# Patient Record
Sex: FEMALE | Race: WHITE | NOT HISPANIC OR LATINO | Employment: OTHER | URBAN - METROPOLITAN AREA
[De-identification: names, ages, dates, MRNs, and addresses within clinical notes are randomized per-mention and may not be internally consistent; named-entity substitution may affect disease eponyms.]

---

## 2017-02-13 ENCOUNTER — TRANSCRIBE ORDERS (OUTPATIENT)
Dept: ADMINISTRATIVE | Facility: HOSPITAL | Age: 72
End: 2017-02-13

## 2017-02-13 DIAGNOSIS — R11.0 NAUSEA ALONE: ICD-10-CM

## 2017-02-13 DIAGNOSIS — E11.40 DIABETIC NEUROPATHY WITH NEUROLOGIC COMPLICATION (HCC): ICD-10-CM

## 2017-02-13 DIAGNOSIS — K74.60 ESOPHAGEAL VARICES IN CIRRHOSIS (HCC): ICD-10-CM

## 2017-02-13 DIAGNOSIS — D64.9 ANEMIA, UNSPECIFIED: ICD-10-CM

## 2017-02-13 DIAGNOSIS — I85.10 ESOPHAGEAL VARICES IN CIRRHOSIS (HCC): ICD-10-CM

## 2017-02-13 DIAGNOSIS — R79.89 OTHER ABNORMAL BLOOD CHEMISTRY: ICD-10-CM

## 2017-02-13 DIAGNOSIS — E11.49 DIABETIC NEUROPATHY WITH NEUROLOGIC COMPLICATION (HCC): ICD-10-CM

## 2017-02-13 DIAGNOSIS — K74.60 HEPATIC CIRRHOSIS, UNSPECIFIED HEPATIC CIRRHOSIS TYPE (HCC): Primary | ICD-10-CM

## 2017-03-28 ENCOUNTER — HOSPITAL ENCOUNTER (OUTPATIENT)
Dept: ULTRASOUND IMAGING | Facility: HOSPITAL | Age: 72
Discharge: HOME | End: 2017-03-28
Attending: INTERNAL MEDICINE
Payer: MEDICARE

## 2017-03-28 DIAGNOSIS — R79.89 OTHER ABNORMAL BLOOD CHEMISTRY: ICD-10-CM

## 2017-03-28 DIAGNOSIS — K74.60 HEPATIC CIRRHOSIS, UNSPECIFIED HEPATIC CIRRHOSIS TYPE (HCC): ICD-10-CM

## 2017-03-28 DIAGNOSIS — D64.9 ANEMIA, UNSPECIFIED: ICD-10-CM

## 2017-03-28 DIAGNOSIS — I85.10 ESOPHAGEAL VARICES IN CIRRHOSIS (HCC): ICD-10-CM

## 2017-03-28 DIAGNOSIS — K74.60 ESOPHAGEAL VARICES IN CIRRHOSIS (HCC): ICD-10-CM

## 2017-03-28 DIAGNOSIS — R11.0 NAUSEA ALONE: ICD-10-CM

## 2017-03-28 DIAGNOSIS — E11.40 DIABETIC NEUROPATHY WITH NEUROLOGIC COMPLICATION (HCC): ICD-10-CM

## 2017-03-28 DIAGNOSIS — E11.49 DIABETIC NEUROPATHY WITH NEUROLOGIC COMPLICATION (HCC): ICD-10-CM

## 2017-03-28 PROCEDURE — 76700 US EXAM ABDOM COMPLETE: CPT

## 2017-06-15 ENCOUNTER — TRANSCRIBE ORDERS (OUTPATIENT)
Dept: ADMINISTRATIVE | Facility: HOSPITAL | Age: 72
End: 2017-06-15

## 2017-06-15 DIAGNOSIS — K74.60 UNSPECIFIED CIRRHOSIS OF LIVER (HCC): Primary | ICD-10-CM

## 2017-06-23 ENCOUNTER — HOSPITAL ENCOUNTER (OUTPATIENT)
Dept: ULTRASOUND IMAGING | Facility: HOSPITAL | Age: 72
Discharge: HOME | End: 2017-06-23
Attending: INTERNAL MEDICINE
Payer: MEDICARE

## 2017-06-23 DIAGNOSIS — K74.60 CIRRHOSIS, NON-ALCOHOLIC (HCC): ICD-10-CM

## 2017-06-23 DIAGNOSIS — K74.60 UNSPECIFIED CIRRHOSIS OF LIVER (HCC): ICD-10-CM

## 2017-06-23 PROCEDURE — 76700 US EXAM ABDOM COMPLETE: CPT

## 2017-09-15 ENCOUNTER — TRANSCRIBE ORDERS (OUTPATIENT)
Dept: ADMINISTRATIVE | Facility: HOSPITAL | Age: 72
End: 2017-09-15

## 2017-09-15 DIAGNOSIS — E11.40 TYPE 2 DIABETES MELLITUS WITH DIABETIC NEUROPATHY, UNSPECIFIED LONG TERM INSULIN USE STATUS: ICD-10-CM

## 2017-09-15 DIAGNOSIS — D64.9 ANEMIA, UNSPECIFIED: Primary | ICD-10-CM

## 2017-09-15 DIAGNOSIS — D69.6 THROMBOCYTOPENIA, UNSPECIFIED (HCC): ICD-10-CM

## 2017-09-15 DIAGNOSIS — R79.89 LIVER FUNCTION TEST ABNORMALITY: ICD-10-CM

## 2017-09-15 DIAGNOSIS — K74.60 NON-ALCOHOLIC CIRRHOSIS (HCC): ICD-10-CM

## 2017-12-12 ENCOUNTER — HOSPITAL ENCOUNTER (OUTPATIENT)
Dept: ULTRASOUND IMAGING | Facility: HOSPITAL | Age: 72
Discharge: HOME | End: 2017-12-12
Attending: INTERNAL MEDICINE
Payer: MEDICARE

## 2017-12-12 ENCOUNTER — GENERIC CONVERSION - ENCOUNTER (OUTPATIENT)
Dept: OTHER | Facility: OTHER | Age: 72
End: 2017-12-12

## 2017-12-12 DIAGNOSIS — K74.60 NON-ALCOHOLIC CIRRHOSIS (HCC): ICD-10-CM

## 2017-12-12 DIAGNOSIS — R79.89 LIVER FUNCTION TEST ABNORMALITY: ICD-10-CM

## 2017-12-12 DIAGNOSIS — E11.40 TYPE 2 DIABETES MELLITUS WITH DIABETIC NEUROPATHY, UNSPECIFIED LONG TERM INSULIN USE STATUS: ICD-10-CM

## 2017-12-12 DIAGNOSIS — D69.6 THROMBOCYTOPENIA (HCC): ICD-10-CM

## 2017-12-12 DIAGNOSIS — D64.9 ANEMIA: ICD-10-CM

## 2017-12-12 PROCEDURE — 76700 US EXAM ABDOM COMPLETE: CPT

## 2018-04-16 ENCOUNTER — TRANSCRIBE ORDERS (OUTPATIENT)
Dept: ADMINISTRATIVE | Facility: HOSPITAL | Age: 73
End: 2018-04-16

## 2018-04-16 DIAGNOSIS — K74.60 HEPATIC CIRRHOSIS, UNSPECIFIED HEPATIC CIRRHOSIS TYPE, UNSPECIFIED WHETHER ASCITES PRESENT (HCC): Primary | ICD-10-CM

## 2018-05-25 LAB — HBA1C MFR BLD HPLC: 0.2 %

## 2018-06-04 DIAGNOSIS — E11.8 TYPE 2 DIABETES MELLITUS WITH COMPLICATION, UNSPECIFIED WHETHER LONG TERM INSULIN USE: Primary | ICD-10-CM

## 2018-06-04 NOTE — TELEPHONE ENCOUNTER
Patient complaining of high sugars, 238 this am but they were 70s the other day  She thinks being upset is making the sugars high  She said they've been high since you adjusted her insulin in January  She said her meter broke and she's been using her 's meter so I offered to give her one from the back with a log so she can record the sugars but she said santosh's too much work  She wants to be seen in June        Can you send a script for a new meter and strips to [facility]

## 2018-06-14 ENCOUNTER — HOSPITAL ENCOUNTER (OUTPATIENT)
Dept: ULTRASOUND IMAGING | Facility: HOSPITAL | Age: 73
Discharge: HOME | End: 2018-06-14
Attending: INTERNAL MEDICINE
Payer: MEDICARE

## 2018-06-14 DIAGNOSIS — K74.60 HEPATIC CIRRHOSIS, UNSPECIFIED HEPATIC CIRRHOSIS TYPE, UNSPECIFIED WHETHER ASCITES PRESENT (HCC): ICD-10-CM

## 2018-06-14 PROCEDURE — 76700 US EXAM ABDOM COMPLETE: CPT

## 2018-07-24 ENCOUNTER — OFFICE VISIT (OUTPATIENT)
Dept: ENDOCRINOLOGY | Facility: HOSPITAL | Age: 73
End: 2018-07-24
Payer: MEDICARE

## 2018-07-24 VITALS
HEIGHT: 63 IN | HEART RATE: 70 BPM | WEIGHT: 223.4 LBS | SYSTOLIC BLOOD PRESSURE: 120 MMHG | DIASTOLIC BLOOD PRESSURE: 64 MMHG | BODY MASS INDEX: 39.58 KG/M2

## 2018-07-24 DIAGNOSIS — E11.42 DIABETIC POLYNEUROPATHY ASSOCIATED WITH TYPE 2 DIABETES MELLITUS (HCC): ICD-10-CM

## 2018-07-24 DIAGNOSIS — E11.65 TYPE 2 DIABETES MELLITUS WITH HYPERGLYCEMIA, WITH LONG-TERM CURRENT USE OF INSULIN (HCC): ICD-10-CM

## 2018-07-24 DIAGNOSIS — Z79.4 TYPE 2 DIABETES MELLITUS WITH HYPERGLYCEMIA, WITH LONG-TERM CURRENT USE OF INSULIN (HCC): ICD-10-CM

## 2018-07-24 DIAGNOSIS — E03.9 HYPOTHYROIDISM, UNSPECIFIED TYPE: Primary | ICD-10-CM

## 2018-07-24 PROBLEM — I10 HYPERTENSION: Status: ACTIVE | Noted: 2018-07-24

## 2018-07-24 PROBLEM — E78.5 HYPERLIPIDEMIA: Status: ACTIVE | Noted: 2018-07-24

## 2018-07-24 PROCEDURE — 99214 OFFICE O/P EST MOD 30 MIN: CPT | Performed by: INTERNAL MEDICINE

## 2018-07-24 RX ORDER — LOSARTAN POTASSIUM 50 MG/1
50 TABLET ORAL DAILY
Refills: 3 | COMMUNITY
Start: 2018-06-01 | End: 2020-06-04 | Stop reason: ALTCHOICE

## 2018-07-24 RX ORDER — LISINOPRIL 20 MG/1
20 TABLET ORAL DAILY
COMMUNITY
Start: 2011-11-21 | End: 2018-10-25 | Stop reason: ALTCHOICE

## 2018-07-24 RX ORDER — HYDROCHLOROTHIAZIDE 25 MG/1
25 TABLET ORAL DAILY
Refills: 3 | COMMUNITY
Start: 2018-06-01 | End: 2020-04-01

## 2018-07-24 RX ORDER — LEVOTHYROXINE SODIUM 0.12 MG/1
125 TABLET ORAL DAILY
Refills: 2 | COMMUNITY
Start: 2018-06-14 | End: 2022-06-17 | Stop reason: SDUPTHER

## 2018-07-24 RX ORDER — NADOLOL 20 MG/1
20 TABLET ORAL DAILY
Refills: 3 | COMMUNITY
Start: 2018-06-01 | End: 2018-10-25 | Stop reason: ALTCHOICE

## 2018-07-24 RX ORDER — SITAGLIPTIN AND METFORMIN HYDROCHLORIDE 1000; 50 MG/1; MG/1
1 TABLET, FILM COATED, EXTENDED RELEASE ORAL 2 TIMES DAILY
Refills: 5 | COMMUNITY
Start: 2018-07-09 | End: 2018-09-13 | Stop reason: SDUPTHER

## 2018-07-24 RX ORDER — FLUTICASONE PROPIONATE 50 MCG
1 SPRAY, SUSPENSION (ML) NASAL DAILY
Status: ON HOLD | COMMUNITY
Start: 2011-11-21

## 2018-07-24 RX ORDER — SIMVASTATIN 40 MG
40 TABLET ORAL DAILY
Refills: 3 | Status: ON HOLD | COMMUNITY
Start: 2018-06-01

## 2018-07-24 RX ORDER — INSULIN GLARGINE 100 [IU]/ML
INJECTION, SOLUTION SUBCUTANEOUS
Refills: 4 | COMMUNITY
Start: 2018-07-09 | End: 2018-11-14 | Stop reason: SDUPTHER

## 2018-07-24 RX ORDER — GLIMEPIRIDE 4 MG/1
4 TABLET ORAL 2 TIMES DAILY
Refills: 5 | COMMUNITY
Start: 2018-06-26 | End: 2018-10-25 | Stop reason: SDUPTHER

## 2018-07-24 RX ORDER — OMEPRAZOLE 20 MG/1
20 CAPSULE, DELAYED RELEASE ORAL DAILY
Refills: 8 | COMMUNITY
Start: 2018-06-13 | End: 2019-03-03 | Stop reason: SDUPTHER

## 2018-07-24 NOTE — PROGRESS NOTES
7/24/2018    Assessment/Plan      Diagnoses and all orders for this visit:    Hypothyroidism, unspecified type  -     TSH, 3rd generation Lab Collect; Future  -     T4, free Lab Collect; Future    Type 2 diabetes mellitus with hyperglycemia, with long-term current use of insulin (formerly [facility])  -     HEMOGLOBIN A1C W/ EAG ESTIMATION Lab Collect; Future  -     Comprehensive metabolic panel Lab Collect; Future  -     Microalbumin / creatinine urine ratio Lab Collect; Future  -     TSH, 3rd generation Lab Collect; Future  -     T4, free Lab Collect; Future  -     Insulin Pen Needle 31G X 5 MM MISC; Use up to 3 times a day    Diabetic polyneuropathy associated with type 2 diabetes mellitus ([facility] )  -     HEMOGLOBIN A1C W/ EAG ESTIMATION Lab Collect; Future  -     Comprehensive metabolic panel Lab Collect; Future  -     Microalbumin / creatinine urine ratio Lab Collect; Future    Other orders  -     fluticasone (FLONASE) 50 mcg/act nasal spray; 1 spray into each nostril daily    -     glimepiride (AMARYL) 4 mg tablet; Take 4 mg by mouth 2 (two) times a day  -     hydrochlorothiazide (HYDRODIURIL) 25 mg tablet; Take 25 mg by mouth daily  -     BASAGLAR KWIKPEN 100 units/mL injection pen; USE 80 TO 90 UNITS SUBCUTANEOUSLY DAILY AS DIRECTED  -     JANUMET XR  MG TB24; Take 1 tablet by mouth 2 (two) times a day  -     levothyroxine 125 mcg tablet; Take 125 mcg by mouth daily  -     lisinopril (ZESTRIL) 20 mg tablet; Take 20 mg by mouth daily    -     losartan (COZAAR) 50 mg tablet; Take 50 mg by mouth daily  -     nadolol (CORGARD) 20 mg tablet; Take 20 mg by mouth daily  -     simvastatin (ZOCOR) 40 mg tablet; Take 40 mg by mouth daily  -     omeprazole (PriLOSEC) 20 mg delayed release capsule; Take 20 mg by mouth daily        Assessment/Plan:  1  Type 2 diabetes insulin requiring  Her most recent hemoglobin A1c is higher than I would like to be although blood sugars have improved since that hemoglobin A1c was done    Based on her blood sugar record, I have asked her to increase her basaglar insulin to 44 units at 12 pm and 32 units in the evening  She will continue the same glimepiride and Janumet  She will continue to check her sugars at least once a day  She will continue to work on diet and activity  2   Diabetic neuropathy  She has a decrease in vibration sensation on exam and ingrown toenail  She is following up with the podiatrist next week  3   Hypothyroidism  She is on levothyroxine 125 mcg daily  I will repeat her thyroid blood work next visit  I will have her follow up in 3 months with preceding hemoglobin A1c, CMP, TSH, free T4, and urine microalbumin to creatinine ratio  CC: Diabetes Consult    History of Present Illness     HPI: Freddy Arriola is a 71y o  year old female with type 2 diabetes for 47 years  She is on oral agents at home and takes Basaglar insulin 42 units at 12 pm and 32 units at hs, Janumet XR 50/1000 mg BID and glimepiride 4 mg BID  She denies any polyphagia, polydipsia, nocturia  She has polyuria because he drinks a lot of water in general   She does have blurry vision  She denies chest pain or shortness of breath  She has numbness and tingling of her feet chronically  She denies nephropathy, retinopathy, heart attack, stroke and claudication but does admit to neuropathy  She was on toujeo insulin up until around January 2018 due to insurance issues  She is struggling with administering the insulin due to difficulty pressing button  She can only give it in her abdomen instead of her arm  She is under a lot of stress caring for her  and disabled daughter  Hypoglycemic episodes: No infrequent  H/o of hypoglycemia causing hospitalization or intervention such as glucagon injection  or ambulance call  No   Hypoglycemia symptoms: confusion and dizziness  Treatment of hypoglycemia: drinks juice  Glucagon:No   Medic alert tag: recommended,Yes       The patient's last eye exam was in last week with some retinopathy  The patient's last foot exam was in due next week  Last A1C was 8 2% on 05/25/2018  Blood Sugar/Glucometer/Pump/CGM review: she checks blood sugars up to once a day  She says blood sugars were up to 300 when she first started the basaglar and are now around mid 100s  Before breakfast: last 2 weeks blood sugars range from 100 -203  Before lunch:   Before dinner:   Bedtime:     Review of Systems   Constitutional: Positive for fatigue  Negative for unexpected weight change  HENT: Negative for hearing loss and tinnitus  Eyes: Positive for visual disturbance  Eyes water a lot and are worse since she had cataract surgery  She has chronic blurry vision  Respiratory: Negative for chest tightness and shortness of breath  Cardiovascular: Positive for leg swelling  Negative for chest pain and palpitations  Gastrointestinal: Positive for constipation  Negative for abdominal pain, diarrhea and nausea  Endocrine: Positive for polyuria  Negative for polydipsia and polyphagia  Drinks a lot so urinates  Nocturia none  Musculoskeletal: Negative for arthralgias and back pain  Skin: Negative for wound  Has a sore toe on right 1st toe  Neurological: Positive for tremors and numbness  Negative for dizziness, light-headedness and headaches  She has numbness and tingling of the feet  She get ingroin toenails regularly  Feet gets cold easily  Legs feel like lead bricks with fluid in them  Occasional tremors  Occasional dizziness  Psychiatric/Behavioral: Positive for sleep disturbance  Negative for dysphoric mood  The patient is not nervous/anxious  Up a lot due to family problems and has a lot of nightmares         Historical Information   Past Medical History:   Diagnosis Date    Fatty liver     Osteoarthritis      Past Surgical History:   Procedure Laterality Date    CATARACT EXTRACTION, BILATERAL      LAPAROSCOPIC CHOLECYSTECTOMY      SHOULDER SURGERY Right     calcium depsoti    TUBAL LIGATION      UMBILICAL HERNIA REPAIR      with mesh placed     Social History   History   Alcohol Use No     History   Drug Use No     History   Smoking Status    Never Smoker   Smokeless Tobacco    Never Used     Family History:   Family History   Problem Relation Age of Onset    Breast cancer Mother     Diabetes type II Father     Thyroid disease unspecified Daughter     Down syndrome Daughter     Diabetes type II Daughter     Diabetes type II Sister     No Known Problems Brother     Prostate cancer Brother     No Known Problems Sister     Stroke Sister     No Known Problems Son        Meds/Allergies   Current Outpatient Prescriptions   Medication Sig Dispense Refill    BASAGLAR KWIKPEN 100 units/mL injection pen USE 80 TO 90 UNITS SUBCUTANEOUSLY DAILY AS DIRECTED  4    fluticasone (FLONASE) 50 mcg/act nasal spray 1 spray into each nostril daily        glimepiride (AMARYL) 4 mg tablet Take 4 mg by mouth 2 (two) times a day  5    hydrochlorothiazide (HYDRODIURIL) 25 mg tablet Take 25 mg by mouth daily  3    JANUMET XR  MG TB24 Take 1 tablet by mouth 2 (two) times a day  5    levothyroxine 125 mcg tablet Take 125 mcg by mouth daily  2    lisinopril (ZESTRIL) 20 mg tablet Take 20 mg by mouth daily        losartan (COZAAR) 50 mg tablet Take 50 mg by mouth daily  3    nadolol (CORGARD) 20 mg tablet Take 20 mg by mouth daily  3    omeprazole (PriLOSEC) 20 mg delayed release capsule Take 20 mg by mouth daily  8    simvastatin (ZOCOR) 40 mg tablet Take 40 mg by mouth daily  3    Insulin Pen Needle 31G X 5 MM MISC Use up to 3 times a day 100 each 6     No current facility-administered medications for this visit        Allergies   Allergen Reactions    Strawberry C [Ascorbate] Other (See Comments)     Mouth sores    Penicillins Rash       Objective   Vitals: Blood pressure 120/64, pulse 70, height 5' 3" (1 6 m), weight 101 kg (223 lb 6 4 oz)  Invasive Devices          No matching active lines, drains, or airways          Physical Exam   Constitutional: She is oriented to person, place, and time  She appears well-developed and well-nourished  HENT:   Head: Normocephalic and atraumatic  Eyes: Conjunctivae and EOM are normal  Pupils are equal, round, and reactive to light  No lid lag, stare, proptosis, or periorbital edema  Neck: Normal range of motion  Neck supple  No thyromegaly present  No carotid bruits  Cardiovascular: Normal rate, regular rhythm, normal heart sounds and intact distal pulses  Pulses are no weak pulses  No murmur heard  Pulses:       Dorsalis pedis pulses are 2+ on the right side, and 2+ on the left side  Posterior tibial pulses are 2+ on the right side, and 2+ on the left side  Pulmonary/Chest: Effort normal and breath sounds normal  She has no wheezes  Abdominal: Soft  Bowel sounds are normal  There is no tenderness  Musculoskeletal: Normal range of motion  She exhibits no edema or deformity  Lateral right 1st toe with erythema and sore, ingroin toenail  Hammer toes right 2nd and 3rd toes  No tremor of the outstretched hands  No spinous process tenderness  No CVA tenderness  Feet:   Right Foot:   Skin Integrity: Positive for erythema  Negative for ulcer, skin breakdown, warmth, callus or dry skin  Left Foot:   Skin Integrity: Negative for ulcer, skin breakdown, erythema, warmth, callus or dry skin  Lymphadenopathy:     She has no cervical adenopathy  Neurological: She is alert and oriented to person, place, and time  She has normal reflexes  Vibration sensation diminished to the 1st toe DIP joint bilaterally  Microfilament sensation intact to the bilateral lower extremities  Achilles tendon reflex is intact bilaterally  Skin: Skin is warm and dry  No rash noted  Vitals reviewed  Patient's shoes and socks removed  Right Foot/Ankle   Right Foot Inspection  Skin Exam: skin normal, skin intact and erythema no dry skin, no warmth, no callus, no maceration, no abnormal color, no pre-ulcer, no ulcer and no callus                          Toe Exam: ROM and strength within normal limits  Sensory   Vibration: diminished  Proprioception: intact   Monofilament testing: intact  Vascular  Capillary refills: < 3 seconds  The right DP pulse is 2+  The right PT pulse is 2+  Left Foot/Ankle  Left Foot Inspection  Skin Exam: skin normal and skin intactno dry skin, no warmth, no erythema, no maceration, normal color, no pre-ulcer, no ulcer and no callus                         Toe Exam: ROM and strength within normal limits                   Sensory   Vibration: diminished  Proprioception: intact  Monofilament: intact  Vascular  Capillary refills: < 3 seconds  The left DP pulse is 2+  The left PT pulse is 2+  Assign Risk Category:  Deformity present; No loss of protective sensation; No weak pulses       Risk: 1      The history was obtained from the review of the chart and from the patient  Lab Results:   Blood work done a [address] [place] [address] on 05/25/2018 showed a Hgba1c of 8 2%    CMP showed a glucose of 76 butWas otherwise normal           Future Appointments  Date Time Provider John Alvarado   10/25/2018 9:40 AM Angela Hutton MD [facility]    foot

## 2018-07-24 NOTE — LETTER
July 24, 2018     Diandra Covington DO  [address]    Patient: Juan Everett   YOB: 1945   Date of Visit: 7/24/2018       Dear Dr Tiffanie Hall: Thank you for referring Sandra Davis to me for evaluation  Below are my notes for this consultation  If you have questions, please do not hesitate to call me  I look forward to following your patient along with you  Sincerely,        Jayjay Wiseman MD        CC: No Recipients  Jayjay Wiseman MD  7/24/2018  5:09 PM  Sign at close encounter  7/24/2018    Assessment/Plan      Diagnoses and all orders for this visit:    Hypothyroidism, unspecified type  -     TSH, 3rd generation Lab Collect; Future  -     T4, free Lab Collect; Future    Type 2 diabetes mellitus with hyperglycemia, with long-term current use of insulin ([facility])  -     HEMOGLOBIN A1C W/ EAG ESTIMATION Lab Collect; Future  -     Comprehensive metabolic panel Lab Collect; Future  -     Microalbumin / creatinine urine ratio Lab Collect; Future  -     TSH, 3rd generation Lab Collect; Future  -     T4, free Lab Collect; Future  -     Insulin Pen Needle 31G X 5 MM MISC; Use up to 3 times a day    Diabetic polyneuropathy associated with type 2 diabetes mellitus ([facility] )  -     HEMOGLOBIN A1C W/ EAG ESTIMATION Lab Collect; Future  -     Comprehensive metabolic panel Lab Collect; Future  -     Microalbumin / creatinine urine ratio Lab Collect; Future    Other orders  -     fluticasone (FLONASE) 50 mcg/act nasal spray; 1 spray into each nostril daily    -     glimepiride (AMARYL) 4 mg tablet; Take 4 mg by mouth 2 (two) times a day  -     hydrochlorothiazide (HYDRODIURIL) 25 mg tablet; Take 25 mg by mouth daily  -     BASAGLAR KWIKPEN 100 units/mL injection pen; USE 80 TO 90 UNITS SUBCUTANEOUSLY DAILY AS DIRECTED  -     JANUMET XR  MG TB24; Take 1 tablet by mouth 2 (two) times a day  -     levothyroxine 125 mcg tablet;  Take 125 mcg by mouth daily  -     lisinopril (ZESTRIL) 20 mg tablet; Take 20 mg by mouth daily    -     losartan (COZAAR) 50 mg tablet; Take 50 mg by mouth daily  -     nadolol (CORGARD) 20 mg tablet; Take 20 mg by mouth daily  -     simvastatin (ZOCOR) 40 mg tablet; Take 40 mg by mouth daily  -     omeprazole (PriLOSEC) 20 mg delayed release capsule; Take 20 mg by mouth daily        Assessment/Plan:  1  Type 2 diabetes insulin requiring  Her most recent hemoglobin A1c is higher than I would like to be although blood sugars have improved since that hemoglobin A1c was done  Based on her blood sugar record, I have asked her to increase her basaglar insulin to 44 units at 12 pm and 32 units in the evening  She will continue the same glimepiride and Janumet  She will continue to check her sugars at least once a day  She will continue to work on diet and activity  2   Diabetic neuropathy  She has a decrease in vibration sensation on exam and ingrown toenail  She is following up with the podiatrist next week  3   Hypothyroidism  She is on levothyroxine 125 mcg daily  I will repeat her thyroid blood work next visit  I will have her follow up in 3 months with preceding hemoglobin A1c, CMP, TSH, free T4, and urine microalbumin to creatinine ratio  CC: Diabetes Consult    History of Present Illness     HPI: Juan Everett is a 71y o  year old female with type 2 diabetes for 47 years  She is on oral agents at home and takes Basaglar insulin 42 units at 12 pm and 32 units at hs, Janumet XR 50/1000 mg BID and glimepiride 4 mg BID  She denies any polyphagia, polydipsia, nocturia  She has polyuria because he drinks a lot of water in general   She does have blurry vision  She denies chest pain or shortness of breath  She has numbness and tingling of her feet chronically  She denies nephropathy, retinopathy, heart attack, stroke and claudication but does admit to neuropathy    She was on toujeo insulin up until around January 2018 due to insurance issues  She is struggling with administering the insulin due to difficulty pressing button  She can only give it in her abdomen instead of her arm  She is under a lot of stress caring for her  and disabled daughter  Hypoglycemic episodes: No infrequent  H/o of hypoglycemia causing hospitalization or intervention such as glucagon injection  or ambulance call  No   Hypoglycemia symptoms: confusion and dizziness  Treatment of hypoglycemia: drinks juice  Glucagon:No   Medic alert tag: recommended,Yes  The patient's last eye exam was in last week with some retinopathy  The patient's last foot exam was in due next week  Last A1C was 8 2% on 05/25/2018  Blood Sugar/Glucometer/Pump/CGM review: she checks blood sugars up to once a day  She says blood sugars were up to 300 when she first started the basaglar and are now around mid 100s  Before breakfast: last 2 weeks blood sugars range from 100 -203  Before lunch:   Before dinner:   Bedtime:     Review of Systems   Constitutional: Positive for fatigue  Negative for unexpected weight change  HENT: Negative for hearing loss and tinnitus  Eyes: Positive for visual disturbance  Eyes water a lot and are worse since she had cataract surgery  She has chronic blurry vision  Respiratory: Negative for chest tightness and shortness of breath  Cardiovascular: Positive for leg swelling  Negative for chest pain and palpitations  Gastrointestinal: Positive for constipation  Negative for abdominal pain, diarrhea and nausea  Endocrine: Positive for polyuria  Negative for polydipsia and polyphagia  Drinks a lot so urinates  Nocturia none  Musculoskeletal: Negative for arthralgias and back pain  Skin: Negative for wound  Has a sore toe on right 1st toe  Neurological: Positive for tremors and numbness  Negative for dizziness, light-headedness and headaches          She has numbness and tingling of the feet  She get ingroin toenails regularly  Feet gets cold easily  Legs feel like lead bricks with fluid in them  Occasional tremors  Occasional dizziness  Psychiatric/Behavioral: Positive for sleep disturbance  Negative for dysphoric mood  The patient is not nervous/anxious  Up a lot due to family problems and has a lot of nightmares         Historical Information   Past Medical History:   Diagnosis Date    Fatty liver     Osteoarthritis      Past Surgical History:   Procedure Laterality Date    CATARACT EXTRACTION, BILATERAL      LAPAROSCOPIC CHOLECYSTECTOMY      SHOULDER SURGERY Right     calcium depsoti    TUBAL LIGATION      UMBILICAL HERNIA REPAIR      with mesh placed     Social History   History   Alcohol Use No     History   Drug Use No     History   Smoking Status    Never Smoker   Smokeless Tobacco    Never Used     Family History:   Family History   Problem Relation Age of Onset    Breast cancer Mother     Diabetes type II Father     Thyroid disease unspecified Daughter     Down syndrome Daughter     Diabetes type II Daughter     Diabetes type II Sister     No Known Problems Brother     Prostate cancer Brother     No Known Problems Sister     Stroke Sister     No Known Problems Son        Meds/Allergies   Current Outpatient Prescriptions   Medication Sig Dispense Refill    BASAGLAR KWIKPEN 100 units/mL injection pen USE 80 TO 90 UNITS SUBCUTANEOUSLY DAILY AS DIRECTED  4    fluticasone (FLONASE) 50 mcg/act nasal spray 1 spray into each nostril daily        glimepiride (AMARYL) 4 mg tablet Take 4 mg by mouth 2 (two) times a day  5    hydrochlorothiazide (HYDRODIURIL) 25 mg tablet Take 25 mg by mouth daily  3    JANUMET XR  MG TB24 Take 1 tablet by mouth 2 (two) times a day  5    levothyroxine 125 mcg tablet Take 125 mcg by mouth daily  2    lisinopril (ZESTRIL) 20 mg tablet Take 20 mg by mouth daily        losartan (COZAAR) 50 mg tablet Take 50 mg by mouth daily 3    nadolol (CORGARD) 20 mg tablet Take 20 mg by mouth daily  3    omeprazole (PriLOSEC) 20 mg delayed release capsule Take 20 mg by mouth daily  8    simvastatin (ZOCOR) 40 mg tablet Take 40 mg by mouth daily  3    Insulin Pen Needle 31G X 5 MM MISC Use up to 3 times a day 100 each 6     No current facility-administered medications for this visit  Allergies   Allergen Reactions    Strawberry C [Ascorbate] Other (See Comments)     Mouth sores    Penicillins Rash       Objective   Vitals: Blood pressure 120/64, pulse 70, height 5' 3" (1 6 m), weight 101 kg (223 lb 6 4 oz)  Invasive Devices          No matching active lines, drains, or airways          Physical Exam   Constitutional: She is oriented to person, place, and time  She appears well-developed and well-nourished  HENT:   Head: Normocephalic and atraumatic  Eyes: Conjunctivae and EOM are normal  Pupils are equal, round, and reactive to light  No lid lag, stare, proptosis, or periorbital edema  Neck: Normal range of motion  Neck supple  No thyromegaly present  No carotid bruits  Cardiovascular: Normal rate, regular rhythm, normal heart sounds and intact distal pulses  Pulses are no weak pulses  No murmur heard  Pulses:       Dorsalis pedis pulses are 2+ on the right side, and 2+ on the left side  Posterior tibial pulses are 2+ on the right side, and 2+ on the left side  Pulmonary/Chest: Effort normal and breath sounds normal  She has no wheezes  Abdominal: Soft  Bowel sounds are normal  There is no tenderness  Musculoskeletal: Normal range of motion  She exhibits no edema or deformity  Lateral right 1st toe with erythema and sore, ingroin toenail  Hammer toes right 2nd and 3rd toes  No tremor of the outstretched hands  No spinous process tenderness  No CVA tenderness  Feet:   Right Foot:   Skin Integrity: Positive for erythema  Negative for ulcer, skin breakdown, warmth, callus or dry skin     Left Foot:   Skin Integrity: Negative for ulcer, skin breakdown, erythema, warmth, callus or dry skin  Lymphadenopathy:     She has no cervical adenopathy  Neurological: She is alert and oriented to person, place, and time  She has normal reflexes  Vibration sensation diminished to the 1st toe DIP joint bilaterally  Microfilament sensation intact to the bilateral lower extremities  Achilles tendon reflex is intact bilaterally  Skin: Skin is warm and dry  No rash noted  Vitals reviewed  Patient's shoes and socks removed  Right Foot/Ankle   Right Foot Inspection  Skin Exam: skin normal, skin intact and erythema no dry skin, no warmth, no callus, no maceration, no abnormal color, no pre-ulcer, no ulcer and no callus                          Toe Exam: ROM and strength within normal limits  Sensory   Vibration: diminished  Proprioception: intact   Monofilament testing: intact  Vascular  Capillary refills: < 3 seconds  The right DP pulse is 2+  The right PT pulse is 2+  Left Foot/Ankle  Left Foot Inspection  Skin Exam: skin normal and skin intactno dry skin, no warmth, no erythema, no maceration, normal color, no pre-ulcer, no ulcer and no callus                         Toe Exam: ROM and strength within normal limits                   Sensory   Vibration: diminished  Proprioception: intact  Monofilament: intact  Vascular  Capillary refills: < 3 seconds  The left DP pulse is 2+  The left PT pulse is 2+  Assign Risk Category:  Deformity present; No loss of protective sensation; No weak pulses       Risk: 1      The history was obtained from the review of the chart and from the patient  Lab Results:   Blood work done a [facility] on 05/25/2018 showed a Hgba1c of 8 2%    CMP showed a glucose of 76 butWas otherwise normal           Future Appointments  Date Time Provider John Alvarado   10/25/2018 9:40 AM Elina Mar MD [facility]    foot

## 2018-07-24 NOTE — PATIENT INSTRUCTIONS
Hgba1c is 8 2%  This is higher than I would like  Increase Basaglar to 44 units at 12 pm and 32 units at bedtime  Continue the same glimepiride and Janumet  Continue to check blood sugars at least once a day  Follow up in 3 months with blood work

## 2018-09-13 DIAGNOSIS — E11.8 TYPE 2 DIABETES MELLITUS WITH COMPLICATION, UNSPECIFIED WHETHER LONG TERM INSULIN USE: Primary | ICD-10-CM

## 2018-09-13 RX ORDER — SITAGLIPTIN AND METFORMIN HYDROCHLORIDE 1000; 50 MG/1; MG/1
1 TABLET, FILM COATED, EXTENDED RELEASE ORAL 2 TIMES DAILY
Qty: 30 TABLET | Refills: 3 | Status: SHIPPED | OUTPATIENT
Start: 2018-09-13 | End: 2018-11-17 | Stop reason: SDUPTHER

## 2018-09-13 NOTE — TELEPHONE ENCOUNTER
Can you send another script of janumet to [facility]? Patient would like 30 days with refills

## 2018-10-08 LAB — HBA1C MFR BLD HPLC: 8.7 %

## 2018-10-25 ENCOUNTER — TELEPHONE (OUTPATIENT)
Dept: ENDOCRINOLOGY | Facility: HOSPITAL | Age: 73
End: 2018-10-25

## 2018-10-25 ENCOUNTER — OFFICE VISIT (OUTPATIENT)
Dept: ENDOCRINOLOGY | Facility: HOSPITAL | Age: 73
End: 2018-10-25
Payer: MEDICARE

## 2018-10-25 VITALS
WEIGHT: 228.4 LBS | DIASTOLIC BLOOD PRESSURE: 68 MMHG | BODY MASS INDEX: 40.47 KG/M2 | SYSTOLIC BLOOD PRESSURE: 126 MMHG | HEIGHT: 63 IN | HEART RATE: 68 BPM

## 2018-10-25 DIAGNOSIS — E11.42 DIABETIC POLYNEUROPATHY ASSOCIATED WITH TYPE 2 DIABETES MELLITUS (HCC): ICD-10-CM

## 2018-10-25 DIAGNOSIS — E11.65 TYPE 2 DIABETES MELLITUS WITH HYPERGLYCEMIA, WITH LONG-TERM CURRENT USE OF INSULIN (HCC): Primary | ICD-10-CM

## 2018-10-25 DIAGNOSIS — E11.8 TYPE 2 DIABETES MELLITUS WITH COMPLICATION, UNSPECIFIED WHETHER LONG TERM INSULIN USE: ICD-10-CM

## 2018-10-25 DIAGNOSIS — E03.9 HYPOTHYROIDISM, UNSPECIFIED TYPE: ICD-10-CM

## 2018-10-25 DIAGNOSIS — E11.8 TYPE 2 DIABETES MELLITUS WITH COMPLICATION, UNSPECIFIED WHETHER LONG TERM INSULIN USE: Primary | ICD-10-CM

## 2018-10-25 DIAGNOSIS — Z79.4 TYPE 2 DIABETES MELLITUS WITH HYPERGLYCEMIA, WITH LONG-TERM CURRENT USE OF INSULIN (HCC): Primary | ICD-10-CM

## 2018-10-25 PROCEDURE — 99214 OFFICE O/P EST MOD 30 MIN: CPT | Performed by: INTERNAL MEDICINE

## 2018-10-25 RX ORDER — GLIMEPIRIDE 4 MG/1
4 TABLET ORAL 2 TIMES DAILY
Qty: 60 TABLET | Refills: 6 | Status: SHIPPED | OUTPATIENT
Start: 2018-10-25 | End: 2019-11-13 | Stop reason: SDUPTHER

## 2018-10-25 RX ORDER — GLUCOSA SU 2KCL/CHONDROITIN SU 500-400 MG
CAPSULE ORAL
COMMUNITY
End: 2020-02-26 | Stop reason: ALTCHOICE

## 2018-10-25 RX ORDER — CHLORAL HYDRATE 500 MG
1000 CAPSULE ORAL DAILY
COMMUNITY
End: 2020-02-26 | Stop reason: ALTCHOICE

## 2018-10-25 RX ORDER — BLOOD-GLUCOSE METER
EACH MISCELLANEOUS
Qty: 1 KIT | Refills: 0 | Status: SHIPPED | OUTPATIENT
Start: 2018-10-25 | End: 2020-09-28

## 2018-10-25 NOTE — TELEPHONE ENCOUNTER
After pt's appt today she stopped back in to say she needs script sent for One-touch verio meter to [facility]   THanks

## 2018-10-25 NOTE — LETTER
October 25, 2018     Diandra Covington [facility] Dr Rolan Cade [facility]  [address]     Patient: Manish Molina   YOB: 1945   Date of Visit: 10/25/2018       Dear Dr Isamar Cutler: Thank you for referring Tenzin Banuelos to me for evaluation  Below are my notes for this consultation  If you have questions, please do not hesitate to call me  I look forward to following your patient along with you  Sincerely,        Lila Dunbar MD        CC: No Recipients  Lila Dunbar MD  10/25/2018  5:26 PM  Sign at close encounter  10/25/2018    Assessment/Plan      Diagnoses and all orders for this visit:    Type 2 diabetes mellitus with hyperglycemia, with long-term current use of insulin (HCC)  -     glimepiride (AMARYL) 4 mg tablet; Take 1 tablet (4 mg total) by mouth 2 (two) times a day  -     Insulin Pen Needle 31G X 5 MM MISC; Use up to 3 times a day  -     HEMOGLOBIN A1C W/ EAG ESTIMATION Lab Collect; Future  -     Comprehensive metabolic panel Lab Collect; Future    Diabetic polyneuropathy associated with type 2 diabetes mellitus (HCC)  -     glimepiride (AMARYL) 4 mg tablet; Take 1 tablet (4 mg total) by mouth 2 (two) times a day  -     HEMOGLOBIN A1C W/ EAG ESTIMATION Lab Collect; Future  -     Comprehensive metabolic panel Lab Collect; Future    Hypothyroidism, unspecified type    Other orders  -     Coenzyme Q10 (CO Q10) 100 MG CAPS; Take by mouth  -     Calcium Carb-Cholecalciferol (CALCIUM 1000 + D PO); Take by mouth  -     Omega-3 Fatty Acids (FISH OIL) 1,000 mg; Take 1,000 mg by mouth daily        Assessment/Plan:  1  Type 2 diabetes insulin requiring  Her most recent hemoglobin A1c has gone up  I think some of it is due to dietary indiscretion in the stress she is under  Her blood sugars over the last week and half of improved with her increase in activity  For now, I will not increase her insulin as I am afraid she will have hypoglycemia overnight    She will continue the same insulin, glimepiride and Janumet for now  She will continue to check her blood sugars up to 3 times a day  She needs to work on her diet with eating fewer carbohydrates and more vegetables  2   Diabetic neuropathy  She has no significant symptoms PAD  Just decreased vibration sensation on exam   I will follow this over time  3   Hypothyroidism  Her most recent thyroid function tests are normal  She will continue the same levothyroxine 125 mcg daily  I have asked her to follow up in 3 months with preceding hemoglobin A1c and CMP  CC: Diabetes Consult    History of Present Illness     HPI: Deangelo Hdez is a 71y o  year old female with type 2 diabetes for 47 years  She is on oral agents and insulin at home and takes Basaglar insulin 44 in am and 32 in pm, glimepiride 4 mg bid, and janumet XR 50/10717 BId  She denies any polyphagia, polydipsia, nocturia,   and blurry vision  She has polyuria  She denies chest pain, but gets shortness of breath with activity  She has numbness and tingling of the feet which is relatively stable  She denies nephropathy, retinopathy, heart attack, stroke and claudication but does admit to neuropathy  Hypoglycemic episodes: Yes occasional Occurinfg more frequently as working outside  Happening as low as 50-60s on awakening  H/o of hypoglycemia causing hospitalization or intervention such as glucagon injection  or ambulance call  No     The patient's last eye exam was in last month with no retinopathy  The patient's last foot exam was in every 3 months regularly  Last A1C was   Lab Results   Component Value Date    HGBA1C 8 7 10/08/2018     Blood Sugar/Glucometer/Pump/CGM review: checks blood sugars once a day in am   Before breakfast: , occasional over 200 per blood glucose monitor download  Before lunch:   Before dinner:   Bedtime:     She has a history of hypothyroidism and is on levothyroxine 125 mcg daily    She does tend to be on the constipated side her weight is down 5 lb since last visit  She has no palpitation or diarrhea  She has fatigue but does not sleep very well at night  She has no compressive thyroid symptoms or difficulties with swallowing  Review of Systems   Constitutional: Positive for fatigue and unexpected weight change  Weight down 5 lbs since last visit  HENT: Negative for trouble swallowing  Eyes: Negative for visual disturbance  Wears glasses  Respiratory: Positive for shortness of breath  Negative for chest tightness  Will get occasional SOB with activity  Cardiovascular: Negative for chest pain and palpitations  Sees DR Miguel Moran next month  Gastrointestinal: Positive for constipation  Negative for abdominal pain, diarrhea and nausea  Tends to be on the contstipated side  Endocrine: Positive for polyuria  Negative for polydipsia and polyphagia  No nocturia  Musculoskeletal: Positive for arthralgias  Skin: Negative for wound  Neurological: Positive for numbness  Has numbness and tingling of the feet stable  Will have left sided calf throbbing at times  Psychiatric/Behavioral: Positive for sleep disturbance  Under stress with 's illness  He recently had kidney stone  Poor sleep  Stress with daughter's health issues and caring for her too         Historical Information   Past Medical History:   Diagnosis Date    Fatty liver     Osteoarthritis      Past Surgical History:   Procedure Laterality Date    CATARACT EXTRACTION, BILATERAL      LAPAROSCOPIC CHOLECYSTECTOMY      SHOULDER SURGERY Right     calcium depsoti    TUBAL LIGATION      UMBILICAL HERNIA REPAIR      with mesh placed     Social History   History   Alcohol Use No     History   Drug Use No     History   Smoking Status    Never Smoker   Smokeless Tobacco    Never Used     Family History:   Family History   Problem Relation Age of Onset    Breast cancer Mother    Constance Yordy Diabetes type II Father     Thyroid disease unspecified Daughter     Down syndrome Daughter     Diabetes type II Daughter     Diabetes type II Sister     No Known Problems Brother     Prostate cancer Brother     No Known Problems Sister     Stroke Sister     No Known Problems Son        Meds/Allergies   Current Outpatient Prescriptions   Medication Sig Dispense Refill    BASAGLAR KWIKPEN 100 units/mL injection pen USE 80 TO 90 UNITS SUBCUTANEOUSLY DAILY AS DIRECTED  4    Calcium Carb-Cholecalciferol (CALCIUM 1000 + D PO) Take by mouth      Coenzyme Q10 (CO Q10) 100 MG CAPS Take by mouth      fluticasone (FLONASE) 50 mcg/act nasal spray 1 spray into each nostril daily prn       glimepiride (AMARYL) 4 mg tablet Take 1 tablet (4 mg total) by mouth 2 (two) times a day 60 tablet 6    hydrochlorothiazide (HYDRODIURIL) 25 mg tablet Take 25 mg by mouth daily  3    Insulin Pen Needle 31G X 5 MM MISC Use up to 3 times a day 100 each 6    JANUMET XR  MG TB24 Take 1 tablet by mouth 2 (two) times a day 30 tablet 3    levothyroxine 125 mcg tablet Take 125 mcg by mouth daily  2    losartan (COZAAR) 50 mg tablet Take 50 mg by mouth daily  3    Omega-3 Fatty Acids (FISH OIL) 1,000 mg Take 1,000 mg by mouth daily      omeprazole (PriLOSEC) 20 mg delayed release capsule Take 20 mg by mouth daily  8    simvastatin (ZOCOR) 40 mg tablet Take 40 mg by mouth daily  3    Blood Glucose Monitoring Suppl (ONETOUCH VERIO) w/Device KIT Check sugars three times a day 1 kit 0    glucose blood (ONETOUCH VERIO) test strip Check sugars three times a day 100 each 6     No current facility-administered medications for this visit  Allergies   Allergen Reactions    Latex     Strawberry C [Ascorbate] Other (See Comments)     Mouth sores    Penicillins Rash       Objective   Vitals: Blood pressure 126/68, pulse 68, height 5' 3" (1 6 m), weight 104 kg (228 lb 6 4 oz)    Invasive Devices          No matching active lines, drains, or airways          Physical Exam   Constitutional: She is oriented to person, place, and time  She appears well-developed and well-nourished  HENT:   Head: Normocephalic and atraumatic  Eyes: Pupils are equal, round, and reactive to light  Conjunctivae and EOM are normal    Neck: Normal range of motion  Neck supple  No thyromegaly present  No carotid bruits  Cardiovascular: Normal rate, regular rhythm, normal heart sounds and intact distal pulses  No murmur heard  Pulmonary/Chest: Effort normal and breath sounds normal  She has no wheezes  Musculoskeletal: Normal range of motion  She exhibits no edema or deformity  Slight tremor of the outstretched hands  Hammer toes right 3rd and 4th toes  No ulceration of the feet when examined with the shoes and socks removed  Lymphadenopathy:     She has no cervical adenopathy  Neurological: She is alert and oriented to person, place, and time  She has normal reflexes  Skin: Skin is warm and dry  No rash noted  Vitals reviewed  The history was obtained from the review of the chart and from the patient  Lab Results:    Most recent Alc is  Lab Results   Component Value Date    HGBA1C 8 7 10/08/2018           Blood work done a [facility] on 10/08/2018 showed a TSH of 2 27 with a free T4 1 61  CMP showed a fasting glucose of 84 with a BUN of 24, creatinine of 0 75, GFR over 60, BUN/creatinine ratio of 32, but was otherwise normal  Total cholesterol is 146, triglyceride 95, HDL 36, LDL 91  Urine microalbumin to creatinine ratio was less than 1 2 mg/dL            Future Appointments  Date Time Provider John Alvarado   1/18/2019 11:40 AM Mariza Oliva MD [facility]

## 2018-10-25 NOTE — PATIENT INSTRUCTIONS
Hgab1c is 8 7%  This is higher  No change for now in insulin doses  continue the same diabetes medicines for now  You need toe at fewer carbs and more vegeis  Follow up in 3 months with blood work

## 2018-10-25 NOTE — PROGRESS NOTES
10/25/2018    Assessment/Plan      Diagnoses and all orders for this visit:    Type 2 diabetes mellitus with hyperglycemia, with long-term current use of insulin (HCC)  -     glimepiride (AMARYL) 4 mg tablet; Take 1 tablet (4 mg total) by mouth 2 (two) times a day  -     Insulin Pen Needle 31G X 5 MM MISC; Use up to 3 times a day  -     HEMOGLOBIN A1C W/ EAG ESTIMATION Lab Collect; Future  -     Comprehensive metabolic panel Lab Collect; Future    Diabetic polyneuropathy associated with type 2 diabetes mellitus (HCC)  -     glimepiride (AMARYL) 4 mg tablet; Take 1 tablet (4 mg total) by mouth 2 (two) times a day  -     HEMOGLOBIN A1C W/ EAG ESTIMATION Lab Collect; Future  -     Comprehensive metabolic panel Lab Collect; Future    Hypothyroidism, unspecified type    Other orders  -     Coenzyme Q10 (CO Q10) 100 MG CAPS; Take by mouth  -     Calcium Carb-Cholecalciferol (CALCIUM 1000 + D PO); Take by mouth  -     Omega-3 Fatty Acids (FISH OIL) 1,000 mg; Take 1,000 mg by mouth daily        Assessment/Plan:  1  Type 2 diabetes insulin requiring  Her most recent hemoglobin A1c has gone up  I think some of it is due to dietary indiscretion in the stress she is under  Her blood sugars over the last week and half of improved with her increase in activity  For now, I will not increase her insulin as I am afraid she will have hypoglycemia overnight  She will continue the same insulin, glimepiride and Janumet for now  She will continue to check her blood sugars up to 3 times a day  She needs to work on her diet with eating fewer carbohydrates and more vegetables  2   Diabetic neuropathy  She has no significant symptoms PAD  Just decreased vibration sensation on exam   I will follow this over time  3   Hypothyroidism  Her most recent thyroid function tests are normal  She will continue the same levothyroxine 125 mcg daily  I have asked her to follow up in 3 months with preceding hemoglobin A1c and CMP        CC: Diabetes and thyroid follow up    History of Present Illness     HPI: Evens Gillette is a 71y o  year old female with type 2 diabetes for 47 years  She is on oral agents and insulin at home and takes Basaglar insulin 44 in am and 32 in pm, glimepiride 4 mg bid, and janumet XR 50/86200 BId  She denies any polyphagia, polydipsia, nocturia,   and blurry vision  She has polyuria  She denies chest pain, but gets shortness of breath with activity  She has numbness and tingling of the feet which is relatively stable  She denies nephropathy, retinopathy, heart attack, stroke and claudication but does admit to neuropathy  Hypoglycemic episodes: Yes occasional Occurinfg more frequently as working outside  Happening as low as 50-60s on awakening  H/o of hypoglycemia causing hospitalization or intervention such as glucagon injection  or ambulance call  No     The patient's last eye exam was in last month with no retinopathy  The patient's last foot exam was in every 3 months regularly  Last A1C was   Lab Results   Component Value Date    HGBA1C 8 7 10/08/2018     Blood Sugar/Glucometer/Pump/CGM review: checks blood sugars once a day in am   Before breakfast: , occasional over 200 per blood glucose monitor download  Before lunch:   Before dinner:   Bedtime:     She has a history of hypothyroidism and is on levothyroxine 125 mcg daily  She does tend to be on the constipated side her weight is down 5 lb since last visit  She has no palpitation or diarrhea  She has fatigue but does not sleep very well at night  She has no compressive thyroid symptoms or difficulties with swallowing  Review of Systems   Constitutional: Positive for fatigue and unexpected weight change  Weight down 5 lbs since last visit  HENT: Negative for trouble swallowing  Eyes: Negative for visual disturbance  Wears glasses  Respiratory: Positive for shortness of breath  Negative for chest tightness  Will get occasional SOB with activity  Cardiovascular: Negative for chest pain and palpitations  Sees DR Woo Love next month  Gastrointestinal: Positive for constipation  Negative for abdominal pain, diarrhea and nausea  Tends to be on the contstipated side  Endocrine: Positive for polyuria  Negative for polydipsia and polyphagia  No nocturia  Musculoskeletal: Positive for arthralgias  Skin: Negative for wound  Neurological: Positive for numbness  Has numbness and tingling of the feet stable  Will have left sided calf throbbing at times  Psychiatric/Behavioral: Positive for sleep disturbance  Under stress with 's illness  He recently had kidney stone  Poor sleep  Stress with daughter's health issues and caring for her too         Historical Information   Past Medical History:   Diagnosis Date    Fatty liver     Osteoarthritis      Past Surgical History:   Procedure Laterality Date    CATARACT EXTRACTION, BILATERAL      LAPAROSCOPIC CHOLECYSTECTOMY      SHOULDER SURGERY Right     calcium depsoti    TUBAL LIGATION      UMBILICAL HERNIA REPAIR      with mesh placed     Social History   History   Alcohol Use No     History   Drug Use No     History   Smoking Status    Never Smoker   Smokeless Tobacco    Never Used     Family History:   Family History   Problem Relation Age of Onset   Idalia Galvan Breast cancer Mother     Diabetes type II Father     Thyroid disease unspecified Daughter     Down syndrome Daughter     Diabetes type II Daughter     Diabetes type II Sister     No Known Problems Brother     Prostate cancer Brother     No Known Problems Sister     Stroke Sister     No Known Problems Son        Meds/Allergies   Current Outpatient Prescriptions   Medication Sig Dispense Refill    BASAGLAR KWIKPEN 100 units/mL injection pen USE 80 TO 90 UNITS SUBCUTANEOUSLY DAILY AS DIRECTED  4    Calcium Carb-Cholecalciferol (CALCIUM 1000 + D PO) Take by mouth      Coenzyme Q10 (CO Q10) 100 MG CAPS Take by mouth      fluticasone (FLONASE) 50 mcg/act nasal spray 1 spray into each nostril daily prn       glimepiride (AMARYL) 4 mg tablet Take 1 tablet (4 mg total) by mouth 2 (two) times a day 60 tablet 6    hydrochlorothiazide (HYDRODIURIL) 25 mg tablet Take 25 mg by mouth daily  3    Insulin Pen Needle 31G X 5 MM MISC Use up to 3 times a day 100 each 6    JANUMET XR  MG TB24 Take 1 tablet by mouth 2 (two) times a day 30 tablet 3    levothyroxine 125 mcg tablet Take 125 mcg by mouth daily  2    losartan (COZAAR) 50 mg tablet Take 50 mg by mouth daily  3    Omega-3 Fatty Acids (FISH OIL) 1,000 mg Take 1,000 mg by mouth daily      omeprazole (PriLOSEC) 20 mg delayed release capsule Take 20 mg by mouth daily  8    simvastatin (ZOCOR) 40 mg tablet Take 40 mg by mouth daily  3    Blood Glucose Monitoring Suppl (ONETOUCH VERIO) w/Device KIT Check sugars three times a day 1 kit 0    glucose blood (ONETOUCH VERIO) test strip Check sugars three times a day 100 each 6     No current facility-administered medications for this visit  Allergies   Allergen Reactions    Latex     Strawberry C [Ascorbate] Other (See Comments)     Mouth sores    Penicillins Rash       Objective   Vitals: Blood pressure 126/68, pulse 68, height 5' 3" (1 6 m), weight 104 kg (228 lb 6 4 oz)  Invasive Devices          No matching active lines, drains, or airways          Physical Exam   Constitutional: She is oriented to person, place, and time  She appears well-developed and well-nourished  HENT:   Head: Normocephalic and atraumatic  Eyes: Pupils are equal, round, and reactive to light  Conjunctivae and EOM are normal    Neck: Normal range of motion  Neck supple  No thyromegaly present  No carotid bruits  Cardiovascular: Normal rate, regular rhythm, normal heart sounds and intact distal pulses  No murmur heard    Pulmonary/Chest: Effort normal and breath sounds normal  She has no wheezes  Musculoskeletal: Normal range of motion  She exhibits no edema or deformity  Slight tremor of the outstretched hands  Hammer toes right 3rd and 4th toes  No ulceration of the feet when examined with the shoes and socks removed  Lymphadenopathy:     She has no cervical adenopathy  Neurological: She is alert and oriented to person, place, and time  She has normal reflexes  Skin: Skin is warm and dry  No rash noted  Vitals reviewed  The history was obtained from the review of the chart and from the patient  Lab Results:    Most recent Alc is  Lab Results   Component Value Date    HGBA1C 8 7 10/08/2018           Blood work done a [facility] on 10/08/2018 showed a TSH of 2 27 with a free T4 1 61  CMP showed a fasting glucose of 84 with a BUN of 24, creatinine of 0 75, GFR over 60, BUN/creatinine ratio of 32, but was otherwise normal  Total cholesterol is 146, triglyceride 95, HDL 36, LDL 91  Urine microalbumin to creatinine ratio was less than 1 2 mg/dL            Future Appointments  Date Time Provider John Alvarado   1/18/2019 11:40 AM Dimitri Bell MD [facility]

## 2018-10-29 ENCOUNTER — TELEPHONE (OUTPATIENT)
Dept: ENDOCRINOLOGY | Facility: HOSPITAL | Age: 73
End: 2018-10-29

## 2018-10-29 DIAGNOSIS — I10 HYPERTENSION, UNSPECIFIED TYPE: Primary | ICD-10-CM

## 2018-10-29 RX ORDER — NADOLOL 20 MG/1
20 TABLET ORAL DAILY
Qty: 30 TABLET | Refills: 0
Start: 2018-10-29 | End: 2019-11-21 | Stop reason: SDUPTHER

## 2018-10-29 NOTE — TELEPHONE ENCOUNTER
I readded it  I don't order it so it doesn't really matter  It was probably removed because she said she wasn't taking it and then must have been mistaken

## 2018-10-29 NOTE — TELEPHONE ENCOUNTER
Pt was reviewing her summary from the 25th and she noticed it says to stop taking Nadolol 20mg  She said she takes that daily and wants it put back on her med list  I can't add it without sending it to the pharmacy

## 2018-11-14 DIAGNOSIS — E11.8 TYPE 2 DIABETES MELLITUS WITH COMPLICATION, UNSPECIFIED WHETHER LONG TERM INSULIN USE: Primary | ICD-10-CM

## 2018-11-14 RX ORDER — INSULIN GLARGINE 100 [IU]/ML
INJECTION, SOLUTION SUBCUTANEOUS
Qty: 5 PEN | Refills: 4 | Status: SHIPPED | OUTPATIENT
Start: 2018-11-14 | End: 2019-02-21 | Stop reason: SDUPTHER

## 2018-11-17 DIAGNOSIS — E11.8 TYPE 2 DIABETES MELLITUS WITH COMPLICATION, UNSPECIFIED WHETHER LONG TERM INSULIN USE: ICD-10-CM

## 2018-11-17 RX ORDER — SITAGLIPTIN AND METFORMIN HYDROCHLORIDE 1000; 50 MG/1; MG/1
TABLET, FILM COATED, EXTENDED RELEASE ORAL
Qty: 30 TABLET | Refills: 3 | Status: CANCELLED | OUTPATIENT
Start: 2018-11-17

## 2018-11-17 RX ORDER — SITAGLIPTIN AND METFORMIN HYDROCHLORIDE 1000; 50 MG/1; MG/1
1 TABLET, FILM COATED, EXTENDED RELEASE ORAL 2 TIMES DAILY
Qty: 60 TABLET | Refills: 3 | Status: SHIPPED | OUTPATIENT
Start: 2018-11-17 | End: 2019-01-18 | Stop reason: SDUPTHER

## 2018-11-19 ENCOUNTER — TRANSCRIBE ORDERS (OUTPATIENT)
Dept: ADMINISTRATIVE | Facility: HOSPITAL | Age: 73
End: 2018-11-19

## 2018-11-19 DIAGNOSIS — K74.60 HEPATIC CIRRHOSIS, UNSPECIFIED HEPATIC CIRRHOSIS TYPE, UNSPECIFIED WHETHER ASCITES PRESENT (HCC): Primary | ICD-10-CM

## 2018-11-24 ENCOUNTER — HOSPITAL ENCOUNTER (OUTPATIENT)
Dept: ULTRASOUND IMAGING | Facility: HOSPITAL | Age: 73
Discharge: HOME | End: 2018-11-24
Attending: INTERNAL MEDICINE
Payer: MEDICARE

## 2018-11-24 DIAGNOSIS — K74.60 HEPATIC CIRRHOSIS, UNSPECIFIED HEPATIC CIRRHOSIS TYPE, UNSPECIFIED WHETHER ASCITES PRESENT (HCC): ICD-10-CM

## 2018-11-24 PROCEDURE — 76700 US EXAM ABDOM COMPLETE: CPT

## 2019-01-11 LAB — HBA1C MFR BLD HPLC: 8.5 %

## 2019-01-18 ENCOUNTER — OFFICE VISIT (OUTPATIENT)
Dept: ENDOCRINOLOGY | Facility: HOSPITAL | Age: 74
End: 2019-01-18
Payer: MEDICARE

## 2019-01-18 VITALS
SYSTOLIC BLOOD PRESSURE: 114 MMHG | WEIGHT: 222.4 LBS | HEIGHT: 63 IN | HEART RATE: 64 BPM | DIASTOLIC BLOOD PRESSURE: 54 MMHG | BODY MASS INDEX: 39.41 KG/M2

## 2019-01-18 DIAGNOSIS — E78.2 MIXED HYPERLIPIDEMIA: ICD-10-CM

## 2019-01-18 DIAGNOSIS — E11.42 DIABETIC POLYNEUROPATHY ASSOCIATED WITH TYPE 2 DIABETES MELLITUS (HCC): ICD-10-CM

## 2019-01-18 DIAGNOSIS — E11.65 TYPE 2 DIABETES MELLITUS WITH HYPERGLYCEMIA, WITH LONG-TERM CURRENT USE OF INSULIN (HCC): Primary | ICD-10-CM

## 2019-01-18 DIAGNOSIS — I10 ESSENTIAL HYPERTENSION: ICD-10-CM

## 2019-01-18 DIAGNOSIS — E03.9 ACQUIRED HYPOTHYROIDISM: ICD-10-CM

## 2019-01-18 DIAGNOSIS — Z79.4 TYPE 2 DIABETES MELLITUS WITH HYPERGLYCEMIA, WITH LONG-TERM CURRENT USE OF INSULIN (HCC): Primary | ICD-10-CM

## 2019-01-18 DIAGNOSIS — E11.8 TYPE 2 DIABETES MELLITUS WITH COMPLICATION, UNSPECIFIED WHETHER LONG TERM INSULIN USE: ICD-10-CM

## 2019-01-18 PROCEDURE — 99215 OFFICE O/P EST HI 40 MIN: CPT | Performed by: INTERNAL MEDICINE

## 2019-01-18 RX ORDER — SITAGLIPTIN AND METFORMIN HYDROCHLORIDE 1000; 50 MG/1; MG/1
1 TABLET, FILM COATED, EXTENDED RELEASE ORAL 2 TIMES DAILY
Qty: 180 TABLET | Refills: 3 | Status: SHIPPED | OUTPATIENT
Start: 2019-01-18 | End: 2020-02-04

## 2019-01-18 NOTE — PROGRESS NOTES
1/18/2019    Assessment/Plan      Diagnoses and all orders for this visit:    Type 2 diabetes mellitus with hyperglycemia, with long-term current use of insulin ([facility] )  -     ONETOUCH DELICA LANCETS FINE MISC; Use 1-3 times a day  -     HEMOGLOBIN A1C W/ EAG ESTIMATION Lab Collect; Future  -     Comprehensive metabolic panel Lab Collect; Future  -     T4, free Lab Collect; Future  -     TSH, 3rd generation Lab Collect; Future    Diabetic polyneuropathy associated with type 2 diabetes mellitus ([facility] )  -     ONETOUCH DELICA LANCETS FINE MISC; Use 1-3 times a day  -     HEMOGLOBIN A1C W/ EAG ESTIMATION Lab Collect; Future  -     Comprehensive metabolic panel Lab Collect; Future  -     T4, free Lab Collect; Future  -     TSH, 3rd generation Lab Collect; Future    Acquired hypothyroidism  -     T4, free Lab Collect; Future  -     TSH, 3rd generation Lab Collect; Future    Essential hypertension  -     Comprehensive metabolic panel Lab Collect; Future    Mixed hyperlipidemia  -     Comprehensive metabolic panel Lab Collect; Future    Type 2 diabetes mellitus with complication, unspecified whether long term insulin use (HCC)  -     JANUMET XR  MG TB24; Take 1 tablet by mouth 2 (two) times a day        Assessment/Plan:  1  Type 2 diabetes insulin requiring  Her most recent hemoglobin A1c is still not at goal at 8 5%  This signifies uncontrolled diabetes as yet  At this point, she will continue the same glimepiride and Janumet but I will increase her Basaglar insulin to 44 units in the morning and 34 units in the evening  She will continue to check her blood sugars at least once a day  She will continue to work on diet and activity  2   Diabetic neuropathy  She does follow with a podiatrist on a regular basis  She is having some worsening symptoms which may be due to the higher sugars and hopefully these will improve when the sugars come down  3   Hypothyroidism  I have no recent thyroid function tests    I will recheck her TSH and free T4 with next visit  For now, she will continue her same levothyroxine 125 mcg daily  4   Hypertension  Blood pressure is under reasonable control on her current hydrochlorothiazide, losartan, and Nadolol  She is followed by a cardiologist on a regular basis  5   Hyperlipidemia  She is on simvastatin 40 mg daily  I have asked her to make sure that lipid profile is forwarded to me when it is next done by her cardiologist   I have asked her to follow up in 3 months with preceding hemoglobin A1c, CMP, TSH, and free T4       CC: Diabetes type 2, hypothyroid, hypertension, and lipid follow-up    History of Present Illness     HPI: Adrianna White is a 68y o  year old female with type 2 diabetes for 47 years  She is on oral agents and insulin at home and takes glimepiride 4mg BID, Janumet XR 50/1000 mg BID, and basaglar insulin 44 in am and 32 in pm  She denies any polyphagia, polydipsia, nocturia and blurry vision  She has polyuria  She has numbness and tingling of her feet which is worsening and is climbing up the lateral side of her left calf  She denies chest pain and follows with a cardiologist regularly  She will get shortness of breath if she walks or is very active for a lot of time  She denies nephropathy, retinopathy, heart attack, stroke and claudication but does admit to neuropathy and coronary artery disease  Walking 4-5 times a day to neighbors house  She has a history of hypertension and takes losartan 50 mg daily, Nadolol 20 mg daily, and hydrochlorothiazide 25 mg daily  She denies chest pain, headache, or stroke-like symptoms  She has hypothyroidism and is on levothyroxine 125 mcg daily  She denies heat or cold intolerance, diarrhea, palpitation, anxiety or depression  She does have sleeping issues due to taking care of her  is less fatigued  She has gained a few lb  She has occasional tremors and constipation      She has hyperlipidemia and takes simvastatin 40 mg daily  She denies chest pain or headache  Hypoglycemic episodes: Yes occasional  Timing varies  The patient's last eye exam was in September 2018 with no retibnopathy  The patient's last foot exam was in  2 months ago  Last A1C was done on 01/11/2019 was  Lab Results   Component Value Date    HGBA1C 8 5 01/11/2019     Blood Sugar/Glucometer/Pump/CGM review: checks blood sugars once a day in am   No record brought with her today  Before breakfast: 90s before xmas and upper 100s now  Before lunch:   Before dinner:   Bedtime:     Review of Systems   Constitutional: Positive for fatigue and unexpected weight change  Has been building a wheelchair ramp roof over the ramp  6 lbs less than last visit  HENT: Negative for trouble swallowing  Eyes: Negative for visual disturbance  Wears glasses  Respiratory: Positive for shortness of breath  Negative for chest tightness  SOB with walk a lot  Cardiovascular: Negative for chest pain, palpitations and leg swelling  Gastrointestinal: Positive for constipation  Negative for abdominal pain, diarrhea and nausea  Has constipation  Endocrine: Positive for polyuria  Negative for heat intolerance, polydipsia and polyphagia  No Nocturia  Musculoskeletal: Positive for arthralgias  Negative for back pain  1st toe sore bilateral with ingroin toenails  Skin: Negative for wound  Has dry skin, brittle nails, but no hair loss  Neurological: Positive for tremors and numbness  Tremors when fatigued  She has numbness and tingling of the feet a bit more  Can radiate up the lateral side of the leg into the calf  Psychiatric/Behavioral: Positive for sleep disturbance  Negative for dysphoric mood  The patient is not nervous/anxious  Still under stress caring for  and down syndrome daughter  Sleeping interrupted by          Historical Information   Past Medical History:   Diagnosis Date    Fatty liver     Osteoarthritis      Past Surgical History:   Procedure Laterality Date    CATARACT EXTRACTION, BILATERAL      LAPAROSCOPIC CHOLECYSTECTOMY      SHOULDER SURGERY Right     calcium depsoti    TUBAL LIGATION      UMBILICAL HERNIA REPAIR      with mesh placed     Social History   History   Alcohol Use No     History   Drug Use No     History   Smoking Status    Never Smoker   Smokeless Tobacco    Never Used     Family History:   Family History   Problem Relation Age of Onset   [place] Abt Breast cancer Mother     Diabetes type II Father     Thyroid disease unspecified Daughter     Down syndrome Daughter     Diabetes type II Daughter     Diabetes type II Sister     No Known Problems Brother     Prostate cancer Brother     No Known Problems Sister     Stroke Sister     No Known Problems Son        Meds/Allergies   Current Outpatient Prescriptions   Medication Sig Dispense Refill    BASAGLAR KWIKPEN 100 units/mL injection pen USE 80 TO 90 UNITS SUBCUTANEOUSLY DAILY AS DIRECTED 5 pen 4    Blood Glucose Monitoring Suppl (Karina Butler) w/Device KIT Check sugars three times a day 1 kit 0    Calcium Carb-Cholecalciferol (CALCIUM 1000 + D PO) Take by mouth      Coenzyme Q10 (CO Q10) 100 MG CAPS Take by mouth      fluticasone (FLONASE) 50 mcg/act nasal spray 1 spray into each nostril daily prn       glimepiride (AMARYL) 4 mg tablet Take 1 tablet (4 mg total) by mouth 2 (two) times a day 60 tablet 6    glucose blood (ONETOUCH VERIO) test strip Check sugars three times a day 100 each 6    hydrochlorothiazide (HYDRODIURIL) 25 mg tablet Take 25 mg by mouth daily  3    Insulin Pen Needle 31G X 5 MM MISC Use up to 3 times a day 100 each 6    JANUMET XR  MG TB24 Take 1 tablet by mouth 2 (two) times a day 180 tablet 3    levothyroxine 125 mcg tablet Take 125 mcg by mouth daily  2    losartan (COZAAR) 50 mg tablet Take 50 mg by mouth daily  3    nadolol (CORGARD) 20 mg tablet Take 1 tablet (20 mg total) by mouth daily 30 tablet 0    Omega-3 Fatty Acids (FISH OIL) 1,000 mg Take 1,000 mg by mouth daily      omeprazole (PriLOSEC) 20 mg delayed release capsule Take 20 mg by mouth daily  8    simvastatin (ZOCOR) 40 mg tablet Take 40 mg by mouth daily  3    ONETOUCH DELICA LANCETS FINE MISC Use 1-3 times a day 100 each 3     No current facility-administered medications for this visit  Allergies   Allergen Reactions    Latex     Strawberry C [Ascorbate] Other (See Comments)     Mouth sores    Penicillins Rash       Objective   Vitals: Blood pressure 114/54, pulse 64, height 5' 3" (1 6 m), weight 101 kg (222 lb 6 4 oz)  Invasive Devices          No matching active lines, drains, or airways          Physical Exam   Constitutional: She is oriented to person, place, and time  She appears well-developed and well-nourished  HENT:   Head: Normocephalic and atraumatic  Eyes: Pupils are equal, round, and reactive to light  Conjunctivae and EOM are normal    No lid lag, stare, proptosis, or periorbital edema  Neck: Normal range of motion  Neck supple  No thyromegaly present  No carotid bruits  Thyroid normal in size without palpable thyroid nodules  Cardiovascular: Normal rate, regular rhythm, normal heart sounds and intact distal pulses  No murmur heard  Pulmonary/Chest: Effort normal and breath sounds normal  She has no wheezes  Musculoskeletal: Normal range of motion  She exhibits no edema or deformity  Hammer toe right 2nd to 4th toes  No tremor of the outstretched hands  No ulcerations of the feet  Lymphadenopathy:     She has no cervical adenopathy  Neurological: She is alert and oriented to person, place, and time  She has normal reflexes  Skin: Skin is warm and dry  No rash noted  Vitals reviewed  The history was obtained from the review of the chart and from the patient      Lab Results:    Most recent Alc is  Lab Results   Component Value Date    HGBA1C 8 5 01/11/2019           CMP done a [facility] on 01/11/2019 demonstrates a glucose of 150 fasting, BUN 24, creatinine 0 73, GFR over 30, BUN/creatinine ratio 32 9, AST 38, but was otherwise normal     Future Appointments  Date Time Provider John Alvarado   4/24/2019 9:40 AM Melisa Bates MD ENDO [address]

## 2019-01-18 NOTE — PATIENT INSTRUCTIONS
hgba1c is still high at 8 5%  This is slightly better  Increase the basaglar insulin to 44 units in am and 34 units in the pm   Continue the same twice a day glimepiride and janumet  Continue to check blood sugars at least once a night  Follow up in 3 months with blood work

## 2019-01-18 NOTE — LETTER
January 18, 2019     [facility] [address]  [address] [facility]    Patient: Ernesto Gonzalez   YOB: 1945   Date of Visit: 1/18/2019       Dear Dr Marcie Johns: Thank you for referring Rosario Dugan to me for evaluation  Below are my notes for this consultation  If you have questions, please do not hesitate to call me  I look forward to following your patient along with you  Sincerely,        Karli Stacy MD        CC: No Recipients  Karli Stacy MD  1/18/2019 12:54 PM  Sign at close encounter  1/18/2019    Assessment/Plan      Diagnoses and all orders for this visit:    Type 2 diabetes mellitus with hyperglycemia, with long-term current use of insulin ([facility] )  -     ONEUCH DELICA LANCETS FINE MISC; Use 1-3 times a day  -     HEMOGLOBIN A1C W/ EAG ESTIMATION Lab Collect; Future  -     Comprehensive metabolic panel Lab Collect; Future  -     T4, free Lab Collect; Future  -     TSH, 3rd generation Lab Collect; Future    Diabetic polyneuropathy associated with type 2 diabetes mellitus ([facility] [address] )  -     ONETOUCH DELICA LANCETS FINE MISC; Use 1-3 times a day  -     HEMOGLOBIN A1C W/ EAG ESTIMATION Lab Collect; Future  -     Comprehensive metabolic panel Lab Collect; Future  -     T4, free Lab Collect; Future  -     TSH, 3rd generation Lab Collect; Future    Acquired hypothyroidism  -     T4, free Lab Collect; Future  -     TSH, 3rd generation Lab Collect; Future    Essential hypertension  -     Comprehensive metabolic panel Lab Collect; Future    Mixed hyperlipidemia  -     Comprehensive metabolic panel Lab Collect; Future    Type 2 diabetes mellitus with complication, unspecified whether long term insulin use (HCC)  -     JANUMET XR  MG TB24; Take 1 tablet by mouth 2 (two) times a day        Assessment/Plan:  1  Type 2 diabetes insulin requiring  Her most recent hemoglobin A1c is still not at goal at 8 5%    This signifies uncontrolled diabetes as yet   At this point, she will continue the same glimepiride and Janumet but I will increase her Basaglar insulin to 44 units in the morning and 34 units in the evening  She will continue to check her blood sugars at least once a day  She will continue to work on diet and activity  2   Diabetic neuropathy  She does follow with a podiatrist on a regular basis  She is having some worsening symptoms which may be due to the higher sugars and hopefully these will improve when the sugars come down  3   Hypothyroidism  I have no recent thyroid function tests  I will recheck her TSH and free T4 with next visit  For now, she will continue her same levothyroxine 125 mcg daily  4   Hypertension  Blood pressure is under reasonable control on her current hydrochlorothiazide, losartan, and Nadolol  She is followed by a cardiologist on a regular basis  5   Hyperlipidemia  She is on simvastatin 40 mg daily  I have asked her to make sure that lipid profile is forwarded to me when it is next done by her cardiologist   I have asked her to follow up in 3 months with preceding hemoglobin A1c, CMP, TSH, and free T4       CC: Diabetes type 2, hypothyroid, hypertension, and lipid follow-up    History of Present Illness     HPI: Roberto Reyes is a 68y o  year old female with type 2 diabetes for 47 years  She is on oral agents and insulin at home and takes glimepiride 4mg BID, Janumet XR 50/1000 mg BID, and basaglar insulin 44 in am and 32 in pm  She denies any polyphagia, polydipsia, nocturia and blurry vision  She has polyuria  She has numbness and tingling of her feet which is worsening and is climbing up the lateral side of her left calf  She denies chest pain and follows with a cardiologist regularly  She will get shortness of breath if she walks or is very active for a lot of time    She denies nephropathy, retinopathy, heart attack, stroke and claudication but does admit to neuropathy and coronary artery disease  Walking 4-5 times a day to neighbors house  She has a history of hypertension and takes losartan 50 mg daily, Nadolol 20 mg daily, and hydrochlorothiazide 25 mg daily  She denies chest pain, headache, or stroke-like symptoms  She has hypothyroidism and is on levothyroxine 125 mcg daily  She denies heat or cold intolerance, diarrhea, palpitation, anxiety or depression  She does have sleeping issues due to taking care of her  is less fatigued  She has gained a few lb  She has occasional tremors and constipation  She has hyperlipidemia and takes simvastatin 40 mg daily  She denies chest pain or headache  Hypoglycemic episodes: Yes occasional  Timing varies  The patient's last eye exam was in September 2018 with no retibnopathy  The patient's last foot exam was in  2 months ago  Last A1C was done on 01/11/2019 was  Lab Results   Component Value Date    HGBA1C 8 5 01/11/2019     Blood Sugar/Glucometer/Pump/CGM review: checks blood sugars once a day in am   No record brought with her today  Before breakfast: 90s before xmas and upper 100s now  Before lunch:   Before dinner:   Bedtime:     Review of Systems   Constitutional: Positive for fatigue and unexpected weight change  Has been building a wheelchair ramp roof over the ramp  6 lbs less than last visit  HENT: Negative for trouble swallowing  Eyes: Negative for visual disturbance  Wears glasses  Respiratory: Positive for shortness of breath  Negative for chest tightness  SOB with walk a lot  Cardiovascular: Negative for chest pain, palpitations and leg swelling  Gastrointestinal: Positive for constipation  Negative for abdominal pain, diarrhea and nausea  Has constipation  Endocrine: Positive for polyuria  Negative for heat intolerance, polydipsia and polyphagia  No Nocturia  Musculoskeletal: Positive for arthralgias  Negative for back pain          1st toe sore bilateral with ingroin toenails  Skin: Negative for wound  Has dry skin, brittle nails, but no hair loss  Neurological: Positive for tremors and numbness  Tremors when fatigued  She has numbness and tingling of the feet a bit more  Can radiate up the lateral side of the leg into the calf  Psychiatric/Behavioral: Positive for sleep disturbance  Negative for dysphoric mood  The patient is not nervous/anxious  Still under stress caring for  and down syndrome daughter  Sleeping interrupted by          Historical Information   Past Medical History:   Diagnosis Date    Fatty liver     Osteoarthritis      Past Surgical History:   Procedure Laterality Date    CATARACT EXTRACTION, BILATERAL      LAPAROSCOPIC CHOLECYSTECTOMY      SHOULDER SURGERY Right     calcium depsoti    TUBAL LIGATION      UMBILICAL HERNIA REPAIR      with mesh placed     Social History   History   Alcohol Use No     History   Drug Use No     History   Smoking Status    Never Smoker   Smokeless Tobacco    Never Used     Family History:   Family History   Problem Relation Age of Onset    Breast cancer Mother     Diabetes type II Father     Thyroid disease unspecified Daughter     Down syndrome Daughter     Diabetes type II Daughter     Diabetes type II Sister     No Known Problems Brother     Prostate cancer Brother     No Known Problems Sister     Stroke Sister     No Known Problems Son        Meds/Allergies   Current Outpatient Prescriptions   Medication Sig Dispense Refill    BASAGLELGIN SERRATOPEN 100 units/mL injection pen USE 80 TO 90 UNITS SUBCUTANEOUSLY DAILY AS DIRECTED 5 pen 4    Blood Glucose Monitoring Suppl (ONETOUCH VERIO) w/Device KIT Check sugars three times a day 1 kit 0    Calcium Carb-Cholecalciferol (CALCIUM 1000 + D PO) Take by mouth      Coenzyme Q10 (CO Q10) 100 MG CAPS Take by mouth      fluticasone (FLONASE) 50 mcg/act nasal spray 1 spray into each nostril daily prn       glimepiride (AMARYL) 4 mg tablet Take 1 tablet (4 mg total) by mouth 2 (two) times a day 60 tablet 6    glucose blood (ONETOUCH VERIO) test strip Check sugars three times a day 100 each 6    hydrochlorothiazide (HYDRODIURIL) 25 mg tablet Take 25 mg by mouth daily  3    Insulin Pen Needle 31G X 5 MM MISC Use up to 3 times a day 100 each 6    JANUMET XR  MG TB24 Take 1 tablet by mouth 2 (two) times a day 180 tablet 3    levothyroxine 125 mcg tablet Take 125 mcg by mouth daily  2    losartan (COZAAR) 50 mg tablet Take 50 mg by mouth daily  3    nadolol (CORGARD) 20 mg tablet Take 1 tablet (20 mg total) by mouth daily 30 tablet 0    Omega-3 Fatty Acids (FISH OIL) 1,000 mg Take 1,000 mg by mouth daily      omeprazole (PriLOSEC) 20 mg delayed release capsule Take 20 mg by mouth daily  8    simvastatin (ZOCOR) 40 mg tablet Take 40 mg by mouth daily  3    ONETOUCH DELICA LANCETS FINE MISC Use 1-3 times a day 100 each 3     No current facility-administered medications for this visit  Allergies   Allergen Reactions    Latex     Strawberry C [Ascorbate] Other (See Comments)     Mouth sores    Penicillins Rash       Objective   Vitals: Blood pressure 114/54, pulse 64, height 5' 3" (1 6 m), weight 101 kg (222 lb 6 4 oz)  Invasive Devices          No matching active lines, drains, or airways          Physical Exam   Constitutional: She is oriented to person, place, and time  She appears well-developed and well-nourished  HENT:   Head: Normocephalic and atraumatic  Eyes: Pupils are equal, round, and reactive to light  Conjunctivae and EOM are normal    No lid lag, stare, proptosis, or periorbital edema  Neck: Normal range of motion  Neck supple  No thyromegaly present  No carotid bruits  Thyroid normal in size without palpable thyroid nodules  Cardiovascular: Normal rate, regular rhythm, normal heart sounds and intact distal pulses  No murmur heard    Pulmonary/Chest: Effort normal and breath sounds normal  She has no wheezes  Musculoskeletal: Normal range of motion  She exhibits no edema or deformity  Hammer toe right 2nd to 4th toes  No tremor of the outstretched hands  No ulcerations of the feet  Lymphadenopathy:     She has no cervical adenopathy  Neurological: She is alert and oriented to person, place, and time  She has normal reflexes  Skin: Skin is warm and dry  No rash noted  Vitals reviewed  The history was obtained from the review of the chart and from the patient      Lab Results:    Most recent Alc is  Lab Results   Component Value Date    HGBA1C 8 5 01/11/2019           CMP done a "[facility]" on 01/11/2019 demonstrates a glucose of 150 fasting, BUN 24, creatinine 0 73, GFR over 30, BUN/creatinine ratio 32 9, AST 38, but was otherwise normal     Future Appointments  Date Time Provider John Alvarado   4/24/2019 9:40 AM Stephanie Mendoza MD ENDO [address] [facility]

## 2019-01-31 DIAGNOSIS — E11.8 TYPE 2 DIABETES MELLITUS WITH COMPLICATION, UNSPECIFIED WHETHER LONG TERM INSULIN USE: ICD-10-CM

## 2019-02-12 DIAGNOSIS — E11.8 TYPE 2 DIABETES MELLITUS WITH COMPLICATION, UNSPECIFIED WHETHER LONG TERM INSULIN USE: ICD-10-CM

## 2019-02-13 ENCOUNTER — TELEPHONE (OUTPATIENT)
Dept: GASTROENTEROLOGY | Facility: CLINIC | Age: 74
End: 2019-02-13

## 2019-02-13 NOTE — TELEPHONE ENCOUNTER
Pt called, has questions about when she is due for lab work and what kind  She was seen in November and ordered a 6 m f/u  Pt should be scheduled in may     cb # [id number]   ce

## 2019-02-15 NOTE — TELEPHONE ENCOUNTER
Message left for pt to call back  Her last ov 11/15/18 noted to repeat labs in March or April   (CMP, INR, CBC, and AFP)

## 2019-02-19 NOTE — TELEPHONE ENCOUNTER
Called pt back, reviewed Dr Haley Schneider recommendations for labs and ov  She does have the order form to obtain the labs when ready

## 2019-02-21 DIAGNOSIS — E11.8 TYPE 2 DIABETES MELLITUS WITH COMPLICATION, UNSPECIFIED WHETHER LONG TERM INSULIN USE: ICD-10-CM

## 2019-02-21 RX ORDER — INSULIN GLARGINE 100 [IU]/ML
INJECTION, SOLUTION SUBCUTANEOUS
Qty: 15 PEN | Refills: 4 | Status: SHIPPED | OUTPATIENT
Start: 2019-02-21 | End: 2019-11-12 | Stop reason: SDUPTHER

## 2019-03-03 DIAGNOSIS — K21.9 GASTROESOPHAGEAL REFLUX DISEASE WITHOUT ESOPHAGITIS: Primary | ICD-10-CM

## 2019-03-06 RX ORDER — OMEPRAZOLE 20 MG/1
CAPSULE, DELAYED RELEASE ORAL
Qty: 90 CAPSULE | Refills: 1 | Status: SHIPPED | OUTPATIENT
Start: 2019-03-06 | End: 2019-08-26 | Stop reason: SDUPTHER

## 2019-03-12 ENCOUNTER — TELEPHONE (OUTPATIENT)
Dept: GASTROENTEROLOGY | Facility: CLINIC | Age: 74
End: 2019-03-12

## 2019-03-12 NOTE — TELEPHONE ENCOUNTER
Pt left  mssg stating she has paper for tests/is unsure if blood work only or if also [address]?; is supposed to tell all the doctors only the sugar Dr gets those results; req  [phone] to clarify/might be outside[place] [facility] [place]

## 2019-03-14 NOTE — TELEPHONE ENCOUNTER
Pt called back, reviewed labs requested again  Pt states gets confused at times and wanted to review again

## 2019-03-29 LAB — HBA1C MFR BLD HPLC: 8.9 %

## 2019-04-24 ENCOUNTER — OFFICE VISIT (OUTPATIENT)
Dept: ENDOCRINOLOGY | Facility: HOSPITAL | Age: 74
End: 2019-04-24
Payer: MEDICARE

## 2019-04-24 VITALS
SYSTOLIC BLOOD PRESSURE: 124 MMHG | WEIGHT: 226.4 LBS | HEART RATE: 58 BPM | DIASTOLIC BLOOD PRESSURE: 70 MMHG | HEIGHT: 63 IN | BODY MASS INDEX: 40.11 KG/M2

## 2019-04-24 DIAGNOSIS — E78.2 MIXED HYPERLIPIDEMIA: ICD-10-CM

## 2019-04-24 DIAGNOSIS — E11.42 DIABETIC POLYNEUROPATHY ASSOCIATED WITH TYPE 2 DIABETES MELLITUS (HCC): ICD-10-CM

## 2019-04-24 DIAGNOSIS — I10 ESSENTIAL HYPERTENSION: ICD-10-CM

## 2019-04-24 DIAGNOSIS — Z79.4 TYPE 2 DIABETES MELLITUS WITH HYPERGLYCEMIA, WITH LONG-TERM CURRENT USE OF INSULIN (HCC): Primary | ICD-10-CM

## 2019-04-24 DIAGNOSIS — E03.9 ACQUIRED HYPOTHYROIDISM: ICD-10-CM

## 2019-04-24 DIAGNOSIS — E11.65 TYPE 2 DIABETES MELLITUS WITH HYPERGLYCEMIA, WITH LONG-TERM CURRENT USE OF INSULIN (HCC): Primary | ICD-10-CM

## 2019-04-24 PROCEDURE — 99215 OFFICE O/P EST HI 40 MIN: CPT | Performed by: INTERNAL MEDICINE

## 2019-06-07 ENCOUNTER — TRANSCRIBE ORDERS (OUTPATIENT)
Dept: ADMINISTRATIVE | Facility: HOSPITAL | Age: 74
End: 2019-06-07

## 2019-06-07 DIAGNOSIS — K43.9 VENTRAL HERNIA WITHOUT OBSTRUCTION OR GANGRENE: Primary | ICD-10-CM

## 2019-06-14 ENCOUNTER — HOSPITAL ENCOUNTER (OUTPATIENT)
Dept: CT IMAGING | Facility: HOSPITAL | Age: 74
Discharge: HOME | End: 2019-06-14
Payer: MEDICARE

## 2019-06-14 DIAGNOSIS — K43.9 VENTRAL HERNIA WITHOUT OBSTRUCTION OR GANGRENE: ICD-10-CM

## 2019-06-14 PROCEDURE — 74176 CT ABD & PELVIS W/O CONTRAST: CPT

## 2019-06-19 LAB
LEFT EYE DIABETIC RETINOPATHY: NORMAL
RIGHT EYE DIABETIC RETINOPATHY: NORMAL

## 2019-07-23 LAB — HBA1C MFR BLD HPLC: 7.7 %

## 2019-07-30 ENCOUNTER — OFFICE VISIT (OUTPATIENT)
Dept: ENDOCRINOLOGY | Facility: HOSPITAL | Age: 74
End: 2019-07-30
Payer: MEDICARE

## 2019-07-30 VITALS
BODY MASS INDEX: 41.14 KG/M2 | DIASTOLIC BLOOD PRESSURE: 70 MMHG | HEIGHT: 63 IN | HEART RATE: 58 BPM | WEIGHT: 232.2 LBS | SYSTOLIC BLOOD PRESSURE: 122 MMHG

## 2019-07-30 DIAGNOSIS — E11.65 TYPE 2 DIABETES MELLITUS WITH HYPERGLYCEMIA, WITH LONG-TERM CURRENT USE OF INSULIN (HCC): Primary | ICD-10-CM

## 2019-07-30 DIAGNOSIS — E11.42 DIABETIC POLYNEUROPATHY ASSOCIATED WITH TYPE 2 DIABETES MELLITUS (HCC): ICD-10-CM

## 2019-07-30 DIAGNOSIS — I10 ESSENTIAL HYPERTENSION: ICD-10-CM

## 2019-07-30 DIAGNOSIS — Z79.4 TYPE 2 DIABETES MELLITUS WITH HYPERGLYCEMIA, WITH LONG-TERM CURRENT USE OF INSULIN (HCC): Primary | ICD-10-CM

## 2019-07-30 DIAGNOSIS — E78.2 MIXED HYPERLIPIDEMIA: ICD-10-CM

## 2019-07-30 DIAGNOSIS — E03.9 ACQUIRED HYPOTHYROIDISM: ICD-10-CM

## 2019-07-30 PROCEDURE — 99215 OFFICE O/P EST HI 40 MIN: CPT | Performed by: INTERNAL MEDICINE

## 2019-07-30 NOTE — PROGRESS NOTES
7/30/2019    Assessment/Plan      Diagnoses and all orders for this visit:    Type 2 diabetes mellitus with hyperglycemia, with long-term current use of insulin (HCC)  -     Insulin Pen Needle 31G X 5 MM MISC; Use up to 3 times a day  -     HEMOGLOBIN A1C W/ EAG ESTIMATION Lab Collect; Future  -     Comprehensive metabolic panel Lab Collect; Future  -     TSH, 3rd generation Lab Collect; Future  -     T4, free Lab Collect; Future  -     Microalbumin / creatinine urine ratio Lab Collect; Future  -     Lipid Panel with Direct LDL reflex Lab Collect; Future  -     CBC and differential Lab Collect; Future    Diabetic polyneuropathy associated with type 2 diabetes mellitus (HCC)  -     HEMOGLOBIN A1C W/ EAG ESTIMATION Lab Collect; Future  -     Comprehensive metabolic panel Lab Collect; Future  -     TSH, 3rd generation Lab Collect; Future  -     T4, free Lab Collect; Future  -     Microalbumin / creatinine urine ratio Lab Collect; Future  -     Lipid Panel with Direct LDL reflex Lab Collect; Future  -     CBC and differential Lab Collect; Future    Acquired hypothyroidism  -     HEMOGLOBIN A1C W/ EAG ESTIMATION Lab Collect; Future  -     Comprehensive metabolic panel Lab Collect; Future  -     TSH, 3rd generation Lab Collect; Future  -     T4, free Lab Collect; Future  -     Microalbumin / creatinine urine ratio Lab Collect; Future  -     Lipid Panel with Direct LDL reflex Lab Collect; Future  -     CBC and differential Lab Collect; Future    Essential hypertension  -     HEMOGLOBIN A1C W/ EAG ESTIMATION Lab Collect; Future  -     Comprehensive metabolic panel Lab Collect; Future  -     TSH, 3rd generation Lab Collect; Future  -     T4, free Lab Collect; Future  -     Microalbumin / creatinine urine ratio Lab Collect; Future  -     Lipid Panel with Direct LDL reflex Lab Collect; Future  -     CBC and differential Lab Collect; Future    Mixed hyperlipidemia  -     HEMOGLOBIN A1C W/ EAG ESTIMATION Lab Collect;  Future  - Comprehensive metabolic panel Lab Collect; Future  -     TSH, 3rd generation Lab Collect; Future  -     T4, free Lab Collect; Future  -     Microalbumin / creatinine urine ratio Lab Collect; Future  -     Lipid Panel with Direct LDL reflex Lab Collect; Future  -     CBC and differential Lab Collect; Future    Other orders  -     Multiple Vitamins-Minerals (PRESERVISION AREDS 2+MULTI VIT PO); Take by mouth        Assessment/Plan:    1  Type 2 diabetes insulin requiring  Her most recent hemoglobin A1c has markedly improved down to 7 7%  I would like to see her hemoglobin A1c less than 7 5% given her age and medical conditions but this is a significant improvement and I commended her on the progress she has made  For now, she will continue the same Janumet, glimepiride, and Basaglar insulin  She will continue to work on diet and will continue to check her blood sugars at least 1 to 3 times a day  2  Diabetic neuropathy  She denies neuropathic symptoms and sees a podiatrist on a regular basis  Diabetic foot exam was performed in the office today  3  Hypothyroidism  Her most recent thyroid function tests are normal   She is both biochemically and clinically euthyroid  She will continue the same levothyroxine 125 mcg daily  4  Hypertension  Blood pressure is under excellent control on her current hydrochlorothiazide, Nadolol, and losartan  5  Hyperlipidemia  She takes simvastatin 40 mg daily and I will repeat a lipid panel with her next visit  I have asked her to follow up in 3 months with preceding hemoglobin A1c, CMP, CBC, TSH, free T4, lipid panel, and urine microalbumin to creatinine ratio  CC: Diabetes Type 2, thyroid, lipid, blood pressure follow-up    History of Present Illness     HPI: Anderson Salinas is a 68y o  year old female with type 2 diabetes for 48 years    She is on oral agents and insulin at home and takes  Janumet XR 50/1000 mg twice a day, glimepiride 4 mg twice a day, and Basaglar insulin  46 units the morning and 34 units in the evening  She denies any polyuria, polydipsia, polyphagia,nocturia and blurry vision  She has some numbness of her feet unchanged  She denies chest pain  She is always has some shortness of breath  She denies nephropathy, retinopathy, heart attack, stroke and claudication but does admit to neuropathy  Hypoglycemic episodes: Yes occasional     The patient's last eye exam was in June 2019 with no retinopathy  The patient's last foot exam was in May 2019 with podiatry  Last A1C was   Lab Results   Component Value Date    HGBA1C 7 7 07/23/2019     Blood Sugar/Glucometer/Pump/CGM review: checks blood sugars once a day in am  No record brought with her today  Reports low to mid 100s  Will have occasional 200s  She has hypothyroidism and takes levothyroxine 125 mcg daily  She denies heat or cold intolerance, diarrhea or constipation  She has tremors and occasional palpitations  She has insomnia due to waking frequently and is a woken for her 's needs  She is thus fatigued  Weight has gone up 6 lb since April 2019  She has hypertension and takes hydrochlorothiazide 25 mg daily, nadolol 20 mg daily, and losartan 50 mg daily  She denies headache or stroke-like symptoms  She has hyperlipidemia and takes simvastatin 40 mg daily  She denies chest pain but always has some shortness of breath  She sees a cardiologist on a regular basis  Review of Systems   Constitutional: Positive for fatigue and unexpected weight change  Has gained a lot of weigh back in the abdomen and skin is getting hard  6 lbs more than April 2019  HENT: Negative for trouble swallowing  Eyes: Negative for visual disturbance  Wears glasses  Has blurry vision  Respiratory: Positive for shortness of breath  Negative for chest tightness  Always SOB  Cardiovascular: Positive for palpitations and leg swelling   Negative for chest pain         Developed low extremity edema this past month  Has occasional palpitations  Due to see cardiologist soon  Gastrointestinal: Positive for abdominal pain  Negative for constipation, diarrhea and nausea  Area in lower abdomen abnormal after previous surgery  Endocrine: Negative for cold intolerance, heat intolerance, polydipsia, polyphagia and polyuria  Nocturia none  Skin: Negative for wound  Neurological: Positive for tremors and numbness  Negative for dizziness, weakness, light-headedness and headaches  Has some numbness in the feet  Always has tremors  Psychiatric/Behavioral: Positive for sleep disturbance  Wakes frequently and awoken by 's needs         Historical Information   Past Medical History:   Diagnosis Date    Fatty liver     Osteoarthritis      Past Surgical History:   Procedure Laterality Date    CATARACT EXTRACTION, BILATERAL      LAPAROSCOPIC CHOLECYSTECTOMY      SHOULDER SURGERY Right     calcium depsoti    TUBAL LIGATION      UMBILICAL HERNIA REPAIR      with mesh placed     Social History   Social History     Substance and Sexual Activity   Alcohol Use No     Social History     Substance and Sexual Activity   Drug Use No     Social History     Tobacco Use   Smoking Status Never Smoker   Smokeless Tobacco Never Used     Family History:   Family History   Problem Relation Age of Onset    Breast cancer Mother     Diabetes type II Father     Thyroid disease unspecified Daughter     Down syndrome Daughter     Diabetes type II Daughter     Diabetes type II Sister     No Known Problems Brother     Prostate cancer Brother     No Known Problems Sister     Stroke Sister     No Known Problems Son        Meds/Allergies   Current Outpatient Medications   Medication Sig Dispense Refill    BASAGLAR KWIKPEN 100 units/mL injection pen USE 80 TO 90 UNITS SUBCUTANEOUSLY DAILY AS DIRECTED 15 pen 4    Blood Glucose Monitoring Suppl Matthias Saucedo VERIO) w/Device KIT Check sugars three times a day 1 kit 0    Calcium Carb-Cholecalciferol (CALCIUM 1000 + D PO) Take by mouth      Coenzyme Q10 (CO Q10) 100 MG CAPS Take by mouth      fluticasone (FLONASE) 50 mcg/act nasal spray 1 spray into each nostril daily prn       glimepiride (AMARYL) 4 mg tablet Take 1 tablet (4 mg total) by mouth 2 (two) times a day 60 tablet 6    glucose blood (ONETOUCH VERIO) test strip Check sugars once a day 100 each 2    hydrochlorothiazide (HYDRODIURIL) 25 mg tablet Take 25 mg by mouth daily  3    Insulin Pen Needle 31G X 5 MM MISC Use up to 3 times a day 100 each 6    JANUMET XR  MG TB24 Take 1 tablet by mouth 2 (two) times a day 180 tablet 3    levothyroxine 125 mcg tablet Take 125 mcg by mouth daily  2    losartan (COZAAR) 50 mg tablet Take 50 mg by mouth daily  3    Multiple Vitamins-Minerals (PRESERVISION AREDS 2+MULTI VIT PO) Take by mouth      nadolol (CORGARD) 20 mg tablet Take 1 tablet (20 mg total) by mouth daily 30 tablet 0    Omega-3 Fatty Acids (FISH OIL) 1,000 mg Take 1,000 mg by mouth daily      omeprazole (PriLOSEC) 20 mg delayed release capsule TAKE 1 CAPSULE BY MOUTH EVERY DAY 90 capsule 1    ONETOUCH DELICA LANCETS FINE MISC Use 1-3 times a day 100 each 3    simvastatin (ZOCOR) 40 mg tablet Take 40 mg by mouth daily  3     No current facility-administered medications for this visit  Allergies   Allergen Reactions    Latex     Strawberry C [Ascorbate] Other (See Comments)     Mouth sores    Penicillins Rash       Objective   Vitals: Blood pressure 122/70, pulse 58, height 5' 3" (1 6 m), weight 105 kg (232 lb 3 2 oz)  Invasive Devices     None                 Physical Exam   Constitutional: She is oriented to person, place, and time  She appears well-developed and well-nourished  HENT:   Head: Normocephalic and atraumatic  Eyes: Pupils are equal, round, and reactive to light   Conjunctivae and EOM are normal    Neck: Normal range of motion  Neck supple  No thyromegaly present  Thyroid normal in size without palpable thyroid nodules  No carotid bruits  Cardiovascular: Normal rate, regular rhythm, normal heart sounds and intact distal pulses  Pulses are no weak pulses  No murmur heard  Pulses:       Dorsalis pedis pulses are 2+ on the right side, and 2+ on the left side  Posterior tibial pulses are 2+ on the right side, and 2+ on the left side  Pulmonary/Chest: Effort normal and breath sounds normal  She has no wheezes  Abdominal: Soft  There is no tenderness  Musculoskeletal: Normal range of motion  She exhibits edema and deformity  1-2+ left lower extremity edema  Hammer toe right 2nd and 3rd toes  No ulcerations of the feet  No tremor of the outstretched hands  Feet:   Right Foot:   Skin Integrity: Negative for ulcer, skin breakdown, erythema, warmth, callus or dry skin  Left Foot:   Skin Integrity: Negative for ulcer, skin breakdown, erythema, warmth, callus or dry skin  Lymphadenopathy:     She has no cervical adenopathy  Neurological: She is alert and oriented to person, place, and time  She has normal reflexes  Vibration sensation diminished to the 1st toe DIP joint bilaterally  Microfilament sensation intact bilateral except to the left heel  Skin: Skin is warm and dry  No rash noted  Vitals reviewed  Patient's shoes and socks removed  Right Foot/Ankle   Right Foot Inspection  Skin Exam: skin normal and skin intact no dry skin, no warmth, no callus, no erythema, no maceration, no abnormal color, no pre-ulcer, no ulcer and no callus                          Toe Exam: ROM and strength within normal limits and right toe deformity  Sensory   Vibration: diminished    Monofilament testing: intact  Vascular  Capillary refills: < 3 seconds  The right DP pulse is 2+  The right PT pulse is 2+       Left Foot/Ankle  Left Foot Inspection  Skin Exam: skin normal and skin intactno dry skin, no warmth, no erythema, no maceration, normal color, no pre-ulcer, no ulcer and no callus                         Toe Exam: ROM and strength within normal limits and swelling                   Sensory   Vibration: diminished    Monofilament: intact  Vascular  Capillary refills: < 3 seconds  The left DP pulse is 2+  The left PT pulse is 2+  Assign Risk Category:  Deformity present; Loss of protective sensation; No weak pulses       Risk: 1        The history was obtained from the review of the chart and from the patient  Lab Results:    Most recent Alc is  Lab Results   Component Value Date    HGBA1C 7 7 07/23/2019             CMP done on 07/23/2019 at [address] [place] [address] showed a glucose of 63 fasting, CO2 is 29 2, AST is 36, but the rest of the CMP was otherwise normal     TSH is 3 82 with a free T4 1 26        Future Appointments   Date Time Provider John Alvarado   11/12/2019  8:15 AM Anner Aase, CRNP [facility]

## 2019-07-30 NOTE — PATIENT INSTRUCTIONS
Hemoglobin A1c is 7 7%  This is much better than last visit when it was 8 9%  I would like it below 7 5%  No change in medicines for now  Continue to check blood sugars at least once daily  Thyroid function tests are normal   Continue the same levothyroxine 125 mcg daily  Follow up in 3 months with blood work

## 2019-08-26 DIAGNOSIS — K21.9 GASTROESOPHAGEAL REFLUX DISEASE WITHOUT ESOPHAGITIS: ICD-10-CM

## 2019-08-26 RX ORDER — OMEPRAZOLE 20 MG/1
CAPSULE, DELAYED RELEASE ORAL
Qty: 90 CAPSULE | Refills: 1 | Status: SHIPPED | OUTPATIENT
Start: 2019-08-26 | End: 2020-02-21 | Stop reason: SDUPTHER

## 2019-09-18 ENCOUNTER — TELEPHONE (OUTPATIENT)
Dept: GASTROENTEROLOGY | Facility: CLINIC | Age: 74
End: 2019-09-18

## 2019-09-18 NOTE — TELEPHONE ENCOUNTER
6-mo recall Labs  Provider:  [address]:  GV    Looks like except for the AFP, other labs are in future order from 7/30  Hi Dr Duran Chain:  Please advise if any labs would be due at this time  She is overdue for OV; will send note to  for appt

## 2019-10-14 LAB
CREAT ?TM UR-SCNC: 1.2 UMOL/L
EXT MICROALBUMIN URINE RANDOM: 42
HBA1C MFR BLD HPLC: 8 %

## 2019-11-12 ENCOUNTER — OFFICE VISIT (OUTPATIENT)
Dept: ENDOCRINOLOGY | Facility: HOSPITAL | Age: 74
End: 2019-11-12
Payer: MEDICARE

## 2019-11-12 VITALS
SYSTOLIC BLOOD PRESSURE: 118 MMHG | HEIGHT: 63 IN | HEART RATE: 62 BPM | BODY MASS INDEX: 40.64 KG/M2 | DIASTOLIC BLOOD PRESSURE: 60 MMHG | WEIGHT: 229.4 LBS

## 2019-11-12 DIAGNOSIS — E03.9 ACQUIRED HYPOTHYROIDISM: ICD-10-CM

## 2019-11-12 DIAGNOSIS — E11.65 TYPE 2 DIABETES MELLITUS WITH HYPERGLYCEMIA, WITH LONG-TERM CURRENT USE OF INSULIN (HCC): Primary | ICD-10-CM

## 2019-11-12 DIAGNOSIS — E11.8 TYPE 2 DIABETES MELLITUS WITH COMPLICATION (HCC): ICD-10-CM

## 2019-11-12 DIAGNOSIS — E78.2 MIXED HYPERLIPIDEMIA: ICD-10-CM

## 2019-11-12 DIAGNOSIS — Z79.4 TYPE 2 DIABETES MELLITUS WITH HYPERGLYCEMIA, WITH LONG-TERM CURRENT USE OF INSULIN (HCC): Primary | ICD-10-CM

## 2019-11-12 DIAGNOSIS — E11.42 DIABETIC POLYNEUROPATHY ASSOCIATED WITH TYPE 2 DIABETES MELLITUS (HCC): ICD-10-CM

## 2019-11-12 DIAGNOSIS — I10 ESSENTIAL HYPERTENSION: ICD-10-CM

## 2019-11-12 PROCEDURE — 99214 OFFICE O/P EST MOD 30 MIN: CPT | Performed by: NURSE PRACTITIONER

## 2019-11-12 RX ORDER — INSULIN GLARGINE 100 [IU]/ML
INJECTION, SOLUTION SUBCUTANEOUS
Qty: 15 PEN | Refills: 4 | Status: SHIPPED | OUTPATIENT
Start: 2019-11-12 | End: 2020-02-26 | Stop reason: SDUPTHER

## 2019-11-12 NOTE — PROGRESS NOTES
Amaury Rogers 69 y o  female MRN: [id number]    Encounter: [id number]      Assessment/Plan     Assessment: This is a 69y o -year-old female with type 2 diabetes with polyneuropathy, hypothyroidism, hypertension and hyperlipidemia  Plan:  1  Type 2 diabetes insulin requiring  Her most recent hemoglobin A1c has elevated to 8 0  She states she has been under lot of stress at home with her  who is now hospitalized  Discussed the importance of a proper diabetic diet and consistent activity  Offered medical nutrition therapy for help with her diet which was declined at this time  For now, she will continue the same Janumet, glimepiride, and Basaglar insulin  She will continue to work on diet and will continue to check her blood sugars at least 1 to 3 times a day  2  Diabetic neuropathy  She denies neuropathic symptoms and sees a podiatrist on a regular basis  3  Hypothyroidism  Her most recent TSH is slightly elevated  She will continue the same levothyroxine 125 mcg daily  Managed by PCP, by her report  4  Hypertension  Blood pressure is under excellent control on her current hydrochlorothiazide, Nadolol, and losartan  5  Hyperlipidemia  Continue simvastatin 40 mg daily  CC:  Type 2 Diabetes follow-up    History of Present Illness     HPI:  69y o  year old female with type 2 diabetes for 48 years  She is on oral agents and insulin at home and takes  Janumet XR 50/1000 mg twice a day, glimepiride 4 mg twice a day, and Basaglar insulin 46 units the morning and 34 units in the evening  Her most recent hemoglobin A1c from October 14, 2019 is 8 0  She denies any polyuria, polydipsia, polyphagia,nocturia and blurry vision  She has some numbness of her feet unchanged  She denies chest pain  She is always has some shortness of breath    She denies nephropathy, retinopathy, heart attack, stroke and claudication but does admit to neuropathy        Hypoglycemic episodes:  rare      Most recent diabetic eye exam was on June 19, 2019 with no retinopathy  Her most recent diabetic foot exam was performed on July 30, 2019       Blood Sugar/Glucometer/Pump/CGM review:  She checks her blood sugars once daily in the morning  Her most recent glucometer download from October 30 through November 12, 2019 reveals 13 readings with an average blood glucose of 136        She has hypothyroidism and takes levothyroxine 125 mcg daily  Her most recent TSH from October 14, 2019 is 5 620 with a free T4 of 1 47  She denies heat or cold intolerance, diarrhea or constipation  She has tremors and occasional palpitations  She has insomnia due to waking frequently and is a woken for her 's needs  She is thus fatigued  Weight has gone up 6 lb since April 2019  She has hypertension and takes hydrochlorothiazide 25 mg daily, nadolol 20 mg daily, and losartan 50 mg daily  She denies headache or stroke-like symptoms  She has hyperlipidemia and takes simvastatin 40 mg daily  She denies chest pain but always has some shortness of breath  She sees a cardiologist on a regular basis  Review of Systems   Constitutional: Positive for fatigue  Negative for chills and fever  HENT: Negative  Negative for trouble swallowing and voice change  Eyes: Negative  Negative for visual disturbance  Respiratory: Positive for shortness of breath (Dyspnea on exertion)  Negative for chest tightness  Cardiovascular: Positive for leg swelling  Negative for chest pain  Gastrointestinal: Positive for abdominal pain ( attributed to constipation) and constipation  Negative for diarrhea and vomiting  Endocrine: Negative for cold intolerance, heat intolerance, polydipsia, polyphagia and polyuria  Genitourinary: Negative  Musculoskeletal: Positive for arthralgias ( attributed to age)  Skin: Negative  Allergic/Immunologic: Negative  Neurological: Positive for dizziness ( rare)   Negative for syncope, light-headedness and headaches  Hematological: Negative  Psychiatric/Behavioral: Negative  All other systems reviewed and are negative        Historical Information   Past Medical History:   Diagnosis Date    Fatty liver     Osteoarthritis      Past Surgical History:   Procedure Laterality Date    CATARACT EXTRACTION, BILATERAL      LAPAROSCOPIC CHOLECYSTECTOMY      SHOULDER SURGERY Right     calcium depsoti    TUBAL LIGATION      UMBILICAL HERNIA REPAIR      with mesh placed     Social History   Social History     Substance and Sexual Activity   Alcohol Use No     Social History     Substance and Sexual Activity   Drug Use No     Social History     Tobacco Use   Smoking Status Never Smoker   Smokeless Tobacco Never Used     Family History:   Family History   Problem Relation Age of Onset    Breast cancer Mother     Diabetes type II Father     Thyroid disease unspecified Daughter     Down syndrome Daughter     Diabetes type II Daughter     Diabetes type II Sister     No Known Problems Brother     Prostate cancer Brother     No Known Problems Sister     Stroke Sister     No Known Problems Son        Meds/Allergies   Current Outpatient Medications   Medication Sig Dispense Refill    BASAGLAR KWIKPEN 100 units/mL injection pen USE 80 TO 90 UNITS SUBCUTANEOUSLY DAILY AS DIRECTED 15 pen 4    Blood Glucose Monitoring Suppl (Rayna Chavez) w/Device KIT Check sugars three times a day 1 kit 0    Calcium Carb-Cholecalciferol (CALCIUM 1000 + D PO) Take by mouth      Coenzyme Q10 (CO Q10) 100 MG CAPS Take by mouth      fluticasone (FLONASE) 50 mcg/act nasal spray 1 spray into each nostril daily prn       glimepiride (AMARYL) 4 mg tablet Take 1 tablet (4 mg total) by mouth 2 (two) times a day 60 tablet 6    glucose blood (ONETOUCH VERIO) test strip Check sugars once a day 100 each 2    hydrochlorothiazide (HYDRODIURIL) 25 mg tablet Take 25 mg by mouth daily  3    Insulin Pen Needle 31G X 5 MM MISC Use up to 3 times a day 100 each 6    JANUMET XR  MG TB24 Take 1 tablet by mouth 2 (two) times a day 180 tablet 3    levothyroxine 125 mcg tablet Take 125 mcg by mouth daily  2    losartan (COZAAR) 50 mg tablet Take 50 mg by mouth daily  3    nadolol (CORGARD) 20 mg tablet Take 1 tablet (20 mg total) by mouth daily 30 tablet 0    Omega-3 Fatty Acids (FISH OIL) 1,000 mg Take 1,000 mg by mouth daily      omeprazole (PriLOSEC) 20 mg delayed release capsule TAKE 1 CAPSULE BY MOUTH EVERY DAY 90 capsule 1    ONETOUCH DELICA LANCETS FINE MISC Use 1-3 times a day 100 each 3    simvastatin (ZOCOR) 40 mg tablet Take 40 mg by mouth daily  3    Multiple Vitamins-Minerals (PRESERVISION AREDS 2+MULTI VIT PO) Take by mouth       No current facility-administered medications for this visit  Allergies   Allergen Reactions    Latex     Sesame Seed (Diagnostic)      Other reaction(s): swelling inside mouth    Strawberry C [Ascorbate] Other (See Comments)     Mouth sores    Penicillins Rash       Objective   Vitals: Blood pressure 118/60, pulse 62, height 5' 3" (1 6 m), weight 104 kg (229 lb 6 4 oz)  Physical Exam   Constitutional: She is oriented to person, place, and time  She appears well-developed and well-nourished  Obese   HENT:   Head: Normocephalic and atraumatic  Mouth/Throat: Oropharynx is clear and moist    Eyes: Pupils are equal, round, and reactive to light  Conjunctivae and EOM are normal    Wears glasses   Neck: Normal range of motion  Neck supple  Cardiovascular: Normal rate, regular rhythm, normal heart sounds and intact distal pulses  Pulmonary/Chest: Effort normal and breath sounds normal    Abdominal: Soft  Bowel sounds are normal    Musculoskeletal: Normal range of motion  Neurological: She is alert and oriented to person, place, and time  Skin: Skin is warm and dry  Dry skin   Psychiatric: She has a normal mood and affect   Her behavior is normal  Judgment and thought content normal    Vitals reviewed  Lab Results:   Lab Results   Component Value Date/Time    Hemoglobin A1C 8 0 10/14/2019    Hemoglobin A1C 7 7 07/23/2019    Hemoglobin A1C 8 9 03/29/2019     Portions of the record may have been created with voice recognition software  Occasional wrong word or "sound a like" substitutions may have occurred due to the inherent limitations of voice recognition software  Read the chart carefully and recognize, using context, where substitutions have occurred

## 2019-11-12 NOTE — PATIENT INSTRUCTIONS
Be mindful of diet  Stay active and stay hydrated  Rotate sites for injecting your insulin  Her most recent Hemoglobin A1c is 8 0  Continue to check blood sugars at least once daily  Your most recent TSH is slightly elevated  Continue the same levothyroxine 125 mcg daily and follow up with your PCP  Obtain thyroid function lab work in 6-8 weeks to reassess  Consider follow-up with the nutritionist     Obtain lab work prior to next office visit

## 2019-11-13 DIAGNOSIS — Z79.4 TYPE 2 DIABETES MELLITUS WITH HYPERGLYCEMIA, WITH LONG-TERM CURRENT USE OF INSULIN (HCC): ICD-10-CM

## 2019-11-13 DIAGNOSIS — E11.65 TYPE 2 DIABETES MELLITUS WITH HYPERGLYCEMIA, WITH LONG-TERM CURRENT USE OF INSULIN (HCC): ICD-10-CM

## 2019-11-13 DIAGNOSIS — E11.42 DIABETIC POLYNEUROPATHY ASSOCIATED WITH TYPE 2 DIABETES MELLITUS (HCC): ICD-10-CM

## 2019-11-13 RX ORDER — GLIMEPIRIDE 4 MG/1
4 TABLET ORAL 2 TIMES DAILY
Qty: 180 TABLET | Refills: 2 | Status: SHIPPED | OUTPATIENT
Start: 2019-11-13 | End: 2020-08-06

## 2019-11-15 NOTE — TELEPHONE ENCOUNTER
Spoke with pt, she is scheduled for an ov with Dr Annel Cook on 11/21  She is very anxious about her apt as she has another appointment that day as well and is very busy with her   He has 3 different types of cancers and is always running him to the [place] and taking care of their daughter  She really wants to connect with Dr GREENE to take care of herself but is concerned may need other labs and us and doesn't know if has the time  Reviewed pt's chart and noted she had an US done 11/24/18 and a ct scan 6/14/19  As far as labs she had most recently 10/14/19 and an AFP 3/29/19  Recommended to pt to keep her 11/21 apt and Dr Annel Cook can order future labs at that time  Pt agreeable and appreciative as not sure would have time for labs anyway

## 2019-11-21 ENCOUNTER — OFFICE VISIT (OUTPATIENT)
Dept: GASTROENTEROLOGY | Facility: CLINIC | Age: 74
End: 2019-11-21
Payer: MEDICARE

## 2019-11-21 VITALS
HEART RATE: 80 BPM | HEIGHT: 63 IN | BODY MASS INDEX: 40.4 KG/M2 | DIASTOLIC BLOOD PRESSURE: 76 MMHG | WEIGHT: 228 LBS | SYSTOLIC BLOOD PRESSURE: 120 MMHG

## 2019-11-21 DIAGNOSIS — I10 HYPERTENSION, UNSPECIFIED TYPE: ICD-10-CM

## 2019-11-21 DIAGNOSIS — K74.69 OTHER CIRRHOSIS OF LIVER (HCC): Primary | ICD-10-CM

## 2019-11-21 DIAGNOSIS — Z79.4 TYPE 2 DIABETES MELLITUS WITH HYPERGLYCEMIA, WITH LONG-TERM CURRENT USE OF INSULIN (HCC): ICD-10-CM

## 2019-11-21 DIAGNOSIS — K59.00 CONSTIPATION, UNSPECIFIED CONSTIPATION TYPE: ICD-10-CM

## 2019-11-21 DIAGNOSIS — I85.00 ESOPHAGEAL VARICES DETERMINED BY ENDOSCOPY (HCC): ICD-10-CM

## 2019-11-21 DIAGNOSIS — E11.65 TYPE 2 DIABETES MELLITUS WITH HYPERGLYCEMIA, WITH LONG-TERM CURRENT USE OF INSULIN (HCC): ICD-10-CM

## 2019-11-21 PROCEDURE — 99214 OFFICE O/P EST MOD 30 MIN: CPT | Performed by: INTERNAL MEDICINE

## 2019-11-21 RX ORDER — NADOLOL 20 MG/1
20 TABLET ORAL DAILY
Qty: 30 TABLET | Refills: 0
Start: 2019-11-21 | End: 2021-03-16 | Stop reason: SDUPTHER

## 2019-11-21 RX ORDER — LACTULOSE 20 G/30ML
10 SOLUTION ORAL 2 TIMES DAILY
Qty: 900 ML | Refills: 6 | Status: SHIPPED | OUTPATIENT
Start: 2019-11-21 | End: 2021-03-16 | Stop reason: SDUPTHER

## 2019-11-21 NOTE — PATIENT INSTRUCTIONS
[id number] - Outpatient Follow-up Note  Ailyn Padilla 69 y o  female MRN: [id number]  Encounter: [id number]    ASSESSMENT AND PLAN:      1  Other cirrhosis of liver ([facility] )  -patient with a history of cirrhosis  Most likely on the basis of nonalcoholic steatohepatitis  Patient with a BMI of 40  Cirrhosis is compensated  - AFP tumor marker; Future  - Protime-INR; Future  - US abdomen complete; Future    The patient to obtain test in the spring February or March   -the CT scan examination June 2019 no evidence of hepatoma  -yearly flu shot  -advise Pneumovax  -screen for hepatocellular carcinoma with ultrasound and alpha fetoprotein level every 6 months        2  Constipation, unspecified constipation type    -recent onset of constipation  Patient does feels though her movements get caught in her lower abdomen  Has had surgery with mesh implanted  CT scan in June of this year showed some inflammatory changes beneath the skin around the mesh  Never treated for cellulitis  - lactulose 20 g/30 mL; Take 15 mL (10 g total) by mouth 2 (two) times a day  Dispense: 900 mL; Refill: 6    3  Type 2 diabetes mellitus with hyperglycemia, with long-term current use of insulin (HCC)  -last hemoglobin A1c of 8    -try to avoid hypoglycemia as patient is on beta-blocker      4  Esophageal varices determined by endoscopy ([place] [id number] )    -EGD 2015 showed grade 2 varices nonbleeding   -will call patient in January or February [facility]   -on Corgard 20 mg daily for variceal hemorrhage prevention    5  Hypertension, unspecified type  -followed by Cardiology    6  Thrombocytopenia-  -secondary to problem 1           Followup Appointment:  6 months

## 2019-11-21 NOTE — PROGRESS NOTES
[id number] [facility] - Outpatient Follow-up Note  Dalila Villeda 69 y o  female MRN: [id number]  Encounter: [id number]    ASSESSMENT AND PLAN:      1  Other cirrhosis of liver ([facility] 75 )  -patient with a history of cirrhosis  Most likely on the basis of nonalcoholic steatohepatitis  Patient with a BMI of 40  Cirrhosis is compensated  - AFP tumor marker; Future  - Protime-INR; Future  - US abdomen complete; Future    The patient to obtain test in the spring February or March   -the CT scan examination June 2019 no evidence of hepatoma  -yearly flu shot  -advise Pneumovax  -screen for hepatocellular carcinoma with ultrasound and alpha fetoprotein level every 6 months        2  Constipation, unspecified constipation type    -recent onset of constipation  Patient does feels though her movements get caught in her lower abdomen  Has had surgery with mesh implanted  CT scan in June of this year showed some inflammatory changes beneath the skin around the mesh  Never treated for cellulitis  - lactulose 20 g/30 mL; Take 15 mL (10 g total) by mouth 2 (two) times a day  Dispense: 900 mL; Refill: 6    3  Type 2 diabetes mellitus with hyperglycemia, with long-term current use of insulin ([facility])  -last hemoglobin A1c of 8    -try to avoid hypoglycemia as patient is on beta-blocker      4  Esophageal varices determined by endoscopy ([place] [id number] )    -EGD 2015 showed grade 2 varices nonbleeding   -will call patient in January or February to schedule EGD at [facility]   -on Corgard 20 mg daily for variceal hemorrhage prevention    5  Hypertension, unspecified type  -followed by Cardiology    6  Thrombocytopenia-  -secondary to problem 1   Most recent platelet count 47899 mild pancytopenia with white count of 4 4 1000         Followup Appointment:  6 months  ______________________________________________________________________    Chief Complaint   Patient presents with    Follow-up liver     HPI:  Patient returns to the office for follow-up visit with respect to management and follow-up of her cirrhosis  This was discovered incidentally several years ago by scanning  It is presumed secondary to nonalcoholic steatohepatitis  Patient has never had a heavy alcohol history and previous history checking for viral markers have been negative  Patient has not had any complications related to his cirrhosis  She did have esophageal varices on upper endoscopy in 2015  She has had some abdominal discomfort and swelling around her umbilical area  She had some complicated hernia surgery and mesh implanted back in 2011  She felt a popping sensation in the spring and this led to a CT scan  Note was made of some inflammatory changes around the bottom of the mesh  Patient had surgery also for removal of a large pannus  Patient has had some abdominal discomfort per just complains of issues with constipation at this time  She almost feels though the fecal matter will get stuck on its way down the intestine  The patient did have colonoscopy which I performed in 2015 which was essentially unremarkable except for some diverticulosis  No polyps were noted  Patient is quite fatigued at this time    Unfortunately her  has been ill and in the hospital [facility] and she has to drive an hour and half to see him    Historical Information   Past Medical History:   Diagnosis Date    Cirrhosis ([facility] )     Diabetic neuropathy ([facility] )     Esophageal varices ([facility] )     Fatty liver     Fatty liver     GERD (gastroesophageal reflux disease)     Hiatal hernia     Hyperlipidemia     Hypertension     Hypoglycemia     Hypothyroidism     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)     Osteoarthritis     Osteopenia     Thrombocytopenia (HCC)     Type 2 diabetes mellitus ([facility] )      Past Surgical History:   Procedure Laterality Date    ABDOMINAL SURGERY      CATARACT EXTRACTION, BILATERAL      EGD AND COLONOSCOPY  03/18/2015    LAPAROSCOPIC CHOLECYSTECTOMY      SHOULDER SURGERY Right     calcium depsoti    TUBAL LIGATION      UMBILICAL HERNIA REPAIR      with mesh placed    UPPER GASTROINTESTINAL ENDOSCOPY       Social History     Substance and Sexual Activity   Alcohol Use No     Social History     Substance and Sexual Activity   Drug Use No     Social History     Tobacco Use   Smoking Status Never Smoker   Smokeless Tobacco Never Used     Family History   Problem Relation Age of Onset    Breast cancer Mother     Diabetes type II Father     Thyroid disease unspecified Daughter     Down syndrome Daughter     Diabetes type II Daughter     Diabetes type II Sister     No Known Problems Brother     Prostate cancer Brother     No Known Problems Sister     Stroke Sister     No Known Problems Son          Current Outpatient Medications:   Earnstine Apley KWIKPEN 100 units/mL injection pen    Blood Glucose Monitoring Suppl (ONETOUCH VERIO) w/Device KIT    Calcium Carb-Cholecalciferol (CALCIUM 1000 + D PO)    Coenzyme Q10 (CO Q10) 100 MG CAPS    fluticasone (FLONASE) 50 mcg/act nasal spray    glimepiride (AMARYL) 4 mg tablet    glucose blood (ONETOUCH VERIO) test strip    hydrochlorothiazide (HYDRODIURIL) 25 mg tablet    Insulin Pen Needle 31G X 5 MM MISC    JANUMET XR  MG TB24    levothyroxine 125 mcg tablet    losartan (COZAAR) 50 mg tablet    Multiple Vitamins-Minerals (PRESERVISION AREDS 2+MULTI VIT PO)    nadolol (CORGARD) 20 mg tablet    Omega-3 Fatty Acids (FISH OIL) 1,000 mg    omeprazole (PriLOSEC) 20 mg delayed release capsule    ONEUCH DELICA LANCETS FINE MISC    simvastatin (ZOCOR) 40 mg tablet    lactulose 20 g/30 mL  Allergies   Allergen Reactions    Latex     Sesame Seed (Diagnostic)      Other reaction(s): swelling inside mouth    Strawberry C [Ascorbate] Other (See Comments)     Mouth sores    Penicillins Rash     Reviewed medications and allergies and updated as indicated    PHYSICAL EXAM:    Blood pressure 120/76, pulse 80, height 5' 3" (1 6 m), weight 103 kg (228 lb)  Body mass index is 40 39 kg/m²  General Appearance: NAD, cooperative, alert  Eyes: Anicteric, PERRLA, EOMI  ENT:  Normocephalic, atraumatic, normal mucosa  Neck:  Supple, symmetrical, trachea midline  Resp:  Clear to auscultation bilaterally; no rales, rhonchi or wheezing; respirations unlabored   CV:  S1 S2, Regular rate and rhythm; no murmur, rub, or gallop  GI:  Soft, non-tender, non-distended; normal bowel sounds; no masses, obese large periumbilical scar in midline abdominal scar  No significant tenderness  Rectal: Deferred  Musculoskeletal: No cyanosis, clubbing trace pedal edema  Normal ROM  Skin:  No jaundice, rashes, or lesions   Heme/Lymph: No palpable cervical lymphadenopathy  Psych: Normal affect, good eye contact  Neuro: No gross deficits, AAOx3    Lab Results:   Lab Results   Component Value Date    WBC 5 26 10/27/2015    HGB 12 6 10/27/2015    HCT 38 6 10/27/2015    MCV 90 10/27/2015    PLT 97 (L) 10/27/2015     CMP - ALT 26 alk-phos 80 bilirubin 0 7 AST slightly high at 42-[place] labs October 2019    Radiology Results:  CT scan abdomen [facility] June 2019 absent gallbladder  Cirrhotic appearing liver  Stable splenomegaly  Left adrenal cyst   Upper abdominal varices    Ventral mesh noted low at the level of the umbilicus with some inflammatory changes in subcutaneous fluid in the area no focal collection positive evidence for portal hypertension

## 2019-11-21 NOTE — LETTER
November 21, 2019     Diandra Covington DO  [address]  [address]     Patient: Antonio Casas   YOB: 1945   Date of Visit: 11/21/2019       Dear Dr Rj Montenegro: Thank you for referring Enedina Braden to me for evaluation  Below are my notes for this consultation  If you have questions, please do not hesitate to call me  I look forward to following your patient along with you  Sincerely,        Gina Valdez MD        CC: No Recipients  Gina Valdez MD  11/21/2019  5:26 PM  Incomplete  [address] [facility] - Outpatient Follow-up Note  Antonio Casas 69 y o  female MRN: [id number]  Encounter: [id number]    ASSESSMENT AND PLAN:      1  Other cirrhosis of liver ([facility] [address] )  -patient with a history of cirrhosis  Most likely on the basis of nonalcoholic steatohepatitis  Patient with a BMI of 40  Cirrhosis is compensated  - AFP tumor marker; Future  - Protime-INR; Future  - US abdomen complete; Future    The patient to obtain test in the spring February or March   -the CT scan examination June 2019 no evidence of hepatoma  -yearly flu shot  -advise Pneumovax  -screen for hepatocellular carcinoma with ultrasound and alpha fetoprotein level every 6 months        2  Constipation, unspecified constipation type    -recent onset of constipation  Patient does feels though her movements get caught in her lower abdomen  Has had surgery with mesh implanted  CT scan in June of this year showed some inflammatory changes beneath the skin around the mesh  Never treated for cellulitis  - lactulose 20 g/30 mL; Take 15 mL (10 g total) by mouth 2 (two) times a day  Dispense: 900 mL; Refill: 6    3  Type 2 diabetes mellitus with hyperglycemia, with long-term current use of insulin (HCC)  -last hemoglobin A1c of 8    -try to avoid hypoglycemia as patient is on beta-blocker      4   Esophageal varices determined by endoscopy [facility])    -EGD 2015 showed grade 2 varices nonbleeding   -will call patient in January or February to schedule EGD at [facility]   -on Corgard 20 mg daily for variceal hemorrhage prevention    5  Hypertension, unspecified type  -followed by Cardiology    6  Thrombocytopenia-  -secondary to problem 1  Most recent platelet count 42782 mild pancytopenia with white count of 4 4 1000         Followup Appointment:  6 months  ______________________________________________________________________    Chief Complaint   Patient presents with    Follow-up     liver     HPI:  Patient returns to the office for follow-up visit with respect to management and follow-up of her cirrhosis  This was discovered incidentally several years ago by scanning  It is presumed secondary to nonalcoholic steatohepatitis  Patient has never had a heavy alcohol history and previous history checking for viral markers have been negative  Patient has not had any complications related to his cirrhosis  She did have esophageal varices on upper endoscopy in 2015  She has had some abdominal discomfort and swelling around her umbilical area  She had some complicated hernia surgery and mesh implanted back in 2011  She felt a popping sensation in the spring and this led to a CT scan  Note was made of some inflammatory changes around the bottom of the mesh  Patient had surgery also for removal of a large pannus  Patient has had some abdominal discomfort per just complains of issues with constipation at this time  She almost feels though the fecal matter will get stuck on its way down the intestine  The patient did have colonoscopy which I performed in 2015 which was essentially unremarkable except for some diverticulosis  No polyps were noted  Patient is quite fatigued at this time    Unfortunately her  has been ill and in the hospital [facility] and she has to drive an hour and half to see him    Historical Information   Past Medical History:   Diagnosis Date    Cirrhosis ([place] [address] )     Diabetic neuropathy ([place] [address] )     Esophageal varices (HCC)     Fatty liver     Fatty liver     GERD (gastroesophageal reflux disease)     Hiatal hernia     Hyperlipidemia     Hypertension     Hypoglycemia     Hypothyroidism     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 ([place] [address] )     Osteoarthritis     Osteopenia     Thrombocytopenia (HCC)     Type 2 diabetes mellitus (HCC)      Past Surgical History:   Procedure Laterality Date    ABDOMINAL SURGERY      CATARACT EXTRACTION, BILATERAL      EGD AND COLONOSCOPY  03/18/2015    LAPAROSCOPIC CHOLECYSTECTOMY      SHOULDER SURGERY Right     calcium depsoti    TUBAL LIGATION      UMBILICAL HERNIA REPAIR      with mesh placed    UPPER GASTROINTESTINAL ENDOSCOPY       Social History     Substance and Sexual Activity   Alcohol Use No     Social History     Substance and Sexual Activity   Drug Use No     Social History     Tobacco Use   Smoking Status Never Smoker   Smokeless Tobacco Never Used     Family History   Problem Relation Age of Onset    Breast cancer Mother     Diabetes type II Father     Thyroid disease unspecified Daughter     Down syndrome Daughter     Diabetes type II Daughter     Diabetes type II Sister     No Known Problems Brother     Prostate cancer Brother     No Known Problems Sister     Stroke Sister     No Known Problems Son          Current Outpatient Medications:   Angel Starcher KWIKPEN 100 units/mL injection pen    Blood Glucose Monitoring Suppl (ONETOUCH VERIO) w/Device KIT    Calcium Carb-Cholecalciferol (CALCIUM 1000 + D PO)    Coenzyme Q10 (CO Q10) 100 MG CAPS    fluticasone (FLONASE) 50 mcg/act nasal spray    glimepiride (AMARYL) 4 mg tablet    glucose blood (ONETOUCH VERIO) test strip    hydrochlorothiazide (HYDRODIURIL) 25 mg tablet    Insulin Pen Needle 31G X 5 MM MISC    JANUMET XR  MG TB24    levothyroxine 125 mcg tablet    losartan (COZAAR) 50 mg tablet    Multiple Vitamins-Minerals (PRESERVISION AREDS 2+MULTI VIT PO)    nadolol (CORGARD) 20 mg tablet    Omega-3 Fatty Acids (FISH OIL) 1,000 mg    omeprazole (PriLOSEC) 20 mg delayed release capsule    ONETOUCH DELICA LANCETS FINE MISC    simvastatin (ZOCOR) 40 mg tablet    lactulose 20 g/30 mL  Allergies   Allergen Reactions    Latex     Sesame Seed (Diagnostic)      Other reaction(s): swelling inside mouth    Strawberry C [Ascorbate] Other (See Comments)     Mouth sores    Penicillins Rash     Reviewed medications and allergies and updated as indicated    PHYSICAL EXAM:    Blood pressure 120/76, pulse 80, height 5' 3" (1 6 m), weight 103 kg (228 lb)  Body mass index is 40 39 kg/m²  General Appearance: NAD, cooperative, alert  Eyes: Anicteric, PERRLA, EOMI  ENT:  Normocephalic, atraumatic, normal mucosa  Neck:  Supple, symmetrical, trachea midline  Resp:  Clear to auscultation bilaterally; no rales, rhonchi or wheezing; respirations unlabored   CV:  S1 S2, Regular rate and rhythm; no murmur, rub, or gallop  GI:  Soft, non-tender, non-distended; normal bowel sounds; no masses, obese large periumbilical scar in midline abdominal scar  No significant tenderness  Rectal: Deferred  Musculoskeletal: No cyanosis, clubbing trace pedal edema  Normal ROM  Skin:  No jaundice, rashes, or lesions   Heme/Lymph: No palpable cervical lymphadenopathy  Psych: Normal affect, good eye contact  Neuro: No gross deficits, AAOx3    Lab Results:   Lab Results   Component Value Date    WBC 5 26 10/27/2015    HGB 12 6 10/27/2015    HCT 38 6 10/27/2015    MCV 90 10/27/2015    PLT 97 (L) 10/27/2015     CMP - ALT 26 alk-phos 80 bilirubin 0 7 AST slightly high at 42-[place] labs October 2019    Radiology Results:  CT scan abdomen [facility] June 2019 absent gallbladder  Cirrhotic appearing liver  Stable splenomegaly  Left adrenal cyst   Upper abdominal varices    Ventral mesh noted low at the level of the umbilicus with some inflammatory changes in subcutaneous fluid in the area no focal collection positive evidence for portal hypertension    Dae Villa MD  11/21/2019  9:08 AM  Sign at close encounter  [address] [facility] - Outpatient Follow-up Note  Violeta Hall 69 y o  female MRN: [id number]  Encounter: [id number]    ASSESSMENT AND PLAN:      1  Other cirrhosis of liver ([facility] )  -patient with a history of cirrhosis  Most likely on the basis of nonalcoholic steatohepatitis  Patient with a BMI of 40  Cirrhosis is compensated  - AFP tumor marker; Future  - Protime-INR; Future  - US abdomen complete; Future    The patient to obtain test in the spring February or March   -the CT scan examination June 2019 no evidence of hepatoma  -yearly flu shot  -advise Pneumovax  -screen for hepatocellular carcinoma with ultrasound and alpha fetoprotein level every 6 months        2  Constipation, unspecified constipation type    -recent onset of constipation  Patient does feels though her movements get caught in her lower abdomen  Has had surgery with mesh implanted  CT scan in June of this year showed some inflammatory changes beneath the skin around the mesh  Never treated for cellulitis  - lactulose 20 g/30 mL; Take 15 mL (10 g total) by mouth 2 (two) times a day  Dispense: 900 mL; Refill: 6    3  Type 2 diabetes mellitus with hyperglycemia, with long-term current use of insulin ([facility])  -last hemoglobin A1c of 8    -try to avoid hypoglycemia as patient is on beta-blocker      4  Esophageal varices determined by endoscopy ([place] [address] )    -EGD 2015 showed grade 2 varices nonbleeding   -will call patient in January or February to schedule EGD at [facility]   -on Corgard 20 mg daily for variceal hemorrhage prevention    5  Hypertension, unspecified type  -followed by Cardiology    6  Thrombocytopenia-  -secondary to problem 1           Followup Appointment:  6 months  ______________________________________________________________________    Chief Complaint   Patient presents with    Follow-up     liver     HPI:  Patient returns to the office for follow-up visit with respect to management and follow-up of her cirrhosis  This was discovered incidentally several years ago by scanning  It is presumed secondary to nonalcoholic steatohepatitis  Patient has never had a heavy alcohol history and previous history checking for viral markers have been negative  Patient has not had any complications related to his cirrhosis  She did have esophageal varices on upper endoscopy in 2015  She has had some abdominal discomfort and swelling around her umbilical area  She had some complicated hernia surgery and mesh implanted back in 2011  She felt a popping sensation in the spring and this led to a CT scan  Note was made of some inflammatory changes around the bottom of the mesh  Patient had surgery also for removal of a large pannus  Patient has had some abdominal discomfort per just complains of issues with constipation at this time  She almost feels though the fecal matter will get stuck on its way down the intestine  The patient did have colonoscopy which I performed in 2015 which was essentially unremarkable except for some diverticulosis  No polyps were noted  Patient is quite fatigued at this time    Unfortunately her  has been ill and in the hospital [facility] and she has to drive an hour and half to see him    Historical Information   Past Medical History:   Diagnosis Date    Cirrhosis ([facility] )     Diabetic neuropathy ([facility] )     Esophageal varices ([facility] )     Fatty liver     Fatty liver     GERD (gastroesophageal reflux disease)     Hiatal hernia     Hyperlipidemia     Hypertension     Hypoglycemia     Hypothyroidism     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)     Osteoarthritis     Osteopenia     Thrombocytopenia (HCC)     Type 2 diabetes mellitus ([address] )      Past Surgical History:   Procedure Laterality Date    ABDOMINAL SURGERY      CATARACT EXTRACTION, BILATERAL      EGD AND COLONOSCOPY  03/18/2015    LAPAROSCOPIC CHOLECYSTECTOMY      SHOULDER SURGERY Right     calcium depsoti    TUBAL LIGATION      UMBILICAL HERNIA REPAIR      with mesh placed    UPPER GASTROINTESTINAL ENDOSCOPY       Social History     Substance and Sexual Activity   Alcohol Use No     Social History     Substance and Sexual Activity   Drug Use No     Social History     Tobacco Use   Smoking Status Never Smoker   Smokeless Tobacco Never Used     Family History   Problem Relation Age of Onset    Breast cancer Mother     Diabetes type II Father     Thyroid disease unspecified Daughter     Down syndrome Daughter     Diabetes type II Daughter     Diabetes type II Sister     No Known Problems Brother     Prostate cancer Brother     No Known Problems Sister     Stroke Sister     No Known Problems Son          Current Outpatient Medications:   Laly Rack KWIKPEN 100 units/mL injection pen    Blood Glucose Monitoring Suppl (ONETOUCH VERIO) w/Device KIT    Calcium Carb-Cholecalciferol (CALCIUM 1000 + D PO)    Coenzyme Q10 (CO Q10) 100 MG CAPS    fluticasone (FLONASE) 50 mcg/act nasal spray    glimepiride (AMARYL) 4 mg tablet    glucose blood (ONETOUCH VERIO) test strip    hydrochlorothiazide (HYDRODIURIL) 25 mg tablet    Insulin Pen Needle 31G X 5 MM MISC    JANUMET XR  MG TB24    levothyroxine 125 mcg tablet    losartan (COZAAR) 50 mg tablet    Multiple Vitamins-Minerals (PRESERVISION AREDS 2+MULTI VIT PO)    nadolol (CORGARD) 20 mg tablet    Omega-3 Fatty Acids (FISH OIL) 1,000 mg    omeprazole (PriLOSEC) 20 mg delayed release capsule    ONEUCH DELICA LANCETS FINE MISC    simvastatin (ZOCOR) 40 mg tablet    lactulose 20 g/30 mL  Allergies   Allergen Reactions    Latex     Sesame Seed (Diagnostic)      Other reaction(s): swelling inside mouth    Strawberry C [Ascorbate] Other (See Comments)     Mouth sores    Penicillins Rash     Reviewed medications and allergies and updated as indicated    PHYSICAL EXAM:    Blood pressure 120/76, pulse 80, height 5' 3" (1 6 m), weight 103 kg (228 lb)  Body mass index is 40 39 kg/m²  General Appearance: NAD, cooperative, alert  Eyes: Anicteric, PERRLA, EOMI  ENT:  Normocephalic, atraumatic, normal mucosa  Neck:  Supple, symmetrical, trachea midline  Resp:  Clear to auscultation bilaterally; no rales, rhonchi or wheezing; respirations unlabored   CV:  S1 S2, Regular rate and rhythm; no murmur, rub, or gallop  GI:  Soft, non-tender, non-distended; normal bowel sounds; no masses, obese large periumbilical scar in midline abdominal scar  No significant tenderness  Rectal: Deferred  Musculoskeletal: No cyanosis, clubbing trace pedal edema  Normal ROM  Skin:  No jaundice, rashes, or lesions   Heme/Lymph: No palpable cervical lymphadenopathy  Psych: Normal affect, good eye contact  Neuro: No gross deficits, AAOx3    Lab Results:   Lab Results   Component Value Date    WBC 5 26 10/27/2015    HGB 12 6 10/27/2015    HCT 38 6 10/27/2015    MCV 90 10/27/2015    PLT 97 (L) 10/27/2015     No results found for: NA, K, CL, CO2, ANIONGAP, BUN, CREATININE, GLUCOSE, GLUF, CALCIUM, CORRECTEDCA, AST, ALT, ALKPHOS, PROT, BILITOT, EGFR  No results found for: IRON, TIBC, FERRITIN  No results found for: LIPASE    Radiology Results:   No results found

## 2019-11-26 ENCOUNTER — HOSPITAL ENCOUNTER (OUTPATIENT)
Dept: ULTRASOUND IMAGING | Facility: HOSPITAL | Age: 74
Discharge: HOME | End: 2019-11-26
Attending: INTERNAL MEDICINE
Payer: MEDICARE

## 2019-11-26 DIAGNOSIS — K74.69 OTHER CIRRHOSIS OF LIVER (HCC): ICD-10-CM

## 2019-11-26 PROCEDURE — 76700 US EXAM ABDOM COMPLETE: CPT

## 2020-01-14 LAB — HBA1C MFR BLD HPLC: 8.6 %

## 2020-01-16 NOTE — RESULT ENCOUNTER NOTE
Please call the patient regarding abnormal result  Fasting glucose is slightly low at 66 on comprehensive metabolic panel  If she experiencing consistent hypoglycemia?   Hemoglobin A1c is 8 6   TSH and free T4 are normal

## 2020-02-04 DIAGNOSIS — E11.8 TYPE 2 DIABETES MELLITUS WITH COMPLICATION (HCC): ICD-10-CM

## 2020-02-04 RX ORDER — SITAGLIPTIN AND METFORMIN HYDROCHLORIDE 1000; 50 MG/1; MG/1
TABLET, FILM COATED, EXTENDED RELEASE ORAL
Qty: 180 TABLET | Refills: 0 | Status: SHIPPED | OUTPATIENT
Start: 2020-02-04 | End: 2020-02-26 | Stop reason: SDUPTHER

## 2020-02-21 DIAGNOSIS — K21.9 GASTROESOPHAGEAL REFLUX DISEASE WITHOUT ESOPHAGITIS: ICD-10-CM

## 2020-02-21 RX ORDER — OMEPRAZOLE 20 MG/1
20 CAPSULE, DELAYED RELEASE ORAL DAILY
Qty: 90 CAPSULE | Refills: 2 | Status: SHIPPED | OUTPATIENT
Start: 2020-02-21 | End: 2020-11-12

## 2020-02-26 ENCOUNTER — OFFICE VISIT (OUTPATIENT)
Dept: ENDOCRINOLOGY | Facility: HOSPITAL | Age: 75
End: 2020-02-26
Payer: MEDICARE

## 2020-02-26 VITALS
SYSTOLIC BLOOD PRESSURE: 112 MMHG | WEIGHT: 225.4 LBS | HEIGHT: 63 IN | HEART RATE: 64 BPM | DIASTOLIC BLOOD PRESSURE: 64 MMHG | BODY MASS INDEX: 39.94 KG/M2

## 2020-02-26 DIAGNOSIS — E11.42 DIABETIC POLYNEUROPATHY ASSOCIATED WITH TYPE 2 DIABETES MELLITUS (HCC): ICD-10-CM

## 2020-02-26 DIAGNOSIS — Z79.4 TYPE 2 DIABETES MELLITUS WITH HYPERGLYCEMIA, WITH LONG-TERM CURRENT USE OF INSULIN (HCC): Primary | ICD-10-CM

## 2020-02-26 DIAGNOSIS — E78.2 MIXED HYPERLIPIDEMIA: ICD-10-CM

## 2020-02-26 DIAGNOSIS — E11.8 TYPE 2 DIABETES MELLITUS WITH COMPLICATION (HCC): ICD-10-CM

## 2020-02-26 DIAGNOSIS — I10 ESSENTIAL HYPERTENSION: ICD-10-CM

## 2020-02-26 DIAGNOSIS — E03.9 ACQUIRED HYPOTHYROIDISM: ICD-10-CM

## 2020-02-26 DIAGNOSIS — E11.65 TYPE 2 DIABETES MELLITUS WITH HYPERGLYCEMIA, WITH LONG-TERM CURRENT USE OF INSULIN (HCC): Primary | ICD-10-CM

## 2020-02-26 PROCEDURE — 99215 OFFICE O/P EST HI 40 MIN: CPT | Performed by: INTERNAL MEDICINE

## 2020-02-26 RX ORDER — SITAGLIPTIN AND METFORMIN HYDROCHLORIDE 1000; 50 MG/1; MG/1
1 TABLET, FILM COATED, EXTENDED RELEASE ORAL 2 TIMES DAILY
Qty: 180 TABLET | Refills: 3 | Status: SHIPPED | OUTPATIENT
Start: 2020-02-26 | End: 2021-04-26

## 2020-02-26 RX ORDER — BLOOD SUGAR DIAGNOSTIC
STRIP MISCELLANEOUS
Qty: 200 EACH | Refills: 2 | Status: SHIPPED | OUTPATIENT
Start: 2020-02-26 | End: 2020-09-28

## 2020-02-26 RX ORDER — INSULIN GLARGINE 100 [IU]/ML
INJECTION, SOLUTION SUBCUTANEOUS
Qty: 15 PEN | Refills: 6 | Status: SHIPPED | OUTPATIENT
Start: 2020-02-26 | End: 2020-05-19 | Stop reason: SDUPTHER

## 2020-02-26 NOTE — PROGRESS NOTES
2/26/2020    Assessment/Plan      Diagnoses and all orders for this visit:    Type 2 diabetes mellitus with hyperglycemia, with long-term current use of insulin ([facility] [address] )  -     HEMOGLOBIN A1C W/ EAG ESTIMATION Lab Collect; Future  -     Comprehensive metabolic panel Lab Collect; Future  -     TSH, 3rd generation Lab Collect; Future  -     T4, free Lab Collect; Future  -     ONETOUCH VERIO test strip; Check sugars up to 3 times a day    Type 2 diabetes mellitus with complication (HCC)  -     BASAGLAR KWIKPEN 100 units/mL injection pen; 46 units in am and 36 units in pm, up to 90 units daily  -     JANUMET XR  MG TB24; Take 1 tablet by mouth 2 (two) times a day    Acquired hypothyroidism  -     HEMOGLOBIN A1C W/ EAG ESTIMATION Lab Collect; Future  -     Comprehensive metabolic panel Lab Collect; Future  -     TSH, 3rd generation Lab Collect; Future  -     T4, free Lab Collect; Future    Diabetic polyneuropathy associated with type 2 diabetes mellitus (HCC)  -     HEMOGLOBIN A1C W/ EAG ESTIMATION Lab Collect; Future  -     Comprehensive metabolic panel Lab Collect; Future  -     TSH, 3rd generation Lab Collect; Future  -     T4, free Lab Collect; Future    Essential hypertension  -     HEMOGLOBIN A1C W/ EAG ESTIMATION Lab Collect; Future  -     Comprehensive metabolic panel Lab Collect; Future  -     TSH, 3rd generation Lab Collect; Future  -     T4, free Lab Collect; Future    Mixed hyperlipidemia  -     HEMOGLOBIN A1C W/ EAG ESTIMATION Lab Collect; Future  -     Comprehensive metabolic panel Lab Collect; Future  -     TSH, 3rd generation Lab Collect; Future  -     T4, free Lab Collect; Future        Assessment/Plan:  1  Type 2 diabetes insulin requiring  Hemoglobin A1c is 8 6% signifying uncontrolled diabetes and this has gone up  I have asked her to increase her Basaglar insulin to 46 units the morning and 36 units in the evening  She will continue the same glimepiride and Janumet XR    She will continue to work on a diet lower in carbohydrates and portions  I have asked her to continue to test her blood sugars at least 3 times a day  2  Diabetic neuropathy  She has chronic neuropathic symptoms unchanged  She is going to be seeing a new podiatrist soon  Diabetic foot exams are performed in the endocrine office on a regular basis  3  Hypothyroidism  Most recent thyroid function tests are normal   She is both biochemically and clinically euthyroid  She will continue the same levothyroxine 125 mcg daily  4  Hypertension  Blood pressure is under good control on her current dose of losartan, hydrochlorothiazide, and not all  5  Hyperlipidemia  She will continue the same simvastatin 40 mg daily  She follows with her cardiologist regarding this problem  I have asked her to follow up in 3 months with preceding hemoglobin A1c, CMP, TSH, and free T4       CC: Diabetes type 2, thyroid, lipid, blood pressure follow-up    History of Present Illness     HPI: Tyler Ring is a 69y o  year old female with type 2 diabetes with neuropathy for 48 years, hypothyroidism, hypertension, hyperlipidemia here for follow-up  She is on oral agents and insulin at home and takes Basaglar insulin 46 units in the morning and 34 units in the evening, glimepiride 4 mg twice a day, and Janumet XR  mg twice a day  She denies any polyuria, polyphagia, nocturia and blurry vision  She has polydipsia  She has chronic unchanged numbness and tingling of the feet  She has a chest ache but no actual chest pain and is always somewhat short of breath  She is due to see her cardiologist in the next several months  She denies nephropathy, retinopathy, heart attack, stroke and claudication but does admit to neuropathy    xmas day 2019  She is caring for daughter at home who has emotional issues  Here  was very close to her daughter  Has been eating more with stress and difficulty with sleeping as listening for daughter to call out  Hypoglycemic episodes: Yes infrequent  The patient's last eye exam was in June 2019 with no retinopathy  The patient's last foot exam was in July 2019 at endocrine office visit  To go to a new podiatrist soon  Last A1C was   Lab Results   Component Value Date    HGBA1C 8 6 01/14/2020     Blood Sugar/Glucometer/Pump/CGM review: checks blood sugars 2-3 times a day  Has some very high blood sugars in December with 's death  Before breakfast: 150-200s  Before lunch: low to mid 100s  Before dinner:   Bedtime:     She has hypothyroidism and takes levothyroxine 125 mcg daily  She is fatigued and frustrated with dealing with her daughter and her health issues as her  has passed away in used to be the main person who dealt with this on a regular basis  She does have some palpitations and tremors  She does not sleep well since her  passed away and is more anxious and depressed  She denies heat or cold intolerance, diarrhea or constipation, or weight changes  She has hyperlipidemia and takes simvastatin 40 mg daily  She denies chest pain but has a chest taken shortness of breath unchanged  She has hypertension and takes losartan 50 mg daily, nadolol 20 mg daily, and hydrochlorothiazide 25 mg daily  She denies headache or stroke-like symptoms  Review of Systems   Constitutional: Positive for fatigue  Negative for unexpected weight change  HENT: Negative for trouble swallowing  Eyes: Negative for visual disturbance  Wears glasses  Respiratory: Positive for shortness of breath  Negative for chest tightness  Cardiovascular: Positive for chest pain, palpitations and leg swelling  Has chest ache  To see cardiology Dr Tamy Joseph soon  Having swelling of the legs recently  Gastrointestinal: Positive for constipation  Negative for abdominal pain, diarrhea and nausea  Constipation at times  Uses lactulose as needed     Endocrine: Positive for polydipsia  Negative for cold intolerance, heat intolerance, polyphagia and polyuria  Nocturia none  Genitourinary: Positive for urgency  Skin: Negative for wound  Neurological: Positive for tremors and numbness  Negative for dizziness, weakness, light-headedness and headaches  Has numbness and tingling of the feet  Psychiatric/Behavioral: Positive for sleep disturbance  The patient is nervous/anxious  Doesn't sleep well due to daughter and 's recent death  Under a lot of stress with trying to help daughter with her health issues since   2019         Historical Information   Past Medical History:   Diagnosis Date    [facility]osis ([facility] )     Diabetic neuropathy ([facility] )     Diabetic neuropathy ([facility] )     Esophageal varices (HCC)     Fatty liver     Fatty liver     GERD (gastroesophageal reflux disease)     Hiatal hernia     Hyperlipidemia     Hypertension     Hypoglycemia     Hypothyroidism     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)     Osteoarthritis     Osteopenia     Thrombocytopenia (HCC)     Type 2 diabetes mellitus (HCC)      Past Surgical History:   Procedure Laterality Date    ABDOMINAL SURGERY      Abdominal plasty with mesh insertion    CATARACT EXTRACTION, BILATERAL      CATARACT EXTRACTION, BILATERAL      EGD AND COLONOSCOPY  2015    LAPAROSCOPIC CHOLECYSTECTOMY      SHOULDER SURGERY Right     calcium depsoti    TUBAL LIGATION      UMBILICAL HERNIA REPAIR      with mesh placed    UPPER GASTROINTESTINAL ENDOSCOPY       Social History   Social History     Substance and Sexual Activity   Alcohol Use No     Social History     Substance and Sexual Activity   Drug Use No     Social History     Tobacco Use   Smoking Status Never Smoker   Smokeless Tobacco Never Used     Family History:   Family History   Problem Relation Age of Onset    Breast cancer Mother     Diabetes type II Father     Thyroid disease unspecified Daughter     Down syndrome Daughter     Diabetes type II Daughter     Diabetes type II Sister     No Known Problems Brother     Prostate cancer Brother     No Known Problems Sister     Stroke Sister     No Known Problems Son        Meds/Allergies   Current Outpatient Medications   Medication Sig Dispense Refill    BASAGLAR KWIKPEN 100 units/mL injection pen 46 units in am and 36 units in pm, up to 90 units daily 15 pen 6    Blood Glucose Monitoring Suppl (ONETOUCH VERIO) w/Device KIT Check sugars three times a day 1 kit 0    fluticasone (FLONASE) 50 mcg/act nasal spray 1 spray into each nostril daily prn       glimepiride (AMARYL) 4 mg tablet TAKE 1 TABLET (4 MG TOTAL) BY MOUTH 2 (TWO) TIMES A  tablet 2    hydrochlorothiazide (HYDRODIURIL) 25 mg tablet Take 25 mg by mouth daily  3    Insulin Pen Needle 31G X 5 MM MISC Use up to 3 times a day 100 each 6    JANUMET XR  MG TB24 Take 1 tablet by mouth 2 (two) times a day 180 tablet 3    levothyroxine 125 mcg tablet Take 125 mcg by mouth daily  2    losartan (COZAAR) 50 mg tablet Take 50 mg by mouth daily  3    nadolol (CORGARD) 20 mg tablet Take 1 tablet (20 mg total) by mouth daily 30 tablet 0    omeprazole (PriLOSEC) 20 mg delayed release capsule Take 1 capsule (20 mg total) by mouth daily 90 capsule 2    ONETOUCH DELICA LANCETS FINE MISC Use 1-3 times a day 100 each 3    ONETOUCH VERIO test strip Check sugars up to 3 times a day 200 each 2    simvastatin (ZOCOR) 40 mg tablet Take 40 mg by mouth daily  3    lactulose 20 g/30 mL Take 15 mL (10 g total) by mouth 2 (two) times a day 900 mL 6     No current facility-administered medications for this visit        Allergies   Allergen Reactions    Latex     Sesame Seed (Diagnostic)      Other reaction(s): swelling inside mouth    Strawberry C [Ascorbate] Other (See Comments)     Mouth sores    Penicillins Rash       Objective   Vitals: Blood pressure 112/64, pulse 64, height 5' 3" (1 6 m), weight 102 kg (225 lb 6 4 oz)  Invasive Devices     None                 Physical Exam   Constitutional: She is oriented to person, place, and time  She appears well-developed and well-nourished  HENT:   Head: Normocephalic and atraumatic  Eyes: Conjunctivae and EOM are normal    No lid lag, stare, proptosis, or periorbital edema  Neck: Normal range of motion  Neck supple  No thyromegaly present  Thyroid normal in size without palpable nodules  No carotid bruits  Cardiovascular: Normal rate, regular rhythm and normal heart sounds  No murmur heard  Pulmonary/Chest: Effort normal and breath sounds normal  She has no wheezes  Abdominal: Soft  Musculoskeletal: She exhibits edema  She exhibits no deformity  Trace edema left  No ulcerations of the feet  No tremor of the outstretched hands  Lymphadenopathy:     She has no cervical adenopathy  Neurological: She is alert and oriented to person, place, and time  She has normal reflexes  Deep tendon reflexes normal    Skin: Skin is warm and dry  No rash noted  Vitals reviewed  The history was obtained from the review of the chart and from the patient  Lab Results:    Most recent Alc is  Lab Results   Component Value Date    HGBA1C 8 6 01/14/2020           Blood work done on 01/14/2020 at [address] [place] [address] demonstrates a CMP with a glucose of 66 fasting, BUN/creatinine ratio 28 1, AST 41, but was otherwise normal   TSH is 1 56 with a free T4 1 43      Future Appointments   Date Time Provider John Alvarado   4/1/2020  2:00 PM Edwin Evangelista MD [facility]   6/4/2020 10:00 AM Jessa Segura MD [facility]

## 2020-02-26 NOTE — PATIENT INSTRUCTIONS
hgba1c is too high at 8 6% and has gone up  increase the basaglar to 46 units in am and 36 units in pm    Continue the same glimepirde and janumet XR  Continue to test blood sugars 3 times a day  Continue to work on diet with lower carbs and portions  The thyroid blood work is normal   Continue the same levothyroxine  Follow up in 3 months with blood work

## 2020-03-26 ENCOUNTER — TELEPHONE (OUTPATIENT)
Dept: GASTROENTEROLOGY | Facility: CLINIC | Age: 75
End: 2020-03-26

## 2020-03-26 NOTE — TELEPHONE ENCOUNTER
Patient scheduled for office visit on 4/1  Pt does not have the capability to do facetime/virtual visit and would like to be seen but does not have anyone to take care of her daughter so she is unable to come in to the office  I advised pt she is able to bring one person to her visit with her but pt stated she does not wish to bring her daughter with her  Pt is experiencing bloating and trouble using the restroom  Pt is able to speak over the phone, please advise whether a phone call is acceptable or if you would like to reschedule pts marlon hurley

## 2020-04-01 ENCOUNTER — TELEMEDICINE (OUTPATIENT)
Dept: GASTROENTEROLOGY | Facility: CLINIC | Age: 75
End: 2020-04-01
Payer: MEDICARE

## 2020-04-01 VITALS — HEIGHT: 63 IN | WEIGHT: 230 LBS | BODY MASS INDEX: 40.75 KG/M2

## 2020-04-01 DIAGNOSIS — R60.1 GENERALIZED EDEMA: ICD-10-CM

## 2020-04-01 DIAGNOSIS — Z79.4 TYPE 2 DIABETES MELLITUS WITH HYPERGLYCEMIA, WITH LONG-TERM CURRENT USE OF INSULIN (HCC): ICD-10-CM

## 2020-04-01 DIAGNOSIS — R07.9 CHEST PAIN, UNSPECIFIED TYPE: Primary | ICD-10-CM

## 2020-04-01 DIAGNOSIS — E11.65 TYPE 2 DIABETES MELLITUS WITH HYPERGLYCEMIA, WITH LONG-TERM CURRENT USE OF INSULIN (HCC): ICD-10-CM

## 2020-04-01 DIAGNOSIS — I85.10 SECONDARY ESOPHAGEAL VARICES WITHOUT BLEEDING (HCC): ICD-10-CM

## 2020-04-01 DIAGNOSIS — K74.60 HEPATIC CIRRHOSIS, UNSPECIFIED HEPATIC CIRRHOSIS TYPE, UNSPECIFIED WHETHER ASCITES PRESENT (HCC): ICD-10-CM

## 2020-04-01 PROCEDURE — 99443 PR PHYS/QHP TELEPHONE EVALUATION 21-30 MIN: CPT | Performed by: INTERNAL MEDICINE

## 2020-04-01 RX ORDER — FUROSEMIDE 40 MG/1
40 TABLET ORAL DAILY
Qty: 30 TABLET | Refills: 3 | Status: SHIPPED | OUTPATIENT
Start: 2020-04-01 | End: 2020-06-22

## 2020-04-01 RX ORDER — SPIRONOLACTONE 50 MG/1
50 TABLET, FILM COATED ORAL DAILY
Qty: 30 TABLET | Refills: 3 | Status: SHIPPED | OUTPATIENT
Start: 2020-04-01 | End: 2020-06-22

## 2020-05-01 ENCOUNTER — TELEPHONE (OUTPATIENT)
Dept: OTHER | Facility: OTHER | Age: 75
End: 2020-05-01

## 2020-05-01 ENCOUNTER — TELEPHONE (OUTPATIENT)
Dept: ENDOCRINOLOGY | Facility: HOSPITAL | Age: 75
End: 2020-05-01

## 2020-05-12 ENCOUNTER — TELEPHONE (OUTPATIENT)
Dept: ENDOCRINOLOGY | Facility: HOSPITAL | Age: 75
End: 2020-05-12

## 2020-05-14 ENCOUNTER — HOSPITAL ENCOUNTER (OUTPATIENT)
Dept: ULTRASOUND IMAGING | Facility: HOSPITAL | Age: 75
Discharge: HOME | End: 2020-05-14
Attending: INTERNAL MEDICINE
Payer: MEDICARE

## 2020-05-14 DIAGNOSIS — K74.60 HEPATIC CIRRHOSIS, UNSPECIFIED HEPATIC CIRRHOSIS TYPE, UNSPECIFIED WHETHER ASCITES PRESENT (HCC): ICD-10-CM

## 2020-05-14 PROCEDURE — 76700 US EXAM ABDOM COMPLETE: CPT

## 2020-05-15 ENCOUNTER — TELEPHONE (OUTPATIENT)
Dept: ENDOCRINOLOGY | Facility: HOSPITAL | Age: 75
End: 2020-05-15

## 2020-05-15 LAB — HBA1C MFR BLD HPLC: 8.5 %

## 2020-05-19 ENCOUNTER — TELEPHONE (OUTPATIENT)
Dept: ENDOCRINOLOGY | Facility: HOSPITAL | Age: 75
End: 2020-05-19

## 2020-05-19 DIAGNOSIS — E11.8 TYPE 2 DIABETES MELLITUS WITH COMPLICATION (HCC): ICD-10-CM

## 2020-05-19 RX ORDER — INSULIN GLARGINE 100 [IU]/ML
INJECTION, SOLUTION SUBCUTANEOUS
Qty: 15 PEN | Refills: 6
Start: 2020-05-19 | End: 2020-07-21 | Stop reason: SDUPTHER

## 2020-05-26 ENCOUNTER — TELEPHONE (OUTPATIENT)
Dept: ENDOCRINOLOGY | Facility: HOSPITAL | Age: 75
End: 2020-05-26

## 2020-05-28 ENCOUNTER — TELEPHONE (OUTPATIENT)
Dept: ENDOCRINOLOGY | Facility: HOSPITAL | Age: 75
End: 2020-05-28

## 2020-06-02 ENCOUNTER — TELEPHONE (OUTPATIENT)
Dept: GASTROENTEROLOGY | Facility: CLINIC | Age: 75
End: 2020-06-02

## 2020-06-02 ENCOUNTER — TELEMEDICINE (OUTPATIENT)
Dept: GASTROENTEROLOGY | Facility: CLINIC | Age: 75
End: 2020-06-02
Payer: MEDICARE

## 2020-06-02 VITALS — BODY MASS INDEX: 37.03 KG/M2 | HEIGHT: 63 IN | WEIGHT: 209 LBS

## 2020-06-02 DIAGNOSIS — Z79.4 TYPE 2 DIABETES MELLITUS WITH HYPERGLYCEMIA, WITH LONG-TERM CURRENT USE OF INSULIN (HCC): ICD-10-CM

## 2020-06-02 DIAGNOSIS — K74.60 LIVER CIRRHOSIS SECONDARY TO NASH (HCC): Primary | ICD-10-CM

## 2020-06-02 DIAGNOSIS — R60.1 GENERALIZED EDEMA: ICD-10-CM

## 2020-06-02 DIAGNOSIS — K21.9 GASTROESOPHAGEAL REFLUX DISEASE WITHOUT ESOPHAGITIS: ICD-10-CM

## 2020-06-02 DIAGNOSIS — E11.65 TYPE 2 DIABETES MELLITUS WITH HYPERGLYCEMIA, WITH LONG-TERM CURRENT USE OF INSULIN (HCC): ICD-10-CM

## 2020-06-02 DIAGNOSIS — Z11.59 SCREENING FOR VIRAL DISEASE: ICD-10-CM

## 2020-06-02 DIAGNOSIS — K75.81 LIVER CIRRHOSIS SECONDARY TO NASH (HCC): Primary | ICD-10-CM

## 2020-06-02 DIAGNOSIS — D64.9 ANEMIA, NORMOCYTIC NORMOCHROMIC: ICD-10-CM

## 2020-06-02 PROCEDURE — 99214 OFFICE O/P EST MOD 30 MIN: CPT | Performed by: INTERNAL MEDICINE

## 2020-06-02 RX ORDER — CHLORAL HYDRATE 500 MG
1000 CAPSULE ORAL DAILY
COMMUNITY
End: 2020-06-04 | Stop reason: ALTCHOICE

## 2020-06-04 ENCOUNTER — TELEMEDICINE (OUTPATIENT)
Dept: ENDOCRINOLOGY | Facility: HOSPITAL | Age: 75
End: 2020-06-04
Payer: MEDICARE

## 2020-06-04 DIAGNOSIS — E11.65 TYPE 2 DIABETES MELLITUS WITH HYPERGLYCEMIA, WITH LONG-TERM CURRENT USE OF INSULIN (HCC): Primary | ICD-10-CM

## 2020-06-04 DIAGNOSIS — I10 ESSENTIAL HYPERTENSION: ICD-10-CM

## 2020-06-04 DIAGNOSIS — E11.42 DIABETIC POLYNEUROPATHY ASSOCIATED WITH TYPE 2 DIABETES MELLITUS (HCC): ICD-10-CM

## 2020-06-04 DIAGNOSIS — E03.9 ACQUIRED HYPOTHYROIDISM: ICD-10-CM

## 2020-06-04 DIAGNOSIS — Z79.4 TYPE 2 DIABETES MELLITUS WITH HYPERGLYCEMIA, WITH LONG-TERM CURRENT USE OF INSULIN (HCC): Primary | ICD-10-CM

## 2020-06-04 DIAGNOSIS — E78.2 MIXED HYPERLIPIDEMIA: ICD-10-CM

## 2020-06-04 PROCEDURE — 99214 OFFICE O/P EST MOD 30 MIN: CPT | Performed by: INTERNAL MEDICINE

## 2020-06-04 RX ORDER — ASPIRIN 81 MG/1
81 TABLET, CHEWABLE ORAL
Status: ON HOLD | COMMUNITY
End: 2022-07-21 | Stop reason: SDUPTHER

## 2020-06-09 ENCOUNTER — TELEPHONE (OUTPATIENT)
Dept: ENDOCRINOLOGY | Facility: HOSPITAL | Age: 75
End: 2020-06-09

## 2020-06-21 DIAGNOSIS — R60.1 GENERALIZED EDEMA: ICD-10-CM

## 2020-06-22 RX ORDER — SPIRONOLACTONE 50 MG/1
TABLET, FILM COATED ORAL
Qty: 90 TABLET | Refills: 1 | Status: SHIPPED | OUTPATIENT
Start: 2020-06-22 | End: 2020-12-20

## 2020-06-22 RX ORDER — FUROSEMIDE 40 MG/1
TABLET ORAL
Qty: 90 TABLET | Refills: 1 | Status: SHIPPED | OUTPATIENT
Start: 2020-06-22 | End: 2020-12-20

## 2020-07-14 ENCOUNTER — TELEPHONE (OUTPATIENT)
Dept: GASTROENTEROLOGY | Facility: CLINIC | Age: 75
End: 2020-07-14

## 2020-07-14 NOTE — TELEPHONE ENCOUNTER
Patient called today to find out how long she was to stay on iron supplements  On review of labs it is noted that on 6/30/20 Matilde Blackburn requested egd/colon be arranged for patient  I told patient I would have scheduling dept contact her to schedule procedures

## 2020-07-16 NOTE — TELEPHONE ENCOUNTER
1st call-lvm for pt to call and schedule combo per dr Bruce Darnell (ordered from lab results-ov 6/22/20)

## 2020-07-19 DIAGNOSIS — Z11.59 SCREENING FOR VIRAL DISEASE: ICD-10-CM

## 2020-07-19 PROCEDURE — U0003 INFECTIOUS AGENT DETECTION BY NUCLEIC ACID (DNA OR RNA); SEVERE ACUTE RESPIRATORY SYNDROME CORONAVIRUS 2 (SARS-COV-2) (CORONAVIRUS DISEASE [COVID-19]), AMPLIFIED PROBE TECHNIQUE, MAKING USE OF HIGH THROUGHPUT TECHNOLOGIES AS DESCRIBED BY CMS-2020-01-R: HCPCS

## 2020-07-21 ENCOUNTER — TELEPHONE (OUTPATIENT)
Dept: ENDOCRINOLOGY | Facility: HOSPITAL | Age: 75
End: 2020-07-21

## 2020-07-21 DIAGNOSIS — E11.8 TYPE 2 DIABETES MELLITUS WITH COMPLICATION (HCC): ICD-10-CM

## 2020-07-21 LAB
INPATIENT: NORMAL
SARS-COV-2 RNA SPEC QL NAA+PROBE: NOT DETECTED

## 2020-07-21 RX ORDER — INSULIN GLARGINE 100 [IU]/ML
INJECTION, SOLUTION SUBCUTANEOUS
Qty: 10 PEN | Refills: 5 | Status: SHIPPED | OUTPATIENT
Start: 2020-07-21 | End: 2021-03-15

## 2020-07-22 ENCOUNTER — CLINICAL SUPPORT (OUTPATIENT)
Dept: GASTROENTEROLOGY | Facility: CLINIC | Age: 75
End: 2020-07-22

## 2020-07-22 DIAGNOSIS — D64.9 ANEMIA, UNSPECIFIED TYPE: Primary | ICD-10-CM

## 2020-07-22 RX ORDER — FERROUS SULFATE 325(65) MG
325 TABLET ORAL
COMMUNITY
End: 2020-10-13 | Stop reason: ALTCHOICE

## 2020-07-22 NOTE — TELEPHONE ENCOUNTER
Spoke with patient  She is currently taking glimepiride 4mg daily, janumet XR  BID, Basaglar 46 units in the morning, 30 units in the evening

## 2020-07-22 NOTE — TELEPHONE ENCOUNTER
Her blood sugars seem to be higher before supper so lets increase her glimepiride to 4 mg 1 and half tablets in the evening daily  Continue the same Janumet and BAsaglar insulin for now

## 2020-07-23 VITALS — BODY MASS INDEX: 37.03 KG/M2 | HEIGHT: 63 IN | WEIGHT: 209 LBS

## 2020-07-23 NOTE — PROGRESS NOTES
Phone prep with pt  Dx: anemia, cirrhosis  Rx sent to provider for signature  Instructions for colyte given  Meds reviewed  covid test complete  Instructions faxed to pharmacy

## 2020-07-24 ENCOUNTER — TELEPHONE (OUTPATIENT)
Dept: GASTROENTEROLOGY | Facility: CLINIC | Age: 75
End: 2020-07-24

## 2020-07-24 NOTE — TELEPHONE ENCOUNTER
Patient picked up prep but can't read it since it is small writing  Needs help  She said there was supposed to be instructions with the prep? She has no email access    Wants call back so she does prep properly re: meds, etc   [phone]

## 2020-07-29 ENCOUNTER — ANESTHESIA EVENT (OUTPATIENT)
Dept: GASTROENTEROLOGY | Facility: AMBULATORY SURGERY CENTER | Age: 75
End: 2020-07-29

## 2020-07-29 ENCOUNTER — ANESTHESIA (OUTPATIENT)
Dept: GASTROENTEROLOGY | Facility: AMBULATORY SURGERY CENTER | Age: 75
End: 2020-07-29

## 2020-07-29 ENCOUNTER — TELEPHONE (OUTPATIENT)
Dept: ENDOCRINOLOGY | Facility: HOSPITAL | Age: 75
End: 2020-07-29

## 2020-07-29 ENCOUNTER — HOSPITAL ENCOUNTER (OUTPATIENT)
Dept: GASTROENTEROLOGY | Facility: AMBULATORY SURGERY CENTER | Age: 75
Discharge: HOME | End: 2020-07-29
Payer: MEDICARE

## 2020-07-29 VITALS
RESPIRATION RATE: 26 BRPM | TEMPERATURE: 98.7 F | HEART RATE: 62 BPM | SYSTOLIC BLOOD PRESSURE: 108 MMHG | OXYGEN SATURATION: 95 % | DIASTOLIC BLOOD PRESSURE: 49 MMHG

## 2020-07-29 DIAGNOSIS — K21.9 GASTROESOPHAGEAL REFLUX DISEASE WITHOUT ESOPHAGITIS: ICD-10-CM

## 2020-07-29 DIAGNOSIS — D64.9 ANEMIA, NORMOCYTIC NORMOCHROMIC: ICD-10-CM

## 2020-07-29 PROCEDURE — 88305 TISSUE EXAM BY PATHOLOGIST: CPT | Performed by: PATHOLOGY

## 2020-07-29 PROCEDURE — 45378 DIAGNOSTIC COLONOSCOPY: CPT | Performed by: INTERNAL MEDICINE

## 2020-07-29 PROCEDURE — 43239 EGD BIOPSY SINGLE/MULTIPLE: CPT | Performed by: INTERNAL MEDICINE

## 2020-07-29 RX ORDER — SODIUM CHLORIDE 9 MG/ML
50 INJECTION, SOLUTION INTRAVENOUS CONTINUOUS
Status: DISCONTINUED | OUTPATIENT
Start: 2020-07-29 | End: 2020-08-02 | Stop reason: HOSPADM

## 2020-07-29 RX ORDER — PROPOFOL 10 MG/ML
INJECTION, EMULSION INTRAVENOUS AS NEEDED
Status: DISCONTINUED | OUTPATIENT
Start: 2020-07-29 | End: 2020-07-29 | Stop reason: SURG

## 2020-07-29 RX ORDER — DEXTROSE MONOHYDRATE 50 MG/ML
INJECTION, SOLUTION INTRAVENOUS CONTINUOUS PRN
Status: DISCONTINUED | OUTPATIENT
Start: 2020-07-29 | End: 2020-07-29 | Stop reason: SURG

## 2020-07-29 RX ADMIN — PROPOFOL 50 MG: 10 INJECTION, EMULSION INTRAVENOUS at 14:07

## 2020-07-29 RX ADMIN — DEXTROSE MONOHYDRATE: 50 INJECTION, SOLUTION INTRAVENOUS at 13:21

## 2020-07-29 RX ADMIN — PROPOFOL 50 MG: 10 INJECTION, EMULSION INTRAVENOUS at 14:01

## 2020-07-29 RX ADMIN — PROPOFOL 100 MG: 10 INJECTION, EMULSION INTRAVENOUS at 13:53

## 2020-07-29 RX ADMIN — SODIUM CHLORIDE: 9 INJECTION, SOLUTION INTRAVENOUS at 13:39

## 2020-07-29 RX ADMIN — PROPOFOL 50 MG: 10 INJECTION, EMULSION INTRAVENOUS at 14:16

## 2020-07-29 RX ADMIN — PROPOFOL 50 MG: 10 INJECTION, EMULSION INTRAVENOUS at 13:57

## 2020-07-29 RX ADMIN — PROPOFOL 50 MG: 10 INJECTION, EMULSION INTRAVENOUS at 14:12

## 2020-07-29 NOTE — ANESTHESIA PREPROCEDURE EVALUATION
Review of Systems/Medical History          Cardiovascular  Hyperlipidemia, Hypertension ,    Pulmonary       GI/Hepatic    GERD , Liver disease , cirrhosis,  Hiatal hernia,             Endo/Other  Diabetes type 2 Insulin,      GYN       Hematology   Musculoskeletal       Neurology   Psychology           Physical Exam    Airway    Mallampati score: II  TM Distance: >3 FB  Neck ROM: full     Dental   upper dentures,     Cardiovascular  Rhythm: regular, Rate: normal, Cardiovascular exam normal    Pulmonary  Pulmonary exam normal Breath sounds clear to auscultation,     Other Findings        Anesthesia Plan  ASA Score- 3     Anesthesia Type- IV sedation with anesthesia with ASA Monitors  Additional Monitors:   Airway Plan:     Comment: Benefits and risks of planned anesthetic discussed with patient, and agrees to proceed  I, Dr Eduardo Baird, the attending anesthesiologist, have personally seen and evaluated the patient prior to anesthetic care  I have reviewed the preanesthetic record, and other medical records if appropriate to the anesthetic care  If a CRNA is involved in the case, I have reviewed the CRNA assessment, if present, and agree  The patient is in a suitable condition to proceed with my formulated anesthetic plan        Plan Factors-    Induction- intravenous  Postoperative Plan-     Informed Consent- Anesthetic plan and risks discussed with patient

## 2020-07-29 NOTE — ANESTHESIA POSTPROCEDURE EVALUATION
Post-Op Assessment Note    CV Status:  Stable  Pain Score: 0    Pain management: adequate     Mental Status:  Sleepy   Hydration Status:  Euvolemic   PONV Controlled:  Controlled   Airway Patency:  Patent   Post Op Vitals Reviewed: Yes      Staff: Anesthesiologist           BP      Temp      Pulse     Resp      SpO2

## 2020-07-29 NOTE — TELEPHONE ENCOUNTER
Received call from patient  She had a colonoscopy and EGD today  She wants to know if she can resume her insulin and oral medications now?

## 2020-07-29 NOTE — DISCHARGE INSTRUCTIONS
Diverticulosis Diet   WHAT YOU NEED TO KNOW:   What is a diverticulosis diet? A diverticulosis diet includes high-fiber foods  High-fiber foods help you have regular bowel movements  Extra fiber may decrease your risk of forming new diverticula (small pockets) in your intestine  A high-fiber diet may also help prevent diverticulitis  Diverticulitis is a painful condition that occurs when diverticula become inflamed or infected  You do not need to avoid nuts, seeds, corn, or popcorn while you are on a diverticulosis diet  How much fiber do I need? You may need 25 to 35 grams of fiber each day  Ask your dietitian or healthcare provider how much fiber you should have  Increase your intake of fiber slowly  When you eat more fiber, you may have gas and feel bloated  You may need to take a fiber supplement if you do not get enough fiber from food  Drink plenty of liquids as you increase the fiber in your diet  Your dietitian or healthcare provider may recommend 8 eight-ounce cups or more each day  Ask which liquids are best for you  Which foods are high in fiber? · Foods with at least 4 grams of fiber per serving:      ¨ ? to ½ cup of high-fiber cereal (check the nutrition label on the box)    ¨ ½ cup of blackberries or raspberries    ¨ 4 dried prunes    ¨ 1 cooked artichoke    ¨ ½ cup of cooked legumes, such as lentils, or red, kidney, and grace beans    · Foods with 1 to 3 grams of fiber per serving:      ¨ 1 slice of whole-wheat, pumpernickel, or rye bread    ¨ 4 whole-wheat crackers    ¨ ½ cup of cereal with 1 to 3 grams of fiber per serving (check the nutrition label on the box)    ¨ 1 piece of fruit, such as an apple, banana, pear, kiwi, or orange    ¨ 3 dates    ¨ ½ cup of canned apricots, fruit cocktail, peaches, or pears    ¨ ½ cup of raw or cooked vegetables, such as carrots, cauliflower, cabbage, spinach, squash, or corn  When should I contact my healthcare provider?    · You have questions about a high-fiber diet  · You have a change in your bowel movements  · You have an upset stomach  · You have a fever  · You have pain in your lower abdomen on the left side  · You have questions about your condition or care  CARE AGREEMENT:   You have the right to help plan your care  Learn about your health condition and how it may be treated  Discuss treatment options with your caregivers to decide what care you want to receive  You always have the right to refuse treatment  The above information is an  only  It is not intended as medical advice for individual conditions or treatments  Talk to your doctor, nurse or pharmacist before following any medical regimen to see if it is safe and effective for you  © 2017 [employer] Information is for End User's use only and may not be sold, redistributed or otherwise used for commercial purposes  All illustrations and images included in CareNotes® are the copyrighted property of [employer]  or [employer]

## 2020-07-29 NOTE — H&P
History and Physical -  [facility]  Laura Olivas 69 y o  female MRN: [id number]    HPI: Laura Olivas is a 69y o  year old female who presents for evaluation of iron deficiency anemia  She does have a history of cirrhosis and diabetes  She is here for EGD and colonoscopy to evaluate source of iron deficiency anemia    REVIEW OF SYSTEMS: Per the HPI, and otherwise unremarkable      Historical Information   Past Medical History:   Diagnosis Date    [facility]osis ([facility] )     Diabetic neuropathy ([facility] )     Diabetic neuropathy ([facility] )     [facility]     Fatty liver     Fatty liver     GERD (gastroesophageal reflux disease)     Hiatal hernia     Hyperlipidemia     Hypertension     Hypoglycemia     Hypothyroidism     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)     Osteoarthritis     Osteopenia     Thrombocytopenia (HCC)     Type 2 diabetes mellitus ([facility] )      Past Surgical History:   Procedure Laterality Date    ABDOMINAL SURGERY      Abdominal plasty with mesh insertion    CATARACT EXTRACTION, BILATERAL      CATARACT EXTRACTION, BILATERAL      EGD AND COLONOSCOPY  03/18/2015    LAPAROSCOPIC CHOLECYSTECTOMY      SHOULDER SURGERY Right     calcium depsoti    TUBAL LIGATION      UMBILICAL HERNIA REPAIR      with mesh placed    UPPER GASTROINTESTINAL ENDOSCOPY       Social History   Social History     Substance and Sexual Activity   Alcohol Use No     Social History     Substance and Sexual Activity   Drug Use No     Social History     Tobacco Use   Smoking Status Never Smoker   Smokeless Tobacco Never Used     Family History   Problem Relation Age of Onset    Breast cancer Mother     Diabetes type II Father     Thyroid disease unspecified Daughter     Down syndrome Daughter     Diabetes type II Daughter     Diabetes type II Sister     No Known Problems Brother     Prostate cancer Brother     No Known Problems Sister     Stroke Sister     No Known Problems Son     Breast cancer Maternal Aunt     Colon cancer Neg Hx        Meds/Allergies       Current Outpatient Medications:     aspirin 81 mg chewable tablet    BASAGLAR KWIKPEN 100 units/mL injection pen    Blood Glucose Monitoring Suppl (ONETOUCH VERIO) w/Device KIT    ferrous sulfate 325 (65 Fe) mg tablet    fluticasone (FLONASE) 50 mcg/act nasal spray    furosemide (LASIX) 40 mg tablet    glimepiride (AMARYL) 4 mg tablet    Insulin Pen Needle 31G X 5 MM MISC    JANUMET XR  MG TB24    lactulose 20 g/30 mL    levothyroxine 125 mcg tablet    nadolol (CORGARD) 20 mg tablet    omeprazole (PriLOSEC) 20 mg delayed release capsule    ONEUCH DELICA LANCETS FINE MISC    ONETOUCH VERIO test strip    polyethylene glycol (COLYTE) 4000 mL solution    sertraline (ZOLOFT) 50 mg tablet    simvastatin (ZOCOR) 40 mg tablet    spironolactone (ALDACTONE) 50 mg tablet    Allergies   Allergen Reactions    Latex     Sesame Seed (Diagnostic)      Other reaction(s): swelling inside mouth    Strawberry C [Ascorbate] Other (See Comments)     Mouth sores    Penicillins Rash       Objective     There were no vitals taken for this visit  PHYSICAL EXAM    Gen: NAD AAOx3  CV: S1S2 RRR no m/r/g  CHEST: Clear b/l no c/r/w  ABD: soft, +BS NT/ND  EXT: no edema    ASSESSMENT/PLAN:  This is a 69y o  year old female here for diagnostic EGD and colonoscopy, and she is stable and optimized for her procedure

## 2020-07-31 ENCOUNTER — TELEPHONE (OUTPATIENT)
Dept: GASTROENTEROLOGY | Facility: CLINIC | Age: 75
End: 2020-07-31

## 2020-07-31 NOTE — TELEPHONE ENCOUNTER
Noted on patient discharge instructions from 7/29/20 colon, "consider capsule endoscopy"  Patient has f/u ov scheduled 9/9/20

## 2020-08-06 DIAGNOSIS — E11.42 DIABETIC POLYNEUROPATHY ASSOCIATED WITH TYPE 2 DIABETES MELLITUS (HCC): ICD-10-CM

## 2020-08-06 DIAGNOSIS — Z79.4 TYPE 2 DIABETES MELLITUS WITH HYPERGLYCEMIA, WITH LONG-TERM CURRENT USE OF INSULIN (HCC): ICD-10-CM

## 2020-08-06 DIAGNOSIS — E11.65 TYPE 2 DIABETES MELLITUS WITH HYPERGLYCEMIA, WITH LONG-TERM CURRENT USE OF INSULIN (HCC): ICD-10-CM

## 2020-08-06 RX ORDER — GLIMEPIRIDE 4 MG/1
4 TABLET ORAL 2 TIMES DAILY
Qty: 180 TABLET | Refills: 2 | Status: SHIPPED | OUTPATIENT
Start: 2020-08-06 | End: 2020-10-13 | Stop reason: SDUPTHER

## 2020-08-25 LAB — HBA1C MFR BLD HPLC: 7.7 %

## 2020-09-09 ENCOUNTER — OFFICE VISIT (OUTPATIENT)
Dept: GASTROENTEROLOGY | Facility: CLINIC | Age: 75
End: 2020-09-09
Payer: MEDICARE

## 2020-09-09 VITALS
HEIGHT: 63 IN | BODY MASS INDEX: 34.91 KG/M2 | TEMPERATURE: 97.5 F | HEART RATE: 62 BPM | DIASTOLIC BLOOD PRESSURE: 80 MMHG | SYSTOLIC BLOOD PRESSURE: 132 MMHG | WEIGHT: 197 LBS

## 2020-09-09 DIAGNOSIS — E11.65 TYPE 2 DIABETES MELLITUS WITH HYPERGLYCEMIA, WITH LONG-TERM CURRENT USE OF INSULIN (HCC): ICD-10-CM

## 2020-09-09 DIAGNOSIS — I85.10 SECONDARY ESOPHAGEAL VARICES WITHOUT BLEEDING (HCC): ICD-10-CM

## 2020-09-09 DIAGNOSIS — K74.69 OTHER CIRRHOSIS OF LIVER (HCC): Primary | ICD-10-CM

## 2020-09-09 DIAGNOSIS — Z79.4 TYPE 2 DIABETES MELLITUS WITH HYPERGLYCEMIA, WITH LONG-TERM CURRENT USE OF INSULIN (HCC): ICD-10-CM

## 2020-09-09 DIAGNOSIS — D50.9 IRON DEFICIENCY ANEMIA, UNSPECIFIED IRON DEFICIENCY ANEMIA TYPE: ICD-10-CM

## 2020-09-09 PROCEDURE — 99214 OFFICE O/P EST MOD 30 MIN: CPT | Performed by: INTERNAL MEDICINE

## 2020-09-09 RX ORDER — MELATONIN
2000 DAILY
COMMUNITY
End: 2022-07-21

## 2020-09-09 NOTE — LETTER
September 11, 2020     Diandra Covington,   [address]    Patient: Ericka Ruiz   YOB: 1945   Date of Visit: 9/9/2020       Dear Dr Justin Hopson: Thank you for referring Reagan Benavides to me for evaluation  Below are my notes for this consultation  If you have questions, please do not hesitate to call me  I look forward to following your patient along with you  Sincerely,        Jenifer Abreu MD        CC: MD Jenifer Ness MD  9/11/2020  9:57 AM  Sign when Signing Visit  [id number] - Outpatient Follow-up Note  Ericka Ruiz 69 y o  female MRN: [id number]  Encounter: [id number]    ASSESSMENT AND PLAN:      1  Other cirrhosis of liver ([facility] [address] )  --Patient with longstanding cirrhosis on the basis PAUL  Seems to be compensated  Does not have issues with fluid overload  Doing well on present diuretic therapy  Does report features feels a little bit shaky and was a little bit [place]   [place] [place] [facility] [place]" a couple of months ago  Need to check ammonia level  No asterixis on exam slight tremor-- not clearly with hepatic encephalopathy-- Patient is relatively well compensated - labs favorable---     - Comprehensive metabolic panel; Future  - Ammonia; Future  - advise flu shot and keeping up-to-date with Pneumovax  - ultrasound liver every 6 months to screen for hepatocellular carcinoma-- would be due by December of this year last exam in May    2  Iron deficiency anemia, unspecified iron deficiency anemia type    - patient with recent EGD and colonoscopy  She does have grade 2 varices and rectal varices - non bleeding in appearance   Celiac biopsies negative-- Nadolol prescribed  For bleeding prophylaxis--most recent hemoglobin mid May 10 0 5 MCV 82  -  Patient is on iron therapy 325  - CBC and differential; Future  - Ferritin; Future    3   Secondary esophageal varices without bleeding ([address] )  -- noted on recent endoscopy  No stigmata of bleeding    4  Type 2 diabetes mellitus with hyperglycemia, with long-term current use of insulin (HCC)  -- states sugars not under optimal control difficult to check as her neuropathy interferes with her checking her sugars follows up endocrine Dr Kelly Stack      Followup Appointment: 4 mo   ______________________________________________________________________    Chief Complaint   Patient presents with    Follow-up     Post combo     HPI:  The patient returns to the office for follow up after her recent endoscopic procedures for evaluation of iron deficiency anemia  EGD and Colonoscopy done in late July showed non- bleeding esophageal varices and rectal varices  These did not have any stigmata of recent hemorrhage  Overall the patient is carrying reasonably well  She is still quite depressed over the loss of her  in January of this year  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 as he was a   Patient overall somewhat fatigued and somewhat depressed  Patient reports she did have some help from visiting nurse a couple months ago  Things have pretty much straight now  She reports that she may have been so depressed that she was in [employer] land "but she has come out of this somewhat  He is able to do usual task  to her guarding  She does take care of her daughter who was about to turn 48 who has significant cognitive challenges  Financial issues are difficult for the patient as well  Reviewed adjust patient's medications in the spring for problems with fluid overload  She is not having any problems with peripheral edema  She does not feel her abdomen is more distended either  She is taking her diuretics faithfully  Patient does report a little bit of constipation  May be related to ongoing oral iron therapy  Last hemoglobin was in June and is in the high 9 g range    She has not had lab work since that time      Historical Information   Past Medical History:   Diagnosis Date    Cirrhosis ([facility] )     Diabetes mellitus ([facility] )     Diabetic neuropathy ([facility] )     [facility]     Esophageal varices ([facility] )     Fatty liver     Fatty liver     GERD (gastroesophageal reflux disease)     Hiatal hernia     Hyperlipidemia     Hypertension     Hypoglycemia     Hypothyroidism     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)     Osteoarthritis     Osteopenia     Thrombocytopenia (HCC)     Type 2 diabetes mellitus (HCC)      Past Surgical History:   Procedure Laterality Date    ABDOMINAL SURGERY      Abdominal plasty with mesh insertion    CATARACT EXTRACTION, BILATERAL      CATARACT EXTRACTION, BILATERAL      EGD AND COLONOSCOPY  03/18/2015    LAPAROSCOPIC CHOLECYSTECTOMY      SHOULDER SURGERY Right     calcium depsoti    TUBAL LIGATION      UMBILICAL HERNIA REPAIR      with mesh placed    UPPER GASTROINTESTINAL ENDOSCOPY       Social History     Substance and Sexual Activity   Alcohol Use No     Social History     Substance and Sexual Activity   Drug Use No     Social History     Tobacco Use   Smoking Status Never Smoker   Smokeless Tobacco Never Used     Family History   Problem Relation Age of Onset    Breast cancer Mother     Diabetes type II Father     Thyroid disease unspecified Daughter     Down syndrome Daughter     Diabetes type II Daughter     Diabetes type II Sister     No Known Problems Brother     Prostate cancer Brother     No Known Problems Sister     Stroke Sister     No Known Problems Son     Breast cancer Maternal Aunt     Colon cancer Neg Hx          Current Outpatient Medications:     aspirin 81 mg chewable tablet    BASAGLELGIN SERRATOPEN 100 units/mL injection pen    Blood Glucose Monitoring Suppl (ONETOUCH VERIO) w/Device KIT    Calcium Carb-Cholecalciferol (CALCIUM 1000 + D PO)    cholecalciferol (VITAMIN D3) 1,000 units tablet    ferrous sulfate 325 (65 Fe) mg tablet    fluticasone (FLONASE) 50 mcg/act nasal spray    furosemide (LASIX) 40 mg tablet    glimepiride (AMARYL) 4 mg tablet    Insulin Pen Needle 31G X 5 MM MISC    JANUMET XR  MG TB24    lactulose 20 g/30 mL    levothyroxine 125 mcg tablet    nadolol (CORGARD) 20 mg tablet    omeprazole (PriLOSEC) 20 mg delayed release capsule    ONETOUCH DELICA LANCETS FINE MISC    ONETOUCH VERIO test strip    sertraline (ZOLOFT) 50 mg tablet    simvastatin (ZOCOR) 40 mg tablet    spironolactone (ALDACTONE) 50 mg tablet  Allergies   Allergen Reactions    Latex     Sesame Seed (Diagnostic)      Other reaction(s): swelling inside mouth    Strawberry C [Ascorbate] Other (See Comments)     Mouth sores    Penicillins Rash     Reviewed medications and allergies and updated as indicated  General - positive for fatigue   GI -Please see above   Psych - Positive for Depression  The rest of the 10 point Review of Systems is Negative     PHYSICAL EXAM:    Blood pressure 132/80, pulse 62, temperature 97 5 °F (36 4 °C), height 5' 3" (1 6 m), weight 89 4 kg (197 lb)  Body mass index is 34 9 kg/m²  General Appearance: NAD, cooperative, alert  Eyes: Anicteric, conjunctiva pale  ENT:  Normocephalic, atraumatic, normal mucosa  Neck:  Supple, symmetrical, trachea midline  Resp:  Clear to auscultation bilaterally; no rales, rhonchi or wheezing; respirations unlabored   CV:  S1 S2, Regular rate and rhythm; no murmur, rub, or gallop  GI:  Soft, non-tender, non-distended; normal bowel sounds; no masses, no organomegaly -large abdominal pannus  Rectal: Deferred  Musculoskeletal: No cyanosis, clubbing or edema  Normal ROM    Skin:  No jaundice, rashes, or lesions   Heme/Lymph: No palpable cervical lymphadenopathy  Psych:  Depressed mood good eye contact  Neuro: No gross deficits, AAOx3--no asterixis but some tremor on outstretched hands    Lab Results: [facility] - May 15, 2020  Hemoglobin 10 5--MCV 82, platelet count  82 K  CMP electrolytes normal AST 44 and ALT 31 total bilirubin 0 point long INR 1 1 albumin 4 I would RO an I had I will i e  looked nice on a I used a polyp or region red BC may be done on go to a record bowel      Radiology Results: -- US 5/2020-[facility] --hepatic cirrhosis with vascular collaterals - no mass , no ascites  Post cholecystectomy, enlarged spleen

## 2020-09-09 NOTE — PROGRESS NOTES
[id number] - Outpatient Follow-up Note  Shae Schumacher 69 y o  female MRN: [id number]  Encounter: [id number]    ASSESSMENT AND PLAN:      1  Other cirrhosis of liver ([facility] [address] )  --Patient with longstanding cirrhosis on the basis PAUL  Seems to be compensated  Does not have issues with fluid overload  Doing well on present diuretic therapy  Does report features feels a little bit shaky and was a little bit [place] [facility] [place] [facility] [place] a couple of months ago  Need to check ammonia level  No asterixis on exam slight tremor-- not clearly with hepatic encephalopathy-- Patient is relatively well compensated - labs favorable---     - Comprehensive metabolic panel; Future  - Ammonia; Future  - advise flu shot and keeping up-to-date with Pneumovax  - ultrasound liver every 6 months to screen for hepatocellular carcinoma-- would be due by December of this year last exam in May    2  Iron deficiency anemia, unspecified iron deficiency anemia type    - patient with recent EGD and colonoscopy  She does have grade 2 varices and rectal varices - non bleeding in appearance   Celiac biopsies negative-- Nadolol prescribed  For bleeding prophylaxis--most recent hemoglobin mid May 10 0 5 MCV 82  -  Patient is on iron therapy 325  - CBC and differential; Future  - Ferritin; Future    3  Secondary esophageal varices without bleeding (HCC)  -- noted on recent endoscopy  No stigmata of bleeding    4   Type 2 diabetes mellitus with hyperglycemia, with long-term current use of insulin (HCC)  -- states sugars not under optimal control difficult to check as her neuropathy interferes with her checking her sugars follows up endocrine Dr Shira Conley      Followup Appointment: 4 mo   ______________________________________________________________________    Chief Complaint   Patient presents with    Follow-up     Post combo     HPI:  The patient returns to the office for follow up after her recent endoscopic procedures for evaluation of iron deficiency anemia  EGD and Colonoscopy done in late July showed non- bleeding esophageal varices and rectal varices  These did not have any stigmata of recent hemorrhage  Overall the patient is carrying reasonably well  She is still quite depressed over the loss of her  in January of this year  She is still dealing with completing forms in [employer] paperwork as he was a   Patient overall somewhat fatigued and somewhat depressed  Patient reports she did have some help from visiting nurse a couple months ago  Things have pretty much straight now  She reports that she may have been so depressed that she was in [employer] land "but she has come out of this somewhat  He is able to do usual task  to her guarding  She does take care of her daughter who was about to turn 48 who has significant cognitive challenges  Financial issues are difficult for the patient as well  Reviewed adjust patient's medications in the spring for problems with fluid overload  She is not having any problems with peripheral edema  She does not feel her abdomen is more distended either  She is taking her diuretics faithfully  Patient does report a little bit of constipation  May be related to ongoing oral iron therapy  Last hemoglobin was in June and is in the high 9 g range  She has not had lab work since that time      Historical Information   Past Medical History:   Diagnosis Date    Cirrhosis ([facility] )     [facility]     [facility]     Diabetic neuropathy [facility] )     Esophageal varices ([facility] )     Fatty liver     Fatty liver     GERD (gastroesophageal reflux disease)     Hiatal hernia     Hyperlipidemia     Hypertension     Hypoglycemia     Hypothyroidism     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)     Osteoarthritis     Osteopenia     Thrombocytopenia (HCC)     Type 2 diabetes mellitus ([facility] )      Past Surgical History:   Procedure Laterality Date    ABDOMINAL SURGERY      Abdominal plasty with mesh insertion    CATARACT EXTRACTION, BILATERAL      CATARACT EXTRACTION, BILATERAL      EGD AND COLONOSCOPY  03/18/2015    LAPAROSCOPIC CHOLECYSTECTOMY      SHOULDER SURGERY Right     calcium depsoti    TUBAL LIGATION      UMBILICAL HERNIA REPAIR      with mesh placed    UPPER GASTROINTESTINAL ENDOSCOPY       Social History     Substance and Sexual Activity   Alcohol Use No     Social History     Substance and Sexual Activity   Drug Use No     Social History     Tobacco Use   Smoking Status Never Smoker   Smokeless Tobacco Never Used     Family History   Problem Relation Age of Onset    Breast cancer Mother     Diabetes type II Father     Thyroid disease unspecified Daughter     Down syndrome Daughter     Diabetes type II Daughter     Diabetes type II Sister     No Known Problems Brother     Prostate cancer Brother     No Known Problems Sister     Stroke Sister     No Known Problems Son     Breast cancer Maternal Aunt     Colon cancer Neg Hx          Current Outpatient Medications:     aspirin 81 mg chewable tablet    BASAGLAR KWIKPEN 100 units/mL injection pen    Blood Glucose Monitoring Suppl (ONETOUCH VERIO) w/Device KIT    Calcium Carb-Cholecalciferol (CALCIUM 1000 + D PO)    cholecalciferol (VITAMIN D3) 1,000 units tablet    ferrous sulfate 325 (65 Fe) mg tablet    fluticasone (FLONASE) 50 mcg/act nasal spray    furosemide (LASIX) 40 mg tablet    glimepiride (AMARYL) 4 mg tablet    Insulin Pen Needle 31G X 5 MM MISC    JANUMET XR  MG TB24    lactulose 20 g/30 mL    levothyroxine 125 mcg tablet    nadolol (CORGARD) 20 mg tablet    omeprazole (PriLOSEC) 20 mg delayed release capsule    ONEUCH DELICA LANCETS FINE MISC    ONETOUCH VERIO test strip    sertraline (ZOLOFT) 50 mg tablet    simvastatin (ZOCOR) 40 mg tablet    spironolactone (ALDACTONE) 50 mg tablet  Allergies   Allergen Reactions    Latex     Sesame Seed (Diagnostic)      Other reaction(s): swelling inside mouth    Strawberry C [Ascorbate] Other (See Comments)     Mouth sores    Penicillins Rash     Reviewed medications and allergies and updated as indicated  General - positive for fatigue   GI -Please see above   Psych - Positive for Depression  The rest of the 10 point Review of Systems is Negative     PHYSICAL EXAM:    Blood pressure 132/80, pulse 62, temperature 97 5 °F (36 4 °C), height 5' 3" (1 6 m), weight 89 4 kg (197 lb)  Body mass index is 34 9 kg/m²  General Appearance: NAD, cooperative, alert  Eyes: Anicteric, conjunctiva pale  ENT:  Normocephalic, atraumatic, normal mucosa  Neck:  Supple, symmetrical, trachea midline  Resp:  Clear to auscultation bilaterally; no rales, rhonchi or wheezing; respirations unlabored   CV:  S1 S2, Regular rate and rhythm; no murmur, rub, or gallop  GI:  Soft, non-tender, non-distended; normal bowel sounds; no masses, no organomegaly -large abdominal pannus  Rectal: Deferred  Musculoskeletal: No cyanosis, clubbing or edema  Normal ROM    Skin:  No jaundice, rashes, or lesions   Heme/Lymph: No palpable cervical lymphadenopathy  Psych:  Depressed mood good eye contact  Neuro: No gross deficits, AAOx3--no asterixis but some tremor on outstretched hands    Lab Results: [facility] - May 15, 2020  Hemoglobin 10 5--MCV 82, platelet count  82 K  CMP electrolytes normal AST 44 and ALT 31 total bilirubin 0 point long INR 1 1 albumin 4 I would RO an I had I will i e  looked nice on a I used a polyp or region red BC may be done on go to a record bowel      Radiology Results: -- US 5/2020-[facility] --hepatic cirrhosis with vascular collaterals - no mass , no ascites  Post cholecystectomy, enlarged spleen

## 2020-09-09 NOTE — PATIENT INSTRUCTIONS
[id number] - Outpatient Follow-up Note  Livia Crane 69 y o  female MRN: [id number]  Encounter: [id number]    ASSESSMENT AND PLAN:      1  Other cirrhosis of liver ([facility] )  --Patient with longstanding cirrhosis on the basis PAUL  Seems to be compensated  Does not have issues with fluid overload  Doing well on present diuretic therapy  Does report features feels a little bit shaky and was a [facility] [place]   [facility] [place] a couple of months ago  Need to check ammonia level  No asterixis on exam slight tremor-- not clearly with hepatic encephalopathy    - Comprehensive metabolic panel; Future  - Ammonia; Future  - advise flu shot and keeping up-to-date with Pneumovax  - ultrasound liver every 6 months to screen for hepatocellular carcinoma-- would be due by December of this year last exam in May    2  Iron deficiency anemia, unspecified iron deficiency anemia type    - patient with recent EGD and colonoscopy  She does have grade 2 varices  Nadolol prescribed  For bleeding prophylaxis  -  Patient is on iron therapy 325  - CBC and differential; Future  - Ferritin; Future    3  Secondary esophageal varices without bleeding (HCC)  -- noted on recent endoscopy  No stigmata of bleeding    4   Type 2 diabetes mellitus with hyperglycemia, with long-term current use of insulin (HCC)  -- states sugars not under optimal control difficult to check as her neuropathy interferes with her checking her sugars follows up endocrine Dr Deonna Lopez      Followup Appointment: 4 mo

## 2020-09-09 NOTE — LETTER
September 11, 2020     Diandra Covington DO  [address]    Patient: Shae Schumacher   YOB: 1945   Date of Visit: 9/9/2020       Dear Dr Jessica Fagan: Thank you for referring Charles Miller to me for evaluation  Below are my notes for this consultation  If you have questions, please do not hesitate to call me  I look forward to following your patient along with you  Sincerely,        Christine Roberts MD        CC: MD Christine Amaya MD  9/11/2020  9:55 AM  Incomplete  [address] [facility] - Outpatient Follow-up Note  Shae Schumacher 69 y o  female MRN: [id number]  Encounter: [id number]    ASSESSMENT AND PLAN:      1  Other cirrhosis of liver ([facility] [address] )  --Patient with longstanding cirrhosis on the basis PAUL  Seems to be compensated  Does not have issues with fluid overload  Doing well on present diuretic therapy  Does report features feels a little bit shaky and was a little bit "in [facility] [address] land" a couple of months ago  Need to check ammonia level  No asterixis on exam slight tremor-- not clearly with hepatic encephalopathy-- Patient is relatively well compensated - labs favorable---     - Comprehensive metabolic panel; Future  - Ammonia; Future  - advise flu shot and keeping up-to-date with Pneumovax  - ultrasound liver every 6 months to screen for hepatocellular carcinoma-- would be due by December of this year last exam in May    2  Iron deficiency anemia, unspecified iron deficiency anemia type    - patient with recent EGD and colonoscopy  She does have grade 2 varices and rectal varices - non bleeding in appearance   Celiac biopsies negative-- Nadolol prescribed  For bleeding prophylaxis--most recent hemoglobin mid May 10 0 5 MCV 82  -  Patient is on iron therapy 325  - CBC and differential; Future  - Ferritin; Future    3   Secondary esophageal varices without bleeding (HCC)  -- noted on recent endoscopy  No stigmata of bleeding    4  Type 2 diabetes mellitus with hyperglycemia, with long-term current use of insulin (HCC)  -- states sugars not under optimal control difficult to check as her neuropathy interferes with her checking her sugars follows up endocrine Dr Vazquez Just      Followup Appointment: 4 mo   ______________________________________________________________________    Chief Complaint   Patient presents with    Follow-up     Post combo     HPI:  The patient returns to the office for follow up after her recent endoscopic procedures for evaluation of iron deficiency anemia  EGD and Colonoscopy done in late July showed non- bleeding esophageal varices and rectal varices  These did not have any stigmata of recent hemorrhage  Overall the patient is carrying reasonably well  She is still quite depressed over the loss of her  in January of this year  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 as he was a   Patient overall somewhat fatigued and somewhat depressed  Patient reports she did have some help from visiting nurse a couple months ago  Things have pretty much straight now  She reports that she may have been so depressed that she was in [employer] land "but she has come out of this somewhat  He is able to do usual task  to her guarding  She does take care of her daughter who was about to turn 48 who has significant cognitive challenges  Financial issues are difficult for the patient as well  Reviewed adjust patient's medications in the spring for problems with fluid overload  She is not having any problems with peripheral edema  She does not feel her abdomen is more distended either  She is taking her diuretics faithfully  Patient does report a little bit of constipation  May be related to ongoing oral iron therapy  Last hemoglobin was in June and is in the high 9 g range    She has not had lab work since that time     Historical Information   Past Medical History:   Diagnosis Date    Cirrhosis ([place] [address] )     Diabetes mellitus ([place] [address] )     Diabetic neuropathy ([place] [address] )     Diabetic neuropathy ([place] [address] )     Esophageal varices ([place] [address] )     Fatty liver     Fatty liver     GERD (gastroesophageal reflux disease)     Hiatal hernia     Hyperlipidemia     Hypertension     Hypoglycemia     Hypothyroidism     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 ([place] [address] )     Osteoarthritis     Osteopenia     Thrombocytopenia (HCC)     Type 2 diabetes mellitus (HCC)      Past Surgical History:   Procedure Laterality Date    ABDOMINAL SURGERY      Abdominal plasty with mesh insertion    CATARACT EXTRACTION, BILATERAL      CATARACT EXTRACTION, BILATERAL      EGD AND COLONOSCOPY  03/18/2015    LAPAROSCOPIC CHOLECYSTECTOMY      SHOULDER SURGERY Right     calcium depsoti    TUBAL LIGATION      UMBILICAL HERNIA REPAIR      with mesh placed    UPPER GASTROINTESTINAL ENDOSCOPY       Social History     Substance and Sexual Activity   Alcohol Use No     Social History     Substance and Sexual Activity   Drug Use No     Social History     Tobacco Use   Smoking Status Never Smoker   Smokeless Tobacco Never Used     Family History   Problem Relation Age of Onset    Breast cancer Mother     Diabetes type II Father     Thyroid disease unspecified Daughter     Down syndrome Daughter     Diabetes type II Daughter     Diabetes type II Sister     No Known Problems Brother     Prostate cancer Brother     No Known Problems Sister     Stroke Sister     No Known Problems Son     Breast cancer Maternal Aunt     Colon cancer Neg Hx          Current Outpatient Medications:     aspirin 81 mg chewable tablet    BASAGLAR KWIKPEN 100 units/mL injection pen    Blood Glucose Monitoring Suppl (ONETOUCH VERIO) w/Device KIT    Calcium Carb-Cholecalciferol (CALCIUM 1000 + D PO)    cholecalciferol (VITAMIN D3) 1,000 units tablet    ferrous sulfate 325 (65 Fe) mg tablet    fluticasone (FLONASE) 50 mcg/act nasal spray    furosemide (LASIX) 40 mg tablet    glimepiride (AMARYL) 4 mg tablet    Insulin Pen Needle 31G X 5 MM MISC    JANUMET XR  MG TB24    lactulose 20 g/30 mL    levothyroxine 125 mcg tablet    nadolol (CORGARD) 20 mg tablet    omeprazole (PriLOSEC) 20 mg delayed release capsule    ONETOUCH DELICA LANCETS FINE MISC    ONETOUCH VERIO test strip    sertraline (ZOLOFT) 50 mg tablet    simvastatin (ZOCOR) 40 mg tablet    spironolactone (ALDACTONE) 50 mg tablet  Allergies   Allergen Reactions    Latex     Sesame Seed (Diagnostic)      Other reaction(s): swelling inside mouth    Strawberry C [Ascorbate] Other (See Comments)     Mouth sores    Penicillins Rash     Reviewed medications and allergies and updated as indicated  General - positive for fatigue   GI -Please see above   Psych - Positive for Depression  The rest of the 10 point Review of Systems is Negative     PHYSICAL EXAM:    Blood pressure 132/80, pulse 62, temperature 97 5 °F (36 4 °C), height 5' 3" (1 6 m), weight 89 4 kg (197 lb)  Body mass index is 34 9 kg/m²  General Appearance: NAD, cooperative, alert  Eyes: Anicteric, conjunctiva pale  ENT:  Normocephalic, atraumatic, normal mucosa  Neck:  Supple, symmetrical, trachea midline  Resp:  Clear to auscultation bilaterally; no rales, rhonchi or wheezing; respirations unlabored   CV:  S1 S2, Regular rate and rhythm; no murmur, rub, or gallop  GI:  Soft, non-tender, non-distended; normal bowel sounds; no masses, no organomegaly -large abdominal pannus  Rectal: Deferred  Musculoskeletal: No cyanosis, clubbing or edema  Normal ROM    Skin:  No jaundice, rashes, or lesions   Heme/Lymph: No palpable cervical lymphadenopathy  Psych:  Depressed mood good eye contact  Neuro: No gross deficits, AAOx3--no asterixis but some tremor on outstretched hands    Lab Results: [facility] - May 15, 2020  Hemoglobin 10 5--MCV 82, platelet count 10Y  CMP electrolytes normal AST 44 and ALT 31 total bilirubin 0 point long INR 1 1 albumin 4 I would RO an I had I will i e  looked nice on a I used a polyp or region red BC may be done on go to a record bowel      Radiology Results: -- US 5/2020-[facility] --hepatic cirrhosis with vascular collaterals - no mass , no ascites  Post cholecystectomy, enlarged spleen      Ricarda Katz MD  9/9/2020  2:34 PM  Sign when Signing Visit  [address] [facility] - Outpatient Follow-up Note  Amarilis Monroe 69 y o  female MRN: [id number]  Encounter: [id number]    ASSESSMENT AND PLAN:      1  Other cirrhosis of liver ([facility] [address] )  --Patient with longstanding cirrhosis on the basis PAUL  Seems to be compensated  Does not have issues with fluid overload  Doing well on present diuretic therapy  Does report features feels a little bit shaky and [facility] a [place]   [facility] [place] a couple of months ago  Need to check ammonia level  No asterixis on exam slight tremor-- not clearly with hepatic encephalopathy    - Comprehensive metabolic panel; Future  - Ammonia; Future  - advise flu shot and keeping up-to-date with Pneumovax  - ultrasound liver every 6 months to screen for hepatocellular carcinoma-- would be due by December of this year last exam in May    2  Iron deficiency anemia, unspecified iron deficiency anemia type    - patient with recent EGD and colonoscopy  She does have grade 2 varices  Nadolol prescribed  For bleeding prophylaxis  -  Patient is on iron therapy 325  - CBC and differential; Future  - Ferritin; Future    3  Secondary esophageal varices without bleeding (HCC)  -- noted on recent endoscopy  No stigmata of bleeding    4   Type 2 diabetes mellitus with hyperglycemia, with long-term current use of insulin (HCC)  -- states sugars not under optimal control difficult to check as her neuropathy interferes with her checking her sugars follows up endocrine Dr Kasia Parikh      Followup Appointment: 4 mo ______________________________________________________________________    Chief Complaint   Patient presents with    Follow-up     Post combo     HPI:  The  patient    Historical Information   Past Medical History:   Diagnosis Date    Cirrhosis ([place] [address] )     Diabetes mellitus ([place] [address] )     Diabetic neuropathy ([facility] )     Diabetic neuropathy ([facility] )     Esophageal varices ([facility] )     Fatty liver     Fatty liver     GERD (gastroesophageal reflux disease)     Hiatal hernia     Hyperlipidemia     Hypertension     Hypoglycemia     Hypothyroidism     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)     Osteoarthritis     Osteopenia     Thrombocytopenia (HCC)     Type 2 diabetes mellitus (HCC)      Past Surgical History:   Procedure Laterality Date    ABDOMINAL SURGERY      Abdominal plasty with mesh insertion    CATARACT EXTRACTION, BILATERAL      CATARACT EXTRACTION, BILATERAL      EGD AND COLONOSCOPY  03/18/2015    LAPAROSCOPIC CHOLECYSTECTOMY      SHOULDER SURGERY Right     calcium depsoti    TUBAL LIGATION      UMBILICAL HERNIA REPAIR      with mesh placed    UPPER GASTROINTESTINAL ENDOSCOPY       Social History     Substance and Sexual Activity   Alcohol Use No     Social History     Substance and Sexual Activity   Drug Use No     Social History     Tobacco Use   Smoking Status Never Smoker   Smokeless Tobacco Never Used     Family History   Problem Relation Age of Onset    Breast cancer Mother     Diabetes type II Father     Thyroid disease unspecified Daughter     Down syndrome Daughter     Diabetes type II Daughter     Diabetes type II Sister     No Known Problems Brother     Prostate cancer Brother     No Known Problems Sister     Stroke Sister     No Known Problems Son     Breast cancer Maternal Aunt     Colon cancer Neg Hx          Current Outpatient Medications:     aspirin 81 mg chewable tablet    BASAGLAR KWIKPEN 100 units/mL injection pen    Blood Glucose Monitoring Suppl [employer] VERIO) w/Device KIT    Calcium Carb-Cholecalciferol (CALCIUM 1000 + D PO)    cholecalciferol (VITAMIN D3) 1,000 units tablet    ferrous sulfate 325 (65 Fe) mg tablet    fluticasone (FLONASE) 50 mcg/act nasal spray    furosemide (LASIX) 40 mg tablet    glimepiride (AMARYL) 4 mg tablet    Insulin Pen Needle 31G X 5 MM MISC    JANUMET XR  MG TB24    lactulose 20 g/30 mL    levothyroxine 125 mcg tablet    nadolol (CORGARD) 20 mg tablet    omeprazole (PriLOSEC) 20 mg delayed release capsule    ONETOUCH DELICA LANCETS FINE MISC    ONETOUCH VERIO test strip    sertraline (ZOLOFT) 50 mg tablet    simvastatin (ZOCOR) 40 mg tablet    spironolactone (ALDACTONE) 50 mg tablet  Allergies   Allergen Reactions    Latex     Sesame Seed (Diagnostic)      Other reaction(s): swelling inside mouth    Strawberry C [Ascorbate] Other (See Comments)     Mouth sores    Penicillins Rash     Reviewed medications and allergies and updated as indicated    PHYSICAL EXAM:    Blood pressure 132/80, pulse 62, temperature 97 5 °F (36 4 °C), height 5' 3" (1 6 m), weight 89 4 kg (197 lb)  Body mass index is 34 9 kg/m²  General Appearance: NAD, cooperative, alert  Eyes: Anicteric, PERRLA, EOMI  ENT:  Normocephalic, atraumatic, normal mucosa  Neck:  Supple, symmetrical, trachea midline  Resp:  Clear to auscultation bilaterally; no rales, rhonchi or wheezing; respirations unlabored   CV:  S1 S2, Regular rate and rhythm; no murmur, rub, or gallop  GI:  Soft, non-tender, non-distended; normal bowel sounds; no masses, no organomegaly   Rectal: Deferred  Musculoskeletal: No cyanosis, clubbing or edema  Normal ROM    Skin:  No jaundice, rashes, or lesions   Heme/Lymph: No palpable cervical lymphadenopathy  Psych: Normal affect, good eye contact  Neuro: No gross deficits, AAOx3    Lab Results:   Lab Results   Component Value Date    WBC 5 26 10/27/2015    HGB 12 6 10/27/2015    HCT 38 6 10/27/2015    MCV 90 10/27/2015    PLT 97 (L) 10/27/2015     No results found for: NA, K, CL, CO2, ANIONGAP, BUN, CREATININE, GLUCOSE, GLUF, CALCIUM, CORRECTEDCA, AST, ALT, ALKPHOS, PROT, BILITOT, EGFR  No results found for: IRON, TIBC, FERRITIN  No results found for: LIPASE    Radiology Results:   No results found

## 2020-09-13 ENCOUNTER — APPOINTMENT (EMERGENCY)
Dept: RADIOLOGY | Facility: HOSPITAL | Age: 75
DRG: 441 | End: 2020-09-13
Payer: MEDICARE

## 2020-09-13 ENCOUNTER — HOSPITAL ENCOUNTER (INPATIENT)
Facility: HOSPITAL | Age: 75
LOS: 2 days | Discharge: HOME | DRG: 441 | End: 2020-09-15
Attending: EMERGENCY MEDICINE | Admitting: INTERNAL MEDICINE
Payer: MEDICARE

## 2020-09-13 ENCOUNTER — APPOINTMENT (EMERGENCY)
Dept: CT IMAGING | Facility: HOSPITAL | Age: 75
DRG: 441 | End: 2020-09-13
Payer: MEDICARE

## 2020-09-13 DIAGNOSIS — D69.6 THROMBOCYTOPENIA (HCC): ICD-10-CM

## 2020-09-13 DIAGNOSIS — N39.0 URINARY TRACT INFECTION: ICD-10-CM

## 2020-09-13 DIAGNOSIS — E72.20 HYPERAMMONEMIA (HCC): ICD-10-CM

## 2020-09-13 DIAGNOSIS — G93.40 ACUTE ENCEPHALOPATHY: Primary | ICD-10-CM

## 2020-09-13 PROBLEM — G93.41 ACUTE METABOLIC ENCEPHALOPATHY: Status: ACTIVE | Noted: 2020-09-13

## 2020-09-13 PROBLEM — K74.60 LIVER CIRRHOSIS SECONDARY TO NASH (NONALCOHOLIC STEATOHEPATITIS) (HCC): Status: ACTIVE | Noted: 2020-09-13

## 2020-09-13 PROBLEM — E03.8 OTHER SPECIFIED HYPOTHYROIDISM: Status: ACTIVE | Noted: 2018-07-24

## 2020-09-13 PROBLEM — K75.81 LIVER CIRRHOSIS SECONDARY TO NASH (NONALCOHOLIC STEATOHEPATITIS) (HCC): Status: ACTIVE | Noted: 2020-09-13

## 2020-09-13 LAB
ALBUMIN SERPL BCP-MCNC: 3 G/DL (ref 3.5–5)
ALP SERPL-CCNC: 72 U/L (ref 46–116)
ALT SERPL W P-5'-P-CCNC: 32 U/L (ref 12–78)
AMMONIA PLAS-SCNC: 61 UMOL/L (ref 11–35)
ANION GAP SERPL CALCULATED.3IONS-SCNC: 7 MMOL/L (ref 4–13)
APAP SERPL-MCNC: <2 UG/ML (ref 10–20)
AST SERPL W P-5'-P-CCNC: 39 U/L (ref 5–45)
BACTERIA UR QL AUTO: ABNORMAL /HPF
BASOPHILS # BLD AUTO: 0.03 THOUSANDS/ΜL (ref 0–0.1)
BASOPHILS NFR BLD AUTO: 1 % (ref 0–1)
BILIRUB SERPL-MCNC: 0.9 MG/DL (ref 0.2–1)
BILIRUB UR QL STRIP: NEGATIVE
BUN SERPL-MCNC: 25 MG/DL (ref 5–25)
CALCIUM SERPL-MCNC: 9.4 MG/DL (ref 8.3–10.1)
CHLORIDE SERPL-SCNC: 106 MMOL/L (ref 100–108)
CLARITY UR: ABNORMAL
CO2 SERPL-SCNC: 28 MMOL/L (ref 21–32)
COLOR UR: YELLOW
CREAT SERPL-MCNC: 1 MG/DL (ref 0.6–1.3)
EOSINOPHIL # BLD AUTO: 0.07 THOUSAND/ΜL (ref 0–0.61)
EOSINOPHIL NFR BLD AUTO: 2 % (ref 0–6)
ERYTHROCYTE [DISTWIDTH] IN BLOOD BY AUTOMATED COUNT: 18.2 % (ref 11.6–15.1)
ETHANOL SERPL-MCNC: <3 MG/DL (ref 0–3)
GFR SERPL CREATININE-BSD FRML MDRD: 56 ML/MIN/1.73SQ M
GLUCOSE SERPL-MCNC: 132 MG/DL (ref 65–140)
GLUCOSE SERPL-MCNC: 146 MG/DL (ref 65–140)
GLUCOSE SERPL-MCNC: 187 MG/DL (ref 65–140)
GLUCOSE SERPL-MCNC: 223 MG/DL (ref 65–140)
GLUCOSE UR STRIP-MCNC: NEGATIVE MG/DL
HCT VFR BLD AUTO: 40.5 % (ref 34.8–46.1)
HGB BLD-MCNC: 13.2 G/DL (ref 11.5–15.4)
HGB UR QL STRIP.AUTO: ABNORMAL
IMM GRANULOCYTES # BLD AUTO: 0.01 THOUSAND/UL (ref 0–0.2)
IMM GRANULOCYTES NFR BLD AUTO: 0 % (ref 0–2)
KETONES UR STRIP-MCNC: NEGATIVE MG/DL
LEUKOCYTE ESTERASE UR QL STRIP: ABNORMAL
LYMPHOCYTES # BLD AUTO: 0.64 THOUSANDS/ΜL (ref 0.6–4.47)
LYMPHOCYTES NFR BLD AUTO: 15 % (ref 14–44)
MCH RBC QN AUTO: 30 PG (ref 26.8–34.3)
MCHC RBC AUTO-ENTMCNC: 32.6 G/DL (ref 31.4–37.4)
MCV RBC AUTO: 92 FL (ref 82–98)
MONOCYTES # BLD AUTO: 0.34 THOUSAND/ΜL (ref 0.17–1.22)
MONOCYTES NFR BLD AUTO: 8 % (ref 4–12)
NEUTROPHILS # BLD AUTO: 3.28 THOUSANDS/ΜL (ref 1.85–7.62)
NEUTS SEG NFR BLD AUTO: 74 % (ref 43–75)
NITRITE UR QL STRIP: NEGATIVE
NON-SQ EPI CELLS URNS QL MICRO: ABNORMAL /HPF
NRBC BLD AUTO-RTO: 0 /100 WBCS
PH UR STRIP.AUTO: 6.5 [PH]
PLATELET # BLD AUTO: 71 THOUSANDS/UL (ref 149–390)
PLATELET # BLD AUTO: 74 THOUSANDS/UL (ref 149–390)
PMV BLD AUTO: 13.3 FL (ref 8.9–12.7)
PMV BLD AUTO: 13.5 FL (ref 8.9–12.7)
POTASSIUM SERPL-SCNC: 4.5 MMOL/L (ref 3.5–5.3)
PROT SERPL-MCNC: 6.6 G/DL (ref 6.4–8.2)
PROT UR STRIP-MCNC: NEGATIVE MG/DL
RBC # BLD AUTO: 4.4 MILLION/UL (ref 3.81–5.12)
RBC #/AREA URNS AUTO: ABNORMAL /HPF
SALICYLATES SERPL-MCNC: <3 MG/DL (ref 3–20)
SODIUM SERPL-SCNC: 141 MMOL/L (ref 136–145)
SP GR UR STRIP.AUTO: 1.01 (ref 1–1.03)
TSH SERPL DL<=0.05 MIU/L-ACNC: 1.68 UIU/ML (ref 0.36–3.74)
UROBILINOGEN UR QL STRIP.AUTO: 1 E.U./DL
WBC # BLD AUTO: 4.37 THOUSAND/UL (ref 4.31–10.16)
WBC #/AREA URNS AUTO: ABNORMAL /HPF

## 2020-09-13 PROCEDURE — 93005 ELECTROCARDIOGRAM TRACING: CPT

## 2020-09-13 PROCEDURE — 80307 DRUG TEST PRSMV CHEM ANLYZR: CPT | Performed by: INTERNAL MEDICINE

## 2020-09-13 PROCEDURE — 82948 REAGENT STRIP/BLOOD GLUCOSE: CPT

## 2020-09-13 PROCEDURE — 70450 CT HEAD/BRAIN W/O DYE: CPT

## 2020-09-13 PROCEDURE — G1004 CDSM NDSC: HCPCS

## 2020-09-13 PROCEDURE — 85049 AUTOMATED PLATELET COUNT: CPT | Performed by: INTERNAL MEDICINE

## 2020-09-13 PROCEDURE — 80329 ANALGESICS NON-OPIOID 1 OR 2: CPT | Performed by: PHYSICIAN ASSISTANT

## 2020-09-13 PROCEDURE — 87186 SC STD MICRODIL/AGAR DIL: CPT | Performed by: PHYSICIAN ASSISTANT

## 2020-09-13 PROCEDURE — 1123F ACP DISCUSS/DSCN MKR DOCD: CPT | Performed by: PHYSICIAN ASSISTANT

## 2020-09-13 PROCEDURE — 87086 URINE CULTURE/COLONY COUNT: CPT | Performed by: PHYSICIAN ASSISTANT

## 2020-09-13 PROCEDURE — 99285 EMERGENCY DEPT VISIT HI MDM: CPT

## 2020-09-13 PROCEDURE — 87077 CULTURE AEROBIC IDENTIFY: CPT | Performed by: PHYSICIAN ASSISTANT

## 2020-09-13 PROCEDURE — 84443 ASSAY THYROID STIM HORMONE: CPT | Performed by: INTERNAL MEDICINE

## 2020-09-13 PROCEDURE — 85025 COMPLETE CBC W/AUTO DIFF WBC: CPT | Performed by: PHYSICIAN ASSISTANT

## 2020-09-13 PROCEDURE — 99285 EMERGENCY DEPT VISIT HI MDM: CPT | Performed by: PHYSICIAN ASSISTANT

## 2020-09-13 PROCEDURE — 81001 URINALYSIS AUTO W/SCOPE: CPT | Performed by: PHYSICIAN ASSISTANT

## 2020-09-13 PROCEDURE — 36415 COLL VENOUS BLD VENIPUNCTURE: CPT | Performed by: PHYSICIAN ASSISTANT

## 2020-09-13 PROCEDURE — 82272 OCCULT BLD FECES 1-3 TESTS: CPT

## 2020-09-13 PROCEDURE — 71045 X-RAY EXAM CHEST 1 VIEW: CPT

## 2020-09-13 PROCEDURE — 99223 1ST HOSP IP/OBS HIGH 75: CPT | Performed by: INTERNAL MEDICINE

## 2020-09-13 PROCEDURE — 80053 COMPREHEN METABOLIC PANEL: CPT | Performed by: PHYSICIAN ASSISTANT

## 2020-09-13 PROCEDURE — 80320 DRUG SCREEN QUANTALCOHOLS: CPT | Performed by: PHYSICIAN ASSISTANT

## 2020-09-13 PROCEDURE — 82140 ASSAY OF AMMONIA: CPT | Performed by: PHYSICIAN ASSISTANT

## 2020-09-13 RX ORDER — CEFTRIAXONE 1 G/50ML
1000 INJECTION, SOLUTION INTRAVENOUS ONCE
Status: COMPLETED | OUTPATIENT
Start: 2020-09-13 | End: 2020-09-13

## 2020-09-13 RX ORDER — LACTULOSE 20 G/30ML
20 SOLUTION ORAL 3 TIMES DAILY
Status: DISCONTINUED | OUTPATIENT
Start: 2020-09-13 | End: 2020-09-15 | Stop reason: HOSPADM

## 2020-09-13 RX ORDER — FUROSEMIDE 40 MG/1
40 TABLET ORAL DAILY
Status: DISCONTINUED | OUTPATIENT
Start: 2020-09-14 | End: 2020-09-15 | Stop reason: HOSPADM

## 2020-09-13 RX ORDER — NADOLOL 20 MG/1
20 TABLET ORAL DAILY
Status: DISCONTINUED | OUTPATIENT
Start: 2020-09-14 | End: 2020-09-15 | Stop reason: HOSPADM

## 2020-09-13 RX ORDER — SERTRALINE HYDROCHLORIDE 100 MG/1
100 TABLET, FILM COATED ORAL DAILY
COMMUNITY
Start: 2020-06-25 | End: 2020-10-13 | Stop reason: ALTCHOICE

## 2020-09-13 RX ORDER — SODIUM CHLORIDE 9 MG/ML
50 INJECTION, SOLUTION INTRAVENOUS CONTINUOUS
Status: DISCONTINUED | OUTPATIENT
Start: 2020-09-13 | End: 2020-09-14

## 2020-09-13 RX ORDER — SERTRALINE HYDROCHLORIDE 100 MG/1
100 TABLET, FILM COATED ORAL DAILY
Status: DISCONTINUED | OUTPATIENT
Start: 2020-09-14 | End: 2020-09-15 | Stop reason: HOSPADM

## 2020-09-13 RX ORDER — ASPIRIN 81 MG/1
81 TABLET, CHEWABLE ORAL DAILY
Status: DISCONTINUED | OUTPATIENT
Start: 2020-09-14 | End: 2020-09-15 | Stop reason: HOSPADM

## 2020-09-13 RX ORDER — PRAVASTATIN SODIUM 40 MG
40 TABLET ORAL
Status: DISCONTINUED | OUTPATIENT
Start: 2020-09-13 | End: 2020-09-15 | Stop reason: HOSPADM

## 2020-09-13 RX ORDER — SPIRONOLACTONE 25 MG/1
50 TABLET ORAL DAILY
Status: DISCONTINUED | OUTPATIENT
Start: 2020-09-14 | End: 2020-09-15 | Stop reason: HOSPADM

## 2020-09-13 RX ORDER — ONDANSETRON 2 MG/ML
4 INJECTION INTRAMUSCULAR; INTRAVENOUS EVERY 6 HOURS PRN
Status: DISCONTINUED | OUTPATIENT
Start: 2020-09-13 | End: 2020-09-15 | Stop reason: HOSPADM

## 2020-09-13 RX ORDER — INSULIN GLARGINE 100 [IU]/ML
25 INJECTION, SOLUTION SUBCUTANEOUS
Status: DISCONTINUED | OUTPATIENT
Start: 2020-09-13 | End: 2020-09-15 | Stop reason: HOSPADM

## 2020-09-13 RX ADMIN — INSULIN GLARGINE 25 UNITS: 100 INJECTION, SOLUTION SUBCUTANEOUS at 23:57

## 2020-09-13 RX ADMIN — LACTULOSE 20 G: 10 SOLUTION ORAL at 16:47

## 2020-09-13 RX ADMIN — SODIUM CHLORIDE 50 ML/HR: 0.9 INJECTION, SOLUTION INTRAVENOUS at 16:44

## 2020-09-13 RX ADMIN — PRAVASTATIN SODIUM 40 MG: 40 TABLET ORAL at 16:46

## 2020-09-13 RX ADMIN — INSULIN LISPRO 1 UNITS: 100 INJECTION, SOLUTION INTRAVENOUS; SUBCUTANEOUS at 17:31

## 2020-09-13 RX ADMIN — LACTULOSE 20 G: 10 SOLUTION ORAL at 21:27

## 2020-09-13 RX ADMIN — CEFTRIAXONE 1000 MG: 1 INJECTION, SOLUTION INTRAVENOUS at 15:43

## 2020-09-13 NOTE — ASSESSMENT & PLAN NOTE
Patient denies history of cirrhosis due to PAUL  Ammonia level is 61, we have no old ammonia levels in the chart  Acute hepatic encephalopathy is likely    Will give her lactulose to lower ammonia level    She has history of esophageal varices, will continue nadolol  I see no evidence of volume overload will continue Lasix and spironolactone    Electrolytes are normal

## 2020-09-13 NOTE — ED PROVIDER NOTES
History  Chief Complaint   Patient presents with    Altered Mental Status     To ED via EMS for evaluation after family noticed a decreased LOC starting yesterday  Family states that patient was  lethargic and weak today  NO known Recent illness  68 yo female w/ hx of IDDM, PAUL cirrhosis, esophageal varices, HTN, and HLD presents to the Emergency Department for evaluation of altered mental status  Pt has been lethargic and sleeping frequently over the past 48 hours  Per EMS, family offered little other history  The patient is notably lethargic and confused, answers questions inappropriately but does follow commands  She is grossly incontinent of urine and stool  Neurologic exam is non focal and non lateralizing, however she is globally weak and cannot perform extremity strength testing  Cardiopulmonary exam benign, abdomen obese but non rigid  No peripheral edema, no obvious open wounds  A/P: Acute encephalopathy  Uncertain etiology, do not suspect sepsis given normal VS and lack of fever  Check CBC, CMP, ammonia, accucheck, CTH, UA, coma panel  Prior to Admission Medications   Prescriptions Last Dose Informant Patient Reported? Taking?    BASAGLAR KWIKPEN 100 units/mL injection pen  Self No No   Si units in am and 30 units in pm, up to 90 units daily   Blood Glucose Monitoring Suppl (ONETOUCH VERIO) w/Device KIT  Self No No   Sig: Check sugars three times a day   Calcium Carb-Cholecalciferol (CALCIUM 1000 + D PO)  Self Yes No   Sig: Take by mouth   Insulin Pen Needle 31G X 5 MM MISC  Self No No   Sig: Use up to 3 times a day   JANUMET XR  MG TB24  Self No No   Sig: Take 1 tablet by mouth 2 (two) times a day   ONETOUCH DELICA LANCETS FINE MISC  Self No No   Sig: Use 1-3 times a day   ONETOUCH VERIO test strip  Self No No   Sig: Check sugars up to 3 times a day   aspirin 81 mg chewable tablet  Self Yes No   Sig: Chew 81 mg daily   cholecalciferol (VITAMIN D3) 1,000 units tablet  Self Yes No   Sig: Take 2,000 Units by mouth daily   ferrous sulfate 325 (65 Fe) mg tablet  Self Yes No   Sig: Take 325 mg by mouth daily with breakfast   fluticasone (FLONASE) 50 mcg/act nasal spray  Self Yes No   Si spray into each nostril daily prn    furosemide (LASIX) 40 mg tablet  Self No No   Sig: TAKE 1 TABLET BY MOUTH EVERY DAY   glimepiride (AMARYL) 4 mg tablet  Self No No   Sig: TAKE 1 TABLET (4 MG TOTAL) BY MOUTH 2 (TWO) TIMES A DAY   lactulose 20 g/30 mL  Self No No   Sig: Take 15 mL (10 g total) by mouth 2 (two) times a day   levothyroxine 125 mcg tablet  Self Yes No   Sig: Take 125 mcg by mouth daily   nadolol (CORGARD) 20 mg tablet  Self No No   Sig: Take 1 tablet (20 mg total) by mouth daily   omeprazole (PriLOSEC) 20 mg delayed release capsule  Self No No   Sig: Take 1 capsule (20 mg total) by mouth daily   sertraline (ZOLOFT) 100 mg tablet   Yes No   Sig: Take 100 mg by mouth daily   sertraline (ZOLOFT) 50 mg tablet  Self Yes No   Sig: Take 25 mg by mouth daily    simvastatin (ZOCOR) 40 mg tablet  Self Yes No   Sig: Take 40 mg by mouth daily   spironolactone (ALDACTONE) 50 mg tablet  Self No No   Sig: TAKE 1 TABLET BY MOUTH EVERY DAY      Facility-Administered Medications: None       Past Medical History:   Diagnosis Date    Cirrhosis ([facility] [address] )     Diabetes mellitus (HCC)     Diabetic neuropathy (HCC)     Diabetic neuropathy (HCC)     Esophageal varices (HCC)     Fatty liver     Fatty liver     GERD (gastroesophageal reflux disease)     Hiatal hernia     Hyperlipidemia     Hypertension     Hypoglycemia     Hypothyroidism     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)     Osteoarthritis     Osteopenia     Thrombocytopenia (HCC)     Type 2 diabetes mellitus (HCC)        Past Surgical History:   Procedure Laterality Date    ABDOMINAL SURGERY      Abdominal plasty with mesh insertion    CATARACT EXTRACTION, BILATERAL      CATARACT EXTRACTION, BILATERAL      EGD AND COLONOSCOPY 03/18/2015    LAPAROSCOPIC CHOLECYSTECTOMY      SHOULDER SURGERY Right     calcium depsoti    TUBAL LIGATION      UMBILICAL HERNIA REPAIR      with mesh placed    UPPER GASTROINTESTINAL ENDOSCOPY         Family History   Problem Relation Age of Onset   [place] Breast cancer Mother     Diabetes type II Father     Thyroid disease unspecified Daughter     Down syndrome Daughter     Diabetes type II Daughter     Diabetes type II Sister     No Known Problems Brother     Prostate cancer Brother     No Known Problems Sister     Stroke Sister     No Known Problems Son     Breast cancer Maternal Aunt     Colon cancer Neg Hx      I have reviewed and agree with the history as documented  E-Cigarette/Vaping    E-Cigarette Use Never User      E-Cigarette/Vaping Substances    Nicotine No     THC No     CBD No     Flavoring No     Other No     Unknown No      Social History     Tobacco Use    Smoking status: Never Smoker    Smokeless tobacco: Never Used   Substance Use Topics    Alcohol use: No    Drug use: No       Review of Systems   Unable to perform ROS: Mental status change       Physical Exam  Physical Exam  Vitals signs reviewed  Constitutional:       General: She is not in acute distress  Appearance: She is well-developed  She is not diaphoretic  Comments: Grossly incontinent of stool and urine   HENT:      Head: Normocephalic and atraumatic  Mouth/Throat:      Mouth: Mucous membranes are dry  Eyes:      General: No scleral icterus  Pupils: Pupils are equal, round, and reactive to light  Neck:      Vascular: No JVD  Cardiovascular:      Rate and Rhythm: Normal rate and regular rhythm  Heart sounds: No murmur  No friction rub  No gallop  Pulmonary:      Effort: No respiratory distress  Breath sounds: No wheezing or rales  Abdominal:      General: Bowel sounds are normal  There is no distension  Palpations: Abdomen is soft  There is no mass  Tenderness: There is no abdominal tenderness  There is no guarding or rebound  Genitourinary:     Rectum: Guaiac result positive (pt is on PO iron)  Musculoskeletal:      Right lower leg: No edema  Left lower leg: No edema  Skin:     General: Skin is warm and dry  Capillary Refill: Capillary refill takes less than 2 seconds  Coloration: Skin is not pale  Neurological:      Mental Status: She is oriented to person, place, and time  She is lethargic  GCS: GCS eye subscore is 3  GCS verbal subscore is 4  GCS motor subscore is 6  Cranial Nerves: No facial asymmetry  Motor: Weakness (global) present  Psychiatric:         Behavior: Behavior normal          Vital Signs  ED Triage Vitals [09/13/20 1359]   Temperature Pulse Respirations Blood Pressure SpO2   98 2 °F (36 8 °C) 78 18 145/70 98 %      Temp Source Heart Rate Source Patient Position - Orthostatic VS BP Location FiO2 (%)   Tympanic Monitor Lying Right arm --      Pain Score       --           Vitals:    09/13/20 1359 09/13/20 1430 09/13/20 1500 09/13/20 1530   BP: 145/70 122/59 117/56 136/65   Pulse: 78 (!) 54 (!) 54 (!) 54   Patient Position - Orthostatic VS: Lying            Visual Acuity  Visual Acuity      Most Recent Value   L Pupil Size (mm)  3   R Pupil Size (mm)  3          ED Medications  Medications   cefTRIAXone (ROCEPHIN) IVPB (premix) 1,000 mg 50 mL (has no administration in time range)       Diagnostic Studies  Results Reviewed     Procedure Component Value Units Date/Time    Urine culture [[id number]] Collected:  09/13/20 1414    Lab Status:   In process Specimen:  Urine, Straight Cath Updated:  09/13/20 1523    Urine Microscopic [[id number]]  (Abnormal) Collected:  09/13/20 1414    Lab Status:  Final result Specimen:  Urine, Straight Cath Updated:  09/13/20 1456     RBC, UA 1-2 /hpf      WBC, UA 4-10 /hpf      Epithelial Cells Occasional /hpf      Bacteria, UA Moderate /hpf     CBC and differential [[id number]] (Abnormal) Collected:  09/13/20 1414    Lab Status:  Final result Specimen:  Blood from Arm, Right Updated:  09/13/20 1454     WBC 4 37 Thousand/uL      RBC 4 40 Million/uL      Hemoglobin 13 2 g/dL      Hematocrit 40 5 %      MCV 92 fL      MCH 30 0 pg      MCHC 32 6 g/dL      RDW 18 2 %      MPV 13 5 fL      Platelets 74 Thousands/uL      nRBC 0 /100 WBCs      Neutrophils Relative 74 %      Immat GRANS % 0 %      Lymphocytes Relative 15 %      Monocytes Relative 8 %      Eosinophils Relative 2 %      Basophils Relative 1 %      Neutrophils Absolute 3 28 Thousands/µL      Immature Grans Absolute 0 01 Thousand/uL      Lymphocytes Absolute 0 64 Thousands/µL      Monocytes Absolute 0 34 Thousand/µL      Eosinophils Absolute 0 07 Thousand/µL      Basophils Absolute 0 03 Thousands/µL     Ethanol [[id number]]  (Normal) Collected:  09/13/20 1414    Lab Status:  Final result Specimen:  Blood from Arm, Right Updated:  09/13/20 1451     Ethanol Lvl <3 mg/dL     Salicylate level [[id number]]  (Abnormal) Collected:  09/13/20 1414    Lab Status:  Final result Specimen:  Blood from Arm, Right Updated:  39/64/18 0007     Salicylate Lvl <0 9 mg/dL     Acetaminophen level-If concentration is detectable, please discuss with medical  on call   [[id number]]  (Abnormal) Collected:  09/13/20 1414    Lab Status:  Final result Specimen:  Blood from Arm, Right Updated:  09/13/20 1445     Acetaminophen Level <2 0 ug/mL     Comprehensive metabolic panel [[id number]]  (Abnormal) Collected:  09/13/20 1414    Lab Status:  Final result Specimen:  Blood from Arm, Right Updated:  09/13/20 1441     Sodium 141 mmol/L      Potassium 4 5 mmol/L      Chloride 106 mmol/L      CO2 28 mmol/L      ANION GAP 7 mmol/L      BUN 25 mg/dL      Creatinine 1 00 mg/dL      Glucose 187 mg/dL      Calcium 9 4 mg/dL      AST 39 U/L      ALT 32 U/L      Alkaline Phosphatase 72 U/L      Total Protein 6 6 g/dL      Albumin 3 0 g/dL      Total Bilirubin 0 90 mg/dL eGFR 56 ml/min/1 73sq m     Narrative:       [place] guidelines for Chronic Kidney Disease (CKD):     Stage 1 with normal or high GFR (GFR > 90 mL/min/1 73 square meters)    Stage 2 Mild CKD (GFR = 60-89 mL/min/1 73 square meters)    Stage 3A Moderate CKD (GFR = 45-59 mL/min/1 73 square meters)    Stage 3B Moderate CKD (GFR = 30-44 mL/min/1 73 square meters)    Stage 4 Severe CKD (GFR = 15-29 mL/min/1 73 square meters)    Stage 5 End Stage CKD (GFR <15 mL/min/1 73 square meters)  Note: GFR calculation is accurate only with a steady state creatinine    Ammonia [[id number]]  (Abnormal) Collected:  09/13/20 1414    Lab Status:  Final result Specimen:  Blood from Arm, Right Updated:  09/13/20 1438     Ammonia 61 umol/L     UA w Reflex to Microscopic w Reflex to Culture [[id number]]  (Abnormal) Collected:  09/13/20 1414    Lab Status:  Final result Specimen:  Urine, Straight Cath Updated:  09/13/20 1426     Color, UA Yellow     Clarity, UA Slightly Cloudy     Specific Gravity, UA 1 015     pH, UA 6 5     Leukocytes, UA Small     Nitrite, UA Negative     Protein, UA Negative mg/dl      Glucose, UA Negative mg/dl      Ketones, UA Negative mg/dl      Urobilinogen, UA 1 0 E U /dl      Bilirubin, UA Negative     Blood, UA Trace-Intact    Fingerstick Glucose (POCT) [[id number]]  (Abnormal) Collected:  09/13/20 1420    Lab Status:  Final result Updated:  09/13/20 1420     POC Glucose 223 mg/dl                  CT head without contrast   Final Result by Neida Padilla MD (09/13 1458)      No acute intracranial abnormality  Workstation performed: [id number]         XR chest 1 view portable   Final Result by Nish Herzog MD (09/13 1409)      No acute cardiopulmonary disease              Workstation performed: [id number]                    Procedures  Procedures         ED Course  ED Course as of Sep 13 1542   Sun Sep 13, 2020   1359 Stool hemoccult positive however pt is on PO iron                              SBIRT 20yo+      Most Recent Value   SBIRT (22 yo +)   In order to provide better care to our patients, we are screening all of our patients for alcohol and drug use  Would it be okay to ask you these screening questions? Unable to answer at this time Filed at: 09/13/2020 1436                  [facility]  Number of Diagnoses or Management Options  Acute encephalopathy: new and requires workup  Hyperammonemia [facility]): new and requires workup  Diagnosis management comments: Uncertain etiology to encephalopathy, although elevated ammonia or UTI could be the culprit  Will admit to medicine for further eval and management       Amount and/or Complexity of Data Reviewed  Clinical lab tests: ordered and reviewed  Tests in the radiology section of CPT®: ordered and reviewed  Tests in the medicine section of CPT®: ordered and reviewed  Review and summarize past medical records: yes  Discuss the patient with other providers: yes  Independent visualization of images, tracings, or specimens: yes        Disposition  Final diagnoses:   Acute encephalopathy   Hyperammonemia ([facility] [address] )     Time reflects when diagnosis was documented in both MDM as applicable and the Disposition within this note     Time User Action Codes Description Comment    9/13/2020  3:13 PM [facility] Caro Add [G93 40] Acute encephalopathy     [facility]  [facility])       ED Disposition     ED Disposition Condition Date/Time Comment    Admit Stable Sun Sep 13, 2020  3:13 PM Case was discussed with Dr Hari Jimenez and the patient's admission status was agreed to be Admission Status: inpatient status to the service of Dr Hari Jimenez   Follow-up Information    None         Patient's Medications   Discharge Prescriptions    No medications on file     No discharge procedures on file      PDMP Review     None          ED Provider  Electronically Signed by           Delmi Yanes PA-C  09/13/20 9628

## 2020-09-13 NOTE — ASSESSMENT & PLAN NOTE
Lab Results   Component Value Date    HGBA1C 7 7 08/25/2020       Recent Labs     09/13/20  1420   POCGLU 223*       Blood Sugar Average: Last 72 hrs:  (P) 223     Patient's daughter-in-law reports that patient's blood sugars can be the 300s at times depending on diet    Today blood sugars 187    I will hold patient's glimepiride, Janumet    I will continue Lantus but at a reduced dose to avoid hypoglycemia if patient does not eat and sliding scale insulin

## 2020-09-13 NOTE — ASSESSMENT & PLAN NOTE
Patient presents with acute metabolic encephalopathy most likely due to hepatic encephalopathy, I doubt UTI, seizure, CVA  Hypoglycemia is unlikely      Admitting patient to med surge  Will check her neuro status serially  Monitor blood sugars closely  Will give her lactulose to lower her ammonia level    Patient requires more than 2 midnights hospital stay  She is full code    I discussed the case with patient's daughter-in-law on the phone in detail

## 2020-09-13 NOTE — ASSESSMENT & PLAN NOTE
Patient has chronic thrombocytopenia dating as far back as 1    There is no visible bleeding, will monitor platelet counts

## 2020-09-13 NOTE — NURSING NOTE
Spoke to patient's daughter-in-law Tuan Brown to complete admission database since pt  is very lethargic today  All is complete except the vaccines section  She was unsure if the pt had her pneumonia vaccine, she informed me that she would call the pt 's doctor tomorrow to get her immunization records  She said she would let us know tomorrow if the pt needs the vaccine or not  If so, she requests that the pt receives it before discharge

## 2020-09-13 NOTE — ASSESSMENT & PLAN NOTE
Blood pressure is stable running in the 940 systolic    Will continue nadolol, Lasix, spironolactone

## 2020-09-13 NOTE — PLAN OF CARE
Problem: Potential for Falls  Goal: Patient will remain free of falls  Description: INTERVENTIONS:  - Assess patient frequently for physical needs  -  Identify cognitive and physical deficits and behaviors that affect risk of falls    -  [place] fall precautions as indicated by assessment   - Educate patient/family on patient safety including physical limitations  - Instruct patient to call for assistance with activity based on assessment  - Modify environment to reduce risk of injury  - Consider OT/PT consult to assist with strengthening/mobility  Outcome: Progressing     Problem: PAIN - ADULT  Goal: Verbalizes/displays adequate comfort level or baseline comfort level  Description: Interventions:  - Encourage patient to monitor pain and request assistance  - Assess pain using appropriate pain scale  - Administer analgesics based on type and severity of pain and evaluate response  - Implement non-pharmacological measures as appropriate and evaluate response  - Consider cultural and social influences on pain and pain management  - Notify physician/advanced practitioner if interventions unsuccessful or patient reports new pain  Outcome: Progressing     Problem: INFECTION - ADULT  Goal: Absence or prevention of progression during hospitalization  Description: INTERVENTIONS:  - Assess and monitor for signs and symptoms of infection  - Monitor lab/diagnostic results  - Monitor all insertion sites, i e  indwelling lines, tubes, and drains  - Monitor endotracheal if appropriate and nasal secretions for changes in amount and color  - [place] appropriate cooling/warming therapies per order  - Administer medications as ordered  - Instruct and encourage patient and family to use good hand hygiene technique  - Identify and instruct in appropriate isolation precautions for identified infection/condition  Outcome: Progressing  Goal: Absence of fever/infection during neutropenic period  Description: INTERVENTIONS:  - Monitor WBC    Outcome: Progressing     Problem: SAFETY ADULT  Goal: Patient will remain free of falls  Description: INTERVENTIONS:  - Assess patient frequently for physical needs  -  Identify cognitive and physical deficits and behaviors that affect risk of falls    -  [place] fall precautions as indicated by assessment   - Educate patient/family on patient safety including physical limitations  - Instruct patient to call for assistance with activity based on assessment  - Modify environment to reduce risk of injury  - Consider OT/PT consult to assist with strengthening/mobility  Outcome: Progressing  Goal: Maintain or return to baseline ADL function  Description: INTERVENTIONS:  -  Assess patient's ability to carry out ADLs; assess patient's baseline for ADL function and identify physical deficits which impact ability to perform ADLs (bathing, care of mouth/teeth, toileting, grooming, dressing, etc )  - Assess/evaluate cause of self-care deficits   - Assess range of motion  - Assess patient's mobility; develop plan if impaired  - Assess patient's need for assistive devices and provide as appropriate  - Encourage maximum independence but intervene and supervise when necessary  - Involve family in performance of ADLs  - Assess for home care needs following discharge   - Consider OT consult to assist with ADL evaluation and planning for discharge  - Provide patient education as appropriate  Outcome: Progressing  Goal: Maintain or return mobility status to optimal level  Description: INTERVENTIONS:  - Assess patient's baseline mobility status (ambulation, transfers, stairs, etc )    - Identify cognitive and physical deficits and behaviors that affect mobility  - Identify mobility aids required to assist with transfers and/or ambulation (gait belt, sit-to-stand, lift, walker, cane, etc )  - [place] fall precautions as indicated by assessment  - Record patient progress and toleration of activity level on Mobility SBAR; progress patient to next Phase/Stage  - Instruct patient to call for assistance with activity based on assessment  - Consider rehabilitation consult to assist with strengthening/weightbearing, etc   Outcome: Progressing     Problem: DISCHARGE PLANNING  Goal: Discharge to home or other facility with appropriate resources  Description: INTERVENTIONS:  - Identify barriers to discharge w/patient and caregiver  - Arrange for needed discharge resources and transportation as appropriate  - Identify discharge learning needs (meds, wound care, etc )  - Arrange for interpretive services to assist at discharge as needed  - Refer to Case Management Department for coordinating discharge planning if the patient needs post-hospital services based on physician/advanced practitioner order or complex needs related to functional status, cognitive ability, or social support system  Outcome: Progressing     Problem: Knowledge Deficit  Goal: Patient/family/caregiver demonstrates understanding of disease process, treatment plan, medications, and discharge instructions  Description: Complete learning assessment and assess knowledge base    Interventions:  - Provide teaching at level of understanding  - Provide teaching via preferred learning methods  Outcome: Progressing

## 2020-09-13 NOTE — H&P
H&P- Leroy Yousiftead 1945, 69 y o  female MRN: [id number]    Unit/Bed#: ED 05 Encounter: [id number]    Primary Care Provider: Ariella Rodriguez DO   Date and time admitted to hospital: 9/13/2020  1:55 PM        * Acute metabolic encephalopathy  Assessment & Plan  Patient presents with acute metabolic encephalopathy most likely due to hepatic encephalopathy, I doubt UTI, seizure, CVA  Hypoglycemia is unlikely  Admitting patient to [facility]  Will check her neuro status serially  Monitor blood sugars closely  Will give her lactulose to lower her ammonia level    Patient requires more than 2 midnights hospital stay  She is full code    I discussed the case with patient's daughter-in-law on the phone in detail    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) [facility])  Assessment & Plan  Patient denies history of cirrhosis due to PAUL  Ammonia level is 61, we have no old ammonia levels in the chart  Acute hepatic encephalopathy is likely    Will give her lactulose to lower ammonia level    She has history of esophageal varices, will continue nadolol  I see no evidence of volume overload will continue Lasix and spironolactone    Electrolytes are normal    Type 2 diabetes mellitus with hyperglycemia, with long-term current use of insulin [facility])  Assessment & Plan  Lab Results   Component Value Date    HGBA1C 7 7 08/25/2020       Recent Labs     09/13/20  1420   POCGLU 223*       Blood Sugar Average: Last 72 hrs:  (P) 36     Patient's daughter-in-law reports that patient's blood sugars can be the 300s at times depending on diet    Today blood sugars 187    I will hold patient's glimepiride, Janumet    I will continue Lantus but at a reduced dose to avoid hypoglycemia if patient does not eat and sliding scale insulin    Essential hypertension  Assessment & Plan  Blood pressure is stable running in the 062 systolic    Will continue nadolol, Lasix, spironolactone    Other specified hypothyroidism  Assessment & Plan  Will check TSH, continue levothyroxine    Thrombocytopenia [facility])  Assessment & Plan  Patient has chronic thrombocytopenia dating as far back as 2015  There is no visible bleeding, will monitor platelet counts        VTE Prophylaxis: ordered    Code Status: as above    Anticipated Length of Stay: as above    Justification for Hospital Stay: see assessment and plan        Chief Complaint:   Sleepiness    History of Present Illness:    Shena Lux is a 69 y o  female who presents with above chief compaint  Patient was brought to the ER for evaluation of excessive sleepiness  I spoke with patient's daughter-in-law Cooper Palma on the phone to get history as patient is for historian: At times she answers with yes or no but not consistently to my questions  According to Cooper Palma patient was at a birthday party 2 days ago and since then has been getting progressively more sleepier  Today she kept falling back to sleep after being awoken by daughter-in-law that is why she was brought to the ER  Daughter renal reports no recent fever, vomiting, diarrhea  Patient has no history of seizures  Patient has type 2 diabetes and her blood sugars may be in the 300s depending on what she eats  History taking is also difficult from the daughter-in-law  Review of Systems:    Review of Systems   Unable to perform ROS: Mental status change   Respiratory: Negative for shortness of breath  Cardiovascular: Negative for chest pain  Gastrointestinal: Negative for abdominal pain  Genitourinary: Negative for dysuria         Past Medical and Surgical History:     Past Medical History:   Diagnosis Date    Cirrhosis ([facility]     Diabetes mellitus ([facility]     Diabetic neuropathy ([facility]     Diabetic neuropathy ([facility]     Esophageal varices ([facility]     Fatty liver     Fatty liver     GERD (gastroesophageal reflux disease)     Hiatal hernia     Hyperlipidemia     Hypertension     Hypoglycemia     Hypothyroidism     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)     Osteoarthritis     Osteopenia     Thrombocytopenia (HCC)     Type 2 diabetes mellitus (HCC)        Past Surgical History:   Procedure Laterality Date    ABDOMINAL SURGERY      Abdominal plasty with mesh insertion    CATARACT EXTRACTION, BILATERAL      CATARACT EXTRACTION, BILATERAL      EGD AND COLONOSCOPY  2015    LAPAROSCOPIC CHOLECYSTECTOMY      SHOULDER SURGERY Right     calcium depsoti    TUBAL LIGATION      UMBILICAL HERNIA REPAIR      with mesh placed    UPPER GASTROINTESTINAL ENDOSCOPY         Meds/Allergies:    Prior to Admission Medications   Prescriptions Last Dose Informant Patient Reported? Taking?    BASAGLELGIN SERRATOPEN 100 units/mL injection pen  Self No No   Si units in am and 30 units in pm, up to 90 units daily   Blood Glucose Monitoring Suppl (ONETOUCH VERIO) w/Device KIT  Self No No   Sig: Check sugars three times a day   Calcium Carb-Cholecalciferol (CALCIUM 1000 + D PO)  Self Yes No   Sig: Take by mouth   Insulin Pen Needle 31G X 5 MM MISC  Self No No   Sig: Use up to 3 times a day   JANUMET XR  MG TB24  Self No No   Sig: Take 1 tablet by mouth 2 (two) times a day   ONETOUCH DELICA LANCETS FINE MISC  Self No No   Sig: Use 1-3 times a day   ONETOUCH VERIO test strip  Self No No   Sig: Check sugars up to 3 times a day   aspirin 81 mg chewable tablet  Self Yes No   Sig: Chew 81 mg daily   cholecalciferol (VITAMIN D3) 1,000 units tablet  Self Yes No   Sig: Take 2,000 Units by mouth daily   ferrous sulfate 325 (65 Fe) mg tablet  Self Yes No   Sig: Take 325 mg by mouth daily with breakfast   fluticasone (FLONASE) 50 mcg/act nasal spray  Self Yes No   Si spray into each nostril daily prn    furosemide (LASIX) 40 mg tablet  Self No No   Sig: TAKE 1 TABLET BY MOUTH EVERY DAY   glimepiride (AMARYL) 4 mg tablet  Self No No   Sig: TAKE 1 TABLET (4 MG TOTAL) BY MOUTH 2 (TWO) TIMES A DAY lactulose 20 g/30 mL  Self No No   Sig: Take 15 mL (10 g total) by mouth 2 (two) times a day   levothyroxine 125 mcg tablet  Self Yes No   Sig: Take 125 mcg by mouth daily   nadolol (CORGARD) 20 mg tablet  Self No No   Sig: Take 1 tablet (20 mg total) by mouth daily   omeprazole (PriLOSEC) 20 mg delayed release capsule  Self No No   Sig: Take 1 capsule (20 mg total) by mouth daily   sertraline (ZOLOFT) 100 mg tablet   Yes No   Sig: Take 100 mg by mouth daily   sertraline (ZOLOFT) 50 mg tablet  Self Yes No   Sig: Take 25 mg by mouth daily    simvastatin (ZOCOR) 40 mg tablet  Self Yes No   Sig: Take 40 mg by mouth daily   spironolactone (ALDACTONE) 50 mg tablet  Self No No   Sig: TAKE 1 TABLET BY MOUTH EVERY DAY      Facility-Administered Medications: None       Allergies:    Allergies   Allergen Reactions    Latex     Sesame Seed (Diagnostic)      Other reaction(s): swelling inside mouth    Strawberry C [Ascorbate] Other (See Comments)     Mouth sores    Penicillins Rash       Social History:     Social History     Substance and Sexual Activity   Alcohol Use No     Social History     Tobacco Use   Smoking Status Never Smoker   Smokeless Tobacco Never Used     Social History     Substance and Sexual Activity   Drug Use No       Family History:    Family History   Problem Relation Age of Onset    Breast cancer Mother     Diabetes type II Father     Thyroid disease unspecified Daughter     Down syndrome Daughter     Diabetes type II Daughter     Diabetes type II Sister     No Known Problems Brother     Prostate cancer Brother     No Known Problems Sister     Stroke Sister     No Known Problems Son     Breast cancer Maternal Aunt     Colon cancer Neg Hx        Physical Exam:     Vitals:   Blood Pressure: 136/65 (09/13/20 1530)  Pulse: (!) 54 (09/13/20 1530)  Temperature: 98 2 °F (36 8 °C) (09/13/20 1359)  Temp Source: Tympanic (09/13/20 1359)  Respirations: 20 (09/13/20 1530)  Weight - Scale: 89 4 kg (197 lb) (09/13/20 1359)  SpO2: 98 % (09/13/20 1530)    Physical Exam  Constitutional:       Comments: Sleepy but easily arouses to verbal stimuli, does not follow commands   HENT:      Head: Normocephalic  Mouth/Throat:      Mouth: Mucous membranes are moist    Eyes:      General: No scleral icterus  Conjunctiva/sclera: Conjunctivae normal    Neck:      Musculoskeletal: Neck supple  Cardiovascular:      Rate and Rhythm: Normal rate and regular rhythm  Heart sounds: No murmur  Pulmonary:      Effort: No respiratory distress  Breath sounds: No wheezing or rales  Abdominal:      General: Bowel sounds are normal  There is no distension  Palpations: Abdomen is soft  Tenderness: There is no abdominal tenderness  There is no guarding  Musculoskeletal:         General: No tenderness  Skin:     General: Skin is warm and dry  Comments: I did not see cellulitis, ulcers, lower extremity edema   Neurological:      Comments: Patient has no facial droop, tongue is midline, she does not follow commands, muscle tone is normal  When she answers with yes or no speech is normal             Additional Data:     Lab Results: I personally reviewed them    Results from last 7 days   Lab Units 09/13/20  1414   WBC Thousand/uL 4 37   HEMOGLOBIN g/dL 13 2   HEMATOCRIT % 40 5   PLATELETS Thousands/uL 74*   NEUTROS PCT % 74   LYMPHS PCT % 15   MONOS PCT % 8   EOS PCT % 2     Results from last 7 days   Lab Units 09/13/20  1414   POTASSIUM mmol/L 4 5   CHLORIDE mmol/L 106   CO2 mmol/L 28   BUN mg/dL 25   CREATININE mg/dL 1 00   CALCIUM mg/dL 9 4   ALK PHOS U/L 72   ALT U/L 32   AST U/L 39           Imaging: I personally reviewed them    CT head without contrast   Final Result by Suzy Alexander MD (09/13 2280)      No acute intracranial abnormality                    Workstation performed: [id number]         XR chest 1 view portable   Final Result by Julián Lomeli MD (09/13 8247)      No acute cardiopulmonary disease  Workstation performed: [id number]             EKG : I personally reviewed      Cris Duran MD    ** Please Note: This note has been constructed using a voice recognition system   **

## 2020-09-14 ENCOUNTER — APPOINTMENT (INPATIENT)
Dept: ULTRASOUND IMAGING | Facility: HOSPITAL | Age: 75
DRG: 441 | End: 2020-09-14
Payer: MEDICARE

## 2020-09-14 LAB
ALBUMIN SERPL BCP-MCNC: 2.5 G/DL (ref 3.5–5)
ALP SERPL-CCNC: 59 U/L (ref 46–116)
ALT SERPL W P-5'-P-CCNC: 26 U/L (ref 12–78)
AMMONIA PLAS-SCNC: 48 UMOL/L (ref 11–35)
AMPHETAMINES SERPL QL SCN: NEGATIVE
ANION GAP SERPL CALCULATED.3IONS-SCNC: 6 MMOL/L (ref 4–13)
AST SERPL W P-5'-P-CCNC: 32 U/L (ref 5–45)
BARBITURATES UR QL: NEGATIVE
BENZODIAZ UR QL: NEGATIVE
BILIRUB SERPL-MCNC: 0.8 MG/DL (ref 0.2–1)
BUN SERPL-MCNC: 21 MG/DL (ref 5–25)
CALCIUM SERPL-MCNC: 9.2 MG/DL (ref 8.3–10.1)
CHLORIDE SERPL-SCNC: 112 MMOL/L (ref 100–108)
CO2 SERPL-SCNC: 27 MMOL/L (ref 21–32)
COCAINE UR QL: NEGATIVE
CREAT SERPL-MCNC: 0.97 MG/DL (ref 0.6–1.3)
ERYTHROCYTE [DISTWIDTH] IN BLOOD BY AUTOMATED COUNT: 18.5 % (ref 11.6–15.1)
GFR SERPL CREATININE-BSD FRML MDRD: 58 ML/MIN/1.73SQ M
GLUCOSE SERPL-MCNC: 133 MG/DL (ref 65–140)
GLUCOSE SERPL-MCNC: 174 MG/DL (ref 65–140)
GLUCOSE SERPL-MCNC: 311 MG/DL (ref 65–140)
GLUCOSE SERPL-MCNC: 75 MG/DL (ref 65–140)
GLUCOSE SERPL-MCNC: 80 MG/DL (ref 65–140)
HCT VFR BLD AUTO: 33.9 % (ref 34.8–46.1)
HGB BLD-MCNC: 11.1 G/DL (ref 11.5–15.4)
MCH RBC QN AUTO: 29.9 PG (ref 26.8–34.3)
MCHC RBC AUTO-ENTMCNC: 32.7 G/DL (ref 31.4–37.4)
MCV RBC AUTO: 91 FL (ref 82–98)
METHADONE UR QL: NEGATIVE
OPIATES UR QL SCN: NEGATIVE
OXYCODONE+OXYMORPHONE UR QL SCN: NEGATIVE
PCP UR QL: NEGATIVE
PLATELET # BLD AUTO: 74 THOUSANDS/UL (ref 149–390)
PMV BLD AUTO: 12.5 FL (ref 8.9–12.7)
POTASSIUM SERPL-SCNC: 3.5 MMOL/L (ref 3.5–5.3)
PROT SERPL-MCNC: 5.8 G/DL (ref 6.4–8.2)
RBC # BLD AUTO: 3.71 MILLION/UL (ref 3.81–5.12)
SODIUM SERPL-SCNC: 145 MMOL/L (ref 136–145)
THC UR QL: NEGATIVE
WBC # BLD AUTO: 3.83 THOUSAND/UL (ref 4.31–10.16)

## 2020-09-14 PROCEDURE — 97163 PT EVAL HIGH COMPLEX 45 MIN: CPT

## 2020-09-14 PROCEDURE — 97166 OT EVAL MOD COMPLEX 45 MIN: CPT

## 2020-09-14 PROCEDURE — 82140 ASSAY OF AMMONIA: CPT | Performed by: INTERNAL MEDICINE

## 2020-09-14 PROCEDURE — 85027 COMPLETE CBC AUTOMATED: CPT | Performed by: INTERNAL MEDICINE

## 2020-09-14 PROCEDURE — 99232 SBSQ HOSP IP/OBS MODERATE 35: CPT | Performed by: INTERNAL MEDICINE

## 2020-09-14 PROCEDURE — 87040 BLOOD CULTURE FOR BACTERIA: CPT | Performed by: INTERNAL MEDICINE

## 2020-09-14 PROCEDURE — 80053 COMPREHEN METABOLIC PANEL: CPT | Performed by: INTERNAL MEDICINE

## 2020-09-14 PROCEDURE — 82948 REAGENT STRIP/BLOOD GLUCOSE: CPT

## 2020-09-14 RX ADMIN — LEVOTHYROXINE SODIUM 125 MCG: 100 TABLET ORAL at 08:36

## 2020-09-14 RX ADMIN — FUROSEMIDE 40 MG: 40 TABLET ORAL at 08:36

## 2020-09-14 RX ADMIN — LACTULOSE 20 G: 10 SOLUTION ORAL at 17:06

## 2020-09-14 RX ADMIN — INSULIN LISPRO 1 UNITS: 100 INJECTION, SOLUTION INTRAVENOUS; SUBCUTANEOUS at 17:35

## 2020-09-14 RX ADMIN — INSULIN LISPRO 3 UNITS: 100 INJECTION, SOLUTION INTRAVENOUS; SUBCUTANEOUS at 21:56

## 2020-09-14 RX ADMIN — ENOXAPARIN SODIUM 40 MG: 40 INJECTION SUBCUTANEOUS at 08:37

## 2020-09-14 RX ADMIN — INSULIN GLARGINE 25 UNITS: 100 INJECTION, SOLUTION SUBCUTANEOUS at 21:54

## 2020-09-14 RX ADMIN — LACTULOSE 20 G: 10 SOLUTION ORAL at 08:36

## 2020-09-14 RX ADMIN — SERTRALINE HYDROCHLORIDE 100 MG: 100 TABLET ORAL at 08:36

## 2020-09-14 RX ADMIN — PRAVASTATIN SODIUM 40 MG: 40 TABLET ORAL at 17:07

## 2020-09-14 RX ADMIN — ASPIRIN 81 MG 81 MG: 81 TABLET ORAL at 08:37

## 2020-09-14 NOTE — PLAN OF CARE
Problem: OCCUPATIONAL THERAPY ADULT  Goal: Performs self-care activities at highest level of function for planned discharge setting  See evaluation for individualized goals  Note: Limitation: Decreased self-care trans, Decreased high-level ADLs  Prognosis: Good  Assessment: Pt is a 69 y o  female seen for OT evaluation at [facility], admitted 2/27/7746 w/ Acute metabolic encephalopathy  OT completed expanded review of pt's medical and social history  Comorbidities affecting pt's functional performance at time of assessment include: AMS, hyperammoniemia, essential HTN, nd diabetic polyneurapathy  Personal factors affecting pt at time of IE include:decreased functional mobility  Pt with active OT orders  Prior to admission, pt was living with family in house  Pt was I w/  ADLS and IADLS, (+) drove, & required no use of DME PTA  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  Upon evaluation: Pt requires for bed mobility, supervision for functional mobility/transfers, supervision for UB ADLs and supervision for LB ADLS 2* the following deficits impacting occupational performance: decreased balance and decreased safety awareness  Pt also with one near LOB during encounter  Pt to benefit from continued skilled OT tx while in the hospital to address deficits as defined above and maximize level of functional independence w ADL's and functional mobility  Occupational Performance areas to address include: functional mobility  Based on findings, pt is of moderate complexity  At this time, OT recommendations at time of discharge are home with family support       OT Discharge Recommendation: Post-Acute Rehabilitation Services

## 2020-09-14 NOTE — NURSING NOTE
Pt slept during most hrly rounds between q2hr incont checks  No stool overnight, am amonia   level =48  Mentation gradually clearer; AAOx3

## 2020-09-14 NOTE — PROGRESS NOTES
Progress Note - Adrianna White 1945, 69 y o  female MRN: [id number]    Unit/Bed#: -[facility] Encounter: [id number]    Primary Care Provider: Tadeo Robb DO   Date and time admitted to hospital: 9/13/2020  1:55 PM        Thrombocytopenia [facility])  Assessment & Plan  Patient has chronic thrombocytopenia dating as far back as 2015    There is no visible bleeding, will monitor platelet counts    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) ([facility] [address] [facility] )  Assessment & Plan  Decompensated Liver cirrhosis present on admission as evidenced by her being encephalopathy of hepatic origin in the setting of medication non-adherence in a patient with Liver cirrhosis    Ammonia level on presentation was 61, now 48    Plan:   Continue lactulose as ordered   US abdomen to evaluate for ascites     Type 2 diabetes mellitus with hyperglycemia, with long-term current use of insulin [facility])  Assessment & Plan  Lab Results   Component Value Date    HGBA1C 7 7 08/25/2020       Recent Labs     09/13/20  2058 09/13/20  2258 09/14/20  0803 09/14/20  1215   POCGLU 146* 132 75 133       Blood Sugar Average: Last 72 hrs:  (P) 141 8     Patient's daughter-in-law reports that patient's blood sugars can be the 300s at times depending on diet    Today blood sugars 187    I will hold patient's glimepiride, Janumet    Plan:   Continue Lantus and SSI as ordered       Other specified hypothyroidism  Assessment & Plan  Will check TSH, continue levothyroxine    Essential hypertension  Assessment & Plan  Blood pressure is stable running in the 368 systolic    Will continue nadolol, Lasix, spironolactone    * Acute metabolic encephalopathy  Assessment & Plan   acute metabolic encephalopathy POA, due to hepatic encephalopathy  CT head w/o acute insult   Less likely infectious trigger  UA not convincing of a UTI, pending culture  Chest X ray appears clear  She has remained afebrile and labs w/o leucocytosis     She is now AO x 3 with ammonia level downtrending to 48    Plan:   continue with lactulose, titrate to 2-3 BM per day    F/u culture data   Blood cultures  Ammonia level tomorrow         VTE Pharmacologic Prophylaxis:   Pharmacologic: Enoxaparin (Lovenox)  Mechanical VTE Prophylaxis in Place: Yes    Patient Centered Rounds: I have performed bedside rounds with nursing staff today  Discussions with Specialists or Other Care Team Provider: None    Education and Discussions with Family / Patient: Yes    Time Spent for Care: 30 minutes  More than 50% of total time spent on counseling and coordination of care as described above  Current Length of Stay: 1 day(s)    Current Patient Status: Inpatient   Certification Statement: The patient will continue to require additional inpatient hospital stay due to Work up for encephalopathy     Discharge Plan: Home    Code Status: Level 1 - Full Code      Subjective:   Seen, now alert and oriented x 4  Denies complaints of abdominal pain  Denies symptoms consistent with a UTI including frequency, urgency, dysuria  She is not tender on abdominal palpation   Objective:     Vitals:   Temp (24hrs), Av 2 °F (36 8 °C), Min:97 7 °F (36 5 °C), Max:98 5 °F (36 9 °C)    Temp:  [97 7 °F (36 5 °C)-98 5 °F (36 9 °C)] 98 5 °F (36 9 °C)  HR:  [54-78] 65  Resp:  [16-20] 18  BP: ()/(31-70) 110/34  SpO2:  [96 %-100 %] 96 %  Body mass index is 37 57 kg/m²  Input and Output Summary (last 24 hours): Intake/Output Summary (Last 24 hours) at 2020 1355  Last data filed at 2020 0600  Gross per 24 hour   Intake 460 ml   Output 402 ml   Net 58 ml       Physical Exam:     Physical Exam  Vitals signs reviewed  Constitutional:       General: She is not in acute distress  Appearance: Normal appearance  She is not ill-appearing, toxic-appearing or diaphoretic  HENT:      Head: Normocephalic and atraumatic  Eyes:      Extraocular Movements: Extraocular movements intact        Pupils: Pupils are equal, round, and reactive to light  Neck:      Musculoskeletal: Normal range of motion and neck supple  No neck rigidity or muscular tenderness  Vascular: No carotid bruit  Cardiovascular:      Rate and Rhythm: Normal rate and regular rhythm  Pulses: Normal pulses  Heart sounds: Normal heart sounds  No murmur  No friction rub  Pulmonary:      Effort: Pulmonary effort is normal       Breath sounds: Normal breath sounds  No stridor  No wheezing, rhonchi or rales  Chest:      Chest wall: No tenderness  Abdominal:      General: Bowel sounds are normal  There is distension  Palpations: Abdomen is soft  Tenderness: There is no abdominal tenderness  There is no guarding or rebound  Musculoskeletal: Normal range of motion  General: No swelling or tenderness  Right lower leg: No edema  Left lower leg: No edema  Skin:     General: Skin is warm and dry  Coloration: Skin is not jaundiced  Neurological:      General: No focal deficit present  Mental Status: She is alert and oriented to person, place, and time  Cranial Nerves: No cranial nerve deficit  Sensory: No sensory deficit  Additional Data:     Labs:    Results from last 7 days   Lab Units 09/14/20 0442  09/13/20  1414   WBC Thousand/uL 3 83*  --  4 37   HEMOGLOBIN g/dL 11 1*  --  13 2   HEMATOCRIT % 33 9*  --  40 5   PLATELETS Thousands/uL 74*   < > 74*   NEUTROS PCT %  --   --  74   LYMPHS PCT %  --   --  15   MONOS PCT %  --   --  8   EOS PCT %  --   --  2    < > = values in this interval not displayed       Results from last 7 days   Lab Units 09/14/20  0442   SODIUM mmol/L 145   POTASSIUM mmol/L 3 5   CHLORIDE mmol/L 112*   CO2 mmol/L 27   BUN mg/dL 21   CREATININE mg/dL 0 97   ANION GAP mmol/L 6   CALCIUM mg/dL 9 2   ALBUMIN g/dL 2 5*   TOTAL BILIRUBIN mg/dL 0 80   ALK PHOS U/L 59   ALT U/L 26   AST U/L 32   GLUCOSE RANDOM mg/dL 80         Results from last 7 days Lab Units 09/14/20  1215 09/14/20  0803 09/13/20  2258 09/13/20  2058 09/13/20  1420   POC GLUCOSE mg/dl 133 75 132 146* 223*                   * I Have Reviewed All Lab Data Listed Above  * Additional Pertinent Lab Tests Reviewed: [place] [facility] Admission Reviewed    Imaging:    Imaging Reports Reviewed Today Include:CT head, Chest x-ray  Imaging Personally Reviewed by Myself Includes:  None    Recent Cultures (last 7 days):           Last 24 Hours Medication List:   Current Facility-Administered Medications   Medication Dose Route Frequency Provider Last Rate    aspirin  81 mg Oral Daily Gómez Liu MD      enoxaparin  40 mg Subcutaneous Daily Gómez Liu MD      furosemide  40 mg Oral Daily Gómez Liu MD      insulin glargine  25 Units Subcutaneous HS Gómez Liu MD      insulin lispro  1-5 Units Subcutaneous TID Madelyn Pantoja MD      lactulose  20 g Oral TID Gómez Liu MD      levothyroxine  125 mcg Oral Daily Gómez Liu MD      nadolol  20 mg Oral Daily Gómez Liu MD      ondansetron  4 mg Intravenous Q6H PRN Gómez Liu MD      pravastatin  40 mg Oral Daily With Domo Guerrero MD      sertraline  100 mg Oral Daily Gómez Liu MD      spironolactone  50 mg Oral Daily Gómez Liu MD          Today, Patient Was Seen By: Danna Bell MD    ** Please Note: Dictation voice to text software may have been used in the creation of this document   **

## 2020-09-14 NOTE — ASSESSMENT & PLAN NOTE
Blood pressure is stable running in the 641 systolic    Will continue nadolol, Lasix, spironolactone

## 2020-09-14 NOTE — ASSESSMENT & PLAN NOTE
acute metabolic encephalopathy POA, due to hepatic encephalopathy  CT head w/o acute insult   Less likely infectious trigger  UA not convincing of a UTI, pending culture  Chest X ray appears clear  She has remained afebrile and labs w/o leucocytosis     She is now AO x 3 with ammonia level downtrending to 48    Plan:   continue with lactulose, titrate to 2-3 BM per day    F/u culture data   Blood cultures  Ammonia level tomorrow

## 2020-09-14 NOTE — OCCUPATIONAL THERAPY NOTE
Occupational Therapy Evaluation     Patient Name: Carolina Beck  [id number] Date: 9/14/2020  Problem List  Principal Problem:    Acute metabolic encephalopathy  Active Problems:    Essential hypertension    Other specified hypothyroidism    Type 2 diabetes mellitus with hyperglycemia, with long-term current use of insulin ([facility] )    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) (HCC)    Thrombocytopenia (HCC)    Past Medical History  Past Medical History:   Diagnosis Date    Cirrhosis ([facility] )     Diabetes mellitus ([facility] )     Diabetic neuropathy ([facility] )     Diabetic neuropathy ([facility] )     Esophageal varices (HCC)     Fatty liver     Fatty liver     GERD (gastroesophageal reflux disease)     Hiatal hernia     Hyperlipidemia     Hypertension     Hypoglycemia     Hypothyroidism     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)     Osteoarthritis     Osteopenia     Thrombocytopenia (HCC)     Type 2 diabetes mellitus ([facility] )      Past Surgical History  Past Surgical History:   Procedure Laterality Date    ABDOMINAL SURGERY      Abdominal plasty with mesh insertion    CATARACT EXTRACTION, BILATERAL      CATARACT EXTRACTION, BILATERAL      EGD AND COLONOSCOPY  03/18/2015    LAPAROSCOPIC CHOLECYSTECTOMY      SHOULDER SURGERY Right     calcium depsoti    TUBAL LIGATION      UMBILICAL HERNIA REPAIR      with mesh placed    UPPER GASTROINTESTINAL ENDOSCOPY           09/14/20 1201   Note Type   Note type Eval only   Restrictions/Precautions   Weight Bearing Precautions Per Order No   Other Precautions Fall Risk;Cognitive   Pain Assessment   Pain Assessment Tool Pain Assessment not indicated - pt denies pain   Home Living   Type of [address] [place] [address] entrance   Bathroom Shower/Tub Tub/shower unit   Bathroom Toilet Standard   Bathroom Accessibility Accessible   Home Equipment   (no AD at baseline)   Prior Function   Level of [place] Independent with ADLs and functional mobility   Lives With Family   ADL Assistance Independent   IADLs Independent   Vocational Retired   Comments 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   Subjective   Subjective Pt states "I've been going to the bathroom a lot"    ADL   Eating Assistance 7  Independent   Eating Deficit Setup   Grooming Assistance 7  Independent   Grooming Deficit Setup   UB Bathing Assistance 5  Supervision/Setup   LB Bathing Assistance 5  Supervision/Setup   UB Dressing Assistance 5  Supervision/Setup   LB Dressing Assistance 5  Supervision/Setup   [address]  5  Supervision/Setup   Transfers   Sit to [address] to Sit 5  Supervision   Stand pivot 5  Supervision   Additional Comments Pt with noted 1 episode of near LOB when attempting to turn and sit in chair  Pt able to recover without assist    Functional Mobility   Functional Mobility 5  Supervision   Additional Comments no AD    Balance   Static Sitting Good   Dynamic Sitting Good   Static Standing Fair +   Dynamic Standing Fair   Activity Tolerance   Activity Tolerance Patient tolerated treatment well   Medical Staff Made Aware PT Emily   Nurse Made Aware HILDA [place]   RUE Assessment   RUE Assessment WFL   LUE Assessment   LUE Assessment WFL   Cognition   Arousal/Participation Alert   Attention Within functional limits   Orientation Level Oriented to person;Oriented to place;Oriented to time   Memory Decreased recall of precautions;Decreased recall of recent events   Following Commands Follows all commands and directions without difficulty   Assessment   Limitation Decreased self-care trans;Decreased high-level ADLs   Prognosis Good   Assessment Pt is a 69 y o  female seen for OT evaluation at [facility], admitted 0/42/6862 w/ Acute metabolic encephalopathy  OT completed expanded review of pt's medical and social history   Comorbidities affecting pt's functional performance at time of assessment include: AMS, hyperammoniemia, essential HTN, nd diabetic polyneurapathy  Personal factors affecting pt at time of IE include:decreased functional mobility  Pt with active OT orders  Prior to admission, pt was living with family in house  Pt was I w/  ADLS and IADLS, (+) drove, & required no use of DME PTA  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  Upon evaluation: Pt requires for bed mobility, supervision for functional mobility/transfers, supervision for UB ADLs and supervision for LB ADLS 2* the following deficits impacting occupational performance: decreased balance and decreased safety awareness  Pt also with one near LOB during encounter  Pt to benefit from continued skilled OT tx while in the hospital to address deficits as defined above and maximize level of functional independence w ADL's and functional mobility  Occupational Performance areas to address include: functional mobility  Based on findings, pt is of moderate complexity  At this time, OT recommendations at time of discharge are home with family support  Goals   Patient Goals Pt wishes to get home   Recommendation   OT Discharge Recommendation Post-Acute Rehabilitation Services           Pt will achieve the following goals within 10 days  *Pt will complete UB bathing and dressing with independence  *Pt will complete LB bathing and dressing with independence       * Pt will complete toileting w/ independence  w/ G hygiene/thoroughness using DME PRN    *Pt will complete bed mobility with independence  , with bed flat and no side rail to prep for purposeful tasks    *Pt will perform functional transfers with on/off all surfaces with independence  using DME as needed w/ G balance/safety  *Pt will increase standing tolerance to 5 minutes in order to complete sinkside ADL task  *Pt will demonstrate increased activity tolerance >20 min in order to complete ADL routine  *Pt will identify 3-5 fall risks during ADL routine to ensure home safety upon discharge      *Pt will improve functional mobility during ADL/IADL/leisure tasks to independence using DME as needed w/ G balance/safety         *Assess DME needs     Nalini Roman, OTR/L

## 2020-09-14 NOTE — PLAN OF CARE
Problem: Potential for Falls  Goal: Patient will remain free of falls  Description: INTERVENTIONS:  - Assess patient frequently for physical needs  -  Identify cognitive and physical deficits and behaviors that affect risk of falls    -  [place] fall precautions as indicated by assessment   - Educate patient/family on patient safety including physical limitations  - Instruct patient to call for assistance with activity based on assessment  - Modify environment to reduce risk of injury  - Consider OT/PT consult to assist with strengthening/mobility  Outcome: Progressing

## 2020-09-14 NOTE — ASSESSMENT & PLAN NOTE
Decompensated Liver cirrhosis present on admission as evidenced by her being encephalopathy of hepatic origin in the setting of medication non-adherence in a patient with Liver cirrhosis    Ammonia level on presentation was 61, now 48    Plan:   Continue lactulose as ordered   US abdomen to evaluate for ascites

## 2020-09-14 NOTE — PHYSICAL THERAPY NOTE
PT eval     09/14/20 1200   Note Type   Note type Eval only   Pain Assessment   Pain Assessment Tool Pain Assessment not indicated - pt denies pain   Home Living   Type of Home House   Additional Comments states no AD pta drives shops cares for disabled dghtr   Prior Function   Level of [place] Independent with ADLs and functional mobility   Lives With [facility] Help From Family   ADL Assistance Independent   IADLs Independent   Vocational Retired   Restrictions/Precautions   [place] Bearing Precautions Per Order No   Other Precautions Fall Risk;Cognitive   General   Additional Pertinent History adm with change in mental status, hepatic encephalopathy with elevated ammonia,   Family/Caregiver Present No   Cognition   Overall Cognitive Status WFL   Arousal/Participation Alert   Orientation Level Oriented to person;Oriented to place;Oriented to time   Memory Decreased recall of recent events   Following Commands Follows all commands and directions without difficulty   Comments realizes she was confused on admission   RUE Assessment   RUE Assessment WFL   LUE Assessment   LUE Assessment WFL   RLE Assessment   RLE Assessment WFL   LLE Assessment   LLE Assessment WFL   Coordination   Movements are Fluid and Coordinated 1   Proprioception   RLE Proprioception Grossly intact   LLE Proprioception Grossly Intact   Transfers   Sit to Stand 5  Supervision   Stand to Sit 5  Supervision   Stand pivot 5  Supervision   Ambulation/Elevation   Gait pattern Narrow SUGAR; Forward Flexion; Short stride; Step to; Inconsistent millicent  (near Lob on turn to approach chair ax1to recover)   Gait Assistance 5  Supervision   Additional items Assist x 1  (occas cg of 1)   Assistive Device None   Distance 25'x2   Balance   Static Sitting Good   Dynamic Sitting Good   Static Standing Fair +   Dynamic Standing Fair   Ambulatory Fair   Endurance Deficit   Endurance Deficit No  (02 sat 95% on room air no sob)   Activity Tolerance Activity Tolerance Patient tolerated treatment well   Medical Staff Made Aware OT Kitty   Nurse Made Aware HILDA Lacey   Assessment   Prognosis Good   Problem List Impaired balance;Decreased mobility   Assessment Pt presents with sightly decreased mobility after adm for hepatic encephalopathy  Able to amb without AD but 1 near LOb   Will follow for addtional visit to ensure safety with or without AD   Barriers to Discharge   (medical clearance)   Goals   Patient Goals get better go home   STG Expiration Date 09/16/20   Short Term Goal #1 1) safe ind abm with or without AD 50' level no LOB    Plan   Treatment/Interventions Gait training   PT Frequency Follow-up visit only   Recommendation   PT Discharge Recommendation Return to previous environment with social support   PT - OK to Discharge No   Modified [place] Scale   Modified [place] Scale 3   Emily Bosch, PT

## 2020-09-14 NOTE — PLAN OF CARE
Problem: PHYSICAL THERAPY ADULT  Goal: Performs mobility at highest level of function for planned discharge setting  See evaluation for individualized goals  Description: Treatment/Interventions: Gait training          See flowsheet documentation for full assessment, interventions and recommendations  Outcome: Progressing  Note: Prognosis: Good  Problem List: Impaired balance, Decreased mobility  Assessment: Pt presents with sightly decreased mobility after adm for hepatic encephalopathy  Able to amb without AD but 1 near LOb  Will follow for addtional visit to ensure safety with or without AD  Barriers to Discharge: (medical clearance)     PT Discharge Recommendation: Return to previous environment with social support     PT - OK to Discharge: No    See flowsheet documentation for full assessment

## 2020-09-14 NOTE — ASSESSMENT & PLAN NOTE
Lab Results   Component Value Date    HGBA1C 7 7 08/25/2020       Recent Labs     09/13/20 2058 09/13/20 2258 09/14/20  0803 09/14/20  1215   POCGLU 146* 132 75 133       Blood Sugar Average: Last 72 hrs:  (P) 141 8     Patient's daughter-in-law reports that patient's blood sugars can be the 300s at times depending on diet    Today blood sugars 187    I will hold patient's glimepiride, Janumet    Plan:   Continue Lantus and SSI as ordered

## 2020-09-15 ENCOUNTER — APPOINTMENT (INPATIENT)
Dept: ULTRASOUND IMAGING | Facility: HOSPITAL | Age: 75
DRG: 441 | End: 2020-09-15
Payer: MEDICARE

## 2020-09-15 VITALS
TEMPERATURE: 97.9 F | OXYGEN SATURATION: 94 % | HEART RATE: 72 BPM | SYSTOLIC BLOOD PRESSURE: 131 MMHG | BODY MASS INDEX: 37.57 KG/M2 | DIASTOLIC BLOOD PRESSURE: 55 MMHG | WEIGHT: 212.08 LBS | RESPIRATION RATE: 16 BRPM

## 2020-09-15 PROBLEM — N39.0 URINARY TRACT INFECTION: Status: ACTIVE | Noted: 2020-09-15

## 2020-09-15 LAB
AMMONIA PLAS-SCNC: 15 UMOL/L (ref 11–35)
ANION GAP SERPL CALCULATED.3IONS-SCNC: 9 MMOL/L (ref 4–13)
ATRIAL RATE: 62 BPM
BASOPHILS # BLD AUTO: 0.03 THOUSANDS/ΜL (ref 0–0.1)
BASOPHILS NFR BLD AUTO: 1 % (ref 0–1)
BUN SERPL-MCNC: 18 MG/DL (ref 5–25)
CALCIUM SERPL-MCNC: 8.5 MG/DL (ref 8.3–10.1)
CHLORIDE SERPL-SCNC: 107 MMOL/L (ref 100–108)
CO2 SERPL-SCNC: 26 MMOL/L (ref 21–32)
CREAT SERPL-MCNC: 0.88 MG/DL (ref 0.6–1.3)
EOSINOPHIL # BLD AUTO: 0.05 THOUSAND/ΜL (ref 0–0.61)
EOSINOPHIL NFR BLD AUTO: 2 % (ref 0–6)
ERYTHROCYTE [DISTWIDTH] IN BLOOD BY AUTOMATED COUNT: 18.4 % (ref 11.6–15.1)
GFR SERPL CREATININE-BSD FRML MDRD: 65 ML/MIN/1.73SQ M
GLUCOSE SERPL-MCNC: 165 MG/DL (ref 65–140)
GLUCOSE SERPL-MCNC: 182 MG/DL (ref 65–140)
GLUCOSE SERPL-MCNC: 266 MG/DL (ref 65–140)
GLUCOSE SERPL-MCNC: 304 MG/DL (ref 65–140)
HCT VFR BLD AUTO: 34.6 % (ref 34.8–46.1)
HGB BLD-MCNC: 11.1 G/DL (ref 11.5–15.4)
IMM GRANULOCYTES # BLD AUTO: 0 THOUSAND/UL (ref 0–0.2)
IMM GRANULOCYTES NFR BLD AUTO: 0 % (ref 0–2)
LYMPHOCYTES # BLD AUTO: 0.76 THOUSANDS/ΜL (ref 0.6–4.47)
LYMPHOCYTES NFR BLD AUTO: 27 % (ref 14–44)
MCH RBC QN AUTO: 29.9 PG (ref 26.8–34.3)
MCHC RBC AUTO-ENTMCNC: 32.1 G/DL (ref 31.4–37.4)
MCV RBC AUTO: 93 FL (ref 82–98)
MONOCYTES # BLD AUTO: 0.28 THOUSAND/ΜL (ref 0.17–1.22)
MONOCYTES NFR BLD AUTO: 10 % (ref 4–12)
NEUTROPHILS # BLD AUTO: 1.74 THOUSANDS/ΜL (ref 1.85–7.62)
NEUTS SEG NFR BLD AUTO: 60 % (ref 43–75)
NRBC BLD AUTO-RTO: 0 /100 WBCS
P AXIS: 59 DEGREES
PLATELET # BLD AUTO: 62 THOUSANDS/UL (ref 149–390)
PMV BLD AUTO: 12.9 FL (ref 8.9–12.7)
POTASSIUM SERPL-SCNC: 3.4 MMOL/L (ref 3.5–5.3)
PR INTERVAL: 178 MS
QRS AXIS: -24 DEGREES
QRSD INTERVAL: 86 MS
QT INTERVAL: 460 MS
QTC INTERVAL: 466 MS
RBC # BLD AUTO: 3.71 MILLION/UL (ref 3.81–5.12)
SODIUM SERPL-SCNC: 142 MMOL/L (ref 136–145)
T WAVE AXIS: 23 DEGREES
VENTRICULAR RATE: 62 BPM
WBC # BLD AUTO: 2.86 THOUSAND/UL (ref 4.31–10.16)

## 2020-09-15 PROCEDURE — 85025 COMPLETE CBC W/AUTO DIFF WBC: CPT | Performed by: INTERNAL MEDICINE

## 2020-09-15 PROCEDURE — 80048 BASIC METABOLIC PNL TOTAL CA: CPT | Performed by: INTERNAL MEDICINE

## 2020-09-15 PROCEDURE — 82948 REAGENT STRIP/BLOOD GLUCOSE: CPT

## 2020-09-15 PROCEDURE — 93010 ELECTROCARDIOGRAM REPORT: CPT | Performed by: INTERNAL MEDICINE

## 2020-09-15 PROCEDURE — 76705 ECHO EXAM OF ABDOMEN: CPT

## 2020-09-15 PROCEDURE — 82140 ASSAY OF AMMONIA: CPT | Performed by: INTERNAL MEDICINE

## 2020-09-15 PROCEDURE — 99239 HOSP IP/OBS DSCHRG MGMT >30: CPT | Performed by: INTERNAL MEDICINE

## 2020-09-15 RX ORDER — LEVOFLOXACIN 250 MG/1
250 TABLET ORAL ONCE
Status: COMPLETED | OUTPATIENT
Start: 2020-09-15 | End: 2020-09-15

## 2020-09-15 RX ORDER — LEVOFLOXACIN 250 MG/1
250 TABLET ORAL DAILY
Qty: 2 TABLET | Refills: 0 | Status: SHIPPED | OUTPATIENT
Start: 2020-09-15 | End: 2020-09-17

## 2020-09-15 RX ORDER — POTASSIUM CHLORIDE 20 MEQ/1
40 TABLET, EXTENDED RELEASE ORAL ONCE
Status: COMPLETED | OUTPATIENT
Start: 2020-09-15 | End: 2020-09-15

## 2020-09-15 RX ADMIN — FUROSEMIDE 40 MG: 40 TABLET ORAL at 08:14

## 2020-09-15 RX ADMIN — INSULIN LISPRO 4 UNITS: 100 INJECTION, SOLUTION INTRAVENOUS; SUBCUTANEOUS at 12:26

## 2020-09-15 RX ADMIN — ENOXAPARIN SODIUM 40 MG: 40 INJECTION SUBCUTANEOUS at 08:14

## 2020-09-15 RX ADMIN — SERTRALINE HYDROCHLORIDE 100 MG: 100 TABLET ORAL at 08:13

## 2020-09-15 RX ADMIN — SPIRONOLACTONE 50 MG: 25 TABLET, FILM COATED ORAL at 08:14

## 2020-09-15 RX ADMIN — LEVOTHYROXINE SODIUM 125 MCG: 100 TABLET ORAL at 08:13

## 2020-09-15 RX ADMIN — NADOLOL 20 MG: 20 TABLET ORAL at 08:13

## 2020-09-15 RX ADMIN — INSULIN LISPRO 1 UNITS: 100 INJECTION, SOLUTION INTRAVENOUS; SUBCUTANEOUS at 08:14

## 2020-09-15 RX ADMIN — PRAVASTATIN SODIUM 40 MG: 40 TABLET ORAL at 16:30

## 2020-09-15 RX ADMIN — LEVOFLOXACIN 250 MG: 250 TABLET, FILM COATED ORAL at 16:30

## 2020-09-15 RX ADMIN — LACTULOSE 20 G: 10 SOLUTION ORAL at 08:14

## 2020-09-15 RX ADMIN — POTASSIUM CHLORIDE 40 MEQ: 20 TABLET, EXTENDED RELEASE ORAL at 16:30

## 2020-09-15 RX ADMIN — ASPIRIN 81 MG 81 MG: 81 TABLET ORAL at 08:14

## 2020-09-15 NOTE — PLAN OF CARE
Problem: Potential for Falls  Goal: Patient will remain free of falls  Description: INTERVENTIONS:  - Assess patient frequently for physical needs  -  Identify cognitive and physical deficits and behaviors that affect risk of falls    -  [place] fall precautions as indicated by assessment   - Educate patient/family on patient safety including physical limitations  - Instruct patient to call for assistance with activity based on assessment  - Modify environment to reduce risk of injury  - Consider OT/PT consult to assist with strengthening/mobility  Outcome: Progressing

## 2020-09-15 NOTE — DISCHARGE INSTR - AVS FIRST PAGE
Dear Dayne Lock,     It was our pleasure to care for you here at [address] [place] [address]  It is our hope that we were always able to exceed the expected standards for your care during your stay  You were hospitalized due to Acute confusion due to elevated ammonia levels  You were cared for on the medical-surgical floor by Rochelle Kennedy MD with the [facility] who covers for your primary care physician (PCP), Dori Cutler DO, while you were hospitalized  If you have any questions or concerns related to this hospitalization, you may contact us at [phone]  For follow up as well as any medication refills, we recommend that you follow up with your primary care physician  A registered nurse will reach out to you by phone within a few days after your discharge to answer any additional questions that you may have after going home  However, at this time we provide for you here, the most important instructions / recommendations at discharge:     · Notable Medication Adjustments -   · None  · Testing Required after Discharge -   · Complete blood count in 1 week  · Important follow up information -   · Primary care physician within 1 week  · Gastroenterologist within 2 weeks  · Other Instructions -   · Please continue to take her lactulose as prescribed  You should have a minimum of 3-4 bowel movements per day  ·   · Please review this entire after visit summary as additional general instructions including medication list, appointments, activity, diet, any pertinent wound care, and other additional recommendations from your care team that may be provided for you        Sincerely,     Rochelle Kennedy MD

## 2020-09-15 NOTE — ASSESSMENT & PLAN NOTE
acute toxic metabolic encephalopathy POA, due to hepatic encephalopathy  CT head w/o acute insult   Less likely infectious trigger  UA not convincing of a UTI, with cultures growing E coli  Chest X ray appears clear  She has remained afebrile  She is now AO x 3 with ammonia level normal at 15    Plan:   continue with lactulose on discharge, titrate to 3-4 BM per day    Will discharge home today to follow-up with gastroenterology and primary care physician  Will discharge on 2 days of levofloxacin 250 mg daily for 2 days    To complete a 3 day course

## 2020-09-15 NOTE — ASSESSMENT & PLAN NOTE
Decompensated Liver cirrhosis present on admission as evidenced by her being encephalopathy of hepatic origin in the setting of medication non-adherence in a patient with Liver cirrhosis    Ammonia level on presentation was 64- 48 now 15  Ultrasound abdomen done without ascites    Plan:   Continue lactulose titrate to have 3-4 bowel movements per day  Discharge home

## 2020-09-15 NOTE — NURSING NOTE
Patient's IV removed by previous nurse this AM  Discharge instructions reviewed with patient and daughter-in-law with patient's permission  Left via wheelchair down to front lobby, daughter-in-law to provide transport home

## 2020-09-15 NOTE — DISCHARGE INSTRUCTIONS
Encephalopathy   WHAT YOU NEED TO KNOW:   Encephalopathy is a term used to describe brain disease or brain damage  It usually develops because of a health condition such as cirrhosis, or a brain injury  Symptoms may be mild or severe, and may be short-term or permanent  DISCHARGE INSTRUCTIONS:   Call 911 or have someone else call for any of the following:   · You cannot be woken  · You had a seizure  Seek care immediately if:   · You had a seizure  · You feel confused, dizzy, or lightheaded  · Your heart is beating faster than is normal for you  · You have sudden shortness of breath  Contact your healthcare provider if:   · You have a fever  · You are sleeping more than usual     · You have questions or concerns about your condition or care  Medicines: You may need any of the following:  · Blood pressure medicine  may be given to raise or lower your blood pressure  · Antibiotics  help treat an infection caused by bacteria  · A vitamin B supplement  may be needed if the level in your blood is too low  · Take your medicine as directed  Contact your healthcare provider if you think your medicine is not helping or if you have side effects  Tell him or her if you are allergic to any medicine  Keep a list of the medicines, vitamins, and herbs you take  Include the amounts, and when and why you take them  Bring the list or the pill bottles to follow-up visits  Carry your medicine list with you in case of an emergency  Manage encephalopathy:   · Limit or do not drink alcohol  Alcohol can worsen your condition  Alcohol can also cause new or worsening damage to your liver and brain  Ask your healthcare provider if it is safe for you to drink alcohol  Limit alcohol to 1 drink a day if you are a woman, or 2 drinks a day if you are a man  A drink of alcohol is 12 ounces of beer, 5 ounces of wine, or 1½ ounces of liquor  · Eat low-protein and low-sodium foods, if directed    If your symptoms are caused by a liver disease, you may be asked to eat less protein and sodium (salt)  Some foods high in protein include beef, pork, poultry (chicken, turkey), beans, and nuts  Some foods high in sodium include salty snacks, canned foods, condiments, deli meats, and cured meat such as armenta  Ask your dietitian for more information about foods to limit or avoid  Follow up with your healthcare provider as directed:  Write down your questions so you remember to ask them during your visits  © 2017 [facility] Information is for End User's use only and may not be sold, redistributed or otherwise used for commercial purposes  All illustrations and images included in CareNotes® are the copyrighted property of [employer]  or Tae Jackson  The above information is an  only  It is not intended as medical advice for individual conditions or treatments  Talk to your doctor, nurse or pharmacist before following any medical regimen to see if it is safe and effective for you

## 2020-09-15 NOTE — DISCHARGE SUMMARY
Discharge- Hardik Rowland 1945, 69 y o  female MRN: [id number]    Unit/Bed#: -01 Encounter: [id number]    Primary Care Provider: Morales Canela DO   Date and time admitted to hospital: 9/13/2020  1:55 PM        Urinary tract infection  Assessment & Plan  Will treat with 3 days of levofloxacin 250 mg daily  Thrombocytopenia [facility])  Assessment & Plan  Patient has chronic thrombocytopenia dating as far back as 1    There is no visible bleeding, will monitor platelet counts    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) (HCC)  Assessment & Plan  Decompensated Liver cirrhosis present on admission as evidenced by her being encephalopathy of hepatic origin in the setting of medication non-adherence in a patient with Liver cirrhosis    Ammonia level on presentation was 64- 48 now 15  Ultrasound abdomen done without ascites    Plan:   Continue lactulose titrate to have 3-4 bowel movements per day  Discharge home    Type 2 diabetes mellitus with hyperglycemia, with long-term current use of insulin [facility])  Assessment & Plan  Lab Results   Component Value Date    HGBA1C 7 7 08/25/2020       Recent Labs     09/14/20  1551 09/14/20  2122 09/15/20  0728 09/15/20  1211   POCGLU 174* 311* 165* 304*       Blood Sugar Average: Last 72 hrs:  (P) 184 [id number]     Patient's daughter-in-law reports that patient's blood sugars can be the 300s at times depending on diet    Today blood sugars 187    I will hold patient's glimepiride, Janumet    Plan:   Discharge home on home medicines      Other specified hypothyroidism  Assessment & Plan  Will check TSH, continue levothyroxine    Essential hypertension  Assessment & Plan  Blood pressure is stable  Will continue nadolol, Lasix, spironolactone    * Acute metabolic encephalopathy  Assessment & Plan   acute toxic metabolic encephalopathy POA, due to hepatic encephalopathy  CT head w/o acute insult   Less likely infectious trigger  UA not convincing of a UTI, with cultures growing E coli  Chest X ray appears clear  She has remained afebrile  She is now AO x 3 with ammonia level normal at 15    Plan:   continue with lactulose on discharge, titrate to 3-4 BM per day    Will discharge home today to follow-up with gastroenterology and primary care physician  Will discharge on 2 days of levofloxacin 250 mg daily for 2 days  To complete a 3 day course          Discharging Physician / Practitioner: Lidia Moore MD  PCP: Miguel Angel Beasley DO  Admission Date:   Admission Orders (From admission, onward)     Ordered        09/13/20 1513  Inpatient Admission (expected length of stay for this patient Order details is greater than two midnights)  Once                   Discharge Date: 09/15/20    Resolved Problems  Date Reviewed: 9/13/2020    None          Consultations During Hospital Stay:  · None    Procedures Performed:   · None    Significant Findings / Test Results:   · None    Ultrasound abdomen:  FINDINGS:     No ascites identified in any of the 4 abdominal quadrants      IMPRESSION:     No ascites  Incidental Findings:   · None    Test Results Pending at Discharge (will require follow up): · Blood cultures     Outpatient Tests Requested:  · Complete blood count    Complications:  None    Reason for Admission:  Acute metabolic encephalopathy due to hyperammonemia, urinary tract infection    Hospital Course:     Malachi Clifton is a 69 y o  female patient who originally presented to the hospital on 9/13/2020 due to lethargy and generalized weakness  She was also acutely confused alert and oriented to person only  Ammonia level was checked and was found to be 68, she was immediately started on lactulose with appropriate response to15  She is also not alert oriented to time person place  Urinalysis and cultures done growing E coli  Given acute confusion state she was treated for UTI  Recommendations:  1   Follow-up with primary care physician within 1 week  2  Follow-up with Gastroenterology within 2 weeks next   3  Discussed with patient about taking her lactulose, to have 3-4 bowel movements per day  4  Levofloxacin 250 mg daily was prescribed to finish a 3 day course for UTI  Please see above list of diagnoses and related plan for additional information  Condition at Discharge: stable     Discharge Day Visit / Exam:     Subjective:  Seen  Has no complaints to offer  Alert and oriented x4  Anxious to go home  Had a long discussion with the patient and educated on the need to take her lactulose  As I believe that her presentation to the hospital was due to nonadherence citing diarrhea  Vitals: Blood Pressure: 93/61 (09/15/20 0730)  Pulse: 72 (09/15/20 0730)  Temperature: 98 5 °F (36 9 °C) (09/15/20 0730)  Temp Source: Tympanic (09/13/20 1359)  Respirations: 18 (09/15/20 0730)  Weight - Scale: 96 2 kg (212 lb 1 3 oz) (09/15/20 0600)  SpO2: 94 % (09/15/20 0900)  Exam:   Physical Exam  Vitals signs reviewed  Constitutional:       General: She is not in acute distress  Appearance: Normal appearance  She is not ill-appearing, toxic-appearing or diaphoretic  HENT:      Head: Normocephalic  Eyes:      Extraocular Movements: Extraocular movements intact  Pupils: Pupils are equal, round, and reactive to light  Neck:      Musculoskeletal: Normal range of motion and neck supple  No neck rigidity or muscular tenderness  Vascular: No carotid bruit  Cardiovascular:      Rate and Rhythm: Normal rate and regular rhythm  Pulses: Normal pulses  Heart sounds: Normal heart sounds  No murmur  No friction rub  Pulmonary:      Effort: Pulmonary effort is normal       Breath sounds: Normal breath sounds  No stridor  No wheezing, rhonchi or rales  Chest:      Chest wall: No tenderness  Abdominal:      General: Abdomen is flat  Bowel sounds are normal  There is no distension        Palpations: Abdomen is soft  Tenderness: There is no abdominal tenderness  There is no guarding or rebound  Comments: Obese   Musculoskeletal: Normal range of motion  General: No swelling or tenderness  Right lower leg: No edema  Left lower leg: No edema  Skin:     General: Skin is warm and dry  Coloration: Skin is not jaundiced  Neurological:      General: No focal deficit present  Mental Status: She is alert and oriented to person, place, and time  Cranial Nerves: No cranial nerve deficit  Sensory: No sensory deficit  Discussion with Family:  Yes    Discharge instructions/Information to patient and family:   See after visit summary for information provided to patient and family  Provisions for Follow-Up Care:  See after visit summary for information related to follow-up care and any pertinent home health orders  Disposition:     Home    For Discharges to [facility] SNF:   · Not Applicable to this Patient - Not Applicable to this Patient    Planned Readmission:  No     Discharge Statement:  I spent 35 minutes discharging the patient  This time was spent on the day of discharge  I had direct contact with the patient on the day of discharge  Greater than 50% of the total time was spent examining patient, answering all patient questions, arranging and discussing plan of care with patient as well as directly providing post-discharge instructions  Additional time then spent on discharge activities  Discharge Medications:  See after visit summary for reconciled discharge medications provided to patient and family        ** Please Note: This note has been constructed using a voice recognition system **

## 2020-09-15 NOTE — CASE MANAGEMENT
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

## 2020-09-15 NOTE — ASSESSMENT & PLAN NOTE
Lab Results   Component Value Date    HGBA1C 7 7 08/25/2020       Recent Labs     09/14/20  1551 09/14/20  2122 09/15/20  0728 09/15/20  1211   POCGLU 174* 311* 165* 304*       Blood Sugar Average: Last 72 hrs:  (P) 921 [id number]     Patient's daughter-in-law reports that patient's blood sugars can be the 300s at times depending on diet    Today blood sugars 187    I will hold patient's glimepiride, Janumet    Plan:   Discharge home on home medicines

## 2020-09-16 LAB
BACTERIA UR CULT: ABNORMAL
BACTERIA UR CULT: ABNORMAL

## 2020-09-19 LAB
BACTERIA BLD CULT: NORMAL
BACTERIA BLD CULT: NORMAL

## 2020-09-22 ENCOUNTER — TELEPHONE (OUTPATIENT)
Dept: ENDOCRINOLOGY | Facility: HOSPITAL | Age: 75
End: 2020-09-22

## 2020-09-22 NOTE — TELEPHONE ENCOUNTER
Received call from patient  She states she needs a new meter  She does not like the one she has, which I believe is the One Touch Verio  She states she has a Accu Chek Guide at home in a box, she believes it came from our office, she does not know how to use it  What kind of meter do you suggest for her to use? While on the phone, the patient was going into spurts of crying, she could not hold the conversation about why she called  She kept asking about blood work and where she has to go today  At one point she asked who I was and why I called and if she knew me

## 2020-09-22 NOTE — TELEPHONE ENCOUNTER
She can use with ever blood sugar monitor her insurance approves  She just had blood work done in August so I do not need any new blood work for her appointment  Regarding her other issues with possible crying and forgetfulness, she will need to follow up with her PCP regarding that

## 2020-09-23 ENCOUNTER — TELEMEDICINE (OUTPATIENT)
Dept: GASTROENTEROLOGY | Facility: CLINIC | Age: 75
End: 2020-09-23
Payer: MEDICARE

## 2020-09-23 VITALS — BODY MASS INDEX: 35.26 KG/M2 | WEIGHT: 199 LBS | HEIGHT: 63 IN

## 2020-09-23 DIAGNOSIS — K75.81 LIVER CIRRHOSIS SECONDARY TO NASH (NONALCOHOLIC STEATOHEPATITIS) (HCC): ICD-10-CM

## 2020-09-23 DIAGNOSIS — K72.90 HEPATIC ENCEPHALOPATHY (HCC): Primary | ICD-10-CM

## 2020-09-23 DIAGNOSIS — R19.7 DIARRHEA, UNSPECIFIED TYPE: ICD-10-CM

## 2020-09-23 DIAGNOSIS — D50.9 IRON DEFICIENCY ANEMIA, UNSPECIFIED IRON DEFICIENCY ANEMIA TYPE: ICD-10-CM

## 2020-09-23 DIAGNOSIS — K74.60 LIVER CIRRHOSIS SECONDARY TO NASH (NONALCOHOLIC STEATOHEPATITIS) (HCC): ICD-10-CM

## 2020-09-23 PROCEDURE — 99214 OFFICE O/P EST MOD 30 MIN: CPT | Performed by: INTERNAL MEDICINE

## 2020-09-23 NOTE — PATIENT INSTRUCTIONS
Virtual Regular Visit      Assessment/Plan:    Problem List Items Addressed This Visit        Digestive    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) ([facility] )      Other Visit Diagnoses     Hepatic encephalopathy ([facility] )    -  Primary    Relevant Medications    rifaximin (XIFAXAN) 550 mg tablet    Diarrhea, unspecified type        Iron deficiency anemia, unspecified iron deficiency anemia type          Grade 2 esophageal varices on recent endoscopy    1  Cirrhosis related to non alcoholic steatohepatitis-complications have included mild fluid overload and non bleeding esophageal varices  Presently having some issues with concentration and hepatic encephalopathy    2  Hepatic encephalopathy-patient with recent hospitalization for urinary tract infection  She had a metabolic encephalopathy at that time  Ammonia levels was up over 61 but then came down nicely  She was given lactulose in the hospital   -over the weekend patient did have some decline in mental status and felt that she could not concentrate well  Patient has been taking her lactulose but did really have much in the way of new meds yesterday  Today is had multiple bowel movements and concentration is much better  -continue lactulose 1 tbsp twice a day  Titrate to about 3 bowel movements per day  Avoid excessive diarrhea and cut back dosing for diarrhea  -begin Xifaxan 550 twice a day  Will check with nursing about company assistance program because the medication on its own is cost prohibitive in patient has limited resources  -advised to hold on driving for about 1 week until she checks back in the office  Her niece will visit at that time and call the office    3  Diarrhea-no doubt related to the lactulose    4  History of iron deficiency anemia  Patient with recent EGD and colonoscopy studies were essentially unrevealing for source of anemia  No celiac disease no colonic source    Patient did have nonbleeding varices which were small  Most recent hemoglobin was 11 1  Probably can hold on iron for now    5  History non-bleeding esophageal varices  -patient continues on Nadolol for prophylaxis       Follow-up 1 month    Can be virtual visit

## 2020-09-23 NOTE — LETTER
September 23, 2020     Diandra Covington DO  [address]    Patient: Evens Gillette   YOB: 1945   Date of Visit: 9/23/2020       Dear Dr Deidra Steinberg: Thank you for referring Chet Opitz to me for evaluation  Below are my notes for this consultation  If you have questions, please do not hesitate to call me  I look forward to following your patient along with you  Sincerely,        Austyn Helm MD        CC: No Recipients  Austyn Helm MD  9/23/2020  4:12 PM  Sign when Signing Visit    Virtual Regular Visit      Assessment/Plan:    Problem List Items Addressed This Visit        Digestive    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) ([facility] [address] )      Other Visit Diagnoses     Hepatic encephalopathy ([facility] )    -  Primary    Relevant Medications    rifaximin (XIFAXAN) 550 mg tablet    Diarrhea, unspecified type        Iron deficiency anemia, unspecified iron deficiency anemia type          Grade 2 esophageal varices on recent endoscopy    1  Cirrhosis related to non alcoholic steatohepatitis-complications have included mild fluid overload and non bleeding esophageal varices  Presently having some issues with concentration and hepatic encephalopathy    2  Hepatic encephalopathy-patient with recent hospitalization for urinary tract infection  She had a metabolic encephalopathy at that time  Ammonia levels was up over 61 but then came down nicely  She was given lactulose in the hospital   -over the weekend patient did have some decline in mental status and felt that she could not concentrate well  Patient has been taking her lactulose but did really have much in the way of new meds yesterday  Today is had multiple bowel movements and concentration is much better  -continue lactulose 1 tbsp twice a day  Titrate to about 3 bowel movements per day    Avoid excessive diarrhea and cut back dosing for diarrhea  -begin Xifaxan 550 twice a day  Will check with nursing about company assistance program because the medication on its own is cost prohibitive in patient has limited resources  -advised to hold on driving for about 1 week until she checks back in the office  Her niece will visit at that time and call the office    3  Diarrhea-no doubt related to the lactulose    4  History of iron deficiency anemia  Patient with recent EGD and colonoscopy studies were essentially unrevealing for source of anemia  No celiac disease no colonic source  Patient did have nonbleeding varices which were small--  Most recent hemoglobin was 11 1  Probably can hold on iron for now    5  History non-bleeding esophageal varices  -patient continues on Nadolol for prophylaxis       Follow-up 1 month  Can be virtual visit    Reason for visit is   Chief Complaint   Patient presents with    Follow-up     SLUB    Virtual Regular Visit        Encounter provider Oscar Diane MD    Provider located at [address] [address] [phone]      Recent Visits  No visits were found meeting these conditions  Showing recent visits within past 7 days and meeting all other requirements     Today's Visits  Date Type Provider Dept   09/23/20 Telemedicine Osacr Diane MD [facility]   Showing today's visits and meeting all other requirements     Future Appointments  No visits were found meeting these conditions  Showing future appointments within next 150 days and meeting all other requirements        The patient was identified by name and date of birth  Deangelo Hdez was informed that this is a telemedicine visit and that the visit is being conducted through [employer] and patient was informed that this is not a secure, HIPAA-complaint platform  She agrees to proceed     My office door was closed  No one else was in the room    She acknowledged consent and understanding of privacy and security of the video platform  The patient has agreed to participate and understands they can discontinue the visit at any time  Patient is aware this is a billable service  Subjective  Leroy Neal is a 69 y o  female   HPI-- patient has a virtual visit post hospitalization for recent urinary tract infection  She is in the hospital for 2 days last week  Patient was noted to have mental confusion at the time in workup for metabolic encephalopathy  Ammonia levels were somewhat high in the 60 range  She is given lactulose  She had improvement of her symptoms with treatment of the urinary infection  She has been on lactulose in the past   In recent weeks patient has had some issues with concentration and memory sore in the office early in the month we ordered a ammonia level but this does not done until she was hospitalized  Patient's niece has issues with liver problems and is on Xifaxan suggested that this may be a good option for the patient  Patient is feeling fairly well today and concentrating reasonably well  She denies at symptoms of fluid overload    She is walking without difficulty at this time    Past Medical History:   Diagnosis Date    Cirrhosis ([facility]     Diabetes mellitus ([facility]     Diabetic neuropathy ([facility] )     Diabetic neuropathy ([facility] )     Esophageal varices (HCC)     Fatty liver     Fatty liver     GERD (gastroesophageal reflux disease)     Hiatal hernia     Hyperlipidemia     Hypertension     Hypoglycemia     Hypothyroidism     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)     Osteoarthritis     Osteopenia     Thrombocytopenia (HCC)     Type 2 diabetes mellitus (HCC)        Past Surgical History:   Procedure Laterality Date    ABDOMINAL SURGERY      Abdominal plasty with mesh insertion    CATARACT EXTRACTION, BILATERAL      CATARACT EXTRACTION, BILATERAL      EGD AND COLONOSCOPY  03/18/2015    LAPAROSCOPIC CHOLECYSTECTOMY      SHOULDER SURGERY Right     calcium depsoti    TUBAL LIGATION      UMBILICAL HERNIA REPAIR      with mesh placed    UPPER GASTROINTESTINAL ENDOSCOPY         Current Outpatient Medications   Medication Sig Dispense Refill    aspirin 81 mg chewable tablet Chew 81 mg daily      BASAGLAR KWIKPEN 100 units/mL injection pen 46 units in am and 30 units in pm, up to 90 units daily 10 pen 5    Blood Glucose Monitoring Suppl (ONETOUCH VERIO) w/Device KIT Check sugars three times a day 1 kit 0    Calcium Carb-Cholecalciferol (CALCIUM 1000 + D PO) Take by mouth      cholecalciferol (VITAMIN D3) 1,000 units tablet Take 2,000 Units by mouth daily      ferrous sulfate 325 (65 Fe) mg tablet Take 325 mg by mouth daily with breakfast      fluticasone (FLONASE) 50 mcg/act nasal spray 1 spray into each nostril daily prn       furosemide (LASIX) 40 mg tablet TAKE 1 TABLET BY MOUTH EVERY DAY 90 tablet 1    glimepiride (AMARYL) 4 mg tablet TAKE 1 TABLET (4 MG TOTAL) BY MOUTH 2 (TWO) TIMES A  tablet 2    Insulin Pen Needle 31G X 5 MM MISC Use up to 3 times a day 100 each 6    JANUMET XR  MG TB24 Take 1 tablet by mouth 2 (two) times a day 180 tablet 3    levothyroxine 125 mcg tablet Take 125 mcg by mouth daily  2    nadolol (CORGARD) 20 mg tablet Take 1 tablet (20 mg total) by mouth daily 30 tablet 0    omeprazole (PriLOSEC) 20 mg delayed release capsule Take 1 capsule (20 mg total) by mouth daily 90 capsule 2    ONETOUCH DELICA LANCETS FINE MISC Use 1-3 times a day 100 each 3    ONETOUCH VERIO test strip Check sugars up to 3 times a day 200 each 2    simvastatin (ZOCOR) 40 mg tablet Take 40 mg by mouth daily  3    spironolactone (ALDACTONE) 50 mg tablet TAKE 1 TABLET BY MOUTH EVERY DAY 90 tablet 1    lactulose 20 g/30 mL Take 15 mL (10 g total) by mouth 2 (two) times a day 900 mL 6    rifaximin (XIFAXAN) 550 mg tablet Take 1 tablet (550 mg total) by mouth every 12 (twelve) hours 60 tablet 6    sertraline (ZOLOFT) 100 mg tablet Take 100 mg by mouth daily      sertraline (ZOLOFT) 50 mg tablet Take 25 mg by mouth daily        No current facility-administered medications for this visit  Allergies   Allergen Reactions    Latex     Sesame Seed (Diagnostic)      Other reaction(s): swelling inside mouth    Strawberry C [Ascorbate] Other (See Comments)     Mouth sores    Penicillins Rash       Review of Systems   Allergies and medications reviewed  GI please see above-includes diarrhea  Neuro decrease concentration improved in the last 24 hours    Video Exam    Vitals:    09/23/20 1503   Weight: 90 3 kg (199 lb)   Height: 5' 3" (1 6 m)       Physical Exam   General well-developed female no distress  Eyes anicteric  Neck no JVP  Respiratory normal respiratory motion  Abdomen obese no added distension compared to normal visualization  Skin no rashes evident  Neuro alert oriented x3 no asterixis slight tremor of the outstretched hands gait appears normal-able to observe patient walking across kitchen floor    I spent 25 minutes directly with the patient during this visit      [address] acknowledges that she has consented to an online visit or consultation  She understands that the online visit is based solely on information provided by her, and that, in the absence of a face-to-face physical evaluation by the physician, the diagnosis she receives is both limited and provisional in terms of accuracy and completeness  This is not intended to replace a full medical face-to-face evaluation by the physician  Jenn Kunz understands and accepts these terms

## 2020-09-23 NOTE — LETTER
September 23, 2020     Diandra Covington DO  [address]    Patient: Juana Butler   YOB: 1945   Date of Visit: 9/23/2020       Dear Dr Jamal Ho: Thank you for referring Jean Banks to me for evaluation  Below are my notes for this consultation  If you have questions, please do not hesitate to call me  I look forward to following your patient along with you  Sincerely,        Troy Hunter MD        CC: No Recipients  Troy Hunter MD  9/23/2020  4:09 PM  Incomplete    Virtual Regular Visit      Assessment/Plan:    Problem List Items Addressed This Visit        Digestive    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) ([facility] )      Other Visit Diagnoses     Hepatic encephalopathy ([facility] [facility] )    -  Primary    Relevant Medications    rifaximin (XIFAXAN) 550 mg tablet    Diarrhea, unspecified type        Iron deficiency anemia, unspecified iron deficiency anemia type          Grade 2 esophageal varices on recent endoscopy    1  Cirrhosis related to non alcoholic steatohepatitis-complications have included mild fluid overload and non bleeding esophageal varices  Presently having some issues with concentration and hepatic encephalopathy    2  Hepatic encephalopathy-patient with recent hospitalization for urinary tract infection  She had a metabolic encephalopathy at that time  Ammonia levels was up over 61 but then came down nicely  She was given lactulose in the hospital   -over the weekend patient did have some decline in mental status and felt that she could not concentrate well  Patient has been taking her lactulose but did really have much in the way of new meds yesterday  Today is had multiple bowel movements and concentration is much better  -continue lactulose 1 tbsp twice a day  Titrate to about 3 bowel movements per day    Avoid excessive diarrhea and cut back dosing for diarrhea  -begin Xifaxan 550 twice a day   Will check with nursing about company assistance program because the medication on its own is cost prohibitive in patient has limited resources  -advised to hold on driving for about 1 week until she checks back in the office  Her niece will visit at that time and call the office    3  Diarrhea-no doubt related to the lactulose    4  History of iron deficiency anemia  Patient with recent EGD and colonoscopy studies were essentially unrevealing for source of anemia  No celiac disease no colonic source  Patient did have nonbleeding varices which were small    5  History non-bleeding esophageal varices  -patient continues on Nadolol for prophylaxis       Follow-up 1 month  Can be virtual visit    Reason for visit is   Chief Complaint   Patient presents with    Follow-up     SLUB    Virtual Regular Visit        Encounter provider Alta Russell MD    Provider located at [place] [address] [place] [address] [phone]      Recent Visits  No visits were found meeting these conditions  Showing recent visits within past 7 days and meeting all other requirements     Today's Visits  Date Type Provider Dept   09/23/20 Telemedicine Alta Russell MD [facility]   Showing today's visits and meeting all other requirements     Future Appointments  No visits were found meeting these conditions  Showing future appointments within next 150 days and meeting all other requirements        The patient was identified by name and date of birth  Leroy Neal was informed that this is a telemedicine visit and that the visit is being conducted through [employer] and patient was informed that this is not a secure, HIPAA-complaint platform  She agrees to proceed     My office door was closed  No one else was in the room  She acknowledged consent and understanding of privacy and security of the video platform   The patient has agreed to participate and understands they can discontinue the visit at any time  Patient is aware this is a billable service  Subjective  Malachi Clifton is a 69 y o  female   HPI-- patient has a virtual visit post hospitalization for recent urinary tract infection  She is in the hospital for 2 days last week  Patient was noted to have mental confusion at the time in workup for metabolic encephalopathy  Ammonia levels were somewhat high in the 60 range  She is given lactulose  She had improvement of her symptoms with treatment of the urinary infection  She has been on lactulose in the past   In recent weeks patient has had some issues with concentration and memory sore in the office early in the month we ordered a ammonia level but this does not done until she was hospitalized  Patient's niece has issues with liver problems and is on Xifaxan suggested that this may be a good option for the patient  Patient is feeling fairly well today and concentrating reasonably well  She denies at symptoms of fluid overload    She is walking without difficulty at this time    Past Medical History:   Diagnosis Date    [facility]     [facility]     [facility]     Diabetic neuropathy ([facility]     Esophageal varices (HCC)     Fatty liver     Fatty liver     GERD (gastroesophageal reflux disease)     Hiatal hernia     Hyperlipidemia     Hypertension     Hypoglycemia     Hypothyroidism     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)     Osteoarthritis     Osteopenia     Thrombocytopenia (HCC)     Type 2 diabetes mellitus (HCC)        Past Surgical History:   Procedure Laterality Date    ABDOMINAL SURGERY      Abdominal plasty with mesh insertion    CATARACT EXTRACTION, BILATERAL      CATARACT EXTRACTION, BILATERAL      EGD AND COLONOSCOPY  03/18/2015    LAPAROSCOPIC CHOLECYSTECTOMY      SHOULDER SURGERY Right     calcium depsoti    TUBAL LIGATION      UMBILICAL HERNIA REPAIR      with mesh placed    UPPER GASTROINTESTINAL ENDOSCOPY         Current Outpatient Medications   Medication Sig Dispense Refill    aspirin 81 mg chewable tablet Chew 81 mg daily      BASAGLAR KWIKPEN 100 units/mL injection pen 46 units in am and 30 units in pm, up to 90 units daily 10 pen 5    Blood Glucose Monitoring Suppl (ONETOUCH VERIO) w/Device KIT Check sugars three times a day 1 kit 0    Calcium Carb-Cholecalciferol (CALCIUM 1000 + D PO) Take by mouth      cholecalciferol (VITAMIN D3) 1,000 units tablet Take 2,000 Units by mouth daily      ferrous sulfate 325 (65 Fe) mg tablet Take 325 mg by mouth daily with breakfast      fluticasone (FLONASE) 50 mcg/act nasal spray 1 spray into each nostril daily prn       furosemide (LASIX) 40 mg tablet TAKE 1 TABLET BY MOUTH EVERY DAY 90 tablet 1    glimepiride (AMARYL) 4 mg tablet TAKE 1 TABLET (4 MG TOTAL) BY MOUTH 2 (TWO) TIMES A  tablet 2    Insulin Pen Needle 31G X 5 MM MISC Use up to 3 times a day 100 each 6    JANUMET XR  MG TB24 Take 1 tablet by mouth 2 (two) times a day 180 tablet 3    levothyroxine 125 mcg tablet Take 125 mcg by mouth daily  2    nadolol (CORGARD) 20 mg tablet Take 1 tablet (20 mg total) by mouth daily 30 tablet 0    omeprazole (PriLOSEC) 20 mg delayed release capsule Take 1 capsule (20 mg total) by mouth daily 90 capsule 2    ONETOUCH DELICA LANCETS FINE MISC Use 1-3 times a day 100 each 3    ONETOUCH VERIO test strip Check sugars up to 3 times a day 200 each 2    simvastatin (ZOCOR) 40 mg tablet Take 40 mg by mouth daily  3    spironolactone (ALDACTONE) 50 mg tablet TAKE 1 TABLET BY MOUTH EVERY DAY 90 tablet 1    lactulose 20 g/30 mL Take 15 mL (10 g total) by mouth 2 (two) times a day 900 mL 6    rifaximin (XIFAXAN) 550 mg tablet Take 1 tablet (550 mg total) by mouth every 12 (twelve) hours 60 tablet 6    sertraline (ZOLOFT) 100 mg tablet Take 100 mg by mouth daily      sertraline (ZOLOFT) 50 mg tablet Take 25 mg by mouth daily        No current facility-administered medications for this visit  Allergies   Allergen Reactions    Latex     Sesame Seed (Diagnostic)      Other reaction(s): swelling inside mouth    Strawberry C [Ascorbate] Other (See Comments)     Mouth sores    Penicillins Rash       Review of Systems   Allergies and medications reviewed  GI please see above-includes diarrhea  Neuro decrease concentration improved in the last 24 hours    Video Exam    Vitals:    09/23/20 1503   Weight: 90 3 kg (199 lb)   Height: 5' 3" (1 6 m)       Physical Exam   General well-developed female no distress  Eyes anicteric  Neck no JVP  Respiratory normal respiratory motion  Abdomen obese no added distension compared to normal visualization  Skin no rashes evident  Neuro alert oriented x3 no asterixis slight tremor of the outstretched hands gait appears normal-able to observe patient walking across kitchen floor    I spent 25 minutes directly with the patient during this visit      [address] acknowledges that she has consented to an online visit or consultation  She understands that the online visit is based solely on information provided by her, and that, in the absence of a face-to-face physical evaluation by the physician, the diagnosis she receives is both limited and provisional in terms of accuracy and completeness  This is not intended to replace a full medical face-to-face evaluation by the physician  Evens Gil[place] understands and accepts these terms      Austyn Helm MD  9/23/2020  3:57 PM  Sign when Signing Visit    Virtual Regular Visit      Assessment/Plan:    Problem List Items Addressed This Visit        Digestive    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) ([facility] [address] )      Other Visit Diagnoses     Hepatic encephalopathy ([facility] [address] )    -  Primary    Relevant Medications    rifaximin (XIFAXAN) 550 mg tablet    Diarrhea, unspecified type        Iron deficiency anemia, unspecified iron deficiency anemia type                   Reason for visit is   Chief Complaint   Patient presents with    Follow-up     SLUB    Virtual Regular Visit        Encounter provider Bianka Mendoza MD    Provider located at [address] [address] [phone]      Recent Visits  No visits were found meeting these conditions  Showing recent visits within past 7 days and meeting all other requirements     Today's Visits  Date Type Provider Dept   09/23/20 Telemedicine Bianka Mendoza MD [facility]   Showing today's visits and meeting all other requirements     Future Appointments  No visits were found meeting these conditions  Showing future appointments within next 150 days and meeting all other requirements        The patient was identified by name and date of birth  Malachi Clifton was informed that this is a telemedicine visit and that the visit is being conducted through {AMB CORONAVIRUS VISIT AZLFQN:46884}  {Telemedicine confidentiality :95928} {Telemedicine participants:77405}  She acknowledged consent and understanding of privacy and security of the video platform  The patient has agreed to participate and understands they can discontinue the visit at any time  Patient is aware this is a billable service  Subjective  Malachi Clifton is a 69 y o  female ***         HPI     Past Medical History:   Diagnosis Date    Cir[facility]osis [facility]     Diabetes mellitus ([facility]     Diabetic neuropathy ([facility]     Diabetic neuropathy ([facility]     Esophageal varices ([facility]     Fatty liver     Fatty liver     GERD (gastroesophageal reflux disease)     Hiatal hernia     Hyperlipidemia     Hypertension     Hypoglycemia     Hypothyroidism     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)     Osteoarthritis     Osteopenia     Thrombocytopenia (HCC)  [facility]        Past Surgical History:   Procedure Laterality Date    ABDOMINAL SURGERY      Abdominal plasty with mesh insertion    CATARACT EXTRACTION, BILATERAL      CATARACT EXTRACTION, BILATERAL      EGD AND COLONOSCOPY  03/18/2015    LAPAROSCOPIC CHOLECYSTECTOMY      SHOULDER SURGERY Right     calcium depsoti    TUBAL LIGATION      UMBILICAL HERNIA REPAIR      with mesh placed    UPPER GASTROINTESTINAL ENDOSCOPY         Current Outpatient Medications   Medication Sig Dispense Refill    aspirin 81 mg chewable tablet Chew 81 mg daily      BASAGLELGIN PONCEIKPEN 100 units/mL injection pen 46 units in am and 30 units in pm, up to 90 units daily 10 pen 5    Blood Glucose Monitoring Suppl (ONETOUCH VERIO) w/Device KIT Check sugars three times a day 1 kit 0    Calcium Carb-Cholecalciferol (CALCIUM 1000 + D PO) Take by mouth      cholecalciferol (VITAMIN D3) 1,000 units tablet Take 2,000 Units by mouth daily      ferrous sulfate 325 (65 Fe) mg tablet Take 325 mg by mouth daily with breakfast      fluticasone (FLONASE) 50 mcg/act nasal spray 1 spray into each nostril daily prn       furosemide (LASIX) 40 mg tablet TAKE 1 TABLET BY MOUTH EVERY DAY 90 tablet 1    glimepiride (AMARYL) 4 mg tablet TAKE 1 TABLET (4 MG TOTAL) BY MOUTH 2 (TWO) TIMES A  tablet 2    Insulin Pen Needle 31G X 5 MM MISC Use up to 3 times a day 100 each 6    JANUMET XR  MG TB24 Take 1 tablet by mouth 2 (two) times a day 180 tablet 3    levothyroxine 125 mcg tablet Take 125 mcg by mouth daily  2    nadolol (CORGARD) 20 mg tablet Take 1 tablet (20 mg total) by mouth daily 30 tablet 0    omeprazole (PriLOSEC) 20 mg delayed release capsule Take 1 capsule (20 mg total) by mouth daily 90 capsule 2    ONETOUCH DELICA LANCETS FINE MISC Use 1-3 times a day 100 each 3    ONETOUCH VERIO test strip Check sugars up to 3 times a day 200 each 2    simvastatin (ZOCOR) 40 mg tablet Take 40 mg by mouth daily  3    spironolactone (ALDACTONE) 50 mg tablet TAKE 1 TABLET BY MOUTH EVERY DAY 90 tablet 1    lactulose 20 g/30 mL Take 15 mL (10 g total) by mouth 2 (two) times a day 900 mL 6    rifaximin (XIFAXAN) 550 mg tablet Take 1 tablet (550 mg total) by mouth every 12 (twelve) hours 60 tablet 6    sertraline (ZOLOFT) 100 mg tablet Take 100 mg by mouth daily      sertraline (ZOLOFT) 50 mg tablet Take 25 mg by mouth daily        No current facility-administered medications for this visit  Allergies   Allergen Reactions    Latex     Sesame Seed (Diagnostic)      Other reaction(s): swelling inside mouth    Strawberry C [Ascorbate] Other (See Comments)     Mouth sores    Penicillins Rash       Review of Systems    Video Exam    Vitals:    09/23/20 1503   Weight: 90 3 kg (199 lb)   Height: 5' 3" (1 6 m)       Physical Exam     {covid time spent:64291}      VIRTUAL VISIT DISCLAIMER    Wendy Parra Beenaaniket acknowledges that she has consented to an online visit or consultation  She understands that the online visit is based solely on information provided by her, and that, in the absence of a face-to-face physical evaluation by the physician, the diagnosis she receives is both limited and provisional in terms of accuracy and completeness  This is not intended to replace a full medical face-to-face evaluation by the physician  Shae Schumacher understands and accepts these terms

## 2020-09-23 NOTE — PROGRESS NOTES
Virtual Regular Visit      Assessment/Plan:    Problem List Items Addressed This Visit        Digestive    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) ([facility] )      Other Visit Diagnoses     Hepatic encephalopathy ([facility] [address] )    -  Primary    Relevant Medications    rifaximin (XIFAXAN) 550 mg tablet    Diarrhea, unspecified type        Iron deficiency anemia, unspecified iron deficiency anemia type          Grade 2 esophageal varices on recent endoscopy    1  Cirrhosis related to non alcoholic steatohepatitis-complications have included mild fluid overload and non bleeding esophageal varices  Presently having some issues with concentration and hepatic encephalopathy    2  Hepatic encephalopathy-patient with recent hospitalization for urinary tract infection  She had a metabolic encephalopathy at that time  Ammonia levels was up over 61 but then came down nicely  She was given lactulose in the hospital   -over the weekend patient did have some decline in mental status and felt that she could not concentrate well  Patient has been taking her lactulose but did really have much in the way of new meds yesterday  Today is had multiple bowel movements and concentration is much better  -continue lactulose 1 tbsp twice a day  Titrate to about 3 bowel movements per day  Avoid excessive diarrhea and cut back dosing for diarrhea  -begin Xifaxan 550 twice a day  Will check with nursing about company assistance program because the medication on its own is cost prohibitive in patient has limited resources  -advised to hold on driving for about 1 week until she checks back in the office  Her niece will visit at that time and call the office    3  Diarrhea-no doubt related to the lactulose    4  History of iron deficiency anemia  Patient with recent EGD and colonoscopy studies were essentially unrevealing for source of anemia  No celiac disease no colonic source    Patient did have nonbleeding varices which were small--  Most recent hemoglobin was 11 1  Probably can hold on iron for now    5  History non-bleeding esophageal varices  -patient continues on Nadolol for prophylaxis       Follow-up 1 month  Can be virtual visit    Reason for visit is   Chief Complaint   Patient presents with    Follow-up     SLUB    Virtual Regular Visit        Encounter provider Tristan Patel MD    Provider located at [address] [address] [phone]      Recent Visits  No visits were found meeting these conditions  Showing recent visits within past 7 days and meeting all other requirements     Today's Visits  Date Type Provider Dept   09/23/20 Telemedicine Tristan Patel MD [facility]   Showing today's visits and meeting all other requirements     Future Appointments  No visits were found meeting these conditions  Showing future appointments within next 150 days and meeting all other requirements        The patient was identified by name and date of birth  Eddie Manuel was informed that this is a telemedicine visit and that the visit is being conducted through [employer] and patient was informed that this is not a secure, HIPAA-complaint platform  She agrees to proceed     My office door was closed  No one else was in the room  She acknowledged consent and understanding of privacy and security of the video platform  The patient has agreed to participate and understands they can discontinue the visit at any time  Patient is aware this is a billable service  Subjective  Eddie Manuel is a 69 y o  female   HPI-- patient has a virtual visit post hospitalization for recent urinary tract infection  She is in the hospital for 2 days last week  Patient was noted to have mental confusion at the time in workup for metabolic encephalopathy  Ammonia levels were somewhat high in the 60 range    She is given lactulose  She had improvement of her symptoms with treatment of the urinary infection  She has been on lactulose in the past   In recent weeks patient has had some issues with concentration and memory sore in the office early in the month we ordered a ammonia level but this does not done until she was hospitalized  Patient's niece has issues with liver problems and is on Xifaxan suggested that this may be a good option for the patient  Patient is feeling fairly well today and concentrating reasonably well  She denies at symptoms of fluid overload    She is walking without difficulty at this time    Past Medical History:   Diagnosis Date    Cirrhosis ([facility] )     [facility]     Diabetic neuropathy [facility]     Diabetic neuropathy [facility]     Esophageal varices (HCC)     Fatty liver     Fatty liver     GERD (gastroesophageal reflux disease)     Hiatal hernia     Hyperlipidemia     Hypertension     Hypoglycemia     Hypothyroidism     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)     Osteoarthritis     Osteopenia     Thrombocytopenia (HCC)     Type 2 diabetes mellitus (HCC)        Past Surgical History:   Procedure Laterality Date    ABDOMINAL SURGERY      Abdominal plasty with mesh insertion    CATARACT EXTRACTION, BILATERAL      CATARACT EXTRACTION, BILATERAL      EGD AND COLONOSCOPY  03/18/2015    LAPAROSCOPIC CHOLECYSTECTOMY      SHOULDER SURGERY Right     calcium depsoti    TUBAL LIGATION      UMBILICAL HERNIA REPAIR      with mesh placed    UPPER GASTROINTESTINAL ENDOSCOPY         Current Outpatient Medications   Medication Sig Dispense Refill    aspirin 81 mg chewable tablet Chew 81 mg daily      BASAGLAR KWIKPEN 100 units/mL injection pen 46 units in am and 30 units in pm, up to 90 units daily 10 pen 5    Blood Glucose Monitoring Suppl (ONETOUCH VERIO) w/Device KIT Check sugars three times a day 1 kit 0    Calcium Carb-Cholecalciferol (CALCIUM 1000 + D PO) Take by mouth  cholecalciferol (VITAMIN D3) 1,000 units tablet Take 2,000 Units by mouth daily      ferrous sulfate 325 (65 Fe) mg tablet Take 325 mg by mouth daily with breakfast      fluticasone (FLONASE) 50 mcg/act nasal spray 1 spray into each nostril daily prn       furosemide (LASIX) 40 mg tablet TAKE 1 TABLET BY MOUTH EVERY DAY 90 tablet 1    glimepiride (AMARYL) 4 mg tablet TAKE 1 TABLET (4 MG TOTAL) BY MOUTH 2 (TWO) TIMES A  tablet 2    Insulin Pen Needle 31G X 5 MM MISC Use up to 3 times a day 100 each 6    JANUMET XR  MG TB24 Take 1 tablet by mouth 2 (two) times a day 180 tablet 3    levothyroxine 125 mcg tablet Take 125 mcg by mouth daily  2    nadolol (CORGARD) 20 mg tablet Take 1 tablet (20 mg total) by mouth daily 30 tablet 0    omeprazole (PriLOSEC) 20 mg delayed release capsule Take 1 capsule (20 mg total) by mouth daily 90 capsule 2    ONETOUCH DELICA LANCETS FINE MISC Use 1-3 times a day 100 each 3    ONETOUCH VERIO test strip Check sugars up to 3 times a day 200 each 2    simvastatin (ZOCOR) 40 mg tablet Take 40 mg by mouth daily  3    spironolactone (ALDACTONE) 50 mg tablet TAKE 1 TABLET BY MOUTH EVERY DAY 90 tablet 1    lactulose 20 g/30 mL Take 15 mL (10 g total) by mouth 2 (two) times a day 900 mL 6    rifaximin (XIFAXAN) 550 mg tablet Take 1 tablet (550 mg total) by mouth every 12 (twelve) hours 60 tablet 6    sertraline (ZOLOFT) 100 mg tablet Take 100 mg by mouth daily      sertraline (ZOLOFT) 50 mg tablet Take 25 mg by mouth daily        No current facility-administered medications for this visit           Allergies   Allergen Reactions    Latex     Sesame Seed (Diagnostic)      Other reaction(s): swelling inside mouth    Strawberry C [Ascorbate] Other (See Comments)     Mouth sores    Penicillins Rash       Review of Systems   Allergies and medications reviewed  GI please see above-includes diarrhea  Neuro decrease concentration improved in the last 24 hours    Video Exam    Vitals:    09/23/20 1503   Weight: 90 3 kg (199 lb)   Height: 5' 3" (1 6 m)       Physical Exam   General well-developed female no distress  Eyes anicteric  Neck no JVP  Respiratory normal respiratory motion  Abdomen obese no added distension compared to normal visualization  Skin no rashes evident  Neuro alert oriented x3 no asterixis slight tremor of the outstretched hands gait appears normal-able to observe patient walking across kitchen floor    I spent 25 minutes directly with the patient during this visit      [address] acknowledges that she has consented to an online visit or consultation  She understands that the online visit is based solely on information provided by her, and that, in the absence of a face-to-face physical evaluation by the physician, the diagnosis she receives is both limited and provisional in terms of accuracy and completeness  This is not intended to replace a full medical face-to-face evaluation by the physician  Laura Olivas understands and accepts these terms

## 2020-09-25 ENCOUNTER — TELEPHONE (OUTPATIENT)
Dept: GASTROENTEROLOGY | Facility: CLINIC | Age: 75
End: 2020-09-25

## 2020-09-28 DIAGNOSIS — E11.65 TYPE 2 DIABETES MELLITUS WITH HYPERGLYCEMIA, WITH LONG-TERM CURRENT USE OF INSULIN (HCC): Primary | ICD-10-CM

## 2020-09-28 DIAGNOSIS — Z79.4 TYPE 2 DIABETES MELLITUS WITH HYPERGLYCEMIA, WITH LONG-TERM CURRENT USE OF INSULIN (HCC): Primary | ICD-10-CM

## 2020-09-28 RX ORDER — BLOOD SUGAR DIAGNOSTIC
STRIP MISCELLANEOUS
Qty: 300 EACH | Refills: 3 | Status: SHIPPED | OUTPATIENT
Start: 2020-09-28 | End: 2022-01-21

## 2020-09-28 RX ORDER — BLOOD-GLUCOSE METER
EACH MISCELLANEOUS ONCE
Qty: 1 EACH | Refills: 0 | Status: ON HOLD | OUTPATIENT
Start: 2020-09-28 | End: 2022-08-05

## 2020-09-28 NOTE — TELEPHONE ENCOUNTER
She should be on glimepiride 4 mg 1 5 tablet in the evening and not 1 tablet twice a day  She does not need blood work for her appointment as it is too soon to do a hemoglobin A1c  She can have a prescription for OneTouch Ultra blood sugar monitor

## 2020-09-28 NOTE — TELEPHONE ENCOUNTER
3 THINGS FOR PATIENT  Pt called to clarify how she is to be taking her Glimepiride  In July you said to do 1 5 in the evening but it was sent in August as taking 1 tablet twice daily  She is also asking what labs she is due for before her appt next month  She had some labs done in the hospital recently and you ordered some labs in June to be done in August  She said she wants the orders faxed to the [facility] in [facility] from [facility]  She also wants a script for a new meter but does not want a Verio  She wants a different OneTouch

## 2020-09-30 ENCOUNTER — TELEPHONE (OUTPATIENT)
Dept: GASTROENTEROLOGY | Facility: CLINIC | Age: 75
End: 2020-09-30

## 2020-09-30 NOTE — TELEPHONE ENCOUNTER
Pt left very fragmented VM mssg (female in background prompted mssg) asking if Dr Mauricio Parsons will let her drive now?/is getting new med today  # [phone]

## 2020-09-30 NOTE — TELEPHONE ENCOUNTER
I spoke with patient and she is feeling great  Xifaxan was approved 9/23/20-3/23/21 and pharmacy has ordered it  Please advise if okay for her to drive

## 2020-09-30 NOTE — TELEPHONE ENCOUNTER
I returned call, no answer, left msg to call back to let us know how she is feeling, so we can provided Matilde Blackburn with that information

## 2020-10-01 NOTE — TELEPHONE ENCOUNTER
I called the patient and left VM on answering machine --  Ok to drive advised to take the lactulose for 2-3 loose - can adjust dose  In addition to the Xifaxin -- please check patient in 7-10 days

## 2020-10-06 NOTE — TELEPHONE ENCOUNTER
Agree with above - tell her not to  until she is feeling better -- needs the lactulose - shouldn't raise bs too much as it hasn't in past - with respect to lactulose - yes - titrate to 2-3 loose bm per day  --  Thanks  --

## 2020-10-06 NOTE — TELEPHONE ENCOUNTER
Twenty five minute conversation with patient  She reports she was feeling shaky, I asked her to check her BGM  Her blood sugar was 72  She mentioned her sugars are all over the place, usually later int he afternoon they are in the 300's  I have a feeling the lactulose could also be contributing  She mentioned some of her diabetic medications she is taking and was confused about her dosage for glimepiride as it is listed (1) tablet BID; she reports she is supposed to take (1 5 tablets in the evening with dinner not in the am)   I advised that if she has questions/concerns she needs to reach out to her endocrinologist  I see she has a follow up appt with Dr Benjamin Kunz 10/13 I encouraged her to eat breakfast since she is feeling shaky  I asked how many bowel movements she's been having? She reports small amounts  Of hard pebble like stools  Though she did report a moderate amount of stool last evening before bed  I reviewed how she is taking lactulose? According to our medication list we have lactulose listed as 15 ml BID  Patient reports taking 15 ml QID  I advised she increase lactulose to 30 ml try alternating I e 30 ml in am, then 15 ml with lunch, 30 ml with dinner then 15 ml again before bed  If she is still having difficulty producing BM's we may need to increase to 30 ml QID  I reinforced that the goal is for her to have 2-3 loose bm's/day  She is also taking Xifaxin BID  She has a follow up appt with Dr Nyla Everett 10/27/20  I asked if she is experiencing  Musty or sweet breath odor ,Forgetfulness,  or Confusion? She denies symptoms

## 2020-10-07 ENCOUNTER — HOSPITAL ENCOUNTER (OUTPATIENT)
Dept: ULTRASOUND IMAGING | Facility: HOSPITAL | Age: 75
Discharge: HOME | End: 2020-10-07
Attending: INTERNAL MEDICINE
Payer: MEDICARE

## 2020-10-07 DIAGNOSIS — K74.69 OTHER CIRRHOSIS OF LIVER (HCC): ICD-10-CM

## 2020-10-07 PROCEDURE — 76700 US EXAM ABDOM COMPLETE: CPT

## 2020-10-13 ENCOUNTER — OFFICE VISIT (OUTPATIENT)
Dept: ENDOCRINOLOGY | Facility: HOSPITAL | Age: 75
End: 2020-10-13
Payer: MEDICARE

## 2020-10-13 VITALS
TEMPERATURE: 97.6 F | DIASTOLIC BLOOD PRESSURE: 70 MMHG | BODY MASS INDEX: 35.54 KG/M2 | HEIGHT: 63 IN | SYSTOLIC BLOOD PRESSURE: 120 MMHG | WEIGHT: 200.6 LBS | HEART RATE: 72 BPM

## 2020-10-13 DIAGNOSIS — E78.2 MIXED HYPERLIPIDEMIA: ICD-10-CM

## 2020-10-13 DIAGNOSIS — I10 ESSENTIAL HYPERTENSION: ICD-10-CM

## 2020-10-13 DIAGNOSIS — E03.9 ACQUIRED HYPOTHYROIDISM: ICD-10-CM

## 2020-10-13 DIAGNOSIS — E11.42 DIABETIC POLYNEUROPATHY ASSOCIATED WITH TYPE 2 DIABETES MELLITUS (HCC): ICD-10-CM

## 2020-10-13 DIAGNOSIS — Z79.4 TYPE 2 DIABETES MELLITUS WITH HYPERGLYCEMIA, WITH LONG-TERM CURRENT USE OF INSULIN (HCC): Primary | ICD-10-CM

## 2020-10-13 DIAGNOSIS — E11.65 TYPE 2 DIABETES MELLITUS WITH HYPERGLYCEMIA, WITH LONG-TERM CURRENT USE OF INSULIN (HCC): Primary | ICD-10-CM

## 2020-10-13 PROCEDURE — 99215 OFFICE O/P EST HI 40 MIN: CPT | Performed by: INTERNAL MEDICINE

## 2020-10-13 RX ORDER — GLIMEPIRIDE 4 MG/1
TABLET ORAL
Qty: 180 TABLET | Refills: 2 | Status: SHIPPED | OUTPATIENT
Start: 2020-10-13 | End: 2021-01-15 | Stop reason: SDUPTHER

## 2020-10-13 RX ORDER — LACTULOSE 10 G/15ML
20 SOLUTION ORAL 3 TIMES DAILY
COMMUNITY
End: 2021-02-10

## 2020-10-13 RX ORDER — GLUCOSA SU 2KCL/CHONDROITIN SU 500-400 MG
CAPSULE ORAL
Status: ON HOLD | COMMUNITY

## 2020-10-23 ENCOUNTER — TELEPHONE (OUTPATIENT)
Dept: GASTROENTEROLOGY | Facility: CLINIC | Age: 75
End: 2020-10-23

## 2020-10-23 DIAGNOSIS — K75.81 LIVER CIRRHOSIS SECONDARY TO NASH (NONALCOHOLIC STEATOHEPATITIS) (HCC): Primary | ICD-10-CM

## 2020-10-23 DIAGNOSIS — K74.60 LIVER CIRRHOSIS SECONDARY TO NASH (NONALCOHOLIC STEATOHEPATITIS) (HCC): Primary | ICD-10-CM

## 2020-10-27 ENCOUNTER — OFFICE VISIT (OUTPATIENT)
Dept: GASTROENTEROLOGY | Facility: CLINIC | Age: 75
End: 2020-10-27
Payer: MEDICARE

## 2020-10-27 VITALS
BODY MASS INDEX: 35.26 KG/M2 | HEART RATE: 74 BPM | WEIGHT: 199 LBS | HEIGHT: 63 IN | TEMPERATURE: 97.2 F | SYSTOLIC BLOOD PRESSURE: 118 MMHG | DIASTOLIC BLOOD PRESSURE: 72 MMHG

## 2020-10-27 DIAGNOSIS — K72.90 HEPATIC ENCEPHALOPATHY (HCC): Primary | ICD-10-CM

## 2020-10-27 DIAGNOSIS — K75.81 LIVER CIRRHOSIS SECONDARY TO NASH (NONALCOHOLIC STEATOHEPATITIS) (HCC): ICD-10-CM

## 2020-10-27 DIAGNOSIS — E87.79 OTHER HYPERVOLEMIA: ICD-10-CM

## 2020-10-27 DIAGNOSIS — K74.60 LIVER CIRRHOSIS SECONDARY TO NASH (NONALCOHOLIC STEATOHEPATITIS) (HCC): ICD-10-CM

## 2020-10-27 DIAGNOSIS — E16.2 HYPOGLYCEMIA: ICD-10-CM

## 2020-10-27 DIAGNOSIS — I85.10 SECONDARY ESOPHAGEAL VARICES WITHOUT BLEEDING (HCC): ICD-10-CM

## 2020-10-27 PROCEDURE — 99214 OFFICE O/P EST MOD 30 MIN: CPT | Performed by: INTERNAL MEDICINE

## 2020-11-12 DIAGNOSIS — K21.9 GASTROESOPHAGEAL REFLUX DISEASE WITHOUT ESOPHAGITIS: ICD-10-CM

## 2020-11-12 RX ORDER — OMEPRAZOLE 20 MG/1
CAPSULE, DELAYED RELEASE ORAL
Qty: 90 CAPSULE | Refills: 2 | Status: SHIPPED | OUTPATIENT
Start: 2020-11-12 | End: 2021-07-30

## 2020-11-29 DIAGNOSIS — Z79.4 TYPE 2 DIABETES MELLITUS WITH HYPERGLYCEMIA, WITH LONG-TERM CURRENT USE OF INSULIN (HCC): ICD-10-CM

## 2020-11-29 DIAGNOSIS — E11.65 TYPE 2 DIABETES MELLITUS WITH HYPERGLYCEMIA, WITH LONG-TERM CURRENT USE OF INSULIN (HCC): ICD-10-CM

## 2020-11-30 RX ORDER — PEN NEEDLE, DIABETIC 31 GX5/16"
NEEDLE, DISPOSABLE MISCELLANEOUS
Qty: 100 EACH | Refills: 6 | Status: SHIPPED | OUTPATIENT
Start: 2020-11-30 | End: 2021-11-30

## 2020-12-15 LAB
CREAT ?TM UR-SCNC: 23 UMOL/L
HBA1C MFR BLD HPLC: 6.9 %
MICROALBUMIN/CREAT UR: NORMAL MG/G{CREAT}

## 2020-12-16 ENCOUNTER — TELEPHONE (OUTPATIENT)
Dept: GASTROENTEROLOGY | Facility: CLINIC | Age: 75
End: 2020-12-16

## 2020-12-20 DIAGNOSIS — R60.1 GENERALIZED EDEMA: ICD-10-CM

## 2020-12-20 RX ORDER — SPIRONOLACTONE 50 MG/1
TABLET, FILM COATED ORAL
Qty: 90 TABLET | Refills: 1 | Status: SHIPPED | OUTPATIENT
Start: 2020-12-20 | End: 2021-06-08

## 2020-12-20 RX ORDER — FUROSEMIDE 40 MG/1
TABLET ORAL
Qty: 90 TABLET | Refills: 1 | Status: SHIPPED | OUTPATIENT
Start: 2020-12-20 | End: 2021-06-08

## 2020-12-31 ENCOUNTER — TELEPHONE (OUTPATIENT)
Dept: GASTROENTEROLOGY | Facility: CLINIC | Age: 75
End: 2020-12-31

## 2021-01-15 ENCOUNTER — OFFICE VISIT (OUTPATIENT)
Dept: ENDOCRINOLOGY | Facility: HOSPITAL | Age: 76
End: 2021-01-15
Payer: MEDICARE

## 2021-01-15 VITALS
SYSTOLIC BLOOD PRESSURE: 100 MMHG | DIASTOLIC BLOOD PRESSURE: 64 MMHG | BODY MASS INDEX: 33.98 KG/M2 | HEART RATE: 70 BPM | HEIGHT: 63 IN | WEIGHT: 191.8 LBS

## 2021-01-15 DIAGNOSIS — E11.42 DIABETIC POLYNEUROPATHY ASSOCIATED WITH TYPE 2 DIABETES MELLITUS (HCC): ICD-10-CM

## 2021-01-15 DIAGNOSIS — Z79.4 TYPE 2 DIABETES MELLITUS WITH HYPERGLYCEMIA, WITH LONG-TERM CURRENT USE OF INSULIN (HCC): Primary | ICD-10-CM

## 2021-01-15 DIAGNOSIS — I10 ESSENTIAL HYPERTENSION: ICD-10-CM

## 2021-01-15 DIAGNOSIS — E11.65 TYPE 2 DIABETES MELLITUS WITH HYPERGLYCEMIA, WITH LONG-TERM CURRENT USE OF INSULIN (HCC): Primary | ICD-10-CM

## 2021-01-15 DIAGNOSIS — E03.9 ACQUIRED HYPOTHYROIDISM: ICD-10-CM

## 2021-01-15 DIAGNOSIS — E78.2 MIXED HYPERLIPIDEMIA: ICD-10-CM

## 2021-01-15 PROCEDURE — 99215 OFFICE O/P EST HI 40 MIN: CPT | Performed by: INTERNAL MEDICINE

## 2021-01-15 RX ORDER — GLIMEPIRIDE 4 MG/1
TABLET ORAL
Qty: 180 TABLET | Refills: 2
Start: 2021-01-15 | End: 2021-11-13

## 2021-01-15 NOTE — PROGRESS NOTES
1/15/2021    Assessment/Plan      Diagnoses and all orders for this visit:    Type 2 diabetes mellitus with hyperglycemia, with long-term current use of insulin (HCC)  -     glimepiride (AMARYL) 4 mg tablet; 1 tablet in am and 1/2 tablet in pm  -     HEMOGLOBIN A1C W/ EAG ESTIMATION Lab Collect; Future  -     Comprehensive metabolic panel Lab Collect; Future  -     TSH, 3rd generation Lab Collect; Future  -     T4, free Lab Collect; Future    Diabetic polyneuropathy associated with type 2 diabetes mellitus (HCC)  -     glimepiride (AMARYL) 4 mg tablet; 1 tablet in am and 1/2 tablet in pm  -     HEMOGLOBIN A1C W/ EAG ESTIMATION Lab Collect; Future  -     Comprehensive metabolic panel Lab Collect; Future  -     TSH, 3rd generation Lab Collect; Future  -     T4, free Lab Collect; Future    Acquired hypothyroidism  -     HEMOGLOBIN A1C W/ EAG ESTIMATION Lab Collect; Future  -     Comprehensive metabolic panel Lab Collect; Future  -     TSH, 3rd generation Lab Collect; Future  -     T4, free Lab Collect; Future    Essential hypertension  -     HEMOGLOBIN A1C W/ EAG ESTIMATION Lab Collect; Future  -     Comprehensive metabolic panel Lab Collect; Future  -     TSH, 3rd generation Lab Collect; Future  -     T4, free Lab Collect; Future    Mixed hyperlipidemia  -     HEMOGLOBIN A1C W/ EAG ESTIMATION Lab Collect; Future  -     Comprehensive metabolic panel Lab Collect; Future  -     TSH, 3rd generation Lab Collect; Future  -     T4, free Lab Collect; Future    Other orders  -     rifaximin (XIFAXAN) 550 mg tablet; Take 550 mg by mouth every 8 (eight) hours        Assessment/Plan:  1  Type 2 diabetes, insulin requiring  Hemoglobin A1c is 6 9%  This demonstrates excellent control of her diabetes  She will continue the same Janumet and Basaglar insulin    Based on her blood sugar record, she has higher blood sugars in the afternoon and lower blood sugars in the morning so I have asked her to switch her glimepiride to 4 mg 1 whole pill in the morning and half pill at supper  She will continue to test her blood sugars twice a day and call them into the office in 2-3 weeks  2  Diabetic neuropathy  She has stable neuropathic symptoms  Diabetic foot exams are up-to-date  3  Hypothyroidism  Thyroid function tests are normal   She is biochemically and clinically euthyroid and will continue the same levothyroxine 125 mcg daily  4  Hypertension  Blood pressure is under good control on her current dose of furosemide and nadolol  5  Hyperlipidemia  She will continue the same simvastatin 40 mg daily  I have asked her to follow up in 3 months with preceding hemoglobin A1c, CMP, TSH, and free T4       CC: Diabetes type 2, thyroid, blood pressure, lipid follow-up    History of Present Illness     HPI: Maral Sullivan is a 77y o  year old female with type 2 diabetes, insulin requiring with neuropathy for 48 years, hypothyroidism, hypertension, hyperlipidemia for follow-up visit  She is on oral agents and insulin at home and takes Basaglar insulin 46 units in the morning and 30 units in the evening, glimepiride 4 mg half in the morning and 1 in the evening, and Janumet XR 50/1000 mg twice a day  She denies any polyuria, polydipsia, nocturia, polyphagia  and blurry vision  She has unchanged numbness and tingling of the feet  She denies chest pain or shortness of breath  She denies nephropathy, retinopathy, heart attack, stroke and claudication but does admit to neuropathy  Was in hospital in sept 2020 with liver abnormalities  She does admit that she is starting to eat more now in the last month or so  Hypoglycemic episodes: Yes several times per week  These are occurring mostly in the morning    The patient's last eye exam was in September 2020 with stable retinopathy  The patient's last foot exam was in October 2020 at endocrine office visit    She does see a podiatrist on a regular basis with last visit January 2021  Last A1C was   Lab Results   Component Value Date    HGBA1C 6 9 12/15/2020     Blood Sugar/Glucometer/Pump/CGM review: checks blood sugars twice a day  Blood sugars tend to be lower in the morning and as low as the 50s and sugars up to the 130s to 140s in the morning  Blood sugars in the evening tend to be consistently high into the 200-300 range  She has hypothyroidism and takes levothyroxine 125 mcg daily  She denies heat or cold intolerance, palpitation, tremors, diarrhea or constipation  She is having some sleeping issues as she has been staying awake wearing about her daughter with her sleep apnea and is thus fatigued  Weight is 8 lb less than October 2020  She has hypertension and takes furosemide 40 mg daily, spironolactone 50 mg daily, and Nadolol 20 mg daily  She denies headache or stroke-like symptoms  She has hyperlipidemia and takes simvastatin 40 mg daily  She denies chest pain or shortness of breath    Review of Systems   Constitutional: Positive for fatigue  Negative for unexpected weight change  Weight 8 lbs less than October 2020  HENT: Negative for trouble swallowing  Eyes: Negative for visual disturbance  Wears glasses  No diplopia  Respiratory: Negative for chest tightness and shortness of breath  Cardiovascular: Negative for chest pain and palpitations  Gastrointestinal: Negative for abdominal pain, constipation, diarrhea and nausea  On lactulose and BM's at least 2 times a day  Endocrine: Negative for cold intolerance, heat intolerance, polydipsia, polyphagia and polyuria  Nocturia none  starting to eat more again  Skin: Negative for rash and wound  Very fragile skin and rips off of knees when leaning on knees to get in bed  Neurological: Positive for numbness  Negative for dizziness, tremors, weakness, light-headedness and headaches  Some numbness and tingling of the feet unchanged  Psychiatric/Behavioral: Positive for sleep disturbance  Stays awake to watch over daughter with sleep apnea   Under a lot of stress in general        Historical Information   Past Medical History:   Diagnosis Date    Cirrhosis ([facility] )     Diabetes mellitus ([facility] )     Diabetic neuropathy ([facility] )     Diabetic neuropathy ([facility] )     Esophageal varices ([facility] )     Fatty liver     Fatty liver     GERD (gastroesophageal reflux disease)     Hiatal hernia     Hyperlipidemia     Hypertension     Hypoglycemia     Hypothyroidism     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)     Osteoarthritis     Osteopenia     Thrombocytopenia (HCC)     Type 2 diabetes mellitus (HCC)      Past Surgical History:   Procedure Laterality Date    ABDOMINAL SURGERY      Abdominal plasty with mesh insertion    CATARACT EXTRACTION, BILATERAL      CATARACT EXTRACTION, BILATERAL      EGD AND COLONOSCOPY  03/18/2015    LAPAROSCOPIC CHOLECYSTECTOMY      SHOULDER SURGERY Right     calcium depsoti    TUBAL LIGATION      UMBILICAL HERNIA REPAIR      with mesh placed    UPPER GASTROINTESTINAL ENDOSCOPY       Social History   Social History     Substance and Sexual Activity   Alcohol Use Not Currently    Frequency: Never     Social History     Substance and Sexual Activity   Drug Use No     Social History     Tobacco Use   Smoking Status Never Smoker   Smokeless Tobacco Never Used     Family History:   Family History   Problem Relation Age of Onset    Breast cancer Mother     Diabetes type II Father     Thyroid disease unspecified Daughter     Down syndrome Daughter     Diabetes type II Daughter     Diabetes type II Sister     No Known Problems Brother     Prostate cancer Brother     No Known Problems Sister     Stroke Sister     No Known Problems Son     Breast cancer Maternal Aunt     Colon cancer Neg Hx        Meds/Allergies   Current Outpatient Medications   Medication Sig Dispense Refill    aspirin 81 mg chewable tablet Chew 81 mg daily      BASAGLAR KWIKPEN 100 units/mL injection pen 46 units in am and 30 units in pm, up to 90 units daily 10 pen 5    Blood Glucose Monitoring Suppl (ONE TOUCH ULTRA 2) w/Device KIT by Does not apply route once for 1 dose Use Glucometer to test up to 3 times daily  1 each 0    Calcium Carb-Cholecalciferol (CALCIUM 1000 + D PO) Take by mouth      cholecalciferol (VITAMIN D3) 1,000 units tablet Take 2,000 Units by mouth daily      Coenzyme Q10 (Co Q10) 100 MG CAPS Take by mouth      fluticasone (FLONASE) 50 mcg/act nasal spray 1 spray into each nostril daily prn       furosemide (LASIX) 40 mg tablet TAKE 1 TABLET BY MOUTH EVERY DAY 90 tablet 1    glimepiride (AMARYL) 4 mg tablet 1 tablet in am and 1/2 tablet in pm 180 tablet 2    glucose blood (OneTouch Ultra) test strip Test up to 3 times daily  300 each 3    Insulin Pen Needle (B-D UF III MINI PEN NEEDLES) 31G X 5 MM MISC USE UP TO 3 TIMES A  each 6    JANUMET XR  MG TB24 Take 1 tablet by mouth 2 (two) times a day 180 tablet 3    KRILL OIL PO Take 750 mg by mouth      lactulose (CHRONULAC) 10 g/15 mL solution Take 20 g by mouth 3 (three) times a day      lactulose 20 g/30 mL Take 15 mL (10 g total) by mouth 2 (two) times a day 900 mL 6    levothyroxine 125 mcg tablet Take 125 mcg by mouth daily  2    nadolol (CORGARD) 20 mg tablet Take 1 tablet (20 mg total) by mouth daily 30 tablet 0    omeprazole (PriLOSEC) 20 mg delayed release capsule TAKE 1 CAPSULE BY MOUTH EVERY DAY 90 capsule 2    ONETOUCH DELICA LANCETS FINE MISC Use 1-3 times a day 100 each 3    rifaximin (XIFAXAN) 550 mg tablet Take 550 mg by mouth every 8 (eight) hours      simvastatin (ZOCOR) 40 mg tablet Take 40 mg by mouth daily  3    spironolactone (ALDACTONE) 50 mg tablet TAKE 1 TABLET BY MOUTH EVERY DAY 90 tablet 1     No current facility-administered medications for this visit        Allergies   Allergen Reactions    Latex     Sesame Seed (Diagnostic)      Other reaction(s): swelling inside mouth    Strawberry C [Ascorbate] Other (See Comments)     Mouth sores    Penicillins Rash       Objective   Vitals: Blood pressure 100/64, pulse 70, height 5' 3" (1 6 m), weight 87 kg (191 lb 12 8 oz), not currently breastfeeding  Invasive Devices     None                 Physical Exam  Vitals signs reviewed  Constitutional:       Appearance: Normal appearance  She is well-developed  She is obese  HENT:      Head: Normocephalic and atraumatic  Eyes:      Extraocular Movements: Extraocular movements intact  Conjunctiva/sclera: Conjunctivae normal       Comments: No lid lag, stare, proptosis, or periorbital edema  Neck:      Musculoskeletal: Normal range of motion and neck supple  Thyroid: No thyromegaly  Vascular: No carotid bruit  Comments: Thyroid normal in size  No palpable thyroid nodules  No bruits over the thyroid gland  Cardiovascular:      Rate and Rhythm: Normal rate and regular rhythm  Heart sounds: Normal heart sounds  No murmur  Pulmonary:      Effort: Pulmonary effort is normal       Breath sounds: Normal breath sounds  No wheezing  Abdominal:      Palpations: Abdomen is soft  Musculoskeletal: Normal range of motion  General: No deformity  Right lower leg: No edema  Left lower leg: No edema  Comments: No tremor of the outstretched hands  Lymphadenopathy:      Cervical: No cervical adenopathy  Skin:     General: Skin is warm and dry  Findings: No rash  Neurological:      Mental Status: She is alert and oriented to person, place, and time  Deep Tendon Reflexes: Reflexes are normal and symmetric  Comments: Deep tendon reflexes normal          The history was obtained from the review of the chart and from the patient      Lab Results:    Most recent Alc is  Lab Results   Component Value Date    HGBA1C 6 9 12/15/2020         Blood work done at [facility] on 12/15/2020 showed a CMP with a glucose of 89 fasting, CO2 29 5, BUN 34, creatinine 1 04, GFR 52, BUN/creatinine ratio 32 7, calcium 10 4, AST 53, ALT 34, but was otherwise normal     Lab Results   Component Value Date    CREATININE 0 88 09/15/2020    CREATININE 0 97 09/14/2020    CREATININE 1 00 09/13/2020    BUN 18 09/15/2020    K 3 4 (L) 09/15/2020     09/15/2020    CO2 26 09/15/2020     eGFR   Date Value Ref Range Status   09/15/2020 65 ml/min/1 73sq m Final     Lab Results   Component Value Date    ALT 26 09/14/2020    AST 32 09/14/2020    ALKPHOS 59 09/14/2020     TSH is 0 945 with a free T4 of 1 79  Urine microalbumin to creatinine ratio is below the lower limit of detection at less than 0 2 mg/dL        Future Appointments   Date Time Provider [facility]   2/10/2021  3:30 PM Yola Flores MD GI [id number] [facility]   4/13/2021  8:30 AM [facility] US 2 [id number] [place] [facility]   4/16/2021 10:40 AM Liban Bryant MD ENDO [facility]

## 2021-01-15 NOTE — PATIENT INSTRUCTIONS
hgba1c is 6 9%  this is excellent  Let's switch the glimepiride to 4 mg 1 whole pill in am at breakfast and 1/2 pill in pm at supper  Continue the same janumet and basaglar insulin  Continue to test blood sugars twice a day for now and call them in to dr Gladys Mireles in 2-3 weeks  The rest of the blood work is good  no change in levothyroxine dosage  Follow up in 3 months with blood work

## 2021-01-26 ENCOUNTER — TELEPHONE (OUTPATIENT)
Dept: ENDOCRINOLOGY | Facility: HOSPITAL | Age: 76
End: 2021-01-26

## 2021-01-26 NOTE — TELEPHONE ENCOUNTER
Reviewing her last blood sugar logs, and Dr Kylie Zimmer last note her blood sugar in the evening was previously running in the 200s and 300s with the occasional or 100  The change in medication was prevent the episodes of low blood sugar she was experiencing in the morning  Her last A1c was doing great  I would be concerned about going back to her previous dose of glimepiride and causing the low blood sugars  If she is that concerned, I can send over in new script for glimepiride  I can send over the 2 mg tablets and she can take 2 in the morning and 1 and half in the evening to see if this will help with her evening blood sugars, and prevent the low blood sugars in the mornings

## 2021-01-26 NOTE — TELEPHONE ENCOUNTER
Received call from patient  She states he evening blood sugars are running high 200-400 range since Dr Sanjuan Sicard changed the Glimepiride dose at her 1/15/21 visit  Patient's morning blood sugars are no higher than 175

## 2021-02-10 ENCOUNTER — OFFICE VISIT (OUTPATIENT)
Dept: GASTROENTEROLOGY | Facility: CLINIC | Age: 76
End: 2021-02-10
Payer: MEDICARE

## 2021-02-10 VITALS
WEIGHT: 194 LBS | SYSTOLIC BLOOD PRESSURE: 118 MMHG | BODY MASS INDEX: 34.38 KG/M2 | HEIGHT: 63 IN | DIASTOLIC BLOOD PRESSURE: 78 MMHG

## 2021-02-10 DIAGNOSIS — I85.10 SECONDARY ESOPHAGEAL VARICES WITHOUT BLEEDING (HCC): ICD-10-CM

## 2021-02-10 DIAGNOSIS — E87.70 HYPERVOLEMIA, UNSPECIFIED HYPERVOLEMIA TYPE: ICD-10-CM

## 2021-02-10 DIAGNOSIS — K75.81 LIVER CIRRHOSIS SECONDARY TO NASH (NONALCOHOLIC STEATOHEPATITIS) (HCC): Primary | ICD-10-CM

## 2021-02-10 DIAGNOSIS — K72.90 HEPATIC ENCEPHALOPATHY (HCC): ICD-10-CM

## 2021-02-10 DIAGNOSIS — K74.60 LIVER CIRRHOSIS SECONDARY TO NASH (NONALCOHOLIC STEATOHEPATITIS) (HCC): Primary | ICD-10-CM

## 2021-02-10 PROCEDURE — 99214 OFFICE O/P EST MOD 30 MIN: CPT | Performed by: INTERNAL MEDICINE

## 2021-02-10 NOTE — PATIENT INSTRUCTIONS
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) ([facility] )  -Patient is compensated at this time  No symptomatic encephalopathy on present medications  No ascites or significant fluid overload on diuretics  Continue same medications  Would like her to get her labs and ultrasound in the next 1-2 months  -  Patient should be a candidate for COVID-19 vaccination in light of her history of diabetes and cirrhosis    - CBC and differential; Future  - AFP tumor marker; Future  - Protime-INR; Future  - US abdomen complete; Future  -CMP     Do labs in March 2  Hepatic encephalopathy ([facility] )  - patient did report she had to pay a co-pay  Will check with nursing about assistance program for the Xifaxan  She could continue on the lactulose  She should have about 2 bowel movements a day continue present dose if patient does not have a bowel movement she is advised to take an extra dose of the lactulose  Mental status is very good today   no asterixis on today's exam  - rifaximin (XIFAXAN) 550 mg tablet; Take 1 tablet (550 mg total) by mouth every 12 (twelve) hours  Dispense: 60 tablet; Refill: 3  - check nh-3 level     3  Hypervolemia, unspecified hypervolemia type  -- no edema- or ascites- diuretics working well  Reviewed check electrolytes  - doing well on low-dose Aldactone  Advised not to overdo it with fluid intake  Should limit to 2 to 2 5 L per day    4  Secondary esophageal varices without bleeding (HCC)  - grade 2 varices noted on upper endoscopy in July    Continue nadolol      Followup Appointment: 4 - 5 mo

## 2021-02-10 NOTE — PROGRESS NOTES
[id number] - Outpatient Follow-up Note  Magda Vance 77 y o  female MRN: [id number]  Encounter: [id number]    ASSESSMENT AND PLAN:      1  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) ([facility] )  -Patient is compensated at this time  No symptomatic encephalopathy on present medications  No ascites or significant fluid overload on diuretics  Continue same medications  Would like her to get her labs and ultrasound in the next 1-2 months  -  Patient should be a candidate for COVID-19 vaccination in light of her history of diabetes and cirrhosis    - CBC and differential; Future  - AFP tumor marker; Future  - Protime-INR; Future  - US abdomen complete; Future  -CMP     Do labs in March     2  Hepatic encephalopathy ([facility] )  - patient did report she had to pay a co-pay  Will check with nursing about assistance program for the Xifaxan  She could continue on the lactulose  She should have about 2 bowel movements a day continue present dose if patient does not have a bowel movement she is advised to take an extra dose of the lactulose  Mental status is very good today   no asterixis on today's exam  - rifaximin (XIFAXAN) 550 mg tablet; Take 1 tablet (550 mg total) by mouth every 12 (twelve) hours  Dispense: 60 tablet; Refill: 3  Check nh-3 level     3  Hypervolemia, unspecified hypervolemia type  -- no edema- or ascites- diuretics working well  Reviewed check electrolytes  - doing well on low-dose Aldactone  Advised not to overdo it with fluid intake  Should limit to 2 to 2 5 L per day    4  Secondary esophageal varices without bleeding (HCC)  - grade 2 varices noted on upper endoscopy in July    Continue nadolol      Followup Appointment: 4 - 5 mo   ______________________________________________________________________    Chief Complaint   Patient presents with    Follow-up    Cirrhosis     HPI:   Patient returns to the office for follow-up visit with respect to her cirrhosis and hepatic encephalopathy  Overall she is doing well  She has a little bit down in the dumps  Her daughter was recently diagnosed with congestive heart failure and was hospitalized  She had very low oxygen saturation daughter has down syndrome  Patient is worried and has to keep a close eye on her  Patient really does not seem to have too much in way of GI issues  She has some sharp lower abdominal pain at times  More accurately she states she felt as though she "ate a hot pepper"  She did have a colonoscopy in the past year which was negative  She does have some neuropathy  Etiology  Of this discomfort is unclear  Patient does report her concentration is good  She is usually moving her bowels about twice a day  She may need attains her dose of the lactulose  Occasionally she will not have as many bowel movements  Patient has been on rifaximin with good results  We should look into her situation in that she had to come up with a co-pay for the medication  This did really help her hepatic encephalopathy very well in the late fall      Historical Information   Past Medical History:   Diagnosis Date    Cirrhosis ([facility] )     Diabetes mellitus ([facility] )     Diabetic neuropathy ([facility] )     Diabetic neuropathy ([facility] )     Esophageal varices (HCC)     Fatty liver     Fatty liver     GERD (gastroesophageal reflux disease)     Hepatic encephalopathy (HCC)     Hiatal hernia     Hyperlipidemia     Hypertension     Hypoglycemia     Hypothyroidism     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)     Osteoarthritis     Osteopenia     Thrombocytopenia (HCC)     Type 2 diabetes mellitus (HCC)      Past Surgical History:   Procedure Laterality Date    ABDOMINAL SURGERY      Abdominal plasty with mesh insertion    CATARACT EXTRACTION, BILATERAL      CATARACT EXTRACTION, BILATERAL      EGD AND COLONOSCOPY  03/18/2015    LAPAROSCOPIC CHOLECYSTECTOMY      SHOULDER SURGERY Right calcium depsoti    TUBAL LIGATION      UMBILICAL HERNIA REPAIR      with mesh placed    UPPER GASTROINTESTINAL ENDOSCOPY       Social History     Substance and Sexual Activity   Alcohol Use Not Currently    Frequency: Never     Social History     Substance and Sexual Activity   Drug Use No     Social History     Tobacco Use   Smoking Status Never Smoker   Smokeless Tobacco Never Used     Family History   Problem Relation Age of Onset    Breast cancer Mother     Diabetes type II Father     Thyroid disease unspecified Daughter     Down syndrome Daughter     Diabetes type II Daughter     Diabetes type II Sister     No Known Problems Brother     Prostate cancer Brother     No Known Problems Sister     Stroke Sister     No Known Problems Son     Breast cancer Maternal Aunt     Colon cancer Neg Hx          Current Outpatient Medications:     aspirin 81 mg chewable tablet    BASAGLAR KWIKPEN 100 units/mL injection pen    Calcium Carb-Cholecalciferol (CALCIUM 1000 + D PO)    cholecalciferol (VITAMIN D3) 1,000 units tablet    Coenzyme Q10 (Co Q10) 100 MG CAPS    fluticasone (FLONASE) 50 mcg/act nasal spray    furosemide (LASIX) 40 mg tablet    glimepiride (AMARYL) 4 mg tablet    glucose blood (OneTouch Ultra) test strip    Insulin Pen Needle (B-D UF III MINI PEN NEEDLES) 31G X 5 MM MISC    JANUMET XR  MG TB24    KRILL OIL PO    levothyroxine 125 mcg tablet    nadolol (CORGARD) 20 mg tablet    omeprazole (PriLOSEC) 20 mg delayed release capsule    ONETOUCH DELICA LANCETS FINE MISC    rifaximin (XIFAXAN) 550 mg tablet    simvastatin (ZOCOR) 40 mg tablet    spironolactone (ALDACTONE) 50 mg tablet    Blood Glucose Monitoring Suppl (ONE TOUCH ULTRA 2) w/Device KIT    lactulose 20 g/30 mL  Allergies   Allergen Reactions    Latex     Sesame Seed (Diagnostic)      Other reaction(s): swelling inside mouth    Strawberry C [Ascorbate] Other (See Comments)     Mouth sores    Penicillins Rash     Reviewed medications and allergies and updated as indicated    GI please see above    General positive for mild weight loss 3 lb in the last 6 months  Neuro positive for neuropathy   Endocrine positive for polydipsia    Rest of 10 point review of systems are negative    PHYSICAL EXAM:    Blood pressure 118/78, height 5' 3" (1 6 m), weight 88 kg (194 lb), not currently breastfeeding  Body mass index is 34 37 kg/m²  General Appearance: NAD, cooperative, alert  Eyes: Anicteric,  Conjunctiva pink  ENT:  Normocephalic, atraumatic, normal mucosa  Neck:  Supple, symmetrical, trachea midline  Resp:  Clear to auscultation bilaterally; no rales, rhonchi or wheezing; respirations unlabored   CV:  S1 S2, Regular rate and rhythm; no murmur, rub, or gallop  GI:  Soft, non-tender, non-distended; normal bowel sounds; no masses, no organomegaly    large abdominal pannus  Hernia has been repaired  Rectal: Deferred  Musculoskeletal: No cyanosis, clubbing or edema  Normal ROM  Skin:  No jaundice, rashes, or lesions - stasis changes over the lower extremities  Heme/Lymph: No palpable cervical lymphadenopathy  Psych: Normal affect, good eye contact  Neuro: No gross deficits, AAOx3-- no asterixis    Mental status appears excellent today    Lab Results:   Lab Results   Component Value Date    WBC 2 86 (L) 09/15/2020    HGB 11 1 (L) 09/15/2020    HCT 34 6 (L) 09/15/2020    MCV 93 09/15/2020    PLT 62 (L) 09/15/2020     Lab Results   Component Value Date    K 3 4 (L) 09/15/2020     09/15/2020    CO2 26 09/15/2020    BUN 18 09/15/2020    CREATININE 0 88 09/15/2020    CALCIUM 8 5 09/15/2020    AST 32 09/14/2020    ALT 26 09/14/2020    ALKPHOS 59 09/14/2020    EGFR 65 09/15/2020

## 2021-02-10 NOTE — LETTER
February 10, 2021     Diandra Covington DO  [address]    Patient: Dang Johns   YOB: 1945   Date of Visit: 2/10/2021       Dear Dr Alvarez Blood: Thank you for referring Rody May to me for evaluation  Below are my notes for this consultation  If you have questions, please do not hesitate to call me  I look forward to following your patient along with you  Sincerely,        Kenrick Rodas MD        CC: MD Kenrick Carmichael MD  2/10/2021  2:04 PM  Incomplete  [id number] - Outpatient Follow-up Note  Dang Johns 77 y o  female MRN: [id number]  Encounter: [id number]    ASSESSMENT AND PLAN:      1  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) ([facility] )  -Patient is compensated at this time  No symptomatic encephalopathy on present medications  No ascites or significant fluid overload on diuretics  Continue same medications  Would like her to get her labs and ultrasound in the next 1-2 months  -  Patient should be a candidate for COVID-19 vaccination in light of her history of diabetes and cirrhosis    - CBC and differential; Future  - AFP tumor marker; Future  - Protime-INR; Future  - US abdomen complete; Future  -CMP     Do labs in March 2  Hepatic encephalopathy ([facility] )  - patient did report she had to pay a co-pay  Will check with nursing about assistance program for the Xifaxan  She could continue on the lactulose  She should have about 2 bowel movements a day continue present dose if patient does not have a bowel movement she is advised to take an extra dose of the lactulose  Mental status is very good today   no asterixis on today's exam  - rifaximin (XIFAXAN) 550 mg tablet; Take 1 tablet (550 mg total) by mouth every 12 (twelve) hours  Dispense: 60 tablet; Refill: 3    3   Hypervolemia, unspecified hypervolemia type  -- no edema- or ascites- diuretics working well  Reviewed check electrolytes  - doing well on low-dose Aldactone  Advised not to overdo it with fluid intake  Should limit to 2 to 2 5 L per day    4  Secondary esophageal varices without bleeding (HCC)  - grade 2 varices noted on upper endoscopy in July  Continue nadolol      Followup Appointment: 4 - 5 mo   ______________________________________________________________________    Chief Complaint   Patient presents with    Follow-up    Cirrhosis     HPI:   Patient returns to the office for follow-up visit with respect to her cirrhosis and hepatic encephalopathy  Overall she is doing well  She has a little bit down in the dumps  Her daughter was recently diagnosed with congestive heart failure and was hospitalized  She had very low oxygen saturation daughter has down syndrome  Patient is worried and has to keep a close eye on her  Patient really does not seem to have too much in way of GI issues  She has some sharp lower abdominal pain at times  More accurately she states she felt as though she "ate a hot pepper"  She did have a colonoscopy in the past year which was negative  She does have some neuropathy  Etiology  Of this discomfort is unclear  Patient does report her concentration is good  She is usually moving her bowels about twice a day  She may need attains her dose of the lactulose  Occasionally she will not have as many bowel movements  Patient has been on rifaximin with good results  We should look into her situation in that she had to come up with a co-pay for the medication  This did really help her hepatic encephalopathy very well in the late fall      Historical Information   Past Medical History:   Diagnosis Date    [facility]osis ([facility] )     Diabetes mellitus ([facility] )     Diabetic neuropathy ([facility] )     Diabetic neuropathy ([facility] )     Esophageal varices ([facility] )     Fatty liver     Fatty liver     GERD (gastroesophageal reflux disease)     Hepatic encephalopathy ([address] )     Hiatal hernia     Hyperlipidemia     Hypertension     Hypoglycemia     Hypothyroidism     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)     Osteoarthritis     Osteopenia     Thrombocytopenia (HCC)     Type 2 diabetes mellitus (HCC)      Past Surgical History:   Procedure Laterality Date    ABDOMINAL SURGERY      Abdominal plasty with mesh insertion    CATARACT EXTRACTION, BILATERAL      CATARACT EXTRACTION, BILATERAL      EGD AND COLONOSCOPY  03/18/2015    LAPAROSCOPIC CHOLECYSTECTOMY      SHOULDER SURGERY Right     calcium depsoti    TUBAL LIGATION      UMBILICAL HERNIA REPAIR      with mesh placed    UPPER GASTROINTESTINAL ENDOSCOPY       Social History     Substance and Sexual Activity   Alcohol Use Not Currently    Frequency: Never     Social History     Substance and Sexual Activity   Drug Use No     Social History     Tobacco Use   Smoking Status Never Smoker   Smokeless Tobacco Never Used     Family History   Problem Relation Age of Onset    Breast cancer Mother     Diabetes type II Father     Thyroid disease unspecified Daughter     Down syndrome Daughter     Diabetes type II Daughter     Diabetes type II Sister     No Known Problems Brother     Prostate cancer Brother     No Known Problems Sister     Stroke Sister     No Known Problems Son     Breast cancer Maternal Aunt     Colon cancer Neg Hx          Current Outpatient Medications:     aspirin 81 mg chewable tablet    BASAGLAR KWIKPEN 100 units/mL injection pen    Calcium Carb-Cholecalciferol (CALCIUM 1000 + D PO)    cholecalciferol (VITAMIN D3) 1,000 units tablet    Coenzyme Q10 (Co Q10) 100 MG CAPS    fluticasone (FLONASE) 50 mcg/act nasal spray    furosemide (LASIX) 40 mg tablet    glimepiride (AMARYL) 4 mg tablet    glucose blood (OneTouch Ultra) test strip    Insulin Pen Needle (B-D UF III MINI PEN NEEDLES) 31G X 5 MM MISC    JANUMET XR  MG TB24    KRILL OIL PO    levothyroxine 125 mcg tablet    nadolol (CORGARD) 20 mg tablet    omeprazole (PriLOSEC) 20 mg delayed release capsule    ONETOUCH DELICA LANCETS FINE MISC    rifaximin (XIFAXAN) 550 mg tablet    simvastatin (ZOCOR) 40 mg tablet    spironolactone (ALDACTONE) 50 mg tablet    Blood Glucose Monitoring Suppl (ONE TOUCH ULTRA 2) w/Device KIT    lactulose 20 g/30 mL  Allergies   Allergen Reactions    Latex     Sesame Seed (Diagnostic)      Other reaction(s): swelling inside mouth    Strawberry C [Ascorbate] Other (See Comments)     Mouth sores    Penicillins Rash     Reviewed medications and allergies and updated as indicated    GI please see above    General positive for mild weight loss 3 lb in the last 6 months  Neuro positive for neuropathy   Endocrine positive for polydipsia    Rest of 10 point review of systems are negative    PHYSICAL EXAM:    Blood pressure 118/78, height 5' 3" (1 6 m), weight 88 kg (194 lb), not currently breastfeeding  Body mass index is 34 37 kg/m²  General Appearance: NAD, cooperative, alert  Eyes: Anicteric,  Conjunctiva pink  ENT:  Normocephalic, atraumatic, normal mucosa  Neck:  Supple, symmetrical, trachea midline  Resp:  Clear to auscultation bilaterally; no rales, rhonchi or wheezing; respirations unlabored   CV:  S1 S2, Regular rate and rhythm; no murmur, rub, or gallop  GI:  Soft, non-tender, non-distended; normal bowel sounds; no masses, no organomegaly    large abdominal pannus  Hernia has been repaired  Rectal: Deferred  Musculoskeletal: No cyanosis, clubbing or edema  Normal ROM  Skin:  No jaundice, rashes, or lesions - stasis changes over the lower extremities  Heme/Lymph: No palpable cervical lymphadenopathy  Psych: Normal affect, good eye contact  Neuro: No gross deficits, AAOx3-- no asterixis    Mental status appears excellent today    Lab Results:   Lab Results   Component Value Date    WBC 2 86 (L) 09/15/2020    HGB 11 1 (L) 09/15/2020    HCT 34 6 (L) 09/15/2020    MCV 93 09/15/2020    PLT 62 (L) 09/15/2020     Lab Results   Component Value Date    K 3 4 (L) 09/15/2020     09/15/2020    CO2 26 09/15/2020    BUN 18 09/15/2020    CREATININE 0 88 09/15/2020    CALCIUM 8 5 09/15/2020    AST 32 09/14/2020    ALT 26 09/14/2020    ALKPHOS 59 09/14/2020    EGFR 65 09/15/2020

## 2021-02-23 ENCOUNTER — TELEPHONE (OUTPATIENT)
Dept: ENDOCRINOLOGY | Facility: HOSPITAL | Age: 76
End: 2021-02-23

## 2021-02-23 NOTE — TELEPHONE ENCOUNTER
Patient called in bc she said her blood sugars have been high since you changed her medications  I put them on a blood sugar log for you to review

## 2021-03-11 ENCOUNTER — TELEPHONE (OUTPATIENT)
Dept: GASTROENTEROLOGY | Facility: CLINIC | Age: 76
End: 2021-03-11

## 2021-03-13 DIAGNOSIS — E11.8 TYPE 2 DIABETES MELLITUS WITH COMPLICATION (HCC): ICD-10-CM

## 2021-03-15 RX ORDER — INSULIN GLARGINE 100 [IU]/ML
INJECTION, SOLUTION SUBCUTANEOUS
Qty: 15 ML | Refills: 5 | Status: SHIPPED | OUTPATIENT
Start: 2021-03-15 | End: 2021-04-16 | Stop reason: SDUPTHER

## 2021-03-16 ENCOUNTER — TELEPHONE (OUTPATIENT)
Dept: ENDOCRINOLOGY | Facility: HOSPITAL | Age: 76
End: 2021-03-16

## 2021-03-16 DIAGNOSIS — K59.00 CONSTIPATION, UNSPECIFIED CONSTIPATION TYPE: ICD-10-CM

## 2021-03-16 DIAGNOSIS — I10 HYPERTENSION, UNSPECIFIED TYPE: ICD-10-CM

## 2021-03-16 RX ORDER — NADOLOL 20 MG/1
20 TABLET ORAL DAILY
Qty: 30 TABLET | Refills: 2
Start: 2021-03-16 | End: 2022-07-21

## 2021-03-16 RX ORDER — LACTULOSE 20 G/30ML
10 SOLUTION ORAL 2 TIMES DAILY
Qty: 900 ML | Refills: 6 | Status: SHIPPED | OUTPATIENT
Start: 2021-03-16 | End: 2022-02-15

## 2021-03-16 NOTE — TELEPHONE ENCOUNTER
Spoke to patient  Feels like she ate a hot pepper and has burning in the stomach  Not all the time  She is out of lactulose and thought maybe getting back on that to help her BM would also help the burning  She is still taking the omeprazole  Advised we would take care of the refills and if the burning continues to let us know

## 2021-03-16 NOTE — TELEPHONE ENCOUNTER
Pt is very confused - requesting two meds - Nadolol and liquid (I believe Lactulose/she threw bottle away/can't verify med); Rx to [facility]  Also, she states her "stomach is on fire"/asks to find out why? CB# [phone]

## 2021-03-17 ENCOUNTER — TELEPHONE (OUTPATIENT)
Dept: OTHER | Facility: HOSPITAL | Age: 76
End: 2021-03-17

## 2021-03-17 ENCOUNTER — TELEPHONE (OUTPATIENT)
Dept: OTHER | Facility: OTHER | Age: 76
End: 2021-03-17

## 2021-03-17 NOTE — TELEPHONE ENCOUNTER
Phone: [phone] Pt: Johnson Connell : 45- Reason: Patient called today she has Questions about her Insulin Pin and what the Units should be  Her Blood sugar is reading 507  Would like the on call Provider to call her back

## 2021-03-17 NOTE — TELEPHONE ENCOUNTER
I was called because the patient has blood glucose in the 500s  I called the patient; she said that her blood glucose have been high for many days and she is waiting for the office to call her to tell her what to do  She is currently on Glimepiride, Janumet and Basaglar twice a day  This morning, her blood glucose was 180 but before dinner it was 507  She had some fruits in a can with lunch  She feels well and denies any symptoms or fever  She does not have any short acting insulin  I asked her to take an extra half tablet of glimepiride with dinner and to check her blood glucose later  She will call the office tomorrow as she needs to start lispro with dinner per Dr Stuart Paz  Meanwhile, if she does not feel well or blood glucose is higher, she should go to the ER

## 2021-03-18 NOTE — TELEPHONE ENCOUNTER
Spoke with patient  She is questioning if she can increase the glimepiride at night, she does not want to take additional insulin

## 2021-03-18 NOTE — TELEPHONE ENCOUNTER
See phone note from on call physician, I will call the patient today, this is still the plan correct?

## 2021-03-18 NOTE — TELEPHONE ENCOUNTER
She needs a followup appointment with me or AP to discuss this and starting more injections at meals

## 2021-03-19 ENCOUNTER — TELEPHONE (OUTPATIENT)
Dept: OTHER | Facility: OTHER | Age: 76
End: 2021-03-19

## 2021-03-19 NOTE — TELEPHONE ENCOUNTER
Phone: [phone] Pt: Madelin Ziegler : 24- Reason: Patient called today she has Questions about her Insulin Pin and what the Units should be  Her Blood sugar is reading 507  Would like the on call Provider to call her back  Looks like she already spoke with a doctor earlier but she is very confused  She needs clarification on medications      Paged Dr Khloe Galloway via [employer] [phone]

## 2021-03-24 ENCOUNTER — OFFICE VISIT (OUTPATIENT)
Dept: ENDOCRINOLOGY | Facility: HOSPITAL | Age: 76
End: 2021-03-24
Payer: MEDICARE

## 2021-03-24 VITALS
BODY MASS INDEX: 34.05 KG/M2 | WEIGHT: 192.2 LBS | SYSTOLIC BLOOD PRESSURE: 108 MMHG | HEIGHT: 63 IN | HEART RATE: 64 BPM | DIASTOLIC BLOOD PRESSURE: 62 MMHG

## 2021-03-24 DIAGNOSIS — Z79.4 TYPE 2 DIABETES MELLITUS WITH HYPERGLYCEMIA, WITH LONG-TERM CURRENT USE OF INSULIN (HCC): Primary | ICD-10-CM

## 2021-03-24 DIAGNOSIS — E11.65 TYPE 2 DIABETES MELLITUS WITH HYPERGLYCEMIA, WITH LONG-TERM CURRENT USE OF INSULIN (HCC): Primary | ICD-10-CM

## 2021-03-24 PROCEDURE — 99214 OFFICE O/P EST MOD 30 MIN: CPT | Performed by: PHYSICIAN ASSISTANT

## 2021-03-24 RX ORDER — RIFAXIMIN 550 MG/1
550 TABLET ORAL EVERY 12 HOURS
COMMUNITY
Start: 2021-03-17 | End: 2021-11-14

## 2021-03-24 NOTE — PROGRESS NOTES
Mateo Rogers 77 y o  female MRN: [id number]    Encounter: [id number]      Assessment/Plan     Assessment: This is a 77y o -year-old female with type 2 diabetes with neuropathy, hypertension and hyperlipidemia, and hypothyroidism  Plan:    1  Type 2 diabetes, insulin requiring: no lab work completed prior to this office visit  Previous A1c was 6 9 which is excellent for her age in medical conditions  Reviewing her blood sugars she is doing fair  Does have occasional episodes of hyperglycemia and hypoglycemia  She is not consistent with the time she eats  She also has limited foods she can eat due to her liver cirrhosis  She is a type 2 diabetic currently checking blood sugars 2 times a day  She is on basal insulin twice a day  She has been experiencing episodes of hypoglycemic unawareness typically occurring in the morning  Because of this, she would benefit from a Dexcom  Will do a Dexcom professional trial at this time to see if she can appropriately utilize this device  We did discuss adding mealtime insulin, likely only once a day with dinner, but she is resistant at starting this at this time  No lab work was completed prior to this office visit, does have a follow-up appointment next month  Since her blood sugars are better this past week, will see if this can continue and make further recommendations at next appointment  2  Diabetic neuropathy:  Diabetic foot exam is up-to-date  Symptoms are stable  3  Hypothyroidism:  Lab work was not order prior to this office visit  At this time will continue with levothyroxine 125 mcg daily  4  Hypertension:   Normotensive in the office today  Continue with furosemide and nadolol  5  Hyperlipidemia:  Continue utilizing simvastatin 40 mg daily      CC:   Type 2 diabetes and thyroid follow-up    History of Present Illness     HPI:  José Miguel Moran is a 77y o  year old female with type 2 diabetes, insulin requiring with neuropathy for 48 years, hypothyroidism, hypertension, hyperlipidemia for follow-up visit  She is on oral agents and insulin at home and takes Basaglar insulin 46 units in the morning and 30 units in the evening, glimepiride 4 mg 1 5 in the morning and 0 5 in the evening, and Janumet XR 50/1000 mg twice a day  She denies any polyuria, polydipsia, nocturia, polyphagia  and blurry vision  She has unchanged numbness and tingling of the feet  She denies chest pain or shortness of breath  She denies nephropathy, retinopathy, heart attack, stroke and claudication but does admit to neuropathy  She presents today with concerns of hyperglycemia later in the day  She will occasionally have episodes where her blood sugar is in the 300s to 500s in the evening  She will become symptomatic with headaches and dizziness  She has been taking her medication as directed  Does not want to increase her Basaglar, however she does have episodes of hypoglycemia in the morning at times  Is not interested in taking mealtime insulin as she is concerned about missing doses, are not eating soon off and causing more problems with her blood sugars      Was in hospital in sept 2020 with liver abnormalities  She is currently following up with GI for 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      Hypoglycemic episodes: Yes several times per week  These are occurring mostly in the morning     The patient's last eye exam was in September 2020 with stable retinopathy  The patient's last foot exam was in October 2020 at endocrine office visit  She does see a podiatrist on a regular basis with last visit January 2021  Last A1C was         Lab Results   Component Value Date     HGBA1C 6 9 12/15/2020         Blood Sugar/Glucometer/Pump/CGM review:   Downloaded glucometer from March 11 through March 24, 2021 reveals an average glucose of 224  She is checking her blood sugar 4 times a day    Between March 11 and March 17, 2021 fasting blood sugar was elevated and was in the high 100s and upwards to high 200s period around lunchtime her blood sugar was normal  She has significantly elevated prior to dinner and bed  The from March 18 through March 24, 2021  Her blood sugar has been he between 57 and 157 in the morning  Around lunch she is the high 100s to 200 she did have an episode in 300s  In the evening she is variable with lowest blood sugar being 87 and the highest being 301      She has hypothyroidism and takes levothyroxine 125 mcg daily  She denies heat or cold intolerance, palpitation, tremors, diarrhea or constipation  She is having some sleeping issues as she has been staying awake wearing about her daughter with her sleep apnea and is thus fatigued      She has hypertension and takes furosemide 40 mg daily, spironolactone 50 mg daily, and Nadolol 20 mg daily  She denies headache or stroke-like symptoms      She has hyperlipidemia and takes simvastatin 40 mg daily  She denies chest pain or shortness of breath  she does have concerns with increased stress specifically over the last year since her  passed away  She is also taking care of her daughter he was autistic and has a few medical conditions of her own  Review of Systems   Constitutional: Positive for fatigue  Negative for activity change, appetite change, diaphoresis and unexpected weight change  HENT: Negative for sore throat, trouble swallowing and voice change  Eyes: Negative for visual disturbance  Respiratory: Negative for chest tightness and shortness of breath  Cardiovascular: Negative for chest pain, palpitations and leg swelling  Gastrointestinal: Positive for abdominal pain and nausea  Negative for constipation, diarrhea and vomiting  Endocrine: Negative for cold intolerance, heat intolerance, polydipsia, polyphagia and polyuria  Genitourinary: Negative for frequency  Skin: Negative for rash and wound     Neurological: Positive for dizziness ( due to hyperglycemia) and headaches ( due to hyperglycemia)  Negative for tremors, weakness, light-headedness and numbness  Hematological: Negative for adenopathy  Psychiatric/Behavioral: Positive for sleep disturbance  Negative for dysphoric mood  The patient is not nervous/anxious          Historical Information   Past Medical History:   Diagnosis Date    Cirrhosis ([facility] )     Diabetes mellitus ([facility] )     Diabetic neuropathy ([facility] )     Diabetic neuropathy ([facility] )     Esophageal varices ([place] [address] )     Fatty liver     Fatty liver     GERD (gastroesophageal reflux disease)     Hepatic encephalopathy (HCC)     Hiatal hernia     Hyperlipidemia     Hypertension     Hypoglycemia     Hypothyroidism     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)     Osteoarthritis     Osteopenia     Thrombocytopenia (HCC)     Type 2 diabetes mellitus ([place] [address] )      Past Surgical History:   Procedure Laterality Date    ABDOMINAL SURGERY      Abdominal plasty with mesh insertion    CATARACT EXTRACTION, BILATERAL      CATARACT EXTRACTION, BILATERAL      EGD AND COLONOSCOPY  03/18/2015    LAPAROSCOPIC CHOLECYSTECTOMY      SHOULDER SURGERY Right     calcium depsoti    TUBAL LIGATION      UMBILICAL HERNIA REPAIR      with mesh placed    UPPER GASTROINTESTINAL ENDOSCOPY       Social History   Social History     Substance and Sexual Activity   Alcohol Use Not Currently    Frequency: Never     Social History     Substance and Sexual Activity   Drug Use No     Social History     Tobacco Use   Smoking Status Never Smoker   Smokeless Tobacco Never Used     Family History:   Family History   Problem Relation Age of Onset   Amisha Slipper Breast cancer Mother     Diabetes type II Father     Thyroid disease unspecified Daughter     Down syndrome Daughter     Diabetes type II Daughter     Diabetes type II Sister     No Known Problems Brother     Prostate cancer Brother     No Known Problems Sister     Stroke Sister     No Known Problems Son  Breast cancer Maternal Aunt     Colon cancer Neg Hx        Meds/Allergies   Current Outpatient Medications   Medication Sig Dispense Refill    aspirin 81 mg chewable tablet Chew 81 mg daily      Basaglstas KimbroughPen 100 units/mL injection pen 46 UNITS IN AM AND 30 UNITS IN PM, UP TO 90 UNITS DAILY 15 mL 5    Blood Glucose Monitoring Suppl (ONE TOUCH ULTRA 2) w/Device KIT by Does not apply route once for 1 dose Use Glucometer to test up to 3 times daily  1 each 0    Calcium Carb-Cholecalciferol (CALCIUM 1000 + D PO) Take by mouth      cholecalciferol (VITAMIN D3) 1,000 units tablet Take 2,000 Units by mouth daily      Coenzyme Q10 (Co Q10) 100 MG CAPS Take by mouth      fluticasone (FLONASE) 50 mcg/act nasal spray 1 spray into each nostril daily prn       furosemide (LASIX) 40 mg tablet TAKE 1 TABLET BY MOUTH EVERY DAY 90 tablet 1    glimepiride (AMARYL) 4 mg tablet 1 tablet in am and 1/2 tablet in pm 180 tablet 2    glucose blood (OneTouch Ultra) test strip Test up to 3 times daily  300 each 3    Insulin Pen Needle (B-D UF III MINI PEN NEEDLES) 31G X 5 MM MISC USE UP TO 3 TIMES A  each 6    JANUMET XR  MG TB24 Take 1 tablet by mouth 2 (two) times a day 180 tablet 3    KRILL OIL PO Take 750 mg by mouth      lactulose 20 g/30 mL Take 15 mL (10 g total) by mouth 2 (two) times a day 900 mL 6    levothyroxine 125 mcg tablet Take 125 mcg by mouth daily  2    nadolol (CORGARD) 20 mg tablet Take 1 tablet (20 mg total) by mouth daily 30 tablet 2    omeprazole (PriLOSEC) 20 mg delayed release capsule TAKE 1 CAPSULE BY MOUTH EVERY DAY 90 capsule 2    ONETOUCH DELICA LANCETS FINE MISC Use 1-3 times a day 100 each 3    simvastatin (ZOCOR) 40 mg tablet Take 40 mg by mouth daily  3    spironolactone (ALDACTONE) 50 mg tablet TAKE 1 TABLET BY MOUTH EVERY DAY 90 tablet 1     No current facility-administered medications for this visit        Allergies   Allergen Reactions    Latex     Sesame Seed (Diagnostic)      Other reaction(s): swelling inside mouth    Strawberry C [Ascorbate] Other (See Comments)     Mouth sores    Penicillins Rash       Objective   Vitals: not currently breastfeeding  Physical Exam  Vitals signs and nursing note reviewed  Constitutional:       General: She is not in acute distress  Appearance: Normal appearance  She is obese  She is not diaphoretic  HENT:      Head: Normocephalic and atraumatic  Eyes:      General: No scleral icterus  Extraocular Movements: Extraocular movements intact  Conjunctiva/sclera: Conjunctivae normal       Pupils: Pupils are equal, round, and reactive to light  Neck:      Musculoskeletal: Normal range of motion  Cardiovascular:      Rate and Rhythm: Normal rate and regular rhythm  Heart sounds: No murmur  Pulmonary:      Effort: Pulmonary effort is normal  No respiratory distress  Breath sounds: Normal breath sounds  No wheezing  Musculoskeletal:      Right lower leg: No edema  Left lower leg: No edema  Lymphadenopathy:      Cervical: No cervical adenopathy  Neurological:      Mental Status: She is alert and oriented to person, place, and time  Mental status is at baseline  Sensory: No sensory deficit  Psychiatric:         Mood and Affect: Mood normal          Behavior: Behavior normal          Thought Content: Thought content normal          The history was obtained from the review of the chart, patient      Lab Results:   Lab Results   Component Value Date/Time    Hemoglobin A1C 6 9 12/15/2020    Hemoglobin A1C 7 7 08/25/2020    Hemoglobin A1C 8 5 05/15/2020    WBC 2 86 (L) 09/15/2020 06:26 AM    WBC 3 83 (L) 09/14/2020 04:42 AM    WBC 4 37 09/13/2020 02:14 PM    Hemoglobin 11 1 (L) 09/15/2020 06:26 AM    Hemoglobin 11 1 (L) 09/14/2020 04:42 AM    Hemoglobin 13 2 09/13/2020 02:14 PM    Hematocrit 34 6 (L) 09/15/2020 06:26 AM    Hematocrit 33 9 (L) 09/14/2020 04:42 AM    Hematocrit 40 5 09/13/2020 02:14 PM    MCV 93 09/15/2020 06:26 AM    MCV 91 09/14/2020 04:42 AM    MCV 92 09/13/2020 02:14 PM    Platelets 62 (L) 19/57/8326 06:26 AM    Platelets 74 (L) 62/70/1070 04:42 AM    Platelets 71 (L) 24/65/7068 04:39 PM    BUN 18 09/15/2020 06:26 AM    BUN 21 09/14/2020 04:42 AM    BUN 25 09/13/2020 02:14 PM    Potassium 3 4 (L) 09/15/2020 06:26 AM    Potassium 3 5 09/14/2020 04:42 AM    Potassium 4 5 09/13/2020 02:14 PM    Chloride 107 09/15/2020 06:26 AM    Chloride 112 (H) 09/14/2020 04:42 AM    Chloride 106 09/13/2020 02:14 PM    CO2 26 09/15/2020 06:26 AM    CO2 27 09/14/2020 04:42 AM    CO2 28 09/13/2020 02:14 PM    Creatinine 0 88 09/15/2020 06:26 AM    Creatinine 0 97 09/14/2020 04:42 AM    Creatinine 1 00 09/13/2020 02:14 PM    AST 32 09/14/2020 04:42 AM    AST 39 09/13/2020 02:14 PM    ALT 26 09/14/2020 04:42 AM    ALT 32 09/13/2020 02:14 PM    Albumin 2 5 (L) 09/14/2020 04:42 AM    Albumin 3 0 (L) 09/13/2020 02:14 PM         Portions of the record may have been created with voice recognition software  Occasional wrong word or "sound a like" substitutions may have occurred due to the inherent limitations of voice recognition software  Read the chart carefully and recognize, using context, where substitutions have occurred

## 2021-03-24 NOTE — PATIENT INSTRUCTIONS
Continue with current diabetes medication  Get lab work at the end of the month  Follow up in 1 month with Dr Sanjuan Sicard

## 2021-03-25 ENCOUNTER — TELEPHONE (OUTPATIENT)
Dept: ENDOCRINOLOGY | Facility: HOSPITAL | Age: 76
End: 2021-03-25

## 2021-03-25 NOTE — TELEPHONE ENCOUNTER
Patient called  She noticed that on her AVS her medication doses were incorrect  She is currently taking Basaglar 48 units AM and 32 units PM and Glimepiride 1 5 tabs AM and  5 tabs PM  She would like to make sure this is what she is to be taking

## 2021-04-01 NOTE — TELEPHONE ENCOUNTER
Patient called bryan villalta to make an appt  I see that she is seen in [facility]      [phone]

## 2021-04-06 LAB — HBA1C MFR BLD HPLC: 8.6 %

## 2021-04-13 ENCOUNTER — HOSPITAL ENCOUNTER (OUTPATIENT)
Dept: ULTRASOUND IMAGING | Facility: HOSPITAL | Age: 76
Discharge: HOME | End: 2021-04-13
Attending: INTERNAL MEDICINE
Payer: MEDICARE

## 2021-04-13 DIAGNOSIS — K75.81 LIVER CIRRHOSIS SECONDARY TO NASH (NONALCOHOLIC STEATOHEPATITIS) (HCC): ICD-10-CM

## 2021-04-13 DIAGNOSIS — K74.60 LIVER CIRRHOSIS SECONDARY TO NASH (NONALCOHOLIC STEATOHEPATITIS) (HCC): ICD-10-CM

## 2021-04-13 PROCEDURE — 76700 US EXAM ABDOM COMPLETE: CPT

## 2021-04-14 ENCOUNTER — OFFICE VISIT (OUTPATIENT)
Dept: DIABETES SERVICES | Facility: HOSPITAL | Age: 76
End: 2021-04-14
Payer: MEDICARE

## 2021-04-14 VITALS — WEIGHT: 192 LBS | BODY MASS INDEX: 34.02 KG/M2 | HEIGHT: 63 IN

## 2021-04-14 DIAGNOSIS — Z79.4 TYPE 2 DIABETES MELLITUS WITH HYPERGLYCEMIA, WITH LONG-TERM CURRENT USE OF INSULIN (HCC): ICD-10-CM

## 2021-04-14 DIAGNOSIS — E11.65 TYPE 2 DIABETES MELLITUS WITH HYPERGLYCEMIA, WITH LONG-TERM CURRENT USE OF INSULIN (HCC): ICD-10-CM

## 2021-04-14 PROCEDURE — 97802 MEDICAL NUTRITION INDIV IN: CPT | Performed by: DIETITIAN, REGISTERED

## 2021-04-14 NOTE — PROGRESS NOTES
Medical Nutrition Therapy     Assessment    Visit Type: Initial visit  Chief complaint:  Type 2 Diabetes     HPI:   Met with René Madden for intial MNT  Testing fasting and before supper  Give a poor food recall  States she has frustration with finding foods and meals to be able to eat because she has so many restrictions  Her or daughter with Down syndrome has heart failure issues so they do not eat any salt, she has restrictions due to her liver failure, and was told she cannot eat anything white due to her diabetes, and so feels like she is left with no options  Also states difficulty in testing that hurts her fingers a lot  Referral from Marisol Duggan at endocrinology was desiring a DEX com professional, but she she does not use a smart device which is needed in order for her to be able to see the blood sugars and replace fingersticks  Therefore we were not able to do that  She does not express interest in doing a blinded a version as she only wants it to get rid of the fingersticks  She also does not qualify for a personal unit as she is only on insulin twice a day  Therefore we completed medical nutrition therapy today instead  Food recall shows primary issues: she does not make terrible food choices, but her carb intake throughout the day varies greatly  Discussed she is putting too much restriction on herself in terms of eating in the diabetes, she can eat carbohydrates but we want a moderate and consistent consumption, not that she needs to avoid them altogether  Most meals tend to be either high carbohydrate or not at all  She is also very picky eater which plays a role as well  She declines working through a diet that involves label reading and any carbohydrate counting, used general concepts and awareness of carbohydrate foods instead and I put together sample meals for her based on her current meals to show increases or decreases to carbs that I suggest at meals    She grazes on carbs throughout the day which is likely also an issue, I did suggest that she tries to graze on protein foods if snacking but this was not appealing to her  Making changes to her diet will be a challenge, but she also does not want to take insulin and ideally no more diabetes pills either  She expressed frustration when she left today  Together we discussed what foods contain CHO, reading a food label, serving sizes, and set a carb goal of 30-45g CHO/meal to promote weight loss with 15g snacks  Put together sample meals for Miriam's reference and evaluated Miriam's current eating plan  Good understanding, Darling Berto will call with questions or for more education  Follow up as needed if not improving  Ht Readings from Last 1 Encounters:   04/14/21 5' 3" (1 6 m)     Wt Readings from Last 3 Encounters:   04/14/21 87 1 kg (192 lb)   03/24/21 87 2 kg (192 lb 3 2 oz)   02/10/21 88 kg (194 lb)        Body mass index is 34 01 kg/m²  Lab Results   Component Value Date    HGBA1C 8 6 04/06/2021    HGBA1C 6 9 12/15/2020    HGBA1C 7 7 08/25/2020       No results found for: CHOL  No results found for: HDL  No results found for: LDLCALC  No results found for: TRIG  No results found for: CHOLHDL    Weight Change: No    Medical Diagnosis/ICD 10 Code:  E11 65    Barriers to Learning: emotional    Do you follow any special diet presently?: Yes - low salt low sugar  Who shops: patient  Who cooks: patient    Food Log: Completed via the method of food recall- lives with disabled daughter     Breakfast: not getting good sleep since caring for her daughter, gets up around [address] [address] thyroid pill and then waits  Eats 9am  Likes cereal cherrios, rice crispies, corn flakes with milk, sometimes fruit with it  or eggs with 1 toast; 1 toast with milk on it  Just water   Morning Snack: always snacking- tomatoes, crackers, stopped eating pretzels and chip  Lunch:1pm: salad "cow fodder", or a burger without a bun, loves soda diet     Afternoon Snack: crackers or tomatoes  Might get a little sugar free candy   Dinner: 6pm: chili; gets some food from food bank  Might make a full meal or just mac and meat goulosh  Evening Snack:  Cool whip and berries but mostly berries  Cup of tea  Bed midnight  More tomatoes   Beverages: water, coffee socally rare, tea at night with milk, soda diet, mix for water sugar free, no milk only in things, juice used to have a little oj when sugars are low,   Eating out/Take out: veda salas if out and eats half a bun  Exercise ADL, working around the house, cutting wood,     Nutrition Diagnosis:  Food and nutrition related knowledge deficit  related to Lack of prior exposure to accurate nutrition related information as evidenced by [employer] inaccurate or incomplete information    Intervention: behavior modification strategies, individualized meal plan and monitoring portion control     Treatment Goals: Patient understands education and recommendations    Education Material Given  Gwendolyn Vega was provided the Portion Booklet and Planning Healthy Meals     Monitoring and evaluation:    Term code indicator   1 6 3 Carbohydrate Intake Criteria: 30-45g CHO per meal, 15g CHO snacks    Patients Response to Instruction:  Olive Haji  Motivationpoor  Expected Compliancepoor    Thank you for coming to the [facility] for education today  Please feel free to call with any questions or concerns      Yohana Gallegos, [address]  [address] [facility]  [address] [facility] [id number]

## 2021-04-16 ENCOUNTER — TELEPHONE (OUTPATIENT)
Dept: ENDOCRINOLOGY | Facility: HOSPITAL | Age: 76
End: 2021-04-16

## 2021-04-16 ENCOUNTER — OFFICE VISIT (OUTPATIENT)
Dept: ENDOCRINOLOGY | Facility: HOSPITAL | Age: 76
End: 2021-04-16
Payer: MEDICARE

## 2021-04-16 VITALS
BODY MASS INDEX: 34.59 KG/M2 | DIASTOLIC BLOOD PRESSURE: 64 MMHG | HEART RATE: 58 BPM | WEIGHT: 195.2 LBS | SYSTOLIC BLOOD PRESSURE: 110 MMHG | HEIGHT: 63 IN

## 2021-04-16 DIAGNOSIS — E78.2 MIXED HYPERLIPIDEMIA: ICD-10-CM

## 2021-04-16 DIAGNOSIS — E11.8 TYPE 2 DIABETES MELLITUS WITH COMPLICATION (HCC): ICD-10-CM

## 2021-04-16 DIAGNOSIS — E03.9 ACQUIRED HYPOTHYROIDISM: ICD-10-CM

## 2021-04-16 DIAGNOSIS — I10 ESSENTIAL HYPERTENSION: ICD-10-CM

## 2021-04-16 DIAGNOSIS — E11.65 TYPE 2 DIABETES MELLITUS WITH HYPERGLYCEMIA, WITH LONG-TERM CURRENT USE OF INSULIN (HCC): Primary | ICD-10-CM

## 2021-04-16 DIAGNOSIS — E11.42 DIABETIC POLYNEUROPATHY ASSOCIATED WITH TYPE 2 DIABETES MELLITUS (HCC): ICD-10-CM

## 2021-04-16 DIAGNOSIS — Z79.4 TYPE 2 DIABETES MELLITUS WITH HYPERGLYCEMIA, WITH LONG-TERM CURRENT USE OF INSULIN (HCC): Primary | ICD-10-CM

## 2021-04-16 PROCEDURE — 99215 OFFICE O/P EST HI 40 MIN: CPT | Performed by: INTERNAL MEDICINE

## 2021-04-16 RX ORDER — INSULIN GLARGINE 100 [IU]/ML
INJECTION, SOLUTION SUBCUTANEOUS
Qty: 15 ML | Refills: 5
Start: 2021-04-16 | End: 2021-05-06 | Stop reason: SDUPTHER

## 2021-04-16 NOTE — PATIENT INSTRUCTIONS
Hemoglobin A1c is 8 6%  This is higher than last visit demonstrates poorly controlled diabetes  Let's increase the basaglar insulin to 48 units in the am and 34 units in the pm    continue the same janumet and glimepiride  work on less snacking during the day  Continue to check blood sugars 2-3 times a day  Pleas call in blood sugars in 2 weeks  Follow up in 3 months with blood work

## 2021-04-16 NOTE — PROGRESS NOTES
4/18/2021    Assessment/Plan      Diagnoses and all orders for this visit:    Type 2 diabetes mellitus with hyperglycemia, with long-term current use of insulin ([facility] )  -     HEMOGLOBIN A1C W/ EAG ESTIMATION Lab Collect; Future  -     Comprehensive metabolic panel Lab Collect; Future  -     TSH, 3rd generation Lab Collect; Future  -     T4, free Lab Collect; Future  -     Lipid Panel with Direct LDL reflex Lab Collect; Future    Diabetic polyneuropathy associated with type 2 diabetes mellitus (HCC)  -     HEMOGLOBIN A1C W/ EAG ESTIMATION Lab Collect; Future  -     Comprehensive metabolic panel Lab Collect; Future  -     TSH, 3rd generation Lab Collect; Future  -     T4, free Lab Collect; Future  -     Lipid Panel with Direct LDL reflex Lab Collect; Future    Acquired hypothyroidism  -     HEMOGLOBIN A1C W/ EAG ESTIMATION Lab Collect; Future  -     Comprehensive metabolic panel Lab Collect; Future  -     TSH, 3rd generation Lab Collect; Future  -     T4, free Lab Collect; Future  -     Lipid Panel with Direct LDL reflex Lab Collect; Future    Essential hypertension  -     HEMOGLOBIN A1C W/ EAG ESTIMATION Lab Collect; Future  -     Comprehensive metabolic panel Lab Collect; Future  -     TSH, 3rd generation Lab Collect; Future  -     T4, free Lab Collect; Future  -     Lipid Panel with Direct LDL reflex Lab Collect; Future    Mixed hyperlipidemia  -     HEMOGLOBIN A1C W/ EAG ESTIMATION Lab Collect; Future  -     Comprehensive metabolic panel Lab Collect; Future  -     TSH, 3rd generation Lab Collect; Future  -     T4, free Lab Collect; Future  -     Lipid Panel with Direct LDL reflex Lab Collect; Future    Type 2 diabetes mellitus with complication (HCC)  -     Basaglar KwikPen 100 units/mL injection pen; 48 units in am and 34 units in pm, up to 90 units daily        Assessment/Plan:    1  Type 2 diabetes, insulin requiring  Hemoglobin A1c is 8 6%    This is higher than last visit and demonstrates poorly controlled diabetes  For now, I will increase her Basaglar insulin to 48 units the morning and 34 units in the evening  I wanted her to add insulin at suppertime because of high blood sugars before bedtime but she flat leave refuses to add more insulin therapy  She will continue the same Janumet and glimepiride  She will work on snacking last throughout the day  She will continue to check her blood sugars 2 to 3 times a day and I have asked her to call in her blood sugars in 2 weeks for review and adjustments in medication as needed  2  Diabetic neuropathy  She has stable neuropathic symptoms  Diabetic foot exams are up-to-date  She does see a podiatrist regularly  3  Hypothyroidism  Thyroid function tests are normal   She is biochemically and clinically euthyroid  She will continue the same levothyroxine 125 mcg daily  4  Hypertension  Blood pressure is under good control on her current dose of nadolol and   Furosemide  5  Hyperlipidemia  She will continue the same simvastatin 40 mg daily  I have asked her to follow up in 3 months with preceding hemoglobin A1c, CMP, lipid panel, TSH, and free T4       CC: Diabetes Type 2, blood pressure, thyroid, lipid follow-up    History of Present Illness     HPI: Elena Aguilar is a 77y o  year old female with type 2 diabetes, insulin requiring with neuropathy for 49 years, hypothyroidism, hypertension, hyperlipidemia for follow-up visit  She is on oral agents and insulin at home and takes  Basaglar insulin 48 units the morning and 32 units in the evening, Janumet XR 50/1000 mg twice a day, and glimepiride 4 mg 1 and half tablet in the morning and half tablet in the evening  She had blood sugars into the 500s in the evening and was asked to add Humalog insulin at suppertime meal, 5 units but refused  She denies any polyphagia, polydipsia, nocturia and blurry vision  She has chronic polyuria with dry mouth   She has chronic feet numbness unchanged  She denies chest pain or shortness of breath  She did see her cardiologist recently  She denies nephropathy, retinopathy, heart attack, stroke and claudication but does admit to neuropathy  Trying to eat better with daughter, low salt and low sweets, daughter is overeating  Hypoglycemic episodes: No rare  The patient's last eye exam was in  2020 with stable retinopathy  The patient's last foot exam was in   2020 at endocrine office visit  She does see a podiatrist regularly  Last A1C was   Lab Results   Component Value Date    HGBA1C 8 6 2021     Blood Sugar/Glucometer/Pump/CGM review: Checks blood sugars twice a day, can be 3-4 times a day  Blood sugar record demonstrates blood sugars that vary from 100 to over 300 in the morning and blood sugars that are relatively consistently over 180 and up as high as the 400s in the evening and around suppertime  She has hypothyroidism and takes levothyroxine 125 mcg daily  She is always somewhat cold  She is fatigued  She has chronic tremors since her    She is diarrhea with her lactulose  She does have anxiety and depression and insomnia  She denies constipation, palpitation, weight changes, heat intolerance  She has hypertension and takes furosemide 40 mg daily and nadolol 20 mg daily  She denies headache or stroke-like symptoms  She has hyperlipidemia and takes simvastatin 40 mg daily  She denies chest pain or shortness of breath  Had both COVID vaccine doses  Review of Systems   Constitutional: Positive for fatigue  Negative for unexpected weight change  HENT: Negative for trouble swallowing  Eyes: Negative for visual disturbance  Wears glasses  Respiratory: Negative for chest tightness and shortness of breath  Cardiovascular: Negative for chest pain, palpitations and leg swelling  Saw cardiologist recently    Had a stress test cardiology thinks issues are from anxiety  Gastrointestinal: Positive for diarrhea  Negative for abdominal pain, constipation and nausea  Diarrhea with lactulose  Endocrine: Positive for cold intolerance and polyuria  Negative for heat intolerance, polydipsia and polyphagia  Nocturia none  always thirst with a dry mouth  Skin: Negative for rash  Neurological: Positive for tremors and numbness  Negative for dizziness, weakness, light-headedness and headaches  Has chronic tremors since    Has feet numbness  Psychiatric/Behavioral: Positive for confusion, dysphoric mood and sleep disturbance  The patient is nervous/anxious  Feels mentally overwhelmed as yet and difficult to absorb more information  Under stress and daughter had event where she did not wake up due to heart failure  Getting nebulizer and not cooperative with oxygen and CPAP  Not much sleep as up with daughter all night         Historical Information   Past Medical History:   Diagnosis Date    Cirrhosis ([place] [address] )     Diabetes mellitus ([place] [address] )     Diabetic neuropathy ([facility] )     Diabetic neuropathy ([facility] )     Esophageal varices (HCC)     Fatty liver     Fatty liver     GERD (gastroesophageal reflux disease)     Hepatic encephalopathy (HCC)     Hiatal hernia     Hyperlipidemia     Hypertension     Hypoglycemia     Hypothyroidism     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)     Osteoarthritis     Osteopenia     Thrombocytopenia (HCC)     Type 2 diabetes mellitus ([facility] )      Past Surgical History:   Procedure Laterality Date    ABDOMINAL SURGERY      Abdominal plasty with mesh insertion    CATARACT EXTRACTION, BILATERAL      CATARACT EXTRACTION, BILATERAL      EGD AND COLONOSCOPY  2015    LAPAROSCOPIC CHOLECYSTECTOMY      SHOULDER SURGERY Right     calcium depsoti    TUBAL LIGATION      UMBILICAL HERNIA REPAIR      with mesh placed    UPPER GASTROINTESTINAL ENDOSCOPY       Social History   Social History Substance and Sexual Activity   Alcohol Use Not Currently    Frequency: Never     Social History     Substance and Sexual Activity   Drug Use No     Social History     Tobacco Use   Smoking Status Never Smoker   Smokeless Tobacco Never Used     Family History:   Family History   Problem Relation Age of Onset   [facility] Breast cancer Mother     Diabetes type II Father     Thyroid disease unspecified Daughter     Down syndrome Daughter     Diabetes type II Daughter     Diabetes type II Sister     No Known Problems Brother     Prostate cancer Brother     No Known Problems Sister     Stroke Sister     No Known Problems Son     Breast cancer Maternal Aunt     Colon cancer Neg Hx        Meds/Allergies   Current Outpatient Medications   Medication Sig Dispense Refill    aspirin 81 mg chewable tablet Chew 81 mg daily      Basaglar KwikPen 100 units/mL injection pen 48 units in am and 34 units in pm, up to 90 units daily 15 mL 5    Blood Glucose Monitoring Suppl (ONE TOUCH ULTRA 2) w/Device KIT by Does not apply route once for 1 dose Use Glucometer to test up to 3 times daily  1 each 0    Calcium Carb-Cholecalciferol (CALCIUM 1000 + D PO) Take by mouth      cholecalciferol (VITAMIN D3) 1,000 units tablet Take 2,000 Units by mouth daily      Coenzyme Q10 (Co Q10) 100 MG CAPS Take by mouth      fluticasone (FLONASE) 50 mcg/act nasal spray 1 spray into each nostril daily prn       furosemide (LASIX) 40 mg tablet TAKE 1 TABLET BY MOUTH EVERY DAY 90 tablet 1    glimepiride (AMARYL) 4 mg tablet 1 tablet in am and 1/2 tablet in pm (Patient taking differently: 1 5 tablet in am and 1/2 tablet in pm) 180 tablet 2    glucose blood (OneTouch Ultra) test strip Test up to 3 times daily   300 each 3    Insulin Pen Needle (B-D UF III MINI PEN NEEDLES) 31G X 5 MM MISC USE UP TO 3 TIMES A  each 6    JANUMET XR  MG TB24 Take 1 tablet by mouth 2 (two) times a day 180 tablet 3    KRILL OIL PO Take 750 mg by mouth      lactulose 20 g/30 mL Take 15 mL (10 g total) by mouth 2 (two) times a day (Patient taking differently: Take 10 g by mouth 4 (four) times a day ) 900 mL 6    levothyroxine 125 mcg tablet Take 125 mcg by mouth daily  2    nadolol (CORGARD) 20 mg tablet Take 1 tablet (20 mg total) by mouth daily 30 tablet 2    omeprazole (PriLOSEC) 20 mg delayed release capsule TAKE 1 CAPSULE BY MOUTH EVERY DAY 90 capsule 2    ONETOUCH DELICA LANCETS FINE MISC Use 1-3 times a day 100 each 3    simvastatin (ZOCOR) 40 mg tablet Take 40 mg by mouth daily  3    spironolactone (ALDACTONE) 50 mg tablet TAKE 1 TABLET BY MOUTH EVERY DAY 90 tablet 1    Xifaxan 550 MG tablet Take 550 mg by mouth every 12 (twelve) hours       No current facility-administered medications for this visit  Allergies   Allergen Reactions    Latex     Sesame Seed (Diagnostic) - Food Allergy      Other reaction(s): swelling inside mouth    Strawberry C [Ascorbate - Food Allergy] Other (See Comments)     Mouth sores    Penicillins Rash       Objective   Vitals: Blood pressure 110/64, pulse 58, height 5' 3" (1 6 m), weight 88 5 kg (195 lb 3 2 oz), not currently breastfeeding  Invasive Devices     None                 Physical Exam  Vitals signs reviewed  Constitutional:       Appearance: Normal appearance  She is well-developed  She is obese  HENT:      Head: Normocephalic and atraumatic  Eyes:      Extraocular Movements: Extraocular movements intact  Conjunctiva/sclera: Conjunctivae normal       Comments: Wearing glasses  Neck:      Musculoskeletal: Normal range of motion and neck supple  Thyroid: No thyromegaly  Vascular: No carotid bruit  Comments: Thyroid normal in size  No palpable thyroid nodules  Cardiovascular:      Rate and Rhythm: Normal rate and regular rhythm  Heart sounds: Normal heart sounds  No murmur     Pulmonary:      Effort: Pulmonary effort is normal       Breath sounds: Normal breath sounds  No wheezing  Abdominal:      Palpations: Abdomen is soft  Musculoskeletal: Normal range of motion  General: No deformity  Right lower leg: No edema  Left lower leg: No edema  Comments: No tremor of the outstretched hands  No Ulcerations of the feet  Lymphadenopathy:      Cervical: No cervical adenopathy  Skin:     General: Skin is warm and dry  Findings: No rash  Neurological:      Mental Status: She is alert and oriented to person, place, and time  Deep Tendon Reflexes: Reflexes are normal and symmetric  Comments: Deep tendon reflexes normal          The history was obtained from the review of the chart and from the patient  Lab Results:    Most recent Alc is  Lab Results   Component Value Date    HGBA1C 8 6 04/06/2021             Blood work done on 04/06/2021 at [facility] showed a CMP  With a BUN of 33, creatinine 1 21, GFR 33, BUN /creatinine ratio 27 3, fasting glucose  116, AST 49, ALT 37, but was otherwise normal   Lab Results   Component Value Date    CREATININE 0 88 09/15/2020    CREATININE 0 97 09/14/2020    CREATININE 1 00 09/13/2020    BUN 18 09/15/2020    K 3 4 (L) 09/15/2020     09/15/2020    CO2 26 09/15/2020     eGFR   Date Value Ref Range Status   09/15/2020 65 ml/min/1 73sq m Final     Lab Results   Component Value Date    ALT 26 09/14/2020    AST 32 09/14/2020    ALKPHOS 59 09/14/2020       TSH is 0 963 with a free T4 of 1 61  CBC shows a hemoglobin of 10 4 with hematocrit of 33 9, WBC 3 2, platelets 77      Future Appointments   Date Time Provider John Jenny   7/27/2021 10:00 AM Lily Valverde MD [facility]

## 2021-04-20 ENCOUNTER — TELEPHONE (OUTPATIENT)
Dept: GASTROENTEROLOGY | Facility: CLINIC | Age: 76
End: 2021-04-20

## 2021-04-20 NOTE — TELEPHONE ENCOUNTER
----- Message from Bryon Daily MD sent at 4/19/2021  8:14 AM EDT -----  I called the patient reviewed ultrasound and alpha fetoprotein  All negative  Repeat studies in 6 months  Patient probably should have a follow-up office visit by August   Thank you

## 2021-04-26 DIAGNOSIS — E11.8 TYPE 2 DIABETES MELLITUS WITH COMPLICATION (HCC): ICD-10-CM

## 2021-04-26 RX ORDER — SITAGLIPTIN AND METFORMIN HYDROCHLORIDE 1000; 50 MG/1; MG/1
TABLET, FILM COATED, EXTENDED RELEASE ORAL
Qty: 180 TABLET | Refills: 3 | Status: SHIPPED | OUTPATIENT
Start: 2021-04-26 | End: 2022-02-14

## 2021-04-27 ENCOUNTER — TELEPHONE (OUTPATIENT)
Dept: GASTROENTEROLOGY | Facility: CLINIC | Age: 76
End: 2021-04-27

## 2021-04-27 NOTE — TELEPHONE ENCOUNTER
Pt left  mssg asking Edna Wong what med she can take for awful headache and back of neck pain; can't come in for OV because she is taking care of her daughter  CB# [phone]

## 2021-04-27 NOTE — TELEPHONE ENCOUNTER
Reviewed with Cleopatra Bolton, RN patient can use Tylenol alternating with Ibuprofen but not to exceed 3000 mg of Tylenol daily  Patient advised to contact PCP re headache and neck pain  Pt was concerned it was related to her liver  She verbalized understanding of how to take Lactulose and when to take extra dose related to BM frequency  Also reviewed with patient headaches could be related to blood sugar and she could check with endocrinology

## 2021-05-06 DIAGNOSIS — E11.8 TYPE 2 DIABETES MELLITUS WITH COMPLICATION (HCC): ICD-10-CM

## 2021-05-06 RX ORDER — INSULIN GLARGINE 100 [IU]/ML
INJECTION, SOLUTION SUBCUTANEOUS
Qty: 30 ML | Refills: 1
Start: 2021-05-06 | End: 2021-05-07 | Stop reason: SDUPTHER

## 2021-05-07 DIAGNOSIS — E11.8 TYPE 2 DIABETES MELLITUS WITH COMPLICATION (HCC): Primary | ICD-10-CM

## 2021-05-07 RX ORDER — INSULIN GLARGINE 100 [IU]/ML
INJECTION, SOLUTION SUBCUTANEOUS
Qty: 30 ML | Refills: 1 | Status: SHIPPED | OUTPATIENT
Start: 2021-05-07 | End: 2021-07-27 | Stop reason: SDUPTHER

## 2021-05-12 ENCOUNTER — TELEPHONE (OUTPATIENT)
Dept: ENDOCRINOLOGY | Facility: HOSPITAL | Age: 76
End: 2021-05-12

## 2021-05-12 NOTE — TELEPHONE ENCOUNTER
Received voicemail from patient  She reports the pharmacy filled a old script  I spoke with the pharmacy, they believe she requested a refill on the old script  They have received the new script and this will be able to be filled on 5/25/21 and will be 2 boxes or 30ml  Left message for patient to call back

## 2021-05-19 NOTE — TELEPHONE ENCOUNTER
Spoke to patient and clarified  She said that her sugars have been running high   She is going to bring in her glucometer to be downloaded

## 2021-05-20 ENCOUNTER — TELEPHONE (OUTPATIENT)
Dept: ENDOCRINOLOGY | Facility: HOSPITAL | Age: 76
End: 2021-05-20

## 2021-05-24 ENCOUNTER — TELEPHONE (OUTPATIENT)
Dept: ENDOCRINOLOGY | Facility: HOSPITAL | Age: 76
End: 2021-05-24

## 2021-05-24 NOTE — TELEPHONE ENCOUNTER
Deyanira Ruiz, hopefully this will improve with moving all of her glimepiride to the am  IF it continues, please call us

## 2021-05-24 NOTE — TELEPHONE ENCOUNTER
Patient wanted to let you know she's had some low blood sugars in the morning  Was 71 two days ago, 70 yesterday & 54 this morning  She ate some mandarin orange slices & crackers with almond butter  Her blood sugar is now back up to 136  Said she's doing okay, just wanted to let you know

## 2021-06-08 DIAGNOSIS — R60.1 GENERALIZED EDEMA: ICD-10-CM

## 2021-06-08 RX ORDER — FUROSEMIDE 40 MG/1
TABLET ORAL
Qty: 90 TABLET | Refills: 1 | Status: SHIPPED | OUTPATIENT
Start: 2021-06-08 | End: 2021-12-06

## 2021-06-08 RX ORDER — SPIRONOLACTONE 50 MG/1
TABLET, FILM COATED ORAL
Qty: 90 TABLET | Refills: 1 | Status: SHIPPED | OUTPATIENT
Start: 2021-06-08 | End: 2021-12-06

## 2021-06-18 ENCOUNTER — TELEPHONE (OUTPATIENT)
Dept: GASTROENTEROLOGY | Facility: CLINIC | Age: 76
End: 2021-06-18

## 2021-06-18 NOTE — TELEPHONE ENCOUNTER
Pt needs injection for arthritis and was not sure if would react with her xifaxin  Advised she could get the injection

## 2021-06-18 NOTE — TELEPHONE ENCOUNTER
Pt left message she refused injection to treat her arthritis because she thought she could not have it because of the medication MAJO prescribed for her liver    [phone]

## 2021-06-21 ENCOUNTER — TELEPHONE (OUTPATIENT)
Dept: ENDOCRINOLOGY | Facility: HOSPITAL | Age: 76
End: 2021-06-21

## 2021-06-21 NOTE — TELEPHONE ENCOUNTER
I reviewed her blood sugar record over the last several months  First of all, blood sugar record shows no blood sugars since May 18 so I think her due date is wrong on her blood sugar monitor because I believe she has been testing her blood sugars during that time frame  Blood sugars appear to be consistently high in the upper 100s but mostly 200-500 range  At this point, increase her Basaglar insulin to 50 units the morning and 35 units in the evening  Continue the same glimepiride 4 mg 2 in the morning and Janumet 50/1000 mg twice a day  I think she should be on insulin at meals but she has refused that in the past   Is she willing to consider this? I think the reason she was told not to use Tylenol as because of her liver disease

## 2021-06-21 NOTE — TELEPHONE ENCOUNTER
Patient is supposed to be getting a Cortisone injection in her shoulder  She said her fasting today was 333 and yesterday it was 521  She said all of her fasting numbers have been high  Before supper it's been in the high 200's to 300's  She is also also asking what she can take for pain as needed? She thought she was told she can't take Tylenol?

## 2021-07-06 ENCOUNTER — TELEPHONE (OUTPATIENT)
Dept: GASTROENTEROLOGY | Facility: CLINIC | Age: 76
End: 2021-07-06

## 2021-07-06 NOTE — TELEPHONE ENCOUNTER
Called pt back, she was diagnosed with arthritis in her left shoulder and arm and her PCP initially gave her Meloxicam which after she picked up noted not to take with any history of liver disease so never took it  She called her PCP back about this and was then given Tramadol  She picked that up and it noted the same thing  Pt is questioning your opinion if either of them is safe for her with her 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  She is aware Dr Yoseph Lala is not in today

## 2021-07-06 NOTE — TELEPHONE ENCOUNTER
Pt states she needs to know what she can take for pain; got 2 meds from Dr Renny Sifuentes - Meloxicam & Tramadol; told Prim she can't take because inserts say not to take if you have liver disease  # [phone]

## 2021-07-12 ENCOUNTER — HOSPITAL ENCOUNTER (EMERGENCY)
Facility: HOSPITAL | Age: 76
Discharge: HOME | End: 2021-07-13
Attending: EMERGENCY MEDICINE | Admitting: EMERGENCY MEDICINE
Payer: MEDICARE

## 2021-07-12 DIAGNOSIS — D61.818 PANCYTOPENIA (HCC): ICD-10-CM

## 2021-07-12 DIAGNOSIS — D64.9 ANEMIA: Primary | ICD-10-CM

## 2021-07-12 LAB
ABO GROUP BLD: NORMAL
ABO GROUP BLD: NORMAL
ALBUMIN SERPL BCP-MCNC: 2.7 G/DL (ref 3.5–5)
ALP SERPL-CCNC: 63 U/L (ref 46–116)
ALT SERPL W P-5'-P-CCNC: 27 U/L (ref 12–78)
ANION GAP SERPL CALCULATED.3IONS-SCNC: 9 MMOL/L (ref 4–13)
APTT PPP: 31 SECONDS (ref 23–37)
AST SERPL W P-5'-P-CCNC: 31 U/L (ref 5–45)
BASOPHILS # BLD AUTO: 0.03 THOUSANDS/ΜL (ref 0–0.1)
BASOPHILS NFR BLD AUTO: 1 % (ref 0–1)
BILIRUB SERPL-MCNC: 0.5 MG/DL (ref 0.2–1)
BLD GP AB SCN SERPL QL: NEGATIVE
BUN SERPL-MCNC: 37 MG/DL (ref 5–25)
CALCIUM ALBUM COR SERPL-MCNC: 9.7 MG/DL (ref 8.3–10.1)
CALCIUM SERPL-MCNC: 8.7 MG/DL (ref 8.3–10.1)
CHLORIDE SERPL-SCNC: 105 MMOL/L (ref 100–108)
CO2 SERPL-SCNC: 25 MMOL/L (ref 21–32)
CREAT SERPL-MCNC: 1.33 MG/DL (ref 0.6–1.3)
EOSINOPHIL # BLD AUTO: 0.04 THOUSAND/ΜL (ref 0–0.61)
EOSINOPHIL NFR BLD AUTO: 1 % (ref 0–6)
ERYTHROCYTE [DISTWIDTH] IN BLOOD BY AUTOMATED COUNT: 17.9 % (ref 11.6–15.1)
GFR SERPL CREATININE-BSD FRML MDRD: 39 ML/MIN/1.73SQ M
GLUCOSE SERPL-MCNC: 202 MG/DL (ref 65–140)
HCT VFR BLD AUTO: 22 % (ref 34.8–46.1)
HGB BLD-MCNC: 6.1 G/DL (ref 11.5–15.4)
IMM GRANULOCYTES # BLD AUTO: 0.01 THOUSAND/UL (ref 0–0.2)
IMM GRANULOCYTES NFR BLD AUTO: 0 % (ref 0–2)
INR PPP: 1.27 (ref 0.84–1.19)
LYMPHOCYTES # BLD AUTO: 0.85 THOUSANDS/ΜL (ref 0.6–4.47)
LYMPHOCYTES NFR BLD AUTO: 29 % (ref 14–44)
MCH RBC QN AUTO: 21.6 PG (ref 26.8–34.3)
MCHC RBC AUTO-ENTMCNC: 27.7 G/DL (ref 31.4–37.4)
MCV RBC AUTO: 78 FL (ref 82–98)
MONOCYTES # BLD AUTO: 0.31 THOUSAND/ΜL (ref 0.17–1.22)
MONOCYTES NFR BLD AUTO: 11 % (ref 4–12)
NEUTROPHILS # BLD AUTO: 1.72 THOUSANDS/ΜL (ref 1.85–7.62)
NEUTS SEG NFR BLD AUTO: 58 % (ref 43–75)
NRBC BLD AUTO-RTO: 0 /100 WBCS
PLATELET # BLD AUTO: 77 THOUSANDS/UL (ref 149–390)
POTASSIUM SERPL-SCNC: 4.5 MMOL/L (ref 3.5–5.3)
PROT SERPL-MCNC: 6.2 G/DL (ref 6.4–8.2)
PROTHROMBIN TIME: 15.9 SECONDS (ref 11.6–14.5)
RBC # BLD AUTO: 2.82 MILLION/UL (ref 3.81–5.12)
RH BLD: NEGATIVE
RH BLD: NEGATIVE
SODIUM SERPL-SCNC: 139 MMOL/L (ref 136–145)
SPECIMEN EXPIRATION DATE: NORMAL
WBC # BLD AUTO: 2.96 THOUSAND/UL (ref 4.31–10.16)

## 2021-07-12 PROCEDURE — 99284 EMERGENCY DEPT VISIT MOD MDM: CPT | Performed by: EMERGENCY MEDICINE

## 2021-07-12 PROCEDURE — 36430 TRANSFUSION BLD/BLD COMPNT: CPT

## 2021-07-12 PROCEDURE — 99284 EMERGENCY DEPT VISIT MOD MDM: CPT

## 2021-07-12 PROCEDURE — 36415 COLL VENOUS BLD VENIPUNCTURE: CPT | Performed by: EMERGENCY MEDICINE

## 2021-07-12 PROCEDURE — 86850 RBC ANTIBODY SCREEN: CPT | Performed by: EMERGENCY MEDICINE

## 2021-07-12 PROCEDURE — 86920 COMPATIBILITY TEST SPIN: CPT

## 2021-07-12 PROCEDURE — 85610 PROTHROMBIN TIME: CPT | Performed by: EMERGENCY MEDICINE

## 2021-07-12 PROCEDURE — 85025 COMPLETE CBC W/AUTO DIFF WBC: CPT | Performed by: EMERGENCY MEDICINE

## 2021-07-12 PROCEDURE — 85730 THROMBOPLASTIN TIME PARTIAL: CPT | Performed by: EMERGENCY MEDICINE

## 2021-07-12 PROCEDURE — P9016 RBC LEUKOCYTES REDUCED: HCPCS

## 2021-07-12 PROCEDURE — 86900 BLOOD TYPING SEROLOGIC ABO: CPT | Performed by: EMERGENCY MEDICINE

## 2021-07-12 PROCEDURE — 86901 BLOOD TYPING SEROLOGIC RH(D): CPT | Performed by: EMERGENCY MEDICINE

## 2021-07-12 PROCEDURE — 80053 COMPREHEN METABOLIC PANEL: CPT | Performed by: EMERGENCY MEDICINE

## 2021-07-12 NOTE — ED PROVIDER NOTES
History  Chief Complaint   Patient presents with    Abnormal Lab     Patient states that she was sent here for abnormal labs  Hgb 6 6  Patient denies any problems     32-year-old female presents for evaluation of abnormal outpatient blood work  The patient states that she got a call from her primary care doctor regarding some routine blood work of a hemoglobin of 6 6  The patient denies any dark black or bloody stools  She denies any specific fatigue or exertional dyspnea  She has been going to physical therapy for arthralgias of her left shoulder and arm  Prior to Admission Medications   Prescriptions Last Dose Informant Patient Reported? Taking? Basaglar KwikPen 100 units/mL injection pen   No No   Si units in am and 34 units in pm, up to 90 units daily   Blood Glucose Monitoring Suppl (ONE TOUCH ULTRA 2) w/Device KIT  Self No No   Sig: by Does not apply route once for 1 dose Use Glucometer to test up to 3 times daily     Calcium Carb-Cholecalciferol (CALCIUM 1000 + D PO)  Self Yes No   Sig: Take by mouth   Coenzyme Q10 (Co Q10) 100 MG CAPS  Self Yes No   Sig: Take by mouth   Insulin Pen Needle (B-D UF III MINI PEN NEEDLES) 31G X 5 MM MISC  Self No No   Sig: USE UP TO 3 TIMES A DAY   Janumet XR  MG TB24   No No   Sig: TAKE 1 TABLET BY MOUTH TWICE A DAY   KRILL OIL PO  Self Yes No   Sig: Take 750 mg by mouth   ONETOUCH DELICA LANCETS FINE MISC  Self No No   Sig: Use 1-3 times a day   Xifaxan 550 MG tablet  Self Yes No   Sig: Take 550 mg by mouth every 12 (twelve) hours   aspirin 81 mg chewable tablet  Self Yes No   Sig: Chew 81 mg daily   cholecalciferol (VITAMIN D3) 1,000 units tablet  Self Yes No   Sig: Take 2,000 Units by mouth daily   fluticasone (FLONASE) 50 mcg/act nasal spray  Self Yes No   Si spray into each nostril daily prn    furosemide (LASIX) 40 mg tablet   No No   Sig: TAKE 1 TABLET BY MOUTH EVERY DAY   glimepiride (AMARYL) 4 mg tablet  Self No No   Si tablet in am and 1/2 tablet in pm   Patient taking differently: 1 5 tablet in am and 1/2 tablet in pm   glucose blood (OneTouch Ultra) test strip  Self No No   Sig: Test up to 3 times daily     lactulose 20 g/30 mL  Self No No   Sig: Take 15 mL (10 g total) by mouth 2 (two) times a day   Patient taking differently: Take 10 g by mouth 4 (four) times a day    levothyroxine 125 mcg tablet  Self Yes No   Sig: Take 125 mcg by mouth daily   nadolol (CORGARD) 20 mg tablet  Self No No   Sig: Take 1 tablet (20 mg total) by mouth daily   omeprazole (PriLOSEC) 20 mg delayed release capsule  Self No No   Sig: TAKE 1 CAPSULE BY MOUTH EVERY DAY   simvastatin (ZOCOR) 40 mg tablet  Self Yes No   Sig: Take 40 mg by mouth daily   spironolactone (ALDACTONE) 50 mg tablet   No No   Sig: TAKE 1 TABLET BY MOUTH EVERY DAY      Facility-Administered Medications: None       Past Medical History:   Diagnosis Date    Cirrhosis ([place] [address] )     Diabetes mellitus ([place] [address] )     Diabetic neuropathy (HCC)     Diabetic neuropathy (HCC)     Esophageal varices (HCC)     Fatty liver     Fatty liver     GERD (gastroesophageal reflux disease)     Hepatic encephalopathy (HCC)     Hiatal hernia     Hyperlipidemia     Hypertension     Hypoglycemia     Hypothyroidism     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)     Osteoarthritis     Osteopenia     Thrombocytopenia (HCC)     Type 2 diabetes mellitus (HCC)        Past Surgical History:   Procedure Laterality Date    ABDOMINAL SURGERY      Abdominal plasty with mesh insertion    CATARACT EXTRACTION, BILATERAL      CATARACT EXTRACTION, BILATERAL      EGD AND COLONOSCOPY  03/18/2015    LAPAROSCOPIC CHOLECYSTECTOMY      SHOULDER SURGERY Right     calcium depsoti    TUBAL LIGATION      UMBILICAL HERNIA REPAIR      with mesh placed    UPPER GASTROINTESTINAL ENDOSCOPY         Family History   Problem Relation Age of Onset    Breast cancer Mother     Diabetes type II Father     Thyroid disease unspecified Daughter     Down syndrome Daughter     Diabetes type II Daughter     Diabetes type II Sister     No Known Problems Brother     Prostate cancer Brother     No Known Problems Sister     Stroke Sister     No Known Problems Son     Breast cancer Maternal Aunt     Colon cancer Neg Hx      I have reviewed and agree with the history as documented  E-Cigarette/Vaping    E-Cigarette Use Never User      E-Cigarette/Vaping Substances    Nicotine No     THC No     CBD No     Flavoring No     Other No     Unknown No      Social History     Tobacco Use    Smoking status: Never Smoker    Smokeless tobacco: Never Used   Vaping Use    Vaping Use: Never used   Substance Use Topics    Alcohol use: Not Currently    Drug use: No       Review of Systems   Constitutional: Positive for fatigue  Negative for chills and fever  HENT: Negative for sore throat  Eyes: Negative for visual disturbance  Respiratory: Negative for shortness of breath  Cardiovascular: Negative for chest pain  Gastrointestinal: Negative for abdominal pain, blood in stool, diarrhea, nausea and vomiting  Genitourinary: Negative for difficulty urinating, dysuria and pelvic pain  Musculoskeletal: Positive for arthralgias  Negative for back pain  Skin: Negative for rash  Neurological: Positive for weakness  Negative for syncope and headaches  All other systems reviewed and are negative  Physical Exam  Physical Exam  Vitals and nursing note reviewed  Constitutional:       General: She is not in acute distress  Appearance: She is well-developed  She is obese  HENT:      Head: Normocephalic and atraumatic  Right Ear: External ear normal       Left Ear: External ear normal    Eyes:      General: No scleral icterus  Conjunctiva/sclera: Conjunctivae normal       Pupils: Pupils are equal, round, and reactive to light  Cardiovascular:      Rate and Rhythm: Normal rate and regular rhythm        Heart sounds: Normal heart sounds  Pulmonary:      Effort: Pulmonary effort is normal  No respiratory distress  Breath sounds: Normal breath sounds  Abdominal:      General: Bowel sounds are normal       Palpations: Abdomen is soft  Tenderness: There is no abdominal tenderness  There is no guarding or rebound  Musculoskeletal:         General: Normal range of motion  Cervical back: Normal range of motion  Skin:     General: Skin is warm and dry  Findings: No rash  Neurological:      Mental Status: She is alert and oriented to person, place, and time           Vital Signs  ED Triage Vitals   Temperature Pulse Respirations Blood Pressure SpO2   07/12/21 1910 07/12/21 1910 07/12/21 1910 07/12/21 1910 07/12/21 1910   97 6 °F (36 4 °C) 70 18 (!) 106/49 98 %      Temp Source Heart Rate Source Patient Position - Orthostatic VS BP Location FiO2 (%)   07/12/21 1910 07/12/21 2308 07/12/21 1910 07/12/21 1910 --   Temporal Monitor Lying Right arm       Pain Score       --                  Vitals:    07/13/21 0150 07/13/21 0200 07/13/21 0215 07/13/21 0230   BP: 123/57 123/57 122/56 122/56   Pulse: 59 59 56 56   Patient Position - Orthostatic VS:  Lying Lying Lying         Visual Acuity      ED Medications  Medications - No data to display    Diagnostic Studies  Results Reviewed     Procedure Component Value Units Date/Time    CBC and differential [[id number]]  (Abnormal) Collected: 07/12/21 1950    Lab Status: Final result Specimen: Blood from Arm, Right Updated: 07/12/21 2112     WBC 2 96 Thousand/uL      RBC 2 82 Million/uL      Hemoglobin 6 1 g/dL      Hematocrit 22 0 %      MCV 78 fL      MCH 21 6 pg      MCHC 27 7 g/dL      RDW 17 9 %      Platelets 77 Thousands/uL      nRBC 0 /100 WBCs      Neutrophils Relative 58 %      Immat GRANS % 0 %      Lymphocytes Relative 29 %      Monocytes Relative 11 %      Eosinophils Relative 1 %      Basophils Relative 1 %      Neutrophils Absolute 1 72 Thousands/µL Immature Grans Absolute 0 01 Thousand/uL      Lymphocytes Absolute 0 85 Thousands/µL      Monocytes Absolute 0 31 Thousand/µL      Eosinophils Absolute 0 04 Thousand/µL      Basophils Absolute 0 03 Thousands/µL     Comprehensive metabolic panel [[id number]]  (Abnormal) Collected: 07/12/21 1950    Lab Status: Final result Specimen: Blood from Arm, Right Updated: 07/12/21 2038     Sodium 139 mmol/L      Potassium 4 5 mmol/L      Chloride 105 mmol/L      CO2 25 mmol/L      ANION GAP 9 mmol/L      BUN 37 mg/dL      Creatinine 1 33 mg/dL      Glucose 202 mg/dL      Calcium 8 7 mg/dL      Corrected Calcium 9 7 mg/dL      AST 31 U/L      ALT 27 U/L      Alkaline Phosphatase 63 U/L      Total Protein 6 2 g/dL      Albumin 2 7 g/dL      Total Bilirubin 0 50 mg/dL      eGFR 39 ml/min/1 73sq m     Narrative:      [place] guidelines for Chronic Kidney Disease (CKD):     Stage 1 with normal or high GFR (GFR > 90 mL/min/1 73 square meters)    Stage 2 Mild CKD (GFR = 60-89 mL/min/1 73 square meters)    Stage 3A Moderate CKD (GFR = 45-59 mL/min/1 73 square meters)    Stage 3B Moderate CKD (GFR = 30-44 mL/min/1 73 square meters)    Stage 4 Severe CKD (GFR = 15-29 mL/min/1 73 square meters)    Stage 5 End Stage CKD (GFR <15 mL/min/1 73 square meters)  Note: GFR calculation is accurate only with a steady state creatinine    APTT [[id number]]  (Normal) Collected: 07/12/21 1950    Lab Status: Final result Specimen: Blood from Arm, Right Updated: 07/12/21 2033     PTT 31 seconds     Protime-INR [[id number]]  (Abnormal) Collected: 07/12/21 1950    Lab Status: Final result Specimen: Blood from Arm, Right Updated: 07/12/21 2033     Protime 15 9 seconds      INR 1 27                 No orders to display              Procedures  Procedures         ED Course  ED Course as of Jul 16 1204 Mon Jul 12, 2021   2107 6  1hb plts 77      2119 I discussed results of the patient's CBC with a hemoglobin of 6 1    Patient signed consent for blood transfusion however patient is refusing to be admitted due to the fact that she has to take care of her daughter with Down syndrome  The patient understands the need for blood and that the best course to Medical action may be admission however she has nobody else to care for her daughter and therefore will follow-up with Dr Abida Yip from GI as an outpatient  Hemoglobin(!!): 6 1                             SBIRT 22yo+      Most Recent Value   SBIRT (25 yo +)   In order to provide better care to our patients, we are screening all of our patients for alcohol and drug use  Would it be okay to ask you these screening questions? No Filed at: 07/12/2021 2304                    MDM  Number of Diagnoses or Management Options  Anemia  [facility] ([facility] )  Diagnosis management comments: Plan is to see recheck laboratories and transfuse as needed  Amount and/or Complexity of Data Reviewed  Review and summarize past medical records: yes        Disposition  Final diagnoses:   Anemia   Pancytopenia ([facility] )     Time reflects when diagnosis was documented in both MDM as applicable and the Disposition within this note     Time User Action Codes Description Comment    7/12/2021  9:21 PM Naveen Merida Add [D64 9] Anemia     7/12/2021  9:21 PM Naveen Merida Add [L06 [id number]] Pancytopenia [facility])       ED Disposition     ED Disposition Condition Date/Time Comment    Discharge Stable Tue Jul 13, 2021  2:05 AM Isaiah Rogers discharge to home/self care  Follow-up Information     Follow up With Specialties Details Why Contact Info Additional Information    Dori Cutler DO  Schedule an appointment as soon as possible for a visit   [facility] [address] [facility]  [address] [place] [address]       Vernell Montanez MD Gastroenterology Schedule an appointment as soon as possible for a visit   [address]  [address] Hematology Oncology Specialists [facility] Hematology and Oncology Call  For further evaluation [address]  [phone] [facility] Hematology Oncology Specialists [facility], [facility], [address], [place], [place], [id number] [phone]          Discharge Medication List as of 7/13/2021  2:06 AM      CONTINUE these medications which have NOT CHANGED    Details   aspirin 81 mg chewable tablet Chew 81 mg daily, Historical Med      Basaglar KwikPen 100 units/mL injection pen 48 units in am and 34 units in pm, up to 90 units daily, Normal      Blood Glucose Monitoring Suppl (ONE TOUCH ULTRA 2) w/Device KIT by Does not apply route once for 1 dose Use Glucometer to test up to 3 times daily  , Starting Mon 9/28/2020, Normal      Calcium Carb-Cholecalciferol (CALCIUM 1000 + D PO) Take by mouth, Historical Med      cholecalciferol (VITAMIN D3) 1,000 units tablet Take 2,000 Units by mouth daily, Historical Med      Coenzyme Q10 (Co Q10) 100 MG CAPS Take by mouth, Historical Med      fluticasone (FLONASE) 50 mcg/act nasal spray 1 spray into each nostril daily prn , Starting Mon 11/21/2011, Historical Med      furosemide (LASIX) 40 mg tablet TAKE 1 TABLET BY MOUTH EVERY DAY, Normal      glimepiride (AMARYL) 4 mg tablet 1 tablet in am and 1/2 tablet in pm, No Print      glucose blood (OneTouch Ultra) test strip Test up to 3 times daily  , Normal      Insulin Pen Needle (B-D UF III MINI PEN NEEDLES) 31G X 5 MM MISC USE UP TO 3 TIMES A DAY, Normal      Janumet XR  MG TB24 TAKE 1 TABLET BY MOUTH TWICE A DAY, Normal      KRILL OIL PO Take 750 mg by mouth, Historical Med      lactulose 20 g/30 mL Take 15 mL (10 g total) by mouth 2 (two) times a day, Starting Tue 3/16/2021, Until Fri 4/16/2021, Normal      levothyroxine 125 mcg tablet Take 125 mcg by mouth daily, Starting Thu 6/14/2018, Historical Med      nadolol (CORGARD) 20 mg tablet Take 1 tablet (20 mg total) by mouth daily, Starting Tue 3/16/2021, No Print      omeprazole (PriLOSEC) 20 mg delayed release capsule TAKE 1 CAPSULE BY MOUTH EVERY DAY, Normal      ONETOUCH DELICA LANCETS FINE MISC Use 1-3 times a day, Normal      simvastatin (ZOCOR) 40 mg tablet Take 40 mg by mouth daily, Starting Fri 6/1/2018, Historical Med      spironolactone (ALDACTONE) 50 mg tablet TAKE 1 TABLET BY MOUTH EVERY DAY, Normal      Xifaxan 550 MG tablet Take 550 mg by mouth every 12 (twelve) hours, Starting Wed 3/17/2021, Historical Med           No discharge procedures on file      PDMP Review       Value Time User    PDMP Reviewed  Yes 9/15/2020  3:13 PM Tahira Argueta MD          ED Provider  Electronically Signed by           Orlene Spurling, DO  07/16/21 6242

## 2021-07-13 VITALS
DIASTOLIC BLOOD PRESSURE: 56 MMHG | HEART RATE: 56 BPM | TEMPERATURE: 98.1 F | BODY MASS INDEX: 33.12 KG/M2 | HEIGHT: 64 IN | WEIGHT: 194 LBS | RESPIRATION RATE: 16 BRPM | OXYGEN SATURATION: 100 % | SYSTOLIC BLOOD PRESSURE: 122 MMHG

## 2021-07-13 LAB
ABO GROUP BLD BPU: NORMAL
BPU ID: NORMAL
CROSSMATCH: NORMAL
UNIT DISPENSE STATUS: NORMAL
UNIT PRODUCT CODE: NORMAL
UNIT RH: NORMAL

## 2021-07-13 NOTE — ED NOTES
RN and provider explained to pt on insulin use and eating today when pt gets home        Penny Hall RN  07/13/21 5669

## 2021-07-13 NOTE — ED CARE HANDOFF
Emergency Department Sign Out Note        Sign out and transfer of care from Dr Naidu Reef  See Separate Emergency Department note  The patient, Mariaelena Thakur, was evaluated by the previous provider for anemia  Workup Completed:  No orders to display      Labs Reviewed   CBC AND DIFFERENTIAL - Abnormal       Result Value Ref Range Status    WBC 2 96 (*) 4 31 - 10 16 Thousand/uL Final    RBC 2 82 (*) 3 81 - 5 12 Million/uL Final    Hemoglobin 6 1 (*) 11 5 - 15 4 g/dL Final    Comment: This result has been called to  [facility] by Kaya Sorto on 07 12 2021 at 2108, and has been read back       Hematocrit 22 0 (*) 34 8 - 46 1 % Final    MCV 78 (*) 82 - 98 fL Final    MCH 21 6 (*) 26 8 - 34 3 pg Final    MCHC 27 7 (*) 31 4 - 37 4 g/dL Final    RDW 17 9 (*) 11 6 - 15 1 % Final    Platelets 77 (*) 668 - 390 Thousands/uL Final    Comment: no clots, smear manually reviewed    nRBC 0  /100 WBCs Final    Neutrophils Relative 58  43 - 75 % Final    Immat GRANS % 0  0 - 2 % Final    Lymphocytes Relative 29  14 - 44 % Final    Monocytes Relative 11  4 - 12 % Final    Eosinophils Relative 1  0 - 6 % Final    Basophils Relative 1  0 - 1 % Final    Neutrophils Absolute 1 72 (*) 1 85 - 7 62 Thousands/µL Final    Immature Grans Absolute 0 01  0 00 - 0 20 Thousand/uL Final    Lymphocytes Absolute 0 85  0 60 - 4 47 Thousands/µL Final    Monocytes Absolute 0 31  0 17 - 1 22 Thousand/µL Final    Eosinophils Absolute 0 04  0 00 - 0 61 Thousand/µL Final    Basophils Absolute 0 03  0 00 - 0 10 Thousands/µL Final   PROTIME-INR - Abnormal    Protime 15 9 (*) 11 6 - 14 5 seconds Final    INR 1 27 (*) 0 84 - 1 19 Final   COMPREHENSIVE METABOLIC PANEL - Abnormal    Sodium 139  136 - 145 mmol/L Final    Potassium 4 5  3 5 - 5 3 mmol/L Final    Chloride 105  100 - 108 mmol/L Final    CO2 25  21 - 32 mmol/L Final    ANION GAP 9  4 - 13 mmol/L Final    BUN 37 (*) 5 - 25 mg/dL Final    Creatinine 1 33 (*) 0 60 - 1 30 mg/dL Final Comment: Standardized to IDMS reference method    Glucose 202 (*) 65 - 140 mg/dL Final    Comment: If the patient is fasting, the ADA then defines impaired fasting glucose as > 100 mg/dL and diabetes as > or equal to 123 mg/dL  Specimen collection should occur prior to Sulfasalazine administration due to the potential for falsely depressed results  Specimen collection should occur prior to Sulfapyridine administration due to the potential for falsely elevated results  Calcium 8 7  8 3 - 10 1 mg/dL Final    Corrected Calcium 9 7  8 3 - 10 1 mg/dL Final    AST 31  5 - 45 U/L Final    Comment: Specimen collection should occur prior to Sulfasalazine administration due to the potential for falsely depressed results  ALT 27  12 - 78 U/L Final    Comment: Specimen collection should occur prior to Sulfasalazine administration due to the potential for falsely depressed results  Alkaline Phosphatase 63  46 - 116 U/L Final    Total Protein 6 2 (*) 6 4 - 8 2 g/dL Final    Albumin 2 7 (*) 3 5 - 5 0 g/dL Final    Total Bilirubin 0 50  0 20 - 1 00 mg/dL Final    Comment: Use of this assay is not recommended for patients undergoing treatment with eltrombopag due to the potential for falsely elevated results      eGFR 39  ml/min/1 73sq m Final    Narrative:     [place] guidelines for Chronic Kidney Disease (CKD):     Stage 1 with normal or high GFR (GFR > 90 mL/min/1 73 square meters)    Stage 2 Mild CKD (GFR = 60-89 mL/min/1 73 square meters)    Stage 3A Moderate CKD (GFR = 45-59 mL/min/1 73 square meters)    Stage 3B Moderate CKD (GFR = 30-44 mL/min/1 73 square meters)    Stage 4 Severe CKD (GFR = 15-29 mL/min/1 73 square meters)    Stage 5 End Stage CKD (GFR <15 mL/min/1 73 square meters)  Note: GFR calculation is accurate only with a steady state creatinine   APTT - Normal    PTT 31  23 - 37 seconds Final    Comment: Therapeutic Heparin Range =  60-90 seconds   TYPE AND SCREEN    ABO Grouping O   Final    Rh Factor Negative   Final    Antibody Screen Negative   Final    Specimen Expiration Date [id number]   Final   ABORH RECHECK    ABO Grouping O   Final    Rh Factor Negative   Final   PREPARE LEUKOREDUCED RBC    Unit Product Code [id number]       Unit Number [id number]       Unit ABO O       Unit [facility]       Crossmatch Compatible   Final    Unit Dispense Status Issued           ED Course / Workup Pending (followup):                                    ED Course as of Jul 13 0216   Mon Jul 12, 2021 2207 S/o from Dr Marlee Spears  Is going to be d/c after 1uPRBC  Tue Jul 13, 2021   0214 Pt feels fine  Discussed that it is unclear what is causing her sx, and that it is very important she f/u with PCP, heme/onc, and GI  RTED if she develops any CP, SOB, dizziness, or s/s of GIB  Pt agrees with plan  Offered admission again, she declines as she has to take care of her daughter           Procedures  MDM  Number of Diagnoses or Management Options  Anemia: new and requires workup  Pancytopenia [facility]): new and requires workup     Amount and/or Complexity of Data Reviewed  Clinical lab tests: reviewed  Discussion of test results with the performing providers: yes  Decide to obtain previous medical records or to obtain history from someone other than the patient: yes  Review and summarize past medical records: yes    Risk of Complications, Morbidity, and/or Mortality  Presenting problems: moderate  Diagnostic procedures: low  Management options: low    Patient Progress  Patient progress: stable      Disposition  Final diagnoses:   Anemia   Pancytopenia ([facility] )     Time reflects when diagnosis was documented in both MDM as applicable and the Disposition within this note     Time User Action Codes Description Comment    7/12/2021  9:21 PM Verba James Add [D64 9] Anemia     7/12/2021  9:21 PM Verba James Add [[id number]] Pancytopenia [facility])       ED Disposition     ED Disposition Condition Date/Time Comment    Discharge Stable Tue Jul 13, 2021  2:05 AM Ashanti Lancekandace Riostre discharge to home/self care  Follow-up Information     Follow up With Specialties Details Why Contact Info Additional Information    Dori Cutler DO  Schedule an appointment as soon as possible for a visit   [facility]  [address] [facility]       Jose Caldwell MD Gastroenterology Schedule an appointment as soon as possible for a visit   [place] [address]  [address] [facility] [address] [address] [address] [facility] Hematology Oncology Specialists [facility] Hematology and Oncology Call  For further evaluation [address] [address] [phone] [place] [facility] [place], [facility] [address], [address] [place] [address], [place], [place], [address] [phone]        Patient's Medications   Discharge Prescriptions    No medications on file     No discharge procedures on file         ED Provider  Electronically Signed by     Arvind Galicia MD  07/13/21 1988

## 2021-07-14 ENCOUNTER — TELEPHONE (OUTPATIENT)
Dept: HEMATOLOGY ONCOLOGY | Facility: CLINIC | Age: 76
End: 2021-07-14

## 2021-07-14 NOTE — TELEPHONE ENCOUNTER
New Patient Request   Patient Details:     Anderson Salinas      1945      [id number]      Reason for Appointment   Who is calling to schedule? Patient   If not Patient, what is their name? Arabella Flores    What is the diagnosis? Anemia     Pancytopenia ([facility] [address] )     Who is the referring doctor? Dr Laureano December are you scheduling with ? [facility]   Best Number to call back on? If calling from the [facility], use the Nurse number   [phone]   Miscellaneous Information:     Please advise the patient, a new patient  will be calling them back within 1 business day

## 2021-07-15 ENCOUNTER — TELEPHONE (OUTPATIENT)
Dept: HEMATOLOGY ONCOLOGY | Facility: CLINIC | Age: 76
End: 2021-07-15

## 2021-07-15 NOTE — TELEPHONE ENCOUNTER
New Patient Encounter    New Patient Intake Form   Patient Details:  Hilario Larose  1945  [id number]    Background Information:  [address] starts by opening a telephone encounter and gathering the following information   Who is calling to schedule? If not self, relationship to patient? Patient    Referring Provider Dori Cutler   What is the diagnosis? Anemia, Pancytopenia   Is this Cancer or Non-Cancer? Non Cancer   Is this diagnosis confirmed? Yes   When was the diagnosis? 7-   Is there a confirmed diagnosis from a biopsy/tissue reviewed by pathology? N/A   Were outside slides requested? N/A   Is patient aware of diagnosis? Yes   Is there a personal history and what kind? Anemia   Is there a family history and what kind? No   Reason for visit? Have you had any imaging or labs done? If so: when, where? Labs 7-   Are records in [address] [place] [address]? Yes   If patient has a prior history of cancer were old records obtained? N/A   Was the patient told to bring a disk? N/A   Does the patient smoke or Vape? No   If yes, how many packs or cartridges per day? Scheduling Information:   [facility]:  [facility]   Are there any dates/time the patient cannot be seen? Miscellaneous:    After completing the above information, please route to Financial Counselor and the appropriate Nurse Navigator for review

## 2021-07-16 ENCOUNTER — TELEPHONE (OUTPATIENT)
Dept: ENDOCRINOLOGY | Facility: HOSPITAL | Age: 76
End: 2021-07-16

## 2021-07-16 PROBLEM — E11.3293 MILD NONPROLIFERATIVE DIABETIC RETINOPATHY OF BOTH EYES WITHOUT MACULAR EDEMA ASSOCIATED WITH TYPE 2 DIABETES MELLITUS (HCC): Status: ACTIVE | Noted: 2021-07-16

## 2021-07-16 NOTE — TELEPHONE ENCOUNTER
Sorry for the confusion, I did know that her Orpha Munguia was 48 units the morning so she only needs to increase the evening dose of Basaglar to 36 units    It was just the way I dictated it sounded unusual

## 2021-07-21 ENCOUNTER — OFFICE VISIT (OUTPATIENT)
Dept: GASTROENTEROLOGY | Facility: CLINIC | Age: 76
End: 2021-07-21
Payer: MEDICARE

## 2021-07-21 VITALS
HEART RATE: 93 BPM | SYSTOLIC BLOOD PRESSURE: 104 MMHG | WEIGHT: 196.8 LBS | DIASTOLIC BLOOD PRESSURE: 56 MMHG | BODY MASS INDEX: 33.6 KG/M2 | HEIGHT: 64 IN

## 2021-07-21 DIAGNOSIS — D69.6 THROMBOCYTOPENIA, UNSPECIFIED (HCC): ICD-10-CM

## 2021-07-21 DIAGNOSIS — K75.81 LIVER CIRRHOSIS SECONDARY TO NASH (NONALCOHOLIC STEATOHEPATITIS) (HCC): ICD-10-CM

## 2021-07-21 DIAGNOSIS — K74.60 LIVER CIRRHOSIS SECONDARY TO NASH (NONALCOHOLIC STEATOHEPATITIS) (HCC): ICD-10-CM

## 2021-07-21 DIAGNOSIS — I25.9 CHEST PAIN DUE TO MYOCARDIAL ISCHEMIA, UNSPECIFIED ISCHEMIC CHEST PAIN TYPE: ICD-10-CM

## 2021-07-21 DIAGNOSIS — I85.10 SECONDARY ESOPHAGEAL VARICES WITHOUT BLEEDING (HCC): ICD-10-CM

## 2021-07-21 DIAGNOSIS — D50.0 IRON DEFICIENCY ANEMIA DUE TO CHRONIC BLOOD LOSS: Primary | ICD-10-CM

## 2021-07-21 PROCEDURE — 99214 OFFICE O/P EST MOD 30 MIN: CPT | Performed by: INTERNAL MEDICINE

## 2021-07-21 RX ORDER — DOXYCYCLINE HYCLATE 50 MG/1
324 CAPSULE, GELATIN COATED ORAL
COMMUNITY
End: 2021-07-21

## 2021-07-21 NOTE — LETTER
July 23, 2021     DO Kirill Haque Ayannabradly  [address] [facility]    Patient: Dipti Loving   YOB: 1945   Date of Visit: 7/21/2021       Dear Dr Majo Connors: Thank you for referring Annalee Hunter to me for evaluation  Below are my notes for this consultation  If you have questions, please do not hesitate to call me  I look forward to following your patient along with you  Sincerely,        James Maguire MD        CC: MD James Rust MD  7/23/2021  1:55 PM  Incomplete  [facility] - Outpatient Follow-up Note  Dipti Loving 77 y o  female MRN: [id number]  Encounter: [id number]    ASSESSMENT AND PLAN:      1  Iron deficiency anemia due to chronic blood loss  -With a recent hemoglobin of 6 1  Was advised to go to the ED and received 1 unit of packed red blood cell  Repeat CBC last week showed a hemoglobin of 7 4  Ferritin was low at 3  Patient did have upper endoscopy a year ago which revealed grade 2 esophageal varices which were not bleeding  Colonoscopy just revealed diverticulosis but no bleeding source  This was done to evaluate iron deficiency  Patient never had a capsule endoscopy   differential diagnosis could include occult bleeding from esophageal source although patient has not had any overt bleeding, could patient  be slowly leaking blood from portal gastropathy or varices  - could consider small  Bowel source of blood loss as patient never had capsule endoscopy    - Ambulatory referral to Hematology / Oncology; Future- patient see Dr Shayy velez for IV iron  - discontinue iron at this time  -  Arrange for outpatient small-bowel capsule endoscopy  Will schedule today as this has to be done before the 29th of the month to be covered by Medicare    EGD - [facility] or [facility] for   possible banding if necessary      2   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) ([facility] [address] )  -- stable at this time  No ascites    3  Secondary esophageal varices without bleeding (HCC)  -- grade 2 varices noted on EGD 1 year ago has been on    prophylactic beta-blockers has not had    4  Thrombocytopenia, unspecified (HCC)  -- platelet count 77  No doubt related to portal hypertension      5  Chest pain secondary to myocardial ischemia- much improved since transfusion  Did have exertional chest pain and chest pain with emotional upset with hemoglobin of 6 1-probable demand ischemia   - patient is advised to get right to the ED for recurrent chest pain      Followup Appointment: 2 mo   ______________________________________________________________________    Chief Complaint   Patient presents with    Follow-up     HPI:  Patient is seen in office after recent ER visit having been directed to the ER by a personal physician Dr Flor Ordoñez she for an abnormally low blood count  Patient did have specific complaints except fatigue  CBC obtained on July 12th revealed a along with low MCV  Patient had no symptoms of over blood loss such as hematemesis melena or blood per rectum  She did report on today's visit she was having some left-sided chest discomfort with emotional upset and exercise  Patient was directed to go to the emergency room the on the evening July 11  She was there for several hours  She received 1 unit packed red blood cells  She was advised to stay into the hospital but declined admission secondary to having known to care for her disabled daughter at home  Arrangements were made for her to follow-up with our office and Hematology for parental iron administration  It should be noted patient did have less dramatic anemia a little over year ago  Her hemoglobin at that time was 9-10 g range  Upper endoscopy was revealing for some mild gastritis and grade 2 esophageal varices without stigmata of bleeding  She had some thickening of the antral folds    Biopsies taken to evaluate for celiac disease in the small bowel were negative  She also had a colonoscopy at that time which was July of 2020 revealed few diverticuli, no polyps and rectal varices which were not bleeding  Patient had been on some supplemental iron and her hemoglobin normalized  Hemoglobin in September of 2020 was 11 1  Repeat labs ordered for February through our office but were not accomplished  In light of patient's stable hemoglobin last fall, a capsule endoscopy was not performed  Patient is not taking any aspirin or nonsteroidal inflammatory agents  With respect to the patient's cirrhosis she does not problems present liver with significant edema or trouble with concentration  She has been treated in past for hepatic encephalopathy      Historical Information   Past Medical History:   Diagnosis Date    Anemia     Cirrhosis ([address] )     Diabetes mellitus (HCC)     Diabetic neuropathy (HCC)     Diabetic neuropathy (HCC)     Esophageal varices (HCC)     Fatty liver     Fatty liver     GERD (gastroesophageal reflux disease)     Hepatic encephalopathy (HCC)     Hiatal hernia     Hyperlipidemia     Hypertension     Hypoglycemia     Hypothyroidism     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)     Osteoarthritis     Osteopenia     Pancytopenia (HCC)     Thrombocytopenia (HCC)     Type 2 diabetes mellitus ([facility] [address] )      Past Surgical History:   Procedure Laterality Date    ABDOMINAL SURGERY      Abdominal plasty with mesh insertion    CATARACT EXTRACTION, BILATERAL      CATARACT EXTRACTION, BILATERAL      EGD AND COLONOSCOPY  03/18/2015    LAPAROSCOPIC CHOLECYSTECTOMY      SHOULDER SURGERY Right     calcium depsoti    TUBAL LIGATION      UMBILICAL HERNIA REPAIR      with mesh placed    UPPER GASTROINTESTINAL ENDOSCOPY       Social History     Substance and Sexual Activity   Alcohol Use Not Currently     Social History     Substance and Sexual Activity   Drug Use No     Social History Tobacco Use   Smoking Status Never Smoker   Smokeless Tobacco Never Used     Family History   Problem Relation Age of Onset    Breast cancer Mother     Diabetes type II Father     Thyroid disease unspecified Daughter     Down syndrome Daughter     Diabetes type II Daughter     Diabetes type II Sister     No Known Problems Brother     Prostate cancer Brother     No Known Problems Sister     Stroke Sister     No Known Problems Son     Breast cancer Maternal Aunt     Colon cancer Neg Hx          Current Outpatient Medications:     Basaglar KwikPen 100 units/mL injection pen    Blood Glucose Monitoring Suppl (ONE TOUCH ULTRA 2) w/Device KIT    Calcium Carb-Cholecalciferol (CALCIUM 1000 + D PO)    Coenzyme Q10 (Co Q10) 100 MG CAPS    fluticasone (FLONASE) 50 mcg/act nasal spray    furosemide (LASIX) 40 mg tablet    glimepiride (AMARYL) 4 mg tablet    glucose blood (OneTouch Ultra) test strip    Insulin Pen Needle (B-D UF III MINI PEN NEEDLES) 31G X 5 MM MISC    Janumet XR  MG TB24    KRILL OIL PO    lactulose 20 g/30 mL    levothyroxine 125 mcg tablet    nadolol (CORGARD) 20 mg tablet    omeprazole (PriLOSEC) 20 mg delayed release capsule    ONETOUCH DELICA LANCETS FINE MISC    simvastatin (ZOCOR) 40 mg tablet    spironolactone (ALDACTONE) 50 mg tablet    Xifaxan 550 MG tablet    aspirin 81 mg chewable tablet    cholecalciferol (VITAMIN D3) 1,000 units tablet  Allergies   Allergen Reactions    Latex     Sesame Seed (Diagnostic) - Food Allergy      Other reaction(s): swelling inside mouth    Strawberry C [Ascorbate - Food Allergy] Other (See Comments)     Mouth sores    Penicillins Rash     Reviewed medications and allergies and updated as indicated    PHYSICAL EXAM:    Blood pressure 104/56, pulse 93, height 5' 4" (1 626 m), weight 89 3 kg (196 lb 12 8 oz), not currently breastfeeding  Body mass index is 33 78 kg/m²    General Appearance: NAD, cooperative, alert  Eyes: Anicteric, -conjunctiva pink  ENT:  Normocephalic, atraumatic, normal mucosa  Neck:  Supple, symmetrical, trachea midline  Resp:  Clear to auscultation bilaterally; no rales, rhonchi or wheezing; respirations unlabored   CV:  S1 S2, Regular rate and rhythm; no murmur, rub, or gallop  GI:  Soft, non-tender, non-distended; normal bowel sounds; no masses, no organomegaly -large abdominal pannus  Rectal: Deferred  Musculoskeletal: No cyanosis, clubbing or edema  Normal ROM  Skin:  No jaundice, rashes, or lesions   Heme/Lymph: No palpable cervical lymphadenopathy  Psych: Normal affect, good eye contact  Neuro: No gross deficits, AAOx3    Lab Results:   Lab Results   Component Value Date    WBC 2 96 (L) 07/12/2021    HGB 6 1 (LL) 07/12/2021    HCT 22 0 (L) 07/12/2021    MCV 78 (L) 07/12/2021    PLT 77 (L) 07/12/2021     Lab Results   Component Value Date    K 4 5 07/12/2021     07/12/2021    CO2 25 07/12/2021    BUN 37 (H) 07/12/2021    CREATININE 1 33 (H) 07/12/2021    CALCIUM 8 7 07/12/2021    CORRECTEDCA 9 7 07/12/2021    AST 31 07/12/2021    ALT 27 07/12/2021    ALKPHOS 63 07/12/2021    EGFR 39 07/12/2021     Repeat labs July 14, 2021 post transfusion hemoglobin 7   Hematocrit the 27 2  platelet count 00 Y  Iron saturation level 3% ferritin 10    Derrick Morataya MD  7/21/2021  4:15 PM  Sign when Signing Visit  [facility] - Outpatient Follow-up Note  Michael Edwards 77 y o  female MRN: [id number]  Encounter: [id number]    ASSESSMENT AND PLAN:      1  Iron deficiency anemia due to chronic blood loss  -With a recent hemoglobin of 6 1  Was advised to go to the ED and received 1 unit of packed red blood cell  Repeat CBC last week showed a hemoglobin of 7 4  Ferritin was low at 3  Patient did have upper endoscopy a year ago which revealed grade 2 esophageal varices which were not bleeding  Colonoscopy just revealed diverticulosis but no bleeding source    This was done to evaluate iron deficiency  Patient never had a capsule endoscopy   differential diagnosis could include occult bleeding from esophageal source although patient has not had any overt bleeding, could patient  be slowly leaking blood from portal gastropathy or varices  - could consider small  Bowel source of blood loss as patient never had capsule endoscopy    - Ambulatory referral to Hematology / Oncology; Future- patient see Dr Margaret velez for IV iron  - discontinue iron at this time  -  Arrange for outpatient small-bowel capsule endoscopy  Will schedule today as this has to be done before the 29th of the month to be covered by Medicare    EGD - [facility] or [facility] for   possible banding if necessary      2  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) ([place] [address] )  -- stable at this time  No ascites    3  Secondary esophageal varices without bleeding (HCC)  -- grade 2 varices noted on EGD 1 year ago has been on    prophylactic beta-blockers has not had    4  Thrombocytopenia, unspecified (HCC)  -- platelet count 77  No doubt related to portal hypertension      5  Chest pain secondary to myocardial ischemia- much improved since transfusion    Did have exertional chest pain and chest pain with emotional upset with hemoglobin of 6 1  - patient is advised to get right to the ED for recurrent chest pain      Followup Appointment: 2 mo   ______________________________________________________________________    Chief Complaint   Patient presents with    Follow-up     HPI:  ***    Historical Information   Past Medical History:   Diagnosis Date    Anemia     Cirrhosis ([place] [address] )     Diabetes mellitus ([facility] )     Diabetic neuropathy ([facility] )     Diabetic neuropathy ([facility] )     Esophageal varices ([facility] )     Fatty liver     Fatty liver     GERD (gastroesophageal reflux disease)     Hepatic encephalopathy (HCC)     Hiatal hernia     Hyperlipidemia     Hypertension     Hypoglycemia     Hypothyroidism     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 ([facility] [address] )     Osteoarthritis     Osteopenia     Pancytopenia (HCC)     Thrombocytopenia (HCC)     Type 2 diabetes mellitus ([facility] [address] )      Past Surgical History:   Procedure Laterality Date    ABDOMINAL SURGERY      Abdominal plasty with mesh insertion    CATARACT EXTRACTION, BILATERAL      CATARACT EXTRACTION, BILATERAL      EGD AND COLONOSCOPY  03/18/2015    LAPAROSCOPIC CHOLECYSTECTOMY      SHOULDER SURGERY Right     calcium depsoti    TUBAL LIGATION      UMBILICAL HERNIA REPAIR      with mesh placed    UPPER GASTROINTESTINAL ENDOSCOPY       Social History     Substance and Sexual Activity   Alcohol Use Not Currently     Social History     Substance and Sexual Activity   Drug Use No     Social History     Tobacco Use   Smoking Status Never Smoker   Smokeless Tobacco Never Used     Family History   Problem Relation Age of Onset    Breast cancer Mother     Diabetes type II Father     Thyroid disease unspecified Daughter     Down syndrome Daughter     Diabetes type II Daughter     Diabetes type II Sister     No Known Problems Brother     Prostate cancer Brother     No Known Problems Sister     Stroke Sister     No Known Problems Son     Breast cancer Maternal Aunt     Colon cancer Neg Hx          Current Outpatient Medications:     Basaglar KwikPen 100 units/mL injection pen    Blood Glucose Monitoring Suppl (ONE TOUCH ULTRA 2) w/Device KIT    Calcium Carb-Cholecalciferol (CALCIUM 1000 + D PO)    Coenzyme Q10 (Co Q10) 100 MG CAPS    fluticasone (FLONASE) 50 mcg/act nasal spray    furosemide (LASIX) 40 mg tablet    glimepiride (AMARYL) 4 mg tablet    glucose blood (OneTouch Ultra) test strip    Insulin Pen Needle (B-D UF III MINI PEN NEEDLES) 31G X 5 MM MISC    Janumet XR  MG TB24    KRILL OIL PO    lactulose 20 g/30 mL    levothyroxine 125 mcg tablet    nadolol (CORGARD) 20 mg tablet    omeprazole (PriLOSEC) 20 mg delayed release capsule   ONETOUCH DELICA LANCETS FINE MISC    simvastatin (ZOCOR) 40 mg tablet    spironolactone (ALDACTONE) 50 mg tablet    Xifaxan 550 MG tablet    aspirin 81 mg chewable tablet    cholecalciferol (VITAMIN D3) 1,000 units tablet  Allergies   Allergen Reactions    Latex     Sesame Seed (Diagnostic) - Food Allergy      Other reaction(s): swelling inside mouth    Strawberry C [Ascorbate - Food Allergy] Other (See Comments)     Mouth sores    Penicillins Rash     Reviewed medications and allergies and updated as indicated    PHYSICAL EXAM:    Blood pressure 104/56, pulse 93, height 5' 4" (1 626 m), weight 89 3 kg (196 lb 12 8 oz), not currently breastfeeding  Body mass index is 33 78 kg/m²  General Appearance: NAD, cooperative, alert  Eyes: Anicteric, PERRLA, EOMI  ENT:  Normocephalic, atraumatic, normal mucosa  Neck:  Supple, symmetrical, trachea midline  Resp:  Clear to auscultation bilaterally; no rales, rhonchi or wheezing; respirations unlabored   CV:  S1 S2, Regular rate and rhythm; no murmur, rub, or gallop  GI:  Soft, non-tender, non-distended; normal bowel sounds; no masses, no organomegaly   Rectal: Deferred  Musculoskeletal: No cyanosis, clubbing or edema  Normal ROM    Skin:  No jaundice, rashes, or lesions   Heme/Lymph: No palpable cervical lymphadenopathy  Psych: Normal affect, good eye contact  Neuro: No gross deficits, AAOx3    Lab Results:   Lab Results   Component Value Date    WBC 2 96 (L) 07/12/2021    HGB 6 1 (LL) 07/12/2021    HCT 22 0 (L) 07/12/2021    MCV 78 (L) 07/12/2021    PLT 77 (L) 07/12/2021     Lab Results   Component Value Date    K 4 5 07/12/2021     07/12/2021    CO2 25 07/12/2021    BUN 37 (H) 07/12/2021    CREATININE 1 33 (H) 07/12/2021    CALCIUM 8 7 07/12/2021    CORRECTEDCA 9 7 07/12/2021    AST 31 07/12/2021    ALT 27 07/12/2021    ALKPHOS 63 07/12/2021    EGFR 39 07/12/2021     No results found for: IRON, TIBC, FERRITIN  No results found for: LIPASE    Radiology Results:   No results found

## 2021-07-21 NOTE — LETTER
July 23, 2021     DO Kirill Haque  [address] [facility]    Patient: Antony Mantilla   YOB: 1945   Date of Visit: 7/21/2021       Dear Dr [facility]: Thank you for referring Herb Duke to me for evaluation  Below are my notes for this consultation  If you have questions, please do not hesitate to call me  I look forward to following your patient along with you  Sincerely,        Americo Winslow MD        CC: MD Americo Mancini MD  7/23/2021  1:56 PM  Sign when Signing Visit  [id number] [id number] - Outpatient Follow-up Note  Antony Mantilla 77 y o  female MRN: [id number]  Encounter: [id number]    ASSESSMENT AND PLAN:      1  Iron deficiency anemia due to chronic blood loss  -With a recent hemoglobin of 6 1  Was advised to go to the ED and received 1 unit of packed red blood cell  Repeat CBC last week showed a hemoglobin of 7 4  Ferritin was low at 3  Patient did have upper endoscopy a year ago which revealed grade 2 esophageal varices which were not bleeding  Colonoscopy just revealed diverticulosis but no bleeding source  This was done to evaluate iron deficiency  Patient never had a capsule endoscopy   differential diagnosis could include occult bleeding from esophageal source although patient has not had any overt bleeding, could patient  be slowly leaking blood from portal gastropathy or varices  - could consider small  Bowel source of blood loss as patient never had capsule endoscopy    - Ambulatory referral to Hematology / Oncology; Future- patient see Dr Dima velez for IV iron  - discontinue iron at this time  -  Arrange for outpatient small-bowel capsule endoscopy  Will schedule today as this has to be done before the 29th of the month to be covered by Medicare    EGD - [facility] or [facility] for   possible banding if necessary      2   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) ([facility] [address] )  -- stable at this time  No ascites    3  Secondary esophageal varices without bleeding (HCC)  -- grade 2 varices noted on EGD 1 year ago has been on    prophylactic beta-blockers has not had    4  Thrombocytopenia, unspecified (HCC)  -- platelet count 77  No doubt related to portal hypertension      5  Chest pain secondary to myocardial ischemia- much improved since transfusion  Did have exertional chest pain and chest pain with emotional upset with hemoglobin of 6 1-probable demand ischemia   - patient is advised to get right to the ED for recurrent chest pain      Followup Appointment: 2 mo   ______________________________________________________________________    Chief Complaint   Patient presents with    Follow-up     HPI:  Patient is seen in office after recent ER visit having been directed to the ER by a personal physician Dr Kimmy Betts she for an abnormally low blood count  Patient did have specific complaints except fatigue  CBC obtained on July 12th revealed a along with low MCV  Patient had no symptoms of over blood loss such as hematemesis melena or blood per rectum  She did report on today's visit she was having some left-sided chest discomfort with emotional upset and exercise  Patient was directed to go to the emergency room the on the evening July 11  She was there for several hours  She received 1 unit packed red blood cells  She was advised to stay into the hospital but declined admission secondary to having known to care for her disabled daughter at home  Arrangements were made for her to follow-up with our office and Hematology for parental iron administration  It should be noted patient did have less dramatic anemia a little over year ago  Her hemoglobin at that time was 9-10 g range  Upper endoscopy was revealing for some mild gastritis and grade 2 esophageal varices without stigmata of bleeding  She had some thickening of the antral folds  Biopsies taken to evaluate for celiac disease in the small bowel were negative  She also had a colonoscopy at that time which was July of 2020 revealed few diverticuli, no polyps and rectal varices which were not bleeding  Patient had been on some supplemental iron and her hemoglobin normalized  Hemoglobin in September of 2020 was 11 1  Repeat labs ordered for February through our office but were not accomplished  In light of patient's stable hemoglobin last fall, a capsule endoscopy was not performed  Patient is not taking any aspirin or nonsteroidal inflammatory agents  With respect to the patient's cirrhosis she does not problems present liver with significant edema or trouble with concentration  She has been treated in past for hepatic encephalopathy      Historical Information   Past Medical History:   Diagnosis Date    Anemia     Cirrhosis ([facility] [address] [facility]     Diabetes mellitus (HCC)     Diabetic neuropathy (HCC)     Diabetic neuropathy (HCC)     Esophageal varices (HCC)     Fatty liver     Fatty liver     GERD (gastroesophageal reflux disease)     Hepatic encephalopathy (HCC)     Hiatal hernia     Hyperlipidemia     Hypertension     Hypoglycemia     Hypothyroidism     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)     Osteoarthritis     Osteopenia     Pancytopenia (HCC)     Thrombocytopenia (HCC)     Type 2 diabetes mellitus ([facility] [address] )      Past Surgical History:   Procedure Laterality Date    ABDOMINAL SURGERY      Abdominal plasty with mesh insertion    CATARACT EXTRACTION, BILATERAL      CATARACT EXTRACTION, BILATERAL      EGD AND COLONOSCOPY  03/18/2015    LAPAROSCOPIC CHOLECYSTECTOMY      SHOULDER SURGERY Right     calcium depsoti    TUBAL LIGATION      UMBILICAL HERNIA REPAIR      with mesh placed    UPPER GASTROINTESTINAL ENDOSCOPY       Social History     Substance and Sexual Activity   Alcohol Use Not Currently     Social History     Substance and Sexual Activity   Drug Use No Social History     Tobacco Use   Smoking Status Never Smoker   Smokeless Tobacco Never Used     Family History   Problem Relation Age of Onset    Breast cancer Mother     Diabetes type II Father     Thyroid disease unspecified Daughter     Down syndrome Daughter     Diabetes type II Daughter     Diabetes type II Sister     No Known Problems Brother     Prostate cancer Brother     No Known Problems Sister     Stroke Sister     No Known Problems Son     Breast cancer Maternal Aunt     Colon cancer Neg Hx          Current Outpatient Medications:     Basaglar KwikPen 100 units/mL injection pen    Blood Glucose Monitoring Suppl (ONE TOUCH ULTRA 2) w/Device KIT    Calcium Carb-Cholecalciferol (CALCIUM 1000 + D PO)    Coenzyme Q10 (Co Q10) 100 MG CAPS    fluticasone (FLONASE) 50 mcg/act nasal spray    furosemide (LASIX) 40 mg tablet    glimepiride (AMARYL) 4 mg tablet    glucose blood (OneTouch Ultra) test strip    Insulin Pen Needle (B-D UF III MINI PEN NEEDLES) 31G X 5 MM MISC    Janumet XR  MG TB24    KRILL OIL PO    lactulose 20 g/30 mL    levothyroxine 125 mcg tablet    nadolol (CORGARD) 20 mg tablet    omeprazole (PriLOSEC) 20 mg delayed release capsule    ONETOUCH DELICA LANCETS FINE MISC    simvastatin (ZOCOR) 40 mg tablet    spironolactone (ALDACTONE) 50 mg tablet    Xifaxan 550 MG tablet    aspirin 81 mg chewable tablet    cholecalciferol (VITAMIN D3) 1,000 units tablet  Allergies   Allergen Reactions    Latex     Sesame Seed (Diagnostic) - Food Allergy      Other reaction(s): swelling inside mouth    Strawberry C [Ascorbate - Food Allergy] Other (See Comments)     Mouth sores    Penicillins Rash     Reviewed medications and allergies and updated as indicated    PHYSICAL EXAM:    Blood pressure 104/56, pulse 93, height 5' 4" (1 626 m), weight 89 3 kg (196 lb 12 8 oz), not currently breastfeeding  Body mass index is 33 78 kg/m²    General Appearance: NAD, cooperative, alert  Eyes: Anicteric, -conjunctiva pink  ENT:  Normocephalic, atraumatic, normal mucosa  Neck:  Supple, symmetrical, trachea midline  Resp:  Clear to auscultation bilaterally; no rales, rhonchi or wheezing; respirations unlabored   CV:  S1 S2, Regular rate and rhythm; no murmur, rub, or gallop  GI:  Soft, non-tender, non-distended; normal bowel sounds; no masses, no organomegaly -large abdominal pannus  Rectal: Deferred  Musculoskeletal: No cyanosis, clubbing or edema  Normal ROM    Skin:  No jaundice, rashes, or lesions   Heme/Lymph: No palpable cervical lymphadenopathy  Psych: Normal affect, good eye contact  Neuro: No gross deficits, AAOx3    Lab Results:   Lab Results   Component Value Date    WBC 2 96 (L) 07/12/2021    HGB 6 1 (LL) 07/12/2021    HCT 22 0 (L) 07/12/2021    MCV 78 (L) 07/12/2021    PLT 77 (L) 07/12/2021     Lab Results   Component Value Date    K 4 5 07/12/2021     07/12/2021    CO2 25 07/12/2021    BUN 37 (H) 07/12/2021    CREATININE 1 33 (H) 07/12/2021    CALCIUM 8 7 07/12/2021    CORRECTEDCA 9 7 07/12/2021    AST 31 07/12/2021    ALT 27 07/12/2021    ALKPHOS 63 07/12/2021    EGFR 39 07/12/2021     Repeat labs July 14, 2021 post transfusion hemoglobin 7   Hematocrit the 27 2  platelet count 02 E  Iron saturation level 3% ferritin 10

## 2021-07-21 NOTE — PROGRESS NOTES
[id number] - Outpatient Follow-up Note  Michael Edwards 77 y o  female MRN: [id number]  Encounter: [id number]    ASSESSMENT AND PLAN:      1  Iron deficiency anemia due to chronic blood loss  -With a recent hemoglobin of 6 1  Was advised to go to the ED and received 1 unit of packed red blood cell  Repeat CBC last week showed a hemoglobin of 7 4  Ferritin was low at 3  Patient did have upper endoscopy a year ago which revealed grade 2 esophageal varices which were not bleeding  Colonoscopy just revealed diverticulosis but no bleeding source  This was done to evaluate iron deficiency  Patient never had a capsule endoscopy   differential diagnosis could include occult bleeding from esophageal source although patient has not had any overt bleeding, could patient  be slowly leaking blood from portal gastropathy or varices  - could consider small  Bowel source of blood loss as patient never had capsule endoscopy    - Ambulatory referral to Hematology / Oncology; Future- patient see Dr Colin velez for IV iron  - discontinue iron at this time  -  Arrange for outpatient small-bowel capsule endoscopy  Will schedule today as this has to be done before the 29th of the month to be covered by Medicare    EGD - [facility] or [facility] for   possible banding if necessary      2  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) ([facility] [address] )  -- stable at this time  No ascites    3  Secondary esophageal varices without bleeding (HCC)  -- grade 2 varices noted on EGD 1 year ago has been on    prophylactic beta-blockers has not had    4  Thrombocytopenia, unspecified (HCC)  -- platelet count 77  No doubt related to portal hypertension      5  Chest pain secondary to myocardial ischemia- much improved since transfusion    Did have exertional chest pain and chest pain with emotional upset with hemoglobin of 6 1-probable demand ischemia   - patient is advised to get right to the ED for recurrent chest pain      Followup Appointment: 2 mo   ______________________________________________________________________    Chief Complaint   Patient presents with    Follow-up     HPI:  Patient is seen in office after recent ER visit having been directed to the ER by a personal physician Dr Darryl Schmidt she for an abnormally low blood count  Patient did have specific complaints except fatigue  CBC obtained on July 12th revealed a along with low MCV  Patient had no symptoms of over blood loss such as hematemesis melena or blood per rectum  She did report on today's visit she was having some left-sided chest discomfort with emotional upset and exercise  Patient was directed to go to the emergency room the on the evening July 11  She was there for several hours  She received 1 unit packed red blood cells  She was advised to stay into the hospital but declined admission secondary to having known to care for her disabled daughter at home  Arrangements were made for her to follow-up with our office and Hematology for parental iron administration  It should be noted patient did have less dramatic anemia a little over year ago  Her hemoglobin at that time was 9-10 g range  Upper endoscopy was revealing for some mild gastritis and grade 2 esophageal varices without stigmata of bleeding  She had some thickening of the antral folds  Biopsies taken to evaluate for celiac disease in the small bowel were negative  She also had a colonoscopy at that time which was July of 2020 revealed few diverticuli, no polyps and rectal varices which were not bleeding  Patient had been on some supplemental iron and her hemoglobin normalized  Hemoglobin in September of 2020 was 11 1  Repeat labs ordered for February through our office but were not accomplished  In light of patient's stable hemoglobin last fall, a capsule endoscopy was not performed    Patient is not taking any aspirin or nonsteroidal inflammatory agents  With respect to the patient's cirrhosis she does not problems present liver with significant edema or trouble with concentration  She has been treated in past for hepatic encephalopathy      Historical Information   Past Medical History:   Diagnosis Date    Anemia     Cirrhosis ([place] [address] )     Diabetes mellitus ([place] [address] )     [facility]     Diabetic neuropathy [facility]     Esophageal varices ([facility] )     Fatty liver     Fatty liver     GERD (gastroesophageal reflux disease)     Hepatic encephalopathy (HCC)     Hiatal hernia     Hyperlipidemia     Hypertension     Hypoglycemia     Hypothyroidism     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)     Osteoarthritis     Osteopenia     Pancytopenia (HCC)     Thrombocytopenia (HCC)     Type 2 diabetes mellitus ([facility] )      Past Surgical History:   Procedure Laterality Date    ABDOMINAL SURGERY      Abdominal plasty with mesh insertion    CATARACT EXTRACTION, BILATERAL      CATARACT EXTRACTION, BILATERAL      EGD AND COLONOSCOPY  03/18/2015    LAPAROSCOPIC CHOLECYSTECTOMY      SHOULDER SURGERY Right     calcium depsoti    TUBAL LIGATION      UMBILICAL HERNIA REPAIR      with mesh placed    UPPER GASTROINTESTINAL ENDOSCOPY       Social History     Substance and Sexual Activity   Alcohol Use Not Currently     Social History     Substance and Sexual Activity   Drug Use No     Social History     Tobacco Use   Smoking Status Never Smoker   Smokeless Tobacco Never Used     Family History   Problem Relation Age of Onset    Breast cancer Mother     Diabetes type II Father     Thyroid disease unspecified Daughter     Down syndrome Daughter     Diabetes type II Daughter     Diabetes type II Sister     No Known Problems Brother     Prostate cancer Brother     No Known Problems Sister     Stroke Sister     No Known Problems Son     Breast cancer Maternal Aunt     Colon cancer Neg Hx          Current Outpatient Medications:     Basaglar KwikPen 100 units/mL injection pen    Blood Glucose Monitoring Suppl (ONE TOUCH ULTRA 2) w/Device KIT    Calcium Carb-Cholecalciferol (CALCIUM 1000 + D PO)    Coenzyme Q10 (Co Q10) 100 MG CAPS    fluticasone (FLONASE) 50 mcg/act nasal spray    furosemide (LASIX) 40 mg tablet    glimepiride (AMARYL) 4 mg tablet    glucose blood (OneTouch Ultra) test strip    Insulin Pen Needle (B-D UF III MINI PEN NEEDLES) 31G X 5 MM MISC    Janumet XR  MG TB24    KRILL OIL PO    lactulose 20 g/30 mL    levothyroxine 125 mcg tablet    nadolol (CORGARD) 20 mg tablet    omeprazole (PriLOSEC) 20 mg delayed release capsule    ONETOUCH DELICA LANCETS FINE MISC    simvastatin (ZOCOR) 40 mg tablet    spironolactone (ALDACTONE) 50 mg tablet    Xifaxan 550 MG tablet    aspirin 81 mg chewable tablet    cholecalciferol (VITAMIN D3) 1,000 units tablet  Allergies   Allergen Reactions    Latex     Sesame Seed (Diagnostic) - Food Allergy      Other reaction(s): swelling inside mouth    Strawberry C [Ascorbate - Food Allergy] Other (See Comments)     Mouth sores    Penicillins Rash     Reviewed medications and allergies and updated as indicated    PHYSICAL EXAM:    Blood pressure 104/56, pulse 93, height 5' 4" (1 626 m), weight 89 3 kg (196 lb 12 8 oz), not currently breastfeeding  Body mass index is 33 78 kg/m²  General Appearance: NAD, cooperative, alert  Eyes: Anicteric, -conjunctiva pink  ENT:  Normocephalic, atraumatic, normal mucosa  Neck:  Supple, symmetrical, trachea midline  Resp:  Clear to auscultation bilaterally; no rales, rhonchi or wheezing; respirations unlabored   CV:  S1 S2, Regular rate and rhythm; no murmur, rub, or gallop  GI:  Soft, non-tender, non-distended; normal bowel sounds; no masses, no organomegaly -large abdominal pannus  Rectal: Deferred  Musculoskeletal: No cyanosis, clubbing or edema  Normal ROM    Skin:  No jaundice, rashes, or lesions   Heme/Lymph: No palpable cervical lymphadenopathy  Psych: Normal affect, good eye contact  Neuro: No gross deficits, AAOx3    Lab Results:   Lab Results   Component Value Date    WBC 2 96 (L) 07/12/2021    HGB 6 1 (LL) 07/12/2021    HCT 22 0 (L) 07/12/2021    MCV 78 (L) 07/12/2021    PLT 77 (L) 07/12/2021     Lab Results   Component Value Date    K 4 5 07/12/2021     07/12/2021    CO2 25 07/12/2021    BUN 37 (H) 07/12/2021    CREATININE 1 33 (H) 07/12/2021    CALCIUM 8 7 07/12/2021    CORRECTEDCA 9 7 07/12/2021    AST 31 07/12/2021    ALT 27 07/12/2021    ALKPHOS 63 07/12/2021    EGFR 39 07/12/2021     Repeat labs July 14, 2021 post transfusion hemoglobin 7   Hematocrit the 27 2  platelet count 47 K  Iron saturation level 3% ferritin 10

## 2021-07-21 NOTE — PATIENT INSTRUCTIONS
[id number] - Outpatient Follow-up Note  Sebastien Daily 77 y o  female MRN: [id number]  Encounter: [id number]    ASSESSMENT AND PLAN:      1  Iron deficiency anemia due to chronic blood loss  -With a recent hemoglobin of 6 1  Was advised to go to the ED and received 1 unit of packed red blood cell  Repeat CBC last week showed a hemoglobin of 7 4  Ferritin was low at 3  Patient did have upper endoscopy a year ago which revealed grade 2 esophageal varices which were not bleeding  Colonoscopy just revealed diverticulosis but no bleeding source  This was done to evaluate iron deficiency  Patient never had a capsule endoscopy   differential diagnosis could include occult bleeding from esophageal source although patient has not had any overt bleeding, could patient  be slowly leaking blood from portal gastropathy or varices  - could consider small  Bowel source of blood loss as patient never had capsule endoscopy    - Ambulatory referral to Hematology / Oncology; Future- patient see Dr Lynn Doyle group for IV iron  - discontinue iron at this time  -  Arrange for outpatient small-bowel capsule endoscopy  Will schedule today as this has to be done before the 29th of the month to be covered by Medicare    EGD - [facility] or [facility] for   possible banding if necessary      2  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) ([facility] [address] )  -- stable at this time  No ascites    3  Secondary esophageal varices without bleeding (HCC)  -- grade 2 varices noted on EGD 1 year ago has been on    prophylactic beta-blockers has not had    4  Thrombocytopenia, unspecified (HCC)  -- platelet count 77  No doubt related to portal hypertension      5  Chest pain secondary to myocardial ischemia- much improved since transfusion    Did have exertional chest pain and chest pain with emotional upset with hemoglobin of 6 1-probable demand ischemia   - patient is advised to get right to the ED for recurrent chest pain      Followup Appointment: 2 mo

## 2021-07-22 ENCOUNTER — TELEPHONE (OUTPATIENT)
Dept: GASTROENTEROLOGY | Facility: CLINIC | Age: 76
End: 2021-07-22

## 2021-07-22 NOTE — TELEPHONE ENCOUNTER
Dr Jesus Manuel Saeed, patient only wants to be scheduled with you for the EGD at either hospital ordered from yesterday's visit  Your next available block time at either [facility] or [facility] isn't until 9/21  You are rounding again Fri 8/27- Mon 8/30  I have her on the cancellation list if there are any block time cancellations next week, as you at [facility] and [facility] next week for block  Patient says she cannot wait until 9/21 date  Please advise, thank you

## 2021-07-27 ENCOUNTER — OFFICE VISIT (OUTPATIENT)
Dept: ENDOCRINOLOGY | Facility: HOSPITAL | Age: 76
End: 2021-07-27
Payer: MEDICARE

## 2021-07-27 VITALS
HEIGHT: 64 IN | DIASTOLIC BLOOD PRESSURE: 60 MMHG | WEIGHT: 196 LBS | BODY MASS INDEX: 33.46 KG/M2 | HEART RATE: 80 BPM | SYSTOLIC BLOOD PRESSURE: 110 MMHG

## 2021-07-27 DIAGNOSIS — E11.8 TYPE 2 DIABETES MELLITUS WITH COMPLICATION (HCC): ICD-10-CM

## 2021-07-27 DIAGNOSIS — E11.42 DIABETIC POLYNEUROPATHY ASSOCIATED WITH TYPE 2 DIABETES MELLITUS (HCC): ICD-10-CM

## 2021-07-27 DIAGNOSIS — E03.9 ACQUIRED HYPOTHYROIDISM: ICD-10-CM

## 2021-07-27 DIAGNOSIS — E78.2 MIXED HYPERLIPIDEMIA: ICD-10-CM

## 2021-07-27 DIAGNOSIS — I10 ESSENTIAL HYPERTENSION: ICD-10-CM

## 2021-07-27 DIAGNOSIS — Z79.4 TYPE 2 DIABETES MELLITUS WITH HYPERGLYCEMIA, WITH LONG-TERM CURRENT USE OF INSULIN (HCC): Primary | ICD-10-CM

## 2021-07-27 DIAGNOSIS — E11.65 TYPE 2 DIABETES MELLITUS WITH HYPERGLYCEMIA, WITH LONG-TERM CURRENT USE OF INSULIN (HCC): Primary | ICD-10-CM

## 2021-07-27 PROCEDURE — 99215 OFFICE O/P EST HI 40 MIN: CPT | Performed by: INTERNAL MEDICINE

## 2021-07-27 RX ORDER — INSULIN GLARGINE 100 [IU]/ML
INJECTION, SOLUTION SUBCUTANEOUS
Qty: 30 ML | Refills: 6 | Status: SHIPPED | OUTPATIENT
Start: 2021-07-27 | End: 2022-03-04 | Stop reason: SDUPTHER

## 2021-07-27 NOTE — PROGRESS NOTES
7/27/2021    Assessment/Plan      Diagnoses and all orders for this visit:    Type 2 diabetes mellitus with hyperglycemia, with long-term current use of insulin ([place])  -     HEMOGLOBIN A1C W/ EAG ESTIMATION Lab Collect; Future  -     Comprehensive metabolic panel Lab Collect; Future  -     CBC and differential Lab Collect; Future  -     TSH, 3rd generation Lab Collect; Future  -     T4, free Lab Collect; Future    Diabetic polyneuropathy associated with type 2 diabetes mellitus (HCC)  -     HEMOGLOBIN A1C W/ EAG ESTIMATION Lab Collect; Future  -     Comprehensive metabolic panel Lab Collect; Future  -     CBC and differential Lab Collect; Future  -     TSH, 3rd generation Lab Collect; Future  -     T4, free Lab Collect; Future    Acquired hypothyroidism  -     HEMOGLOBIN A1C W/ EAG ESTIMATION Lab Collect; Future  -     Comprehensive metabolic panel Lab Collect; Future  -     CBC and differential Lab Collect; Future  -     TSH, 3rd generation Lab Collect; Future  -     T4, free Lab Collect; Future    Essential hypertension  -     HEMOGLOBIN A1C W/ EAG ESTIMATION Lab Collect; Future  -     Comprehensive metabolic panel Lab Collect; Future  -     CBC and differential Lab Collect; Future  -     TSH, 3rd generation Lab Collect; Future  -     T4, free Lab Collect; Future    Mixed hyperlipidemia  -     HEMOGLOBIN A1C W/ EAG ESTIMATION Lab Collect; Future  -     Comprehensive metabolic panel Lab Collect; Future  -     CBC and differential Lab Collect; Future  -     TSH, 3rd generation Lab Collect; Future  -     T4, free Lab Collect; Future    Type 2 diabetes mellitus with complication (HCC)  -     Basaglar KwikPen 100 units/mL injection pen; 48 units in am and 36 units in pm, up to 90 units daily        Assessment/Plan:    1  Type 2 diabetes, insulin requiring  Her most recent hemoglobin A1c has improved to 7 6% above but her hemoglobin is quite low with significant anemia so this is probably a false improvement  Unfortunately, she did not bring blood sugars to her appointment so it is unclear how to adjust her medications  For now, she will continue the same glimepiride, Januvia, and Basaglar insulin  She will continue to test her blood sugars at least twice a day  2  Diabetic neuropathy  She denies neuropathic symptoms  Diabetic foot exams are up-to-date  3  Hypothyroidism  Thyroid function tests are normal   She is both biochemically and clinically euthyroid  For now, she will continue the same levothyroxine 125 mcg daily  4  Hypertension  Blood pressure is under good control on her current dose of spironolactone, furosemide, and nadolol  5  Hyperlipidemia  Her cholesterol profile is good  She will continue the same simvastatin 40 mg daily  Of note, reviewing her CBC demonstrates a low WBC count, low red blood cell count with hemoglobin and hematocrit and low platelet count  I would recommend also consideration for hematology evaluation to see if there is some sort of myelodysplastic cause of her abnormalities  I will leave that up to her PCP  I have asked her to follow up in 3 months with preceding hemoglobin A1c, CMP, CBC, TSH, and free T4       CC: Diabetes Type 2, thyroid, blood pressure, lipid follow-up    History of Present Illness     HPI: Katerin Dennison is a 77y o  year old female with type 2 diabetes, insulin requiring with neuropathy and retinopathy for 49 years, hypothyroidism, hypertension, hyperlipidemia for follow-up visit  She is on oral agents and insulin at home and takes  Basaglar insulin 48 units the morning and 36 units in the evening, Janumet XR 50/1000 mg twice a day, and glimepiride 4 mg 2 at breakfast  She denies any polyphagia, polydipsia, nocturia and blurry vision  She has some polyuria  She denies numbness or tingling of the feet  She denies shortness of breath but will get some chest pain and have difficulty with lifting her arm    She did see her cardiologist regarding this  She denies nephropathy, retinopathy, heart attack, stroke and claudication but does admit to neuropathy  Hypoglycemic episodes: Yes rare  The patient's last eye exam was in June 2021 with stable retinopathy  The patient's last foot exam was in  October 2020 at endocrine office visit  She does see a podiatrist regularly and last visit was yesterday  Last A1C was   Lab Results   Component Value Date    HGBA1C 7 6 07/12/2021     Blood Sugar/Glucometer/Pump/CGM review: checks blood sugars twice a day am and pm  No record brought with her today  Was down int he 100s in am recently "grazes" on food all day and the sugar is 300 before supper  She has hypothyroidism and takes levothyroxine 125 mcg daily  She has fatigue and admits to insomnia due to her daughter waking her at night  She does have some anxiety and tremors  She denies palpitation, diarrhea or constipation, depression, weight changes, heat or cold intolerance  She has hyperlipidemia and takes simvastatin 40 mg daily  She had some episodes of chest pain with difficulty lifting her left arm and saw her cardiologist   She is now in physical therapy  She denies shortness of breath  She has hypertension and takes spironolactone 50 mg daily, nadolol 20 mg daily, and furosemide 40 mg daily  She denies stroke-like symptoms  She denies headache or lightheadedness  Reports having an EGD tomorrow as told she is bleeding in GI tract  Review of Systems   Constitutional: Positive for fatigue  Negative for unexpected weight change  Some increase in eating recently  HENT: Negative for trouble swallowing  Eyes: Negative for visual disturbance  Wears glasses  Respiratory: Negative for chest tightness and shortness of breath  Cardiovascular: Positive for chest pain  Negative for palpitations  Was having chest pain and couldn't lift left arm, to cardiology for evaluation   Now in PT  Gastrointestinal: Negative for abdominal pain, constipation, diarrhea and nausea  Endocrine: Positive for polyuria  Negative for cold intolerance, heat intolerance, polydipsia and polyphagia  Nocturia none  Skin: Negative for rash and wound  Neurological: Positive for tremors  Negative for dizziness, light-headedness, numbness and headaches  Slight tremors  Psychiatric/Behavioral: Positive for sleep disturbance  Negative for dysphoric mood  The patient is nervous/anxious  Sleeping "terrible", gets up with ill daughter  Always anxious         Historical Information   Past Medical History:   Diagnosis Date    Anemia     Cirrhosis ([address] )     Diabetic neuropathy (HCC)     Esophageal varices (HCC)     Fatty liver     GERD (gastroesophageal reflux disease)     Hepatic encephalopathy (HCC)     Hiatal hernia     Hyperlipidemia     Hypertension     Hypoglycemia     Hypothyroidism     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)     Osteoarthritis     Osteopenia     Pancytopenia (HCC)     Thrombocytopenia (HCC)     Type 2 diabetes mellitus (HCC)      Past Surgical History:   Procedure Laterality Date    ABDOMINAL SURGERY      Abdominal plasty with mesh insertion    CATARACT EXTRACTION, BILATERAL      CATARACT EXTRACTION, BILATERAL      EGD AND COLONOSCOPY  03/18/2015    LAPAROSCOPIC CHOLECYSTECTOMY      SHOULDER SURGERY Right     calcium depsoti    TUBAL LIGATION      UMBILICAL HERNIA REPAIR      with mesh placed    UPPER GASTROINTESTINAL ENDOSCOPY       Social History   Social History     Substance and Sexual Activity   Alcohol Use Not Currently     Social History     Substance and Sexual Activity   Drug Use No     Social History     Tobacco Use   Smoking Status Never Smoker   Smokeless Tobacco Never Used     Family History:   Family History   Problem Relation Age of Onset    Breast cancer Mother     Diabetes type II Father     Thyroid disease unspecified Daughter     Down syndrome Daughter     Diabetes type II Daughter     Diabetes type II Sister     No Known Problems Brother     Prostate cancer Brother     No Known Problems Sister     Stroke Sister     No Known Problems Son     Breast cancer Maternal Aunt     Colon cancer Neg Hx        Meds/Allergies   Current Outpatient Medications   Medication Sig Dispense Refill    Basaglar KwikPen 100 units/mL injection pen 48 units in am and 36 units in pm, up to 90 units daily 30 mL 6    Blood Glucose Monitoring Suppl (ONE TOUCH ULTRA 2) w/Device KIT by Does not apply route once for 1 dose Use Glucometer to test up to 3 times daily  1 each 0    Calcium Carb-Cholecalciferol (CALCIUM 1000 + D PO) Take by mouth      Coenzyme Q10 (Co Q10) 100 MG CAPS Take by mouth      fluticasone (FLONASE) 50 mcg/act nasal spray 1 spray into each nostril daily prn       furosemide (LASIX) 40 mg tablet TAKE 1 TABLET BY MOUTH EVERY DAY 90 tablet 1    glimepiride (AMARYL) 4 mg tablet 1 tablet in am and 1/2 tablet in pm (Patient taking differently: Take 8 mg by mouth daily with breakfast ) 180 tablet 2    glucose blood (OneTouch Ultra) test strip Test up to 3 times daily   300 each 3    Insulin Pen Needle (B-D UF III MINI PEN NEEDLES) 31G X 5 MM MISC USE UP TO 3 TIMES A  each 6    Janumet XR  MG TB24 TAKE 1 TABLET BY MOUTH TWICE A  tablet 3    KRILL OIL PO Take 750 mg by mouth      lactulose 20 g/30 mL Take 15 mL (10 g total) by mouth 2 (two) times a day (Patient taking differently: Take 10 g by mouth 4 (four) times a day ) 900 mL 6    levothyroxine 125 mcg tablet Take 125 mcg by mouth daily  2    nadolol (CORGARD) 20 mg tablet Take 1 tablet (20 mg total) by mouth daily 30 tablet 2    omeprazole (PriLOSEC) 20 mg delayed release capsule TAKE 1 CAPSULE BY MOUTH EVERY DAY 90 capsule 2    ONETOUCH DELICA LANCETS FINE MISC Use 1-3 times a day 100 each 3    simvastatin (ZOCOR) 40 mg tablet Take 40 mg by mouth daily  3    spironolactone (ALDACTONE) 50 mg tablet TAKE 1 TABLET BY MOUTH EVERY DAY 90 tablet 1    Xifaxan 550 MG tablet Take 550 mg by mouth every 12 (twelve) hours      aspirin 81 mg chewable tablet Chew 81 mg daily (Patient not taking: Reported on 7/21/2021)      cholecalciferol (VITAMIN D3) 1,000 units tablet Take 2,000 Units by mouth daily (Patient not taking: Reported on 7/21/2021)       No current facility-administered medications for this visit  Allergies   Allergen Reactions    Latex     Sesame Seed (Diagnostic) - Food Allergy      Other reaction(s): swelling inside mouth    Strawberry C [Ascorbate - Food Allergy] Other (See Comments)     Mouth sores    Penicillins Rash       Objective   Vitals: Blood pressure 110/60, pulse 80, height 5' 4" (1 626 m), weight 88 9 kg (196 lb), not currently breastfeeding  Invasive Devices     None                 Physical Exam  Vitals reviewed  Constitutional:       Appearance: Normal appearance  She is well-developed  She is obese  HENT:      Head: Normocephalic and atraumatic  Eyes:      Extraocular Movements: Extraocular movements intact  Conjunctiva/sclera: Conjunctivae normal       Comments: No lid lag, stare, proptosis, or periorbital edema  Neck:      Thyroid: No thyromegaly  Vascular: No carotid bruit  Comments: Thyroid normal in size  No palpable thyroid nodules  Cardiovascular:      Rate and Rhythm: Normal rate and regular rhythm  Heart sounds: Normal heart sounds  No murmur heard  Pulmonary:      Effort: Pulmonary effort is normal       Breath sounds: Normal breath sounds  No wheezing  Abdominal:      Palpations: Abdomen is soft  Musculoskeletal:         General: No deformity  Normal range of motion  Cervical back: Normal range of motion and neck supple  Right lower leg: No edema  Left lower leg: No edema  Comments: No tremor of the outstretched hands     Lymphadenopathy:      Cervical: No cervical adenopathy  Skin:     General: Skin is warm and dry  Findings: No rash  Neurological:      Mental Status: She is alert and oriented to person, place, and time  Deep Tendon Reflexes: Reflexes are normal and symmetric  Comments: Her deep tendon reflexes normal          The history was obtained from the review of the chart and from the patient  Lab Results:    Most recent Alc is  Lab Results   Component Value Date    HGBA1C 7 6 07/12/2021             Blood work done on 07/12/2021 performed a [address] showed a CMP with a glucose of 66 fasting, BUN 36, creatinine 1 12, GFR 48, BUN/creatinine ratio 32 1, but was otherwise normal     Total cholesterol 115, triglyceride 83, HDL 30, LDL 68  TSH 1 12 with a free T4 1 56  CBC shows a hemoglobin of 6 6 with a hematocrit 24 4, WBC 3 3, platelet count 20,815      Lab Results   Component Value Date    CREATININE 1 33 (H) 07/12/2021    CREATININE 0 88 09/15/2020    CREATININE 0 97 09/14/2020    BUN 37 (H) 07/12/2021    K 4 5 07/12/2021     07/12/2021    CO2 25 07/12/2021     eGFR   Date Value Ref Range Status   07/12/2021 39 ml/min/1 73sq m Final       Lab Results   Component Value Date    ALT 27 07/12/2021    AST 31 07/12/2021    ALKPHOS 63 07/12/2021         Future Appointments   Date Time Provider John Alvarado   7/28/2021  8:30 AM GI NURSE M[facility]I [facility]   8/6/2021  9:00 AM JOSE JUAN Delaney [facility]   9/29/2021 10:30 AM Lina Thakur MD [facility]   11/4/2021  2:20 PM Brian Cameron MD [facility]

## 2021-07-27 NOTE — PATIENT INSTRUCTIONS
hgba1c is 7 6%  this is improved but may not be accurate due to the severe anemia  For now, no change in diabetes medicines  Continue to work on diet, eat regular meals with less Grazing  Continue to test blood sugars 2-3 times a day  the thyroid blood work is normal   Continue the same levothyroxine  the cholesterol is good  follow up in 3 months with blood work

## 2021-07-28 ENCOUNTER — CLINICAL SUPPORT (OUTPATIENT)
Dept: GASTROENTEROLOGY | Facility: CLINIC | Age: 76
End: 2021-07-28
Payer: MEDICARE

## 2021-07-28 DIAGNOSIS — D50.9 IRON DEFICIENCY ANEMIA, UNSPECIFIED IRON DEFICIENCY ANEMIA TYPE: ICD-10-CM

## 2021-07-28 PROCEDURE — 91110 GI TRC IMG INTRAL ESOPH-ILE: CPT | Performed by: INTERNAL MEDICINE

## 2021-07-30 DIAGNOSIS — K21.9 GASTROESOPHAGEAL REFLUX DISEASE WITHOUT ESOPHAGITIS: ICD-10-CM

## 2021-07-30 RX ORDER — OMEPRAZOLE 20 MG/1
CAPSULE, DELAYED RELEASE ORAL
Qty: 90 CAPSULE | Refills: 3 | Status: ON HOLD | OUTPATIENT
Start: 2021-07-30

## 2021-08-05 NOTE — H&P (VIEW-ONLY)
Hematology/Oncology Outpatient Consult Note  Antony Mantilla 77 y o  female MRN: @ Encounter: [id number]        Date:  8/6/2021        CC:  Pancytopenia, Iron deficiency       HPI:  Antony Mantilla is a 77year old female with a history of type 2 diabetes, hypothyroidism, hypertension, Yusuf Shaver cirrhosis and portal hypertension, splenomegaly, esophageal varices who was seen in the ED on 07/12/2021 for evaluation of abnormal outpatient blood work  Patient had routine blood work done by her PCP and was noted to have a hemoglobin of 6 6  She was sent to the ED for further evaluation  She was transfused with 2 units of blood at discharge was referred to Hematology and Gastroenterology for further workup and treatment recommendations  Repeat CBC done on 7/14 demonstrated evidence of pancytopenia with a white count 3 2, hemoglobin 7 6, MCV 80, platelet count 73, ANC 1 7  Her iron panel demonstrates significant iron deficiency with serum iron of 16, iron saturation 3%, ferritin 10  Patient has been following closely with GI for her history of nonalcoholic steatohepatitis and cirrhosis  She was last seen by Dr Jordan Gastelum with [facility] on 07/21 and set up to undergo small bowel capsule endoscopy  This was completed on 07/28/2021  Patient had previously undergone EGD and colonoscopy on 07/17/2020  The studies revealed grade 2 esophageal varices which were not bleeding  Colonoscopy showed diverticulosis but no source of bleeding  Per review of blood work on file, she does have evidence of pancytopenia dating back to 9/2020  She has evidence of leukopenia and thrombocytopenia recorded in 10/2019     Patient reports she is taking oral iron  This does cause constipation  She denies any symptoms of significant fatigue, lightheadedness/dizziness, CP, palpitations or shortness of breath  She denies any abnormal bleeding  No epistaxis, gingival bleeding  She denies easy bruisability   There is no ecchymosis on her back, abdomen, lower extremities  She  denies any melena, hematochezia, hematuria  She denies any recent or frequent/recurrent infections  No fevers/chills  No unexpected weight loss, night sweats    She has arthralgias  She has been working with PT for arthralgias of her left shoulder and arm  She is primary caregiver of her 48year old daughter who has Down's Syndrome  Previous Hematologic/ Oncologic History:    Oral iron        Test Results:    Imaging:   ABDOMEN ULTRASOUND, COMPLETE      INDICATION:   X38 34: Nonalcoholic steatohepatitis (PAUL)  K74 60: Unspecified cirrhosis of liver      COMPARISON: Ultrasound 10/7/2020      TECHNIQUE:   Real-time ultrasound of the abdomen was performed with a curvilinear transducer with both volumetric sweeps and still imaging techniques      FINDINGS:     PANCREAS:  Visualized portions of the pancreas are within normal limits      AORTA AND IVC:  Visualized portions are normal for patient age      LIVER:  Size:  Within normal range  The liver measures 14 cm in the midclavicular line  Contour:  Surface contour is smooth  Parenchyma: There is diffuse coarsened heterogeneous echotexture suggesting underlying cirrhotic changes  Stable ovoid hypodensity in the right hepatic lobe measuring 7 x 3 x 8 mm  Limited imaging of the main portal vein shows it to be patent and hepatopetal      BILIARY:  Patient has undergone cholecystectomy  No intrahepatic biliary dilatation  CBD measures 2 mm  No choledocholithiasis      KIDNEY:   Right kidney measures 11 9 cm  Within normal limits      Left kidney measures 11 7 cm  3 4 x 1 6 x 3 1 cm lower pole parapelvic cyst     SPLEEN:   Enlarged, measuring 17 cm  Unchanged since the prior study      ASCITES:  None      IMPRESSION:     Cirrhotic liver, with stable subcentimeter hypodensity in the right hepatic lobe      Unchanged splenomegaly        Labs:   Lab Results   Component Value Date    WBC 2 96 (L) 07/12/2021    HGB 6 1 (LL) 07/12/2021    HCT 22 0 (L) 07/12/2021    MCV 78 (L) 07/12/2021    PLT 77 (L) 07/12/2021     Lab Results   Component Value Date    K 4 5 07/12/2021     07/12/2021    CO2 25 07/12/2021    BUN 37 (H) 07/12/2021    CREATININE 1 33 (H) 07/12/2021    CALCIUM 8 7 07/12/2021    CORRECTEDCA 9 7 07/12/2021    AST 31 07/12/2021    ALT 27 07/12/2021    ALKPHOS 63 07/12/2021    EGFR 39 07/12/2021           ROS:  Review of Systems   Constitutional: Positive for fatigue (mild)  Musculoskeletal: Positive for arthralgias  All other systems reviewed and are negative          Active Problems:   Patient Active Problem List   Diagnosis    Hyperlipidemia    Essential hypertension    Acquired hypothyroidism    Type 2 diabetes mellitus with hyperglycemia, with long-term current use of insulin ([facility] )    Diabetic polyneuropathy associated with type 2 diabetes mellitus (HCC)    Acute metabolic encephalopathy    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) (HCC)    Thrombocytopenia (HCC)    Urinary tract infection    Mild nonproliferative diabetic retinopathy of both eyes without macular edema associated with type 2 diabetes mellitus (HCC)    Pancytopenia (HCC)    Iron deficiency anemia due to chronic blood loss       Past Medical History:   Past Medical History:   Diagnosis Date    Anemia     Cirrhosis (HCC)     Diabetic neuropathy ([facility] )     Esophageal varices (HCC)     Fatty liver     GERD (gastroesophageal reflux disease)     Hepatic encephalopathy (HCC)     Hiatal hernia     Hyperlipidemia     Hypertension     Hypoglycemia     Hypothyroidism     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)     Osteoarthritis     Osteopenia     Pancytopenia (HCC)     Thrombocytopenia (HCC)     Type 2 diabetes mellitus ([facility] )        Surgical History:   Past Surgical History:   Procedure Laterality Date    ABDOMINAL SURGERY      Abdominal plasty with mesh insertion    CATARACT EXTRACTION, BILATERAL      CATARACT EXTRACTION, BILATERAL      EGD AND COLONOSCOPY  03/18/2015    LAPAROSCOPIC CHOLECYSTECTOMY      SHOULDER SURGERY Right     calcium depsoti    TUBAL LIGATION      UMBILICAL HERNIA REPAIR      with mesh placed    UPPER GASTROINTESTINAL ENDOSCOPY         Family History:    Family History   Problem Relation Age of Onset   Learta January Breast cancer Mother     Diabetes type II Father     Thyroid disease unspecified Daughter     Down syndrome Daughter     Diabetes type II Daughter     Diabetes type II Sister     No Known Problems Brother     Prostate cancer Brother     No Known Problems Sister     Stroke Sister     No Known Problems Son     Breast cancer Maternal Aunt     Colon cancer Neg Hx        Cancer-related family history includes Breast cancer in her maternal aunt and mother; Prostate cancer in her brother  There is no history of Colon cancer  Social History:   Social History     Socioeconomic History    Marital status: /Civil Union     Spouse name: Not on file    Number of children: Not on file    Years of education: Not on file    Highest education level: Not on file   Occupational History    Not on file   Tobacco Use    Smoking status: Never Smoker    Smokeless tobacco: Never Used   Vaping Use    Vaping Use: Never used   Substance and Sexual Activity    Alcohol use: Not Currently    Drug use: No    Sexual activity: Not Currently   Other Topics Concern    Not on file   Social History Narrative    Not on file     Social Determinants of Health     Financial Resource Strain:     Difficulty of Paying Living Expenses:    Food Insecurity:     Worried About Running Out of Food in the Last Year:     [address] in the Last Year:    Transportation Needs:     Lack of Transportation (Medical):      Lack of Transportation (Non-Medical):    Physical Activity:     Days of Exercise per Week:     Minutes of Exercise per Session:    Stress:     Feeling of Stress :    Social Connections:     Frequency of Communication with Friends and Family:     Frequency of Social Gatherings with Friends and Family:     Attends Faith Services:     Active Member of Clubs or Organizations:     Attends Club or Organization Meetings:     Marital Status:    Intimate Partner Violence:     Fear of Current or Ex-Partner:     Emotionally Abused:     Physically Abused:     Sexually Abused:        Current Medications:   Current Outpatient Medications   Medication Sig Dispense Refill    aspirin 81 mg chewable tablet Chew 81 mg daily       Basaglar KwikPen 100 units/mL injection pen 48 units in am and 36 units in pm, up to 90 units daily 30 mL 6    Calcium Carb-Cholecalciferol (CALCIUM 1000 + D PO) Take by mouth      cholecalciferol (VITAMIN D3) 1,000 units tablet Take 2,000 Units by mouth daily       Coenzyme Q10 (Co Q10) 100 MG CAPS Take by mouth      ferrous sulfate 325 (65 Fe) mg tablet Take 1 tablet by mouth 2 (two) times a day      fluticasone (FLONASE) 50 mcg/act nasal spray 1 spray into each nostril daily prn       furosemide (LASIX) 40 mg tablet TAKE 1 TABLET BY MOUTH EVERY DAY 90 tablet 1    glimepiride (AMARYL) 4 mg tablet 1 tablet in am and 1/2 tablet in pm (Patient taking differently: Take 8 mg by mouth daily with breakfast ) 180 tablet 2    glucose blood (OneTouch Ultra) test strip Test up to 3 times daily   300 each 3    Insulin Pen Needle (B-D UF III MINI PEN NEEDLES) 31G X 5 MM MISC USE UP TO 3 TIMES A  each 6    Janumet XR  MG TB24 TAKE 1 TABLET BY MOUTH TWICE A  tablet 3    KRILL OIL PO Take 750 mg by mouth      levothyroxine 125 mcg tablet Take 125 mcg by mouth daily  2    meloxicam (MOBIC) 7 5 mg tablet TAKE 1 TABLET BY MOUTH DAILY AS NEEDED FOR ARM PAIN      nadolol (CORGARD) 20 mg tablet Take 1 tablet (20 mg total) by mouth daily 30 tablet 2    omeprazole (PriLOSEC) 20 mg delayed release capsule TAKE 1 CAPSULE BY MOUTH EVERY DAY 90 capsule 3    ONETOUCH DELICA LANCETS FINE MISC Use 1-3 times a day 100 each 3    simvastatin (ZOCOR) 40 mg tablet Take 40 mg by mouth daily  3    spironolactone (ALDACTONE) 50 mg tablet TAKE 1 TABLET BY MOUTH EVERY DAY 90 tablet 1    traMADol (ULTRAM) 50 mg tablet TAKE 1 TABLET BY MOUTH THREE TIMES A DAY AS NEEDED FOR PAIN      Xifaxan 550 MG tablet Take 550 mg by mouth every 12 (twelve) hours      Blood Glucose Monitoring Suppl (ONE TOUCH ULTRA 2) w/Device KIT by Does not apply route once for 1 dose Use Glucometer to test up to 3 times daily  1 each 0    lactulose 20 g/30 mL Take 15 mL (10 g total) by mouth 2 (two) times a day (Patient taking differently: Take 10 g by mouth 4 (four) times a day ) 900 mL 6     No current facility-administered medications for this visit  Allergies: Allergies   Allergen Reactions    Latex     Sesame Seed (Diagnostic) - Food Allergy      Other reaction(s): swelling inside mouth    Strawberry C [Ascorbate - Food Allergy] Other (See Comments)     Mouth sores    Penicillins Rash         Physical Exam:    Body surface area is 1 94 meters squared  Wt Readings from Last 3 Encounters:   08/06/21 89 4 kg (197 lb)   07/27/21 88 9 kg (196 lb)   07/21/21 89 3 kg (196 lb 12 8 oz)        Temp Readings from Last 3 Encounters:   08/06/21 98 °F (36 7 °C) (Temporal)   07/13/21 98 1 °F (36 7 °C) (Oral)   10/27/20 (!) 97 2 °F (36 2 °C)        BP Readings from Last 3 Encounters:   08/06/21 96/52   07/27/21 110/60   07/21/21 104/56         Pulse Readings from Last 3 Encounters:   08/06/21 63   07/27/21 80   07/21/21 93          Physical Exam  Constitutional:       General: She is not in acute distress  Appearance: She is obese  HENT:      Head: Normocephalic and atraumatic  Eyes:      General: No scleral icterus  Right eye: No discharge  Left eye: No discharge  Cardiovascular:      Rate and Rhythm: Regular rhythm     Pulmonary:      Effort: Pulmonary effort is normal       Breath sounds: Normal breath sounds  Abdominal:      General: Bowel sounds are normal       Palpations: Abdomen is soft  Musculoskeletal:         General: Normal range of motion  Cervical back: Normal range of motion  Skin:     General: Skin is warm and dry  Neurological:      General: No focal deficit present  Mental Status: She is alert and oriented to person, place, and time  Psychiatric:         Mood and Affect: Mood normal          Behavior: Behavior normal               Assessment/ Plan:    1  Pancytopenia ([address] )    2  Iron deficiency anemia due to chronic blood loss      The patient is a pleasant 77year old female  with a history of type 2 diabetes, hypothyroidism, hypertension, Slater cirrhosis and portal hypertension, splenomegaly, esophageal varices and iron deficiency anemia  She follows closely with GI  She had a significant drop in her Hgb to 6 1 in June and required a blood transfusion  She has recently undergone a small bowel capsule endoscopy to evaluate for source of blood loss  Her labs demonstrate iron deficiency  She is also pancytopenic with decreased white count, decreased hemoglobin, decreased platelet count  I am concerned that she may have underlying MDS  She does have cirrhotic liver and portal hypertension that is also likely contributing to her thrombocytopenia  In addition, she has an enlarged spleen and may have some element of sequestration    I spent time reviewing all of patient's blood work with her today and to the best of my ability explained my concern for possible bone marrow disorder  I have recommended a bone marrow biopsy for further deliniation  I am not certain patient fully comprehends the implication of this but is aggreeable to undergoing recommended work up  I have placed a consult to IR  Gen path form has been completed  I will check a peripheral flow cytometry      In addition, I have recommended parental iron replacement to treat her obvious iron deficiency  This should help her hemoglobin improve  She is relatively asymptomatic  We did discuss IV iron replacement  Potential side effects of IV iron could include but may not be limited to:  change in taste, diarrhea, muscle cramps, nausea or vomiting, pain in the arms or legs, pain or burning sensation in the injection site, allergic reaction  The patient verbalized understanding and wishes to proceed  I will set her up to receive IV Venofer 200 mg weekly x 10 doses  Patient will return for a follow-up visit after completion of bone marrow biopsy to review results  I will repeat blood work to include CBC, CMP, flow cytometry  Patient is instructed to call at any time with questions or concerns  She will also continue to follow closely with GI        Barriers to care: None  Patient able to self care  Portions of the record may have been created with voice recognition software  Occasional wrong word or "sound a like" substitutions may have occurred due to the inherent limitations of voice recognition software  Read the chart carefully and recognize, using context, where substitutions have occurred

## 2021-08-05 NOTE — PROGRESS NOTES
Hematology/Oncology Outpatient Consult Note  Riana Liz 77 y o  female MRN: @ Encounter: [id number]        Date:  8/6/2021        CC:  Pancytopenia, Iron deficiency       HPI:  Riana Liz is a 77year old female with a history of type 2 diabetes, hypothyroidism, hypertension, Teresa Rink cirrhosis and portal hypertension, splenomegaly, esophageal varices who was seen in the ED on 07/12/2021 for evaluation of abnormal outpatient blood work  Patient had routine blood work done by her PCP and was noted to have a hemoglobin of 6 6  She was sent to the ED for further evaluation  She was transfused with 2 units of blood at discharge was referred to Hematology and Gastroenterology for further workup and treatment recommendations  Repeat CBC done on 7/14 demonstrated evidence of pancytopenia with a white count 3 2, hemoglobin 7 6, MCV 80, platelet count 73, ANC 1 7  Her iron panel demonstrates significant iron deficiency with serum iron of 16, iron saturation 3%, ferritin 10  Patient has been following closely with GI for her history of nonalcoholic steatohepatitis and cirrhosis  She was last seen by Dr Anne Marie Rosales with [facility] on 07/21 and set up to undergo small bowel capsule endoscopy  This was completed on 07/28/2021  Patient had previously undergone EGD and colonoscopy on 07/17/2020  The studies revealed grade 2 esophageal varices which were not bleeding  Colonoscopy showed diverticulosis but no source of bleeding  Per review of blood work on file, she does have evidence of pancytopenia dating back to 9/2020  She has evidence of leukopenia and thrombocytopenia recorded in 10/2019     Patient reports she is taking oral iron  This does cause constipation  She denies any symptoms of significant fatigue, lightheadedness/dizziness, CP, palpitations or shortness of breath  She denies any abnormal bleeding  No epistaxis, gingival bleeding  She denies easy bruisability   There is no ecchymosis on her back, abdomen, lower extremities  She  denies any melena, hematochezia, hematuria  She denies any recent or frequent/recurrent infections  No fevers/chills  No unexpected weight loss, night sweats    She has arthralgias  She has been working with PT for arthralgias of her left shoulder and arm  She is primary caregiver of her 48year old daughter who has Down's Syndrome  Previous Hematologic/ Oncologic History:    Oral iron        Test Results:    Imaging:   ABDOMEN ULTRASOUND, COMPLETE      INDICATION:   X39 99: Nonalcoholic steatohepatitis (PAUL)  K74 60: Unspecified cirrhosis of liver      COMPARISON: Ultrasound 10/7/2020      TECHNIQUE:   Real-time ultrasound of the abdomen was performed with a curvilinear transducer with both volumetric sweeps and still imaging techniques      FINDINGS:     PANCREAS:  Visualized portions of the pancreas are within normal limits      AORTA AND IVC:  Visualized portions are normal for patient age      LIVER:  Size:  Within normal range  The liver measures 14 cm in the midclavicular line  Contour:  Surface contour is smooth  Parenchyma: There is diffuse coarsened heterogeneous echotexture suggesting underlying cirrhotic changes  Stable ovoid hypodensity in the right hepatic lobe measuring 7 x 3 x 8 mm  Limited imaging of the main portal vein shows it to be patent and hepatopetal      BILIARY:  Patient has undergone cholecystectomy  No intrahepatic biliary dilatation  CBD measures 2 mm  No choledocholithiasis      KIDNEY:   Right kidney measures 11 9 cm  Within normal limits      Left kidney measures 11 7 cm  3 4 x 1 6 x 3 1 cm lower pole parapelvic cyst     SPLEEN:   Enlarged, measuring 17 cm  Unchanged since the prior study      ASCITES:  None      IMPRESSION:     Cirrhotic liver, with stable subcentimeter hypodensity in the right hepatic lobe      Unchanged splenomegaly        Labs:   Lab Results   Component Value Date    WBC 2 96 (L) 07/12/2021    HGB 6 1 (LL) 07/12/2021    HCT 22 0 (L) 07/12/2021    MCV 78 (L) 07/12/2021    PLT 77 (L) 07/12/2021     Lab Results   Component Value Date    K 4 5 07/12/2021     07/12/2021    CO2 25 07/12/2021    BUN 37 (H) 07/12/2021    CREATININE 1 33 (H) 07/12/2021    CALCIUM 8 7 07/12/2021    CORRECTEDCA 9 7 07/12/2021    AST 31 07/12/2021    ALT 27 07/12/2021    ALKPHOS 63 07/12/2021    EGFR 39 07/12/2021           ROS:  Review of Systems   Constitutional: Positive for fatigue (mild)  Musculoskeletal: Positive for arthralgias  All other systems reviewed and are negative          Active Problems:   Patient Active Problem List   Diagnosis    Hyperlipidemia    Essential hypertension    Acquired hypothyroidism    Type 2 diabetes mellitus with hyperglycemia, with long-term current use of insulin ([facility] )    Diabetic polyneuropathy associated with type 2 diabetes mellitus (HCC)    Acute metabolic encephalopathy    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) (HCC)    Thrombocytopenia (HCC)    Urinary tract infection    Mild nonproliferative diabetic retinopathy of both eyes without macular edema associated with type 2 diabetes mellitus (HCC)    Pancytopenia (HCC)    Iron deficiency anemia due to chronic blood loss       Past Medical History:   Past Medical History:   Diagnosis Date    Anemia     Cirrhosis (HCC)     Diabetic neuropathy ([facility] )     Esophageal varices (HCC)     Fatty liver     GERD (gastroesophageal reflux disease)     Hepatic encephalopathy (HCC)     Hiatal hernia     Hyperlipidemia     Hypertension     Hypoglycemia     Hypothyroidism     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)     Osteoarthritis     Osteopenia     Pancytopenia (HCC)     Thrombocytopenia (HCC)     Type 2 diabetes mellitus ([facility] )        Surgical History:   Past Surgical History:   Procedure Laterality Date    ABDOMINAL SURGERY      Abdominal plasty with mesh insertion    CATARACT EXTRACTION, BILATERAL      CATARACT EXTRACTION, BILATERAL      EGD AND COLONOSCOPY  03/18/2015    LAPAROSCOPIC CHOLECYSTECTOMY      SHOULDER SURGERY Right     calcium depsoti    TUBAL LIGATION      UMBILICAL HERNIA REPAIR      with mesh placed    UPPER GASTROINTESTINAL ENDOSCOPY         Family History:    Family History   Problem Relation Age of Onset   Jennifer Mohan Breast cancer Mother     Diabetes type II Father     Thyroid disease unspecified Daughter     Down syndrome Daughter     Diabetes type II Daughter     Diabetes type II Sister     No Known Problems Brother     Prostate cancer Brother     No Known Problems Sister     Stroke Sister     No Known Problems Son     Breast cancer Maternal Aunt     Colon cancer Neg Hx        Cancer-related family history includes Breast cancer in her maternal aunt and mother; Prostate cancer in her brother  There is no history of Colon cancer  Social History:   Social History     Socioeconomic History    Marital status: /Civil Union     Spouse name: Not on file    Number of children: Not on file    Years of education: Not on file    Highest education level: Not on file   Occupational History    Not on file   Tobacco Use    Smoking status: Never Smoker    Smokeless tobacco: Never Used   Vaping Use    Vaping Use: Never used   Substance and Sexual Activity    Alcohol use: Not Currently    Drug use: No    Sexual activity: Not Currently   Other Topics Concern    Not on file   Social History Narrative    Not on file     Social Determinants of Health     Financial Resource Strain:     Difficulty of Paying Living Expenses:    Food Insecurity:     Worried About Running Out of Food in the Last Year:     [address] in the Last Year:    Transportation Needs:     Lack of Transportation (Medical):      Lack of Transportation (Non-Medical):    Physical Activity:     Days of Exercise per Week:     Minutes of Exercise per Session:    Stress:     Feeling of Stress :    Social Connections:     Frequency of Communication with Friends and Family:     Frequency of Social Gatherings with Friends and Family:     Attends Jew Services:     Active Member of Clubs or Organizations:     Attends Club or Organization Meetings:     Marital Status:    Intimate Partner Violence:     Fear of Current or Ex-Partner:     Emotionally Abused:     Physically Abused:     Sexually Abused:        Current Medications:   Current Outpatient Medications   Medication Sig Dispense Refill    aspirin 81 mg chewable tablet Chew 81 mg daily       Basaglar KwikPen 100 units/mL injection pen 48 units in am and 36 units in pm, up to 90 units daily 30 mL 6    Calcium Carb-Cholecalciferol (CALCIUM 1000 + D PO) Take by mouth      cholecalciferol (VITAMIN D3) 1,000 units tablet Take 2,000 Units by mouth daily       Coenzyme Q10 (Co Q10) 100 MG CAPS Take by mouth      ferrous sulfate 325 (65 Fe) mg tablet Take 1 tablet by mouth 2 (two) times a day      fluticasone (FLONASE) 50 mcg/act nasal spray 1 spray into each nostril daily prn       furosemide (LASIX) 40 mg tablet TAKE 1 TABLET BY MOUTH EVERY DAY 90 tablet 1    glimepiride (AMARYL) 4 mg tablet 1 tablet in am and 1/2 tablet in pm (Patient taking differently: Take 8 mg by mouth daily with breakfast ) 180 tablet 2    glucose blood (OneTouch Ultra) test strip Test up to 3 times daily   300 each 3    Insulin Pen Needle (B-D UF III MINI PEN NEEDLES) 31G X 5 MM MISC USE UP TO 3 TIMES A  each 6    Janumet XR  MG TB24 TAKE 1 TABLET BY MOUTH TWICE A  tablet 3    KRILL OIL PO Take 750 mg by mouth      levothyroxine 125 mcg tablet Take 125 mcg by mouth daily  2    meloxicam (MOBIC) 7 5 mg tablet TAKE 1 TABLET BY MOUTH DAILY AS NEEDED FOR ARM PAIN      nadolol (CORGARD) 20 mg tablet Take 1 tablet (20 mg total) by mouth daily 30 tablet 2    omeprazole (PriLOSEC) 20 mg delayed release capsule TAKE 1 CAPSULE BY MOUTH EVERY DAY 90 capsule 3    ONETOUCH DELICA LANCETS FINE MISC Use 1-3 times a day 100 each 3    simvastatin (ZOCOR) 40 mg tablet Take 40 mg by mouth daily  3    spironolactone (ALDACTONE) 50 mg tablet TAKE 1 TABLET BY MOUTH EVERY DAY 90 tablet 1    traMADol (ULTRAM) 50 mg tablet TAKE 1 TABLET BY MOUTH THREE TIMES A DAY AS NEEDED FOR PAIN      Xifaxan 550 MG tablet Take 550 mg by mouth every 12 (twelve) hours      Blood Glucose Monitoring Suppl (ONE TOUCH ULTRA 2) w/Device KIT by Does not apply route once for 1 dose Use Glucometer to test up to 3 times daily  1 each 0    lactulose 20 g/30 mL Take 15 mL (10 g total) by mouth 2 (two) times a day (Patient taking differently: Take 10 g by mouth 4 (four) times a day ) 900 mL 6     No current facility-administered medications for this visit  Allergies: Allergies   Allergen Reactions    Latex     Sesame Seed (Diagnostic) - Food Allergy      Other reaction(s): swelling inside mouth    Strawberry C [Ascorbate - Food Allergy] Other (See Comments)     Mouth sores    Penicillins Rash         Physical Exam:    Body surface area is 1 94 meters squared  Wt Readings from Last 3 Encounters:   08/06/21 89 4 kg (197 lb)   07/27/21 88 9 kg (196 lb)   07/21/21 89 3 kg (196 lb 12 8 oz)        Temp Readings from Last 3 Encounters:   08/06/21 98 °F (36 7 °C) (Temporal)   07/13/21 98 1 °F (36 7 °C) (Oral)   10/27/20 (!) 97 2 °F (36 2 °C)        BP Readings from Last 3 Encounters:   08/06/21 96/52   07/27/21 110/60   07/21/21 104/56         Pulse Readings from Last 3 Encounters:   08/06/21 63   07/27/21 80   07/21/21 93          Physical Exam  Constitutional:       General: She is not in acute distress  Appearance: She is obese  HENT:      Head: Normocephalic and atraumatic  Eyes:      General: No scleral icterus  Right eye: No discharge  Left eye: No discharge  Cardiovascular:      Rate and Rhythm: Regular rhythm     Pulmonary:      Effort: Pulmonary effort is normal       Breath sounds: Normal breath sounds  Abdominal:      General: Bowel sounds are normal       Palpations: Abdomen is soft  Musculoskeletal:         General: Normal range of motion  Cervical back: Normal range of motion  Skin:     General: Skin is warm and dry  Neurological:      General: No focal deficit present  Mental Status: She is alert and oriented to person, place, and time  Psychiatric:         Mood and Affect: Mood normal          Behavior: Behavior normal               Assessment/ Plan:    1  Pancytopenia ([address] )    2  Iron deficiency anemia due to chronic blood loss      The patient is a pleasant 77year old female  with a history of type 2 diabetes, hypothyroidism, hypertension, Slater cirrhosis and portal hypertension, splenomegaly, esophageal varices and iron deficiency anemia  She follows closely with GI  She had a significant drop in her Hgb to 6 1 in June and required a blood transfusion  She has recently undergone a small bowel capsule endoscopy to evaluate for source of blood loss  Her labs demonstrate iron deficiency  She is also pancytopenic with decreased white count, decreased hemoglobin, decreased platelet count  I am concerned that she may have underlying MDS  She does have cirrhotic liver and portal hypertension that is also likely contributing to her thrombocytopenia  In addition, she has an enlarged spleen and may have some element of sequestration    I spent time reviewing all of patient's blood work with her today and to the best of my ability explained my concern for possible bone marrow disorder  I have recommended a bone marrow biopsy for further deliniation  I am not certain patient fully comprehends the implication of this but is aggreeable to undergoing recommended work up  I have placed a consult to IR  Gen path form has been completed  I will check a peripheral flow cytometry      In addition, I have recommended parental iron replacement to treat her obvious iron deficiency  This should help her hemoglobin improve  She is relatively asymptomatic  We did discuss IV iron replacement  Potential side effects of IV iron could include but may not be limited to:  change in taste, diarrhea, muscle cramps, nausea or vomiting, pain in the arms or legs, pain or burning sensation in the injection site, allergic reaction  The patient verbalized understanding and wishes to proceed  I will set her up to receive IV Venofer 200 mg weekly x 10 doses  Patient will return for a follow-up visit after completion of bone marrow biopsy to review results  I will repeat blood work to include CBC, CMP, flow cytometry  Patient is instructed to call at any time with questions or concerns  She will also continue to follow closely with GI        Barriers to care: None  Patient able to self care  Portions of the record may have been created with voice recognition software  Occasional wrong word or "sound a like" substitutions may have occurred due to the inherent limitations of voice recognition software  Read the chart carefully and recognize, using context, where substitutions have occurred

## 2021-08-06 ENCOUNTER — CONSULT (OUTPATIENT)
Dept: HEMATOLOGY ONCOLOGY | Facility: HOSPITAL | Age: 76
End: 2021-08-06
Payer: MEDICARE

## 2021-08-06 ENCOUNTER — TELEPHONE (OUTPATIENT)
Dept: HEMATOLOGY ONCOLOGY | Facility: HOSPITAL | Age: 76
End: 2021-08-06

## 2021-08-06 VITALS
HEART RATE: 63 BPM | HEIGHT: 64 IN | TEMPERATURE: 98 F | SYSTOLIC BLOOD PRESSURE: 96 MMHG | DIASTOLIC BLOOD PRESSURE: 52 MMHG | BODY MASS INDEX: 33.63 KG/M2 | OXYGEN SATURATION: 98 % | WEIGHT: 197 LBS | RESPIRATION RATE: 16 BRPM

## 2021-08-06 DIAGNOSIS — D61.818 PANCYTOPENIA (HCC): Primary | ICD-10-CM

## 2021-08-06 DIAGNOSIS — D50.0 IRON DEFICIENCY ANEMIA DUE TO CHRONIC BLOOD LOSS: ICD-10-CM

## 2021-08-06 PROCEDURE — 99205 OFFICE O/P NEW HI 60 MIN: CPT | Performed by: NURSE PRACTITIONER

## 2021-08-06 RX ORDER — TRAMADOL HYDROCHLORIDE 50 MG/1
TABLET ORAL
COMMUNITY
Start: 2021-07-02 | End: 2022-03-04 | Stop reason: ALTCHOICE

## 2021-08-06 RX ORDER — SODIUM CHLORIDE 9 MG/ML
20 INJECTION, SOLUTION INTRAVENOUS ONCE
Status: CANCELLED | OUTPATIENT
Start: 2021-08-11

## 2021-08-06 RX ORDER — MELOXICAM 7.5 MG/1
TABLET ORAL
COMMUNITY
Start: 2021-06-30 | End: 2021-12-28

## 2021-08-06 RX ORDER — FERROUS SULFATE 325(65) MG
1 TABLET ORAL 2 TIMES DAILY
COMMUNITY
Start: 2021-07-14 | End: 2021-12-28

## 2021-08-06 NOTE — TELEPHONE ENCOUNTER
Per QU infusion unable to schedule out at the moment due to being busy  Per infusion will schedule two appointments for patient and schedule the rest day of visit  Patient was made aware

## 2021-08-11 ENCOUNTER — HOSPITAL ENCOUNTER (OUTPATIENT)
Dept: INFUSION CENTER | Facility: HOSPITAL | Age: 76
Discharge: HOME | End: 2021-08-11
Attending: INTERNAL MEDICINE
Payer: MEDICARE

## 2021-08-11 VITALS
DIASTOLIC BLOOD PRESSURE: 64 MMHG | OXYGEN SATURATION: 99 % | RESPIRATION RATE: 16 BRPM | SYSTOLIC BLOOD PRESSURE: 144 MMHG | HEART RATE: 70 BPM | TEMPERATURE: 97.1 F

## 2021-08-11 DIAGNOSIS — D50.0 IRON DEFICIENCY ANEMIA DUE TO CHRONIC BLOOD LOSS: Primary | ICD-10-CM

## 2021-08-11 PROCEDURE — 96365 THER/PROPH/DIAG IV INF INIT: CPT

## 2021-08-11 RX ORDER — SODIUM CHLORIDE 9 MG/ML
20 INJECTION, SOLUTION INTRAVENOUS ONCE
Status: CANCELLED | OUTPATIENT
Start: 2021-08-18

## 2021-08-11 RX ORDER — SODIUM CHLORIDE 9 MG/ML
20 INJECTION, SOLUTION INTRAVENOUS ONCE
Status: COMPLETED | OUTPATIENT
Start: 2021-08-11 | End: 2021-08-11

## 2021-08-11 RX ADMIN — SODIUM CHLORIDE 20 ML/HR: 9 INJECTION, SOLUTION INTRAVENOUS at 14:29

## 2021-08-11 RX ADMIN — IRON SUCROSE 200 MG: 20 INJECTION, SOLUTION INTRAVENOUS at 14:28

## 2021-08-11 NOTE — PROGRESS NOTES
Pt tolerated venofer infusion well with no adverse reactions  PIV removed and bandage applied  Next appts scheduled and avs printed  Pt left ambulatory with steady gait for d/c to home

## 2021-08-18 ENCOUNTER — HOSPITAL ENCOUNTER (OUTPATIENT)
Dept: INFUSION CENTER | Facility: HOSPITAL | Age: 76
Discharge: HOME | End: 2021-08-18
Attending: INTERNAL MEDICINE
Payer: MEDICARE

## 2021-08-18 ENCOUNTER — TELEPHONE (OUTPATIENT)
Dept: GASTROENTEROLOGY | Facility: CLINIC | Age: 76
End: 2021-08-18

## 2021-08-18 VITALS
HEART RATE: 65 BPM | TEMPERATURE: 97.1 F | RESPIRATION RATE: 14 BRPM | SYSTOLIC BLOOD PRESSURE: 118 MMHG | DIASTOLIC BLOOD PRESSURE: 56 MMHG | OXYGEN SATURATION: 100 %

## 2021-08-18 DIAGNOSIS — D50.0 IRON DEFICIENCY ANEMIA DUE TO CHRONIC BLOOD LOSS: Primary | ICD-10-CM

## 2021-08-18 PROCEDURE — 96365 THER/PROPH/DIAG IV INF INIT: CPT

## 2021-08-18 RX ORDER — SODIUM CHLORIDE 9 MG/ML
20 INJECTION, SOLUTION INTRAVENOUS ONCE
Status: CANCELLED | OUTPATIENT
Start: 2021-08-25

## 2021-08-18 RX ORDER — SODIUM CHLORIDE 9 MG/ML
20 INJECTION, SOLUTION INTRAVENOUS ONCE
Status: COMPLETED | OUTPATIENT
Start: 2021-08-18 | End: 2021-08-18

## 2021-08-18 RX ADMIN — SODIUM CHLORIDE 20 ML/HR: 9 INJECTION, SOLUTION INTRAVENOUS at 12:25

## 2021-08-18 RX ADMIN — IRON SUCROSE 200 MG: 20 INJECTION, SOLUTION INTRAVENOUS at 12:26

## 2021-08-18 NOTE — PLAN OF CARE
Problem: Potential for Falls  Goal: Patient will remain free of falls  Description: INTERVENTIONS:  - Educate patient/family on patient safety including physical limitations  - Instruct patient to call for assistance with activity   - Consult OT/PT to assist with strengthening/mobility   - Keep Call bell within reach  - Keep bed low and locked with side rails adjusted as appropriate  - Keep care items and personal belongings within reach  - Initiate and maintain comfort rounds  - Make Fall Risk Sign visible to staff  - Offer Toileting every Hours, in advance of need  - Initiate/Maintain alarm  - Obtain necessary fall risk management equipment:   - Apply yellow socks and bracelet for high fall risk patients  - Consider moving patient to room near nurses station  Outcome: Progressing     Problem: Knowledge Deficit  Goal: Patient/family/caregiver demonstrates understanding of disease process, treatment plan, medications, and discharge instructions  Description: Complete learning assessment and assess knowledge base    Interventions:  - Provide teaching at level of understanding  - Provide teaching via preferred learning methods  Outcome: Progressing

## 2021-08-18 NOTE — PROGRESS NOTES
Pt arrived amb for Venofer infusion  Tolerated well  Pt thought that the iron was making her sleepy today  PIV removed intact  Disch amb to home

## 2021-08-20 NOTE — TELEPHONE ENCOUNTER
Holding egd spot on 9/9 block with Liz Newberry as discussed with him  Liz Newberry is going to call her later this PM then I will confirm with her

## 2021-08-23 ENCOUNTER — TELEPHONE (OUTPATIENT)
Dept: HEMATOLOGY ONCOLOGY | Facility: CLINIC | Age: 76
End: 2021-08-23

## 2021-08-23 NOTE — TELEPHONE ENCOUNTER
Reviewed plan of care with patient per OV note:     I will set her up to receive IV Venofer 200 mg weekly x 10 doses       Patient will return for a follow-up visit after completion of bone marrow biopsy to review results  I will repeat blood work to include CBC, CMP, flow cytometry  Patient is instructed to call at any time with questions or concerns    She will also continue to follow closely with GI    Patient verbalized understanding of plan

## 2021-08-24 ENCOUNTER — TRANSCRIBE ORDERS (OUTPATIENT)
Dept: LAB | Facility: HOSPITAL | Age: 76
End: 2021-08-24

## 2021-08-24 ENCOUNTER — HOSPITAL ENCOUNTER (OUTPATIENT)
Dept: CT IMAGING | Facility: HOSPITAL | Age: 76
Discharge: HOME | End: 2021-08-24
Admitting: RADIOLOGY
Payer: MEDICARE

## 2021-08-24 VITALS
SYSTOLIC BLOOD PRESSURE: 101 MMHG | OXYGEN SATURATION: 99 % | RESPIRATION RATE: 18 BRPM | DIASTOLIC BLOOD PRESSURE: 50 MMHG | TEMPERATURE: 97.5 F | WEIGHT: 176 LBS | HEIGHT: 64 IN | HEART RATE: 54 BPM | BODY MASS INDEX: 30.05 KG/M2

## 2021-08-24 DIAGNOSIS — D50.0 IRON DEFICIENCY ANEMIA DUE TO CHRONIC BLOOD LOSS: ICD-10-CM

## 2021-08-24 DIAGNOSIS — D61.818 PANCYTOPENIA (HCC): Primary | ICD-10-CM

## 2021-08-24 DIAGNOSIS — D61.818 PANCYTOPENIA (HCC): ICD-10-CM

## 2021-08-24 DIAGNOSIS — D69.6 THROMBOCYTOPENIA (HCC): ICD-10-CM

## 2021-08-24 PROCEDURE — 99153 MOD SED SAME PHYS/QHP EA: CPT

## 2021-08-24 PROCEDURE — 77012 CT SCAN FOR NEEDLE BIOPSY: CPT

## 2021-08-24 PROCEDURE — 88185 FLOWCYTOMETRY/TC ADD-ON: CPT

## 2021-08-24 PROCEDURE — 88313 SPECIAL STAINS GROUP 2: CPT | Performed by: PATHOLOGY

## 2021-08-24 PROCEDURE — 88341 IMHCHEM/IMCYTCHM EA ADD ANTB: CPT | Performed by: PATHOLOGY

## 2021-08-24 PROCEDURE — 88184 FLOWCYTOMETRY/ TC 1 MARKER: CPT

## 2021-08-24 PROCEDURE — 85097 BONE MARROW INTERPRETATION: CPT | Performed by: PATHOLOGY

## 2021-08-24 PROCEDURE — 88342 IMHCHEM/IMCYTCHM 1ST ANTB: CPT | Performed by: PATHOLOGY

## 2021-08-24 PROCEDURE — 88291 CYTO/MOLECULAR REPORT: CPT

## 2021-08-24 PROCEDURE — 88305 TISSUE EXAM BY PATHOLOGIST: CPT | Performed by: PATHOLOGY

## 2021-08-24 PROCEDURE — 88311 DECALCIFY TISSUE: CPT | Performed by: PATHOLOGY

## 2021-08-24 PROCEDURE — 88262 CHROMOSOME ANALYSIS 15-20: CPT

## 2021-08-24 PROCEDURE — 36415 COLL VENOUS BLD VENIPUNCTURE: CPT

## 2021-08-24 PROCEDURE — 99152 MOD SED SAME PHYS/QHP 5/>YRS: CPT

## 2021-08-24 PROCEDURE — 38222 DX BONE MARROW BX & ASPIR: CPT

## 2021-08-24 PROCEDURE — 88237 TISSUE CULTURE BONE MARROW: CPT

## 2021-08-24 RX ORDER — FENTANYL CITRATE 50 UG/ML
INJECTION, SOLUTION INTRAMUSCULAR; INTRAVENOUS CODE/TRAUMA/SEDATION MEDICATION
Status: COMPLETED | OUTPATIENT
Start: 2021-08-24 | End: 2021-08-24

## 2021-08-24 RX ORDER — MIDAZOLAM HYDROCHLORIDE 2 MG/2ML
INJECTION, SOLUTION INTRAMUSCULAR; INTRAVENOUS CODE/TRAUMA/SEDATION MEDICATION
Status: COMPLETED | OUTPATIENT
Start: 2021-08-24 | End: 2021-08-24

## 2021-08-24 RX ORDER — SODIUM CHLORIDE 9 MG/ML
75 INJECTION, SOLUTION INTRAVENOUS CONTINUOUS
Status: DISCONTINUED | OUTPATIENT
Start: 2021-08-24 | End: 2021-08-25 | Stop reason: HOSPADM

## 2021-08-24 RX ADMIN — FENTANYL CITRATE 50 MCG: 50 INJECTION, SOLUTION INTRAMUSCULAR; INTRAVENOUS at 10:44

## 2021-08-24 RX ADMIN — MIDAZOLAM 1 MG: 1 INJECTION INTRAMUSCULAR; INTRAVENOUS at 10:44

## 2021-08-24 RX ADMIN — MIDAZOLAM 0.5 MG: 1 INJECTION INTRAMUSCULAR; INTRAVENOUS at 10:49

## 2021-08-24 RX ADMIN — FENTANYL CITRATE 25 MCG: 50 INJECTION, SOLUTION INTRAMUSCULAR; INTRAVENOUS at 10:49

## 2021-08-24 NOTE — TELEPHONE ENCOUNTER
Called and left msg for pt to call back to confirm/add on to the schedule for 9/9 at [facility]

## 2021-08-24 NOTE — DISCHARGE INSTRUCTIONS
Bone Marrow Biopsy     WHAT YOU NEED TO KNOW:   A bone marrow biopsy is a procedure to remove a small amount of bone marrow from your bone  Bone marrow is the soft tissue inside your bone that helps to make blood cells  The sample is tested for disease or infection  DISCHARGE INSTRUCTIONS:     1  Limit your activities day of biopsy as directed by your doctor  2  Use medication as ordered  3  Return to your normal diet  Small sips of flat soda will help with nausea  4  Remove band-aid or dressing 24 hours after procedure  Contact Interventional Radiology at [phone] Karen PATIENTS: Contact Interventional Radiology at [phone]) Jacque Barrientos PATIENTS: Contact Interventional Radiology at [phone]) if:    1  Difficulty breathing, nausea or vomiting  2  Chills or fever above 101 F     3  Pain at biopsy site not relieved by medication  4  Develop any redness, swelling, heat, unusual drainage, heavy bruising or bleeding from biopsy site

## 2021-08-24 NOTE — DISCHARGE INSTR - LAB
Bone Marrow Biopsy     WHAT YOU NEED TO KNOW:   A bone marrow biopsy is a procedure to remove a small amount of bone marrow from your bone  Bone marrow is the soft tissue inside your bone that helps to make blood cells  The sample is tested for disease or infection  DISCHARGE INSTRUCTIONS:     1  Limit your activities day of biopsy as directed by your doctor  2  Use medication as ordered  3  Return to your normal diet  Small sips of flat soda will help with nausea  4  Remove band-aid or dressing 24 hours after procedure  Contact Interventional Radiology at [phone] Karen PATIENTS: Contact Interventional Radiology at [phone]) Alysia Perkins PATIENTS: Contact Interventional Radiology at [phone]) if:    1  Difficulty breathing, nausea or vomiting  2  Chills or fever above 101 F     3  Pain at biopsy site not relieved by medication  4  Develop any redness, swelling, heat, unusual drainage, heavy bruising or bleeding from biopsy site

## 2021-08-24 NOTE — INTERVAL H&P NOTE
Update: (This section must be completed if the H&P was completed greater than 24 hrs to procedure or admission)    H&P reviewed  After examining the patient, I find no changed to the H&P since it had been written  Patient re-evaluated   Accept as history and physical     Tenzin Tyson MD/August 24, 2021/11:22 AM

## 2021-08-24 NOTE — BRIEF OP NOTE (RAD/CATH)
INTERVENTIONAL RADIOLOGY PROCEDURE NOTE    Date: 8/24/2021    Procedure: IR BIOPSY BONE MARROW    Preoperative diagnosis:   1  Pancytopenia (HCC)         Postoperative diagnosis: Same  Surgeon: Dario Harmon MD     Assistant: None  No qualified resident was available  Blood loss: None    Specimens: Yes     Findings: Technically successful CT-guided bone marrow bx + asp right posterior iliac bone  Complications: None immediate      Anesthesia: conscious sedation and local

## 2021-08-25 ENCOUNTER — HOSPITAL ENCOUNTER (OUTPATIENT)
Dept: INFUSION CENTER | Facility: HOSPITAL | Age: 76
Discharge: HOME | End: 2021-08-25
Attending: INTERNAL MEDICINE
Payer: MEDICARE

## 2021-08-25 VITALS
TEMPERATURE: 97.7 F | RESPIRATION RATE: 14 BRPM | OXYGEN SATURATION: 98 % | DIASTOLIC BLOOD PRESSURE: 51 MMHG | HEART RATE: 68 BPM | SYSTOLIC BLOOD PRESSURE: 102 MMHG

## 2021-08-25 DIAGNOSIS — D50.0 IRON DEFICIENCY ANEMIA DUE TO CHRONIC BLOOD LOSS: Primary | ICD-10-CM

## 2021-08-25 PROCEDURE — 96365 THER/PROPH/DIAG IV INF INIT: CPT

## 2021-08-25 RX ORDER — SODIUM CHLORIDE 9 MG/ML
20 INJECTION, SOLUTION INTRAVENOUS ONCE
Status: COMPLETED | OUTPATIENT
Start: 2021-08-25 | End: 2021-08-25

## 2021-08-25 RX ORDER — SODIUM CHLORIDE 9 MG/ML
20 INJECTION, SOLUTION INTRAVENOUS ONCE
Status: CANCELLED | OUTPATIENT
Start: 2021-09-01

## 2021-08-25 RX ADMIN — IRON SUCROSE 200 MG: 20 INJECTION, SOLUTION INTRAVENOUS at 11:41

## 2021-08-25 RX ADMIN — SODIUM CHLORIDE 20 ML/HR: 9 INJECTION, SOLUTION INTRAVENOUS at 11:40

## 2021-08-25 NOTE — TELEPHONE ENCOUNTER
Talked to pt and she was confused/ unsure who will be driving her or if she will have care for her daughter  I have her scheduled and have asked her to please call back if she cannot do this date  I did confirm cancellation of previously booked 9/21 at [facility] egd

## 2021-09-01 ENCOUNTER — HOSPITAL ENCOUNTER (OUTPATIENT)
Dept: INFUSION CENTER | Facility: HOSPITAL | Age: 76
Discharge: HOME | End: 2021-09-01
Attending: INTERNAL MEDICINE
Payer: MEDICARE

## 2021-09-01 VITALS — HEIGHT: 64 IN | WEIGHT: 176 LBS | BODY MASS INDEX: 30.05 KG/M2

## 2021-09-01 VITALS
SYSTOLIC BLOOD PRESSURE: 118 MMHG | RESPIRATION RATE: 14 BRPM | OXYGEN SATURATION: 100 % | TEMPERATURE: 97.5 F | DIASTOLIC BLOOD PRESSURE: 57 MMHG | HEART RATE: 65 BPM

## 2021-09-01 DIAGNOSIS — D50.0 IRON DEFICIENCY ANEMIA DUE TO CHRONIC BLOOD LOSS: Primary | ICD-10-CM

## 2021-09-01 PROCEDURE — 96365 THER/PROPH/DIAG IV INF INIT: CPT

## 2021-09-01 RX ORDER — SODIUM CHLORIDE 9 MG/ML
20 INJECTION, SOLUTION INTRAVENOUS ONCE
Status: COMPLETED | OUTPATIENT
Start: 2021-09-01 | End: 2021-09-01

## 2021-09-01 RX ORDER — SODIUM CHLORIDE 9 MG/ML
20 INJECTION, SOLUTION INTRAVENOUS ONCE
Status: CANCELLED | OUTPATIENT
Start: 2021-09-08

## 2021-09-01 RX ADMIN — SODIUM CHLORIDE 20 ML/HR: 9 INJECTION, SOLUTION INTRAVENOUS at 12:48

## 2021-09-01 RX ADMIN — IRON SUCROSE 200 MG: 20 INJECTION, SOLUTION INTRAVENOUS at 12:48

## 2021-09-01 NOTE — PROGRESS NOTES
Pt received IV venofer today  No adverse reactions noted  PIV removed; bandage applied  Next appts scheduled  Pt left ambulatory with steady gait for d/c to home

## 2021-09-02 ENCOUNTER — TELEPHONE (OUTPATIENT)
Dept: OTHER | Facility: OTHER | Age: 76
End: 2021-09-02

## 2021-09-02 DIAGNOSIS — D50.0 IRON DEFICIENCY ANEMIA DUE TO CHRONIC BLOOD LOSS: Primary | ICD-10-CM

## 2021-09-02 NOTE — TELEPHONE ENCOUNTER
Pt called back, her last CBC was 7/14--she is questioning if needs repeating prior to her 9/9 EGD? Your note 8/18 post capsule did say continue IV iron infusions with Dr Sushil Pond and follow CBC      Thanks

## 2021-09-02 NOTE — TELEPHONE ENCOUNTER
Appears pt had a CBC last 7/14/21  Message left for pt to call back to confirm that date  Dr Agustin Arteaga note on 8/18 states follow CBC

## 2021-09-03 NOTE — TELEPHONE ENCOUNTER
At pt's last ov 7/21 Dr Nelsy Maguire noted iron def  Anemia due to chronic blood loss with a differential diagnosis-- could include occult bleeding from esophageal source  She had the capsule and an EGD was ordered at the hospital for possible banding  She had a grade 2 varices on an EGD last year and has been on prophylactic beta-blockers

## 2021-09-03 NOTE — TELEPHONE ENCOUNTER
Order for a CBC was placed to [facility] and faxed to [facility] as well for pt's convenience as needs to have done asap  Confirmation received  Message left for pt that was done to both facilities  Stephanie Olivas

## 2021-09-03 NOTE — TELEPHONE ENCOUNTER
Lengthy call w/ Pt (10+ min); ques about lab order  She will probably go to [facility] old [address]

## 2021-09-03 NOTE — TELEPHONE ENCOUNTER
Why she coming back for an EGD? Is it the varices follow-up?   If she has not had a CBC in the last month  then it is reasonable to do

## 2021-09-03 NOTE — TELEPHONE ENCOUNTER
Message left for pt that labwork is requested and to please call us back with which lab as she has gone to [facility] and [facility] recently

## 2021-09-04 ENCOUNTER — APPOINTMENT (OUTPATIENT)
Dept: LAB | Facility: HOSPITAL | Age: 76
End: 2021-09-04
Attending: INTERNAL MEDICINE
Payer: MEDICARE

## 2021-09-04 DIAGNOSIS — K75.81 LIVER CIRRHOSIS SECONDARY TO NASH (NONALCOHOLIC STEATOHEPATITIS) (HCC): ICD-10-CM

## 2021-09-04 DIAGNOSIS — D69.6 THROMBOCYTOPENIA (HCC): ICD-10-CM

## 2021-09-04 DIAGNOSIS — K74.60 LIVER CIRRHOSIS SECONDARY TO NASH (NONALCOHOLIC STEATOHEPATITIS) (HCC): ICD-10-CM

## 2021-09-04 DIAGNOSIS — D50.0 IRON DEFICIENCY ANEMIA DUE TO CHRONIC BLOOD LOSS: ICD-10-CM

## 2021-09-04 DIAGNOSIS — D61.818 PANCYTOPENIA (HCC): Primary | ICD-10-CM

## 2021-09-04 LAB
BASOPHILS # BLD AUTO: 0.04 THOUSANDS/ΜL (ref 0–0.1)
BASOPHILS NFR BLD AUTO: 1 % (ref 0–1)
EOSINOPHIL # BLD AUTO: 0.05 THOUSAND/ΜL (ref 0–0.61)
EOSINOPHIL NFR BLD AUTO: 2 % (ref 0–6)
ERYTHROCYTE [DISTWIDTH] IN BLOOD BY AUTOMATED COUNT: 23.3 % (ref 11.6–15.1)
HCT VFR BLD AUTO: 35.9 % (ref 34.8–46.1)
HGB BLD-MCNC: 11.2 G/DL (ref 11.5–15.4)
IMM GRANULOCYTES # BLD AUTO: 0.02 THOUSAND/UL (ref 0–0.2)
IMM GRANULOCYTES NFR BLD AUTO: 1 % (ref 0–2)
INR PPP: 1.12 (ref 0.84–1.19)
LYMPHOCYTES # BLD AUTO: 0.7 THOUSANDS/ΜL (ref 0.6–4.47)
LYMPHOCYTES NFR BLD AUTO: 22 % (ref 14–44)
MCH RBC QN AUTO: 29.2 PG (ref 26.8–34.3)
MCHC RBC AUTO-ENTMCNC: 31.2 G/DL (ref 31.4–37.4)
MCV RBC AUTO: 94 FL (ref 82–98)
MONOCYTES # BLD AUTO: 0.32 THOUSAND/ΜL (ref 0.17–1.22)
MONOCYTES NFR BLD AUTO: 10 % (ref 4–12)
NEUTROPHILS # BLD AUTO: 2.08 THOUSANDS/ΜL (ref 1.85–7.62)
NEUTS SEG NFR BLD AUTO: 64 % (ref 43–75)
NRBC BLD AUTO-RTO: 0 /100 WBCS
PROTHROMBIN TIME: 14 SECONDS (ref 11.6–14.5)
RBC # BLD AUTO: 3.84 MILLION/UL (ref 3.81–5.12)
WBC # BLD AUTO: 3.21 THOUSAND/UL (ref 4.31–10.16)

## 2021-09-04 PROCEDURE — 36415 COLL VENOUS BLD VENIPUNCTURE: CPT

## 2021-09-04 PROCEDURE — 85025 COMPLETE CBC W/AUTO DIFF WBC: CPT

## 2021-09-04 PROCEDURE — 88239 TISSUE CULTURE TUMOR: CPT

## 2021-09-04 PROCEDURE — 85610 PROTHROMBIN TIME: CPT

## 2021-09-08 ENCOUNTER — TELEPHONE (OUTPATIENT)
Dept: SURGERY | Facility: HOSPITAL | Age: 76
End: 2021-09-08

## 2021-09-08 ENCOUNTER — HOSPITAL ENCOUNTER (OUTPATIENT)
Dept: INFUSION CENTER | Facility: HOSPITAL | Age: 76
Discharge: HOME | End: 2021-09-08
Attending: INTERNAL MEDICINE
Payer: MEDICARE

## 2021-09-08 VITALS
SYSTOLIC BLOOD PRESSURE: 123 MMHG | DIASTOLIC BLOOD PRESSURE: 58 MMHG | RESPIRATION RATE: 14 BRPM | HEART RATE: 64 BPM | OXYGEN SATURATION: 100 % | TEMPERATURE: 96.8 F

## 2021-09-08 DIAGNOSIS — D50.0 IRON DEFICIENCY ANEMIA DUE TO CHRONIC BLOOD LOSS: Primary | ICD-10-CM

## 2021-09-08 PROCEDURE — 96365 THER/PROPH/DIAG IV INF INIT: CPT

## 2021-09-08 RX ORDER — SODIUM CHLORIDE 9 MG/ML
20 INJECTION, SOLUTION INTRAVENOUS ONCE
Status: COMPLETED | OUTPATIENT
Start: 2021-09-08 | End: 2021-09-08

## 2021-09-08 RX ORDER — SODIUM CHLORIDE 9 MG/ML
20 INJECTION, SOLUTION INTRAVENOUS ONCE
Status: CANCELLED | OUTPATIENT
Start: 2021-09-15

## 2021-09-08 RX ADMIN — IRON SUCROSE 200 MG: 20 INJECTION, SOLUTION INTRAVENOUS at 12:30

## 2021-09-08 RX ADMIN — SODIUM CHLORIDE 20 ML/HR: 9 INJECTION, SOLUTION INTRAVENOUS at 12:30

## 2021-09-09 ENCOUNTER — ANESTHESIA (OUTPATIENT)
Dept: GASTROENTEROLOGY | Facility: HOSPITAL | Age: 76
End: 2021-09-09

## 2021-09-09 ENCOUNTER — TELEPHONE (OUTPATIENT)
Dept: SURGERY | Facility: HOSPITAL | Age: 76
End: 2021-09-09

## 2021-09-09 ENCOUNTER — HOSPITAL ENCOUNTER (OUTPATIENT)
Dept: GASTROENTEROLOGY | Facility: HOSPITAL | Age: 76
Setting detail: OUTPATIENT SURGERY
Discharge: HOME | End: 2021-09-09
Attending: INTERNAL MEDICINE | Admitting: INTERNAL MEDICINE
Payer: MEDICARE

## 2021-09-09 ENCOUNTER — ANESTHESIA EVENT (OUTPATIENT)
Dept: GASTROENTEROLOGY | Facility: HOSPITAL | Age: 76
End: 2021-09-09

## 2021-09-09 VITALS
SYSTOLIC BLOOD PRESSURE: 123 MMHG | DIASTOLIC BLOOD PRESSURE: 57 MMHG | OXYGEN SATURATION: 99 % | TEMPERATURE: 97.7 F | RESPIRATION RATE: 14 BRPM | HEART RATE: 63 BPM | BODY MASS INDEX: 38.68 KG/M2 | WEIGHT: 197 LBS | HEIGHT: 60 IN

## 2021-09-09 DIAGNOSIS — D50.8 OTHER IRON DEFICIENCY ANEMIA: ICD-10-CM

## 2021-09-09 LAB
Lab: NORMAL
MISCELLANEOUS LAB TEST RESULT: NORMAL
MISCELLANEOUS LAB TEST RESULT: NORMAL
SCAN RESULT: NORMAL

## 2021-09-09 PROCEDURE — 43244 EGD VARICES LIGATION: CPT | Performed by: INTERNAL MEDICINE

## 2021-09-09 PROCEDURE — 88305 TISSUE EXAM BY PATHOLOGIST: CPT | Performed by: PATHOLOGY

## 2021-09-09 PROCEDURE — 82948 REAGENT STRIP/BLOOD GLUCOSE: CPT

## 2021-09-09 PROCEDURE — 43239 EGD BIOPSY SINGLE/MULTIPLE: CPT | Performed by: INTERNAL MEDICINE

## 2021-09-09 RX ORDER — GLYCOPYRROLATE 0.2 MG/ML
INJECTION INTRAMUSCULAR; INTRAVENOUS AS NEEDED
Status: DISCONTINUED | OUTPATIENT
Start: 2021-09-09 | End: 2021-09-09

## 2021-09-09 RX ORDER — DEXTROSE MONOHYDRATE 25 G/50ML
12.5 INJECTION, SOLUTION INTRAVENOUS ONCE
Status: DISCONTINUED | OUTPATIENT
Start: 2021-09-09 | End: 2021-09-13 | Stop reason: HOSPADM

## 2021-09-09 RX ORDER — PROPOFOL 10 MG/ML
INJECTION, EMULSION INTRAVENOUS AS NEEDED
Status: DISCONTINUED | OUTPATIENT
Start: 2021-09-09 | End: 2021-09-09

## 2021-09-09 RX ORDER — LIDOCAINE HYDROCHLORIDE 20 MG/ML
INJECTION, SOLUTION EPIDURAL; INFILTRATION; INTRACAUDAL; PERINEURAL AS NEEDED
Status: DISCONTINUED | OUTPATIENT
Start: 2021-09-09 | End: 2021-09-09

## 2021-09-09 RX ORDER — SODIUM CHLORIDE 9 MG/ML
100 INJECTION, SOLUTION INTRAVENOUS CONTINUOUS
Status: DISCONTINUED | OUTPATIENT
Start: 2021-09-09 | End: 2021-09-13 | Stop reason: HOSPADM

## 2021-09-09 RX ORDER — DEXTROSE MONOHYDRATE 25 G/50ML
INJECTION, SOLUTION INTRAVENOUS
Status: COMPLETED
Start: 2021-09-09 | End: 2021-09-09

## 2021-09-09 RX ADMIN — LIDOCAINE HYDROCHLORIDE 100 MG: 20 INJECTION, SOLUTION EPIDURAL; INFILTRATION; INTRACAUDAL; PERINEURAL at 07:39

## 2021-09-09 RX ADMIN — PROPOFOL 100 MG: 10 INJECTION, EMULSION INTRAVENOUS at 07:39

## 2021-09-09 RX ADMIN — SODIUM CHLORIDE: 0.9 INJECTION, SOLUTION INTRAVENOUS at 07:28

## 2021-09-09 RX ADMIN — PROPOFOL 50 MG: 10 INJECTION, EMULSION INTRAVENOUS at 07:48

## 2021-09-09 RX ADMIN — PROPOFOL 50 MG: 10 INJECTION, EMULSION INTRAVENOUS at 07:44

## 2021-09-09 RX ADMIN — DEXTROSE MONOHYDRATE 12.5 ML: 500 INJECTION PARENTERAL at 07:10

## 2021-09-09 RX ADMIN — GLYCOPYRROLATE 0.2 MG: 0.2 INJECTION, SOLUTION INTRAMUSCULAR; INTRAVENOUS at 07:39

## 2021-09-09 NOTE — H&P
History and Physical -  [facility]  Quinton Wiley 77 y o  female MRN: [id number]    HPI: Quinton Wiley is a 77y o  year old female who presents for endoscopy and possible esophageal banding of varices  She has a history of iron deficiency and cirrhosis    REVIEW OF SYSTEMS: Per the HPI, and otherwise unremarkable      Historical Information   Past Medical History:   Diagnosis Date    Anemia     Cirrhosis ([facility] [address] )     Diabetic neuropathy ([facility] )     Esophageal varices (HCC)     Fatty liver     GERD (gastroesophageal reflux disease)     Hepatic encephalopathy (HCC)     Hiatal hernia     Hyperlipidemia     Hypertension     Hypoglycemia     Hypothyroidism     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)     Osteoarthritis     Osteopenia     Pancytopenia (HCC)     Thrombocytopenia (HCC)     Type 2 diabetes mellitus (HCC)      Past Surgical History:   Procedure Laterality Date    ABDOMINAL SURGERY      Abdominal plasty with mesh insertion    CATARACT EXTRACTION, BILATERAL      CATARACT EXTRACTION, BILATERAL      EGD AND COLONOSCOPY  03/18/2015    IR BIOPSY BONE MARROW  8/24/2021    LAPAROSCOPIC CHOLECYSTECTOMY      SHOULDER SURGERY Right     calcium depsoti    TUBAL LIGATION      UMBILICAL HERNIA REPAIR      with mesh placed    UPPER GASTROINTESTINAL ENDOSCOPY       Social History   Social History     Substance and Sexual Activity   Alcohol Use Not Currently     Social History     Substance and Sexual Activity   Drug Use No     Social History     Tobacco Use   Smoking Status Never Smoker   Smokeless Tobacco Never Used     Family History   Problem Relation Age of Onset    Breast cancer Mother     Diabetes type II Father     Thyroid disease unspecified Daughter     Down syndrome Daughter     Diabetes type II Daughter     Diabetes type II Sister     No Known Problems Brother     Prostate cancer Brother     No Known Problems Sister     Stroke Sister     No Known Problems Son     Breast cancer Maternal Aunt     Colon cancer Neg Hx        Meds/Allergies       Current Outpatient Medications:     aspirin 81 mg chewable tablet    Basaglar KwikPen 100 units/mL injection pen    Blood Glucose Monitoring Suppl (ONE TOUCH ULTRA 2) w/Device KIT    Calcium Carb-Cholecalciferol (CALCIUM 1000 + D PO)    cholecalciferol (VITAMIN D3) 1,000 units tablet    Coenzyme Q10 (Co Q10) 100 MG CAPS    ferrous sulfate 325 (65 Fe) mg tablet    fluticasone (FLONASE) 50 mcg/act nasal spray    furosemide (LASIX) 40 mg tablet    glimepiride (AMARYL) 4 mg tablet    glucose blood (OneTouch Ultra) test strip    Insulin Pen Needle (B-D UF III MINI PEN NEEDLES) 31G X 5 MM MISC    Janumet XR  MG TB24    KRILL OIL PO    lactulose 20 g/30 mL    levothyroxine 125 mcg tablet    meloxicam (MOBIC) 7 5 mg tablet    nadolol (CORGARD) 20 mg tablet    omeprazole (PriLOSEC) 20 mg delayed release capsule    ONETOUCH DELICA LANCETS FINE MISC    simvastatin (ZOCOR) 40 mg tablet    spironolactone (ALDACTONE) 50 mg tablet    traMADol (ULTRAM) 50 mg tablet    Xifaxan 550 MG tablet  No current facility-administered medications for this encounter  Allergies   Allergen Reactions    Bee Venom Swelling    Latex     Sesame Seed (Diagnostic) - Food Allergy      Other reaction(s): swelling inside mouth    Strawberry C [Ascorbate - Food Allergy] Other (See Comments)     Mouth sores    Penicillins Rash       Objective     /57   Pulse (!) 50   Temp (!) 97 4 °F (36 3 °C) (Oral)   Resp 20   Ht 5' (1 524 m)   Wt 89 4 kg (197 lb)   SpO2 99%   BMI 38 47 kg/m²     PHYSICAL EXAM    Gen: NAD AAOx3  Head: Normocephalic, Atraumatic  CV: S1S2 RRR no m/r/g  CHEST: Clear b/l no c/r/w  ABD: soft, +BS NT/ND  EXT: no edema    ASSESSMENT/PLAN:  This is a 77y o  year old female here for upper endoscopy and possible esophageal banding and she is stable and optimized for her procedure

## 2021-09-09 NOTE — ANESTHESIA POSTPROCEDURE EVALUATION
Post-Op Assessment Note    CV Status:  Stable  Pain Score: 0    Pain management: adequate     Mental Status:  Awake and sleepy   Hydration Status:  Euvolemic and stable   PONV Controlled:  None   Airway Patency:  Patent      Post Op Vitals Reviewed: Yes      Staff: CRNA         No complications documented      BP      Temp     Pulse 64 (09/09/21 0753)   Resp 17 (09/09/21 0753)    SpO2 100 % (09/09/21 0753)

## 2021-09-14 DIAGNOSIS — K72.90 HEPATIC ENCEPHALOPATHY (HCC): ICD-10-CM

## 2021-09-14 DIAGNOSIS — K74.60 LIVER CIRRHOSIS SECONDARY TO NASH (NONALCOHOLIC STEATOHEPATITIS) (HCC): Primary | ICD-10-CM

## 2021-09-14 DIAGNOSIS — K75.81 LIVER CIRRHOSIS SECONDARY TO NASH (NONALCOHOLIC STEATOHEPATITIS) (HCC): Primary | ICD-10-CM

## 2021-09-15 ENCOUNTER — HOSPITAL ENCOUNTER (OUTPATIENT)
Dept: INFUSION CENTER | Facility: HOSPITAL | Age: 76
Discharge: HOME | End: 2021-09-15
Attending: INTERNAL MEDICINE
Payer: MEDICARE

## 2021-09-15 VITALS
SYSTOLIC BLOOD PRESSURE: 128 MMHG | TEMPERATURE: 97.1 F | OXYGEN SATURATION: 97 % | RESPIRATION RATE: 18 BRPM | HEART RATE: 66 BPM | DIASTOLIC BLOOD PRESSURE: 63 MMHG

## 2021-09-15 DIAGNOSIS — D50.0 IRON DEFICIENCY ANEMIA DUE TO CHRONIC BLOOD LOSS: Primary | ICD-10-CM

## 2021-09-15 PROCEDURE — 96365 THER/PROPH/DIAG IV INF INIT: CPT

## 2021-09-15 RX ORDER — SODIUM CHLORIDE 9 MG/ML
20 INJECTION, SOLUTION INTRAVENOUS ONCE
Status: CANCELLED | OUTPATIENT
Start: 2021-09-22

## 2021-09-15 RX ORDER — SODIUM CHLORIDE 9 MG/ML
20 INJECTION, SOLUTION INTRAVENOUS ONCE
Status: COMPLETED | OUTPATIENT
Start: 2021-09-15 | End: 2021-09-15

## 2021-09-15 RX ADMIN — SODIUM CHLORIDE 20 ML/HR: 9 INJECTION, SOLUTION INTRAVENOUS at 12:05

## 2021-09-15 RX ADMIN — IRON SUCROSE 200 MG: 20 INJECTION, SOLUTION INTRAVENOUS at 12:05

## 2021-09-15 NOTE — PROGRESS NOTES
Pt tolerated venofer with no adverse reactions  PIV removed  Next appts scheduled  Pt left ambulatory with steady gait for d/c to home

## 2021-09-16 ENCOUNTER — TELEPHONE (OUTPATIENT)
Dept: GASTROENTEROLOGY | Facility: CLINIC | Age: 76
End: 2021-09-16

## 2021-09-16 DIAGNOSIS — D50.0 IRON DEFICIENCY ANEMIA DUE TO CHRONIC BLOOD LOSS: Primary | ICD-10-CM

## 2021-09-16 LAB
GLUCOSE SERPL-MCNC: 56 MG/DL (ref 65–140)
GLUCOSE SERPL-MCNC: 73 MG/DL (ref 65–140)
GLUCOSE SERPL-MCNC: 97 MG/DL (ref 65–140)

## 2021-09-16 NOTE — TELEPHONE ENCOUNTER
----- Message from Yadira Bell sent at 9/16/2021 12:20 PM EDT -----  Emma Downs to [facility] for order  ----- Message -----  From: Madelyn Figueroa MD  Sent: 9/16/2021  11:52 AM EDT  To: Yadira Saras, Cira Edison    Called the patient and left message - no h pylori - positive portal pressures - gastrtis   Recall egd 6 mo - for varices --   cbc 6 weeks thanks

## 2021-09-16 NOTE — RESULT ENCOUNTER NOTE
Called the patient and left message - no h pylori - positive portal pressures - gastrtis   Recall egd 6 mo - for varices --   cbc 6 weeks thanks

## 2021-09-17 ENCOUNTER — OFFICE VISIT (OUTPATIENT)
Dept: HEMATOLOGY ONCOLOGY | Facility: HOSPITAL | Age: 76
End: 2021-09-17
Payer: MEDICARE

## 2021-09-17 VITALS
TEMPERATURE: 97 F | HEIGHT: 60 IN | DIASTOLIC BLOOD PRESSURE: 50 MMHG | RESPIRATION RATE: 16 BRPM | WEIGHT: 194.6 LBS | OXYGEN SATURATION: 99 % | SYSTOLIC BLOOD PRESSURE: 108 MMHG | HEART RATE: 66 BPM | BODY MASS INDEX: 38.21 KG/M2

## 2021-09-17 DIAGNOSIS — D61.818 PANCYTOPENIA (HCC): ICD-10-CM

## 2021-09-17 DIAGNOSIS — D50.0 IRON DEFICIENCY ANEMIA DUE TO CHRONIC BLOOD LOSS: Primary | ICD-10-CM

## 2021-09-17 PROCEDURE — 99214 OFFICE O/P EST MOD 30 MIN: CPT | Performed by: INTERNAL MEDICINE

## 2021-09-17 NOTE — PROGRESS NOTES
Hematology/Oncology Outpatient Follow- up Note  Mike Romano 77 y o  female MRN: @ Encounter: [id number]        Date:  9/17/2021    Presenting Complaint/Diagnosis : Pancytopenia, Iron deficiency     HPI:    Mike Romano is a 77year old female with a history of type 2 diabetes, hypothyroidism, hypertension, Jenny Ghazi cirrhosis and portal hypertension, splenomegaly, esophageal varices who was seen in the ED on 07/12/2021 for evaluation of abnormal outpatient blood work  Patient had routine blood work done by her PCP and was noted to have a hemoglobin of 6 6  She was sent to the ED for further evaluation  She was transfused with 2 units of blood at discharge was referred to Hematology and Gastroenterology for further workup and treatment recommendations      Repeat CBC done on 7/14 demonstrated evidence of pancytopenia with a white count 3 2, hemoglobin 7 6, MCV 80, platelet count 73, ANC 1 7  Her iron panel demonstrates significant iron deficiency with serum iron of 16, iron saturation 3%, ferritin 10  Patient has been following closely with GI for her history of nonalcoholic steatohepatitis and cirrhosis  She was last seen by Dr Junior Ritchie with [facility] on 07/21 and set up to undergo small bowel capsule endoscopy  This was completed on 07/28/2021  Patient had previously undergone EGD and colonoscopy on 07/17/2020  The studies revealed grade 2 esophageal varices which were not bleeding  Colonoscopy showed diverticulosis but no source of bleeding      Per review of blood work on file, she does have evidence of pancytopenia dating back to 9/2020  She has evidence of leukopenia and thrombocytopenia recorded in 10/2019      Patient reports she is taking oral iron  This does cause constipation  She denies any symptoms of significant fatigue, lightheadedness/dizziness, CP, palpitations or shortness of breath  She denies any abnormal bleeding  No epistaxis, gingival bleeding   She denies easy bruisability  There is no ecchymosis on her back, abdomen, lower extremities  She  denies any melena, hematochezia, hematuria  She denies any recent or frequent/recurrent infections  No fevers/chills  No unexpected weight loss, night sweats     She has arthralgias  She has been working with PT for arthralgias of her left shoulder and arm      She is primary caregiver of her 48year old daughter who has Down's Syndrome  Previous Hematologic/ Oncologic History:    Oral iron    Current Hematologic/ Oncologic Treatment:      Workup    Interval History:     The patient returns for follow-up visit  Her bone marrow biopsy shows no evidence of MDS or bone marrow abnormality  She did have low iron stores and is currently getting Venofer  Her hemoglobin is improved to 11 and as she gets iron I hope it will improve further  She had clumped platelets so we will do her next platelet count with of clumped per protocol  Her white count is low which I suspect is related to splenomegaly as she does have cirrhosis  Today she feels fatigued  States she does bruise easily  Denies any nausea or vomiting  Denies any diarrhea  The rest of her 14 point review of systems today was negative  Test Results:    Imaging: EGD    Addendum Date: 9/9/2021 Addendum:   [address] [facility] [address] [address] [phone] DATE OF SERVICE: 9/09/21 PHYSICIAN(S): Madelyn Figueroa MD - Attending Physician INDICATION: Other iron deficiency anemia POST-OP DIAGNOSIS: See the impression below  PREPROCEDURE: Informed consent was obtained for the procedure, including sedation  Risks of perforation, hemorrhage, adverse drug reaction and aspiration were discussed  The patient was placed in the left lateral decubitus position  Patient was explained about the risks and benefits of the procedure  Risks including but not limited to bleeding, infection, and perforation were explained in detail   Also explained about less than 100% sensitivity with the exam and other alternatives  DETAILS OF PROCEDURE: Patient was taken to the procedure room where a time out was performed to confirm correct patient and correct procedure  The patient underwent monitored anesthesia care, which was administered by an anesthesia professional  The patient's blood pressure, heart rate, level of consciousness, respirations and oxygen were monitored throughout the procedure  The scope was advanced to the second part of the duodenum  Retroflexion was performed in the fundus  Prior to the procedure, the patient's H  Pylori status was unknown  The patient experienced no blood loss  The procedure was not difficult  The patient tolerated the procedure well  There were no apparent complications  ANESTHESIA INFORMATION: ASA: III Anesthesia Type: IV Sedation with Anesthesia MEDICATIONS: dextrose 50 % IV solution **ADS Override Pull** 12 5 mL (Totals for administrations occurring from 0708 to 0753 on 09/09/21) FINDINGS: Three medium and moderate grade II varices with no bleeding (no red kristy sign) in the esophagus; placed 2 bands successfully using super 7 banding Moderate, localized edematous, erythematous and nodular mucosa in the antrum; performed 4 cold forceps biopsies to rule out H  pylori Portal hypertensive gastropathy - no active bleeding - proximal stomach- fundus The duodenal bulb and 2nd part of the duodenum appeared normal  SPECIMENS: ID Type Source Tests Collected by Time Destination 1 : gastritis Tissue Stomach TISSUE EXAM Yvonne Caal MD 9/9/2021  7:42 AM  IMPRESSION: Grade 2 esophageal varices not actively bleeding for 2 bands placed    Three variceal columns through the largest ones banded Nodular antral gastritis Portal hypertensive gastropathy mild proximal stomach RECOMMENDATION: Follow up with me in clinic Will call with biopsy results 7-10 day Continue following up CBCs per Hematology Liquid diet and soft foods today Call office immediately for vomiting brown material or blood or black appearing stools  Mera Bob MD    Result Date: 9/9/2021  Narrative: [address] [facility] [address] [address] [phone] DATE OF SERVICE: 9/09/21 PHYSICIAN(S): Mera Bob MD - Attending Physician INDICATION: Other iron deficiency anemia POST-OP DIAGNOSIS: See the impression below  PREPROCEDURE: Informed consent was obtained for the procedure, including sedation  Risks of perforation, hemorrhage, adverse drug reaction and aspiration were discussed  The patient was placed in the left lateral decubitus position  Patient was explained about the risks and benefits of the procedure  Risks including but not limited to bleeding, infection, and perforation were explained in detail  Also explained about less than 100% sensitivity with the exam and other alternatives  DETAILS OF PROCEDURE: Patient was taken to the procedure room where a time out was performed to confirm correct patient and correct procedure  The patient underwent monitored anesthesia care, which was administered by an anesthesia professional  The patient's blood pressure, heart rate, level of consciousness, respirations and oxygen were monitored throughout the procedure  The scope was advanced to the second part of the duodenum  Retroflexion was performed in the fundus  Prior to the procedure, the patient's H  Pylori status was unknown  The patient experienced no blood loss  The procedure was not difficult  The patient tolerated the procedure well  There were no apparent complications   ANESTHESIA INFORMATION: ASA: III Anesthesia Type: IV Sedation with Anesthesia MEDICATIONS: dextrose 50 % IV solution **ADS Override Pull** 12 5 mL (Totals for administrations occurring from 0708 to 0753 on 09/09/21) FINDINGS: Three medium and moderate grade II varices with no bleeding (no red kristy sign) in the esophagus; placed 2 bands successfully using super 7 banding Moderate, localized edematous, erythematous and nodular mucosa in the antrum; performed 4 cold forceps biopsies to rule out H  pylori Portal hypertensive gastropathy - no active bleeding - proximal stomach- fundus The duodenal bulb and 2nd part of the duodenum appeared normal  Photo documentation through lumens was not available on this examination SPECIMENS: ID Type Source Tests Collected by Time Destination 1 : gastritis Tissue Stomach TISSUE EXAM Camron Silveira MD 9/9/2021  7:42 AM      Impression: Grade 2 esophageal varices not actively bleeding for 2 bands placed  Three variceal columns through the largest ones banded Nodular antral gastritis Portal hypertensive gastropathy mild proximal stomach RECOMMENDATION: Follow up with me in clinic Will call with biopsy results 7-10 day Continue following up CBCs per Hematology Liquid diet and soft foods today Call office immediately for vomiting brown material or blood or black appearing stools  Camron Silveira MD     IR biopsy bone marrow    Result Date: 8/24/2021  Narrative: IR BIOPSY BONE MARROW PROCEDURE: Bone Marrow Biopsy + Aspiration CLINICAL INDICATION: Pancytopenia COMPARISON: None PERFORMING PHYSICIAN: Martina Crawford MD ASSISTANT PHYSICIAN: None MEDICATIONS: 1% lidocaine (local)  Versed, fentanyl  SEDATION TIME: Continuous cardiopulmonary monitoring by the interventional radiology physician and/or nurse for  30 minutes of sedation CONTRAST: None FLUOROSCOPY TIME: None na RADIATION DOSE: 681 mGy   (dose length product)  This examination, like all CT scans performed in the [facility], was performed utilizing techniques to minimize radiation dose exposure, including the use of iterative reconstruction and automated exposure control  NUMBER OF IMAGES: 30 TECHNIQUE: Informed written consent was obtained from the patient after a thorough discussion of risks, benefits, and alternatives to the procedure   All questions were answered   The patient was brought to the procedure room and a time-out was performed utilizing universal protocol  The patient was identified verbally and via wrist band  The patient was placed prone on the CT table and an appropriate skin entry site over the sacral region was marked  The site was prepped and draped  The skin and subcutaneous tissues were generously infiltrated with lidocaine  A skin nick was made with a #11 blade  A spinal needle was used to administer lidocaine at the periosteum of the posterior right iliac bone  Access to the iliac diploic space was achieved using the On Control device with an 11 gauge needle/cannula  A marrow aspirate was obtained and slides were prepared by the   A core biopsy was then obtained and a touch prep slide was made  The cannula was then removed and manual pressure was maintained until hemostasis was achieved and the area was dressed and bandaged  The patient tolerated the procedure well without difficulty or complication encountered and left the section in satisfactory stable condition  FINDINGS: As above  Impression: Technically successful bone marrow biopsy and aspiration  Workstation performed: [id number]       Labs:   Lab Results   Component Value Date    WBC 3 21 (L) 09/04/2021    HGB 11 2 (L) 09/04/2021    HCT 35 9 09/04/2021    MCV 94 09/04/2021    PLT  09/04/2021      Comment:      Not reported due to platelet clumping  Lab Results   Component Value Date    K 4 5 07/12/2021     07/12/2021    CO2 25 07/12/2021    BUN 37 (H) 07/12/2021    CREATININE 1 33 (H) 07/12/2021    CALCIUM 8 7 07/12/2021    CORRECTEDCA 9 7 07/12/2021    AST 31 07/12/2021    ALT 27 07/12/2021    ALKPHOS 63 07/12/2021    EGFR 39 07/12/2021     ROS: As stated in the history of present illness otherwise his 14 point review of systems today was negative        Active Problems:   Patient Active Problem List   Diagnosis    Hyperlipidemia    Essential hypertension    Acquired hypothyroidism    Type 2 diabetes mellitus with hyperglycemia, with long-term current use of insulin ([facility] )    Diabetic polyneuropathy associated with type 2 diabetes mellitus ([facility]    Acute metabolic encephalopathy    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) (HCC)    Thrombocytopenia (HCC)    Urinary tract infection    Mild nonproliferative diabetic retinopathy of both eyes without macular edema associated with type 2 diabetes mellitus (HCC)    Pancytopenia (HCC)    Iron deficiency anemia due to chronic blood loss       Past Medical History:   Past Medical History:   Diagnosis Date    Anemia     Cirrhosis ([facility] )     Diabetic neuropathy ([facility] )     Esophageal varices (HCC)     Fatty liver     GERD (gastroesophageal reflux disease)     Hepatic encephalopathy (HCC)     Hiatal hernia     Hyperlipidemia     Hypertension     Hypoglycemia     Hypothyroidism     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 ([facility] )     Osteoarthritis     Osteopenia     Pancytopenia (HCC)     Thrombocytopenia (HCC)     Type 2 diabetes mellitus ([facility] )        Surgical History:   Past Surgical History:   Procedure Laterality Date    ABDOMINAL SURGERY      Abdominal plasty with mesh insertion    CATARACT EXTRACTION, BILATERAL      CATARACT EXTRACTION, BILATERAL      EGD AND COLONOSCOPY  03/18/2015    IR BIOPSY BONE MARROW  8/24/2021    LAPAROSCOPIC CHOLECYSTECTOMY      SHOULDER SURGERY Right     calcium depsoti    TUBAL LIGATION      UMBILICAL HERNIA REPAIR      with mesh placed    UPPER GASTROINTESTINAL ENDOSCOPY         Family History:    Family History   Problem Relation Age of Onset   Junior Moreland Breast cancer Mother     Diabetes type II Father     Thyroid disease unspecified Daughter     Down syndrome Daughter     Diabetes type II Daughter     Diabetes type II Sister     No Known Problems Brother     Prostate cancer Brother     No Known Problems Sister     Stroke Sister     No Known Problems Son  Breast cancer Maternal Aunt     Colon cancer Neg Hx        Cancer-related family history includes Breast cancer in her maternal aunt and mother; Prostate cancer in her brother  There is no history of Colon cancer  Social History:   Social History     Socioeconomic History    Marital status: /Civil Union     Spouse name: Not on file    Number of children: Not on file    Years of education: Not on file    Highest education level: Not on file   Occupational History    Not on file   Tobacco Use    Smoking status: Never Smoker    Smokeless tobacco: Never Used   Vaping Use    Vaping Use: Never used   Substance and Sexual Activity    Alcohol use: Not Currently    Drug use: No    Sexual activity: Not Currently   Other Topics Concern    Not on file   Social History Narrative    Not on file     Social Determinants of Health     Financial Resource Strain:     Difficulty of Paying Living Expenses:    Food Insecurity:     Worried About Running Out of Food in the Last Year:     [address] in the Last Year:    Transportation Needs:     Lack of Transportation (Medical):      Lack of Transportation (Non-Medical):    Physical Activity:     Days of Exercise per Week:     Minutes of Exercise per Session:    Stress:     Feeling of Stress :    Social Connections:     Frequency of Communication with Friends and Family:     Frequency of Social Gatherings with Friends and Family:     Attends [employer] Services:     Active Member of Clubs or Organizations:     Attends Club or Organization Meetings:     Marital Status:    Intimate Partner Violence:     Fear of Current or Ex-Partner:     Emotionally Abused:     Physically Abused:     Sexually Abused:        Current Medications:   Current Outpatient Medications   Medication Sig Dispense Refill    Basaglar KwikPen 100 units/mL injection pen 48 units in am and 36 units in pm, up to 90 units daily 30 mL 6    Blood Glucose Monitoring Suppl (ONE TOUCH ULTRA 2) w/Device KIT by Does not apply route once for 1 dose Use Glucometer to test up to 3 times daily  1 each 0    Calcium Carb-Cholecalciferol (CALCIUM 1000 + D PO) Take by mouth      cholecalciferol (VITAMIN D3) 1,000 units tablet Take 2,000 Units by mouth daily       Coenzyme Q10 (Co Q10) 100 MG CAPS Take by mouth      fluticasone (FLONASE) 50 mcg/act nasal spray 1 spray into each nostril daily prn      furosemide (LASIX) 40 mg tablet TAKE 1 TABLET BY MOUTH EVERY DAY 90 tablet 1    glimepiride (AMARYL) 4 mg tablet 1 tablet in am and 1/2 tablet in pm (Patient taking differently: Take 8 mg by mouth daily with breakfast ) 180 tablet 2    glucose blood (OneTouch Ultra) test strip Test up to 3 times daily   300 each 3    Insulin Pen Needle (B-D UF III MINI PEN NEEDLES) 31G X 5 MM MISC USE UP TO 3 TIMES A  each 6    Janumet XR  MG TB24 TAKE 1 TABLET BY MOUTH TWICE A  tablet 3    KRILL OIL PO Take 750 mg by mouth      levothyroxine 125 mcg tablet Take 125 mcg by mouth daily  2    nadolol (CORGARD) 20 mg tablet Take 1 tablet (20 mg total) by mouth daily 30 tablet 2    omeprazole (PriLOSEC) 20 mg delayed release capsule TAKE 1 CAPSULE BY MOUTH EVERY DAY 90 capsule 3    ONETOUCH DELICA LANCETS FINE MISC Use 1-3 times a day 100 each 3    simvastatin (ZOCOR) 40 mg tablet Take 40 mg by mouth daily  3    spironolactone (ALDACTONE) 50 mg tablet TAKE 1 TABLET BY MOUTH EVERY DAY 90 tablet 1    Xifaxan 550 MG tablet Take 550 mg by mouth every 12 (twelve) hours      aspirin 81 mg chewable tablet Chew 81 mg daily  (Patient not taking: Reported on 9/1/2021)      ferrous sulfate 325 (65 Fe) mg tablet Take 1 tablet by mouth 2 (two) times a day (Patient not taking: Reported on 9/1/2021)      lactulose 20 g/30 mL Take 15 mL (10 g total) by mouth 2 (two) times a day (Patient taking differently: Take 10 g by mouth 4 (four) times a day ) 900 mL 6    meloxicam (MOBIC) 7 5 mg tablet TAKE 1 TABLET BY MOUTH DAILY AS NEEDED FOR ARM PAIN (Patient not taking: Reported on 9/1/2021)      traMADol (ULTRAM) 50 mg tablet TAKE 1 TABLET BY MOUTH THREE TIMES A DAY AS NEEDED FOR PAIN (Patient not taking: Reported on 9/1/2021)       No current facility-administered medications for this visit  Allergies: Allergies   Allergen Reactions    Bee Venom Swelling    Latex     Sesame Seed (Diagnostic) - Food Allergy      Other reaction(s): swelling inside mouth    Strawberry C [Ascorbate - Food Allergy] Other (See Comments)     Mouth sores    Penicillins Rash       Physical Exam:    Body surface area is 1 85 meters squared  Wt Readings from Last 3 Encounters:   09/17/21 88 3 kg (194 lb 9 6 oz)   09/09/21 89 4 kg (197 lb)   09/01/21 79 8 kg (176 lb)        Temp Readings from Last 3 Encounters:   09/17/21 (!) 97 °F (36 1 °C) (Tympanic)   09/15/21 (!) 97 1 °F (36 2 °C) (Temporal)   09/09/21 97 7 °F (36 5 °C) (Temporal)        BP Readings from Last 3 Encounters:   09/17/21 108/50   09/15/21 128/63   09/09/21 123/57         Pulse Readings from Last 3 Encounters:   09/17/21 66   09/15/21 66   09/09/21 63        Physical Exam     Constitutional   General appearance: No acute distress, well appearing and well nourished  Eyes   Conjunctiva and lids: No swelling, erythema or discharge  Pupils and irises: Equal, round and reactive to light  Ears, Nose, Mouth, and Throat   External inspection of ears and nose: Normal     Nasal mucosa, septum, and turbinates: Normal without edema or erythema  Oropharynx: Normal with no erythema, edema, exudate or lesions  Pulmonary   Respiratory effort: No increased work of breathing or signs of respiratory distress  Auscultation of lungs: Clear to auscultation  Cardiovascular   Palpation of heart: Normal PMI, no thrills  Auscultation of heart: Normal rate and rhythm, normal S1 and S2, without murmurs      Examination of extremities for edema and/or varicosities: Normal     Carotid pulses: Normal     Abdomen   Abdomen: Non-tender, no masses  Liver and spleen: No hepatomegaly or splenomegaly  Lymphatic   Palpation of lymph nodes in neck: No lymphadenopathy  Musculoskeletal   Gait and station: Normal     Digits and nails: Normal without clubbing or cyanosis  Inspection/palpation of joints, bones, and muscles: Normal     Skin   Skin and subcutaneous tissue: Normal without rashes or lesions  Neurologic   Cranial nerves: Cranial nerves 2-12 intact  Sensation: No sensory loss  Psychiatric   Orientation to person, place, and time: Normal     Mood and affect: Normal         Assessment / Plan:      The patient is a pleasant 61-year-old female with a past medical history of PAUL cirrhosis who has esophageal varices and was found to be iron deficient  Because her white count was low we ended up doing a bone marrow biopsy which revealed no major abnormalities or MDS  It did reveal low iron stores  She was put on Venofer and her hemoglobin is up to 11 from 6 range  At this point she will stay on her Venofer to finish 10 doses  I will see her back in 6 months with repeat blood work to include iron studies  I suspect her hemoglobin being low was related to her iron deficiency probably secondary to GI bleeding from her varices  I suspect her white count and platelet count run low because of her cirrhosis and splenomegaly  The patient is in agreement with the plan  I will see her back in 6 months with repeat blood work  Goals and Barriers:  Current Goal:  Prolong Survival from   Pancytopenia  Barriers: None  Patient's Capacity to Self Care:  Patient able to self care      Portions of the record may have been created with voice recognition software   Occasional wrong word or "sound a like" substitutions may have occurred due to the inherent limitations of voice recognition software   Read the chart carefully and recognize, using context, where substitutions have occurred

## 2021-09-22 ENCOUNTER — HOSPITAL ENCOUNTER (OUTPATIENT)
Dept: INFUSION CENTER | Facility: HOSPITAL | Age: 76
Discharge: HOME | End: 2021-09-22
Attending: INTERNAL MEDICINE
Payer: MEDICARE

## 2021-09-22 VITALS
OXYGEN SATURATION: 100 % | HEART RATE: 66 BPM | TEMPERATURE: 98.6 F | SYSTOLIC BLOOD PRESSURE: 127 MMHG | DIASTOLIC BLOOD PRESSURE: 73 MMHG | RESPIRATION RATE: 16 BRPM

## 2021-09-22 DIAGNOSIS — D50.0 IRON DEFICIENCY ANEMIA DUE TO CHRONIC BLOOD LOSS: Primary | ICD-10-CM

## 2021-09-22 PROCEDURE — 96365 THER/PROPH/DIAG IV INF INIT: CPT

## 2021-09-22 RX ORDER — SODIUM CHLORIDE 9 MG/ML
20 INJECTION, SOLUTION INTRAVENOUS ONCE
Status: COMPLETED | OUTPATIENT
Start: 2021-09-22 | End: 2021-09-22

## 2021-09-22 RX ORDER — SODIUM CHLORIDE 9 MG/ML
20 INJECTION, SOLUTION INTRAVENOUS ONCE
Status: CANCELLED | OUTPATIENT
Start: 2021-09-29

## 2021-09-22 RX ADMIN — IRON SUCROSE 200 MG: 20 INJECTION, SOLUTION INTRAVENOUS at 12:19

## 2021-09-22 RX ADMIN — SODIUM CHLORIDE 20 ML/HR: 9 INJECTION, SOLUTION INTRAVENOUS at 12:17

## 2021-09-22 NOTE — TELEPHONE ENCOUNTER
Lengthy call w/ Pt  She is confused by the "pile of papers" she has  Conf'd upcoming OV w/ Velta Even (also conf'd her daughter's upcoming appt)  Noted orders for BW and US/noted expected dates; noted sev'l times she does not need BW for MAJO-CBC was ord'd for in 6 wks   She has not yet sched'd US  (10+ min)

## 2021-09-27 ENCOUNTER — APPOINTMENT (OUTPATIENT)
Dept: LAB | Facility: HOSPITAL | Age: 76
End: 2021-09-27
Attending: INTERNAL MEDICINE
Payer: MEDICARE

## 2021-09-27 DIAGNOSIS — K74.60 LIVER CIRRHOSIS SECONDARY TO NASH (NONALCOHOLIC STEATOHEPATITIS) (HCC): ICD-10-CM

## 2021-09-27 DIAGNOSIS — K72.90 HEPATIC ENCEPHALOPATHY (HCC): ICD-10-CM

## 2021-09-27 DIAGNOSIS — K75.81 LIVER CIRRHOSIS SECONDARY TO NASH (NONALCOHOLIC STEATOHEPATITIS) (HCC): ICD-10-CM

## 2021-09-27 LAB — AMMONIA PLAS-SCNC: 68 UMOL/L (ref 11–35)

## 2021-09-27 PROCEDURE — 82140 ASSAY OF AMMONIA: CPT

## 2021-09-27 PROCEDURE — 36415 COLL VENOUS BLD VENIPUNCTURE: CPT

## 2021-09-29 ENCOUNTER — HOSPITAL ENCOUNTER (OUTPATIENT)
Dept: INFUSION CENTER | Facility: HOSPITAL | Age: 76
Discharge: HOME | End: 2021-09-29
Attending: INTERNAL MEDICINE
Payer: MEDICARE

## 2021-09-29 ENCOUNTER — OFFICE VISIT (OUTPATIENT)
Dept: GASTROENTEROLOGY | Facility: CLINIC | Age: 76
End: 2021-09-29
Payer: MEDICARE

## 2021-09-29 VITALS
SYSTOLIC BLOOD PRESSURE: 110 MMHG | BODY MASS INDEX: 37.69 KG/M2 | WEIGHT: 192 LBS | HEIGHT: 60 IN | DIASTOLIC BLOOD PRESSURE: 60 MMHG

## 2021-09-29 VITALS
HEART RATE: 51 BPM | TEMPERATURE: 96.9 F | OXYGEN SATURATION: 100 % | RESPIRATION RATE: 14 BRPM | DIASTOLIC BLOOD PRESSURE: 61 MMHG | SYSTOLIC BLOOD PRESSURE: 125 MMHG

## 2021-09-29 DIAGNOSIS — K74.60 CIRRHOSIS OF LIVER WITHOUT ASCITES, UNSPECIFIED HEPATIC CIRRHOSIS TYPE (HCC): ICD-10-CM

## 2021-09-29 DIAGNOSIS — D50.0 IRON DEFICIENCY ANEMIA DUE TO CHRONIC BLOOD LOSS: Primary | ICD-10-CM

## 2021-09-29 DIAGNOSIS — D50.0 IRON DEFICIENCY ANEMIA DUE TO CHRONIC BLOOD LOSS: ICD-10-CM

## 2021-09-29 DIAGNOSIS — I85.00 ESOPHAGEAL VARICES WITHOUT BLEEDING, UNSPECIFIED ESOPHAGEAL VARICES TYPE (HCC): Primary | ICD-10-CM

## 2021-09-29 DIAGNOSIS — K72.90 HEPATIC ENCEPHALOPATHY (HCC): ICD-10-CM

## 2021-09-29 PROCEDURE — 96365 THER/PROPH/DIAG IV INF INIT: CPT

## 2021-09-29 PROCEDURE — 99214 OFFICE O/P EST MOD 30 MIN: CPT | Performed by: INTERNAL MEDICINE

## 2021-09-29 RX ORDER — SODIUM CHLORIDE 9 MG/ML
20 INJECTION, SOLUTION INTRAVENOUS ONCE
Status: CANCELLED | OUTPATIENT
Start: 2021-10-06

## 2021-09-29 RX ORDER — SODIUM CHLORIDE 9 MG/ML
20 INJECTION, SOLUTION INTRAVENOUS ONCE
Status: COMPLETED | OUTPATIENT
Start: 2021-09-29 | End: 2021-09-29

## 2021-09-29 RX ORDER — SODIUM CHLORIDE 9 MG/ML
20 INJECTION, SOLUTION INTRAVENOUS ONCE
Status: CANCELLED | OUTPATIENT
Start: 2021-10-05

## 2021-09-29 RX ADMIN — SODIUM CHLORIDE 20 ML/HR: 9 INJECTION, SOLUTION INTRAVENOUS at 08:50

## 2021-09-29 RX ADMIN — IRON SUCROSE 200 MG: 20 INJECTION, SOLUTION INTRAVENOUS at 08:49

## 2021-09-29 NOTE — LETTER
September 29, 2021     Meggan Farley DO  [address]    Patient: Judi Freitas   YOB: 1945   Date of Visit: 9/29/2021       Dear Dr Angelique Norman: Thank you for referring Alexandru Tatum to me for evaluation  Below are my notes for this consultation  If you have questions, please do not hesitate to call me  I look forward to following your patient along with you  Sincerely,        Luisana Joe MD        CC: MD Cyndi Michelle MD Mallory Lown, MD  9/29/2021 11:41 AM  Sign when Signing Visit  [facility] - Outpatient Follow-up Note  Judi Freitas 77 y o  female MRN: [id number]  Encounter: [id number]    ASSESSMENT AND PLAN:      1  Esophageal varices without bleeding, unspecified esophageal varices type ([facility] )  -- Patient with esophageal varices on recent upper endoscopy  Did not have any overt signs but moderate-size varices noted  Two bands applied  Perhaps this could have been a source for the patient's blood loss  -  Continue nadolol for now  - EGD in 6 months    2  Cirrhosis of liver without ascites, unspecified hepatic cirrhosis type ([facility] )  -- patient with fairly stable cirrhosis  Some complications with previous fluid overload controlled the diuretics  Encephalopathy well controlled with Xifaxan and lactulose  -  Screening for [address]  with abdominal ultrasonography is every 6 months and alpha fetoprotein    3  Hepatic encephalopathy (HCC)  -- somewhat elevated ammonia level in the 60s on laboratory values earlier this week  Patient denies fatigue and trouble with concentration  No asterixis on physical examination and answers all questions appropriately today  Sometimes she skips her lactulose dose in the morning when she is going out and this is understandable  - try to take lactulose 2 times a day  Best if she has least 2-3 bowel movements per day    4   Iron deficiency anemia due to chronic blood loss  -- patient is getting near the end of her iron infusion therapy  Most recent hemoglobin early in the month was 11 2  Due for CBC soon  She reports having some dark brown stool  Monitor the patient for blood loss  Patient did have colonoscopy last summer which was negative  Has not had capsule endoscopy  Would hold off for now depending on lab studies  She did drop down into  6-7 g range of hemoglobin earlier this summer   - continue to monitor  - patient following up with Dr Katherine Benitez-- CBC likely ordered through his office      Followup Appointment: 3 mo  ______________________________________________________________________    Chief Complaint   Patient presents with    Heartburn     follow up     HPI:   Patient returns to the office for follow-up visit for her liver issues and anemia  Patient did drop her hemoglobin down in the 6 g range earlier this summer and required transfusion therapy  She has been on IV iron  Patient does have some hypoproliferative issues with her anemia but was felt she had probable GI blood loss  There is no history of overt bleeding  Patient did have GI workup for milder anemia in July of 2020 with a EGD and colonoscopy  Note was made of nonbleeding varices  Colonoscopy showed no source of anemia either  She did have a capsule study  Given her history of cirrhosis we went ahead with an EGD earlier this month  Note was made of portal hypertensive gastropathy  She did have moderate size varices without clear-cut stigmata bleeding  Nevertheless given her drop in hemoglobin a elected to place 2 bands on the largest varices  Patient has not really had a problem since then  She reports having some dark stool but not black  She did get iron infusion today  Patient has been followed in the past for hepatic encephalopathy  She had an ammonia level which was above the normal range in the 60s    Presently she does not feel for take or trouble with concentration  Patient has generally good in ascertaining when her concentration is wanes   She has been maintained on lactulose twice a day and Xifaxan  She has only been taking the lactulose about once a day because of problems with loose stools and the fact that she can not really get out of the house if she takes a dose in the morning  Not having problems with abdominal pain or swelling    Has been maintained on diuretic therapy    Historical Information   Past Medical History:   Diagnosis Date    Anemia     Cirrhosis ([address] )     Diabetic neuropathy (HCC)     Esophageal varices (HCC)     Fatty liver     GERD (gastroesophageal reflux disease)     Hepatic encephalopathy (HCC)     Hiatal hernia     Hyperlipidemia     Hypertension     Hypoglycemia     Hypothyroidism     Liver cirrhosis secondary to PUAL (HCC)     Osteoarthritis     Osteopenia     Pancytopenia (HCC)     Thrombocytopenia (HCC)     Type 2 diabetes mellitus (HCC)      Past Surgical History:   Procedure Laterality Date    ABDOMINAL SURGERY      Abdominal plasty with mesh insertion    CATARACT EXTRACTION, BILATERAL      CATARACT EXTRACTION, BILATERAL      EGD AND COLONOSCOPY  03/18/2015    IR BIOPSY BONE MARROW  8/24/2021    LAPAROSCOPIC CHOLECYSTECTOMY      SHOULDER SURGERY Right     calcium depsoti    TUBAL LIGATION      UMBILICAL HERNIA REPAIR      with mesh placed    UPPER GASTROINTESTINAL ENDOSCOPY       Social History     Substance and Sexual Activity   Alcohol Use Not Currently     Social History     Substance and Sexual Activity   Drug Use No     Social History     Tobacco Use   Smoking Status Never Smoker   Smokeless Tobacco Never Used     Family History   Problem Relation Age of Onset    Breast cancer Mother     Diabetes type II Father     Thyroid disease unspecified Daughter     Down syndrome Daughter     Diabetes type II Daughter     Diabetes type II Sister     No Known Problems Brother  Prostate cancer Brother     No Known Problems Sister     Stroke Sister     No Known Problems Son     Breast cancer Maternal Aunt     Colon cancer Neg Hx          Current Outpatient Medications:     Basaglar KwikPen 100 units/mL injection pen    Blood Glucose Monitoring Suppl (ONE TOUCH ULTRA 2) w/Device KIT    Calcium Carb-Cholecalciferol (CALCIUM 1000 + D PO)    Coenzyme Q10 (Co Q10) 100 MG CAPS    fluticasone (FLONASE) 50 mcg/act nasal spray    furosemide (LASIX) 40 mg tablet    glimepiride (AMARYL) 4 mg tablet    glucose blood (OneTouch Ultra) test strip    Insulin Pen Needle (B-D UF III MINI PEN NEEDLES) 31G X 5 MM MISC    Janumet XR  MG TB24    KRILL OIL PO    lactulose 20 g/30 mL    levothyroxine 125 mcg tablet    nadolol (CORGARD) 20 mg tablet    omeprazole (PriLOSEC) 20 mg delayed release capsule    ONETOUCH DELICA LANCETS FINE MISC    simvastatin (ZOCOR) 40 mg tablet    spironolactone (ALDACTONE) 50 mg tablet    Xifaxan 550 MG tablet    aspirin 81 mg chewable tablet    cholecalciferol (VITAMIN D3) 1,000 units tablet    ferrous sulfate 325 (65 Fe) mg tablet    meloxicam (MOBIC) 7 5 mg tablet    traMADol (ULTRAM) 50 mg tablet  No current facility-administered medications for this visit  Allergies   Allergen Reactions    Bee Venom Swelling    Latex     Sesame Seed (Diagnostic) - Food Allergy      Other reaction(s): swelling inside mouth    Strawberry C [Ascorbate - Food Allergy] Other (See Comments)     Mouth sores    Penicillins Rash     Reviewed medications and allergies and updated as indicated    PHYSICAL EXAM:    Blood pressure 110/60, height 5' (1 524 m), weight 87 1 kg (192 lb), not currently breastfeeding  Body mass index is 37 5 kg/m²  General Appearance: NAD, cooperative, alert  Eyes: Anicteric,  conjunctiva  ENT:  Normocephalic, atraumatic, normal mucosa      Neck:  Supple, symmetrical, trachea midline  Resp:  Clear to auscultation bilaterally; no rales, rhonchi or wheezing; respirations unlabored   CV:  S1 S2, Regular rate and rhythm; no murmur, rub, or gallop  GI:  Soft, non-tender, non-distended; normal bowel sounds; no masses,  Large pannus  Rectal: Deferred  Musculoskeletal: No cyanosis, clubbing or edema  Normal ROM  Skin:  No jaundice, rashes, or lesions- positive palmar erythema   Heme/Lymph: No palpable cervical lymphadenopathy  Psych: Normal affect, good eye contact  Neuro: No gross deficits, AAOx3    Lab Results:   Lab Results   Component Value Date    WBC 3 21 (L) 09/04/2021    HGB 11 2 (L) 09/04/2021    HCT 35 9 09/04/2021    MCV 94 09/04/2021    PLT  09/04/2021      Comment:      Not reported due to platelet clumping       Lab Results   Component Value Date    K 4 5 07/12/2021     07/12/2021    CO2 25 07/12/2021    BUN 37 (H) 07/12/2021    CREATININE 1 33 (H) 07/12/2021    CALCIUM 8 7 07/12/2021    CORRECTEDCA 9 7 07/12/2021    AST 31 07/12/2021    ALT 27 07/12/2021    ALKPHOS 63 07/12/2021    EGFR 39 07/12/2021

## 2021-09-29 NOTE — PROGRESS NOTES
[facility] - Outpatient Follow-up Note  Riana Liz 77 y o  female MRN: [id number]  Encounter: [id number]    ASSESSMENT AND PLAN:      1  Esophageal varices without bleeding, unspecified esophageal varices type ([place] [address] )  -- Patient with esophageal varices on recent upper endoscopy  Did not have any overt signs but moderate-size varices noted  Two bands applied  Perhaps this could have been a source for the patient's blood loss  -  Continue nadolol for now  - EGD in 6 months    2  Cirrhosis of liver without ascites, unspecified hepatic cirrhosis type ([place] [address] )  -- patient with fairly stable cirrhosis  Some complications with previous fluid overload controlled the diuretics  Encephalopathy well controlled with Xifaxan and lactulose  -  Screening for [place] [address]  with abdominal ultrasonography is every 6 months and alpha fetoprotein    3  Hepatic encephalopathy (HCC)  -- somewhat elevated ammonia level in the 60s on laboratory values earlier this week  Patient denies fatigue and trouble with concentration  No asterixis on physical examination and answers all questions appropriately today  Sometimes she skips her lactulose dose in the morning when she is going out and this is understandable  - try to take lactulose 2 times a day  Best if she has least 2-3 bowel movements per day    4  Iron deficiency anemia due to chronic blood loss  -- patient is getting near the end of her iron infusion therapy  Most recent hemoglobin early in the month was 11 2  Due for CBC soon  She reports having some dark brown stool  Monitor the patient for blood loss  Patient did have colonoscopy last summer which was negative  Has not had capsule endoscopy  Would hold off for now depending on lab studies    She did drop down into  6-7 g range of hemoglobin earlier this summer   - continue to monitor  - patient following up with Dr Jaime Rodas-- CBC likely ordered through his office      Followup Appointment: 3 mo  ______________________________________________________________________    Chief Complaint   Patient presents with    Heartburn     follow up     HPI:   Patient returns to the office for follow-up visit for her liver issues and anemia  Patient did drop her hemoglobin down in the 6 g range earlier this summer and required transfusion therapy  She has been on IV iron  Patient does have some hypoproliferative issues with her anemia but was felt she had probable GI blood loss  There is no history of overt bleeding  Patient did have GI workup for milder anemia in July of 2020 with a EGD and colonoscopy  Note was made of nonbleeding varices  Colonoscopy showed no source of anemia either  She did have a capsule study  Given her history of cirrhosis we went ahead with an EGD earlier this month  Note was made of portal hypertensive gastropathy  She did have moderate size varices without clear-cut stigmata bleeding  Nevertheless given her drop in hemoglobin a elected to place 2 bands on the largest varices  Patient has not really had a problem since then  She reports having some dark stool but not black  She did get iron infusion today  Patient has been followed in the past for hepatic encephalopathy  She had an ammonia level which was above the normal range in the 60s  Presently she does not feel for take or trouble with concentration  Patient has generally good in ascertaining when her concentration is wanes   She has been maintained on lactulose twice a day and Xifaxan  She has only been taking the lactulose about once a day because of problems with loose stools and the fact that she can not really get out of the house if she takes a dose in the morning  Not having problems with abdominal pain or swelling    Has been maintained on diuretic therapy    Historical Information   Past Medical History:   Diagnosis Date    Anemia     [facility] )     [facility]  Esophageal varices (HCC)     Fatty liver     GERD (gastroesophageal reflux disease)     Hepatic encephalopathy (HCC)     Hiatal hernia     Hyperlipidemia     Hypertension     Hypoglycemia     Hypothyroidism     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)     Osteoarthritis     Osteopenia     Pancytopenia (HCC)     Thrombocytopenia (HCC)     Type 2 diabetes mellitus (HCC)      Past Surgical History:   Procedure Laterality Date    ABDOMINAL SURGERY      Abdominal plasty with mesh insertion    CATARACT EXTRACTION, BILATERAL      CATARACT EXTRACTION, BILATERAL      EGD AND COLONOSCOPY  03/18/2015    IR BIOPSY BONE MARROW  8/24/2021    LAPAROSCOPIC CHOLECYSTECTOMY      SHOULDER SURGERY Right     calcium depsoti    TUBAL LIGATION      UMBILICAL HERNIA REPAIR      with mesh placed    UPPER GASTROINTESTINAL ENDOSCOPY       Social History     Substance and Sexual Activity   Alcohol Use Not Currently     Social History     Substance and Sexual Activity   Drug Use No     Social History     Tobacco Use   Smoking Status Never Smoker   Smokeless Tobacco Never Used     Family History   Problem Relation Age of Onset    Breast cancer Mother     Diabetes type II Father     Thyroid disease unspecified Daughter     Down syndrome Daughter     Diabetes type II Daughter     Diabetes type II Sister     No Known Problems Brother     Prostate cancer Brother     No Known Problems Sister     Stroke Sister     No Known Problems Son     Breast cancer Maternal Aunt     Colon cancer Neg Hx          Current Outpatient Medications:     Basaglar KwikPen 100 units/mL injection pen    Blood Glucose Monitoring Suppl (ONE TOUCH ULTRA 2) w/Device KIT    Calcium Carb-Cholecalciferol (CALCIUM 1000 + D PO)    Coenzyme Q10 (Co Q10) 100 MG CAPS    fluticasone (FLONASE) 50 mcg/act nasal spray    furosemide (LASIX) 40 mg tablet    glimepiride (AMARYL) 4 mg tablet    glucose blood (OneTouch Ultra) test strip   Insulin Pen Needle (B-D UF III MINI PEN NEEDLES) 31G X 5 MM MISC    Janumet XR  MG TB24    KRILL OIL PO    lactulose 20 g/30 mL    levothyroxine 125 mcg tablet    nadolol (CORGARD) 20 mg tablet    omeprazole (PriLOSEC) 20 mg delayed release capsule    ONETOUCH DELICA LANCETS FINE MISC    simvastatin (ZOCOR) 40 mg tablet    spironolactone (ALDACTONE) 50 mg tablet    Xifaxan 550 MG tablet    aspirin 81 mg chewable tablet    cholecalciferol (VITAMIN D3) 1,000 units tablet    ferrous sulfate 325 (65 Fe) mg tablet    meloxicam (MOBIC) 7 5 mg tablet    traMADol (ULTRAM) 50 mg tablet  No current facility-administered medications for this visit  Allergies   Allergen Reactions    Bee Venom Swelling    Latex     Sesame Seed (Diagnostic) - Food Allergy      Other reaction(s): swelling inside mouth    Strawberry C [Ascorbate - Food Allergy] Other (See Comments)     Mouth sores    Penicillins Rash     Reviewed medications and allergies and updated as indicated    PHYSICAL EXAM:    Blood pressure 110/60, height 5' (1 524 m), weight 87 1 kg (192 lb), not currently breastfeeding  Body mass index is 37 5 kg/m²  General Appearance: NAD, cooperative, alert  Eyes: Anicteric,  conjunctiva  ENT:  Normocephalic, atraumatic, normal mucosa  Neck:  Supple, symmetrical, trachea midline  Resp:  Clear to auscultation bilaterally; no rales, rhonchi or wheezing; respirations unlabored   CV:  S1 S2, Regular rate and rhythm; no murmur, rub, or gallop  GI:  Soft, non-tender, non-distended; normal bowel sounds; no masses,  Large pannus  Rectal: Deferred  Musculoskeletal: No cyanosis, clubbing or edema  Normal ROM    Skin:  No jaundice, rashes, or lesions- positive palmar erythema   Heme/Lymph: No palpable cervical lymphadenopathy  Psych: Normal affect, good eye contact  Neuro: No gross deficits, AAOx3    Lab Results:   Lab Results   Component Value Date    WBC 3 21 (L) 09/04/2021    HGB 11 2 (L) 09/04/2021    HCT 35 9 09/04/2021    MCV 94 09/04/2021    PLT  09/04/2021      Comment:      Not reported due to platelet clumping       Lab Results   Component Value Date    K 4 5 07/12/2021     07/12/2021    CO2 25 07/12/2021    BUN 37 (H) 07/12/2021    CREATININE 1 33 (H) 07/12/2021    CALCIUM 8 7 07/12/2021    CORRECTEDCA 9 7 07/12/2021    AST 31 07/12/2021    ALT 27 07/12/2021    ALKPHOS 63 07/12/2021    EGFR 39 07/12/2021

## 2021-09-29 NOTE — LETTER
September 29, 2021     Miltoncatarina Covington DO  [address]    Patient: Armaan Casanova   YOB: 1945   Date of Visit: 9/29/2021       Dear Dr Toño Balbuena: Thank you for referring Chelsy Sanchez to me for evaluation  Below are my notes for this consultation  If you have questions, please do not hesitate to call me  I look forward to following your patient along with you  Sincerely,        Jose F Pal MD        CC: MD Janet Hernandez MD Altamease Pill, MD  9/29/2021 11:39 AM  Incomplete  [facility] - Outpatient Follow-up Note  Armaan Casanova 77 y o  female MRN: [id number]  Encounter: [id number]    ASSESSMENT AND PLAN:      1  Esophageal varices without bleeding, unspecified esophageal varices type ([facility] )  -- Patient with esophageal varices on recent upper endoscopy  Did not have any overt signs but moderate-size varices noted  Two bands applied  Perhaps this could have been a source for the patient's blood loss  -  Continue nadolol for now  - EGD in 6 months    2  Cirrhosis of liver without ascites, unspecified hepatic cirrhosis type ([address] )  -- patient with fairly stable cirrhosis  Some complications with previous fluid overload controlled the diuretics  Encephalopathy well controlled with Xifaxan and lactulose  -  Screening for [address]  with abdominal ultrasonography is every 6 months and alpha fetoprotein    3  Hepatic encephalopathy (HCC)  -- somewhat elevated ammonia level in the 60s on laboratory values earlier this week  Patient denies fatigue and trouble with concentration  No asterixis on physical examination and answers all questions appropriately today  Sometimes she skips her lactulose dose in the morning when she is going out and this is understandable  - try to take lactulose 2 times a day  Best if she has least 2-3 bowel movements per day    4   Iron deficiency anemia due to chronic blood loss  -- patient is getting near the end of her iron infusion therapy  Most recent hemoglobin early in the month was 11 2  Due for CBC soon  She reports having some dark brown stool  Monitor the patient for blood loss  Patient did have colonoscopy last summer which was negative  Has not had capsule endoscopy  Would hold off for now depending on lab studies  She did drop down into  6-7 g range of hemoglobin earlier this summer   - continue to monitor  - patient following up with Dr Aden Ornelas-- CBC likely ordered through his office      Followup Appointment: 3 mo  ______________________________________________________________________    Chief Complaint   Patient presents with    Heartburn     follow up     HPI:   Patient returns to the office for follow-up visit for her liver issues and anemia  Patient did drop her hemoglobin down in the 6 g range earlier this summer and required transfusion therapy  She has been on IV iron  Patient does have some hypoproliferative issues with her anemia but was felt she had probable GI blood loss  There is no history of overt bleeding  Patient did have GI workup for milder anemia in July of 2020 with a EGD and colonoscopy  Note was made of nonbleeding varices  Colonoscopy showed no source of anemia either  She did have a capsule study  Given her history of cirrhosis we went ahead with an EGD earlier this month  Note was made of portal hypertensive gastropathy  She did have moderate size varices without clear-cut stigmata bleeding  Nevertheless given her drop in hemoglobin a elected to place 2 bands on the largest varices  Patient has not really had a problem since then  She reports having some dark stool but not black  She did get iron infusion today  Patient has been followed in the past for hepatic encephalopathy  She had an ammonia level which was above the normal range in the 60s    Presently she does not feel for take or trouble with concentration  Patient has generally good in ascertaining when her concentration is wanes   She has been maintained on lactulose twice a day and Xifaxan  She has only been taking the lactulose about once a day because of problems with loose stools and the fact that she can not really get out of the house if she takes a dose in the morning  Not having problems with abdominal pain or swelling    Has been maintained on diuretic therapy    Historical Information   Past Medical History:   Diagnosis Date    Anemia     Cirrhosis ([address] )     Diabetic neuropathy (HCC)     Esophageal varices (HCC)     Fatty liver     GERD (gastroesophageal reflux disease)     Hepatic encephalopathy (HCC)     Hiatal hernia     Hyperlipidemia     Hypertension     Hypoglycemia     Hypothyroidism     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)     Osteoarthritis     Osteopenia     Pancytopenia (HCC)     Thrombocytopenia (HCC)     Type 2 diabetes mellitus (HCC)      Past Surgical History:   Procedure Laterality Date    ABDOMINAL SURGERY      Abdominal plasty with mesh insertion    CATARACT EXTRACTION, BILATERAL      CATARACT EXTRACTION, BILATERAL      EGD AND COLONOSCOPY  03/18/2015    IR BIOPSY BONE MARROW  8/24/2021    LAPAROSCOPIC CHOLECYSTECTOMY      SHOULDER SURGERY Right     calcium depsoti    TUBAL LIGATION      UMBILICAL HERNIA REPAIR      with mesh placed    UPPER GASTROINTESTINAL ENDOSCOPY       Social History     Substance and Sexual Activity   Alcohol Use Not Currently     Social History     Substance and Sexual Activity   Drug Use No     Social History     Tobacco Use   Smoking Status Never Smoker   Smokeless Tobacco Never Used     Family History   Problem Relation Age of Onset    Breast cancer Mother     Diabetes type II Father     Thyroid disease unspecified Daughter     Down syndrome Daughter     Diabetes type II Daughter     Diabetes type II Sister     No Known Problems Brother     Prostate cancer Brother     No Known Problems Sister     Stroke Sister     No Known Problems Son     Breast cancer Maternal Aunt     Colon cancer Neg Hx          Current Outpatient Medications:     Basaglar KwikPen 100 units/mL injection pen    Blood Glucose Monitoring Suppl (ONE TOUCH ULTRA 2) w/Device KIT    Calcium Carb-Cholecalciferol (CALCIUM 1000 + D PO)    Coenzyme Q10 (Co Q10) 100 MG CAPS    fluticasone (FLONASE) 50 mcg/act nasal spray    furosemide (LASIX) 40 mg tablet    glimepiride (AMARYL) 4 mg tablet    glucose blood (OneTouch Ultra) test strip    Insulin Pen Needle (B-D UF III MINI PEN NEEDLES) 31G X 5 MM MISC    Janumet XR  MG TB24    KRILL OIL PO    lactulose 20 g/30 mL    levothyroxine 125 mcg tablet    nadolol (CORGARD) 20 mg tablet    omeprazole (PriLOSEC) 20 mg delayed release capsule    ONETOUCH DELICA LANCETS FINE MISC    simvastatin (ZOCOR) 40 mg tablet    spironolactone (ALDACTONE) 50 mg tablet    Xifaxan 550 MG tablet    aspirin 81 mg chewable tablet    cholecalciferol (VITAMIN D3) 1,000 units tablet    ferrous sulfate 325 (65 Fe) mg tablet    meloxicam (MOBIC) 7 5 mg tablet    traMADol (ULTRAM) 50 mg tablet  No current facility-administered medications for this visit  Allergies   Allergen Reactions    Bee Venom Swelling    Latex     Sesame Seed (Diagnostic) - Food Allergy      Other reaction(s): swelling inside mouth    Strawberry C [Ascorbate - Food Allergy] Other (See Comments)     Mouth sores    Penicillins Rash     Reviewed medications and allergies and updated as indicated    PHYSICAL EXAM:    Blood pressure 110/60, height 5' (1 524 m), weight 87 1 kg (192 lb), not currently breastfeeding  Body mass index is 37 5 kg/m²  General Appearance: NAD, cooperative, alert  Eyes: Anicteric,  conjunctiva  ENT:  Normocephalic, atraumatic, normal mucosa      Neck:  Supple, symmetrical, trachea midline  Resp:  Clear to auscultation bilaterally; no rales, rhonchi or wheezing; respirations unlabored   CV:  S1 S2, Regular rate and rhythm; no murmur, rub, or gallop  GI:  Soft, non-tender, non-distended; normal bowel sounds; no masses,  Large pannus  Rectal: Deferred  Musculoskeletal: No cyanosis, clubbing or edema  Normal ROM  Skin:  No jaundice, rashes, or lesions- positive palmar erythema   Heme/Lymph: No palpable cervical lymphadenopathy  Psych: Normal affect, good eye contact  Neuro: No gross deficits, AAOx3    Lab Results:   Lab Results   Component Value Date    WBC 3 21 (L) 09/04/2021    HGB 11 2 (L) 09/04/2021    HCT 35 9 09/04/2021    MCV 94 09/04/2021    PLT  09/04/2021      Comment:      Not reported due to platelet clumping  Lab Results   Component Value Date    K 4 5 07/12/2021     07/12/2021    CO2 25 07/12/2021    BUN 37 (H) 07/12/2021    CREATININE 1 33 (H) 07/12/2021    CALCIUM 8 7 07/12/2021    CORRECTEDCA 9 7 07/12/2021    AST 31 07/12/2021    ALT 27 07/12/2021    ALKPHOS 63 07/12/2021    EGFR 39 07/12/2021         Yvonne Caal MD  9/29/2021 11:13 AM  Sign when Signing Visit  [id number] - Outpatient Follow-up Note  Colletta Laster 77 y o  female MRN: [id number]  Encounter: [id number]    ASSESSMENT AND PLAN:      1  Esophageal varices without bleeding, unspecified esophageal varices type ([facility] )  -- Patient with esophageal varices on recent upper endoscopy  Did not have any overt signs but moderate-size varices noted  Two bands applied  Perhaps this could have been a source for the patient's blood loss  -  Continue nadolol for now  - EGD in 6 months    2  Cirrhosis of liver without ascites, unspecified hepatic cirrhosis type ([facility] )  -- patient with fairly stable cirrhosis  Some complications with previous fluid overload controlled the diuretics  Encephalopathy well controlled with Xifaxan and lactulose  -  [facility]  with abdominal ultrasonography is every 6 months and alpha fetoprotein    3   Hepatic encephalopathy ([facility] )  -- somewhat elevated ammonia level in the 60s on laboratory values earlier this week  Patient denies fatigue and trouble with concentration  No asterixis on physical examination and answers all questions appropriately today  Sometimes she skips her lactulose dose in the morning when she is going out and this is understandable  - try to take lactulose 2 times a day  Best if she has least 2-3 bowel movements per day    4  Iron deficiency anemia due to chronic blood loss  -- patient is getting near the end of her iron infusion therapy  Most recent hemoglobin early in the month was 11 2  Due for CBC soon  She reports having some dark brown stool  Monitor the patient for blood loss  Patient did have colonoscopy last summer which was negative  Has not had capsule endoscopy  Would hold off for now depending on lab studies  She did drop down into  6-7 g range of hemoglobin earlier this  Summer        Followup Appointment: 3 mo  ______________________________________________________________________    Chief Complaint   Patient presents with    Heartburn     follow up     HPI:  ***    Historical Information   Past Medical History:   Diagnosis Date    Anemia     [facility]     Diabetic neuropathy ([facility] )     Esophageal varices ([facility] )     Fatty liver     GERD (gastroesophageal reflux disease)     Hepatic encephalopathy (HCC)     Hiatal hernia     Hyperlipidemia     Hypertension     Hypoglycemia     Hypothyroidism     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)     Osteoarthritis     Osteopenia     Pancytopenia (HCC)     Thrombocytopenia (HCC)     Type 2 diabetes mellitus (HCC)      Past Surgical History:   Procedure Laterality Date    ABDOMINAL SURGERY      Abdominal plasty with mesh insertion    CATARACT EXTRACTION, BILATERAL      CATARACT EXTRACTION, BILATERAL      EGD AND COLONOSCOPY  03/18/2015    IR BIOPSY BONE MARROW  8/24/2021    LAPAROSCOPIC CHOLECYSTECTOMY      SHOULDER SURGERY Right     calcium depsoti    TUBAL LIGATION      UMBILICAL HERNIA REPAIR      with mesh placed    UPPER GASTROINTESTINAL ENDOSCOPY       Social History     Substance and Sexual Activity   Alcohol Use Not Currently     Social History     Substance and Sexual Activity   Drug Use No     Social History     Tobacco Use   Smoking Status Never Smoker   Smokeless Tobacco Never Used     Family History   Problem Relation Age of Onset    Breast cancer Mother     Diabetes type II Father     Thyroid disease unspecified Daughter     Down syndrome Daughter     Diabetes type II Daughter     Diabetes type II Sister     No Known Problems Brother     Prostate cancer Brother     No Known Problems Sister     Stroke Sister     No Known Problems Son     Breast cancer Maternal Aunt     Colon cancer Neg Hx          Current Outpatient Medications:     Basaglar KwikPen 100 units/mL injection pen    Blood Glucose Monitoring Suppl (ONE TOUCH ULTRA 2) w/Device KIT    Calcium Carb-Cholecalciferol (CALCIUM 1000 + D PO)    Coenzyme Q10 (Co Q10) 100 MG CAPS    fluticasone (FLONASE) 50 mcg/act nasal spray    furosemide (LASIX) 40 mg tablet    glimepiride (AMARYL) 4 mg tablet    glucose blood (OneTouch Ultra) test strip    Insulin Pen Needle (B-D UF III MINI PEN NEEDLES) 31G X 5 MM MISC    Janumet XR  MG TB24    KRILL OIL PO    lactulose 20 g/30 mL    levothyroxine 125 mcg tablet    nadolol (CORGARD) 20 mg tablet    omeprazole (PriLOSEC) 20 mg delayed release capsule    ONETOUCH DELICA LANCETS FINE MISC    simvastatin (ZOCOR) 40 mg tablet    spironolactone (ALDACTONE) 50 mg tablet    Xifaxan 550 MG tablet    aspirin 81 mg chewable tablet    cholecalciferol (VITAMIN D3) 1,000 units tablet    ferrous sulfate 325 (65 Fe) mg tablet    meloxicam (MOBIC) 7 5 mg tablet    traMADol (ULTRAM) 50 mg tablet  No current facility-administered medications for this visit    Allergies   Allergen Reactions  Bee Venom Swelling    Latex     Sesame Seed (Diagnostic) - Food Allergy      Other reaction(s): swelling inside mouth    Strawberry C [Ascorbate - Food Allergy] Other (See Comments)     Mouth sores    Penicillins Rash     Reviewed medications and allergies and updated as indicated    PHYSICAL EXAM:    Blood pressure 110/60, height 5' (1 524 m), weight 87 1 kg (192 lb), not currently breastfeeding  Body mass index is 37 5 kg/m²  General Appearance: NAD, cooperative, alert  Eyes: Anicteric, PERRLA, EOMI  ENT:  Normocephalic, atraumatic, normal mucosa  Neck:  Supple, symmetrical, trachea midline  Resp:  Clear to auscultation bilaterally; no rales, rhonchi or wheezing; respirations unlabored   CV:  S1 S2, Regular rate and rhythm; no murmur, rub, or gallop  GI:  Soft, non-tender, non-distended; normal bowel sounds; no masses, no organomegaly   Rectal: Deferred  Musculoskeletal: No cyanosis, clubbing or edema  Normal ROM  Skin:  No jaundice, rashes, or lesions   Heme/Lymph: No palpable cervical lymphadenopathy  Psych: Normal affect, good eye contact  Neuro: No gross deficits, AAOx3    Lab Results:   Lab Results   Component Value Date    WBC 3 21 (L) 09/04/2021    HGB 11 2 (L) 09/04/2021    HCT 35 9 09/04/2021    MCV 94 09/04/2021    PLT  09/04/2021      Comment:      Not reported due to platelet clumping       Lab Results   Component Value Date    K 4 5 07/12/2021     07/12/2021    CO2 25 07/12/2021    BUN 37 (H) 07/12/2021    CREATININE 1 33 (H) 07/12/2021    CALCIUM 8 7 07/12/2021    CORRECTEDCA 9 7 07/12/2021    AST 31 07/12/2021    ALT 27 07/12/2021    ALKPHOS 63 07/12/2021    EGFR 39 07/12/2021     No results found for: IRON, TIBC, FERRITIN  No results found for: LIPASE    Radiology Results:   EGD    Addendum Date: 9/9/2021 Addendum:   [address] [facility] [address] [id number] [address] [place] OF SERVICE: 9/09/21 PHYSICIAN(S): Jose Caldwell MD - Attending Physician INDICATION: Other iron deficiency anemia POST-OP DIAGNOSIS: See the impression below  PREPROCEDURE: Informed consent was obtained for the procedure, including sedation  Risks of perforation, hemorrhage, adverse drug reaction and aspiration were discussed  The patient was placed in the left lateral decubitus position  Patient was explained about the risks and benefits of the procedure  Risks including but not limited to bleeding, infection, and perforation were explained in detail  Also explained about less than 100% sensitivity with the exam and other alternatives  DETAILS OF PROCEDURE: Patient was taken to the procedure room where a time out was performed to confirm correct patient and correct procedure  The patient underwent monitored anesthesia care, which was administered by an anesthesia professional  The patient's blood pressure, heart rate, level of consciousness, respirations and oxygen were monitored throughout the procedure  The scope was advanced to the second part of the duodenum  Retroflexion was performed in the fundus  Prior to the procedure, the patient's H  Pylori status was unknown  The patient experienced no blood loss  The procedure was not difficult  The patient tolerated the procedure well  There were no apparent complications   ANESTHESIA INFORMATION: ASA: III Anesthesia Type: IV Sedation with Anesthesia MEDICATIONS: dextrose 50 % IV solution **ADS Override Pull** 12 5 mL (Totals for administrations occurring from 0708 to 0753 on 09/09/21) FINDINGS: Three medium and moderate grade II varices with no bleeding (no red kristy sign) in the esophagus; placed 2 bands successfully using super 7 banding Moderate, localized edematous, erythematous and nodular mucosa in the antrum; performed 4 cold forceps biopsies to rule out H  pylori Portal hypertensive gastropathy - no active bleeding - proximal stomach- fundus The duodenal bulb and 2nd part of the duodenum appeared normal  unknown  The patient experienced no blood loss  The procedure was not difficult  The patient tolerated the procedure well  There were no apparent complications  ANESTHESIA INFORMATION: ASA: III Anesthesia Type: IV Sedation with Anesthesia MEDICATIONS: dextrose 50 % IV solution **ADS Override Pull** 12 5 mL (Totals for administrations occurring from 0708 to 0753 on 09/09/21) FINDINGS: Three medium and moderate grade II varices with no bleeding (no red kristy sign) in the esophagus; placed 2 bands successfully using super 7 banding Moderate, localized edematous, erythematous and nodular mucosa in the antrum; performed 4 cold forceps biopsies to rule out H  pylori Portal hypertensive gastropathy - no active bleeding - proximal stomach- fundus The duodenal bulb and 2nd part of the duodenum appeared normal  Photo documentation through lumens was not available on this examination SPECIMENS: ID Type Source Tests Collected by Time Destination 1 : gastritis Tissue Stomach TISSUE EXAM Amaury Weinberg MD 9/9/2021  7:42 AM      Impression: Grade 2 esophageal varices not actively bleeding for 2 bands placed    Three variceal columns through the largest ones banded Nodular antral gastritis Portal hypertensive gastropathy mild proximal stomach RECOMMENDATION: Follow up with me in clinic Will call with biopsy results 7-10 day Continue following up CBCs per Hematology Liquid diet and soft foods today Call office immediately for vomiting brown material or blood or black appearing stools  Amaury Weinberg MD

## 2021-09-29 NOTE — PATIENT INSTRUCTIONS
[id number] - Outpatient Follow-up Note  Albert Sims 77 y o  female MRN: [id number]  Encounter: [id number]    ASSESSMENT AND PLAN:      1  Esophageal varices without bleeding, unspecified esophageal varices type ([place] [address] )  -- Patient with esophageal varices on recent upper endoscopy  Did not have any overt signs but moderate-size varices noted  Two bands applied  Perhaps this could have been a source for the patient's blood loss  -  Continue nadolol for now  - EGD in 6 months    2  Cirrhosis of liver without ascites, unspecified hepatic cirrhosis type ([facility] )  -- patient with fairly stable cirrhosis  Some complications with previous fluid overload controlled the diuretics  Encephalopathy well controlled with Xifaxan and lactulose  -  Screening for [place] [address]  with abdominal ultrasonography is every 6 months and alpha fetoprotein    3  Hepatic encephalopathy (HCC)  -- somewhat elevated ammonia level in the 60s on laboratory values earlier this week  Patient denies fatigue and trouble with concentration  No asterixis on physical examination and answers all questions appropriately today  Sometimes she skips her lactulose dose in the morning when she is going out and this is understandable  - try to take lactulose 2 times a day  Best if she has least 2-3 bowel movements per day    4  Iron deficiency anemia due to chronic blood loss  -- patient is getting near the end of her iron infusion therapy  Most recent hemoglobin early in the month was 11 2  Due for CBC soon  She reports having some dark brown stool  Monitor the patient for blood loss  Patient did have colonoscopy last summer which was negative  Has not had capsule endoscopy  Would hold off for now depending on lab studies    She did drop down into  6-7 g range of hemoglobin earlier this  Summer   - continue to monitor  - patient following up with Dr Amber Key-- CBC likely ordered through his office    Followup Appointment: 3 mo

## 2021-10-01 ENCOUNTER — HOSPITAL ENCOUNTER (OUTPATIENT)
Dept: ULTRASOUND IMAGING | Facility: HOSPITAL | Age: 76
Discharge: HOME | End: 2021-10-01
Attending: INTERNAL MEDICINE
Payer: MEDICARE

## 2021-10-01 DIAGNOSIS — K75.81 LIVER CIRRHOSIS SECONDARY TO NASH (NONALCOHOLIC STEATOHEPATITIS) (HCC): ICD-10-CM

## 2021-10-01 DIAGNOSIS — K74.60 LIVER CIRRHOSIS SECONDARY TO NASH (NONALCOHOLIC STEATOHEPATITIS) (HCC): ICD-10-CM

## 2021-10-01 DIAGNOSIS — K72.90 HEPATIC ENCEPHALOPATHY (HCC): ICD-10-CM

## 2021-10-01 PROCEDURE — 76700 US EXAM ABDOM COMPLETE: CPT

## 2021-10-05 ENCOUNTER — HOSPITAL ENCOUNTER (OUTPATIENT)
Dept: INFUSION CENTER | Facility: HOSPITAL | Age: 76
Discharge: HOME | End: 2021-10-05
Attending: INTERNAL MEDICINE

## 2021-10-05 DIAGNOSIS — D50.0 IRON DEFICIENCY ANEMIA DUE TO CHRONIC BLOOD LOSS: Primary | ICD-10-CM

## 2021-10-05 RX ORDER — SODIUM CHLORIDE 9 MG/ML
20 INJECTION, SOLUTION INTRAVENOUS ONCE
Status: CANCELLED | OUTPATIENT
Start: 2021-10-06

## 2021-10-06 ENCOUNTER — HOSPITAL ENCOUNTER (OUTPATIENT)
Dept: INFUSION CENTER | Facility: HOSPITAL | Age: 76
Discharge: HOME | End: 2021-10-06
Attending: INTERNAL MEDICINE
Payer: MEDICARE

## 2021-10-06 VITALS
DIASTOLIC BLOOD PRESSURE: 60 MMHG | SYSTOLIC BLOOD PRESSURE: 125 MMHG | TEMPERATURE: 97.6 F | OXYGEN SATURATION: 100 % | RESPIRATION RATE: 18 BRPM | HEART RATE: 61 BPM

## 2021-10-06 DIAGNOSIS — D50.0 IRON DEFICIENCY ANEMIA DUE TO CHRONIC BLOOD LOSS: Primary | ICD-10-CM

## 2021-10-06 PROCEDURE — 96365 THER/PROPH/DIAG IV INF INIT: CPT

## 2021-10-06 RX ORDER — SODIUM CHLORIDE 9 MG/ML
20 INJECTION, SOLUTION INTRAVENOUS ONCE
Status: COMPLETED | OUTPATIENT
Start: 2021-10-06 | End: 2021-10-06

## 2021-10-06 RX ORDER — SODIUM CHLORIDE 9 MG/ML
20 INJECTION, SOLUTION INTRAVENOUS ONCE
Status: CANCELLED | OUTPATIENT
Start: 2021-10-13

## 2021-10-06 RX ADMIN — SODIUM CHLORIDE 20 ML/HR: 9 INJECTION, SOLUTION INTRAVENOUS at 11:50

## 2021-10-06 RX ADMIN — IRON SUCROSE 200 MG: 20 INJECTION, SOLUTION INTRAVENOUS at 11:50

## 2021-10-13 ENCOUNTER — LAB (OUTPATIENT)
Dept: LAB | Facility: HOSPITAL | Age: 76
End: 2021-10-13
Payer: MEDICARE

## 2021-10-13 ENCOUNTER — HOSPITAL ENCOUNTER (OUTPATIENT)
Dept: INFUSION CENTER | Facility: HOSPITAL | Age: 76
End: 2021-10-13
Attending: INTERNAL MEDICINE

## 2021-10-13 ENCOUNTER — HOSPITAL ENCOUNTER (OUTPATIENT)
Dept: INFUSION CENTER | Facility: HOSPITAL | Age: 76
Discharge: HOME | End: 2021-10-13
Attending: INTERNAL MEDICINE
Payer: MEDICARE

## 2021-10-13 VITALS
HEART RATE: 61 BPM | DIASTOLIC BLOOD PRESSURE: 64 MMHG | RESPIRATION RATE: 16 BRPM | SYSTOLIC BLOOD PRESSURE: 100 MMHG | TEMPERATURE: 97.5 F | OXYGEN SATURATION: 100 %

## 2021-10-13 DIAGNOSIS — E11.42 DIABETIC POLYNEUROPATHY ASSOCIATED WITH TYPE 2 DIABETES MELLITUS (HCC): ICD-10-CM

## 2021-10-13 DIAGNOSIS — Z79.4 TYPE 2 DIABETES MELLITUS WITH HYPERGLYCEMIA, WITH LONG-TERM CURRENT USE OF INSULIN (HCC): ICD-10-CM

## 2021-10-13 DIAGNOSIS — E78.2 MIXED HYPERLIPIDEMIA: ICD-10-CM

## 2021-10-13 DIAGNOSIS — E03.9 ACQUIRED HYPOTHYROIDISM: ICD-10-CM

## 2021-10-13 DIAGNOSIS — E11.65 TYPE 2 DIABETES MELLITUS WITH HYPERGLYCEMIA, WITH LONG-TERM CURRENT USE OF INSULIN (HCC): ICD-10-CM

## 2021-10-13 DIAGNOSIS — D50.0 IRON DEFICIENCY ANEMIA DUE TO CHRONIC BLOOD LOSS: Primary | ICD-10-CM

## 2021-10-13 DIAGNOSIS — I10 ESSENTIAL HYPERTENSION: ICD-10-CM

## 2021-10-13 LAB
ALBUMIN SERPL BCP-MCNC: 3.2 G/DL (ref 3.5–5)
ALP SERPL-CCNC: 76 U/L (ref 46–116)
ALT SERPL W P-5'-P-CCNC: 36 U/L (ref 12–78)
ANION GAP SERPL CALCULATED.3IONS-SCNC: 8 MMOL/L (ref 4–13)
AST SERPL W P-5'-P-CCNC: 38 U/L (ref 5–45)
BASOPHILS # BLD AUTO: 0.05 THOUSANDS/ΜL (ref 0–0.1)
BASOPHILS NFR BLD AUTO: 1 % (ref 0–1)
BILIRUB SERPL-MCNC: 0.7 MG/DL (ref 0.2–1)
BUN SERPL-MCNC: 28 MG/DL (ref 5–25)
CALCIUM ALBUM COR SERPL-MCNC: 9.7 MG/DL (ref 8.3–10.1)
CALCIUM SERPL-MCNC: 9.1 MG/DL (ref 8.3–10.1)
CHLORIDE SERPL-SCNC: 104 MMOL/L (ref 100–108)
CO2 SERPL-SCNC: 29 MMOL/L (ref 21–32)
CREAT SERPL-MCNC: 1.11 MG/DL (ref 0.6–1.3)
EOSINOPHIL # BLD AUTO: 0.08 THOUSAND/ΜL (ref 0–0.61)
EOSINOPHIL NFR BLD AUTO: 2 % (ref 0–6)
ERYTHROCYTE [DISTWIDTH] IN BLOOD BY AUTOMATED COUNT: 16.2 % (ref 11.6–15.1)
EST. AVERAGE GLUCOSE BLD GHB EST-MCNC: 177 MG/DL
GFR SERPL CREATININE-BSD FRML MDRD: 49 ML/MIN/1.73SQ M
GLUCOSE SERPL-MCNC: 122 MG/DL (ref 65–140)
HBA1C MFR BLD: 7.8 %
HCT VFR BLD AUTO: 39.1 % (ref 34.8–46.1)
HGB BLD-MCNC: 12.5 G/DL (ref 11.5–15.4)
IMM GRANULOCYTES # BLD AUTO: 0.02 THOUSAND/UL (ref 0–0.2)
IMM GRANULOCYTES NFR BLD AUTO: 1 % (ref 0–2)
LYMPHOCYTES # BLD AUTO: 0.75 THOUSANDS/ΜL (ref 0.6–4.47)
LYMPHOCYTES NFR BLD AUTO: 22 % (ref 14–44)
MCH RBC QN AUTO: 30.8 PG (ref 26.8–34.3)
MCHC RBC AUTO-ENTMCNC: 32 G/DL (ref 31.4–37.4)
MCV RBC AUTO: 96 FL (ref 82–98)
MONOCYTES # BLD AUTO: 0.34 THOUSAND/ΜL (ref 0.17–1.22)
MONOCYTES NFR BLD AUTO: 10 % (ref 4–12)
NEUTROPHILS # BLD AUTO: 2.25 THOUSANDS/ΜL (ref 1.85–7.62)
NEUTS SEG NFR BLD AUTO: 64 % (ref 43–75)
NRBC BLD AUTO-RTO: 0 /100 WBCS
PLATELET # BLD AUTO: 69 THOUSANDS/UL (ref 149–390)
PMV BLD AUTO: 13.4 FL (ref 8.9–12.7)
POTASSIUM SERPL-SCNC: 4.4 MMOL/L (ref 3.5–5.3)
PROT SERPL-MCNC: 7.2 G/DL (ref 6.4–8.2)
RBC # BLD AUTO: 4.06 MILLION/UL (ref 3.81–5.12)
SODIUM SERPL-SCNC: 141 MMOL/L (ref 136–145)
T4 FREE SERPL-MCNC: 1.46 NG/DL (ref 0.76–1.46)
TSH SERPL DL<=0.05 MIU/L-ACNC: 1.15 UIU/ML (ref 0.36–3.74)
WBC # BLD AUTO: 3.49 THOUSAND/UL (ref 4.31–10.16)

## 2021-10-13 PROCEDURE — 84439 ASSAY OF FREE THYROXINE: CPT

## 2021-10-13 PROCEDURE — 84443 ASSAY THYROID STIM HORMONE: CPT

## 2021-10-13 PROCEDURE — 80053 COMPREHEN METABOLIC PANEL: CPT

## 2021-10-13 PROCEDURE — 96365 THER/PROPH/DIAG IV INF INIT: CPT

## 2021-10-13 PROCEDURE — 36415 COLL VENOUS BLD VENIPUNCTURE: CPT

## 2021-10-13 PROCEDURE — 83036 HEMOGLOBIN GLYCOSYLATED A1C: CPT

## 2021-10-13 PROCEDURE — 85025 COMPLETE CBC W/AUTO DIFF WBC: CPT

## 2021-10-13 RX ORDER — SODIUM CHLORIDE 9 MG/ML
20 INJECTION, SOLUTION INTRAVENOUS ONCE
Status: COMPLETED | OUTPATIENT
Start: 2021-10-13 | End: 2021-10-13

## 2021-10-13 RX ORDER — SODIUM CHLORIDE 9 MG/ML
20 INJECTION, SOLUTION INTRAVENOUS ONCE
Status: CANCELLED | OUTPATIENT
Start: 2021-10-13

## 2021-10-13 RX ADMIN — SODIUM CHLORIDE 20 ML/HR: 9 INJECTION, SOLUTION INTRAVENOUS at 13:02

## 2021-10-13 RX ADMIN — IRON SUCROSE 200 MG: 20 INJECTION, SOLUTION INTRAVENOUS at 13:00

## 2021-11-04 ENCOUNTER — OFFICE VISIT (OUTPATIENT)
Dept: ENDOCRINOLOGY | Facility: HOSPITAL | Age: 76
End: 2021-11-04
Payer: MEDICARE

## 2021-11-04 VITALS
DIASTOLIC BLOOD PRESSURE: 60 MMHG | SYSTOLIC BLOOD PRESSURE: 100 MMHG | BODY MASS INDEX: 32.44 KG/M2 | WEIGHT: 190 LBS | HEART RATE: 60 BPM | HEIGHT: 64 IN

## 2021-11-04 DIAGNOSIS — E11.65 TYPE 2 DIABETES MELLITUS WITH HYPERGLYCEMIA, WITH LONG-TERM CURRENT USE OF INSULIN (HCC): Primary | ICD-10-CM

## 2021-11-04 DIAGNOSIS — E03.9 ACQUIRED HYPOTHYROIDISM: ICD-10-CM

## 2021-11-04 DIAGNOSIS — I10 ESSENTIAL HYPERTENSION: ICD-10-CM

## 2021-11-04 DIAGNOSIS — Z79.4 TYPE 2 DIABETES MELLITUS WITH HYPERGLYCEMIA, WITH LONG-TERM CURRENT USE OF INSULIN (HCC): Primary | ICD-10-CM

## 2021-11-04 DIAGNOSIS — E78.2 MIXED HYPERLIPIDEMIA: ICD-10-CM

## 2021-11-04 DIAGNOSIS — E11.42 DIABETIC POLYNEUROPATHY ASSOCIATED WITH TYPE 2 DIABETES MELLITUS (HCC): ICD-10-CM

## 2021-11-04 PROCEDURE — 99215 OFFICE O/P EST HI 40 MIN: CPT | Performed by: INTERNAL MEDICINE

## 2021-11-13 DIAGNOSIS — K72.90 HEPATIC FAILURE, UNSPECIFIED WITHOUT COMA (HCC): ICD-10-CM

## 2021-11-14 RX ORDER — RIFAXIMIN 550 MG/1
TABLET ORAL
Qty: 60 TABLET | Refills: 3 | Status: SHIPPED | OUTPATIENT
Start: 2021-11-14 | End: 2022-03-11 | Stop reason: SDUPTHER

## 2021-11-15 ENCOUNTER — TELEPHONE (OUTPATIENT)
Dept: GASTROENTEROLOGY | Facility: CLINIC | Age: 76
End: 2021-11-15

## 2021-11-28 DIAGNOSIS — Z79.4 TYPE 2 DIABETES MELLITUS WITH HYPERGLYCEMIA, WITH LONG-TERM CURRENT USE OF INSULIN (HCC): ICD-10-CM

## 2021-11-28 DIAGNOSIS — E11.65 TYPE 2 DIABETES MELLITUS WITH HYPERGLYCEMIA, WITH LONG-TERM CURRENT USE OF INSULIN (HCC): ICD-10-CM

## 2021-11-30 RX ORDER — PEN NEEDLE, DIABETIC 31 GX5/16"
NEEDLE, DISPOSABLE MISCELLANEOUS
Qty: 100 EACH | Refills: 6 | Status: ON HOLD | OUTPATIENT
Start: 2021-11-30 | End: 2022-07-14

## 2021-12-05 DIAGNOSIS — R60.1 GENERALIZED EDEMA: ICD-10-CM

## 2021-12-06 RX ORDER — FUROSEMIDE 40 MG/1
TABLET ORAL
Qty: 90 TABLET | Refills: 1 | Status: ON HOLD | OUTPATIENT
Start: 2021-12-06

## 2021-12-06 RX ORDER — SPIRONOLACTONE 50 MG/1
TABLET, FILM COATED ORAL
Qty: 90 TABLET | Refills: 1 | Status: SHIPPED | OUTPATIENT
Start: 2021-12-06 | End: 2022-03-11

## 2021-12-22 ENCOUNTER — TELEPHONE (OUTPATIENT)
Dept: HEMATOLOGY ONCOLOGY | Facility: CLINIC | Age: 76
End: 2021-12-22

## 2021-12-22 ENCOUNTER — TELEPHONE (OUTPATIENT)
Dept: GASTROENTEROLOGY | Facility: CLINIC | Age: 76
End: 2021-12-22

## 2021-12-22 DIAGNOSIS — K74.60 CIRRHOSIS OF LIVER WITHOUT ASCITES, UNSPECIFIED HEPATIC CIRRHOSIS TYPE (HCC): ICD-10-CM

## 2021-12-22 DIAGNOSIS — I85.00 ESOPHAGEAL VARICES WITHOUT BLEEDING, UNSPECIFIED ESOPHAGEAL VARICES TYPE (HCC): ICD-10-CM

## 2021-12-22 DIAGNOSIS — D50.0 IRON DEFICIENCY ANEMIA DUE TO CHRONIC BLOOD LOSS: ICD-10-CM

## 2021-12-22 DIAGNOSIS — K72.90 LIVER FAILURE WITHOUT HEPATIC COMA, UNSPECIFIED CHRONICITY (HCC): Primary | ICD-10-CM

## 2021-12-23 ENCOUNTER — LAB (OUTPATIENT)
Dept: LAB | Facility: HOSPITAL | Age: 76
End: 2021-12-23
Attending: INTERNAL MEDICINE
Payer: MEDICARE

## 2021-12-23 DIAGNOSIS — K74.60 CIRRHOSIS OF LIVER WITHOUT ASCITES, UNSPECIFIED HEPATIC CIRRHOSIS TYPE (HCC): ICD-10-CM

## 2021-12-23 DIAGNOSIS — D50.0 IRON DEFICIENCY ANEMIA DUE TO CHRONIC BLOOD LOSS: ICD-10-CM

## 2021-12-23 DIAGNOSIS — K72.90 LIVER FAILURE WITHOUT HEPATIC COMA, UNSPECIFIED CHRONICITY (HCC): ICD-10-CM

## 2021-12-23 DIAGNOSIS — I85.00 ESOPHAGEAL VARICES WITHOUT BLEEDING, UNSPECIFIED ESOPHAGEAL VARICES TYPE (HCC): ICD-10-CM

## 2021-12-23 LAB
AFP-TM SERPL-MCNC: 1.7 NG/ML (ref 0.5–8)
BASOPHILS # BLD AUTO: 0.05 THOUSANDS/ΜL (ref 0–0.1)
BASOPHILS NFR BLD AUTO: 1 % (ref 0–1)
EOSINOPHIL # BLD AUTO: 0.08 THOUSAND/ΜL (ref 0–0.61)
EOSINOPHIL NFR BLD AUTO: 2 % (ref 0–6)
ERYTHROCYTE [DISTWIDTH] IN BLOOD BY AUTOMATED COUNT: 14.2 % (ref 11.6–15.1)
HCT VFR BLD AUTO: 39.2 % (ref 34.8–46.1)
HGB BLD-MCNC: 12.5 G/DL (ref 11.5–15.4)
IMM GRANULOCYTES # BLD AUTO: 0.02 THOUSAND/UL (ref 0–0.2)
IMM GRANULOCYTES NFR BLD AUTO: 1 % (ref 0–2)
LYMPHOCYTES # BLD AUTO: 1.14 THOUSANDS/ΜL (ref 0.6–4.47)
LYMPHOCYTES NFR BLD AUTO: 28 % (ref 14–44)
MCH RBC QN AUTO: 32 PG (ref 26.8–34.3)
MCHC RBC AUTO-ENTMCNC: 31.9 G/DL (ref 31.4–37.4)
MCV RBC AUTO: 100 FL (ref 82–98)
MONOCYTES # BLD AUTO: 0.35 THOUSAND/ΜL (ref 0.17–1.22)
MONOCYTES NFR BLD AUTO: 8 % (ref 4–12)
NEUTROPHILS # BLD AUTO: 2.51 THOUSANDS/ΜL (ref 1.85–7.62)
NEUTS SEG NFR BLD AUTO: 60 % (ref 43–75)
NRBC BLD AUTO-RTO: 0 /100 WBCS
PLATELET # BLD AUTO: 69 THOUSANDS/UL (ref 149–390)
PMV BLD AUTO: 12.2 FL (ref 8.9–12.7)
RBC # BLD AUTO: 3.91 MILLION/UL (ref 3.81–5.12)
WBC # BLD AUTO: 4.15 THOUSAND/UL (ref 4.31–10.16)

## 2021-12-23 PROCEDURE — 36415 COLL VENOUS BLD VENIPUNCTURE: CPT

## 2021-12-23 PROCEDURE — 82105 ALPHA-FETOPROTEIN SERUM: CPT

## 2021-12-23 PROCEDURE — 85025 COMPLETE CBC W/AUTO DIFF WBC: CPT

## 2021-12-28 ENCOUNTER — OFFICE VISIT (OUTPATIENT)
Dept: GASTROENTEROLOGY | Facility: CLINIC | Age: 76
End: 2021-12-28
Payer: MEDICARE

## 2021-12-28 ENCOUNTER — TELEPHONE (OUTPATIENT)
Dept: GASTROENTEROLOGY | Facility: CLINIC | Age: 76
End: 2021-12-28

## 2021-12-28 VITALS
HEIGHT: 64 IN | WEIGHT: 190.4 LBS | DIASTOLIC BLOOD PRESSURE: 58 MMHG | SYSTOLIC BLOOD PRESSURE: 110 MMHG | BODY MASS INDEX: 32.5 KG/M2

## 2021-12-28 DIAGNOSIS — K72.90 HEPATIC ENCEPHALOPATHY (HCC): ICD-10-CM

## 2021-12-28 DIAGNOSIS — G89.29 CHRONIC LEFT SHOULDER PAIN: ICD-10-CM

## 2021-12-28 DIAGNOSIS — K74.60 LIVER CIRRHOSIS SECONDARY TO NASH (NONALCOHOLIC STEATOHEPATITIS) (HCC): Primary | ICD-10-CM

## 2021-12-28 DIAGNOSIS — D50.0 IRON DEFICIENCY ANEMIA DUE TO CHRONIC BLOOD LOSS: ICD-10-CM

## 2021-12-28 DIAGNOSIS — M25.512 CHRONIC LEFT SHOULDER PAIN: ICD-10-CM

## 2021-12-28 DIAGNOSIS — K75.81 LIVER CIRRHOSIS SECONDARY TO NASH (NONALCOHOLIC STEATOHEPATITIS) (HCC): Primary | ICD-10-CM

## 2021-12-28 DIAGNOSIS — I85.10 SECONDARY ESOPHAGEAL VARICES WITHOUT BLEEDING (HCC): ICD-10-CM

## 2021-12-28 PROBLEM — K76.82 HEPATIC ENCEPHALOPATHY: Status: ACTIVE | Noted: 2020-09-13

## 2021-12-28 PROCEDURE — 99214 OFFICE O/P EST MOD 30 MIN: CPT | Performed by: INTERNAL MEDICINE

## 2021-12-28 PROCEDURE — 1124F ACP DISCUSS-NO DSCNMKR DOCD: CPT | Performed by: INTERNAL MEDICINE

## 2021-12-28 RX ORDER — ESCITALOPRAM OXALATE 5 MG/1
5 TABLET ORAL DAILY
Status: ON HOLD | COMMUNITY

## 2022-01-11 ENCOUNTER — TELEPHONE (OUTPATIENT)
Dept: GASTROENTEROLOGY | Facility: CLINIC | Age: 77
End: 2022-01-11

## 2022-01-11 NOTE — TELEPHONE ENCOUNTER
Yaneth Evans called from [employer]  Dr Efraín Recinos would like you to call her cell  regarding patient  She is aware you will be in office 1/12/22

## 2022-01-12 NOTE — TELEPHONE ENCOUNTER
Dr Sandra barrera texted me --wants to put patient on asa for abnormal stress test - cad - did advise she had bleeding in past - can do asa 81 mg every other day and observe   Julee Spann

## 2022-01-18 ENCOUNTER — LAB (OUTPATIENT)
Dept: LAB | Facility: HOSPITAL | Age: 77
End: 2022-01-18
Payer: MEDICARE

## 2022-01-18 DIAGNOSIS — D61.818 PANCYTOPENIA (HCC): ICD-10-CM

## 2022-01-18 DIAGNOSIS — K75.81 LIVER CIRRHOSIS SECONDARY TO NASH (NONALCOHOLIC STEATOHEPATITIS) (HCC): ICD-10-CM

## 2022-01-18 DIAGNOSIS — K74.60 LIVER CIRRHOSIS SECONDARY TO NASH (NONALCOHOLIC STEATOHEPATITIS) (HCC): ICD-10-CM

## 2022-01-18 DIAGNOSIS — I10 ESSENTIAL HYPERTENSION: ICD-10-CM

## 2022-01-18 DIAGNOSIS — E11.65 TYPE 2 DIABETES MELLITUS WITH HYPERGLYCEMIA, WITH LONG-TERM CURRENT USE OF INSULIN (HCC): ICD-10-CM

## 2022-01-18 DIAGNOSIS — D50.0 IRON DEFICIENCY ANEMIA DUE TO CHRONIC BLOOD LOSS: ICD-10-CM

## 2022-01-18 DIAGNOSIS — E78.2 MIXED HYPERLIPIDEMIA: ICD-10-CM

## 2022-01-18 DIAGNOSIS — E03.9 ACQUIRED HYPOTHYROIDISM: ICD-10-CM

## 2022-01-18 DIAGNOSIS — Z79.4 TYPE 2 DIABETES MELLITUS WITH HYPERGLYCEMIA, WITH LONG-TERM CURRENT USE OF INSULIN (HCC): ICD-10-CM

## 2022-01-18 DIAGNOSIS — E11.42 DIABETIC POLYNEUROPATHY ASSOCIATED WITH TYPE 2 DIABETES MELLITUS (HCC): ICD-10-CM

## 2022-01-18 LAB
AFP-TM SERPL-MCNC: 1.6 NG/ML (ref 0.5–8)
ALBUMIN SERPL BCP-MCNC: 3.3 G/DL (ref 3.5–5)
ALP SERPL-CCNC: 70 U/L (ref 46–116)
ALT SERPL W P-5'-P-CCNC: 45 U/L (ref 12–78)
ANION GAP SERPL CALCULATED.3IONS-SCNC: 5 MMOL/L (ref 4–13)
AST SERPL W P-5'-P-CCNC: 53 U/L (ref 5–45)
BASOPHILS # BLD AUTO: 0.04 THOUSANDS/ΜL (ref 0–0.1)
BASOPHILS NFR BLD AUTO: 1 % (ref 0–1)
BILIRUB SERPL-MCNC: 1.18 MG/DL (ref 0.2–1)
BUN SERPL-MCNC: 28 MG/DL (ref 5–25)
CALCIUM ALBUM COR SERPL-MCNC: 10 MG/DL (ref 8.3–10.1)
CALCIUM SERPL-MCNC: 9.4 MG/DL (ref 8.3–10.1)
CHLORIDE SERPL-SCNC: 106 MMOL/L (ref 100–108)
CO2 SERPL-SCNC: 30 MMOL/L (ref 21–32)
CREAT SERPL-MCNC: 1.07 MG/DL (ref 0.6–1.3)
CREAT UR-MCNC: 25.2 MG/DL
EOSINOPHIL # BLD AUTO: 0.13 THOUSAND/ΜL (ref 0–0.61)
EOSINOPHIL NFR BLD AUTO: 4 % (ref 0–6)
ERYTHROCYTE [DISTWIDTH] IN BLOOD BY AUTOMATED COUNT: 14.2 % (ref 11.6–15.1)
EST. AVERAGE GLUCOSE BLD GHB EST-MCNC: 177 MG/DL
FERRITIN SERPL-MCNC: 163 NG/ML (ref 8–388)
GFR SERPL CREATININE-BSD FRML MDRD: 50 ML/MIN/1.73SQ M
GLUCOSE SERPL-MCNC: 64 MG/DL (ref 65–140)
HBA1C MFR BLD: 7.8 %
HCT VFR BLD AUTO: 39.5 % (ref 34.8–46.1)
HGB BLD-MCNC: 12.8 G/DL (ref 11.5–15.4)
IMM GRANULOCYTES # BLD AUTO: 0.01 THOUSAND/UL (ref 0–0.2)
IMM GRANULOCYTES NFR BLD AUTO: 0 % (ref 0–2)
INR PPP: 1.2 (ref 0.84–1.19)
IRON SATN MFR SERPL: 28 % (ref 15–50)
IRON SERPL-MCNC: 90 UG/DL (ref 50–170)
LYMPHOCYTES # BLD AUTO: 0.75 THOUSANDS/ΜL (ref 0.6–4.47)
LYMPHOCYTES NFR BLD AUTO: 21 % (ref 14–44)
MCH RBC QN AUTO: 32.9 PG (ref 26.8–34.3)
MCHC RBC AUTO-ENTMCNC: 32.4 G/DL (ref 31.4–37.4)
MCV RBC AUTO: 102 FL (ref 82–98)
MICROALBUMIN UR-MCNC: 18.5 MG/L (ref 0–20)
MICROALBUMIN/CREAT 24H UR: 73 MG/G CREATININE (ref 0–30)
MONOCYTES # BLD AUTO: 0.33 THOUSAND/ΜL (ref 0.17–1.22)
MONOCYTES NFR BLD AUTO: 9 % (ref 4–12)
NEUTROPHILS # BLD AUTO: 2.31 THOUSANDS/ΜL (ref 1.85–7.62)
NEUTS SEG NFR BLD AUTO: 65 % (ref 43–75)
NRBC BLD AUTO-RTO: 0 /100 WBCS
PLATELET # BLD AUTO: 60 THOUSANDS/UL (ref 149–390)
PMV BLD AUTO: 12.6 FL (ref 8.9–12.7)
POTASSIUM SERPL-SCNC: 4 MMOL/L (ref 3.5–5.3)
PROT SERPL-MCNC: 7.1 G/DL (ref 6.4–8.2)
PROTHROMBIN TIME: 14.7 SECONDS (ref 11.6–14.5)
RBC # BLD AUTO: 3.89 MILLION/UL (ref 3.81–5.12)
SODIUM SERPL-SCNC: 141 MMOL/L (ref 136–145)
T4 FREE SERPL-MCNC: 1.74 NG/DL (ref 0.76–1.46)
TIBC SERPL-MCNC: 317 UG/DL (ref 250–450)
TSH SERPL DL<=0.05 MIU/L-ACNC: 0.42 UIU/ML (ref 0.36–3.74)
WBC # BLD AUTO: 3.57 THOUSAND/UL (ref 4.31–10.16)

## 2022-01-18 PROCEDURE — 84443 ASSAY THYROID STIM HORMONE: CPT

## 2022-01-18 PROCEDURE — 80053 COMPREHEN METABOLIC PANEL: CPT

## 2022-01-18 PROCEDURE — 83540 ASSAY OF IRON: CPT

## 2022-01-18 PROCEDURE — 82105 ALPHA-FETOPROTEIN SERUM: CPT

## 2022-01-18 PROCEDURE — 83036 HEMOGLOBIN GLYCOSYLATED A1C: CPT

## 2022-01-18 PROCEDURE — 85025 COMPLETE CBC W/AUTO DIFF WBC: CPT

## 2022-01-18 PROCEDURE — 88184 FLOWCYTOMETRY/ TC 1 MARKER: CPT

## 2022-01-18 PROCEDURE — 82043 UR ALBUMIN QUANTITATIVE: CPT

## 2022-01-18 PROCEDURE — 36415 COLL VENOUS BLD VENIPUNCTURE: CPT

## 2022-01-18 PROCEDURE — 82728 ASSAY OF FERRITIN: CPT

## 2022-01-18 PROCEDURE — 88185 FLOWCYTOMETRY/TC ADD-ON: CPT

## 2022-01-18 PROCEDURE — 83918 ORGANIC ACIDS TOTAL QUANT: CPT

## 2022-01-18 PROCEDURE — 83550 IRON BINDING TEST: CPT

## 2022-01-18 PROCEDURE — 82570 ASSAY OF URINE CREATININE: CPT

## 2022-01-18 PROCEDURE — 84439 ASSAY OF FREE THYROXINE: CPT

## 2022-01-18 PROCEDURE — 85610 PROTHROMBIN TIME: CPT

## 2022-01-19 LAB — SCAN RESULT: NORMAL

## 2022-01-21 DIAGNOSIS — Z79.4 TYPE 2 DIABETES MELLITUS WITH HYPERGLYCEMIA, WITH LONG-TERM CURRENT USE OF INSULIN (HCC): ICD-10-CM

## 2022-01-21 DIAGNOSIS — E11.65 TYPE 2 DIABETES MELLITUS WITH HYPERGLYCEMIA, WITH LONG-TERM CURRENT USE OF INSULIN (HCC): ICD-10-CM

## 2022-01-21 LAB — METHYLMALONATE SERPL-SCNC: 257 NMOL/L (ref 0–378)

## 2022-01-21 RX ORDER — BLOOD SUGAR DIAGNOSTIC
STRIP MISCELLANEOUS
Qty: 300 STRIP | Refills: 3 | Status: ON HOLD | OUTPATIENT
Start: 2022-01-21

## 2022-02-14 DIAGNOSIS — K59.00 CONSTIPATION, UNSPECIFIED CONSTIPATION TYPE: ICD-10-CM

## 2022-02-14 DIAGNOSIS — E11.8 TYPE 2 DIABETES MELLITUS WITH COMPLICATION (HCC): ICD-10-CM

## 2022-02-14 RX ORDER — SITAGLIPTIN AND METFORMIN HYDROCHLORIDE 1000; 50 MG/1; MG/1
TABLET, FILM COATED, EXTENDED RELEASE ORAL
Qty: 180 TABLET | Refills: 3 | Status: SHIPPED | OUTPATIENT
Start: 2022-02-14 | End: 2022-07-21

## 2022-02-15 RX ORDER — LACTULOSE 10 G/15ML
SOLUTION ORAL
Qty: 2700 ML | Refills: 2 | Status: ON HOLD | OUTPATIENT
Start: 2022-02-15

## 2022-02-16 ENCOUNTER — TELEPHONE (OUTPATIENT)
Dept: GASTROENTEROLOGY | Facility: CLINIC | Age: 77
End: 2022-02-16

## 2022-02-16 NOTE — TELEPHONE ENCOUNTER
Scheduled date of EGD(as of today): 2/23/22  Physician performing EGD: Dr Christa Duval  Location of EGD: [facility]  Instructions reviewed with patient by: Liz Jones  Clearances: none

## 2022-02-18 ENCOUNTER — TELEPHONE (OUTPATIENT)
Dept: GASTROENTEROLOGY | Facility: CLINIC | Age: 77
End: 2022-02-18

## 2022-02-18 DIAGNOSIS — K74.60 LIVER CIRRHOSIS SECONDARY TO NASH (NONALCOHOLIC STEATOHEPATITIS) (HCC): Primary | ICD-10-CM

## 2022-02-18 DIAGNOSIS — K75.81 LIVER CIRRHOSIS SECONDARY TO NASH (NONALCOHOLIC STEATOHEPATITIS) (HCC): Primary | ICD-10-CM

## 2022-02-18 NOTE — TELEPHONE ENCOUNTER
Pt states she is confused by all of her papers  She understands EGD is next Wed, 2/23; is OK w/ f/u appt 3/29  She is confused about orders for blood work & US, test timing, etc; states she "has too many papers"  # [phone]

## 2022-02-21 NOTE — TELEPHONE ENCOUNTER
According to Dr Haim Britt office visit notes from 12/28/21  -  Recheck labs in 3-4 months  -- CBC, INR, CMP  -  Repeat abdominal ultrasound in April can do at the same time as lab

## 2022-02-21 NOTE — TELEPHONE ENCOUNTER
I spoke with patient and caregiver  Rescheduled her 3/29/22 follow up to 22, so she can have US and lab work completed prior to follow up appt  She will have US & blood work performed through [facility]  I provided the number to Central Scheduling for her to schedule US  New order entered as previous one was

## 2022-02-22 ENCOUNTER — TELEPHONE (OUTPATIENT)
Dept: SURGERY | Facility: HOSPITAL | Age: 77
End: 2022-02-22

## 2022-02-23 ENCOUNTER — APPOINTMENT (OUTPATIENT)
Dept: LAB | Facility: HOSPITAL | Age: 77
End: 2022-02-23
Attending: INTERNAL MEDICINE
Payer: MEDICARE

## 2022-02-23 ENCOUNTER — ANESTHESIA (OUTPATIENT)
Dept: GASTROENTEROLOGY | Facility: HOSPITAL | Age: 77
End: 2022-02-23

## 2022-02-23 ENCOUNTER — HOSPITAL ENCOUNTER (OUTPATIENT)
Dept: GASTROENTEROLOGY | Facility: HOSPITAL | Age: 77
Setting detail: OUTPATIENT SURGERY
Discharge: HOME | End: 2022-02-23
Attending: INTERNAL MEDICINE
Payer: MEDICARE

## 2022-02-23 ENCOUNTER — ANESTHESIA EVENT (OUTPATIENT)
Dept: GASTROENTEROLOGY | Facility: HOSPITAL | Age: 77
End: 2022-02-23

## 2022-02-23 VITALS
OXYGEN SATURATION: 96 % | DIASTOLIC BLOOD PRESSURE: 63 MMHG | SYSTOLIC BLOOD PRESSURE: 137 MMHG | HEART RATE: 71 BPM | TEMPERATURE: 96.2 F | RESPIRATION RATE: 16 BRPM

## 2022-02-23 DIAGNOSIS — D50.0 IRON DEFICIENCY ANEMIA DUE TO CHRONIC BLOOD LOSS: ICD-10-CM

## 2022-02-23 DIAGNOSIS — D61.818 PANCYTOPENIA (HCC): ICD-10-CM

## 2022-02-23 DIAGNOSIS — D50.0 IRON DEFICIENCY ANEMIA DUE TO CHRONIC BLOOD LOSS: Primary | ICD-10-CM

## 2022-02-23 DIAGNOSIS — I85.10 SECONDARY ESOPHAGEAL VARICES WITHOUT BLEEDING (HCC): ICD-10-CM

## 2022-02-23 LAB
GLUCOSE SERPL-MCNC: 131 MG/DL (ref 65–140)
PLATELET # BLD AUTO: 47 THOUSANDS/UL (ref 149–390)

## 2022-02-23 PROCEDURE — 43244 EGD VARICES LIGATION: CPT | Performed by: INTERNAL MEDICINE

## 2022-02-23 PROCEDURE — 82948 REAGENT STRIP/BLOOD GLUCOSE: CPT

## 2022-02-23 RX ORDER — EPHEDRINE SULFATE 50 MG/ML
INJECTION INTRAVENOUS AS NEEDED
Status: DISCONTINUED | OUTPATIENT
Start: 2022-02-23 | End: 2022-02-23

## 2022-02-23 RX ORDER — SODIUM CHLORIDE, SODIUM LACTATE, POTASSIUM CHLORIDE, CALCIUM CHLORIDE 600; 310; 30; 20 MG/100ML; MG/100ML; MG/100ML; MG/100ML
INJECTION, SOLUTION INTRAVENOUS CONTINUOUS PRN
Status: DISCONTINUED | OUTPATIENT
Start: 2022-02-23 | End: 2022-02-23

## 2022-02-23 RX ORDER — PROPOFOL 10 MG/ML
INJECTION, EMULSION INTRAVENOUS AS NEEDED
Status: DISCONTINUED | OUTPATIENT
Start: 2022-02-23 | End: 2022-02-23

## 2022-02-23 RX ADMIN — PROPOFOL 50 MG: 10 INJECTION, EMULSION INTRAVENOUS at 08:19

## 2022-02-23 RX ADMIN — PROPOFOL 50 MG: 10 INJECTION, EMULSION INTRAVENOUS at 08:11

## 2022-02-23 RX ADMIN — PROPOFOL 100 MG: 10 INJECTION, EMULSION INTRAVENOUS at 08:07

## 2022-02-23 RX ADMIN — EPHEDRINE SULFATE 5 MG: 50 INJECTION INTRAVENOUS at 08:20

## 2022-02-23 RX ADMIN — EPHEDRINE SULFATE 5 MG: 50 INJECTION INTRAVENOUS at 08:18

## 2022-02-23 RX ADMIN — EPHEDRINE SULFATE 5 MG: 50 INJECTION INTRAVENOUS at 08:16

## 2022-02-23 RX ADMIN — SODIUM CHLORIDE, SODIUM LACTATE, POTASSIUM CHLORIDE, AND CALCIUM CHLORIDE: .6; .31; .03; .02 INJECTION, SOLUTION INTRAVENOUS at 08:04

## 2022-02-23 RX ADMIN — EPHEDRINE SULFATE 5 MG: 50 INJECTION INTRAVENOUS at 08:17

## 2022-02-23 RX ADMIN — PROPOFOL 50 MG: 10 INJECTION, EMULSION INTRAVENOUS at 08:09

## 2022-02-23 RX ADMIN — PROPOFOL 50 MG: 10 INJECTION, EMULSION INTRAVENOUS at 08:14

## 2022-02-23 NOTE — H&P
History and Physical -  [facility]  Eddie Manuel 77 y o  female MRN: [id number]    HPI: Eddie Manuel is a 77y o  year old female who presents for EGD and possible banding history of PAUL and cirrhosis and varices    REVIEW OF SYSTEMS: Per the HPI, and otherwise unremarkable      Historical Information   Past Medical History:   Diagnosis Date    Anemia     Cirrhosis ([facility] [address] )     Diabetic neuropathy ([facility] )     Esophageal varices (HCC)     Fatty liver     GERD (gastroesophageal reflux disease)     Hepatic encephalopathy (HCC)     Hiatal hernia     Hyperlipidemia     Hypertension     Hypoglycemia     Hypothyroidism     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)     Osteoarthritis     Osteopenia     Pancytopenia (HCC)     Thrombocytopenia (HCC)     Type 2 diabetes mellitus (HCC)      Past Surgical History:   Procedure Laterality Date    ABDOMINAL SURGERY      Abdominal plasty with mesh insertion    CATARACT EXTRACTION, BILATERAL      CATARACT EXTRACTION, BILATERAL      EGD AND COLONOSCOPY  03/18/2015    IR BIOPSY BONE MARROW  8/24/2021    LAPAROSCOPIC CHOLECYSTECTOMY      SHOULDER SURGERY Right     calcium depsoti    TUBAL LIGATION      UMBILICAL HERNIA REPAIR      with mesh placed    UPPER GASTROINTESTINAL ENDOSCOPY       Social History   Social History     Substance and Sexual Activity   Alcohol Use Not Currently     Social History     Substance and Sexual Activity   Drug Use No     Social History     Tobacco Use   Smoking Status Never Smoker   Smokeless Tobacco Never Used     Family History   Problem Relation Age of Onset    Breast cancer Mother     Diabetes type II Father     Thyroid disease unspecified Daughter     Down syndrome Daughter     Diabetes type II Daughter     Diabetes type II Sister     No Known Problems Brother     Prostate cancer Brother     No Known Problems Sister     Stroke Sister     No Known Problems Son     Breast cancer Maternal Aunt     Colon cancer Neg Hx        Meds/Allergies       Current Outpatient Medications:     aspirin 81 mg chewable tablet    Basaglar KwikPen 100 units/mL injection pen    Blood Glucose Monitoring Suppl (ONE TOUCH ULTRA 2) w/Device KIT    Calcium Carb-Cholecalciferol (CALCIUM 1000 + D PO)    Coenzyme Q10 (Co Q10) 100 MG CAPS    escitalopram (LEXAPRO) 5 mg tablet    fluticasone (FLONASE) 50 mcg/act nasal spray    furosemide (LASIX) 40 mg tablet    glimepiride (AMARYL) 4 mg tablet    Janumet XR  MG TB24    KRILL OIL PO    levothyroxine 125 mcg tablet    nadolol (CORGARD) 20 mg tablet    omeprazole (PriLOSEC) 20 mg delayed release capsule    ONETOUCH DELICA LANCETS FINE MISC    OneTouch Ultra test strip    simvastatin (ZOCOR) 40 mg tablet    spironolactone (ALDACTONE) 50 mg tablet    traMADol (ULTRAM) 50 mg tablet    Xifaxan 550 MG tablet    B-D UF III MINI PEN NEEDLES 31G X 5 MM MISC    cholecalciferol (VITAMIN D3) 1,000 units tablet    lactulose (CHRONULAC) 10 g/15 mL solution    Allergies   Allergen Reactions    Bee Venom Swelling    Latex     Sesame Seed (Diagnostic) - Food Allergy      Other reaction(s): swelling inside mouth    Strawberry C [Ascorbate - Food Allergy] Other (See Comments)     Mouth sores    Penicillins Rash       Objective     /52   Pulse 68   Temp (!) 96 2 °F (35 7 °C) (Temporal)   Resp 20   SpO2 100%     PHYSICAL EXAM    Gen: NAD AAOx3  Head: Normocephalic, Atraumatic  CV: S1S2 RRR no m/r/g  CHEST: Clear b/l no c/r/w  ABD: soft, +BS NT/ND  EXT: no edema    ASSESSMENT/PLAN:  This is a 77y o  year old female here for EGD, and she is stable and optimized for her procedure

## 2022-02-23 NOTE — ANESTHESIA PREPROCEDURE EVALUATION
Procedure:  EGD    Relevant Problems   ANESTHESIA (within normal limits)      CARDIO   (+) Essential hypertension   (+) Hyperlipidemia      ENDO   (+) Acquired hypothyroidism   (+) Type 2 diabetes mellitus with hyperglycemia, with long-term current use of insulin (HCC)      GI/HEPATIC   (+) 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) (HCC)      HEMATOLOGY   (+) Iron deficiency anemia due to chronic blood loss   (+) Pancytopenia (HCC)   (+) Thrombocytopenia (HCC)      NEURO/PSYCH   (+) Diabetic polyneuropathy associated with type 2 diabetes mellitus (HCC)      PULMONARY   (-) Sleep apnea   (-) URI (upper respiratory infection)    BMI 38    Physical Exam    Airway    Mallampati score: II  TM Distance: >3 FB  Neck ROM: full     Dental   Comment: edentulous,     Cardiovascular      Pulmonary      Other Findings       Lab Results   Component Value Date    WBC 3 57 (L) 01/18/2022    HGB 12 8 01/18/2022    PLT 60 (L) 01/18/2022     Lab Results   Component Value Date    SODIUM 141 01/18/2022    K 4 0 01/18/2022    BUN 28 (H) 01/18/2022    CREATININE 1 07 01/18/2022    EGFR 50 01/18/2022     Lab Results   Component Value Date    HGBA1C 7 8 (H) 01/18/2022         Anesthesia Plan  ASA Score- 3     Anesthesia Type- IV sedation with anesthesia with ASA Monitors  Additional Monitors:   Airway Plan:     Comment: BG 58 this am and symptomatic - given 1/4 amp D50  Plan Factors-Exercise tolerance (METS): >4 METS  Chart reviewed  Existing labs reviewed  Patient summary reviewed  Patient is not a current smoker  Induction- intravenous  Postoperative Plan-     Informed Consent- Anesthetic plan and risks discussed with patient  I personally reviewed this patient with the CRNA  Discussed and agreed on the Anesthesia Plan with the SHARITA Gómez

## 2022-02-23 NOTE — ANESTHESIA POSTPROCEDURE EVALUATION
Post-Op Assessment Note    CV Status:  Stable    Pain management: adequate     Mental Status:  Sleepy   Hydration Status:  Euvolemic   PONV Controlled:  Controlled   Airway Patency:  Patent      Post Op Vitals Reviewed: Yes      Staff: Anesthesiologist, CRNA         No complications documented      BP   136/62   Temp     Pulse  65   Resp   14   SpO2   97

## 2022-02-28 ENCOUNTER — TELEPHONE (OUTPATIENT)
Dept: GASTROENTEROLOGY | Facility: CLINIC | Age: 77
End: 2022-02-28

## 2022-02-28 NOTE — TELEPHONE ENCOUNTER
Pt left fragmented VM mssg; states she is supposed to have something repeated/does not know what; had a hard time w/ the scope this time-reports pain in her chest, arm and everywhere and hands are black and blue  # [phone]/[facility] scope was on Wed

## 2022-02-28 NOTE — TELEPHONE ENCOUNTER
Called patient  Mild chest pain after banding  Some dysphagia but things are going down  I explained to her that this is normal   Should go away in a few days advised a soft diet and liquids  Her chief complaint is her left hand where her IV was she says it is red and blue  Does not go upper arm no fever  I advised if it gets worse she should have her family doctor or the ER check it out  Suspected to hematoma from the IV as that is where her IV was for the procedure    Patient verbalized understanding will call back if it is worse

## 2022-03-04 ENCOUNTER — OFFICE VISIT (OUTPATIENT)
Dept: ENDOCRINOLOGY | Facility: HOSPITAL | Age: 77
End: 2022-03-04
Payer: MEDICARE

## 2022-03-04 VITALS
DIASTOLIC BLOOD PRESSURE: 54 MMHG | HEART RATE: 76 BPM | WEIGHT: 187.4 LBS | BODY MASS INDEX: 31.99 KG/M2 | HEIGHT: 64 IN | SYSTOLIC BLOOD PRESSURE: 118 MMHG

## 2022-03-04 DIAGNOSIS — E78.2 MIXED HYPERLIPIDEMIA: ICD-10-CM

## 2022-03-04 DIAGNOSIS — I10 ESSENTIAL HYPERTENSION: ICD-10-CM

## 2022-03-04 DIAGNOSIS — E11.8 TYPE 2 DIABETES MELLITUS WITH COMPLICATION (HCC): ICD-10-CM

## 2022-03-04 DIAGNOSIS — E11.65 TYPE 2 DIABETES MELLITUS WITH HYPERGLYCEMIA, WITH LONG-TERM CURRENT USE OF INSULIN (HCC): Primary | ICD-10-CM

## 2022-03-04 DIAGNOSIS — E03.9 ACQUIRED HYPOTHYROIDISM: ICD-10-CM

## 2022-03-04 DIAGNOSIS — Z79.4 TYPE 2 DIABETES MELLITUS WITH HYPERGLYCEMIA, WITH LONG-TERM CURRENT USE OF INSULIN (HCC): Primary | ICD-10-CM

## 2022-03-04 DIAGNOSIS — E11.42 DIABETIC POLYNEUROPATHY ASSOCIATED WITH TYPE 2 DIABETES MELLITUS (HCC): ICD-10-CM

## 2022-03-04 PROCEDURE — 99215 OFFICE O/P EST HI 40 MIN: CPT | Performed by: INTERNAL MEDICINE

## 2022-03-04 RX ORDER — ACETAMINOPHEN 500 MG
1 TABLET ORAL
Status: ON HOLD | COMMUNITY

## 2022-03-04 RX ORDER — INSULIN GLARGINE 100 [IU]/ML
INJECTION, SOLUTION SUBCUTANEOUS
Qty: 30 ML | Refills: 6 | Status: ON HOLD | OUTPATIENT
Start: 2022-03-04 | End: 2022-07-21 | Stop reason: SDUPTHER

## 2022-03-04 NOTE — PROGRESS NOTES
Jessica Rogers 77 y o  female MRN: [id number]    Encounter: [id number]      Assessment/Plan     This is a 77 y o -female with PMH of type 2 DM on insulin use, diabetic neuropathy, hypothyroidism and HTN who presents today for follow up  Overall she has been feeling at baseline  Patient is compliant with her medication regimen at home  She tries to stay up to date on all her appt  Patient will need to complete blood work in 3 months prior to her next visit  1  Type 2 diabetes, on long term use of insulin  Reviewed most recent Hemoglobin A1c is 7 8% (01/18/22) which remained unchanged from previosly  HbA1c goal around 7 5, she is close to her goal given her comorbidities Continue Basaglar 48 u am and 36 u hs , Janumet xr 50/100 bid, and Glimepiride 8 mg daily  Continue to check blood sugars 2 to 3 times a day and bring to next visit  She will continue to work on dietary changes  Up to date on ophthalmology and foot exams check ups  F/u in 3 months      2  Diabetic neuropathy  Stable, not worsening  She has chronic neuropathic symptoms  Diabetic foot exam was performed on 11/04/2021  Continue with podiatry regular check ups      3  Hypothyroidism  Reviewed most recent thyroid function test (01/18/22) with normal TSH 0 42 and T4 slightly up at 1 74  She is biochemically and clinically euthyroid  Will continue Levothyroxine 125 mcg daily      4  Hypertension  Blood pressure at goal <140/90 per JNC-8 criteria  Continue current dose of furosemide, spironolactone, and Nadolol  Adhere to low salt diet and exercise as tolerated     5  Hyperlipidemia  She will continue the same Simvastatin 40 mg daily  Repeat labs prior to her visit in 3 months        CC: Diabetes    History of Present Illness     HPI:    History of Present Illness:   Wade Ring is a 77 y o  F with PMH of type 2 diabetes with long term use of insulin who presents today to the office for follow up   Reports complications of neuropathy, retinopathy  Denies hx of CKD, hx foot ulcer/PVD  Denies recent illness or hospitalizations  Denies recent severe hypoglycemic or severe hyperglycemic episodes  Compliant with current regimen  Home glucose monitoring: are performed regularly 1x per day  Home blood glucose readings:   Before breakfast: typically 106-272  She has a glucose sensor, and report was downloaded today in the office  Current regimen: Janumet XR 50/1000 mg BID, Glimepiride 4 mg (2 at breakfast), Basaglar 48 U Am and 36 U HS  Last Eye Exam: beginning of 2022  Last Foot Exam: 11/04/2021     Has hypertension: Taking furosemide 40 mg daily, spironolactone 50 mg daily, and Nadolol 20 mg daily  Has hyperlipidemia: Taking Zocor 40 mg daily    Hypothyroidism  Takes Levothyroxine 125 mcg daily  Denies thyroid nodules or hx of thyroid cancer  She feels at baseline with her sx    Review of Systems   Constitutional: Positive for chills (in cold weather) and fatigue  Negative for appetite change, fever and unexpected weight change  Brittle nails  Denies hair loss   HENT: Negative for trouble swallowing  Eyes: Positive for visual disturbance (hx of floaters)  Respiratory: Negative for cough and shortness of breath  Cardiovascular: Negative for chest pain and leg swelling  Gastrointestinal: Negative for abdominal pain, blood in stool, constipation, diarrhea, nausea and vomiting  Genitourinary: Negative for hematuria  Musculoskeletal: Positive for arthralgias (left shoulder pain)  Negative for gait problem  Skin: Negative for rash  Neurological: Positive for tremors  Negative for dizziness and headaches  Psychiatric/Behavioral: Positive for sleep disturbance  Negative for agitation  The patient is nervous/anxious          Historical Information   Past Medical History:   Diagnosis Date    Anemia     Cirrhosis ([facility] [address] )     Diabetic neuropathy (HCC)     Esophageal varices (HCC)     Fatty liver     GERD (gastroesophageal reflux disease)     Hepatic encephalopathy (HCC)     Hiatal hernia     Hyperlipidemia     Hypertension     Hypoglycemia     Hypothyroidism     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)     Osteoarthritis     Osteopenia     Pancytopenia (HCC)     Thrombocytopenia (HCC)     Type 2 diabetes mellitus (HCC)      Past Surgical History:   Procedure Laterality Date    ABDOMINAL SURGERY      Abdominal plasty with mesh insertion    CATARACT EXTRACTION, BILATERAL      CATARACT EXTRACTION, BILATERAL      EGD AND COLONOSCOPY  03/18/2015    IR BIOPSY BONE MARROW  8/24/2021    LAPAROSCOPIC CHOLECYSTECTOMY      SHOULDER SURGERY Right     calcium depsoti    TUBAL LIGATION      UMBILICAL HERNIA REPAIR      with mesh placed    UPPER GASTROINTESTINAL ENDOSCOPY       Social History   Social History     Substance and Sexual Activity   Alcohol Use Not Currently     Social History     Substance and Sexual Activity   Drug Use No     Social History     Tobacco Use   Smoking Status Never Smoker   Smokeless Tobacco Never Used     Family History:   Family History   Problem Relation Age of Onset    Breast cancer Mother     Diabetes type II Father     Thyroid disease unspecified Daughter     Down syndrome Daughter     Diabetes type II Daughter     Diabetes type II Sister     No Known Problems Brother     Prostate cancer Brother     No Known Problems Sister     Stroke Sister     No Known Problems Son     Breast cancer Maternal Aunt     Colon cancer Neg Hx        Meds/Allergies   Current Outpatient Medications   Medication Sig Dispense Refill    aspirin 81 mg chewable tablet Chew 81 mg daily        B-D UF III MINI PEN NEEDLES 31G X 5 MM MISC USE UP TO 3 TIMES A  each 6    Basaglar KwikPen 100 units/mL injection pen 48 units in am and 36 units in pm, up to 90 units daily 30 mL 6    Blood Glucose Monitoring Suppl (ONE TOUCH ULTRA 2) w/Device KIT by Does not apply route once for 1 dose Use Glucometer to test up to 3 times daily  1 each 0    Calcium Carb-Cholecalciferol (CALCIUM 1000 + D PO) Take by mouth      Coenzyme Q10 (Co Q10) 100 MG CAPS Take by mouth      escitalopram (LEXAPRO) 5 mg tablet Take 5 mg by mouth daily      fluticasone (FLONASE) 50 mcg/act nasal spray 1 spray into each nostril daily prn      furosemide (LASIX) 40 mg tablet TAKE 1 TABLET BY MOUTH EVERY DAY 90 tablet 1    glimepiride (AMARYL) 4 mg tablet Take 2 tablets (8 mg total) by mouth daily with breakfast 180 tablet 3    Janumet XR  MG TB24 TAKE 1 TABLET BY MOUTH TWICE A  tablet 3    KRILL OIL PO Take 750 mg by mouth      lactulose (CHRONULAC) 10 g/15 mL solution TAKE 15 ML (10 G TOTAL) BY MOUTH 2 (TWO) TIMES A DAY 2700 mL 2    levothyroxine 125 mcg tablet Take 125 mcg by mouth daily  2    nadolol (CORGARD) 20 mg tablet Take 1 tablet (20 mg total) by mouth daily 30 tablet 2    omeprazole (PriLOSEC) 20 mg delayed release capsule TAKE 1 CAPSULE BY MOUTH EVERY DAY 90 capsule 3    ONETOUCH DELICA LANCETS FINE MISC Use 1-3 times a day 100 each 3    OneTouch Ultra test strip TEST UP TO 3 TIMES DAILY  300 strip 3    simvastatin (ZOCOR) 40 mg tablet Take 40 mg by mouth daily  3    spironolactone (ALDACTONE) 50 mg tablet TAKE 1 TABLET BY MOUTH EVERY DAY 90 tablet 1    Xifaxan 550 MG tablet TAKE 1 TABLET BY MOUTH EVERY 12 HOURS 60 tablet 3    cholecalciferol (VITAMIN D3) 1,000 units tablet Take 2,000 Units by mouth daily   (Patient not taking: Reported on 3/4/2022 )      traMADol (ULTRAM) 50 mg tablet TAKE 1 TABLET BY MOUTH THREE TIMES A DAY AS NEEDED FOR PAIN (Patient not taking: Reported on 3/4/2022)       No current facility-administered medications for this visit       Allergies   Allergen Reactions    Bee Venom Swelling    Latex     Sesame Seed (Diagnostic) - Food Allergy      Other reaction(s): swelling inside mouth    Strawberry C [Ascorbate - Food Allergy] Other (See Comments)     Mouth sores    Penicillins Rash       Objective   Vitals: Height 5' 4" (1 626 m), weight 85 kg (187 lb 6 4 oz), not currently breastfeeding  Physical Exam  Vitals and nursing note reviewed  Constitutional:       General: She is not in acute distress  Appearance: Normal appearance  She is well-developed  She is obese  She is not ill-appearing, toxic-appearing or diaphoretic  HENT:      Head: Normocephalic and atraumatic  Nose: Nose normal    Eyes:      General: Scleral icterus present  Extraocular Movements: Extraocular movements intact  Neck:      Comments: No thyromegaly or thyroid nodules appreciated  Cardiovascular:      Rate and Rhythm: Normal rate and regular rhythm  Heart sounds: Normal heart sounds  Pulmonary:      Effort: Pulmonary effort is normal  No respiratory distress  Breath sounds: Normal breath sounds  Abdominal:      General: Bowel sounds are normal  There is distension  Palpations: Abdomen is soft  Tenderness: There is no abdominal tenderness  Musculoskeletal:         General: Normal range of motion  Cervical back: Normal range of motion and neck supple  Right lower leg: No edema  Left lower leg: No edema  Skin:     General: Skin is warm  Findings: No rash  Neurological:      Mental Status: She is alert and oriented to person, place, and time  Mental status is at baseline  Gait: Gait normal       Deep Tendon Reflexes: Reflexes normal       Comments: Tremors noted, at baseline   Psychiatric:         Mood and Affect: Mood normal          Behavior: Behavior normal          Thought Content: Thought content normal          Judgment: Judgment normal          The history was obtained from the review of the chart, patient      Lab Results:   Lab Results   Component Value Date/Time    Hemoglobin A1C 7 8 (H) 01/18/2022 10:57 AM    Hemoglobin A1C 7 8 (H) 10/13/2021 11:59 AM    Hemoglobin A1C 7 6 07/12/2021 12:00 AM    Hemoglobin A1C 8 6 04/06/2021 12:00 AM    WBC 3 57 (L) 01/18/2022 10:57 AM    WBC 4 15 (L) 12/23/2021 10:32 AM    WBC 3 49 (L) 10/13/2021 11:59 AM    Hemoglobin 12 8 01/18/2022 10:57 AM    Hemoglobin 12 5 12/23/2021 10:32 AM    Hemoglobin 12 5 10/13/2021 11:59 AM    Hematocrit 39 5 01/18/2022 10:57 AM    Hematocrit 39 2 12/23/2021 10:32 AM    Hematocrit 39 1 10/13/2021 11:59 AM     (H) 01/18/2022 10:57 AM     (H) 12/23/2021 10:32 AM    MCV 96 10/13/2021 11:59 AM    Platelets 60 (L) 22/87/7589 10:57 AM    Platelets 69 (L) 55/67/0635 10:32 AM    Platelets 69 (L) 71/79/5845 11:59 AM    BUN 28 (H) 01/18/2022 10:57 AM    BUN 28 (H) 10/13/2021 11:59 AM    BUN 37 (H) 07/12/2021 07:50 PM    Potassium 4 0 01/18/2022 10:57 AM    Potassium 4 4 10/13/2021 11:59 AM    Potassium 4 5 07/12/2021 07:50 PM    Chloride 106 01/18/2022 10:57 AM    Chloride 104 10/13/2021 11:59 AM    Chloride 105 07/12/2021 07:50 PM    CO2 30 01/18/2022 10:57 AM    CO2 29 10/13/2021 11:59 AM    CO2 25 07/12/2021 07:50 PM    Creatinine 1 07 01/18/2022 10:57 AM    Creatinine 1 11 10/13/2021 11:59 AM    Creatinine 1 33 (H) 07/12/2021 07:50 PM    AST 53 (H) 01/18/2022 10:57 AM    AST 38 10/13/2021 11:59 AM    AST 31 07/12/2021 07:50 PM    ALT 45 01/18/2022 10:57 AM    ALT 36 10/13/2021 11:59 AM    ALT 27 07/12/2021 07:50 PM    Albumin 3 3 (L) 01/18/2022 10:57 AM    Albumin 3 2 (L) 10/13/2021 11:59 AM    Albumin 2 7 (L) 07/12/2021 07:50 PM       Imaging Studies: I have personally reviewed pertinent reports  Portions of the record may have been created with voice recognition software  Occasional wrong word or "sound a like" substitutions may have occurred due to the inherent limitations of voice recognition software  Read the chart carefully and recognize, using context, where substitutions have occurred

## 2022-03-04 NOTE — PATIENT INSTRUCTIONS
hgba1c is 7 8%  this is stable  Continue the same janumet, glimepiride,and basaglar insulin  Continue to test blood sugars up to 3 times a day  the thyroid blood work is ok  Continue the same levothyroxine 125 mcg daily  Follow up in 3 months with blood work

## 2022-03-09 DIAGNOSIS — K72.90 HEPATIC FAILURE, UNSPECIFIED WITHOUT COMA (HCC): ICD-10-CM

## 2022-03-11 ENCOUNTER — APPOINTMENT (OUTPATIENT)
Dept: LAB | Facility: HOSPITAL | Age: 77
End: 2022-03-11
Attending: INTERNAL MEDICINE
Payer: MEDICARE

## 2022-03-11 ENCOUNTER — HOSPITAL ENCOUNTER (OUTPATIENT)
Dept: ULTRASOUND IMAGING | Facility: HOSPITAL | Age: 77
Discharge: HOME | End: 2022-03-11
Attending: INTERNAL MEDICINE
Payer: MEDICARE

## 2022-03-11 DIAGNOSIS — K72.90 HEPATIC FAILURE, UNSPECIFIED WITHOUT COMA (HCC): ICD-10-CM

## 2022-03-11 DIAGNOSIS — D50.0 IRON DEFICIENCY ANEMIA DUE TO CHRONIC BLOOD LOSS: ICD-10-CM

## 2022-03-11 DIAGNOSIS — K74.60 LIVER CIRRHOSIS SECONDARY TO NASH (NONALCOHOLIC STEATOHEPATITIS) (HCC): ICD-10-CM

## 2022-03-11 DIAGNOSIS — K75.81 LIVER CIRRHOSIS SECONDARY TO NASH (NONALCOHOLIC STEATOHEPATITIS) (HCC): ICD-10-CM

## 2022-03-11 DIAGNOSIS — R60.1 GENERALIZED EDEMA: ICD-10-CM

## 2022-03-11 DIAGNOSIS — D61.818 PANCYTOPENIA (HCC): ICD-10-CM

## 2022-03-11 LAB
BASOPHILS # BLD AUTO: 0.03 THOUSANDS/ΜL (ref 0–0.1)
BASOPHILS NFR BLD AUTO: 1 % (ref 0–1)
EOSINOPHIL # BLD AUTO: 0.05 THOUSAND/ΜL (ref 0–0.61)
EOSINOPHIL NFR BLD AUTO: 2 % (ref 0–6)
ERYTHROCYTE [DISTWIDTH] IN BLOOD BY AUTOMATED COUNT: 13.9 % (ref 11.6–15.1)
HCT VFR BLD AUTO: 35.4 % (ref 34.8–46.1)
HGB BLD-MCNC: 11.3 G/DL (ref 11.5–15.4)
IMM GRANULOCYTES # BLD AUTO: 0.01 THOUSAND/UL (ref 0–0.2)
IMM GRANULOCYTES NFR BLD AUTO: 0 % (ref 0–2)
LYMPHOCYTES # BLD AUTO: 0.58 THOUSANDS/ΜL (ref 0.6–4.47)
LYMPHOCYTES NFR BLD AUTO: 20 % (ref 14–44)
MCH RBC QN AUTO: 31 PG (ref 26.8–34.3)
MCHC RBC AUTO-ENTMCNC: 31.9 G/DL (ref 31.4–37.4)
MCV RBC AUTO: 97 FL (ref 82–98)
MONOCYTES # BLD AUTO: 0.23 THOUSAND/ΜL (ref 0.17–1.22)
MONOCYTES NFR BLD AUTO: 8 % (ref 4–12)
NEUTROPHILS # BLD AUTO: 2.08 THOUSANDS/ΜL (ref 1.85–7.62)
NEUTS SEG NFR BLD AUTO: 69 % (ref 43–75)
NRBC BLD AUTO-RTO: 0 /100 WBCS
PLATELET # BLD AUTO: 62 THOUSANDS/UL (ref 149–390)
PLATELET # BLD AUTO: 68 THOUSANDS/UL (ref 149–390)
PMV BLD AUTO: 12.7 FL (ref 8.9–12.7)
RBC # BLD AUTO: 3.64 MILLION/UL (ref 3.81–5.12)
WBC # BLD AUTO: 2.98 THOUSAND/UL (ref 4.31–10.16)

## 2022-03-11 PROCEDURE — 36415 COLL VENOUS BLD VENIPUNCTURE: CPT

## 2022-03-11 PROCEDURE — 85025 COMPLETE CBC W/AUTO DIFF WBC: CPT

## 2022-03-11 PROCEDURE — 76700 US EXAM ABDOM COMPLETE: CPT

## 2022-03-11 RX ORDER — SPIRONOLACTONE 50 MG/1
TABLET, FILM COATED ORAL
Qty: 90 TABLET | Refills: 1 | Status: SHIPPED | OUTPATIENT
Start: 2022-03-11 | End: 2022-06-27 | Stop reason: SDUPTHER

## 2022-03-11 NOTE — TELEPHONE ENCOUNTER
Pt states she has no refills on Xifaxan; requests refill to [facility]  If ques  # [phone]

## 2022-03-11 NOTE — TELEPHONE ENCOUNTER
Pt states she is out of med after two today  Rx sent to Will  to sign off on-he is not in the office until Tues     Forwarding to NP

## 2022-03-14 RX ORDER — RIFAXIMIN 550 MG/1
TABLET ORAL
Qty: 60 TABLET | Refills: 3 | Status: SHIPPED | OUTPATIENT
Start: 2022-03-14 | End: 2022-03-14 | Stop reason: SDUPTHER

## 2022-03-16 ENCOUNTER — TELEPHONE (OUTPATIENT)
Dept: HEMATOLOGY ONCOLOGY | Facility: CLINIC | Age: 77
End: 2022-03-16

## 2022-03-16 NOTE — TELEPHONE ENCOUNTER
Appointment Confirmation (to confirm pre existing appointments - ONLY)     Appointment with  Leonidas Taylor   Appointment date & time  3/25 @ 10AM   Location [facility]   Patient verbilized Understanding  Yes

## 2022-03-21 NOTE — RESULT ENCOUNTER NOTE
I called the patient  Left voice message  Liver reveals no masses  Study is stable    Need recall to screen for hepatocellular in 6 months

## 2022-03-24 ENCOUNTER — APPOINTMENT (OUTPATIENT)
Dept: LAB | Facility: HOSPITAL | Age: 77
End: 2022-03-24
Payer: MEDICARE

## 2022-03-24 ENCOUNTER — TELEPHONE (OUTPATIENT)
Dept: HEMATOLOGY ONCOLOGY | Facility: HOSPITAL | Age: 77
End: 2022-03-24

## 2022-03-24 DIAGNOSIS — D50.0 IRON DEFICIENCY ANEMIA DUE TO CHRONIC BLOOD LOSS: Primary | ICD-10-CM

## 2022-03-24 DIAGNOSIS — D50.0 IRON DEFICIENCY ANEMIA DUE TO CHRONIC BLOOD LOSS: ICD-10-CM

## 2022-03-24 LAB
FERRITIN SERPL-MCNC: 22 NG/ML (ref 8–388)
IRON SATN MFR SERPL: 20 % (ref 15–50)
IRON SERPL-MCNC: 77 UG/DL (ref 50–170)
TIBC SERPL-MCNC: 389 UG/DL (ref 250–450)

## 2022-03-24 PROCEDURE — 83540 ASSAY OF IRON: CPT

## 2022-03-24 PROCEDURE — 83550 IRON BINDING TEST: CPT

## 2022-03-24 PROCEDURE — 82728 ASSAY OF FERRITIN: CPT

## 2022-03-24 PROCEDURE — 36415 COLL VENOUS BLD VENIPUNCTURE: CPT

## 2022-03-25 ENCOUNTER — OFFICE VISIT (OUTPATIENT)
Dept: HEMATOLOGY ONCOLOGY | Facility: HOSPITAL | Age: 77
End: 2022-03-25
Payer: MEDICARE

## 2022-03-25 VITALS
RESPIRATION RATE: 14 BRPM | OXYGEN SATURATION: 99 % | HEIGHT: 64 IN | BODY MASS INDEX: 33.12 KG/M2 | SYSTOLIC BLOOD PRESSURE: 108 MMHG | DIASTOLIC BLOOD PRESSURE: 58 MMHG | HEART RATE: 66 BPM | TEMPERATURE: 96.5 F | WEIGHT: 194 LBS

## 2022-03-25 DIAGNOSIS — D50.0 IRON DEFICIENCY ANEMIA DUE TO CHRONIC BLOOD LOSS: Primary | ICD-10-CM

## 2022-03-25 DIAGNOSIS — D61.818 PANCYTOPENIA (HCC): ICD-10-CM

## 2022-03-25 PROCEDURE — 99214 OFFICE O/P EST MOD 30 MIN: CPT | Performed by: NURSE PRACTITIONER

## 2022-03-25 NOTE — PROGRESS NOTES
Hematology/Oncology Outpatient Follow- up Note  Ravinder Horvath [id number], [id number]  3/25/2022        Chief Complaint   Patient presents with    Follow-up       HPI:  Ravinder Horvath is a 77year old female with a history of type 2 diabetes, hypothyroidism, hypertension, Colon Slot cirrhosis and portal hypertension, splenomegaly, esophageal varices and iron deficiency anemia who was referred to hematology in 8/2021 for  pancytopenia  She follows closely with GI  On presentation, her labs demonstrated iron deficiency and pancytopenia with decreased white count, decreased hemoglobin, decreased platelet count  Out of concern for underlying MDS, she underwent a bone marrow biopsy on 8/24/2021 which resulted with no major abnormalities or MDS  It did reveal low iron stores    Her history of cirrhotic liver and portal hypertension likely contributes to her thrombocytopenia  In addition, she has an enlarged spleen and may have some element of sequestration  She was treated with a course of IV Venofer with improvement in her Hgb  Previous Hematologic/ Oncologic History:    Work up  IV Venofer x 10 doses (8/11-10/13/2021)    Current Hematologic/ Oncologic Treatment:    Observation      Interval History:   The patient presents for routine follow up  She was last seen by Dr Bernabe Willis on 9/17/21  Since last visit, she had EGD on 2/23/22, she had 2 esophageal varices banded  Abdominal ultrasound on 3/11/22 showed Hepatic cirrhosis and mild steatosis  No masses  Splenomegaly    Most recent blood work reviewed  She remains pancytopenic  Her counts are stable  White count 2 98, Hgb 11 3, MCV 97, platelets 68  ANC 6 61  Iron panel is normal  Serum iron 77, iron saturation 20%, ferritin 22    Overall, patient states she is feeling well  At her baseline  She does have some occasional mild nose bleeds  Denies any other abnormal bleeding  She has some minor bruising on forearms    Denies any excessive bruisability  Denies any abdominal pain  No change in bowel habits  She does get fatigued, experiences some mild exertional dyspnea  Test Results:    Imaging: EGD    Result Date: 2/23/2022  Narrative: [id number] [facility] [address] [facility] [id number] [phone] DATE OF SERVICE: 2/23/22 PHYSICIAN(S): Hortensia Angel MD Proceduralist INDICATION: Secondary esophageal varices without bleeding ([address] ) POST-OP DIAGNOSIS: See the impression below  PREPROCEDURE: Informed consent was obtained for the procedure, including sedation  Risks of perforation, hemorrhage, adverse drug reaction and aspiration were discussed  The patient was placed in the left lateral decubitus position  Patient was explained about the risks and benefits of the procedure  Risks including but not limited to bleeding, infection, and perforation were explained in detail  Also explained about less than 100% sensitivity with the exam and other alternatives  DETAILS OF PROCEDURE: Patient was taken to the procedure room where a time out was performed to confirm correct patient and correct procedure  The patient underwent monitored anesthesia care, which was administered by an anesthesia professional  The patient's blood pressure, heart rate, level of consciousness, respirations and oxygen were monitored throughout the procedure  The scope was advanced to the second part of the duodenum  Retroflexion was performed in the fundus  The patient experienced no blood loss  The procedure was not difficult  The patient tolerated the procedure well  There were no apparent complications  ANESTHESIA INFORMATION: ASA: III Anesthesia Type: IV Sedation with Anesthesia MEDICATIONS: No administrations occurring from 0758 to 0828 on 02/23/22 FINDINGS: All observed locations appeared normal, including the duodenum  The duodenum appeared normal  Moderate edematous, erythematous and granular mucosa in the antrum   Consistent with known portal gastropathy Multiple medium grade II and grade III varices with no bleeding (no red kristy sign) in the lower third of the esophagus; placed 2 bands successfully, resulting in complete eradication  Two varices grade 2-3 not actively bleeding in the distal esophagus banded x2 SPECIMENS: * No specimens in log *     Impression: We banded 2 esophageal varices  RECOMMENDATION: Repeat EGD in approximately 2 months in the hospital   Vani Tolliver MD     US abdomen complete    Result Date: 3/14/2022  Narrative: ABDOMEN ULTRASOUND, COMPLETE INDICATION:   B47 30: Nonalcoholic steatohepatitis (PAUL) K74 60: Unspecified cirrhosis of liver  COMPARISON:  10/1/2021 TECHNIQUE:   Real-time ultrasound of the abdomen was performed with a curvilinear transducer with both volumetric sweeps and still imaging techniques  FINDINGS: PANCREAS:  Visualized portions of the pancreas are within normal limits  AORTA AND IVC:  Visualized portions are normal for patient age  LIVER: Size:  Within normal range  The liver measures 14 4 cm in the midclavicular line  Contour:  Surface contour is nodular  Parenchyma:  Mild increased echogenicity  Coarse echotexture  No liver mass identified  Left lobe 0 6 cm cyst Limited imaging of the main portal vein shows it to be patent and hepatopetal  BILIARY: Gallbladder surgically removed No intrahepatic biliary dilatation  CBD measures 5 mm  No choledocholithiasis  KIDNEY: Right kidney measures 10 1 x 4 8 x 6 0  cm  Volume 152 2 mL Kidney within normal limits  Left kidney measures 11 0 x 5 5 x 4 6 cm  Volume 144 4 mL Lower oval 2 7 cm parapelvic cyst again noted SPLEEN: Measures 16 5 x 16 3 x 7 4 cm  Volume 1037 9 mL Enlarged  ASCITES:  None  Impression: Hepatic cirrhosis and mild steatosis   Splenomegaly Cholecystectomy Workstation performed: [id number]       Labs:   Lab Results   Component Value Date    WBC 2 98 (L) 03/11/2022    HGB 11 3 (L) 03/11/2022    HCT 35 4 03/11/2022    MCV 97 03/11/2022    PLT 68 (L) 03/11/2022     Lab Results   Component Value Date    K 4 0 01/18/2022     01/18/2022    CO2 30 01/18/2022    BUN 28 (H) 01/18/2022    CREATININE 1 07 01/18/2022    CALCIUM 9 4 01/18/2022    CORRECTEDCA 10 0 01/18/2022    AST 53 (H) 01/18/2022    ALT 45 01/18/2022    ALKPHOS 70 01/18/2022    EGFR 50 01/18/2022           Review of Systems   Constitutional: Positive for fatigue  HENT: Positive for nosebleeds  Respiratory: Positive for shortness of breath (exertional )  Hematological: Bruises/bleeds easily  All other systems reviewed and are negative          Active Problems:   Patient Active Problem List   Diagnosis    Hyperlipidemia    Essential hypertension    Acquired hypothyroidism    Type 2 diabetes mellitus with hyperglycemia, with long-term current use of insulin ([address] )    Diabetic polyneuropathy associated with type 2 diabetes mellitus (HCC)    Hepatic encephalopathy (HCC)    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) (HCC)    Thrombocytopenia (HCC)    Urinary tract infection    Mild nonproliferative diabetic retinopathy of both eyes without macular edema associated with type 2 diabetes mellitus (HCC)    Pancytopenia (HCC)    Iron deficiency anemia due to chronic blood loss    Esophageal varices without bleeding ([address] )       Past Medical History:   Past Medical History:   Diagnosis Date    Anemia     Cirrhosis (HCC)     Diabetic neuropathy ([address] )     Esophageal varices (HCC)     Fatty liver     GERD (gastroesophageal reflux disease)     Hepatic encephalopathy (HCC)     Hiatal hernia     Hyperlipidemia     Hypertension     Hypoglycemia     Hypothyroidism     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)     Osteoarthritis     Osteopenia     Pancytopenia (HCC)     Thrombocytopenia (HCC)     Type 2 diabetes mellitus ([address] )        Surgical History:   Past Surgical History:   Procedure Laterality Date    ABDOMINAL SURGERY Abdominal plasty with mesh insertion    CATARACT EXTRACTION, BILATERAL      CATARACT EXTRACTION, BILATERAL      EGD AND COLONOSCOPY  03/18/2015    IR BIOPSY BONE MARROW  8/24/2021    LAPAROSCOPIC CHOLECYSTECTOMY      SHOULDER SURGERY Right     calcium depsoti    TUBAL LIGATION      UMBILICAL HERNIA REPAIR      with mesh placed    UPPER GASTROINTESTINAL ENDOSCOPY         Family History:    Family History   Problem Relation Age of Onset    Breast cancer Mother     Diabetes type II Father     Thyroid disease unspecified Daughter     Down syndrome Daughter     Diabetes type II Daughter     Diabetes type II Sister     No Known Problems Brother     Prostate cancer Brother     No Known Problems Sister     Stroke Sister     No Known Problems Son     Breast cancer Maternal Aunt     Colon cancer Neg Hx        Cancer-related family history includes Breast cancer in her maternal aunt and mother; Prostate cancer in her brother  There is no history of Colon cancer  Social History:   Social History     Socioeconomic History    Marital status:       Spouse name: Not on file    Number of children: Not on file    Years of education: Not on file    Highest education level: Not on file   Occupational History    Not on file   Tobacco Use    Smoking status: Never Smoker    Smokeless tobacco: Never Used   Vaping Use    Vaping Use: Never used   Substance and Sexual Activity    Alcohol use: Not Currently    Drug use: No    Sexual activity: Not Currently   Other Topics Concern    Not on file   Social History Narrative    Not on file     Social Determinants of Health     Financial Resource Strain: Not on file   Food Insecurity: Not on file   Transportation Needs: Not on file   Physical Activity: Not on file   Stress: Not on file   Social Connections: Not on file   Intimate Partner Violence: Not on file   Housing Stability: Not on file       Current Medications:   Current Outpatient Medications Medication Sig Dispense Refill    aspirin 81 mg chewable tablet Chew 81 mg daily        B-D UF III MINI PEN NEEDLES 31G X 5 MM MISC USE UP TO 3 TIMES A  each 6    Basaglar KwikPen 100 units/mL injection pen 48 units in am and 36 units in pm, up to 90 units daily 30 mL 6    calcium citrate-Vitamin D ([employer] Calcium Citrate+D3 Petites) 200 mg-250 units Take 1 tablet by mouth daily with breakfast      Coenzyme Q10 (Co Q10) 100 MG CAPS Take by mouth      escitalopram (LEXAPRO) 5 mg tablet Take 5 mg by mouth daily      fluticasone (FLONASE) 50 mcg/act nasal spray 1 spray into each nostril daily prn      furosemide (LASIX) 40 mg tablet TAKE 1 TABLET BY MOUTH EVERY DAY 90 tablet 1    glimepiride (AMARYL) 4 mg tablet Take 2 tablets (8 mg total) by mouth daily with breakfast 180 tablet 3    Janumet XR  MG TB24 TAKE 1 TABLET BY MOUTH TWICE A  tablet 3    KRILL OIL PO Take 750 mg by mouth      lactulose (CHRONULAC) 10 g/15 mL solution TAKE 15 ML (10 G TOTAL) BY MOUTH 2 (TWO) TIMES A DAY 2700 mL 2    levothyroxine 125 mcg tablet Take 125 mcg by mouth daily  2    nadolol (CORGARD) 20 mg tablet Take 1 tablet (20 mg total) by mouth daily 30 tablet 2    omeprazole (PriLOSEC) 20 mg delayed release capsule TAKE 1 CAPSULE BY MOUTH EVERY DAY 90 capsule 3    ONETOUCH DELICA LANCETS FINE MISC Use 1-3 times a day 100 each 3    OneTouch Ultra test strip TEST UP TO 3 TIMES DAILY   300 strip 3    rifaximin (Xifaxan) 550 mg tablet Take 1 tablet (550 mg total) by mouth every 12 (twelve) hours 60 tablet 3    rifaximin (Xifaxan) 550 mg tablet Take 1 tablet (550 mg total) by mouth every 12 (twelve) hours 60 tablet 0    simvastatin (ZOCOR) 40 mg tablet Take 40 mg by mouth daily  3    spironolactone (ALDACTONE) 50 mg tablet TAKE 1 TABLET BY MOUTH EVERY DAY 90 tablet 1    Blood Glucose Monitoring Suppl (ONE TOUCH ULTRA 2) w/Device KIT by Does not apply route once for 1 dose Use Glucometer to test up to 3 times daily  1 each 0    cholecalciferol (VITAMIN D3) 1,000 units tablet Take 2,000 Units by mouth daily   (Patient not taking: Reported on 3/4/2022 )       No current facility-administered medications for this visit  Allergies: Allergies   Allergen Reactions    Bee Venom Swelling    Latex     Sesame Seed (Diagnostic) - Food Allergy      Other reaction(s): swelling inside mouth    Strawberry C [Ascorbate - Food Allergy] Other (See Comments)     Mouth sores    Penicillins Rash       Physical Exam:  /58 (BP Location: Right arm, Patient Position: Sitting, Cuff Size: Adult)   Pulse 66   Temp (!) 96 5 °F (35 8 °C) (Tympanic)   Resp 14   Ht 5' 4" (1 626 m)   Wt 88 kg (194 lb)   SpO2 99%   BMI 33 30 kg/m²   Body surface area is 1 93 meters squared  Wt Readings from Last 3 Encounters:   03/25/22 88 kg (194 lb)   03/04/22 85 kg (187 lb 6 4 oz)   12/28/21 86 4 kg (190 lb 6 4 oz)           Physical Exam  Constitutional:       General: She is not in acute distress  Appearance: Normal appearance  HENT:      Head: Normocephalic and atraumatic  Eyes:      General: No scleral icterus  Right eye: No discharge  Left eye: No discharge  Conjunctiva/sclera: Conjunctivae normal    Cardiovascular:      Rate and Rhythm: Normal rate and regular rhythm  Pulmonary:      Effort: Pulmonary effort is normal  No respiratory distress  Breath sounds: Normal breath sounds  Chest:   Breasts:      Right: No axillary adenopathy or supraclavicular adenopathy  Left: No axillary adenopathy or supraclavicular adenopathy  Abdominal:      General: Bowel sounds are normal  There is no distension  Palpations: Abdomen is soft  There is no mass  Tenderness: There is no abdominal tenderness  Musculoskeletal:         General: Normal range of motion  Lymphadenopathy:      Cervical: No cervical adenopathy        Upper Body:      Right upper body: No supraclavicular, axillary or pectoral adenopathy  Left upper body: No supraclavicular, axillary or pectoral adenopathy  Skin:     General: Skin is warm and dry  Findings: Bruising present  Neurological:      General: No focal deficit present  Mental Status: She is alert and oriented to person, place, and time  Psychiatric:         Mood and Affect: Mood normal          Behavior: Behavior normal          Assessment / Plan:    1  Iron deficiency anemia due to chronic blood loss    2  Pancytopenia [facility])      The patient is a 77year old female with a history of type 2 diabetes, hypothyroidism, hypertension, Unice Shillings cirrhosis and portal hypertension, splenomegaly, esophageal varices and iron deficiency anemia who was referred to hematology in 8/2021 for  pancytopenia  She follows closely with GI  On presentation, her labs demonstrated iron deficiency and pancytopenia with decreased white count, decreased hemoglobin, decreased platelet count  Out of concern for underlying MDS, she underwent a bone marrow biopsy on 8/24/2021 which resulted with no major abnormalities or MDS  It did reveal low iron stores    Her history of cirrhotic liver and portal hypertension likely contributes to her thrombocytopenia  In addition, she has an enlarged spleen and may have some element of sequestration  She was treated with a course of IV Venofer with improvement in her Hgb  Most recent blood work shows persistent pancytopenia  Hemoglobin has stabilized post iron infusions at 11 3  White count 2 98, platelet count 68, ANC 2 08  Serum iron 77  Patient reports she feels well  At her baseline  She continues to follow closely with GI and recently had EGD with banding of 2 varices  She denies any active bleeding  She will continue on observation and close follow-up with her GI specialist   I will repeat blood work in 4 months    She is instructed to notify us if she has any significant fatigue, lightheadedness/dizziness, abnormal bleeding and we can check labs sooner  Patient verbalized understanding  She is in agreement with this plan of care  She knows to call with any questions or concerns    Goals and Barriers:  Current Goal:  Prolong Survival from pancytopenia  Barriers: None  Patient's Capacity to Self Care:  Patient able to self care  Portions of the record may have been created with voice recognition software  Occasional wrong word or "sound a like" substitutions may have occurred due to the inherent limitations of voice recognition software  Read the chart carefully and recognize, using context, where substitutions have occurred

## 2022-04-05 ENCOUNTER — TELEPHONE (OUTPATIENT)
Dept: GASTROENTEROLOGY | Facility: CLINIC | Age: 77
End: 2022-04-05

## 2022-04-05 NOTE — TELEPHONE ENCOUNTER
Very lengthy call w/ Pt (9+ min) She is very confused  Conf'd upcoming appt 4/18 several times  She thought Clara Zaldivar wanted her to do DI-I noted Naty Kathleen after last US and recall in 6 mos  I did not see outstanding labs for GI but noted other Dr orders  She vocalized frustration w/ so much testing

## 2022-04-18 ENCOUNTER — OFFICE VISIT (OUTPATIENT)
Dept: GASTROENTEROLOGY | Facility: CLINIC | Age: 77
End: 2022-04-18
Payer: MEDICARE

## 2022-04-18 VITALS
WEIGHT: 184.6 LBS | SYSTOLIC BLOOD PRESSURE: 116 MMHG | DIASTOLIC BLOOD PRESSURE: 56 MMHG | HEIGHT: 64 IN | BODY MASS INDEX: 31.51 KG/M2

## 2022-04-18 DIAGNOSIS — D50.0 IRON DEFICIENCY ANEMIA DUE TO CHRONIC BLOOD LOSS: ICD-10-CM

## 2022-04-18 DIAGNOSIS — D69.6 THROMBOCYTOPENIA (HCC): ICD-10-CM

## 2022-04-18 DIAGNOSIS — K74.60 LIVER CIRRHOSIS SECONDARY TO NASH (NONALCOHOLIC STEATOHEPATITIS) (HCC): Primary | ICD-10-CM

## 2022-04-18 DIAGNOSIS — I85.10 SECONDARY ESOPHAGEAL VARICES WITHOUT BLEEDING (HCC): ICD-10-CM

## 2022-04-18 DIAGNOSIS — K75.81 LIVER CIRRHOSIS SECONDARY TO NASH (NONALCOHOLIC STEATOHEPATITIS) (HCC): Primary | ICD-10-CM

## 2022-04-18 DIAGNOSIS — R10.30 LOWER ABDOMINAL PAIN: ICD-10-CM

## 2022-04-18 DIAGNOSIS — I50.9 CHRONIC CONGESTIVE HEART FAILURE, UNSPECIFIED HEART FAILURE TYPE (HCC): ICD-10-CM

## 2022-04-18 PROCEDURE — 99214 OFFICE O/P EST MOD 30 MIN: CPT | Performed by: INTERNAL MEDICINE

## 2022-04-18 NOTE — PROGRESS NOTES
[facility] - Outpatient Follow-up Note  Sherry Gallardo 77 y o  female MRN: [id number]  Encounter: [id number]    ASSESSMENT AND PLAN:      1  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) ([place] [address] )  Patient with nonalcoholic steatohepatitis  Complications have included hepatic encephalopathy but this is stable at this time not having issues    - AFP tumor marker; Future  - Protime-INR; Future    - has varices  When not exactly sure if she bled but we did put bands and patient no longer has issues     With iron deficiency anemia  Last hemoglobin 11 3    - ultrasound in March and AFP negative  Repeat in 6 months for screening for [place] [address]     2  Secondary esophageal varices without bleeding ([place] [address] )  -- patient did have rubber-band ligation back in December  Follow-up was to be in the next 2-3 months  Do at this time  Hopefully we can get her in in the next month or so at the hospital  - EGD; Future -SL- UB possible banding     3  Thrombocytopenia (HCC)  - platelet count in the 60s stable    4  Chronic congestive heart failure, unspecified heart failure type ([place] [address] )  -- follows up with Cardiology  5  Iron deficiency anemia due to chronic blood loss  --  Seems to have resolved     6  Lower abdominal pain  -- pain is not specified  Reports that in the lower quadrants  Sometimes after eating  Seems to be deep-seated  Physical examination negative  Patient does have neuropathy  Options include CT scan abdomen and pelvis which I think is the only way to really evaluate  Patient wants to just observe for now  At times it might be 4 to 6/10  Does not seem to be disabling     -  If the pain gets worse I have told the patient to call the office and we could order the CT scans    She would have to go off her metformin combination medication prior      Followup Appointment: 4 mo   ______________________________________________________________________    Chief Complaint   Patient presents with    followup liver cirrhosis     burning in stomach     HPI:   Patient returns to the office for routine follow-up with respect to her cirrhosis  Overall she is doing fairly well but does complain of lower abdominal pain  She has had this over the last couple of months  Pain is definitely below the umbilicus  Seems to be related to eating  Patient notices that after she eats and gets up tries to do the dishes and moves around  Will be somewhat fleeting  She feels it is deep-seated  Not necessarily cramping  No problems with her appetite or nausea  Does report the pain is like a burning type pain as well  Does not seem to come around from back  Has not really experienced this in the past     The abdominal pain is not clearly related to where she does her insulin injections on her anterior abdomen  With respect to the patient's liver issues she does not have problems with abdominal distention  There is no peripheral edema  She has had problems with hepatic encephalopathy but reports her concentration is good at this point  She is getting lab work in the near future  Things get a little confusing at times as patient gets lab work ordered for myself, primary Care, Cardiology and endocrine  She is up-to-date with respect to colorectal cancer screening as she had a colonoscopy in 2020 for iron deficiency  Up-to-date with respect to screen for hepatoma  Last abdominal ultrasound and alpha fetoprotein were done in March  Patient does have a history of esophageal varices  There was no clear-cut bleeding which she was iron deficient  After we banded the varices her hemoglobin stabilized  Last exam hemoglobin was 11 3 and March with normal iron panel  Last upper endoscopy was around the end of last year so she is due again for follow-up  and possible prophylactic banding      Historical Information   Past Medical History:   Diagnosis Date    Anemia     Cirrhosis ([facility] )     Diabetic neuropathy ([facility] )     Esophageal varices (HCC)     Fatty liver     GERD (gastroesophageal reflux disease)     Hepatic encephalopathy (HCC)     Hiatal hernia     Hyperlipidemia     Hypertension     Hypoglycemia     Hypothyroidism     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)     Osteoarthritis     Osteopenia     Pancytopenia (HCC)     Thrombocytopenia (HCC)     Type 2 diabetes mellitus (HCC)      Past Surgical History:   Procedure Laterality Date    ABDOMINAL SURGERY      Abdominal plasty with mesh insertion    CATARACT EXTRACTION, BILATERAL      CATARACT EXTRACTION, BILATERAL      COLONOSCOPY      EGD AND COLONOSCOPY  03/18/2015    IR BIOPSY BONE MARROW  8/24/2021    LAPAROSCOPIC CHOLECYSTECTOMY      SHOULDER SURGERY Right     calcium depsoti    TUBAL LIGATION      UMBILICAL HERNIA REPAIR      with mesh placed    UPPER GASTROINTESTINAL ENDOSCOPY       Social History     Substance and Sexual Activity   Alcohol Use Not Currently     Social History     Substance and Sexual Activity   Drug Use No     Social History     Tobacco Use   Smoking Status Never Smoker   Smokeless Tobacco Never Used     Family History   Problem Relation Age of Onset    Breast cancer Mother     Diabetes type II Father     Thyroid disease unspecified Daughter     Down syndrome Daughter     Diabetes type II Daughter     Diabetes type II Sister     No Known Problems Brother     Prostate cancer Brother     No Known Problems Sister     Stroke Sister     No Known Problems Son     Breast cancer Maternal Aunt     Colon cancer Neg Hx          Current Outpatient Medications:     aspirin 81 mg chewable tablet    B-D UF III MINI PEN NEEDLES 31G X 5 MM MISC    Basdelma Flores 100 units/mL injection pen    Blood Glucose Monitoring Suppl (ONE TOUCH ULTRA 2) w/Device KIT    calcium citrate-Vitamin D ([employer] Calcium Citrate+D3 Petites) 200 mg-250 units    cholecalciferol (VITAMIN D3) 1,000 units tablet    Coenzyme Q10 (Co Q10) 100 MG CAPS    escitalopram (LEXAPRO) 5 mg tablet    fluticasone (FLONASE) 50 mcg/act nasal spray    furosemide (LASIX) 40 mg tablet    glimepiride (AMARYL) 4 mg tablet    Janumet XR  MG TB24    KRILL OIL PO    lactulose (CHRONULAC) 10 g/15 mL solution    levothyroxine 125 mcg tablet    nadolol (CORGARD) 20 mg tablet    omeprazole (PriLOSEC) 20 mg delayed release capsule    ONETOUCH DELICA LANCETS FINE MISC    OneTouch Ultra test strip    rifaximin (Xifaxan) 550 mg tablet    simvastatin (ZOCOR) 40 mg tablet    spironolactone (ALDACTONE) 50 mg tablet    rifaximin (Xifaxan) 550 mg tablet  Allergies   Allergen Reactions    Bee Venom Swelling    Latex     Sesame Seed (Diagnostic) - Food Allergy      Other reaction(s): swelling inside mouth    Strawberry C [Ascorbate - Food Allergy] Other (See Comments)     Mouth sores    Penicillins Rash     Reviewed medications and allergies and updated as indicated    PHYSICAL EXAM:    Blood pressure 116/56, height 5' 4" (1 626 m), weight 83 7 kg (184 lb 9 6 oz), not currently breastfeeding  Body mass index is 31 69 kg/m²  General Appearance: NAD, cooperative, alert  Eyes: Anicteric- conjunctiva pink  ENT:  Normocephalic, atraumatic, normal mucosa  Neck:  Supple, symmetrical, trachea midline  Resp:  Clear to auscultation bilaterally; no rales, rhonchi or wheezing; respirations unlabored   CV:  S1 S2, Regular rate and rhythm; no murmur, rub, or gallop  GI:  Soft, non-tender, non-distended; normal bowel sounds; no masses, no organomegaly - large pannus  Rectal: Deferred  Musculoskeletal: No cyanosis, clubbing or edema  Normal ROM    Skin:  No jaundice, rashes, or lesions   Heme/Lymph: No palpable cervical lymphadenopathy  Psych: Normal affect, good eye contact  Neuro: No gross deficits, AAOx3-- good thought process no asterixis    Lab Results:   Lab Results   Component Value Date    WBC 2 98 (L) 03/11/2022    HGB 11 3 (L) 03/11/2022    HCT 35 4 03/11/2022    MCV 97 03/11/2022    PLT 68 (L) 03/11/2022     Lab Results   Component Value Date    K 4 0 01/18/2022     01/18/2022    CO2 30 01/18/2022    BUN 28 (H) 01/18/2022    CREATININE 1 07 01/18/2022    CALCIUM 9 4 01/18/2022    CORRECTEDCA 10 0 01/18/2022    AST 53 (H) 01/18/2022    ALT 45 01/18/2022    ALKPHOS 70 01/18/2022    EGFR 50 01/18/2022     Lab Results   Component Value Date    IRON 77 03/24/2022    TIBC 389 03/24/2022    FERRITIN 22 03/24/2022       Radiology Results:     Abdominal ultrasound [facility]  March 11, 2022- increased echogenicity liver coarse texture no masses- positive for splenomegaly gallbladder surgically removed portal vein patent

## 2022-04-18 NOTE — LETTER
April 18, 2022     Janise Harada, DO  [address]    Patient: Sen Peacock   YOB: 1945   Date of Visit: 4/18/2022       Dear Dr Gaston Mast: Thank you for referring Jhoana Bagley to me for evaluation  Below are my notes for this consultation  If you have questions, please do not hesitate to call me  I look forward to following your patient along with you  Sincerely,        Db Albarran MD        CC: MD Lester Ny MD Loy Brodie, MD  4/18/2022  5:12 PM  Sign when Signing Visit  [facility] - Outpatient Follow-up Note  Sen Peacock 77 y o  female MRN: [id number]  Encounter: [id number]    ASSESSMENT AND PLAN:      1  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) ([facility] )  Patient with nonalcoholic steatohepatitis  Complications have included hepatic encephalopathy but this is stable at this time not having issues    - AFP tumor marker; Future  - Protime-INR; Future    - has varices  When not exactly sure if she bled but we did put bands and patient no longer has issues     With iron deficiency anemia  Last hemoglobin 11 3    - ultrasound in March and AFP negative  Repeat in 6 months for screening for [facility]     2  Secondary esophageal varices without bleeding ([facility] )  -- patient did have rubber-band ligation back in December  Follow-up was to be in the next 2-3 months  Do at this time  Hopefully we can get her in in the next month or so at the hospital  - EGD; Future -SL- UB possible banding     3  Thrombocytopenia (HCC)  - platelet count in the 60s stable    4  Chronic congestive heart failure, unspecified heart failure type ([facility] )  -- follows up with Cardiology  5  Iron deficiency anemia due to chronic blood loss  --  Seems to have resolved     6  Lower abdominal pain  -- pain is not specified  Reports that in the lower quadrants  Sometimes after eating  Seems to be deep-seated  Physical examination negative  Patient does have neuropathy  Options include CT scan abdomen and pelvis which I think is the only way to really evaluate  Patient wants to just observe for now  At times it might be 4 to 6/10  Does not seem to be disabling     -  If the pain gets worse I have told the patient to call the office and we could order the CT scans  She would have to go off her metformin combination medication prior      Followup Appointment: 4 mo   ______________________________________________________________________    Chief Complaint   Patient presents with    followup liver cirrhosis     burning in stomach     HPI:   Patient returns to the office for routine follow-up with respect to her cirrhosis  Overall she is doing fairly well but does complain of lower abdominal pain  She has had this over the last couple of months  Pain is definitely below the umbilicus  Seems to be related to eating  Patient notices that after she eats and gets up tries to do the dishes and moves around  Will be somewhat fleeting  She feels it is deep-seated  Not necessarily cramping  No problems with her appetite or nausea  Does report the pain is like a burning type pain as well  Does not seem to come around from back  Has not really experienced this in the past     The abdominal pain is not clearly related to where she does her insulin injections on her anterior abdomen  With respect to the patient's liver issues she does not have problems with abdominal distention  There is no peripheral edema  She has had problems with hepatic encephalopathy but reports her concentration is good at this point  She is getting lab work in the near future  Things get a little confusing at times as patient gets lab work ordered for myself, primary Care, Cardiology and endocrine    She is up-to-date with respect to colorectal cancer screening as she had a colonoscopy in 2020 for iron deficiency  Up-to-date with respect to screen for hepatoma  Last abdominal ultrasound and alpha fetoprotein were done in March  Patient does have a history of esophageal varices  There was no clear-cut bleeding which she was iron deficient  After we banded the varices her hemoglobin stabilized  Last exam hemoglobin was 11 3 and March with normal iron panel  Last upper endoscopy was around the end of last year so she is due again for follow-up  and possible prophylactic banding      Historical Information   Past Medical History:   Diagnosis Date    Anemia     Cirrhosis ([address] )     Diabetic neuropathy (HCC)     Esophageal varices (HCC)     Fatty liver     GERD (gastroesophageal reflux disease)     Hepatic encephalopathy (HCC)     Hiatal hernia     Hyperlipidemia     Hypertension     Hypoglycemia     Hypothyroidism     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)     Osteoarthritis     Osteopenia     Pancytopenia (HCC)     Thrombocytopenia (HCC)     Type 2 diabetes mellitus (HCC)      Past Surgical History:   Procedure Laterality Date    ABDOMINAL SURGERY      Abdominal plasty with mesh insertion    CATARACT EXTRACTION, BILATERAL      CATARACT EXTRACTION, BILATERAL      COLONOSCOPY      EGD AND COLONOSCOPY  03/18/2015    IR BIOPSY BONE MARROW  8/24/2021    LAPAROSCOPIC CHOLECYSTECTOMY      SHOULDER SURGERY Right     calcium depsoti    TUBAL LIGATION      UMBILICAL HERNIA REPAIR      with mesh placed    UPPER GASTROINTESTINAL ENDOSCOPY       Social History     Substance and Sexual Activity   Alcohol Use Not Currently     Social History     Substance and Sexual Activity   Drug Use No     Social History     Tobacco Use   Smoking Status Never Smoker   Smokeless Tobacco Never Used     Family History   Problem Relation Age of Onset    Breast cancer Mother     Diabetes type II Father     Thyroid disease unspecified Daughter     Down syndrome Daughter     Diabetes type II Daughter     Diabetes type II Sister     No Known Problems Brother     Prostate cancer Brother     No Known Problems Sister     Stroke Sister     No Known Problems Son     Breast cancer Maternal Aunt     Colon cancer Neg Hx          Current Outpatient Medications:     aspirin 81 mg chewable tablet    B-D UF III MINI PEN NEEDLES 31G X 5 MM MISC    Elizabeth GabrielikPen 100 units/mL injection pen    Blood Glucose Monitoring Suppl (ONE TOUCH ULTRA 2) w/Device KIT    calcium citrate-Vitamin D ([employer]S Calcium Citrate+D3 Petites) 200 mg-250 units    cholecalciferol (VITAMIN D3) 1,000 units tablet    Coenzyme Q10 (Co Q10) 100 MG CAPS    escitalopram (LEXAPRO) 5 mg tablet    fluticasone (FLONASE) 50 mcg/act nasal spray    furosemide (LASIX) 40 mg tablet    glimepiride (AMARYL) 4 mg tablet    Janumet XR  MG TB24    KRILL OIL PO    lactulose (CHRONULAC) 10 g/15 mL solution    levothyroxine 125 mcg tablet    nadolol (CORGARD) 20 mg tablet    omeprazole (PriLOSEC) 20 mg delayed release capsule    ONETOUCH DELICA LANCETS FINE MISC    OneTouch Ultra test strip    rifaximin (Xifaxan) 550 mg tablet    simvastatin (ZOCOR) 40 mg tablet    spironolactone (ALDACTONE) 50 mg tablet    rifaximin (Xifaxan) 550 mg tablet  Allergies   Allergen Reactions    Bee Venom Swelling    Latex     Sesame Seed (Diagnostic) - Food Allergy      Other reaction(s): swelling inside mouth    Strawberry C [Ascorbate - Food Allergy] Other (See Comments)     Mouth sores    Penicillins Rash     Reviewed medications and allergies and updated as indicated    PHYSICAL EXAM:    Blood pressure 116/56, height 5' 4" (1 626 m), weight 83 7 kg (184 lb 9 6 oz), not currently breastfeeding  Body mass index is 31 69 kg/m²  General Appearance: NAD, cooperative, alert  Eyes: Anicteric- conjunctiva pink  ENT:  Normocephalic, atraumatic, normal mucosa      Neck:  Supple, symmetrical, trachea midline  Resp:  Clear to auscultation bilaterally; no rales, rhonchi or wheezing; respirations unlabored   CV:  S1 S2, Regular rate and rhythm; no murmur, rub, or gallop  GI:  Soft, non-tender, non-distended; normal bowel sounds; no masses, no organomegaly - large pannus  Rectal: Deferred  Musculoskeletal: No cyanosis, clubbing or edema  Normal ROM    Skin:  No jaundice, rashes, or lesions   Heme/Lymph: No palpable cervical lymphadenopathy  Psych: Normal affect, good eye contact  Neuro: No gross deficits, AAOx3-- good thought process no asterixis    Lab Results:   Lab Results   Component Value Date    WBC 2 98 (L) 03/11/2022    HGB 11 3 (L) 03/11/2022    HCT 35 4 03/11/2022    MCV 97 03/11/2022    PLT 68 (L) 03/11/2022     Lab Results   Component Value Date    K 4 0 01/18/2022     01/18/2022    CO2 30 01/18/2022    BUN 28 (H) 01/18/2022    CREATININE 1 07 01/18/2022    CALCIUM 9 4 01/18/2022    CORRECTEDCA 10 0 01/18/2022    AST 53 (H) 01/18/2022    ALT 45 01/18/2022    ALKPHOS 70 01/18/2022    EGFR 50 01/18/2022     Lab Results   Component Value Date    IRON 77 03/24/2022    TIBC 389 03/24/2022    FERRITIN 22 03/24/2022       Radiology Results:     Abdominal ultrasound [facility]  March 11, 2022- increased echogenicity liver coarse texture no masses- positive for splenomegaly gallbladder surgically removed portal vein patent

## 2022-04-18 NOTE — PATIENT INSTRUCTIONS
[facility] - Outpatient Follow-up Note  Homa Knott 77 y o  female MRN: [id number]  Encounter: [id number]    ASSESSMENT AND PLAN:      1  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) ([place] [address] )  Patient with nonalcoholic steatohepatitis  Complications have included hepatic encephalopathy but this is stable at this time not having issues    - AFP tumor marker; Future  - Protime-INR; Future    - has varices  When not exactly sure if she bled but we did put bands and patient no longer has issues     With iron deficiency anemia  Last hemoglobin 11 3    - ultrasound in March and AFP negative  Repeat in 6 months for screening for [place] [address]     2  Secondary esophageal varices without bleeding ([place] [address] )  -- patient did have rubber-band ligation back in December  Follow-up was to be in the next 2-3 months  Do at this time  Hopefully we can get her in in the next month or so at the hospital  - EGD; Future -SL- UB possible banding     3  Thrombocytopenia (HCC)  - platelet count in the 60s stable    4  Chronic congestive heart failure, unspecified heart failure type ([place] [address] )  -- follows up with Cardiology  5  Iron deficiency anemia due to chronic blood loss  --  Seems to have resolved     6  Lower abdominal pain  -- pain is not specified  Reports that in the lower quadrants  Sometimes after eating  Seems to be deep-seated  Physical examination negative  Patient does have neuropathy  Options include CT scan abdomen and pelvis which I think is the only way to really evaluate  Patient wants to just observe for now  At times it might be 4 to 6/10  Does not seem to be disabling     -  If the pain gets worse I have told the patient to call the office and we could order the CT scans    She would have to go off her metformin combination medication prior      Followup Appointment: 4 mo

## 2022-04-20 ENCOUNTER — APPOINTMENT (OUTPATIENT)
Dept: LAB | Facility: HOSPITAL | Age: 77
End: 2022-04-20
Payer: MEDICARE

## 2022-04-20 DIAGNOSIS — E78.00 PURE HYPERCHOLESTEROLEMIA: ICD-10-CM

## 2022-04-20 DIAGNOSIS — E11.00 TYPE II DIABETES MELLITUS WITH HYPEROSMOLARITY, UNCONTROLLED (HCC): ICD-10-CM

## 2022-04-20 DIAGNOSIS — E11.65 TYPE II DIABETES MELLITUS WITH HYPEROSMOLARITY, UNCONTROLLED (HCC): ICD-10-CM

## 2022-04-20 DIAGNOSIS — K75.81 NONALCOHOLIC STEATOHEPATITIS: ICD-10-CM

## 2022-04-20 DIAGNOSIS — I10 ESSENTIAL HYPERTENSION, MALIGNANT: ICD-10-CM

## 2022-04-20 LAB
ALBUMIN SERPL BCP-MCNC: 2.8 G/DL (ref 3.5–5)
ALP SERPL-CCNC: 63 U/L (ref 46–116)
ALT SERPL W P-5'-P-CCNC: 27 U/L (ref 12–78)
ANION GAP SERPL CALCULATED.3IONS-SCNC: 4 MMOL/L (ref 4–13)
AST SERPL W P-5'-P-CCNC: 34 U/L (ref 5–45)
BASOPHILS # BLD AUTO: 0.05 THOUSANDS/ΜL (ref 0–0.1)
BASOPHILS NFR BLD AUTO: 2 % (ref 0–1)
BILIRUB SERPL-MCNC: 0.8 MG/DL (ref 0.2–1)
BUN SERPL-MCNC: 36 MG/DL (ref 5–25)
CALCIUM ALBUM COR SERPL-MCNC: 10.2 MG/DL (ref 8.3–10.1)
CALCIUM SERPL-MCNC: 9.2 MG/DL (ref 8.3–10.1)
CHLORIDE SERPL-SCNC: 106 MMOL/L (ref 100–108)
CHOLEST SERPL-MCNC: 115 MG/DL
CO2 SERPL-SCNC: 31 MMOL/L (ref 21–32)
CREAT SERPL-MCNC: 1.15 MG/DL (ref 0.6–1.3)
EOSINOPHIL # BLD AUTO: 0.08 THOUSAND/ΜL (ref 0–0.61)
EOSINOPHIL NFR BLD AUTO: 3 % (ref 0–6)
ERYTHROCYTE [DISTWIDTH] IN BLOOD BY AUTOMATED COUNT: 14.2 % (ref 11.6–15.1)
GFR SERPL CREATININE-BSD FRML MDRD: 46 ML/MIN/1.73SQ M
GLUCOSE P FAST SERPL-MCNC: 66 MG/DL (ref 65–99)
HCT VFR BLD AUTO: 33.3 % (ref 34.8–46.1)
HDLC SERPL-MCNC: 30 MG/DL
HGB BLD-MCNC: 10.3 G/DL (ref 11.5–15.4)
IMM GRANULOCYTES # BLD AUTO: 0.01 THOUSAND/UL (ref 0–0.2)
IMM GRANULOCYTES NFR BLD AUTO: 0 % (ref 0–2)
LDLC SERPL CALC-MCNC: 67 MG/DL (ref 0–100)
LYMPHOCYTES # BLD AUTO: 0.79 THOUSANDS/ΜL (ref 0.6–4.47)
LYMPHOCYTES NFR BLD AUTO: 25 % (ref 14–44)
MCH RBC QN AUTO: 30.1 PG (ref 26.8–34.3)
MCHC RBC AUTO-ENTMCNC: 30.9 G/DL (ref 31.4–37.4)
MCV RBC AUTO: 97 FL (ref 82–98)
MONOCYTES # BLD AUTO: 0.26 THOUSAND/ΜL (ref 0.17–1.22)
MONOCYTES NFR BLD AUTO: 8 % (ref 4–12)
NEUTROPHILS # BLD AUTO: 1.93 THOUSANDS/ΜL (ref 1.85–7.62)
NEUTS SEG NFR BLD AUTO: 62 % (ref 43–75)
NRBC BLD AUTO-RTO: 0 /100 WBCS
PLATELET # BLD AUTO: 75 THOUSANDS/UL (ref 149–390)
PMV BLD AUTO: 12.9 FL (ref 8.9–12.7)
POTASSIUM SERPL-SCNC: 4.3 MMOL/L (ref 3.5–5.3)
PROT SERPL-MCNC: 6.5 G/DL (ref 6.4–8.2)
RBC # BLD AUTO: 3.42 MILLION/UL (ref 3.81–5.12)
SODIUM SERPL-SCNC: 141 MMOL/L (ref 136–145)
TRIGL SERPL-MCNC: 91 MG/DL
WBC # BLD AUTO: 3.12 THOUSAND/UL (ref 4.31–10.16)

## 2022-04-20 PROCEDURE — 80053 COMPREHEN METABOLIC PANEL: CPT

## 2022-04-20 PROCEDURE — 80061 LIPID PANEL: CPT

## 2022-04-20 PROCEDURE — 36415 COLL VENOUS BLD VENIPUNCTURE: CPT

## 2022-04-20 PROCEDURE — 85025 COMPLETE CBC W/AUTO DIFF WBC: CPT

## 2022-05-09 DIAGNOSIS — K72.90 HEPATIC FAILURE, UNSPECIFIED WITHOUT COMA (HCC): ICD-10-CM

## 2022-05-09 DIAGNOSIS — K72.90 HEPATIC ENCEPHALOPATHY (HCC): Primary | ICD-10-CM

## 2022-05-09 DIAGNOSIS — K74.60 LIVER CIRRHOSIS SECONDARY TO NASH (NONALCOHOLIC STEATOHEPATITIS) (HCC): ICD-10-CM

## 2022-05-09 DIAGNOSIS — K75.81 LIVER CIRRHOSIS SECONDARY TO NASH (NONALCOHOLIC STEATOHEPATITIS) (HCC): ICD-10-CM

## 2022-05-09 NOTE — TELEPHONE ENCOUNTER
Xifaxan approved by [facility] Андрей CORADO 4/9/22-11/11/22  I spoke with patient and confirmed that she is on medication (previously discontinued on med list as pt not taking)  Patient's last ov April 2022

## 2022-05-16 ENCOUNTER — TELEPHONE (OUTPATIENT)
Dept: HEMATOLOGY ONCOLOGY | Facility: CLINIC | Age: 77
End: 2022-05-16

## 2022-05-16 NOTE — TELEPHONE ENCOUNTER
Appointment Confirmation (to confirm pre existing appointments - ONLY)     Appointment with  Albaro Jacob   Appointment date & time 7-29-22 @ 9:00am   Location [place]    Patient verbilized Understanding

## 2022-05-17 NOTE — TELEPHONE ENCOUNTER
Does pt need egd with banding still? This was ordered at a visit last month with Jacques Cabral, but was not scheduled and scheduling was not notified it was ordered  Just following up on this    Thanks

## 2022-05-24 ENCOUNTER — TELEPHONE (OUTPATIENT)
Dept: GASTROENTEROLOGY | Facility: CLINIC | Age: 77
End: 2022-05-24

## 2022-05-24 DIAGNOSIS — K74.60 LIVER CIRRHOSIS SECONDARY TO NASH (NONALCOHOLIC STEATOHEPATITIS) (HCC): ICD-10-CM

## 2022-05-24 DIAGNOSIS — K75.81 LIVER CIRRHOSIS SECONDARY TO NASH (NONALCOHOLIC STEATOHEPATITIS) (HCC): ICD-10-CM

## 2022-05-24 DIAGNOSIS — R10.30 LOWER ABDOMINAL PAIN: Primary | ICD-10-CM

## 2022-05-24 NOTE — TELEPHONE ENCOUNTER
Patients GI provider:  Dr Hayward Code    Number to return call: ([phone])    Reason for call: Pt calling because Dr Hayward Code told her to call if she changes her mind about scheduling the CT scan of abdomen and pelvis  Please put order in so Central Scheduling calls her to schedule  Thank you!     Scheduled procedure/appointment date if applicable: Apt/procedure

## 2022-05-24 NOTE — TELEPHONE ENCOUNTER
Dr Vicki Salazar,  In your 4/18 ov note it does mention a ct scan of the abdomen and pelvis can be ordered if pt calls back  Ok to proceed? Should that be with po contrast only? I believe the IV contrast is still for emergencies cases only    Note also states would have to go off some of her medications first?

## 2022-05-25 NOTE — TELEPHONE ENCOUNTER
Confirmed with radiology that holding the Metformin is only with IV contrast      Message left for the pt that the order has been placed, central scheduling # given if no call to her and requested she call back with any questions

## 2022-05-31 ENCOUNTER — APPOINTMENT (OUTPATIENT)
Dept: LAB | Facility: HOSPITAL | Age: 77
End: 2022-05-31
Attending: INTERNAL MEDICINE
Payer: MEDICARE

## 2022-05-31 DIAGNOSIS — E11.65 TYPE 2 DIABETES MELLITUS WITH HYPERGLYCEMIA, WITH LONG-TERM CURRENT USE OF INSULIN (HCC): ICD-10-CM

## 2022-05-31 DIAGNOSIS — E11.42 DIABETIC POLYNEUROPATHY ASSOCIATED WITH TYPE 2 DIABETES MELLITUS (HCC): ICD-10-CM

## 2022-05-31 DIAGNOSIS — E03.9 ACQUIRED HYPOTHYROIDISM: ICD-10-CM

## 2022-05-31 DIAGNOSIS — E78.2 MIXED HYPERLIPIDEMIA: ICD-10-CM

## 2022-05-31 DIAGNOSIS — I10 ESSENTIAL HYPERTENSION: ICD-10-CM

## 2022-05-31 DIAGNOSIS — Z79.4 TYPE 2 DIABETES MELLITUS WITH HYPERGLYCEMIA, WITH LONG-TERM CURRENT USE OF INSULIN (HCC): ICD-10-CM

## 2022-05-31 DIAGNOSIS — K75.81 LIVER CIRRHOSIS SECONDARY TO NASH (NONALCOHOLIC STEATOHEPATITIS) (HCC): ICD-10-CM

## 2022-05-31 DIAGNOSIS — K74.60 LIVER CIRRHOSIS SECONDARY TO NASH (NONALCOHOLIC STEATOHEPATITIS) (HCC): ICD-10-CM

## 2022-05-31 LAB
AFP-TM SERPL-MCNC: 1.4 NG/ML (ref 0.5–8)
CHOLEST SERPL-MCNC: 133 MG/DL
EST. AVERAGE GLUCOSE BLD GHB EST-MCNC: 189 MG/DL
HBA1C MFR BLD: 8.2 %
HDLC SERPL-MCNC: 34 MG/DL
INR PPP: 1.23 (ref 0.84–1.19)
LDLC SERPL CALC-MCNC: 84 MG/DL (ref 0–100)
PROTHROMBIN TIME: 15.3 SECONDS (ref 11.6–14.5)
T4 FREE SERPL-MCNC: 1.56 NG/DL (ref 0.76–1.46)
TRIGL SERPL-MCNC: 76 MG/DL
TSH SERPL DL<=0.05 MIU/L-ACNC: 0.31 UIU/ML (ref 0.45–4.5)

## 2022-05-31 PROCEDURE — 84439 ASSAY OF FREE THYROXINE: CPT

## 2022-05-31 PROCEDURE — 84443 ASSAY THYROID STIM HORMONE: CPT

## 2022-05-31 PROCEDURE — 83036 HEMOGLOBIN GLYCOSYLATED A1C: CPT

## 2022-05-31 PROCEDURE — 82105 ALPHA-FETOPROTEIN SERUM: CPT

## 2022-05-31 PROCEDURE — 80061 LIPID PANEL: CPT

## 2022-05-31 PROCEDURE — 36415 COLL VENOUS BLD VENIPUNCTURE: CPT

## 2022-05-31 PROCEDURE — 85610 PROTHROMBIN TIME: CPT

## 2022-06-08 ENCOUNTER — HOSPITAL ENCOUNTER (OUTPATIENT)
Dept: CT IMAGING | Facility: HOSPITAL | Age: 77
Discharge: HOME | End: 2022-06-08
Attending: INTERNAL MEDICINE
Payer: MEDICARE

## 2022-06-08 DIAGNOSIS — K75.81 LIVER CIRRHOSIS SECONDARY TO NASH (NONALCOHOLIC STEATOHEPATITIS) (HCC): ICD-10-CM

## 2022-06-08 DIAGNOSIS — R10.30 LOWER ABDOMINAL PAIN: ICD-10-CM

## 2022-06-08 DIAGNOSIS — K74.60 LIVER CIRRHOSIS SECONDARY TO NASH (NONALCOHOLIC STEATOHEPATITIS) (HCC): ICD-10-CM

## 2022-06-08 PROCEDURE — 74176 CT ABD & PELVIS W/O CONTRAST: CPT

## 2022-06-08 PROCEDURE — G1004 CDSM NDSC: HCPCS

## 2022-06-08 NOTE — LETTER
[address] [place] [address]  [address] [facility] [address] [facility]      Mandy [birth year], 2022    MRN: [id number]     Phone: [phone]     Dear Ms Rogers,    You recently had a(n) Cat Scan performed on 6/8/2022 at  [address] [place] [address] that was requested by Tg Benites MD  The study was reviewed by a radiologist, which is a physician who specializes in medical imaging  The radiologist issued a report describing his or her findings  In that report there was a finding that the radiologist felt warranted further discussion with your health care provider and that discussion would be beneficial to you  The results were sent to Tg Benites MD on 06/11/2022 11:57 AM  We recommend that you contact Tg Benites MD at [phone] or set up an appointment to discuss the results of the imaging test  If you have already heard from Tg Benites MD regarding the results of your study, you can disregard this letter  This letter is not meant to alarm you, but intended to encourage you to follow-up on your results with the provider that sent you for the imaging study  In addition, we have enclosed answers to frequently asked questions by other patients who have also received a letter to review results with their health care provider (see page two)  Thank you for choosing [facility] for your medical imaging needs  FREQUENTLY ASKED QUESTIONS    1  Why am I receiving this letter? [facility] requires us to notify patients who have findings on imaging exams that may require more testing or follow-up with a health professional within the next 3 months  2  How serious is the finding on the imaging test?  This letter is sent to all patients who may need follow-up or more testing within the next 3 months  Receiving this letter does not necessarily mean you have a life-threatening imaging finding or disease  Recommendations in the radiologists imaging report are general in nature and it is up to your healthcare provider to say whether those recommendations make sense for your situation  You are strongly encouraged to talk to your health care provider about the results and ask whether additional steps need to be taken  3  Where can I get a copy of the final report for my recent radiology exam?  To get a full copy of the report you can access your records online at [email or web address] or please contact [address] [place] [address] Department at [phone] Monday through Friday between 8 am and 6 pm          4  What do I need to do now? Please contact your health care provider who requested the imaging study to discuss what further actions (if any) are needed  You may have already heard from (your ordering provider) in regard to this test in which case you can disregard this letter  NOTICE IN ACCORDANCE WITH THE [facility] PATIENT TEST RESULT INFORMATION ACT OF 2018    You are receiving this notice as a result of a determination by your diagnostic imaging service that further discussions of your test results are warranted and would be beneficial to you  The complete results of your test or tests have been or will be sent to the health care practitioner that ordered the test or tests  It is recommended that you contact your health care practitioner to discuss your results as soon as possible

## 2022-06-12 ENCOUNTER — TELEPHONE (OUTPATIENT)
Dept: GASTROENTEROLOGY | Facility: CLINIC | Age: 77
End: 2022-06-12

## 2022-06-12 DIAGNOSIS — K59.00 CONSTIPATION, UNSPECIFIED CONSTIPATION TYPE: Primary | ICD-10-CM

## 2022-06-12 RX ORDER — POLYETHYLENE GLYCOL 3350 17 G/17G
POWDER, FOR SOLUTION ORAL
Qty: 510 G | Refills: 3 | Status: SHIPPED | OUTPATIENT
Start: 2022-06-12 | End: 2022-07-21

## 2022-06-12 NOTE — TELEPHONE ENCOUNTER
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

## 2022-06-17 ENCOUNTER — OFFICE VISIT (OUTPATIENT)
Dept: ENDOCRINOLOGY | Facility: HOSPITAL | Age: 77
End: 2022-06-17

## 2022-06-17 VITALS
WEIGHT: 194.2 LBS | SYSTOLIC BLOOD PRESSURE: 92 MMHG | HEIGHT: 64 IN | DIASTOLIC BLOOD PRESSURE: 40 MMHG | BODY MASS INDEX: 33.16 KG/M2 | HEART RATE: 61 BPM | OXYGEN SATURATION: 94 %

## 2022-06-17 DIAGNOSIS — Z79.4 TYPE 2 DIABETES MELLITUS WITH HYPERGLYCEMIA, WITH LONG-TERM CURRENT USE OF INSULIN (HCC): Primary | ICD-10-CM

## 2022-06-17 DIAGNOSIS — E03.9 ACQUIRED HYPOTHYROIDISM: ICD-10-CM

## 2022-06-17 DIAGNOSIS — E11.65 TYPE 2 DIABETES MELLITUS WITH HYPERGLYCEMIA, WITH LONG-TERM CURRENT USE OF INSULIN (HCC): Primary | ICD-10-CM

## 2022-06-17 RX ORDER — LIDOCAINE 5% 5 G/100G
CREAM TOPICAL
COMMUNITY
End: 2022-07-21

## 2022-06-17 RX ORDER — LEVOTHYROXINE SODIUM 0.12 MG/1
TABLET ORAL
Qty: 90 TABLET | Refills: 2 | Status: ON HOLD
Start: 2022-06-17

## 2022-06-17 NOTE — PROGRESS NOTES
Adelaide Rogers 77 y o  female MRN: [id number]    Encounter: [id number]      Assessment/Plan     Assessment: This is a 77y o -year-old female with type 2 diabetes with neuropathy, hypertension and hyperlipidemia, and hypothyroidism  Plan:  1  Type 2 diabetes on long-term insulin:  Most recent hemoglobin A1c has increased to 8 2  Did discuss making adjustments to her medications, but she does not want to at this time  We will see how her A1c trends, I did inform her that if it continues to increase, we definitely will recommend making further changes  At this time she will continue with Basaglar 40 units in the morning, 36 units in the evening, Janumet XR 50/1000 mg twice a day, glimepiride 8 mg in the morning  Continue to check glucose level daily  Since she does have concerns with frequent visits to multiple doctors, and since from an endocrine standpoint she has been relatively stable, looks than her next visit out to 4 months  Make sure to get lab work completed prior to visit  2  Diabetic neuropathy:  Stable  Diabetic foot exam is up-to-date  3  Hypothyroidism:  TSH was low, and free T4 was elevated  Review of her thyroid function it has been trending towards the hyperactive side  At this time I recommend decreasing her levothyroxine 125 mcg to 1 tablet 6 days a week and half tablet on Sunday  Will recheck thyroid lab work prior to next office visit  4  Hypertension:  Blood pressure slightly low in the office today  She is currently asymptomatic  Instructed her that she is getting any lightheadedness or dizziness, to go to the ED, or contact her PCP  Kidney function remains stable  Repeat CMP prior to next office visit  5  Hyperlipidemia:  Lipid panel doing well at this time  Continue with current medication  Will continue monitor over time        CC:  Type 2 diabetes follow-up    History of Present Illness     HPI:  Antonio Casas is a 77year old female with type 2 diabetes, insulin requiring with retinopathy and neuropathy for 49 years, hypothyroidism, hypertension, hyperlipidemia for follow-up visit  She is on oral agents and insulin at home and takes Janumet XR 50/1000 mg twice daily, glimepiride 4 mg 2 at breakfast, and Basaglar insulin 48 units in the morning and 36 units in the evening  She admits to polyuria and polyphagia she denies polydipsia, nocturia and blurry vision  She has unchanged numbness and tingling of the feet  She has some chest pain in the cold weather but no shortness of breath  She denies nephropathy, retinopathy, heart attack, stroke and claudication but does admit to neuropathy  Does have some swelling around left ankle that comes and goes  Denies any erythema or wound      Hypoglycemic episodes:  Rare episodes, typically associated with not eating      The patient's last eye exam was in June 2022 with stable retinopathy  The patient's last foot exam was in November 2021 at endocrine office visit  She does see a podiatrist regularly  Most recent hemoglobin A1c completed May 31, 2022 was 8 2      Blood Sugar/Glucometer/Pump/CGM review:  Typically checking fasting blood sugar daily  Averages around 185  Lowest is 68 and highest was 362       She has hypothyroidism and takes levothyroxine 125 mcg daily  She is always somewhat cold  She has some tremors  She denies palpitation, heat intolerance, or weight changes  She is fatigued but does have limited sleep as she keeps an eye on her daughter overnight  She denies diarrhea or constipation  She has dry skin and hair loss      She has hypertension and takes furosemide 40 mg daily, spironolactone 50 mg daily, and Nadolol 20 mg daily  She denies headache or stroke-like symptoms      She has hyperlipidemia and takes simvastatin 40 mg daily  She denies shortness of breath but has chest pain in the cold weather  Review of Systems   Constitutional: Positive for fatigue   Negative for activity change, appetite change, diaphoresis and unexpected weight change  HENT: Negative for sore throat, trouble swallowing and voice change  Eyes: Positive for visual disturbance  Respiratory: Positive for shortness of breath  Negative for chest tightness  Cardiovascular: Negative for chest pain, palpitations and leg swelling  Gastrointestinal: Positive for abdominal pain and diarrhea  Negative for constipation, nausea and vomiting  Endocrine: Positive for cold intolerance, polyphagia and polyuria  Negative for heat intolerance and polydipsia  Genitourinary: Negative for frequency  Skin: Negative for rash and wound  Neurological: Negative for dizziness, tremors, weakness, light-headedness, numbness and headaches  Hematological: Negative for adenopathy  Psychiatric/Behavioral: Positive for sleep disturbance  Negative for dysphoric mood  The patient is not nervous/anxious          Historical Information   Past Medical History:   Diagnosis Date    Anemia     Cirrhosis ([address] )     Diabetic neuropathy (HCC)     Esophageal varices (HCC)     Fatty liver     GERD (gastroesophageal reflux disease)     Hepatic encephalopathy (HCC)     Hiatal hernia     Hyperlipidemia     Hypertension     Hypoglycemia     Hypothyroidism     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)     Osteoarthritis     Osteopenia     Pancytopenia (HCC)     Thrombocytopenia (HCC)     Type 2 diabetes mellitus (HCC)      Past Surgical History:   Procedure Laterality Date    ABDOMINAL SURGERY      Abdominal plasty with mesh insertion    CATARACT EXTRACTION, BILATERAL      CATARACT EXTRACTION, BILATERAL      COLONOSCOPY      EGD AND COLONOSCOPY  03/18/2015    IR BIOPSY BONE MARROW  8/24/2021    LAPAROSCOPIC CHOLECYSTECTOMY      SHOULDER SURGERY Right     calcium depsoti    TUBAL LIGATION      UMBILICAL HERNIA REPAIR      with mesh placed    UPPER GASTROINTESTINAL ENDOSCOPY       Social History   Social History Substance and Sexual Activity   Alcohol Use Not Currently     Social History     Substance and Sexual Activity   Drug Use No     Social History     Tobacco Use   Smoking Status Never Smoker   Smokeless Tobacco Never Used     Family History:   Family History   Problem Relation Age of Onset    Breast cancer Mother     Diabetes type II Father     Thyroid disease unspecified Daughter     Down syndrome Daughter     Diabetes type II Daughter     Diabetes type II Sister     No Known Problems Brother     Prostate cancer Brother     No Known Problems Sister     Stroke Sister     No Known Problems Son     Breast cancer Maternal Aunt     Colon cancer Neg Hx        Meds/Allergies   Current Outpatient Medications   Medication Sig Dispense Refill    aspirin 81 mg chewable tablet Chew 81 mg Every 3 days      B-D UF III MINI PEN NEEDLES 31G X 5 MM MISC USE UP TO 3 TIMES A  each 6    Basaglar KwikPen 100 units/mL injection pen 48 units in am and 36 units in pm, up to 90 units daily 30 mL 6    calcium citrate-Vitamin D ([employer] Calcium Citrate+D3 Petites) 200 mg-250 units Take 1 tablet by mouth daily with breakfast      Coenzyme Q10 (Co Q10) 100 MG CAPS Take by mouth      escitalopram (LEXAPRO) 5 mg tablet Take 5 mg by mouth daily      fluticasone (FLONASE) 50 mcg/act nasal spray 1 spray into each nostril daily prn      glimepiride (AMARYL) 4 mg tablet Take 2 tablets (8 mg total) by mouth daily with breakfast 180 tablet 3    Janumet XR  MG TB24 TAKE 1 TABLET BY MOUTH TWICE A  tablet 3    KRILL OIL PO Take 750 mg by mouth      lactulose (CHRONULAC) 10 g/15 mL solution TAKE 15 ML (10 G TOTAL) BY MOUTH 2 (TWO) TIMES A DAY 2700 mL 2    levothyroxine 125 mcg tablet Take 1 tablet 6 days a week and half a tablet on Sunday   90 tablet 2    Lidocaine 5 % CREA Apply topically      nadolol (CORGARD) 20 mg tablet Take 1 tablet (20 mg total) by mouth daily 30 tablet 2    omeprazole (PriLOSEC) 20 mg delayed release capsule TAKE 1 CAPSULE BY MOUTH EVERY DAY 90 capsule 3    ONETOUCH DELICA LANCETS FINE MISC Use 1-3 times a day 100 each 3    OneTouch Ultra test strip TEST UP TO 3 TIMES DAILY  300 strip 3    polyethylene glycol (MIRALAX) 17 g packet Take 17 gm (1 scoop) daily 510 g 3    rifaximin (Xifaxan) 550 mg tablet Take 1 tablet (550 mg total) by mouth every 12 (twelve) hours 60 tablet 5    simvastatin (ZOCOR) 40 mg tablet Take 40 mg by mouth daily  3    spironolactone (ALDACTONE) 50 mg tablet TAKE 1 TABLET BY MOUTH EVERY DAY 90 tablet 1    Blood Glucose Monitoring Suppl (ONE TOUCH ULTRA 2) w/Device KIT by Does not apply route once for 1 dose Use Glucometer to test up to 3 times daily  1 each 0    cholecalciferol (VITAMIN D3) 1,000 units tablet Take 2,000 Units by mouth daily   (Patient not taking: Reported on 6/17/2022)      furosemide (LASIX) 40 mg tablet TAKE 1 TABLET BY MOUTH EVERY DAY 90 tablet 1     No current facility-administered medications for this visit  Allergies   Allergen Reactions    Bee Venom Swelling    Latex     Sesame Seed (Diagnostic) - Food Allergy      Other reaction(s): swelling inside mouth    Strawberry C [Ascorbate - Food Allergy] Other (See Comments)     Mouth sores    Penicillins Rash       Objective   Vitals: Blood pressure (!) 92/40, pulse 61, height 5' 4" (1 626 m), weight 88 1 kg (194 lb 3 2 oz), SpO2 94 %, not currently breastfeeding  Physical Exam  Vitals and nursing note reviewed  Constitutional:       General: She is not in acute distress  Appearance: Normal appearance  She is not diaphoretic  HENT:      Head: Normocephalic and atraumatic  Eyes:      General: No scleral icterus  Extraocular Movements: Extraocular movements intact  Conjunctiva/sclera: Conjunctivae normal       Pupils: Pupils are equal, round, and reactive to light  Neck:      Thyroid: No thyroid mass, thyromegaly or thyroid tenderness     Cardiovascular:      Rate and Rhythm: Normal rate and regular rhythm  Heart sounds: No murmur heard  Pulmonary:      Effort: Pulmonary effort is normal  No respiratory distress  Breath sounds: Normal breath sounds  No wheezing  Musculoskeletal:      Cervical back: Normal range of motion  Right lower leg: No edema  Left lower leg: Edema present  Lymphadenopathy:      Cervical: No cervical adenopathy  Neurological:      Mental Status: She is alert and oriented to person, place, and time  Mental status is at baseline  Sensory: No sensory deficit  Gait: Gait normal    Psychiatric:         Mood and Affect: Mood normal          Behavior: Behavior normal          Thought Content: Thought content normal          The history was obtained from the review of the chart, patient      Lab Results:   Lab Results   Component Value Date/Time    Hemoglobin A1C 8 2 (H) 05/31/2022 09:40 AM    Hemoglobin A1C 7 8 (H) 01/18/2022 10:57 AM    Hemoglobin A1C 7 8 (H) 10/13/2021 11:59 AM    WBC 3 12 (L) 04/20/2022 08:12 AM    WBC 2 98 (L) 03/11/2022 10:00 AM    WBC 3 57 (L) 01/18/2022 10:57 AM    Hemoglobin 10 3 (L) 04/20/2022 08:12 AM    Hemoglobin 11 3 (L) 03/11/2022 10:00 AM    Hemoglobin 12 8 01/18/2022 10:57 AM    Hematocrit 33 3 (L) 04/20/2022 08:12 AM    Hematocrit 35 4 03/11/2022 10:00 AM    Hematocrit 39 5 01/18/2022 10:57 AM    MCV 97 04/20/2022 08:12 AM    MCV 97 03/11/2022 10:00 AM     (H) 01/18/2022 10:57 AM    Platelets 75 (L) 08/51/5233 08:12 AM    Platelets 68 (L) 45/01/5174 10:00 AM    Platelets 60 (L) 28/92/2253 10:57 AM    BUN 36 (H) 04/20/2022 08:12 AM    BUN 28 (H) 01/18/2022 10:57 AM    BUN 28 (H) 10/13/2021 11:59 AM    Potassium 4 3 04/20/2022 08:12 AM    Potassium 4 0 01/18/2022 10:57 AM    Potassium 4 4 10/13/2021 11:59 AM    Chloride 106 04/20/2022 08:12 AM    Chloride 106 01/18/2022 10:57 AM    Chloride 104 10/13/2021 11:59 AM    CO2 31 04/20/2022 08:12 AM    CO2 30 01/18/2022 10:57 AM    CO2 29 10/13/2021 11:59 AM    Creatinine 1 15 04/20/2022 08:12 AM    Creatinine 1 07 01/18/2022 10:57 AM    Creatinine 1 11 10/13/2021 11:59 AM    AST 34 04/20/2022 08:12 AM    AST 53 (H) 01/18/2022 10:57 AM    AST 38 10/13/2021 11:59 AM    ALT 27 04/20/2022 08:12 AM    ALT 45 01/18/2022 10:57 AM    ALT 36 10/13/2021 11:59 AM    Albumin 2 8 (L) 04/20/2022 08:12 AM    Albumin 3 3 (L) 01/18/2022 10:57 AM    Albumin 3 2 (L) 10/13/2021 11:59 AM    HDL, Direct 34 (L) 05/31/2022 09:40 AM    HDL, Direct 30 (L) 04/20/2022 08:12 AM    Triglycerides 76 05/31/2022 09:40 AM    Triglycerides 91 04/20/2022 08:12 AM       Portions of the record may have been created with voice recognition software  Occasional wrong word or "sound a like" substitutions may have occurred due to the inherent limitations of voice recognition software  Read the chart carefully and recognize, using context, where substitutions have occurred

## 2022-06-17 NOTE — PATIENT INSTRUCTIONS
Monitor diet, make sure to drink plenty water throughout the day  Continue Janumet XR 50/1000 mg twice a day, glimepiride 8 mg in the morning, Basaglar 48 units in the morning and 36 units in the evening  Decrease levothyroxine 125 mcg to 1 tablet 6 days a week and half tablet on Sundays  Continue checking blood sugar 2 or 3 times a day  Would recommend sending in blood sugar logs in about 2 weeks  Follow-up in 3 months with lab work completed prior to visit

## 2022-06-20 ENCOUNTER — TELEPHONE (OUTPATIENT)
Dept: OTHER | Facility: OTHER | Age: 77
End: 2022-06-20

## 2022-06-20 NOTE — TELEPHONE ENCOUNTER
Patient was referred to urology by her PCP  Please call to schedule she would like to be seen in [facility] she lives close by, I wasn't able to get a direct dx, however she said that she has diabetes and a few other issues

## 2022-06-27 DIAGNOSIS — R60.1 GENERALIZED EDEMA: ICD-10-CM

## 2022-06-27 RX ORDER — SPIRONOLACTONE 50 MG/1
50 TABLET, FILM COATED ORAL DAILY
Qty: 90 TABLET | Refills: 1 | Status: SHIPPED | OUTPATIENT
Start: 2022-06-27 | End: 2022-07-21

## 2022-06-27 NOTE — TELEPHONE ENCOUNTER
Phone call from pt  She needs refill of her Spironolactone with 90 day supply sent to [facility] in [place]

## 2022-06-29 ENCOUNTER — APPOINTMENT (EMERGENCY)
Dept: RADIOLOGY | Facility: HOSPITAL | Age: 77
End: 2022-06-29
Payer: MEDICARE

## 2022-06-29 ENCOUNTER — TELEPHONE (OUTPATIENT)
Dept: OBGYN CLINIC | Facility: CLINIC | Age: 77
End: 2022-06-29

## 2022-06-29 ENCOUNTER — EPISODE CHANGES (OUTPATIENT)
Dept: CASE MANAGEMENT | Facility: OTHER | Age: 77
End: 2022-06-29

## 2022-06-29 ENCOUNTER — ANESTHESIA EVENT (INPATIENT)
Dept: NON INVASIVE DIAGNOSTICS | Facility: HOSPITAL | Age: 77
DRG: 242 | End: 2022-06-29
Payer: MEDICARE

## 2022-06-29 ENCOUNTER — HOSPITAL ENCOUNTER (EMERGENCY)
Facility: HOSPITAL | Age: 77
End: 2022-06-29
Attending: EMERGENCY MEDICINE | Admitting: EMERGENCY MEDICINE
Payer: MEDICARE

## 2022-06-29 ENCOUNTER — APPOINTMENT (INPATIENT)
Dept: RADIOLOGY | Facility: HOSPITAL | Age: 77
DRG: 242 | End: 2022-06-29
Attending: PHYSICIAN ASSISTANT
Payer: MEDICARE

## 2022-06-29 ENCOUNTER — APPOINTMENT (INPATIENT)
Dept: RADIOLOGY | Facility: HOSPITAL | Age: 77
DRG: 242 | End: 2022-06-29
Payer: MEDICARE

## 2022-06-29 ENCOUNTER — APPOINTMENT (EMERGENCY)
Dept: CT IMAGING | Facility: HOSPITAL | Age: 77
End: 2022-06-29
Payer: MEDICARE

## 2022-06-29 ENCOUNTER — HOSPITAL ENCOUNTER (INPATIENT)
Facility: HOSPITAL | Age: 77
LOS: 6 days | DRG: 242 | End: 2022-07-05
Attending: SURGERY | Admitting: EMERGENCY MEDICINE
Payer: MEDICARE

## 2022-06-29 VITALS
BODY MASS INDEX: 33.29 KG/M2 | OXYGEN SATURATION: 97 % | RESPIRATION RATE: 18 BRPM | WEIGHT: 195 LBS | HEIGHT: 64 IN | SYSTOLIC BLOOD PRESSURE: 153 MMHG | DIASTOLIC BLOOD PRESSURE: 65 MMHG | TEMPERATURE: 97.3 F | HEART RATE: 73 BPM

## 2022-06-29 DIAGNOSIS — E87.5 HYPERKALEMIA: ICD-10-CM

## 2022-06-29 DIAGNOSIS — K75.81 LIVER CIRRHOSIS SECONDARY TO NASH (NONALCOHOLIC STEATOHEPATITIS) (HCC): ICD-10-CM

## 2022-06-29 DIAGNOSIS — D64.9 ANEMIA: ICD-10-CM
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
## 2022-06-29 DIAGNOSIS — I44.2 COMPLETE HEART BLOCK (HCC): ICD-10-CM

## 2022-06-29 DIAGNOSIS — R74.02 ELEVATED SERUM LACTATE DEHYDROGENASE: ICD-10-CM

## 2022-06-29 DIAGNOSIS — R77.8 ELEVATED TROPONIN: ICD-10-CM

## 2022-06-29 DIAGNOSIS — S42.292A HUMERUS HEAD FRACTURE, LEFT, CLOSED, INITIAL ENCOUNTER: ICD-10-CM

## 2022-06-29 DIAGNOSIS — K74.60 LIVER CIRRHOSIS SECONDARY TO NASH (NONALCOHOLIC STEATOHEPATITIS) (HCC): ICD-10-CM

## 2022-06-29 DIAGNOSIS — E11.42 DIABETIC POLYNEUROPATHY ASSOCIATED WITH TYPE 2 DIABETES MELLITUS (HCC): ICD-10-CM

## 2022-06-29 DIAGNOSIS — S42.309A HUMERAL FRACTURE: ICD-10-CM

## 2022-06-29 DIAGNOSIS — R73.9 HYPERGLYCEMIA: ICD-10-CM

## 2022-06-29 DIAGNOSIS — N17.9 AKI (ACUTE KIDNEY INJURY) (HCC): Primary | ICD-10-CM

## 2022-06-29 LAB
2HR DELTA HS TROPONIN: -1 NG/L
ABO GROUP BLD: NORMAL
ABO GROUP BLD: NORMAL
ALBUMIN SERPL BCP-MCNC: 2.6 G/DL (ref 3.5–5)
ALBUMIN SERPL BCP-MCNC: 3 G/DL (ref 3.5–5)
ALP SERPL-CCNC: 54 U/L (ref 46–116)
ALP SERPL-CCNC: 69 U/L (ref 46–116)
ALT SERPL W P-5'-P-CCNC: 28 U/L (ref 12–78)
ALT SERPL W P-5'-P-CCNC: 28 U/L (ref 12–78)
AMMONIA PLAS-SCNC: 32 UMOL/L (ref 11–35)
AMMONIA PLAS-SCNC: 82 UMOL/L (ref 11–35)
ANION GAP SERPL CALCULATED.3IONS-SCNC: 10 MMOL/L (ref 4–13)
ANION GAP SERPL CALCULATED.3IONS-SCNC: 6 MMOL/L (ref 4–13)
ANION GAP SERPL CALCULATED.3IONS-SCNC: 8 MMOL/L (ref 4–13)
ANION GAP SERPL CALCULATED.3IONS-SCNC: 9 MMOL/L (ref 4–13)
AORTIC ROOT: 2.2 CM
APICAL FOUR CHAMBER EJECTION FRACTION: 64 %
APTT PPP: 32 SECONDS (ref 23–37)
ASCENDING AORTA: 2.4 CM
AST SERPL W P-5'-P-CCNC: 28 U/L (ref 5–45)
AST SERPL W P-5'-P-CCNC: 33 U/L (ref 5–45)
ATRIAL RATE: 29 BPM
ATRIAL RATE: 36 BPM
ATRIAL RATE: 76 BPM
ATRIAL RATE: 79 BPM
BASE EX.OXY STD BLDV CALC-SCNC: 57.7 % (ref 60–80)
BASE EXCESS BLDA CALC-SCNC: -3 MMOL/L (ref -2–3)
BASE EXCESS BLDA CALC-SCNC: -5.5 MMOL/L
BASE EXCESS BLDV CALC-SCNC: -5.4 MMOL/L
BASOPHILS # BLD AUTO: 0.02 THOUSANDS/ΜL (ref 0–0.1)
BASOPHILS # BLD AUTO: 0.05 THOUSANDS/ΜL (ref 0–0.1)
BASOPHILS # BLD AUTO: 0.07 THOUSANDS/ΜL (ref 0–0.1)
BASOPHILS NFR BLD AUTO: 0 % (ref 0–1)
BASOPHILS NFR BLD AUTO: 1 % (ref 0–1)
BASOPHILS NFR BLD AUTO: 1 % (ref 0–1)
BETA-HYDROXYBUTYRATE: 0.2 MMOL/L
BILIRUB SERPL-MCNC: 0.7 MG/DL (ref 0.2–1)
BILIRUB SERPL-MCNC: 1.27 MG/DL (ref 0.2–1)
BLD GP AB SCN SERPL QL: NEGATIVE
BLD GP AB SCN SERPL QL: NEGATIVE
BUN SERPL-MCNC: 51 MG/DL (ref 5–25)
BUN SERPL-MCNC: 55 MG/DL (ref 5–25)
BUN SERPL-MCNC: 56 MG/DL (ref 5–25)
BUN SERPL-MCNC: 56 MG/DL (ref 5–25)
CA-I BLD-SCNC: 1.14 MMOL/L (ref 1.12–1.32)
CA-I BLD-SCNC: 1.28 MMOL/L (ref 1.12–1.32)
CALCIUM ALBUM COR SERPL-MCNC: 9.8 MG/DL (ref 8.3–10.1)
CALCIUM ALBUM COR SERPL-MCNC: 9.9 MG/DL (ref 8.3–10.1)
CALCIUM SERPL-MCNC: 8.8 MG/DL (ref 8.3–10.1)
CALCIUM SERPL-MCNC: 9 MG/DL (ref 8.3–10.1)
CALCIUM SERPL-MCNC: 9.2 MG/DL (ref 8.3–10.1)
CALCIUM SERPL-MCNC: 9.3 MG/DL (ref 8.3–10.1)
CARDIAC TROPONIN I PNL SERPL HS: 20 NG/L
CARDIAC TROPONIN I PNL SERPL HS: 21 NG/L
CHLORIDE SERPL-SCNC: 102 MMOL/L (ref 100–108)
CHLORIDE SERPL-SCNC: 103 MMOL/L (ref 100–108)
CHLORIDE SERPL-SCNC: 107 MMOL/L (ref 100–108)
CHLORIDE SERPL-SCNC: 109 MMOL/L (ref 100–108)
CK SERPL-CCNC: 75 U/L (ref 26–192)
CO2 SERPL-SCNC: 21 MMOL/L (ref 21–32)
CO2 SERPL-SCNC: 21 MMOL/L (ref 21–32)
CO2 SERPL-SCNC: 23 MMOL/L (ref 21–32)
CO2 SERPL-SCNC: 25 MMOL/L (ref 21–32)
CREAT SERPL-MCNC: 1.7 MG/DL (ref 0.6–1.3)
CREAT SERPL-MCNC: 1.81 MG/DL (ref 0.6–1.3)
CREAT SERPL-MCNC: 2.08 MG/DL (ref 0.6–1.3)
CREAT SERPL-MCNC: 2.19 MG/DL (ref 0.6–1.3)
E WAVE DECELERATION TIME: 402 MS
EOSINOPHIL # BLD AUTO: 0.02 THOUSAND/ΜL (ref 0–0.61)
EOSINOPHIL # BLD AUTO: 0.05 THOUSAND/ΜL (ref 0–0.61)
EOSINOPHIL # BLD AUTO: 0.05 THOUSAND/ΜL (ref 0–0.61)
EOSINOPHIL NFR BLD AUTO: 0 % (ref 0–6)
EOSINOPHIL NFR BLD AUTO: 1 % (ref 0–6)
EOSINOPHIL NFR BLD AUTO: 1 % (ref 0–6)
ERYTHROCYTE [DISTWIDTH] IN BLOOD BY AUTOMATED COUNT: 15.5 % (ref 11.6–15.1)
ERYTHROCYTE [DISTWIDTH] IN BLOOD BY AUTOMATED COUNT: 15.5 % (ref 11.6–15.1)
ERYTHROCYTE [DISTWIDTH] IN BLOOD BY AUTOMATED COUNT: 15.6 % (ref 11.6–15.1)
FLUAV RNA RESP QL NAA+PROBE: NEGATIVE
FLUBV RNA RESP QL NAA+PROBE: NEGATIVE
FRACTIONAL SHORTENING: 41 % (ref 28–44)
GFR SERPL CREATININE-BSD FRML MDRD: 21 ML/MIN/1.73SQ M
GFR SERPL CREATININE-BSD FRML MDRD: 22 ML/MIN/1.73SQ M
GFR SERPL CREATININE-BSD FRML MDRD: 26 ML/MIN/1.73SQ M
GFR SERPL CREATININE-BSD FRML MDRD: 29 ML/MIN/1.73SQ M
GLUCOSE SERPL-MCNC: 195 MG/DL (ref 65–140)
GLUCOSE SERPL-MCNC: 219 MG/DL (ref 65–140)
GLUCOSE SERPL-MCNC: 228 MG/DL (ref 65–140)
GLUCOSE SERPL-MCNC: 231 MG/DL (ref 65–140)
GLUCOSE SERPL-MCNC: 327 MG/DL (ref 65–140)
GLUCOSE SERPL-MCNC: 335 MG/DL (ref 65–140)
GLUCOSE SERPL-MCNC: 337 MG/DL (ref 65–140)
GLUCOSE SERPL-MCNC: 346 MG/DL (ref 65–140)
GLUCOSE SERPL-MCNC: 351 MG/DL (ref 65–140)
GLUCOSE SERPL-MCNC: 364 MG/DL (ref 65–140)
GLUCOSE SERPL-MCNC: 414 MG/DL (ref 65–140)
GLUCOSE SERPL-MCNC: 426 MG/DL (ref 65–140)
GLUCOSE SERPL-MCNC: 435 MG/DL (ref 65–140)
HCO3 BLDA-SCNC: 19.3 MMOL/L (ref 22–28)
HCO3 BLDA-SCNC: 23.5 MMOL/L (ref 24–30)
HCO3 BLDV-SCNC: 20.2 MMOL/L (ref 24–30)
HCT VFR BLD AUTO: 25.9 % (ref 34.8–46.1)
HCT VFR BLD AUTO: 28.9 % (ref 34.8–46.1)
HCT VFR BLD AUTO: 30.1 % (ref 34.8–46.1)
HCT VFR BLD CALC: 27 % (ref 34.8–46.1)
HGB BLD-MCNC: 7.8 G/DL (ref 11.5–15.4)
HGB BLD-MCNC: 8.9 G/DL (ref 11.5–15.4)
HGB BLD-MCNC: 9 G/DL (ref 11.5–15.4)
HGB BLDA-MCNC: 9.2 G/DL (ref 11.5–15.4)
IMM GRANULOCYTES # BLD AUTO: 0.03 THOUSAND/UL (ref 0–0.2)
IMM GRANULOCYTES # BLD AUTO: 0.05 THOUSAND/UL (ref 0–0.2)
IMM GRANULOCYTES # BLD AUTO: 0.07 THOUSAND/UL (ref 0–0.2)
IMM GRANULOCYTES NFR BLD AUTO: 1 % (ref 0–2)
INR PPP: 1.2 (ref 0.84–1.19)
INTERVENTRICULAR SEPTUM IN DIASTOLE (PARASTERNAL SHORT AXIS VIEW): 0.7 CM
INTERVENTRICULAR SEPTUM: 0.7 CM (ref 0.6–1.1)
IVC: 23 MM
LAAS-AP2: 22.1 CM2
LAAS-AP4: 24 CM2
LACTATE SERPL-SCNC: 2.1 MMOL/L (ref 0.5–2)
LACTATE SERPL-SCNC: 2.5 MMOL/L (ref 0.5–2)
LACTATE SERPL-SCNC: 3 MMOL/L (ref 0.5–2)
LACTATE SERPL-SCNC: 3.6 MMOL/L (ref 0.5–2)
LACTATE SERPL-SCNC: 3.9 MMOL/L (ref 0.5–2)
LEFT ATRIUM SIZE: 4.5 CM
LEFT INTERNAL DIMENSION IN SYSTOLE: 3.2 CM (ref 2.1–4)
LEFT VENTRICULAR INTERNAL DIMENSION IN DIASTOLE: 5.4 CM (ref 3.5–6)
LEFT VENTRICULAR POSTERIOR WALL IN END DIASTOLE: 0.7 CM
LEFT VENTRICULAR STROKE VOLUME: 102 ML
LVSV (TEICH): 102 ML
LYMPHOCYTES # BLD AUTO: 0.41 THOUSANDS/ΜL (ref 0.6–4.47)
LYMPHOCYTES # BLD AUTO: 0.79 THOUSANDS/ΜL (ref 0.6–4.47)
LYMPHOCYTES # BLD AUTO: 0.89 THOUSANDS/ΜL (ref 0.6–4.47)
LYMPHOCYTES NFR BLD AUTO: 10 % (ref 14–44)
LYMPHOCYTES NFR BLD AUTO: 8 % (ref 14–44)
LYMPHOCYTES NFR BLD AUTO: 9 % (ref 14–44)
MAGNESIUM SERPL-MCNC: 2.4 MG/DL (ref 1.6–2.6)
MAGNESIUM SERPL-MCNC: 2.4 MG/DL (ref 1.6–2.6)
MCH RBC QN AUTO: 27.1 PG (ref 26.8–34.3)
MCHC RBC AUTO-ENTMCNC: 29.9 G/DL (ref 31.4–37.4)
MCHC RBC AUTO-ENTMCNC: 30.1 G/DL (ref 31.4–37.4)
MCHC RBC AUTO-ENTMCNC: 30.8 G/DL (ref 31.4–37.4)
MCV RBC AUTO: 88 FL (ref 82–98)
MCV RBC AUTO: 90 FL (ref 82–98)
MCV RBC AUTO: 91 FL (ref 82–98)
MONOCYTES # BLD AUTO: 0.52 THOUSAND/ΜL (ref 0.17–1.22)
MONOCYTES # BLD AUTO: 0.57 THOUSAND/ΜL (ref 0.17–1.22)
MONOCYTES # BLD AUTO: 1.18 THOUSAND/ΜL (ref 0.17–1.22)
MONOCYTES NFR BLD AUTO: 10 % (ref 4–12)
MONOCYTES NFR BLD AUTO: 12 % (ref 4–12)
MONOCYTES NFR BLD AUTO: 7 % (ref 4–12)
MV E'TISSUE VEL-SEP: 13 CM/S
MV PEAK A VEL: 1.55 M/S
MV PEAK E VEL: 153 CM/S
MV STENOSIS PRESSURE HALF TIME: 117 MS
MV VALVE AREA P 1/2 METHOD: 1.88 CM2
NASAL CANNULA: 2
NEUTROPHILS # BLD AUTO: 4.13 THOUSANDS/ΜL (ref 1.85–7.62)
NEUTROPHILS # BLD AUTO: 6.33 THOUSANDS/ΜL (ref 1.85–7.62)
NEUTROPHILS # BLD AUTO: 7.49 THOUSANDS/ΜL (ref 1.85–7.62)
NEUTS SEG NFR BLD AUTO: 76 % (ref 43–75)
NEUTS SEG NFR BLD AUTO: 80 % (ref 43–75)
NEUTS SEG NFR BLD AUTO: 81 % (ref 43–75)
NRBC BLD AUTO-RTO: 0 /100 WBCS
NT-PROBNP SERPL-MCNC: 1603 PG/ML
O2 CT BLDA-SCNC: 12.5 ML/DL (ref 16–23)
O2 CT BLDV-SCNC: 7.3 ML/DL
OXYHGB MFR BLDA: 93.8 % (ref 94–97)
P AXIS: 63 DEGREES
P AXIS: 95 DEGREES
PCO2 BLD: 25 MMOL/L (ref 21–32)
PCO2 BLD: 46.2 MM HG (ref 42–50)
PCO2 BLDA: 34.6 MM HG (ref 36–44)
PCO2 BLDV: 40 MM HG (ref 42–50)
PH BLD: 7.31 [PH] (ref 7.3–7.4)
PH BLDA: 7.36 [PH] (ref 7.35–7.45)
PH BLDV: 7.32 [PH] (ref 7.3–7.4)
PHOSPHATE SERPL-MCNC: 2.9 MG/DL (ref 2.3–4.1)
PHOSPHATE SERPL-MCNC: 3.5 MG/DL (ref 2.3–4.1)
PLATELET # BLD AUTO: 67 THOUSANDS/UL (ref 149–390)
PLATELET # BLD AUTO: 88 THOUSANDS/UL (ref 149–390)
PLATELET # BLD AUTO: 92 THOUSANDS/UL (ref 149–390)
PMV BLD AUTO: 13.2 FL (ref 8.9–12.7)
PMV BLD AUTO: 13.3 FL (ref 8.9–12.7)
PMV BLD AUTO: 13.7 FL (ref 8.9–12.7)
PO2 BLD: 18 MM HG (ref 35–45)
PO2 BLDA: 78.6 MM HG (ref 75–129)
PO2 BLDV: 34 MM HG (ref 35–45)
POTASSIUM BLD-SCNC: 4.8 MMOL/L (ref 3.5–5.3)
POTASSIUM SERPL-SCNC: 4.7 MMOL/L (ref 3.5–5.3)
POTASSIUM SERPL-SCNC: 4.8 MMOL/L (ref 3.5–5.3)
POTASSIUM SERPL-SCNC: 5.1 MMOL/L (ref 3.5–5.3)
POTASSIUM SERPL-SCNC: 5.7 MMOL/L (ref 3.5–5.3)
PR INTERVAL: 0 MS
PR INTERVAL: 0 MS
PR INTERVAL: 196 MS
PR INTERVAL: 200 MS
PROT SERPL-MCNC: 6.2 G/DL (ref 6.4–8.2)
PROT SERPL-MCNC: 6.8 G/DL (ref 6.4–8.2)
PROTHROMBIN TIME: 15.1 SECONDS (ref 11.6–14.5)
QRS AXIS: -32 DEGREES
QRS AXIS: 221 DEGREES
QRSD INTERVAL: 179 MS
QRSD INTERVAL: 83 MS
QRSD INTERVAL: 86 MS
QRSD INTERVAL: 92 MS
QT INTERVAL: 470 MS
QT INTERVAL: 504 MS
QT INTERVAL: 579 MS
QT INTERVAL: 679 MS
QTC INTERVAL: 448 MS
QTC INTERVAL: 457 MS
QTC INTERVAL: 472 MS
QTC INTERVAL: 567 MS
RA PRESSURE ESTIMATED: 15 MMHG
RBC # BLD AUTO: 2.88 MILLION/UL (ref 3.81–5.12)
RBC # BLD AUTO: 3.29 MILLION/UL (ref 3.81–5.12)
RBC # BLD AUTO: 3.32 MILLION/UL (ref 3.81–5.12)
RH BLD: NEGATIVE
RH BLD: NEGATIVE
RIGHT ATRIAL 2D VOLUME: 48 ML
RIGHT ATRIUM AREA SYSTOLE A4C: 17.8 CM2
RIGHT VENTRICLE ID DIMENSION: 3.4 CM
RV PSP: 62 MMHG
SAO2 % BLD FROM PO2: 24 % (ref 60–85)
SARS-COV-2 AG UPPER RESP QL IA: NORMAL
SARS-COV-2 RNA RESP QL NAA+PROBE: NEGATIVE
SL CV LEFT ATRIUM LENGTH A2C: 5.5 CM
SL CV LV EF: 55
SL CV LV EF: 55
SL CV PED ECHO LEFT VENTRICLE DIASTOLIC VOLUME (MOD BIPLANE) 2D: 143 ML
SL CV PED ECHO LEFT VENTRICLE SYSTOLIC VOLUME (MOD BIPLANE) 2D: 41 ML
SODIUM BLD-SCNC: 138 MMOL/L (ref 136–145)
SODIUM SERPL-SCNC: 133 MMOL/L (ref 136–145)
SODIUM SERPL-SCNC: 134 MMOL/L (ref 136–145)
SODIUM SERPL-SCNC: 138 MMOL/L (ref 136–145)
SODIUM SERPL-SCNC: 139 MMOL/L (ref 136–145)
SPECIMEN EXPIRATION DATE: NORMAL
SPECIMEN EXPIRATION DATE: NORMAL
SPECIMEN SOURCE: ABNORMAL
SPECIMEN SOURCE: ABNORMAL
T WAVE AXIS: -24 DEGREES
T WAVE AXIS: -6 DEGREES
T WAVE AXIS: 16 DEGREES
T WAVE AXIS: 52 DEGREES
TR MAX PG: 47 MMHG
TR PEAK VELOCITY: 3.4 M/S
TSH SERPL DL<=0.05 MIU/L-ACNC: 2.08 UIU/ML (ref 0.45–4.5)
VENTRICULAR RATE: 29 BPM
VENTRICULAR RATE: 36 BPM
VENTRICULAR RATE: 57 BPM
VENTRICULAR RATE: 76 BPM
WBC # BLD AUTO: 5.13 THOUSAND/UL (ref 4.31–10.16)
WBC # BLD AUTO: 7.86 THOUSAND/UL (ref 4.31–10.16)
WBC # BLD AUTO: 9.73 THOUSAND/UL (ref 4.31–10.16)

## 2022-06-29 PROCEDURE — 71260 CT THORAX DX C+: CPT

## 2022-06-29 PROCEDURE — 93306 TTE W/DOPPLER COMPLETE: CPT | Performed by: INTERNAL MEDICINE

## 2022-06-29 PROCEDURE — C1785 PMKR, DUAL, RATE-RESP: HCPCS | Performed by: INTERNAL MEDICINE

## 2022-06-29 PROCEDURE — 02HK3JZ INSERTION OF PACEMAKER LEAD INTO RIGHT VENTRICLE, PERCUTANEOUS APPROACH: ICD-10-PCS | Performed by: EMERGENCY MEDICINE

## 2022-06-29 PROCEDURE — 82140 ASSAY OF AMMONIA: CPT | Performed by: EMERGENCY MEDICINE

## 2022-06-29 PROCEDURE — 0JH606Z INSERTION OF PACEMAKER, DUAL CHAMBER INTO CHEST SUBCUTANEOUS TISSUE AND FASCIA, OPEN APPROACH: ICD-10-PCS | Performed by: EMERGENCY MEDICINE

## 2022-06-29 PROCEDURE — 93325 DOPPLER ECHO COLOR FLOW MAPG: CPT | Performed by: INTERNAL MEDICINE

## 2022-06-29 PROCEDURE — 83735 ASSAY OF MAGNESIUM: CPT | Performed by: EMERGENCY MEDICINE

## 2022-06-29 PROCEDURE — 72170 X-RAY EXAM OF PELVIS: CPT

## 2022-06-29 PROCEDURE — 80053 COMPREHEN METABOLIC PANEL: CPT | Performed by: EMERGENCY MEDICINE

## 2022-06-29 PROCEDURE — 84484 ASSAY OF TROPONIN QUANT: CPT | Performed by: EMERGENCY MEDICINE

## 2022-06-29 PROCEDURE — C1769 GUIDE WIRE: HCPCS | Performed by: INTERNAL MEDICINE

## 2022-06-29 PROCEDURE — 99223 1ST HOSP IP/OBS HIGH 75: CPT | Performed by: INTERNAL MEDICINE

## 2022-06-29 PROCEDURE — 99285 EMERGENCY DEPT VISIT HI MDM: CPT

## 2022-06-29 PROCEDURE — 86901 BLOOD TYPING SEROLOGIC RH(D): CPT | Performed by: EMERGENCY MEDICINE

## 2022-06-29 PROCEDURE — 80048 BASIC METABOLIC PNL TOTAL CA: CPT | Performed by: EMERGENCY MEDICINE

## 2022-06-29 PROCEDURE — C8924 2D TTE W OR W/O FOL W/CON,FU: HCPCS

## 2022-06-29 PROCEDURE — 36415 COLL VENOUS BLD VENIPUNCTURE: CPT | Performed by: EMERGENCY MEDICINE

## 2022-06-29 PROCEDURE — 82948 REAGENT STRIP/BLOOD GLUCOSE: CPT

## 2022-06-29 PROCEDURE — 82805 BLOOD GASES W/O2 SATURATION: CPT | Performed by: PHYSICIAN ASSISTANT

## 2022-06-29 PROCEDURE — 93010 ELECTROCARDIOGRAM REPORT: CPT | Performed by: INTERNAL MEDICINE

## 2022-06-29 PROCEDURE — 85025 COMPLETE CBC W/AUTO DIFF WBC: CPT | Performed by: EMERGENCY MEDICINE

## 2022-06-29 PROCEDURE — 23600 CLTX PROX HUMRL FX W/O MNPJ: CPT | Performed by: ORTHOPAEDIC SURGERY

## 2022-06-29 PROCEDURE — 71045 X-RAY EXAM CHEST 1 VIEW: CPT

## 2022-06-29 PROCEDURE — 99291 CRITICAL CARE FIRST HOUR: CPT | Performed by: EMERGENCY MEDICINE

## 2022-06-29 PROCEDURE — C1898 LEAD, PMKR, OTHER THAN TRANS: HCPCS | Performed by: INTERNAL MEDICINE

## 2022-06-29 PROCEDURE — 74177 CT ABD & PELVIS W/CONTRAST: CPT

## 2022-06-29 PROCEDURE — 82550 ASSAY OF CK (CPK): CPT | Performed by: EMERGENCY MEDICINE

## 2022-06-29 PROCEDURE — 93308 TTE F-UP OR LMTD: CPT | Performed by: INTERNAL MEDICINE

## 2022-06-29 PROCEDURE — 99223 1ST HOSP IP/OBS HIGH 75: CPT | Performed by: SURGERY

## 2022-06-29 PROCEDURE — 96365 THER/PROPH/DIAG IV INF INIT: CPT

## 2022-06-29 PROCEDURE — 72125 CT NECK SPINE W/O DYE: CPT

## 2022-06-29 PROCEDURE — C1892 INTRO/SHEATH,FIXED,PEEL-AWAY: HCPCS | Performed by: INTERNAL MEDICINE

## 2022-06-29 PROCEDURE — 33210 INSERT ELECTRD/PM CATH SNGL: CPT | Performed by: EMERGENCY MEDICINE

## 2022-06-29 PROCEDURE — 87636 SARSCOV2 & INF A&B AMP PRB: CPT | Performed by: EMERGENCY MEDICINE

## 2022-06-29 PROCEDURE — 84100 ASSAY OF PHOSPHORUS: CPT | Performed by: EMERGENCY MEDICINE

## 2022-06-29 PROCEDURE — 73030 X-RAY EXAM OF SHOULDER: CPT

## 2022-06-29 PROCEDURE — 86850 RBC ANTIBODY SCREEN: CPT

## 2022-06-29 PROCEDURE — NC001 PR NO CHARGE: Performed by: PHYSICIAN ASSISTANT

## 2022-06-29 PROCEDURE — 84295 ASSAY OF SERUM SODIUM: CPT

## 2022-06-29 PROCEDURE — 83605 ASSAY OF LACTIC ACID: CPT | Performed by: EMERGENCY MEDICINE

## 2022-06-29 PROCEDURE — 84132 ASSAY OF SERUM POTASSIUM: CPT

## 2022-06-29 PROCEDURE — 96361 HYDRATE IV INFUSION ADD-ON: CPT

## 2022-06-29 PROCEDURE — 82947 ASSAY GLUCOSE BLOOD QUANT: CPT

## 2022-06-29 PROCEDURE — 86900 BLOOD TYPING SEROLOGIC ABO: CPT | Performed by: EMERGENCY MEDICINE

## 2022-06-29 PROCEDURE — 82803 BLOOD GASES ANY COMBINATION: CPT

## 2022-06-29 PROCEDURE — 83880 ASSAY OF NATRIURETIC PEPTIDE: CPT | Performed by: EMERGENCY MEDICINE

## 2022-06-29 PROCEDURE — 99222 1ST HOSP IP/OBS MODERATE 55: CPT | Performed by: ORTHOPAEDIC SURGERY

## 2022-06-29 PROCEDURE — 86901 BLOOD TYPING SEROLOGIC RH(D): CPT

## 2022-06-29 PROCEDURE — 99292 CRITICAL CARE ADDL 30 MIN: CPT | Performed by: EMERGENCY MEDICINE

## 2022-06-29 PROCEDURE — C9113 INJ PANTOPRAZOLE SODIUM, VIA: HCPCS | Performed by: EMERGENCY MEDICINE

## 2022-06-29 PROCEDURE — 73060 X-RAY EXAM OF HUMERUS: CPT

## 2022-06-29 PROCEDURE — 33208 INSRT HEART PM ATRIAL & VENT: CPT | Performed by: INTERNAL MEDICINE

## 2022-06-29 PROCEDURE — 70450 CT HEAD/BRAIN W/O DYE: CPT

## 2022-06-29 PROCEDURE — 86900 BLOOD TYPING SEROLOGIC ABO: CPT

## 2022-06-29 PROCEDURE — 4A133B1 MONITORING OF ARTERIAL PRESSURE, PERIPHERAL, PERCUTANEOUS APPROACH: ICD-10-PCS

## 2022-06-29 PROCEDURE — P9016 RBC LEUKOCYTES REDUCED: HCPCS

## 2022-06-29 PROCEDURE — 85610 PROTHROMBIN TIME: CPT | Performed by: EMERGENCY MEDICINE

## 2022-06-29 PROCEDURE — 4A133J1 MONITORING OF ARTERIAL PULSE, PERIPHERAL, PERCUTANEOUS APPROACH: ICD-10-PCS

## 2022-06-29 PROCEDURE — C8929 TTE W OR WO FOL WCON,DOPPLER: HCPCS

## 2022-06-29 PROCEDURE — 76937 US GUIDE VASCULAR ACCESS: CPT | Performed by: EMERGENCY MEDICINE

## 2022-06-29 PROCEDURE — 93005 ELECTROCARDIOGRAM TRACING: CPT

## 2022-06-29 PROCEDURE — 93321 DOPPLER ECHO F-UP/LMTD STD: CPT | Performed by: INTERNAL MEDICINE

## 2022-06-29 PROCEDURE — 36556 INSERT NON-TUNNEL CV CATH: CPT | Performed by: EMERGENCY MEDICINE

## 2022-06-29 PROCEDURE — 82805 BLOOD GASES W/O2 SATURATION: CPT | Performed by: EMERGENCY MEDICINE

## 2022-06-29 PROCEDURE — 82330 ASSAY OF CALCIUM: CPT | Performed by: EMERGENCY MEDICINE

## 2022-06-29 PROCEDURE — 03HY32Z INSERTION OF MONITORING DEVICE INTO UPPER ARTERY, PERCUTANEOUS APPROACH: ICD-10-PCS

## 2022-06-29 PROCEDURE — 82330 ASSAY OF CALCIUM: CPT

## 2022-06-29 PROCEDURE — 02H63JZ INSERTION OF PACEMAKER LEAD INTO RIGHT ATRIUM, PERCUTANEOUS APPROACH: ICD-10-PCS | Performed by: EMERGENCY MEDICINE

## 2022-06-29 PROCEDURE — 94640 AIRWAY INHALATION TREATMENT: CPT

## 2022-06-29 PROCEDURE — 84443 ASSAY THYROID STIM HORMONE: CPT | Performed by: EMERGENCY MEDICINE

## 2022-06-29 PROCEDURE — 73020 X-RAY EXAM OF SHOULDER: CPT

## 2022-06-29 PROCEDURE — 86920 COMPATIBILITY TEST SPIN: CPT

## 2022-06-29 PROCEDURE — 82010 KETONE BODYS QUAN: CPT | Performed by: EMERGENCY MEDICINE

## 2022-06-29 PROCEDURE — 85730 THROMBOPLASTIN TIME PARTIAL: CPT | Performed by: EMERGENCY MEDICINE

## 2022-06-29 PROCEDURE — 86850 RBC ANTIBODY SCREEN: CPT | Performed by: EMERGENCY MEDICINE

## 2022-06-29 PROCEDURE — 96374 THER/PROPH/DIAG INJ IV PUSH: CPT

## 2022-06-29 PROCEDURE — 85014 HEMATOCRIT: CPT

## 2022-06-29 PROCEDURE — 36620 INSERTION CATHETER ARTERY: CPT | Performed by: PHYSICIAN ASSISTANT

## 2022-06-29 DEVICE — LEAD 457453 MRI US BI RCMCRD MVC
Type: IMPLANTABLE DEVICE | Site: HEART | Status: FUNCTIONAL
Brand: CAPSURE SENSE MRI™ SURESCAN™

## 2022-06-29 DEVICE — ENVELOPE CMRM6122 ABSORB MED MR
Type: IMPLANTABLE DEVICE | Site: CHEST | Status: FUNCTIONAL
Brand: TYRX™

## 2022-06-29 DEVICE — IPG W1DR01 AZURE XT DR MRI USA
Type: IMPLANTABLE DEVICE | Site: CHEST | Status: FUNCTIONAL
Brand: AZURE™ XT DR MRI SURESCAN™

## 2022-06-29 DEVICE — LEAD 383069 MRI US
Type: IMPLANTABLE DEVICE | Site: HEART | Status: FUNCTIONAL
Brand: SELECTSECURE™ MRI SURESCAN™

## 2022-06-29 RX ORDER — INSULIN LISPRO 100 [IU]/ML
1-6 INJECTION, SOLUTION INTRAVENOUS; SUBCUTANEOUS EVERY 6 HOURS SCHEDULED
Status: DISCONTINUED | OUTPATIENT
Start: 2022-06-29 | End: 2022-06-29

## 2022-06-29 RX ORDER — DOCUSATE SODIUM 100 MG/1
100 CAPSULE, LIQUID FILLED ORAL 2 TIMES DAILY
Status: DISCONTINUED | OUTPATIENT
Start: 2022-06-29 | End: 2022-07-05 | Stop reason: HOSPADM

## 2022-06-29 RX ORDER — PANTOPRAZOLE SODIUM 40 MG/10ML
40 INJECTION, POWDER, LYOPHILIZED, FOR SOLUTION INTRAVENOUS
Status: DISCONTINUED | OUTPATIENT
Start: 2022-06-29 | End: 2022-06-30

## 2022-06-29 RX ORDER — DEXTROSE MONOHYDRATE 25 G/50ML
25 INJECTION, SOLUTION INTRAVENOUS ONCE
Status: DISCONTINUED | OUTPATIENT
Start: 2022-06-29 | End: 2022-06-29 | Stop reason: HOSPADM

## 2022-06-29 RX ORDER — HYDROMORPHONE HCL IN WATER/PF 6 MG/30 ML
0.2 PATIENT CONTROLLED ANALGESIA SYRINGE INTRAVENOUS EVERY 4 HOURS PRN
Status: DISCONTINUED | OUTPATIENT
Start: 2022-06-29 | End: 2022-07-02

## 2022-06-29 RX ORDER — CALCIUM GLUCONATE 20 MG/ML
1 INJECTION, SOLUTION INTRAVENOUS ONCE
Status: DISCONTINUED | OUTPATIENT
Start: 2022-06-29 | End: 2022-06-29 | Stop reason: HOSPADM

## 2022-06-29 RX ORDER — SODIUM CHLORIDE, SODIUM GLUCONATE, SODIUM ACETATE, POTASSIUM CHLORIDE, MAGNESIUM CHLORIDE, SODIUM PHOSPHATE, DIBASIC, AND POTASSIUM PHOSPHATE .53; .5; .37; .037; .03; .012; .00082 G/100ML; G/100ML; G/100ML; G/100ML; G/100ML; G/100ML; G/100ML
75 INJECTION, SOLUTION INTRAVENOUS CONTINUOUS
Status: DISCONTINUED | OUTPATIENT
Start: 2022-06-29 | End: 2022-06-29

## 2022-06-29 RX ORDER — HEPARIN SODIUM 5000 [USP'U]/ML
5000 INJECTION, SOLUTION INTRAVENOUS; SUBCUTANEOUS EVERY 8 HOURS SCHEDULED
Status: DISCONTINUED | OUTPATIENT
Start: 2022-06-29 | End: 2022-07-02

## 2022-06-29 RX ORDER — PANTOPRAZOLE SODIUM 40 MG/1
40 TABLET, DELAYED RELEASE ORAL
Status: DISCONTINUED | OUTPATIENT
Start: 2022-06-29 | End: 2022-06-29

## 2022-06-29 RX ORDER — LIDOCAINE HYDROCHLORIDE 10 MG/ML
INJECTION, SOLUTION EPIDURAL; INFILTRATION; INTRACAUDAL; PERINEURAL AS NEEDED
Status: DISCONTINUED | OUTPATIENT
Start: 2022-06-29 | End: 2022-06-29 | Stop reason: HOSPADM

## 2022-06-29 RX ORDER — NOREPINEPHRINE BITARTRATE 1 MG/ML
INJECTION, SOLUTION INTRAVENOUS
Status: DISPENSED
Start: 2022-06-29 | End: 2022-06-29

## 2022-06-29 RX ORDER — ATROPINE SULFATE 0.1 MG/ML
0.5 INJECTION INTRAVENOUS ONCE
Status: COMPLETED | OUTPATIENT
Start: 2022-06-29 | End: 2022-06-29

## 2022-06-29 RX ORDER — AMOXICILLIN 250 MG
1 CAPSULE ORAL
Status: DISCONTINUED | OUTPATIENT
Start: 2022-06-29 | End: 2022-07-05 | Stop reason: HOSPADM

## 2022-06-29 RX ORDER — ATROPINE SULFATE 0.1 MG/ML
INJECTION INTRAVENOUS
Status: COMPLETED
Start: 2022-06-29 | End: 2022-06-29

## 2022-06-29 RX ORDER — FENTANYL CITRATE 50 UG/ML
INJECTION, SOLUTION INTRAMUSCULAR; INTRAVENOUS AS NEEDED
Status: DISCONTINUED | OUTPATIENT
Start: 2022-06-29 | End: 2022-06-29

## 2022-06-29 RX ORDER — LACTULOSE 20 G/30ML
10 SOLUTION ORAL 2 TIMES DAILY
Status: DISCONTINUED | OUTPATIENT
Start: 2022-06-29 | End: 2022-07-05 | Stop reason: HOSPADM

## 2022-06-29 RX ORDER — VANCOMYCIN HYDROCHLORIDE 1 G/200ML
1000 INJECTION, SOLUTION INTRAVENOUS ONCE
Status: COMPLETED | OUTPATIENT
Start: 2022-06-29 | End: 2022-06-29

## 2022-06-29 RX ORDER — SODIUM CHLORIDE, SODIUM GLUCONATE, SODIUM ACETATE, POTASSIUM CHLORIDE, MAGNESIUM CHLORIDE, SODIUM PHOSPHATE, DIBASIC, AND POTASSIUM PHOSPHATE .53; .5; .37; .037; .03; .012; .00082 G/100ML; G/100ML; G/100ML; G/100ML; G/100ML; G/100ML; G/100ML
1000 INJECTION, SOLUTION INTRAVENOUS ONCE
Status: COMPLETED | OUTPATIENT
Start: 2022-06-29 | End: 2022-06-29

## 2022-06-29 RX ORDER — HEPARIN SODIUM 5000 [USP'U]/ML
5000 INJECTION, SOLUTION INTRAVENOUS; SUBCUTANEOUS EVERY 8 HOURS SCHEDULED
Status: DISCONTINUED | OUTPATIENT
Start: 2022-06-29 | End: 2022-06-29

## 2022-06-29 RX ORDER — PROPOFOL 10 MG/ML
INJECTION, EMULSION INTRAVENOUS CONTINUOUS PRN
Status: DISCONTINUED | OUTPATIENT
Start: 2022-06-29 | End: 2022-06-29

## 2022-06-29 RX ORDER — OXYCODONE HYDROCHLORIDE 5 MG/1
5 TABLET ORAL EVERY 4 HOURS PRN
Status: DISCONTINUED | OUTPATIENT
Start: 2022-06-29 | End: 2022-07-05 | Stop reason: HOSPADM

## 2022-06-29 RX ORDER — LIDOCAINE HYDROCHLORIDE 10 MG/ML
INJECTION, SOLUTION EPIDURAL; INFILTRATION; INTRACAUDAL; PERINEURAL
Status: DISPENSED
Start: 2022-06-29 | End: 2022-06-29

## 2022-06-29 RX ORDER — ACETAMINOPHEN 325 MG/1
975 TABLET ORAL EVERY 8 HOURS SCHEDULED
Status: DISCONTINUED | OUTPATIENT
Start: 2022-06-29 | End: 2022-07-02

## 2022-06-29 RX ORDER — ONDANSETRON 2 MG/ML
4 INJECTION INTRAMUSCULAR; INTRAVENOUS ONCE
Status: COMPLETED | OUTPATIENT
Start: 2022-06-29 | End: 2022-06-29

## 2022-06-29 RX ORDER — ONDANSETRON 2 MG/ML
4 INJECTION INTRAMUSCULAR; INTRAVENOUS EVERY 4 HOURS PRN
Status: DISCONTINUED | OUTPATIENT
Start: 2022-06-29 | End: 2022-07-05 | Stop reason: HOSPADM

## 2022-06-29 RX ORDER — FENTANYL CITRATE 50 UG/ML
25 INJECTION, SOLUTION INTRAMUSCULAR; INTRAVENOUS ONCE
Status: DISCONTINUED | OUTPATIENT
Start: 2022-06-29 | End: 2022-06-29 | Stop reason: HOSPADM

## 2022-06-29 RX ORDER — POLYETHYLENE GLYCOL 3350 17 G/17G
17 POWDER, FOR SOLUTION ORAL DAILY PRN
Status: DISCONTINUED | OUTPATIENT
Start: 2022-06-29 | End: 2022-07-05 | Stop reason: HOSPADM

## 2022-06-29 RX ORDER — DOBUTAMINE HYDROCHLORIDE 200 MG/100ML
5 INJECTION INTRAVENOUS CONTINUOUS
Status: DISCONTINUED | OUTPATIENT
Start: 2022-06-29 | End: 2022-06-29

## 2022-06-29 RX ORDER — EPINEPHRINE 1 MG/ML
INJECTION, SOLUTION, CONCENTRATE INTRAVENOUS
Status: DISPENSED
Start: 2022-06-29 | End: 2022-06-29

## 2022-06-29 RX ORDER — CALCIUM GLUCONATE 20 MG/ML
1 INJECTION, SOLUTION INTRAVENOUS ONCE
Status: COMPLETED | OUTPATIENT
Start: 2022-06-29 | End: 2022-06-29

## 2022-06-29 RX ORDER — FENTANYL CITRATE 50 UG/ML
50 INJECTION, SOLUTION INTRAMUSCULAR; INTRAVENOUS ONCE
Status: DISCONTINUED | OUTPATIENT
Start: 2022-06-29 | End: 2022-06-29

## 2022-06-29 RX ORDER — GENTAMICIN SULFATE 40 MG/ML
INJECTION, SOLUTION INTRAMUSCULAR; INTRAVENOUS AS NEEDED
Status: DISCONTINUED | OUTPATIENT
Start: 2022-06-29 | End: 2022-06-29 | Stop reason: HOSPADM

## 2022-06-29 RX ORDER — OXYCODONE HYDROCHLORIDE 5 MG/1
2.5 TABLET ORAL EVERY 4 HOURS PRN
Status: DISCONTINUED | OUTPATIENT
Start: 2022-06-29 | End: 2022-07-05 | Stop reason: HOSPADM

## 2022-06-29 RX ORDER — SODIUM CHLORIDE 9 MG/ML
INJECTION, SOLUTION INTRAVENOUS CONTINUOUS PRN
Status: DISCONTINUED | OUTPATIENT
Start: 2022-06-29 | End: 2022-06-29

## 2022-06-29 RX ORDER — DOPAMINE HYDROCHLORIDE 160 MG/100ML
1-10 INJECTION, SOLUTION INTRAVENOUS
Status: DISCONTINUED | OUTPATIENT
Start: 2022-06-29 | End: 2022-06-29

## 2022-06-29 RX ADMIN — ALBUTEROL SULFATE 10 MG: 2.5 SOLUTION RESPIRATORY (INHALATION) at 01:14

## 2022-06-29 RX ADMIN — SODIUM CHLORIDE, SODIUM GLUCONATE, SODIUM ACETATE, POTASSIUM CHLORIDE, MAGNESIUM CHLORIDE, SODIUM PHOSPHATE, DIBASIC, AND POTASSIUM PHOSPHATE 1000 ML: .53; .5; .37; .037; .03; .012; .00082 INJECTION, SOLUTION INTRAVENOUS at 10:00

## 2022-06-29 RX ADMIN — HEPARIN SODIUM 5000 UNITS: 5000 INJECTION INTRAVENOUS; SUBCUTANEOUS at 21:54

## 2022-06-29 RX ADMIN — DOBUTAMINE HYDROCHLORIDE 5 MCG/KG/MIN: 200 INJECTION INTRAVENOUS at 05:44

## 2022-06-29 RX ADMIN — VANCOMYCIN HYDROCHLORIDE 1000 MG: 1 INJECTION, SOLUTION INTRAVENOUS at 17:38

## 2022-06-29 RX ADMIN — DOPAMINE HYDROCHLORIDE IN DEXTROSE 5 MCG/KG/MIN: 1.6 INJECTION, SOLUTION INTRAVENOUS at 06:08

## 2022-06-29 RX ADMIN — IOHEXOL 65 ML: 350 INJECTION, SOLUTION INTRAVENOUS at 01:16

## 2022-06-29 RX ADMIN — HEPARIN SODIUM 5000 UNITS: 5000 INJECTION INTRAVENOUS; SUBCUTANEOUS at 14:04

## 2022-06-29 RX ADMIN — HYDROMORPHONE HYDROCHLORIDE 0.2 MG: 0.2 INJECTION, SOLUTION INTRAMUSCULAR; INTRAVENOUS; SUBCUTANEOUS at 21:53

## 2022-06-29 RX ADMIN — SODIUM CHLORIDE 8 UNITS/HR: 9 INJECTION, SOLUTION INTRAVENOUS at 14:18

## 2022-06-29 RX ADMIN — FENTANYL CITRATE 12.5 MCG: 50 INJECTION INTRAMUSCULAR; INTRAVENOUS at 17:53

## 2022-06-29 RX ADMIN — SODIUM CHLORIDE 1000 ML: 0.9 INJECTION, SOLUTION INTRAVENOUS at 00:34

## 2022-06-29 RX ADMIN — ATROPINE SULFATE 0.5 MG: 0.1 INJECTION INTRAVENOUS at 05:10

## 2022-06-29 RX ADMIN — FENTANYL CITRATE 12.5 MCG: 50 INJECTION INTRAMUSCULAR; INTRAVENOUS at 18:00

## 2022-06-29 RX ADMIN — PERFLUTREN 0.6 ML/MIN: 6.52 INJECTION, SUSPENSION INTRAVENOUS at 08:10

## 2022-06-29 RX ADMIN — SODIUM CHLORIDE: 9 INJECTION, SOLUTION INTRAVENOUS at 17:23

## 2022-06-29 RX ADMIN — NOREPINEPHRINE BITARTRATE 3 MCG/MIN: 1 INJECTION, SOLUTION, CONCENTRATE INTRAVENOUS at 11:15

## 2022-06-29 RX ADMIN — PANTOPRAZOLE SODIUM 40 MG: 40 INJECTION, POWDER, FOR SOLUTION INTRAVENOUS at 11:30

## 2022-06-29 RX ADMIN — CALCIUM GLUCONATE 1 G: 20 INJECTION, SOLUTION INTRAVENOUS at 01:18

## 2022-06-29 RX ADMIN — SODIUM CHLORIDE 1000 ML: 0.9 INJECTION, SOLUTION INTRAVENOUS at 02:41

## 2022-06-29 RX ADMIN — ONDANSETRON 4 MG: 2 INJECTION INTRAMUSCULAR; INTRAVENOUS at 06:25

## 2022-06-29 RX ADMIN — ONDANSETRON 4 MG: 2 INJECTION INTRAMUSCULAR; INTRAVENOUS at 07:31

## 2022-06-29 RX ADMIN — SODIUM CHLORIDE, SODIUM GLUCONATE, SODIUM ACETATE, POTASSIUM CHLORIDE, MAGNESIUM CHLORIDE, SODIUM PHOSPHATE, DIBASIC, AND POTASSIUM PHOSPHATE 75 ML/HR: .53; .5; .37; .037; .03; .012; .00082 INJECTION, SOLUTION INTRAVENOUS at 03:41

## 2022-06-29 RX ADMIN — INSULIN HUMAN 10 UNITS: 100 INJECTION, SOLUTION PARENTERAL at 01:14

## 2022-06-29 RX ADMIN — INSULIN LISPRO 6 UNITS: 100 INJECTION, SOLUTION INTRAVENOUS; SUBCUTANEOUS at 06:44

## 2022-06-29 RX ADMIN — PROPOFOL 20 MCG/KG/MIN: 10 INJECTION, EMULSION INTRAVENOUS at 17:53

## 2022-06-29 NOTE — PROGRESS NOTES
Acceptance note - [address] [facility] Ken 77 y o  female MRN: [id number]  Unit/Bed#: ICU 07 Encounter: [id number]    Reason for Admission / Chief Complaint:   Chief Complaint   Patient presents with    Fall     Trauma transfer- see trauma charting        History of Present Illness:  Juana Butler is a 77 y o  female who presents with humeral fracture and heart block  Initially seen at [facility] due to fall  Patient was walking in flip flops, tripped and fell on her left arm  Patient with no LOC but unsure as to whether she hit her head  While being evaluated at [facility] patient was noted to be in complete heart block and X-ray of her left shoulder showed a displaced left humeral head fracture  Patient was transferred to [facility] for further management of her humeral fracture and heart block  History obtained from chart review      Past Medical History:  Past Medical History:   Diagnosis Date    Anemia     [facility] [address] [facility]     Diabetic neuropathy [facility] [address] [facility]     Esophageal varices (HCC)     Fatty liver     GERD (gastroesophageal reflux disease)     Hepatic encephalopathy (HCC)     Hiatal hernia     Hyperlipidemia     Hypertension     Hypoglycemia     Hypothyroidism     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)     Osteoarthritis     Osteopenia     Pancytopenia (HCC)     Thrombocytopenia (HCC)     Type 2 diabetes mellitus (HCC)        Past Surgical History:  Past Surgical History:   Procedure Laterality Date    ABDOMINAL SURGERY      Abdominal plasty with mesh insertion    CATARACT EXTRACTION, BILATERAL      CATARACT EXTRACTION, BILATERAL      COLONOSCOPY      EGD AND COLONOSCOPY  03/18/2015    IR BIOPSY BONE MARROW  8/24/2021    LAPAROSCOPIC CHOLECYSTECTOMY      SHOULDER SURGERY Right     calcium depsoti    TUBAL LIGATION      UMBILICAL HERNIA REPAIR      with mesh placed    UPPER GASTROINTESTINAL ENDOSCOPY         Past Family History:  Family History   Problem Relation Age of Onset   Prasanth Peterson Breast cancer Mother     Diabetes type II Father     Thyroid disease unspecified Daughter     Down syndrome Daughter     Diabetes type II Daughter     Diabetes type II Sister     No Known Problems Brother     Prostate cancer Brother     No Known Problems Sister     Stroke Sister     No Known Problems Son     Breast cancer Maternal Aunt     Colon cancer Neg Hx        Social History:  Social History     Tobacco Use   Smoking Status Never Smoker   Smokeless Tobacco Never Used     Social History     Substance and Sexual Activity   Alcohol Use Not Currently     Social History     Substance and Sexual Activity   Drug Use No     Marital Status:        Medications:  Current Facility-Administered Medications   Medication Dose Route Frequency    acetaminophen (TYLENOL) tablet 975 mg  975 mg Oral Q8H [address]    docusate sodium (COLACE) capsule 100 mg  100 mg Oral BID    DOPamine (INTROPIN) 400 mg in 250 mL infusion (premix)  1-10 mcg/kg/min Intravenous Titrated    heparin (porcine) subcutaneous injection 5,000 Units  5,000 Units Subcutaneous Q8H [address]    HYDROmorphone HCl (DILAUDID) injection 0 2 mg  0 2 mg Intravenous Q4H PRN    insulin lispro (HumaLOG) 100 units/mL subcutaneous injection 1-6 Units  1-6 Units Subcutaneous Q6H [address]    lactulose oral solution 10 g  10 g Oral BID    multi-electrolyte (PLASMALYTE-A/ISOLYTE-S PH 7 4) IV solution  75 mL/hr Intravenous Continuous    naloxone (NARCAN) 0 04 mg/mL syringe 0 04 mg  0 04 mg Intravenous Q1MIN PRN    ondansetron (ZOFRAN) injection 4 mg  4 mg Intravenous Q4H PRN    oxyCODONE (ROXICODONE) IR tablet 2 5 mg  2 5 mg Oral Q4H PRN    oxyCODONE (ROXICODONE) IR tablet 5 mg  5 mg Oral Q4H PRN    pantoprazole (PROTONIX) EC tablet 40 mg  40 mg Oral Early Morning    polyethylene glycol (MIRALAX) packet 17 g  17 g Oral Daily PRN    rifaximin (XIFAXAN) tablet 550 mg  550 mg Oral Q12H [address]    senna-docusate sodium (SENOKOT S) 8 6-50 mg per tablet 1 tablet  1 tablet Oral HS     Home medications:  Prior to Admission medications    Medication Sig Start Date End Date Taking? Authorizing Provider   aspirin 81 mg chewable tablet Chew 81 mg Every 3 days    Historical Provider, MD HARRELL UF III MINI PEN NEEDLES 31G X 5 MM MISC USE UP TO 3 TIMES A DAY 11/30/21   Too Saltness, CRNP   Basaglar KwikPen 100 units/mL injection pen 48 units in am and 36 units in pm, up to 90 units daily 3/4/22   Janna Mann MD   Blood Glucose Monitoring Suppl (ONE TOUCH ULTRA 2) w/Device KIT by Does not apply route once for 1 dose Use Glucometer to test up to 3 times daily  9/28/20 4/18/22  Janna Mann MD   calcium citrate-Vitamin D ([employer]S Calcium Citrate+D3 Petites) 200 mg-250 units Take 1 tablet by mouth daily with breakfast    Historical Provider, MD   cholecalciferol (VITAMIN D3) 1,000 units tablet Take 2,000 Units by mouth daily    Patient not taking: Reported on 6/17/2022    Historical Provider, MD   Coenzyme Q10 (Co Q10) 100 MG CAPS Take by mouth    Historical Provider, MD   escitalopram (LEXAPRO) 5 mg tablet Take 5 mg by mouth daily    Historical Provider, MD   fluticasone (FLONASE) 50 mcg/act nasal spray 1 spray into each nostril daily prn 11/21/11   Historical Provider, MD   furosemide (LASIX) 40 mg tablet TAKE 1 TABLET BY MOUTH EVERY DAY 12/6/21   Tamie Balderas MD   glimepiride (AMARYL) 4 mg tablet Take 2 tablets (8 mg total) by mouth daily with breakfast 11/13/21   Janna Mann MD   Janumet XR  MG TB24 TAKE 1 TABLET BY MOUTH TWICE A DAY 2/14/22   Janna Mann MD   KRILL OIL PO Take 750 mg by mouth    Historical Provider, MD   lactulose (CHRONULAC) 10 g/15 mL solution TAKE 15 ML (10 G TOTAL) BY MOUTH 2 (TWO) TIMES A DAY 2/15/22   Tamie Balderas MD   levothyroxine 125 mcg tablet Take 1 tablet 6 days a week and half a tablet on Sunday 6/17/22   Sushil Kessler PA-C   Lidocaine 5 % CREA Apply topically    Historical Provider, MD   nadolol (CORGARD) 20 mg tablet Take 1 tablet (20 mg total) by mouth daily 3/16/21   Abida Cardozo MD   omeprazole (PriLOSEC) 20 mg delayed release capsule TAKE 1 CAPSULE BY MOUTH EVERY DAY 7/30/21   JOSE JUAN Gentile   [facility] DELICA LANCETS FINE MISC Use 1-3 times a day 1/18/19   Graciela Gleason MD   OneTouch Ultra test strip TEST UP TO 3 TIMES DAILY  1/21/22   Graciela Gleason MD   polyethylene glycol (MIRALAX) 17 g packet Take 17 gm (1 scoop) daily 6/12/22   Abida Cardozo MD   rifaximin (Xifaxan) 550 mg tablet Take 1 tablet (550 mg total) by mouth every 12 (twelve) hours 5/10/22 4/9/23  JOSE JUAN Landin   simvastatin (ZOCOR) 40 mg tablet Take 40 mg by mouth daily 6/1/18   Lorenza Theodore MD   spironolactone (ALDACTONE) 50 mg tablet Take 1 tablet (50 mg total) by mouth daily 6/27/22   Abida Cardozo MD     Allergies: Allergies   Allergen Reactions    Bee Venom Swelling    Latex     Sesame Seed (Diagnostic) - Food Allergy      Other reaction(s): swelling inside mouth    Strawberry C [Ascorbate - Food Allergy] Other (See Comments)     Mouth sores    Penicillins Rash       ROS:   Review of Systems   Constitutional: Negative for chills, diaphoresis, fever and unexpected weight change  HENT: Negative for ear pain and sore throat  Eyes: Negative for visual disturbance  Respiratory: Negative for cough, chest tightness and shortness of breath  Cardiovascular: Negative for chest pain and leg swelling  Gastrointestinal: Positive for nausea  Negative for abdominal distention, abdominal pain, constipation, diarrhea and vomiting  Endocrine: Negative  Genitourinary: Negative for difficulty urinating and dysuria  Musculoskeletal:        Left shoulder pain    Skin: Negative  Allergic/Immunologic: Negative  Neurological: Negative  Negative for weakness and light-headedness  Hematological: Negative  Psychiatric/Behavioral: Negative  All other systems reviewed and are negative         Vitals:  Vitals:    06/29/22 0330 22 0332 22 0415 22 0503   BP: 152/65  141/64 105/50   BP Location:    Right arm   Pulse: (!) 54  56 (!) 26   Resp: 22  22 (!) 23   Temp:  97 5 °F (36 4 °C)  97 9 °F (36 6 °C)   TempSrc:  Tympanic  Oral   SpO2: 96%  94% 97%   Weight:    95 7 kg (210 lb 15 7 oz)   Height:    5' 3" (1 6 m)     Temperature:   Temp (24hrs), Av 6 °F (36 4 °C), Min:97 3 °F (36 3 °C), Max:97 9 °F (36 6 °C)    Current Temperature: 97 9 °F (36 6 °C)    Weights:      Body mass index is 37 37 kg/m²  Hemodynamic Monitoring:  N/A     Non-Invasive/Invasive Ventilation Settings:  Respiratory  Report   Lab Data (Last 4 hours)    None         O2/Vent Data (Last 4 hours)    None              No results found for: PHART, MSO4AKY, PO2ART, GEQ5HTK, D2YNLPVL, BEART, SOURCE  SpO2: SpO2: 97 %     Physical Exam:  Physical Exam  Vitals and nursing note reviewed  Constitutional:       General: She is not in acute distress  HENT:      Head: Normocephalic and atraumatic  Right Ear: External ear normal       Left Ear: External ear normal    Eyes:      Conjunctiva/sclera: Conjunctivae normal       Pupils: Pupils are equal, round, and reactive to light  Neck:      Thyroid: No thyromegaly  Cardiovascular:      Rate and Rhythm: Normal rate and regular rhythm  Heart sounds: Normal heart sounds  No murmur heard  No friction rub  No gallop  Pulmonary:      Effort: Pulmonary effort is normal  No respiratory distress  Breath sounds: Normal breath sounds  Abdominal:      General: Bowel sounds are normal  There is no distension  Palpations: Abdomen is soft  Tenderness: There is no abdominal tenderness  Musculoskeletal:         General: Tenderness (Left upper arm) present  No deformity  Cervical back: Normal range of motion  Skin:     General: Skin is warm and dry  Neurological:      General: No focal deficit present  Mental Status: She is alert and oriented to person, place, and time   Mental status is at baseline  Cranial Nerves: No cranial nerve deficit  Sensory: No sensory deficit  Motor: No weakness  Deep Tendon Reflexes: Reflexes are normal and symmetric  Psychiatric:         Behavior: Behavior normal          Thought Content: Thought content normal          Judgment: Judgment normal           Labs:  Results from last 7 days   Lab Units 06/29/22  0236 06/29/22  0028   WBC Thousand/uL  --  7 86   HEMOGLOBIN g/dL  --  9 0*   I STAT HEMOGLOBIN g/dl 9 2*  --    HEMATOCRIT %  --  30 1*   HEMATOCRIT, ISTAT % 27*  --    PLATELETS Thousands/uL  --  92*   NEUTROS PCT %  --  80*   MONOS PCT %  --  7     Results from last 7 days   Lab Units 06/29/22  0411 06/29/22  0236 06/29/22  0226 06/29/22  0028   SODIUM mmol/L 138  --  134* 133*   POTASSIUM mmol/L 4 8  --  4 7 5 7*   CHLORIDE mmol/L 107  --  103 102   CO2 mmol/L 21  --  25 23   CO2, I-STAT mmol/L  --  25  --   --    ANION GAP mmol/L 10  --  6 8   BUN mg/dL 56*  --  56* 51*   CREATININE mg/dL 1 81*  --  2 08* 2 19*   CALCIUM mg/dL 9 3  --  9 2 9 0   ALT U/L  --   --   --  28   AST U/L  --   --   --  28   ALK PHOS U/L  --   --   --  69   ALBUMIN g/dL  --   --   --  3 0*   TOTAL BILIRUBIN mg/dL  --   --   --  0 70     Results from last 7 days   Lab Units 06/29/22  0411   MAGNESIUM mg/dL 2 4   PHOSPHORUS mg/dL 2 9      Results from last 7 days   Lab Units 06/29/22  0029   INR  1 20*   PTT seconds 32         Results from last 7 days   Lab Units 06/29/22  0040   LACTIC ACID mmol/L 3 0*     ABG:No results found for: PHART, PXN1KMO, PO2ART, OSY6IRG, R1JNFYGR, BEART, SOURCE  VBG:            Imaging:  I have personally reviewed pertinent reports  EKG: This was personally reviewed by myself  Micro:        ______________________________________________________________________    Assessment:   Active Problems:    * No active hospital problems  *  Resolved Problems:    * No resolved hospital problems  *        Plan:    · Neuro:   Dx:  No active issues  - Analgesia: scheduled tylenol and prn dilaudid and oxycodone   - Sedation: None   Dx: Hx of depression   - Home med: Lexapro   - Delirium ppx: CAM-ICU, sleep hygiene      · CV:   Dx: Complete Heartblock   - Cardiac workup with negative troponin    - Continue cardiac monitoring    - EP consulted    - Avoid any AV tammy blockade agent    - Holding home beta blocker    Dx: Elevated BNP and hx of generalized edema   - Potential undiagnosed CHF    - Assess for LV dysfunction   - Consider restarting home dose lasix   - Hemodynamic support: None   - MAP goal >65      · Lung:   Dx: No active issues   - Continue respiratory protocol and airway clearance protocl     · GI:   Dx: Cirrhosis w/ hx of hepatic encephalopathy    - Continue Lactulose and rifaximin   Dx: GERD   - Protonix  - Bowel regimen: Colace, Miralax, Senokot     · FEN:   - Fluids: Maintenance fluid  - Electrolytes: Trend and replete as needed   - Lactate 3 0     - IV hydration initiated  - Nutrition: NPO     · :   - Dx: CLYDE   - Creatinine 2 08 (Baseline 1)    - Fluid hydration   - Monitor I&Os  - Monitor BUN/Cr    · ID:   - Dx: No active issues   - Monitor temp/WBC curve     · Heme:   Dx: Blood loss anemia   - Hb: 9 (Baseline Hb 12)    - Trend Hb  - DVT ppx: SQ heparin, SCDs    · Endo:   Dx: Hx of Diabetes   - Home med: Glimepiride   - On ISS    - Consider insulin gtt    - Monitor blood glucose     · Msk/Skin:   Dx: Humeral fracture   - Ortho consulted    - Non op management   - PT/OT  - Turning/Repositioning  - Wound care    · Disposition: Continue ICU care       ______________________________________________________________________    VTE Pharmacologic Prophylaxis: Heparin  VTE Mechanical Prophylaxis: sequential compression device    Invasive lines and devices:   Invasive Devices  Report    Peripheral Intravenous Line  Duration           Peripheral IV 06/29/22 Right Antecubital <1 day    Peripheral IV 06/29/22 Right Hand <1 day          Drain Duration           External Urinary Catheter <1 day                Code Status: Level 1 - Full Code  POA:    POLST:      Given critical illness, patient length of stay will require greater than two midnights  Portions of the record may have been created with voice recognition software  Occasional wrong word or "sound a like" substitutions may have occurred due to the inherent limitations of voice recognition software  Read the chart carefully and recognize, using context, where substitutions have occurred        Milan Morataya MD

## 2022-06-29 NOTE — CONSULTS
Orthopedics   Dee Bucio 77 y o  female MRN: [id number]  Unit/Bed#: ED 14      Chief Complaint:   left arm and shoulder pain    HPI:   77 y o  right hand dominant female status post fall complaining of left shoulder pain  She states she slipped and fell on grass and had immediate shoulder pain  She has no numbness or tingling of the left upper extremity, but does have pain with shoulder ROM  She was transferred from [facility] for her shoulder and further evaluation of her complete heart block  She ambulates using a scooter      Review Of Systems:   · Skin: Normal  · Neuro: See HPI  · Musculoskeletal: See HPI  · 14 point review of systems negative except as stated above     Past Medical History:   Past Medical History:   Diagnosis Date    Anemia     [facility] [address] [facility]     Diabetic neuropathy ([facility] [address] [facility]     Esophageal varices (HCC)     Fatty liver     GERD (gastroesophageal reflux disease)     Hepatic encephalopathy (HCC)     Hiatal hernia     Hyperlipidemia     Hypertension     Hypoglycemia     Hypothyroidism     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)     Osteoarthritis     Osteopenia     Pancytopenia (HCC)     Thrombocytopenia (HCC)     Type 2 diabetes mellitus (HCC)        Past Surgical History:   Past Surgical History:   Procedure Laterality Date    ABDOMINAL SURGERY      Abdominal plasty with mesh insertion    CATARACT EXTRACTION, BILATERAL      CATARACT EXTRACTION, BILATERAL      COLONOSCOPY      EGD AND COLONOSCOPY  03/18/2015    IR BIOPSY BONE MARROW  8/24/2021    LAPAROSCOPIC CHOLECYSTECTOMY      SHOULDER SURGERY Right     calcium depsoti    TUBAL LIGATION      UMBILICAL HERNIA REPAIR      with mesh placed    UPPER GASTROINTESTINAL ENDOSCOPY         Family History:  Family history reviewed and non-contributory  Family History   Problem Relation Age of Onset    Breast cancer Mother     Diabetes type II Father     Thyroid disease unspecified Daughter     Down syndrome Daughter     Diabetes type II Daughter     Diabetes type II Sister     No Known Problems Brother     Prostate cancer Brother     No Known Problems Sister     Stroke Sister     No Known Problems Son     Breast cancer Maternal Aunt     Colon cancer Neg Hx        Social History:  Social History     Socioeconomic History    Marital status:      Spouse name: None    Number of children: None    Years of education: None    Highest education level: None   Occupational History    None   Tobacco Use    Smoking status: Never Smoker    Smokeless tobacco: Never Used   Vaping Use    Vaping Use: Never used   Substance and Sexual Activity    Alcohol use: Not Currently    Drug use: No    Sexual activity: Not Currently   Other Topics Concern    None   Social History Narrative    None     Social Determinants of Health     Financial Resource Strain: Not on file   Food Insecurity: Not on file   Transportation Needs: Not on file   Physical Activity: Not on file   Stress: Not on file   Social Connections: Not on file   Intimate Partner Violence: Not on file   Housing Stability: Not on file       Allergies:    Allergies   Allergen Reactions    Bee Venom Swelling    Latex     Sesame Seed (Diagnostic) - Food Allergy      Other reaction(s): swelling inside mouth    Strawberry C [Ascorbate - Food Allergy] Other (See Comments)     Mouth sores    Penicillins Rash           Labs:  0   Lab Value Date/Time    HCT 27 (L) 06/29/2022 0236    HCT 30 1 (L) 06/29/2022 0028    HCT 33 3 (L) 04/20/2022 0812    HCT 35 4 03/11/2022 1000    HCT 38 6 10/27/2015 0851    HGB 9 2 (L) 06/29/2022 0236    HGB 9 0 (L) 06/29/2022 0028    HGB 10 3 (L) 04/20/2022 0812    HGB 11 3 (L) 03/11/2022 1000    HGB 12 6 10/27/2015 0851    INR 1 20 (H) 06/29/2022 0029    INR 1 08 10/27/2015 0851    WBC 7 86 06/29/2022 0028    WBC 3 12 (L) 04/20/2022 0812    WBC 2 98 (L) 03/11/2022 1000    WBC 5 26 10/27/2015 0851       Meds:    Current Facility-Administered Medications:     acetaminophen (TYLENOL) tablet 975 mg, 975 mg, Oral, Q8H [address], Cori Neither, DO    docusate sodium (COLACE) capsule 100 mg, 100 mg, Oral, BID, Cori Neither, DO    heparin (porcine) subcutaneous injection 5,000 Units, 5,000 Units, Subcutaneous, Q8H [address] **AND** Platelet count, , , Once, Cori Neither, DO    HYDROmorphone HCl (DILAUDID) injection 0 2 mg, 0 2 mg, Intravenous, Q4H PRN, Cori Neither, DO    insulin lispro (HumaLOG) 100 units/mL subcutaneous injection 1-6 Units, 1-6 Units, Subcutaneous, Q6H [address] **AND** Fingerstick Glucose (POCT), , , Q6H, Cori Neither, DO    multi-electrolyte (PLASMALYTE-A/ISOLYTE-S PH 7 4) IV solution, 75 mL/hr, Intravenous, Continuous, Cori Neither, DO, Last Rate: 75 mL/hr at 06/29/22 0341, 75 mL/hr at 06/29/22 0341    naloxone (NARCAN) 0 04 mg/mL syringe 0 04 mg, 0 04 mg, Intravenous, Q1MIN PRN, Cori Neither, DO    [facility]ansetron [facility]) injection 4 mg, 4 mg, Intravenous, Q4H PRN, Cori Neither, DO    oxyCODONE (ROXICODONE) IR tablet 2 5 mg, 2 5 mg, Oral, Q4H PRN, Cori Neither, DO    oxyCODONE (ROXICODONE) IR tablet 5 mg, 5 mg, Oral, Q4H PRN, Cori Neither, DO    polyethylene glycol (MIRALAX) packet 17 g, 17 g, Oral, Daily PRN, Cori Neither, DO    senna-docusate sodium (SENOKOT S) 8 6-50 mg per tablet 1 tablet, 1 tablet, Oral, HS, Cori Neither, DO    Current Outpatient Medications:     aspirin 81 mg chewable tablet, Chew 81 mg Every 3 days, Disp: , Rfl:     B-D UF III MINI PEN NEEDLES 31G X 5 MM MISC, USE UP TO 3 TIMES A DAY, Disp: 100 each, Rfl: 6    Basaglar KwikPen 100 units/mL injection pen, 48 units in am and 36 units in pm, up to 90 units daily, Disp: 30 mL, Rfl: 6    Blood Glucose Monitoring Suppl (ONE TOUCH ULTRA 2) w/Device KIT, by Does not apply route once for 1 dose Use Glucometer to test up to 3 times daily  , Disp: 1 each, Rfl: 0    calcium citrate-Vitamin D ([employer]S Calcium Citrate+D3 Petites) 200 mg-250 units, Take 1 tablet by mouth daily with breakfast, Disp: , Rfl:     cholecalciferol (VITAMIN D3) 1,000 units tablet, Take 2,000 Units by mouth daily   (Patient not taking: Reported on 6/17/2022), Disp: , Rfl:     Coenzyme Q10 (Co Q10) 100 MG CAPS, Take by mouth, Disp: , Rfl:     escitalopram (LEXAPRO) 5 mg tablet, Take 5 mg by mouth daily, Disp: , Rfl:     fluticasone (FLONASE) 50 mcg/act nasal spray, 1 spray into each nostril daily prn, Disp: , Rfl:     furosemide (LASIX) 40 mg tablet, TAKE 1 TABLET BY MOUTH EVERY DAY, Disp: 90 tablet, Rfl: 1    glimepiride (AMARYL) 4 mg tablet, Take 2 tablets (8 mg total) by mouth daily with breakfast, Disp: 180 tablet, Rfl: 3    Janumet XR  MG TB24, TAKE 1 TABLET BY MOUTH TWICE A DAY, Disp: 180 tablet, Rfl: 3    KRILL OIL PO, Take 750 mg by mouth, Disp: , Rfl:     lactulose (CHRONULAC) 10 g/15 mL solution, TAKE 15 ML (10 G TOTAL) BY MOUTH 2 (TWO) TIMES A DAY, Disp: 2700 mL, Rfl: 2    levothyroxine 125 mcg tablet, Take 1 tablet 6 days a week and half a tablet on Sunday  , Disp: 90 tablet, Rfl: 2    Lidocaine 5 % CREA, Apply topically, Disp: , Rfl:     nadolol (CORGARD) 20 mg tablet, Take 1 tablet (20 mg total) by mouth daily, Disp: 30 tablet, Rfl: 2    omeprazole (PriLOSEC) 20 mg delayed release capsule, TAKE 1 CAPSULE BY MOUTH EVERY DAY, Disp: 90 capsule, Rfl: 3    ONETOUCH DELICA LANCETS FINE MISC, Use 1-3 times a day, Disp: 100 each, Rfl: 3    OneTouch Ultra test strip, TEST UP TO 3 TIMES DAILY  , Disp: 300 strip, Rfl: 3    polyethylene glycol (MIRALAX) 17 g packet, Take 17 gm (1 scoop) daily, Disp: 510 g, Rfl: 3    rifaximin (Xifaxan) 550 mg tablet, Take 1 tablet (550 mg total) by mouth every 12 (twelve) hours, Disp: 60 tablet, Rfl: 5    simvastatin (ZOCOR) 40 mg tablet, Take 40 mg by mouth daily, Disp: , Rfl: 3    spironolactone (ALDACTONE) 50 mg tablet, Take 1 tablet (50 mg total) by mouth daily, Disp: 90 tablet, Rfl: 1    Blood Culture:   Lab Results   Component Value Date    BLOODCX No Growth After 5 Days  09/14/2020       Wound Culture:   No results found for: WOUNDCULT    Ins and Outs:  No intake/output data recorded  Physical Exam:   /64   Pulse 56   Temp 97 5 °F (36 4 °C) (Tympanic)   Resp 22   SpO2 94%   Gen: Alert and oriented to person, place, time  HEENT: EOMI, eyes clear, moist mucus membranes, hearing intact  Respiratory: Bilateral chest rise  No audible wheezing found  Cardiovascular: Regular Rate and Rhythm  Abdomen: soft nontender/nondistended  Musculoskeletal: left upper extremity  · Skin intact, swelling noted over the shoulder  · Tender to palpation over shoulder and upper arm  · Sensation intact to radial, ulna, median, musculocutaneous, and axillary nerve distributions  · Motor intact to  radial, ulna, median, musculocutaneous, and axillary nerve distributions  · 2+ rad pulse      Radiology:   I personally reviewed the films  X-rays left humerus shows a displaced proximal humerus fracture  CT Chest abdomen pelvis demonstrates a displaced proximal humerus fracture with located glenohumeral joint     _*_*_*_*_*_*_*_*_*_*_*_*_*_*_*_*_*_*_*_*_*_*_*_*_*_*_*_*_*_*_*_*_*_*_*_*_*_*_*_*_*    Assessment:  77 y o  female S/P fall with left proximal humerus fracture  The patient may be managed in a sling and follow up with orthopedics as an outpatient      Plan:   · Sling placed  · Analgesics for pain  · NPO at midnight  · NonOp management of left proximal humeral fracture  · Dispo: Ortho will follow    Elton Pal MD

## 2022-06-29 NOTE — PLAN OF CARE
Problem: CARDIOVASCULAR - ADULT  Goal: Maintains optimal cardiac output and hemodynamic stability  Description: INTERVENTIONS:  - Monitor I/O, vital signs and rhythm  - Monitor for S/S and trends of decreased cardiac output  - Administer and titrate ordered vasoactive medications to optimize hemodynamic stability  - Assess quality of pulses, skin color and temperature  - Assess for signs of decreased coronary artery perfusion  - Instruct patient to report change in severity of symptoms  Outcome: Progressing           Problem: PAIN - ADULT  Goal: Verbalizes/displays adequate comfort level or baseline comfort level  Description: Interventions:  - Encourage patient to monitor pain and request assistance  - Assess pain using appropriate pain scale  - Administer analgesics based on type and severity of pain and evaluate response  - Implement non-pharmacological measures as appropriate and evaluate response  - Consider cultural and social influences on pain and pain management  - Notify physician/advanced practitioner if interventions unsuccessful or patient reports new pain  Outcome: Progressing

## 2022-06-29 NOTE — PROCEDURES
Transvenous Pacing Wire Insertion    Date/Time: 6/29/2022 9:53 AM  Performed by: Lisseth Pérez MD  Authorized by: Lisseth Pérez MD     Patient location:  Bedside and ICU  Other Assisting Provider: Yes (comment) Laurence Oviedo )    Consent:     Consent obtained:  Emergent situation    Consent given by:  Patient    Risks discussed:  Arterial puncture, pneumothorax, bleeding, hypotension and incorrect placement    Alternatives discussed:  No treatment and delayed treatment  Universal protocol:     Procedure explained and questions answered to patient or proxy's satisfaction: yes      Patient identity confirmed:  Hospital-assigned identification number  Pre-procedure details:     Hand hygiene: Hand hygiene performed prior to insertion      Sterile barrier technique: All elements of maximal sterile technique followed      Skin preparation:  2% chlorhexidine    Skin preparation agent: Skin preparation agent completely dried prior to procedure    Sedation:     Sedation type:  None  Anesthesia (see MAR for exact dosages):      Anesthesia method:  Local infiltration  Indications:     Temporary pacing indications:  Complete heart block  Procedure details:     Patient position:  Flat    Venous introducer: new sheath/introducer placed      Location:  Right internal jugular    Ultrasound guidance: yes      Ultrasound image availability:  Not saved    Sterile ultrasound techniques: Sterile gel and sterile probe covers were used      Successful placement: yes      Number of attempts:  1    Pacing catheter size:  5 Fr    TVP depth at introducer hub (cm):  33  Pacer settings:     Wire type: balloon tip      Ventricular site placed: right ventricle      Temporary pacer function: turned on      Rate:  70    Pacing capture:  100% capture  Post-procedure details:     TVP catheter securement: TVP catheter cover in place, TVP catheter hub locked and catheter hub secured      Post-procedure:  Line sutured and sterile dressing applied    Image confirmation:  No pneumothorax on x-ray and placement verified by x-ray    Post-procedure complications: none      Patient tolerance of procedure:   Tolerated well, no immediate complications    Observer: Yes      Observer name:  Dr Grover Rosenthal

## 2022-06-29 NOTE — TRAUMA DOCUMENTATION
Pt not taken to CT earlier due to IV access required prior to transfer  Once IV access was established, pt transported to CT with this RN  Pt taken to CT at 0050

## 2022-06-29 NOTE — CONSULTS
Consultation - Cardiology  Ángel Wolf 77 y o  female MRN: [id number]  Unit/Bed#: ICU 07 Encounter: [id number]    Inpatient consult to Electrophysiology  Consult performed by: Elie Dewey DO  Consult ordered by: Brandy Atkins DO        History of Present Illness   Physician Requesting Consult: Valerie Chapman MD  Reason for Consult / Principal Problem:     Assessment/Plan   Status post mechanical fall complicated by left proximal humeral fracture  -orthopedic following, non operative management is recommended    Complete heart block  -status post TVP from today  Chest x-ray was with a proper lead position but with episodes of non-capture  The following changes are made: Mode VVO, rate 80 beats per minute, V output 10 mA    -no gross electrolyte abnormalities, most likely progression of conduction disease secondary to the age  She was on nadolol as part of cirrhosis complicated by esophageal varices treatment at home  Will plan for permanent device placement today    Dilated cardiomyopathy with preserved ejection fraction  -BENITO from this admission showed motion abnormalities consistent with apex possibly in the setting of stress-induced cardiomyopathy  No previous echo to compare  Patient with multiple risk factors thus ischemic close need to be considered  Will have to decide on ischemic workup as currently with CLYDE which would preclude left heart catheterization  -currently mildly hypervolemic, patient takes Lasix and spironolactone at home  Can resume once creatinine is better    CLYDE on CKD stage 3  -presenting creatinine 2 19, slowly improving 1 81 this morning    Cirrhosis no secondary to PAUL  -at home on spironolactone, Lasix, nadolol  Can resume not level once get permanent device, spironolactone Lasix once CLYDE is improved      HPI: Ángel Wolf is a 77 y o  female with past medical history of type 2 diabetes mellitus, hypothyroidism, hypertension, hyperlipidemia, 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 (with esophageal varices, status post banding), CKD stage 3 presented after mechanical fall (was walking in flip-flops and slipped) to [facility] Katelyn Ahuja was found to have left proximal humeral fracture  This morning patient was hypotensive with complete heart block, status post atropine 0 5 mg, and underwent TVP placement by ICU team   She continued being hypotensive and was started on vasopressors (Levophed and epinephrine)  Labs:  Creatinine 2 19 (baseline creatinine around 1), hemoglobin 7 8 (baseline around 10-12), platelets 67 (That about a baseline) , INR 1 2, glucose 414, NT proBNP 1603   Trauma imaging consistent with left femoral head neck fracture    Home cardiac medications:  Spironolactone 50 mg daily, furosemide 40 mg daily, nadolol 20 mg daily, simvastatin 40 mg q day, aspirin 81 mg daily    Primary Cardiologist:  None    EKG:           TELE:  Initially with complete heart block with junctional rhythm, currently V paced rhythm at 80 beats per minute            Cardiac testing:   ECHO:  06/10/2022   Left Ventricle: Left ventricular cavity size is moderately dilated  Wall thickness is normal  There is eccentric hypertrophy  The left ventricular ejection fraction is 55%  Systolic function is normal  Diastolic function is abnormal     Left Atrium: The atrium is mildly dilated    Right Atrium: The atrium is mildly dilated    Mitral Valve: There is moderate annular calcification  The annulus is severely dilated  There is mild regurgitation    Tricuspid Valve: There is mild regurgitation    The following segments are akinetic: apical anterior, apical septal, apical inferior, apical lateral and apex      The following segments are hyperkinetic: basal anterior, basal anteroseptal, basal inferoseptal, basal inferior, basal inferolateral, basal anterolateral, mid anterior, mid anteroseptal, mid inferoseptal, mid inferior, mid inferolateral and mid anterolateral    Right Ventricle: Systolic function is moderately reduced      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  Patient in sinus rhythm with complete heart block and junctional escape with associated calcific changes of the aortic root and mitral annulus       Review of Systems  ROS as noted above, otherwise 12 point review of systems was performed and is negative       Historical Information   Past Medical History:   Diagnosis Date    Anemia     Cirrhosis ([address] )     Diabetic neuropathy (HCC)     Esophageal varices (HCC)     Fatty liver     GERD (gastroesophageal reflux disease)     Hepatic encephalopathy (HCC)     Hiatal hernia     Hyperlipidemia     Hypertension     Hypoglycemia     Hypothyroidism     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)     Osteoarthritis     Osteopenia     Pancytopenia (HCC)     Thrombocytopenia (HCC)     Type 2 diabetes mellitus (HCC)      Past Surgical History:   Procedure Laterality Date    ABDOMINAL SURGERY      Abdominal plasty with mesh insertion    CATARACT EXTRACTION, BILATERAL      CATARACT EXTRACTION, BILATERAL      COLONOSCOPY      EGD AND COLONOSCOPY  03/18/2015    IR BIOPSY BONE MARROW  8/24/2021    LAPAROSCOPIC CHOLECYSTECTOMY      SHOULDER SURGERY Right     calcium depsoti    TUBAL LIGATION      UMBILICAL HERNIA REPAIR      with mesh placed    UPPER GASTROINTESTINAL ENDOSCOPY       Social History     Substance and Sexual Activity   Alcohol Use Not Currently     Social History     Substance and Sexual Activity   Drug Use No     Social History     Tobacco Use   Smoking Status Never Smoker   Smokeless Tobacco Never Used     Family History: non-contributory    Meds/Allergies   Hospital Medications:   Current Facility-Administered Medications   Medication Dose Route Frequency    acetaminophen (TYLENOL) tablet 975 mg  975 mg Oral Q8H [address]    docusate sodium (COLACE) capsule 100 mg  100 mg Oral BID    EPINEPHrine 3000 mcg (STANDARD CONCENTRATION) IV in sodium chloride 0 9% 250 mL  1-10 mcg/min Intravenous Titrated    EPINEPHrine PF (ADRENALIN) 1 mg/mL injection **ADS Override Pull**        heparin (porcine) subcutaneous injection 5,000 Units  5,000 Units Subcutaneous Q8H [facility]    HYDROmorphone HCl (DILAUDID) injection 0 2 mg  0 2 mg Intravenous Q4H PRN    insulin regular (HumuLIN R,NovoLIN R) 1 Units/mL in sodium chloride 0 9 % 100 mL infusion  0 3-21 Units/hr Intravenous Titrated    lactulose oral solution 10 g  10 g Oral BID    lidocaine (PF) (XYLOCAINE-MPF) 1 % injection **ADS Override Pull**        multi-electrolyte (PLASMALYTE-A/ISOLYTE-S PH 7 4) IV solution  75 mL/hr Intravenous Continuous    naloxone (NARCAN) 0 04 mg/mL syringe 0 04 mg  0 04 mg Intravenous Q1MIN PRN    norepinephrine (LEVOPHED) 1 mg/mL injection **ADS Override Pull**        norepinephrine (LEVOPHED) 4 mg (STANDARD CONCENTRATION) IV in sodium chloride 0 9% 250 mL  1-30 mcg/min Intravenous Titrated    ondansetron (ZOFRAN) injection 4 mg  4 mg Intravenous Q4H PRN    oxyCODONE (ROXICODONE) IR tablet 2 5 mg  2 5 mg Oral Q4H PRN    oxyCODONE (ROXICODONE) IR tablet 5 mg  5 mg Oral Q4H PRN    pantoprazole (PROTONIX) injection 40 mg  40 mg Intravenous Q24H C[facility]    polyethylene glycol (MIRALAX) packet 17 g  17 g Oral Daily PRN    rifaximin (XIFAXAN) tablet 550 mg  550 mg Oral Q12H [facility]    senna-docusate sodium (SENOKOT S) 8 6-50 mg per tablet 1 tablet  1 tablet Oral HS    vancomycin (VANCOCIN) IVPB (premix in dextrose) 1,000 mg 200 mL  1,000 mg Intravenous Once     Home Medications:   Medications Prior to Admission   Medication    aspirin 81 mg chewable tablet    B-D UF III MINI PEN NEEDLES 31G X 5 MM MISC    Basaglar KwikPen 100 units/mL injection pen    calcium citrate-Vitamin D (CVS Calcium Citrate+D3 Petites) 200 mg-250 units    escitalopram (LEXAPRO) 5 mg tablet    furosemide (LASIX) 40 mg tablet    glimepiride (AMARYL) 4 mg tablet    Janumet XR  MG TB24    KRILL OIL PO    levothyroxine 125 mcg tablet    nadolol (CORGARD) 20 mg tablet    omeprazole (PriLOSEC) 20 mg delayed release capsule    polyethylene glycol (MIRALAX) 17 g packet    rifaximin (Xifaxan) 550 mg tablet    simvastatin (ZOCOR) 40 mg tablet    spironolactone (ALDACTONE) 50 mg tablet    Blood Glucose Monitoring Suppl (ONE TOUCH ULTRA 2) w/Device KIT    cholecalciferol (VITAMIN D3) 1,000 units tablet    Coenzyme Q10 (Co Q10) 100 MG CAPS    fluticasone (FLONASE) 50 mcg/act nasal spray    lactulose (CHRONULAC) 10 g/15 mL solution    Lidocaine 5 % CREA    ONETOUCH DELICA LANCETS FINE MISC    OneTouch Ultra test strip     Allergies   Allergen Reactions    Bee Venom Swelling    Latex     Sesame Seed (Diagnostic) - Food Allergy      Other reaction(s): swelling inside mouth    Strawberry C [Ascorbate - Food Allergy] Other (See Comments)     Mouth sores    Penicillins Rash     Objective   Vitals: Blood pressure 143/50, pulse 80, temperature 97 9 °F (36 6 °C), temperature source Oral, resp  rate (!) 24, height 5' 3" (1 6 m), weight 95 3 kg (210 lb), SpO2 95 %, not currently breastfeeding    Orthostatic Blood Pressures    Flowsheet Row Most Recent Value   Blood Pressure 143/50 filed at 06/29/2022 1120   Patient Position - Orthostatic VS Lying filed at 06/29/2022 0503        No intake or output data in the 24 hours ending 06/29/22 1147    Invasive Devices  Report    Central Venous Catheter Line  Duration           Introducer 06/29/22 Internal jugular Right <1 day          Peripheral Intravenous Line  Duration           Peripheral IV 06/29/22 Right Antecubital <1 day    Peripheral IV 06/29/22 Right Hand <1 day          Arterial Line  Duration           Arterial Line 06/29/22 Right Radial <1 day          Line  Duration           Pacer Wires <1 day          Drain  Duration           External Urinary Catheter <1 day              Physical Exam   GEN:severe distress  HEENT: NCAT, PERRL, EOMs intact  NECK: No JVD or carotid bruits appreciated  CARDIOVASCULAR: RRR, normal S1, S2 without murmurs, rubs, or gallops appreciated  LUNGS: Clear to auscultation bilaterally without wheezes, rhonchi, or rales  ABDOMEN: Soft, nontender, nondistended  EXTREMITIES/VASCULAR: perfused without clubbing, cyanosis, trace edema b/l  PSYCH:  Unable to assess as patient is significant discomfort    Lab Results: I have personally reviewed pertinent lab results  Results from last 7 days   Lab Units 06/29/22 0411 06/29/22  0236 06/29/22  0028   WBC Thousand/uL 5 13  --  7 86   HEMOGLOBIN g/dL 7 8*  --  9 0*   I STAT HEMOGLOBIN g/dl  --  9 2*  --    HEMATOCRIT % 25 9*  --  30 1*   HEMATOCRIT, ISTAT %  --  27*  --    PLATELETS Thousands/uL 67*  --  92*     Results from last 7 days   Lab Units 06/29/22  0411 06/29/22  0236 06/29/22  0226 06/29/22  0028   POTASSIUM mmol/L 4 8  --  4 7 5 7*   CHLORIDE mmol/L 107  --  103 102   CO2 mmol/L 21  --  25 23   CO2, I-STAT mmol/L  --  25  --   --    BUN mg/dL 56*  --  56* 51*   CREATININE mg/dL 1 81*  --  2 08* 2 19*   GLUCOSE, ISTAT mg/dl  --  337*  --   --    CALCIUM mg/dL 9 3  --  9 2 9 0     Results from last 7 days   Lab Units 06/29/22  0029   INR  1 20*   PTT seconds 32     Results from last 7 days   Lab Units 06/29/22  0411   MAGNESIUM mg/dL 2 4     Imaging: I have personally reviewed pertinent reports

## 2022-06-29 NOTE — ANESTHESIA POSTPROCEDURE EVALUATION
Post-Op Assessment Note    CV Status:  Stable    Pain management: adequate     Mental Status:  Alert and awake   Hydration Status:  Euvolemic   PONV Controlled:  Controlled   Airway Patency:  Patent      Post Op Vitals Reviewed: Yes      Staff: CRNA         There were no known complications for this encounter      BP  116/41   Temp      Pulse  62   Resp   27   SpO2   99     Pt at baseline

## 2022-06-29 NOTE — PROCEDURES
Arterial Line Insertion    Date/Time: 6/29/2022 11:04 AM  Performed by: Liliana Vera PA-C  Authorized by: Liliana Vera PA-C     Patient location:  ICU  Other Assisting Provider: Yes (comment)    Consent:     Consent obtained:  Emergent situation  Universal protocol:     Site/side marked: yes      Immediately prior to procedure a time out was called: yes      Patient identity confirmed:  Arm band, provided demographic data, anonymous protocol, patient vented/unresponsive and hospital-assigned identification number  Indications:     Indications: hemodynamic monitoring and multiple ABGs    Pre-procedure details:     Skin preparation:  Chlorhexidine    Preparation: Patient was prepped and draped in sterile fashion    Anesthesia (see MAR for exact dosages): Anesthesia method:  Local infiltration    Local anesthetic:  Lidocaine 1% w/o epi  Procedure details:     Location / Tip of Catheter:  Radial    Laterality:  Right    Needle gauge:  20 G    Placement technique:  Percutaneous and ultrasound guided    Ultrasound image availability:  Not obtained due to urgency    Sterile ultrasound techniques: Sterile gel and sterile probe covers were used      Number of attempts:  1    Successful placement: yes      Transducer: waveform confirmed and dampened amplitude    Post-procedure details:     Post-procedure:  Sutured, sterile dressing applied and wrist guard applied    CMS:  Normal    Patient tolerance of procedure:   Tolerated well, no immediate complications

## 2022-06-29 NOTE — PHYSICAL THERAPY NOTE
PT orders received  Chart reviewed  Per RN patient not appropriate for PT At this time due to low HR  Will hold  Will continue to follow  Sandro Chatman, PT, DPT     06/29/22 0816   PT Last Visit   PT Visit Date 06/29/22   Note Type   Note type Evaluation; Cancelled Session   Cancel Reasons Medical status

## 2022-06-29 NOTE — TELEPHONE ENCOUNTER
Called patient and left message to call back and make appointment with Dr Katina Manning to be seen in 2 weeks     NP Left proximal humerus fx

## 2022-06-29 NOTE — QUICK NOTE
Spoke with son and provided update  Conversation regarding possible central line in the future if patient unable to maintain good hemodynamic status as well as need for blood transfusion in the setting of Hb drop were discussed  Patient's son consented for central line for future needs as well as initiation of blood products  All risks were discussed prior to obtaining consent

## 2022-06-29 NOTE — OCCUPATIONAL THERAPY NOTE
OT CANCEL NOTE    OT orders received  Chart reviewed  Pt has heart rate of 26; pending pacemaker placement today in cardiac cath lab  Will hold initial OT evaluation  Will continue to follow pt on caseload and see pt when medically stable and as clinically appropriate        06/29/22 0815   OT Last Visit   OT Visit Date 06/29/22   Note Type   Note type Evaluation; Cancelled Session   Cancel Reasons Medical status       Ayala Gillette MS, OTR/L

## 2022-06-29 NOTE — DISCHARGE INSTRUCTIONS
Discharge Instructions - Orthopedics  Surendra Polanco 77 y o  female MRN: [id number]  Unit/Bed#: cardiology    Weight Bearing Status:                                           ***    DVT prophylaxis  ***    Pain:  Continue analgesics as directed    Dressing Instructions:   Please keep clean, dry and intact until follow up     Appt Instructions: If you do not have your appointment, please call the clinic at [phone] t  Otherwise followup as scheduled     Contact the office sooner if you experience any increased numbness/tingling in the extremities  Miscellaneous:  ***      REGARDING YOUR PACEMAKER:  Please refer to post pacemaker implantation discharge instructions and restrictions and your pacemaker booklet/temporary card  Keep incision dry for one week  Do not use lotions/powders/creams on incision  Leave outer bandage in place for 1 week - it is water proof, and as long as it is fully adhered to your skin you may shower with it  If it appears as though the bandage is coming off and/or there is any communication to the area of device incision, please then keep the whole area dry for the remaining week  After 1 week, please remove by pulling all edges away from the center of the bandage  No overhead reaching/pushing/pulling/lifting greater than 5-10lbs with left arm for six weeks  Please call the office if you notice redness, swelling, bleeding, or drainage from incision or if you develop fevers  AFTER PACEMAKER CARE:    If you have any questions, please call [phone] to speak with a nurse (8:30am-4pm, or [phone] after hours)  For appointments, please call [phone]  WHAT YOU SHOULD KNOW:   A pacemaker is a small, battery-powered device that is placed under your skin in your upper chest area with wires placed through a vein that lead directly into the heart  It helps regulate your heart rate and prevent your heart from beating too slowly                   AFTER YOU LEAVE:     Medicines:     Pain medicine: You may need medicine to take away or decrease pain  Learn how to take your medicine  Ask what medicine and how much you should take  Be sure you know how, when, and how often to take it  Usually Over the counter pain medicine is sufficient to control pain (Acetominophen or Ibuprofen) Ask your doctor if you may take these  If this does not control your pain, narcotic pain killers may be prescribed, please call if you need prescription  Do not wait until the pain is severe before you take your medicine  Tell caregivers if your pain does not decrease  Pain medicine can make you dizzy or sleepy  Prevent falls by calling someone when you get out of bed or if you need help  Take your medicine as directed  Call your healthcare provider if you think your medicine is not helping or if you have side effects  Tell him if you are allergic to any medicine  Follow up with your cardiologist after your procedure: You will need a follow-up visit approximately 2 weeks after you leave the hospital  Your cardiologist will check your wound and make sure that your pacemaker is working correctly  Follow the instructions to check your pacemaker: Your cardiologist or primary healthcare provider will check your pacemaker and the battery regularly  He will use a computer to check your pacemaker over the telephone or wireless device which will be given to you  Pacemaker batteries usually last 8 to 10 years  The pacemaker unit will be replaced when the battery gets low  This is a simpler procedure than the original one to implant your pacemaker  Wound care:  Keep your incision dry for one week  Do not use lotions/powders/creams on incision  Leave outer bandage in place for 1 week - it is water proof, and as long as it is fully adhered to your skin you may shower with it    If it appears as though the bandage is coming off and/or there is any communication to the area of device incision, please then keep the whole area dry for the remaining week  After 1 week, please remove by pulling all edges away from the center of the bandage  Please call the office if you notice redness, swelling, bleeding, or drainage from incision or if you develop fevers  Activity:   Arm movement and lifting:  Be careful using the arm on the side of your pacemaker  Do not move your arm for the first 24 hours after your procedure  Do not  lift your arm above your shoulder or lift more than 10 pounds for one month after your procedure  Avoid pushing, pulling, or repetitive arm movements for one month  This helps the leads stay in place and helps your wound heal  Ask your caregiver when you can drive after your procedure  You may move your arm side to side without lifting above your shoulder, and do not need to wear a sling at home  Driving: you are ok to drive 48 hours after pacemaker is implanted   Sports:  Ask your caregiver when it is okay to play tennis, golf, basketball, or any sport that requires you to lift your arms  Do not play full contact sports, such as football, that could damage your pacemaker  Ask your cardiologist or primary healthcare provider how much and what kinds of physical activity are safe for you  Living with a pacemaker:   Tell all caregivers you have a pacemaker: This includes surgeons, radiologists, and medical technicians  You may want to wear a medical alert ID bracelet or necklace that states that you have a pacemaker  Carry your pacemaker ID card: Make sure you receive a pacemaker ID card  Carry it with you at all times  It lists important information about your pacemaker  Show it to airport security if you travel  Avoid electrical interference:  Avoid welding equipment and other equipment with large magnets or electric fields  These things could interfere with how your pacemaker works  Use your cell phone on the ear opposite from your pacemaker  Do not carry your cell phone in your shirt pocket over your chest      Some Pacemakers are MRI safe  Ask you doctor if it is safe to proceed with MRI and let the radiologist and staff know you have a pacemaker  Do not touch the skin around your pacemaker: This can cause damage to the lead wires or move the pacemaker unit from where it should be  Contact your cardiologist or primary healthcare provider if:   The area around your pacemaker has increasing amount of pain after surgery  The pain should improve over first few days after implantation  The skin around your stitches has increasing redness, swelling, or has drainage  This may mean that you have an infection  You have a fever  You have chills, a cough, and feel weak or achy  These are also signs of infection  Your feet or ankles are more swollen than your baseline  Your Heart rate is less than 50 beats per minute     Seek care immediately if:   Your bandage becomes soaked with blood  Your pacemaker is swelling rapidly    Your stitches open up  You feel your heart suddenly beating very slowly or quickly  You become too weak or dizzy to stand, or you pass out  Your arm or leg feels warm, tender, and painful  It may look swollen and red  You have chest pain that does not go away with rest or medicine  You feel lightheaded, short of breath, and have chest pain  You cough up blood  © 2014 [address] is for End User's use only and may not be sold, redistributed or otherwise used for commercial purposes  All illustrations and images included in CareNotes® are the copyrighted property of A D A M , Inc  or Tae Jackson  The above information is an  only  It is not intended as medical advice for individual conditions or treatments  Talk to your doctor, nurse or pharmacist before following any medical regimen to see if it is safe and effective for you

## 2022-06-29 NOTE — CASE MANAGEMENT
Case Management Assessment & Discharge Planning Note    Patient name Annie Apple  Location ICU 07/ICU 07 MRN [id number]  : 1945 Date 2022       Current Admission Date: 2022  Current Admission Diagnosis:Complete heart block ([address] ), Humeral fracture, Fall, Unspecified multiple injuries, initial encounter   Patient Active Problem List    Diagnosis Date Noted    Esophageal varices without bleeding ([address] ) 2021    Pancytopenia ([address] ) 2021    Iron deficiency anemia due to chronic blood loss 2021    Mild nonproliferative diabetic retinopathy of both eyes without macular edema associated with type 2 diabetes mellitus ([address] ) 2021    Urinary tract infection 09/15/2020    Hepatic encephalopathy ([address] ) 2020    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) ([address] ) 2020    Thrombocytopenia ([facility] ) 2020    Hyperlipidemia 2018    Essential hypertension 2018    Acquired hypothyroidism 2018    Type 2 diabetes mellitus with hyperglycemia, with long-term current use of insulin ([facility] ) 2018    Diabetic polyneuropathy associated with type 2 diabetes mellitus ([facility] ) 2018      LOS (days): 0  Geometric Mean LOS (GMLOS) (days): 2 40  Days to GMLOS:2 1     OBJECTIVE:    Risk of Unplanned Readmission Score: 19 26         Current admission status: Inpatient       Preferred Pharmacy:   [id number], [address], [place]  [address] [address]  Phone: [phone] Fax: [phone]    [address] [place] [address]  Phone: [phone] Fax: [phone]    Primary Care Provider: Yadiel Reynolds DO    Primary Insurance: MEDICARE  Secondary Insurance: [employer]    ASSESSMENT:  Active Health Care Proxies    There are no active Health Care Proxies on file         Advance Directives  Does patient have a [address] [place] [address] Avenue?: No  Was patient offered paperwork?: Yes  Does patient currently have a Health Care decision maker?: Yes, please see Health Care Proxy section  Does patient have Advance Directives?: No  Was patient offered paperwork?: Yes  Primary Contact: Andre Garcia (Son) [phone]         Readmission Root Cause  30 Day Readmission: No    Patient Information  Admitted from[de-identified] Home  Mental Status: Alert  During Assessment patient was accompanied by: Not accompanied during assessment  Assessment information provided by[de-identified] Patient  Primary Caregiver: Self  Support Systems: Self, Daughter, Family members, [facility] [address] of Residence: [address] [facility] do you live in?: [address] [place] [address] entry access options  Select all that apply : Ramp, Stairs  Number of steps to enter home : 5  Do the steps have railings?: Yes  Type of Current Residence: [facility]  In the last 12 months, was there a time when you were not able to pay the mortgage or rent on time?: No  In the last 12 months, how many places have you lived?: 1  In the last 12 months, was there a time when you did not have a steady place to sleep or slept in a shelter (including now)?: No  Homeless/housing insecurity resource given?: N/A  Living Arrangements: Lives w/ Daughter  Is patient a ?: No    Activities of Daily Living Prior to Admission  Functional Status: Independent  Completes ADLs independently?: Yes  Ambulates independently?: Yes  Does patient use assisted devices?: Yes  Assisted Devices (DME) used:  Other (Comment) (scooter)  Does patient currently own DME?: Yes  What DME does the patient currently own?: Other (Comment), Walker, Wheelchair (Scooter)  Does patient have a history of Outpatient Therapy (PT/OT)?: Yes  Does the patient have a history of Short-Term Rehab?: No  Does patient have a history of HHC?: No  Does patient currently have [place]?: No    Patient Information Continued  Income Source: SSI/SSD  Does patient have prescription coverage?: Yes  Within the past 12 months, you worried that your food would run out before you got the money to buy more : Never true  Within the past 12 months, the food you bought just didn't last and you didn't have money to get more : Never true  Food insecurity resource given?: N/A  Does patient receive dialysis treatments?: No  Does patient have a history of substance abuse?: No  Does patient have a history of Mental Health Diagnosis?: No    Means of Transportation  Means of Transport to Appts[de-identified] Drives Self  In the past 12 months, has lack of transportation kept you from medical appointments or from getting medications?: No  In the past 12 months, has lack of transportation kept you from meetings, work, or from getting things needed for daily living?: No  Was application for public transport provided?: N/A    DISCHARGE DETAILS:    Discharge planning discussed with[de-identified] patient's family  [place] of Choice: Yes     CM contacted family/caregiver?: Yes  Were Treatment Team discharge recommendations reviewed with patient/caregiver?: Yes  Did patient/caregiver verbalize understanding of patient care needs?: Yes  Were patient/caregiver advised of the risks associated with not following Treatment Team discharge recommendations?: Yes    Contacts  Patient Contacts: Beka Blank (Son) [phone]  Relationship to Patient[de-identified] Family  Contact Method: Phone  Phone Number: [phone]  Reason/Outcome: Continuity of Care, Emergency Contact, Discharge Planning    Cm spoke to pt's dtr in [facility], Alejandro Jade, to discuss the role of CM  Pt lives with her dtr, who has a dx of Down Syndrome, in a 1 story home which has 5STE or Ramp access  Pt's bathroom is a walk-in TUB/shower with a raised toilet  Pt drives, used to work in retail, and reports being fully independent for all ADLs but needs assistance or supervision for IADLs  Pt's had 1 fall in the last 6 months   Pt ambulates independently without a device within the home, but uses a scooter for community and out of home distances  Pt also owns a walker and wheelchair  Pt enjoys puzzles, yard work, and being active  Pt uses [facility] in [place]  Pt doesn't have a living will  Pt has no hx of mental health, substance abuse, or IP rehab  Pt's used OP therapy and VNA in the past    CM will appreciate therapy recommendations when appropriate  Pt has two pfizer covid vaccinations:    Dose 1  03/11/2021 (COVID-19 PFIZER VACCINE 0 3 ML IM)      Dose 2  03/30/2021 (COVID-19 PFIZER VACCINE 0 3 ML IM)         CM reviewed d/c planning process including the following: identifying help at home, patient preference for d/c planning needs, Discharge Lounge, [facility] Meds to Bed program, availability of treatment team to discuss questions or concerns patient and/or family may have regarding understanding medications and recognizing signs and symptoms once discharged  CM also encouraged patient to follow up with all recommended appointments after discharge  Patient advised of importance for patient and family to participate in managing patients medical well being

## 2022-06-29 NOTE — H&P
H&P - Trauma   Shena Lux 77 y o  female MRN: [id number]  Unit/Bed#: ED 14 Encounter: [id number]    Trauma Alert: Evaluation; trauma team   Model of Arrival: Ambulance    Trauma Team: Attending Carola Reynolds and Residents Sen Doran  Consultants:     Orthopedics: routine consult; Epic consult order placed; Electrophysiology: routine consult; Epic consult order placed    Assessment/Plan   Active Problems / Assessment:   Fall on ASA  Left humeral fracture  2nd degree type II heart block vs complete heart block     Plan:   ICU admission  Ortho consult, appreciate recs  EP consult, will likely require pacemaker  Analgesia  PT/OT/CM    History of Present Illness     Chief Complaint: Fall  Mechanism:Fall     HPI:    Shena Lux is a 77 y o  female who presents as a transfer from [facility] for a left humeral fracture and heart block  Patient states that she was "walking around in my flip flops, lost my balance and fell onto my arm"  Denies LOC  Unsure if she hit her head  At [facility], she was found to have a left humeral fracture and in complete heart block  Upon my evaluation, the patient complains of left arm pain but denies CP, SOB, lightheadedness, dizziness  EKG taken upon my evaluation shows 2nd degree type II heart block on my interpretation  Review of Systems   Constitutional: Negative for chills and fever  Respiratory: Negative for shortness of breath  Cardiovascular: Negative for chest pain and palpitations  Gastrointestinal: Negative for abdominal pain, nausea and vomiting  Musculoskeletal: Negative for back pain and neck pain  Skin: Negative for wound  Neurological: Negative for dizziness, weakness, light-headedness, numbness and headaches  All other systems reviewed and are negative  12-point, complete review of systems was reviewed and negative except as stated above       Historical Information     Past Medical History:   Diagnosis Date    Anemia     Cirrhosis ([facility] [address] )     Diabetic neuropathy (HCC)     Esophageal varices (HCC)     Fatty liver     GERD (gastroesophageal reflux disease)     Hepatic encephalopathy (HCC)     Hiatal hernia     Hyperlipidemia     Hypertension     Hypoglycemia     Hypothyroidism     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)     Osteoarthritis     Osteopenia     Pancytopenia (HCC)     Thrombocytopenia (HCC)     Type 2 diabetes mellitus ([facility] [address] )      Past Surgical History:   Procedure Laterality Date    ABDOMINAL SURGERY      Abdominal plasty with mesh insertion    CATARACT EXTRACTION, BILATERAL      CATARACT EXTRACTION, BILATERAL      COLONOSCOPY      EGD AND COLONOSCOPY  03/18/2015    IR BIOPSY BONE MARROW  8/24/2021    LAPAROSCOPIC CHOLECYSTECTOMY      SHOULDER SURGERY Right     calcium depsoti    TUBAL LIGATION      UMBILICAL HERNIA REPAIR      with mesh placed    UPPER GASTROINTESTINAL ENDOSCOPY          Social History     Tobacco Use    Smoking status: Never Smoker    Smokeless tobacco: Never Used   Vaping Use    Vaping Use: Never used   Substance Use Topics    Alcohol use: Not Currently    Drug use: No       There is no immunization history on file for this patient  Last Tetanus: Unknown  Family History: Non-contributory    1  Before the illness or injury that brought you to the Emergency, did you need someone to help you on a regular basis? 1=Yes   2  Since the illness or injury that brought you to the Emergency, have you needed more help than usual to take care of yourself? 1=Yes   3  Have you been hospitalized for one or more nights during the past 6 months (excluding a stay in the Emergency Department)? 0=No   4  In general, do you see well? 0=Yes   5  In general, do you have serious problems with your memory? 0=No   6  Do you take more than three different medications everyday?  1=Yes   TOTAL   3     Did you order a geriatric consult if the score was 2 or greater?: Admitted to ICU     Meds/Allergies   all current active meds have been reviewed, current meds:   Current Facility-Administered Medications   Medication Dose Route Frequency    acetaminophen (TYLENOL) tablet 975 mg  975 mg Oral Q8H [address]    docusate sodium (COLACE) capsule 100 mg  100 mg Oral BID    heparin (porcine) subcutaneous injection 5,000 Units  5,000 Units Subcutaneous Q8H [address]    HYDROmorphone HCl (DILAUDID) injection 0 2 mg  0 2 mg Intravenous Q4H PRN    insulin lispro (HumaLOG) 100 units/mL subcutaneous injection 1-6 Units  1-6 Units Subcutaneous Q6H [address]    multi-electrolyte (PLASMALYTE-A/ISOLYTE-S PH 7 4) IV solution  75 mL/hr Intravenous Continuous    naloxone (NARCAN) 0 04 mg/mL syringe 0 04 mg  0 04 mg Intravenous Q1MIN PRN    ondansetron (ZOFRAN) injection 4 mg  4 mg Intravenous Q4H PRN    oxyCODONE (ROXICODONE) IR tablet 2 5 mg  2 5 mg Oral Q4H PRN    oxyCODONE (ROXICODONE) IR tablet 5 mg  5 mg Oral Q4H PRN    polyethylene glycol (MIRALAX) packet 17 g  17 g Oral Daily PRN    senna-docusate sodium (SENOKOT S) 8 6-50 mg per tablet 1 tablet  1 tablet Oral HS    and PTA meds:   Prior to Admission Medications   Prescriptions Last Dose Informant Patient Reported? Taking? B-D UF III MINI PEN NEEDLES 31G X 5 MM MISC  Self No No   Sig: USE UP TO 3 TIMES A DAY   Basaglar KwikPen 100 units/mL injection pen  Self No No   Si units in am and 36 units in pm, up to 90 units daily   Blood Glucose Monitoring Suppl (ONE TOUCH ULTRA 2) w/Device KIT  Self No No   Sig: by Does not apply route once for 1 dose Use Glucometer to test up to 3 times daily  Coenzyme Q10 (Co Q10) 100 MG CAPS  Self Yes No   Sig: Take by mouth   Janumet XR  MG TB24  Self No No   Sig: TAKE 1 TABLET BY MOUTH TWICE A DAY   KRILL OIL PO  Self Yes No   Sig: Take 750 mg by mouth   Lidocaine 5 % CREA   Yes No   Sig: Apply topically   ONETOUCH DELICA LANCETS FINE MISC  Self No No   Sig: Use 1-3 times a day   OneTouch Ultra test strip  Self No No   Sig: TEST UP TO 3 TIMES DAILY  aspirin 81 mg chewable tablet  Self Yes No   Sig: Chew 81 mg Every 3 days   calcium citrate-Vitamin D ([employer] Calcium Citrate+D3 Petites) 200 mg-250 units  Self Yes No   Sig: Take 1 tablet by mouth daily with breakfast   cholecalciferol (VITAMIN D3) 1,000 units tablet  Self Yes No   Sig: Take 2,000 Units by mouth daily     Patient not taking: Reported on 2022   escitalopram (LEXAPRO) 5 mg tablet  Self Yes No   Sig: Take 5 mg by mouth daily   fluticasone (FLONASE) 50 mcg/act nasal spray  Self Yes No   Si spray into each nostril daily prn   furosemide (LASIX) 40 mg tablet  Self No No   Sig: TAKE 1 TABLET BY MOUTH EVERY DAY   glimepiride (AMARYL) 4 mg tablet  Self No No   Sig: Take 2 tablets (8 mg total) by mouth daily with breakfast   lactulose (CHRONULAC) 10 g/15 mL solution  Self No No   Sig: TAKE 15 ML (10 G TOTAL) BY MOUTH 2 (TWO) TIMES A DAY   levothyroxine 125 mcg tablet   No No   Sig: Take 1 tablet 6 days a week and half a tablet on      nadolol (CORGARD) 20 mg tablet  Self No No   Sig: Take 1 tablet (20 mg total) by mouth daily   omeprazole (PriLOSEC) 20 mg delayed release capsule  Self No No   Sig: TAKE 1 CAPSULE BY MOUTH EVERY DAY   polyethylene glycol (MIRALAX) 17 g packet   No No   Sig: Take 17 gm (1 scoop) daily   rifaximin (Xifaxan) 550 mg tablet   No No   Sig: Take 1 tablet (550 mg total) by mouth every 12 (twelve) hours   simvastatin (ZOCOR) 40 mg tablet  Self Yes No   Sig: Take 40 mg by mouth daily   spironolactone (ALDACTONE) 50 mg tablet   No No   Sig: Take 1 tablet (50 mg total) by mouth daily      Facility-Administered Medications: None        Allergies   Allergen Reactions    Bee Venom Swelling    Latex     Sesame Seed (Diagnostic) - Food Allergy      Other reaction(s): swelling inside mouth    Strawberry C [Ascorbate - Food Allergy] Other (See Comments)     Mouth sores    Penicillins Rash       Objective   Initial Vitals:   Temperature: 97 5 °F (36 4 °C) (22 0332)  Pulse: 62 (06/29/22 0323)  Respirations: 18 (06/29/22 0323)  Blood Pressure: 168/65 (06/29/22 0323)    Primary Survey:   Airway:        Status: patent;        Pre-hospital Interventions: none        Hospital Interventions: none  Breathing:        Pre-hospital Interventions: none       Effort: normal       Right breath sounds: normal       Left breath sounds: normal  Circulation:        Rhythm: irregular       Rate: bradycardia   Right Pulses Left Pulses    R radial: 2+  R femoral: 2+  R pedal: 2+     L radial: 2+  L femoral: 2+  L pedal: 2+       Disability:        GCS: Eye: 4; Verbal: 5 Motor: 6 Total: 15       Right Pupil: 4 mm;  round;  reactive         Left Pupil:  4 mm;  round;  reactive      R Motor Strength L Motor Strength    R : 5/5  R dorsiflex: 5/5  R plantarflex: 5/5 L : 5/5  L dorsiflex: 5/5  L plantarflex: 5/5        Sensory:  No sensory deficit  Exposure:       Completed: Yes      Secondary Survey:  Physical Exam  Vitals and nursing note reviewed  Constitutional:       General: She is not in acute distress  Appearance: Normal appearance  She is not ill-appearing or toxic-appearing  HENT:      Head: Normocephalic and atraumatic  Right Ear: Tympanic membrane, ear canal and external ear normal       Left Ear: Tympanic membrane, ear canal and external ear normal       Nose: Nose normal       Mouth/Throat:      Mouth: Mucous membranes are moist       Pharynx: Oropharynx is clear  Eyes:      Extraocular Movements: Extraocular movements intact  Pupils: Pupils are equal, round, and reactive to light  Cardiovascular:      Rate and Rhythm: Bradycardia present  Rhythm irregular  Pulses: Normal pulses  Heart sounds: Normal heart sounds  Pulmonary:      Effort: Pulmonary effort is normal  No respiratory distress  Breath sounds: Normal breath sounds  Abdominal:      Palpations: Abdomen is soft  Tenderness: There is no abdominal tenderness   There is no guarding or rebound  Musculoskeletal:         General: Tenderness (L arm) present  Cervical back: Normal range of motion and neck supple  No tenderness  Comments: L arm in sling  Good distal pulses and sensation  Skin:     General: Skin is warm  Capillary Refill: Capillary refill takes less than 2 seconds  Neurological:      General: No focal deficit present  Mental Status: She is alert and oriented to person, place, and time  Invasive Devices  Report    Peripheral Intravenous Line  Duration           Peripheral IV 06/29/22 Right Antecubital <1 day    Peripheral IV 06/29/22 Right Hand <1 day              Lab Results:   Results: I have personally reviewed all pertinent laboratory/tests results, BMP/CMP:   Lab Results   Component Value Date    SODIUM 134 (L) 06/29/2022    K 4 7 06/29/2022     06/29/2022    CO2 25 06/29/2022    CO2 25 06/29/2022    BUN 56 (H) 06/29/2022    CREATININE 2 08 (H) 06/29/2022    GLUCOSE 337 (H) 06/29/2022    CALCIUM 9 2 06/29/2022    AST 28 06/29/2022    ALT 28 06/29/2022    ALKPHOS 69 06/29/2022    EGFR 22 06/29/2022   , CBC:   Lab Results   Component Value Date    WBC 7 86 06/29/2022    HGB 9 2 (L) 06/29/2022    HCT 27 (L) 06/29/2022    MCV 91 06/29/2022    PLT 92 (L) 06/29/2022    MCH 27 1 06/29/2022    MCHC 29 9 (L) 06/29/2022    RDW 15 6 (H) 06/29/2022    MPV 13 7 (H) 06/29/2022    NRBC 0 06/29/2022    and Troponin: No results found for: TROPONINI    Imaging Results: I have personally reviewed pertinent reports  and I have personally reviewed pertinent films in PACS  Chest Xray(s): N/A   FAST exam(s): N/A   CT Scan(s): positive for acute findings: 1  Comminuted and displaced fracture of the left femoral head/neck  2   No acute traumatic visceral injury identified within the chest, abdomen, or pelvis  3   Cirrhosis of the liver with stigmata of portal hypertension and splenomegaly    4   Mild diffuse thickening of the 2nd and 3rd portions of the duodenum, which may be due to duodenitis  5   Mild diffuse thickening of the ascending colon may be due to portal hypertensive colopathy  Additional Xray(s): positive for acute findings: Left humeral fracture     Other Studies: Procedure Note: EKG  Date/Time: 06/29/22 4:12 AM   Interpreted by: Tello Ivory DO  Indications / Diagnosis: Bradycardia   ECG reviewed by me, the ED Physician: yes   The EKG demonstrates:  Rhythm: 2nd degree AV block type II  Intervals: normal intervals  Axis: left axis deviation  QRS/Blocks: normal QRS  ST Changes: No acute ST Changes, no STD/CAROLINA        Code Status: Level 1 - Full Code  Advance Directive and Living Will:      Power of :    POLST:

## 2022-06-29 NOTE — Clinical Note
Prepped: left chest  Prepped with: ChloraPrep  The patient was draped   Bilateral wrist restraints applied for patient safety

## 2022-06-29 NOTE — PLAN OF CARE
Problem: Potential for Falls  Goal: Patient will remain free of falls  Description: INTERVENTIONS:  - Educate patient/family on patient safety including physical limitations  - Instruct patient to call for assistance with activity   - Consult OT/PT to assist with strengthening/mobility   - Keep Call bell within reach  - Keep bed low and locked with side rails adjusted as appropriate  - Keep care items and personal belongings within reach  - Initiate and maintain comfort rounds  - Make Fall Risk Sign visible to staff  - Offer Toileting every 2 Hours, in advance of need  - Initiate/Maintain bed alarm  - Apply yellow socks and bracelet for high fall risk patients  - Consider moving patient to room near nurses station  Outcome: Progressing     Problem: MOBILITY - ADULT  Goal: Maintain or return to baseline ADL function  Description: INTERVENTIONS:  -  Assess patient's ability to carry out ADLs; assess patient's baseline for ADL function and identify physical deficits which impact ability to perform ADLs (bathing, care of mouth/teeth, toileting, grooming, dressing, etc )  - Assess/evaluate cause of self-care deficits   - Assess range of motion  - Assess patient's mobility; develop plan if impaired  - Assess patient's need for assistive devices and provide as appropriate  - Encourage maximum independence but intervene and supervise when necessary  - Involve family in performance of ADLs  - Assess for home care needs following discharge   - Consider OT consult to assist with ADL evaluation and planning for discharge  - Provide patient education as appropriate  Outcome: Progressing  Goal: Maintains/Returns to pre admission functional level  Description: INTERVENTIONS:  - Perform BMAT or MOVE assessment daily    - Set and communicate daily mobility goal to care team and patient/family/caregiver  - Collaborate with rehabilitation services on mobility goals if consulted  - Perform Range of Motion 3 times a day    - Reposition patient every 2 hours    - Dangle patient 3 times a day  - Stand patient 3 times a day  - Ambulate patient 3 times a day  - Out of bed to chair 3 times a day   - Out of bed for meals 3 times a day  - Out of bed for toileting  - Record patient progress and toleration of activity level   Outcome: Progressing     Problem: Prexisting or High Potential for Compromised Skin Integrity  Goal: Skin integrity is maintained or improved  Description: INTERVENTIONS:  - Identify patients at risk for skin breakdown  - Assess and monitor skin integrity  - Assess and monitor nutrition and hydration status  - Monitor labs   - Assess for incontinence   - Turn and reposition patient  - Assist with mobility/ambulation  - Relieve pressure over bony prominences  - Avoid friction and shearing  - Provide appropriate hygiene as needed including keeping skin clean and dry  - Evaluate need for skin moisturizer/barrier cream  - Collaborate with interdisciplinary team   - Patient/family teaching  - Consider wound care consult   Outcome: Progressing     Problem: PAIN - ADULT  Goal: Verbalizes/displays adequate comfort level or baseline comfort level  Description: Interventions:  - Encourage patient to monitor pain and request assistance  - Assess pain using appropriate pain scale  - Administer analgesics based on type and severity of pain and evaluate response  - Implement non-pharmacological measures as appropriate and evaluate response  - Consider cultural and social influences on pain and pain management  - Notify physician/advanced practitioner if interventions unsuccessful or patient reports new pain  Outcome: Progressing     Problem: INFECTION - ADULT  Goal: Absence or prevention of progression during hospitalization  Description: INTERVENTIONS:  - Assess and monitor for signs and symptoms of infection  - Monitor lab/diagnostic results  - Monitor all insertion sites, i e  indwelling lines, tubes, and drains  - Monitor endotracheal if appropriate and nasal secretions for changes in amount and color  - Carmichael appropriate cooling/warming therapies per order  - Administer medications as ordered  - Instruct and encourage patient and family to use good hand hygiene technique  - Identify and instruct in appropriate isolation precautions for identified infection/condition  Outcome: Progressing     Problem: SAFETY ADULT  Goal: Patient will remain free of falls  Description: INTERVENTIONS:  - Educate patient/family on patient safety including physical limitations  - Instruct patient to call for assistance with activity   - Consult OT/PT to assist with strengthening/mobility   - Keep Call bell within reach  - Keep bed low and locked with side rails adjusted as appropriate  - Keep care items and personal belongings within reach  - Initiate and maintain comfort rounds  - Make Fall Risk Sign visible to staff  - Offer Toileting every 2 Hours, in advance of need  - Initiate/Maintain bed alarm  - Apply yellow socks and bracelet for high fall risk patients  - Consider moving patient to room near nurses station  Outcome: Progressing  Goal: Maintain or return to baseline ADL function  Description: INTERVENTIONS:  -  Assess patient's ability to carry out ADLs; assess patient's baseline for ADL function and identify physical deficits which impact ability to perform ADLs (bathing, care of mouth/teeth, toileting, grooming, dressing, etc )  - Assess/evaluate cause of self-care deficits   - Assess range of motion  - Assess patient's mobility; develop plan if impaired  - Assess patient's need for assistive devices and provide as appropriate  - Encourage maximum independence but intervene and supervise when necessary  - Involve family in performance of ADLs  - Assess for home care needs following discharge   - Consider OT consult to assist with ADL evaluation and planning for discharge  - Provide patient education as appropriate  Outcome: Progressing  Goal: Maintains/Returns to pre admission functional level  Description: INTERVENTIONS:  - Perform BMAT or MOVE assessment daily    - Set and communicate daily mobility goal to care team and patient/family/caregiver  - Collaborate with rehabilitation services on mobility goals if consulted  - Perform Range of Motion 3 times a day  - Reposition patient every 2 hours    - Dangle patient 3 times a day  - Stand patient 3 times a day  - Ambulate patient 3 times a day  - Out of bed to chair 3 times a day   - Out of bed for meals 3 times a day  - Out of bed for toileting  - Record patient progress and toleration of activity level   Outcome: Progressing     Problem: DISCHARGE PLANNING  Goal: Discharge to home or other facility with appropriate resources  Description: INTERVENTIONS:  - Identify barriers to discharge w/patient and caregiver  - Arrange for needed discharge resources and transportation as appropriate  - Identify discharge learning needs (meds, wound care, etc )  - Arrange for interpretive services to assist at discharge as needed  - Refer to Case Management Department for coordinating discharge planning if the patient needs post-hospital services based on physician/advanced practitioner order or complex needs related to functional status, cognitive ability, or social support system  Outcome: Progressing     Problem: CARDIOVASCULAR - ADULT  Goal: Maintains optimal cardiac output and hemodynamic stability  Description: INTERVENTIONS:  - Monitor I/O, vital signs and rhythm  - Monitor for S/S and trends of decreased cardiac output  - Administer and titrate ordered vasoactive medications to optimize hemodynamic stability  - Assess quality of pulses, skin color and temperature  - Assess for signs of decreased coronary artery perfusion  - Instruct patient to report change in severity of symptoms  Outcome: Progressing  Goal: Absence of cardiac dysrhythmias or at baseline rhythm  Description: INTERVENTIONS:  - Continuous cardiac monitoring, vital signs, obtain 12 lead EKG if ordered  - Administer antiarrhythmic and heart rate control medications as ordered  - Monitor electrolytes and administer replacement therapy as ordered  Outcome: Progressing     Problem: MUSCULOSKELETAL - ADULT  Goal: Maintain or return mobility to safest level of function  Description: INTERVENTIONS:  - Assess patient's ability to carry out ADLs; assess patient's baseline for ADL function and identify physical deficits which impact ability to perform ADLs (bathing, care of mouth/teeth, toileting, grooming, dressing, etc )  - Assess/evaluate cause of self-care deficits   - Assess range of motion  - Assess patient's mobility  - Assess patient's need for assistive devices and provide as appropriate  - Encourage maximum independence but intervene and supervise when necessary  - Involve family in performance of ADLs  - Assess for home care needs following discharge   - Consider OT consult to assist with ADL evaluation and planning for discharge  - Provide patient education as appropriate  Outcome: Progressing  Goal: Maintain proper alignment of affected body part  Description: INTERVENTIONS:  - Support, maintain and protect limb and body alignment  - Provide patient/ family with appropriate education  Outcome: Progressing

## 2022-06-29 NOTE — RESULT ENCOUNTER NOTE
Currently inpatient at [facility], result available to treatment team

## 2022-06-29 NOTE — Clinical Note
The Lisandra Guillaume device was inserted  The leads were placed into the connector and visually verified to be in correct position  Injury current obtained

## 2022-06-29 NOTE — EMTALA/ACUTE CARE TRANSFER
[facility] EMERGENCY DEPARTMENT  [address] [facility] [address]  Dept: [phone]      QOHZMK TRANSFER CONSENT    NAME Chante NICOLAS 1945                              MRN [id number]    I have been informed of my rights regarding examination, treatment, and transfer   by Dr Maine Salvador MD    Benefits: Specialized equipment and/or services available at the receiving facility (Include comment)________________________, Continuity of care    Risks: Potential for delay in receiving treatment, Potential deterioration of medical condition, Loss of IV, Possible worsening of condition or death during transfer, Increased discomfort during transfer      Transfer Request   I acknowledge that my medical condition has been evaluated and explained to me by the emergency department physician or other qualified medical person and/or my attending physician who has recommended and offered to me further medical examination and treatment  I understand the Hospital's obligation with respect to the treatment and stabilization of my emergency medical condition  I nevertheless request to be transferred  I release the Hospital, the doctor, and any other persons caring for me from all responsibility or liability for any injury or ill effects that may result from my transfer and agree to accept all responsibility for the consequences of my choice to transfer, rather than receive stabilizing treatment at the Hospital  I understand that because the transfer is my request, my insurance may not provide reimbursement for the services  The Hospital will assist and direct me and my family in how to make arrangements for transfer, but the hospital is not liable for any fees charged by the transport service    In spite of this understanding, I refuse to consent to further medical examination and treatment which has been offered to me, and request transfer to [address] [place] [address] Rd Name, [address] : [facility]  I authorize the performance of emergency medical procedures and treatments upon me in both transit and upon arrival at the receiving facility  Additionally, I authorize the release of any and all medical records to the receiving facility and request they be transported with me, if possible  I authorize the performance of emergency medical procedures and treatments upon me in both transit and upon arrival at the receiving facility  Additionally, I authorize the release of any and all medical records to the receiving facility and request they be transported with me, if possible  I understand that the safest mode of transportation during a medical emergency is an ambulance and that the Hospital advocates the use of this mode of transport  Risks of traveling to the receiving facility by car, including absence of medical control, life sustaining equipment, such as oxygen, and medical personnel has been explained to me and I fully understand them  (TODD CORRECT BOX BELOW)  [  ]  I consent to the stated transfer and to be transported by ambulance/helicopter  [  ]  I consent to the stated transfer, but refuse transportation by ambulance and accept full responsibility for my transportation by car  I understand the risks of non-ambulance transfers and I exonerate the Hospital and its staff from any deterioration in my condition that results from this refusal     X___________________________________________    DATE  22  TIME________  Signature of patient or legally responsible individual signing on patient behalf           RELATIONSHIP TO PATIENT_________________________          Provider Certification    NAME Domo Coley                                         1945                              N [id number]    A medical screening exam was performed on the above named patient    Based on the examination:    Condition Necessitating Transfer There were no encounter diagnoses  Patient Condition: The patient has been stabilized such that within reasonable medical probability, no material deterioration of the patient condition or the condition of the unborn child(jamin) is likely to result from the transfer    Reason for Transfer: Level of Care needed not available at this facility    Transfer Requirements: Facility [facility]   · Space available and qualified personnel available for treatment as acknowledged by    · Agreed to accept transfer and to provide appropriate medical treatment as acknowledged by       Dr Dion Ruelas  · Appropriate medical records of the examination and treatment of the patient are provided at the time of transfer   [address] [facility], [address] _______  · Transfer will be performed by qualified personnel from    and appropriate transfer equipment as required, including the use of necessary and appropriate life support measures      Provider Certification: I have examined the patient and explained the following risks and benefits of being transferred/refusing transfer to the patient/family:  General risk, such as traffic hazards, adverse weather conditions, rough terrain or turbulence, possible failure of equipment (including vehicle or aircraft), or consequences of actions of persons outside the control of the transport personnel, Unanticipated needs of medical equipment and personnel during transport, Risk of worsening condition, The possibility of a transport vehicle being unavailable      Based on these reasonable risks and benefits to the patient and/or the unborn child(jamin), and based upon the information available at the time of the patients examination, I certify that the medical benefits reasonably to be expected from the provision of appropriate medical treatments at another medical facility outweigh the increasing risks, if any, to the individuals medical condition, and in the case of labor to the unborn child, from effecting the transfer      X____________________________________________ DATE 06/29/22        TIME_______      ORIGINAL - SEND TO MEDICAL RECORDS   COPY - SEND WITH PATIENT DURING TRANSFER

## 2022-06-29 NOTE — ANESTHESIA PREPROCEDURE EVALUATION
Procedure:  Cardiac pacer implant (N/A Chest)    Relevant Problems   CARDIO   (+) Essential hypertension   (+) Hyperlipidemia      ENDO   (+) Acquired hypothyroidism   (+) Type 2 diabetes mellitus with hyperglycemia, with long-term current use of insulin (HCC)      GI/HEPATIC   (+) 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) (HCC)      HEMATOLOGY   (+) Iron deficiency anemia due to chronic blood loss   (+) Pancytopenia (HCC)   (+) Thrombocytopenia (HCC)      NEURO/PSYCH   (+) Diabetic polyneuropathy associated with type 2 diabetes mellitus (HCC)      Osteoarthritis    Hypertension    Type 2 diabetes mellitus (HCC)    Hypothyroidism    Osteopenia    Hyperlipidemia    Fatty liver    Hiatal hernia    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)    GERD (gastroesophageal reflux disease)    Cirrhosis (HCC)    Thrombocytopenia (HCC)    Esophageal varices (HCC)    Hypoglycemia    Diabetic neuropathy (HCC)    Hepatic encephalopathy (HCC)    Pancytopenia (HCC)    Anemia     Left Ventricle: Left ventricular cavity size is normal  The left ventricular ejection fraction is 55%  Systolic function is normal     IVS: There is abnormal septal motion consistent with right ventricular pacing    Right Ventricle: A pacer wire is present    Mitral Valve: There is severe annular calcification    Tricuspid Valve: There is mild to moderate regurgitation  The tricuspid valve regurgitation jet is central  The right ventricular systolic pressure is moderately to severely elevated  The estimated right ventricular systolic pressure is 02 69 mmHg    Left Atrium: The atrium is mildly dilated    The following segments are akinetic: apical anterior, apical septal, apical inferior, apical lateral and apex      The following segments are hyperkinetic: basal anterior, basal anteroseptal, basal inferoseptal, basal inferior, basal inferolateral, basal anterolateral, mid anterior, mid anteroseptal, mid inferoseptal, mid inferior, mid inferolateral and mid anterolateral     Pericardium: There is no pericardial effusion  The pericardium is normal in appearance  Physical Exam    Airway    Mallampati score: II  TM Distance: >3 FB  Neck ROM: full     Dental       Cardiovascular  Cardiovascular exam normal    Pulmonary  Pulmonary exam normal     Other Findings        Anesthesia Plan  ASA Score- 4     Anesthesia Type- IV sedation with anesthesia with ASA Monitors  Additional Monitors:   Airway Plan:           Plan Factors-Exercise tolerance (METS): >4 METS  Chart reviewed  EKG reviewed  Imaging results reviewed  Existing labs reviewed  Patient summary reviewed  Induction- intravenous  Postoperative Plan-     Informed Consent- Anesthetic plan and risks discussed with patient  I personally reviewed this patient with the CRNA  Discussed and agreed on the Anesthesia Plan with the CRNA  Alex Sidhu

## 2022-06-29 NOTE — TRAUMA DOCUMENTATION
Patient noted to have episode of unconsciousness, lasted only a few seconds  Once awake able to answer questions appropriately

## 2022-06-29 NOTE — ED PROVIDER NOTES
Emergency Department Trauma Note  Benita Bolton 77 y o  female MRN: [id number]  Unit/Bed#: ED TR13/TR13B Encounter: [id number]      Trauma Alert: Trauma Acuity: Trauma Evaluation  Model of Arrival:   via    Trauma Team: Current Providers  Attending Provider: Flavio Ariza MD  Registered Nurse: Ann Alvarez RN  Consultants:     None      History of Present Illness     Chief Complaint:   Chief Complaint   Patient presents with    Fall     Pt arrived via EMS  Pt fell in driveway around 9427  Slip/trip in her shoes, fell backwards onto left shoulder  Denies LOC, unsure if she hit her head  HPI:  Benita Bolton is a 77 y o  female who presents with fall  Mechanism:Details of Incident: pt fell in driveway, slipped in her shoes, fell backwards onto left shoulder  Denies LOC, cannot remember if she hit her head Injury Date: 06/28/22 Injury Time: 200 Injury Occurence Location - [address] [place] [address] Way: pt's hosue    76 yo F BIBA for a mechanical fall  She slipped in her driveway, onto left side  Doesn't think she hit her head, but not sure  On asa  No LOC or HA  Has left arm pain, mild left hip pain  Unable to get back up  Was in driveway for 2 hours until found  No CP/SOB  Has some abd pain, fell onto abd as well  No numbness/tingling  No neck/back pain - other than chronic arthritis  Has been in normal state of health  Cares for her daughter with disabilities at home  Denies palpitations or dizziness  Denies etoh        History provided by:  Patient, medical records and EMS personnel   used: No    Fall  Mechanism of injury: fall    Injury location:  Shoulder/arm and torso  Shoulder/arm injury location:  L shoulder  Torso injury location:  Abdomen  Incident location:  Home  Time since incident:  2 hours  Arrived directly from scene: yes    Fall:     Fall occurred:  Tripped    Impact surface:  [place]    Entrapped after fall: no    Protective equipment: none    Suspicion of alcohol use: no    Suspicion of drug use: no    Tetanus status:  Unknown  Prior to arrival data:     Bystander interventions:  None    Patient ambulatory at scene: no      Blood loss:  None    Responsiveness at scene:  Alert    Orientation at scene:  Person, place, situation and time    Loss of consciousness: no      Amnesic to event: no      Airway interventions:  None    Breathing interventions:  None    IV access status:  None    IO access:  None    Fluids administered:  None    Cardiac interventions:  None    Medications administered:  None    Immobilization:  None    Airway condition since incident:  Stable    Breathing condition since incident:  Stable    Circulation condition since incident:  Stable    Mental status condition since incident:  Stable    Disability condition since incident:  Stable  Associated symptoms: abdominal pain    Associated symptoms: no back pain, no blindness, no chest pain, no difficulty breathing, no headaches, no hearing loss, no loss of consciousness, no nausea, no neck pain, no seizures and no vomiting    Risk factors: beta blocker therapy and diabetes      Review of Systems   Constitutional: Negative for appetite change, chills, fatigue and fever  HENT: Negative for congestion, ear pain, hearing loss, rhinorrhea, sore throat, trouble swallowing and voice change  Eyes: Negative for blindness, pain and visual disturbance  Respiratory: Negative for cough, chest tightness and shortness of breath  Cardiovascular: Negative for chest pain, palpitations and leg swelling  Gastrointestinal: Positive for abdominal pain  Negative for blood in stool, constipation, diarrhea, nausea and vomiting  Genitourinary: Negative for difficulty urinating and hematuria  Musculoskeletal: Positive for myalgias  Negative for back pain, neck pain and neck stiffness  Skin: Negative for rash     Neurological: Negative for dizziness, seizures, loss of consciousness, syncope, speech difficulty, light-headedness and headaches  Psychiatric/Behavioral: Negative for confusion and suicidal ideas  Historical Information     Immunizations: There is no immunization history on file for this patient      Past Medical History:   Diagnosis Date    Anemia     Cirrhosis ([facility] )     Diabetic neuropathy (HCC)     Esophageal varices (HCC)     Fatty liver     GERD (gastroesophageal reflux disease)     Hepatic encephalopathy (HCC)     Hiatal hernia     Hyperlipidemia     Hypertension     Hypoglycemia     Hypothyroidism     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)     Osteoarthritis     Osteopenia     Pancytopenia (HCC)     Thrombocytopenia (HCC)     Type 2 diabetes mellitus ([facility] )        Family History   Problem Relation Age of Onset    Breast cancer Mother     Diabetes type II Father     Thyroid disease unspecified Daughter     Down syndrome Daughter     Diabetes type II Daughter     Diabetes type II Sister     No Known Problems Brother     Prostate cancer Brother     No Known Problems Sister     Stroke Sister     No Known Problems Son     Breast cancer Maternal Aunt     Colon cancer Neg Hx      Past Surgical History:   Procedure Laterality Date    ABDOMINAL SURGERY      Abdominal plasty with mesh insertion    CATARACT EXTRACTION, BILATERAL      CATARACT EXTRACTION, BILATERAL      COLONOSCOPY      EGD AND COLONOSCOPY  03/18/2015    IR BIOPSY BONE MARROW  8/24/2021    LAPAROSCOPIC CHOLECYSTECTOMY      SHOULDER SURGERY Right     calcium depsoti    TUBAL LIGATION      UMBILICAL HERNIA REPAIR      with mesh placed    UPPER GASTROINTESTINAL ENDOSCOPY       Social History     Tobacco Use    Smoking status: Never Smoker    Smokeless tobacco: Never Used   Vaping Use    Vaping Use: Never used   Substance Use Topics    Alcohol use: Not Currently    Drug use: No     E-Cigarette/Vaping    E-Cigarette Use Never User      E-Cigarette/Vaping Substances    Nicotine No     THC No     CBD No     Flavoring No     Other No     Unknown No        Family History: non-contributory    Meds/Allergies   Prior to Admission Medications   Prescriptions Last Dose Informant Patient Reported? Taking? B-D UF III MINI PEN NEEDLES 31G X 5 MM MISC  Self No No   Sig: USE UP TO 3 TIMES A DAY   Basaglar KwikPen 100 units/mL injection pen  Self No No   Si units in am and 36 units in pm, up to 90 units daily   Blood Glucose Monitoring Suppl (ONE TOUCH ULTRA 2) w/Device KIT  Self No No   Sig: by Does not apply route once for 1 dose Use Glucometer to test up to 3 times daily  Coenzyme Q10 (Co Q10) 100 MG CAPS  Self Yes No   Sig: Take by mouth   Janumet XR  MG TB24  Self No No   Sig: TAKE 1 TABLET BY MOUTH TWICE A DAY   KRILL OIL PO  Self Yes No   Sig: Take 750 mg by mouth   Lidocaine 5 % CREA   Yes No   Sig: Apply topically   ONETOUCH DELICA LANCETS FINE MISC  Self No No   Sig: Use 1-3 times a day   OneTouch Ultra test strip  Self No No   Sig: TEST UP TO 3 TIMES DAILY     aspirin 81 mg chewable tablet  Self Yes No   Sig: Chew 81 mg Every 3 days   calcium citrate-Vitamin D ([employer] Calcium Citrate+D3 Petites) 200 mg-250 units  Self Yes No   Sig: Take 1 tablet by mouth daily with breakfast   cholecalciferol (VITAMIN D3) 1,000 units tablet  Self Yes No   Sig: Take 2,000 Units by mouth daily     Patient not taking: Reported on 2022   escitalopram (LEXAPRO) 5 mg tablet  Self Yes No   Sig: Take 5 mg by mouth daily   fluticasone (FLONASE) 50 mcg/act nasal spray  Self Yes No   Si spray into each nostril daily prn   furosemide (LASIX) 40 mg tablet  Self No No   Sig: TAKE 1 TABLET BY MOUTH EVERY DAY   glimepiride (AMARYL) 4 mg tablet  Self No No   Sig: Take 2 tablets (8 mg total) by mouth daily with breakfast   lactulose (CHRONULAC) 10 g/15 mL solution  Self No No   Sig: TAKE 15 ML (10 G TOTAL) BY MOUTH 2 (TWO) TIMES A DAY   levothyroxine 125 mcg tablet   No No   Sig: Take 1 tablet 6 days a week and half a tablet on Sunday  nadolol (CORGARD) 20 mg tablet  Self No No   Sig: Take 1 tablet (20 mg total) by mouth daily   omeprazole (PriLOSEC) 20 mg delayed release capsule  Self No No   Sig: TAKE 1 CAPSULE BY MOUTH EVERY DAY   polyethylene glycol (MIRALAX) 17 g packet   No No   Sig: Take 17 gm (1 scoop) daily   rifaximin (Xifaxan) 550 mg tablet   No No   Sig: Take 1 tablet (550 mg total) by mouth every 12 (twelve) hours   simvastatin (ZOCOR) 40 mg tablet  Self Yes No   Sig: Take 40 mg by mouth daily   spironolactone (ALDACTONE) 50 mg tablet   No No   Sig: Take 1 tablet (50 mg total) by mouth daily      Facility-Administered Medications: None       Allergies   Allergen Reactions    Bee Venom Swelling    Latex     Sesame Seed (Diagnostic) - Food Allergy      Other reaction(s): swelling inside mouth    Strawberry C [Ascorbate - Food Allergy] Other (See Comments)     Mouth sores    Penicillins Rash       PHYSICAL EXAM    PE limited by: nothing    Objective   Vitals:   First set: Temperature: (!) 97 3 °F (36 3 °C) (06/29/22 0020)  Pulse: (!) 27 (06/29/22 0020)  Respirations: 18 (06/29/22 0020)  Blood Pressure: 111/55 (06/29/22 0020)  SpO2: 99 % (06/29/22 0020)    Primary Survey:   (A) Airway: intact  (B) Breathing: equal  (C) Circulation: Pulses:   normal  (D) Disabliity:  GCS Total:  15  (E) Expose:  Completed    Secondary Survey: (Click on Physical Exam tab above)  Physical Exam  Vitals and nursing note reviewed  Constitutional:       General: She is in acute distress (in pain  )  Appearance: Normal appearance  She is well-developed  She is obese  She is not diaphoretic  HENT:      Head: Normocephalic and atraumatic  No raccoon eyes, Smith's sign or contusion  Jaw: There is normal jaw occlusion  Right Ear: External ear normal       Left Ear: External ear normal       Nose: Nose normal       Mouth/Throat:      Mouth: Mucous membranes are moist       Pharynx: Oropharynx is clear     Eyes:      General: No scleral icterus  Right eye: No discharge  Left eye: No discharge  Extraocular Movements: Extraocular movements intact  Conjunctiva/sclera: Conjunctivae normal       Pupils: Pupils are equal, round, and reactive to light  Neck:      Trachea: No tracheal deviation  Cardiovascular:      Rate and Rhythm: Regular rhythm  Bradycardia present  Pulses: Normal pulses  Heart sounds: Normal heart sounds  No murmur heard  No friction rub  No gallop  Comments: Bradycardia in 20s on tele  Pulmonary:      Effort: Pulmonary effort is normal  No respiratory distress  Breath sounds: Normal breath sounds  No stridor  Chest:      Chest wall: No tenderness  Abdominal:      General: Bowel sounds are normal       Palpations: Abdomen is soft  Tenderness: There is abdominal tenderness (mild, diffusely, without bruising )  There is no guarding or rebound  Musculoskeletal:         General: Tenderness (left shoulder diffusely with swelling and deformity  NV intact  no elbow pain  ) and signs of injury present  No deformity  Normal range of motion  Cervical back: Normal, normal range of motion and neck supple  Thoracic back: Normal       Lumbar back: Normal       Right hip: Normal       Right lower leg: No edema  Left lower leg: No edema  Legs:       Comments: Mild TTP left anterior hip without deformity or bruising  Painful to raise off bed but able to do so  Lymphadenopathy:      Cervical: No cervical adenopathy  Skin:     General: Skin is warm and dry  Findings: No bruising or rash  Neurological:      General: No focal deficit present  Mental Status: She is alert and oriented to person, place, and time  Cranial Nerves: No cranial nerve deficit  Sensory: No sensory deficit  Motor: No weakness        Coordination: Coordination normal    Psychiatric:         Behavior: Behavior normal          Cervical spine cleared by clinical criteria?  No (imaging required)      Invasive Devices  Report    Peripheral Intravenous Line  Duration           Peripheral IV 06/29/22 Right Antecubital <1 day    Peripheral IV 06/29/22 Right Hand <1 day                Lab Results:   Results Reviewed     Procedure Component Value Units Date/Time    HS Troponin I 2hr [[id number]]  (Normal) Collected: 06/29/22 0226    Lab Status: Final result Specimen: Blood from Arm, Right Updated: 06/29/22 0306     hs TnI 2hr 20 ng/L      Delta 2hr hsTnI -1 ng/L     Basic metabolic panel [[id number]]  (Abnormal) Collected: 06/29/22 0226    Lab Status: Final result Specimen: Blood from Arm, Right Updated: 06/29/22 0253     Sodium 134 mmol/L      Potassium 4 7 mmol/L      Chloride 103 mmol/L      CO2 25 mmol/L      ANION GAP 6 mmol/L      BUN 56 mg/dL      Creatinine 2 08 mg/dL      Glucose 351 mg/dL      Calcium 9 2 mg/dL      eGFR 22 ml/min/1 73sq m     Narrative:      National Kidney Disease Foundation guidelines for Chronic Kidney Disease (CKD):     Stage 1 with normal or high GFR (GFR > 90 mL/min/1 73 square meters)    Stage 2 Mild CKD (GFR = 60-89 mL/min/1 73 square meters)    Stage 3A Moderate CKD (GFR = 45-59 mL/min/1 73 square meters)    Stage 3B Moderate CKD (GFR = 30-44 mL/min/1 73 square meters)    Stage 4 Severe CKD (GFR = 15-29 mL/min/1 73 square meters)    Stage 5 End Stage CKD (GFR <15 mL/min/1 73 square meters)  Note: GFR calculation is accurate only with a steady state creatinine    POCT Blood Gas (CG8+) [[id number]]  (Abnormal) Collected: 06/29/22 0236    Lab Status: Final result Specimen: Venous Updated: 06/29/22 0240     ph, Alfredo ISTAT 7 314     pCO2, Alfredo i-STAT 46 2 mm HG      pO2, Alfredo i-STAT 18 0 mm HG      BE, i-STAT -3 mmol/L      HCO3, Alfredo i-STAT 23 5 mmol/L      CO2, i-STAT 25 mmol/L      O2 Sat, i-STAT 24 %      SODIUM, I-STAT 138 mmol/l      Potassium, i-STAT 4 8 mmol/L      Calcium, Ionized i-STAT 1 28 mmol/L      Hct, i-STAT 27 %      Hgb, i-STAT 9 2 g/dl      Glucose, i-STAT 337 mg/dl      Specimen Type VENOUS    HS Troponin I 4hr [[id number]]     Lab Status: No result Specimen: Blood     COVID Rapid Antigen [[id number]]  (Normal) Collected: 06/29/22 0148    Lab Status: Final result Specimen: Nares from Nose Updated: 06/29/22 0212     SARS-CoV-2 Ag Negative Presumptive    Covid19 and INFLUENZA A/B PCR [[id number]] Collected: 06/29/22 0148    Lab Status: In process Specimen: Nares from Nose Updated: 06/29/22 0212    NT-BNP PRO [[id number]]  (Abnormal) Collected: 06/29/22 0028    Lab Status: Final result Specimen: Blood from Arm, Right Updated: 06/29/22 0200     NT-proBNP 1,603 pg/mL     Fingerstick Glucose (POCT) [[id number]]  (Abnormal) Collected: 06/29/22 0115    Lab Status: Final result Updated: 06/29/22 0147     POC Glucose 435 mg/dl     Lactic acid [[id number]]  (Abnormal) Collected: 06/29/22 0040    Lab Status: Final result Specimen: Blood from Arm, Right Updated: 06/29/22 0135     LACTIC ACID 3 0 mmol/L     Narrative:      Result may be elevated if tourniquet was used during collection      Lactic acid 2 Hours [[id number]]     Lab Status: No result Specimen: Blood     Ammonia [[id number]]  (Normal) Collected: 06/29/22 0028    Lab Status: Final result Specimen: Blood from Arm, Right Updated: 06/29/22 0103     Ammonia 32 umol/L     HS Troponin 0hr (reflex protocol) [[id number]]  (Normal) Collected: 06/29/22 0028    Lab Status: Final result Specimen: Blood from Arm, Right Updated: 06/29/22 0101     hs TnI 0hr 21 ng/L     Protime-INR [[id number]]  (Abnormal) Collected: 06/29/22 0029    Lab Status: Final result Specimen: Blood from Arm, Right Updated: 06/29/22 0058     Protime 15 1 seconds      INR 1 20    APTT [[id number]]  (Normal) Collected: 06/29/22 0029    Lab Status: Final result Specimen: Blood from Arm, Right Updated: 06/29/22 0058     PTT 32 seconds     CK (with reflex to MB) [[id number]]  (Normal) Collected: 06/29/22 0028    Lab Status: Final result Specimen: Blood from Arm, Right Updated: 06/29/22 0055     Total CK 75 U/L     Comprehensive metabolic panel [[id number]]  (Abnormal) Collected: 06/29/22 0028    Lab Status: Final result Specimen: Blood from Arm, Right Updated: 06/29/22 0055     Sodium 133 mmol/L      Potassium 5 7 mmol/L      Chloride 102 mmol/L      CO2 23 mmol/L      ANION GAP 8 mmol/L      BUN 51 mg/dL      Creatinine 2 19 mg/dL      Glucose 414 mg/dL      Calcium 9 0 mg/dL      Corrected Calcium 9 8 mg/dL      AST 28 U/L      ALT 28 U/L      Alkaline Phosphatase 69 U/L      Total Protein 6 8 g/dL      Albumin 3 0 g/dL      Total Bilirubin 0 70 mg/dL      eGFR 21 ml/min/1 73sq m     Narrative:      National Kidney Disease Foundation guidelines for Chronic Kidney Disease (CKD):     Stage 1 with normal or high GFR (GFR > 90 mL/min/1 73 square meters)    Stage 2 Mild CKD (GFR = 60-89 mL/min/1 73 square meters)    Stage 3A Moderate CKD (GFR = 45-59 mL/min/1 73 square meters)    Stage 3B Moderate CKD (GFR = 30-44 mL/min/1 73 square meters)    Stage 4 Severe CKD (GFR = 15-29 mL/min/1 73 square meters)    Stage 5 End Stage CKD (GFR <15 mL/min/1 73 square meters)  Note: GFR calculation is accurate only with a steady state creatinine    CBC and differential [[id number]]  (Abnormal) Collected: 06/29/22 0028    Lab Status: Final result Specimen: Blood from Arm, Right Updated: 06/29/22 0051     WBC 7 86 Thousand/uL      RBC 3 32 Million/uL      Hemoglobin 9 0 g/dL      Hematocrit 30 1 %      MCV 91 fL      MCH 27 1 pg      MCHC 29 9 g/dL      RDW 15 6 %      MPV 13 7 fL      Platelets 92 Thousands/uL      nRBC 0 /100 WBCs      Neutrophils Relative 80 %      Immat GRANS % 1 %      Lymphocytes Relative 10 %      Monocytes Relative 7 %      Eosinophils Relative 1 %      Basophils Relative 1 %      Neutrophils Absolute 6 33 Thousands/µL      Immature Grans Absolute 0 05 Thousand/uL      Lymphocytes Absolute 0 79 Thousands/µL      Monocytes Absolute 0 57 Thousand/µL Eosinophils Absolute 0 05 Thousand/µL      Basophils Absolute 0 07 Thousands/µL     Beta Hydroxybutyrate [[id number]]  (Normal) Collected: 06/29/22 0040    Lab Status: Final result Specimen: Blood from Arm, Right Updated: 06/29/22 0049     BETA-HYDROXYBUTYRATE 0 2 mmol/L     Fingerstick Glucose (POCT) [[id number]]  (Abnormal) Collected: 06/29/22 0022    Lab Status: Final result Updated: 06/29/22 0030     POC Glucose 426 mg/dl     UA w Reflex to Microscopic w Reflex to Culture [[id number]]     Lab Status: No result Specimen: Urine                  Imaging Studies:   Direct to CT: Yes  XR shoulder 2+ views LEFT   ED Interpretation by Nneka Adamson MD (06/29 0130)   Humeral head fracture w/ displacement  TRAUMA - CT chest abdomen pelvis w contrast   Final Result by Denny Fuller MD (06/29 0176)   Addendum 1 of 1 by Denny Fuller MD (06/29 1950)   ADDENDUM:      There is an error in the impression of the report  Number 1 of the impression should state: Comminuted and displaced fracture    of the left HUMERAL head/neck  Final      1  Comminuted and displaced fracture of the left femoral head/neck  2   No acute traumatic visceral injury identified within the chest, abdomen, or pelvis  3   Cirrhosis of the liver with stigmata of portal hypertension and splenomegaly  4   Mild diffuse thickening of the 2nd and 3rd portions of the duodenum, which may be due to duodenitis  5   Mild diffuse thickening of the ascending colon may be due to portal hypertensive colopathy  The study was marked in [facility] for immediate notification  Workstation performed: [id number]         TRAUMA - CT spine cervical wo contrast   Final Result by Denny Fuller MD (06/29 8198)      No cervical spine fracture or traumatic malalignment  Workstation performed: [id number]         TRAUMA - CT head wo contrast   Final Result by Denny Fuller MD (06/29 4691)      No intracranial hemorrhage or calvarial fracture  Workstation performed: [id number]         XR Trauma chest portable   ED Interpretation by Jeannette Nelson MD (06/29 0107)   No ptx or definite acute injury  XR Trauma pelvis ap only 1 or 2 vw   ED Interpretation by Jeannette Nelson MD (06/29 0034)   No acute fracture or dislocation               Procedures  CriticalCare Time  Performed by: Jeannette Nelson MD  Authorized by: Jeannette Nelson MD     Critical care provider statement:     Critical care time (minutes):  150    Critical care time was exclusive of:  Separately billable procedures and treating other patients and teaching time    Critical care was necessary to treat or prevent imminent or life-threatening deterioration of the following conditions:  Cardiac failure, circulatory failure and trauma    Critical care was time spent personally by me on the following activities:  Obtaining history from patient or surrogate, blood draw for specimens, development of treatment plan with patient or surrogate, discussions with consultants, evaluation of patient's response to treatment, examination of patient, review of old charts, re-evaluation of patient's condition, ordering and review of radiographic studies, ordering and review of laboratory studies and ordering and performing treatments and interventions    I assumed direction of critical care for this patient from another provider in my specialty: no    ECG 12 Lead Documentation Only    Date/Time: 6/29/2022 2:50 AM  Performed by: Jeannette Nelson MD  Authorized by: Jeannette Nelson MD     Indications / Diagnosis:  Fall  ECG reviewed by me, the ED Provider: yes    Patient location:  ED  Previous ECG:     Previous ECG:  Unavailable    Comparison to cardiac monitor: Yes    Interpretation:     Interpretation: abnormal    Rate:     ECG rate:  28    ECG rate assessment: bradycardic    Rhythm:     Rhythm: A-V block    Ectopy:     Ectopy: none    QRS:     QRS axis:  Left    QRS intervals: Wide  Conduction:     Conduction: abnormal      Abnormal conduction: 3rd degree    ST segments:     ST segments:  Non-specific  T waves:     T waves: non-specific    ECG 12 Lead Documentation Only    Date/Time: 6/29/2022 3:12 AM  Performed by: Rosaura Wallace MD  Authorized by: Rosaura Wallace MD     Indications / Diagnosis:  Bradycardia  ECG reviewed by me, the ED Provider: yes    Patient location:  ED  Previous ECG:     Previous ECG:  Compared to current    Comparison ECG info:  2nd degree heart block has replaced complete heart block    Similarity:  Changes noted  Rate:     ECG rate:  42    ECG rate assessment: bradycardic    Rhythm:     Rhythm: A-V block               ED Course  ED Course as of 06/29/22 0316   Wed Jun 29, 2022   0034 Contrast approved by Dr Mingo Lo TT Dr Brutus Claude (cards)  Concern for heart block  BP stable  Pads in place  0040 Dr Brutus Claude agrees, complete heart block  Recommends I discuss with Dr Destin Santiago for transvenous pacer  5880 Dr Destin Santiago recommends strict bed rest, transfer to criticalcare  Will hold on emergent pacer for now as QRS is narrow  Transfer order placed  4454 Discussed w/ Dr Levi Roque  If no trauma - can go to [facility]  If trauma, he will accept  Called reading room for stat read  0124 Pt now in 2nd degree block type 2  Awaiting trauma scans for dispo  4194 Updated Dr Levi Roque  Accepted for transfer to [facility]     0246 Pt had very brief (< 2 seconds) of LOC with long pause vs asystole on telemetry  Responded to sternal rub  Returned to 2nd degree HR 70s  BP stable  K improved  Mentating and oriented x 3  Dr Destin Santiago updated via TT  EMS here to transport              MDM  Number of Diagnoses or Management Options  CLYDE (acute kidney injury) [facility]): new and requires workup  Anemia: new and requires workup  Complete heart block [facility]): new and requires workup  Elevated serum lactate dehydrogenase: new and requires workup  Elevated troponin: new and requires workup  Humerus head fracture, left, closed, initial encounter: new and requires workup  Hyperglycemia: new and requires workup  Hyperkalemia: new and requires workup     Amount and/or Complexity of Data Reviewed  Clinical lab tests: ordered and reviewed  Tests in the radiology section of CPT®: ordered and reviewed  Tests in the medicine section of CPT®: ordered and reviewed  Review and summarize past medical records: yes  Discuss the patient with other providers: yes  Independent visualization of images, tracings, or specimens: yes    Risk of Complications, Morbidity, and/or Mortality  Presenting problems: high  Diagnostic procedures: moderate  Management options: moderate  General comments: Pt with asymptomatic bradycardia  Complete heart block to 2nd degree  mentation fine throughout  Pacer pads in place  Did not require pacing       Patient Progress  Patient progress: stable          Disposition  [facility] Transfer: No  Final diagnoses:   CLYDE (acute kidney injury) ([facility] [address] )   Complete heart block ([facility] [address] )   Humerus head fracture, left, closed, initial encounter   Hyperglycemia   Elevated serum lactate dehydrogenase   Elevated troponin   Hyperkalemia   Anemia     Time reflects when diagnosis was documented in both MDM as applicable and the Disposition within this note     Time User Action Codes Description Comment    6/29/2022  2:43 AM Javier Marus S Add [N17 9] CLYDE (acute kidney injury) ([facility] [address] )     6/29/2022  2:44 AM Javier Marus S Add [I44 2] Complete heart block ([facility] [address] )     6/29/2022  2:45 AM Carry Earing Add [S42 292A] Humerus head fracture, left, closed, initial encounter     6/29/2022  2:45 AM Javier Marus S Add [R73 9] Hyperglycemia     6/29/2022  2:45 AM Javier Marus S Add [R74 02] Elevated serum lactate dehydrogenase     6/29/2022  2:45 AM Javier Marus S Add [R77 8] Elevated troponin     6/29/2022  2:46 AM Javier Marus S Add [E87 5] Hyperkalemia     6/29/2022  2:46 AM Kannan Cooper Add [D64 9] Anemia       ED Disposition     ED Disposition   Transfer to Another Facility-In Network    Condition   --    Date/Time   Wed Jun 29, 2022  2:16 AM    Comment   Malik Rogers should be transferred out to [facility]             MD Documentation    Bossman Jose Most Recent Value   Patient Condition The patient has been stabilized such that within reasonable medical probability, no material deterioration of the patient condition or the condition of the unborn child(jamin) is likely to result from the transfer   Reason for Transfer Level of Care needed not available at this facility   Benefits of Transfer Specialized equipment and/or services available at the receiving facility (Include comment)________________________, Continuity of care   Risks of Transfer Potential for delay in receiving treatment, Potential deterioration of medical condition, Loss of IV, Possible worsening of condition or death during transfer, Increased discomfort during transfer   Accepting Physician Dr Ty Mcmahon Name, Belen Puna Sending MD [facility]   Provider Certification General risk, such as traffic hazards, adverse weather conditions, rough terrain or turbulence, possible failure of equipment (including vehicle or aircraft), or consequences of actions of persons outside the control of the transport personnel, Unanticipated needs of medical equipment and personnel during transport, Risk of worsening condition, The possibility of a transport vehicle being unavailable      RN Documentation    [address]  [facility]      Follow-up Information    None       Discharge Medication List as of 6/29/2022  3:07 AM      CONTINUE these medications which have NOT CHANGED    Details   aspirin 81 mg chewable tablet Chew 81 mg Every 3 days, Historical Med      B-D UF III MINI PEN NEEDLES 31G X 5 MM MISC USE UP TO 3 TIMES A DAY, Normal      Basaglar KwikPen 100 units/mL injection pen 48 units in am and 36 units in pm, up to 90 units daily, Normal      Blood Glucose Monitoring Suppl (ONE TOUCH ULTRA 2) w/Device KIT by Does not apply route once for 1 dose Use Glucometer to test up to 3 times daily  , Starting Mon 9/28/2020, Normal      calcium citrate-Vitamin D ([employer] Calcium Citrate+D3 Petites) 200 mg-250 units Take 1 tablet by mouth daily with breakfast, Historical Med      cholecalciferol (VITAMIN D3) 1,000 units tablet Take 2,000 Units by mouth daily  , Historical Med      Coenzyme Q10 (Co Q10) 100 MG CAPS Take by mouth, Historical Med      escitalopram (LEXAPRO) 5 mg tablet Take 5 mg by mouth daily, Historical Med      fluticasone (FLONASE) 50 mcg/act nasal spray 1 spray into each nostril daily prn, Starting Mon 11/21/2011, Historical Med      furosemide (LASIX) 40 mg tablet TAKE 1 TABLET BY MOUTH EVERY DAY, Normal      glimepiride (AMARYL) 4 mg tablet Take 2 tablets (8 mg total) by mouth daily with breakfast, Starting Sat 11/13/2021, Normal      Janumet XR  MG TB24 TAKE 1 TABLET BY MOUTH TWICE A DAY, Normal      KRILL OIL PO Take 750 mg by mouth, Historical Med      lactulose (CHRONULAC) 10 g/15 mL solution TAKE 15 ML (10 G TOTAL) BY MOUTH 2 (TWO) TIMES A DAY, Normal      levothyroxine 125 mcg tablet Take 1 tablet 6 days a week and half a tablet on Sunday , No Print      Lidocaine 5 % CREA Apply topically, Historical Med      nadolol (CORGARD) 20 mg tablet Take 1 tablet (20 mg total) by mouth daily, Starting Tue 3/16/2021, No Print      omeprazole (PriLOSEC) 20 mg delayed release capsule TAKE 1 CAPSULE BY MOUTH EVERY DAY, Normal      ONETOUCH DELICA LANCETS FINE MISC Use 1-3 times a day, Normal      OneTouch Ultra test strip TEST UP TO 3 TIMES DAILY , Normal      polyethylene glycol (MIRALAX) 17 g packet Take 17 gm (1 scoop) daily, Normal      rifaximin (Xifaxan) 550 mg tablet Take 1 tablet (550 mg total) by mouth every 12 (twelve) hours, Starting Tue 5/10/2022, Until Sun 4/9/2023, Normal      simvastatin (ZOCOR) 40 mg tablet Take 40 mg by mouth daily, Starting Fri 6/1/2018, Historical Med      spironolactone (ALDACTONE) 50 mg tablet Take 1 tablet (50 mg total) by mouth daily, Starting Mon 6/27/2022, Normal           No discharge procedures on file      PDMP Review       Value Time User    PDMP Reviewed  Yes 9/15/2020  3:13 PM Fredi Stuart MD          ED Provider  Electronically Signed by         Johnie Izquierdo MD  06/29/22 0468

## 2022-06-29 NOTE — PROCEDURES
Central Line Insertion    Date/Time: 6/29/2022 3:27 PM  Performed by: Don Tobar MD  Authorized by: Don Tobar MD     Patient location:  ICU  Consent:     Consent obtained:  Written    Consent given by:  Guardian    Risks discussed:  Arterial puncture, bleeding, incorrect placement, infection, nerve damage and pneumothorax  Universal protocol:     Patient identity confirmed:  Arm band and hospital-assigned identification number  Pre-procedure details:     Hand hygiene: Hand hygiene performed prior to insertion      Sterile barrier technique: All elements of maximal sterile technique followed      Skin preparation:  ChloraPrep    Skin preparation agent: Skin preparation agent completely dried prior to procedure    Indications:     Central line indications: medications requiring central line    Anesthesia (see MAR for exact dosages): Anesthesia method:  Local infiltration    Local anesthetic:  Lidocaine 1% w/o epi  Procedure details:     Location:  Left internal jugular    Vessel type: vein      Laterality:  Left    Approach: percutaneous technique used      Patient position:  Trendelenburg    Catheter type:  Triple lumen 20cm    Catheter size:  7 Fr    Landmarks identified: yes      Ultrasound guidance: yes      Ultrasound image availability:  Images available in PACS    Sterile ultrasound techniques: Sterile gel and sterile probe covers were used      Manometry confirmation: yes      Number of attempts:  1    Successful placement: yes    Post-procedure details:     Post-procedure:  Dressing applied and line sutured    Assessment:  Blood return through all ports, free fluid flow, no pneumothorax on x-ray and placement verified by x-ray    Post-procedure complications: none      Patient tolerance of procedure:   Tolerated well, no immediate complications

## 2022-06-30 ENCOUNTER — APPOINTMENT (INPATIENT)
Dept: RADIOLOGY | Facility: HOSPITAL | Age: 77
DRG: 242 | End: 2022-06-30
Payer: MEDICARE

## 2022-06-30 PROBLEM — I44.2 COMPLETE HEART BLOCK (HCC): Status: ACTIVE | Noted: 2022-06-30

## 2022-06-30 LAB
ABO GROUP BLD BPU: NORMAL
ALBUMIN SERPL BCP-MCNC: 2.4 G/DL (ref 3.5–5)
ALBUMIN SERPL BCP-MCNC: 2.6 G/DL (ref 3.5–5)
ALP SERPL-CCNC: 56 U/L (ref 46–116)
ALT SERPL W P-5'-P-CCNC: 30 U/L (ref 12–78)
ANION GAP SERPL CALCULATED.3IONS-SCNC: 6 MMOL/L (ref 4–13)
ANION GAP SERPL CALCULATED.3IONS-SCNC: 8 MMOL/L (ref 4–13)
AST SERPL W P-5'-P-CCNC: 55 U/L (ref 5–45)
ATRIAL RATE: 60 BPM
BASOPHILS # BLD AUTO: 0.06 THOUSANDS/ΜL (ref 0–0.1)
BASOPHILS NFR BLD AUTO: 1 % (ref 0–1)
BILIRUB SERPL-MCNC: 1.3 MG/DL (ref 0.2–1)
BPU ID: NORMAL
BUN SERPL-MCNC: 46 MG/DL (ref 5–25)
BUN SERPL-MCNC: 51 MG/DL (ref 5–25)
CA-I BLD-SCNC: 1.18 MMOL/L (ref 1.12–1.32)
CALCIUM ALBUM COR SERPL-MCNC: 10 MG/DL (ref 8.3–10.1)
CALCIUM ALBUM COR SERPL-MCNC: 10.1 MG/DL (ref 8.3–10.1)
CALCIUM SERPL-MCNC: 8.7 MG/DL (ref 8.3–10.1)
CALCIUM SERPL-MCNC: 9 MG/DL (ref 8.3–10.1)
CARDIAC TROPONIN I PNL SERPL HS: 361 NG/L
CHLORIDE SERPL-SCNC: 109 MMOL/L (ref 100–108)
CHLORIDE SERPL-SCNC: 112 MMOL/L (ref 100–108)
CO2 SERPL-SCNC: 21 MMOL/L (ref 21–32)
CO2 SERPL-SCNC: 24 MMOL/L (ref 21–32)
CORTIS SERPL-MCNC: 13.8 UG/DL
CREAT SERPL-MCNC: 1.4 MG/DL (ref 0.6–1.3)
CREAT SERPL-MCNC: 1.44 MG/DL (ref 0.6–1.3)
CROSSMATCH: NORMAL
EOSINOPHIL # BLD AUTO: 0.13 THOUSAND/ΜL (ref 0–0.61)
EOSINOPHIL NFR BLD AUTO: 2 % (ref 0–6)
ERYTHROCYTE [DISTWIDTH] IN BLOOD BY AUTOMATED COUNT: 15.7 % (ref 11.6–15.1)
GFR SERPL CREATININE-BSD FRML MDRD: 35 ML/MIN/1.73SQ M
GFR SERPL CREATININE-BSD FRML MDRD: 36 ML/MIN/1.73SQ M
GLUCOSE SERPL-MCNC: 113 MG/DL (ref 65–140)
GLUCOSE SERPL-MCNC: 114 MG/DL (ref 65–140)
GLUCOSE SERPL-MCNC: 115 MG/DL (ref 65–140)
GLUCOSE SERPL-MCNC: 131 MG/DL (ref 65–140)
GLUCOSE SERPL-MCNC: 136 MG/DL (ref 65–140)
GLUCOSE SERPL-MCNC: 151 MG/DL (ref 65–140)
GLUCOSE SERPL-MCNC: 159 MG/DL (ref 65–140)
GLUCOSE SERPL-MCNC: 174 MG/DL (ref 65–140)
GLUCOSE SERPL-MCNC: 175 MG/DL (ref 65–140)
GLUCOSE SERPL-MCNC: 180 MG/DL (ref 65–140)
GLUCOSE SERPL-MCNC: 184 MG/DL (ref 65–140)
GLUCOSE SERPL-MCNC: 187 MG/DL (ref 65–140)
GLUCOSE SERPL-MCNC: 211 MG/DL (ref 65–140)
GLUCOSE SERPL-MCNC: 211 MG/DL (ref 65–140)
GLUCOSE SERPL-MCNC: 224 MG/DL (ref 65–140)
HCT VFR BLD AUTO: 28.4 % (ref 34.8–46.1)
HGB BLD-MCNC: 9 G/DL (ref 11.5–15.4)
IMM GRANULOCYTES # BLD AUTO: 0.03 THOUSAND/UL (ref 0–0.2)
IMM GRANULOCYTES NFR BLD AUTO: 0 % (ref 0–2)
INR PPP: 1.33 (ref 0.84–1.19)
LACTATE SERPL-SCNC: 1.2 MMOL/L (ref 0.5–2)
LYMPHOCYTES # BLD AUTO: 1.18 THOUSANDS/ΜL (ref 0.6–4.47)
LYMPHOCYTES NFR BLD AUTO: 13 % (ref 14–44)
MAGNESIUM SERPL-MCNC: 2.1 MG/DL (ref 1.6–2.6)
MAGNESIUM SERPL-MCNC: 2.6 MG/DL (ref 1.6–2.6)
MCH RBC QN AUTO: 27.8 PG (ref 26.8–34.3)
MCHC RBC AUTO-ENTMCNC: 31.7 G/DL (ref 31.4–37.4)
MCV RBC AUTO: 88 FL (ref 82–98)
MONOCYTES # BLD AUTO: 1.21 THOUSAND/ΜL (ref 0.17–1.22)
MONOCYTES NFR BLD AUTO: 14 % (ref 4–12)
NEUTROPHILS # BLD AUTO: 6.18 THOUSANDS/ΜL (ref 1.85–7.62)
NEUTS SEG NFR BLD AUTO: 70 % (ref 43–75)
NRBC BLD AUTO-RTO: 0 /100 WBCS
P AXIS: 31 DEGREES
PHOSPHATE SERPL-MCNC: 2.9 MG/DL (ref 2.3–4.1)
PHOSPHATE SERPL-MCNC: 3.3 MG/DL (ref 2.3–4.1)
PLATELET # BLD AUTO: 119 THOUSANDS/UL (ref 149–390)
PMV BLD AUTO: 12.9 FL (ref 8.9–12.7)
POTASSIUM SERPL-SCNC: 4.7 MMOL/L (ref 3.5–5.3)
POTASSIUM SERPL-SCNC: 5.4 MMOL/L (ref 3.5–5.3)
PR INTERVAL: 0 MS
PROT SERPL-MCNC: 6.2 G/DL (ref 6.4–8.2)
PROTHROMBIN TIME: 16 SECONDS (ref 11.6–14.5)
QRS AXIS: 210 DEGREES
QRSD INTERVAL: 96 MS
QT INTERVAL: 438 MS
QTC INTERVAL: 438 MS
RBC # BLD AUTO: 3.24 MILLION/UL (ref 3.81–5.12)
SODIUM SERPL-SCNC: 139 MMOL/L (ref 136–145)
SODIUM SERPL-SCNC: 141 MMOL/L (ref 136–145)
T WAVE AXIS: 33 DEGREES
UNIT DISPENSE STATUS: NORMAL
UNIT PRODUCT CODE: NORMAL
UNIT PRODUCT VOLUME: 250 ML
UNIT RH: NORMAL
VENTRICULAR RATE: 60 BPM
WBC # BLD AUTO: 8.79 THOUSAND/UL (ref 4.31–10.16)

## 2022-06-30 PROCEDURE — 97163 PT EVAL HIGH COMPLEX 45 MIN: CPT

## 2022-06-30 PROCEDURE — 82533 TOTAL CORTISOL: CPT | Performed by: EMERGENCY MEDICINE

## 2022-06-30 PROCEDURE — 85610 PROTHROMBIN TIME: CPT | Performed by: EMERGENCY MEDICINE

## 2022-06-30 PROCEDURE — 71045 X-RAY EXAM CHEST 1 VIEW: CPT

## 2022-06-30 PROCEDURE — C9113 INJ PANTOPRAZOLE SODIUM, VIA: HCPCS | Performed by: EMERGENCY MEDICINE

## 2022-06-30 PROCEDURE — 84484 ASSAY OF TROPONIN QUANT: CPT | Performed by: EMERGENCY MEDICINE

## 2022-06-30 PROCEDURE — 80069 RENAL FUNCTION PANEL: CPT | Performed by: PHYSICIAN ASSISTANT

## 2022-06-30 PROCEDURE — 83735 ASSAY OF MAGNESIUM: CPT | Performed by: EMERGENCY MEDICINE

## 2022-06-30 PROCEDURE — 82948 REAGENT STRIP/BLOOD GLUCOSE: CPT

## 2022-06-30 PROCEDURE — 99291 CRITICAL CARE FIRST HOUR: CPT | Performed by: EMERGENCY MEDICINE

## 2022-06-30 PROCEDURE — 93010 ELECTROCARDIOGRAM REPORT: CPT | Performed by: INTERNAL MEDICINE

## 2022-06-30 PROCEDURE — 83605 ASSAY OF LACTIC ACID: CPT | Performed by: EMERGENCY MEDICINE

## 2022-06-30 PROCEDURE — 92610 EVALUATE SWALLOWING FUNCTION: CPT

## 2022-06-30 PROCEDURE — 85025 COMPLETE CBC W/AUTO DIFF WBC: CPT | Performed by: EMERGENCY MEDICINE

## 2022-06-30 PROCEDURE — 80053 COMPREHEN METABOLIC PANEL: CPT | Performed by: EMERGENCY MEDICINE

## 2022-06-30 PROCEDURE — 97167 OT EVAL HIGH COMPLEX 60 MIN: CPT

## 2022-06-30 PROCEDURE — 99024 POSTOP FOLLOW-UP VISIT: CPT | Performed by: PHYSICIAN ASSISTANT

## 2022-06-30 PROCEDURE — 83735 ASSAY OF MAGNESIUM: CPT | Performed by: PHYSICIAN ASSISTANT

## 2022-06-30 PROCEDURE — 82330 ASSAY OF CALCIUM: CPT | Performed by: EMERGENCY MEDICINE

## 2022-06-30 PROCEDURE — 93005 ELECTROCARDIOGRAM TRACING: CPT

## 2022-06-30 PROCEDURE — 84100 ASSAY OF PHOSPHORUS: CPT | Performed by: EMERGENCY MEDICINE

## 2022-06-30 PROCEDURE — 99024 POSTOP FOLLOW-UP VISIT: CPT | Performed by: INTERNAL MEDICINE

## 2022-06-30 RX ORDER — LEVOTHYROXINE SODIUM 112 UG/1
112 TABLET ORAL
Status: DISCONTINUED | OUTPATIENT
Start: 2022-06-30 | End: 2022-07-05 | Stop reason: HOSPADM

## 2022-06-30 RX ORDER — ESCITALOPRAM OXALATE 5 MG/1
5 TABLET ORAL DAILY
Status: DISCONTINUED | OUTPATIENT
Start: 2022-06-30 | End: 2022-07-05 | Stop reason: HOSPADM

## 2022-06-30 RX ORDER — LIDOCAINE 50 MG/G
1 PATCH TOPICAL DAILY
Status: DISCONTINUED | OUTPATIENT
Start: 2022-07-01 | End: 2022-07-05 | Stop reason: HOSPADM

## 2022-06-30 RX ORDER — ASPIRIN 81 MG/1
81 TABLET, CHEWABLE ORAL DAILY
Status: DISCONTINUED | OUTPATIENT
Start: 2022-06-30 | End: 2022-07-05 | Stop reason: HOSPADM

## 2022-06-30 RX ORDER — FUROSEMIDE 10 MG/ML
20 INJECTION INTRAMUSCULAR; INTRAVENOUS ONCE
Status: COMPLETED | OUTPATIENT
Start: 2022-06-30 | End: 2022-06-30

## 2022-06-30 RX ORDER — PRAVASTATIN SODIUM 80 MG/1
80 TABLET ORAL
Refills: 3 | Status: DISCONTINUED | OUTPATIENT
Start: 2022-06-30 | End: 2022-07-05 | Stop reason: HOSPADM

## 2022-06-30 RX ORDER — PANTOPRAZOLE SODIUM 40 MG/1
40 TABLET, DELAYED RELEASE ORAL
Refills: 3 | Status: DISCONTINUED | OUTPATIENT
Start: 2022-06-30 | End: 2022-07-05 | Stop reason: HOSPADM

## 2022-06-30 RX ADMIN — RIFAXIMIN 550 MG: 550 TABLET ORAL at 09:57

## 2022-06-30 RX ADMIN — ESCITALOPRAM 5 MG: 5 TABLET, FILM COATED ORAL at 10:59

## 2022-06-30 RX ADMIN — HEPARIN SODIUM 5000 UNITS: 5000 INJECTION INTRAVENOUS; SUBCUTANEOUS at 05:35

## 2022-06-30 RX ADMIN — LACTULOSE 10 G: 20 SOLUTION ORAL at 18:16

## 2022-06-30 RX ADMIN — SODIUM CHLORIDE 4 UNITS/HR: 9 INJECTION, SOLUTION INTRAVENOUS at 18:00

## 2022-06-30 RX ADMIN — ASPIRIN 81 MG CHEWABLE TABLET 81 MG: 81 TABLET CHEWABLE at 10:53

## 2022-06-30 RX ADMIN — PRAVASTATIN SODIUM 80 MG: 80 TABLET ORAL at 16:38

## 2022-06-30 RX ADMIN — DOCUSATE SODIUM 100 MG: 100 CAPSULE, LIQUID FILLED ORAL at 09:57

## 2022-06-30 RX ADMIN — RIFAXIMIN 550 MG: 550 TABLET ORAL at 22:41

## 2022-06-30 RX ADMIN — NOREPINEPHRINE BITARTRATE 6 MCG/MIN: 1 INJECTION, SOLUTION, CONCENTRATE INTRAVENOUS at 09:08

## 2022-06-30 RX ADMIN — PANTOPRAZOLE SODIUM 40 MG: 40 INJECTION, POWDER, FOR SOLUTION INTRAVENOUS at 09:08

## 2022-06-30 RX ADMIN — HEPARIN SODIUM 5000 UNITS: 5000 INJECTION INTRAVENOUS; SUBCUTANEOUS at 14:59

## 2022-06-30 RX ADMIN — LEVOTHYROXINE SODIUM 112 MCG: 112 TABLET ORAL at 10:53

## 2022-06-30 RX ADMIN — LACTULOSE 10 G: 20 SOLUTION ORAL at 09:56

## 2022-06-30 RX ADMIN — HEPARIN SODIUM 5000 UNITS: 5000 INJECTION INTRAVENOUS; SUBCUTANEOUS at 22:41

## 2022-06-30 RX ADMIN — FUROSEMIDE 20 MG: 10 INJECTION, SOLUTION INTRAMUSCULAR; INTRAVENOUS at 10:53

## 2022-06-30 RX ADMIN — NOREPINEPHRINE BITARTRATE 8 MCG/MIN: 1 INJECTION, SOLUTION, CONCENTRATE INTRAVENOUS at 00:24

## 2022-06-30 NOTE — PROGRESS NOTES
Daily Progress Note - Critical Care   Elena Aguilar 77 y o  female MRN: [id number]  Unit/Bed#: ICU 07 Encounter: [id number]        ----------------------------------------------------------------------------------------  HPI/24hr events: In past 24 hours patient received permanent pacemaker following complete heart block and bedside TVP placement yesterday AM      ---------------------------------------------------------------------------------------  SUBJECTIVE  "I feel good " Patient with some pain in shoulder and neck area but states it is overall ok  No CP, SOB, n/v/d       Review of Systems  Review of systems was reviewed and negative unless stated above in HPI/24-hour events   ---------------------------------------------------------------------------------------  Assessment and Plan:    Neuro:    Diagnosis: No active issues  o Plan: Analgesia with scheduled tylenol, prn dilaudid and oxycodone   Diagnosis: Hx of depression  o Plan: Home med is lexapro      CV:    Diagnosis: Complete heart block  o Plan: S/p bedside TVP, now s/p permanent pacemaker by EP  o Avoid AV tammy blocking agents  o Continues with hypotension even after appropriate pacing  o Levo at 8   Diagnosis: Elevated BNP, generalized edema  o Plan: Possible undiagnosed CHF vs stress cardiomyopathy  o Echo with apical hypokinesis      Pulm:   Diagnosis: No active issues  o Plan: Pulmonary hygiene      GI:    Diagnosis: Cirrhosis, hx hepatic encephalopathy  o Plan: Continue lactulose and rifaximin   Diagnosis: Hx of GERD  o Plan: protonix   Bowel regimen: colace, miralax, senokot      :    Diagnosis: CLYDE  o Plan: Cr now 1 4, continue to trend  o IV hydration  o Monitor I/O's      F/E/N:    Plan:   o F: Maintenance fluids  o E: Trend and replete as indicated  o N: NPO, pending speech eval      Heme/Onc:    Diagnosis: ABLA  o Plan: Trend Hgb, transfuse hgb <7   Diagnosis: DVT ppx  o Plan: SQH, SCDs      Endo:    Diagnosis: Hx of T2DM  o Plan: Home med glimepiride  o Insulin drip with improved glycemic control      ID:    Diagnosis: No active issues  o Plan: Monitor temp/WBC curve      MSK/Skin:    Diagnosis: L humeral head fracture  o Plan: Non-operative management per orthopedics  o Analgesia  o PT/OT when appropriate    Patient appropriate for transfer out of the ICU today?: No  Disposition: Continue Critical Care   Code Status: Level 1 - Full Code  ---------------------------------------------------------------------------------------  ICU CORE MEASURES    Prophylaxis   VTE Pharmacologic Prophylaxis: Heparin  VTE Mechanical Prophylaxis: sequential compression device  Stress Ulcer Prophylaxis: Pantoprazole IV     ABCDE Protocol (if indicated)  Plan to perform spontaneous awakening trial today? Not applicable  Plan to perform spontaneous breathing trial today? Not applicable  Obvious barriers to extubation? Not applicable  CAM-ICU: Negative    Invasive Devices Review  Invasive Devices  Report    Central Venous Catheter Line  Duration           CVC Central Lines 22 Triple Left Internal jugular <1 day          Peripheral Intravenous Line  Duration           Peripheral IV 22 Right Hand 1 day    Peripheral IV 22 Dorsal (posterior); Left Hand <1 day          Arterial Line  Duration           Arterial Line 22 Right Radial <1 day          Line  Duration           Pacer Wires <1 day          Drain  Duration           Urethral Catheter 16 Fr  <1 day              Can any invasive devices be discontinued today?  Yes  ---------------------------------------------------------------------------------------  OBJECTIVE    Vitals   Vitals:    22 0300 22 0400 22 0500 22 0535   BP: 105/54 112/53 114/55    Pulse: 68 66 58    Resp: 20 21 20    Temp:  99 °F (37 2 °C)     TempSrc:  Oral     SpO2: 94% 94% 94%    Weight:    90 6 kg (199 lb 11 8 oz)   Height:         Temp (24hrs), Av 1 °F (37 3 °C), Min:98 6 °F (37 °C), Max:99 32 °F (37 4 °C)  Current: Temperature: 99 °F (37 2 °C)  Visit Vitals  /56   Pulse 58   Temp 99 °F (37 2 °C) (Oral)   Resp 21   Ht 5' 3" (1 6 m)   Wt 90 6 kg (199 lb 11 8 oz)   SpO2 95%   BMI 35 38 kg/m²   OB Status Postmenopausal   Smoking Status Never Smoker   BSA 1 93 m²         Respiratory:  SpO2: SpO2: 95 %, SpO2 Device: O2 Device: Nasal cannula  Nasal Cannula O2 Flow Rate (L/min): 2 L/min    Invasive/non-invasive ventilation settings   Respiratory  Report   Lab Data (Last 4 hours)    None         O2/Vent Data (Last 4 hours)    None                Physical Exam    Physical Exam:    General: NAD, non-toxic  HEENT: NC/AT, PERRL, MMM, no exudates  Neck: No JVD, trachea midline  CV: RRR, no m/r/g  Pulm: effort WNL, CTAB, no wheezes/rales/rhonchi  GI: soft, NT/ND, no rebound/guarding  : Wise in place  MSK: LUE tenderness  Neuro: AAOx3, CN II-XII grossly intact, no focal motor or sensory deficits  Skin: no rashes/wounds        Laboratory and Diagnostics:  Results from last 7 days   Lab Units 06/30/22 0449 06/29/22 1919 06/29/22 0411 06/29/22  0236 06/29/22  0028   WBC Thousand/uL 8 79 9 73 5 13  --  7 86   HEMOGLOBIN g/dL 9 0* 8 9* 7 8*  --  9 0*   I STAT HEMOGLOBIN g/dl  --   --   --  9 2*  --    HEMATOCRIT % 28 4* 28 9* 25 9*  --  30 1*   HEMATOCRIT, ISTAT %  --   --   --  27*  --    PLATELETS Thousands/uL 119* 88* 67*  --  92*   NEUTROS PCT % 70 76* 81*  --  80*   MONOS PCT % 14* 12 10  --  7     Results from last 7 days   Lab Units 06/29/22 1919 06/29/22 0411 06/29/22  0236 06/29/22  0226 06/29/22  0028   SODIUM mmol/L 139 138  --  134* 133*   POTASSIUM mmol/L 5 1 4 8  --  4 7 5 7*   CHLORIDE mmol/L 109* 107  --  103 102   CO2 mmol/L 21 21  --  25 23   CO2, I-STAT mmol/L  --   --  25  --   --    ANION GAP mmol/L 9 10  --  6 8   BUN mg/dL 55* 56*  --  56* 51*   CREATININE mg/dL 1 70* 1 81*  --  2 08* 2 19*   CALCIUM mg/dL 8 8 9 3  --  9 2 9 0   GLUCOSE RANDOM mg/dL 219* 327*  --  351* 414*   ALT U/L 28  --   --   --  28   AST U/L 33  --   --   --  28   ALK PHOS U/L 54  --   --   --  69   ALBUMIN g/dL 2 6*  --   --   --  3 0*   TOTAL BILIRUBIN mg/dL 1 27*  --   --   --  0 70     Results from last 7 days   Lab Units 22  1919 22  0411   MAGNESIUM mg/dL 2 4 2 4   PHOSPHORUS mg/dL 3 5 2 9      Results from last 7 days   Lab Units 22  0449 22  0029   INR  1 33* 1 20*   PTT seconds  --  32          Results from last 7 days   Lab Units 22  0449 22  2205 22  1919 22  1111 22  0757 22  0040   LACTIC ACID mmol/L 1 2 2 1* 2 5* 3 9* 3 6* 3 0*     ABG:  Results from last 7 days   Lab Units 22  1111   PH ART  7 364   PCO2 ART mm Hg 34 6*   PO2 ART mm Hg 78 6   HCO3 ART mmol/L 19 3*   BASE EXC ART mmol/L -5 5   ABG SOURCE  Line, Arterial     VBG:  Results from last 7 days   Lab Units 22  1111 22  1007   PH SINDI   --  7 322   PCO2 SINDI mm Hg  --  40 0*   PO2 SINDI mm Hg  --  34 0*   HCO3 SINDI mmol/L  --  20 2*   BASE EXC SINDI mmol/L  --  -5 4   ABG SOURCE  Line, Arterial  --            Micro        EKG: High degree AVB now s/p ventricular pacing  Imagin/30 CXR w/o significant change from prior, still evidence of pulmonary vascular congestion, L humeral head fx I have personally reviewed pertinent reports  and I have personally reviewed pertinent films in PACS    Intake and Output  I/O        07 07 07 07    P  O   0    I V  (mL/kg)  2286 2 (25 2)    Blood  250    Total Intake(mL/kg)  2536 2 (28)    Urine (mL/kg/hr)  1505 (0 7)    Total Output  1505    Net  +1031 2              UOP: 0 69 ml/kg/hr     Height and Weights   Height: 5' 3" (160 cm)     Body mass index is 35 38 kg/m²    Weight (last 2 days)     Date/Time Weight    22 0535 90 6 (199 74)    22 1120 95 3 (210)    22 0808 95 3 (210)    22 0503 95 7 (210 98)            Nutrition       Diet Orders   (From admission, onward) Start     Ordered    06/30/22 0000  Diet NPO; Sips with meds  Diet effective midnight        References:    Nutrtion Support Algorithm Enteral vs  Parenteral   Question Answer Comment   Diet Type NPO    NPO Except:  Sips with meds        06/29/22 2349              NPO      Active Medications  Scheduled Meds:  Current Facility-Administered Medications   Medication Dose Route Frequency Provider Last Rate    acetaminophen  975 mg Oral [facility] Jacquelin Jeans, DO      docusate sodium  100 mg Oral BID Jacquelin Jeans, DO      heparin (porcine)  5,000 Units Subcutaneous [facility] Kapil Wheeler MD      HYDROmorphone  0 2 mg Intravenous Q4H PRN Jacquelin Jeans, DO      insulin regular (HumuLIN R,NovoLIN R) infusion  0 3-21 Units/hr Intravenous Titrated Kraig Ledbetter MD 1 5 Units/hr (06/30/22 0159)    lactulose  10 g Oral BID Peterson Palacios MD      naloxone  0 04 mg Intravenous Q1MIN PRN Christiane Jemelyssa, DO      norepinephrine  1-30 mcg/min Intravenous Titrated Ras Schroeder PA-C 8 mcg/min (06/30/22 0024)    ondansetron  4 mg Intravenous Q4H PRN Christiane Jemelyssa, DO      oxyCODONE  2 5 mg Oral Q4H PRN Christiane Jemelyssa, DO      oxyCODONE  5 mg Oral Q4H PRN Christiane Jemelyssa, DO      pantoprazole  40 mg Intravenous Q24H [address] Kapil Wheeler MD      polyethylene glycol  17 g Oral Daily PRN Christiane Jemelyssa, DO      rifaximin  550 mg Oral Q12H [address] Peterson Palacios MD      senna-docusate sodium  1 tablet Oral HS Jacquelin Jeans, DO       Continuous Infusions:  insulin regular (HumuLIN R,NovoLIN R) infusion, 0 3-21 Units/hr, Last Rate: 1 5 Units/hr (06/30/22 0159)  norepinephrine, 1-30 mcg/min, Last Rate: 8 mcg/min (06/30/22 0024)      PRN Meds:   HYDROmorphone, 0 2 mg, Q4H PRN  naloxone, 0 04 mg, Q1MIN PRN  ondansetron, 4 mg, Q4H PRN  oxyCODONE, 2 5 mg, Q4H PRN  oxyCODONE, 5 mg, Q4H PRN  polyethylene glycol, 17 g, Daily PRN        Allergies   Allergies   Allergen Reactions    Bee Venom Swelling    Latex     Sesame Seed (Diagnostic) - Food Allergy      Other reaction(s): swelling inside mouth    Strawberry C [Ascorbate - Food Allergy] Other (See Comments)     Mouth sores    Penicillins Rash     ---------------------------------------------------------------------------------------  Advance Directive and Living Will:      Power of :    POLST:    ---------------------------------------------------------------------------------------  Care Time Delivered:   Upon my evaluation, this patient had a high probability of imminent or life-threatening deterioration due to pressor requirement, recent pacemaker deployment, which required my direct attention, intervention, and personal management  I have personally provided 30 minutes (0640 to 0710) of critical care time, exclusive of procedures, teaching, family meetings, and any prior time recorded by providers other than myself  aR Blank MD      Portions of the record may have been created with voice recognition software  Occasional wrong word or "sound a like" substitutions may have occurred due to the inherent limitations of voice recognition software    Read the chart carefully and recognize, using context, where substitutions have occurred

## 2022-06-30 NOTE — OCCUPATIONAL THERAPY NOTE
Occupational Therapy Evaluation     Patient Name: Laura Olivas  [id number] Date: 6/30/2022  Problem List  Principal Problem:    Complete heart block [facility])    Past Medical History  Past Medical History:   Diagnosis Date    Anemia     Cirrhosis ([facility] )     Diabetic neuropathy ([facility] )     Es[facility] varices ([facility] )     Fatty liver     GERD (gastroesophageal reflux disease)     Hepatic encephalopathy (HCC)     Hiatal hernia     Hyperlipidemia     Hypertension     Hypoglycemia     Hypothyroidism     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)     Osteoarthritis     Osteopenia     Pancytopenia (HCC)     Thrombocytopenia (HCC)     Type 2 diabetes mellitus ([facility] )      Past Surgical History  Past Surgical History:   Procedure Laterality Date    ABDOMINAL SURGERY      Abdominal plasty with mesh insertion    CARDIAC ELECTROPHYSIOLOGY PROCEDURE N/A 6/29/2022    Procedure: Cardiac pacer implant;  Surgeon: Adilene Francois MD;  Location:  CARDIAC CATH LAB; Service: Cardiology    CATARACT EXTRACTION, BILATERAL      CATARACT EXTRACTION, BILATERAL      COLONOSCOPY      EGD AND COLONOSCOPY  03/18/2015    IR BIOPSY BONE MARROW  8/24/2021    LAPAROSCOPIC CHOLECYSTECTOMY      SHOULDER SURGERY Right     calcium depsoti    TUBAL LIGATION      UMBILICAL HERNIA REPAIR      with mesh placed    UPPER GASTROINTESTINAL ENDOSCOPY               06/30/22 0854   OT Last Visit   OT Visit Date 06/30/22   Note Type   Note type Evaluation   Restrictions/Precautions   Weight Bearing Precautions Per Order Yes   LUE Weight Bearing Per Order (S)  NWB   Braces or Orthoses (S)  Sling   Other Precautions Chair Alarm; Bed Alarm;WBS;Multiple lines;Telemetry;O2;Fall Risk;Pain  (PPM precautions; A-line)   Pain Assessment   Pain Assessment Tool 0-10   Pain Score 10 - Worst Possible Pain   Pain Location/Orientation Orientation: Left; Location: Arm   Pain Onset/Description Onset: Ongoing; Descriptor: Aching;Descriptor: Discomfort   Patient's Stated Pain Goal No pain Hospital Pain Intervention(s) Repositioned; Ambulation/increased activity; Emotional support   Home Living   Type of [address] [facility];Ramped entrance  (5 [place])   Bathroom Shower/Tub   (walk in tub)   [address] [facility] chair   [address] [facility]; Wheelchair-manual;Electric scooter   Additional Comments Pt reports living w/ her dght (who has Down Syndrome) in 1 SH, 5 [place] or ramp entrance   Prior Function   Level of [place] Independent with ADLs and functional mobility; Needs assistance with IADLs   Lives With Daughter   Receives Help From Family   ADL Assistance Independent   IADLs Needs assistance   Falls in the last 6 months 1 to 4   Vocational Retired   Comments Pt was I w/ ADLS, has assist vs  S w/ IADLS, I w/ transfers and functional mobility within the home; uses scooter for community distances   Lifestyle   Autonomy I w/ ADLS, assist IADLS, I vs Mod I w/ transfers and functional mobility PTA   Reciprocal Relationships Pt lives w/ her dght who has down syndrome   Service to Others Retired; worked in [address] [place] [address] Enjoys puzzles, yardwork and being active   Psychosocial   Psychosocial (WDL) [address]  3  Moderate Assistance   Grooming Assistance 3  Moderate Assistance   [address] 2  Maximal Assistance   LB [address] 2  Maximal Assistance   [address] 2  Maximal Assistance   LB Dressing Assistance 1  Total Assistance   LB Dressing Deficit Don/doff R sock; Don/doff L sock   Toileting Assistance  2  Maximal Assistance   Functional Assistance 3  Moderate Assistance   Functional Deficit Steadying;Verbal cueing;Supervision/safety; Increased time to complete   Bed Mobility   Supine to Sit 3  Moderate assistance   Additional items Assist x 2;HOB elevated; Increased time required;Verbal cues;LE management   Sit to Supine Unable to assess   Additional Comments Pt sat EOB w/ Min A for sitting balance/trunk control   Transfers   Sit to Stand 3  Moderate assistance   Additional items Assist x 1; Increased time required;Verbal cues   Stand to Sit 3  Moderate assistance   Additional items Assist x 1; Increased time required;Verbal cues   Additional Comments HHA used R side for assist into standing; SBA 2nd for safety/line management   Functional Mobility   Functional Mobility 3  Moderate assistance   Additional Comments Pt took few small steps from EOB to chair w/ Mod a x1; HHA used RUE: SBA 2nd for safety/lines   Additional items Hand hold assistance   Balance   Static Sitting Fair -   Dynamic Sitting Poor +   Static Standing Poor   Dynamic Standing Poor   Ambulatory Poor   Activity Tolerance   Activity Tolerance Patient limited by fatigue;Patient limited by pain   Medical Staff Made Aware PT, Alvina and Dinesh OVALLE   Nurse Made Aware yes, Sola   RUE Assessment   RUE Assessment WFL   LUE Assessment   LUE Assessment X  (limited to no AROM 2/2 10/10 pain w/ mvmt/touch; able to slightly wiggle digits and flex/extend wrist)   Hand Function   Gross Motor Coordination   (functional RUE; impaired LUE)   Fine Motor Coordination   (functional RUE; impaired LUE)   Sensation   Light Touch No apparent deficits   Vision-Basic Assessment   Current Vision Wears glasses all the time   Vision - Complex Assessment   Ocular Range of Motion WFL   Cognition   Overall Cognitive Status WFL   Arousal/Participation Responsive; Cooperative   Attention Within functional limits   Orientation Level Oriented X4   Memory Decreased recall of precautions   Following Commands Follows one step commands without difficulty   Comments Pt is cooperative; limited by severe pain in LUE; needs occasional cues for safety and carryover of WBS and PPM precautions   Assessment   Limitation Decreased UE strength;Decreased ADL status; Decreased UE ROM; Decreased Safe judgement during ADL;Decreased cognition;Decreased endurance;Decreased self-care trans;Decreased high-level ADLs; Decreased fine motor control   Prognosis Fair   Assessment Pt is a 76 y/o female seen for OT eval s/p adm to [facility] as a transfer from [facility] for L humeral fracture and heart block s/p fall  Pt is s/p PPM placement on  and is NWB in LUE w/ sling  Pt  has a past medical history of Anemia, Cirrhosis ([facility] ), Diabetic neuropathy ([place] [address] ), Esophageal varices ([place] [address] ), Fatty liver, GERD (gastroesophageal reflux disease), Hepatic encephalopathy ([place] [address] ), Hiatal hernia, Hyperlipidemia, Hypertension, Hypoglycemia, Hypothyroidism, 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 ([place] [address] ), Osteoarthritis, Osteopenia, Pancytopenia ([place] [address] ), Thrombocytopenia ([place] [address] ), and Type 2 diabetes mellitus ([place] [address] )  Pt with active OT orders and up with assistance  orders  Pt lives with her dght in 1 , 5 [place] vs  ramp  Pt was I w/  ADLS and assist vs  S for IADLS, drove, & required use of DME PTA including scooter in the community; also has RW and w/c available  Pt is currently demonstrating the following occupational deficits: Max A UB ADLS, Max-Total A LB ADLS, Mod A x2 bed mobility, Min A EOB sitting, Mod A transfers and functional mobility w/ HHA  These deficits that are impacting pt's baseline areas of occupation are a result of the following impairments: pain, endurance, activity tolerance, functional mobility, forward functional reach, balance, trunk control, functional standing tolerance, unsupportive home environment, decreased I w/ ADLS/IADLS, strength, ROM, decreased safety awareness and impaired fine motor skills  The following Occupational Performance Areas to address include: eating, grooming, bathing/shower, toilet hygiene, dressing, health maintenance, functional mobility, community mobility, clothing management, household maintenance and care of children  Recommend inpatient rehab  upon D/C   Pt to continue to benefit from acute immediate OT services to address the following goals 3-5x/week to  w/in 10-14 days:   Goals Patient Goals to have less pain   LTG Time Frame 10-14   Long Term Goal #1 see below listed goals   Plan   Treatment Interventions ADL retraining;Functional transfer training;UE strengthening/ROM; Endurance training;Patient/family training;Cognitive reorientation;Equipment evaluation/education; Fine motor coordination activities; Compensatory technique education;Continued evaluation; Energy conservation; Activityengagement   Goal Expiration Date 07/14/22   OT Frequency 3-5x/wk   Recommendation   Recommendation (S)  Physiatry Consult   OT Discharge Recommendation Post acute rehabilitation services   OT - OK to Discharge Yes  (when medically stable)   Additional Comments  The patient's raw score on the AM-PAC Daily Activity inpatient short form is 12, standardized score is 30 6, less than 39 4  Patients at this level are likely to benefit from discharge to post-acute rehabilitation services  Please refer to the recommendation of the Occupational Therapist for safe discharge planning  Additional Comments 2 Pt seen as a co-evaluation due to the patient's co-morbidities, clinically unstable presentation, and present impairments which are a regression from the patient's baseline     AM-[facility] Daily Activity Inpatient   Lower Body Dressing 2   Bathing 2   Toileting 2   Upper Body Dressing 2   Grooming 2   Eating 2   Daily Activity Raw Score 12   Daily Activity Standardized Score (Calc for Raw Score >=11) 30 6   AM-PAC Applied Cognition Inpatient   Following a Speech/Presentation 3   Understanding Ordinary Conversation 4   Taking Medications 4   Remembering Where Things Are Placed or Put Away 4   Remembering List of 4-5 Errands 4        GOALS    1) Pt will complete rolling left/right in bed with Min assist, as prerequisite for further engagement in ADLS   2) Pt will complete supine to sit transfer with Min A using RUE to initiate bed mobility   3) Pt will tolerate sitting at EOB 20 minutes with S assist and stable vital signs, as prerequisite for further engagement in [facility]   4) Pt will complete grooming task with Min assist and increased time to increase independence in functional tasks  5) Pt will increase B/L UE ROM 1/2 MMT to increase functional UB use during ADLS   6) Pt will complete UB ADLS with Min A and use of AD/DME as needed to increase independence in functional tasks  7) Pt will complete LB ADLS with Mod A and use of AD/DME as needed to increase independence in functional tasks  8) Pt will complete sit to stand transfer / sit pivot transfer / stand pivot transfer with Min assist on/off all ADL surfaces   9) Pt will follow 100% simple one step verbal commands and be A/Ox4 consistently t/o use of external environmental cues to increase awareness for functional tasks  10) Pt will demonstrate 100% carryover of WBS and E C  techniques t/o fx'l I/ADL/ leisure tasks w/o cues s/p skilled education  11) Pt will improve functional mobility to Min A w/ use of AD/DME as needed to increase engagement in meaningful tasks  12) Pt will demonstrate 100% carryover of 1 handed dressing technique s/p education, during all functional tasks, w/ no more than 1 VC     Abby Smith MS, OTR/L

## 2022-06-30 NOTE — CONSULTS
Reason for Consult / Principal Problem:CM    Physician Requesting Consult:  Vi Forman MD    Cardiologist:         Assessment and Plan      Current Problem List   Active Problems:    * No active hospital problems  *    Assessment/Plan:    1  Regional wall motion abnormalities - likely stress CM based on location - although this is a diagnosis of exclusion the patient is currently still on levophed and is not able to undergo stress test - additionally she has a comminuted fracture of her shoulder  ·  Recommend hemodynamic stabilization prior to ischemic work up - no evidence of ACS at this time  No chest pain  Negative troponin- Hypotension does not appear to be 2/2 HF as patient has clear lungs, no JVP and no peripheral edema  · Eventual ischemic work up  2   New onset CM   ·  As above - no evidence of HF - elevated BNP was in setting of CHB + CLYDE  3  CHB  ·  S/P Pacemaker  Subjective     CC: CM    HPI: This is a 77 old female with a past medical history significant for type 2 diabetes hyperlipidemia hypothyroidism, cirrhosis 2/2 PAUL, hepatic encephalopathy, presented to [facility] yesterday secondary to syncope  The patient subsequently developed complete heart block with hypotension urgent transvenous pacemaker was placed by the critical care team subsequently, an echocardiogram was obtained which was notable for EF of 55%, biatrial dilation, mild MR, mild TR, and multiple regional wall motion abnormalities additionally, RVSP was elevated 62 mm Hg EKG performed history was notable for a electronic ventricular pacemaker  Patient has no previous cardiology workup  The patient now is status post dual chamber permanent pacemaker implantation  Chest x-ray was notable for no acute cardiopulmonary findings  No previous ischemic workup  Labs this morning were notable for creatinine 1 44 potassium 5 4 no lactic acidosis INR was 1 33    Cbc was notable for hemoglobin of 9 platelets of 119        The patient currently is still on Levophed at 8, no lactic acidosis noted, troponin was negative, did have an elevated BNP of 1603, however this was in the setting of complete heart block acute kidney injury  Chest x-ray performed yesterday did not show any evidence of venous congestion  She currently does have a comminuted fracture  She denies any chest pain  She has been afebrile  She currently denies any shortness of breath to me  She does admit to previous cardiac history but she is unsure of what this might be she would reportedly follows with a cardiologist but is unsure what diagnosis she may carry  We have no records of her seeing a cardiologist as he is out of network      Family History:   Family History   Problem Relation Age of Onset   Dasia Polanco Breast cancer Mother     Diabetes type II Father     Thyroid disease unspecified Daughter     Down syndrome Daughter     Diabetes type II Daughter     Diabetes type II Sister     No Known Problems Brother     Prostate cancer Brother     No Known Problems Sister     Stroke Sister     No Known Problems Son     Breast cancer Maternal Aunt     Colon cancer Neg Hx      Historical Information   Past Medical History:   Diagnosis Date    Anemia     [facility]     Diabetic neuropathy [facility]     Esophageal varices (HCC)     Fatty liver     GERD (gastroesophageal reflux disease)     Hepatic encephalopathy (HCC)     Hiatal hernia     Hyperlipidemia     Hypertension     Hypoglycemia     Hypothyroidism     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)     Osteoarthritis     Osteopenia     Pancytopenia (HCC)     Thrombocytopenia (HCC)     Type 2 diabetes mellitus ([facility] )      Past Surgical History:   Procedure Laterality Date    ABDOMINAL SURGERY      Abdominal plasty with mesh insertion    CARDIAC ELECTROPHYSIOLOGY PROCEDURE N/A 6/29/2022    Procedure: Cardiac pacer implant;  Surgeon: Arik Dumas MD;  [facility]; Service: Cardiology    CATARACT EXTRACTION, BILATERAL      CATARACT EXTRACTION, BILATERAL      COLONOSCOPY      EGD AND COLONOSCOPY  03/18/2015    IR BIOPSY BONE MARROW  8/24/2021    LAPAROSCOPIC CHOLECYSTECTOMY      SHOULDER SURGERY Right     calcium depsoti    TUBAL LIGATION      UMBILICAL HERNIA REPAIR      with mesh placed    UPPER GASTROINTESTINAL ENDOSCOPY       Social History   Social History     Substance and Sexual Activity   Alcohol Use Not Currently     Social History     Substance and Sexual Activity   Drug Use No     Social History     Tobacco Use   Smoking Status Never Smoker   Smokeless Tobacco Never Used     Family History:   Family History   Problem Relation Age of Onset    Breast cancer Mother     Diabetes type II Father     Thyroid disease unspecified Daughter     Down syndrome Daughter     Diabetes type II Daughter     Diabetes type II Sister     No Known Problems Brother     Prostate cancer Brother     No Known Problems Sister     Stroke Sister     No Known Problems Son     Breast cancer Maternal Aunt     Colon cancer Neg Hx        Review of Systems:  Review of Systems        Scheduled Meds:  Current Facility-Administered Medications   Medication Dose Route Frequency Provider Last Rate    acetaminophen  975 mg Oral [facility] Mary Beth Morris DO      docusate sodium  100 mg Oral BID Mary Beth Morris DO      heparin (porcine)  5,000 Units Subcutaneous [facility] Arina Dominguez MD      HYDROmorphone  0 2 mg Intravenous Q4H PRN Mary Beth Morris DO      insulin regular (HumuLIN R,NovoLIN R) infusion  0 3-21 Units/hr Intravenous Titrated Tamea Krabbe, MD 1 5 Units/hr (06/30/22 0800)    lactulose  10 g Oral BID Maty Cobb MD      naloxone  0 04 mg Intravenous Q1MIN PRN Mary Beth Morris DO      norepinephrine  1-30 mcg/min Intravenous Titrated Grace Dorantes PA-C 8 mcg/min (06/30/22 0908)    ondansetron  4 mg Intravenous Q4H PRN Mary Beth Morris DO      oxyCODONE  2 5 mg Oral Q4H PRN Srinivasa Almazan, DO      oxyCODONE  5 mg Oral Q4H PRN Srinivasa Almazan, DO      pantoprazole  40 mg Intravenous Q24H [address] Lawerance Sandhoff, MD      polyethylene glycol  17 g Oral Daily PRN Srinivasa Almazan, DO      rifaximin  550 mg Oral Q12H [address] Josefa Awad MD      senna-docusate sodium  1 tablet Oral HS Srinivasa Almazan,        Continuous Infusions:insulin regular (HumuLIN R,NovoLIN R) infusion, 0 3-21 Units/hr, Last Rate: 1 5 Units/hr (22 0800)  norepinephrine, 1-30 mcg/min, Last Rate: 8 mcg/min (22 0908)      PRN Meds:   HYDROmorphone    naloxone    ondansetron    oxyCODONE    oxyCODONE    polyethylene glycol  all current active meds have been reviewed    Allergies   Allergen Reactions    Bee Venom Swelling    Latex     Sesame Seed (Diagnostic) - Food Allergy      Other reaction(s): swelling inside mouth    Strawberry C [Ascorbate - Food Allergy] Other (See Comments)     Mouth sores    Penicillins Rash       Objective   Vitals: Temp (24hrs), Av 1 °F (37 3 °C), Min:98 6 °F (37 °C), Max:99 32 °F (37 4 °C)  Current: Temperature: 99 °F (37 2 °C)  Patient Vitals for the past 24 hrs:   BP Temp Temp src Pulse Resp SpO2 Height Weight   22 0600 116/56 -- -- 58 21 95 % -- --   22 0535 -- -- -- -- -- -- -- 90 6 kg (199 lb 11 8 oz)   22 0500 114/55 -- -- 58 20 94 % -- --   22 0400 112/53 99 °F (37 2 °C) Oral 66 21 94 % -- --   22 0300 105/54 -- -- 68 20 94 % -- --   22 0200 115/55 -- -- 66 22 94 % -- --   22 0100 119/58 -- -- 58 19 94 % -- --   22 0000 105/52 -- -- 58 20 94 % -- --   22 2300 -- -- -- 58 (!) 36 96 % -- --   220 -- -- -- 60 (!) 38 97 % -- --   22 2100 118/63 -- -- 58 21 97 % -- --   22 -- 99 2 °F (37 3 °C) Oral 58 20 97 % -- --   22 1653 (!) 124/49 -- -- 80 16 97 % -- --   22 1600 -- 99 32 °F (37 4 °C) Bladder 78 (!) 24 97 % -- --   22 1500 -- 99 32 °F (37 4 °C) -- 78 (!) 23 99 % -- --   06/29/22 1430 (!) 114/35 99 1 °F (37 3 °C) -- 80 20 -- -- --   06/29/22 1404 -- 99 32 °F (37 4 °C) -- 78 (!) 24 98 % -- --   06/29/22 1319 -- 98 96 °F (37 2 °C) Bladder 78 (!) 23 97 % -- --   06/29/22 1309 -- 98 96 °F (37 2 °C) -- 78 (!) 24 99 % -- --   06/29/22 1304 (!) 115/34 99 1 °F (37 3 °C) -- 80 20 -- -- --   06/29/22 1300 -- 98 96 °F (37 2 °C) -- 78 (!) 25 98 % -- --   06/29/22 1225 -- 98 96 °F (37 2 °C) Bladder 78 (!) 50 98 % -- --   06/29/22 1200 -- 98 96 °F (37 2 °C) Bladder 80 20 97 % -- --   06/29/22 1130 -- 98 6 °F (37 °C) -- 78 (!) 25 98 % -- --   06/29/22 1120 143/50 -- -- -- -- -- 5' 3" (1 6 m) 95 3 kg (210 lb)   06/29/22 1100 -- -- -- 80 (!) 24 95 % -- --   06/29/22 1030 (!) 117/49 -- -- 80 (!) 24 -- -- --   06/29/22 1000 (!) 99/40 -- -- 80 (!) 29 96 % -- --   06/29/22 0955 (!) 66/38 -- -- 68 (!) 25 96 % -- --   06/29/22 0952 (!) 58/32 -- -- 68 (!) 26 96 % -- --   06/29/22 0951 (!) 70/31 -- -- 68 (!) 26 95 % -- --   06/29/22 0950 (!) 84/45 -- -- 68 (!) 26 94 % -- --   06/29/22 0949 (!) 89/43 -- -- 68 (!) 26 95 % -- --   06/29/22 0945 (!) 98/34 -- -- 68 (!) 27 96 % -- --   06/29/22 0930 147/50 -- -- 68 (!) 24 93 % -- --    Body mass index is 35 38 kg/m²  Orthostatic Blood Pressures    Flowsheet Row Most Recent Value   Blood Pressure 116/56 filed at 06/30/2022 0600   Patient Position - Orthostatic VS Lying filed at 06/29/2022 0503              Invasive Devices  Report    Central Venous Catheter Line  Duration           CVC Central Lines 06/29/22 Triple Left Internal jugular <1 day          Peripheral Intravenous Line  Duration           Peripheral IV 06/29/22 Right Hand 1 day    Peripheral IV 06/29/22 Dorsal (posterior); Left Hand <1 day          Arterial Line  Duration           Arterial Line 06/29/22 Right Radial <1 day          Line  Duration           Pacer Wires 1 day          Drain  Duration           Urethral Catheter 16 Fr  <1 day                Physical Exam:  Physical Exam  Constitutional:       Appearance: Normal appearance  Cardiovascular:      Rate and Rhythm: Normal rate  Pulmonary:      Effort: Pulmonary effort is normal       Breath sounds: Normal breath sounds  Abdominal:      General: Abdomen is flat  Neurological:      General: No focal deficit present  Mental Status: She is alert     Psychiatric:         Mood and Affect: Mood normal              Lab Results:   Results from last 7 days   Lab Units 06/30/22 0449 06/29/22 1919 06/29/22 0411 06/29/22  0236 06/29/22  0028   WBC Thousand/uL 8 79 9 73 5 13  --  7 86   HEMOGLOBIN g/dL 9 0* 8 9* 7 8*  --  9 0*   I STAT HEMOGLOBIN g/dl  --   --   --  9 2*  --    HEMATOCRIT % 28 4* 28 9* 25 9*  --  30 1*   HEMATOCRIT, ISTAT %  --   --   --  27*  --    PLATELETS Thousands/uL 119* 88* 67*  --  92*   NEUTROS PCT % 70 76* 81*  --  80*   MONOS PCT % 14* 12 10  --  7      Results from last 7 days   Lab Units 06/30/22 0449 06/29/22 1919 06/29/22 0411 06/29/22 0236 06/29/22  0226 06/29/22  0029 06/29/22  0028   SODIUM mmol/L 141 139 138  --  134*  --  133*   POTASSIUM mmol/L 5 4* 5 1 4 8  --  4 7  --  5 7*   CHLORIDE mmol/L 112* 109* 107  --  103  --  102   CO2 mmol/L 21 21 21  --  25  --  23   CO2, I-STAT mmol/L  --   --   --  25  --   --   --    BUN mg/dL 51* 55* 56*  --  56*  --  51*   CREATININE mg/dL 1 44* 1 70* 1 81*  --  2 08*  --  2 19*   CALCIUM mg/dL 9 0 8 8 9 3  --  9 2  --  9 0   ALK PHOS U/L 56 54  --   --   --   --  69   ALT U/L 30 28  --   --   --   --  28   AST U/L 55* 33  --   --   --   --  28   GLUCOSE, ISTAT mg/dl  --   --   --  337*  --   --   --    MAGNESIUM mg/dL 2 6 2 4 2 4  --   --   --   --    PHOSPHORUS mg/dL 3 3 3 5 2 9  --   --   --   --    INR  1 33*  --   --   --   --  1 20*  --    PTT seconds  --   --   --   --   --  32  --    EGFR ml/min/1 73sq m 35 29 26  --  22  --  21     Results from last 7 days   Lab Units 06/30/22  0449 06/29/22  0029   INR  1 33* 1 20*   PTT seconds  --  32 Results from last 7 days   Lab Units 06/30/22  0449   LACTIC ACID mmol/L 1 2         Lab Results   Component Value Date    PHART 7 364 06/29/2022    RPV7IID 34 6 (L) 06/29/2022    PO2ART 78 6 06/29/2022    BKF4OSC 19 3 (L) 06/29/2022    BEART -5 5 06/29/2022    SOURCE Line, Arterial 06/29/2022     No components found for: HIV1X2  No results found for: HAV, HEPAIGM, HEPBIGM, HEPBCAB, HBEAG, HEPCAB  No results found for: SPEP, UPEP   Lab Results   Component Value Date    HGBA1C 8 2 (H) 05/31/2022    HGBA1C 7 8 (H) 01/18/2022    HGBA1C 7 8 (H) 10/13/2021     No results found for: CHOL   Lab Results   Component Value Date    HDL 34 (L) 05/31/2022    HDL 30 (L) 04/20/2022      Lab Results   Component Value Date    LDLCALC 84 05/31/2022    LDLCALC 67 04/20/2022      Lab Results   Component Value Date    TRIG 76 05/31/2022    TRIG 91 04/20/2022     No components found for: PROCAL          Imaging: I have personally reviewed pertinent reports

## 2022-06-30 NOTE — PLAN OF CARE
Problem: PHYSICAL THERAPY ADULT  Goal: Performs mobility at highest level of function for planned discharge setting  See evaluation for individualized goals  Description: Treatment/Interventions: Functional transfer training, LE strengthening/ROM, Therapeutic exercise, Bed mobility, Gait training, Endurance training, OT          See flowsheet documentation for full assessment, interventions and recommendations  Note: Prognosis: Good  Problem List: Decreased strength, Decreased endurance, Decreased range of motion, Impaired balance, Decreased mobility, Orthopedic restrictions, Pain  Assessment: Pt is a 76 y/o female who was a transfer from [facility] admitted to [address] [place] [address] on 6/29/2022 after a fall  Imaging was positive for a L proximal humeral fx and EKG evaluation shows 2nd degree type II heart block  Pt underwent cardiac pacer implant on 6/29  Pt's Dx and personal factors are hx of depression, elevated BNP, complete heart block, cirrhosis, hx of hepatic encephalopathy, GERD, CLYDE, and T2DM  Pt lives with daughter in a [address] [place] [address] with a ramp access and 5STE  Pt's daughter is dx with Down Syndrome  Pt was independent with ADLs and mobility but needs assistance with IADLs prior  Pt does use a scooter for community/long distances  Pt also owns a walker and W/C  Pt doesn't use ADs when ambulating at home  Pt had 1 fall in the last 6 months  During the examination, pt demonstrated decrease BLE strength, impaired sitting and standing balance and tolerance, impaired endurance, decrease activity tolerance, pain, wt bearing restrictions, gait abnormalities, and fall risk which limits pt to perform ADLs independently and increases risks for falls  Pt will benefit with skilled PT interventions while in the hospital to address the impairments stated above  Pt is recommended to rehab upon D/C    At the end of the session, pt was left OOB in chair with call bell within reach and chair alarm on   Barriers to Discharge: Inaccessible home environment, Decreased caregiver support        PT Discharge Recommendation: Post acute rehabilitation services          See flowsheet documentation for full assessment

## 2022-06-30 NOTE — PHYSICAL THERAPY NOTE
Physical Therapy Evaluation    Patient Name: Shae Schumacher    YCVCH'D Date: 6/30/2022     Problem List  Principal Problem:    Complete heart block [facility])       Past Medical History  Past Medical History:   Diagnosis Date    Anemia     Cirrhosis ([facility] )     Diabetic neuropathy ([facility] )     Esophageal varices ([facility] )     Fatty liver     GERD (gastroesophageal reflux disease)     Hepatic encephalopathy (HCC)     Hiatal hernia     Hyperlipidemia     Hypertension     Hypoglycemia     Hypothyroidism     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)     Osteoarthritis     Osteopenia     Pancytopenia (HCC)     Thrombocytopenia (HCC)     Type 2 diabetes mellitus ([facility] )         Past Surgical History  Past Surgical History:   Procedure Laterality Date    ABDOMINAL SURGERY      Abdominal plasty with mesh insertion    CARDIAC ELECTROPHYSIOLOGY PROCEDURE N/A 6/29/2022    Procedure: Cardiac pacer implant;  Surgeon: Lior Schroeder MD;  Location:  CARDIAC CATH LAB; Service: Cardiology    CATARACT EXTRACTION, BILATERAL      CATARACT EXTRACTION, BILATERAL      COLONOSCOPY      EGD AND COLONOSCOPY  03/18/2015    IR BIOPSY BONE MARROW  8/24/2021    LAPAROSCOPIC CHOLECYSTECTOMY      SHOULDER SURGERY Right     calcium depsoti    TUBAL LIGATION      UMBILICAL HERNIA REPAIR      with mesh placed    UPPER GASTROINTESTINAL ENDOSCOPY             06/30/22 0855   PT Last Visit   PT Visit Date 06/30/22   Note Type   Note type Evaluation   Pain Assessment   Pain Assessment Tool 0-10   Pain Score 10 - Worst Possible Pain   Pain Location/Orientation Orientation: Left; Location: Shoulder   Pain Onset/Description Descriptor: Sharp;Frequency: Constant/Continuous; Onset: Ongoing   Hospital Pain Intervention(s) Rest;Medication (See MAR)   Restrictions/Precautions   Weight Bearing Precautions Per Order Yes   LUE Weight Bearing Per Order (S)  NWB   Braces or Orthoses (S)  Sling   Other Precautions Fall Risk;Pain;Telemetry;Multiple lines;WBS;Chair Alarm; Bed Alarm  (a line; s/p PPM)   Home Living   Type of [address] One level;Ramped entrance  ([address])   Home Equipment Electric scooter;Walker;Cane;Wheelchair-manual   Additional Comments Pt lives with daughter in a [place] with a ramp access and 5STE  Pt's daughter is dx with Down Syndrome  Pt was independent with ADLs and mobility but requires assistance with IADLs prior  Pt owns a scooter, which she uses for the community, walker, and W/C  Pt doesn't use AD when abulating in home  Pt had 1 fall in the last 6 months   (+)    Prior Function   Level of [place] Independent with ADLs and functional mobility   Lives With Daughter   Receives Help From Family   ADL Assistance Independent   IADLs Needs assistance   Falls in the last 6 months 1 to 4  (1)   Vocational Retired   General   Family/Caregiver Present No   Cognition   Overall Cognitive Status WFL   Arousal/Participation Cooperative   Orientation Level Oriented X4  (knows year and month)   Memory Within functional limits   Following Commands Follows one step commands without difficulty   Subjective   Subjective Pt was supine in bed upon PT arrival   Pt reports 10/10 L shoulder pain with movement  RLE Assessment   RLE Assessment X   Strength RLE   R Hip Flexion 3+/5   R Knee Extension 3+/5   R Ankle Dorsiflexion 3+/5   LLE Assessment   LLE Assessment X   Strength LLE   L Hip Flexion 3+/5   L Knee Extension 3+/5   L Ankle Dorsiflexion 3+/5   Light Touch   RLE Light Touch Grossly intact   LLE Light Touch Grossly intact   Bed Mobility   Supine to Sit 3  Moderate assistance   Additional items Assist x 2; Increased time required;LE management;Verbal cues;HOB elevated   Sit to Supine Unable to assess   Additional Comments Pt sat EOB w/ S/CGA with no LOB   Transfers   Sit to Stand 3  Moderate assistance   Additional items Assist x 1; Increased time required;Verbal cues   Stand to Sit 3  Moderate assistance   Additional items Assist x 1; Increased time required;Verbal cues   Additional Comments Pt required R HHA for support, SBA for safety; VCs for hand placement and forward wt shift   Ambulation/Elevation   Gait pattern Excessively slow; Improper Weight shift   Gait Assistance 3  Moderate assist   Additional items Assist x 1; Tactile cues; Verbal cues   Assistive Device Other (Comment)  (R HHA)   Distance 3ft to chair   Ambulation/Elevation Additional Comments Pt required R HHA for support; SBA for safety; VC/TCs for initiation and proper technique  Balance   Static Sitting Fair -   Dynamic Sitting Poor +   Static Standing Poor   Dynamic Standing Poor   Ambulatory Poor   Endurance Deficit   Endurance Deficit Yes   Endurance Deficit Description   (pain, fatigue)   Activity Tolerance   Activity Tolerance Patient limited by pain; Patient limited by fatigue   Medical Staff Made Aware OT   Nurse Made Aware RN cleared pt for therapy   Assessment   Prognosis Good   Problem List Decreased strength;Decreased endurance;Decreased range of motion; Impaired balance;Decreased mobility;Orthopedic restrictions;Pain   Assessment Pt is a 78 y/o female who was a transfer from [facility] admitted to [address] [place] [address] on 6/29/2022 after a fall  Imaging was positive for a L proximal humeral fx and EKG evaluation shows 2nd degree type II heart block  Pt underwent cardiac pacer implant on 6/29  Pt's Dx and personal factors are hx of depression, elevated BNP, complete heart block, cirrhosis, hx of hepatic encephalopathy, GERD, CLYDE, and T2DM  Pt lives with daughter in a [facility] with a ramp access and 5STE  Pt's daughter is dx with Down Syndrome  Pt was independent with ADLs and mobility but needs assistance with IADLs prior  Pt does use a scooter for community/long distances  Pt also owns a walker and W/C  Pt doesn't use ADs when ambulating at home  Pt had 1 fall in the last 6 months    During the examination, pt demonstrated decrease BLE strength, impaired sitting and standing balance and tolerance, impaired endurance, decrease activity tolerance, pain, wt bearing restrictions, gait abnormalities, and fall risk which limits pt to perform ADLs independently and increases risks for falls  Pt will benefit with skilled PT interventions while in the hospital to address the impairments stated above  Pt is recommended to rehab upon D/C  At the end of the session, pt was left OOB in chair with call bell within reach and chair alarm on  Barriers to Discharge Inaccessible home environment;Decreased caregiver support   Goals   STG Expiration Date 07/14/22   Short Term Goal #1 STG 1: Pt will perform supine <-> sit at S level to demonstrate improved bed mobility and decrease any risk for skin breakdown  STG 2: Pt will perform sit <-> stand at S with SPC/least restrictive AD to demonstrate improved transfer mobility so the pt can get in/out of bed safely  STG 3: Pt will ambulate 150ft at S with SPC/least restrictive AD to improve walking tolerance and endurance so pt can safely interact with their home environment  STG 4: Pt will stand at S with SPC/least restrictive AD for 5 min while performing dynamic standing balance exercises without any LOB to improve standing balance and tolerance  Plan   Treatment/Interventions Functional transfer training;LE strengthening/ROM; Therapeutic exercise; Bed mobility;Gait training; Endurance training;OT   PT Frequency 3-5x/wk   Recommendation   PT Discharge Recommendation Post acute rehabilitation services   AM-PAC Basic Mobility Inpatient   Turning in Bed Without Bedrails 1   Lying on Back to Sitting on Edge of Flat Bed 1   Moving Bed to Chair 2   Standing Up From Chair 2   Walk in Room 2   Climb 3-5 Stairs 2   Basic Mobility Inpatient Raw Score 10   Turning Head Towards Sound 4   Follow Simple Instructions 4   Low Function Basic Mobility Raw Score 18   Low Function Basic Mobility Standardized Score 29 25   Highest Level Of Mobility   -HLM Goal 4: Move to chair/commode   JH-HLM Achieved 4: Move to chair/commode     Mendez SPT

## 2022-06-30 NOTE — PROGRESS NOTES
Progress Note - Electrophysiology  Elena Aguilar 77 y o  female MRN: [id number]  Unit/Bed#: ICU 07 Encounter: [id number]      Assessment/Plan:  1  Complete heart block  a  S/p Medtronic dual chamber pacemaker implant 6/29/2022  2  L humeral head fracture  3  HLD  4  HTN  5  Hypothyroidism  6  DM type 2  7  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  8  Esophageal varices without bleeding      Device instructions and restrictions were discussed with the patient in detail  She will also be provided with written instructions detailing what was discussed  Patient also requires a 2 week device and site check which has already been arranged and is in Epic  Patient is stable from an EP standpoint  Subjective/Objective   Subjective: Elena Aguilar is POD # MDT DC PPM implant  Her incision is clean, dry, and intact without evidence of hematoma or ecchymosis or signs of infection  Vital signs and labs were reviewed  Assessment of chest x-rays show proper lead placement without evidence of pneumothorax  Device interrogation showed appropriate device function with lead parameters such as sensing, threshold, and impedance being tested  Patient complains of left arm and shoulder pain  She denies palpitations, shortness of breath, orthopnea, lightheadedness or N/V          Objective:  Vitals: /56   Pulse 58   Temp 99 °F (37 2 °C) (Oral)   Resp 21   Ht 5' 3" (1 6 m)   Wt 90 6 kg (199 lb 11 8 oz)   SpO2 95%   BMI 35 38 kg/m²     Vitals:    06/29/22 1120 06/30/22 0535   Weight: 95 3 kg (210 lb) 90 6 kg (199 lb 11 8 oz)     Orthostatic Blood Pressures    Flowsheet Row Most Recent Value   Blood Pressure 116/56 filed at 06/30/2022 0600   Patient Position - Orthostatic VS Lying filed at 06/29/2022 0503            Intake/Output Summary (Last 24 hours) at 6/30/2022 1052  Last data filed at 6/30/2022 0554  Gross per 24 hour   Intake 897 81 ml   Output 1505 ml   Net -607 19 ml       Invasive Devices  Report Central Venous Catheter Line  Duration           CVC Central Lines 06/29/22 Triple Left Internal jugular <1 day          Peripheral Intravenous Line  Duration           Peripheral IV 06/29/22 Right Hand 1 day    Peripheral IV 06/29/22 Dorsal (posterior); Left Hand <1 day          Arterial Line  Duration           Arterial Line 06/29/22 Right Radial <1 day          Line  Duration           Pacer Wires 1 day          Drain  Duration           Urethral Catheter 16 Fr  <1 day                          Scheduled Meds:  Current Facility-Administered Medications   Medication Dose Route Frequency Provider Last Rate    acetaminophen  975 mg Oral [facility] Negar Crutch, DO      aspirin  81 mg Oral Daily Ruth Finch PA-C      docusate sodium  100 mg Oral BID Negar Crutch, DO      escitalopram  5 mg Oral Daily Ruth Finch PA-C      furosemide  20 mg Intravenous Once Ruth Finch PA-C      heparin (porcine)  5,000 Units Subcutaneous [facility] Carlton Silveira MD      HYDROmorphone  0 2 mg Intravenous Q4H PRN Negar Crutch, DO      insulin regular (HumuLIN R,NovoLIN R) infusion  0 3-21 Units/hr Intravenous Titrated Masha Darnell MD 3 Units/hr (06/30/22 1016)    lactulose  10 g Oral BID Clare Reid MD      levothyroxine  112 mcg Oral Early Morning Ruth Finch PA-C      naloxone  0 04 mg Intravenous Q1MIN PRN Negar Crutch, DO      norepinephrine  1-30 mcg/min Intravenous Titrated Ruth Finch PA-C 8 mcg/min (06/30/22 0908)    ondansetron  4 mg Intravenous Q4H PRN Negar Crutch, DO      oxyCODONE  2 5 mg Oral Q4H PRN Negar Crutch, DO      oxyCODONE  5 mg Oral Q4H PRN Negar Crutch, DO      pantoprazole  40 mg Oral Early Morning Ruth Finch PA-C      polyethylene glycol  17 g Oral Daily PRN Negar Crutch, DO      pravastatin  80 mg Oral Daily With Claribel Hanna PA-C      rifaximin  550 mg Oral Q12H [address] Clare Reid MD      senna-docusate sodium  1 tablet Oral HS Negar Crutch, DO Continuous Infusions:insulin regular (HumuLIN R,NovoLIN R) infusion, 0 3-21 Units/hr, Last Rate: 3 Units/hr (06/30/22 1016)  norepinephrine, 1-30 mcg/min, Last Rate: 8 mcg/min (06/30/22 0908)      PRN Meds:   HYDROmorphone    naloxone    ondansetron    oxyCODONE    oxyCODONE    polyethylene glycol    Review of Systems:  ROS as noted above, otherwise 12 point review of systems was performed and is negative  Physical Exam:   Physical Exam  Vitals reviewed  Constitutional:       Appearance: She is not ill-appearing or diaphoretic  HENT:      Head: Normocephalic and atraumatic  Right Ear: External ear normal       Left Ear: External ear normal       Nose: Nose normal    Eyes:      General:         Right eye: No discharge  Left eye: No discharge  Cardiovascular:      Rate and Rhythm: Normal rate and regular rhythm  Heart sounds: No murmur heard  No friction rub  Pulmonary:      Effort: Pulmonary effort is normal       Breath sounds: Normal breath sounds  No wheezing, rhonchi or rales  Chest:       Abdominal:      General: There is no distension  Palpations: Abdomen is soft  Tenderness: There is no abdominal tenderness  Musculoskeletal:         General: No deformity or signs of injury  Cervical back: No rigidity  No muscular tenderness  Right lower leg: No edema  Left lower leg: No edema  Comments: Sling on left arm   Skin:     General: Skin is warm and dry  Capillary Refill: Capillary refill takes less than 2 seconds  Coloration: Skin is not jaundiced or pale  Neurological:      General: No focal deficit present  Mental Status: She is alert and oriented to person, place, and time  Mental status is at baseline  Psychiatric:         Mood and Affect: Mood normal          Behavior: Behavior normal          Thought Content: Thought content normal                    Lab Results: I have personally reviewed pertinent lab results  Results from last 7 days   Lab Units 06/30/22 0449 06/29/22 1919 06/29/22  0411   WBC Thousand/uL 8 79 9 73 5 13   HEMOGLOBIN g/dL 9 0* 8 9* 7 8*   HEMATOCRIT % 28 4* 28 9* 25 9*   PLATELETS Thousands/uL 119* 88* 67*     Results from last 7 days   Lab Units 06/30/22 0449 06/29/22 1919 06/29/22 0411 06/29/22  0236   POTASSIUM mmol/L 5 4* 5 1 4 8  --    CHLORIDE mmol/L 112* 109* 107  --    CO2 mmol/L 21 21 21  --    CO2, I-STAT mmol/L  --   --   --  25   BUN mg/dL 51* 55* 56*  --    CREATININE mg/dL 1 44* 1 70* 1 81*  --    GLUCOSE, ISTAT mg/dl  --   --   --  337*   CALCIUM mg/dL 9 0 8 8 9 3  --      Results from last 7 days   Lab Units 06/30/22 0449 06/29/22  0029   INR  1 33* 1 20*   PTT seconds  --  32     Results from last 7 days   Lab Units 06/30/22 0449 06/29/22 1919 06/29/22  0411   MAGNESIUM mg/dL 2 6 2 4 2 4       Imaging: I have personally reviewed pertinent films in PACS  ECHO: 6/29/22    Left Ventricle: Left ventricular cavity size is normal  The left ventricular ejection fraction is 55%  Systolic function is normal     IVS: There is abnormal septal motion consistent with right ventricular pacing    Right Ventricle: A pacer wire is present    Mitral Valve: There is severe annular calcification    Tricuspid Valve: There is mild to moderate regurgitation  The tricuspid valve regurgitation jet is central  The right ventricular systolic pressure is moderately to severely elevated  The estimated right ventricular systolic pressure is 58 12 mmHg    Left Atrium: The atrium is mildly dilated    The following segments are akinetic: apical anterior, apical septal, apical inferior, apical lateral and apex      The following segments are hyperkinetic: basal anterior, basal anteroseptal, basal inferoseptal, basal inferior, basal inferolateral, basal anterolateral, mid anterior, mid anteroseptal, mid inferoseptal, mid inferior, mid inferolateral and mid anterolateral     Pericardium: There is no pericardial effusion   The pericardium is normal in appearance        VTE Pharmacologic Prophylaxis: Heparin  VTE Mechanical Prophylaxis: sequential compression device

## 2022-06-30 NOTE — CASE MANAGEMENT
Case Management Discharge Planning Note    Patient name Naresh Hodge  Location ICU 07/ICU 07 MRN [id number]  : 1945 Date 2022       Current Admission Date: 2022  Current Admission Diagnosis:Complete heart block [facility])   Patient Active Problem List    Diagnosis Date Noted    Complete heart block ([facility] ) 2022    Esophageal varices without bleeding ([facility] ) 2021    Pancytopenia ([facility] ) 2021    Iron deficiency anemia due to chronic blood loss 2021    Mild nonproliferative diabetic retinopathy of both eyes without macular edema associated with type 2 diabetes mellitus ([address] ) 2021    Urinary tract infection 09/15/2020    Hepatic encephalopathy ([facility] ) 2020    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) ([facility] [facility] ) 2020    Thrombocytopenia ([facility] [facility] ) 2020    Hyperlipidemia 2018    Essential hypertension 2018    Acquired hypothyroidism 2018    Type 2 diabetes mellitus with hyperglycemia, with long-term current use of insulin ([facility] [facility] ) 2018    Diabetic polyneuropathy associated with type 2 diabetes mellitus ([facility] ) 2018      LOS (days): 1  Geometric Mean LOS (GMLOS) (days): 5 20  Days to GMLOS:3 7     OBJECTIVE:  Risk of Unplanned Readmission Score: 21 75         Current admission status: Inpatient   Preferred Pharmacy:   [id number], [address], [place]  [address] [address]  Phone: [phone] Fax: [phone]    [address] [place] [address]  Phone: [phone] Fax: [phone]    Primary Care Provider: Brando Winters DO    Primary Insurance: MEDICARE  Secondary Insurance: [employer]    DISCHARGE DETAILS:    Pt seen by OT/PT and recommended for IP rehab  Pt would benefit from an PMR consult   CM will discuss rehab options with pt and family

## 2022-06-30 NOTE — PLAN OF CARE
Problem: OCCUPATIONAL THERAPY ADULT  Goal: Performs self-care activities at highest level of function for planned discharge setting  See evaluation for individualized goals  Description: Treatment Interventions: ADL retraining, Functional transfer training, UE strengthening/ROM, Endurance training, Patient/family training, Cognitive reorientation, Equipment evaluation/education, Fine motor coordination activities, Compensatory technique education, Continued evaluation, Energy conservation, Activityengagement          See flowsheet documentation for full assessment, interventions and recommendations  Note: Limitation: Decreased UE strength, Decreased ADL status, Decreased UE ROM, Decreased Safe judgement during ADL, Decreased cognition, Decreased endurance, Decreased self-care trans, Decreased high-level ADLs, Decreased fine motor control  Prognosis: Fair  Assessment: Pt is a 78 y/o female seen for OT eval s/p adm to [facility] as a transfer from [address] [place] [address] for L humeral fracture and heart block s/p fall  Pt is s/p PPM placement on 6/29 and is NWB in LUE w/ sling  Pt  has a past medical history of Anemia, Cirrhosis ([facility] [facility] ), Diabetic neuropathy ([facility] [address] [facility] ), Esophageal varices ([facility] [address] ), Fatty liver, GERD (gastroesophageal reflux disease), Hepatic encephalopathy ([address] ), Hiatal hernia, Hyperlipidemia, Hypertension, Hypoglycemia, Hypothyroidism, 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 ([facility] [facility] ), Osteoarthritis, Osteopenia, Pancytopenia ([address] ), Thrombocytopenia ([address] ), and Type 2 diabetes mellitus ([facility] [address] )  Pt with active OT orders and up with assistance  orders  Pt lives with her dght in 1 , 5 [place] vs  ramp  Pt was I w/  ADLS and assist vs  S for IADLS, drove, & required use of DME PTA including scooter in the community; also has RW and w/c available  Pt is currently demonstrating the following occupational deficits: Max A UB ADLS, Max-Total A LB ADLS, Mod A x2 bed mobility, Min A EOB sitting, Mod A transfers and functional mobility w/ HHA   These deficits that are impacting pt's baseline areas of occupation are a result of the following impairments: pain, endurance, activity tolerance, functional mobility, forward functional reach, balance, trunk control, functional standing tolerance, unsupportive home environment, decreased I w/ ADLS/IADLS, strength, ROM, decreased safety awareness and impaired fine motor skills  The following Occupational Performance Areas to address include: eating, grooming, bathing/shower, toilet hygiene, dressing, health maintenance, functional mobility, community mobility, clothing management, household maintenance and care of children  Recommend inpatient rehab  upon D/C   Pt to continue to benefit from acute immediate OT services to address the following goals 3-5x/week to  w/in 10-14 days:  Recommendation: (S) Physiatry Consult  OT Discharge Recommendation: Post acute rehabilitation services  OT - OK to Discharge: Yes (when medically stable)     Edna Christiansen MS, OTR/L

## 2022-06-30 NOTE — SPEECH THERAPY NOTE
Speech Language/Pathology  Speech/Language Pathology  Assessment    Patient Name: Roberto Reyes  UUAPB'A Date: 6/30/2022     Problem List  Active Problems:    * No active hospital problems  *    Past Medical History  Past Medical History:   Diagnosis Date    Anemia     [facility]     Diabetic neuropathy ([facility] )     Esophageal varices (HCC)     Fatty liver     GERD (gastroesophageal reflux disease)     Hepatic encephalopathy (HCC)     Hiatal hernia     Hyperlipidemia     Hypertension     Hypoglycemia     Hypothyroidism     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)     Osteoarthritis     Osteopenia     Pancytopenia (HCC)     Thrombocytopenia (HCC)     Type 2 diabetes mellitus ([facility] )      Past Surgical History  Past Surgical History:   Procedure Laterality Date    ABDOMINAL SURGERY      Abdominal plasty with mesh insertion    CARDIAC ELECTROPHYSIOLOGY PROCEDURE N/A 6/29/2022    Procedure: Cardiac pacer implant;  Surgeon: Malika Velez MD;  Location:  CARDIAC CATH LAB; Service: Cardiology    CATARACT EXTRACTION, BILATERAL      CATARACT EXTRACTION, BILATERAL      COLONOSCOPY      EGD AND COLONOSCOPY  03/18/2015    IR BIOPSY BONE MARROW  8/24/2021    LAPAROSCOPIC CHOLECYSTECTOMY      SHOULDER SURGERY Right     calcium depsoti    TUBAL LIGATION      UMBILICAL HERNIA REPAIR      with mesh placed    UPPER GASTROINTESTINAL ENDOSCOPY          Bedside Swallow Evaluation:    Summary:  Pt presents w/ WFL oropharyngeal swallow skill  Pt w/ min prolonged mastication and bolus formation w/ hard solids, independently uses liquid wash to aid  Bolus control and transfer WFL  No oral residue noted  Pt w/ suspected fairly prompt swallow initiation  Laryngeal rise fair to palpation  No overt s/s aspiration noted  Pt educated on strategies to optimize safe swallow and s/s aspiration  Pt educated to monitor and notify her medical team should s/s arise  Pt verbalized understanding and agreeable   Pt educated on diet modification options to ease mastication difficulty  Pt verbalized understanding and stated preference for the regular diet w/ soft selections at this time  Recommendations:  Diet: Regular w/ soft selections  Liquid: Thin  Meds: Whole in puree per pt request   Supervision: -  Positioning:Upright  Strategies: Pt to take PO/Meds only when fully alert and upright  Oral care: 3-4x daily  Aspiration precautions  Reflux precautions    Therapy Prognosis: Good  Prognosis considerations: Age, current medical, cognitive status  Frequency: As able and appropriate   ST to f/u for diet tolerance    H&P/Admit info/ pertinent provider notes: (PMH noted above)    HPI:    Dayne Lock is a 77 y o  female who presents as a transfer from [facility] for a left humeral fracture and heart block  Patient states that she was "walking around in my flip flops, lost my balance and fell onto my arm"  Denies LOC  Unsure if she hit her head  At [facility], she was found to have a left humeral fracture and in complete heart block  Upon my evaluation, the patient complains of left arm pain but denies CP, SOB, lightheadedness, dizziness  EKG taken upon my evaluation shows 2nd degree type II heart block on my interpretation  6/30 HPI/24hr events: In past 24 hours patient received permanent pacemaker following complete heart block and bedside TVP placement yesterday AM      Special Studies:  CXR 6/29: New central venous catheter in standard position      No acute cardiopulmonary findings  CT head 6/29: No intracranial hemorrhage or calvarial fracture  Previous VBS: No      Goal(s):  Pt will tolerate least restrictive diet w/out s/s aspiration or oral/pharyngeal difficulties  Patient's goal: "I want to go home"    Did the pt report pain? Yes  If yes, was nursing notified/was it addressed?  Repositioned to pt's satisfaction    Reason for consult:  R/o aspiration  Determine safest and least restrictive diet  Failed nursing dysphagia assessment  respiratory compromise  Nursing reported cough w/ PO    Precautions:  Fall    Food allergies: Sesame seed, strawberry    Current diet: NPO    Premorbid diet::Regular w/ thin    O2 requirement: Nasal cannula     Voice/Speech: Clear    Social: Lives w/ daughter     Follows commands: Yes                          Cognitive Status: Awake and alert     Oral [facility] exam:  Dentition: Upper dentures  Labial strength and ROM: WFL  Lingual strength and ROM: WFL  Mandibular strength and ROM: WFL  Velum: symmetrical  Secretion management: WFL  Oral care: ?white coating on tongue (RN notified), pt endorses pharyngeal     Items administered:  Puree, soft solid, hard solid, nectar thick liquids, thin liquids  Liquids were taken by straw/cup       Oral stage:  Lip closure: WFL  Mastication: min prolonged w/ hard solids  Bolus formation: min prolonged w/ hard solids  Bolus control: WFL  Transfer: WFL  Oral residue: No  Pocketing: No    Pharyngeal stage:  Swallow promptness: Suspected fairly prompt  Laryngeal rise: Fair to palpation  Wet voice: No  Throat clear: No  Cough: No  Secondary swallows: No  Audible swallows: No  No overt s/s aspiration    Esophageal stage:  H/o GERD  H/o Esophageal varices   H/o Hiatal Hernia     Results d/w:  Pt, nursing, physician

## 2022-07-01 LAB
2HR DELTA HS TROPONIN: -19 NG/L
4HR DELTA HS TROPONIN: -15 NG/L
ANION GAP SERPL CALCULATED.3IONS-SCNC: 5 MMOL/L (ref 4–13)
ATRIAL RATE: 75 BPM
BASOPHILS # BLD AUTO: 0.04 THOUSANDS/ΜL (ref 0–0.1)
BASOPHILS NFR BLD AUTO: 1 % (ref 0–1)
BUN SERPL-MCNC: 38 MG/DL (ref 5–25)
CA-I BLD-SCNC: 1.14 MMOL/L (ref 1.12–1.32)
CALCIUM SERPL-MCNC: 8.8 MG/DL (ref 8.3–10.1)
CARDIAC TROPONIN I PNL SERPL HS: 342 NG/L
CARDIAC TROPONIN I PNL SERPL HS: 346 NG/L
CARDIAC TROPONIN I PNL SERPL HS: 346 NG/L (ref 8–18)
CHLORIDE SERPL-SCNC: 111 MMOL/L (ref 100–108)
CO2 SERPL-SCNC: 26 MMOL/L (ref 21–32)
CREAT SERPL-MCNC: 1.19 MG/DL (ref 0.6–1.3)
EOSINOPHIL # BLD AUTO: 0.07 THOUSAND/ΜL (ref 0–0.61)
EOSINOPHIL NFR BLD AUTO: 2 % (ref 0–6)
ERYTHROCYTE [DISTWIDTH] IN BLOOD BY AUTOMATED COUNT: 15.6 % (ref 11.6–15.1)
GFR SERPL CREATININE-BSD FRML MDRD: 44 ML/MIN/1.73SQ M
GLUCOSE SERPL-MCNC: 107 MG/DL (ref 65–140)
GLUCOSE SERPL-MCNC: 120 MG/DL (ref 65–140)
GLUCOSE SERPL-MCNC: 163 MG/DL (ref 65–140)
GLUCOSE SERPL-MCNC: 197 MG/DL (ref 65–140)
GLUCOSE SERPL-MCNC: 279 MG/DL (ref 65–140)
GLUCOSE SERPL-MCNC: 384 MG/DL (ref 65–140)
GLUCOSE SERPL-MCNC: 77 MG/DL (ref 65–140)
GLUCOSE SERPL-MCNC: 82 MG/DL (ref 65–140)
GLUCOSE SERPL-MCNC: 86 MG/DL (ref 65–140)
HCT VFR BLD AUTO: 26.2 % (ref 34.8–46.1)
HGB BLD-MCNC: 8 G/DL (ref 11.5–15.4)
IMM GRANULOCYTES # BLD AUTO: 0.02 THOUSAND/UL (ref 0–0.2)
IMM GRANULOCYTES NFR BLD AUTO: 0 % (ref 0–2)
LYMPHOCYTES # BLD AUTO: 0.89 THOUSANDS/ΜL (ref 0.6–4.47)
LYMPHOCYTES NFR BLD AUTO: 19 % (ref 14–44)
MAGNESIUM SERPL-MCNC: 2.2 MG/DL (ref 1.6–2.6)
MCH RBC QN AUTO: 27.6 PG (ref 26.8–34.3)
MCHC RBC AUTO-ENTMCNC: 30.5 G/DL (ref 31.4–37.4)
MCV RBC AUTO: 90 FL (ref 82–98)
MONOCYTES # BLD AUTO: 0.67 THOUSAND/ΜL (ref 0.17–1.22)
MONOCYTES NFR BLD AUTO: 14 % (ref 4–12)
NEUTROPHILS # BLD AUTO: 3.05 THOUSANDS/ΜL (ref 1.85–7.62)
NEUTS SEG NFR BLD AUTO: 64 % (ref 43–75)
NRBC BLD AUTO-RTO: 0 /100 WBCS
P AXIS: 57 DEGREES
PHOSPHATE SERPL-MCNC: 2.6 MG/DL (ref 2.3–4.1)
PMV BLD AUTO: 12.9 FL (ref 8.9–12.7)
POTASSIUM SERPL-SCNC: 4.8 MMOL/L (ref 3.5–5.3)
PR INTERVAL: 0 MS
QRS AXIS: -62 DEGREES
QRSD INTERVAL: 138 MS
QT INTERVAL: 433 MS
QTC INTERVAL: 484 MS
RBC # BLD AUTO: 2.9 MILLION/UL (ref 3.81–5.12)
SODIUM SERPL-SCNC: 142 MMOL/L (ref 136–145)
T WAVE AXIS: 111 DEGREES
VENTRICULAR RATE: 75 BPM
WBC # BLD AUTO: 4.74 THOUSAND/UL (ref 4.31–10.16)

## 2022-07-01 PROCEDURE — 99222 1ST HOSP IP/OBS MODERATE 55: CPT | Performed by: STUDENT IN AN ORGANIZED HEALTH CARE EDUCATION/TRAINING PROGRAM

## 2022-07-01 PROCEDURE — 93010 ELECTROCARDIOGRAM REPORT: CPT | Performed by: INTERNAL MEDICINE

## 2022-07-01 PROCEDURE — 99024 POSTOP FOLLOW-UP VISIT: CPT | Performed by: INTERNAL MEDICINE

## 2022-07-01 PROCEDURE — 83735 ASSAY OF MAGNESIUM: CPT | Performed by: PHYSICIAN ASSISTANT

## 2022-07-01 PROCEDURE — 85025 COMPLETE CBC W/AUTO DIFF WBC: CPT | Performed by: PHYSICIAN ASSISTANT

## 2022-07-01 PROCEDURE — 84484 ASSAY OF TROPONIN QUANT: CPT | Performed by: EMERGENCY MEDICINE

## 2022-07-01 PROCEDURE — 84100 ASSAY OF PHOSPHORUS: CPT | Performed by: PHYSICIAN ASSISTANT

## 2022-07-01 PROCEDURE — 99291 CRITICAL CARE FIRST HOUR: CPT | Performed by: EMERGENCY MEDICINE

## 2022-07-01 PROCEDURE — 99222 1ST HOSP IP/OBS MODERATE 55: CPT | Performed by: INTERNAL MEDICINE

## 2022-07-01 PROCEDURE — 82330 ASSAY OF CALCIUM: CPT | Performed by: PHYSICIAN ASSISTANT

## 2022-07-01 PROCEDURE — 82948 REAGENT STRIP/BLOOD GLUCOSE: CPT

## 2022-07-01 PROCEDURE — 80048 BASIC METABOLIC PNL TOTAL CA: CPT | Performed by: PHYSICIAN ASSISTANT

## 2022-07-01 RX ORDER — INSULIN GLARGINE 100 [IU]/ML
20 INJECTION, SOLUTION SUBCUTANEOUS EVERY 12 HOURS SCHEDULED
Status: DISCONTINUED | OUTPATIENT
Start: 2022-07-01 | End: 2022-07-02

## 2022-07-01 RX ORDER — INSULIN LISPRO 100 [IU]/ML
1-5 INJECTION, SOLUTION INTRAVENOUS; SUBCUTANEOUS EVERY 6 HOURS SCHEDULED
Status: DISCONTINUED | OUTPATIENT
Start: 2022-07-01 | End: 2022-07-01

## 2022-07-01 RX ORDER — INSULIN LISPRO 100 [IU]/ML
1-5 INJECTION, SOLUTION INTRAVENOUS; SUBCUTANEOUS
Status: DISCONTINUED | OUTPATIENT
Start: 2022-07-01 | End: 2022-07-02

## 2022-07-01 RX ADMIN — HEPARIN SODIUM 5000 UNITS: 5000 INJECTION INTRAVENOUS; SUBCUTANEOUS at 14:15

## 2022-07-01 RX ADMIN — ESCITALOPRAM 5 MG: 5 TABLET, FILM COATED ORAL at 08:46

## 2022-07-01 RX ADMIN — INSULIN LISPRO 1 UNITS: 100 INJECTION, SOLUTION INTRAVENOUS; SUBCUTANEOUS at 12:42

## 2022-07-01 RX ADMIN — INSULIN LISPRO 3 UNITS: 100 INJECTION, SOLUTION INTRAVENOUS; SUBCUTANEOUS at 18:07

## 2022-07-01 RX ADMIN — RIFAXIMIN 550 MG: 550 TABLET ORAL at 22:06

## 2022-07-01 RX ADMIN — RIFAXIMIN 550 MG: 550 TABLET ORAL at 08:45

## 2022-07-01 RX ADMIN — PANTOPRAZOLE SODIUM 40 MG: 40 TABLET, DELAYED RELEASE ORAL at 06:23

## 2022-07-01 RX ADMIN — HEPARIN SODIUM 5000 UNITS: 5000 INJECTION INTRAVENOUS; SUBCUTANEOUS at 22:06

## 2022-07-01 RX ADMIN — LEVOTHYROXINE SODIUM 112 MCG: 112 TABLET ORAL at 06:23

## 2022-07-01 RX ADMIN — INSULIN GLARGINE 20 UNITS: 100 INJECTION, SOLUTION SUBCUTANEOUS at 22:06

## 2022-07-01 RX ADMIN — LACTULOSE 10 G: 20 SOLUTION ORAL at 08:45

## 2022-07-01 RX ADMIN — INSULIN GLARGINE 20 UNITS: 100 INJECTION, SOLUTION SUBCUTANEOUS at 10:38

## 2022-07-01 RX ADMIN — LACTULOSE 10 G: 20 SOLUTION ORAL at 18:03

## 2022-07-01 RX ADMIN — NOREPINEPHRINE BITARTRATE 4 MCG/MIN: 1 INJECTION, SOLUTION, CONCENTRATE INTRAVENOUS at 04:30

## 2022-07-01 RX ADMIN — ACETAMINOPHEN 975 MG: 325 TABLET, FILM COATED ORAL at 14:15

## 2022-07-01 RX ADMIN — PRAVASTATIN SODIUM 80 MG: 80 TABLET ORAL at 16:59

## 2022-07-01 RX ADMIN — HEPARIN SODIUM 5000 UNITS: 5000 INJECTION INTRAVENOUS; SUBCUTANEOUS at 06:23

## 2022-07-01 RX ADMIN — INSULIN LISPRO 4 UNITS: 100 INJECTION, SOLUTION INTRAVENOUS; SUBCUTANEOUS at 22:05

## 2022-07-01 RX ADMIN — LIDOCAINE 5% 1 PATCH: 700 PATCH TOPICAL at 08:44

## 2022-07-01 RX ADMIN — ASPIRIN 81 MG CHEWABLE TABLET 81 MG: 81 TABLET CHEWABLE at 08:45

## 2022-07-01 NOTE — CONSULTS
PHYSICAL MEDICINE AND REHABILITATION CONSULT NOTE  Trinity España 77 y o  female MRN: [id number]  Unit/Bed#: ICU 07 Encounter: [id number]    Requested by (Physician/Service): Marie Leon MD  Reason for Consultation:  Assessment of rehabilitation needs    Assessment:  Rehabilitation Diagnosis:    Left humerus fracture non-op, NWB in sling   Complete heart block s/p PPM   Hypotension requiring pressure support    Impaired mobility and self care    Recommendations:  Rehabilitation Plan:   Continue PT/OT (SLP) while on acute care   The patient is a good candidate for acute inpatient rehabilitation once medically stable   Covid-19 Testing: [facility] inpatient rehabilitation units require testing within 48 hours of all potential admissions at this time  *Re-testing is NOT required for patients recovering from COVID-19 infection if isolation has been discontinued per CDC criteria  Medical Co-morbidities Plan:  · Acute pain due to trauma   · Hypertension  · Diabetes type 2  · Hyperlipidemia   · Liver cirrhosis due to PAUL  · Urinary retention   · Acute blood loss anemia  · DVT ppx: heparin SQ and SCD    Thank you for this consultation  Do not hesitate to contact service with further questions  JOSE JUAN Block  PM&R    History of Present Illness:  Trinity España is a 77 y o  female with a PMH of type 2 diabetes, hypothyroidism, hypertension, hyperlipidemia, 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, CKD who presented to the [address] on 6/29/22 after falling onto her left side  She initially presented to [facility] and was found to have proximal humeral fracture  She was also found to have complete heart block with hypotension  An urgent transvenous pacemaker was placed by the critical care team at bedside  She was transferred to [facility]  She was placed on levophed and epinephrine   She underwent medtronic dual chamber pacemaker implant on 6/29/22 and humeral fracture was managed non-operatively  She is NWB to the E in a sling  ECHO on 6/29/22 showed EF of 55% with normal systolic function  Her hospital course has been complicated by urinary retention and anemia  PM&R are consulted for rehabilitation recommendations  The patient was seen in the ICU  She is reporting pain in her arm  She denies any other complaints  She reports that her daughter drove in from about 250 mile away and is currently staying with her other daughter who has Downs Syndrome  The patient reports being independent and up to a week ago was chopping wood with her neighbor  Review of Systems: 10 point ROS negative except for what is noted in HPI    Function:  Prior level of function and living situation: The patient lives in a ranch style home with 5 [address] or ramp entrance  She lives with her daughter who has Downs Syndrome  She was independent for ADLs  Current level of function:  Physical Therapy: Moderate assist for bed mobility, ambulation  Occupational Therapy: Moderate assist for eating, grooming, maximal assist for UB bathing/dressing, LB bathing/dressing and toileting     Physical Exam:  BP (!) 99/42   Pulse 74   Temp 98 °F (36 7 °C) (Oral)   Resp 21   Ht 5' 3" (1 6 m)   Wt 90 6 kg (199 lb 12 5 oz)   SpO2 96%   BMI 35 39 kg/m²        Intake/Output Summary (Last 24 hours) at 7/1/2022 1006  Last data filed at 7/1/2022 0400  Gross per 24 hour   Intake 831 99 ml   Output 1240 ml   Net -408 01 ml       Body mass index is 35 39 kg/m²  Physical Exam  Constitutional:       General: She is not in acute distress  HENT:      Head: Normocephalic and atraumatic  Right Ear: External ear normal       Left Ear: External ear normal       Nose: Nose normal       Mouth/Throat:      Mouth: Mucous membranes are moist    Eyes:      Extraocular Movements: Extraocular movements intact     Pulmonary:      Effort: Pulmonary effort is normal    Abdominal:      General: There is no distension  Musculoskeletal:         General: Normal range of motion  Comments: LUE not tested due to NWB status    Skin:     General: Skin is warm and dry  Neurological:      Mental Status: She is alert and oriented to person, place, and time  Psychiatric:         Mood and Affect: Mood normal         Social History:    Social History     Socioeconomic History    Marital status:      Spouse name: None    Number of children: None    Years of education: None    Highest education level: None   Occupational History    None   Tobacco Use    Smoking status: Never Smoker    Smokeless tobacco: Never Used   Vaping Use    Vaping Use: Never used   Substance and Sexual Activity    Alcohol use: Not Currently    Drug use: No    Sexual activity: Not Currently   Other Topics Concern    None   Social History Narrative    None     Social Determinants of Health     Financial Resource Strain: Not on file   Food Insecurity: No Food Insecurity    Worried About Running Out of Food in the Last Year: Never true    Narciso of Food in the Last Year: Never true   Transportation Needs: No Transportation Needs    Lack of Transportation (Medical): No    Lack of Transportation (Non-Medical):  No   Physical Activity: Not on file   Stress: Not on file   Social Connections: Not on file   Intimate Partner Violence: Not on file   Housing Stability: Low Risk     Unable to Pay for Housing in the Last Year: No    Number of Places Lived in the Last Year: 1    Unstable Housing in the Last Year: No        Family History:    Family History   Problem Relation Age of Onset    Breast cancer Mother     Diabetes type II Father     Thyroid disease unspecified Daughter     Down syndrome Daughter     Diabetes type II Daughter     Diabetes type II Sister     No Known Problems Brother     Prostate cancer Brother     No Known Problems Sister     Stroke Sister     No Known Problems Son     Breast cancer Maternal Aunt     Colon cancer Neg Hx          Medications:     Current Facility-Administered Medications:     acetaminophen (TYLENOL) tablet 975 mg, 975 mg, Oral, Q8H [address], Radha Head DO    aspirin chewable tablet 81 mg, 81 mg, Oral, Daily, Lieutenant Carmella, PA-C, 81 mg at 07/01/22 0845    docusate sodium (COLACE) capsule 100 mg, 100 mg, Oral, BID, Radha Head DO, 100 mg at 06/30/22 0957    escitalopram (LEXAPRO) tablet 5 mg, 5 mg, Oral, Daily, Lieutepati Weir, PA-C, 5 mg at 07/01/22 0846    heparin (porcine) subcutaneous injection 5,000 Units, 5,000 Units, Subcutaneous, Q8H [address], Evelio Alcantara MD, 5,000 Units at 07/01/22 8964    HYDROmorphone HCl (DILAUDID) injection 0 2 mg, 0 2 mg, Intravenous, Q4H PRN, Radha Head DO, 0 2 mg at 06/29/22 2153    insulin glargine (LANTUS) subcutaneous injection 20 Units 0 2 mL, 20 Units, Subcutaneous, Q12H [address], Natasha Peterson DO    insulin lispro (HumaLOG) 100 units/mL subcutaneous injection 1-5 Units, 1-5 Units, Subcutaneous, Q6H [address] **AND** Fingerstick Glucose (POCT), , , Q6H, Natasha Peterson DO    lactulose oral solution 10 g, 10 g, Oral, BID, Dionna Gold MD, 10 g at 07/01/22 0845    levothyroxine tablet 112 mcg, 112 mcg, Oral, Early Morning, Lieutenant Weir PA-C, 112 mcg at 07/01/22 9446    lidocaine (LIDODERM) 5 % patch 1 patch, 1 patch, Topical, Daily, Dionna Gold MD, 1 patch at 07/01/22 0844    naloxone (NARCAN) 0 04 mg/mL syringe 0 04 mg, 0 04 mg, Intravenous, Q1MIN PRN, Radha eHad DO    norepinephrine (LEVOPHED) 4 mg (STANDARD CONCENTRATION) IV in sodium chloride 0 9% 250 mL, 1-30 mcg/min, Intravenous, Titrated, Lieutenant Carmella PA-C, Last Rate: 15 mL/hr at 07/01/22 0600, 4 mcg/min at 07/01/22 0600    ondansetron (ZOFRAN) injection 4 mg, 4 mg, Intravenous, Q4H PRN, Radha Head DO, 4 mg at 06/29/22 0567    oxyCODONE (ROXICODONE) IR tablet 2 5 mg, 2 5 mg, Oral, Q4H PRN, Radha Head DO    oxyCODONE (ROXICODONE) IR tablet 5 mg, 5 mg, Oral, Q4H PRN, Higinio Roldan, DO    pantoprazole (PROTONIX) EC tablet 40 mg, 40 mg, Oral, Early Morning, Leanna Ron PA-C, 40 mg at 07/01/22 5465    polyethylene glycol (MIRALAX) packet 17 g, 17 g, Oral, Daily PRN, Higinio Roldan, DO    pravastatin (PRAVACHOL) tablet 80 mg, 80 mg, Oral, Daily With Mariana Hanna PA-C, 80 mg at 06/30/22 1638    rifaximin (XIFAXAN) tablet 550 mg, 550 mg, Oral, Q12H [address], Hollie Lugo MD, 550 mg at 07/01/22 0845    senna-docusate sodium (SENOKOT S) 8 6-50 mg per tablet 1 tablet, 1 tablet, Oral, HS, Higinio Roldan, DO    Past Medical History:     Past Medical History:   Diagnosis Date    Anemia     [facility]osis [facility] [address]     Diabetic neuropathy ([facility] [address] )     Esophageal varices (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)     Osteoarthritis     Osteopenia     Pancytopenia (HCC)     Thrombocytopenia (HCC)     Type 2 diabetes mellitus ([address] )         Past Surgical History:     Past Surgical History:   Procedure Laterality Date    ABDOMINAL SURGERY      Abdominal plasty with mesh insertion    CARDIAC ELECTROPHYSIOLOGY PROCEDURE N/A 6/29/2022    Procedure: Cardiac pacer implant;  Surgeon: Yaneli Grey MD;  Location:  CARDIAC CATH LAB; Service: Cardiology    CATARACT EXTRACTION, BILATERAL      CATARACT EXTRACTION, BILATERAL      COLONOSCOPY      EGD AND COLONOSCOPY  03/18/2015    IR BIOPSY BONE MARROW  8/24/2021    LAPAROSCOPIC CHOLECYSTECTOMY      SHOULDER SURGERY Right     calcium depsoti    TUBAL LIGATION      UMBILICAL HERNIA REPAIR      with mesh placed    UPPER GASTROINTESTINAL ENDOSCOPY           Allergies:      Allergies   Allergen Reactions    Bee Venom Swelling    Latex     Sesame Seed (Diagnostic) - Food Allergy      Other reaction(s): swelling inside mouth  Strawberry C [Ascorbate - Food Allergy] Other (See Comments)     Mouth sores    Penicillins Rash           LABORATORY RESULTS:      Lab Results   Component Value Date    HGB 9 0 (L) 06/30/2022    HGB 12 6 10/27/2015    HCT 28 4 (L) 06/30/2022    HCT 38 6 10/27/2015    WBC 8 79 06/30/2022    WBC 5 26 10/27/2015     Lab Results   Component Value Date    BUN 38 (H) 07/01/2022    K 4 8 07/01/2022     (H) 07/01/2022    GLUCOSE 337 (H) 06/29/2022    CREATININE 1 19 07/01/2022     Lab Results   Component Value Date    PROTIME 16 0 (H) 06/30/2022    PROTIME 13 5 10/27/2015    INR 1 33 (H) 06/30/2022    INR 1 08 10/27/2015        DIAGNOSTIC STUDIES: Reviewed  XR chest portable    Result Date: 6/30/2022  Impression: Left pacemaker in satisfactory position  Workstation performed: [id number]     XR Trauma chest portable    Result Date: 6/29/2022  Impression: No acute cardiopulmonary disease  Workstation performed: [id number]     XR shoulder 1 vw left    Result Date: 6/29/2022  Impression: No significant change in proximal left humeral fracture  Continued orthopedic surgery follow-up recommended  Workstation performed: [id number]     XR shoulder 2+ views LEFT    Result Date: 6/29/2022  Impression: Again noted displaced comminuted fracture of the humeral head/neck  Workstation performed: [id number]     XR humerus left    Result Date: 6/29/2022  Impression: 1  Stable appearance of the proximal left humerus fracture, however study limited by positioning  2   Limited evaluation of the elbow due to patient obliquity  If there is continued concern for elbow fracture, consider dedicated, repeat examination of the elbow  Workstation performed: [id number]     TRAUMA - CT head wo contrast    Result Date: 6/29/2022  Impression: No intracranial hemorrhage or calvarial fracture   Workstation performed: [id number]     TRAUMA - CT spine cervical wo contrast    Result Date: 6/29/2022  Impression: No cervical spine fracture or traumatic malalignment  Workstation performed: [id number]     XR Trauma pelvis ap only 1 or 2 vw    Result Date: 6/29/2022  Impression: No acute osseous abnormality  Followup imaging may be obtained for any persistent or worsening symptoms  Workstation performed: [id number]     TRAUMA - CT chest abdomen pelvis w contrast    Addendum Date: 6/29/2022    ADDENDUM: There is an error in the impression of the report  Number 1 of the impression should state: Comminuted and displaced fracture of the left HUMERAL head/neck  Result Date: 6/29/2022  Impression: 1  Comminuted and displaced fracture of the left femoral head/neck  2   No acute traumatic visceral injury identified within the chest, abdomen, or pelvis  3   Cirrhosis of the liver with stigmata of portal hypertension and splenomegaly  4   Mild diffuse thickening of the 2nd and 3rd portions of the duodenum, which may be due to duodenitis  5   Mild diffuse thickening of the ascending colon may be due to portal hypertensive colopathy  The study was marked in [facility] for immediate notification  Workstation performed: [id number]     XR chest portable ICU    Result Date: 6/30/2022  Impression: No focal consolidation, pleural effusion, or pneumothorax  Workstation performed: [id number]     XR chest portable ICU    Result Date: 6/29/2022  Impression: New central venous catheter in standard position  No acute cardiopulmonary findings  Workstation performed: [id number]     XR chest portable ICU    Result Date: 6/29/2022  Impression: Right jugular transvenous pacemaker in right ventricle with no pneumothorax   Workstation performed: [id number]

## 2022-07-01 NOTE — PROGRESS NOTES
Progress Note - Critical Care   Livia Person 77 y o  female MRN: [id number]  Unit/Bed#: ICU 07 Encounter: [id number]    SUBJECTIVE:  "I feel fine now"     HPI/24HR Event:  Complained of chest pain overnight  Cardiac workup obtained       ROS  Patient denies headache, fever, chills, cough, shortness of breath, chest pain, abdominal pain, urinary symptoms, bowel movement changes, weakness, numbness       Assessment & Plan:   · Neuro:   Dx: No active issues  - Analgesia: scheduled tylenol and prn dilaudid and oxycodone   - Sedation: None   Dx: Hx of depression              - Home med: Lexapro   - Delirium ppx: CAM-ICU, sleep hygiene        · CV:   Dx: Complete Heartblock              - Initial cardiac workup with negative troponin               - Continue cardiac monitoring                - s/p dual chamber permanent pacemaker by EP   Dx: Elevated troponin   - Most likely type II MI from stress cardiomyopathy and CLYDE     - Trend troponin   Dx: Hx of HLD   - Home pravastatin  - Hemodynamic support   - Levophed @ 3  - MAP goal >65        · Lung:   Dx: No active issues   - maintain pulmonary hygiene     · GI:   Dx: Cirrhosis w/ hx of hepatic encephalopathy               - Continue Lactulose and rifaximin   Dx: GERD              - Protonix  - Bowel regimen: Colace, Miralax, Senokot      · FEN:   - Fluids: No Maintenance fluid  - Electrolytes: Trend and replete as needed  - Nutrition: Carb restricted diet       · :   - Dx: CLYDE              - Resolving   - Currently 1 4   - Dx: Urinary retention   - straight cath with 550cc of urine    - Flomax not given in the setting of hypotension   - Monitor I&Os  - Monitor BUN/Cr     · ID:   - Dx: No active issues   - Monitor temp/WBC curve      · Heme:   Dx: Blood loss anemia              - Hb: 9 (Baseline Hb 12)                          - Trend Hb  - DVT ppx: SQ heparin, SCDs     · Endo:   Dx:  Hx of Diabetes              - Home med: Glimepiride              - Insulin gtt  Dx: Hx of Hypothyroidism   - Levothyroxine   - Monitor blood glucose      · Msk/Skin:   Dx: Humeral fracture              - Ortho consulted                          - Non op management   - PT/OT  - Turning/Repositioning  - Wound care     · Disposition: Continue ICU care       Invasive lines and devices: Invasive Devices  Report    Central Venous Catheter Line  Duration           CVC Central Lines 22 Triple Left Internal jugular 1 day          Peripheral Intravenous Line  Duration           Peripheral IV 22 Right Hand 2 days    Peripheral IV 22 Dorsal (posterior); Left Hand 1 day          Line  Duration           Pacer Wires 1 day                   Physical exam  General: Resting comfortably in bed in no acute distress  Neuro: GCS 15 (Eye 4, Verbal 5, Motor 6)  HENT:  Normocephalic and atraumatic, PERRL  Heart:  RRR, pulses intact  Lungs:  Bilateral breath sounds present  Abdomen: Bowel sounds present, soft  Skin:   Warm, dry skin/Incision site clean dry and intact    Vitals  Temperature:   Temp (24hrs), Av 3 °F (36 8 °C), Min:98 °F (36 7 °C), Max:98 7 °F (37 1 °C)    Current: Temperature: 98 °F (36 7 °C)    Vitals:    22 0100 22 0200 22 0300 22 0543   BP: (!) 104/46 (!) 101/46 (!) 102/49    Pulse: 70 68 68    Resp: (!) 23 22 (!) 23    Temp:       TempSrc:       SpO2: 94% 95% 96%    Weight:    90 6 kg (199 lb 12 5 oz)   Height:         Arterial Line BP: 152/48  Arterial Line MAP (mmHg): 80 mmHg    Non-Invasive/Invasive Ventilation Settings:  Respiratory  Report   Lab Data (Last 4 hours)    None         O2/Vent Data (Last 4 hours)    None              No results found for: PHART, VGT3CWL, PO2ART, RVL6APD, O4MJGQRC, BEART, SOURCE  SpO2: SpO2: 96 %    Intake and Outputs:  I/O        07 07 07 07    P  O  0 720    I V  (mL/kg) 2286 2 (25 2) 444 5 (4 9)    Blood 250     Total Intake(mL/kg) 2536 2 (28) 1164 5 (12 9)    Urine (mL/kg/hr) 1505 (0 7) 1600 (0 7)    Total Output 1505 1600    Net +1031 2 -435 5                Labs:   Results from last 7 days   Lab Units 06/30/22 0449 06/29/22 1919 06/29/22 0411   WBC Thousand/uL 8 79 9 73 5 13   HEMOGLOBIN g/dL 9 0* 8 9* 7 8*   HEMATOCRIT % 28 4* 28 9* 25 9*   PLATELETS Thousands/uL 119* 88* 67*   NEUTROS PCT % 70 76* 81*   MONOS PCT % 14* 12 10      Results from last 7 days   Lab Units 06/30/22  1636 06/30/22 0449 06/29/22 1919 06/29/22  0411 06/29/22  0236 06/29/22  0226 06/29/22  0028   SODIUM mmol/L 139 141 139   < >  --    < > 133*   POTASSIUM mmol/L 4 7 5 4* 5 1   < >  --    < > 5 7*   CHLORIDE mmol/L 109* 112* 109*   < >  --    < > 102   CO2 mmol/L 24 21 21   < >  --    < > 23   CO2, I-STAT mmol/L  --   --   --   --  25  --   --    BUN mg/dL 46* 51* 55*   < >  --    < > 51*   CREATININE mg/dL 1 40* 1 44* 1 70*   < >  --    < > 2 19*   CALCIUM mg/dL 8 7 9 0 8 8   < >  --    < > 9 0   ALK PHOS U/L  --  56 54  --   --   --  69   ALT U/L  --  30 28  --   --   --  28   AST U/L  --  55* 33  --   --   --  28   GLUCOSE, ISTAT mg/dl  --   --   --   --  337*  --   --     < > = values in this interval not displayed  Results from last 7 days   Lab Units 06/30/22 1637 06/30/22 0449 06/29/22 1919   MAGNESIUM mg/dL 2 1 2 6 2 4     Results from last 7 days   Lab Units 06/30/22 1636 06/30/22 0449 06/29/22 1919   PHOSPHORUS mg/dL 2 9 3 3 3 5      Results from last 7 days   Lab Units 06/30/22 0449 06/29/22  0029   INR  1 33* 1 20*   PTT seconds  --  32     Results from last 7 days   Lab Units 06/30/22  0449   LACTIC ACID mmol/L 1 2       Micro:  Lab Results   Component Value Date    BLOODCX No Growth After 5 Days  09/14/2020    BLOODCX No Growth After 5 Days   09/14/2020    URINECX >100,000 cfu/ml Escherichia coli (A) 09/13/2020    URINECX (A) 09/13/2020     40,000-49,000 cfu/ml Alpha Hemolytic Streptococcus NOT Enterococcus       Resulted Labs    Pending Labs      Imaging Studies    Imaging:   XR chest Michael Torrez(Attending) portable ICU   Final Result by Aj Suarez MD (06/30 1350)      No focal consolidation, pleural effusion, or pneumothorax  Workstation performed: [id number]         XR chest portable   Final Result by Ronni Wolfe MD (06/30 0945)      Left pacemaker in satisfactory position  Workstation performed: [id number]         XR chest portable ICU   Final Result by Tala Roth MD (06/29 1601)   New central venous catheter in standard position  No acute cardiopulmonary findings  Workstation performed: [id number]         XR chest portable ICU   Final Result by Juanpablo Jose MD (06/29 1001)      Right jugular transvenous pacemaker in right ventricle with no pneumothorax  Workstation performed: [id number]         XR shoulder 1 vw left   Final Result by Shorty Rowland DO (06/29 1024)   No significant change in proximal left humeral fracture  Continued orthopedic surgery follow-up recommended  Workstation performed: [id number]         XR humerus left   Final Result by Shorty Rowland DO (06/29 1026)   1  Stable appearance of the proximal left humerus fracture, however study limited by positioning  2   Limited evaluation of the elbow due to patient obliquity  If there is continued concern for elbow fracture, consider dedicated, repeat examination of the elbow  Workstation performed: [id number]           I have personally reviewed pertinent reports  Allergies:    Allergies   Allergen Reactions    Bee Venom Swelling    Latex     Sesame Seed (Diagnostic) - Food Allergy      Other reaction(s): swelling inside mouth    Strawberry C [Ascorbate - Food Allergy] Other (See Comments)     Mouth sores    Penicillins Rash         Medications:   Scheduled Meds:  Current Facility-Administered Medications   Medication Dose Route Frequency Provider Last Rate    acetaminophen  975 mg Oral [facility] Roland Brown Delphina Apley, DO      aspirin  81 mg Oral Daily Lucy De Santiago      docusate sodium  100 mg Oral BID Negin Buckley, DO      escitalopram  5 mg Oral Daily Lucy De Santiago      heparin (porcine)  5,000 Units Subcutaneous [facility] Essie Felix MD      HYDROmorphone  0 2 mg Intravenous Q4H PRN Negin Buckley,       insulin regular (HumuLIN R,NovoLIN R) infusion  0 3-21 Units/hr Intravenous Titrated Aftab Keller MD 2 Units/hr (07/01/22 0409)    lactulose  10 g Oral BID Morales Sosa MD      levothyroxine  112 mcg Oral Early Morning Bee Milligan PA-C      lidocaine  1 patch Topical Daily Morales Sosa MD      naloxone  0 04 mg Intravenous Q1MIN PRN Negin Buckley, DO      norepinephrine  1-30 mcg/min Intravenous Titrated Bee Milligan PA-C 4 mcg/min (07/01/22 0430)    ondansetron  4 mg Intravenous Q4H PRN Negin Buckley, DO      oxyCODONE  2 5 mg Oral Q4H PRN Negin Buckley, DO      oxyCODONE  5 mg Oral Q4H PRN Negin Buckley, DO      pantoprazole  40 mg Oral Early Morning Bee Milligan PA-C      polyethylene glycol  17 g Oral Daily PRN Negin Buckley, DO      pravastatin  80 mg Oral Daily With Viola Odell PA-C      rifaximin  550 mg Oral Q12H [address] Morales Sosa MD      senna-docusate sodium  1 tablet Oral HS Negin Buckley DO       Continuous Infusions:insulin regular (HumuLIN R,NovoLIN R) infusion, 0 3-21 Units/hr, Last Rate: 2 Units/hr (07/01/22 0409)  norepinephrine, 1-30 mcg/min, Last Rate: 4 mcg/min (07/01/22 0430)      PRN Meds:  HYDROmorphone, 0 2 mg, Q4H PRN  naloxone, 0 04 mg, Q1MIN PRN  ondansetron, 4 mg, Q4H PRN  oxyCODONE, 2 5 mg, Q4H PRN  oxyCODONE, 5 mg, Q4H PRN  polyethylene glycol, 17 g, Daily PRN        Counseling / Coordination of Care  Total Critical Care time spent 30 minutes excluding procedures, teaching and family updates  Code Status: Level 1 - Full Code     Portions of the record may have been created with voice recognition software    Occasional wrong word or "sound a like" substitutions may have occurred due to the inherent limitations of voice recognition software  Read the chart carefully and recognize, using context, where substitutions have occurred       Dionna Gold MD

## 2022-07-01 NOTE — CONSULTS
Consultation - Geriatric Medicine   Deangelo Hdez 77 y o  female MRN: [id number]  Unit/Bed#: ICU 07 Encounter: [id number]      Assessment/Plan     Ambulatory dysfunction with fall  -reportedly mechanical fall at home on 6/28/22  -(unknown) head strike (unknown) loss of consciousness  -injuries as outlined below  -denies use of assist device for ambulation at baseline or additional falls in past year  -high risk future falls due to age, now having had hx fall, impaired vision, poor safety awareness, hypotension, complete heart block, deconditioning/debility and unfamiliar environment   -encourage good body mechanics and assist with all transfers  -keep personal items and call bell close to prevent reaching  -maintain environment free of fall hazards  -encourage appropriate footwear and adequate lighting at all times when out of bed  -recommend home fall risk assessment and personal fall alert system if returning home  -PT and OT following     Left proximal humerus fracture  -s/p fall as outlined above  -noted on admission imaging   -NWB LUE in sling   -neurovascular checks per protocol   -Ortho eval - non-operative management at this time - o/p f/u    Acute pain due to trauma  -continue pain control per Geriatric pain protocol:  Tylenol 975mg Q8H scheduled  Roxicodone 2 5mg Q4H PRN moderate pain  Roxicodone 5mg Q4H PRN severe pain  Dilaudid 0 2mg Q4H PRN  -continue adjuncts such as lidocaine patch topically  -encourage addition of non-pharmacologic pain treatment including ice and frequent repositioning  -recommend  bowel regimen to prevent and treat constipation due to increased risk with acute pain and opiate pain medications    Complete heart block  -medtronic dual chamber placed 6/29/22  -Cardiology and EP on board    Hypotension   -remains on levo gtt for vasopressor support     Cognitive screening  -alert and oriented but inattentive and with limited insight which may be due to stressors of acute illness/injuries vs beginning acute encephalopathy but cannot r/o underlying cognitive impairment   -no prior cognitive testing on record for review and pt reportedly independent with all ADLs and IADLs and is caregiver of adult child with Down syndrome   -CTH obtained on admission personally reviewed, mild chronic microangiopathic changes appreciated  -encourage use of sensory assist devices such as corrective lenses at all appropriate times to reduce risk sensory impairment contributing to isolation confusion, encephalopathy more precipitous cognitive  -encourage patient remain physically, socially, and cognitively active engaged to maintain cognitive acuity    Depression  -maintained on Lexapro 5 mg daily, if symptoms in adequately controlled consider increasing to 10 mg daily  -consider outpatient referral to counseling/support group as part of comprehensive multimodal treatment approach and encourage close outpatient follow-up with PCP for ongoing management    DM-II with long-term use of insulin  -A1c 8 2 - goal approximately 7   -home regimen includes Janumet, Basaglar, and glimepiride  -blood sugar during hospitalization 140-180 to reduce risk hypoglycemia  -encourage healthy lifestyle modifications and continue outpatient follow-up with PCP for appropriate diabetic screenings and cares    Hypothroidism   -continue home levothyroxine regimen close outpatient PCP for ongoing dose titration and medication management    PAUL  -with hx hepatic encephalopathy as well as varices without bleeding  -maintained on nadolol, spironolactone, Lasix, Xifaxan, lactulose and omeprazole  -hepatic dosing of medications as possible  -o/p f/u with GI for ongoing management     Deconditioning/debility/frailty  -clinical frailty scale stage 6, mild to moderately frail  -multifactorial including age, complete heart block requiring pacer implantation, hepatic cirrhosis and multiple additional chronic medical comorbidities in elderly individual with limited physiologic and metabolic reserves  -albumin low at 3 0, encourage well-balanced nutrition, consider nutritional supplements between meals if oral intake is poor  -continue optimization chronic medical conditions and address acute derangements as arise  -ensure that anxiety/depression/mood symptoms if present are adequately controlled as may impact patient's response to therapies as well as overall sense of well-being and quality of life    Delirium precautions  -Patient is high risk of delirium due to fall, traumatic injuries, heart block, hospitalization, ICU admission in frail elderly individual  -Initiate delirium precautions  -maintain normal sleep/wake cycle  -minimize overnight interruptions, group overnight vitals/labs/nursing checks as possible  -dim lights, close blinds and turn off tv to minimize stimulation and encourage sleep environment in evenings  -ensure that pain is well controlled  -monitor for fecal and urinary retention which may precipitate delirium  -encourage early mobilization and ambulation with assistance once cleared to safely do so by primary service  -provide frequent reorientation and redirection as indicated and appropriate  -encourage family and friends at the bedside to help help calm patient if anxious -for familiarity and reassurance  -minimize use of medications which may precipitate or worsen delirium such as tramadol, benzodiazepine, anticholinergics, and benadryl  -encourage hydration and nutrition   -redirect unwanted behaviors as first line    Home medication review  Personally confirmed with [facility] ([facility] [phone]:    Spironolactone 50 mg daily  Janumet XR  mg BID  Basaglar insulin 48U (forty eight) in mornings   AND  Basaglar insulin 36U (thirty six) in evenings  Xifaxan 550 mg BID  Levothyroxine 125 mcg daily  Lexapro 5 mg daily  Furosemide 40 mg daily  Lactulose 15 ml (10g) BID  Glimepiride 8 mg daily  Simvastatin 40 mg daily  Omeprazole 20 mg daily  Nadolol 20 mg daily     Care coordination: rounded with Taisha Dodd (RN)    History of Present Illness   Physician Requesting Consult: Carlton Silveira MD  Reason for Consult / Principal Problem: Fall   Hx and PE limited by: N/A  Additional history obtained from: Chart review and patient evaluation    HPI: Ericka Ruiz is a 77y o  year old female with DM-II with long-term use of insulin diabetic peripheral polyneuropathy, hypothyroidism, hypertension, 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, esophageal varices without bleeding, pancytopenia, obesity and ambulatory dysfunction who is admitted to trauma service with ambulatory dysfunction found to have left femur fracture admission imaging as well as complete heart block s/p pacemaker implantation being seen in consultation by Geriatrics for high risk of developing delirium during hospitalization  She is seen and examined at bedside in ICU, she explains that she is admitted following a fall outside her home on 6/28/22  She was not certain if she struck her head or lost consciousness, she was not able to get up without assistance remains for which he approximates S2 hours prior to being found  She initially presented to the [facility] and was transferred to [facility] for higher level of care as she was found to be in complete heart block requiring transvenous pacing and ultimately implanted pacemaker  At the time of encounter she does not seem to have good insight to her injuries/illnesses and states that she was transferred to [facility] for Orthopedics to operate on her arm and denies any recollection of cardiac issue or needing to have a pacer implanted, she perseverates on her arm and frustration that she is not getting surgery so that she can use it and go home       Prior to admission Mayi Lizama was residing home with her adult daughter who has down syndrome and of whom she is the primary caregiver although she does have some private nurse assistance for her at times  She is worried about her daughter and states that her daughter-in-law is currently caring for her why she is in hospital      Darling Thomson reports independence with ADLs and IADLs and denies use of assist device for ambulation at baseline as well as additional falls within the past year  She requires use bifocals as well as dentures, does not use hearing aids and denies memory or cognitive concerns  Inpatient consult to Gerontology  Consult performed by: eBv Gray DO  Consult ordered by: Dada Robledo PA-C        Review of Systems   Constitutional: Negative for chills and fever  HENT: Positive for dental problem (wears dentures)  Eyes: Positive for visual disturbance (wears bifocals)  Respiratory: Negative  Negative for shortness of breath  Cardiovascular: Negative  Gastrointestinal: Negative  Endocrine: Negative  Genitourinary: Positive for frequency (due to meds per pt)  Musculoskeletal: Negative  Negative for gait problem (denies use of assist device for ambulation at baseline )  Skin: Negative  Neurological: Positive for weakness (LUE) and numbness (left hand/fingers)  Negative for dizziness, light-headedness and headaches  Hematological: Negative  Psychiatric/Behavioral: Negative  All other systems reviewed and are negative      Historical Information   Past Medical History:   Diagnosis Date    Anemia     Cir[facility]osis [facility]     Diabetic neuropathy [facility]     Esophageal varices (HCC)     Fatty liver     GERD (gastroesophageal reflux disease)     Hepatic encephalopathy (HCC)     Hiatal hernia     Hyperlipidemia     Hypertension     Hypoglycemia     Hypothyroidism     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)     Osteoarthritis     Osteopenia     Pancytopenia (HCC)     Thrombocytopenia (HCC)     Type 2 diabetes mellitus (HCC)      Past Surgical History:   Procedure Laterality Date    ABDOMINAL SURGERY      Abdominal plasty with mesh insertion    CARDIAC ELECTROPHYSIOLOGY PROCEDURE N/A 6/29/2022    Procedure: Cardiac pacer implant;  Surgeon: Phil Ba MD;  Location:  CARDIAC CATH LAB;   Service: Cardiology    CATARACT EXTRACTION, BILATERAL      CATARACT EXTRACTION, BILATERAL      COLONOSCOPY      EGD AND COLONOSCOPY  03/18/2015    IR BIOPSY BONE MARROW  8/24/2021    LAPAROSCOPIC CHOLECYSTECTOMY      SHOULDER SURGERY Right     calcium depsoti    TUBAL LIGATION      UMBILICAL HERNIA REPAIR      with mesh placed    UPPER GASTROINTESTINAL ENDOSCOPY       Social History   Social History     Substance and Sexual Activity   Alcohol Use Not Currently     Social History     Substance and Sexual Activity   Drug Use No     Social History     Tobacco Use   Smoking Status Never Smoker   Smokeless Tobacco Never Used     Family History:   Family History   Problem Relation Age of Onset    Breast cancer Mother     Diabetes type II Father     Thyroid disease unspecified Daughter     Down syndrome Daughter     Diabetes type II Daughter     Diabetes type II Sister     No Known Problems Brother     Prostate cancer Brother     No Known Problems Sister     Stroke Sister     No Known Problems Son     Breast cancer Maternal Aunt     Colon cancer Neg Hx      Meds/Allergies   all current active meds have been reviewed    Allergies   Allergen Reactions    Bee Venom Swelling    Latex     Sesame Seed (Diagnostic) - Food Allergy      Other reaction(s): swelling inside mouth    Strawberry C [Ascorbate - Food Allergy] Other (See Comments)     Mouth sores    Penicillins Rash     Objective     Intake/Output Summary (Last 24 hours) at 7/1/2022 2923  Last data filed at 7/1/2022 0400  Gross per 24 hour   Intake 1134 99 ml   Output 1360 ml   Net -225 01 ml     Invasive Devices  Report    Central Venous Catheter Line  Duration           [facility] Central Lines 06/29/22 Triple Left Internal jugular 1 day          Peripheral Intravenous Line  Duration Peripheral IV 06/29/22 Right Hand 2 days    Peripheral IV 06/29/22 Dorsal (posterior); Left Hand 1 day          Line  Duration           Pacer Wires 2 days              Physical Exam  Vitals and nursing note reviewed  Constitutional:       General: She is not in acute distress  Appearance: She is obese  She is not toxic-appearing  HENT:      Head: Normocephalic  Nose: Nose normal       Comments: O2 via NC     Mouth/Throat:      Mouth: Mucous membranes are dry  Comments: Upper dentures in place  Eyes:      General: No scleral icterus  Right eye: No discharge  Left eye: No discharge  Conjunctiva/sclera: Conjunctivae normal       Comments: Pupils equal round   Neck:      Comments: Phonation normal  Cardiovascular:      Rate and Rhythm: Normal rate  Pulses: Normal pulses  Pulmonary:      Effort: Pulmonary effort is normal  No respiratory distress  Comments: Shallow respirations  Abdominal:      General: There is no distension  Palpations: Abdomen is soft  Tenderness: There is no abdominal tenderness  Comments: Obese, bowel sounds present   Musculoskeletal:      Cervical back: Neck supple  Right lower leg: No edema  Left lower leg: No edema  Comments: Reduced overall muscle mass    LUE in sling    Skin:     General: Skin is warm and dry  Neurological:      Mental Status: She is alert  Comments: Awake and alert, oriented to person place and situation in general but very forgetful and repetitive with poor immediate recall    Mild difficulty following conversation at times and inattentive, perseverating left arm injury    Psychiatric:         Attention and Perception: She is inattentive  Mood and Affect: Affect is angry  Lab Results:     I have personally reviewed pertinent lab results      I have personally reviewed the following imaging study reports in PACS:    6/29/22-CT abdomen pelvis without contrast, CT head without contrast, CT C-spine without contrast, left wrist XR, portable chest x-ray    Therapies:   PT: Following   OT: Following     VTE Prophylaxis: Heparin    Code Status: Level 1 - Full Code  Advance Directive and Living Will:      Power of :    POLST:      Family and Social Support: adult daughter who suffers with down syndrome    Goals of Care: Get arm fixed and get out of bed

## 2022-07-01 NOTE — CASE MANAGEMENT
Case Management Discharge Planning Note    Patient name Leroy Neal  Location ICU 07/ICU 07 MRN [id number]  : 1945 Date 2022       Current Admission Date: 2022  Current Admission Diagnosis:Complete heart block [facility])   Patient Active Problem List    Diagnosis Date Noted    Complete heart block ([facility] ) 2022    Esophageal varices without bleeding ([facility] ) 2021    Pancytopenia ([facility] ) 2021    Iron deficiency anemia due to chronic blood loss 2021    Mild nonproliferative diabetic retinopathy of both eyes without macular edema associated with type 2 diabetes mellitus ([facility] ) 2021    Urinary tract infection 09/15/2020    Hepatic encephalopathy ([facility] ) 2020    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) ([facility] ) 2020    Thrombocytopenia ([facility] [facility] ) 2020    Hyperlipidemia 2018    Essential hypertension 2018    Acquired hypothyroidism 2018    Type 2 diabetes mellitus with hyperglycemia, with long-term current use of insulin ([facility] [address] [facility] ) 2018    Diabetic polyneuropathy associated with type 2 diabetes mellitus ([facility] [facility] ) 2018      LOS (days): 2  Geometric Mean LOS (GMLOS) (days): 5 20  Days to GMLOS:2 7     OBJECTIVE:  Risk of Unplanned Readmission Score: 21 11         Current admission status: Inpatient   Preferred Pharmacy:   [id number], [address], [place]  [address] [address]  Phone: [phone] Fax: [phone]    [facility], [address] [place] [address] [address] [place] [address]  Phone: [phone] Fax: [phone]    Primary Care Provider: Ariella Rodriguez DO    Primary Insurance: MEDICARE  Secondary Insurance: [employer]    DISCHARGE DETAILS:    Pt is recommended for IP rehab, and CM discussed with pt  Pt is in agreement with plan and would like referrals to [facility]   CM placed and will follow up

## 2022-07-01 NOTE — PROGRESS NOTES
Cardiology Progress note  Unit/Bed#: ICU 07 Encounter: [id number]        Senia Chowdary 77 y o  female [id number]  Hospital Stay Days: 2    Assessment and Plan      Current Problem List   Principal Problem:    Complete heart block (HCC)    Assessment/Plan:    1  Regional wall motion abnormalities - likely stress CM based on location - although this is a diagnosis of exclusion the patient is currently still on levophed and is not able to undergo stress test - additionally she has a comminuted fracture of her shoulder  ? Recommend hemodynamic stabilization prior to ischemic work up   ? - Hypotension does not appear to be 2/2 HF as patient has clear lungs, no JVP and no peripheral edema  ? Eventual ischemic work up elevated troponin non mi elevation - no chest pain she reports to me    ? Outpatient stress  2   New onset CM   ? As above - no evidence of HF - elevated BNP was in setting of CHB + CLYDE  3  CHB  ? S/P Pacemaker  ·     Subjective     Patient seen and examined  Reported chest pain overnight but patient adamantly denies  No shortness of breath  Remains on pressors     Objective     Vitals: Temp (24hrs), Av 4 °F (36 9 °C), Min:98 °F (36 7 °C), Max:98 8 °F (37 1 °C)  Current: Temperature: 98 8 °F (37 1 °C)  Patient Vitals for the past 24 hrs:   BP Temp Temp src Pulse Resp SpO2 Weight   22 1000 117/57 -- -- 66 20 96 % --   22 0900 119/62 -- -- 70 22 97 % --   22 0800 111/56 98 8 °F (37 1 °C) Oral 68 22 95 % --   22 0700 110/53 -- -- 68 (!) 23 96 % --   22 0600 (!) 99/42 -- -- 74 21 96 % --   22 0543 -- -- -- -- -- -- 90 6 kg (199 lb 12 5 oz)   22 0300 (!) 102/49 -- -- 68 (!) 23 96 % --   22 0200 (!) 101/46 -- -- 68 22 95 % --   22 0100 (!) 104/46 -- -- 70 (!) 23 94 % --   22 0000 (!) 96/45 -- -- 68 (!) 23 95 % --   22 2300 107/53 -- -- 72 (!) 23 95 % --   22 2200 116/55 -- -- 68 20 96 % --   22 2100 128/65 -- -- 78 (!) 26 97 % --   06/30/22 2000 (!) 119/48 -- -- 64 22 96 % --   06/30/22 1900 (!) 98/45 98 °F (36 7 °C) Oral 71 20 96 % --   06/30/22 1800 (!) 102/45 -- -- 70 21 97 % --   06/30/22 1600 (!) 90/46 98 4 °F (36 9 °C) Oral 60 21 95 % --   06/30/22 1500 112/50 -- -- 60 22 96 % --   06/30/22 1400 105/52 -- -- 60 21 97 % --   06/30/22 1300 (!) 116/47 -- -- 60 (!) 58 96 % --    Body mass index is 35 39 kg/m²  Physical Exam:  Physical Exam  Constitutional:       Appearance: Normal appearance  Cardiovascular:      Rate and Rhythm: Normal rate  Pulmonary:      Effort: Pulmonary effort is normal    Abdominal:      General: Abdomen is flat  Neurological:      General: No focal deficit present  Psychiatric:         Mood and Affect: Mood normal          Invasive Devices  Report    Central Venous Catheter Line  Duration           CVC Central Lines 06/29/22 Triple Left Internal jugular 1 day          Peripheral Intravenous Line  Duration           Peripheral IV 06/29/22 Right Hand 2 days    Peripheral IV 06/29/22 Dorsal (posterior); Left Hand 1 day          Line  Duration           Pacer Wires 2 days                    Labs:   Results from last 7 days   Lab Units 07/01/22  0627 06/30/22  0449 06/29/22  1919 06/29/22  0411 06/29/22  0236 06/29/22  0028   WBC Thousand/uL 4 74 8 79 9 73 5 13  --  7 86   HEMOGLOBIN g/dL 8 0* 9 0* 8 9* 7 8*  --  9 0*   I STAT HEMOGLOBIN g/dl  --   --   --   --  9 2*  --    HEMATOCRIT % 26 2* 28 4* 28 9* 25 9*  --  30 1*   HEMATOCRIT, ISTAT %  --   --   --   --  27*  --    PLATELETS Thousands/uL  --  119* 88* 67*  --  92*   NEUTROS PCT % 64 70 76* 81*  --  80*   MONOS PCT % 14* 14* 12 10  --  7      Results from last 7 days   Lab Units 07/01/22  0627 06/30/22  1637 06/30/22  1636 06/30/22  0449 06/29/22  1919 06/29/22  0411 06/29/22  0236 06/29/22  0226 06/29/22  0029 06/29/22  0028   SODIUM mmol/L 142  --  139 141 139 138  --  134*  --  133*   POTASSIUM mmol/L 4 8  --  4 7 5 4* 5 1 4 8  --  4 7  --  5 7*   CHLORIDE mmol/L 111*  --  109* 112* 109* 107  --  103  --  102   CO2 mmol/L 26  --  24 21 21 21  --  25  --  23   CO2, I-STAT mmol/L  --   --   --   --   --   --  25  --   --   --    BUN mg/dL 38*  --  46* 51* 55* 56*  --  56*  --  51*   CREATININE mg/dL 1 19  --  1 40* 1 44* 1 70* 1 81*  --  2 08*  --  2 19*   CALCIUM mg/dL 8 8  --  8 7 9 0 8 8 9 3  --  9 2  --  9 0   ALK PHOS U/L  --   --   --  56 54  --   --   --   --  69   ALT U/L  --   --   --  30 28  --   --   --   --  28   AST U/L  --   --   --  55* 33  --   --   --   --  28   GLUCOSE, ISTAT mg/dl  --   --   --   --   --   --  337*  --   --   --    MAGNESIUM mg/dL 2 2 2 1  --  2 6 2 4 2 4  --   --   --   --    PHOSPHORUS mg/dL 2 6  --  2 9 3 3 3 5 2 9  --   --   --   --    INR   --   --   --  1 33*  --   --   --   --  1 20*  --    PTT seconds  --   --   --   --   --   --   --   --  32  --    EGFR ml/min/1 73sq m 44  --  36 35 29 26  --  22  --  21     Results from last 7 days   Lab Units 06/30/22  0449 06/29/22  0029   INR  1 33* 1 20*   PTT seconds  --  32     Results from last 7 days   Lab Units 06/30/22  0449   LACTIC ACID mmol/L 1 2         No results found for: PHART, UCB7KTT, PO2ART, PDZ5XGS, F4RDHIHA, BEART, SOURCE  No components found for: HIV1X2  No results found for: HAV, HEPAIGM, HEPBIGM, HEPBCAB, HBEAG, HEPCAB  No results found for: SPEP, UPEP   Lab Results   Component Value Date    HGBA1C 8 2 (H) 05/31/2022    HGBA1C 7 8 (H) 01/18/2022    HGBA1C 7 8 (H) 10/13/2021     No results found for: CHOL   Lab Results   Component Value Date    HDL 34 (L) 05/31/2022    HDL 30 (L) 04/20/2022      Lab Results   Component Value Date    LDLCALC 84 05/31/2022    LDLCALC 67 04/20/2022      Lab Results   Component Value Date    TRIG 76 05/31/2022    TRIG 91 04/20/2022     No components found for: PROCAL      Micro:      Urinalysis:  Lab Results   Component Value Date    BDZUR Negative 09/13/2020    COCAINEUR Negative 09/13/2020 OPIATEUR Negative 09/13/2020    PCPUR Negative 09/13/2020    THCUR Negative 09/13/2020    ETOH <3 09/13/2020    ACTMNPHEN <2 0 (L) 69/48/0394    SALICYLATE <1 3 (L) 12/89/5893          Invalid input(s): URIBILINOGEN        Intake and Outputs:  I/O       06/29 0701  06/30 0700 06/30 0701  07/01 0700 07/01 0701  07/02 0700    P  O  0 720 360    I V  (mL/kg) 2286 2 (25 2) 478 1 (5 3) 96 1 (1 1)    Blood 250      Total Intake(mL/kg) 2536 2 (28) 1198 1 (13 2) 456 1 (5)    Urine (mL/kg/hr) 1505 (0 7) 1600 (0 7)     Total Output 1505 1600     Net +1031 2 -401 9 +456  1               Nutrition:  Diet Venkatesh/CHO Controlled; Consistent Carbohydrate Diet Level 2 (5 carb servings/75 grams CHO/meal)  Radiology Results:   XR chest portable ICU   Final Result by Rolanda Mantilla MD (06/30 1350)      No focal consolidation, pleural effusion, or pneumothorax  Workstation performed: [id number]         XR chest portable   Final Result by Michael Rosales MD (06/30 0945)      Left pacemaker in satisfactory position  Workstation performed: [id number]         XR chest portable ICU   Final Result by Juice Sharma MD (06/29 1601)   New central venous catheter in standard position  No acute cardiopulmonary findings  Workstation performed: [id number]         XR chest portable ICU   Final Result by Raymond Shukla MD (06/29 1001)      Right jugular transvenous pacemaker in right ventricle with no pneumothorax  Workstation performed: [id number]         XR shoulder 1 vw left   Final Result by Gale Silva DO (06/29 1024)   No significant change in proximal left humeral fracture  Continued orthopedic surgery follow-up recommended  Workstation performed: [id number]         XR humerus left   Final Result by Gale Silva DO (06/29 1026)   1  Stable appearance of the proximal left humerus fracture, however study limited by positioning        2  Limited evaluation of the elbow due to patient obliquity  If there is continued concern for elbow fracture, consider dedicated, repeat examination of the elbow        Workstation performed: [id number]           Scheduled Medications:  acetaminophen, 975 mg, Q8H [address]  aspirin, 81 mg, Daily  docusate sodium, 100 mg, BID  escitalopram, 5 mg, Daily  heparin (porcine), 5,000 Units, Q8H [address]  insulin glargine, 20 Units, Q12H [address]  insulin lispro, 1-5 Units, 4x Daily (AC & HS)  lactulose, 10 g, BID  levothyroxine, 112 mcg, Early Morning  lidocaine, 1 patch, Daily  pantoprazole, 40 mg, Early Morning  pravastatin, 80 mg, Daily With Dinner  rifaximin, 550 mg, Q12H [address]  senna-docusate sodium, 1 tablet, HS      PRN MEDS:  HYDROmorphone, 0 2 mg, Q4H PRN  naloxone, 0 04 mg, Q1MIN PRN  ondansetron, 4 mg, Q4H PRN  oxyCODONE, 2 5 mg, Q4H PRN  oxyCODONE, 5 mg, Q4H PRN  polyethylene glycol, 17 g, Daily PRN      Last 24 Hour Meds: :   Medication Administration - last 24 hours from 06/30/2022 1242 to 07/01/2022 1242       Date/Time Order Dose Route Action Action by     07/01/2022 0512 acetaminophen (TYLENOL) tablet 975 mg 975 mg Oral Not Given Becki Bullocks, RN     06/30/2022 2231 acetaminophen (TYLENOL) tablet 975 mg 975 mg Oral Not Given Becki Bullocks, RN     06/30/2022 1459 acetaminophen (TYLENOL) tablet 975 mg 975 mg Oral Not Given Danted Dora, RN     06/30/2022 2231 senna-docusate sodium (SENOKOT S) 8 6-50 mg per tablet 1 tablet 1 tablet Oral Not Given Becki Bullocks, RN     07/01/2022 0845 docusate sodium (COLACE) capsule 100 mg 100 mg Oral Not Given Bozena John, RN     06/30/2022 1816 docusate sodium (COLACE) capsule 100 mg 100 mg Oral Not Given Bozena John RN     07/01/2022 0845 lactulose oral solution 10 g 10 g Oral Given Bozena John RN     06/30/2022 1816 lactulose oral solution 10 g 10 g Oral Given Bozena John RN     07/01/2022 0845 rifaximin (XIFAXAN) tablet 550 mg 550 mg Oral Given Bozena John, RN 06/30/2022 2241 rifaximin (XIFAXAN) tablet 550 mg 550 mg Oral Given Alexei Bateman, HILDA     07/01/2022 8950 heparin (porcine) subcutaneous injection 5,000 Units 5,000 Units Subcutaneous Given Alexei Bateman, HILDA     06/30/2022 2241 heparin (porcine) subcutaneous injection 5,000 Units 5,000 Units Subcutaneous Given Alexei Bateman, HILDA     06/30/2022 1459 heparin (porcine) subcutaneous injection 5,000 Units 5,000 Units Subcutaneous Given Salud Coreas RN     07/01/2022 0600 norepinephrine (LEVOPHED) 4 mg (STANDARD CONCENTRATION) IV in sodium chloride 0 9% 250 mL 4 mcg/min Intravenous Rate/Dose Change Alexei Bateman, HILDA     07/01/2022 0530 norepinephrine (LEVOPHED) 4 mg (STANDARD CONCENTRATION) IV in sodium chloride 0 9% 250 mL 3 mcg/min Intravenous Rate/Dose Change Alexei Bateman RN     07/01/2022 0430 norepinephrine (LEVOPHED) 4 mg (STANDARD CONCENTRATION) IV in sodium chloride 0 9% 250 mL 4 mcg/min Intravenous Kemartalmazet 37 Ruchi Wright RN     06/30/2022 1920 norepinephrine (LEVOPHED) 4 mg (STANDARD CONCENTRATION) IV in sodium chloride 0 9% 250 mL 4 mcg/min Intravenous Rate/Dose Change Alexei Bateman RN     06/30/2022 1400 norepinephrine (LEVOPHED) 4 mg (STANDARD CONCENTRATION) IV in sodium chloride 0 9% 250 mL 3 mcg/min Intravenous Rate/Dose Change Salud Coreas RN     07/01/2022 1030 insulin regular (HumuLIN R,NovoLIN R) 1 Units/mL in sodium chloride 0 9 % 100 mL infusion 0 Units/hr Intravenous Stopped Salud Coreas RN     07/01/2022 0849 insulin regular (HumuLIN R,NovoLIN R) 1 Units/mL in sodium chloride 0 9 % 100 mL infusion 1 Units/hr Intravenous Rate/Dose Verify Salud Coreas RN     07/01/2022 0847 insulin regular (HumuLIN R,NovoLIN R) 1 Units/mL in sodium chloride 0 9 % 100 mL infusion 1 Units/hr Intravenous Rate/Dose Verify Salud Coreas RN     07/01/2022 0846 insulin regular (HumuLIN R,NovoLIN R) 1 Units/mL in sodium chloride 0 9 % 100 mL infusion 1 Units/hr Intravenous Rate/Dose Verify Adriano Harp RN     07/01/2022 3168 insulin regular (HumuLIN R,NovoLIN R) 1 Units/mL in sodium chloride 0 9 % 100 mL infusion 1 Units/hr Intravenous Rate/Dose Change Laisha Urban RN     07/01/2022 0409 insulin regular (HumuLIN R,NovoLIN R) 1 Units/mL in sodium chloride 0 9 % 100 mL infusion 2 Units/hr Intravenous Rate/Dose Change Laisha Urban RN     07/01/2022 0220 insulin regular (HumuLIN R,NovoLIN R) 1 Units/mL in sodium chloride 0 9 % 100 mL infusion 1 Units/hr Intravenous Rate/Dose Change Laisha Urban RN     07/01/2022 0012 insulin regular (HumuLIN R,NovoLIN R) 1 Units/mL in sodium chloride 0 9 % 100 mL infusion 6 Units/hr Intravenous Rate/Dose Change Laisha Urban RN     06/30/2022 2215 insulin regular (HumuLIN R,NovoLIN R) 1 Units/mL in sodium chloride 0 9 % 100 mL infusion 8 Units/hr Intravenous Rate/Dose Verify Laisha Urban RN     06/30/2022 2019 insulin regular (HumuLIN R,NovoLIN R) 1 Units/mL in sodium chloride 0 9 % 100 mL infusion 8 Units/hr Intravenous Rate/Dose Change Laisha Urban RN     06/30/2022 1800 insulin regular (HumuLIN R,NovoLIN R) 1 Units/mL in sodium chloride 0 9 % 100 mL infusion 4 Units/hr Intravenous [address] RN     06/30/2022 1644 insulin regular (HumuLIN R,NovoLIN R) 1 Units/mL in sodium chloride 0 9 % 100 mL infusion 4 Units/hr Intravenous Rate/Dose Change Adriano Harp RN     06/30/2022 1415 insulin regular (HumuLIN R,NovoLIN R) 1 Units/mL in sodium chloride 0 9 % 100 mL infusion 5 Units/hr Intravenous Rate/Dose Change Adriano Harp RN     07/01/2022 0845 aspirin chewable tablet 81 mg 81 mg Oral Given Adriano Harp RN     07/01/2022 0846 escitalopram (LEXAPRO) tablet 5 mg 5 mg Oral Given Adriano Harp RN     07/01/2022 3601 pantoprazole (PROTONIX) EC tablet 40 mg 40 mg Oral Given Laisha Urban RN     07/01/2022 7597 levothyroxine tablet 112 mcg 112 mcg Oral Given Laisha Urban RN     06/30/2022 1638 pravastatin (PRAVACHOL) tablet 80 mg 80 mg Oral Given Latoya Sanchez RN     07/01/2022 0844 lidocaine (LIDODERM) 5 % patch 1 patch 1 patch Topical Medication Applied Latoya Sanchez RN     07/01/2022 1038 insulin glargine (LANTUS) subcutaneous injection 20 Units 0 2 mL 20 Units Subcutaneous Given Latoya Sanchez RN          PLEASE NOTE:  This encounter was completed utilizing the [employer] Direct Speech Voice Recognition Software  Grammatical errors, random word insertions, pronoun errors and incomplete sentences are occasional consequences of the system due to software limitations, ambient noise and hardware issues  These may be missed by proof reading prior to affixing electronic signature  Any questions or concerns about the content, text or information contained within the body of this dictation should be directly addressed to the physician for clarification  Please do not hesitate to call me directly if you have any any questions or concerns

## 2022-07-02 PROBLEM — S42.202A CLOSED FRACTURE OF PROXIMAL END OF LEFT HUMERUS: Status: ACTIVE | Noted: 2022-07-02

## 2022-07-02 PROBLEM — F32.A DEPRESSION: Status: ACTIVE | Noted: 2022-07-02

## 2022-07-02 PROBLEM — E11.65 TYPE 2 DIABETES MELLITUS WITH HYPERGLYCEMIA, WITH LONG-TERM CURRENT USE OF INSULIN (HCC): Chronic | Status: ACTIVE | Noted: 2018-07-24

## 2022-07-02 PROBLEM — I42.9 CARDIOMYOPATHY (HCC): Status: ACTIVE | Noted: 2022-07-02

## 2022-07-02 PROBLEM — Z79.4 TYPE 2 DIABETES MELLITUS WITH HYPERGLYCEMIA, WITH LONG-TERM CURRENT USE OF INSULIN (HCC): Chronic | Status: ACTIVE | Noted: 2018-07-24

## 2022-07-02 PROBLEM — K74.60 LIVER CIRRHOSIS SECONDARY TO NASH (NONALCOHOLIC STEATOHEPATITIS) (HCC): Chronic | Status: ACTIVE | Noted: 2020-09-13

## 2022-07-02 PROBLEM — F32.A DEPRESSION: Chronic | Status: ACTIVE | Noted: 2022-07-02

## 2022-07-02 PROBLEM — K75.81 LIVER CIRRHOSIS SECONDARY TO NASH (NONALCOHOLIC STEATOHEPATITIS) (HCC): Chronic | Status: ACTIVE | Noted: 2020-09-13

## 2022-07-02 LAB
ANION GAP SERPL CALCULATED.3IONS-SCNC: 3 MMOL/L (ref 4–13)
ATRIAL RATE: 68 BPM
BUN SERPL-MCNC: 34 MG/DL (ref 5–25)
CALCIUM SERPL-MCNC: 8.3 MG/DL (ref 8.3–10.1)
CARDIAC TROPONIN I PNL SERPL HS: 187 NG/L (ref 8–18)
CHLORIDE SERPL-SCNC: 108 MMOL/L (ref 100–108)
CO2 SERPL-SCNC: 27 MMOL/L (ref 21–32)
CREAT SERPL-MCNC: 1.11 MG/DL (ref 0.6–1.3)
ERYTHROCYTE [DISTWIDTH] IN BLOOD BY AUTOMATED COUNT: 15.5 % (ref 11.6–15.1)
GFR SERPL CREATININE-BSD FRML MDRD: 48 ML/MIN/1.73SQ M
GLUCOSE SERPL-MCNC: 255 MG/DL (ref 65–140)
GLUCOSE SERPL-MCNC: 263 MG/DL (ref 65–140)
GLUCOSE SERPL-MCNC: 277 MG/DL (ref 65–140)
GLUCOSE SERPL-MCNC: 312 MG/DL (ref 65–140)
GLUCOSE SERPL-MCNC: 322 MG/DL (ref 65–140)
GLUCOSE SERPL-MCNC: 325 MG/DL (ref 65–140)
GLUCOSE SERPL-MCNC: 326 MG/DL (ref 65–140)
HCT VFR BLD AUTO: 25.6 % (ref 34.8–46.1)
HGB BLD-MCNC: 7.6 G/DL (ref 11.5–15.4)
MCH RBC QN AUTO: 26.8 PG (ref 26.8–34.3)
MCHC RBC AUTO-ENTMCNC: 29.7 G/DL (ref 31.4–37.4)
MCV RBC AUTO: 90 FL (ref 82–98)
P AXIS: 74 DEGREES
PLATELET # BLD AUTO: 57 THOUSANDS/UL (ref 149–390)
PMV BLD AUTO: 13.4 FL (ref 8.9–12.7)
POTASSIUM SERPL-SCNC: 5 MMOL/L (ref 3.5–5.3)
PR INTERVAL: 0 MS
QRS AXIS: -39 DEGREES
QRSD INTERVAL: 88 MS
QT INTERVAL: 425 MS
QTC INTERVAL: 452 MS
RBC # BLD AUTO: 2.84 MILLION/UL (ref 3.81–5.12)
SODIUM SERPL-SCNC: 138 MMOL/L (ref 136–145)
T WAVE AXIS: 43 DEGREES
VENTRICULAR RATE: 68 BPM
WBC # BLD AUTO: 3.21 THOUSAND/UL (ref 4.31–10.16)

## 2022-07-02 PROCEDURE — 99233 SBSQ HOSP IP/OBS HIGH 50: CPT | Performed by: EMERGENCY MEDICINE

## 2022-07-02 PROCEDURE — 85025 COMPLETE CBC W/AUTO DIFF WBC: CPT

## 2022-07-02 PROCEDURE — 80053 COMPREHEN METABOLIC PANEL: CPT

## 2022-07-02 PROCEDURE — 80048 BASIC METABOLIC PNL TOTAL CA: CPT | Performed by: PHYSICIAN ASSISTANT

## 2022-07-02 PROCEDURE — 85027 COMPLETE CBC AUTOMATED: CPT | Performed by: PHYSICIAN ASSISTANT

## 2022-07-02 PROCEDURE — 82948 REAGENT STRIP/BLOOD GLUCOSE: CPT

## 2022-07-02 PROCEDURE — 99024 POSTOP FOLLOW-UP VISIT: CPT | Performed by: INTERNAL MEDICINE

## 2022-07-02 PROCEDURE — 84484 ASSAY OF TROPONIN QUANT: CPT | Performed by: SURGERY

## 2022-07-02 PROCEDURE — 93010 ELECTROCARDIOGRAM REPORT: CPT | Performed by: INTERNAL MEDICINE

## 2022-07-02 PROCEDURE — NC001 PR NO CHARGE: Performed by: PHYSICIAN ASSISTANT

## 2022-07-02 PROCEDURE — 93005 ELECTROCARDIOGRAM TRACING: CPT

## 2022-07-02 RX ORDER — INSULIN GLARGINE 100 [IU]/ML
30 INJECTION, SOLUTION SUBCUTANEOUS EVERY 12 HOURS SCHEDULED
Status: DISCONTINUED | OUTPATIENT
Start: 2022-07-02 | End: 2022-07-05 | Stop reason: HOSPADM

## 2022-07-02 RX ORDER — ALBUMIN, HUMAN INJ 5% 5 %
12.5 SOLUTION INTRAVENOUS ONCE
Status: COMPLETED | OUTPATIENT
Start: 2022-07-02 | End: 2022-07-03

## 2022-07-02 RX ORDER — HYDROMORPHONE HCL IN WATER/PF 6 MG/30 ML
0.2 PATIENT CONTROLLED ANALGESIA SYRINGE INTRAVENOUS EVERY 4 HOURS PRN
Status: DISCONTINUED | OUTPATIENT
Start: 2022-07-02 | End: 2022-07-05 | Stop reason: HOSPADM

## 2022-07-02 RX ORDER — INSULIN LISPRO 100 [IU]/ML
1-5 INJECTION, SOLUTION INTRAVENOUS; SUBCUTANEOUS
Status: DISCONTINUED | OUTPATIENT
Start: 2022-07-02 | End: 2022-07-02

## 2022-07-02 RX ORDER — INSULIN LISPRO 100 [IU]/ML
2-12 INJECTION, SOLUTION INTRAVENOUS; SUBCUTANEOUS EVERY 6 HOURS SCHEDULED
Status: DISCONTINUED | OUTPATIENT
Start: 2022-07-03 | End: 2022-07-04

## 2022-07-02 RX ORDER — ENOXAPARIN SODIUM 100 MG/ML
40 INJECTION SUBCUTANEOUS
Status: DISCONTINUED | OUTPATIENT
Start: 2022-07-02 | End: 2022-07-04

## 2022-07-02 RX ORDER — LANOLIN ALCOHOL/MO/W.PET/CERES
6 CREAM (GRAM) TOPICAL
Status: DISCONTINUED | OUTPATIENT
Start: 2022-07-02 | End: 2022-07-05 | Stop reason: HOSPADM

## 2022-07-02 RX ADMIN — ASPIRIN 81 MG CHEWABLE TABLET 81 MG: 81 TABLET CHEWABLE at 08:11

## 2022-07-02 RX ADMIN — ENOXAPARIN SODIUM 40 MG: 40 INJECTION SUBCUTANEOUS at 14:09

## 2022-07-02 RX ADMIN — LACTULOSE 10 G: 20 SOLUTION ORAL at 08:13

## 2022-07-02 RX ADMIN — ONDANSETRON 4 MG: 2 INJECTION INTRAMUSCULAR; INTRAVENOUS at 02:41

## 2022-07-02 RX ADMIN — DOCUSATE SODIUM 100 MG: 100 CAPSULE, LIQUID FILLED ORAL at 17:14

## 2022-07-02 RX ADMIN — HEPARIN SODIUM 5000 UNITS: 5000 INJECTION INTRAVENOUS; SUBCUTANEOUS at 05:10

## 2022-07-02 RX ADMIN — RIFAXIMIN 550 MG: 550 TABLET ORAL at 08:14

## 2022-07-02 RX ADMIN — INSULIN LISPRO 8 UNITS: 100 INJECTION, SOLUTION INTRAVENOUS; SUBCUTANEOUS at 23:43

## 2022-07-02 RX ADMIN — INSULIN GLARGINE 30 UNITS: 100 INJECTION, SOLUTION SUBCUTANEOUS at 21:20

## 2022-07-02 RX ADMIN — INSULIN GLARGINE 30 UNITS: 100 INJECTION, SOLUTION SUBCUTANEOUS at 08:12

## 2022-07-02 RX ADMIN — INSULIN LISPRO 4 UNITS: 100 INJECTION, SOLUTION INTRAVENOUS; SUBCUTANEOUS at 12:02

## 2022-07-02 RX ADMIN — INSULIN LISPRO 3 UNITS: 100 INJECTION, SOLUTION INTRAVENOUS; SUBCUTANEOUS at 08:13

## 2022-07-02 RX ADMIN — PANTOPRAZOLE SODIUM 40 MG: 40 TABLET, DELAYED RELEASE ORAL at 05:10

## 2022-07-02 RX ADMIN — OXYCODONE HYDROCHLORIDE 2.5 MG: 5 TABLET ORAL at 02:35

## 2022-07-02 RX ADMIN — INSULIN LISPRO 5 UNITS: 100 INJECTION, SOLUTION INTRAVENOUS; SUBCUTANEOUS at 21:20

## 2022-07-02 RX ADMIN — OXYCODONE HYDROCHLORIDE 5 MG: 5 TABLET ORAL at 10:10

## 2022-07-02 RX ADMIN — ESCITALOPRAM 5 MG: 5 TABLET, FILM COATED ORAL at 08:12

## 2022-07-02 RX ADMIN — LIDOCAINE 5% 1 PATCH: 700 PATCH TOPICAL at 08:14

## 2022-07-02 RX ADMIN — OXYCODONE HYDROCHLORIDE 5 MG: 5 TABLET ORAL at 21:04

## 2022-07-02 RX ADMIN — INSULIN LISPRO 5 UNITS: 100 INJECTION, SOLUTION INTRAVENOUS; SUBCUTANEOUS at 16:38

## 2022-07-02 RX ADMIN — SENNOSIDES AND DOCUSATE SODIUM 1 TABLET: 8.6; 5 TABLET ORAL at 21:04

## 2022-07-02 RX ADMIN — LACTULOSE 10 G: 20 SOLUTION ORAL at 17:14

## 2022-07-02 RX ADMIN — DOCUSATE SODIUM 100 MG: 100 CAPSULE, LIQUID FILLED ORAL at 08:12

## 2022-07-02 RX ADMIN — PRAVASTATIN SODIUM 80 MG: 80 TABLET ORAL at 17:14

## 2022-07-02 RX ADMIN — HYDROMORPHONE HYDROCHLORIDE 0.2 MG: 0.2 INJECTION, SOLUTION INTRAMUSCULAR; INTRAVENOUS; SUBCUTANEOUS at 02:35

## 2022-07-02 RX ADMIN — Medication 6 MG: at 21:03

## 2022-07-02 RX ADMIN — OXYCODONE HYDROCHLORIDE 5 MG: 5 TABLET ORAL at 16:57

## 2022-07-02 RX ADMIN — LEVOTHYROXINE SODIUM 112 MCG: 112 TABLET ORAL at 05:10

## 2022-07-02 RX ADMIN — RIFAXIMIN 550 MG: 550 TABLET ORAL at 21:03

## 2022-07-02 NOTE — ASSESSMENT & PLAN NOTE
· Requiring vasopressors and TVP  · S/p Biventricular PPM 6/29  · Levophed has been weaned to off   · Continue telemetry monitoring   · Will need 2 week device check with EP

## 2022-07-02 NOTE — PROGRESS NOTES
Cardiology Progress Note - Manish Molina 77 y o  female MRN: [id number]    Unit/Bed#: ICU 07 Encounter: [id number]      Assessment:  Principal Problem:    Complete heart block ([address] )      Plan:  Patient comfortable at the present time  Apparently had some chest discomfort earlier in the setting of anxiety  Off intravenous pressor  Telemetry with appropriately functioning electronic pacemaker  Will likely check echocardiogram Monday to compared to prior study and assess for any improvement in prior wall motion abnormalities suggesting a stress myopathy  If no improvement patient will require an ischemic heart disease workup  BMP today with potassium of 5 0 and creatinine of 1 1  Will continue current cardiac regimen  Subjective:   Patient seen and examined  No significant events overnight   negative  Objective:     Vitals: Blood pressure 111/52, pulse 68, temperature 98 5 °F (36 9 °C), temperature source Oral, resp  rate 19, height 5' 3" (1 6 m), weight 90 6 kg (199 lb 12 5 oz), SpO2 95 %, not currently breastfeeding , Body mass index is 35 39 kg/m² ,   Orthostatic Blood Pressures    Flowsheet Row Most Recent Value   Blood Pressure 111/52 filed at 07/02/2022 0500   Patient Position - Orthostatic VS Lying filed at 07/02/2022 0400      ,      Intake/Output Summary (Last 24 hours) at 7/2/2022 0734  Last data filed at 7/2/2022 0444  Gross per 24 hour   Intake 911 78 ml   Output 975 ml   Net -63 22 ml       No significant arrhythmias seen on telemetry review         Physical Exam:    GEN: Miriam Rogers   NECK: supple, no carotid bruits, no JVD or HJR  HEART: normal rate, regular rhythm, normal S1 and S2, no murmurs, clicks, gallops or rubs   LUNGS: clear to auscultation bilaterally; no wheezes, rales, or rhonchi   ABDOMEN: normal bowel sounds, soft, no tenderness, no distention  EXTREMITIES: peripheral pulses normal; no clubbing, cyanosis, or edema  SKIN: warm and well perfused, no suspicious lesions on exposed skin    Labs & Results:    No results displayed because visit has over 200 results  CT abdomen pelvis wo contrast    Result Date: 6/11/2022  Narrative: CT ABDOMEN AND PELVIS WITHOUT IV CONTRAST INDICATION:   K80 83: Nonalcoholic steatohepatitis (PAUL) K74 60: Unspecified cirrhosis of liver R10 30: Lower abdominal pain, unspecified  COMPARISON:  CT abdomen pelvis 11/18/2005 TECHNIQUE:  CT examination of the abdomen and pelvis was performed without intravenous contrast  This examination was performed without intravenous contrast in the context of the critical nationwide Omnipaque shortage  Axial, sagittal, and coronal 2D reformatted images were created from the source data and submitted for interpretation  Radiation dose length product (DLP) for this visit:  1211 mGy-cm   This examination, like all CT scans performed in the [facility], was performed utilizing techniques to minimize radiation dose exposure, including the use of iterative reconstruction and automated exposure control  Enteric contrast was administered  FINDINGS: ABDOMEN LOWER CHEST:  Partially imaged cardiomegaly and mitral/aortic annulus calcifications  Clear lung bases  LIVER/BILIARY TREE:  The liver demonstrates cirrhotic morphology  Within the limitations of this examination there is no evidence of suspicious hepatic mass  No biliary dilatation  GALLBLADDER:  Gallbladder is surgically absent  SPLEEN:  The spleen is enlarged, measuring 15 7 cm in craniocaudal length PANCREAS:  Unremarkable  ADRENAL GLANDS:  Unremarkable  KIDNEYS/URETERS:  2 8 cm left lower pole  cyst with thin calcified septations  No hydronephrosis  STOMACH AND BOWEL: Uncomplicated, debris-filled duodenal diverticulum  There is colonic diverticulosis without evidence of acute diverticulitis  APPENDIX:  Noninflamed  ABDOMINOPELVIC CAVITY:  No ascites  No pneumoperitoneum  No lymphadenopathy   VESSELS:  Diffuse mesenteric edema and mesenteric vascular engorgement indicating portal hypertension  PELVIS REPRODUCTIVE ORGANS:  Unremarkable for patient's age  URINARY BLADDER:  Unremarkable  ABDOMINAL WALL/INGUINAL REGIONS:  Body wall edema in keeping with anasarca  No collections  OSSEOUS STRUCTURES:  No acute fracture or destructive osseous lesion  Impression: No acute inflammatory findings in the abdomen or pelvis  Hepatic cirrhosis; splenomegaly and mesenteric edema/vascular engorgement indicate portal hypertension  2 8 cm slightly complicated left lower pole renal cyst  Follow-up with renal ultrasound on a nonemergent basis  The study was marked in EPIC for significant notification  Workstation performed: [id number]     XR chest portable    Result Date: 6/30/2022  Narrative: CHEST INDICATION:   Patient s/p Pacemaker/ICD Insertion  COMPARISON:  Previous day EXAM PERFORMED/VIEWS:  XR CHEST PORTABLE FINDINGS: Left dual-lead pacemaker is in satisfactory position  Left internal jugular catheter tip terminates in the superior vena cava  Cardiomediastinal silhouette appears unremarkable  The lungs are clear  No pneumothorax or pleural effusion  Osseous structures appear within normal limits for patient age  Impression: Left pacemaker in satisfactory position  Workstation performed: [id number]     XR Trauma chest portable    Result Date: 6/29/2022  Narrative: CHEST INDICATION:  TRAUMA  Fall  COMPARISON:  9/13/2020 EXAM PERFORMED/VIEWS:  XR CHEST PORTABLE  AP supine FINDINGS: Cardiomediastinal silhouette appears unremarkable  The lungs are clear  No pneumothorax or pleural effusion  Degenerative changes of the spine  Impression: No acute cardiopulmonary disease  Workstation performed: [id number]     XR shoulder 1 vw left    Result Date: 6/29/2022  Narrative: LEFT SHOULDER INDICATION:   Evaluation of fracture  77 y o  right hand dominant female status post fall complaining of left shoulder pain   She states she slipped and fell on grass and had immediate shoulder pain  She has no numbness or tingling of the left upper extremity, but does have pain with shoulder  ROM  She was transferred from [facility] for her shoulder and further evaluation of her complete heart block  She ambulates using a scooter  The patient's entire past medical history was obtained directly from either the most recent orthopedic surgery notes in [facility]  The information was copied and pasted directly from Epic  COMPARISON:  Shoulder radiographs performed earlier in the morning  VIEWS:  XR SHOULDER 1 VW LEFT Images: 1 FINDINGS: No significant change in the displaced, impacted left humeral head/neck fracture  Degenerative changes within the shoulder and acromioclavicular joint  No lytic or blastic osseous lesion  Soft tissue swelling overlying the shoulder joint proper  Impression: No significant change in proximal left humeral fracture  Continued orthopedic surgery follow-up recommended  Workstation performed: [id number]     XR shoulder 2+ views LEFT    Result Date: 6/29/2022  Narrative: LEFT SHOULDER INDICATION:   injury  COMPARISON:  CT chest abdomen pelvis 6/29/2022 VIEWS:  XR SHOULDER 2+ VW LEFT FINDINGS: Again noted displaced comminuted fracture of the humeral head/neck  Mild osteoarthritis acromioclavicular joint  No lytic or blastic osseous lesion  Soft tissues are unremarkable  Impression: Again noted displaced comminuted fracture of the humeral head/neck  Workstation performed: [id number]     XR humerus left    Result Date: 6/29/2022  Narrative: LEFT HUMERUS INDICATION:   Evaluation of fracture joint above and below  77 y o  right hand dominant female status post fall complaining of left shoulder pain  She states she slipped and fell on grass and had immediate shoulder pain  She has no numbness or tingling of the left upper extremity, but does have pain with shoulder  ROM   She was transferred from [facility] for her shoulder and further evaluation of her complete heart block  She ambulates using a scooter  The patient's entire past medical history was obtained directly from the most recent orthopedic surgery notes in [facility]  The information was copied and pasted directly from Epic  COMPARISON:  Shoulder radiographs performed earlier in the morning  VIEWS:  XR HUMERUS LEFT Images: 2 FINDINGS: Stable appearance of the left humeral head/neck fracture, however study limited by positioning  Remainder of the osseous structures appear intact  Degenerative changes in the shoulder  Left elbow not well evaluated due to patient obliquity  No lytic or blastic osseous lesion  Soft tissues are unremarkable  Impression: 1  Stable appearance of the proximal left humerus fracture, however study limited by positioning  2   Limited evaluation of the elbow due to patient obliquity  If there is continued concern for elbow fracture, consider dedicated, repeat examination of the elbow  Workstation performed: [id number]     TRAUMA - CT head wo contrast    Result Date: 6/29/2022  Narrative: CT BRAIN - WITHOUT CONTRAST INDICATION:   TRAUMA  COMPARISON:  CT of the head on September 13, 2020  TECHNIQUE:  CT examination of the brain was performed  In addition to axial images, sagittal and coronal 2D reformatted images were created and submitted for interpretation  Radiation dose length product (DLP) for this visit:  867 18 mGy-cm   This examination, like all CT scans performed in the [facility], was performed utilizing techniques to minimize radiation dose exposure, including the use of iterative  reconstruction and automated exposure control  IMAGE QUALITY:  Diagnostic  FINDINGS: PARENCHYMA:  No intracranial mass, mass effect or midline shift  No CT signs of acute infarction  No acute parenchymal hemorrhage  VENTRICLES AND EXTRA-AXIAL SPACES:  Normal for the patient's age  VISUALIZED ORBITS AND PARANASAL SINUSES:  Bilateral ocular lens replacements   CALVARIUM AND EXTRACRANIAL SOFT TISSUES:  Normal      Impression: No intracranial hemorrhage or calvarial fracture  Workstation performed: [id number]     TRAUMA - CT spine cervical wo contrast    Result Date: 6/29/2022  Narrative: CT CERVICAL SPINE - WITHOUT CONTRAST INDICATION:   TRAUMA  COMPARISON:  None  TECHNIQUE:  CT examination of the cervical spine was performed without intravenous contrast   Contiguous axial images were obtained  Sagittal and coronal reconstructions were performed  Radiation dose length product (DLP) for this visit:  437 mGy-cm   This examination, like all CT scans performed in the [facility], was performed utilizing techniques to minimize radiation dose exposure, including the use of iterative reconstruction and automated exposure control  IMAGE QUALITY:  Diagnostic  FINDINGS: ALIGNMENT:  No significant subluxation  VERTEBRAL BODIES:  No acute fracture  Schmorl's node at the inferior endplate of C6  DEGENERATIVE CHANGES:  Moderate multilevel cervical degenerative changes are noted  Disc space narrowing is most pronounced at C3-C4, C4-C5, and C6-C7 where there is mild bony spinal canal narrowing  PREVERTEBRAL AND PARASPINAL SOFT TISSUES:  Calcifications at the carotid bifurcations  THORACIC INLET:  Please refer to the concurrent chest, abdomen, and pelvic CT report for description of the thoracic inlet findings  Impression: No cervical spine fracture or traumatic malalignment  Workstation performed: [id number]     XR Trauma pelvis ap only 1 or 2 vw    Result Date: 6/29/2022  Narrative: PELVIS INDICATION:   TRAUMA  Fall  COMPARISON:  CT abdomen and pelvis 6/8/2022 VIEWS:  XR PELVIS AP ONLY 1 VW FINDINGS: No acute fracture or hip dislocation  Mild bilateral hip osteoarthritis  No lytic or blastic osseous lesion  Soft tissues are unremarkable  Degenerative changes pubic symphysis and visualized lower lumbar spine  Impression: No acute osseous abnormality   Followup imaging may be obtained for any persistent or worsening symptoms  Workstation performed: [id number]     TRAUMA - CT chest abdomen pelvis w contrast    Addendum Date: 6/29/2022 Addendum:   ADDENDUM: There is an error in the impression of the report  Number 1 of the impression should state: Comminuted and displaced fracture of the left HUMERAL head/neck  Result Date: 6/29/2022  Narrative: CT CHEST, ABDOMEN AND PELVIS WITH IV CONTRAST INDICATION:   TRAUMA  COMPARISON:  CT of abdomen pelvis on June 8, 2022  TECHNIQUE: CT examination of the chest, abdomen and pelvis was performed  Axial, sagittal, and coronal 2D reformatted images were created from the source data and submitted for interpretation  Radiation dose length product (DLP) for this visit:  1452 46 mGy-cm   This examination, like all CT scans performed in the [facility], was performed utilizing techniques to minimize radiation dose exposure, including the use of iterative reconstruction and automated exposure control  IV Contrast:  65 mL of iohexol (OMNIPAQUE) Enteric Contrast: Enteric contrast was not administered  FINDINGS: CHEST LUNGS:  Lungs are clear  There is no tracheal or endobronchial lesion  PLEURA:  Unremarkable  HEART/GREAT VESSELS: Coronary artery calcifications  Mitral annular calcification  No thoracic aortic aneurysm  MEDIASTINUM AND BRIGID:  Unremarkable  CHEST WALL AND LOWER NECK:  Unremarkable  ABDOMEN LIVER/BILIARY TREE:  Cirrhotic morphology of the liver  GALLBLADDER:  Gallbladder is surgically absent  SPLEEN:  Splenomegaly measuring 15 5 cm in craniocaudal dimension, not significantly changed  PANCREAS:  Atrophic  ADRENAL GLANDS:  Unremarkable  KIDNEYS/URETERS:  Stable 2 8 cm left renal cyst with thin peripheral calcification  No hydronephrosis  STOMACH AND BOWEL:  3 2 cm duodenal diverticulum  There is mild diffuse thickening of the 2nd and 3rd portions of the duodenum  No bowel obstruction    Colonic diverticulosis without evidence of diverticulitis  Mild diffuse thickening of the ascending colon  APPENDIX:  A normal appendix was visualized  ABDOMINOPELVIC CAVITY:  No significant ascites  No pneumoperitoneum  Periportal lymph nodes measure up to 1 4 cm in short axis, stable  Mesenteric edema similar to prior exam  VESSELS:  Unremarkable for patient's age  PELVIS REPRODUCTIVE ORGANS:  Unremarkable for patient's age  URINARY BLADDER:  Unremarkable  ABDOMINAL WALL/INGUINAL REGIONS:  Mild subcutaneous edema  Skin thickening of the anterior abdominal wall pannus similar to prior exam  OSSEOUS STRUCTURES:  Comminuted and displaced fracture of the left humeral head/neck  Impression: 1  Comminuted and displaced fracture of the left femoral head/neck  2   No acute traumatic visceral injury identified within the chest, abdomen, or pelvis  3   Cirrhosis of the liver with stigmata of portal hypertension and splenomegaly  4   Mild diffuse thickening of the 2nd and 3rd portions of the duodenum, which may be due to duodenitis  5   Mild diffuse thickening of the ascending colon may be due to portal hypertensive colopathy  The study was marked in [facility] for immediate notification  Workstation performed: [id number]     Cardiac ep lab eps/ablations    Result Date: 2022  Narrative: ELECTROPHYSIOLOGY OPERATIVE REPORT PATIENT NAME: Jenn Kunz :  1945 MRN: [id number] Date of surgery: 22 Surgeon: Maine Perason MD Pt Location: Cath Lab PROCEDURE PERFORMED: 1)Dual Chamber Permanent Pacemaker Implantation- HIS/Left bundle pacing w deep septal lead to preserve narrow QRS Preoperative Medications: Vancomycin ANESTHESIA: Conscious sedation PREOPERATIVE DIAGNOSIS: Complete heart block POSTOPERATIVE DIAGNOSIS: Successful Dual Chamber Permanent Pacemaker implant  Same as Preop  Informed Consent: Risks, benefits, and alternatives discussed with patient and any family present   The patient understands risks, which include but are not limited to life threatening  bleeding, infection, air around lungs, blood around the heart and reoperation dislodged or malfunctioning device  Procedure Description: After informed consent was obtained, the patient was brought to the electrophysiology laboratory NPO  A time out was called and the patient was properly identified  The patient was pre-medicated as above  The left infraclavicular area was prepped and draped in the usual sterile fashion  After local anesthetic infiltration with 1% lidocaine, an incision was made below clavicle  The incision was extended down to the level of the pectoral fascia  A pocket was formed above the pectoral fascia using cautery and blunt dissection  Venous access was done with needle puncture using Seldinger technique in the axillary vein  A safe sheath introducer was advanced over a wire into the axillary vein, the wire was confirmed to be in the right atrium on flouroscopy, the wire was removed  Under fluoroscopic guidance the right atrial lead was placed in the right atrial appendage using a passive atrial lead, tissue contact was confirmed and good lead parameters were seen, the Safe-sheath was removed and the lead was tied down with 2-0 Ethibond sutures to the muscle  Under fluoroscopic guidance the right ventricular lead was positioned on the right ventricular septum basal septum, delivered with Medtronic sheath and screwed in deeply to to capture the left bunlde with narrow QRS with right bundle branch block pattern in V1,  we confirmed it to be intraseptal pacing with relatively high impedence and good threshold and sensing with unipolar pacing  After satisfactory ventricular sensing and pacing thresholds were confirmed, the lead was sewn to the pectoral fascia/muscle using 2-0 Ethibond sutures  Temporary pacemaker removed under fluoroscopy  A Infinity Augmented Reality[employer]onic Absorbable Antibiotic pouch was used  The lead and pulse generator were placed in the pre-pectoral pocket   The pocket was then closed with 3 layers of 2-0, 3-0, 4-0 -Vicryl suture was used to close the skin  EBL: Minimal Complications: None Contrast:none Findings: The patient tolerated the procedure well  Plan: Routine postoperative care, CXR  Follow-up in 2 weeks with incision check and interrogation  XR chest portable ICU    Result Date: 6/30/2022  Narrative: CHEST INDICATION:   post procedure cxr  COMPARISON:  Chest radiograph June 30, 2022 at 00:42 EXAM PERFORMED/VIEWS:  XR CHEST PORTABLE ICU FINDINGS:  Left-sided chest wall intracardiac device is identified  Leads are intact  Tip of left internal jugular central venous catheter projects over the superior cavoatrial junction  Cardiomediastinal silhouette appears unremarkable  The lungs are clear  No pneumothorax or pleural effusion  Left humeral head fracture is similar in appearance to prior study  Impression: No focal consolidation, pleural effusion, or pneumothorax  Workstation performed: [id number]     XR chest portable ICU    Result Date: 6/29/2022  Narrative: CHEST INDICATION:   central line placement  COMPARISON:  6/29/2022 at 9:40 AM EXAM PERFORMED/VIEWS:  XR CHEST PORTABLE ICU  3:37 PM FINDINGS:  Lung volumes are within normal limits  Lungs are clear  No effusion or pneumothorax  Heart, mediastinal and hilar structures are within normal limits for age and technique  Right IJ transvenous pacer in the right ventricle  New left IJ central venous catheter at the cavoatrial junction  No acute osseous or soft tissue pathology  Impression: New central venous catheter in standard position  No acute cardiopulmonary findings  Workstation performed: [id number]     XR chest portable ICU    Result Date: 6/29/2022  Narrative: CHEST INDICATION:   post pacer wire cordis placement  COMPARISON:  Chest radiograph and CT from 6/29/2022  EXAM PERFORMED/VIEWS:  XR CHEST PORTABLE ICU FINDINGS:  Right jugular transvenous pacemaker in right ventricle   Cardiomediastinal silhouette appears unremarkable  The lungs are clear  No pneumothorax or pleural effusion  Osseous structures appear within normal limits for patient age  Impression: Right jugular transvenous pacemaker in right ventricle with no pneumothorax  Workstation performed: [id number]     [address] [facility] [address] [facility] [address] bedside procedure    Result Date: 6/29/2022  Narrative: 1 2 [id number] [id number]  2 446 4649 [id number] 42 1    Echo complete w/ contrast if indicated    Result Date: 6/29/2022  Narrative: [employer]  Left Ventricle: Left ventricular cavity size is moderately dilated  Wall thickness is normal  There is eccentric hypertrophy  The left ventricular ejection fraction is 55%  Systolic function is normal  Diastolic function is abnormal    Left Atrium: The atrium is mildly dilated    Right Atrium: The atrium is mildly dilated    Mitral Valve: There is moderate annular calcification  The annulus is severely dilated  There is mild regurgitation    Tricuspid Valve: There is mild regurgitation    The following segments are akinetic: apical anterior, apical septal, apical inferior, apical lateral and apex    The following segments are hyperkinetic: basal anterior, basal anteroseptal, basal inferoseptal, basal inferior, basal inferolateral, basal anterolateral, mid anterior, mid anteroseptal, mid inferoseptal, mid inferior, mid inferolateral and mid anterolateral    Right Ventricle: Systolic function is moderately reduced  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  Patient in sinus rhythm with complete heart block and junctional escape with associated calcific changes of the aortic root and mitral annulus  Echo follow up/limited w/ contrast if indicated    Result Date: 6/29/2022  Narrative: [employer]  Left Ventricle: Left ventricular cavity size is normal  The left ventricular ejection fraction is 55%  Systolic function is normal    IVS: There is abnormal septal motion consistent with right ventricular pacing    Right Ventricle: A pacer wire is present    Mitral Valve: There is severe annular calcification    Tricuspid Valve: There is mild to moderate regurgitation  The tricuspid valve regurgitation jet is central  The right ventricular systolic pressure is moderately to severely elevated  The estimated right ventricular systolic pressure is 02 26 mmHg    Left Atrium: The atrium is mildly dilated    The following segments are akinetic: apical anterior, apical septal, apical inferior, apical lateral and apex    The following segments are hyperkinetic: basal anterior, basal anteroseptal, basal inferoseptal, basal inferior, basal inferolateral, basal anterolateral, mid anterior, mid anteroseptal, mid inferoseptal, mid inferior, mid inferolateral and mid anterolateral    Pericardium: There is no pericardial effusion  The pericardium is normal in appearance  EKG personally reviewed by Henrietta James MD      Counseling / Coordination of Care  Total floor / unit time spent today 30 minutes  Greater than 50% of total time was spent with the patient and / or family counseling and / or coordination of care

## 2022-07-02 NOTE — PROGRESS NOTES
Progress Note - Surgical Critical Care   Liliya Darling 77 y o  female MRN: [id number]  Unit/Bed#: ICU 07 Encounter: [id number]        ----------------------------------------------------------------------------------------  24hr events: Weaned off of levophed since 18:00 yesterday evening      ---------------------------------------------------------------------------------------  SUBJECTIVE  Patient anxious upon exam this morning  C/o chest pain       Review of Systems  Review of systems was reviewed and negative unless stated above in HPI/24-hour events   ---------------------------------------------------------------------------------------  Assessment and Plan:    Neuro:   Diagnosis: acute pain due to trauma  Plan:   Continue current analgesic regimen  Tylenol 975 mg PO Q8hr  Oxycodone 2 5 mg IV Q6hr PRN for moderate pain   Oxycodone 5 mg IV Q6hr PRN for severe pain   Dilaudid 0 2 mg IV Q3hr PRN for breakthrough pain   Delirium Precautions  Plan: CAM ICU, regulate sleep-wake cycle, avoid deliriogenic medications      CV:   Diagnosis: complete heart block  S/p TVP and subsequent BiV PPM placement (6/29/22)  Plan:   EP consulted  Continue telemetry monitoring  Will require 2 week device check per EP  Diagnosis: hypotension (improved)  Plan:   Vasopressors weaned off since 18:00 yesterday evening  Maintain MAP >65  Diagnosis: elevated troponin  Plan:   Possible type II MI  Eventual outpatient f/u with cardiology for ischemic workup  Repeat ECG this morning unremarkable  Diagnosis: new-onset cardiomyopathy  TTE 6/29/22 showed ED of 55%, multiple regional wall motion abnormalities  Possibly stress-induced cardiomyopathy  No evidence of heart failure  Plan:   Cardiology following, appreciate recs  Outpatient f/u with cardiology  Diagnosis: HLD  Plan:   Continue pravastatin      Pulm:  No active issues   Plan:   Maintain SpO2 >92%      GI/HPB:   Diagnosis: PAUL  H/o hepatic encephalopathy as well as varices without bleeding  Plan:   Continue rifaximin, lactulose, PPI  Holding home nadolol and spironolactone -- consider resuming in next 24-48 hours if hemodynamics continue to improve  Outpatient f/u with GI for surveillance  GI PPx: protonix 40 mg PO daily  Bowel regimen: colace, senokot, miralax      :   Diagnosis: urinary retention (improving)  Plan:   Voiding spontaneously now  Holding home lasix 40 mg daily i/s/o hypotension -- would consider resuming given improved hemodynamics  Monitor UOP  Diagnosis: CLYDE (improved)  Plan:   Creatinine peaked at 2 19 upon presentation  Has been steadily improving -- now 1 11 (from 1 19)      F/E/N:   F: none  E: monitor and replete electrolytes PRN   N: CCD2      Heme/Onc:   Diagnosis: anemia  Likely 2/2 blood loss, dilutional  Plan:   Hemoglobin stable between 8-9 g/dL  Continue to monitor daily  DVT PPx: SQH 5000u TID, SCDs      Endo:   Diagnosis: DMII with long-term insulin use  Plan:   Home regimen: janumet, basaglar, glimepiride  Hyperglycemic on lantus 20u BID plus SSI  Increase lantus to 30 units BID  Increase SSI to algorithm 3  Diagnosis: hypothyroidism  Plan:   Continue home levothyroxine      ID:   No active issues   Plan:   Monitor daily WBC      MSK/Skin:   Diagnosis: L proximal humerus fracture  Plan:   Orthopaedics consulted -- plan for non-operative management, CHARLEY BIRCH, sling for comfort      Psych:   Diagnosis: depression  Plan:   Continue lexapro 5 mg daily      Other:   Diagnosis: ambulatory dysfunction  Plan:   Geriatrics consulted  PMR consulted: good candidate for acute inpatient rehab once medically stable  PT/OT while inpatient      Disposition:  Can likely transfer out of ICU today  Code Status: Level 1 - Full Code  ---------------------------------------------------------------------------------------  ICU CORE MEASURES    Prophylaxis   VTE Pharmacologic Prophylaxis: Heparin  VTE Mechanical Prophylaxis: sequential compression device  Stress Ulcer Prophylaxis: Pantoprazole IV     ABCDE Protocol (if indicated)  Plan to perform spontaneous awakening trial today? Not applicable  Plan to perform spontaneous breathing trial today? Not applicable  Obvious barriers to extubation? Not applicable  CAM-ICU: Negative    Invasive Devices Review  Invasive Devices  Report      Central Venous Catheter Line  Duration             CVC Central Lines 22 Triple Left Internal jugular 2 days              Peripheral Intravenous Line  Duration             Peripheral IV 22 Right Hand 3 days    Peripheral IV 22 Dorsal (posterior); Left Hand 2 days              Line  Duration             Pacer Wires 2 days                  Can any invasive devices be discontinued today?  No (keep CVC for now)  ---------------------------------------------------------------------------------------  OBJECTIVE    Vitals   Vitals:    22 0400 22 0425 22 0449 22 0500   BP: (!) 87/43 (!) 94/45 127/60 111/52   BP Location: Right arm      Pulse: 74 70 70 68   Resp: 19 21 (!) 24 19   Temp: 98 5 °F (36 9 °C)      TempSrc: Oral      SpO2: 93% 94% 96% 95%   Weight:       Height:         Temp (24hrs), Av 6 °F (37 °C), Min:98 4 °F (36 9 °C), Max:98 8 °F (37 1 °C)  Current: Temperature: 98 5 °F (36 9 °C)    Physical Exam  Gen:  NAD, anxious  HENT: MMM  CV:  RRR, paced  Lungs: normal WOB on RA  Abd:  soft, NT/ND  : voiding  Ext:  no C/C/E, LUE in sling  Skin:  warm/dry, no rashes  Neuro: A&Ox4, GCS 15  Lines:  L IJ TLC    Respiratory:  SpO2: SpO2: 95 %       Invasive/non-invasive ventilation settings   Respiratory  Report     Lab Data (Last 4 hours)      None           O2/Vent Data (Last 4 hours)      None                    Laboratory and Diagnostics:  Results from last 7 days   Lab Units 22  0627 22  0449 22  1919 22  0411 22  0236 22  0028   WBC Thousand/uL 4 74 8 79 9 73 5 13  --  7 86   HEMOGLOBIN g/dL 8 0* 9 0* 8 9* 7 8*  --  9 0* I STAT HEMOGLOBIN g/dl  --   --   --   --  9 2*  --    HEMATOCRIT % 26 2* 28 4* 28 9* 25 9*  --  30 1*   HEMATOCRIT, ISTAT %  --   --   --   --  27*  --    PLATELETS Thousands/uL  --  119* 88* 67*  --  92*   NEUTROS PCT % 64 70 76* 81*  --  80*   MONOS PCT % 14* 14* 12 10  --  7     Results from last 7 days   Lab Units 07/02/22  0518 07/01/22  0627 06/30/22  1636 06/30/22  0449 06/29/22  1919 06/29/22  0411 06/29/22  0236 06/29/22  0226 06/29/22  0028   SODIUM mmol/L 138 142 139 141 139 138  --  134* 133*   POTASSIUM mmol/L 5 0 4 8 4 7 5 4* 5 1 4 8  --  4 7 5 7*   CHLORIDE mmol/L 108 111* 109* 112* 109* 107  --  103 102   CO2 mmol/L 27 26 24 21 21 21  --  25 23   CO2, I-STAT mmol/L  --   --   --   --   --   --  25  --   --    ANION GAP mmol/L 3* 5 6 8 9 10  --  6 8   BUN mg/dL 34* 38* 46* 51* 55* 56*  --  56* 51*   CREATININE mg/dL 1 11 1 19 1 40* 1 44* 1 70* 1 81*  --  2 08* 2 19*   CALCIUM mg/dL 8 3 8 8 8 7 9 0 8 8 9 3  --  9 2 9 0   GLUCOSE RANDOM mg/dL 255* 77 174* 114 219* 327*  --  351* 414*   ALT U/L  --   --   --  30 28  --   --   --  28   AST U/L  --   --   --  55* 33  --   --   --  28   ALK PHOS U/L  --   --   --  56 54  --   --   --  69   ALBUMIN g/dL  --   --  2 4* 2 6* 2 6*  --   --   --  3 0*   TOTAL BILIRUBIN mg/dL  --   --   --  1 30* 1 27*  --   --   --  0 70     Results from last 7 days   Lab Units 07/01/22  0627 06/30/22  1637 06/30/22  1636 06/30/22  0449 06/29/22  1919 06/29/22  0411   MAGNESIUM mg/dL 2 2 2 1  --  2 6 2 4 2 4   PHOSPHORUS mg/dL 2 6  --  2 9 3 3 3 5 2 9      Results from last 7 days   Lab Units 06/30/22  0449 06/29/22  0029   INR  1 33* 1 20*   PTT seconds  --  32          Results from last 7 days   Lab Units 06/30/22  0449 06/29/22  2205 06/29/22  1919 06/29/22  1111 06/29/22  0757 06/29/22  0040   LACTIC ACID mmol/L 1 2 2 1* 2 5* 3 9* 3 6* 3 0*     ABG:  Results from last 7 days   Lab Units 06/29/22  1111   PH ART  7 364   PCO2 ART mm Hg 34 6*   PO2 ART mm Hg 78 6   HCO3 ART mmol/L 19 3*   BASE EXC ART mmol/L -5 5   ABG SOURCE  Line, Arterial     VBG:  Results from last 7 days   Lab Units 06/29/22  1111 06/29/22  1007   PH SINDI   --  7 322   PCO2 SINDI mm Hg  --  40 0*   PO2 SINDI mm Hg  --  34 0*   HCO3 SINDI mmol/L  --  20 2*   BASE EXC SINDI mmol/L  --  -5 4   ABG SOURCE  Line, Arterial  --            Micro        EKG: I have personally reviewed pertinent reports  Imaging: I have personally reviewed pertinent reports  Intake and Output  I/O         06/30 0701 07/01 0700 07/01 0701 07/02 0700    P  O  720 720    I V  (mL/kg) 478 1 (5 3) 191 8 (2 1)    Total Intake(mL/kg) 1198 1 (13 2) 911 8 (10 1)    Urine (mL/kg/hr) 1600 (0 7) 600 (0 3)    Stool  0    Total Output 1600 600    Net -401 9 +311 8          Unmeasured Urine Occurrence  1 x    Unmeasured Stool Occurrence  1 x          UOP: 975 mL + 1x unmeasured / 24 hr     Height and Weights   Height: 5' 3" (160 cm)     Body mass index is 35 39 kg/m²    Weight (last 2 days)       Date/Time Weight    07/01/22 0543 90 6 (199 78)    06/30/22 0535 90 6 (199 74)              Nutrition       Diet Orders   (From admission, onward)                 Start     Ordered    06/30/22 0958  Diet Venkatesh/CHO Controlled; Consistent Carbohydrate Diet Level 2 (5 carb servings/75 grams CHO/meal)  Diet effective midnight        References:    Nutrtion Support Algorithm Enteral vs  Parenteral   Question Answer Comment   Diet Type Venkatesh/CHO Controlled    Venkatesh/CHO Controlled Consistent Carbohydrate Diet Level 2 (5 carb servings/75 grams CHO/meal)        06/30/22 0958                      Active Medications  Scheduled Meds:  Current Facility-Administered Medications   Medication Dose Route Frequency Provider Last Rate    acetaminophen  975 mg Oral [facility] Negin Buckley DO      aspirin  81 mg Oral Daily Roberth Hanna PA-C      docusate sodium  100 mg Oral BID Negin Buckley DO      escitalopram  5 mg Oral Daily Bee Milligan PA-C      heparin (porcine)  5,000 Units Subcutaneous Q8H Hayley Sierra MD      HYDROmorphone  0 2 mg Intravenous Q4H PRN Erven Payor, DO      insulin glargine  30 Units Subcutaneous Q12H [address] Alejandro Sargent MD      insulin lispro  1-5 Units Subcutaneous 4x Daily (AC & HS) Alejandro Sargent MD      lactulose  10 g Oral BID Cristy Rodriguez MD      levothyroxine  112 mcg Oral Early Morning Devra Goldmann, PA-C      lidocaine  1 patch Topical Daily Cristy Rodriguez MD      naloxone  0 04 mg Intravenous Q1MIN PRN Erven Payor, DO      norepinephrine  1-30 mcg/min Intravenous Titrated Devra Goldmann, PA-C Stopped (07/01/22 1800)    ondansetron  4 mg Intravenous Q4H PRN Erven Payor, DO      oxyCODONE  2 5 mg Oral Q4H PRN Erven Payor, DO      oxyCODONE  5 mg Oral Q4H PRN Erven Payor, DO      pantoprazole  40 mg Oral Early Morning Devra Goldmann, PA-C      polyethylene glycol  17 g Oral Daily PRN Erven Payor, DO      pravastatin  80 mg Oral Daily With Ping Winn PA-C      rifaximin  550 mg Oral Q12H [address] Cristy Rodriguez MD      senna-docusate sodium  1 tablet Oral HS Erven Payor, DO       Continuous Infusions:  norepinephrine, 1-30 mcg/min, Last Rate: Stopped (07/01/22 1800)    PRN Meds:   HYDROmorphone, 0 2 mg, Q4H PRN  naloxone, 0 04 mg, Q1MIN PRN  ondansetron, 4 mg, Q4H PRN  oxyCODONE, 2 5 mg, Q4H PRN  oxyCODONE, 5 mg, Q4H PRN  polyethylene glycol, 17 g, Daily PRN      Allergies   Allergies   Allergen Reactions    Bee Venom Swelling    Latex     Sesame Seed (Diagnostic) - Food Allergy      Other reaction(s): swelling inside mouth    Strawberry C [Ascorbate - Food Allergy] Other (See Comments)     Mouth sores    Penicillins Rash     ---------------------------------------------------------------------------------------  Advance Directive and Living Will:      Power of :    POLST:    ---------------------------------------------------------------------------------------      Alejandro Sargent MD      Portions of the record may have been created with voice recognition software  Occasional wrong word or "sound a like" substitutions may have occurred due to the inherent limitations of voice recognition software    Read the chart carefully and recognize, using context, where substitutions have occurred

## 2022-07-02 NOTE — ASSESSMENT & PLAN NOTE
· Orthopedics consulted - nonoperative management   · NV intact   · NWB sharad BIRCH for comfort   · Multimodal analgesia

## 2022-07-02 NOTE — ASSESSMENT & PLAN NOTE
Lab Results   Component Value Date    HGBA1C 8 2 (H) 05/31/2022       Recent Labs     07/01/22  1806 07/01/22  2157 07/02/22  0008 07/02/22  0759   POCGLU 279* 384* 325* 263*       Blood Sugar Average: Last 72 hrs:  (P) 596 [id number]     · Home regimen: Lantus 48 U AM and 36 U in PM, Amaryl, Sitagliptin-Metformin   · Current regimen: Lantus 30 U BID - increase back to home regimen as BG tolerates  · ISS increase as needed

## 2022-07-02 NOTE — ASSESSMENT & PLAN NOTE
· With hx of hepatic encephalopathy and gastric varices without bleeding   · Continue home rifaximin, lactulose and PPI   · Holding home nadolol with current normotension - restart as BP tolerates  · Holding home spironolactone - restart as hemodynamics improve   · Outpatient f/u with GI

## 2022-07-02 NOTE — ASSESSMENT & PLAN NOTE
· Dilated CM on echo with persevered EF 55%, apical hypokinesis concerning for stress-CM, no prior echo   · Cards plan for repeat echo on 7/4 to eval for improvement in wall motion abnormalities suggesting a stress CM  · If no improvement will need ischemic work-up

## 2022-07-02 NOTE — ASSESSMENT & PLAN NOTE
· Continue home lexapro 5 mg, per geriatrics consider increasing to 10 mg daily if symptoms inadequately controlled

## 2022-07-02 NOTE — CASE MANAGEMENT
Case Management Discharge Planning Note    Patient name Dee Bucio  Location ICU 07/ICU 07 MRN [id number]  : 1945 Date 2022       Current Admission Date: 2022  Current Admission Diagnosis:Complete heart block [facility])   Patient Active Problem List    Diagnosis Date Noted    Closed fracture of proximal end of left humerus 2022    Depression 2022    Cardiomyopathy ([facility] ) 2022    Complete heart block ([facility] ) 2022    Esophageal varices without bleeding ([facility] ) 2021    Pancytopenia ([facility] ) 2021    Iron deficiency anemia due to chronic blood loss 2021    Mild nonproliferative diabetic retinopathy of both eyes without macular edema associated with type 2 diabetes mellitus ([facility] ) 2021    Urinary tract infection 09/15/2020    Hepatic encephalopathy ([facility] ) 2020    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) ([facility] ) 2020    Thrombocytopenia ([facility] [address] [facility] ) 2020    Hyperlipidemia 2018    Essential hypertension 2018    Acquired hypothyroidism 2018    Type 2 diabetes mellitus with hyperglycemia, with long-term current use of insulin ([facility] ) 2018    Diabetic polyneuropathy associated with type 2 diabetes mellitus ([facility] [facility] ) 2018      LOS (days): 3  Geometric Mean LOS (GMLOS) (days): 5 20  Days to GMLOS:1 7     OBJECTIVE:  Risk of Unplanned Readmission Score: 21 19         Current admission status: Inpatient   Preferred Pharmacy:   [id number], [address] [place], [place]  [address]  [address] [facility] [address]  Phone: [phone] Fax: [phone]    [address] [place] [address]  Phone: [phone] Fax: [phone]    Primary Care Provider: Em Stanley DO    Primary Insurance: MEDICARE  Secondary Insurance: KYRA    DISCHARGE DETAILS:    Pt not medically stable for d/c at this time   Pt being followed by [facility] and [facility] for potential admission

## 2022-07-02 NOTE — PROGRESS NOTES
[facility]  ICU Transfer Note - Akiko Gleason 1945, 77 y o  female MRN: [id number]  Unit/Bed#: ICU 07 Encounter: [id number]  Primary Care Provider: Hailey Chandler DO   Date and time admitted to hospital: 6/29/2022  3:20 AM    Closed fracture of proximal end of left humerus  Assessment & Plan  · Orthopedics consulted - nonoperative management   · NV intact   · NWB LUE, sling for comfort   · Multimodal analgesia     * Complete heart block ([facility] [address] )  Assessment & Plan  · Requiring vasopressors and TVP  · S/p Biventricular PPM 6/29  · Levophed has been weaned to off   · Continue telemetry monitoring   · Will need 2 week device check with EP     Cardiomyopathy ([facility] )  Assessment & Plan  · Dilated CM on echo with persevered EF 55%, apical hypokinesis concerning for stress-CM, no prior echo   · Cards plan for repeat echo on 7/4 to eval for improvement in wall motion abnormalities suggesting a stress CM  · If no improvement will need ischemic work-up    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) ([facility] )  Assessment & Plan  · With hx of hepatic encephalopathy and gastric varices without bleeding   · Continue home rifaximin, lactulose and PPI   · Holding home nadolol with current normotension - restart as BP tolerates  · Holding home spironolactone - restart as hemodynamics improve   · Outpatient f/u with GI    Type 2 diabetes mellitus with hyperglycemia, with long-term current use of insulin [facility])  Assessment & Plan  Lab Results   Component Value Date    HGBA1C 8 2 (H) 05/31/2022       Recent Labs     07/01/22  1806 07/01/22  2157 07/02/22  0008 07/02/22  0759   POCGLU 279* 384* 325* 263*       Blood Sugar Average: Last 72 hrs:  (P) 494 [id number]     · Home regimen: Lantus 48 U AM and 36 U in PM, Amaryl, Sitagliptin-Metformin   · Current regimen: Lantus 30 U BID - increase back to home regimen as BG tolerates  · ISS increase as needed Depression  Assessment & Plan  · Continue home lexapro 5 mg, per geriatrics consider increasing to 10 mg daily if symptoms inadequately controlled     Acquired hypothyroidism  Assessment & Plan  · Home levothyroxine     Code Status: Level 1 - Full Code  POA:    POLST:      Reason for ICU admission:   Complete heart block     Active problems:   Principal Problem:    Complete heart block (HCC)  Active Problems:    Closed fracture of proximal end of left humerus    Cardiomyopathy ([facility] [address] )    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) ([facility] )    Type 2 diabetes mellitus with hyperglycemia, with long-term current use of insulin ([facility] )    Acquired hypothyroidism    Depression  Resolved Problems:    * No resolved hospital problems  *      Consultants:   EP  Cardiology  Orthopedics   Geriatrics   PMR     History of Present Illness:   Per Dr Morales Sosa "Akiko Gleason is a 77 y o  female who presents with humeral fracture and heart block  Initially seen at [facility] due to fall  Patient was walking in flip flops, tripped and fell on her left arm  Patient with no LOC but unsure as to whether she hit her head  While being evaluated at [place] [address] patient was noted to be in complete heart block and X-ray of her left shoulder showed a displaced left humeral head fracture  Patient was transferred to [facility] for further management of her humeral fracture and heart block "    Summary of clinical course:   Darling Thomson required required vasopressor support for BP while in complete heart block, given instability TVP was floated emergently  Echo concerning for apical stress cardiomyopathy with preserved EF  Had Bi-ventricular PPM placed on 6/29  She continued to improve but low-dose levophed was slow to wean to off  Levophed has no been off since 7/1 evening and remains hemodynamically stable       Recent or scheduled procedures:   6/29 introducer and TVP   6/29 BiV PPM     Outstanding/pending diagnostics:   7/4 repeat echo to assess for improvement in RWMA   Possible ischemic work-up     Cultures:   None        Mobilization Plan:   PT/OT, OOB   NWB sharad BIRCH for comfort     Nutrition Plan:   Carb controlled diet     Invasive Devices Review  Invasive Devices  Report    Central Venous Catheter Line  Duration           CVC Central Lines 06/29/22 Triple Left Internal jugular 2 days          Peripheral Intravenous Line  Duration           Peripheral IV 06/29/22 Right Hand 3 days    Peripheral IV 06/29/22 Dorsal (posterior); Left Hand 2 days          Line  Duration           Pacer Wires 3 days                Rationale for remaining devices: None     VTE Pharmacologic Prophylaxis: Heparin  VTE Mechanical Prophylaxis: sequential compression device    Discharge Plan:   Patient should be ready for discharge to acute inpatient rehab when medically stable     Initial Physical Therapy Recommendations: post acute rehab   Initial Occupational Therapy Recommendations: post acute rehab     Home medications that are not reordered and reason why:   Nadolol and spironolactone - restart as hemodynamics tolerate   Amaryl, Sitagliptin-Metformin - holding while in ICU, can restart as needed     Spoke with Jeferson Ellis  regarding transfer  Please contact critical care via [employer] with any questions or concerns  Portions of the record may have been created with voice recognition software  Occasional wrong word or "sound a like" substitutions may have occurred due to the inherent limitations of voice recognition software  Read the chart carefully and recognize, using context, where substitutions have occurred

## 2022-07-02 NOTE — PLAN OF CARE
Problem: Potential for Falls  Goal: Patient will remain free of falls  Description: INTERVENTIONS:  - Educate patient/family on patient safety including physical limitations  - Instruct patient to call for assistance with activity   - Consult OT/PT to assist with strengthening/mobility   - Keep Call bell within reach  - Keep bed low and locked with side rails adjusted as appropriate  - Keep care items and personal belongings within reach  - Initiate and maintain comfort rounds  - Make Fall Risk Sign visible to staff  - Apply yellow socks and bracelet for high fall risk patients  - Consider moving patient to room near nurses station  Outcome: Progressing     Problem: MOBILITY - ADULT  Goal: Maintain or return to baseline ADL function  Description: INTERVENTIONS:  -  Assess patient's ability to carry out ADLs; assess patient's baseline for ADL function and identify physical deficits which impact ability to perform ADLs (bathing, care of mouth/teeth, toileting, grooming, dressing, etc )  - Assess/evaluate cause of self-care deficits   - Assess range of motion  - Assess patient's mobility; develop plan if impaired  - Assess patient's need for assistive devices and provide as appropriate  - Encourage maximum independence but intervene and supervise when necessary  - Involve family in performance of ADLs  - Assess for home care needs following discharge   - Consider OT consult to assist with ADL evaluation and planning for discharge  - Provide patient education as appropriate  Outcome: Progressing  Goal: Maintains/Returns to pre admission functional level  Description: INTERVENTIONS:  - Perform BMAT or MOVE assessment daily    - Set and communicate daily mobility goal to care team and patient/family/caregiver     - Collaborate with rehabilitation services on mobility goals if consulted  - Out of bed for toileting  - Record patient progress and toleration of activity level   Outcome: Progressing     Problem: Prexisting or High Potential for Compromised Skin Integrity  Goal: Skin integrity is maintained or improved  Description: INTERVENTIONS:  - Identify patients at risk for skin breakdown  - Assess and monitor skin integrity  - Assess and monitor nutrition and hydration status  - Monitor labs   - Assess for incontinence   - Turn and reposition patient  - Assist with mobility/ambulation  - Relieve pressure over bony prominences  - Avoid friction and shearing  - Provide appropriate hygiene as needed including keeping skin clean and dry  - Evaluate need for skin moisturizer/barrier cream  - Collaborate with interdisciplinary team   - Patient/family teaching  - Consider wound care consult   Outcome: Progressing     Problem: SAFETY ADULT  Goal: Patient will remain free of falls  Description: INTERVENTIONS:  - Educate patient/family on patient safety including physical limitations  - Instruct patient to call for assistance with activity   - Consult OT/PT to assist with strengthening/mobility   - Keep Call bell within reach  - Keep bed low and locked with side rails adjusted as appropriate  - Keep care items and personal belongings within reach  - Initiate and maintain comfort rounds  - Make Fall Risk Sign visible to staff  - Apply yellow socks and bracelet for high fall risk patients  - Consider moving patient to room near nurses station  Outcome: Progressing  Goal: Maintain or return to baseline ADL function  Description: INTERVENTIONS:  -  Assess patient's ability to carry out ADLs; assess patient's baseline for ADL function and identify physical deficits which impact ability to perform ADLs (bathing, care of mouth/teeth, toileting, grooming, dressing, etc )  - Assess/evaluate cause of self-care deficits   - Assess range of motion  - Assess patient's mobility; develop plan if impaired  - Assess patient's need for assistive devices and provide as appropriate  - Encourage maximum independence but intervene and supervise when necessary  - Involve family in performance of ADLs  - Assess for home care needs following discharge   - Consider OT consult to assist with ADL evaluation and planning for discharge  - Provide patient education as appropriate  Outcome: Progressing  Goal: Maintains/Returns to pre admission functional level  Description: INTERVENTIONS:  - Perform BMAT or MOVE assessment daily    - Set and communicate daily mobility goal to care team and patient/family/caregiver     - Collaborate with rehabilitation services on mobility goals if consulted  - Out of bed for toileting  - Record patient progress and toleration of activity level   Outcome: Progressing

## 2022-07-03 PROBLEM — D62 ACUTE BLOOD LOSS ANEMIA: Status: ACTIVE | Noted: 2022-07-03

## 2022-07-03 LAB
ABO GROUP BLD: NORMAL
ALBUMIN SERPL BCP-MCNC: 2.2 G/DL (ref 3.5–5)
ALP SERPL-CCNC: 56 U/L (ref 46–116)
ALT SERPL W P-5'-P-CCNC: 27 U/L (ref 12–78)
ANION GAP SERPL CALCULATED.3IONS-SCNC: 3 MMOL/L (ref 4–13)
ANION GAP SERPL CALCULATED.3IONS-SCNC: 8 MMOL/L (ref 4–13)
AST SERPL W P-5'-P-CCNC: 30 U/L (ref 5–45)
ATRIAL RATE: 71 BPM
BASOPHILS # BLD AUTO: 0.02 THOUSANDS/ΜL (ref 0–0.1)
BASOPHILS NFR BLD AUTO: 1 % (ref 0–1)
BILIRUB SERPL-MCNC: 0.99 MG/DL (ref 0.2–1)
BLD GP AB SCN SERPL QL: NEGATIVE
BUN SERPL-MCNC: 33 MG/DL (ref 5–25)
BUN SERPL-MCNC: 38 MG/DL (ref 5–25)
CALCIUM ALBUM COR SERPL-MCNC: 9.4 MG/DL (ref 8.3–10.1)
CALCIUM SERPL-MCNC: 8 MG/DL (ref 8.3–10.1)
CALCIUM SERPL-MCNC: 8.1 MG/DL (ref 8.3–10.1)
CHLORIDE SERPL-SCNC: 107 MMOL/L (ref 100–108)
CHLORIDE SERPL-SCNC: 108 MMOL/L (ref 100–108)
CO2 SERPL-SCNC: 24 MMOL/L (ref 21–32)
CO2 SERPL-SCNC: 25 MMOL/L (ref 21–32)
CREAT SERPL-MCNC: 1.17 MG/DL (ref 0.6–1.3)
CREAT SERPL-MCNC: 1.31 MG/DL (ref 0.6–1.3)
EOSINOPHIL # BLD AUTO: 0.03 THOUSAND/ΜL (ref 0–0.61)
EOSINOPHIL NFR BLD AUTO: 1 % (ref 0–6)
ERYTHROCYTE [DISTWIDTH] IN BLOOD BY AUTOMATED COUNT: 15.4 % (ref 11.6–15.1)
ERYTHROCYTE [DISTWIDTH] IN BLOOD BY AUTOMATED COUNT: 15.5 % (ref 11.6–15.1)
GFR SERPL CREATININE-BSD FRML MDRD: 39 ML/MIN/1.73SQ M
GFR SERPL CREATININE-BSD FRML MDRD: 45 ML/MIN/1.73SQ M
GLUCOSE SERPL-MCNC: 190 MG/DL (ref 65–140)
GLUCOSE SERPL-MCNC: 192 MG/DL (ref 65–140)
GLUCOSE SERPL-MCNC: 195 MG/DL (ref 65–140)
GLUCOSE SERPL-MCNC: 230 MG/DL (ref 65–140)
GLUCOSE SERPL-MCNC: 260 MG/DL (ref 65–140)
GLUCOSE SERPL-MCNC: 264 MG/DL (ref 65–140)
GLUCOSE SERPL-MCNC: 283 MG/DL (ref 65–140)
GLUCOSE SERPL-MCNC: 287 MG/DL (ref 65–140)
HCT VFR BLD AUTO: 22.2 % (ref 34.8–46.1)
HCT VFR BLD AUTO: 23.9 % (ref 34.8–46.1)
HCT VFR BLD AUTO: 28.6 % (ref 34.8–46.1)
HGB BLD-MCNC: 6.9 G/DL (ref 11.5–15.4)
HGB BLD-MCNC: 7.3 G/DL (ref 11.5–15.4)
HGB BLD-MCNC: 8.5 G/DL (ref 11.5–15.4)
IMM GRANULOCYTES # BLD AUTO: 0.01 THOUSAND/UL (ref 0–0.2)
IMM GRANULOCYTES NFR BLD AUTO: 0 % (ref 0–2)
LYMPHOCYTES # BLD AUTO: 0.52 THOUSANDS/ΜL (ref 0.6–4.47)
LYMPHOCYTES NFR BLD AUTO: 17 % (ref 14–44)
MCH RBC QN AUTO: 27.3 PG (ref 26.8–34.3)
MCH RBC QN AUTO: 27.6 PG (ref 26.8–34.3)
MCHC RBC AUTO-ENTMCNC: 30.5 G/DL (ref 31.4–37.4)
MCHC RBC AUTO-ENTMCNC: 31.1 G/DL (ref 31.4–37.4)
MCV RBC AUTO: 89 FL (ref 82–98)
MCV RBC AUTO: 90 FL (ref 82–98)
MONOCYTES # BLD AUTO: 0.34 THOUSAND/ΜL (ref 0.17–1.22)
MONOCYTES NFR BLD AUTO: 11 % (ref 4–12)
NEUTROPHILS # BLD AUTO: 2.09 THOUSANDS/ΜL (ref 1.85–7.62)
NEUTS SEG NFR BLD AUTO: 70 % (ref 43–75)
NRBC BLD AUTO-RTO: 0 /100 WBCS
P AXIS: 145 DEGREES
PLATELET # BLD AUTO: 58 THOUSANDS/UL (ref 149–390)
PLATELET # BLD AUTO: 59 THOUSANDS/UL (ref 149–390)
PMV BLD AUTO: 13.1 FL (ref 8.9–12.7)
PMV BLD AUTO: 13.1 FL (ref 8.9–12.7)
POTASSIUM SERPL-SCNC: 5.2 MMOL/L (ref 3.5–5.3)
POTASSIUM SERPL-SCNC: 5.4 MMOL/L (ref 3.5–5.3)
PR INTERVAL: 172 MS
PROT SERPL-MCNC: 5.5 G/DL (ref 6.4–8.2)
QRS AXIS: 242 DEGREES
QRSD INTERVAL: 156 MS
QT INTERVAL: 468 MS
QTC INTERVAL: 508 MS
RBC # BLD AUTO: 2.5 MILLION/UL (ref 3.81–5.12)
RBC # BLD AUTO: 2.67 MILLION/UL (ref 3.81–5.12)
RH BLD: NEGATIVE
SODIUM SERPL-SCNC: 135 MMOL/L (ref 136–145)
SODIUM SERPL-SCNC: 140 MMOL/L (ref 136–145)
SPECIMEN EXPIRATION DATE: NORMAL
T WAVE AXIS: 73 DEGREES
VENTRICULAR RATE: 71 BPM
WBC # BLD AUTO: 2.61 THOUSAND/UL (ref 4.31–10.16)
WBC # BLD AUTO: 3.01 THOUSAND/UL (ref 4.31–10.16)

## 2022-07-03 PROCEDURE — 99232 SBSQ HOSP IP/OBS MODERATE 35: CPT | Performed by: INTERNAL MEDICINE

## 2022-07-03 PROCEDURE — 85014 HEMATOCRIT: CPT | Performed by: EMERGENCY MEDICINE

## 2022-07-03 PROCEDURE — 85027 COMPLETE CBC AUTOMATED: CPT | Performed by: PHYSICIAN ASSISTANT

## 2022-07-03 PROCEDURE — 97535 SELF CARE MNGMENT TRAINING: CPT

## 2022-07-03 PROCEDURE — 82948 REAGENT STRIP/BLOOD GLUCOSE: CPT

## 2022-07-03 PROCEDURE — 85018 HEMOGLOBIN: CPT | Performed by: EMERGENCY MEDICINE

## 2022-07-03 PROCEDURE — 86901 BLOOD TYPING SEROLOGIC RH(D): CPT

## 2022-07-03 PROCEDURE — P9016 RBC LEUKOCYTES REDUCED: HCPCS

## 2022-07-03 PROCEDURE — 86850 RBC ANTIBODY SCREEN: CPT

## 2022-07-03 PROCEDURE — 93010 ELECTROCARDIOGRAM REPORT: CPT | Performed by: INTERNAL MEDICINE

## 2022-07-03 PROCEDURE — 80048 BASIC METABOLIC PNL TOTAL CA: CPT | Performed by: PHYSICIAN ASSISTANT

## 2022-07-03 PROCEDURE — 86900 BLOOD TYPING SEROLOGIC ABO: CPT

## 2022-07-03 PROCEDURE — 93005 ELECTROCARDIOGRAM TRACING: CPT

## 2022-07-03 PROCEDURE — 99233 SBSQ HOSP IP/OBS HIGH 50: CPT | Performed by: SURGERY

## 2022-07-03 PROCEDURE — 86923 COMPATIBILITY TEST ELECTRIC: CPT

## 2022-07-03 RX ADMIN — LACTULOSE 10 G: 20 SOLUTION ORAL at 17:23

## 2022-07-03 RX ADMIN — HYDROMORPHONE HYDROCHLORIDE 0.2 MG: 0.2 INJECTION, SOLUTION INTRAMUSCULAR; INTRAVENOUS; SUBCUTANEOUS at 13:23

## 2022-07-03 RX ADMIN — ESCITALOPRAM 5 MG: 5 TABLET, FILM COATED ORAL at 08:16

## 2022-07-03 RX ADMIN — LIDOCAINE 5% 1 PATCH: 700 PATCH TOPICAL at 08:16

## 2022-07-03 RX ADMIN — PANTOPRAZOLE SODIUM 40 MG: 40 TABLET, DELAYED RELEASE ORAL at 05:08

## 2022-07-03 RX ADMIN — PRAVASTATIN SODIUM 80 MG: 80 TABLET ORAL at 17:23

## 2022-07-03 RX ADMIN — RIFAXIMIN 550 MG: 550 TABLET ORAL at 21:09

## 2022-07-03 RX ADMIN — ALBUMIN (HUMAN) 12.5 G: 12.5 INJECTION, SOLUTION INTRAVENOUS at 00:02

## 2022-07-03 RX ADMIN — INSULIN LISPRO 2 UNITS: 100 INJECTION, SOLUTION INTRAVENOUS; SUBCUTANEOUS at 23:45

## 2022-07-03 RX ADMIN — INSULIN GLARGINE 30 UNITS: 100 INJECTION, SOLUTION SUBCUTANEOUS at 08:46

## 2022-07-03 RX ADMIN — INSULIN LISPRO 6 UNITS: 100 INJECTION, SOLUTION INTRAVENOUS; SUBCUTANEOUS at 11:11

## 2022-07-03 RX ADMIN — RIFAXIMIN 550 MG: 550 TABLET ORAL at 08:16

## 2022-07-03 RX ADMIN — LEVOTHYROXINE SODIUM 112 MCG: 112 TABLET ORAL at 05:08

## 2022-07-03 RX ADMIN — ENOXAPARIN SODIUM 40 MG: 40 INJECTION SUBCUTANEOUS at 08:16

## 2022-07-03 RX ADMIN — OXYCODONE HYDROCHLORIDE 5 MG: 5 TABLET ORAL at 21:12

## 2022-07-03 RX ADMIN — ASPIRIN 81 MG CHEWABLE TABLET 81 MG: 81 TABLET CHEWABLE at 08:16

## 2022-07-03 RX ADMIN — DOCUSATE SODIUM 100 MG: 100 CAPSULE, LIQUID FILLED ORAL at 17:23

## 2022-07-03 RX ADMIN — Medication 6 MG: at 21:09

## 2022-07-03 RX ADMIN — SENNOSIDES AND DOCUSATE SODIUM 1 TABLET: 8.6; 5 TABLET ORAL at 21:09

## 2022-07-03 RX ADMIN — DOCUSATE SODIUM 100 MG: 100 CAPSULE, LIQUID FILLED ORAL at 08:16

## 2022-07-03 RX ADMIN — INSULIN LISPRO 6 UNITS: 100 INJECTION, SOLUTION INTRAVENOUS; SUBCUTANEOUS at 05:11

## 2022-07-03 RX ADMIN — OXYCODONE HYDROCHLORIDE 5 MG: 5 TABLET ORAL at 11:08

## 2022-07-03 RX ADMIN — INSULIN GLARGINE 30 UNITS: 100 INJECTION, SOLUTION SUBCUTANEOUS at 21:08

## 2022-07-03 RX ADMIN — INSULIN LISPRO 4 UNITS: 100 INJECTION, SOLUTION INTRAVENOUS; SUBCUTANEOUS at 17:24

## 2022-07-03 RX ADMIN — LACTULOSE 10 G: 20 SOLUTION ORAL at 08:16

## 2022-07-03 NOTE — NURSING NOTE
Patient did not void since noon of 7/2 which was 100 ml  She did not void since  She was bladder scanned a multiple of times, the most being 65ml at 0500 AM  Attending resident was notified

## 2022-07-03 NOTE — OCCUPATIONAL THERAPY NOTE
Occupational Therapy Treatment Note       07/03/22 1343   Restrictions/Precautions   Weight Bearing Precautions Per Order Yes   LUE Weight Bearing Per Order NWB   Braces or Orthoses Sling   Other Precautions Fall Risk;Pain;WBS;Chair Alarm   Lifestyle   Autonomy I w/ ADLS, assist IADLS, I vs Mod I w/ transfers and functional mobility PTA   Reciprocal Relationships Pt lives w/ her dght who has down syndrome   Service to Others Retired; worked in [employer], yardwork and being active   Pain Assessment   Pain Assessment Tool 0-10   Pain Score 7   Pain Location/Orientation Orientation: Left; Location: Arm   ADL   Where Assessed Commode   Toileting Assistance  2  Maximal Assistance   Toileting Deficit Clothing management up;Perineal hygiene;Clothing management down   Bed Mobility   Additional Comments pt presented sitting out of bed to bedside chair   Transfers   Sit to Stand 4  Minimal assistance   Additional items Assist x 1   Stand to Sit 4  Minimal assistance   Additional items Assist x 1   Stand pivot 4  Minimal assistance   Additional items Assist x 1   Cognition   Overall Cognitive Status WFL   Arousal/Participation Responsive; Cooperative   Attention Within functional limits   Orientation Level Oriented X4   Memory Within functional limits   Following Commands Follows one step commands without difficulty   Activity Tolerance   Activity Tolerance Patient tolerated treatment well   Assessment   Assessment Pt seen for participation in occupational therapy with focus on activity tolerance, functional transfers/commode transfer toileting and UB self-care  Pt cleared by RN/Carlton for pt participation in OT session  Pt presented sitting out of bed to bedside chair upon initiation of OT session and agreeable to participate in therapy following pt identifiers confirmed  Pt required assist for stand pivot transfer to bedside commode 2* to pt deconditioning    She required assistance for toilet hygiene 2* to coordination and balance  Pt will require post acute rehab services to continue to address pt's above-noted deficits which currently impair pt's ADL and functional mob  Pt returned to bedside chair post OT session, chair alarm activated and all needs within reach  Plan   Treatment Interventions ADL retraining;Functional transfer training; Endurance training   Goal Expiration Date 07/14/22   OT Treatment Day 1   OT Frequency 3-5x/wk   Recommendation   OT Discharge Recommendation Post acute rehabilitation services   AM-PAC Daily Activity Inpatient   Lower Body Dressing 2   Bathing 2   Toileting 2   Upper Body Dressing 2   Grooming 2   Eating 2   Daily Activity Raw Score 12   Daily Activity Standardized Score (Calc for Raw Score >=11) 30 6   AM-PAC Applied Cognition Inpatient   Following a Speech/Presentation 3   Understanding Ordinary Conversation 4   Taking Medications 4   Remembering Where Things Are Placed or Put Away 4   Remembering List of 4-5 Errands 4     Lisy STODDARD/PAOLA

## 2022-07-03 NOTE — PLAN OF CARE
Problem: Potential for Falls  Goal: Patient will remain free of falls  Description: INTERVENTIONS:  - Educate patient/family on patient safety including physical limitations  - Instruct patient to call for assistance with activity   - Consult OT/PT to assist with strengthening/mobility   - Keep Call bell within reach  - Keep bed low and locked with side rails adjusted as appropriate  - Keep care items and personal belongings within reach  - Initiate and maintain comfort rounds  - Make Fall Risk Sign visible to staff  - Apply yellow socks and bracelet for high fall risk patients  - Consider moving patient to room near nurses station  Outcome: Progressing     Problem: MOBILITY - ADULT  Goal: Maintain or return to baseline ADL function  Description: INTERVENTIONS:  -  Assess patient's ability to carry out ADLs; assess patient's baseline for ADL function and identify physical deficits which impact ability to perform ADLs (bathing, care of mouth/teeth, toileting, grooming, dressing, etc )  - Assess/evaluate cause of self-care deficits   - Assess range of motion  - Assess patient's mobility; develop plan if impaired  - Assess patient's need for assistive devices and provide as appropriate  - Encourage maximum independence but intervene and supervise when necessary  - Involve family in performance of ADLs  - Assess for home care needs following discharge   - Consider OT consult to assist with ADL evaluation and planning for discharge  - Provide patient education as appropriate  Outcome: Progressing  Goal: Maintains/Returns to pre admission functional level  Description: INTERVENTIONS:  - Perform BMAT or MOVE assessment daily    - Set and communicate daily mobility goal to care team and patient/family/caregiver     - Collaborate with rehabilitation services on mobility goals if consulted  - Out of bed for toileting  - Record patient progress and toleration of activity level   Outcome: Progressing     Problem: Prexisting or High Potential for Compromised Skin Integrity  Goal: Skin integrity is maintained or improved  Description: INTERVENTIONS:  - Identify patients at risk for skin breakdown  - Assess and monitor skin integrity  - Assess and monitor nutrition and hydration status  - Monitor labs   - Assess for incontinence   - Turn and reposition patient  - Assist with mobility/ambulation  - Relieve pressure over bony prominences  - Avoid friction and shearing  - Provide appropriate hygiene as needed including keeping skin clean and dry  - Evaluate need for skin moisturizer/barrier cream  - Collaborate with interdisciplinary team   - Patient/family teaching  - Consider wound care consult   Outcome: Progressing     Problem: PAIN - ADULT  Goal: Verbalizes/displays adequate comfort level or baseline comfort level  Description: Interventions:  - Encourage patient to monitor pain and request assistance  - Assess pain using appropriate pain scale  - Administer analgesics based on type and severity of pain and evaluate response  - Implement non-pharmacological measures as appropriate and evaluate response  - Consider cultural and social influences on pain and pain management  - Notify physician/advanced practitioner if interventions unsuccessful or patient reports new pain  Outcome: Progressing     Problem: INFECTION - ADULT  Goal: Absence or prevention of progression during hospitalization  Description: INTERVENTIONS:  - Assess and monitor for signs and symptoms of infection  - Monitor lab/diagnostic results  - Monitor all insertion sites, i e  indwelling lines, tubes, and drains  - Monitor endotracheal if appropriate and nasal secretions for changes in amount and color  - Jerome appropriate cooling/warming therapies per order  - Administer medications as ordered  - Instruct and encourage patient and family to use good hand hygiene technique  - Identify and instruct in appropriate isolation precautions for identified infection/condition  Outcome: Progressing     Problem: SAFETY ADULT  Goal: Patient will remain free of falls  Description: INTERVENTIONS:  - Educate patient/family on patient safety including physical limitations  - Instruct patient to call for assistance with activity   - Consult OT/PT to assist with strengthening/mobility   - Keep Call bell within reach  - Keep bed low and locked with side rails adjusted as appropriate  - Keep care items and personal belongings within reach  - Initiate and maintain comfort rounds  - Make Fall Risk Sign visible to staff  - Apply yellow socks and bracelet for high fall risk patients  - Consider moving patient to room near nurses station  Outcome: Progressing  Goal: Maintain or return to baseline ADL function  Description: INTERVENTIONS:  -  Assess patient's ability to carry out ADLs; assess patient's baseline for ADL function and identify physical deficits which impact ability to perform ADLs (bathing, care of mouth/teeth, toileting, grooming, dressing, etc )  - Assess/evaluate cause of self-care deficits   - Assess range of motion  - Assess patient's mobility; develop plan if impaired  - Assess patient's need for assistive devices and provide as appropriate  - Encourage maximum independence but intervene and supervise when necessary  - Involve family in performance of ADLs  - Assess for home care needs following discharge   - Consider OT consult to assist with ADL evaluation and planning for discharge  - Provide patient education as appropriate  Outcome: Progressing  Goal: Maintains/Returns to pre admission functional level  Description: INTERVENTIONS:  - Perform BMAT or MOVE assessment daily    - Set and communicate daily mobility goal to care team and patient/family/caregiver     - Collaborate with rehabilitation services on mobility goals if consulted  - Out of bed for toileting  - Record patient progress and toleration of activity level   Outcome: Progressing     Problem: DISCHARGE PLANNING  Goal: Discharge to home or other facility with appropriate resources  Description: INTERVENTIONS:  - Identify barriers to discharge w/patient and caregiver  - Arrange for needed discharge resources and transportation as appropriate  - Identify discharge learning needs (meds, wound care, etc )  - Arrange for interpretive services to assist at discharge as needed  - Refer to Case Management Department for coordinating discharge planning if the patient needs post-hospital services based on physician/advanced practitioner order or complex needs related to functional status, cognitive ability, or social support system  Outcome: Progressing     Problem: CARDIOVASCULAR - ADULT  Goal: Maintains optimal cardiac output and hemodynamic stability  Description: INTERVENTIONS:  - Monitor I/O, vital signs and rhythm  - Monitor for S/S and trends of decreased cardiac output  - Administer and titrate ordered vasoactive medications to optimize hemodynamic stability  - Assess quality of pulses, skin color and temperature  - Assess for signs of decreased coronary artery perfusion  - Instruct patient to report change in severity of symptoms  Outcome: Progressing  Goal: Absence of cardiac dysrhythmias or at baseline rhythm  Description: INTERVENTIONS:  - Continuous cardiac monitoring, vital signs, obtain 12 lead EKG if ordered  - Administer antiarrhythmic and heart rate control medications as ordered  - Monitor electrolytes and administer replacement therapy as ordered  Outcome: Progressing     Problem: MUSCULOSKELETAL - ADULT  Goal: Maintain or return mobility to safest level of function  Description: INTERVENTIONS:  - Assess patient's ability to carry out ADLs; assess patient's baseline for ADL function and identify physical deficits which impact ability to perform ADLs (bathing, care of mouth/teeth, toileting, grooming, dressing, etc )  - Assess/evaluate cause of self-care deficits   - Assess range of motion  - Assess patient's mobility  - Assess patient's need for assistive devices and provide as appropriate  - Encourage maximum independence but intervene and supervise when necessary  - Involve family in performance of ADLs  - Assess for home care needs following discharge   - Consider OT consult to assist with ADL evaluation and planning for discharge  - Provide patient education as appropriate  Outcome: Progressing  Goal: Maintain proper alignment of affected body part  Description: INTERVENTIONS:  - Support, maintain and protect limb and body alignment  - Provide patient/ family with appropriate education  Outcome: Progressing

## 2022-07-03 NOTE — ASSESSMENT & PLAN NOTE
-hgb has been slowly downtrending over this admission  -6 9 on 07/03 check   -will transfuse one unit with plan to recheck later in the day for response

## 2022-07-03 NOTE — ASSESSMENT & PLAN NOTE
Lab Results   Component Value Date    HGBA1C 8 2 (H) 05/31/2022       Recent Labs     07/02/22  2335 07/03/22  0509 07/03/22  0730 07/03/22  1110   POCGLU 322* 260* 195* 287*       Blood Sugar Average: Last 72 hrs:  (P) 208 [id number]     · Home regimen: Lantus 48 U AM and 36 U in PM, Amaryl, Sitagliptin-Metformin   · Current regimen: Lantus 30 U BID - increase back to home regimen as BG tolerates  · ISS increase as needed

## 2022-07-03 NOTE — CASE MANAGEMENT
Case Management Discharge Planning Note    Patient name Malachi Clifton  [facility] [id number]  : 1945 Date 7/3/2022       Current Admission Date: 2022  Current Admission Diagnosis:Complete heart block [facility])   Patient Active Problem List    Diagnosis Date Noted    Acute blood loss anemia 2022    Closed fracture of proximal end of left humerus 2022    Depression 2022    Cardiomyopathy ([address] ) 2022    Complete heart block ([facility] ) 2022    Esophageal varices without bleeding ([facility] ) 2021    Pancytopenia ([facility] ) 2021    Iron deficiency anemia due to chronic blood loss 2021    Mild nonproliferative diabetic retinopathy of both eyes without macular edema associated with type 2 diabetes mellitus ([facility] ) 2021    Urinary tract infection 09/15/2020    Hepatic encephalopathy ([facility] ) 2020    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) ([facility] ) 2020    Thrombocytopenia ([facility] ) 2020    Hyperlipidemia 2018    Essential hypertension 2018    Acquired hypothyroidism 2018    Type 2 diabetes mellitus with hyperglycemia, with long-term current use of insulin ([facility] ) 2018    Diabetic polyneuropathy associated with type 2 diabetes mellitus ([facility] ) 2018      LOS (days): 4  Geometric Mean LOS (GMLOS) (days): 5 20  Days to GMLOS:0 8     OBJECTIVE:  Risk of Unplanned Readmission Score: 22 79         Current admission status: Inpatient   Preferred Pharmacy:   [id number], [address] [facility] , [place], [place]  [address]    [address] [facility] [address]  Phone: [phone] Fax: [phone]    [address] [place] [address]  Phone: [phone] Fax: [phone]    Primary Care Provider: Miguel Angel Beasley DO    Primary Insurance: MEDICARE  Secondary Insurance: [employer]    DISCHARGE DETAILS:      Other Referral/Resources/Interventions Provided:  Referral Comments: [facility] reporting that if pt will have assistance at home for ADL's after dc from rehab she has been approved for [facility] pending medical stability and bed availability

## 2022-07-03 NOTE — PROGRESS NOTES
Cardiology Progress Note - Eddie Manuel 77 y o  female MRN: [id number]    Unit/Bed#: [facility] 812-01 Encounter: [id number]      Assessment:  Principal Problem:    Complete heart block (HCC)  Active Problems:    Acquired hypothyroidism    Type 2 diabetes mellitus with hyperglycemia, with long-term current use of insulin (HCC)    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) ([facility] )    Closed fracture of proximal end of left humerus    Depression    Cardiomyopathy ([facility] )      Plan:  Patient has no chest pain or significant issue  Continues with splint on left arm  She is in sinus rhythm on telemetry with appropriate V pacing  BMP today with potassium of 5 2 and creatinine of 1 17  Vital signs stable  Will reassess LV wall motion on echo later this week  Subjective:   Patient seen and examined  No significant events overnight   negative  Objective:     Vitals: Blood pressure (!) 124/48, pulse 67, temperature 99 °F (37 2 °C), temperature source Oral, resp  rate 16, height 5' 3" (1 6 m), weight 91 kg (200 lb 9 9 oz), SpO2 98 %, not currently breastfeeding , Body mass index is 35 54 kg/m² ,   Orthostatic Blood Pressures    Flowsheet Row Most Recent Value   Blood Pressure 124/48 filed at 07/03/2022 0905   Patient Position - Orthostatic VS Lying filed at 07/03/2022 0905      ,      Intake/Output Summary (Last 24 hours) at 7/3/2022 0919  Last data filed at 7/2/2022 1915  Gross per 24 hour   Intake 500 ml   Output 600 ml   Net -100 ml       No significant arrhythmias seen on telemetry review         Physical Exam:    GEN: Miriam Rogers   NECK: supple, no carotid bruits, no JVD or HJR  HEART: normal rate, regular rhythm, normal S1 and S2, no murmurs, clicks, gallops or rubs   LUNGS: clear to auscultation bilaterally; no wheezes, rales, or rhonchi   ABDOMEN: normal bowel sounds, soft, no tenderness, no distention  EXTREMITIES: peripheral pulses normal; no clubbing, cyanosis, or edema  SKIN: warm and well perfused, no suspicious lesions on exposed skin    Labs & Results:    No results displayed because visit has over 200 results  CT abdomen pelvis wo contrast    Result Date: 6/11/2022  Narrative: CT ABDOMEN AND PELVIS WITHOUT IV CONTRAST INDICATION:   J91 61: Nonalcoholic steatohepatitis (PAUL) K74 60: Unspecified cirrhosis of liver R10 30: Lower abdominal pain, unspecified  COMPARISON:  CT abdomen pelvis 11/18/2005 TECHNIQUE:  CT examination of the abdomen and pelvis was performed without intravenous contrast  This examination was performed without intravenous contrast in the context of the critical nationwide Omnipaque shortage  Axial, sagittal, and coronal 2D reformatted images were created from the source data and submitted for interpretation  Radiation dose length product (DLP) for this visit:  1211 mGy-cm   This examination, like all CT scans performed in the [facility], was performed utilizing techniques to minimize radiation dose exposure, including the use of iterative reconstruction and automated exposure control  Enteric contrast was administered  FINDINGS: ABDOMEN LOWER CHEST:  Partially imaged cardiomegaly and mitral/aortic annulus calcifications  Clear lung bases  LIVER/BILIARY TREE:  The liver demonstrates cirrhotic morphology  Within the limitations of this examination there is no evidence of suspicious hepatic mass  No biliary dilatation  GALLBLADDER:  Gallbladder is surgically absent  SPLEEN:  The spleen is enlarged, measuring 15 7 cm in craniocaudal length PANCREAS:  Unremarkable  ADRENAL GLANDS:  Unremarkable  KIDNEYS/URETERS:  2 8 cm left lower pole  cyst with thin calcified septations  No hydronephrosis  STOMACH AND BOWEL: Uncomplicated, debris-filled duodenal diverticulum  There is colonic diverticulosis without evidence of acute diverticulitis  APPENDIX:  Noninflamed  ABDOMINOPELVIC CAVITY:  No ascites  No pneumoperitoneum  No lymphadenopathy  VESSELS:  Diffuse mesenteric edema and mesenteric vascular engorgement indicating portal hypertension  PELVIS REPRODUCTIVE ORGANS:  Unremarkable for patient's age  URINARY BLADDER:  Unremarkable  ABDOMINAL WALL/INGUINAL REGIONS:  Body wall edema in keeping with anasarca  No collections  OSSEOUS STRUCTURES:  No acute fracture or destructive osseous lesion  Impression: No acute inflammatory findings in the abdomen or pelvis  Hepatic cirrhosis; splenomegaly and mesenteric edema/vascular engorgement indicate portal hypertension  2 8 cm slightly complicated left lower pole renal cyst  Follow-up with renal ultrasound on a nonemergent basis  The study was marked in EPIC for significant notification  Workstation performed: [id number]     XR chest portable    Result Date: 6/30/2022  Narrative: CHEST INDICATION:   Patient s/p Pacemaker/ICD Insertion  COMPARISON:  Previous day EXAM PERFORMED/VIEWS:  XR CHEST PORTABLE FINDINGS: Left dual-lead pacemaker is in satisfactory position  Left internal jugular catheter tip terminates in the superior vena cava  Cardiomediastinal silhouette appears unremarkable  The lungs are clear  No pneumothorax or pleural effusion  Osseous structures appear within normal limits for patient age  Impression: Left pacemaker in satisfactory position  Workstation performed: [id number]     XR Trauma chest portable    Result Date: 6/29/2022  Narrative: CHEST INDICATION:  TRAUMA  Fall  COMPARISON:  9/13/2020 EXAM PERFORMED/VIEWS:  XR CHEST PORTABLE  AP supine FINDINGS: Cardiomediastinal silhouette appears unremarkable  The lungs are clear  No pneumothorax or pleural effusion  Degenerative changes of the spine  Impression: No acute cardiopulmonary disease  Workstation performed: [id number]     XR shoulder 1 vw left    Result Date: 6/29/2022  Narrative: LEFT SHOULDER INDICATION:   Evaluation of fracture  77 y o  right hand dominant female status post fall complaining of left shoulder pain   She states she slipped and fell on grass and had immediate shoulder pain  She has no numbness or tingling of the left upper extremity, but does have pain with shoulder  ROM  She was transferred from [place] [address] for her shoulder and further evaluation of her complete heart block  She ambulates using a scooter  The patient's entire past medical history was obtained directly from either the most recent orthopedic surgery notes in [facility]  The information was copied and pasted directly from Epic  COMPARISON:  Shoulder radiographs performed earlier in the morning  VIEWS:  XR SHOULDER 1 VW LEFT Images: 1 FINDINGS: No significant change in the displaced, impacted left humeral head/neck fracture  Degenerative changes within the shoulder and acromioclavicular joint  No lytic or blastic osseous lesion  Soft tissue swelling overlying the shoulder joint proper  Impression: No significant change in proximal left humeral fracture  Continued orthopedic surgery follow-up recommended  Workstation performed: [id number]     XR shoulder 2+ views LEFT    Result Date: 6/29/2022  Narrative: LEFT SHOULDER INDICATION:   injury  COMPARISON:  CT chest abdomen pelvis 6/29/2022 VIEWS:  XR SHOULDER 2+ VW LEFT FINDINGS: Again noted displaced comminuted fracture of the humeral head/neck  Mild osteoarthritis acromioclavicular joint  No lytic or blastic osseous lesion  Soft tissues are unremarkable  Impression: Again noted displaced comminuted fracture of the humeral head/neck  Workstation performed: [id number]     XR humerus left    Result Date: 6/29/2022  Narrative: LEFT HUMERUS INDICATION:   Evaluation of fracture joint above and below  77 y o  right hand dominant female status post fall complaining of left shoulder pain  She states she slipped and fell on grass and had immediate shoulder pain  She has no numbness or tingling of the left upper extremity, but does have pain with shoulder  ROM   She was transferred from [place] [address] for her shoulder and further evaluation of her complete heart block  She ambulates using a scooter  The patient's entire past medical history was obtained directly from the most recent orthopedic surgery notes in [facility]  The information was copied and pasted directly from Epic  COMPARISON:  Shoulder radiographs performed earlier in the morning  VIEWS:  XR HUMERUS LEFT Images: 2 FINDINGS: Stable appearance of the left humeral head/neck fracture, however study limited by positioning  Remainder of the osseous structures appear intact  Degenerative changes in the shoulder  Left elbow not well evaluated due to patient obliquity  No lytic or blastic osseous lesion  Soft tissues are unremarkable  Impression: 1  Stable appearance of the proximal left humerus fracture, however study limited by positioning  2   Limited evaluation of the elbow due to patient obliquity  If there is continued concern for elbow fracture, consider dedicated, repeat examination of the elbow  Workstation performed: [id number]     TRAUMA - CT head wo contrast    Result Date: 6/29/2022  Narrative: CT BRAIN - WITHOUT CONTRAST INDICATION:   TRAUMA  COMPARISON:  CT of the head on September 13, 2020  TECHNIQUE:  CT examination of the brain was performed  In addition to axial images, sagittal and coronal 2D reformatted images were created and submitted for interpretation  Radiation dose length product (DLP) for this visit:  867 18 mGy-cm   This examination, like all CT scans performed in the [place], was performed utilizing techniques to minimize radiation dose exposure, including the use of iterative  reconstruction and automated exposure control  IMAGE QUALITY:  Diagnostic  FINDINGS: PARENCHYMA:  No intracranial mass, mass effect or midline shift  No CT signs of acute infarction  No acute parenchymal hemorrhage  VENTRICLES AND EXTRA-AXIAL SPACES:  Normal for the patient's age   VISUALIZED ORBITS AND PARANASAL SINUSES:  Bilateral ocular lens replacements  CALVARIUM AND EXTRACRANIAL SOFT TISSUES:  Normal      Impression: No intracranial hemorrhage or calvarial fracture  Workstation performed: [id number]     TRAUMA - CT spine cervical wo contrast    Result Date: 6/29/2022  Narrative: CT CERVICAL SPINE - WITHOUT CONTRAST INDICATION:   TRAUMA  COMPARISON:  None  TECHNIQUE:  CT examination of the cervical spine was performed without intravenous contrast   Contiguous axial images were obtained  Sagittal and coronal reconstructions were performed  Radiation dose length product (DLP) for this visit:  437 mGy-cm   This examination, like all CT scans performed in the [facility], was performed utilizing techniques to minimize radiation dose exposure, including the use of iterative reconstruction and automated exposure control  IMAGE QUALITY:  Diagnostic  FINDINGS: ALIGNMENT:  No significant subluxation  VERTEBRAL BODIES:  No acute fracture  Schmorl's node at the inferior endplate of C6  DEGENERATIVE CHANGES:  Moderate multilevel cervical degenerative changes are noted  Disc space narrowing is most pronounced at C3-C4, C4-C5, and C6-C7 where there is mild bony spinal canal narrowing  PREVERTEBRAL AND PARASPINAL SOFT TISSUES:  Calcifications at the carotid bifurcations  THORACIC INLET:  Please refer to the concurrent chest, abdomen, and pelvic CT report for description of the thoracic inlet findings  Impression: No cervical spine fracture or traumatic malalignment  Workstation performed: [id number]     XR Trauma pelvis ap only 1 or 2 vw    Result Date: 6/29/2022  Narrative: PELVIS INDICATION:   TRAUMA  Fall  COMPARISON:  CT abdomen and pelvis 6/8/2022 VIEWS:  XR PELVIS AP ONLY 1 VW FINDINGS: No acute fracture or hip dislocation  Mild bilateral hip osteoarthritis  No lytic or blastic osseous lesion  Soft tissues are unremarkable  Degenerative changes pubic symphysis and visualized lower lumbar spine       Impression: No acute osseous abnormality  Followup imaging may be obtained for any persistent or worsening symptoms  Workstation performed: [id number]     TRAUMA - CT chest abdomen pelvis w contrast    Addendum Date: 6/29/2022 Addendum:   ADDENDUM: There is an error in the impression of the report  Number 1 of the impression should state: Comminuted and displaced fracture of the left HUMERAL head/neck  Result Date: 6/29/2022  Narrative: CT CHEST, ABDOMEN AND PELVIS WITH IV CONTRAST INDICATION:   TRAUMA  COMPARISON:  CT of abdomen pelvis on June 8, 2022  TECHNIQUE: CT examination of the chest, abdomen and pelvis was performed  Axial, sagittal, and coronal 2D reformatted images were created from the source data and submitted for interpretation  Radiation dose length product (DLP) for this visit:  1452 46 mGy-cm   This examination, like all CT scans performed in the [facility], was performed utilizing techniques to minimize radiation dose exposure, including the use of iterative reconstruction and automated exposure control  IV Contrast:  65 mL of iohexol (OMNIPAQUE) Enteric Contrast: Enteric contrast was not administered  FINDINGS: CHEST LUNGS:  Lungs are clear  There is no tracheal or endobronchial lesion  PLEURA:  Unremarkable  HEART/GREAT VESSELS: Coronary artery calcifications  Mitral annular calcification  No thoracic aortic aneurysm  MEDIASTINUM AND BRIGID:  Unremarkable  CHEST WALL AND LOWER NECK:  Unremarkable  ABDOMEN LIVER/BILIARY TREE:  Cirrhotic morphology of the liver  GALLBLADDER:  Gallbladder is surgically absent  SPLEEN:  Splenomegaly measuring 15 5 cm in craniocaudal dimension, not significantly changed  PANCREAS:  Atrophic  ADRENAL GLANDS:  Unremarkable  KIDNEYS/URETERS:  Stable 2 8 cm left renal cyst with thin peripheral calcification  No hydronephrosis  STOMACH AND BOWEL:  3 2 cm duodenal diverticulum  There is mild diffuse thickening of the 2nd and 3rd portions of the duodenum    No bowel obstruction  Colonic diverticulosis without evidence of diverticulitis  Mild diffuse thickening of the ascending colon  APPENDIX:  A normal appendix was visualized  ABDOMINOPELVIC CAVITY:  No significant ascites  No pneumoperitoneum  Periportal lymph nodes measure up to 1 4 cm in short axis, stable  Mesenteric edema similar to prior exam  VESSELS:  Unremarkable for patient's age  PELVIS REPRODUCTIVE ORGANS:  Unremarkable for patient's age  URINARY BLADDER:  Unremarkable  ABDOMINAL WALL/INGUINAL REGIONS:  Mild subcutaneous edema  Skin thickening of the anterior abdominal wall pannus similar to prior exam  OSSEOUS STRUCTURES:  Comminuted and displaced fracture of the left humeral head/neck  Impression: 1  Comminuted and displaced fracture of the left femoral head/neck  2   No acute traumatic visceral injury identified within the chest, abdomen, or pelvis  3   Cirrhosis of the liver with stigmata of portal hypertension and splenomegaly  4   Mild diffuse thickening of the 2nd and 3rd portions of the duodenum, which may be due to duodenitis  5   Mild diffuse thickening of the ascending colon may be due to portal hypertensive colopathy  The study was marked in [facility] for immediate notification  Workstation performed: [id number]     Cardiac ep lab eps/ablations    Result Date: 2022  Narrative: ELECTROPHYSIOLOGY OPERATIVE REPORT PATIENT NAME: Akiko Gleason :  1945 MRN: [id number] Date of surgery: 22 Surgeon: Deshawn Rose MD Pt Location: Cath Lab PROCEDURE PERFORMED: 1)Dual Chamber Permanent Pacemaker Implantation- HIS/Left bundle pacing w deep septal lead to preserve narrow QRS Preoperative Medications: Vancomycin ANESTHESIA: Conscious sedation PREOPERATIVE DIAGNOSIS: Complete heart block POSTOPERATIVE DIAGNOSIS: Successful Dual Chamber Permanent Pacemaker implant  Same as Preop  Informed Consent: Risks, benefits, and alternatives discussed with patient and any family present   The patient understands risks, which include but are not limited to life threatening  bleeding, infection, air around lungs, blood around the heart and reoperation dislodged or malfunctioning device  Procedure Description: After informed consent was obtained, the patient was brought to the electrophysiology laboratory NPO  A time out was called and the patient was properly identified  The patient was pre-medicated as above  The left infraclavicular area was prepped and draped in the usual sterile fashion  After local anesthetic infiltration with 1% lidocaine, an incision was made below clavicle  The incision was extended down to the level of the pectoral fascia  A pocket was formed above the pectoral fascia using cautery and blunt dissection  Venous access was done with needle puncture using Seldinger technique in the axillary vein  A safe sheath introducer was advanced over a wire into the axillary vein, the wire was confirmed to be in the right atrium on flouroscopy, the wire was removed  Under fluoroscopic guidance the right atrial lead was placed in the right atrial appendage using a passive atrial lead, tissue contact was confirmed and good lead parameters were seen, the Safe-sheath was removed and the lead was tied down with 2-0 Ethibond sutures to the muscle  Under fluoroscopic guidance the right ventricular lead was positioned on the right ventricular septum basal septum, delivered with Medtronic sheath and screwed in deeply to to capture the left bunlde with narrow QRS with right bundle branch block pattern in V1,  we confirmed it to be intraseptal pacing with relatively high impedence and good threshold and sensing with unipolar pacing  After satisfactory ventricular sensing and pacing thresholds were confirmed, the lead was sewn to the pectoral fascia/muscle using 2-0 Ethibond sutures  Temporary pacemaker removed under fluoroscopy  A Innorange Oytronic Absorbable Antibiotic pouch was used   The lead and pulse generator were placed in the pre-pectoral pocket  The pocket was then closed with 3 layers of 2-0, 3-0, 4-0 -Vicryl suture was used to close the skin  EBL: Minimal Complications: None Contrast:none Findings: The patient tolerated the procedure well  Plan: Routine postoperative care, CXR  Follow-up in 2 weeks with incision check and interrogation  XR chest portable ICU    Result Date: 6/30/2022  Narrative: CHEST INDICATION:   post procedure cxr  COMPARISON:  Chest radiograph June 30, 2022 at 00:42 EXAM PERFORMED/VIEWS:  XR CHEST PORTABLE ICU FINDINGS:  Left-sided chest wall intracardiac device is identified  Leads are intact  Tip of left internal jugular central venous catheter projects over the superior cavoatrial junction  Cardiomediastinal silhouette appears unremarkable  The lungs are clear  No pneumothorax or pleural effusion  Left humeral head fracture is similar in appearance to prior study  Impression: No focal consolidation, pleural effusion, or pneumothorax  Workstation performed: [id number]     XR chest portable ICU    Result Date: 6/29/2022  Narrative: CHEST INDICATION:   central line placement  COMPARISON:  6/29/2022 at 9:40 AM EXAM PERFORMED/VIEWS:  XR CHEST PORTABLE ICU  3:37 PM FINDINGS:  Lung volumes are within normal limits  Lungs are clear  No effusion or pneumothorax  Heart, mediastinal and hilar structures are within normal limits for age and technique  Right IJ transvenous pacer in the right ventricle  New left IJ central venous catheter at the cavoatrial junction  No acute osseous or soft tissue pathology  Impression: New central venous catheter in standard position  No acute cardiopulmonary findings  Workstation performed: [id number]     XR chest portable ICU    Result Date: 6/29/2022  Narrative: CHEST INDICATION:   post pacer wire cordis placement  COMPARISON:  Chest radiograph and CT from 6/29/2022   EXAM PERFORMED/VIEWS:  XR CHEST PORTABLE ICU FINDINGS:  Right jugular transvenous pacemaker in right ventricle  Cardiomediastinal silhouette appears unremarkable  The lungs are clear  No pneumothorax or pleural effusion  Osseous structures appear within normal limits for patient age  Impression: Right jugular transvenous pacemaker in right ventricle with no pneumothorax  Workstation performed: [id number]     [address] bedside procedure    Result Date: 6/29/2022  Narrative: 1 2 [id number] [id number]  2 446 4649 [id number] 42 1    Echo complete w/ contrast if indicated    Result Date: 6/29/2022  Narrative: Aric Flores  Left Ventricle: Left ventricular cavity size is moderately dilated  Wall thickness is normal  There is eccentric hypertrophy  The left ventricular ejection fraction is 55%  Systolic function is normal  Diastolic function is abnormal    Left Atrium: The atrium is mildly dilated    Right Atrium: The atrium is mildly dilated    Mitral Valve: There is moderate annular calcification  The annulus is severely dilated  There is mild regurgitation    Tricuspid Valve: There is mild regurgitation    The following segments are akinetic: apical anterior, apical septal, apical inferior, apical lateral and apex    The following segments are hyperkinetic: basal anterior, basal anteroseptal, basal inferoseptal, basal inferior, basal inferolateral, basal anterolateral, mid anterior, mid anteroseptal, mid inferoseptal, mid inferior, mid inferolateral and mid anterolateral    Right Ventricle: Systolic function is moderately reduced  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  Patient in sinus rhythm with complete heart block and junctional escape with associated calcific changes of the aortic root and mitral annulus  Echo follow up/limited w/ contrast if indicated    Result Date: 6/29/2022  Narrative: Aric Flores  Left Ventricle: Left ventricular cavity size is normal  The left ventricular ejection fraction is 55%   Systolic function is normal    IVS: There is abnormal septal motion consistent with right ventricular pacing    Right Ventricle: A pacer wire is present    Mitral Valve: There is severe annular calcification    Tricuspid Valve: There is mild to moderate regurgitation  The tricuspid valve regurgitation jet is central  The right ventricular systolic pressure is moderately to severely elevated  The estimated right ventricular systolic pressure is 16 60 mmHg    Left Atrium: The atrium is mildly dilated    The following segments are akinetic: apical anterior, apical septal, apical inferior, apical lateral and apex    The following segments are hyperkinetic: basal anterior, basal anteroseptal, basal inferoseptal, basal inferior, basal inferolateral, basal anterolateral, mid anterior, mid anteroseptal, mid inferoseptal, mid inferior, mid inferolateral and mid anterolateral    Pericardium: There is no pericardial effusion  The pericardium is normal in appearance  EKG personally reviewed by Ivania Ordoñez MD      Counseling / Coordination of Care  Total floor / unit time spent today 30 minutes  Greater than 50% of total time was spent with the patient and / or family counseling and / or coordination of care

## 2022-07-03 NOTE — PROGRESS NOTES
[address] [facility]  Progress Note Natasha Ba 1945, 77 y o  female MRN: [id number]  Unit/Bed#: [facility] 812-01 Encounter: [id number]  Primary Care Provider: Hailee Burger DO   Date and time admitted to hospital: 6/29/2022  3:20 AM    Acute blood loss anemia  Assessment & Plan  -hgb has been slowly downtrending over this admission  -6 9 on 07/03 check   -will transfuse one unit with plan to recheck later in the day for response      Cardiomyopathy [facility])  Assessment & Plan  · Dilated CM on echo with persevered EF 55%, apical hypokinesis concerning for stress-CM, no prior echo   · Cards plan for repeat echo on 7/4 to eval for improvement in wall motion abnormalities suggesting a stress CM  · If no improvement will need ischemic work-up    Depression  Assessment & Plan  · Continue home lexapro 5 mg, per geriatrics consider increasing to 10 mg daily if symptoms inadequately controlled     Closed fracture of proximal end of left humerus  Assessment & Plan  · Orthopedics consulted - nonoperative management   · NV intact   · NWB LUE, sling for comfort   · Multimodal analgesia     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) ([address] )  Assessment & Plan  · With hx of hepatic encephalopathy and gastric varices without bleeding   · Continue home rifaximin, lactulose and PPI   · Holding home nadolol with current normotension - restart as BP tolerates  · Holding home spironolactone - restart as hemodynamics improve   · Outpatient f/u with GI    Type 2 diabetes mellitus with hyperglycemia, with long-term current use of insulin [facility])  Assessment & Plan  Lab Results   Component Value Date    HGBA1C 8 2 (H) 05/31/2022       Recent Labs     07/02/22  2335 07/03/22  0509 07/03/22  0730 07/03/22  1110   POCGLU 322* 260* 195* 287*       Blood Sugar Average: Last 72 hrs:  (P) 208 [id number]     · Home regimen: Lantus 48 U AM and 36 U in PM, Amaryl, Sitagliptin-Metformin · Current regimen: Lantus 30 U BID - increase back to home regimen as BG tolerates  · ISS increase as needed     Acquired hypothyroidism  Assessment & Plan  · Home levothyroxine     * Complete heart block (HCC)  Assessment & Plan  · Requiring vasopressors and TVP  · S/p Biventricular PPM 6/29  · Levophed has been weaned to off   · Continue telemetry monitoring   · Will need 2 week device check with EP           Disposition: Continue med/surg    SUBJECTIVE:  Chief Complaint: Complete Heart block and closed fx of humerus Left    Subjective: no acute complaints this morning  No acute events overnight  OBJECTIVE:   Vitals:   Temp:  [97 8 °F (36 6 °C)-99 °F (37 2 °C)] 98 7 °F (37 1 °C)  HR:  [63-73] 63  Resp:  [16-24] 16  BP: ()/(40-98) 123/50    Intake/Output:  I/O       07/01 0701  07/02 0700 07/02 0701  07/03 0700 07/03 0701  07/04 0700    P  O  720 800     I V  (mL/kg) 191 8 (2 1)      Total Intake(mL/kg) 911 8 (10 1) 800 (8 8)     Urine (mL/kg/hr) 975 (0 4) 900 (0 4) 800 (1 5)    Stool 0      Total Output 975 900 800    Net -63 2 -100 -800           Unmeasured Urine Occurrence 1 x      Unmeasured Stool Occurrence 1 x           Nutrition: Diet Venkatesh/CHO Controlled; Consistent Carbohydrate Diet Level 1 (4 carb servings/60 grams CHO/meal); Sodium 4 GM (LEWIS)  GI Proph/Bowel Reg: Sennokot  VTE Prophylaxis:Enoxaparin (Lovenox)     Physical Exam:   GENERAL APPEARANCE: No acute distress  NEURO: alert and oriented x4  HEENT: Normocephalic and atraumatic  CV: RRR  LUNGS: CTAB  GI: Soft and nontender  : deferred  MSK: Left upper extremity and sling  ROM otherwise intact  SKIN: warm and dry  Invasive Devices  Report    Peripheral Intravenous Line  Duration           Peripheral IV 06/29/22 Right Hand 4 days    Peripheral IV 06/29/22 Dorsal (posterior); Left Hand 3 days    Peripheral IV 07/03/22 Distal;Right;Ventral (anterior) Forearm <1 day          Line  Duration           Pacer Wires 4 days          Drain Duration           External Urinary Catheter <1 day                      Lab Results:   BMP/CMP:   Lab Results   Component Value Date    SODIUM 135 (L) 07/03/2022    K 5 2 07/03/2022     07/03/2022    CO2 24 07/03/2022    BUN 33 (H) 07/03/2022    CREATININE 1 17 07/03/2022    CALCIUM 8 1 (L) 07/03/2022    AST 30 07/02/2022    ALT 27 07/02/2022    ALKPHOS 56 07/02/2022    EGFR 45 07/03/2022    and CBC:   Lab Results   Component Value Date    WBC 2 61 (L) 07/03/2022    HGB 6 9 (LL) 07/03/2022    HCT 22 2 (L) 07/03/2022    MCV 89 07/03/2022    PLT 58 (L) 07/03/2022    MCH 27 6 07/03/2022    MCHC 31 1 (L) 07/03/2022    RDW 15 4 (H) 07/03/2022    MPV 13 1 (H) 07/03/2022    NRBC 0 07/02/2022     Imaging/EKG Studies: I have personally reviewed pertinent reports

## 2022-07-03 NOTE — PLAN OF CARE
Problem: OCCUPATIONAL THERAPY ADULT  Goal: Performs self-care activities at highest level of function for planned discharge setting  See evaluation for individualized goals  Description: Treatment Interventions: ADL retraining, Functional transfer training, UE strengthening/ROM, Endurance training, Patient/family training, Cognitive reorientation, Equipment evaluation/education, Fine motor coordination activities, Compensatory technique education, Continued evaluation, Energy conservation, Activityengagement          See flowsheet documentation for full assessment, interventions and recommendations  Outcome: Progressing  Note: Limitation: Decreased UE strength, Decreased ADL status, Decreased UE ROM, Decreased Safe judgement during ADL, Decreased cognition, Decreased endurance, Decreased self-care trans, Decreased high-level ADLs, Decreased fine motor control  Prognosis: Fair  Assessment: Pt seen for participation in occupational therapy with focus on activity tolerance, functional transfers/commode transfer toileting and UB self-care  Pt cleared by RN/Carlton for pt participation in OT session  Pt presented sitting out of bed to bedside chair upon initiation of OT session and agreeable to participate in therapy following pt identifiers confirmed  Pt required assist for stand pivot transfer to bedside commode 2* to pt deconditioning  She required assistance for toilet hygiene 2* to coordination and balance  Pt will require post acute rehab services to continue to address pt's above-noted deficits which currently impair pt's ADL and functional mob  Pt returned to bedside chair post OT session, chair alarm activated and all needs within reach    Recommendation: (S) Physiatry Consult  OT Discharge Recommendation: Post acute rehabilitation services  OT - OK to Discharge: Yes (when medically stable)

## 2022-07-04 LAB
ABO GROUP BLD BPU: NORMAL
ANION GAP SERPL CALCULATED.3IONS-SCNC: 4 MMOL/L (ref 4–13)
BASOPHILS # BLD AUTO: 0.03 THOUSANDS/ΜL (ref 0–0.1)
BASOPHILS NFR BLD AUTO: 1 % (ref 0–1)
BPU ID: NORMAL
BUN SERPL-MCNC: 36 MG/DL (ref 5–25)
CALCIUM SERPL-MCNC: 8.2 MG/DL (ref 8.3–10.1)
CHLORIDE SERPL-SCNC: 107 MMOL/L (ref 100–108)
CO2 SERPL-SCNC: 26 MMOL/L (ref 21–32)
CREAT SERPL-MCNC: 0.96 MG/DL (ref 0.6–1.3)
CROSSMATCH: NORMAL
EOSINOPHIL # BLD AUTO: 0.08 THOUSAND/ΜL (ref 0–0.61)
EOSINOPHIL NFR BLD AUTO: 3 % (ref 0–6)
ERYTHROCYTE [DISTWIDTH] IN BLOOD BY AUTOMATED COUNT: 17 % (ref 11.6–15.1)
GFR SERPL CREATININE-BSD FRML MDRD: 58 ML/MIN/1.73SQ M
GLUCOSE SERPL-MCNC: 110 MG/DL (ref 65–140)
GLUCOSE SERPL-MCNC: 119 MG/DL (ref 65–140)
GLUCOSE SERPL-MCNC: 233 MG/DL (ref 65–140)
GLUCOSE SERPL-MCNC: 298 MG/DL (ref 65–140)
GLUCOSE SERPL-MCNC: 330 MG/DL (ref 65–140)
HCT VFR BLD AUTO: 26.2 % (ref 34.8–46.1)
HGB BLD-MCNC: 8 G/DL (ref 11.5–15.4)
IMM GRANULOCYTES # BLD AUTO: 0.01 THOUSAND/UL (ref 0–0.2)
IMM GRANULOCYTES NFR BLD AUTO: 0 % (ref 0–2)
LYMPHOCYTES # BLD AUTO: 0.62 THOUSANDS/ΜL (ref 0.6–4.47)
LYMPHOCYTES NFR BLD AUTO: 25 % (ref 14–44)
MCH RBC QN AUTO: 27.2 PG (ref 26.8–34.3)
MCHC RBC AUTO-ENTMCNC: 30.5 G/DL (ref 31.4–37.4)
MCV RBC AUTO: 89 FL (ref 82–98)
MONOCYTES # BLD AUTO: 0.34 THOUSAND/ΜL (ref 0.17–1.22)
MONOCYTES NFR BLD AUTO: 14 % (ref 4–12)
NEUTROPHILS # BLD AUTO: 1.43 THOUSANDS/ΜL (ref 1.85–7.62)
NEUTS SEG NFR BLD AUTO: 57 % (ref 43–75)
NRBC BLD AUTO-RTO: 0 /100 WBCS
PLATELET # BLD AUTO: 60 THOUSANDS/UL (ref 149–390)
PMV BLD AUTO: 11.8 FL (ref 8.9–12.7)
POTASSIUM SERPL-SCNC: 4.6 MMOL/L (ref 3.5–5.3)
RBC # BLD AUTO: 2.94 MILLION/UL (ref 3.81–5.12)
SODIUM SERPL-SCNC: 137 MMOL/L (ref 136–145)
UNIT DISPENSE STATUS: NORMAL
UNIT PRODUCT CODE: NORMAL
UNIT PRODUCT VOLUME: 350 ML
UNIT RH: NORMAL
WBC # BLD AUTO: 2.51 THOUSAND/UL (ref 4.31–10.16)

## 2022-07-04 PROCEDURE — 97530 THERAPEUTIC ACTIVITIES: CPT

## 2022-07-04 PROCEDURE — 82948 REAGENT STRIP/BLOOD GLUCOSE: CPT

## 2022-07-04 PROCEDURE — 97112 NEUROMUSCULAR REEDUCATION: CPT

## 2022-07-04 PROCEDURE — 99232 SBSQ HOSP IP/OBS MODERATE 35: CPT | Performed by: INTERNAL MEDICINE

## 2022-07-04 PROCEDURE — 99232 SBSQ HOSP IP/OBS MODERATE 35: CPT | Performed by: STUDENT IN AN ORGANIZED HEALTH CARE EDUCATION/TRAINING PROGRAM

## 2022-07-04 PROCEDURE — 85025 COMPLETE CBC W/AUTO DIFF WBC: CPT | Performed by: PHYSICIAN ASSISTANT

## 2022-07-04 PROCEDURE — 80048 BASIC METABOLIC PNL TOTAL CA: CPT | Performed by: PHYSICIAN ASSISTANT

## 2022-07-04 PROCEDURE — 97116 GAIT TRAINING THERAPY: CPT

## 2022-07-04 RX ORDER — LOSARTAN POTASSIUM 25 MG/1
25 TABLET ORAL DAILY
Status: DISCONTINUED | OUTPATIENT
Start: 2022-07-05 | End: 2022-07-05 | Stop reason: HOSPADM

## 2022-07-04 RX ORDER — ENOXAPARIN SODIUM 100 MG/ML
30 INJECTION SUBCUTANEOUS EVERY 12 HOURS SCHEDULED
Status: DISCONTINUED | OUTPATIENT
Start: 2022-07-04 | End: 2022-07-05 | Stop reason: HOSPADM

## 2022-07-04 RX ORDER — INSULIN LISPRO 100 [IU]/ML
2-12 INJECTION, SOLUTION INTRAVENOUS; SUBCUTANEOUS
Status: DISCONTINUED | OUTPATIENT
Start: 2022-07-04 | End: 2022-07-05 | Stop reason: HOSPADM

## 2022-07-04 RX ADMIN — RIFAXIMIN 550 MG: 550 TABLET ORAL at 08:46

## 2022-07-04 RX ADMIN — HYDROMORPHONE HYDROCHLORIDE 0.2 MG: 0.2 INJECTION, SOLUTION INTRAMUSCULAR; INTRAVENOUS; SUBCUTANEOUS at 19:40

## 2022-07-04 RX ADMIN — INSULIN LISPRO 4 UNITS: 100 INJECTION, SOLUTION INTRAVENOUS; SUBCUTANEOUS at 21:04

## 2022-07-04 RX ADMIN — LIDOCAINE 5% 1 PATCH: 700 PATCH TOPICAL at 08:46

## 2022-07-04 RX ADMIN — LACTULOSE 10 G: 20 SOLUTION ORAL at 08:46

## 2022-07-04 RX ADMIN — PANTOPRAZOLE SODIUM 40 MG: 40 TABLET, DELAYED RELEASE ORAL at 06:21

## 2022-07-04 RX ADMIN — ASPIRIN 81 MG CHEWABLE TABLET 81 MG: 81 TABLET CHEWABLE at 08:46

## 2022-07-04 RX ADMIN — PRAVASTATIN SODIUM 80 MG: 80 TABLET ORAL at 16:24

## 2022-07-04 RX ADMIN — ENOXAPARIN SODIUM 40 MG: 40 INJECTION SUBCUTANEOUS at 08:47

## 2022-07-04 RX ADMIN — RIFAXIMIN 550 MG: 550 TABLET ORAL at 21:04

## 2022-07-04 RX ADMIN — INSULIN GLARGINE 30 UNITS: 100 INJECTION, SOLUTION SUBCUTANEOUS at 08:46

## 2022-07-04 RX ADMIN — ENOXAPARIN SODIUM 30 MG: 30 INJECTION SUBCUTANEOUS at 21:03

## 2022-07-04 RX ADMIN — LEVOTHYROXINE SODIUM 112 MCG: 112 TABLET ORAL at 06:21

## 2022-07-04 RX ADMIN — INSULIN LISPRO 8 UNITS: 100 INJECTION, SOLUTION INTRAVENOUS; SUBCUTANEOUS at 12:29

## 2022-07-04 RX ADMIN — OXYCODONE HYDROCHLORIDE 5 MG: 5 TABLET ORAL at 08:46

## 2022-07-04 RX ADMIN — SENNOSIDES AND DOCUSATE SODIUM 1 TABLET: 8.6; 5 TABLET ORAL at 21:04

## 2022-07-04 RX ADMIN — DOCUSATE SODIUM 100 MG: 100 CAPSULE, LIQUID FILLED ORAL at 17:48

## 2022-07-04 RX ADMIN — LACTULOSE 10 G: 20 SOLUTION ORAL at 17:48

## 2022-07-04 RX ADMIN — Medication 6 MG: at 21:04

## 2022-07-04 RX ADMIN — INSULIN LISPRO 6 UNITS: 100 INJECTION, SOLUTION INTRAVENOUS; SUBCUTANEOUS at 16:01

## 2022-07-04 RX ADMIN — DOCUSATE SODIUM 100 MG: 100 CAPSULE, LIQUID FILLED ORAL at 08:46

## 2022-07-04 RX ADMIN — INSULIN GLARGINE 30 UNITS: 100 INJECTION, SOLUTION SUBCUTANEOUS at 21:03

## 2022-07-04 RX ADMIN — ESCITALOPRAM 5 MG: 5 TABLET, FILM COATED ORAL at 08:46

## 2022-07-04 NOTE — ASSESSMENT & PLAN NOTE
-hgb has been slowly downtrending over this admission  -6 9 on 07/03 check   -will transfuse one unit with plan to recheck later in the day for response    - after one unit pRBCS, Hgb now 8 5  -continue to trend daily

## 2022-07-04 NOTE — PROGRESS NOTES
[address] [facility]  Progress Note Tom Perkins 1945, 77 y o  female MRN: [id number]  Unit/Bed#: [facility] 812-01 Encounter: [id number]  Primary Care Provider: Yadiel Reynolds DO   Date and time admitted to hospital: 6/29/2022  3:20 AM    Acute blood loss anemia  Assessment & Plan  -hgb has been slowly downtrending over this admission  -6 9 on 07/03 check   -will transfuse one unit with plan to recheck later in the day for response    - after one unit pRBCS, Hgb now 8 5  -continue to trend daily    Cardiomyopathy [facility])  Assessment & Plan  · Dilated CM on echo with persevered EF 55%, apical hypokinesis concerning for stress-CM, no prior echo   · Cards plan for repeat echo on 7/4 to eval for improvement in wall motion abnormalities suggesting a stress CM  · If no improvement will need ischemic work-up    Depression  Assessment & Plan  · Continue home lexapro 5 mg, per geriatrics consider increasing to 10 mg daily if symptoms inadequately controlled     Closed fracture of proximal end of left humerus  Assessment & Plan  · Orthopedics consulted - nonoperative management   · NV intact   · NWB LUE, sling for comfort   · Multimodal analgesia     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) ([facility] [address] )  Assessment & Plan  · With hx of hepatic encephalopathy and gastric varices without bleeding   · Continue home rifaximin, lactulose and PPI   · Holding home nadolol with current normotension - restart as BP tolerates  · Holding home spironolactone - restart as hemodynamics improve   · Outpatient f/u with GI    Type 2 diabetes mellitus with hyperglycemia, with long-term current use of insulin [facility])  Assessment & Plan  Lab Results   Component Value Date    HGBA1C 8 2 (H) 05/31/2022       Recent Labs     07/03/22  1110 07/03/22  1609 07/03/22  2118 07/03/22  2343   POCGLU 287* 264* 190* 192*       Blood Sugar Average: Last 72 hrs:  (P) 231 55     · Home regimen: Lantus 48 U AM and 36 U in PM, Amaryl, Sitagliptin-Metformin   · Current regimen: Lantus 30 U BID - increase back to home regimen as BG tolerates  · ISS increase as needed     Acquired hypothyroidism  Assessment & Plan  · Home levothyroxine     * Complete heart block (HCC)  Assessment & Plan  · Requiring vasopressors and TVP  · S/p Biventricular PPM 6/29  · Levophed has been weaned to off   · Continue telemetry monitoring   · Will need 2 week device check with EP           Disposition: continue med/surg    SUBJECTIVE:  Chief Complaint:  Complete heart block and closed fracture of the proximal in the left tumors    Subjective:  No acute events overnight  Patient was transfused 1 unit PRBCs yesterday  Appropriate response of hemoglobin from 6 3 to 8 5  OBJECTIVE:   Vitals:   Temp:  [98 3 °F (36 8 °C)-99 °F (37 2 °C)] 98 5 °F (36 9 °C)  HR:  [60-72] 68  Resp:  [14-18] 16  BP: ()/(40-52) 113/49    Intake/Output:  I/O       07/02 0701  07/03 0700 07/03 0701  07/04 0700    P  O  800 240    Blood  406 3    Total Intake(mL/kg) 800 (8 8) 646 3 (7 1)    Urine (mL/kg/hr) 900 (0 4) 1150 (0 5)    Total Output 900 1150    Net -100 -503 8               Nutrition: Diet Venkatesh/CHO Controlled; Consistent Carbohydrate Diet Level 1 (4 carb servings/60 grams CHO/meal); Sodium 4 GM (LEWIS)  GI Proph/Bowel Reg:  Senokot  VTE Prophylaxis:Enoxaparin (Lovenox)     Physical Exam:   GENERAL APPEARANCE:  No acute distress  NEURO:  Alert and oriented x4  HEENT:  Normocephalic and atraumatic  CV:  Regular in rhythm  No murmurs rubs or gallops  LUNGS:  Clear to auscultation bilaterally  GI:  Soft nontender abdomen  :  Deferred  MSK:  Left upper extremity sling  Range of motion otherwise intact  No peripheral edema    SKIN:  Warm and dry    Invasive Devices  Report    Peripheral Intravenous Line  Duration           Peripheral IV 07/03/22 Distal;Right;Ventral (anterior) Forearm <1 day          Line  Duration           Pacer Wires 4 days Lab Results:   BMP/CMP:   Lab Results   Component Value Date    SODIUM 137 07/04/2022    K 4 6 07/04/2022     07/04/2022    CO2 26 07/04/2022    BUN 36 (H) 07/04/2022    CREATININE 0 96 07/04/2022    CALCIUM 8 2 (L) 07/04/2022    EGFR 58 07/04/2022    and CBC:   Lab Results   Component Value Date    HGB 8 5 (L) 07/03/2022    HCT 28 6 (L) 07/03/2022

## 2022-07-04 NOTE — PROGRESS NOTES
Cardiology Progress Note - Elena Aguilar 77 y o  female MRN: [id number]    Unit/Bed#: [facility] 812-01 Encounter: [id number]      Assessment:  Principal Problem:    Complete heart block (HCC)  Active Problems:    Acquired hypothyroidism    Type 2 diabetes mellitus with hyperglycemia, with long-term current use of insulin (HCC)    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) (HCC)    Closed fracture of proximal end of left humerus    Depression    Cardiomyopathy ([address] )    Acute blood loss anemia      Plan:  Patient has no chest pain or significant issue  Continues with splint on left arm  She is in sinus rhythm on telemetry with appropriate V pacing  BMP today with potassium of 4 6 and creatinine of  96  Vital signs stable  Will recheck echocardiogram to assess LV wall motion and systolic function given cardiomyopathy noted on prior study possibly stress related  Given that blood pressure is improved will start low-dose losartan in reference to cardiomyopathy  Subjective:   Patient seen and examined  No significant events overnight   negative  Objective:     Vitals: Blood pressure (!) 116/49, pulse 66, temperature 98 5 °F (36 9 °C), resp  rate 16, height 5' 3" (1 6 m), weight 92 2 kg (203 lb 4 2 oz), SpO2 97 %, not currently breastfeeding , Body mass index is 36 01 kg/m² ,   Orthostatic Blood Pressures    Flowsheet Row Most Recent Value   Blood Pressure 116/49 filed at 07/04/2022 0750   Patient Position - Orthostatic VS Lying filed at 07/03/2022 1541      ,      Intake/Output Summary (Last 24 hours) at 7/4/2022 1041  Last data filed at 7/4/2022 0600  Gross per 24 hour   Intake 646 25 ml   Output 900 ml   Net -253 75 ml       No significant arrhythmias seen on telemetry review         Physical Exam:    GEN: Nneka Rogers appears well, alert and oriented x 3, pleasant and cooperative   NECK: supple, no carotid bruits, no JVD or HJR  HEART: normal rate, regular rhythm, normal S1 and S2, no murmurs, clicks, gallops or rubs   LUNGS: clear to auscultation bilaterally; no wheezes, rales, or rhonchi   ABDOMEN: normal bowel sounds, soft, no tenderness, no distention  EXTREMITIES: peripheral pulses normal; no clubbing, cyanosis, or edema  SKIN: warm and well perfused, no suspicious lesions on exposed skin    Labs & Results:    No results displayed because visit has over 200 results  CT abdomen pelvis wo contrast    Result Date: 6/11/2022  Narrative: CT ABDOMEN AND PELVIS WITHOUT IV CONTRAST INDICATION:   X08 13: Nonalcoholic steatohepatitis (PAUL) K74 60: Unspecified cirrhosis of liver R10 30: Lower abdominal pain, unspecified  COMPARISON:  CT abdomen pelvis 11/18/2005 TECHNIQUE:  CT examination of the abdomen and pelvis was performed without intravenous contrast  This examination was performed without intravenous contrast in the context of the critical nationwide Omnipaque shortage  Axial, sagittal, and coronal 2D reformatted images were created from the source data and submitted for interpretation  Radiation dose length product (DLP) for this visit:  1211 mGy-cm   This examination, like all CT scans performed in the [facility], was performed utilizing techniques to minimize radiation dose exposure, including the use of iterative reconstruction and automated exposure control  Enteric contrast was administered  FINDINGS: ABDOMEN LOWER CHEST:  Partially imaged cardiomegaly and mitral/aortic annulus calcifications  Clear lung bases  LIVER/BILIARY TREE:  The liver demonstrates cirrhotic morphology  Within the limitations of this examination there is no evidence of suspicious hepatic mass  No biliary dilatation  GALLBLADDER:  Gallbladder is surgically absent  SPLEEN:  The spleen is enlarged, measuring 15 7 cm in craniocaudal length PANCREAS:  Unremarkable  ADRENAL GLANDS:  Unremarkable  KIDNEYS/URETERS:  2 8 cm left lower pole  cyst with thin calcified septations   No hydronephrosis  STOMACH AND BOWEL: Uncomplicated, debris-filled duodenal diverticulum  There is colonic diverticulosis without evidence of acute diverticulitis  APPENDIX:  Noninflamed  ABDOMINOPELVIC CAVITY:  No ascites  No pneumoperitoneum  No lymphadenopathy  VESSELS:  Diffuse mesenteric edema and mesenteric vascular engorgement indicating portal hypertension  PELVIS REPRODUCTIVE ORGANS:  Unremarkable for patient's age  URINARY BLADDER:  Unremarkable  ABDOMINAL WALL/INGUINAL REGIONS:  Body wall edema in keeping with anasarca  No collections  OSSEOUS STRUCTURES:  No acute fracture or destructive osseous lesion  Impression: No acute inflammatory findings in the abdomen or pelvis  Hepatic cirrhosis; splenomegaly and mesenteric edema/vascular engorgement indicate portal hypertension  2 8 cm slightly complicated left lower pole renal cyst  Follow-up with renal ultrasound on a nonemergent basis  The study was marked in EPIC for significant notification  Workstation performed: [id number]     XR chest portable    Result Date: 6/30/2022  Narrative: CHEST INDICATION:   Patient s/p Pacemaker/ICD Insertion  COMPARISON:  Previous day EXAM PERFORMED/VIEWS:  XR CHEST PORTABLE FINDINGS: Left dual-lead pacemaker is in satisfactory position  Left internal jugular catheter tip terminates in the superior vena cava  Cardiomediastinal silhouette appears unremarkable  The lungs are clear  No pneumothorax or pleural effusion  Osseous structures appear within normal limits for patient age  Impression: Left pacemaker in satisfactory position  Workstation performed: [id number]     XR Trauma chest portable    Result Date: 6/29/2022  Narrative: CHEST INDICATION:  TRAUMA  Fall  COMPARISON:  9/13/2020 EXAM PERFORMED/VIEWS:  XR CHEST PORTABLE  AP supine FINDINGS: Cardiomediastinal silhouette appears unremarkable  The lungs are clear  No pneumothorax or pleural effusion  Degenerative changes of the spine       Impression: No acute cardiopulmonary disease  Workstation performed: [id number]     XR shoulder 1 vw left    Result Date: 6/29/2022  Narrative: LEFT SHOULDER INDICATION:   Evaluation of fracture  77 y o  right hand dominant female status post fall complaining of left shoulder pain  She states she slipped and fell on grass and had immediate shoulder pain  She has no numbness or tingling of the left upper extremity, but does have pain with shoulder  ROM  She was transferred from [facility] for her shoulder and further evaluation of her complete heart block  She ambulates using a scooter  The patient's entire past medical history was obtained directly from either the most recent orthopedic surgery notes in [facility]  The information was copied and pasted directly from Epic  COMPARISON:  Shoulder radiographs performed earlier in the morning  VIEWS:  XR SHOULDER 1 VW LEFT Images: 1 FINDINGS: No significant change in the displaced, impacted left humeral head/neck fracture  Degenerative changes within the shoulder and acromioclavicular joint  No lytic or blastic osseous lesion  Soft tissue swelling overlying the shoulder joint proper  Impression: No significant change in proximal left humeral fracture  Continued orthopedic surgery follow-up recommended  Workstation performed: [id number]     XR shoulder 2+ views LEFT    Result Date: 6/29/2022  Narrative: LEFT SHOULDER INDICATION:   injury  COMPARISON:  CT chest abdomen pelvis 6/29/2022 VIEWS:  XR SHOULDER 2+ VW LEFT FINDINGS: Again noted displaced comminuted fracture of the humeral head/neck  Mild osteoarthritis acromioclavicular joint  No lytic or blastic osseous lesion  Soft tissues are unremarkable  Impression: Again noted displaced comminuted fracture of the humeral head/neck  Workstation performed: [id number]     XR humerus left    Result Date: 6/29/2022  Narrative: LEFT HUMERUS INDICATION:   Evaluation of fracture joint above and below    77 y o  right hand dominant female status post fall complaining of left shoulder pain  She states she slipped and fell on grass and had immediate shoulder pain  She has no numbness or tingling of the left upper extremity, but does have pain with shoulder  ROM  She was transferred from [facility] for her shoulder and further evaluation of her complete heart block  She ambulates using a scooter  The patient's entire past medical history was obtained directly from the most recent orthopedic surgery notes in [facility]  The information was copied and pasted directly from Epic  COMPARISON:  Shoulder radiographs performed earlier in the morning  VIEWS:  XR HUMERUS LEFT Images: 2 FINDINGS: Stable appearance of the left humeral head/neck fracture, however study limited by positioning  Remainder of the osseous structures appear intact  Degenerative changes in the shoulder  Left elbow not well evaluated due to patient obliquity  No lytic or blastic osseous lesion  Soft tissues are unremarkable  Impression: 1  Stable appearance of the proximal left humerus fracture, however study limited by positioning  2   Limited evaluation of the elbow due to patient obliquity  If there is continued concern for elbow fracture, consider dedicated, repeat examination of the elbow  Workstation performed: [id number]     TRAUMA - CT head wo contrast    Result Date: 6/29/2022  Narrative: CT BRAIN - WITHOUT CONTRAST INDICATION:   TRAUMA  COMPARISON:  CT of the head on September 13, 2020  TECHNIQUE:  CT examination of the brain was performed  In addition to axial images, sagittal and coronal 2D reformatted images were created and submitted for interpretation  Radiation dose length product (DLP) for this visit:  867 18 mGy-cm   This examination, like all CT scans performed in the [facility], was performed utilizing techniques to minimize radiation dose exposure, including the use of iterative  reconstruction and automated exposure control  IMAGE QUALITY:  Diagnostic  FINDINGS: PARENCHYMA:  No intracranial mass, mass effect or midline shift  No CT signs of acute infarction  No acute parenchymal hemorrhage  VENTRICLES AND EXTRA-AXIAL SPACES:  Normal for the patient's age  VISUALIZED ORBITS AND PARANASAL SINUSES:  Bilateral ocular lens replacements  CALVARIUM AND EXTRACRANIAL SOFT TISSUES:  Normal      Impression: No intracranial hemorrhage or calvarial fracture  Workstation performed: [id number]     TRAUMA - CT spine cervical wo contrast    Result Date: 6/29/2022  Narrative: CT CERVICAL SPINE - WITHOUT CONTRAST INDICATION:   TRAUMA  COMPARISON:  None  TECHNIQUE:  CT examination of the cervical spine was performed without intravenous contrast   Contiguous axial images were obtained  Sagittal and coronal reconstructions were performed  Radiation dose length product (DLP) for this visit:  437 mGy-cm   This examination, like all CT scans performed in the [facility], was performed utilizing techniques to minimize radiation dose exposure, including the use of iterative reconstruction and automated exposure control  IMAGE QUALITY:  Diagnostic  FINDINGS: ALIGNMENT:  No significant subluxation  VERTEBRAL BODIES:  No acute fracture  Schmorl's node at the inferior endplate of C6  DEGENERATIVE CHANGES:  Moderate multilevel cervical degenerative changes are noted  Disc space narrowing is most pronounced at C3-C4, C4-C5, and C6-C7 where there is mild bony spinal canal narrowing  PREVERTEBRAL AND PARASPINAL SOFT TISSUES:  Calcifications at the carotid bifurcations  THORACIC INLET:  Please refer to the concurrent chest, abdomen, and pelvic CT report for description of the thoracic inlet findings  Impression: No cervical spine fracture or traumatic malalignment  Workstation performed: [id number]     XR Trauma pelvis ap only 1 or 2 vw    Result Date: 6/29/2022  Narrative: PELVIS INDICATION:   TRAUMA  Fall   COMPARISON:  CT abdomen and pelvis 6/8/2022 VIEWS:  XR PELVIS AP ONLY 1 VW FINDINGS: No acute fracture or hip dislocation  Mild bilateral hip osteoarthritis  No lytic or blastic osseous lesion  Soft tissues are unremarkable  Degenerative changes pubic symphysis and visualized lower lumbar spine  Impression: No acute osseous abnormality  Followup imaging may be obtained for any persistent or worsening symptoms  Workstation performed: [id number]     TRAUMA - CT chest abdomen pelvis w contrast    Addendum Date: 6/29/2022 Addendum:   ADDENDUM: There is an error in the impression of the report  Number 1 of the impression should state: Comminuted and displaced fracture of the left HUMERAL head/neck  Result Date: 6/29/2022  Narrative: CT CHEST, ABDOMEN AND PELVIS WITH IV CONTRAST INDICATION:   TRAUMA  COMPARISON:  CT of abdomen pelvis on June 8, 2022  TECHNIQUE: CT examination of the chest, abdomen and pelvis was performed  Axial, sagittal, and coronal 2D reformatted images were created from the source data and submitted for interpretation  Radiation dose length product (DLP) for this visit:  1452 46 mGy-cm   This examination, like all CT scans performed in the [facility], was performed utilizing techniques to minimize radiation dose exposure, including the use of iterative reconstruction and automated exposure control  IV Contrast:  65 mL of iohexol (OMNIPAQUE) Enteric Contrast: Enteric contrast was not administered  FINDINGS: CHEST LUNGS:  Lungs are clear  There is no tracheal or endobronchial lesion  PLEURA:  Unremarkable  HEART/GREAT VESSELS: Coronary artery calcifications  Mitral annular calcification  No thoracic aortic aneurysm  MEDIASTINUM AND BRIGID:  Unremarkable  CHEST WALL AND LOWER NECK:  Unremarkable  ABDOMEN LIVER/BILIARY TREE:  Cirrhotic morphology of the liver  GALLBLADDER:  Gallbladder is surgically absent  SPLEEN:  Splenomegaly measuring 15 5 cm in craniocaudal dimension, not significantly changed  PANCREAS:  Atrophic   ADRENAL GLANDS: Unremarkable  KIDNEYS/URETERS:  Stable 2 8 cm left renal cyst with thin peripheral calcification  No hydronephrosis  STOMACH AND BOWEL:  3 2 cm duodenal diverticulum  There is mild diffuse thickening of the 2nd and 3rd portions of the duodenum  No bowel obstruction  Colonic diverticulosis without evidence of diverticulitis  Mild diffuse thickening of the ascending colon  APPENDIX:  A normal appendix was visualized  ABDOMINOPELVIC CAVITY:  No significant ascites  No pneumoperitoneum  Periportal lymph nodes measure up to 1 4 cm in short axis, stable  Mesenteric edema similar to prior exam  VESSELS:  Unremarkable for patient's age  PELVIS REPRODUCTIVE ORGANS:  Unremarkable for patient's age  URINARY BLADDER:  Unremarkable  ABDOMINAL WALL/INGUINAL REGIONS:  Mild subcutaneous edema  Skin thickening of the anterior abdominal wall pannus similar to prior exam  OSSEOUS STRUCTURES:  Comminuted and displaced fracture of the left humeral head/neck  Impression: 1  Comminuted and displaced fracture of the left femoral head/neck  2   No acute traumatic visceral injury identified within the chest, abdomen, or pelvis  3   Cirrhosis of the liver with stigmata of portal hypertension and splenomegaly  4   Mild diffuse thickening of the 2nd and 3rd portions of the duodenum, which may be due to duodenitis  5   Mild diffuse thickening of the ascending colon may be due to portal hypertensive colopathy  The study was marked in [facility] for immediate notification   Workstation performed: [id number]     Cardiac ep lab eps/ablations    Result Date: 2022  Narrative: ELECTROPHYSIOLOGY OPERATIVE REPORT PATIENT NAME: Modesto Ortiz :  1945 MRN: [id number] Date of surgery: 22 Surgeon: Vivian Benz MD Pt Location: Cath Lab PROCEDURE PERFORMED: 1)Dual Chamber Permanent Pacemaker Implantation- HIS/Left bundle pacing w deep septal lead to preserve narrow QRS Preoperative Medications: Vancomycin ANESTHESIA: Conscious sedation PREOPERATIVE DIAGNOSIS: Complete heart block POSTOPERATIVE DIAGNOSIS: Successful Dual Chamber Permanent Pacemaker implant  Same as Preop  Informed Consent: Risks, benefits, and alternatives discussed with patient and any family present  The patient understands risks, which include but are not limited to life threatening  bleeding, infection, air around lungs, blood around the heart and reoperation dislodged or malfunctioning device  Procedure Description: After informed consent was obtained, the patient was brought to the electrophysiology laboratory NPO  A time out was called and the patient was properly identified  The patient was pre-medicated as above  The left infraclavicular area was prepped and draped in the usual sterile fashion  After local anesthetic infiltration with 1% lidocaine, an incision was made below clavicle  The incision was extended down to the level of the pectoral fascia  A pocket was formed above the pectoral fascia using cautery and blunt dissection  Venous access was done with needle puncture using Seldinger technique in the axillary vein  A safe sheath introducer was advanced over a wire into the axillary vein, the wire was confirmed to be in the right atrium on flouroscopy, the wire was removed  Under fluoroscopic guidance the right atrial lead was placed in the right atrial appendage using a passive atrial lead, tissue contact was confirmed and good lead parameters were seen, the Safe-sheath was removed and the lead was tied down with 2-0 Ethibond sutures to the muscle  Under fluoroscopic guidance the right ventricular lead was positioned on the right ventricular septum basal septum, delivered with Medtronic sheath and screwed in deeply to to capture the left bunlde with narrow QRS with right bundle branch block pattern in V1,  we confirmed it to be intraseptal pacing with relatively high impedence and good threshold and sensing with unipolar pacing   After satisfactory ventricular sensing and pacing thresholds were confirmed, the lead was sewn to the pectoral fascia/muscle using 2-0 Ethibond sutures  Temporary pacemaker removed under fluoroscopy  A Dayaktronic Absorbable Antibiotic pouch was used  The lead and pulse generator were placed in the pre-pectoral pocket  The pocket was then closed with 3 layers of 2-0, 3-0, 4-0 -Vicryl suture was used to close the skin  EBL: Minimal Complications: None Contrast:none Findings: The patient tolerated the procedure well  Plan: Routine postoperative care, CXR  Follow-up in 2 weeks with incision check and interrogation  XR chest portable ICU    Result Date: 6/30/2022  Narrative: CHEST INDICATION:   post procedure cxr  COMPARISON:  Chest radiograph June 30, 2022 at 00:42 EXAM PERFORMED/VIEWS:  XR CHEST PORTABLE ICU FINDINGS:  Left-sided chest wall intracardiac device is identified  Leads are intact  Tip of left internal jugular central venous catheter projects over the superior cavoatrial junction  Cardiomediastinal silhouette appears unremarkable  The lungs are clear  No pneumothorax or pleural effusion  Left humeral head fracture is similar in appearance to prior study  Impression: No focal consolidation, pleural effusion, or pneumothorax  Workstation performed: [id number]     XR chest portable ICU    Result Date: 6/29/2022  Narrative: CHEST INDICATION:   central line placement  COMPARISON:  6/29/2022 at 9:40 AM EXAM PERFORMED/VIEWS:  XR CHEST PORTABLE ICU  3:37 PM FINDINGS:  Lung volumes are within normal limits  Lungs are clear  No effusion or pneumothorax  Heart, mediastinal and hilar structures are within normal limits for age and technique  Right IJ transvenous pacer in the right ventricle  New left IJ central venous catheter at the cavoatrial junction  No acute osseous or soft tissue pathology  Impression: New central venous catheter in standard position  No acute cardiopulmonary findings    Workstation performed: [id number]     XR chest portable ICU    Result Date: 6/29/2022  Narrative: CHEST INDICATION:   post pacer wire cordis placement  COMPARISON:  Chest radiograph and CT from 6/29/2022  EXAM PERFORMED/VIEWS:  XR CHEST PORTABLE ICU FINDINGS:  Right jugular transvenous pacemaker in right ventricle  Cardiomediastinal silhouette appears unremarkable  The lungs are clear  No pneumothorax or pleural effusion  Osseous structures appear within normal limits for patient age  Impression: Right jugular transvenous pacemaker in right ventricle with no pneumothorax  Workstation performed: [id number]     [id number] [facility] [facility] bedside procedure    Result Date: 6/29/2022  Narrative: 1 2 [id number]  2 446 [id number] 42 1    Echo complete w/ contrast if indicated    Result Date: 6/29/2022  Narrative: [facility]  Left Ventricle: Left ventricular cavity size is moderately dilated  Wall thickness is normal  There is eccentric hypertrophy  The left ventricular ejection fraction is 55%  Systolic function is normal  Diastolic function is abnormal    Left Atrium: The atrium is mildly dilated    Right Atrium: The atrium is mildly dilated    Mitral Valve: There is moderate annular calcification  The annulus is severely dilated  There is mild regurgitation    Tricuspid Valve: There is mild regurgitation    The following segments are akinetic: apical anterior, apical septal, apical inferior, apical lateral and apex    The following segments are hyperkinetic: basal anterior, basal anteroseptal, basal inferoseptal, basal inferior, basal inferolateral, basal anterolateral, mid anterior, mid anteroseptal, mid inferoseptal, mid inferior, mid inferolateral and mid anterolateral    Right Ventricle: Systolic function is moderately reduced  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   Patient in sinus rhythm with complete heart block and junctional escape with associated calcific changes of the aortic root and mitral annulus  Echo follow up/limited w/ contrast if indicated    Result Date: 6/29/2022  Narrative: Dasia Polanco  Left Ventricle: Left ventricular cavity size is normal  The left ventricular ejection fraction is 55%  Systolic function is normal    IVS: There is abnormal septal motion consistent with right ventricular pacing    Right Ventricle: A pacer wire is present    Mitral Valve: There is severe annular calcification    Tricuspid Valve: There is mild to moderate regurgitation  The tricuspid valve regurgitation jet is central  The right ventricular systolic pressure is moderately to severely elevated  The estimated right ventricular systolic pressure is 35 88 mmHg    Left Atrium: The atrium is mildly dilated    The following segments are akinetic: apical anterior, apical septal, apical inferior, apical lateral and apex    The following segments are hyperkinetic: basal anterior, basal anteroseptal, basal inferoseptal, basal inferior, basal inferolateral, basal anterolateral, mid anterior, mid anteroseptal, mid inferoseptal, mid inferior, mid inferolateral and mid anterolateral    Pericardium: There is no pericardial effusion  The pericardium is normal in appearance  EKG personally reviewed by Mayra Agustin MD      Counseling / Coordination of Care  Total floor / unit time spent today 30 minutes  Greater than 50% of total time was spent with the patient and / or family counseling and / or coordination of care

## 2022-07-04 NOTE — WOUND OSTOMY CARE
Consult Note - Wound   Eddie Manuel 77 y o  female MRN: [id number]  Unit/Bed#: [facility] 812-01 Encounter: [id number]        History and Present Illness:  Patient is 76 yo female admitted to [facility] from [address] after patient sustained fall and subsequent humerus fracture  Upon arrival patient was found to have complete heart block when her HR was initially in the 20s  Patient is continent of bowel and bladder  Patient is moderate assist X1 with standing and transfers  Patient is independent with meals after set up assistance as she is non weight bearing to the left arm  Assessment Findings:     1  POA DTI to sacral/buttocks area- some partial thickness skin loss with pink wound bed surrounded by purple, nonblanchable intact epithelial tissue, scant drainage  This injury more than likely from fall prior to hospitalization  DTI's have the potential to evolve into full thickness unstageable, stage III, or stage IV wounds  No induration, fluctuance, odor, warmth/temperature differences, redness, or purulence noted to the above noted wounds and skin areas assessed  New dressings applied per orders listed below  Patient tolerated well- no s/s of non-verbal pain or discomfort observed during the encounter  Bedside nurse aware of plan of care  See flow sheets for more detailed assessment findings  Wound care will continue to follow  Skin care Plan:  1-Cleanse sacro-buttocks with soap and water  Apply Xeroform gauze and cover with Allevyn foam  Brandon with T for treatment  Change every other day and PRN  2-Turn/reposition q2h or when medically stable for pressure re-distribution on skin   3-Elevate heels to offload pressure  4-Moisturize skin daily with skin nourishing cream  5-Ehob cushion in chair when out of bed  6-Hydraguard to bilateral heels BID and PRN          Wounds:  Wound 07/02/22 Pressure Injury Coccyx Inner (Active)   Wound Description Intact;Dry;Light purple;Non-blanchable erythema 07/04/22 1100 Pressure Injury Stage DTPI 07/04/22 1100   Maribell-wound Assessment Clean;Dry; Intact; Erythema 07/04/22 1100   Wound Length (cm) 7 cm 07/04/22 1100   Wound Width (cm) 6 cm 07/04/22 1100   Wound Depth (cm) 0 1 cm 07/04/22 1100   Wound Surface Area (cm^2) 42 cm^2 07/04/22 1100   Wound Volume (cm^3) 4 2 cm^3 07/04/22 1100   Calculated Wound Volume (cm^3) 4 2 cm^3 07/04/22 1100   Tunneling 0 cm 07/04/22 1100   Tunneling in depth located at 0 07/04/22 1100   Undermining 0 07/04/22 1100   Undermining is depth extending from 0 07/04/22 1100   Wound Site Closure BILLY 07/04/22 1100   Drainage Amount Scant 07/04/22 1100   Drainage Description Serosanguineous 07/04/22 1100   Non-staged Wound Description Partial thickness 07/04/22 1100   Treatments Cleansed 07/04/22 1100   Dressing Foam, Silicon (eg  Allevyn, etc); Xeroform 07/04/22 1100   Wound packed? No 07/04/22 1100   Packing- # removed 0 07/04/22 1100   Packing- # inserted 0 07/04/22 1100   Dressing Changed New 07/04/22 1100   Patient Tolerance Tolerated well 07/04/22 1100   Dressing Status Clean;Dry; Intact 07/04/22 1100         Marzena Banda RN, [facility]

## 2022-07-04 NOTE — DISCHARGE INSTR - OTHER ORDERS
Skin care Plan:  1-Cleanse sacro-buttocks with soap and water  Apply Xeroform gauze and cover with Allevyn foam  Brandon with T for treatment  Change every other day and PRN  2-Turn/reposition q2h or when medically stable for pressure re-distribution on skin   3-Elevate heels to offload pressure  4-Moisturize skin daily with skin nourishing cream  5-Ehob cushion in chair when out of bed  6-Hydraguard to bilateral heels BID and PRN

## 2022-07-04 NOTE — PLAN OF CARE
Problem: PHYSICAL THERAPY ADULT  Goal: Performs mobility at highest level of function for planned discharge setting  See evaluation for individualized goals  Description: Treatment/Interventions: Functional transfer training, LE strengthening/ROM, Therapeutic exercise, Bed mobility, Gait training, Endurance training, OT          See flowsheet documentation for full assessment, interventions and recommendations  Outcome: Progressing  Note: Prognosis: Good  Problem List: Decreased strength, Decreased endurance, Decreased range of motion, Impaired balance, Decreased mobility, Orthopedic restrictions, Pain  Assessment: The patient continues to remain limited due to pain as well as deficits in strength, balance, and activity tolerance  She was able to progress her ambulatory distance some today, but she remains unable to attain household distances  She noted considerable buttocal pain, and she was able to stand multiple times as the [facility] cushion was adjusted and replaced with a new one as well as one and two wedges to help unweight her sacrum  She did note improvement after these adjustments  The patient was also provided ice for her arm with some improvement as well  Turned the [address] on for her as well to help provide more engagement for her in the room  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

## 2022-07-04 NOTE — PHYSICAL THERAPY NOTE
Physical Therapy Progress Note     07/04/22 0935   PT Last Visit   PT Visit Date 07/04/22   Note Type   Note Type Treatment for insurance authorization   Pain Assessment   Pain Assessment Tool 0-10   Pain Score 8   Pain Location/Orientation Orientation: Left; Location: Arm;Location: [address]  [facility] Pain Intervention(s) Repositioned; Ambulation/increased activity; Environmental changes; Emotional support   Restrictions/Precautions   LUE Weight Bearing Per Order NWB   Braces or Orthoses Sling   Other Precautions Fall Risk;Pain;WBS   Subjective   Subjective The pt  states that she has a lot of pain and discomfort especially on her buttocks  She would like to watch [employer] or [place] on the television, but she was happy with the [address]   Sit to Stand 4  Minimal assistance   Additional items Assist x 1; Increased time required   Stand to Sit 5  Supervision   Ambulation/Elevation   Gait pattern Excessively slow; Step to;Short stride; Inconsistent millicent; Shuffling;Decreased foot clearance   Gait Assistance 3  Moderate assist   Additional items Assist x 1;Verbal cues   Assistive Device Other (Comment)  (Right hand-hold assistance )   Distance 15 feet, 10 feet  Balance   Static Sitting Fair +   Dynamic Sitting Fair   Static Standing Poor +   Ambulatory Poor   Activity Tolerance   Activity Tolerance Patient limited by pain   Nurse [address], RN  Exercises   Knee AROM Long Arc Quad Sitting;Bilateral;10 reps;AROM  (x2 sets )   Balance training  Static standing x 23 minutes total time  Assessment   Prognosis Good   Problem List Decreased strength;Decreased endurance;Decreased range of motion; Impaired balance;Decreased mobility;Orthopedic restrictions;Pain   Assessment The patient continues to remain limited due to pain as well as deficits in strength, balance, and activity tolerance  She was able to progress her ambulatory distance some today, but she remains unable to attain household distances   She noted considerable buttocal pain, and she was able to stand multiple times as the [facility] cushion was adjusted and replaced with a new one as well as one and two wedges to help unweight her sacrum  She did note improvement after these adjustments  The patient was also provided ice for her arm with some improvement as well  Turned the [address] on for her as well to help provide more engagement for her in the room  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, 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  Recommend continued therapies at a rehabilitation unit in order to maximize her independence and safety  Barriers to Discharge Inaccessible home environment;Decreased caregiver support   Goals   Patient Goals To get better, and to get back home  STG Expiration Date 07/14/22   PT Treatment Day 1   Plan   Treatment/Interventions Functional transfer training;LE strengthening/ROM; Therapeutic exercise; Endurance training;Patient/family training;Bed mobility;Gait training   Progress Progressing toward goals   PT Frequency 3-5x/wk   Recommendation   PT Discharge Recommendation Post acute rehabilitation services   AM-PAC Basic Mobility Inpatient   Turning in Bed Without Bedrails 2   Lying on Back to Sitting on Edge of Flat Bed 2   Moving Bed to Chair 2   Standing Up From Chair 2   Walk in Room 2   Climb 3-5 Stairs 1   Basic Mobility Inpatient Raw Score 11   Basic Mobility Standardized Score 30 25   Highest Level Of Mobility   JH-HLM Goal 4: Move to chair/commode   JH-HLM Achieved 6: Walk 10 steps or more       An AM-PAC Basic Mobility standardized score less than 40 78 suggests the patient may benefit from discharge to post-acute rehab services      Champ Mason, PTA

## 2022-07-04 NOTE — ASSESSMENT & PLAN NOTE
Lab Results   Component Value Date    HGBA1C 8 2 (H) 05/31/2022       Recent Labs     07/03/22  1110 07/03/22  1609 07/03/22  2118 07/03/22  2343   POCGLU 287* 264* 190* 192*       Blood Sugar Average: Last 72 hrs:  (P) 231 55     · Home regimen: Lantus 48 U AM and 36 U in PM, Amaryl, Sitagliptin-Metformin   · Current regimen: Lantus 30 U BID - increase back to home regimen as BG tolerates  · ISS increase as needed

## 2022-07-05 ENCOUNTER — APPOINTMENT (INPATIENT)
Dept: NON INVASIVE DIAGNOSTICS | Facility: HOSPITAL | Age: 77
DRG: 242 | End: 2022-07-05
Payer: MEDICARE

## 2022-07-05 ENCOUNTER — HOSPITAL ENCOUNTER (INPATIENT)
Facility: HOSPITAL | Age: 77
LOS: 16 days | Discharge: HOME HEALTH | DRG: 560 | End: 2022-07-21
Attending: STUDENT IN AN ORGANIZED HEALTH CARE EDUCATION/TRAINING PROGRAM | Admitting: STUDENT IN AN ORGANIZED HEALTH CARE EDUCATION/TRAINING PROGRAM
Payer: MEDICARE

## 2022-07-05 VITALS
SYSTOLIC BLOOD PRESSURE: 111 MMHG | HEART RATE: 64 BPM | OXYGEN SATURATION: 99 % | WEIGHT: 203 LBS | TEMPERATURE: 98.6 F | BODY MASS INDEX: 35.97 KG/M2 | HEIGHT: 63 IN | DIASTOLIC BLOOD PRESSURE: 50 MMHG | RESPIRATION RATE: 18 BRPM

## 2022-07-05 DIAGNOSIS — D50.0 IRON DEFICIENCY ANEMIA DUE TO CHRONIC BLOOD LOSS: ICD-10-CM

## 2022-07-05 DIAGNOSIS — I44.2 COMPLETE HEART BLOCK (HCC): ICD-10-CM

## 2022-07-05 DIAGNOSIS — R52 PAIN: ICD-10-CM

## 2022-07-05 DIAGNOSIS — R93.7 ABNORMAL BONE XRAY: ICD-10-CM

## 2022-07-05 DIAGNOSIS — E03.9 ACQUIRED HYPOTHYROIDISM: ICD-10-CM

## 2022-07-05 DIAGNOSIS — K74.60 LIVER CIRRHOSIS SECONDARY TO NASH (NONALCOHOLIC STEATOHEPATITIS) (HCC): ICD-10-CM

## 2022-07-05 DIAGNOSIS — I42.9 CARDIOMYOPATHY (HCC): ICD-10-CM

## 2022-07-05 DIAGNOSIS — K21.9 GASTROESOPHAGEAL REFLUX DISEASE WITHOUT ESOPHAGITIS: ICD-10-CM

## 2022-07-05 DIAGNOSIS — B36.9 FUNGAL SKIN INFECTION: ICD-10-CM

## 2022-07-05 DIAGNOSIS — D62 ACUTE BLOOD LOSS ANEMIA: Primary | ICD-10-CM

## 2022-07-05 DIAGNOSIS — S42.202A CLOSED FRACTURE OF PROXIMAL END OF LEFT HUMERUS: ICD-10-CM

## 2022-07-05 DIAGNOSIS — D61.818 PANCYTOPENIA (HCC): ICD-10-CM

## 2022-07-05 DIAGNOSIS — I85.00 ESOPHAGEAL VARICES WITHOUT BLEEDING (HCC): ICD-10-CM

## 2022-07-05 DIAGNOSIS — R23.8 SKIN BREAKDOWN: ICD-10-CM

## 2022-07-05 DIAGNOSIS — E11.65 TYPE 2 DIABETES MELLITUS WITH HYPERGLYCEMIA, WITH LONG-TERM CURRENT USE OF INSULIN (HCC): ICD-10-CM

## 2022-07-05 DIAGNOSIS — Z00.00 HEALTHCARE MAINTENANCE: ICD-10-CM

## 2022-07-05 DIAGNOSIS — K75.81 LIVER CIRRHOSIS SECONDARY TO NASH (NONALCOHOLIC STEATOHEPATITIS) (HCC): ICD-10-CM

## 2022-07-05 DIAGNOSIS — Z79.4 TYPE 2 DIABETES MELLITUS WITH HYPERGLYCEMIA, WITH LONG-TERM CURRENT USE OF INSULIN (HCC): ICD-10-CM

## 2022-07-05 DIAGNOSIS — E11.8 TYPE 2 DIABETES MELLITUS WITH COMPLICATION (HCC): ICD-10-CM

## 2022-07-05 DIAGNOSIS — E78.2 MIXED HYPERLIPIDEMIA: ICD-10-CM

## 2022-07-05 LAB
ANION GAP SERPL CALCULATED.3IONS-SCNC: 6 MMOL/L (ref 4–13)
APICAL FOUR CHAMBER EJECTION FRACTION: 41 %
AV LVOT MEAN GRADIENT: 2 MMHG
AV LVOT PEAK GRADIENT: 4 MMHG
BASOPHILS # BLD AUTO: 0.05 THOUSANDS/ΜL (ref 0–0.1)
BASOPHILS NFR BLD AUTO: 1 % (ref 0–1)
BUN SERPL-MCNC: 28 MG/DL (ref 5–25)
CALCIUM SERPL-MCNC: 8.6 MG/DL (ref 8.3–10.1)
CHLORIDE SERPL-SCNC: 107 MMOL/L (ref 100–108)
CO2 SERPL-SCNC: 24 MMOL/L (ref 21–32)
CREAT SERPL-MCNC: 0.93 MG/DL (ref 0.6–1.3)
DOP CALC LVOT PEAK VEL VTI: 26.23 CM
DOP CALC LVOT PEAK VEL: 0.96 M/S
DOP CALC MV VTI: 46.63 CM
EOSINOPHIL # BLD AUTO: 0.07 THOUSAND/ΜL (ref 0–0.61)
EOSINOPHIL NFR BLD AUTO: 2 % (ref 0–6)
ERYTHROCYTE [DISTWIDTH] IN BLOOD BY AUTOMATED COUNT: 17.1 % (ref 11.6–15.1)
FLUAV RNA RESP QL NAA+PROBE: NEGATIVE
FLUBV RNA RESP QL NAA+PROBE: NEGATIVE
FRACTIONAL SHORTENING: 39 (ref 28–44)
GFR SERPL CREATININE-BSD FRML MDRD: 60 ML/MIN/1.73SQ M
GLUCOSE SERPL-MCNC: 162 MG/DL (ref 65–140)
GLUCOSE SERPL-MCNC: 175 MG/DL (ref 65–140)
GLUCOSE SERPL-MCNC: 272 MG/DL (ref 65–140)
GLUCOSE SERPL-MCNC: 304 MG/DL (ref 65–140)
GLUCOSE SERPL-MCNC: 320 MG/DL (ref 65–140)
HCT VFR BLD AUTO: 31.1 % (ref 34.8–46.1)
HGB BLD-MCNC: 9.5 G/DL (ref 11.5–15.4)
IMM GRANULOCYTES # BLD AUTO: 0.02 THOUSAND/UL (ref 0–0.2)
IMM GRANULOCYTES NFR BLD AUTO: 1 % (ref 0–2)
INTERVENTRICULAR SEPTUM IN DIASTOLE (PARASTERNAL SHORT AXIS VIEW): 1.5 CM
INTERVENTRICULAR SEPTUM: 1.5 CM (ref 0.6–1.1)
LEFT INTERNAL DIMENSION IN SYSTOLE: 2.5 CM (ref 2.1–4)
LEFT VENTRICULAR INTERNAL DIMENSION IN DIASTOLE: 4.1 CM (ref 3.5–6)
LEFT VENTRICULAR POSTERIOR WALL IN END DIASTOLE: 1.5 CM
LEFT VENTRICULAR STROKE VOLUME: 54 ML
LVSV (TEICH): 54 ML
LYMPHOCYTES # BLD AUTO: 0.78 THOUSANDS/ΜL (ref 0.6–4.47)
LYMPHOCYTES NFR BLD AUTO: 20 % (ref 14–44)
MCH RBC QN AUTO: 26.9 PG (ref 26.8–34.3)
MCHC RBC AUTO-ENTMCNC: 30.5 G/DL (ref 31.4–37.4)
MCV RBC AUTO: 88 FL (ref 82–98)
MONOCYTES # BLD AUTO: 0.53 THOUSAND/ΜL (ref 0.17–1.22)
MONOCYTES NFR BLD AUTO: 13 % (ref 4–12)
MV MEAN GRADIENT: 2 MMHG
MV PEAK GRADIENT: 8 MMHG
NEUTROPHILS # BLD AUTO: 2.5 THOUSANDS/ΜL (ref 1.85–7.62)
NEUTS SEG NFR BLD AUTO: 63 % (ref 43–75)
NRBC BLD AUTO-RTO: 0 /100 WBCS
PLATELET # BLD AUTO: 66 THOUSANDS/UL (ref 149–390)
PMV BLD AUTO: 12.8 FL (ref 8.9–12.7)
POTASSIUM SERPL-SCNC: 4.6 MMOL/L (ref 3.5–5.3)
RBC # BLD AUTO: 3.53 MILLION/UL (ref 3.81–5.12)
RSV RNA RESP QL NAA+PROBE: NEGATIVE
SARS-COV-2 RNA RESP QL NAA+PROBE: NEGATIVE
SL CV LV EF: 50
SL CV PED ECHO LEFT VENTRICLE DIASTOLIC VOLUME (MOD BIPLANE) 2D: 76 ML
SL CV PED ECHO LEFT VENTRICLE SYSTOLIC VOLUME (MOD BIPLANE) 2D: 22 ML
SODIUM SERPL-SCNC: 137 MMOL/L (ref 136–145)
WBC # BLD AUTO: 3.95 THOUSAND/UL (ref 4.31–10.16)

## 2022-07-05 PROCEDURE — 99024 POSTOP FOLLOW-UP VISIT: CPT | Performed by: INTERNAL MEDICINE

## 2022-07-05 PROCEDURE — 82948 REAGENT STRIP/BLOOD GLUCOSE: CPT

## 2022-07-05 PROCEDURE — 0241U HB NFCT DS VIR RESP RNA 4 TRGT: CPT | Performed by: PHYSICIAN ASSISTANT

## 2022-07-05 PROCEDURE — 93308 TTE F-UP OR LMTD: CPT | Performed by: INTERNAL MEDICINE

## 2022-07-05 PROCEDURE — 93325 DOPPLER ECHO COLOR FLOW MAPG: CPT | Performed by: INTERNAL MEDICINE

## 2022-07-05 PROCEDURE — NC001 PR NO CHARGE: Performed by: STUDENT IN AN ORGANIZED HEALTH CARE EDUCATION/TRAINING PROGRAM

## 2022-07-05 PROCEDURE — 93308 TTE F-UP OR LMTD: CPT

## 2022-07-05 PROCEDURE — 80048 BASIC METABOLIC PNL TOTAL CA: CPT | Performed by: PHYSICIAN ASSISTANT

## 2022-07-05 PROCEDURE — 85025 COMPLETE CBC W/AUTO DIFF WBC: CPT | Performed by: PHYSICIAN ASSISTANT

## 2022-07-05 PROCEDURE — NC001 PR NO CHARGE: Performed by: PHYSICIAN ASSISTANT

## 2022-07-05 PROCEDURE — 93321 DOPPLER ECHO F-UP/LMTD STD: CPT | Performed by: INTERNAL MEDICINE

## 2022-07-05 RX ORDER — ENOXAPARIN SODIUM 100 MG/ML
30 INJECTION SUBCUTANEOUS EVERY 12 HOURS SCHEDULED
Status: CANCELLED | OUTPATIENT
Start: 2022-07-05

## 2022-07-05 RX ORDER — SENNOSIDES 8.6 MG
1 TABLET ORAL
Status: DISCONTINUED | OUTPATIENT
Start: 2022-07-05 | End: 2022-07-21 | Stop reason: HOSPADM

## 2022-07-05 RX ORDER — LOSARTAN POTASSIUM 25 MG/1
25 TABLET ORAL DAILY
Status: CANCELLED | OUTPATIENT
Start: 2022-07-06

## 2022-07-05 RX ORDER — PANTOPRAZOLE SODIUM 40 MG/1
40 TABLET, DELAYED RELEASE ORAL
Status: CANCELLED | OUTPATIENT
Start: 2022-07-06

## 2022-07-05 RX ORDER — AMOXICILLIN 250 MG
1 CAPSULE ORAL
Status: CANCELLED | OUTPATIENT
Start: 2022-07-05

## 2022-07-05 RX ORDER — LANOLIN ALCOHOL/MO/W.PET/CERES
6 CREAM (GRAM) TOPICAL
Status: CANCELLED | OUTPATIENT
Start: 2022-07-05

## 2022-07-05 RX ORDER — LIDOCAINE 50 MG/G
1 PATCH TOPICAL DAILY
Status: CANCELLED | OUTPATIENT
Start: 2022-07-06

## 2022-07-05 RX ORDER — OXYCODONE HYDROCHLORIDE 5 MG/1
2.5 TABLET ORAL EVERY 6 HOURS PRN
Status: DISCONTINUED | OUTPATIENT
Start: 2022-07-05 | End: 2022-07-15

## 2022-07-05 RX ORDER — POLYETHYLENE GLYCOL 3350 17 G/17G
17 POWDER, FOR SOLUTION ORAL DAILY PRN
Status: CANCELLED | OUTPATIENT
Start: 2022-07-05

## 2022-07-05 RX ORDER — DOCUSATE SODIUM 100 MG/1
100 CAPSULE, LIQUID FILLED ORAL 2 TIMES DAILY
Status: CANCELLED | OUTPATIENT
Start: 2022-07-05

## 2022-07-05 RX ORDER — INSULIN LISPRO 100 [IU]/ML
2-12 INJECTION, SOLUTION INTRAVENOUS; SUBCUTANEOUS
Status: CANCELLED | OUTPATIENT
Start: 2022-07-05

## 2022-07-05 RX ORDER — LOSARTAN POTASSIUM 25 MG/1
25 TABLET ORAL DAILY
Status: DISCONTINUED | OUTPATIENT
Start: 2022-07-06 | End: 2022-07-13

## 2022-07-05 RX ORDER — ONDANSETRON 2 MG/ML
4 INJECTION INTRAMUSCULAR; INTRAVENOUS EVERY 4 HOURS PRN
Status: CANCELLED | OUTPATIENT
Start: 2022-07-05

## 2022-07-05 RX ORDER — ASPIRIN 81 MG/1
81 TABLET, CHEWABLE ORAL DAILY
Status: CANCELLED | OUTPATIENT
Start: 2022-07-06

## 2022-07-05 RX ORDER — LEVOTHYROXINE SODIUM 112 UG/1
112 TABLET ORAL
Status: DISCONTINUED | OUTPATIENT
Start: 2022-07-06 | End: 2022-07-21 | Stop reason: HOSPADM

## 2022-07-05 RX ORDER — ASPIRIN 81 MG/1
81 TABLET, CHEWABLE ORAL DAILY
Status: DISCONTINUED | OUTPATIENT
Start: 2022-07-06 | End: 2022-07-21 | Stop reason: HOSPADM

## 2022-07-05 RX ORDER — INSULIN GLARGINE 100 [IU]/ML
30 INJECTION, SOLUTION SUBCUTANEOUS EVERY 12 HOURS SCHEDULED
Status: DISCONTINUED | OUTPATIENT
Start: 2022-07-05 | End: 2022-07-09

## 2022-07-05 RX ORDER — ESCITALOPRAM OXALATE 5 MG/1
5 TABLET ORAL DAILY
Status: CANCELLED | OUTPATIENT
Start: 2022-07-06

## 2022-07-05 RX ORDER — PANTOPRAZOLE SODIUM 40 MG/1
40 TABLET, DELAYED RELEASE ORAL
Status: DISCONTINUED | OUTPATIENT
Start: 2022-07-06 | End: 2022-07-21 | Stop reason: HOSPADM

## 2022-07-05 RX ORDER — ONDANSETRON 4 MG/1
4 TABLET, ORALLY DISINTEGRATING ORAL EVERY 6 HOURS PRN
Status: DISCONTINUED | OUTPATIENT
Start: 2022-07-05 | End: 2022-07-21 | Stop reason: HOSPADM

## 2022-07-05 RX ORDER — OXYCODONE HYDROCHLORIDE 5 MG/1
2.5 TABLET ORAL EVERY 6 HOURS PRN
Status: CANCELLED | OUTPATIENT
Start: 2022-07-05

## 2022-07-05 RX ORDER — ESCITALOPRAM OXALATE 10 MG/1
5 TABLET ORAL DAILY
Status: DISCONTINUED | OUTPATIENT
Start: 2022-07-06 | End: 2022-07-21 | Stop reason: HOSPADM

## 2022-07-05 RX ORDER — INSULIN LISPRO 100 [IU]/ML
2-12 INJECTION, SOLUTION INTRAVENOUS; SUBCUTANEOUS
Status: DISCONTINUED | OUTPATIENT
Start: 2022-07-05 | End: 2022-07-21 | Stop reason: HOSPADM

## 2022-07-05 RX ORDER — LACTULOSE 20 G/30ML
10 SOLUTION ORAL 2 TIMES DAILY
Status: CANCELLED | OUTPATIENT
Start: 2022-07-05

## 2022-07-05 RX ORDER — HYDROMORPHONE HCL IN WATER/PF 6 MG/30 ML
0.2 PATIENT CONTROLLED ANALGESIA SYRINGE INTRAVENOUS EVERY 4 HOURS PRN
Status: CANCELLED | OUTPATIENT
Start: 2022-07-05

## 2022-07-05 RX ORDER — PRAVASTATIN SODIUM 80 MG/1
80 TABLET ORAL
Status: CANCELLED | OUTPATIENT
Start: 2022-07-05

## 2022-07-05 RX ORDER — BISACODYL 10 MG
10 SUPPOSITORY, RECTAL RECTAL DAILY PRN
Status: DISCONTINUED | OUTPATIENT
Start: 2022-07-05 | End: 2022-07-21 | Stop reason: HOSPADM

## 2022-07-05 RX ORDER — LIDOCAINE 50 MG/G
1 PATCH TOPICAL DAILY
Status: DISCONTINUED | OUTPATIENT
Start: 2022-07-06 | End: 2022-07-07

## 2022-07-05 RX ORDER — DOCUSATE SODIUM 100 MG/1
100 CAPSULE, LIQUID FILLED ORAL 2 TIMES DAILY
Status: DISCONTINUED | OUTPATIENT
Start: 2022-07-05 | End: 2022-07-21 | Stop reason: HOSPADM

## 2022-07-05 RX ORDER — LACTULOSE 20 G/30ML
10 SOLUTION ORAL 2 TIMES DAILY
Status: DISCONTINUED | OUTPATIENT
Start: 2022-07-05 | End: 2022-07-21 | Stop reason: HOSPADM

## 2022-07-05 RX ORDER — ENOXAPARIN SODIUM 100 MG/ML
30 INJECTION SUBCUTANEOUS EVERY 12 HOURS SCHEDULED
Status: DISCONTINUED | OUTPATIENT
Start: 2022-07-05 | End: 2022-07-21 | Stop reason: HOSPADM

## 2022-07-05 RX ORDER — LEVOTHYROXINE SODIUM 112 UG/1
112 TABLET ORAL
Status: CANCELLED | OUTPATIENT
Start: 2022-07-06

## 2022-07-05 RX ORDER — PRAVASTATIN SODIUM 80 MG/1
80 TABLET ORAL
Status: DISCONTINUED | OUTPATIENT
Start: 2022-07-06 | End: 2022-07-21 | Stop reason: HOSPADM

## 2022-07-05 RX ORDER — INSULIN GLARGINE 100 [IU]/ML
30 INJECTION, SOLUTION SUBCUTANEOUS EVERY 12 HOURS SCHEDULED
Status: CANCELLED | OUTPATIENT
Start: 2022-07-05

## 2022-07-05 RX ORDER — LANOLIN ALCOHOL/MO/W.PET/CERES
6 CREAM (GRAM) TOPICAL
Status: DISCONTINUED | OUTPATIENT
Start: 2022-07-05 | End: 2022-07-21 | Stop reason: HOSPADM

## 2022-07-05 RX ORDER — POLYETHYLENE GLYCOL 3350 17 G/17G
17 POWDER, FOR SOLUTION ORAL DAILY
Status: DISCONTINUED | OUTPATIENT
Start: 2022-07-06 | End: 2022-07-21 | Stop reason: HOSPADM

## 2022-07-05 RX ADMIN — DOCUSATE SODIUM 100 MG: 100 CAPSULE, LIQUID FILLED ORAL at 19:17

## 2022-07-05 RX ADMIN — DOCUSATE SODIUM 100 MG: 100 CAPSULE, LIQUID FILLED ORAL at 09:25

## 2022-07-05 RX ADMIN — INSULIN GLARGINE 30 UNITS: 100 INJECTION, SOLUTION SUBCUTANEOUS at 22:28

## 2022-07-05 RX ADMIN — INSULIN GLARGINE 30 UNITS: 100 INJECTION, SOLUTION SUBCUTANEOUS at 09:41

## 2022-07-05 RX ADMIN — LIDOCAINE 5% 1 PATCH: 700 PATCH TOPICAL at 09:07

## 2022-07-05 RX ADMIN — INSULIN LISPRO 8 UNITS: 100 INJECTION, SOLUTION INTRAVENOUS; SUBCUTANEOUS at 12:39

## 2022-07-05 RX ADMIN — MELATONIN 6 MG: at 22:28

## 2022-07-05 RX ADMIN — LOSARTAN POTASSIUM 25 MG: 25 TABLET, FILM COATED ORAL at 09:07

## 2022-07-05 RX ADMIN — INSULIN LISPRO 8 UNITS: 100 INJECTION, SOLUTION INTRAVENOUS; SUBCUTANEOUS at 16:40

## 2022-07-05 RX ADMIN — LEVOTHYROXINE SODIUM 112 MCG: 112 TABLET ORAL at 05:15

## 2022-07-05 RX ADMIN — INSULIN LISPRO 2 UNITS: 100 INJECTION, SOLUTION INTRAVENOUS; SUBCUTANEOUS at 07:33

## 2022-07-05 RX ADMIN — ENOXAPARIN SODIUM 30 MG: 30 INJECTION SUBCUTANEOUS at 09:07

## 2022-07-05 RX ADMIN — RIFAXIMIN 550 MG: 550 TABLET ORAL at 22:29

## 2022-07-05 RX ADMIN — ESCITALOPRAM 5 MG: 5 TABLET, FILM COATED ORAL at 09:07

## 2022-07-05 RX ADMIN — RIFAXIMIN 550 MG: 550 TABLET ORAL at 09:06

## 2022-07-05 RX ADMIN — OXYCODONE HYDROCHLORIDE 5 MG: 5 TABLET ORAL at 14:38

## 2022-07-05 RX ADMIN — PANTOPRAZOLE SODIUM 40 MG: 40 TABLET, DELAYED RELEASE ORAL at 05:15

## 2022-07-05 RX ADMIN — ENOXAPARIN SODIUM 30 MG: 30 INJECTION SUBCUTANEOUS at 22:28

## 2022-07-05 RX ADMIN — LACTULOSE 10 G: 20 SOLUTION ORAL at 19:17

## 2022-07-05 RX ADMIN — ASPIRIN 81 MG CHEWABLE TABLET 81 MG: 81 TABLET CHEWABLE at 09:07

## 2022-07-05 RX ADMIN — LACTULOSE 10 G: 20 SOLUTION ORAL at 09:06

## 2022-07-05 RX ADMIN — SENNOSIDES 8.6 MG: 8.6 TABLET, FILM COATED ORAL at 22:28

## 2022-07-05 RX ADMIN — PRAVASTATIN SODIUM 80 MG: 80 TABLET ORAL at 16:40

## 2022-07-05 RX ADMIN — OXYCODONE HYDROCHLORIDE 2.5 MG: 5 TABLET ORAL at 22:33

## 2022-07-05 NOTE — PROGRESS NOTES
PHYSICAL MEDICINE AND REHABILITATION   PREADMISSION ASSESSMENT     Projected [facility] Diagnoses:  Impairment of mobility, safety and Activities of Daily Living (ADLs) due to Orthopedic Disorders:  08 9  Other Orthopedic L Humerus fx  Etiologic: L hummerus fx, Complete Heart Block     Date of Onset: 6/29/22   Date of surgery: 6/29/2022: Cardiac Pacer Implant (Chest)    PATIENT INFORMATION  Name: Eddie Manuel Phone #: [phone] (home)   Address: [address] [place] [id number]  YOB: 1945 Age: 77 y o  SS#   Marital Status:   Ethnicity:   Employment Status: retired  Extended Emergency Contact Information  Primary Emergency Contact: [address] Phone: [phone]  Relation: Son  Secondary Emergency Contact: [address] [facility]  Mobile Phone: [phone]  Relation: Daughter In-Law  Advance Directive: Level 1 Full Code (no ACP docs)    INSURANCE/COVERAGE:     Primary Payor: MEDICARE / Plan: MEDICARE A AND B / Product Type: Medicare A & B Fee for Service /   Secondary Payer: [employer]   Payer Contact:  Payer Contact:   Contact Phone:  Contact Phone: [phone]     Authorization #: N/A  Coverage Dates: N/A  LCD: N/A  MEDICARE #:[id number]  Medicare Days: 60-30-60  Medical Record #: [id number]    REFERRAL SOURCE:   Referring provider: Manjit Harding MD  Referring facility: [address] [place] [address]  Room: [address] [place] [address]/[place] [id number]-  PCP: Lizett Kenney DO PCP phone number: [phone]    MEDICAL INFORMATION  HPI: Eddie Manuel is a 77 y o  female with a PMH of type 2 diabetes, hypothyroidism, hypertension, hyperlipidemia, 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, CKD who presented to the [address] [place] [address] on 6/29/22 after falling onto her left side  The patient initially presented to [facility] and was found to have a L proximal humeral fracture   She was also found to have complete heart block with hypotension, bradycardia  She was transferred to [place] [address] for higher level of care  Patient had an urgent transvenous pacemaker placed by the critical care team at bedside  Cardiology services were consulted and patient was placed on levophed and epinephrine  Orthopedic services were consulated for humeral fracture which was managed non-operatively  She is NWB to the [facility] with a sling for stabilization  ECHO on 6/29/22 showed EF of 55% with normal systolic function  Her hospital course has been complicated by urinary retention and anemia  Patient received 1 uinit of PRB's and H/H was monitored  Geriatrics was consulted recommendations regarding the patients current status  In the future, patient will need a ischemic work up  Pt is hemodynamically stable at this time  PT/OT therapies were consulted and continue to follow patient at this time  PM&R was consulted and are recommending inpatient Acute Rehab  All involved medical disciplines feel/agree patient is medially ready for discharge at this time  Inpatient acute rehabilitation physician was consulted  Upon review of the patient's case and correspondence with PT/OT therapies and PM&R services, [facility] physician feels Larry Marsh will benefit and is a good candidate for inpatient acute rehab at this time  She has demonstrated the willingness/desire and tolerance to participate in the required 3 hours or more of therapies per day  Covid: 3/11/21, 3/30/21, [employer], R/O testing neg 7/5/2022    Past Medical History:   Past Surgical History:    Allergies:     Past Medical History:   Diagnosis Date    Anemia     Cirrhosis ([facility] )     Diabetic neuropathy ([facility] )     Esophageal varices (HCC)     Fatty liver     GERD (gastroesophageal reflux disease)     Hepatic encephalopathy (HCC)     Hiatal hernia     Hyperlipidemia     Hypertension     Hypoglycemia     Hypothyroidism     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)     Osteoarthritis     Osteopenia     Pancytopenia ([facility] )     Thrombocytopenia (HCC)     Type 2 diabetes mellitus ([facility] )     Past Surgical History:   Procedure Laterality Date    ABDOMINAL SURGERY      Abdominal plasty with mesh insertion    CARDIAC ELECTROPHYSIOLOGY PROCEDURE N/A 6/29/2022    Procedure: Cardiac pacer implant;  Surgeon: Romeo Cali MD;  Location:  CARDIAC CATH LAB; Service: Cardiology    CATARACT EXTRACTION, BILATERAL      CATARACT EXTRACTION, BILATERAL      COLONOSCOPY      EGD AND COLONOSCOPY  03/18/2015    IR BIOPSY BONE MARROW  8/24/2021    LAPAROSCOPIC CHOLECYSTECTOMY      SHOULDER SURGERY Right     calcium depsoti    TUBAL LIGATION      UMBILICAL HERNIA REPAIR      with mesh placed    UPPER GASTROINTESTINAL ENDOSCOPY       Allergies   Allergen Reactions    Bee Venom Swelling    Latex     Sesame Seed (Diagnostic) - Food Allergy      Other reaction(s): swelling inside mouth    Strawberry C [Ascorbate - Food Allergy] Other (See Comments)     Mouth sores    Penicillins Rash         Medical/functional conditions requiring inpatient rehabilitation: Left humerus fracture non-op, NWB in sling, complete heart block s/p PPM, Hypotension requiring pressure support, Impaired mobility and self care  Risk for medical/clinical complications: DVT, risk for falls, skin breakdown, decline in overall functional status, hypertensive episodes and uncontrolled blood sugars       · Comorbidities/Surgeries in the last 100 days: Acute pain due to trauma   · Hypertension  · Diabetes type 2  · Hyperlipidemia   · Liver cirrhosis due to PAUL  · Urinary retention   · Acute blood loss anemia  · DVT ppx: heparin SQ and SCD      CURRENT VITAL SIGNS:   Temp:  [98 2 °F (36 8 °C)-98 9 °F (37 2 °C)] 98 6 °F (37 °C)  HR:  [59-66] 64  Resp:  [16-22] 18  BP: (101-117)/(50-51) 111/50   Intake/Output Summary (Last 24 hours) at 7/5/2022 1513  Last data filed at 7/5/2022 1336  Gross per 24 hour   Intake 600 ml   Output 300 ml   Net 300 ml LABORATORY RESULTS:      Lab Results   Component Value Date    HGB 9 5 (L) 07/05/2022    HGB 12 6 10/27/2015    HCT 31 1 (L) 07/05/2022    HCT 38 6 10/27/2015    WBC 3 95 (L) 07/05/2022    WBC 5 26 10/27/2015     Lab Results   Component Value Date    BUN 28 (H) 07/05/2022    K 4 6 07/05/2022     07/05/2022    GLUCOSE 337 (H) 06/29/2022    CREATININE 0 93 07/05/2022     Lab Results   Component Value Date    PROTIME 16 0 (H) 06/30/2022    PROTIME 13 5 10/27/2015    INR 1 33 (H) 06/30/2022    INR 1 08 10/27/2015        DIAGNOSTIC STUDIES:  XR chest portable    Result Date: 6/30/2022  Impression: Left pacemaker in satisfactory position  Workstation performed: [id number]     XR Trauma chest portable    Result Date: 6/29/2022  Impression: No acute cardiopulmonary disease  Workstation performed: [id number]     XR shoulder 1 vw left    Result Date: 6/29/2022  Impression: No significant change in proximal left humeral fracture  Continued orthopedic surgery follow-up recommended  Workstation performed: [id number]     XR shoulder 2+ views LEFT    Result Date: 6/29/2022  Impression: Again noted displaced comminuted fracture of the humeral head/neck  Workstation performed: [id number]     XR humerus left    Result Date: 6/29/2022  Impression: 1  Stable appearance of the proximal left humerus fracture, however study limited by positioning  2   Limited evaluation of the elbow due to patient obliquity  If there is continued concern for elbow fracture, consider dedicated, repeat examination of the elbow  Workstation performed: [id number]     TRAUMA - CT head wo contrast    Result Date: 6/29/2022  Impression: No intracranial hemorrhage or calvarial fracture  Workstation performed: [id number]     TRAUMA - CT spine cervical wo contrast    Result Date: 6/29/2022  Impression: No cervical spine fracture or traumatic malalignment   Workstation performed: [id number]     XR Trauma pelvis ap only 1 or 2 vw    Result Date: 6/29/2022  Impression: No acute osseous abnormality  Followup imaging may be obtained for any persistent or worsening symptoms  Workstation performed: [id number]     TRAUMA - CT chest abdomen pelvis w contrast    Addendum Date: 6/29/2022    ADDENDUM: There is an error in the impression of the report  Number 1 of the impression should state: Comminuted and displaced fracture of the left HUMERAL head/neck  Result Date: 6/29/2022  Impression: 1  Comminuted and displaced fracture of the left femoral head/neck  2   No acute traumatic visceral injury identified within the chest, abdomen, or pelvis  3   Cirrhosis of the liver with stigmata of portal hypertension and splenomegaly  4   Mild diffuse thickening of the 2nd and 3rd portions of the duodenum, which may be due to duodenitis  5   Mild diffuse thickening of the ascending colon may be due to portal hypertensive colopathy  The study was marked in [facility] for immediate notification  Workstation performed: [id number]     XR chest portable ICU    Result Date: 6/30/2022  Impression: No focal consolidation, pleural effusion, or pneumothorax  Workstation performed: [id number]     XR chest portable ICU    Result Date: 6/29/2022  Impression: New central venous catheter in standard position  No acute cardiopulmonary findings  Workstation performed: [id number]     XR chest portable ICU    Result Date: 6/29/2022  Impression: Right jugular transvenous pacemaker in right ventricle with no pneumothorax   Workstation performed: [id number]       PRECAUTIONS/SPECIAL NEEDS:  Weight Bearing Precautions:  non-weight bearing, Anticoagulation:  Lovenox, Pain Management and LUE Non weight bearing, Fall precautions, hypoglycemia protocol with blood sugar management, Diet Venkatesh/Cho Level 1, 4 gm NA     MEDICATIONS:     Current Facility-Administered Medications:     aspirin chewable tablet 81 mg, 81 mg, Oral, Daily, Leah Sanford PA-C, 81 mg at 07/05/22 6739    docusate sodium (COLACE) capsule 100 mg, 100 mg, Oral, BID, VAL Lennon-C, 100 mg at 07/05/22 3986    enoxaparin (LOVENOX) subcutaneous injection 30 mg, 30 mg, Subcutaneous, Q12H [facility], Shahnaz Rockwell, DO, 30 mg at 07/05/22 0907    escitalopram (LEXAPRO) tablet 5 mg, 5 mg, Oral, Daily, Marlen Meeks PA-C, 5 mg at 07/05/22 0907    HYDROmorphone HCl (DILAUDID) injection 0 2 mg, 0 2 mg, Intravenous, Q4H PRN, Kasia Godoy DO, 0 2 mg at 07/04/22 1940    insulin glargine (LANTUS) subcutaneous injection 30 Units 0 3 mL, 30 Units, Subcutaneous, Q12H [facility], Marlen Meeks PA-C, 30 Units at 07/05/22 0941    insulin lispro (HumaLOG) 100 units/mL subcutaneous injection 2-12 Units, 2-12 Units, Subcutaneous, 4x Daily (with meals and at bedtime), 8 Units at 07/05/22 1239 **AND** Fingerstick Glucose (POCT), , , 4x Daily AC and at bedtime, Marysol Nicole MD    lactulose oral solution 10 g, 10 g, Oral, BID, VAL Lennon-C, 10 g at 07/05/22 9297    levothyroxine tablet 112 mcg, 112 mcg, Oral, Early Morning, VAL Lennon-C, 112 mcg at 07/05/22 0515    lidocaine (LIDODERM) 5 % patch 1 patch, 1 patch, Topical, Daily, VAL Lennon-C, 1 patch at 07/05/22 4043    losartan (COZAAR) tablet 25 mg, 25 mg, Oral, Daily, Ashanti Talley MD, 25 mg at 07/05/22 0907    melatonin tablet 6 mg, 6 mg, Oral, HS, VAL Lennon-C, 6 mg at 07/04/22 2104    naloxone (NARCAN) 0 04 mg/mL syringe 0 04 mg, 0 04 mg, Intravenous, Q1MIN PRN, Marlen Meeks PA-C    ondansetron [facility]) injection 4 mg, 4 mg, Intravenous, Q4H PRN, Marlen Hoops, PA-C, 4 mg at 07/02/22 0241    oxyCODONE (ROXICODONE) IR tablet 2 5 mg, 2 5 mg, Oral, Q4H PRN, Marlen Hoops, PA-C, 2 5 mg at 07/02/22 0235    oxyCODONE (ROXICODONE) IR tablet 5 mg, 5 mg, Oral, Q4H PRN, Marlen Hoops, PA-C, 5 mg at 07/05/22 1438    pantoprazole (PROTONIX) EC tablet 40 mg, 40 mg, Oral, Early Morning, Marlen Hoops, PA-C, 40 mg at 07/05/22 0515    perflutren lipid microsphere (DEFINITY) injection, 1 mL/min, Intravenous, Once in imaging, Kim Ortiz MD    polyethylene glycol [facility]) packet 17 g, 17 g, Oral, Daily PRN, Isaura Castellon PA-C    pravastatin (PRAVACHOL) tablet 80 mg, 80 mg, Oral, Daily With Demond Gómez PA-C, 80 mg at 07/04/22 1624    rifaximin (XIFAXAN) tablet 550 mg, 550 mg, Oral, Q12H [facility], Isaura Castellon PA-C, 550 mg at 07/05/22 4402    senna-docusate sodium (SENOKOT S) 8 6-50 mg per tablet 1 tablet, 1 tablet, Oral, HS, Isaura Castellon PA-C, 1 tablet at 07/04/22 2104    SKIN INTEGRITY: L anterior chest incision, Coccyx stage I pressure, light purple non blanchable treated with Xeroform      PRIOR LEVEL OF FUNCTION:  She lives in a(n) single family home  Deangelo Hdez is  and lives with their daughter  Self Care: Independent and Indoor Mobility: Independent    FALLS IN THE LAST 6 MONTHS: 1    HOME ENVIRONMENT:  The living area: can live on one level  There are  to 0 the home  Ramped  The patient will have 24 hour supervision/physical assistance available upon discharge  PREVIOUS DME:  Equipment in home (previous DME): Shower Chair, Manual Wheelchair and electric scooter for long distsances  FUNCTIONAL STATUS: PT/OT Documentation  Physical Therapy Occupational Therapy Speech Therapy    07/04/22 0992   PT Last Visit   PT Visit Date 07/04/22   Note Type   Note Type Treatment for insurance authorization   Pain Assessment   Pain Assessment Tool 0-10   Pain Score 8   Pain Location/Orientation Orientation: Left; Location: Arm;Location: [address] [place] [address] Pain Intervention(s) Repositioned; Ambulation/increased activity; Environmental changes; Emotional support   Restrictions/Precautions   LUE Weight Bearing Per Order NWB   Braces or Orthoses Sling   Other Precautions Fall Risk;Pain;WBS   Subjective   Subjective The pt  states that she has a lot of pain and discomfort especially on her buttocks   She would like to watch CSI or NCIS on the television, but she was happy with the [address]  Transfers   Sit to Stand 4  Minimal assistance   Additional items Assist x 1; Increased time required   Stand to Sit 5  Supervision   Ambulation/Elevation   Gait pattern Excessively slow; Step to;Short stride; Inconsistent millicent; Shuffling;Decreased foot clearance   Gait Assistance 3  Moderate assist   Additional items Assist x 1;Verbal cues   Assistive Device Other (Comment)  (Right hand-hold assistance )   Distance 15 feet, 10 feet  Balance   Static Sitting Fair +   Dynamic Sitting Fair   Static Standing Poor +   Ambulatory Poor   Activity Tolerance   Activity Tolerance Patient limited by pain   Nurse [address], RN  Exercises   Knee AROM Long Arc Quad Sitting;Bilateral;10 reps;AROM  (x2 sets )   Balance training  Static standing x 23 minutes total time  Assessment   Prognosis Good   Problem List Decreased strength;Decreased endurance;Decreased range of motion; Impaired balance;Decreased mobility;Orthopedic restrictions;Pain   Assessment The patient continues to remain limited due to pain as well as deficits in strength, balance, and activity tolerance  She was able to progress her ambulatory distance some today, but she remains unable to attain household distances  She noted considerable buttocal pain, and she was able to stand multiple times as the [facility] cushion was adjusted and replaced with a new one as well as one and two wedges to help unweight her sacrum  She did note improvement after these adjustments  The patient was also provided ice for her arm with some improvement as well  Turned the [address] on for her as well to help provide more engagement for her in the room  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, 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  Recommend continued therapies at a rehabilitation unit in order to maximize her independence and safety         07/03/22 1343   Restrictions/Precautions   Weight Bearing Precautions Per Order Yes   LUE Weight Bearing Per Order NWB   Braces or Orthoses Sling   Other Precautions Fall Risk;Pain;WBS;Chair Alarm   Lifestyle   Autonomy I w/ ADLS, assist IADLS, I vs Mod I w/ transfers and functional mobility PTA   Reciprocal Relationships Pt lives w/ her dght who has down syndrome   Service to Others Retired; worked in [address] [place] [address] [place] Enjoys puzzles, yardwork and being active   Pain Assessment   Pain Assessment Tool 0-10   Pain Score 7   Pain Location/Orientation Orientation: Left; Location: Arm   ADL   Where Assessed Commode   Toileting Assistance  2  Maximal Assistance   Toileting Deficit Clothing management up;Perineal hygiene;Clothing management down   Bed Mobility   Additional Comments pt presented sitting out of bed to bedside chair   Transfers   Sit to Stand 4  Minimal assistance   Additional items Assist x 1   Stand to Sit 4  Minimal assistance   Additional items Assist x 1   Stand pivot 4  Minimal assistance   Additional items Assist x 1   Cognition   Overall Cognitive Status WFL   Arousal/Participation Responsive; Cooperative   Attention Within functional limits   Orientation Level Oriented X4   Memory Within functional limits   Following Commands Follows one step commands without difficulty   Activity Tolerance   Activity Tolerance Patient tolerated treatment well   Assessment   Assessment Pt seen for participation in occupational therapy with focus on activity tolerance, functional transfers/commode transfer toileting and UB self-care  Pt cleared by RN/[place] for pt participation in OT session  Pt presented sitting out of bed to bedside chair upon initiation of OT session and agreeable to participate in therapy following pt identifiers confirmed  Pt required assist for stand pivot transfer to bedside commode 2* to pt deconditioning  She required assistance for toilet hygiene 2* to coordination and balance    Pt will require post acute rehab services to continue to address pt's above-noted deficits which currently impair pt's ADL and functional mob  Pt returned to bedside chair post OT session, chair alarm activated and all needs within reach  CARE SCORES:  Self Care:  Eatin: Partial/moderate assistance  Oral hygiene: 03: Partial/moderate assistance  Toilet hygiene: 03: Partial/moderate assistance  Shower/bathing self: 03: Partial/moderate assistance  Upper body dressin: Partial/moderate assistance  Lower body dressin: Substantial/maximal assistance  Putting on/taking off footwear: 03: Partial/moderate assistance  Transfers: Min A x1 hand held A  Roll left and right: 03: Partial/moderate assistance  Sit to lyin: Partial/moderate assistance  Lying to sitting on side of bed: 03: Partial/moderate assistance  Sit to stand: 03: Partial/moderate assistance  Chair/bed to chair transfer: 03: Partial/moderate assistance  Toilet transfer: 03: Partial/moderate assistance  Mobility: Mod A x1 hand held A  Walk 10 ft: 03: Partial/moderate assistance  Walk 50 ft with two turns: 88: Not attempted due to medical conditions or safety concerns  Walk 150ft: 88: Not attempted due to medical conditions or safety concerns    CURRENT GAP IN FUNCTION  Prior to Admission: Functional Status: Patient was independent with mobility/ambulation, transfers, ADL's, IADL's  Estimated length of stay: 10 to 14 days    Anticipated Post-Discharge Disposition/Treatment  Disposition: Return to previous home/apartment  Outpatient Services: Physical Therapy (PT)    BARRIERS TO DISCHARGE  Weakness, Pain, Balance Difficulty, Fatigue, Caregiver Accessibility and Equipment Needs    INTERVENTIONS FOR DISCHARGE  Adaptive equipment, Patient/Family/Caregiver Education, [employer], [employer] DME needs, Therapy exercises and Energy conservation education     REQUIRED THERAPY:  Patient will require PT and OT 90 minutes each per day, five days per week to achieve rehab goals       REQUIRED FUNCTIONAL AND MEDICAL MANAGEMENT FOR INPATIENT REHABILITATION:  Skin:  Treatment recommendation for the pressure sore(s) include: Turn patient every 2 hours while in bed and perform pressure relief in wheelchair every 20 minutes for 20 seconds  , Pain Management: Overall pain is moderately controlled, Fluid/Electrolytes/Nutrition:  Appetite: fair  Current Diet: cardiac  Blood sugars: blood sugars in the mid 100's  Calorie Count: completed, Deep Vein Thrombosis (DVT) Prophylaxis:  Lovenox, and further IM management of additional medical conditions while on the ARC, patient needs PT/OT intervention, patient/family education and training, possible Neuropsych and Neurology consults with any other needed consults PRN, nursing medication review and management of bowel/bladder function  RECOMMENDED LEVEL OF CARE:   Leroy Neal is a 77 y o  female with a PMH of type 2 diabetes, hypothyroidism, hypertension, hyperlipidemia, 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, CKD who presented [address] the [email or web address] on 6/29/22 after falling onto her left side  The patient initially presented to [facility] and was found to have a L proximal humeral fracture  She was also found to have complete heart block with hypotension  An urgent transvenous pacemaker was placed by the critical care team at bedside  She was transferred to [facility] placement  She was placed on levophed and epinephrine  She underwent medtronic dual chamber pacemaker implant on 6/29/22 and humeral fracture was managed non-operatively  She is NWB to the LUE in a sling  ECHO on 6/29/22 showed EF of 55% with normal systolic function  Her hospital course has been complicated by urinary retention and anemia  Prior to admission the patient was completely independent with functional mobility for house hold distances, electric scooter for longer distances   The patient is currently requiring with Min A with transfers with hand held A, Mod A for gait, Mod A for UB bathing/dressing, Max A for LE bathing/dressing and will require continued therapies to achieve optimal performance levels  Nursing is being recommended for education as well as bowel/bladder management, internal medicine to monitor/manage medical conditions, PM&R to maximize self-care, mobility, strength, endurance, and cognition upon discharge to home with the support of family  Close medical management and PM&R management is recommended at this time while patient is on the [facility]  Inpatient acute rehab is recommended for patient to maximize overall strength and mobility upon discharge to home with family support   ARLYN Morales

## 2022-07-05 NOTE — PROGRESS NOTES
Cardiology Progress note  Unit/Bed#: [facility] 812-01 Encounter: [id number]        Laura Olivas 77 y o  female [id number]  Hospital Stay Days: 6    Assessment and Plan      Current Problem List   Principal Problem:    Complete heart block (HCC)  Active Problems:    Acquired hypothyroidism    Type 2 diabetes mellitus with hyperglycemia, with long-term current use of insulin (HCC)    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) (HCC)    Closed fracture of proximal end of left humerus    Depression    Cardiomyopathy ([address] )    Acute blood loss anemia    Assessment/Plan:    1  Cardiomyopathy  · Echo on  showed multiple akinetic regions  · Potentially stress induced  Plan:  · Recheck Echo today  · Ischemic workup outpatient  · Continue losartan 25       Subjective     Patient is a 76 y/o female with PMH of HTN, HLD, DM that presented on  due to a fall  Patient was found to be in complete heart block and was then transcutaneously paced  Patient then had a permanent pacemaker placed  Patient was also found to have akinetic regions on Echo  Patient will be having another Echo done today to determine next steps  Objective     Vitals: Temp (24hrs), Av 4 °F (36 9 °C), Min:98 2 °F (36 8 °C), Max:98 6 °F (37 °C)  Current: Temperature: 98 2 °F (36 8 °C)  Patient Vitals for the past 24 hrs:   BP Temp Pulse Resp SpO2   22 0720 117/51 98 2 °F (36 8 °C) 64 17 96 %   22 2229 117/51 98 4 °F (36 9 °C) 66 20 99 %   22 1905 101/51 98 6 °F (37 °C) 61 -- 100 %   22 1535 112/51 98 2 °F (36 8 °C) 59 16 100 %    Body mass index is 36 01 kg/m²  Physical Exam:  Physical Exam  Vitals and nursing note reviewed  Constitutional:       General: She is not in acute distress  Appearance: She is well-developed  HENT:      Head: Normocephalic and atraumatic     Eyes:      Conjunctiva/sclera: Conjunctivae normal    Cardiovascular:      Rate and Rhythm: Normal rate and regular rhythm  Heart sounds: No murmur heard  Pulmonary:      Effort: Pulmonary effort is normal  No respiratory distress  Breath sounds: Normal breath sounds  Abdominal:      Palpations: Abdomen is soft  Tenderness: There is no abdominal tenderness  Musculoskeletal:      Cervical back: Neck supple  Skin:     General: Skin is warm and dry  Comments: L Humeral fracture   Neurological:      Mental Status: She is alert           Invasive Devices  Report    Peripheral Intravenous Line  Duration           Peripheral IV 07/03/22 Distal;Right;Ventral (anterior) Forearm 2 days          Line  Duration           Pacer Wires 5 days                    Labs:   Results from last 7 days   Lab Units 07/05/22  0513 07/04/22  0448 07/03/22  1506 07/03/22  0448 07/02/22  2359 07/02/22  0518 07/01/22  0627 06/30/22  0449 06/29/22  1919 06/29/22  0411 06/29/22  0236 06/29/22  0028   WBC Thousand/uL 3 95* 2 51*  --  2 61* 3 01* 3 21* 4 74 8 79 9 73 5 13  --  7 86   HEMOGLOBIN g/dL 9 5* 8 0* 8 5* 6 9* 7 3* 7 6* 8 0* 9 0* 8 9* 7 8*  --  9 0*   I STAT HEMOGLOBIN g/dl  --   --   --   --   --   --   --   --   --   --  9 2*  --    HEMATOCRIT % 31 1* 26 2* 28 6* 22 2* 23 9* 25 6* 26 2* 28 4* 28 9* 25 9*  --  30 1*   HEMATOCRIT, ISTAT %  --   --   --   --   --   --   --   --   --   --  27*  --    PLATELETS Thousands/uL 66* 60*  --  58* 59* 57*  --  119* 88* 67*  --  92*   NEUTROS PCT % 63 57  --   --  70  --  64 70 76* 81*  --  80*   MONOS PCT % 13* 14*  --   --  11  --  14* 14* 12 10  --  7      Results from last 7 days   Lab Units 07/05/22  0513 07/04/22  0448 07/03/22  0448 07/02/22  2359 07/02/22  0518 07/01/22  0627 06/30/22  1637 06/30/22  1636 06/30/22  0449 06/29/22  1919 06/29/22  0411 06/29/22  0236 06/29/22  0226 06/29/22  0029 06/29/22  0028   SODIUM mmol/L 137 137 135* 140 138 142  --  139 141 139 138  --  134*  --  133*   POTASSIUM mmol/L 4 6 4 6 5 2 5 4* 5 0 4 8  --  4 7 5 4* 5 1 4 8  --  4 7  --  5 7* CHLORIDE mmol/L 107 107 108 107 108 111*  --  109* 112* 109* 107  --  103  --  102   CO2 mmol/L 24 26 24 25 27 26  --  24 21 21 21  --  25  --  23   CO2, I-STAT mmol/L  --   --   --   --   --   --   --   --   --   --   --  25  --   --   --    BUN mg/dL 28* 36* 33* 38* 34* 38*  --  46* 51* 55* 56*  --  56*  --  51*   CREATININE mg/dL 0 93 0 96 1 17 1 31* 1 11 1 19  --  1 40* 1 44* 1 70* 1 81*  --  2 08*  --  2 19*   CALCIUM mg/dL 8 6 8 2* 8 1* 8 0* 8 3 8 8  --  8 7 9 0 8 8 9 3  --  9 2  --  9 0   ALK PHOS U/L  --   --   --  56  --   --   --   --  56 54  --   --   --   --  69   ALT U/L  --   --   --  27  --   --   --   --  30 28  --   --   --   --  28   AST U/L  --   --   --  30  --   --   --   --  55* 33  --   --   --   --  28   GLUCOSE, ISTAT mg/dl  --   --   --   --   --   --   --   --   --   --   --  337*  --   --   --    MAGNESIUM mg/dL  --   --   --   --   --  2 2 2 1  --  2 6 2 4 2 4  --   --   --   --    PHOSPHORUS mg/dL  --   --   --   --   --  2 6  --  2 9 3 3 3 5 2 9  --   --   --   --    INR   --   --   --   --   --   --   --   --  1 33*  --   --   --   --  1 20*  --    PTT seconds  --   --   --   --   --   --   --   --   --   --   --   --   --  32  --    EGFR ml/min/1 73sq m 60 58 45 39 48 44  --  36 35 29 26  --  22  --  21     Results from last 7 days   Lab Units 06/30/22  0449 06/29/22  0029   INR  1 33* 1 20*   PTT seconds  --  32     Results from last 7 days   Lab Units 06/30/22  0449   LACTIC ACID mmol/L 1 2         No results found for: PHART, YUE3QFA, PO2ART, HOG9HKX, E7IXDDIM, BEART, SOURCE  No components found for: HIV1X2  No results found for: HAV, HEPAIGM, HEPBIGM, HEPBCAB, HBEAG, HEPCAB  No results found for: SPEP, UPEP   Lab Results   Component Value Date    HGBA1C 8 2 (H) 05/31/2022    HGBA1C 7 8 (H) 01/18/2022    HGBA1C 7 8 (H) 10/13/2021     No results found for: CHOL   Lab Results   Component Value Date    HDL 34 (L) 05/31/2022    HDL 30 (L) 04/20/2022      Lab Results   Component Value Date    LDLCALC 84 05/31/2022    LDLCALC 67 04/20/2022      Lab Results   Component Value Date    TRIG 76 05/31/2022    TRIG 91 04/20/2022     No components found for: PROCAL      Micro:      Urinalysis:  Lab Results   Component Value Date    BDZUR Negative 09/13/2020    COCAINEUR Negative 09/13/2020    OPIATEUR Negative 09/13/2020    PCPUR Negative 09/13/2020    THCUR Negative 09/13/2020    ETOH <3 09/13/2020    ACTMNPHEN <2 0 (L) 62/54/3892    SALICYLATE <1 6 (L) 06/62/7145          Invalid input(s): URIBILINOGEN        Intake and Outputs:  I/O       07/03 0701 07/04 0700 07/04 0701 07/05 0700 07/05 0701  07/06 0700    P  O  240 120     Blood 406 3      Total Intake(mL/kg) 646 3 (7) 120 (1 3)     Urine (mL/kg/hr) 1450 (0 7)      Total Output 1450      Net -803 8 +120                Nutrition:  Diet Venkatesh/CHO Controlled; Consistent Carbohydrate Diet Level 1 (4 carb servings/60 grams CHO/meal); Sodium 4 GM (LEWIS)  Radiology Results:   XR chest portable ICU   Final Result by Chucho Child MD (06/30 1350)      No focal consolidation, pleural effusion, or pneumothorax  Workstation performed: [id number]         XR chest portable   Final Result by Donavon Rizzo MD (06/30 0945)      Left pacemaker in satisfactory position  Workstation performed: [id number]         XR chest portable ICU   Final Result by Bao Goss MD (06/29 1601)   New central venous catheter in standard position  No acute cardiopulmonary findings  Workstation performed: [id number]         XR chest portable ICU   Final Result by Jason Dunbar MD (06/29 1001)      Right jugular transvenous pacemaker in right ventricle with no pneumothorax  Workstation performed: [id number]         XR shoulder 1 vw left   Final Result by Alexa Quinn DO (06/29 1024)   No significant change in proximal left humeral fracture        Continued orthopedic surgery follow-up recommended  Workstation performed: [id number]         XR humerus left   Final Result by Chitra Leigh DO (06/29 1026)   1  Stable appearance of the proximal left humerus fracture, however study limited by positioning  2   Limited evaluation of the elbow due to patient obliquity  If there is continued concern for elbow fracture, consider dedicated, repeat examination of the elbow        Workstation performed: [id number]           Scheduled Medications:  aspirin, 81 mg, Daily  docusate sodium, 100 mg, BID  enoxaparin, 30 mg, Q12H CECY  escitalopram, 5 mg, Daily  insulin glargine, 30 Units, Q12H [address]  insulin lispro, 2-12 Units, 4x Daily (with meals and at bedtime)  lactulose, 10 g, BID  levothyroxine, 112 mcg, Early Morning  lidocaine, 1 patch, Daily  losartan, 25 mg, Daily  melatonin, 6 mg, HS  pantoprazole, 40 mg, Early Morning  pravastatin, 80 mg, Daily With Dinner  rifaximin, 550 mg, Q12H [address]  senna-docusate sodium, 1 tablet, HS      PRN MEDS:  HYDROmorphone, 0 2 mg, Q4H PRN  naloxone, 0 04 mg, Q1MIN PRN  ondansetron, 4 mg, Q4H PRN  oxyCODONE, 2 5 mg, Q4H PRN  oxyCODONE, 5 mg, Q4H PRN  polyethylene glycol, 17 g, Daily PRN      Last 24 Hour Meds: :   Medication Administration - last 24 hours from 07/04/2022 0830 to 07/05/2022 0830       Date/Time Order Dose Route Action Action by     07/04/2022 0846 oxyCODONE (ROXICODONE) IR tablet 5 mg 5 mg Oral Given Matthieu De Guzman RN     07/04/2022 2104 senna-docusate sodium (SENOKOT S) 8 6-50 mg per tablet 1 tablet 1 tablet Oral Given Dee Dee HILDA Little     07/04/2022 1748 docusate sodium (COLACE) capsule 100 mg 100 mg Oral Given Dee Dee HILDA Little     07/04/2022 0846 docusate sodium (COLACE) capsule 100 mg 100 mg Oral Given Matthieu De Guzman RN     07/04/2022 1748 lactulose oral solution 10 g 10 g Oral Given Dee Dee Ada, HILDA     07/04/2022 0846 lactulose oral solution 10 g 10 g Oral Given Matthieu De Guzman, HILDA     07/04/2022 2104 rifaximin Stephen Bridges) tablet 550 mg 550 mg Oral Given Robert Fu RN     07/04/2022 1940 rifaximin Piña Branson) tablet 550 mg   Oral Canceled Entry Robert Fu RN     07/04/2022 0846 rifaximin Wang Conn) tablet 550 mg 550 mg Oral Given Petty Cook RN     07/04/2022 9017 aspirin chewable tablet 81 mg 81 mg Oral Given Petty Cook RN     07/04/2022 0846 escitalopram (LEXAPRO) tablet 5 mg 5 mg Oral Given Petyt Cook RN     07/05/2022 0515 pantoprazole (PROTONIX) EC tablet 40 mg 40 mg Oral Given Karina Barba RN     07/05/2022 0515 levothyroxine tablet 112 mcg 112 mcg Oral Given Karina Barba RN     07/04/2022 1624 pravastatin (PRAVACHOL) tablet 80 mg 80 mg Oral Given Robert Fu RN     07/04/2022 2046 lidocaine (LIDODERM) 5 % patch 1 patch 1 patch Topical Patch Removed Robert Fu RN     07/04/2022 0846 lidocaine (LIDODERM) 5 % patch 1 patch 1 patch Topical Medication Applied Petty Cook RN     07/04/2022 2103 insulin glargine (LANTUS) subcutaneous injection 30 Units 0 3 mL 30 Units Subcutaneous Given Robert Fu RN     07/04/2022 0846 insulin glargine (LANTUS) subcutaneous injection 30 Units 0 3 mL 30 Units Subcutaneous Given Petty Cook RN     07/04/2022 2104 melatonin tablet 6 mg 6 mg Oral Given Robert Fu RN     07/04/2022 0847 enoxaparin (LOVENOX) subcutaneous injection 40 mg 40 mg Subcutaneous Given Petty Cook RN     07/04/2022 1940 HYDROmorphone HCl (DILAUDID) injection 0 2 mg 0 2 mg Intravenous Given Robert Fu RN     07/05/2022 6749 insulin lispro (HumaLOG) 100 units/mL subcutaneous injection 2-12 Units 2 Units Subcutaneous Given Alesia Horner RN     07/04/2022 2104 insulin lispro (HumaLOG) 100 units/mL subcutaneous injection 2-12 Units 4 Units Subcutaneous Given Robert Fu RN     07/04/2022 1601 insulin lispro (HumaLOG) 100 units/mL subcutaneous injection 2-12 Units 6 Units Subcutaneous Given Robert Fu RN     07/04/2022 8655 insulin lispro (HumaLOG) 100 units/mL subcutaneous injection 2-12 Units 8 Units Subcutaneous Given Meseret Barron RN     07/04/2022 2103 enoxaparin (LOVENOX) subcutaneous injection 30 mg 30 mg Subcutaneous Given Meseret Barron RN          PLEASE NOTE:  This encounter was completed utilizing the [employer] Direct Speech Voice Recognition Software  Grammatical errors, random word insertions, pronoun errors and incomplete sentences are occasional consequences of the system due to software limitations, ambient noise and hardware issues  These may be missed by proof reading prior to affixing electronic signature  Any questions or concerns about the content, text or information contained within the body of this dictation should be directly addressed to the physician for clarification  Please do not hesitate to call me directly if you have any any questions or concerns          ARMANDO Meng   PGY-1 Internal Medicine

## 2022-07-05 NOTE — ASSESSMENT & PLAN NOTE
Lab Results   Component Value Date    HGBA1C 8 2 (H) 05/31/2022       Recent Labs     07/04/22  2103 07/05/22  0719 07/05/22  1134 07/05/22  1552   POCGLU 233* 175* 320* 304*       Blood Sugar Average: Last 72 hrs:  (P) 262 [id number]     · Home regimen: Lantus 48 U AM and 36 U in PM, Amaryl, Sitagliptin-Metformin   · Current regimen: Lantus 30 U BID - increase back to home regimen as BG tolerates  · ISS increase as needed

## 2022-07-05 NOTE — ASSESSMENT & PLAN NOTE
Lab Results   Component Value Date    HGBA1C 8 2 (H) 05/31/2022       Recent Labs     07/04/22  1050 07/04/22  1541 07/04/22  2103 07/05/22  0719   POCGLU 330* 298* 233* 175*       Blood Sugar Average: Last 72 hrs:  (P) 256 [id number]     · Home regimen: Lantus 48 U AM and 36 U in PM, Amaryl, Sitagliptin-Metformin   · Current regimen: Lantus 30 U BID - increase back to home regimen as BG tolerates  · ISS increase as needed

## 2022-07-05 NOTE — PROGRESS NOTES
[address] [facility]  Progress Note Gilles Worthington 1945, 77 y o  female MRN: [id number]  [facility] Encounter: [id number]  Primary Care Provider: Gerri Miller DO   Date and time admitted to hospital: 6/29/2022  3:20 AM    Acute blood loss anemia  Assessment & Plan  -hgb has been slowly downtrending over this admission  -6 9 on 07/03 check   -will transfuse one unit with plan to recheck later in the day for response    - tx with one unit pRBC; now Hb 9 5 Hct 31 1  -continue to trend H/H    Cardiomyopathy ([facility] )  Assessment & Plan  · Dilated CM on echo with persevered EF 55%, apical hypokinesis concerning for stress-CM, no prior echo   · Cards plan for repeat echo on 7/4 to eval for improvement in wall motion abnormalities suggesting a stress CM  · If no improvement will need ischemic work-up  · Pending echocardiogram  · Start low dose Losartan per cardiology; trend cr and potassium    Depression  Assessment & Plan  · Continue home lexapro 5 mg, per geriatrics consider increasing to 10 mg daily if symptoms inadequately controlled     Closed fracture of proximal end of left humerus  Assessment & Plan  · Orthopedics consulted - nonoperative management   · NV intact   · NWB LUE, sling for comfort   · Multimodal analgesia     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) ([facility] )  Assessment & Plan  · With hx of hepatic encephalopathy and gastric varices without bleeding   · Continue home rifaximin, lactulose and PPI   · Holding home nadolol with current normotension - restart as BP tolerates  · Holding home spironolactone - restart as hemodynamics improve   · Outpatient f/u with GI    Type 2 diabetes mellitus with hyperglycemia, with long-term current use of insulin [facility])  Assessment & Plan  Lab Results   Component Value Date    HGBA1C 8 2 (H) 05/31/2022       Recent Labs     07/04/22  1050 07/04/22  1541 07/04/22  2103 07/05/22  0719   POCGLU 330* 298* 233* 175*       Blood Sugar Average: Last 72 hrs:  (P) 939 [id number]     · Home regimen: Lantus 48 U AM and 36 U in PM, Amaryl, Sitagliptin-Metformin   · Current regimen: Lantus 30 U BID - increase back to home regimen as BG tolerates  · ISS increase as needed     Acquired hypothyroidism  Assessment & Plan  · Home levothyroxine     * Complete heart block (HCC)  Assessment & Plan  · Requiring vasopressors and TVP  · S/p Biventricular PPM 6/29  · Levophed has been weaned to off   · Continue telemetry monitoring   · Will need 2 week device check with EP     Patient reports no new symptoms, patient nurse denies patient acute issues overnight  Patient also reports normal bowel movement schedule  Patient also reports normal appetite dietary intake  Disposition: stable    SUBJECTIVE:  Chief Complaint: left shoulder pain    Subjective: "I am ready to go home "    OBJECTIVE:   Vitals:   Temp:  [98 2 °F (36 8 °C)-98 6 °F (37 °C)] 98 2 °F (36 8 °C)  HR:  [59-66] 64  Resp:  [16-20] 17  BP: (101-117)/(51) 117/51    Intake/Output:  I/O       07/03 0701  07/04 0700 07/04 0701  07/05 0700 07/05 0701  07/06 0700    P  O  240 120     Blood 406 3      Total Intake(mL/kg) 646 3 (7) 120 (1 3)     Urine (mL/kg/hr) 1450 (0 7)      Total Output 1450      Net -803 8 +120                 Nutrition: Diet Venkatesh/CHO Controlled; Consistent Carbohydrate Diet Level 1 (4 carb servings/60 grams CHO/meal); Sodium 4 GM (LEWIS)  GI Proph/Bowel Reg: senokot  VTE Prophylaxis:Enoxaparin (Lovenox)     Physical Exam:   GENERAL APPEARANCE:  Comfortable  NEURO:  Alert and oriented x3, no new focal acute deficits    HEENT:  EOM intact, no pain on EOM, oropharynx clear and patent  CV: regular rate and rhythm  LUNGS:  Clear to auscultation bilaterally  GI:  Soft nontender, no distension  :  Voiding  MSK:  Right upper extremity 4/5 dressing intact, left shoulder, patient refuses movement secondary to pain, left elbow 4/5, left wrist 4/5 neurovascularly intact, bilateral lower extremities 4/5 neurovascularly intact  SKIN:  Warm well perfused  Invasive Devices  Report    Peripheral Intravenous Line  Duration           Peripheral IV 07/03/22 Distal;Right;Ventral (anterior) Forearm 2 days          Line  Duration           Pacer Wires 6 days                      Lab Results:   BMP/CMP:   Lab Results   Component Value Date    SODIUM 137 07/05/2022    K 4 6 07/05/2022     07/05/2022    CO2 24 07/05/2022    BUN 28 (H) 07/05/2022    CREATININE 0 93 07/05/2022    CALCIUM 8 6 07/05/2022    EGFR 60 07/05/2022    and CBC:   Lab Results   Component Value Date    WBC 3 95 (L) 07/05/2022    HGB 9 5 (L) 07/05/2022    HCT 31 1 (L) 07/05/2022    MCV 88 07/05/2022    PLT 66 (L) 07/05/2022    MCH 26 9 07/05/2022    MCHC 30 5 (L) 07/05/2022    RDW 17 1 (H) 07/05/2022    MPV 12 8 (H) 07/05/2022    NRBC 0 07/05/2022     Imaging/EKG Studies: I have personally reviewed pertinent reports       Other Studies: none

## 2022-07-05 NOTE — ASSESSMENT & PLAN NOTE
-hgb has been slowly downtrending over this admission  -6 9 on 07/03 check   -will transfuse one unit with plan to recheck later in the day for response    - tx with one unit pRBC; now Hb 9 5 Hct 31 1  -continue to trend H/H

## 2022-07-05 NOTE — ASSESSMENT & PLAN NOTE
· Dilated CM on echo with persevered EF 55%, apical hypokinesis concerning for stress-CM, no prior echo   · Cards plan for repeat echo on 7/4 to eval for improvement in wall motion abnormalities suggesting a stress CM  · If no improvement will need ischemic work-up  · Pending echocardiogram  · Start low dose Losartan per cardiology; trend cr and potassium

## 2022-07-05 NOTE — RESTORATIVE TECHNICIAN NOTE
Restorative Technician Note      Patient Name: Malachi Clifton     Restorative Tech Visit Date: 7/5/2022  Note Type: Mobility  Patient Position Upon Consult: Other (Comment) (seated on commode)  Activity Performed: Ambulated  Assistive Device: Other (Comment) (Ax1 HHA)  Patient Position at End of Consult: Supine;  All needs within reach    Nelsy CANAST, Restorative Technician

## 2022-07-05 NOTE — DISCHARGE SUMMARY
[facility]  Discharge- Hardeep Benjamin 1945, 77 y o  female MRN: [id number]  Unit/Bed#: [facility] 812-01 Encounter: [id number]  Primary Care Provider: Karrie Marsh DO   Date and time admitted to hospital: 6/29/2022  3:20 AM    Acute blood loss anemia  Assessment & Plan  -hgb has been slowly downtrending over this admission  -6 9 on 07/03 check   -will transfuse one unit with plan to recheck later in the day for response    - tx with one unit pRBC; now Hb 9 5 Hct 31 1  -continue to trend H/H    Cardiomyopathy ([facility] )  Assessment & Plan  · Dilated CM on echo with persevered EF 55%, apical hypokinesis concerning for stress-CM, no prior echo   · Cards plan for repeat echo on 7/4 to eval for improvement in wall motion abnormalities suggesting a stress CM  · If no improvement will need ischemic work-up  · Pending echocardiogram  · Start low dose Losartan per cardiology; trend cr and potassium    Depression  Assessment & Plan  · Continue home lexapro 5 mg, per geriatrics consider increasing to 10 mg daily if symptoms inadequately controlled     Closed fracture of proximal end of left humerus  Assessment & Plan  · Orthopedics consulted - nonoperative management   · NV intact   · NWB LUE, sling for comfort   · Multimodal analgesia     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) ([facility] )  Assessment & Plan  · With hx of hepatic encephalopathy and gastric varices without bleeding   · Continue home rifaximin, lactulose and PPI   · Holding home nadolol with current normotension - restart as BP tolerates  · Holding home spironolactone - restart as hemodynamics improve   · Outpatient f/u with GI    Type 2 diabetes mellitus with hyperglycemia, with long-term current use of insulin [facility])  Assessment & Plan  Lab Results   Component Value Date    HGBA1C 8 2 (H) 05/31/2022       Recent Labs     07/04/22  2103 07/05/22  0719 07/05/22  1134 07/05/22  1552   POCGLU 233* 175* 320* 304*       Blood Sugar Average: Last 72 hrs:  (P) 398 [id number]     · Home regimen: Lantus 48 U AM and 36 U in PM, Amaryl, Sitagliptin-Metformin   · Current regimen: Lantus 30 U BID - increase back to home regimen as BG tolerates  · ISS increase as needed     Acquired hypothyroidism  Assessment & Plan  · Home levothyroxine     * Complete heart block (HCC)  Assessment & Plan  · Requiring vasopressors and TVP  · S/p Biventricular PPM 6/29  · Levophed has been weaned to off   · Continue telemetry monitoring   · Will need 2 week device check with EP             Medical Problems             Resolved Problems  Date Reviewed: 7/5/2022   None                 Admission Date:   Admission Orders (From admission, onward)     Ordered        06/29/22 0333  Inpatient Admission  Once                        Admitting Diagnosis: Complete heart block ([address] ) [I44 2]  Humeral fracture [S42 309A]  Fall [W19  XXXA]  Unspecified multiple injuries, initial encounter [T07  XXXA]    HPI: "Roberto Reyes is a 77 y o  female who presents as a transfer from [facility] for a left humeral fracture and heart block  Patient states that she was "walking around in my flip flops, lost my balance and fell onto my arm"  Denies LOC  Unsure if she hit her head  At [facility], she was found to have a left humeral fracture and in complete heart block  Upon my evaluation, the patient complains of left arm pain but denies CP, SOB, lightheadedness, dizziness  EKG taken upon my evaluation shows 2nd degree type II heart block on my interpretation " via Dr Myra Jean, resident    Procedures Performed:   Orders Placed This Encounter   Procedures    Cardiac ep lab eps/ablations    Central Line    Temporary External Pacing       Summary of Hospital Course:  Patient is a 23-year-old female presenting to the trauma service as a trauma evaluation with left humeral fracture and heart block    Patient reported a mechanical fall, was found to have a left humeral fracture on imaging, and complete heart block; second-degree type 2 heart block  Patient was seen by orthopedics who would deemed patient left humerus fracture non operative; recommended multimodal pain control and left upper extremity sling   06/29/2022 with cardiology implanting cardiac pacer with echocardiogram denoting left ventricular ejection fraction 50%, grade 1 abnormal relaxation, with minor annular calcification on mitral valve and mild regurgitation of tricuspid  Patient was treated with multimodal pain regimen, had had no acute issues throughout the course of the inpatient stay  Patient was recommended for ARC placement by PT/OT  Significant Findings, Care, Treatment and Services Provided:  Heart block, left humeral fracture    Complications: none    Condition at Discharge: stable         Discharge instructions/Information to patient and family:   See after visit summary for information provided to patient and family  Provisions for Follow-Up Care:  See after visit summary for information related to follow-up care and any pertinent home health orders  PCP: Romeo Pierre DO    Disposition: Short-term rehab at [facility]    Planned Readmission: No    Discharge Statement   I spent 28 minutes discharging the patient  This time was spent on the day of discharge  I had direct contact with the patient on the day of discharge  Additional documentation is required if more than 30 minutes were spent on discharge  Discharge Medications:  See after visit summary for reconciled discharge medications provided to patient and family

## 2022-07-05 NOTE — CASE MANAGEMENT
Case Management Discharge Planning Note    Patient name Evens Gillette  Location [facility] 887-54 MRN [id number]  : 1945 Date 2022       Current Admission Date: 2022  Current Admission Diagnosis:Complete heart block [facility])   Patient Active Problem List    Diagnosis Date Noted    Acute blood loss anemia 2022    Closed fracture of proximal end of left humerus 2022    Depression 2022    Cardiomyopathy ([facility] ) 2022    Complete heart block ([facility] ) 2022    Esophageal varices without bleeding ([facility] ) 2021    Pancytopenia ([facility] ) 2021    Iron deficiency anemia due to chronic blood loss 2021    Mild nonproliferative diabetic retinopathy of both eyes without macular edema associated with type 2 diabetes mellitus ([facility] ) 2021    Urinary tract infection 09/15/2020    Hepatic encephalopathy ([facility] ) 2020    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) ([facility] ) 2020    Thrombocytopenia ([facility] ) 2020    Hyperlipidemia 2018    Essential hypertension 2018    Acquired hypothyroidism 2018    Type 2 diabetes mellitus with hyperglycemia, with long-term current use of insulin ([facility] ) 2018    Diabetic polyneuropathy associated with type 2 diabetes mellitus ([facility] ) 2018      LOS (days): 6  Geometric Mean LOS (GMLOS) (days): 5 20  Days to GMLOS:-1 3     OBJECTIVE:  Risk of Unplanned Readmission Score: 19 05         Current admission status: Inpatient   Preferred Pharmacy:   [id number], [address] [place], [place]  [address] [facility]    [place] [address]  Phone: [phone] Fax: [phone]    [address] [place] [address] [place] [address]  Phone: [phone] Fax: [phone]    Primary Care Provider: Shaun Gongora DO    Primary Insurance: MEDICARE  Secondary Insurance: [place]RA    DISCHARGE DETAILS:    Pt accepted to [facility] and can d/c into room 962  Report can be called to [address] can accept the pt today but will need someone to transport the pt to their floor   CM will contact pt's nurse to discuss

## 2022-07-05 NOTE — INCIDENTAL FINDINGS
The following findings require follow up:  Radiographic finding   Finding:   Pelvic x-ray; incidental finding-Degenerative changes pubic symphysis and visualized lower lumbar spine   "  Chest x-ray-incidental finding -"Degenerative changes of the spine   "  Head CT without contrast-incidental finding-" VISUALIZED ORBITS AND PARANASAL SINUSES:  Bilateral ocular lens replacements    "  CT spine cervical without contrast--incidental finding   DEGENERATIVE CHANGES:  Moderate multilevel cervical degenerative changes are noted  Disc space narrowing is most pronounced at C3-C4, C4-C5, and C6-C7 where there is mild bony spinal canal narrowing  PREVERTEBRAL AND PARASPINAL SOFT TISSUES:  Calcifications at the carotid bifurcations   "  CT chest abdomen pelvis with contrast-incidental finding coronary artery calcifications  Mitral annular calcification    Cirrhotic morphology of the liver    Gallbladder is surgically absent    Splenomegaly measuring 15 5 cm in craniocaudal dimension, not significantly changed    Atrophic pancreas    STOMACH AND BOWEL:  3 2 cm duodenal diverticulum  There is mild diffuse thickening of the 2nd and 3rd portions of the duodenum  No bowel obstruction  Colonic diverticulosis without evidence of diverticulitis  Mild diffuse thickening of the ascending Colon    ABDOMINAL WALL/INGUINAL REGIONS:  Mild subcutaneous edema  Skin thickening of the anterior abdominal wall pannus similar to prior exam "  Shoulder x-rays- 1 and two view  left shoulder-incidental finding-mild osteoarthritis acromioclavicular joint   "  Left humerus x-ray-incidental finding initial Stable appearance of the left humeral head/neck fracture, however study limited by positioning  Remainder of the osseous structures appear intact   "  Chest x-ray portable ICU; right jugular transvenous pacemaker and right ventricle   "    Degenerative changes in pubic symphysis, lower lumbar spine; bilateral ocular lens replacements, cervical degenerative changes, calcifications carotid arteries, coronary artery calcifications, mitral annular calcification, cirrhotic morphology of the liver, splenomegaly, duodenal diverticulum, left shoulder arthritis      Patient with verbal understanding of all aforementioned     Follow up required: PCP   Follow up should be done within one week(s)    Please notify the following clinician to assist with the follow up:   Dr Bryon West

## 2022-07-05 NOTE — RESTORATIVE TECHNICIAN NOTE
Restorative Technician Note      Patient Name: Amarilis Monroe     Restorative Tech Visit Date: 7/5/2022  Note Type: Mobility  Patient Position Upon Consult: Other (Comment) (seated bedside commode; ambulated within room to scale for daily weight; Ax1 HHA)  Activity Performed: Ambulated; Transferred  Assistive Device: Other (Comment) (Ax1 HHA)  Patient Position at End of Consult: Bedside chair;  All needs within reach    Kasandra Bazan  DPT, Restorative Technician

## 2022-07-06 ENCOUNTER — PATIENT OUTREACH (OUTPATIENT)
Dept: CASE MANAGEMENT | Facility: OTHER | Age: 77
End: 2022-07-06

## 2022-07-06 PROBLEM — E66.9 OBESITY (BMI 30-39.9): Status: ACTIVE | Noted: 2022-07-06

## 2022-07-06 LAB
GLUCOSE SERPL-MCNC: 176 MG/DL (ref 65–140)
GLUCOSE SERPL-MCNC: 223 MG/DL (ref 65–140)
GLUCOSE SERPL-MCNC: 228 MG/DL (ref 65–140)
GLUCOSE SERPL-MCNC: 243 MG/DL (ref 65–140)

## 2022-07-06 PROCEDURE — 99223 1ST HOSP IP/OBS HIGH 75: CPT | Performed by: STUDENT IN AN ORGANIZED HEALTH CARE EDUCATION/TRAINING PROGRAM

## 2022-07-06 PROCEDURE — 97535 SELF CARE MNGMENT TRAINING: CPT

## 2022-07-06 PROCEDURE — 97530 THERAPEUTIC ACTIVITIES: CPT

## 2022-07-06 PROCEDURE — 97162 PT EVAL MOD COMPLEX 30 MIN: CPT

## 2022-07-06 PROCEDURE — 97166 OT EVAL MOD COMPLEX 45 MIN: CPT

## 2022-07-06 PROCEDURE — 82948 REAGENT STRIP/BLOOD GLUCOSE: CPT

## 2022-07-06 PROCEDURE — 99222 1ST HOSP IP/OBS MODERATE 55: CPT | Performed by: INTERNAL MEDICINE

## 2022-07-06 RX ORDER — NYSTATIN 100000 [USP'U]/G
POWDER TOPICAL 2 TIMES DAILY
Status: DISCONTINUED | OUTPATIENT
Start: 2022-07-06 | End: 2022-07-21 | Stop reason: HOSPADM

## 2022-07-06 RX ORDER — NADOLOL 20 MG/1
20 TABLET ORAL DAILY
Status: DISCONTINUED | OUTPATIENT
Start: 2022-07-07 | End: 2022-07-12

## 2022-07-06 RX ADMIN — PRAVASTATIN SODIUM 80 MG: 80 TABLET ORAL at 16:34

## 2022-07-06 RX ADMIN — RIFAXIMIN 550 MG: 550 TABLET ORAL at 10:06

## 2022-07-06 RX ADMIN — OXYCODONE HYDROCHLORIDE 2.5 MG: 5 TABLET ORAL at 17:20

## 2022-07-06 RX ADMIN — LIDOCAINE 5% 1 PATCH: 700 PATCH TOPICAL at 09:14

## 2022-07-06 RX ADMIN — MELATONIN 6 MG: at 21:42

## 2022-07-06 RX ADMIN — INSULIN LISPRO 4 UNITS: 100 INJECTION, SOLUTION INTRAVENOUS; SUBCUTANEOUS at 16:27

## 2022-07-06 RX ADMIN — DOCUSATE SODIUM 100 MG: 100 CAPSULE, LIQUID FILLED ORAL at 16:34

## 2022-07-06 RX ADMIN — PANTOPRAZOLE SODIUM 40 MG: 40 TABLET, DELAYED RELEASE ORAL at 06:01

## 2022-07-06 RX ADMIN — SENNOSIDES 8.6 MG: 8.6 TABLET, FILM COATED ORAL at 21:42

## 2022-07-06 RX ADMIN — METFORMIN HYDROCHLORIDE 500 MG: 500 TABLET ORAL at 16:34

## 2022-07-06 RX ADMIN — INSULIN GLARGINE 30 UNITS: 100 INJECTION, SOLUTION SUBCUTANEOUS at 21:42

## 2022-07-06 RX ADMIN — INSULIN LISPRO 4 UNITS: 100 INJECTION, SOLUTION INTRAVENOUS; SUBCUTANEOUS at 00:08

## 2022-07-06 RX ADMIN — INSULIN LISPRO 4 UNITS: 100 INJECTION, SOLUTION INTRAVENOUS; SUBCUTANEOUS at 12:05

## 2022-07-06 RX ADMIN — INSULIN LISPRO 2 UNITS: 100 INJECTION, SOLUTION INTRAVENOUS; SUBCUTANEOUS at 09:19

## 2022-07-06 RX ADMIN — INSULIN LISPRO 4 UNITS: 100 INJECTION, SOLUTION INTRAVENOUS; SUBCUTANEOUS at 21:43

## 2022-07-06 RX ADMIN — ASPIRIN 81 MG CHEWABLE TABLET 81 MG: 81 TABLET CHEWABLE at 10:05

## 2022-07-06 RX ADMIN — LEVOTHYROXINE SODIUM 112 MCG: 112 TABLET ORAL at 06:01

## 2022-07-06 RX ADMIN — DOCUSATE SODIUM 100 MG: 100 CAPSULE, LIQUID FILLED ORAL at 10:06

## 2022-07-06 RX ADMIN — NYSTATIN: 100000 POWDER TOPICAL at 17:00

## 2022-07-06 RX ADMIN — ESCITALOPRAM OXALATE 5 MG: 10 TABLET ORAL at 12:11

## 2022-07-06 RX ADMIN — LOSARTAN POTASSIUM 25 MG: 25 TABLET, FILM COATED ORAL at 10:05

## 2022-07-06 RX ADMIN — LACTULOSE 10 G: 20 SOLUTION ORAL at 10:05

## 2022-07-06 RX ADMIN — POLYETHYLENE GLYCOL 3350 17 G: 17 POWDER, FOR SOLUTION ORAL at 10:06

## 2022-07-06 RX ADMIN — RIFAXIMIN 550 MG: 550 TABLET ORAL at 21:42

## 2022-07-06 RX ADMIN — INSULIN GLARGINE 30 UNITS: 100 INJECTION, SOLUTION SUBCUTANEOUS at 10:06

## 2022-07-06 RX ADMIN — ENOXAPARIN SODIUM 30 MG: 30 INJECTION SUBCUTANEOUS at 21:42

## 2022-07-06 RX ADMIN — OXYCODONE HYDROCHLORIDE 2.5 MG: 5 TABLET ORAL at 09:17

## 2022-07-06 RX ADMIN — ENOXAPARIN SODIUM 30 MG: 30 INJECTION SUBCUTANEOUS at 10:05

## 2022-07-06 NOTE — PLAN OF CARE
Problem: PAIN - ADULT  Goal: Verbalizes/displays adequate comfort level or baseline comfort level  Description: Interventions:  - Encourage patient to monitor pain and request assistance  - Assess pain using appropriate pain scale  - Administer analgesics based on type and severity of pain and evaluate response  - Implement non-pharmacological measures as appropriate and evaluate response  - Consider cultural and social influences on pain and pain management  - Notify physician/advanced practitioner if interventions unsuccessful or patient reports new pain  Outcome: Progressing     Problem: INFECTION - ADULT  Goal: Absence or prevention of progression during hospitalization  Description: INTERVENTIONS:  - Assess and monitor for signs and symptoms of infection  - Monitor lab/diagnostic results  - Monitor all insertion sites, i e  indwelling lines, tubes, and drains  - Monitor endotracheal if appropriate and nasal secretions for changes in amount and color  - [place] appropriate cooling/warming therapies per order  - Administer medications as ordered  - Instruct and encourage patient and family to use good hand hygiene technique  - Identify and instruct in appropriate isolation precautions for identified infection/condition  Outcome: Progressing  Goal: Absence of fever/infection during neutropenic period  Description: INTERVENTIONS:  - Monitor WBC    Outcome: Progressing     Problem: SAFETY ADULT  Goal: Patient will remain free of falls  Description: INTERVENTIONS:  - Educate patient/family on patient safety including physical limitations  - Instruct patient to call for assistance with activity   - Consult OT/PT to assist with strengthening/mobility   - Keep Call bell within reach  - Keep bed low and locked with side rails adjusted as appropriate  - Keep care items and personal belongings within reach  - Initiate and maintain comfort rounds  - Make Fall Risk Sign visible to staff  - Offer Toileting every 2-4 Hours, in advance of need  - Initiate/Maintain bed/chair alarm  - Obtain necessary fall risk management equipment: yellow socks  - Apply yellow socks and bracelet for high fall risk patients  - Consider moving patient to room near nurses station  Outcome: Progressing  Goal: Maintain or return to baseline ADL function  Description: INTERVENTIONS:  -  Assess patient's ability to carry out ADLs; assess patient's baseline for ADL function and identify physical deficits which impact ability to perform ADLs (bathing, care of mouth/teeth, toileting, grooming, dressing, etc )  - Assess/evaluate cause of self-care deficits   - Assess range of motion  - Assess patient's mobility; develop plan if impaired  - Assess patient's need for assistive devices and provide as appropriate  - Encourage maximum independence but intervene and supervise when necessary  - Involve family in performance of ADLs  - Assess for home care needs following discharge   - Consider OT consult to assist with ADL evaluation and planning for discharge  - Provide patient education as appropriate  Outcome: Progressing

## 2022-07-06 NOTE — CONSULTS
Internal Medicine Consultation Note    Patient: Trinity España  Age/sex: 77 y o  female  Medical Record #: [id number]      Assessment/Plan:    Left proximal humerus fracture  · Non-op  · NWB  · Sling for comfort  · See Ortho as OP    CHB  · S/p dual chamber PPM 6/29/22  · Dressing intact = will remove 7/7/22  · No heavy lift/push/pull >10 lbs  · No left arm above shoulder height     Stress cardiomyopathy  · D/W Dr Martina Ruiz re:  recs for further w/u based on ECHO results from 7/5/22 and he said no further intervention for now but see back in office  · Continue Losartan 25mg qd (Cardiology started in hospital)    ABLA  · S/p 1 unit PRBCs  · Stable  · Will watch    PAUL cirrhosis  · Hx non-bleeding esophageal varices and hepatic encephalopathy   · Home:  Rifaximin 550 mcg q12hrs/Lactulose 10Gm BID/Lasix 40mg qd/Corgard 20mg qd/Aldactone 50mg qd  · Here:  Rifaximin 550 mg q12hrs/Lactulose 10Gm BID  · Add back Corgard at 20mg qd, hold SBP <120    DM2  · Home:  Janumet XR  BID/Amaryl 8 mg qAM/Basaglar 48U qam, 36U qpm  · Here:  Lantus 30U q12hrs  · Add back Metformin 500 mg BID to start this evening   · QID Accuchecks/SSI and DM diet    Pancytopenia  · Is 2/2 to cirrhosis  · Will watch    Hypothyroidism  · Continue Levothyroxine 112 mcg qd    Depression  · Continue Lexapro as at home       Discharge date:  Team      Subjective/HPI:    Nicole Roegrs is a 77year old patient with a history of PAUL cirrhosis, non-bleeding esophageal varices, pancytopenia, DM2, HTN, depression, hypothyroidism and HLD who was brought to the hospital after a fall from standing  She says she tripped over her flip flops and fell  She sustained a closed left proximal humerus fracture and was seen by Orthopedic surgery  They treated her non-surgically with non-weight bearing and a sling  She was found to be in complete heart block on admission and dropped her HR into the 20's    She then developed cardiogenic shock requiring pressor support with Levophed and Epinephrine and had a transvenous pacing wire placed  She then had a dual chamber PPM placed 6/29/22  Creatinine during her admission bennett to 2 19 eventually resolving  She was transfused with one unit PRBCs for hemoglobin of 6 9  Initial ECHO on 6/29/22 showed moderately dilated left ventricular cavity size, LVEF 55%, mild bi-atrial enlargement  There were regional wall motion abnormalities with akinesis of the apical anterior, apical septal, apical inferior, apical lateral walls and the apex  There was hyperkinesis of the basal anterior, basal anteroseptal, basal inferoseptal, basal inferior, basal inferolateral, basal anterolateral, mid anterior, mid anteroseptal, mid inferoseptal, mid inferior, mid inferolateral and mid anterolateral  RVEF was moderately reduced  The LV systolic function was abnormal in a pattern suggestive of apical stress cardiomyopathy  She was started on Losartan by Cardiology and a repeat ECHO done today 7/5/22  It showed normal left ventricular cavity size, LVEF was 77%, grade I diastolic dysfunction  There was akinesis of the apex, hypokinesis of the apical anterior, apical septal, apical inferior and apical lateral walls  Cardiology to advise on any further workup  Patient [facility]  We are asked to assist with medical management  Currently, does not have any complaints of CP, SOB, dizziness, N/V/D       ROS:   A 10 point ROS was performed; negative except as noted above       Social History:    Substance Use History:   Social History     Substance and Sexual Activity   Alcohol Use Not Currently     Social History     Tobacco Use   Smoking Status Never Smoker   Smokeless Tobacco Never Used     Social History     Substance and Sexual Activity   Drug Use No       Family History:    Family History   Problem Relation Age of Onset    Breast cancer Mother     Diabetes type II Father     Thyroid disease unspecified Daughter     Down syndrome Daughter     Diabetes type II Daughter     Diabetes type II Sister     No Known Problems Brother     Prostate cancer Brother     No Known Problems Sister     Stroke Sister     No Known Problems Son     Breast cancer Maternal Aunt     Colon cancer Neg Hx          Review of Scheduled Meds:  Current Facility-Administered Medications   Medication Dose Route Frequency Provider Last Rate    aspirin  81 mg Oral Daily Reneatucker Keller, DO      bisacodyl  10 mg Rectal Daily PRN Reneatucker Keller, DO      docusate sodium  100 mg Oral BID Reneatucker Keller, DO      enoxaparin  30 mg Subcutaneous Q12H [facility] Reneatucker Keller, DO      escitalopram  5 mg Oral Daily Reneatucker Keller, DO      insulin glargine  30 Units Subcutaneous Q12H [facility] Reneatucker Keller, DO      insulin lispro  2-12 Units Subcutaneous 4x Daily (with meals and at bedtime) Reneatucker Keller, DO      lactulose  10 g Oral BID Reneatucker Keller, DO      levothyroxine  112 mcg Oral Early Morning Reneatucker Keller, DO      lidocaine  1 patch Topical Daily Reneatucker Keller, DO      losartan  25 mg Oral Daily Reneatucker Keller, DO      melatonin  6 mg Oral HS Renea Longest, DO      ondansetron  4 mg Oral Q6H PRN Reneatucker Keller, DO      oxyCODONE  2 5 mg Oral Q6H PRN Reneatucker Keller, DO      pantoprazole  40 mg Oral Early Morning Reneae Longest, DO      polyethylene glycol  17 g Oral Daily Reneatucker Keller, DO      pravastatin  80 mg Oral Daily With [employer] Edy Jaquez, DO      rifaximin  550 mg Oral Q12H [facility] Reneatucker Keller, DO      senna  1 tablet Oral HS Reneatucker Keller, DO         Labs:     Results from last 7 days   Lab Units 07/05/22  0513 07/04/22  0448   WBC Thousand/uL 3 95* 2 51*   HEMOGLOBIN g/dL 9 5* 8 0*   HEMATOCRIT % 31 1* 26 2*   PLATELETS Thousands/uL 66* 60*     Results from last 7 days   Lab Units 07/05/22  0513 07/04/22  0448   SODIUM mmol/L 137 137   POTASSIUM mmol/L 4 6 4 6   CHLORIDE mmol/L 107 107   CO2 mmol/L 24 26   BUN mg/dL 28* 36*   CREATININE mg/dL 0 93 0 96   CALCIUM mg/dL 8 6 8 2*         Results from last 7 days   Lab Units 22  0449   INR  1 33*        Results from last 7 days   Lab Units 22  0653 223 22  1552   POC GLUCOSE mg/dl 176* 272* 304*       Lab Results   Component Value Date    BLOODCX No Growth After 5 Days  2020    BLOODCX No Growth After 5 Days  2020    URINECX >100,000 cfu/ml Escherichia coli (A) 2020    URINECX (A) 2020     40,000-49,000 cfu/ml Alpha Hemolytic Streptococcus NOT Enterococcus       Input and Output Summary (last 24 hours):     No intake or output data in the 24 hours ending 22 0915    Imaging:     No orders to display       *Labs /Radiology studies reviewed  *Medications reviewed and reconciled as needed  *Please refer to order section for additional ordered labs studies  *Case discussed with primary attending during morning huddle case rounds    Vitals:   Temp (24hrs), Av 5 °F (36 9 °C), Min:98 °F (36 7 °C), Max:98 9 °F (37 2 °C)    Temp:  [98 °F (36 7 °C)-98 9 °F (37 2 °C)] 98 2 °F (36 8 °C)  HR:  [62-68] 64  Resp:  [18-22] 18  BP: (111-150)/(50-69) 148/68  SpO2:  [96 %-100 %] 97 %  Body mass index is 36 03 kg/m²       Physical Exam:   General Appearance: no distress, conversive  HEENT: PERRLA, conjuctiva normal; oropharynx clear; mucous membranes moist   Neck:  Supple, normal ROM  Lungs: CTA, normal respiratory effort, no retractions, expiratory effort normal  CV: regular rate and rhythm; no rubs/murmurs/gallops, PMI normal   PPM dressing dry and intact w/o surrounding edema or erythema  ABD: soft; ND/NT; +BS  EXT: no edema  Skin: normal turgor, normal texture, no rashes  Psych: affect normal, mood normal  Neuro: AAO          Invasive Devices  Report    Line  Duration           Pacer Wires 7 days                 VTE Pharmacologic Prophylaxis: Enoxaparin (Lovenox)  Code Status: Level 1 - Full Code  Current Length of Stay: 1 day(s)    Total floor / unit time spent today 1 hour with more than 50% spent counseling/coordinating care  Counseling includes discussion with patient re: progress  and discussion with patient of his/her current medical state/information  Coordination of patient's care was performed in conjunction with primary service  Time invested included review of patient's labs, vitals, and management of their comorbidities with continued monitoring  In addition, this patient was discussed with medical team including physician and advanced extenders  The care of the patient was extensively discussed and appropriate treatment plan was formulated unique for this patient  ** Please Note: Fluency Direct voice to text software may have been used in the creation of this document   Audio transcription errors may occur**

## 2022-07-06 NOTE — ASSESSMENT & PLAN NOTE
· S/p dual chamber PPM 6/29/22 with Dr Coe Every  · Monitor pacemaker site for signs of infection - not currently  · Called Cardiac Device clinic 7/21 - patient had site check by EP-MADI Vincent on 7/13/22 while in [facility] (no note) - per clinic there were no sutures and likely will not need OP follow-up with device clinic until October   · OP Cards follow-up   · No heavy lifting/push/pull >10 lbs

## 2022-07-06 NOTE — PROGRESS NOTES
OT LONG TERM GOALS          07/06/22 1230   Rehab Team Goals   ADL Team Goal Patient will be independent with ADLs with least restrictive device upon completion of rehab program   Rehab Team Interventions   OT Interventions Self Care;Home Management; Therapeutic Exercise; Energy Conservation;Patient/Family Education   Eating Goal   Eating Goal 06  Independent - Patient completes the activity by him/herself with no assistance from a helper  Status Ongoing; Target goal - two weeks   Interventions Compensation Strategies; Optimal Position   Grooming Goal   Oral Hygiene Goal 06  Independent - Patient completes the activity by him/herself with no assistance from a helper  Task Wash/Dry Face;Wash/Dry Hands;Brush Teeth   Environment Stand at [employer]   Status Ongoing; Target goal - two weeks   Intervention Assistive Device;Balance Work; Therapeutic Exercise; Tolerance Work   Tub/Shower Transfer Goal   Method Shower Stall  (supervision)   Assist Device Seat with Back   Status Ongoing; Target goal - two weeks   Interventions Assistive Device; ADL Training   Bathing Goal   Shower/bathe self Goal 04  Supervision or touching assistance- [place] provides VERBAL CUES or supervision throughout activity  Environment Seated   Status Ongoing; Target goal - two weeks   Intervention ADL Training;Assistive Device; Therapeutic Exercise   Upper Body Dressing Goal   Upper body dressing Goal 06  Independent - Patient completes the activity by him/herself with no assistance from a helper  Task Upper Body   Environment Seated   Status Ongoing; Target goal - two weeks   Intervention Therapeutic Exercise; Tolerance Work   Lower Body Dressing Goal   Lower body dressing Goal 06  Independent - Patient completes the activity by him/herself with no assistance from a helper  Putting on/taking off footwear Goal 06  Independent - Patient completes the activity by him/herself with no assistance from a helper     Task Lower Body   Adaptive Equipment [place];Sock Aide   Environment Seated;Standing   Status Ongoing; Target goal - two weeks   Intervention Balance Work; Therapeutic Exercise; Tolerance Work   Toileting Transfer Goal   Toilet transfer Goal 06  Independent - Patient completes the activity by him/herself with no assistance from a helper  Assistive Device [facility] [place]   Status Ongoing; Target goal - two weeks   Intervention ADL Training;Balance Work;Assistive Device   Toileting Goal   Toileting hygiene Goal 06  Independent - Patient completes the activity by him/herself with no assistance from a helper  Task Pants Up;Hygiene   Status Ongoing; Target goal - two weeks   Intervention ADL Training;Balance Work;Assistive Device   Meal Prep and Kitchen Mobility   Assist Level Independent   Status Ongoing; Target goal - two weeks

## 2022-07-06 NOTE — CASE MANAGEMENT
Met w/pt and reviewed rehab routine and cm role  Pt resides with her 48year old dtr Dada Thakur who she reports as having down syndrome  Pt's son Wm Pineda and his wife Mary Ellen Gongora are helping in stay with Dada Thakur at her house, but tonight they will be taking Dada Thakur to their own home  Dada Thakur receives hha's mon thru fri through [facility] arranged through [facility]/mr  The hha's are there 8-12 mon, tue, wed, and 8-1 thur and Friday  Pt states she has never had hha for herself although she did go through an assessment through the [facility], but they told her she made too much money for services  Cm asked pt if she would like to try again as it is possible their requirements have changed  [facility] [place] [facility] and owns a roller walker, scooter and 3 w/c's  Pt states she just finished outpt physical for her shoulder at a non [facility] facility in [facility]  Pt states she drives  Reviewed team mtg process and potential los and pt is concerned about receiving a bill despite cm explaining her coverage under medicare and [employer]  Following to assist w/dc planning recommendations

## 2022-07-06 NOTE — PCC CARE MANAGEMENT
Pt continues to make gains and is expected to return home tomorrow with family and contd therapy services  Family education/training completed and they are aware of the assist needed for pt to maintain safety at home

## 2022-07-06 NOTE — PROGRESS NOTES
07/06/22 0900   Patient Data   Rehab Impairment Impairment of mobility, safety and Activities of Daily Living (ADLs) due to Orthopedic Disorders:  08 9  Other Orthopedic L Humerus fx  Etiologic Diagnosis L hummerus fx, Complete Heart Block   Date of Onset 06/29/22   Home Setup   Type of Home Single Level   Method of Entry Stairs;Hand Rail Bilateral   Number of Stairs 4   Number of Stairs in Home 0   First Floor Bathroom Full   Available Equipment Roller Walker   Prior Level of Function   Self-Care 3  Independent - Patient completed the activities by him/herself, with or without an assistive device, with no assistance from a helper  Indoor-Mobility (Ambulation) 3  Independent - Patient completed the activities by him/herself, with or without an assistive device, with no assistance from a helper  Stairs 3  Independent - Patient completed the activities by him/herself, with or without an assistive device, with no assistance from a helper  Functional Cognition 3  Independent - Patient completed the activities by him/herself, with or without an assistive device, with no assistance from a helper  Falls in the Last Year   Number of falls in the past 12 months 1   Type of Injury Associated with Fall Major injury   Psychosocial   Psychosocial (WDL) WDL   Restrictions/Precautions   Precautions Fall Risk;Pain;Supervision on toilet/commode;Pressure Ulcer  (pressure ulcer R buttocks,)   Weight Bearing Restrictions Yes   LUE Weight Bearing Per Order NWB   Braces or Orthoses Sling  (ordered as PRN)   Pain Assessment   Pain Assessment Tool 0-10   Pain Score 8   Pain Location/Orientation Orientation: Left; Location: Shoulder; Location: Arm   Pain Onset/Description Onset: Ongoing; Onset: Sudden   Effect of Pain on Daily Activities L shoulder pain affects all functional mobility  she gets sudden shoots of pain but had pain in L shoulder consistently [facility] Pain Intervention(s) Cold applied; Rest  (ice applied 20 min L forearm during session)   Toileting Hygiene   Type of Assistance Needed Physical assistance   Physical Assistance Level Total assistance   Toileting Hygiene CARE Score 1   Toilet Transfer   Surface Assessed Standard Toilet   Transfer Technique Standard   Limitations Noted In Balance; Sequencing;LE Strength;UE Strength   Adaptive Equipment   (HHA RUE)   Positioning Concerns   (L arm)   Findings walked from bedroom to bathroom, Sherrie RUE HHA, use of RUE HHA for sit<>stand  Pt rpeorted that the commode pinches her skin on her thighs and therefore she didn't want to use the commode  Used the toilet in the bathroom, but the grab bar is on the L hand side   Type of Assistance Needed Physical assistance   Physical Assistance Level 51%-75%   Toilet Transfer CARE Score 2   Toileting   Findings pt wearing a gown and no underwear so not assessed   Transfer Bed/Chair/Wheelchair   Positioning Concerns Skin Integrity   Limitations Noted In Balance   Findings Pt was able to push with RUE on bed/chair to stand and then HHA on R side with therapist hand around L waist    Type of Assistance Needed Physical assistance   Physical Assistance Level 51%-75%   Comment slow moving due to L shoulder pain   Chair/Bed-to-Chair Transfer CARE Score 2   Roll Left and Right   Comment too much pain in L shoulder to roll to L side    MaxA rolling to R side   Reason if not Attempted Safety concerns   Roll Left and Right CARE Score 88   Sit to Lying   Type of Assistance Needed Physical assistance   Physical Assistance Level Total assistance   Comment Ax2   Sit to Lying CARE Score 1   Lying to Sitting on Side of Bed   Type of Assistance Needed Physical assistance   Physical Assistance Level Total assistance   Comment Ax2 when bed flat, with HOB up all the way then MaxA x1   Lying to Sitting on Side of Bed CARE Score 1   Sit to Stand   Type of Assistance Needed Physical assistance   Physical Assistance Level 51%-75%   Comment boost to stand then A to stabilize balance   Sit to Stand CARE Score 2   Picking Up Object   Reason if not Attempted Safety concerns   Picking Up Object CARE Score 88   Car Transfer   Reason if not Attempted Safety concerns   Car Transfer CARE Score 88   Ambulation   Primary Mode of Locomotion Prior to Admission Walk   Distance Walked (feet) 20 ft  (x2)   Assist Device Hand Hold   Gait Pattern Inconsistant Ayala; Slow Ayala; Step through; Step to; Improper weight shift   Limitations Noted In Balance; Endurance;Speed;Strength;Swing   Provided Assistance with: Balance;Trunk Support   Walk Assist Level Minimum Assist   Walk 10 Feet   Type of Assistance Needed Physical assistance   Physical Assistance Level 26%-50%   Walk 10 Feet CARE Score 3   Walk 50 Feet with Two Turns   Reason if not Attempted Safety concerns   Walk 50 Feet with Two Turns CARE Score 88   Walk 150 Feet   Reason if not Attempted Safety concerns   Walk 150 Feet CARE Score 88   Walking 10 Feet on Uneven Surfaces   Reason if not Attempted Safety concerns   Walking 10 Feet on Uneven Surfaces CARE Score 88   Wheel 50 Feet with Two Turns   Reason if not Attempted Activity not applicable   Wheel 50 Feet with Two Turns CARE Score 9   Wheel 150 Feet   Reason if not Attempted Activity not applicable   Wheel 673 Feet CARE Score 9   Curb or Single Stair   Reason if not Attempted Safety concerns   1 Step (Curb) CARE Score 88   4 Steps   Reason if not Attempted Safety concerns   4 Steps CARE Score 88   12 Steps   Reason if not Attempted Safety concerns   12 Steps CARE Score 88   Comprehension   QI: Comprehension 4  Undestands: Clear comprehension without cues or repetitions   Expression   QI: Expression 4   Express complex messages without difficulty and with speech that is clear and easy to [place]   RLE Assessment   RLE Assessment   (gross 3/5 MMT)   LLE Assessment   LLE Assessment   (gross 3/5 MMT)   LUE Assessment   LUE Assessment   (in sing, no ROM)   Coordination Movements are Fluid and Coordinated 1   Cognition   Overall Cognitive Status WFL   Arousal/Participation Cooperative  (tired and in pain but willing to do therapy)   Attention Within functional limits   Objective Measure   PT Measure(s) secondary to cardiac history, reviewed breathing exercises and used incentive spriometer x10, with Sherrie for VC  a majority of session focused on positioning in the recliner chair to help improve comfort OOB  Trialed various pillows/support, and ended with a rolled thin blanket and a rolled towel (long ways) to support under L arm in recliner  Used ice for 20 min on L forearm as well, due to swelling in fingers and pain throughout arm  Brought a waffle cushion to the room for her to sit on however declined it at htis time as she said that she felt it made her bottom too sweaty  Worked on sit<>stands and initial standing balance, worked with RN to reapply wound care to bottom  (had to take allevyn off when she was going to the bathroom)  RN checked prior wound care orders as when took hte allevyn off it opened the skin a little bit, so RN donned xeroform and then the allevyn over it (the smaller sacral allevyn)  PT Findings Pt needed to use the bathroom at start of session and needed pain meds from RN; worked on going to the bathroom with mobility  Overall session limited by pain and fatigue  After session Dr Codi Taylor clarified that we can do limited PROM to L shoulder to prevent adhesive capsulitis/contractures, and the same iwth the elbow  Discharge Information   Patient's Discharge Plan to dc home   Patient's Rehab Expectations to get better to go home   Barriers to Discharge Home Limited Family Support;Decreased Strength;Decreased Endurance;Pain; Safety Considerations  ([place], NWB LUE,)   Impressions Pt presents s/p fall at home with subsequent L humeral fracture  Pt is NWB to LUE with humeral fracture managed non-op with sling PRN   Medical history includes type 2 diabetes, hypothyroidism, hypertension, hyperlipidemia, 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, CKD   Pt is also deconditioned and has increased pain in L shoulder, even after having pain meds  Pt presents with dec strength, dec dyanmic balance and righitng reactions, which limits all functional mobility at this time  [place] and limited family support are barriers to dc home, as pt will need A from family/friends to complete IADLs as she will be limited with this due to NWB and pain in LUE  Currently planning on pt being able to progress her basic functional mobility with SPC in RUE to achieve Mag for transfers and household mobility, with S for stairs for safety  Pt will benefit from skilled PT to progress strength, balance, activity tolerance, safetyand (I) with mobility     PT Therapy Minutes   PT Time In 0900   PT Time Out 1030   PT Total Time (minutes) 90   PT Mode of treatment - Individual (minutes) 90   PT Mode of treatment - Concurrent (minutes) 0   PT Mode of treatment - Group (minutes) 0   PT Mode of treatment - Co-treat (minutes) 0   PT Mode of Treatment - Total time(minutes) 90 minutes   PT Cumulative Minutes 90   Cumulative Minutes   Cumulative therapy minutes 90

## 2022-07-06 NOTE — ASSESSMENT & PLAN NOTE
· Mgmt per IM   · They recommend d/c meds:  · Lasix 40mg qday, Spironolactone 25mg qday, Nadolol 10mg qday

## 2022-07-06 NOTE — PCC NURSING
Left proximal humerus fracture - Non-op, NWB, Sling for comfort, See Ortho as OP  CHB - S/p dual chamber PPM 6/29/22, No heavy lift/push/pull >10 lbs, No left arm above shoulder height  Stress cardiomyopathy - D/W Dr Oconnor Ours he said no further intervention for now but see back in office, Continue Losartan 25mg qd (Cardiology started in hospital)  ABLA - S/p 1 unit PRBCs, Stable  PAUL cirrhosis - Hx non-bleeding esophageal varices (banded) and hepatic encephalopathy, Sees Dr Cricket Jordan at [facility] as OP, Home:  Rifaximin 550 mcg q12hrs/Lactulose 10Gm BID/Lasix 40mg qd/Corgard 20mg qd/Aldactone 50mg qd, Here:  Rifaximin 550 mg q12hrs/Lactulose 10Gm BID/Corgard 20mg qd (hold SBP <120), Consider adding back a small dose of Lasix when BP allows  DM2 - Home:  Janumet XR  BID/Amaryl 8 mg qAM/Basaglar 48U qam, 36U qpm, Here:  Lantus 32U q12hrs/Metformin 500 mg BID, QID Accuchecks/SSI and DM diet, FBS low 7/10 AM and Lantus was skipped altogether --> make sure patient has qhs snack, No changes today  Pancytopenia - Is 2/2 to cirrhosis/portal HTN  Additionally, has splenomegaly contributing to low platelet ct, Stable today, Hemoglobin was down to 8 1 on 7/8/22, Has had tx with Venofer in past   Sees [facility] Senia Park from [facility]  Hypothyroidism - Continue Levothyroxine 112 mcg qd  Depression - Continue Lexapro as at home  Hyperkalemia - Mild at 5 2 but may need to stop ARB if continues to climb, Recheck 7/14/22  Pt is continent of both bowel and bladder  7/20- C/o inc pain left shoulder 7/15 = xray stable  Wearing edema glove on left hand  CT done- results P DM2-Home:  Janumet XR  BID/Amaryl 8 mg qAM/Basaglar 48U qam, 36U qpmHere:  Lantus 32U AM + 10U PM/Metformin 500 mg BID QID Accuchecks/SSI and DM diet Will inc AM Lantus dose to 38U     This week we will continue to monitor vital signs and lab values    We will continue to educate on the importance of turning and offloading in order to prevent skin breakdown and preform routine skin checks  We will encourage independence with ADLs  We will monitor for constipation and medicate per bowel protocol  We will preform safe transfers and keep the pt free from falls  Will provide pt/family with updated DM education

## 2022-07-06 NOTE — ASSESSMENT & PLAN NOTE
· Function improving  · CG training completed   · Displaced comminuted fracture of the humeral head/neck on imaging  · NWB to the left upper limb, maintain in sling  · Ok for for gentle range of motion to prevent adhesive capsulitis  · Pain control  · PT/OT in [facility] completed recommend  PT, OT   · Follow up with Dr Lucretia Garcia per orthopaedics as OP    · Shoulder/humerus XR 7/18  · Impression:  Comminuted impaction fracture in the proximal left humerus fairly similar in alignment  Soft tissue swelling about the elbow with a number of small radiopaque foreign bodies  Acute impaction fracture in the proximal left humerus with lateral rotation of the humeral head and widening of the glenohumeral joint  · Ortho recommended CT of shoulder - completed - per Dr Lucretia Garcia can still follow-up as OP   · CT 7/19 : Comminuted impacted humeral head and neck fracture, without significant change in alignment  No significant osseous bridging  Mild incongruity of the articular surface  Involvement of the greater tuberosity  No glenoid fracture identified  Moderate acromioclavicular osteoarthritis  Small glenohumeral joint effusion  Muscle bulk is preserved  Tendon contours poorly evaluated  Small left pleural effusion  · LUE still fairly swollen and painful - neurovascularly intact  · IM team evaluated arm prior to d/c and do not feel this is cellulitis but rather residual edema from injury   · Continue close monitoring at home and with PCP/ortho  · You can use glove during day with skin checks every few hours  Remove at night and do NOT use if increased skin discoloration, pain, or other concerns  You can gentle wrap left hand and arm without glove with ACE wrap as discussed but do NOT over tighten  Remove at night and do NOT use if increased skin discoloration, pain, or other concerns      · 7/20 Venous US LUE - negative DVT

## 2022-07-06 NOTE — ASSESSMENT & PLAN NOTE
· Discussed with IM prior to d/c  · Can go back on home regimen - levothyroxine 125mcg 6 days per week and 1/2 tab on Sunday; prior TSH acceptable   · OP Endo/PCP follow-up

## 2022-07-06 NOTE — ASSESSMENT & PLAN NOTE
· Medicine team reached out to Cardiology re: advice with recs for further w/u based on ECHO results from 7/5/22  · Per Cardiology, outpatient follow up, no acute changes based on echo  · (Losartan on hold with borderline low BP with cirrhosis meds)

## 2022-07-06 NOTE — ASSESSMENT & PLAN NOTE
· Secondary to cirrhosis, monitor labs  · Stable per recent H-O c/s in [facility]  · Can follow-up with PCP and also hem-onc

## 2022-07-06 NOTE — ASSESSMENT & PLAN NOTE
· Hx non-bleeding esophageal varices and hepatic encephalopathy   · Here:  Rifaximin 550 mg q12hrs/Lactulose 10Gm BID/Nadolol 10mg qday/Spironolactone 25mg qdya  · OP GI follow-up

## 2022-07-06 NOTE — ASSESSMENT & PLAN NOTE
· Acute blood loss anemia, Hgb 6 9 previously, s/p transfusion  · Hgb on 7/20 of 8 1  · Will require close follow with labs  · Monitor clinically as well in therapy

## 2022-07-06 NOTE — TEAM CONFERENCE
Acute RehabilitationTeam Conference Note  Date: 7/6/2022   Time: 11:29 AM       Patient Name:  Annei Apple       Medical Record Number: [id number]   YOB: 1945  Sex: Female          [facility]  Payor Info:  Payor: MEDICARE / Plan: MEDICARE A AND B / Product Type: Medicare A & B Fee for Service /      Admitting Diagnosis: Fracture closed, humerus [S42 [id number]]   Admit Date/Time:  7/5/2022  5:43 PM  Admission Comments: No comment available     Primary Diagnosis:  Closed fracture of proximal end of left humerus  Principal Problem: Closed fracture of proximal end of left humerus    Patient Active Problem List    Diagnosis Date Noted    Obesity (BMI 30-39 9) 07/06/2022    Acute blood loss anemia 07/03/2022    Closed fracture of proximal end of left humerus 07/02/2022    Depression 07/02/2022    Cardiomyopathy ([address] ) 07/02/2022    Complete heart block ([address] ) 06/30/2022    Esophageal varices without bleeding ([address] ) 09/29/2021    Pancytopenia ([address] ) 08/06/2021    Iron deficiency anemia due to chronic blood loss 08/06/2021    Mild nonproliferative diabetic retinopathy of both eyes without macular edema associated with type 2 diabetes mellitus ([address] ) 07/16/2021    Urinary tract infection 09/15/2020    Hepatic encephalopathy ([address] ) 09/13/2020    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) ([address] ) 09/13/2020    Thrombocytopenia ([address] ) 09/13/2020    Hyperlipidemia 07/24/2018    Essential hypertension 07/24/2018    Acquired hypothyroidism 07/24/2018    Type 2 diabetes mellitus with hyperglycemia, with long-term current use of insulin ([address] ) 07/24/2018    Diabetic polyneuropathy associated with type 2 diabetes mellitus ([address] ) 07/24/2018       Physical Therapy:    Weight Bearing Status: Non-weight bearing (LUE)  Transfers:  Moderate Assistance  Bed Mobility: Maximum Assistance, Assist of 2  Amulation Distance (ft): 20 feet  Ambulation: Minimal Assistance  Assistive Device for Ambulation: Hand Hold Assistance  Discharge Recommendations: Home with:  [address] with[de-identified] Family Support, First Floor Setup, Home Physical Therapy    Pt presents s/p fall at home with L humeral fracture and NWB status with sling PRN  Pt presents with inc pain and swelling in LUE, with inc pain with slight movements to adjust sling for positioning  Pt also has impaired cardiovascular function with dec activity tolerance and fatigue, needing rest breaks  Pt has a ramp or a few [place] with BHR and first floor setup  Pt's daughter has Down's Syndrome and lives with her; the dtr has a caregiver/aide who helps her intermittently during the week  Pt reported that her daughter won't be able to help her at d/c but she also doesn't have family/friends in the area who can help her  Currently anticipating need for A for all IADLs due to NWB status LUE and therefore very limited functional use of LUE  Pt presents with deconditioning and limited ambulation distance at this time as well  She also needs inc physical A for bed mobility due to LUE pain  Pt will benefit from skilled PT to progress safety and (I) with functional mobility to reach Mag for transfers, bed mobility and ambulation and S for stairs  Will need to plan on A in the house for IADLs         Occupational Therapy:          No notes on file    Speech Therapy:           No notes on file    Nursing Notes:  Appetite: Good  Diet Type: Diabetic                      Diet Patient/Family Education Complete: No                            Bladder: Continent     Bladder Patient/Family Education: No  Bowel: Continent     Bowel Patient/Family Education: No  Pain Location/Orientation: Orientation: Left, Location: Arm  Pain Score: 8                       Hospital Pain Intervention(s): Medication (See MAR), Elevated, Repositioned  Pain Patient/Family Education: No  Medication Management/Safety  Injectable: Lovenox (insulin)  Safe Administration: Yes (by staff)  Medication Patient/Family Education Complete: No (on going)    Pt admitted with left humeral fracture and heart block  LUE non operable  NWB,  Sling applied  Pain managed with oxycodone and Lidoderm patch  Pacemaker placed 6/29  Incision with Mepilex dressing  Diabetes managed with diet, SSI, and QID blood sugar checks,  Hx of hepatic encephalopathy and gastric varices without bleeding managed with rifaximin, lactulose and PPI   Continent of bowel and bladder  This week we will continue to monitor vital signs and lab results  Pt will work on increase balance and strength, maintain skin integrity by turning /repositioning and offloading pressure, and safety with transfers to keep pt free from falls  Case Management:     Discharge Planning  Living Arrangements: Lives w/ Children  Support Systems: Self, Daughter, Family members, Private Caregivers  Assistance Needed: none  Type of Current Residence: Private residence  [address]: No  Pt admitted s/p medical event and is undergoing therapy evaluations today  Cm to complete initial assessment and assist w/dc planning needs  Is the patient actively participating in therapies? yes  List any modifications to the treatment plan:     Barriers Interventions   Pain, nwb of anthony extremity Pain meds, repositioning, passive rom to be initiated   Steps to entr Therapy stair training   Deconditioning, strength Therapy strengthening exercises   Pressure wound right buttocks Wound care orders to be verified         Is the patient making expected progress toward goals?  yes  List any update or changes to goals:     Medical Goals: Patient will be medically stable for discharge to [facility] upon completion of rehab program and Patient will be able to manage medical conditions and comorbid conditions with medications and follow up upon completion of rehab program    Weekly Team Goals:   Rehab Team Goals  Bowel/Bladder Team Goal: Patient will require assist with bladder/bowel management with least restrictive device upon completion of rehab program  Transfer Team Goal: Patient will be independent with transfers with least restrictive device upon completion of rehab program  Locomotion Team Goal: Patient will be independent with locomotion with least restrictive device upon completion of rehab program    Discussion: pt is undergoing evaluations with therapy today and presents with the identified barriers from physical  Therapy  Pt is min to mod for transfers and max of 2 for bed mobility  Pt has a mentally handicap daughter who is receiving care while she is hospitalized  Goals are for pt to ambulate independently but due to injury ads may require assist  Estimated los 2 weeks  Anticipated Discharge Date:  reteam [facility] Team Members Present: The following team members are supervising care for this patient and were present during this Weekly Team Conference      Physician: Dr Codi Taylor DO  : Luci Hernandez MSW  Registered Nurse: Guy Broderick RN, BSN, [address] [place] [address]  Physical Therapist: Jayjay Edwards DPT  Occupational Therapist: Shane Gonzalez MS, OTR/L, CBIS  Speech Therapist:

## 2022-07-06 NOTE — H&P
PHYSICAL MEDICINE AND REHABILITATION H&P/ADMISSION NOTE  Senia Chowdary 77 y o  female MRN: [id number]  Unit/Bed#: -89 Encounter: [id number]     Rehab Diagnosis: Impairment of mobility, safety and Activities of Daily Living (ADLs) due to Orthopedic Disorders:  08 9  Other Orthopedic Left Humerus fracture    Etiologic: L hummerus fx, Complete Heart Block  Date of Onset: 6/29/22     Date of surgery: 6/29/2022: Cardiac Pacer Implant (Chest)    History of Present Illness:   Senia Chowdary is a 77 y o  female with type 2 diabetes, hypertension, depression, hypothyroidism, hx of PAUL cirrhosis, esophageal varices, pancytopenia, HLD, hypothyroidism who presented to the [address] on 6/29 after tripping over her flip flops, falling and sustaining a left humerus fracture treated non-operatively with NWB to the left arm in a sling  She was found to be in complete heart block with HR in the 20-230s range, developing cardiogenic shock requiring pressors and TVP  She underwent a Permanent pacemaker placement on 6/29 with Dr Guerrier Asp  Course complicated by CLYDE and blood loss anemia with hemoglobin of 6 9 s/p 1 unit pRBC  Initial ECHO on 6/29/22 showed moderately dilated left ventricular cavity size, LVEF 55%, mild bi-atrial enlargement  There were regional wall motion abnormalities with akinesis of the apical anterior, apical septal, apical inferior, apical lateral walls and the apex  There was hyperkinesis of the basal anterior, basal anteroseptal, basal inferoseptal, basal inferior, basal inferolateral, basal anterolateral, mid anterior, mid anteroseptal, mid inferoseptal, mid inferior, mid inferolateral and mid anterolateral  RVEF was moderately reduced  The LV systolic function was abnormal in a pattern suggestive of apical stress cardiomyopathy  She was started on Losartan by Cardiology and a repeat ECHO done today 7/5/22   It showed LVEF of 50% with grade 1 diastolic dysfunction, akinesis of the apex, hypokinesis of the apical anterior, septal, inferior/lateral walls  The patient was evaluated by the Rehabilitation team and deemed an appropriate candidate for comprehensive inpatient rehabilitation and admitted to the [facility] on 7/5/2022  5:43 PM      Plan:     Rehabilitation   Functional deficits: impaired mobility, self care   Begin PT/OT/SLP  Rehabilitation goals are to achieve a modified independent level with mobility and self care  Prognosis is good  ELOS is 14 days  Estimated discharge is home  DVT prophylaxis   lovenox    Pain   Lidocaine patch   Oxycodone 2 5mg Q6H PRN    Bladder plan   Continent    Bowel plan   Continent    Code Status   Level 1: Full Code    Incidental Findings    Pelvic x-ray; incidental finding-Degenerative changes pubic symphysis and visualized lower lumbar spine   "  Chest x-ray-incidental finding -"Degenerative changes of the spine   "  Head CT without contrast-incidental finding-" VISUALIZED ORBITS AND PARANASAL SINUSES:  Bilateral ocular lens replacements    "  CT spine cervical without contrast--incidental finding   DEGENERATIVE CHANGES:  Moderate multilevel cervical degenerative changes are noted   Disc space narrowing is most pronounced at C3-C4, C4-C5, and C6-C7 where there is mild bony spinal canal narrowing     PREVERTEBRAL AND PARASPINAL SOFT TISSUES:  Calcifications at the carotid bifurcations   "  CT chest abdomen pelvis with contrast-incidental finding coronary artery calcifications  Mitral annular calcification    Cirrhotic morphology of the liver    Gallbladder is surgically absent    Splenomegaly measuring 15 5 cm in craniocaudal dimension, not significantly changed    Atrophic pancreas    STOMACH AND BOWEL:  3 2 cm duodenal diverticulum  Cynthia Climes is mild diffuse thickening of the 2nd and 3rd portions of the duodenum   No bowel obstruction   Colonic diverticulosis without evidence of diverticulitis   Mild diffuse thickening of the ascending Colon    ABDOMINAL WALL/INGUINAL REGIONS:  Mild subcutaneous edema   Skin thickening of the anterior abdominal wall pannus similar to prior exam "  Shoulder x-rays- 1 and two view  left shoulder-incidental finding-mild osteoarthritis acromioclavicular joint   "  Left humerus x-ray-incidental finding initial Stable appearance of the left humeral head/neck fracture, however study limited by positioning   Remainder of the osseous structures appear intact   "  Chest x-ray portable ICU; right jugular transvenous pacemaker and right ventricle   "     Degenerative changes in pubic symphysis, lower lumbar spine; bilateral ocular lens replacements, cervical degenerative changes, calcifications carotid arteries, coronary artery calcifications, mitral annular calcification, cirrhotic morphology of the liver, splenomegaly, duodenal diverticulum, left shoulder arthritis     Please notify the following clinician to assist with the follow up:              Dr Matilde Thompson, DO - Family medicine        * Closed fracture of proximal end of left humerus  Assessment & Plan  · Displaced comminuted fracture of the humeral head/neck on imaging  · NWB to the left upper limb, maintain in sling  · Ok for for gentle range of motion to prevent adhesive capsulitis  · Pain control  · Likely course of acute blood loss anemia, monitor labs  · PT/OT      Complete heart block ([address] )  Assessment & Plan  · S/p dual chamber PPM 6/29/22 with Dr Carolyn Babinski  · Monitor pacemaker site for signs of infection  · No heavy lifting/push/pull >10 lbs    Acute blood loss anemia  Assessment & Plan  · Acute blood loss anemia, Hgb 6 9 previously, s/p transfusion  · Hgb on 7/5 was up to 9 5  · Will require close follow with labs  · Monitor clinically as well in therapy    Cardiomyopathy [facility])  Assessment & Plan  · Medicine team reached out to Cardiology re: advice with recs for further w/u based on ECHO results from 7/5/22  · Continue Losartan 25mg qd (Cardiology started in hospital)  · Per Cardiology, outpatient follow up, no acute changes based on echo      Depression  Assessment & Plan  Continue home lexapro 5mg daily    Pancytopenia ([facility] [address] )  Assessment & Plan  Secondary to cirrhosis, monitor labs    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) ([facility] [address] )  Assessment & Plan  · Hx non-bleeding esophageal varices and hepatic encephalopathy   · At home, was on Rifaximin 550 mcg q12hrs/Lactulose 10Gm BID/Lasix 40mg qd/Nadolol 20mg qd/Aldactone 50mg qd  · Here:  Rifaximin 550 mg q12hrs/Lactulose 10Gm BID    Type 2 diabetes mellitus with hyperglycemia, with long-term current use of insulin [facility])  Assessment & Plan  Lab Results   Component Value Date    HGBA1C 8 2 (H) 05/31/2022       Recent Labs     07/05/22  1134 07/05/22  1552 07/05/22  2053 07/06/22  0653   POCGLU 320* 304* 272* 176*     · Home:  Janumet XR  BID/Amaryl 8 mg qAM/Basaglar 48U qam, 36U qpm  · Here:  Lantus 30U q12hrs  · Add back Metformin 500 mg BID to start this evening   · QID Accuchecks/SSI and DM diet    Acquired hypothyroidism  Assessment & Plan  Continue levothyroxine 112mcg daily    Essential hypertension  Assessment & Plan  · On losartan 25mg daily  · Previously on Nadolol, aldactone, and lasix  · Medications held based on initial hypotension  · Will start adding back meds as indicated, likely starting with nadolol      Hyperlipidemia  Assessment & Plan  Continue statin        Subjective/Interval Events:     Review of Systems   Constitutional: Negative for chills and fever  HENT: Negative for hearing loss and trouble swallowing  Eyes: Negative for photophobia and visual disturbance  Respiratory: Negative for cough and shortness of breath  Cardiovascular: Negative for chest pain and leg swelling  Gastrointestinal: Negative for constipation, diarrhea, nausea and vomiting  Endocrine: Negative for cold intolerance and heat intolerance  Genitourinary: Negative for dysuria and hematuria  Musculoskeletal: Positive for arthralgias  Negative for neck pain  Left arm pain   Skin: Positive for wound  Negative for rash  Allergic/Immunologic: Positive for environmental allergies and food allergies  Neurological: Negative for dizziness and headaches  Hematological: Negative for adenopathy  Bruises/bleeds easily  Psychiatric/Behavioral: Negative for dysphoric mood and sleep disturbance  Function:  PRIOR LEVEL OF FUNCTION:  She lives in a(n) single family home  [place] is  and lives with their daughter  Self Care: Independent and Indoor Mobility: Independent     HOME ENVIRONMENT:  The living area: can live on one level  There are  to 0 the home  Ramped  The patient will have 24 hour supervision/physical assistance available upon discharge  Current level of function:  Physical Therapy: Min A transfers, ambulation Mod A 15' +10' HHA  Occupational Therapy: Max A toileting    Physical Exam:  /68 (BP Location: Left arm)   Pulse 64   Temp 98 2 °F (36 8 °C) (Oral)   Resp 18   Ht 5' 5" (1 651 m)   Wt 98 2 kg (216 lb 7 9 oz)   SpO2 97%   BMI 36 03 kg/m²    No intake or output data in the 24 hours ending 07/06/22 1026  Body mass index is 36 03 kg/m²  Physical Exam  Constitutional:       General: She is not in acute distress  Appearance: She is obese  HENT:      Head: Normocephalic and atraumatic  Right Ear: External ear normal       Left Ear: External ear normal       Nose: Nose normal  No rhinorrhea  Mouth/Throat:      Mouth: Mucous membranes are moist       Pharynx: Oropharynx is clear  Eyes:      General: No scleral icterus  Right eye: No discharge  Left eye: No discharge  Cardiovascular:      Rate and Rhythm: Normal rate  Pulses: Normal pulses  Pulmonary:      Effort: Pulmonary effort is normal  No respiratory distress  Breath sounds:  No wheezing or rales    Abdominal:      General: There is no distension  Palpations: Abdomen is soft  Musculoskeletal:         General: Normal range of motion  Right lower leg: No edema  Left lower leg: No edema  Skin:     General: Skin is warm and dry  Findings: Bruising present  Comments: Skin breakdown under the breasts and over the sacral area   Neurological:      General: No focal deficit present  Mental Status: She is alert and oriented to person, place, and time  Cranial Nerves: No cranial nerve deficit  Motor: No weakness  Comments: Pain limited in the left arm   Psychiatric:         Mood and Affect: Mood normal          Behavior: Behavior normal           Labs, medications, and imaging personally reviewed      Laboratory:    Lab Results   Component Value Date    SODIUM 137 07/05/2022    K 4 6 07/05/2022     07/05/2022    CO2 24 07/05/2022    BUN 28 (H) 07/05/2022    CREATININE 0 93 07/05/2022    GLUC 162 (H) 07/05/2022    CALCIUM 8 6 07/05/2022     Lab Results   Component Value Date    WBC 3 95 (L) 07/05/2022    HGB 9 5 (L) 07/05/2022    HCT 31 1 (L) 07/05/2022    MCV 88 07/05/2022    PLT 66 (L) 07/05/2022     Lab Results   Component Value Date    INR 1 33 (H) 06/30/2022    INR 1 20 (H) 06/29/2022    INR 1 23 (H) 05/31/2022    PROTIME 16 0 (H) 06/30/2022    PROTIME 15 1 (H) 06/29/2022    PROTIME 15 3 (H) 05/31/2022         Current Facility-Administered Medications:     aspirin chewable tablet 81 mg, 81 mg, Oral, Daily, Riana Escobar DO, 81 mg at 07/06/22 1005    bisacodyl (DULCOLAX) rectal suppository 10 mg, 10 mg, Rectal, Daily PRN, Riana Escobar DO    docusate sodium (COLACE) capsule 100 mg, 100 mg, Oral, BID, Riana Escobar DO, 100 mg at 07/06/22 1006    enoxaparin (LOVENOX) subcutaneous injection 30 mg, 30 mg, Subcutaneous, Q12H [address], Riana Escobar DO, 30 mg at 07/06/22 1005    escitalopram (LEXAPRO) tablet 5 mg, 5 mg, Oral, Daily, Riana Escobar DO   insulin glargine (LANTUS) subcutaneous injection 30 Units 0 3 mL, 30 Units, Subcutaneous, Q12H [facility], Sharyn Pyo, DO, 30 Units at 07/06/22 1006    insulin lispro (HumaLOG) 100 units/mL subcutaneous injection 2-12 Units, 2-12 Units, Subcutaneous, 4x Daily (with meals and at bedtime), 2 Units at 07/06/22 0919 **AND** Fingerstick Glucose (POCT), , , 4x Daily AC and at bedtime, Sharyn Pyo, DO    lactulose oral solution 10 g, 10 g, Oral, BID, Sharyn Pyo, DO, 10 g at 07/06/22 1005    levothyroxine tablet 112 mcg, 112 mcg, Oral, Early Morning, Sharyn Pyo, DO, 112 mcg at 07/06/22 0601    lidocaine (LIDODERM) 5 % patch 1 patch, 1 patch, Topical, Daily, Sharyn Pyo, DO, 1 patch at 07/06/22 0914    losartan (COZAAR) tablet 25 mg, 25 mg, Oral, Daily, Sharyn Pyo, DO, 25 mg at 07/06/22 1005    melatonin tablet 6 mg, 6 mg, Oral, HS, Sharyn Pyo, DO, 6 mg at 07/05/22 2228    metFORMIN (GLUCOPHAGE) tablet 500 mg, 500 mg, Oral, BID With Meals, JOSE JUAN Mcallister    [START ON 7/7/2022] nadolol (CORGARD) tablet 20 mg, 20 mg, Oral, Daily, JOSE JUAN Nguyễn    ondansetron (ZOFRAN-ODT) dispersible tablet 4 mg, 4 mg, Oral, Q6H PRN, Sharyn Pyo, DO    oxyCODONE (ROXICODONE) IR tablet 2 5 mg, 2 5 mg, Oral, Q6H PRN, Sharyn Pyo, DO, 2 5 mg at 07/06/22 6410    pantoprazole (PROTONIX) EC tablet 40 mg, 40 mg, Oral, Early Morning, Sharyn Pyo, DO, 40 mg at 07/06/22 0601    polyethylene glycol (MIRALAX) packet 17 g, 17 g, Oral, Daily, Sharyn Pyo, DO, 17 g at 07/06/22 1006    pravastatin (PRAVACHOL) tablet 80 mg, 80 mg, Oral, Daily With Dinner, Sharyn Sweeney DO    rifaximin [place]) tablet 550 mg, 550 mg, Oral, Q12H [facility], Sharyn Sweeney DO, 550 mg at 07/06/22 1006    senna (SENOKOT) tablet 8 6 mg, 1 tablet, Oral, HS, Sharyn Sweeney DO, 8 6 mg at 07/05/22 2228  Allergies   Allergen Reactions    Bee Venom Swelling    Latex     Sesame Seed (Diagnostic) - Food Allergy      Other reaction(s): swelling inside mouth    Strawberry C [Ascorbate - Food Allergy] Other (See Comments)     Mouth sores    Penicillins Rash      Patient Active Problem List    Diagnosis Date Noted    Closed fracture of proximal end of left humerus 07/02/2022    Complete heart block ([address] ) 06/30/2022    Acute blood loss anemia 07/03/2022    Depression 07/02/2022    Cardiomyopathy ([address] ) 07/02/2022    Esophageal varices without bleeding ([address] ) 09/29/2021    Pancytopenia ([address] ) 08/06/2021    Iron deficiency anemia due to chronic blood loss 08/06/2021    Mild nonproliferative diabetic retinopathy of both eyes without macular edema associated with type 2 diabetes mellitus ([address] ) 07/16/2021    Urinary tract infection 09/15/2020    Hepatic encephalopathy ([address] ) 09/13/2020    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) ([address] ) 09/13/2020    Thrombocytopenia ([facility] [address] ) 09/13/2020    Hyperlipidemia 07/24/2018    Essential hypertension 07/24/2018    Acquired hypothyroidism 07/24/2018    Type 2 diabetes mellitus with hyperglycemia, with long-term current use of insulin ([address] ) 07/24/2018    Diabetic polyneuropathy associated with type 2 diabetes mellitus ([address] ) 07/24/2018     Past Medical History:   Diagnosis Date    Anemia     Cirrhosis ([address] )     Diabetic neuropathy (HCC)     Esophageal varices (HCC)     Fatty liver     GERD (gastroesophageal reflux disease)     Hepatic encephalopathy (HCC)     Hiatal hernia     Hyperlipidemia     Hypertension     Hypoglycemia     Hypothyroidism     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)     Osteoarthritis     Osteopenia     Pancytopenia (HCC)     Thrombocytopenia (HCC)     Type 2 diabetes mellitus ([address] )      Past Surgical History:   Procedure Laterality Date    ABDOMINAL SURGERY      Abdominal plasty with mesh insertion    CARDIAC ELECTROPHYSIOLOGY PROCEDURE N/A 6/29/2022    Procedure: Cardiac pacer implant;  Surgeon: Eugene Jha MD;  Location:  CARDIAC CATH LAB; Service: Cardiology    CATARACT EXTRACTION, BILATERAL      CATARACT EXTRACTION, BILATERAL      COLONOSCOPY      EGD AND COLONOSCOPY  03/18/2015    IR BIOPSY BONE MARROW  8/24/2021    LAPAROSCOPIC CHOLECYSTECTOMY      SHOULDER SURGERY Right     calcium depsoti    TUBAL LIGATION      UMBILICAL HERNIA REPAIR      with mesh placed    UPPER GASTROINTESTINAL ENDOSCOPY       Social History     Socioeconomic History    Marital status:      Spouse name: Not on file    Number of children: Not on file    Years of education: Not on file    Highest education level: Not on file   Occupational History    Not on file   Tobacco Use    Smoking status: Never Smoker    Smokeless tobacco: Never Used   Vaping Use    Vaping Use: Never used   Substance and Sexual Activity    Alcohol use: Not Currently    Drug use: No    Sexual activity: Not Currently   Other Topics Concern    Not on file   Social History Narrative    Not on file     Social Determinants of Health     Financial Resource Strain: Not on file   Food Insecurity: No Food Insecurity    Worried About Running Out of Food in the Last Year: Never true    Narciso of Food in the Last Year: Never true   Transportation Needs: No Transportation Needs    Lack of Transportation (Medical): No    Lack of Transportation (Non-Medical):  No   Physical Activity: Not on file   Stress: Not on file   Social Connections: Not on file   Intimate Partner Violence: Not on file   Housing Stability: Low Risk     Unable to Pay for Housing in the Last Year: No    Number of Places Lived in the Last Year: 1    Unstable Housing in the Last Year: No     Social History     Tobacco Use   Smoking Status Never Smoker   Smokeless Tobacco Never Used     Social History     Substance and Sexual Activity   Alcohol Use Not Currently     Family History   Problem Relation Age of Onset    Breast cancer Mother     Diabetes type II Father     Thyroid disease unspecified Daughter     Down syndrome Daughter     Diabetes type II Daughter     Diabetes type II Sister     No Known Problems Brother     Prostate cancer Brother     No Known Problems Sister     Stroke Sister     No Known Problems Son     Breast cancer Maternal Aunt     Colon cancer Neg Hx          Medical Necessity Criteria for [facility] Admission: Anemia, with the following plan: monitor H/H and status post transfusion, Hypertension, Bowel/Bladder Management, Diabetes requiring close blood glucose monitoring, Incision/Wound care and Thrombocytopenia  In addition, the preadmission screen, post-admission physical evaluation, overall plan of care and admissions order demonstrate a reasonable expectation that the following criteria were met at the time of admission to the [facility]  1  The patient requires active and ongoing therapeutic intervention of multiple therapy disciplines (physical therapy, occupational therapy, speech-language pathology, or prosthetics/orthotics), one of which is physical or occupational therapy  2  Patient requires an intensive rehabilitation therapy program, as defined in Chapter 1, section 110 2 2 of the CMS Medicare Policy Manual  This intensive rehabilitation therapy program will consist of at least 3 hours of therapy per day at least 5 days per week or at least 15 hours of intensive rehabilitation therapy within a 7 consecutive day period, beginning with the date of admission to the [facility]  3  The patient is reasonably expected to actively participate in, and benefit significantly from, the intensive rehabilitation therapy program as defined in Chapter 1, section 110 2 2 of the CMS Medicare Policy Manual at this time of admission to the [facility]   She can reasonably be expected to make measurable improvement (that will be of practical value to improve the patients functional capacity or adaptation to impairments) as a result of the rehabilitation treatment, as defined in section 110 3, and such improvement can be expected to be made within the prescribed period of time  As noted in the CMS Medicare Policy Manual, the patient need not be expected to achieve complete independence in the domain of self-care nor be expected to return to his or her prior level of functioning in order to meet this standard  4  The patient must require physician supervision by a rehabilitation physician  As such, a rehabilitation physician will conduct face-to-face visits with the patient at least 3 days per week throughout the patients stay in the [facility] to assess the patient both medically and functionally, as well as to modify the course of treatment as needed to maximize the patients capacity to benefit from the rehabilitation process  5  The patient requires an intensive and coordinated interdisciplinary approach to providing rehabilitation, as defined in Chapter 1, section 110 2 5 of the CMS Medicare Policy Manual  This will be achieved through periodic team conferences, conducted at least once in a 7-day period, and comprising of an interdisciplinary team of medical professionals consisting of: a rehabilitation physician, registered nurse,  and/or , and a licensed/certified therapist from each therapy discipline involved in treating the patient  Changes Since Pre-admission Assessment: None -This patient's participation in rehab continues to be reasonable, necessary and appropriate  CMS Required Post-Admission Physician Evaluation Elements  History and Physical, including medical history, functional history and active comorbidities as in above text  Post-Admission Physician Evaluation:  The patient has the potential to make improvement and is in need of physical, occupational, and/or therapy services  The patient may also need nutritional services   Given the patient's complex medical condition and risk of further medical complications, rehabilitative services cannot be safely provided at a lower level of care, such as a skilled nursing facility  I have reviewed the patient's functional and medical status at the time of the preadmission screening and they are the same as on the day of this admission  I acknowledge that I have personally performed a full physical examination on this patient within 24 hours of admission  The patient and/or family demonstrated understanding the rehabilitation program and the discharge process after we discussed them  Agree in entirety: yes  Minor adaptions: none    Major changes: none    Shlomo Cockayne,   Physical Medicine and [place] [address]    Total time spent:  70 minutes with more than 50% spent counseling/coordinating care  Counseling includes extended discussion with patient (+/- family/relevant historian)  re: history, symptoms, medications, functional issues, mood, medical and rehabilitation management plan, and disposition  Additional time spent with thorough chart review in EMR, reviewing recent medications, labs, imaging, and management plan  This was followed by formulating a comprehensive inpatient medical/rehabilitation management plan, and coordinating with pertinent specialists as indicated

## 2022-07-06 NOTE — ASSESSMENT & PLAN NOTE
Lab Results   Component Value Date    HGBA1C 8 2 (H) 05/31/2022       Recent Labs     07/20/22  2104 07/21/22  0645 07/21/22  0911 07/21/22  1035   POCGLU 155* 120 176* 169*     · Home:  Janumet XR  BID/Amaryl 8 mg qAM/Basaglar 48U qam, 36U qpm  · Here:  Lantus 38U qam/10U Qpm, Metformin 500 mg BID  · QID Accuchecks to start

## 2022-07-06 NOTE — PCC PHYSICAL THERAPY
Pt presents s/p fall at home with L humeral fracture and NWB status with sling PRN  Pt presents with inc pain and swelling in LUE, with inc pain with slight movements to adjust sling for positioning  Pt also has impaired cardiovascular function with dec activity tolerance and fatigue, needing rest breaks  Pt has a ramp or a few [place] with BHR and first floor setup  Pt's daughter has Down's Syndrome and lives with her; the dtr has a caregiver/aide who helps her intermittently during the week  Pt reported that her daughter won't be able to help her at d/c but she also doesn't have family/friends in the area who can help her  Currently anticipating need for A for all IADLs due to NWB status LUE and therefore very limited functional use of LUE  Pt presents with deconditioning and limited ambulation distance at this time as well  She also needs inc physical A for bed mobility due to LUE pain  Pt will benefit from skilled PT to progress safety and (I) with functional mobility to reach Mag for transfers, bed mobility and ambulation and S for stairs  Will need to plan on A in the house for IADLs      7/12/22  Barriers to d/c home include L humeral fracture and NWB LUE with sling, pain in LUE which limits pt's activity tolerance especially with mobilities, decreased endurance requiring frequent rest breaks, decreased family support as pt was caregiver for dtr with Down's Syndrome and family lives over an hour away, decreased dynamic standing balance, and decreased hip strength especially of hip abductors  POC has focused on functional mobilities with use of HW initially, progressed pt to [facility] at this time  Also have focused on stair trng however anticipating use of ramp at home for safe in/out of house as pt would not be able to manage [facility] up/down steps and does better with use of HR vs SPC   Pt overall progressed from [facility]/CS over past week, anticipating d/c home next week with recommendation for home PT  7/19/2022  Pt progressing possible tomorrow  form IRP walking to bathroom with SPC but may need to be clean post BM see OT note  Pt barriers cont with pain left S area and Dtr with DS  Pt possible has SPC at baseline before coming to [facility]   Will f/u tomorrow with DME's order  Pt will cont to work on [facility] with [facility] and focus on safety and Dec fall risk

## 2022-07-06 NOTE — TREATMENT PLAN
Individualized Plan of [address] [place] [address] Ken 77 y o  female MRN: [id number]  Unit/Bed#: -56 Encounter: [id number]     PATIENT INFORMATION  ADMISSION DATE: 7/5/2022  5:43 PM MARGE CATEGORY:Orthopedic Disorders:  08 9  Other Orthopedic Left humerus fracture   ADMISSION DIAGNOSIS: Fracture closed, humerus [S42 [id number]]  EXPECTED LOS: 10 to 14 days     MEDICAL/FUNCTIONAL PROGNOSIS  Based on my assessment of the patient's medical conditions and current functional status, the prognosis for attaining medical and functional goals or the IRF stay is:  Fair    Medical Goals: Patient will be medically stable for discharge to [facility] upon completion of rehab program and Patient will be able to manage medical conditions and comorbid conditions with medications and follow up upon completion of rehab program    Cardiopulmonary function/Anemia: Ensure cardiopulmonary stability and optimize cardiopulmonary function not only at rest but with activity as patient's activity level significantly increases in acute rehab compared with prior to transfer in preparation for safe discharge from [facility]  Must closely and frequently monitor blood pressure, HR, anemia to ensure adequate cardiac output during ADLs and ambulation as patient is at increased risk for orthostatic hypotension/syncope and potential injury if not monitored for and managed adequately  Blood pressure management:    Frequent monitoring of blood pressure with appropriate adjustments in blood pressure medication management to optimize blood pressure control and prevent/limit renal complications  Monitoring impact of blood pressure and side-effects of blood pressure medications at rest and with activity    Hypoxia prevention: Ensure appropriate level of oxygenation at rest and with activity to avoid symptomatic hypoxia, maximize functional performance, and decrease risk of atelectasis/pneumonia through close and frequent monitoring, providing appropriate respiratory treatments (such as incentive spirometry), and when necessary provide/adjust respiratory medications  DM: Patient will improve/maintain blood sugar control to ensure optimal healing and decrease risks of complications associated with DM through medication monitoring, adjustments as indicated, and optimal dietary intake and education  Pain management:  Pain will improve with frequent evaluation of pain, careful adjustments in medications, frequent re-evaluation of patient's pain and medical/neurologic status to ensure optimal pain control, avoidance of potential serious and even life-threatening side-effects and drug interactions, as well as weaning pain medications as soon as possible to decrease risk of short and long-term use  Orthopedic Disorder: left humerus fracture causing impaired mobility, ADLs, and gait:  intensive skilled therapies with physical therapy and occupational therapy with close oversight and management by rehab specialized physician in acute rehabilitation setting to most expeditiously and effectively improve functional mobility, transfers, upper and lower body strengthening, conditioning, balance, and gait training with appropriate assistive device  Patient will have optimal supervision and management of patient's underlying orthopedic disorder with specialized rehabilitation physician during this period of recovery to ensure most expeditious and optimal recovery with decreased risks of fall/injury and other complications including acute worsening of ortho disorder, decrease risk of VTE, PNA, and skin ulceration  Inpatient rehabilitation education/teaching:   To be provided to patient and typically family/caregiver (if able to be identified) by all skilled therapists, rehab nursing, case management, and rehab specialized physician to ensure optimal recovery and decrease risks of complications in both acute rehabilitation setting as well as after discharge  Anxiety (and/or Depression): Patient's mood and it's impact on therapy participation and functional recovery will improve during course with supportive counseling, relaxation/breathing techniques and if necessary medication management  Requires frequent re-assessment and close management to ensure anxiety/depression management during acute rehab course with planning for appropriate outpatient management to ensure optimal mental health and functional recovery  Bladder dysfunction:  Appropriate bladder management with appropriate toileting program from rehab nursing and staff with oversight management by rehabilitation physicians which include appropriate monitoring and possible adjustments in medications to with goals to optimize bladder function and decrease risk of bladder retention, incontinence, and urinary tract infection  Bowel dysfunction: Appropriate bowel management with appropriate toileting program from rehab nursing and staff with oversight management by rehabilitation physicians which include adjustments in medications to optimize appropriate bowel function and prevent/decrease risk of constipation and bowel obstruction  Obesity:  Close monitoring of nutrition status, nutrition specialist with adjustments in diet   on appropriate short and long term nutrition and activity  Obesity increases complexity of patient's overall condition and causes unique challenges during this part of patient's recovery process  Supervise and if necessary make adjustments in rehab nursing care and skilled therapy care to ensure appropriate toileting, bed mobility, other ADLs, and ambulation to decrease risk of falls/injuries, VTE, skin breakdown/ulceration and optimize functional recovery  Skin wounds: Appropriate skin checks for wound/skin evaluation including evaluation of healing, worsening of wounds, or signs of infection     Wound care management from rehab nursing, wound care nursing, physicians  Ensure frequent appropriate turning, positioning in bed, in chair, when mobilizing, and when appropriate with use of appropriate devices to optimize healing and decrease risk of worsening or new skin breakdown  ANTICIPATED DISCHARGE DISPOSITION AND SERVICES  COMMUNITY SETTING: Home - independent/modified independent    ANTICIPATED FOLLOW-UP SERVICE:   Home Health Services: PT, OT and Nursing    DISCIPLINE SPECIFIC PLANS:  Required Disciplines & Services: Rehabillitation Nursing, Case Management, Dietay/Nutrition and Diabetes Education    REQUIRED THERAPY:  Therapy Hours per Day Days per Week Total Days   Physical Therapy 1 5 5 14   Occupational Therapy 1 5 5 14   NOTE: Additional therapy time(s) or changes to allocation of therapies as appropriate to meet patient needs and to achieve functional goals  Patient will participate in above therapy regimen consisting of PT and OT due to the following medical procedure/condition:Orthopedic Disorders:  08 9  Other Orthopedic Left Humerus Fracture    ANTICIPATED FUNCTIONAL OUTCOMES:  ADL:  Modified independent to supervision   Bladder/Bowel: Patient will require assist with bladder/bowel management with least restrictive device upon completion of rehab program   Transfers: Patient will be independent with transfers with least restrictive device upon completion of rehab program   Locomotion: Patient will be independent with locomotion with least restrictive device upon completion of rehab program   Cognitive:   Independent     DISCHARGE PLANNING NEEDS  Equipment needs: Discharge needs to be reviewed with team      REHAB ANTICIPATED PARTICIPATION RESTRICTIONS:  Amubulate Short Distances Only, Assist with Bathing in Tub, Assist with Mobility, Rquires Assist with ADLS, Requires Assit with Homemaking, Requires Assit with Steps and Weight Bearing non-weight bearing to the left arm    Sergio Mcclendon DO  Physical Medicine and Rehabilitation  [address]  [facility]

## 2022-07-06 NOTE — PROGRESS NOTES
Occupational Therapy Evaluation           07/06/22 1230   Patient Data   Rehab Impairment Orthopedic Disorders:  08 9  Other Orthopedic L Humerus fx  Etiologic Diagnosis L dreadmer fx, Complete Heart Block   Date of Onset 06/29/22   Support System   Name pt lives at home with her adult daugther who has Downs Syndrome  Pt's daughter is physically able to care for herself but requires supervision at times, she has an aide Monday-Friday for about 4 hours at a time  Pt reports her daughter could help with things like take trash out and laundry, with the daughter's aide present  Pt has a son and Jadyn Bains in law who live past [place], they are taking pt's daughter to stay at their house while pt is hospitalized  Home Setup   Type of Home Single Level   Method of Entry Stairs;Hand Rail Bilateral   Number of Stairs 4  (but also has a ramp that can be used)   First Floor Bathroom Full; Shower   First Floor Setup Available Yes   Home Modification Comment has walk in shower or tub shower that can be used   Available Equipment Wheelchair  (pt owns w/c, electric scooter, rollator from her   she only used the rollator as needed outside, PTA)   Baseline Information   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)   Transportation    Prior IADL Participation   Money Management Identify Money;Estimate Costs; Combine Bills;Estimate Change;Manage Checkbook   Meal Preparation Full Participation   Laundry Partial Participation   Home Cleaning Partial Participation   Prior Level of Function   Self-Care 3  Independent - Patient completed the activities by him/herself, with or without an assistive device, with no assistance from a helper  Indoor-Mobility (Ambulation) 3  Independent - Patient completed the activities by him/herself, with or without an assistive device, with no assistance from a helper  Stairs 3   Independent - Patient completed the activities by him/herself, with or without an assistive device, with no assistance from a helper  Functional Cognition 3  Independent - Patient completed the activities by him/herself, with or without an assistive device, with no assistance from a helper  Prior Assistance Needed for   (none)   Prior Device Used Z  None of the above   Falls in the Last Year   Number of falls in the past 12 months 1   Type of Injury Associated with Fall Major injury   Psychosocial   Psychosocial (WDL) WDL   Restrictions/Precautions   Precautions Fall Risk;Supervision on toilet/commode;Pressure Ulcer;Pain;Pacemaker  (pacer precautions + NWB L UE)   LUE Weight Bearing Per Order (S)  NWB   Braces or Orthoses Sling  (for comfort  OT provided elevator arm sling)   Pain Assessment   Pain Assessment Tool 0-10   Pain Score 8   Pain Location/Orientation Orientation: Left; Location: Arm   Eating Assessment   Type of Assistance Needed Physical assistance   Physical Assistance Level 25% or less   Comment assist to manage food 2* impaired L UE use   Eating CARE Score 3   Oral Hygiene   Type of Assistance Needed Physical assistance   Physical Assistance Level Total assistance   Comment assist to brush teeth 2* impaired L UE   Oral Hygiene CARE Score 1   Tub/Shower Transfer   Limitations Noted In Safety   Findings has shower orders but pacer bandage still in place  will wait until removed   Shower/Bathe Self   Type of Assistance Needed Physical assistance   Physical Assistance Level 76% or more   Comment seated in  recliner pt able to bathe part of stomach and thighs, and requires assist for all other components of sponge bathing  pt found with impaired skin integrity/open under L breast  OT notified RN Severiano Gouty and Dr Mann Inch   Shower/Bathe Self CARE Score 2   Dressing/Undressing Clothing   Type of Assistance Needed Physical assistance   Physical Assistance Level 76% or more   Comment assist to don/doff L UE sling   pt requires assist to thread L UE into sleeve and put overhead, she is omar able to thread R UE   Upper Body Dressing CARE Score 2   Type of Assistance Needed Physical assistance   Physical Assistance Level Total assistance   Comment assist to thread LEs into pants while seated on toilet, pt stands but requires assist to manage clothing over hips   Lower Body Dressing CARE Score 1   Limitations Noted In Balance; Endurance; Safety;Strength;Timeliness   Positioning Supported Sit;Standing   Putting On/Taking Off Footwear   Type of Assistance Needed Physical assistance   Physical Assistance Level Total assistance   Putting On/Taking Off Footwear CARE Score 1   Toileting Hygiene   Type of Assistance Needed Physical assistance   Physical Assistance Level 76% or more   Comment pt able to stand to complete frontal hygiene with mod assist for steadying, requires assist for clothing management   Toileting Hygiene CARE Score 2   Toilet Transfer   Surface Assessed Standard Toilet   Transfer Technique Standard   Limitations Noted In Balance; Endurance; Safety;LE Strength;UE Strength   Type of Assistance Needed Physical assistance; Adaptive equipment   Physical Assistance Level 51%-75%   Comment hand hold on R   Toilet Transfer CARE Score 2   Transfer Bed/Chair/Wheelchair   Type of Assistance Needed Physical assistance   Physical Assistance Level 51%-75%   Comment hand hold assist   Chair/Bed-to-Chair Transfer CARE Score 2   Sit to Stand   Type of Assistance Needed Physical assistance   Physical Assistance Level 26%-50%   Comment hand hold assist on R   Sit to Stand CARE Score 3   Walk 10 Feet   Type of Assistance Needed Physical assistance   Physical Assistance Level 26%-50%   Comment R hand hold assist to/from bathroom   Walk 10 Feet CARE Score 3   Comprehension   QI: Comprehension 4  Undestands: Clear comprehension without cues or repetitions   Expression   QI: Expression 4   Express complex messages without difficulty and with speech that is clear and easy to [facility]   RUE Assessment   RUE Assessment [place] LUE Assessment   LUE Assessment X  (Dr Breana Becerra to add PROM orders with clarification if he feels appropriate  currently pt is NWB L UE an dwith pacer precautions)   Cognition   Arousal/Participation Cooperative   Attention Within functional limits   Orientation Level Oriented to person;Oriented to place;Oriented to situation   Memory Decreased short term memory   Following Commands Follows one step commands with increased time or repetition   Comments [facility] for basic tasks  pt does report + headstrike upon fall  will continue to assess cognition functionally/formally   Discharge Information   Vocational Plan Retired/not working   Patient's Discharge Plan to go home and be with her daughter   Patient's Rehab Expectations to decrease pain   Barriers to Discharge Home Limited Family Support;Decreased Strength;Decreased Endurance;Pain; Safety Considerations   Impressions Pt is a 77 y o  female who was admitted to [address] [place] [address] on 7/5/2022  Pt's current problem list includes: s/p fall, presented with L humeral fx now NWB in sling, complete heart block s/p pacer implant 6/29/22  Pt's precautions include: L UE NWB and pacer precautions  Comorbidities include - HTN, DM 2, liver cirrhosis 2* PAUL, urinary retention, ABLA  At baseline pt was completing ADLS independently and lives her daughter who has a disability  Pt does not need to physically assist her but supervise/oversee what she is doing  Currently pt requires max/total assist for overall ADLS and mod assist for functional mobility/transfers with hand hold on R  Pt currently presents with impairments in the following categories - activity tolerance, endurance, standing balance/tolerance and UE strength   These impairments, as well as pt's pain, orthopedic restricitions , WBS , decreased caregiver support, risk for falls and home environment  limit pt's ability to safely engage in all baseline areas of occupation, including eating, grooming, bathing, dressing, toileting, functional mobility/transfers, community mobility, laundry , house maintenance, medication management, meal prep, care of children, social participation  and leisure activities    Pt presents with good rehab potential  Pt is unsafe to D/C home at this time, recommending ELOS 2 weeks to achieve independent/supervision level goals  OT noted opened area and redness under L breast during sponge bathing, HILDA Tomas and Dr Tommie Leon made aware  Pt found with EKG sticker under breast and ultrasound type gel   This was cleaned thoroughly and removed, which then exposed red/open area under breast     OT Therapy Minutes   OT Time In 1230   OT Time Out 1400   OT Total Time (minutes) 90   OT Mode of treatment - Individual (minutes) 90   OT Mode of treatment - Concurrent (minutes) 0   OT Mode of treatment - Group (minutes) 0   OT Mode of treatment - Co-treat (minutes) 0   OT Mode of Treatment - Total time(minutes) 90 minutes   OT Cumulative Minutes 90   Cumulative Minutes   Cumulative therapy minutes 180

## 2022-07-07 ENCOUNTER — EPISODE CHANGES (OUTPATIENT)
Dept: CASE MANAGEMENT | Facility: OTHER | Age: 77
End: 2022-07-07

## 2022-07-07 ENCOUNTER — PATIENT OUTREACH (OUTPATIENT)
Dept: CASE MANAGEMENT | Facility: OTHER | Age: 77
End: 2022-07-07

## 2022-07-07 LAB
GLUCOSE SERPL-MCNC: 146 MG/DL (ref 65–140)
GLUCOSE SERPL-MCNC: 186 MG/DL (ref 65–140)
GLUCOSE SERPL-MCNC: 223 MG/DL (ref 65–140)
GLUCOSE SERPL-MCNC: 243 MG/DL (ref 65–140)

## 2022-07-07 PROCEDURE — 97116 GAIT TRAINING THERAPY: CPT

## 2022-07-07 PROCEDURE — 97535 SELF CARE MNGMENT TRAINING: CPT

## 2022-07-07 PROCEDURE — 97110 THERAPEUTIC EXERCISES: CPT

## 2022-07-07 PROCEDURE — 82948 REAGENT STRIP/BLOOD GLUCOSE: CPT

## 2022-07-07 PROCEDURE — 99232 SBSQ HOSP IP/OBS MODERATE 35: CPT | Performed by: INTERNAL MEDICINE

## 2022-07-07 PROCEDURE — 97530 THERAPEUTIC ACTIVITIES: CPT

## 2022-07-07 PROCEDURE — 99233 SBSQ HOSP IP/OBS HIGH 50: CPT | Performed by: STUDENT IN AN ORGANIZED HEALTH CARE EDUCATION/TRAINING PROGRAM

## 2022-07-07 RX ORDER — LIDOCAINE 50 MG/G
2 PATCH TOPICAL DAILY
Status: DISCONTINUED | OUTPATIENT
Start: 2022-07-08 | End: 2022-07-21 | Stop reason: HOSPADM

## 2022-07-07 RX ADMIN — NYSTATIN 1 APPLICATION: 100000 POWDER TOPICAL at 09:50

## 2022-07-07 RX ADMIN — INSULIN GLARGINE 30 UNITS: 100 INJECTION, SOLUTION SUBCUTANEOUS at 22:12

## 2022-07-07 RX ADMIN — PANTOPRAZOLE SODIUM 40 MG: 40 TABLET, DELAYED RELEASE ORAL at 06:44

## 2022-07-07 RX ADMIN — LACTULOSE 10 G: 20 SOLUTION ORAL at 09:49

## 2022-07-07 RX ADMIN — DOCUSATE SODIUM 100 MG: 100 CAPSULE, LIQUID FILLED ORAL at 09:49

## 2022-07-07 RX ADMIN — LACTULOSE 10 G: 20 SOLUTION ORAL at 17:21

## 2022-07-07 RX ADMIN — INSULIN GLARGINE 30 UNITS: 100 INJECTION, SOLUTION SUBCUTANEOUS at 09:49

## 2022-07-07 RX ADMIN — RIFAXIMIN 550 MG: 550 TABLET ORAL at 09:53

## 2022-07-07 RX ADMIN — OXYCODONE HYDROCHLORIDE 2.5 MG: 5 TABLET ORAL at 00:15

## 2022-07-07 RX ADMIN — ENOXAPARIN SODIUM 30 MG: 30 INJECTION SUBCUTANEOUS at 22:12

## 2022-07-07 RX ADMIN — DOCUSATE SODIUM 100 MG: 100 CAPSULE, LIQUID FILLED ORAL at 17:21

## 2022-07-07 RX ADMIN — LEVOTHYROXINE SODIUM 112 MCG: 112 TABLET ORAL at 06:44

## 2022-07-07 RX ADMIN — METFORMIN HYDROCHLORIDE 500 MG: 500 TABLET ORAL at 06:44

## 2022-07-07 RX ADMIN — INSULIN LISPRO 4 UNITS: 100 INJECTION, SOLUTION INTRAVENOUS; SUBCUTANEOUS at 17:23

## 2022-07-07 RX ADMIN — POLYETHYLENE GLYCOL 3350 17 G: 17 POWDER, FOR SOLUTION ORAL at 09:49

## 2022-07-07 RX ADMIN — LIDOCAINE 5% 1 PATCH: 700 PATCH TOPICAL at 09:50

## 2022-07-07 RX ADMIN — OXYCODONE HYDROCHLORIDE 2.5 MG: 5 TABLET ORAL at 13:24

## 2022-07-07 RX ADMIN — INSULIN LISPRO 2 UNITS: 100 INJECTION, SOLUTION INTRAVENOUS; SUBCUTANEOUS at 12:37

## 2022-07-07 RX ADMIN — RIFAXIMIN 550 MG: 550 TABLET ORAL at 22:13

## 2022-07-07 RX ADMIN — NYSTATIN 1 APPLICATION: 100000 POWDER TOPICAL at 17:25

## 2022-07-07 RX ADMIN — ESCITALOPRAM OXALATE 5 MG: 10 TABLET ORAL at 09:49

## 2022-07-07 RX ADMIN — MELATONIN 6 MG: at 22:13

## 2022-07-07 RX ADMIN — ASPIRIN 81 MG CHEWABLE TABLET 81 MG: 81 TABLET CHEWABLE at 09:49

## 2022-07-07 RX ADMIN — INSULIN LISPRO 4 UNITS: 100 INJECTION, SOLUTION INTRAVENOUS; SUBCUTANEOUS at 22:14

## 2022-07-07 RX ADMIN — ENOXAPARIN SODIUM 30 MG: 30 INJECTION SUBCUTANEOUS at 09:50

## 2022-07-07 RX ADMIN — METFORMIN HYDROCHLORIDE 500 MG: 500 TABLET ORAL at 17:21

## 2022-07-07 RX ADMIN — PRAVASTATIN SODIUM 80 MG: 80 TABLET ORAL at 17:21

## 2022-07-07 RX ADMIN — LOSARTAN POTASSIUM 25 MG: 25 TABLET, FILM COATED ORAL at 09:49

## 2022-07-07 NOTE — PROGRESS NOTES
PM&R PROGRESS NOTE:  Naresh Hodge 77 y o  female MRN: [id number]  Unit/Bed#: -51 Encounter: [id number]    Rehab Diagnosis: Impairment of mobility, safety and Activities of Daily Living (ADLs) due to Orthopedic Disorders:  08 9  Other Orthopedic Left Humerus fracture     Etiologic: L beau fx, Complete Heart Block  Date of Onset: 6/29/22     Date of surgery: 6/29/2022: Cardiac Pacer Implant (Chest)    HPI: Naresh Hodge is a 77 y o  female with type 2 diabetes, hypertension, depression, hypothyroidism, hx of PAUL cirrhosis, esophageal varices, pancytopenia, HLD, hypothyroidism who presented to the [address] on 6/29 after tripping over her flip flops, falling and sustaining a left humerus fracture treated non-operatively with NWB to the left arm in a sling  She was found to be in complete heart block with HR in the 20-230s range, developing cardiogenic shock requiring pressors and TVP  She underwent a Permanent pacemaker placement on 6/29 with Dr Abernathy Hull  Course complicated by CLYDE and blood loss anemia with hemoglobin of 6 9 s/p 1 unit pRBC  Initial ECHO on 6/29/22 showed moderately dilated left ventricular cavity size, LVEF 55%, mild bi-atrial enlargement   There were regional wall motion abnormalities with akinesis of the apical anterior, apical septal, apical inferior, apical lateral walls and the apex   There was hyperkinesis of the basal anterior, basal anteroseptal, basal inferoseptal, basal inferior, basal inferolateral, basal anterolateral, mid anterior, mid anteroseptal, mid inferoseptal, mid inferior, mid inferolateral and mid anterolateral  RVEF was moderately reduced   The LV systolic function was abnormal in a pattern suggestive of apical stress cardiomyopathy   She was started on Losartan by Cardiology and a repeat ECHO done today 7/5/22   It showed LVEF of 50% with grade 1 diastolic dysfunction, akinesis of the apex, hypokinesis of the apical anterior, septal, inferior/lateral walls  The patient was evaluated by the Rehabilitation team and deemed an appropriate candidate for comprehensive inpatient rehabilitation and admitted to the [facility] on 7/5/2022  5:43 PM    SUBJECTIVE: Patient seen face to face  No acute issues  Continues to have left arm and shoulder pain, but no worsening  Some edema in the legs because she slept in her recliner with her legs down  Does not want any TEDs or ACE wraps at this point  Added additional lidoderm patch for the left shoulder  She denies fever, chills, nausea, emesis, cough, shortness of breath, diarrhea, constipation  ASSESSMENT: Stable, progressing      PLAN:    Rehabilitation   Functional deficits:  Self care, mobility   Continue current rehabilitation plan of care to maximize function   Functional update:   o PT: transfer Mod A-max A, Ambulation 21' x2 Min A HHA  o OT: Mod-Max A for UB and LB ADLs   Estimated Discharge: TBD in teams, reteam    DVT prophylaxis  · lovenox     Pain  · Lidocaine patch  · Oxycodone 2 5mg Q6H PRN     Bladder plan  · Continent     Bowel plan  · Continent, 7/6 last BM     Code Status  · Level 1: Full Code      * Closed fracture of proximal end of left humerus  Assessment & Plan  · Displaced comminuted fracture of the humeral head/neck on imaging  · NWB to the left upper limb, maintain in sling  · Ok for for gentle range of motion to prevent adhesive capsulitis- would start 7/11, expressed in stand up meeting this morning  · Pain control  · Likely course of acute blood loss anemia, monitor labs  · PT/OT      Complete heart block [facility])  Assessment & Plan  · S/p dual chamber PPM 6/29/22 with Dr Nilda Ambrocio  · Monitor pacemaker site for signs of infection  · No heavy lifting/push/pull >10 lbs    Obesity (BMI 30-39  9)  Assessment & Plan  · BMI 36 03  · Weight loss counseling, promote increased activity    Acute blood loss anemia  Assessment & Plan  · Acute blood loss anemia, Hgb 6 9 previously, s/p transfusion  · Hgb on 7/5 was up to 9 5  · Will require close follow with labs  · Monitor clinically as well in therapy    Cardiomyopathy [facility])  Assessment & Plan  · Medicine team reached out to Cardiology re: advice with recs for further w/u based on ECHO results from 7/5/22  · Continue Losartan 25mg qd (Cardiology started in hospital)  · Per Cardiology, outpatient follow up, no acute changes based on echo      Depression  Assessment & Plan  Continue home lexapro 5mg daily    Pancytopenia ([facility] )  Assessment & Plan  Secondary to cirrhosis, monitor labs    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) ([facility] [address] )  Assessment & Plan  · Hx non-bleeding esophageal varices and hepatic encephalopathy   · At home, was on Rifaximin 550 mcg q12hrs/Lactulose 10Gm BID/Lasix 40mg qd/Nadolol 20mg qd/Aldactone 50mg qd  · Here:  Rifaximin 550 mg q12hrs/Lactulose 10Gm BID    Type 2 diabetes mellitus with hyperglycemia, with long-term current use of insulin [facility])  Assessment & Plan  Lab Results   Component Value Date    HGBA1C 8 2 (H) 05/31/2022       Recent Labs     07/06/22  1558 07/06/22  2056 07/07/22  0655 07/07/22  1123   POCGLU 223* 243* 146* 186*     · Home:  Janumet XR  BID/Amaryl 8 mg qAM/Basaglar 48U qam, 36U qpm  · Here:  Lantus 30U q12hrs  · Add back Metformin 500 mg BID to start this evening   · QID Accuchecks/SSI and DM diet    Acquired hypothyroidism  Assessment & Plan  Continue levothyroxine 112mcg daily    Essential hypertension  Assessment & Plan  · On losartan 25mg daily  · Previously on Nadolol, aldactone, and lasix  · Medications held based on initial hypotension  · Will start adding back meds as indicated, likely starting with nadolol- started back on 7/7      Hyperlipidemia  Assessment & Plan  Continue statin        Appreciate IM consultants medical co-management  Labs, medications, and imaging personally reviewed        ROS:  A ten point review of systems was completed on 07/07/22 and pertinent positives are listed in subjective section  All other systems reviewed were negative  OBJECTIVE:   /53   Pulse 63   Temp 97 7 °F (36 5 °C) (Oral)   Resp 18   Ht 5' 5" (1 651 m)   Wt 98 1 kg (216 lb 3 2 oz)   SpO2 99%   BMI 35 98 kg/m²     Physical Exam  Constitutional:       General: She is not in acute distress  Appearance: She is obese  HENT:      Head: Normocephalic and atraumatic  Right Ear: External ear normal       Left Ear: External ear normal       Nose: Nose normal  No rhinorrhea  Mouth/Throat:      Mouth: Mucous membranes are moist       Pharynx: Oropharynx is clear  Eyes:      General: No scleral icterus  Right eye: No discharge  Left eye: No discharge  Cardiovascular:      Rate and Rhythm: Normal rate  Pulses: Normal pulses  Pulmonary:      Effort: Pulmonary effort is normal  No respiratory distress  Breath sounds: No wheezing, rhonchi or rales  Abdominal:      General: There is no distension  Palpations: Abdomen is soft  Musculoskeletal:      Cervical back: Normal range of motion  Right lower leg: Edema present  Left lower leg: Edema present  Comments: Left arm ecchymosis, some swelling in the arm, unchanged, sling in place, tender to gentle movement   Skin:     General: Skin is warm and dry  Neurological:      Mental Status: She is alert and oriented to person, place, and time  Sensory: No sensory deficit  Motor: No weakness     Psychiatric:         Mood and Affect: Mood normal          Behavior: Behavior normal           Lab Results   Component Value Date    WBC 3 95 (L) 07/05/2022    HGB 9 5 (L) 07/05/2022    HCT 31 1 (L) 07/05/2022    MCV 88 07/05/2022    PLT 66 (L) 07/05/2022     Lab Results   Component Value Date    SODIUM 137 07/05/2022    K 4 6 07/05/2022     07/05/2022    CO2 24 07/05/2022    BUN 28 (H) 07/05/2022    CREATININE 0 93 07/05/2022    GLUC 162 (H) 07/05/2022 CALCIUM 8 6 07/05/2022     Lab Results   Component Value Date    INR 1 33 (H) 06/30/2022    INR 1 20 (H) 06/29/2022    INR 1 23 (H) 05/31/2022    PROTIME 16 0 (H) 06/30/2022    PROTIME 15 1 (H) 06/29/2022    PROTIME 15 3 (H) 05/31/2022           Current Facility-Administered Medications:     aspirin chewable tablet 81 mg, 81 mg, Oral, Daily, Katerina Whalen DO, 81 mg at 07/07/22 7794    bisacodyl (DULCOLAX) rectal suppository 10 mg, 10 mg, Rectal, Daily PRN, Katerina Whalen DO    docusate sodium (COLACE) capsule 100 mg, 100 mg, Oral, BID, Katerina Whalen DO, 100 mg at 07/07/22 0949    enoxaparin (LOVENOX) subcutaneous injection 30 mg, 30 mg, Subcutaneous, Q12H [facility], Katerina Whalen DO, 30 mg at 07/07/22 0950    escitalopram (LEXAPRO) tablet 5 mg, 5 mg, Oral, Daily, Katerina Whalen DO, 5 mg at 07/07/22 0949    insulin glargine (LANTUS) subcutaneous injection 30 Units 0 3 mL, 30 Units, Subcutaneous, Q12H [facility], Katerina Whalen DO, 30 Units at 07/07/22 0949    insulin lispro (HumaLOG) 100 units/mL subcutaneous injection 2-12 Units, 2-12 Units, Subcutaneous, 4x Daily (with meals and at bedtime), 4 Units at 07/06/22 2143 **AND** Fingerstick Glucose (POCT), , , 4x Daily AC and at bedtime, Katerina Whalen DO    lactulose oral solution 10 g, 10 g, Oral, BID, Katerina Whalen DO, 10 g at 07/07/22 2315    levothyroxine tablet 112 mcg, 112 mcg, Oral, Early Morning, Katerina Whalen DO, 112 mcg at 07/07/22 0644    [START ON 7/8/2022] lidocaine (LIDODERM) 5 % patch 2 patch, 2 patch, Topical, Daily, Katerina Whalen DO    losartan (COZAAR) tablet 25 mg, 25 mg, Oral, Daily, Katerina Whalen DO, 25 mg at 07/07/22 0949    melatonin tablet 6 mg, 6 mg, Oral, HS, Katerina Whalen DO, 6 mg at 07/06/22 2142    metFORMIN (GLUCOPHAGE) tablet 500 mg, 500 mg, Oral, BID With Meals, JOSE JUAN Barbosa, 500 mg at 07/07/22 0644    nadolol (CORGARD) tablet 20 mg, 20 mg, Oral, Daily, JOSE JUAN Barbosa    nystatin (MYCOSTATIN) powder, , Topical, BID, Satira Arabia JOSE JUAN Longo, 1 application at 60/02/77 0950    ondansetron (ZOFRAN-ODT) dispersible tablet 4 mg, 4 mg, Oral, Q6H PRN, Melven Roes, DO    oxyCODONE (ROXICODONE) IR tablet 2 5 mg, 2 5 mg, Oral, Q6H PRN, Melven Roes, DO, 2 5 mg at 07/07/22 0015    pantoprazole (PROTONIX) EC tablet 40 mg, 40 mg, Oral, Early Morning, Melven Roes, DO, 40 mg at 07/07/22 5212    polyethylene glycol (MIRALAX) packet 17 g, 17 g, Oral, Daily, Melven Roes, DO, 17 g at 07/07/22 0949    pravastatin (PRAVACHOL) tablet 80 mg, 80 mg, Oral, Daily With Dinner, Melven Roes, DO, 80 mg at 07/06/22 1634    rifaximin (XIFAXAN) tablet 550 mg, 550 mg, Oral, Q12H [address], Melven Roes, DO, 550 mg at 07/07/22 1286    senna (SENOKOT) tablet 8 6 mg, 1 tablet, Oral, HS, Melven Roes, DO, 8 6 mg at 07/06/22 2142    Past Medical History:   Diagnosis Date    Anemia     Cirrhosis ([facility] )     Diabetic neuropathy (HCC)     Esophageal varices (HCC)     Fatty liver     GERD (gastroesophageal reflux disease)     Hepatic encephalopathy (HCC)     Hiatal hernia     Hyperlipidemia     Hypertension     Hypoglycemia     Hypothyroidism     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 ([facility] )     Osteoarthritis     Osteopenia     Pancytopenia (HCC)     Thrombocytopenia (HCC)     Type 2 diabetes mellitus ([facility] )        Patient Active Problem List    Diagnosis Date Noted    Closed fracture of proximal end of left humerus 07/02/2022    Complete heart block ([facility] ) 06/30/2022    Obesity (BMI 30-39 9) 07/06/2022    Acute blood loss anemia 07/03/2022    Depression 07/02/2022    Cardiomyopathy ([facility] ) 07/02/2022    Esophageal varices without bleeding ([facility] ) 09/29/2021    Pancytopenia ([facility] ) 08/06/2021    Iron deficiency anemia due to chronic blood loss 08/06/2021    Mild nonproliferative diabetic retinopathy of both eyes without macular edema associated with type 2 diabetes mellitus ([facility] ) 07/16/2021    Urinary tract infection 09/15/2020    Hepatic encephalopathy ([place] [address] ) 09/13/2020    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) ([place] [address] ) 09/13/2020    Thrombocytopenia ([place] [address] ) 09/13/2020    Hyperlipidemia 07/24/2018    Essential hypertension 07/24/2018    Acquired hypothyroidism 07/24/2018    Type 2 diabetes mellitus with hyperglycemia, with long-term current use of insulin ([place] [address] ) 07/24/2018    Diabetic polyneuropathy associated with type 2 diabetes mellitus ([place] [address] ) 07/24/2018          Priti Vega DO  Physical Medicine and Katie Stallworth    Total time spent:  35 minutes, with more than 50% spent counseling/coordinating care  Counseling includes discussion with patient re: progress in therapies, functional issues observed by therapy staff, and discussion with patient his/her current medical state/wellbeing  Coordination of patient's care was performed in conjunction with Internal Medicine service to monitor patient's labs, vitals, and management of their comorbidities

## 2022-07-07 NOTE — WOUND OSTOMY CARE
Consult Note - Wound   Diane Jurado 77 y o  female MRN: [id number]  Unit/Bed#: [facility] 702-73 Encounter: [id number]        History and Present Illness:  Patient is a 78 yo female that came to [facility] after a fall and left humerus fracture  Patient's left arm is supported via sling  Wound Care was consulted for Buttocks wounds  Patient is moderate assist X1 with standing and transfers  Patient is independent with meals after set up assistance as she is non weight bearing to the left arm  Wound care was following patient during inpatient admission and orders will be transferred over  Wound care was consulted ot see the patient's buttocks pressure injury    Assessment Findings:   1  POA DTI to sacral/buttocks area- some partial thickness skin loss with pink wound bed surrounded by purple, nonblanchable intact epithelial tissue, scant drainage  Small open pink area noted on right buttock  This injury more than likely from fall prior to hospitalization  DTI's have the potential to evolve into full thickness unstageable, stage III, or stage IV wounds  Wound dressed via orders placed below       No induration, fluctuance, odor, warmth/temperature differences, redness, or purulence noted to the above noted wounds and skin areas assessed  Patient tolerated well- no s/s of non-verbal pain or discomfort observed during the encounter  Bedside nurse aware of plan of care  See flow sheets for more detailed assessment findings  2  MASD to skin fold under left breast: pink and fragile  Maribell-wound is intact  Wound to e cleansed and treated with orders placed below  Skin Care Plan:  1-Cleanse sacro-buttocks with soap and water  Apply Xeroform gauze and cover with Allevyn foam  Brandon with T for treatment  Change every other day and PRN  2-Turn/reposition q2h or when medically stable for pressure re-distribution on skin     3-Elevate heels to offload pressure  4-Moisturize skin daily with skin nourishing cream  5-Ehob cushion in chair when out of bed  6-Hydraguard to bilateral heels BID and PRN  7  Calcium Alginate rope to be placed under Left Breast Daily or PRN Soilage or Displacement  Orders listed below and wound care will continue to follow, call or tiger text with questions  Wounds:  Wound 06/29/22 Incision Chest Anterior (Active)   Wound Description BILLY 07/06/22 2351   Maribell-wound Assessment BILLY 07/06/22 2351   Wound Site Closure Sutures 07/01/22 0400   Drainage Amount None 07/06/22 2351   Dressing Other (Comment) 07/04/22 0600   Wound packed? No 06/30/22 0351   Dressing Changed New 06/29/22 1836   Patient Tolerance Tolerated well 06/29/22 1836   Dressing Status Clean;Dry; Intact 07/06/22 2351       Wound 07/02/22 Pressure Injury Coccyx Inner (Active)   Wound Image   07/07/22 0851   Wound Description Light purple;Non-blanchable erythema 07/07/22 0851   Pressure Injury Stage DTPI 07/07/22 0851   Maribell-wound Assessment Fragile 07/07/22 0851   Wound Length (cm) 5 5 cm 07/07/22 0851   Wound Width (cm) 7 cm 07/07/22 0851   Wound Depth (cm) 0 1 cm 07/07/22 0851   Wound Surface Area (cm^2) 38 5 cm^2 07/07/22 0851   Wound Volume (cm^3) 3 85 cm^3 07/07/22 0851   Calculated Wound Volume (cm^3) 3 85 cm^3 07/07/22 0851   Change in Wound Size % 8 33 07/07/22 0851   Tunneling 0 cm 07/07/22 0851   Tunneling in depth located at 0 07/04/22 1100   Undermining 0 07/07/22 0851   Undermining is depth extending from 0 07/04/22 1100   Wound Site Closure BILLY 07/07/22 0851   Drainage Amount Scant 07/07/22 0851   Drainage Description Serosanguineous 07/07/22 0851   Non-staged Wound Description Partial thickness 07/07/22 0851   Treatments Cleansed 07/07/22 0851   Dressing Xeroform; Foam, Silicon (eg  Allevyn, etc) 07/07/22 0851   Wound packed?  No 07/07/22 0851   Packing- # removed 0 07/07/22 0851   Packing- # inserted 0 07/07/22 9407   Dressing Changed Changed 07/07/22 0851   Patient Tolerance Tolerated well 07/07/22 0851 Dressing Status Clean;Dry; Intact 07/07/22 3184                Reba Real RN, BSN

## 2022-07-07 NOTE — PROGRESS NOTES
07/07/22 1000   Pain Assessment   Pain Assessment Tool 0-10   Pain Score 8   Pain Location/Orientation Orientation: Left; Location: Shoulder   Hospital Pain Intervention(s) Repositioned; Emotional support   Restrictions/Precautions   Precautions Fall Risk;Pain;Supervision on toilet/commode;Pressure Ulcer   Weight Bearing Restrictions Yes   LUE Weight Bearing Per Order (S)  NWB   Braces or Orthoses Sling   Lower Body Dressing   Type of Assistance Needed Physical assistance   Physical Assistance Level 26%-50%   Comment With loose fitting hospital pants (blue paper scrubs) pt was able to thread BLEs in, requires min/modA while in stance to maintain balance with A for L side mgmt  Lower Body Dressing CARE Score 3   Sit to Stand   Type of Assistance Needed Physical assistance   Physical Assistance Level 26%-50%   Comment min/modA from lower surfaces  Sit to Stand CARE Score 3   Bed-Chair Transfer   Type of Assistance Needed Physical assistance   Physical Assistance Level 26%-50%   Comment HHA and HW   Chair/Bed-to-Chair Transfer CARE Score 3   Toileting Hygiene   Type of Assistance Needed Physical assistance   Physical Assistance Level 51%-75%   Comment Pt is able to stand with RUE support on HW, requires A for L side CM up/down over hips  Pt able to mange R side, additionally requires A for thoroughness of bladder hygiene for front to back method due to body habitus  Toileting Hygiene CARE Score 2   Toilet Transfer   Type of Assistance Needed Physical assistance   Physical Assistance Level 26%-50%   Comment HW   Toilet Transfer CARE Score 3   Exercise Tools   Other Exercise Tool 1 RUE only strengthening completed with use of 2# free weight including chest press, shoulder flexion to shoulder height only, bicep curls completing 3 x 10 each to increase UB strength and endurance for increased independence with ADL tasks and functional txfers     Cognition   Overall Cognitive Status [facility]   Arousal/Participation Cooperative   Attention Within functional limits   Orientation Level Oriented X4   Memory Decreased short term memory   Following Commands Follows one step commands with increased time or repetition   Activity Tolerance   Activity Tolerance Patient tolerated treatment well   Assessment   Treatment Assessment Pt participated in skilled OT services with focus on toileting, functional txfers, strengthening and act olegario  Pt continues to be limited by LUE NWB, pain, dec standing tolerance/balance, and dec act olegario  Pt will continue to benefit from skilled OT services with focus on functional txfers, standing tolerance/balance, act olegario, endurance  OT Family training done with: Phone call placed to pt's DIL, who will bring pt shoes and personal loose fitting clothing  Prognosis Good   Problem List Decreased strength;Decreased endurance;Decreased range of motion; Impaired balance;Decreased mobility;Orthopedic restrictions;Pain   Plan   Treatment/Interventions ADL retraining;Functional transfer training; Therapeutic exercise; Endurance training;Patient/family training;Equipment eval/education; Bed mobility; Compensatory technique education   Progress Progressing toward goals   Recommendation   OT Discharge Recommendation   (pending)   OT Therapy Minutes   OT Time In 1000   OT Time Out 1130   OT Total Time (minutes) 90   OT Mode of treatment - Individual (minutes) 90   OT Mode of treatment - Concurrent (minutes) 0   OT Mode of treatment - Group (minutes) 0   OT Mode of treatment - Co-treat (minutes) 0   OT Mode of Treatment - Total time(minutes) 90 minutes   OT Cumulative Minutes 180   Therapy Time missed   Time missed?  No

## 2022-07-07 NOTE — PROGRESS NOTES
Final Coded [id number] Cardiac Pacemaker is Ineligible  Bundle Episode closed  I have removed myself off of the care team, updated the closure form, and resolved the episode  I emailed the facility and informed them of the closure of the Bundle episode

## 2022-07-07 NOTE — PROGRESS NOTES
Internal Medicine Progress Note  Patient: Shae Schumacher  Age/sex: 77 y o  female  Medical Record #: [id number]      ASSESSMENT/PLAN: (Interval History)  Shae Schumacher is seen and examined and management for following issues:    Left proximal humerus fracture  · Non-op  · NWB  · Sling for comfort  · See Ortho as OP     CHB  · S/p dual chamber PPM 6/29/22  · Dressing removed 7/7/22 = site unremarkable  · No heavy lift/push/pull >10 lbs  · No left arm above shoulder height      Stress cardiomyopathy  · D/W Dr Oconnor Ours re:  recs for further w/u based on ECHO results from 7/5/22 and he said no further intervention for now but see back in office  · Continue Losartan 25mg qd (Cardiology started in hospital)     ABLA  · S/p 1 unit PRBCs  · Stable  · Will watch     PAUL cirrhosis  · Hx non-bleeding esophageal varices and hepatic encephalopathy   · Home:  Rifaximin 550 mcg q12hrs/Lactulose 10Gm BID/Lasix 40mg qd/Corgard 20mg qd/Aldactone 50mg qd  · Here:  Rifaximin 550 mg q12hrs/Lactulose 10Gm BID/added back Corgard 20mg qd 7/6 (hold SBP <120)  · No changes today  · If labs and BP are ok tomorrow, may be able to add back a small dose of Lasix sine has some LE edema     DM2  · Home:  Janumet XR  BID/Amaryl 8 mg qAM/Basaglar 48U qam, 36U qpm  · Here:  Lantus 30U q12hrs/Metformin 500 mg BID (started last evening 7/6)  · No changes today  · QID Accuchecks/SSI and DM diet     Pancytopenia  · Is 2/2 to cirrhosis  · Will watch     Hypothyroidism  · Continue Levothyroxine 112 mcg qd     Depression  · Continue Lexapro as at home        Discharge date:  Team       The above assessment and plan was reviewed and updated as determined by my evaluation of the patient on 7/7/2022      Labs:   Results from last 7 days   Lab Units 07/05/22  0513 07/04/22  0448   WBC Thousand/uL 3 95* 2 51*   HEMOGLOBIN g/dL 9 5* 8 0*   HEMATOCRIT % 31 1* 26 2*   PLATELETS Thousands/uL 66* 60*     Results from last 7 days   Lab Units 07/05/22  0513 07/04/22  0448   SODIUM mmol/L 137 137   POTASSIUM mmol/L 4 6 4 6   CHLORIDE mmol/L 107 107   CO2 mmol/L 24 26   BUN mg/dL 28* 36*   CREATININE mg/dL 0 93 0 96   CALCIUM mg/dL 8 6 8 2*             Results from last 7 days   Lab Units 07/07/22  1123 07/07/22  0655 07/06/22  2056   POC GLUCOSE mg/dl 186* 146* 243*       Review of Scheduled Meds:  Current Facility-Administered Medications   Medication Dose Route Frequency Provider Last Rate    aspirin  81 mg Oral Daily Rico Calico, DO      bisacodyl  10 mg Rectal Daily PRN Rico Calico, DO      docusate sodium  100 mg Oral BID Rico Calico, DO      enoxaparin  30 mg Subcutaneous Q12H [address] Rico Calico, DO      escitalopram  5 mg Oral Daily Rico Calico, DO      insulin glargine  30 Units Subcutaneous Q12H [address] Rico Calico, DO      insulin lispro  2-12 Units Subcutaneous 4x Daily (with meals and at bedtime) Rico Calico, DO      lactulose  10 g Oral BID Rico Calico, DO      levothyroxine  112 mcg Oral Early Morning Rico Calico, DO      [START ON 7/8/2022] lidocaine  2 patch Topical Daily Rico Calico, DO      losartan  25 mg Oral Daily Rico Calico, DO      melatonin  6 mg Oral HS Rico Calico, DO      metFORMIN  500 mg Oral BID With Meals JOSE JUAN Plascencia      nadolol  20 mg Oral Daily JOSE JUAN Plascencia      nystatin   Topical BID Jacqulynn Canavan [place], CRNP      ondansetron  4 mg Oral Q6H PRN Rico Calico, DO      oxyCODONE  2 5 mg Oral Q6H PRN Rico Calico, DO      pantoprazole  40 mg Oral Early Morning Rico Calico, DO      polyethylene glycol  17 g Oral Daily Rico Calico, DO      pravastatin  80 mg Oral Daily With [employer] Rosario Lower, DO      rifaximin  550 mg Oral Q12H [address] Rico Calico, DO      senna  1 tablet Oral HS Rico Calico, DO         Subjective/ HPI: Patient seen and examined   Patients overnight issues or events were reviewed with nursing or staff during rounds or morning huddle session  New or overnight issues include the following:     No new or overnight issues  Offers no complaints    ROS:   A 10 point ROS was performed; negative except as noted above  Imaging:     No orders to display       *Labs /Radiology studies reviewed  *Medications reviewed and reconciled as needed  *Please refer to order section for additional ordered labs studies  *Case discussed with primary attending during morning huddle case rounds    Physical Examination:  Vitals:   Vitals:    07/06/22 2033 07/07/22 0645 07/07/22 0700 07/07/22 0950   BP: 128/60 123/58  113/53   BP Location: Right arm Right arm     Pulse: 66 66  63   Resp: 18 18     Temp: 97 7 °F (36 5 °C) 97 7 °F (36 5 °C)     TempSrc: Oral Oral     SpO2: 100% 99%     Weight:   98 1 kg (216 lb 3 2 oz)    Height:           General Appearance: no distress, conversive  HEENT: PERRLA, conjuctiva normal; oropharynx clear; mucous membranes moist   Neck:  Supple, normal ROM  Lungs: CTA, normal respiratory effort, no retractions, expiratory effort normal  CV: regular rate and rhythm; no rubs/murmurs/gallops, PMI normal   PPM site is w/o erythema/drainage   ABD: soft; ND/NT; +BS  EXT: very mild edema  Skin: normal turgor, normal texture, no rashes  Psych: affect normal, mood normal  Neuro: AAO      The above physical exam was reviewed and updated as determined by my evaluation of the patient on 7/7/2022  Invasive Devices  Report    None                    VTE Pharmacologic Prophylaxis: Enoxaparin  Code Status: Level 1 - Full Code  Current Length of Stay: 2 day(s)      Total time spent:  30 minutes with more than 50% spent counseling/coordinating care  Counseling includes discussion with patient re: progress  and discussion with patient of his/her current medical state/information  Coordination of patient's care was performed in conjunction with primary service   Time invested included review of patient's labs, vitals, and management of their comorbidities with continued monitoring  In addition, this patient was discussed with medical team including physician and advanced extenders  The care of the patient was extensively discussed and appropriate treatment plan was formulated unique for this patient  ** Please Note:  voice to text software may have been used in the creation of this document   Although proof errors in transcription or interpretation are a potential of such software**

## 2022-07-07 NOTE — PLAN OF CARE
Problem: PAIN - ADULT  Goal: Verbalizes/displays adequate comfort level or baseline comfort level  Description: Interventions:  - Encourage patient to monitor pain and request assistance  - Assess pain using appropriate pain scale  - Administer analgesics based on type and severity of pain and evaluate response  - Implement non-pharmacological measures as appropriate and evaluate response  - Consider cultural and social influences on pain and pain management  - Notify physician/advanced practitioner if interventions unsuccessful or patient reports new pain  Outcome: Progressing     Problem: INFECTION - ADULT  Goal: Absence or prevention of progression during hospitalization  Description: INTERVENTIONS:  - Assess and monitor for signs and symptoms of infection  - Monitor lab/diagnostic results  - Monitor all insertion sites, i e  indwelling lines, tubes, and drains  - Monitor endotracheal if appropriate and nasal secretions for changes in amount and color  - [place] appropriate cooling/warming therapies per order  - Administer medications as ordered  - Instruct and encourage patient and family to use good hand hygiene technique  - Identify and instruct in appropriate isolation precautions for identified infection/condition  Outcome: Progressing  Goal: Absence of fever/infection during neutropenic period  Description: INTERVENTIONS:  - Monitor WBC    Outcome: Progressing     Problem: SAFETY ADULT  Goal: Patient will remain free of falls  Description: INTERVENTIONS:  - Educate patient/family on patient safety including physical limitations  - Instruct patient to call for assistance with activity   - Consult OT/PT to assist with strengthening/mobility   - Keep Call bell within reach  - Keep bed low and locked with side rails adjusted as appropriate  - Keep care items and personal belongings within reach  - Initiate and maintain comfort rounds  - Make Fall Risk Sign visible to staff  - Offer Toileting every 2-4 Hours, in advance of need  - Initiate/Maintain bed/chair alarm  - Obtain necessary fall risk management equipment: yellow socks  - Apply yellow socks and bracelet for high fall risk patients  - Consider moving patient to room near nurses station  Outcome: Progressing  Goal: Maintain or return to baseline ADL function  Description: INTERVENTIONS:  -  Assess patient's ability to carry out ADLs; assess patient's baseline for ADL function and identify physical deficits which impact ability to perform ADLs (bathing, care of mouth/teeth, toileting, grooming, dressing, etc )  - Assess/evaluate cause of self-care deficits   - Assess range of motion  - Assess patient's mobility; develop plan if impaired  - Assess patient's need for assistive devices and provide as appropriate  - Encourage maximum independence but intervene and supervise when necessary  - Involve family in performance of ADLs  - Assess for home care needs following discharge   - Consider OT consult to assist with ADL evaluation and planning for discharge  - Provide patient education as appropriate  Outcome: Progressing     Problem: DISCHARGE PLANNING  Goal: Discharge to home or other facility with appropriate resources  Description: INTERVENTIONS:  - Identify barriers to discharge w/patient and caregiver  - Arrange for needed discharge resources and transportation as appropriate  - Identify discharge learning needs (meds, wound care, etc )  - Arrange for interpretive services to assist at discharge as needed  - Refer to Case Management Department for coordinating discharge planning if the patient needs post-hospital services based on physician/advanced practitioner order or complex needs related to functional status, cognitive ability, or social support system  Outcome: Progressing     Problem: Prexisting or High Potential for Compromised Skin Integrity  Goal: Skin integrity is maintained or improved  Description: INTERVENTIONS:  - Identify patients at risk for skin breakdown  - Assess and monitor skin integrity  - Assess and monitor nutrition and hydration status  - Monitor labs   - Assess for incontinence   - Turn and reposition patient  - Assist with mobility/ambulation  - Relieve pressure over bony prominences  - Avoid friction and shearing  - Provide appropriate hygiene as needed including keeping skin clean and dry  - Evaluate need for skin moisturizer/barrier cream  - Collaborate with interdisciplinary team   - Patient/family teaching  - Consider wound care consult   Outcome: Progressing     Problem: MOBILITY - ADULT  Goal: Maintain or return to baseline ADL function  Description: INTERVENTIONS:  -  Assess patient's ability to carry out ADLs; assess patient's baseline for ADL function and identify physical deficits which impact ability to perform ADLs (bathing, care of mouth/teeth, toileting, grooming, dressing, etc )  - Assess/evaluate cause of self-care deficits   - Assess range of motion  - Assess patient's mobility; develop plan if impaired  - Assess patient's need for assistive devices and provide as appropriate  - Encourage maximum independence but intervene and supervise when necessary  - Involve family in performance of ADLs  - Assess for home care needs following discharge   - Consider OT consult to assist with ADL evaluation and planning for discharge  - Provide patient education as appropriate  Outcome: Progressing  Goal: Maintains/Returns to pre admission functional level  Description: INTERVENTIONS:  - Perform BMAT or MOVE assessment daily    - Set and communicate daily mobility goal to care team and patient/family/caregiver  - Collaborate with rehabilitation services on mobility goals if consulted  - Perform Range of Motion 3 times a day  - Reposition patient every 2 hours    - Dangle patient 3 times a day  - Stand patient 3 times a day  - Ambulate patient 3 times a day  - Out of bed to chair 3 times a day   - Out of bed for meals 3 times a day  - Out of bed for toileting  - Record patient progress and toleration of activity level   Outcome: Progressing     Problem: Potential for Falls  Goal: Patient will remain free of falls  Description: INTERVENTIONS:  - Educate patient/family on patient safety including physical limitations  - Instruct patient to call for assistance with activity   - Consult OT/PT to assist with strengthening/mobility   - Keep Call bell within reach  - Keep bed low and locked with side rails adjusted as appropriate  - Keep care items and personal belongings within reach  - Initiate and maintain comfort rounds  - Make Fall Risk Sign visible to staff  - Offer Toileting every 2 Hours, in advance of need      - Apply yellow socks and bracelet for high fall risk patients  - Consider moving patient to room near nurses station  Outcome: Progressing

## 2022-07-08 LAB
ANION GAP SERPL CALCULATED.3IONS-SCNC: 4 MMOL/L (ref 4–13)
BASOPHILS # BLD AUTO: 0.03 THOUSANDS/ΜL (ref 0–0.1)
BASOPHILS NFR BLD AUTO: 1 % (ref 0–1)
BUN SERPL-MCNC: 35 MG/DL (ref 5–25)
CALCIUM SERPL-MCNC: 8.4 MG/DL (ref 8.3–10.1)
CHLORIDE SERPL-SCNC: 107 MMOL/L (ref 100–108)
CO2 SERPL-SCNC: 25 MMOL/L (ref 21–32)
CREAT SERPL-MCNC: 0.91 MG/DL (ref 0.6–1.3)
EOSINOPHIL # BLD AUTO: 0.06 THOUSAND/ΜL (ref 0–0.61)
EOSINOPHIL NFR BLD AUTO: 2 % (ref 0–6)
ERYTHROCYTE [DISTWIDTH] IN BLOOD BY AUTOMATED COUNT: 18.1 % (ref 11.6–15.1)
GFR SERPL CREATININE-BSD FRML MDRD: 61 ML/MIN/1.73SQ M
GLUCOSE P FAST SERPL-MCNC: 92 MG/DL (ref 65–99)
GLUCOSE SERPL-MCNC: 143 MG/DL (ref 65–140)
GLUCOSE SERPL-MCNC: 204 MG/DL (ref 65–140)
GLUCOSE SERPL-MCNC: 208 MG/DL (ref 65–140)
GLUCOSE SERPL-MCNC: 92 MG/DL (ref 65–140)
GLUCOSE SERPL-MCNC: 95 MG/DL (ref 65–140)
HCT VFR BLD AUTO: 26.9 % (ref 34.8–46.1)
HGB BLD-MCNC: 8.1 G/DL (ref 11.5–15.4)
IMM GRANULOCYTES # BLD AUTO: 0.02 THOUSAND/UL (ref 0–0.2)
IMM GRANULOCYTES NFR BLD AUTO: 1 % (ref 0–2)
LYMPHOCYTES # BLD AUTO: 0.66 THOUSANDS/ΜL (ref 0.6–4.47)
LYMPHOCYTES NFR BLD AUTO: 21 % (ref 14–44)
MCH RBC QN AUTO: 27 PG (ref 26.8–34.3)
MCHC RBC AUTO-ENTMCNC: 30.1 G/DL (ref 31.4–37.4)
MCV RBC AUTO: 90 FL (ref 82–98)
MONOCYTES # BLD AUTO: 0.38 THOUSAND/ΜL (ref 0.17–1.22)
MONOCYTES NFR BLD AUTO: 12 % (ref 4–12)
NEUTROPHILS # BLD AUTO: 2.04 THOUSANDS/ΜL (ref 1.85–7.62)
NEUTS SEG NFR BLD AUTO: 63 % (ref 43–75)
NRBC BLD AUTO-RTO: 0 /100 WBCS
PLATELET # BLD AUTO: 67 THOUSANDS/UL (ref 149–390)
PMV BLD AUTO: 12.9 FL (ref 8.9–12.7)
POTASSIUM SERPL-SCNC: 4.7 MMOL/L (ref 3.5–5.3)
RBC # BLD AUTO: 3 MILLION/UL (ref 3.81–5.12)
SODIUM SERPL-SCNC: 136 MMOL/L (ref 136–145)
WBC # BLD AUTO: 3.19 THOUSAND/UL (ref 4.31–10.16)

## 2022-07-08 PROCEDURE — 97530 THERAPEUTIC ACTIVITIES: CPT

## 2022-07-08 PROCEDURE — 99232 SBSQ HOSP IP/OBS MODERATE 35: CPT | Performed by: STUDENT IN AN ORGANIZED HEALTH CARE EDUCATION/TRAINING PROGRAM

## 2022-07-08 PROCEDURE — 97110 THERAPEUTIC EXERCISES: CPT

## 2022-07-08 PROCEDURE — 97535 SELF CARE MNGMENT TRAINING: CPT

## 2022-07-08 PROCEDURE — 85025 COMPLETE CBC W/AUTO DIFF WBC: CPT | Performed by: NURSE PRACTITIONER

## 2022-07-08 PROCEDURE — 99232 SBSQ HOSP IP/OBS MODERATE 35: CPT | Performed by: INTERNAL MEDICINE

## 2022-07-08 PROCEDURE — 82948 REAGENT STRIP/BLOOD GLUCOSE: CPT

## 2022-07-08 PROCEDURE — 80048 BASIC METABOLIC PNL TOTAL CA: CPT | Performed by: NURSE PRACTITIONER

## 2022-07-08 RX ADMIN — PRAVASTATIN SODIUM 80 MG: 80 TABLET ORAL at 16:32

## 2022-07-08 RX ADMIN — RIFAXIMIN 550 MG: 550 TABLET ORAL at 22:00

## 2022-07-08 RX ADMIN — LIDOCAINE 5% 2 PATCH: 700 PATCH TOPICAL at 10:42

## 2022-07-08 RX ADMIN — LACTULOSE 10 G: 20 SOLUTION ORAL at 10:28

## 2022-07-08 RX ADMIN — ESCITALOPRAM OXALATE 5 MG: 10 TABLET ORAL at 10:27

## 2022-07-08 RX ADMIN — NYSTATIN: 100000 POWDER TOPICAL at 10:28

## 2022-07-08 RX ADMIN — OXYCODONE HYDROCHLORIDE 2.5 MG: 5 TABLET ORAL at 00:21

## 2022-07-08 RX ADMIN — ASPIRIN 81 MG CHEWABLE TABLET 81 MG: 81 TABLET CHEWABLE at 10:27

## 2022-07-08 RX ADMIN — OXYCODONE HYDROCHLORIDE 2.5 MG: 5 TABLET ORAL at 12:59

## 2022-07-08 RX ADMIN — LEVOTHYROXINE SODIUM 112 MCG: 112 TABLET ORAL at 05:34

## 2022-07-08 RX ADMIN — INSULIN GLARGINE 30 UNITS: 100 INJECTION, SOLUTION SUBCUTANEOUS at 10:40

## 2022-07-08 RX ADMIN — ENOXAPARIN SODIUM 30 MG: 30 INJECTION SUBCUTANEOUS at 10:28

## 2022-07-08 RX ADMIN — INSULIN LISPRO 4 UNITS: 100 INJECTION, SOLUTION INTRAVENOUS; SUBCUTANEOUS at 16:28

## 2022-07-08 RX ADMIN — NYSTATIN 1 APPLICATION: 100000 POWDER TOPICAL at 17:20

## 2022-07-08 RX ADMIN — SENNOSIDES 8.6 MG: 8.6 TABLET, FILM COATED ORAL at 22:00

## 2022-07-08 RX ADMIN — ENOXAPARIN SODIUM 30 MG: 30 INJECTION SUBCUTANEOUS at 22:00

## 2022-07-08 RX ADMIN — METFORMIN HYDROCHLORIDE 500 MG: 500 TABLET ORAL at 16:32

## 2022-07-08 RX ADMIN — PANTOPRAZOLE SODIUM 40 MG: 40 TABLET, DELAYED RELEASE ORAL at 05:34

## 2022-07-08 RX ADMIN — MELATONIN 6 MG: at 22:00

## 2022-07-08 RX ADMIN — INSULIN LISPRO 4 UNITS: 100 INJECTION, SOLUTION INTRAVENOUS; SUBCUTANEOUS at 22:00

## 2022-07-08 RX ADMIN — METFORMIN HYDROCHLORIDE 500 MG: 500 TABLET ORAL at 09:05

## 2022-07-08 RX ADMIN — DOCUSATE SODIUM 100 MG: 100 CAPSULE, LIQUID FILLED ORAL at 10:27

## 2022-07-08 RX ADMIN — INSULIN GLARGINE 30 UNITS: 100 INJECTION, SOLUTION SUBCUTANEOUS at 22:00

## 2022-07-08 RX ADMIN — LOSARTAN POTASSIUM 25 MG: 25 TABLET, FILM COATED ORAL at 10:27

## 2022-07-08 RX ADMIN — RIFAXIMIN 550 MG: 550 TABLET ORAL at 10:27

## 2022-07-08 NOTE — PROGRESS NOTES
Internal Medicine Progress Note  Patient: aMria G Lao  Age/sex: 77 y o  female  Medical Record #: [id number]      ASSESSMENT/PLAN: (Interval History)  Maria G Lao is seen and examined and management for following issues:    Left proximal humerus fracture  · Non-op  · NWB  · Sling for comfort  · See Ortho as OP     CHB  · S/p dual chamber PPM 6/29/22  · Dressing removed 7/7/22 = site unremarkable  · No heavy lift/push/pull >10 lbs  · No left arm above shoulder height      Stress cardiomyopathy  · D/W Dr Keely Locke re:  recs for further w/u based on ECHO results from 7/5/22 and he said no further intervention for now but see back in office  · Continue Losartan 25mg qd (Cardiology started in hospital)     ABLA  · S/p 1 unit PRBCs  · Stable  · Will watch     PAUL cirrhosis  · Hx non-bleeding esophageal varices and hepatic encephalopathy   · Home:  Rifaximin 550 mcg q12hrs/Lactulose 10Gm BID/Lasix 40mg qd/Corgard 20mg qd/Aldactone 50mg qd  · Here:  Rifaximin 550 mg q12hrs/Lactulose 10Gm BID/added back Corgard 20mg qd 7/6 (hold SBP <120)  · No changes today  · Consider adding back a small dose of Lasix but BP soft today so will hold off for now      DM2  · Home:  Janumet XR  BID/Amaryl 8 mg qAM/Basaglar 48U qam, 36U qpm  · Here:  Lantus 30U q12hrs/Metformin 500 mg BID (started evening 7/6)  · No changes today  · QID Accuchecks/SSI and DM diet     Pancytopenia  · Is 2/2 to cirrhosis  · Will watch     Hypothyroidism  · Continue Levothyroxine 112 mcg qd     Depression  · Continue Lexapro as at home        Discharge date:  Team       The above assessment and plan was reviewed and updated as determined by my evaluation of the patient on 7/8/2022      Labs:   Results from last 7 days   Lab Units 07/08/22  0501 07/05/22  0513   WBC Thousand/uL 3 19* 3 95*   HEMOGLOBIN g/dL 8 1* 9 5*   HEMATOCRIT % 26 9* 31 1*   PLATELETS Thousands/uL 67* 66*     Results from last 7 days   Lab Units 07/08/22  0501 07/05/22  0513   SODIUM mmol/L 136 137   POTASSIUM mmol/L 4 7 4 6   CHLORIDE mmol/L 107 107   CO2 mmol/L 25 24   BUN mg/dL 35* 28*   CREATININE mg/dL 0 91 0 93   CALCIUM mg/dL 8 4 8 6             Results from last 7 days   Lab Units 07/08/22  0650 07/07/22  2117 07/07/22  1605   POC GLUCOSE mg/dl 95 223* 243*       Review of Scheduled Meds:  Current Facility-Administered Medications   Medication Dose Route Frequency Provider Last Rate    aspirin  81 mg Oral Daily [facility], DO      bisacodyl  10 mg Rectal Daily PRN [facility], DO      docusate sodium  100 mg Oral BID [facility], DO      enoxaparin  30 mg Subcutaneous Q12H [facility], DO      escitalopram  5 mg Oral Daily [facility], DO      insulin glargine  30 Units Subcutaneous Q12H [facility], DO      insulin lispro  2-12 Units Subcutaneous 4x Daily (with meals and at bedtime) [facility], DO      lactulose  10 g Oral BID [facility], DO      levothyroxine  112 mcg Oral Early Morning [facility], DO      lidocaine  2 patch Topical Daily [facility], DO      losartan  25 mg Oral Daily [facility], DO      melatonin  6 mg Oral HS [facility], DO      metFORMIN  500 mg Oral BID With Meals Barbara Raissa, CRNP      nadolol  20 mg Oral Daily Barbara Raissa, CRNP      nystatin   Topical BID JOSE JUAN Preston      ondansetron  4 mg Oral Q6H PRN [facility], DO      oxyCODONE  2 5 mg Oral Q6H PRN [facility], DO      pantoprazole  40 mg Oral Early Morning [facility], DO      polyethylene glycol  17 g Oral Daily [facility], DO      pravastatin  80 mg Oral Daily With [employer] Chase Gill, DO      rifaximin  550 mg Oral Q12H [facility], DO      senna  1 tablet Oral HS [facility], DO         Subjective/ HPI: Patient seen and examined  Patients overnight issues or events were reviewed with nursing or staff during rounds or morning huddle session   New or overnight issues include the following:     Pt seen in her room  She states she is having difficulty getting comfortable in the wheelchair  She also reports fatigue  She has 7/10 arm pain and recently received pain medication  She denies any other complaints  ROS:   A 10 point ROS was performed; negative except as noted above  Imaging:     No orders to display       *Labs /Radiology studies reviewed  *Medications reviewed and reconciled as needed  *Please refer to order section for additional ordered labs studies  *Case discussed with primary attending during morning huddle case rounds    Physical Examination:  Vitals:   Vitals:    07/07/22 1324 07/07/22 2038 07/08/22 0451 07/08/22 0600   BP: 110/55 127/90 105/51    BP Location: Right arm Right arm Right arm    Pulse: 68 64 62    Resp: 18 18 16    Temp: 97 9 °F (36 6 °C) 98 2 °F (36 8 °C) 98 3 °F (36 8 °C)    TempSrc: Oral Oral Oral    SpO2: 100% 94% 98%    Weight:   98 2 kg (216 lb 7 9 oz) 98 2 kg (216 lb 7 9 oz)   Height:         General Appearance: no distress, conversive  HEENT: PERRLA, conjuctiva normal; oropharynx clear; mucous membranes moist   Neck:  Supple, normal ROM  Lungs: CTA, normal respiratory effort, no retractions, expiratory effort normal  CV: regular rate and rhythm; no rubs/murmurs/gallops, PMI normal   PPM site is w/o erythema/drainage   ABD: soft; ND/NT; +BS  EXT: very mild edema  Skin: normal turgor, normal texture, no rashes  Psych: affect normal, mood normal  Neuro: AAO      The above physical exam was reviewed and updated as determined by my evaluation of the patient on 7/8/2022  Invasive Devices  Report    None                    VTE Pharmacologic Prophylaxis: Enoxaparin  Code Status: Level 1 - Full Code  Current Length of Stay: 3 day(s)      Total time spent:  30 minutes with more than 50% spent counseling/coordinating care   Counseling includes discussion with patient re: progress  and discussion with patient of his/her current medical state/information  Coordination of patient's care was performed in conjunction with primary service  Time invested included review of patient's labs, vitals, and management of their comorbidities with continued monitoring  In addition, this patient was discussed with medical team including physician and advanced extenders  The care of the patient was extensively discussed and appropriate treatment plan was formulated unique for this patient  ** Please Note:  voice to text software may have been used in the creation of this document   Although proof errors in transcription or interpretation are a potential of such software**

## 2022-07-08 NOTE — PROGRESS NOTES
PM&R PROGRESS NOTE:  Shena Lux 77 y o  female MRN: [id number]  Unit/Bed#: -79 Encounter: [id number]    Rehab Diagnosis: Impairment of mobility, safety and Activities of Daily Living (ADLs) due to Orthopedic Disorders:  08 9  Other Orthopedic Left Humerus fracture     Etiologic: L hummerus fx, Complete Heart Block  Date of Onset: 6/29/22     Date of surgery: 6/29/2022: Cardiac Pacer Implant (Chest)    HPI: Shena Lux is a 77 y o  female with type 2 diabetes, hypertension, depression, hypothyroidism, hx of PAUL cirrhosis, esophageal varices, pancytopenia, HLD, hypothyroidism who presented to the [address] on 6/29 after tripping over her flip flops, falling and sustaining a left humerus fracture treated non-operatively with NWB to the left arm in a sling  She was found to be in complete heart block with HR in the 20-230s range, developing cardiogenic shock requiring pressors and TVP  She underwent a Permanent pacemaker placement on 6/29 with Dr Coe Every  Course complicated by CLYDE and blood loss anemia with hemoglobin of 6 9 s/p 1 unit pRBC  Initial ECHO on 6/29/22 showed moderately dilated left ventricular cavity size, LVEF 55%, mild bi-atrial enlargement   There were regional wall motion abnormalities with akinesis of the apical anterior, apical septal, apical inferior, apical lateral walls and the apex   There was hyperkinesis of the basal anterior, basal anteroseptal, basal inferoseptal, basal inferior, basal inferolateral, basal anterolateral, mid anterior, mid anteroseptal, mid inferoseptal, mid inferior, mid inferolateral and mid anterolateral  RVEF was moderately reduced   The LV systolic function was abnormal in a pattern suggestive of apical stress cardiomyopathy   She was started on Losartan by Cardiology and a repeat ECHO done today 7/5/22   It showed LVEF of 50% with grade 1 diastolic dysfunction, akinesis of the apex, hypokinesis of the apical anterior, septal, inferior/lateral walls  The patient was evaluated by the Rehabilitation team and deemed an appropriate candidate for comprehensive inpatient rehabilitation and admitted to the [facility] on 7/5/2022  5:43 PM    SUBJECTIVE:  Patient seen and examined in gym  No acute issues overnight  Some reduction in leg swelling as patient was willing to trial VIANEY stockings  Pain still in the left arm, however still participating fully in therapy and making improvements in mobility  She denies fever, chills, nausea, emesis, cough, shortness of breath, diarrhea, or constipation  ASSESSMENT: Stable, progressing      PLAN:    Rehabilitation   Functional deficits:  Self care, mobility   Continue current rehabilitation plan of care to maximize function   Functional update:   o PT: transfer Min-Mod A, Ambulation 48' x2 Min A HHA  o OT: Mod-Max A for UB and LB ADLs   Estimated Discharge: TBD in teams, reteam    DVT prophylaxis  · lovenox     Pain  · Lidocaine patch  · Oxycodone 2 5mg Q6H PRN     Bladder plan  · Continent     Bowel plan  · Continent, 7/8 last BM     Code Status  · Level 1: Full Code      * Closed fracture of proximal end of left humerus  Assessment & Plan  · Displaced comminuted fracture of the humeral head/neck on imaging  · NWB to the left upper limb, maintain in sling  · Ok for for gentle range of motion to prevent adhesive capsulitis- would start 7/11, expressed in stand up meeting this morning  · Pain control  · Likely course of acute blood loss anemia, monitor labs  · PT/OT      Complete heart block [facility])  Assessment & Plan  · S/p dual chamber PPM 6/29/22 with Dr Fausto Richardson  · Monitor pacemaker site for signs of infection  · No heavy lifting/push/pull >10 lbs    Obesity (BMI 30-39  9)  Assessment & Plan  · BMI 36 03  · Weight loss counseling, promote increased activity    Acute blood loss anemia  Assessment & Plan  · Acute blood loss anemia, Hgb 6 9 previously, s/p transfusion  · Hgb on 7/5 was up to 9  5  · Will require close follow with labs  · Monitor clinically as well in therapy    Cardiomyopathy [facility])  Assessment & Plan  · Medicine team reached out to Cardiology re: advice with recs for further w/u based on ECHO results from 7/5/22  · Continue Losartan 25mg qd (Cardiology started in hospital)  · Per Cardiology, outpatient follow up, no acute changes based on echo      Depression  Assessment & Plan  Continue home lexapro 5mg daily    Pancytopenia ([facility] )  Assessment & Plan  Secondary to cirrhosis, monitor labs    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) ([facility] )  Assessment & Plan  · Hx non-bleeding esophageal varices and hepatic encephalopathy   · At home, was on Rifaximin 550 mcg q12hrs/Lactulose 10Gm BID/Lasix 40mg qd/Nadolol 20mg qd/Aldactone 50mg qd  · Here:  Rifaximin 550 mg q12hrs/Lactulose 10Gm BID    Type 2 diabetes mellitus with hyperglycemia, with long-term current use of insulin [facility])  Assessment & Plan  Lab Results   Component Value Date    HGBA1C 8 2 (H) 05/31/2022       Recent Labs     07/07/22  1605 07/07/22  2117 07/08/22  0650 07/08/22  1033   POCGLU 243* 223* 95 143*     · Home:  Janumet XR  BID/Amaryl 8 mg qAM/Basaglar 48U qam, 36U qpm  · Here:  Lantus 30U q12hrs  · Add back Metformin 500 mg BID to start this evening   · QID Accuchecks/SSI and DM diet    Acquired hypothyroidism  Assessment & Plan  Continue levothyroxine 112mcg daily    Essential hypertension  Assessment & Plan  · On losartan 25mg daily  · Previously on Nadolol, aldactone, and lasix  · Medications held based on initial hypotension  · Will start adding back meds as indicated, likely starting with nadolol- started back on 7/7      Hyperlipidemia  Assessment & Plan  Continue statin      Appreciate IM consultants medical co-management  Labs, medications, and imaging personally reviewed      ROS:  A ten point review of systems was completed on 07/08/22 and pertinent positives are listed in subjective section  All other systems reviewed were negative  OBJECTIVE:   /58 (BP Location: Right arm)   Pulse 64   Temp 98 4 °F (36 9 °C) (Oral)   Resp 20   Ht 5' 5" (1 651 m)   Wt 98 2 kg (216 lb 7 9 oz)   SpO2 100%   BMI 36 03 kg/m²     Physical Exam  Constitutional:       General: She is not in acute distress  Appearance: She is obese  HENT:      Head: Normocephalic and atraumatic  Right Ear: External ear normal       Left Ear: External ear normal       Nose: Nose normal  No rhinorrhea  Mouth/Throat:      Mouth: Mucous membranes are moist       Pharynx: Oropharynx is clear  Eyes:      General: No scleral icterus  Right eye: No discharge  Left eye: No discharge  Cardiovascular:      Rate and Rhythm: Normal rate  Pulmonary:      Effort: Pulmonary effort is normal  No respiratory distress  Breath sounds: No wheezing, rhonchi or rales  Abdominal:      General: There is no distension  Palpations: Abdomen is soft  Musculoskeletal:      Cervical back: Normal range of motion  Right lower leg: Edema present  Left lower leg: Edema present  Comments: Left arm ecchymosis, some swelling in the arm, unchanged, sling in place, tender to gentle movement  Edema in legs improved with TEDs   Skin:     General: Skin is warm and dry  Neurological:      Mental Status: She is alert and oriented to person, place, and time  Sensory: No sensory deficit  Motor: No weakness     Psychiatric:         Mood and Affect: Mood normal          Behavior: Behavior normal           Lab Results   Component Value Date    WBC 3 19 (L) 07/08/2022    HGB 8 1 (L) 07/08/2022    HCT 26 9 (L) 07/08/2022    MCV 90 07/08/2022    PLT 67 (L) 07/08/2022     Lab Results   Component Value Date    SODIUM 136 07/08/2022    K 4 7 07/08/2022     07/08/2022    CO2 25 07/08/2022    BUN 35 (H) 07/08/2022    CREATININE 0 91 07/08/2022    GLUC 92 07/08/2022    CALCIUM 8 4 07/08/2022 Lab Results   Component Value Date    INR 1 33 (H) 06/30/2022    INR 1 20 (H) 06/29/2022    INR 1 23 (H) 05/31/2022    PROTIME 16 0 (H) 06/30/2022    PROTIME 15 1 (H) 06/29/2022    PROTIME 15 3 (H) 05/31/2022           Current Facility-Administered Medications:     aspirin chewable tablet 81 mg, 81 mg, Oral, Daily, Barnetta Cocks, DO, 81 mg at 07/08/22 1027    bisacodyl (DULCOLAX) rectal suppository 10 mg, 10 mg, Rectal, Daily PRN, Barnetta Cocks, DO    docusate sodium (COLACE) capsule 100 mg, 100 mg, Oral, BID, Barnetta Cocks, DO, 100 mg at 07/08/22 1027    enoxaparin (LOVENOX) subcutaneous injection 30 mg, 30 mg, Subcutaneous, Q12H [address], Barnetta Cocks, DO, 30 mg at 07/08/22 1028    escitalopram (LEXAPRO) tablet 5 mg, 5 mg, Oral, Daily, Barnetta Cocks, DO, 5 mg at 07/08/22 1027    insulin glargine (LANTUS) subcutaneous injection 30 Units 0 3 mL, 30 Units, Subcutaneous, Q12H [address], Barnetta Cocks, DO, 30 Units at 07/08/22 1040    insulin lispro (HumaLOG) 100 units/mL subcutaneous injection 2-12 Units, 2-12 Units, Subcutaneous, 4x Daily (with meals and at bedtime), 4 Units at 07/07/22 2214 **AND** Fingerstick Glucose (POCT), , , 4x Daily AC and at bedtime, Barnetta Cocks, DO    lactulose oral solution 10 g, 10 g, Oral, BID, Barnetta Cocks, DO, 10 g at 07/08/22 1028    levothyroxine tablet 112 mcg, 112 mcg, Oral, Early Morning, Barnetta Cocks, DO, 112 mcg at 07/08/22 0534    lidocaine (LIDODERM) 5 % patch 2 patch, 2 patch, Topical, Daily, Barnetta Cocks, DO, 2 patch at 07/08/22 1042    losartan (COZAAR) tablet 25 mg, 25 mg, Oral, Daily, Robles Barrientoss, DO, 25 mg at 07/08/22 1027    melatonin tablet 6 mg, 6 mg, Oral, HS, Robles Barrientoss, DO, 6 mg at 07/07/22 2213    metFORMIN (GLUCOPHAGE) tablet 500 mg, 500 mg, Oral, BID With Meals, JOSE JUAN Wen, 500 mg at 07/08/22 0905    nadolol (CORGARD) tablet 20 mg, 20 mg, Oral, Daily, JOSE JUAN Wen    nystatin (MYCOSTATIN) powder, , Topical, BID, Oletta Salts, Given at 07/08/22 1028    ondansetron (ZOFRAN-ODT) dispersible tablet 4 mg, 4 mg, Oral, Q6H PRN, Candelario Mendes DO    oxyCODONE (ROXICODONE) IR tablet 2 5 mg, 2 5 mg, Oral, Q6H PRN, Candelario Mendes DO, 2 5 mg at 07/08/22 1259    pantoprazole (PROTONIX) EC tablet 40 mg, 40 mg, Oral, Early Morning, Candelario Mendes DO, 40 mg at 07/08/22 0534    polyethylene glycol (MIRALAX) packet 17 g, 17 g, Oral, Daily, Candelario Mendes DO, 17 g at 07/07/22 0949    pravastatin (PRAVACHOL) tablet 80 mg, 80 mg, Oral, Daily With Dinner, Candelario Mendes DO, 80 mg at 07/07/22 1721    rifaximin (XIFAXAN) tablet 550 mg, 550 mg, Oral, Q12H [address], Candelario Mendes DO, 550 mg at 07/08/22 1027    senna (SENOKOT) tablet 8 6 mg, 1 tablet, Oral, HS, Candelario Mendes DO, 8 6 mg at 07/06/22 2142    Past Medical History:   Diagnosis Date    Anemia     Cirrhosis ([facility] )     Diabetic neuropathy (HCC)     Esophageal varices (HCC)     Fatty liver     GERD (gastroesophageal reflux disease)     Hepatic encephalopathy (HCC)     Hiatal hernia     Hyperlipidemia     Hypertension     Hypoglycemia     Hypothyroidism     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 ([facility] )     Osteoarthritis     Osteopenia     Pancytopenia (HCC)     Thrombocytopenia (HCC)     Type 2 diabetes mellitus ([facility] )        Patient Active Problem List    Diagnosis Date Noted    Closed fracture of proximal end of left humerus 07/02/2022    Complete heart block ([facility] [address] ) 06/30/2022    Obesity (BMI 30-39 9) 07/06/2022    Acute blood loss anemia 07/03/2022    Depression 07/02/2022    Cardiomyopathy ([facility] ) 07/02/2022    Esophageal varices without bleeding ([facility] ) 09/29/2021    Pancytopenia ([facility] ) 08/06/2021    Iron deficiency anemia due to chronic blood loss 08/06/2021    Mild nonproliferative diabetic retinopathy of both eyes without macular edema associated with type 2 diabetes mellitus ([facility] ) 07/16/2021    Urinary tract infection 09/15/2020    Hepatic encephalopathy ([place] [id number] ) 09/13/2020    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) ([place] [id number] ) 09/13/2020    Thrombocytopenia ([place] [id number] ) 09/13/2020    Hyperlipidemia 07/24/2018    Essential hypertension 07/24/2018    Acquired hypothyroidism 07/24/2018    Type 2 diabetes mellitus with hyperglycemia, with long-term current use of insulin ([place] [id number] ) 07/24/2018    Diabetic polyneuropathy associated with type 2 diabetes mellitus ([place] [id number] ) 07/24/2018          Misbah Botello DO  Physical Medicine and Katie [facility]all[facility]    Total time spent:  35 minutes, with more than 50% spent counseling/coordinating care  Counseling includes discussion with patient re: progress in therapies, functional issues observed by therapy staff, and discussion with patient his/her current medical state/wellbeing  Coordination of patient's care was performed in conjunction with Internal Medicine service to monitor patient's labs, vitals, and management of their comorbidities

## 2022-07-08 NOTE — PROGRESS NOTES
07/08/22 1250   Pain Assessment   Pain Assessment Tool 0-10   Pain Score 7   Pain Location/Orientation Orientation: Left; Location: Arm;Location: Shoulder   Hospital Pain Intervention(s) Repositioned   Restrictions/Precautions   Precautions Fall Risk;Pacemaker;Pain;Supervision on toilet/commode   Weight Bearing Restrictions Yes   LUE Weight Bearing Per Order NWB   ROM Restrictions Yes   LUE ROM Restriction   (ok for passive rom of the arm and shoulder, no overhead, limit to maximum 45 degrees  Would start no earlier than 7/11)   Braces or Orthoses Sling  (elevator sling L UE)   Sit to Stand   Type of Assistance Needed Physical assistance   Physical Assistance Level 26%-50%   Comment with    Sit to Stand CARE Score 3   Bed-Chair Transfer   Type of Assistance Needed Physical assistance   Physical Assistance Level 25% or less   Comment with    Chair/Bed-to-Chair Transfer CARE Score 3   Toileting Hygiene   Type of Assistance Needed Physical assistance   Physical Assistance Level 51%-75%   Comment pt able to manage pants up/dpwn on R side and complete bladder hygiene; requires assist for CM on L side and would anticipate pt requiring assist for rear hygiene due to body habitus  Toileting Hygiene CARE Score 2   Toilet Transfer   Type of Assistance Needed Physical assistance   Physical Assistance Level 26%-50%   Comment with HW to standard toilet   Toilet Transfer CARE Score 3   Functional Standing Tolerance   Time 8 min   Activity card matching task in stance   Comments pt stands at table top to complete card matching activity focusing on standing olegario/bal, dynamic func reaching with no LOB noted  CG/min assist provided for balance/safety throughout  good tolerance to standing with rest break after task completion  Therapeutic Exercise - ROM   UE-ROM Yes   ROM - Left Upper Extremities    L Hand AROM; Index finger; Long finger;Ring finger;Little finger; Thumb   LUE ROM Comment full hand AROM grasp/release to assist with edema management   Exercise Tools   Other Exercise Tool 1 provided pt with blue therapy sponge and instructed pt to complete throughout the day with gentle squeezing on L hand to assist with edema management and overall strength  Cognition   Overall Cognitive Status WFL   Arousal/Participation Cooperative   Attention Within functional limits   Orientation Level Oriented X4   Memory Decreased short term memory   Following Commands Follows one step commands with increased time or repetition   Activity Tolerance   Activity Tolerance Patient tolerated treatment well   Assessment   Treatment Assessment pt engages in 90 minute skilled OT session focusing on rapport building, edema management to L hand, func transfers, standing olegario/bal and toileting  see above for full func details  pt agreeable to OT session despite 7/10 pain in L shoulder  pt completes STS and short distance transfers with HW and overall min assist  continues to require ext assist for toileting due to [address] L UE status and body habitus  gentle edema retrograde massage performed to L hand with encouragement to complete finger AROM (AROM grasp/release) to assist with edema  also provided pt with blue therapy sponge and left bed side  fair standing tolerance noted with overall CG/min assist for balance/safety  recommend continued skilled care to focus on ADL retraining, func transfers with LRAD, standing olegario/bal, IADLs, edema management, in order to decrease burden of care at d/c  Prognosis Good   Problem List Decreased strength;Decreased endurance;Decreased range of motion; Impaired balance;Decreased mobility;Orthopedic restrictions;Pain   Barriers to Discharge Inaccessible home environment;Decreased caregiver support   Plan   Treatment/Interventions ADL retraining;Functional transfer training; Therapeutic exercise; Endurance training;Patient/family training;Equipment eval/education; Compensatory technique education   OT Therapy Minutes   OT Time In 1250   OT Time Out 1420   OT Total Time (minutes) 90   OT Mode of treatment - Individual (minutes) 90   OT Mode of treatment - Concurrent (minutes) 0   OT Mode of treatment - Group (minutes) 0   OT Mode of treatment - Co-treat (minutes) 0   OT Mode of Treatment - Total time(minutes) 90 minutes   OT Cumulative Minutes 270   Therapy Time missed   Time missed?  No

## 2022-07-08 NOTE — PROGRESS NOTES
07/08/22 0900   Pain Assessment   Pain Assessment Tool 0-10   Pain Score No Pain   Restrictions/Precautions   Precautions Fall Risk;Pain;Supervision on toilet/commode;Pacemaker   LUE Weight Bearing Per Order NWB   Braces or Orthoses Sling  (LUE)   Subjective   Subjective Pt agreeable to perfrom skilled PT   Sit to Stand   Type of Assistance Needed Physical assistance   Physical Assistance Level 25% or less   Sit to Stand CARE Score 3   Bed-Chair Transfer   Type of Assistance Needed Physical assistance   Physical Assistance Level 25% or less   Comment    Chair/Bed-to-Chair Transfer CARE Score 3   Walk 10 Feet   Type of Assistance Needed Physical assistance   Physical Assistance Level 26%-50%   Comment    Walk 10 Feet CARE Score 3   Walk 50 Feet with Two Turns   Type of Assistance Needed Physical assistance; Adaptive equipment   Physical Assistance Level 26%-50%   Comment    Walk 50 Feet with Two Turns CARE Score 3   Walk 150 Feet   Type of Assistance Needed Physical assistance; Adaptive equipment   Physical Assistance Level 26%-50%   Comment    Walk 150 Feet CARE Score 3   Walking 10 Feet on Uneven Surfaces   Type of Assistance Needed Physical assistance   Physical Assistance Level 26%-50%   Comment floor MaT    Walking 10 Feet on Uneven Surfaces CARE Score 3   Ambulation   Does the patient walk? 2   Yes   Primary Mode of Locomotion Prior to Admission Walk   Distance Walked (feet) 150 ft   Assist Device Otis Walker   Curb or Single Stair   Style negotiated Single stair   Type of Assistance Needed Physical assistance   Physical Assistance Level 26%-50%   Comment R HR up, R HR down; pt reports she has b/l HRs on [place] home   1 Step (Curb) CARE Score 3   4 Steps   Type of Assistance Needed Physical assistance   Physical Assistance Level 26%-50%   Comment R HR up, R HR down; pt reports she has b/l HRs on [place] home   4 Steps CARE Score 3   Stairs   Type Stairs   # of Steps 4   Therapeutic Interventions Strengthening LAQ AP marching x20 reps STS 5 vc 's for right hand on arm rest   Assessment   Treatment Assessment pt focus on ambulation with HW and follow up with thex ex's for LE and cont to be NWB left UE and overall perform floor mat ambulation with HW modA to Sherrie during ambulation   pt recv'd MHP to left shoulder with Thex ex's LE   pt c/o left shoulder 3/10 and able to perform  steps with single HR   pt return to recliner with all needs in reach   Cont POC   Barriers to Discharge Inaccessible home environment;Decreased caregiver support   Plan   Progress Progressing toward goals   PT Therapy Minutes   PT Time In 0900   PT Time Out 1030   PT Total Time (minutes) 90   PT Mode of treatment - Individual (minutes) 90   PT Mode of treatment - Concurrent (minutes) 0   PT Mode of treatment - Group (minutes) 0   PT Mode of treatment - Co-treat (minutes) 0   PT Mode of Treatment - Total time(minutes) 90 minutes   PT Cumulative Minutes 270   Therapy Time missed   Time missed?  No

## 2022-07-09 LAB
GLUCOSE SERPL-MCNC: 154 MG/DL (ref 65–140)
GLUCOSE SERPL-MCNC: 190 MG/DL (ref 65–140)
GLUCOSE SERPL-MCNC: 248 MG/DL (ref 65–140)
GLUCOSE SERPL-MCNC: 265 MG/DL (ref 65–140)

## 2022-07-09 PROCEDURE — 99232 SBSQ HOSP IP/OBS MODERATE 35: CPT | Performed by: INTERNAL MEDICINE

## 2022-07-09 PROCEDURE — 82948 REAGENT STRIP/BLOOD GLUCOSE: CPT

## 2022-07-09 PROCEDURE — 99232 SBSQ HOSP IP/OBS MODERATE 35: CPT | Performed by: STUDENT IN AN ORGANIZED HEALTH CARE EDUCATION/TRAINING PROGRAM

## 2022-07-09 RX ORDER — INSULIN GLARGINE 100 [IU]/ML
32 INJECTION, SOLUTION SUBCUTANEOUS EVERY 12 HOURS SCHEDULED
Status: DISCONTINUED | OUTPATIENT
Start: 2022-07-09 | End: 2022-07-14

## 2022-07-09 RX ADMIN — RIFAXIMIN 550 MG: 550 TABLET ORAL at 08:30

## 2022-07-09 RX ADMIN — METFORMIN HYDROCHLORIDE 500 MG: 500 TABLET ORAL at 17:40

## 2022-07-09 RX ADMIN — LEVOTHYROXINE SODIUM 112 MCG: 112 TABLET ORAL at 06:12

## 2022-07-09 RX ADMIN — LOSARTAN POTASSIUM 25 MG: 25 TABLET, FILM COATED ORAL at 08:27

## 2022-07-09 RX ADMIN — ASPIRIN 81 MG CHEWABLE TABLET 81 MG: 81 TABLET CHEWABLE at 08:27

## 2022-07-09 RX ADMIN — INSULIN GLARGINE 30 UNITS: 100 INJECTION, SOLUTION SUBCUTANEOUS at 08:31

## 2022-07-09 RX ADMIN — RIFAXIMIN 550 MG: 550 TABLET ORAL at 23:13

## 2022-07-09 RX ADMIN — INSULIN LISPRO 2 UNITS: 100 INJECTION, SOLUTION INTRAVENOUS; SUBCUTANEOUS at 08:32

## 2022-07-09 RX ADMIN — PRAVASTATIN SODIUM 80 MG: 80 TABLET ORAL at 17:40

## 2022-07-09 RX ADMIN — LACTULOSE 10 G: 20 SOLUTION ORAL at 08:32

## 2022-07-09 RX ADMIN — ESCITALOPRAM OXALATE 5 MG: 10 TABLET ORAL at 08:27

## 2022-07-09 RX ADMIN — INSULIN LISPRO 2 UNITS: 100 INJECTION, SOLUTION INTRAVENOUS; SUBCUTANEOUS at 11:56

## 2022-07-09 RX ADMIN — NADOLOL 20 MG: 20 TABLET ORAL at 08:30

## 2022-07-09 RX ADMIN — OXYCODONE HYDROCHLORIDE 2.5 MG: 5 TABLET ORAL at 23:13

## 2022-07-09 RX ADMIN — LIDOCAINE 5% 2 PATCH: 700 PATCH TOPICAL at 08:36

## 2022-07-09 RX ADMIN — NYSTATIN 1 APPLICATION: 100000 POWDER TOPICAL at 08:36

## 2022-07-09 RX ADMIN — ENOXAPARIN SODIUM 30 MG: 30 INJECTION SUBCUTANEOUS at 23:11

## 2022-07-09 RX ADMIN — INSULIN GLARGINE 32 UNITS: 100 INJECTION, SOLUTION SUBCUTANEOUS at 23:11

## 2022-07-09 RX ADMIN — MELATONIN 6 MG: at 23:12

## 2022-07-09 RX ADMIN — NYSTATIN 1 APPLICATION: 100000 POWDER TOPICAL at 17:41

## 2022-07-09 RX ADMIN — INSULIN LISPRO 4 UNITS: 100 INJECTION, SOLUTION INTRAVENOUS; SUBCUTANEOUS at 23:13

## 2022-07-09 RX ADMIN — DOCUSATE SODIUM 100 MG: 100 CAPSULE, LIQUID FILLED ORAL at 17:40

## 2022-07-09 RX ADMIN — OXYCODONE HYDROCHLORIDE 2.5 MG: 5 TABLET ORAL at 01:24

## 2022-07-09 RX ADMIN — METFORMIN HYDROCHLORIDE 500 MG: 500 TABLET ORAL at 08:26

## 2022-07-09 RX ADMIN — INSULIN LISPRO 6 UNITS: 100 INJECTION, SOLUTION INTRAVENOUS; SUBCUTANEOUS at 17:35

## 2022-07-09 RX ADMIN — PANTOPRAZOLE SODIUM 40 MG: 40 TABLET, DELAYED RELEASE ORAL at 06:12

## 2022-07-09 RX ADMIN — SENNOSIDES 8.6 MG: 8.6 TABLET, FILM COATED ORAL at 23:12

## 2022-07-09 RX ADMIN — ENOXAPARIN SODIUM 30 MG: 30 INJECTION SUBCUTANEOUS at 08:29

## 2022-07-09 RX ADMIN — LACTULOSE 10 G: 20 SOLUTION ORAL at 17:40

## 2022-07-09 NOTE — PLAN OF CARE
Problem: INFECTION - ADULT  Goal: Absence or prevention of progression during hospitalization  Description: INTERVENTIONS:  - Assess and monitor for signs and symptoms of infection  - Monitor lab/diagnostic results  - Monitor all insertion sites, i e  indwelling lines, tubes, and drains  - Monitor endotracheal if appropriate and nasal secretions for changes in amount and color  - [place] appropriate cooling/warming therapies per order  - Administer medications as ordered  - Instruct and encourage patient and family to use good hand hygiene technique  - Identify and instruct in appropriate isolation precautions for identified infection/condition  Outcome: Progressing

## 2022-07-09 NOTE — PROGRESS NOTES
PM&R Coverage Progress Note:    Rehab Diagnosis: Impairment of mobility, safety and Activities of Daily Living (ADLs) due to Orthopedic Disorders:  08 9  Other Orthopedic Left Humerus fracture     Etiologic: L dreadmerus fx, Complete Heart Block  Date of Onset: 6/29/22     Date of surgery: 6/29/2022: Cardiac Pacer Implant (Chest)    ASSESSMENT: Stable      PLAN:    Rehabilitation   Continue current rehabilitation plan of care to maximize function   Pain and stiffness in the left arm a barrier, does not want to take pain medications though  Changed position of sling to allow some elbow movement which seems to bother her the most from a stiffness perspective  Mild improvement  Discussed plan to start moving it more on Monday, encouraged pain medication in addition to topicals  Medical issues   No acute concerns   Continue current medical plan of care  Appreciate IM consultants co-management  SUBJECTIVE: Patient seen face to face  Continues to have left arm achiness mostly near the elbow from not bending it  Able to tolerate some movement and adjustment of the sling  Does not want oral pain meds at this time  Sleeping is okay, appetite has been good, but wants chili  She denies fever, chills, nausea, emesis, cough, shortness of breath, diarrhea, or constipation  Still with good feeling in her left arm despite edema throughout arm and some ecchymosis, unchanged  Feet still swollen but is now wearing TEDs  ROS:  A ten point review of systems was completed on 07/09/22 and pertinent positives are listed in subjective section  All other systems reviewed were negative  OBJECTIVE:   /64 (BP Location: Right arm)   Pulse 66   Temp 98 2 °F (36 8 °C) (Oral)   Resp 18   Ht 5' 5" (1 651 m)   Wt 98 2 kg (216 lb 7 9 oz)   SpO2 100%   BMI 36 03 kg/m²     Physical Exam  Constitutional:       General: She is not in acute distress  Appearance: She is obese     HENT:      Head: Normocephalic and atraumatic  Right Ear: External ear normal       Left Ear: External ear normal       Nose: Nose normal  No rhinorrhea  Mouth/Throat:      Mouth: Mucous membranes are moist       Pharynx: Oropharynx is clear  Eyes:      General: No scleral icterus  Right eye: No discharge  Left eye: No discharge  Cardiovascular:      Rate and Rhythm: Normal rate  Pulses: Normal pulses  Pulmonary:      Effort: Pulmonary effort is normal  No respiratory distress  Abdominal:      General: There is no distension  Palpations: Abdomen is soft  Musculoskeletal:      Right lower leg: Edema present  Left lower leg: Edema present  Comments: Left arm edema   Neurological:      Mental Status: She is alert  Sensory: No sensory deficit  Motor: No weakness        Comments: Neurovascularly intact int he distal left upper limb   Psychiatric:         Mood and Affect: Mood normal          Behavior: Behavior normal           Lab Results   Component Value Date    WBC 3 19 (L) 07/08/2022    HGB 8 1 (L) 07/08/2022    HCT 26 9 (L) 07/08/2022    MCV 90 07/08/2022    PLT 67 (L) 07/08/2022     Lab Results   Component Value Date    SODIUM 136 07/08/2022    K 4 7 07/08/2022     07/08/2022    CO2 25 07/08/2022    BUN 35 (H) 07/08/2022    CREATININE 0 91 07/08/2022    GLUC 92 07/08/2022    CALCIUM 8 4 07/08/2022     Lab Results   Component Value Date    INR 1 33 (H) 06/30/2022    INR 1 20 (H) 06/29/2022    INR 1 23 (H) 05/31/2022    PROTIME 16 0 (H) 06/30/2022    PROTIME 15 1 (H) 06/29/2022    PROTIME 15 3 (H) 05/31/2022           Current Facility-Administered Medications:     aspirin chewable tablet 81 mg, 81 mg, Oral, Daily, Natasha Monas, DO, 81 mg at 07/09/22 0827    bisacodyl (DULCOLAX) rectal suppository 10 mg, 10 mg, Rectal, Daily PRN, Natasha Monas, DO    docusate sodium (COLACE) capsule 100 mg, 100 mg, Oral, BID, Natasha Monas, DO, 100 mg at 07/08/22 1027    enoxaparin (LOVENOX) subcutaneous injection 30 mg, 30 mg, Subcutaneous, Q12H [facility], Julieth Washington DO, 30 mg at 07/09/22 0829    escitalopram (LEXAPRO) tablet 5 mg, 5 mg, Oral, Daily, Julieth Washington DO, 5 mg at 07/09/22 0827    insulin glargine (LANTUS) subcutaneous injection 32 Units 0 32 mL, 32 Units, Subcutaneous, Q12H [facility], JOSE JUAN Mathews    insulin lispro (HumaLOG) 100 units/mL subcutaneous injection 2-12 Units, 2-12 Units, Subcutaneous, 4x Daily (with meals and at bedtime), 2 Units at 07/09/22 1156 **AND** Fingerstick Glucose (POCT), , , 4x Daily AC and at bedtime, Julieth Washington DO    lactulose oral solution 10 g, 10 g, Oral, BID, Julieth Washington DO, 10 g at 07/09/22 6384    levothyroxine tablet 112 mcg, 112 mcg, Oral, Early Morning, Julieth Washington DO, 112 mcg at 07/09/22 0612    lidocaine (LIDODERM) 5 % patch 2 patch, 2 patch, Topical, Daily, [facility], 2 patch at 07/09/22 0836    losartan (COZAAR) tablet 25 mg, 25 mg, Oral, Daily, [facility], 25 mg at 07/09/22 0827    melatonin tablet 6 mg, 6 mg, Oral, HS, Julieth Washington DO, 6 mg at 07/08/22 2200    metFORMIN (GLUCOPHAGE) tablet 500 mg, 500 mg, Oral, BID With Meals, WoodJOSE JUAN Wong, 500 mg at 07/09/22 0826    nadolol (CORGARD) tablet 20 mg, 20 mg, Oral, Daily, Wood JOSE JUAN Damon, 20 mg at 07/09/22 0830    nystatin (MYCOSTATIN) powder, , Topical, BID, Wood JOSE JUAN Damon, 1 application at 61/48/80 0836    ondansetron (ZOFRAN-ODT) dispersible tablet 4 mg, 4 mg, Oral, Q6H PRN, Berdine Delaware, DO    oxyCODONE (ROXICODONE) IR tablet 2 5 mg, 2 5 mg, Oral, Q6H PRN, Julieth Washington DO, 2 5 mg at 07/09/22 0124    pantoprazole (PROTONIX) EC tablet 40 mg, 40 mg, Oral, Early Morning, Julieth Washington DO, 40 mg at 07/09/22 0612    polyethylene glycol (MIRALAX) packet 17 g, 17 g, Oral, Daily, Julieth Washington DO, 17 g at 07/07/22 0949    pravastatin (PRAVACHOL) tablet 80 mg, 80 mg, Oral, Daily With Dinner, Julieth Washington DO, 80 mg at 07/08/22 1632    rifaximin (XIFAXAN) tablet 550 mg, 550 mg, Oral, Q12H [address], Oral Colonel, DO, 550 mg at 07/09/22 0830    senna (SENOKOT) tablet 8 6 mg, 1 tablet, Oral, HS, Oral Colonel, DO, 8 6 mg at 07/08/22 2200    Past Medical History:   Diagnosis Date    Anemia     Cirrhosis ([facility] )     [facility]     Esophageal varices (HCC)     Fatty liver     GERD (gastroesophageal reflux disease)     Hepatic encephalopathy (HCC)     Hiatal hernia     Hyperlipidemia     Hypertension     Hypoglycemia     Hypothyroidism     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 ([facility] )     Osteoarthritis     Osteopenia     Pancytopenia (HCC)     Thrombocytopenia (HCC)     Type 2 diabetes mellitus ([facility] )        Patient Active Problem List    Diagnosis Date Noted    Closed fracture of proximal end of left humerus 07/02/2022    Complete heart block ([facility] ) 06/30/2022    Obesity (BMI 30-39 9) 07/06/2022    Acute blood loss anemia 07/03/2022    Depression 07/02/2022    Cardiomyopathy ([facility] ) 07/02/2022    Esophageal varices without bleeding ([facility] ) 09/29/2021    Pancytopenia ([facility] ) 08/06/2021    Iron deficiency anemia due to chronic blood loss 08/06/2021    Mild nonproliferative diabetic retinopathy of both eyes without macular edema associated with type 2 diabetes mellitus [facility] ) 07/16/2021    Urinary tract infection 09/15/2020    Hepatic encephalopathy ([facility] ) 09/13/2020    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) ([facility] ) 09/13/2020    Thrombocytopenia ([facility] ) 09/13/2020    Hyperlipidemia 07/24/2018    Essential hypertension 07/24/2018    Acquired hypothyroidism 07/24/2018    Type 2 diabetes mellitus with hyperglycemia, with long-term current use of insulin ([facility] ) 07/24/2018    Diabetic polyneuropathy associated with type 2 diabetes mellitus ([facility] ) 07/24/2018      Sandra Knowles DO  Physical Medicine and Katie Stallworth    Total visit time: 15 minutes, with more than 50% spent counseling/coordinating care  Counseling includes discussion with patient re: progress in therapies, functional issues observed by therapy staff, and discussion with patient regarding their current medical state and wellbeing  Coordination of patient's care was performed in conjunction with Internal Medicine service to monitor patient's labs, vitals, and management of their comorbidities

## 2022-07-09 NOTE — PROGRESS NOTES
Internal Medicine Progress Note  Patient: Senia Chowdary  Age/sex: 77 y o  female  Medical Record #: [id number]      ASSESSMENT/PLAN: (Interval History)  Senia Chowdary is seen and examined and management for following issues:    Left proximal humerus fracture  · Non-op  · NWB  · Sling for comfort  · See Ortho as OP     CHB  · S/p dual chamber PPM 6/29/22  · No heavy lift/push/pull >10 lbs  · No left arm above shoulder height      Stress cardiomyopathy  · D/W Dr Simpson Found he said no further intervention for now but see back in office  · Continue Losartan 25mg qd (Cardiology started in hospital)     ABLA  · S/p 1 unit PRBCs  · Stable     PAUL cirrhosis  · Hx non-bleeding esophageal varices and hepatic encephalopathy   · Home:  Rifaximin 550 mcg q12hrs/Lactulose 10Gm BID/Lasix 40mg qd/Corgard 20mg qd/Aldactone 50mg qd  · Here:  Rifaximin 550 mg q12hrs/Lactulose 10Gm BID/added back Corgard 20mg qd 7/6 (hold SBP <120)  · No changes today  · Consider adding back a small dose of Lasix when bP allows     DM2  · Home:  Janumet XR  BID/Amaryl 8 mg qAM/Basaglar 48U qam, 36U qpm  · Here:  Lantus 32U q12hrs/Metformin 500 mg BID   · QID Accuchecks/SSI and DM diet  · BS elevated increased lantus as above     Pancytopenia  · Is 2/2 to cirrhosis  · Cbc monday     Hypothyroidism  · Continue Levothyroxine 112 mcg qd     Depression  · Continue Lexapro as at home        Discharge date:  Team       The above assessment and plan was reviewed and updated as determined by my evaluation of the patient on 7/9/2022      Labs:   Results from last 7 days   Lab Units 07/08/22  0501 07/05/22  0513   WBC Thousand/uL 3 19* 3 95*   HEMOGLOBIN g/dL 8 1* 9 5*   HEMATOCRIT % 26 9* 31 1*   PLATELETS Thousands/uL 67* 66*     Results from last 7 days   Lab Units 07/08/22  0501 07/05/22  0513   SODIUM mmol/L 136 137   POTASSIUM mmol/L 4 7 4 6   CHLORIDE mmol/L 107 107   CO2 mmol/L 25 24   BUN mg/dL 35* 28*   CREATININE mg/dL 0 91 0 93 CALCIUM mg/dL 8 4 8 6             Results from last 7 days   Lab Units 07/09/22  0705 07/08/22  2122 07/08/22  1552   POC GLUCOSE mg/dl 154* 208* 204*       Review of Scheduled Meds:  Current Facility-Administered Medications   Medication Dose Route Frequency Provider Last Rate    aspirin  81 mg Oral Daily Ericka Seal, DO      bisacodyl  10 mg Rectal Daily PRN Ericka Seal, DO      docusate sodium  100 mg Oral BID Ericka Seal, DO      enoxaparin  30 mg Subcutaneous Q12H [address] Ericka Seal, DO      escitalopram  5 mg Oral Daily Ericka Seal, DO      insulin glargine  30 Units Subcutaneous Q12H [address] Ericka Seal, DO      insulin lispro  2-12 Units Subcutaneous 4x Daily (with meals and at bedtime) Ericka Seal, DO      lactulose  10 g Oral BID Ericka Seal, DO      levothyroxine  112 mcg Oral Early Morning Ericka Seal, DO      lidocaine  2 patch Topical Daily Ericka Seal, DO      losartan  25 mg Oral Daily Ericka Seal, DO      melatonin  6 mg Oral HS Ericka Seal, DO      metFORMIN  500 mg Oral BID With Meals JOSE JUAN Post      nadolol  20 mg Oral Daily JOSE JUAN Post      nystatin   Topical BID JOSE JUAN Chavez      ondansetron  4 mg Oral Q6H PRN Ericka Seal, DO      oxyCODONE  2 5 mg Oral Q6H PRN Ericka Seal, DO      pantoprazole  40 mg Oral Early Morning Ericka Seal, DO      polyethylene glycol  17 g Oral Daily Ericka Seal, DO      pravastatin  80 mg Oral Daily With [employer] Roge Monroe, DO      rifaximin  550 mg Oral Q12H [address] Ericka Seal, DO      senna  1 tablet Oral HS Ericka Seal, DO         Subjective/ HPI: Patient seen and examined  Patients overnight issues or events were reviewed with nursing or staff during rounds or morning huddle session  New or overnight issues include the following:     Pt seen in her room sleeping in her chair   C/o pain in her left arm this am otherwise no complaints    ROS:   A 10 point ROS was performed; negative except as noted above  Imaging:     No orders to display       *Labs /Radiology studies reviewed  *Medications reviewed and reconciled as needed  *Please refer to order section for additional ordered labs studies  *Case discussed with primary attending during morning huddle case rounds    Physical Examination:  Vitals:   Vitals:    07/08/22 1325 07/08/22 2128 07/09/22 0545 07/09/22 0830   BP: 120/58 121/60 131/60 132/56   BP Location: Right arm Right arm Right arm    Pulse: 64 64 69 59   Resp: 20 18 20    Temp: 98 4 °F (36 9 °C) 98 1 °F (36 7 °C) 97 8 °F (36 6 °C)    TempSrc: Oral Oral Oral    SpO2: 100% 99% 99%    Weight:       Height:         GEN: No apparent distress, interactive  NEURO: Alert and oriented x3  HEENT: Pupils are equal and reactive, EOMI, mucous membranes are moist, face symmetrical  CV: S1 S2 regular, no MRG, no peripheral edema noted  RESP: Lungs are clear bilaterally, no wheezes, rales or rhonchi noted, on room air, respirations easy and non labored  GI: Flat, soft non tender, non distended; +BS x4  : Voiding without difficulty  MUSC: Moves all extremities; NWB LUE  SKIN: pink, warm and dry, normal turgor, L chest incision intact        The above physical exam was reviewed and updated as determined by my evaluation of the patient on 7/9/2022  Invasive Devices  Report    None                    VTE Pharmacologic Prophylaxis: Enoxaparin  Code Status: Level 1 - Full Code  Current Length of Stay: 4 day(s)      Total time spent:  30 minutes with more than 50% spent counseling/coordinating care  Counseling includes discussion with patient re: progress  and discussion with patient of his/her current medical state/information  Coordination of patient's care was performed in conjunction with primary service  Time invested included review of patient's labs, vitals, and management of their comorbidities with continued monitoring   In addition, this patient was discussed with medical team including physician and advanced extenders  The care of the patient was extensively discussed and appropriate treatment plan was formulated unique for this patient  ** Please Note:  voice to text software may have been used in the creation of this document   Although proof errors in transcription or interpretation are a potential of such software**

## 2022-07-10 LAB
GLUCOSE SERPL-MCNC: 131 MG/DL (ref 65–140)
GLUCOSE SERPL-MCNC: 188 MG/DL (ref 65–140)
GLUCOSE SERPL-MCNC: 229 MG/DL (ref 65–140)
GLUCOSE SERPL-MCNC: 270 MG/DL (ref 65–140)
GLUCOSE SERPL-MCNC: 64 MG/DL (ref 65–140)
GLUCOSE SERPL-MCNC: 65 MG/DL (ref 65–140)

## 2022-07-10 PROCEDURE — 82948 REAGENT STRIP/BLOOD GLUCOSE: CPT

## 2022-07-10 PROCEDURE — 97110 THERAPEUTIC EXERCISES: CPT

## 2022-07-10 PROCEDURE — 99232 SBSQ HOSP IP/OBS MODERATE 35: CPT | Performed by: INTERNAL MEDICINE

## 2022-07-10 PROCEDURE — 97530 THERAPEUTIC ACTIVITIES: CPT

## 2022-07-10 PROCEDURE — 97535 SELF CARE MNGMENT TRAINING: CPT

## 2022-07-10 RX ADMIN — RIFAXIMIN 550 MG: 550 TABLET ORAL at 20:24

## 2022-07-10 RX ADMIN — ONDANSETRON 4 MG: 4 TABLET, ORALLY DISINTEGRATING ORAL at 08:57

## 2022-07-10 RX ADMIN — INSULIN LISPRO 4 UNITS: 100 INJECTION, SOLUTION INTRAVENOUS; SUBCUTANEOUS at 18:03

## 2022-07-10 RX ADMIN — NADOLOL 20 MG: 20 TABLET ORAL at 09:08

## 2022-07-10 RX ADMIN — MELATONIN 6 MG: at 22:41

## 2022-07-10 RX ADMIN — OXYCODONE HYDROCHLORIDE 2.5 MG: 5 TABLET ORAL at 22:41

## 2022-07-10 RX ADMIN — PRAVASTATIN SODIUM 80 MG: 80 TABLET ORAL at 18:07

## 2022-07-10 RX ADMIN — METFORMIN HYDROCHLORIDE 500 MG: 500 TABLET ORAL at 20:22

## 2022-07-10 RX ADMIN — DOCUSATE SODIUM 100 MG: 100 CAPSULE, LIQUID FILLED ORAL at 18:04

## 2022-07-10 RX ADMIN — INSULIN LISPRO 6 UNITS: 100 INJECTION, SOLUTION INTRAVENOUS; SUBCUTANEOUS at 22:39

## 2022-07-10 RX ADMIN — PANTOPRAZOLE SODIUM 40 MG: 40 TABLET, DELAYED RELEASE ORAL at 05:00

## 2022-07-10 RX ADMIN — LIDOCAINE 5% 2 PATCH: 700 PATCH TOPICAL at 08:55

## 2022-07-10 RX ADMIN — LACTULOSE 10 G: 20 SOLUTION ORAL at 08:56

## 2022-07-10 RX ADMIN — OXYCODONE HYDROCHLORIDE 2.5 MG: 5 TABLET ORAL at 08:54

## 2022-07-10 RX ADMIN — LOSARTAN POTASSIUM 25 MG: 25 TABLET, FILM COATED ORAL at 08:57

## 2022-07-10 RX ADMIN — NYSTATIN: 100000 POWDER TOPICAL at 18:19

## 2022-07-10 RX ADMIN — ASPIRIN 81 MG CHEWABLE TABLET 81 MG: 81 TABLET CHEWABLE at 08:57

## 2022-07-10 RX ADMIN — SENNOSIDES 8.6 MG: 8.6 TABLET, FILM COATED ORAL at 22:41

## 2022-07-10 RX ADMIN — ENOXAPARIN SODIUM 30 MG: 30 INJECTION SUBCUTANEOUS at 08:58

## 2022-07-10 RX ADMIN — INSULIN GLARGINE 32 UNITS: 100 INJECTION, SOLUTION SUBCUTANEOUS at 22:40

## 2022-07-10 RX ADMIN — ENOXAPARIN SODIUM 30 MG: 30 INJECTION SUBCUTANEOUS at 20:22

## 2022-07-10 RX ADMIN — POLYETHYLENE GLYCOL 3350 17 G: 17 POWDER, FOR SOLUTION ORAL at 08:56

## 2022-07-10 RX ADMIN — NYSTATIN: 100000 POWDER TOPICAL at 09:04

## 2022-07-10 RX ADMIN — LEVOTHYROXINE SODIUM 112 MCG: 112 TABLET ORAL at 05:00

## 2022-07-10 RX ADMIN — METFORMIN HYDROCHLORIDE 500 MG: 500 TABLET ORAL at 07:53

## 2022-07-10 RX ADMIN — ESCITALOPRAM OXALATE 5 MG: 10 TABLET ORAL at 08:56

## 2022-07-10 RX ADMIN — RIFAXIMIN 550 MG: 550 TABLET ORAL at 08:56

## 2022-07-10 RX ADMIN — DOCUSATE SODIUM 100 MG: 100 CAPSULE, LIQUID FILLED ORAL at 08:58

## 2022-07-10 RX ADMIN — INSULIN LISPRO 2 UNITS: 100 INJECTION, SOLUTION INTRAVENOUS; SUBCUTANEOUS at 11:37

## 2022-07-10 RX ADMIN — LACTULOSE 10 G: 20 SOLUTION ORAL at 18:04

## 2022-07-10 NOTE — PROGRESS NOTES
07/10/22 1245   Pain Assessment   Pain Score 7   Pain Location/Orientation Orientation: Left; Location: Arm;Location: Shoulder   Restrictions/Precautions   Precautions Fall Risk;Supervision on toilet/commode   LUE Weight Bearing Per Order NWB   Braces or Orthoses Sling   Cognition   Overall Cognitive Status WFL   Subjective   Subjective arm pain when standing due to pulling and hanging feeling   Sit to Stand   Type of Assistance Needed Physical assistance   Physical Assistance Level 25% or less   Comment CG/Eric   Sit to Stand CARE Score 3   Bed-Chair Transfer   Type of Assistance Needed Physical assistance; Adaptive equipment   Physical Assistance Level 25% or less   Comment CG/Eric with HW   Chair/Bed-to-Chair Transfer CARE Score 3   Transfer Bed/Chair/Wheelchair   Findings pt did practice with [facility] as  seems bulky for pt at this time  pt does feel more stable, but cont to practice with cane with therapy   Walk 10 Feet   Type of Assistance Needed Physical assistance; Adaptive equipment   Physical Assistance Level 25% or less   Comment    Walk 10 Feet CARE Score 3   Walk 50 Feet with Two Turns   Type of Assistance Needed Physical assistance; Adaptive equipment   Physical Assistance Level 25% or less   Walk 50 Feet with Two Turns CARE Score 3   Walk 150 Feet   Type of Assistance Needed Physical assistance; Adaptive equipment   Physical Assistance Level 25% or less   Comment with HW   Walk 150 Feet CARE Score 3   Ambulation   Distance Walked (feet) 150 ft  (x3)   Assist Device Cane;Otis Walker   Gait Pattern Antalgic; Inconsistant Ayala; Slow Ayala; Improper weight shift   Limitations Noted In Endurance;Speed   Findings practiced with both cane and HW this session   Curb or Single Stair   Style negotiated Single stair   Type of Assistance Needed Physical assistance; Adaptive equipment   Physical Assistance Level 25% or less   Comment single rail with R hand   1 Step (Curb) CARE Score 3   4 Steps   Type of Assistance Needed Physical assistance; Adaptive equipment   Physical Assistance Level 25% or less   Comment single rail with R hand   4 Steps CARE Score 3   12 Steps   Reason if not Attempted Safety concerns   12 Steps CARE Score 88   Stairs   # of Steps 6   Weight Bearing Precautions Fall Risk;NWB;LUE   Therapeutic Interventions   Strengthening seated LAQ and hip flex 2# x30 B/L each ex  STS x5 for functional strenghtening, unsupported   Flexibility B heel cord and HS x3mins   Assessment   Treatment Assessment pt tolerated tx well and did practice with SPC, however pt feels more secure with HW   told pt we would cont to use in her room but in therapy to cont practicing with [facility] as it doesn't get in her way with walking  pt cont to have pain in LUE with standing, but cont to participate well  cont to work on strengthening and activity tolerance to imrpove with mobility and safety for d/c wtih LRAD   Problem List Decreased strength;Decreased endurance;Decreased range of motion; Impaired balance;Decreased mobility;Orthopedic restrictions;Pain;Decreased skin integrity;Obesity   Barriers to Discharge Inaccessible home environment;Decreased caregiver support   PT Barriers   Functional Limitation Car transfers; Ramp negotiation;Stair negotiation;Standing;Transfers; Walking   Plan   Progress Progressing toward goals   Recommendation   PT Discharge Recommendation Home with home health rehabilitation   Equipment Recommended   (LRAD)   PT Therapy Minutes   PT Time In 1245   PT Time Out 1415   PT Total Time (minutes) 90   PT Mode of treatment - Individual (minutes) 45   PT Mode of treatment - Concurrent (minutes) 45   PT Mode of treatment - Group (minutes) 0   PT Mode of treatment - Co-treat (minutes) 0   PT Mode of Treatment - Total time(minutes) 90 minutes   PT Cumulative Minutes 360

## 2022-07-10 NOTE — PROGRESS NOTES
Internal Medicine Progress Note  Patient: Hardik Rowland  Age/sex: 77 y o  female  Medical Record #: [id number]      ASSESSMENT/PLAN: (Interval History)  Hardik Rowland is seen and examined and management for following issues:    Left proximal humerus fracture  · Non-op  · NWB  · Sling for comfort  · See Ortho as OP     CHB  · S/p dual chamber PPM 6/29/22  · No heavy lift/push/pull >10 lbs  · No left arm above shoulder height      Stress cardiomyopathy  · D/W Dr Skyla Acuna he said no further intervention for now but see back in office  · Continue Losartan 25mg qd (Cardiology started in hospital)     ABLA  · S/p 1 unit PRBCs  · Stable     PAUL cirrhosis  · Hx non-bleeding esophageal varices and hepatic encephalopathy   · Home:  Rifaximin 550 mcg q12hrs/Lactulose 10Gm BID/Lasix 40mg qd/Corgard 20mg qd/Aldactone 50mg qd  · Here:  Rifaximin 550 mg q12hrs/Lactulose 10Gm BID/added back Corgard 20mg qd 7/6 (hold SBP <120)  · No changes today  · Consider adding back a small dose of Lasix when bP allows     DM2  · Home:  Janumet XR  BID/Amaryl 8 mg qAM/Basaglar 48U qam, 36U qpm  · Here:  Lantus 32U q12hrs/Metformin 500 mg BID   · QID Accuchecks/SSI and DM diet  · FBS low this am will make sure patient has qhs snack     Pancytopenia  · Is 2/2 to cirrhosis  · Cbc monday     Hypothyroidism  · Continue Levothyroxine 112 mcg qd     Depression  · Continue Lexapro as at home        Discharge date:  Team       The above assessment and plan was reviewed and updated as determined by my evaluation of the patient on 7/10/2022      Labs:   Results from last 7 days   Lab Units 07/08/22  0501 07/05/22  0513   WBC Thousand/uL 3 19* 3 95*   HEMOGLOBIN g/dL 8 1* 9 5*   HEMATOCRIT % 26 9* 31 1*   PLATELETS Thousands/uL 67* 66*     Results from last 7 days   Lab Units 07/08/22  0501 07/05/22  0513   SODIUM mmol/L 136 137   POTASSIUM mmol/L 4 7 4 6   CHLORIDE mmol/L 107 107   CO2 mmol/L 25 24   BUN mg/dL 35* 28*   CREATININE mg/dL 0 91 0 93   CALCIUM mg/dL 8 4 8 6             Results from last 7 days   Lab Units 07/10/22  0751 07/10/22  0726 07/10/22  0629   POC GLUCOSE mg/dl 131 65 64*       Review of Scheduled Meds:  Current Facility-Administered Medications   Medication Dose Route Frequency Provider Last Rate    aspirin  81 mg Oral Daily Mane Hughes, DO      bisacodyl  10 mg Rectal Daily PRN Mane Hughes, DO      docusate sodium  100 mg Oral BID Mane Hughes, DO      enoxaparin  30 mg Subcutaneous Q12H [address] Mane Hughes, DO      escitalopram  5 mg Oral Daily Mane Hughes, DO      insulin glargine  32 Units Subcutaneous Q12H [address] JOSE JUAN Mathews      insulin lispro  2-12 Units Subcutaneous 4x Daily (with meals and at bedtime) Mane Hughes, DO      lactulose  10 g Oral BID Mane Hughes, DO      levothyroxine  112 mcg Oral Early Morning Mane Hughes, DO      lidocaine  2 patch Topical Daily Mane Hughes, DO      losartan  25 mg Oral Daily Mane Hughes, DO      melatonin  6 mg Oral HS Mane Hughes, DO      metFORMIN  500 mg Oral BID With Meals JOSE JUAN Tran      nadolol  20 mg Oral Daily JOSE JUAN Tran      nystatin   Topical BID JOSE JUAN Merlos      ondansetron  4 mg Oral Q6H PRN Mane Hughes, DO      oxyCODONE  2 5 mg Oral Q6H PRN Mane Hughes, DO      pantoprazole  40 mg Oral Early Morning Mane Hughes, DO      polyethylene glycol  17 g Oral Daily Mane Hughes, DO      pravastatin  80 mg Oral Daily With [employer] Neil Boyd, DO      rifaximin  550 mg Oral Q12H [address] Mane Hughes, DO      senna  1 tablet Oral HS Mane Hughes, DO         Subjective/ HPI: Patient seen and examined  Patients overnight issues or events were reviewed with nursing or staff during rounds or morning huddle session  New or overnight issues include the following:     Pt seen in her room with therapy  No complaints overnight, states the sling is uncomfortable       ROS:   A 10 point ROS was performed; negative except as noted above  Imaging:     No orders to display       *Labs /Radiology studies reviewed  *Medications reviewed and reconciled as needed  *Please refer to order section for additional ordered labs studies  *Case discussed with primary attending during morning huddle case rounds    Physical Examination:  Vitals:   Vitals:    07/09/22 0830 07/09/22 1401 07/09/22 2128 07/10/22 0507   BP: 132/56 131/64 113/58 114/54   BP Location:  Right arm Right arm Right arm   Pulse: 59 66 60 59   Resp:  18 16 17   Temp:  98 2 °F (36 8 °C) 98 3 °F (36 8 °C) 98 1 °F (36 7 °C)   TempSrc:  Oral Oral Oral   SpO2:  100% 94% 99%   Weight:    100 kg (220 lb 7 4 oz)   Height:         GEN: No apparent distress, interactive  NEURO: Alert and oriented x3  HEENT: Pupils are equal and reactive, EOMI, mucous membranes are moist, face symmetrical  CV: S1 S2 regular, no MRG, 1/4 b/l peripheral edema noted; fingers on left hand with bruising and edema  RESP: Lungs are clear bilaterally, no wheezes, rales or rhonchi noted, on room air, respirations easy and non labored  GI: morbidly obese, soft non tender, non distended; +BS x4  : Voiding without difficulty  MUSC: Moves all extremities; sling to LUE  SKIN: pink, warm and dry, normal turgor, no rashes, lesions      The above physical exam was reviewed and updated as determined by my evaluation of the patient on 7/10/2022  Invasive Devices  Report    None                    VTE Pharmacologic Prophylaxis: Enoxaparin  Code Status: Level 1 - Full Code  Current Length of Stay: 5 day(s)      Total time spent:  30 minutes with more than 50% spent counseling/coordinating care  Counseling includes discussion with patient re: progress  and discussion with patient of his/her current medical state/information  Coordination of patient's care was performed in conjunction with primary service   Time invested included review of patient's labs, vitals, and management of their comorbidities with continued monitoring  In addition, this patient was discussed with medical team including physician and advanced extenders  The care of the patient was extensively discussed and appropriate treatment plan was formulated unique for this patient  ** Please Note:  voice to text software may have been used in the creation of this document   Although proof errors in transcription or interpretation are a potential of such software**

## 2022-07-10 NOTE — NURSING NOTE
Pt bs rechecked and now 65, pt drank another orange juice and eating breakfast will recheck again per protocol

## 2022-07-10 NOTE — PLAN OF CARE
Problem: PAIN - ADULT  Goal: Verbalizes/displays adequate comfort level or baseline comfort level  Description: Interventions:  - Encourage patient to monitor pain and request assistance  - Assess pain using appropriate pain scale  - Administer analgesics based on type and severity of pain and evaluate response  - Implement non-pharmacological measures as appropriate and evaluate response  - Consider cultural and social influences on pain and pain management  - Notify physician/advanced practitioner if interventions unsuccessful or patient reports new pain  Outcome: Progressing     Problem: INFECTION - ADULT  Goal: Absence or prevention of progression during hospitalization  Description: INTERVENTIONS:  - Assess and monitor for signs and symptoms of infection  - Monitor lab/diagnostic results  - Monitor all insertion sites, i e  indwelling lines, tubes, and drains  - Monitor endotracheal if appropriate and nasal secretions for changes in amount and color  - [place] appropriate cooling/warming therapies per order  - Administer medications as ordered  - Instruct and encourage patient and family to use good hand hygiene technique  - Identify and instruct in appropriate isolation precautions for identified infection/condition  Outcome: Progressing  Goal: Absence of fever/infection during neutropenic period  Description: INTERVENTIONS:  - Monitor WBC    Outcome: Progressing     Problem: SAFETY ADULT  Goal: Patient will remain free of falls  Description: INTERVENTIONS:  - Educate patient/family on patient safety including physical limitations  - Instruct patient to call for assistance with activity   - Consult OT/PT to assist with strengthening/mobility   - Keep Call bell within reach  - Keep bed low and locked with side rails adjusted as appropriate  - Keep care items and personal belongings within reach  - Initiate and maintain comfort rounds  - Make Fall Risk Sign visible to staff  - Offer Toileting every 2-4 Hours, in advance of need  - Initiate/Maintain bed/chair alarm  - Obtain necessary fall risk management equipment: yellow socks  - Apply yellow socks and bracelet for high fall risk patients  - Consider moving patient to room near nurses station  Outcome: Progressing  Goal: Maintain or return to baseline ADL function  Description: INTERVENTIONS:  -  Assess patient's ability to carry out ADLs; assess patient's baseline for ADL function and identify physical deficits which impact ability to perform ADLs (bathing, care of mouth/teeth, toileting, grooming, dressing, etc )  - Assess/evaluate cause of self-care deficits   - Assess range of motion  - Assess patient's mobility; develop plan if impaired  - Assess patient's need for assistive devices and provide as appropriate  - Encourage maximum independence but intervene and supervise when necessary  - Involve family in performance of ADLs  - Assess for home care needs following discharge   - Consider OT consult to assist with ADL evaluation and planning for discharge  - Provide patient education as appropriate  Outcome: Progressing     Problem: DISCHARGE PLANNING  Goal: Discharge to home or other facility with appropriate resources  Description: INTERVENTIONS:  - Identify barriers to discharge w/patient and caregiver  - Arrange for needed discharge resources and transportation as appropriate  - Identify discharge learning needs (meds, wound care, etc )  - Arrange for interpretive services to assist at discharge as needed  - Refer to Case Management Department for coordinating discharge planning if the patient needs post-hospital services based on physician/advanced practitioner order or complex needs related to functional status, cognitive ability, or social support system  Outcome: Progressing     Problem: Prexisting or High Potential for Compromised Skin Integrity  Goal: Skin integrity is maintained or improved  Description: INTERVENTIONS:  - Identify patients at risk for skin breakdown  - Assess and monitor skin integrity  - Assess and monitor nutrition and hydration status  - Monitor labs   - Assess for incontinence   - Turn and reposition patient  - Assist with mobility/ambulation  - Relieve pressure over bony prominences  - Avoid friction and shearing  - Provide appropriate hygiene as needed including keeping skin clean and dry  - Evaluate need for skin moisturizer/barrier cream  - Collaborate with interdisciplinary team   - Patient/family teaching  - Consider wound care consult   Outcome: Progressing     Problem: MOBILITY - ADULT  Goal: Maintain or return to baseline ADL function  Description: INTERVENTIONS:  -  Assess patient's ability to carry out ADLs; assess patient's baseline for ADL function and identify physical deficits which impact ability to perform ADLs (bathing, care of mouth/teeth, toileting, grooming, dressing, etc )  - Assess/evaluate cause of self-care deficits   - Assess range of motion  - Assess patient's mobility; develop plan if impaired  - Assess patient's need for assistive devices and provide as appropriate  - Encourage maximum independence but intervene and supervise when necessary  - Involve family in performance of ADLs  - Assess for home care needs following discharge   - Consider OT consult to assist with ADL evaluation and planning for discharge  - Provide patient education as appropriate  Outcome: Progressing  Goal: Maintains/Returns to pre admission functional level  Description: INTERVENTIONS:  - Perform BMAT or MOVE assessment daily    - Set and communicate daily mobility goal to care team and patient/family/caregiver  - Collaborate with rehabilitation services on mobility goals if consulted  - Perform Range of Motion 3 times a day  - Reposition patient every 2 hours    - Dangle patient 3 times a day  - Stand patient 3 times a day  - Ambulate patient 3 times a day  - Out of bed to chair 3 times a day   - Out of bed for meals 3 times a day  - Out of bed for toileting  - Record patient progress and toleration of activity level   Outcome: Progressing     Problem: Potential for Falls  Goal: Patient will remain free of falls  Description: INTERVENTIONS:  - Educate patient/family on patient safety including physical limitations  - Instruct patient to call for assistance with activity   - Consult OT/PT to assist with strengthening/mobility   - Keep Call bell within reach  - Keep bed low and locked with side rails adjusted as appropriate  - Keep care items and personal belongings within reach  - Initiate and maintain comfort rounds  - Make Fall Risk Sign visible to staff  - Offer Toileting every 2 Hours, in advance of need  - Initiate/Maintain bed/chair alarm  - Obtain necessary fall risk management equipment:   - Apply yellow socks and bracelet for high fall risk patients  - Consider moving patient to room near nurses station  Outcome: Progressing

## 2022-07-10 NOTE — NURSING NOTE
Pt blood sugar reported to be 64, breakfast tray given and patient currently eating, will recheck sugar per protocol

## 2022-07-10 NOTE — PROGRESS NOTES
07/10/22 0830   Pain Assessment   Pain Assessment Tool 0-10   Pain Score 7   Pain Location/Orientation Orientation: Left; Location: Arm;Orientation: Mid;Location: [address]  [facility] Pain Intervention(s) Repositioned; Emotional support   Restrictions/Precautions   Precautions Fall Risk;Supervision on toilet/commode;Pressure Ulcer;Pacemaker;Bed/chair alarms   Weight Bearing Restrictions Yes   LUE Weight Bearing Per Order NWB   ROM Restrictions Yes   LUE ROM Restriction   (ok for passive rom of the arm and shoulder, no overhead, limit to maximum 45 degrees  Would start no earlier than 7/11)   Braces or Orthoses Sling   Oral Hygiene   Type of Assistance Needed Physical assistance   Physical Assistance Level 51%-75%   Comment Pt edentulous and thus initially refuses oral care  Education on importance of completing denture care and oral care of gums and tongue  Pt then receptive to completing  Pt is limited due to LUE NWB and thus requires A to manage all containers  Oral Hygiene CARE Score 2   Shower/Bathe Self   Type of Assistance Needed Physical assistance   Physical Assistance Level 51%-75%   Comment Pt does attempt bathing of UB with use of strategies- including placing wash cloth on leg to bathe portions of RUE, and slight trunk flexion to allow some space to wash under LUE with use of RUE  Pt remains limited by LUE NWB, ROM restrictions, and pain  Pt does demo ability to bathe proximal and distal LE's with increased time while seated  Due to pt's body habitus, in order to thoroughly bathe under all skin folds pt does require extensive A for bathing, chest, abdomen, rebeca, and rear  Shower/Bathe Self CARE Score 2   Bathing   Assessed Bath Style Sponge Bath   Able to Gather/Transport No   Able to [employer] Temperature No   Able to Wash/Rinse/Dry (body part) L Upper Leg;R Upper Leg;L Lower Leg/Foot;R Lower Leg/Foot   Limitations Noted in Balance; Endurance;ROM;Strength   Positioning Seated;Standing   Tub/Shower Transfer   Limitations Noted In Endurance; Safety;UE Strength;LE Strength   Findings SBing only this session due to pain  Upper Body Dressing   Type of Assistance Needed Physical assistance   Physical Assistance Level 51%-75%   Comment Pt dependent for sling mgmt and LUE threading due to pain, pt then able to pull over head and thread RUE  Pt does require max education on propr application of sling as pt places strap as loose as possible which then puts arm in dependent position as opposed to neutral  Pt noted with signifcant edema in LUE and educated pt as to importance of avoiding dependent position for edema mgmt and pain mgmt  Pt with dec carryover  Upper Body Dressing CARE Score 2   Lower Body Dressing   Type of Assistance Needed Incidental touching; Adaptive equipment   Physical Assistance Level No physical assistance   Comment With loose fitting personal pants, pt is able to complete CM up/down while in stance and then seated to unthread/thread with increased time  Pt demos G ability to complete dynamic reach around waist alternating via internal rotation and external using RUE until fully managed up  Lower Body Dressing CARE Score 4   Putting On/Taking Off Footwear   Type of Assistance Needed Physical assistance   Physical Assistance Level 76% or more   Comment Able to doff b/l  socks, dependent for donning  Order placed for compression stockings however pt refuses to wear as they "don't feel good " Pt is agreeable to trial ACE wrapping and reports "this feels so much better " Again ongoing education on importance of avoiding dependent position with BLEs in regards to edema mgmt  Putting On/Taking Off Footwear CARE Score 2   Sit to Stand   Type of Assistance Needed Physical assistance   Physical Assistance Level 25% or less   Comment HW   Sit to Stand CARE Score 3   Bed-Chair Transfer   Type of Assistance Needed Physical assistance; Adaptive equipment   Physical Assistance Level 25% or less Comment HW   Chair/Bed-to-Chair Transfer CARE Score 3   Toileting Hygiene   Type of Assistance Needed Physical assistance   Physical Assistance Level 25% or less   Comment A for thoroughness of hygiene due to body habitus, able to complete CM this session with looser fitting pants with increased time in same method as mentioned above for LB dressing  Toileting Hygiene CARE Score 3   Toilet Transfer   Type of Assistance Needed Physical assistance; Adaptive equipment   Physical Assistance Level 25% or less   Comment HW to standard toilet  Toilet Transfer CARE Score 3   Cognition   Overall Cognitive Status WFL  (for basic)   Arousal/Participation Cooperative   Attention Within functional limits   Orientation Level Oriented X4   Memory Decreased short term memory   Following Commands Follows one step commands with increased time or repetition   Activity Tolerance   Activity Tolerance Patient tolerated treatment well   Medical Staff Made Aware Nursing student present to assess skin integrity and provide tx to bottom  Nystatin applied to under chest and maxorb rope reapplied under L breast    Assessment   Treatment Assessment Pt participated in skilled OT services with focus on ADL retraining, functional txfers, standing tolerance  Pt continues to be limited by pain, orthopedic restrictions, dec standing tolerance, dec act olegario, dec strength  Pt requires frequent rest breaks to manage fatigue  Despite education pt continues to present with dec carryover/compliance of avoiding dependent position of both LUE and BLEs  ROHO was not found to be under pt and pt was sacral sitting in recliner upon arrival  Ongoing education on importance of standing during hourly rounding, use of ROHO for optimal pressure redistribution when OOB, and strategies to promote turning and repositioning in bed to R side due to LUE NWB and use of positioning devices such as wedge and pillow   At end of session, time spent properly positioning pt to offload sacrum and placing ROHO under, pt's legs elevated and LUE positioned with use of pillow/towel roll  Pt will continue to benefit from skilled OT services with focus on standing tolerance/balance, functional txfers, act olegario, and ADL retraining  Prognosis Good   Problem List Decreased strength;Decreased endurance;Decreased range of motion; Impaired balance;Decreased mobility;Orthopedic restrictions;Pain;Decreased skin integrity;Obesity   Plan   Treatment/Interventions ADL retraining;Functional transfer training; Therapeutic exercise; Endurance training;Patient/family training;Equipment eval/education; Bed mobility; Compensatory technique education;Spoke to nursing;Spoke to advanced practitioner   Progress Progressing toward goals   Recommendation   OT Discharge Recommendation   (pending)   OT Therapy Minutes   OT Time In 0830   OT Time Out 1000   OT Total Time (minutes) 90   OT Mode of treatment - Individual (minutes) 90   OT Mode of treatment - Concurrent (minutes) 0   OT Mode of treatment - Group (minutes) 0   OT Mode of treatment - Co-treat (minutes) 0   OT Mode of Treatment - Total time(minutes) 90 minutes   OT Cumulative Minutes 360   Therapy Time missed   Time missed?  No

## 2022-07-11 LAB
GLUCOSE SERPL-MCNC: 131 MG/DL (ref 65–140)
GLUCOSE SERPL-MCNC: 158 MG/DL (ref 65–140)
GLUCOSE SERPL-MCNC: 165 MG/DL (ref 65–140)
GLUCOSE SERPL-MCNC: 203 MG/DL (ref 65–140)

## 2022-07-11 PROCEDURE — 97530 THERAPEUTIC ACTIVITIES: CPT

## 2022-07-11 PROCEDURE — 97535 SELF CARE MNGMENT TRAINING: CPT

## 2022-07-11 PROCEDURE — 82948 REAGENT STRIP/BLOOD GLUCOSE: CPT

## 2022-07-11 PROCEDURE — 97110 THERAPEUTIC EXERCISES: CPT

## 2022-07-11 PROCEDURE — 99232 SBSQ HOSP IP/OBS MODERATE 35: CPT | Performed by: STUDENT IN AN ORGANIZED HEALTH CARE EDUCATION/TRAINING PROGRAM

## 2022-07-11 PROCEDURE — 99232 SBSQ HOSP IP/OBS MODERATE 35: CPT | Performed by: INTERNAL MEDICINE

## 2022-07-11 RX ADMIN — LIDOCAINE 5% 2 PATCH: 700 PATCH TOPICAL at 09:48

## 2022-07-11 RX ADMIN — DOCUSATE SODIUM 100 MG: 100 CAPSULE, LIQUID FILLED ORAL at 09:33

## 2022-07-11 RX ADMIN — METFORMIN HYDROCHLORIDE 500 MG: 500 TABLET ORAL at 17:10

## 2022-07-11 RX ADMIN — INSULIN LISPRO 2 UNITS: 100 INJECTION, SOLUTION INTRAVENOUS; SUBCUTANEOUS at 17:11

## 2022-07-11 RX ADMIN — ENOXAPARIN SODIUM 30 MG: 30 INJECTION SUBCUTANEOUS at 21:23

## 2022-07-11 RX ADMIN — ASPIRIN 81 MG CHEWABLE TABLET 81 MG: 81 TABLET CHEWABLE at 09:33

## 2022-07-11 RX ADMIN — LACTULOSE 10 G: 20 SOLUTION ORAL at 17:10

## 2022-07-11 RX ADMIN — DOCUSATE SODIUM 100 MG: 100 CAPSULE, LIQUID FILLED ORAL at 17:10

## 2022-07-11 RX ADMIN — INSULIN GLARGINE 32 UNITS: 100 INJECTION, SOLUTION SUBCUTANEOUS at 09:33

## 2022-07-11 RX ADMIN — METFORMIN HYDROCHLORIDE 500 MG: 500 TABLET ORAL at 09:55

## 2022-07-11 RX ADMIN — PRAVASTATIN SODIUM 80 MG: 80 TABLET ORAL at 17:10

## 2022-07-11 RX ADMIN — ENOXAPARIN SODIUM 30 MG: 30 INJECTION SUBCUTANEOUS at 09:33

## 2022-07-11 RX ADMIN — LEVOTHYROXINE SODIUM 112 MCG: 112 TABLET ORAL at 06:50

## 2022-07-11 RX ADMIN — SENNOSIDES 8.6 MG: 8.6 TABLET, FILM COATED ORAL at 21:23

## 2022-07-11 RX ADMIN — PANTOPRAZOLE SODIUM 40 MG: 40 TABLET, DELAYED RELEASE ORAL at 06:50

## 2022-07-11 RX ADMIN — INSULIN LISPRO 2 UNITS: 100 INJECTION, SOLUTION INTRAVENOUS; SUBCUTANEOUS at 11:18

## 2022-07-11 RX ADMIN — LOSARTAN POTASSIUM 25 MG: 25 TABLET, FILM COATED ORAL at 09:33

## 2022-07-11 RX ADMIN — INSULIN GLARGINE 32 UNITS: 100 INJECTION, SOLUTION SUBCUTANEOUS at 21:23

## 2022-07-11 RX ADMIN — LACTULOSE 10 G: 20 SOLUTION ORAL at 09:32

## 2022-07-11 RX ADMIN — NYSTATIN: 100000 POWDER TOPICAL at 09:44

## 2022-07-11 RX ADMIN — RIFAXIMIN 550 MG: 550 TABLET ORAL at 09:43

## 2022-07-11 RX ADMIN — MELATONIN 6 MG: at 21:23

## 2022-07-11 RX ADMIN — ESCITALOPRAM OXALATE 5 MG: 10 TABLET ORAL at 09:33

## 2022-07-11 RX ADMIN — INSULIN LISPRO 4 UNITS: 100 INJECTION, SOLUTION INTRAVENOUS; SUBCUTANEOUS at 21:24

## 2022-07-11 RX ADMIN — OXYCODONE HYDROCHLORIDE 2.5 MG: 5 TABLET ORAL at 09:02

## 2022-07-11 RX ADMIN — NYSTATIN 1 APPLICATION: 100000 POWDER TOPICAL at 17:13

## 2022-07-11 RX ADMIN — RIFAXIMIN 550 MG: 550 TABLET ORAL at 21:24

## 2022-07-11 NOTE — PROGRESS NOTES
07/11/22 1001   Pain Assessment   Pain Assessment Tool 0-10   Pain Score 8   Pain Location/Orientation Orientation: Left; Location: Arm;Location: Shoulder   Restrictions/Precautions   Precautions Fall Risk;Pain;Supervision on toilet/commode   Weight Bearing Restrictions Yes   LUE Weight Bearing Per Order NWB   ROM Restrictions Yes   LUE ROM Restriction   (ok for passive rom of the arm and shoulder, no overhead, limit to maximum 45 degrees  Would start no earlier than 7/11)   Braces or Orthoses Sling   Lifestyle   Autonomy "The sling makes my neck hurt which causes a lot of tension in my head"   Sit to Lying   Type of Assistance Needed Physical assistance   Physical Assistance Level 26%-50%   Comment Min-ModA x1 w/ cues for technique, pt moves slowly d/t pain and anticipation  Sit to Lying CARE Score 3   Sit to Stand   Type of Assistance Needed Physical assistance   Physical Assistance Level 26%-50%   Comment initial boost to stand w/ elizabeth walker   Sit to Stand CARE Score 3   Toileting Hygiene   Type of Assistance Needed Physical assistance   Physical Assistance Level 25% or less   Comment Pt voided  In stance pt able to wipe self w/ CGA but requires partial assist fo CM over hips   Toileting Hygiene CARE Score 3   Toilet Transfer   Type of Assistance Needed Physical assistance   Physical Assistance Level 25% or less   Comment Sherrie for initial boost and for controlled descent onto standard toilet  Use of elizabeth walker and fxnl approach to toilet   Toilet Transfer CARE Score 3   Cognition   Overall Cognitive Status WFL   Arousal/Participation Cooperative   Attention Attends with cues to redirect   Orientation Level Oriented X4   Memory Decreased recall of precautions   Following Commands Follows one step commands with increased time or repetition   Comments Pt lethargic this session, reports receiving pain meds within the hour to OT session starting, pt frequently requiring redirection, frequently closing eyes   Pt states, "I just can't seem to stay awake   this happens when I get pain meds"   Additional Activities   Additional Activities Comments Per MD order pt cleared to start PROM to LUE  See order for further details  Pt supine in bed Ax2 to don/doff sling for trialed PROM  Pt w/ high pain t/o activity but able to tolerate w/ rest breaks and modifications  Pt assisted to straightening out LUE at side  Elevated using pillows and towels  Pt reports relief from pain when allowed to rest arm in straighted elevated position  Noted edema to distal LUE  Completed light retrograde massage in effort to reduce edema and also desensitize as pt reports pain w/ touch  W/ rest breaks pt tolerated activity  PROM tolerated at elbow from near 180deg to ~110deg  Required frequent rest breaks d/t pain  Pt unable to achieve 90deg elbow flx d/t pain  Completed elbow flexion trial x3 supine w/ rest breaks  Did not trial PROM of shoulder d/t high pain, but discussed w/ pt upcoming goals for elbow and shoulder  Activity Tolerance   Activity Tolerance Patient limited by fatigue;Patient limited by pain   Assessment   Treatment Assessment OT session focusing on toileting tasks, PROM of LUE  Pt limited in PROM d/t high pain, able to attempt at elbow while supine but unable to trial at shoulder  Upon OT arrival to pt's room pt seated in WC nodding off, LUE in sling but sling strap undone, LUE was propped on pillow and positioned near 90deg and edu provided on importance for sling to be donned properly  At end of session, sling donned and at nearly 90deg, as pt unable to tolerate sling at 90deg, and wound nurse present w/ unit nurse to assess pt  Pt continues to requrie skilled hands on assist at this time  Will benefit from continued IPOT following established POC  Problem List Decreased strength;Decreased range of motion;Decreased endurance; Impaired balance;Decreased mobility; Impaired sensation; Impaired tone;Decreased skin integrity;Obesity;Orthopedic restrictions;Pain   Plan   Treatment/Interventions ADL retraining;Functional transfer training; Therapeutic exercise; Endurance training;Patient/family training;Equipment eval/education; Compensatory technique education;Continued evaluation;Spoke to nursing   Progress Progressing toward goals   OT Therapy Minutes   OT Time In 1000   OT Time Out 1120   OT Total Time (minutes) 80   OT Mode of treatment - Individual (minutes) 80   OT Mode of treatment - Concurrent (minutes) 0   OT Mode of treatment - Group (minutes) 0   OT Mode of treatment - Co-treat (minutes) 0   OT Mode of Treatment - Total time(minutes) 80 minutes   OT Cumulative Minutes 440   Therapy Time missed   Time missed?  Yes   Amount of time missed 10  (wound nurse present to provide treatment)

## 2022-07-11 NOTE — PROGRESS NOTES
07/11/22 0830   Pain Assessment   Pain Assessment Tool 0-10   Pain Score 8   Pain Location/Orientation Location: Arm;Location: Shoulder   Pain Onset/Description Onset: Ongoing   Hospital Pain Intervention(s) Cold applied  (nursing notified for pain meds and administered during session)   Restrictions/Precautions   Precautions Fall Risk;Pain;Supervision on toilet/commode   LUE Weight Bearing Per Order NWB   Braces or Orthoses Sling   Cognition   Overall Cognitive Status WFL   Subjective   Subjective did not sleep well and c/o pain as stated above   Sit to Stand   Type of Assistance Needed Physical assistance   Physical Assistance Level 25% or less   Comment initial boost   Sit to Stand CARE Score 3   Bed-Chair Transfer   Type of Assistance Needed Physical assistance; Adaptive equipment   Physical Assistance Level 25% or less   Comment practiced with [facility]   Chair/Bed-to-Chair Transfer CARE Score 3   Transfer Bed/Chair/Wheelchair   Findings using HW in room, but cont to work with Dana [address] 10 Feet   Type of Assistance Needed Adaptive equipment; Incidental touching   Comment with [facility]   Walk 10 Feet CARE Score 4   Walk 50 Feet with Two Turns   Type of Assistance Needed Physical assistance; Adaptive equipment   Physical Assistance Level 25% or less   Comment with [facility]   Walk 50 Feet with Two Turns CARE Score 3   Walk 150 Feet   Type of Assistance Needed Physical assistance; Adaptive equipment   Physical Assistance Level 25% or less   Comment with [facility]   Walk 150 Feet CARE Score 3   Ambulation   Distance Walked (feet) 150 ft  (x2)   Assist Device Cane   Gait Pattern Inconsistant Ayala; Antalgic;Decreased foot clearance; Improper weight shift   Limitations Noted In Endurance;Speed   Provided Assistance with: Balance   Findings increase pain when walking due to arm hanging down in sling causing discomfort in neck   Therapeutic Interventions   Strengthening seated LAQ and hip flex 2# x30 B/L   Flexibility seated heel cord and HS x3mins   Modalities CP to L shoulder region   Other ace wraps donned to B/L LEs   Assessment   Treatment Assessment pt cont to be limited with overall activity tolerance due to increase fatigue and pain in shoulder  pt was given meds and CP to alleviate some pain  pt's sling was loose start of session and adjusted to avoid to much hanging and pulling in the arm  pt improving with use of SPC and with no balance concerns at this time  cont to work on improving activity tolerance and functional ind to decrease burden of care at d/c  Problem List Decreased strength;Decreased endurance;Decreased range of motion; Impaired balance;Decreased mobility;Orthopedic restrictions;Pain;Decreased skin integrity;Obesity   Barriers to Discharge Inaccessible home environment;Decreased caregiver support   PT Barriers   Functional Limitation Car transfers; Ramp negotiation;Stair negotiation;Standing;Transfers; Walking   Plan   Progress Progressing toward goals   Recommendation   PT Discharge Recommendation Home with home health rehabilitation   Equipment Recommended Cane  (vs HW)   PT Therapy Minutes   PT Time In 0830   PT Time Out 0930   PT Total Time (minutes) 60   PT Mode of treatment - Individual (minutes) 50   PT Mode of treatment - Concurrent (minutes) 10   PT Mode of treatment - Group (minutes) 0   PT Mode of treatment - Co-treat (minutes) 0   PT Mode of Treatment - Total time(minutes) 60 minutes   PT Cumulative Minutes 420   Therapy Time missed   Time missed?  No

## 2022-07-11 NOTE — PROGRESS NOTES
PM&R PROGRESS NOTE:  Naresh Hodge 77 y o  female MRN: [id number]  Unit/Bed#: -48 Encounter: [id number]    Rehab Diagnosis: Impairment of mobility, safety and Activities of Daily Living (ADLs) due to Orthopedic Disorders:  08 9  Other Orthopedic Left Humerus fracture     Etiologic: L christianous fx, Complete Heart Block  Date of Onset: 6/29/22     Date of surgery: 6/29/2022: Cardiac Pacer Implant (Chest)    HPI: Naresh Hodge is a 77 y o  female with type 2 diabetes, hypertension, depression, hypothyroidism, hx of PAUL cirrhosis, esophageal varices, pancytopenia, HLD, hypothyroidism who presented to the [address] [place] [address] on 6/29 after tripping over her flip flops, falling and sustaining a left humerus fracture treated non-operatively with NWB to the left arm in a sling  She was found to be in complete heart block with HR in the 20-230s range, developing cardiogenic shock requiring pressors and TVP  She underwent a Permanent pacemaker placement on 6/29 with Dr Abernathy Rufina  Course complicated by CLYDE and blood loss anemia with hemoglobin of 6 9 s/p 1 unit pRBC  Initial ECHO on 6/29/22 showed moderately dilated left ventricular cavity size, LVEF 55%, mild bi-atrial enlargement   There were regional wall motion abnormalities with akinesis of the apical anterior, apical septal, apical inferior, apical lateral walls and the apex   There was hyperkinesis of the basal anterior, basal anteroseptal, basal inferoseptal, basal inferior, basal inferolateral, basal anterolateral, mid anterior, mid anteroseptal, mid inferoseptal, mid inferior, mid inferolateral and mid anterolateral  RVEF was moderately reduced   The LV systolic function was abnormal in a pattern suggestive of apical stress cardiomyopathy   She was started on Losartan by Cardiology and a repeat ECHO done today 7/5/22   It showed LVEF of 50% with grade 1 diastolic dysfunction, akinesis of the apex, hypokinesis of the apical anterior, septal, inferior/lateral walls  The patient was evaluated by the Rehabilitation team and deemed an appropriate candidate for comprehensive inpatient rehabilitation and admitted to the [facility] on 7/5/2022  5:43 PM    SUBJECTIVE:  Patient seen and examined in room  No acute issues issues overnight  Started ranging the left shoulder today, maintaining precautions and no overhead, limit to 45 degrees max  Tired now, but had pain medications  She denies fever, chills, nausea, emesis, cough, shortness of breath, diarrhea, constipation  ASSESSMENT: Stable, progressing      PLAN:    Rehabilitation   Functional deficits:  Self care, mobility   Continue current rehabilitation plan of care to maximize function   Functional update:   o PT: transfer Min-Mod A, [id number] x2 Min A with SPC  o OT: Mod-Max A for UB and LB ADLs   Estimated Discharge: TBD in teams, reteam    DVT prophylaxis  · lovenox     Pain  · Lidocaine patch  · Oxycodone 2 5mg Q6H PRN     Bladder plan  · Continent     Bowel plan  · Continent, 7/10 x2     Code Status  · Level 1: Full Code      * Closed fracture of proximal end of left humerus  Assessment & Plan  · Displaced comminuted fracture of the humeral head/neck on imaging  · NWB to the left upper limb, maintain in sling  · Ok for for gentle range of motion to prevent adhesive capsulitis- would start 7/11, expressed in stand up meeting this morning  · Pain control  · Likely course of acute blood loss anemia, monitor labs  · PT/OT      Complete heart block [facility])  Assessment & Plan  · S/p dual chamber PPM 6/29/22 with Dr Jennifer Kellogg  · Monitor pacemaker site for signs of infection  · No heavy lifting/push/pull >10 lbs    Obesity (BMI 30-39  9)  Assessment & Plan  · BMI 36 03  · Weight loss counseling, promote increased activity    Acute blood loss anemia  Assessment & Plan  · Acute blood loss anemia, Hgb 6 9 previously, s/p transfusion  · Hgb on 7/5 was up to 9 5  · Will require close follow with labs  · Monitor clinically as well in therapy    Cardiomyopathy [facility])  Assessment & Plan  · Medicine team reached out to Cardiology re: advice with recs for further w/u based on ECHO results from 7/5/22  · Continue Losartan 25mg qd (Cardiology started in hospital)  · Per Cardiology, outpatient follow up, no acute changes based on echo      Depression  Assessment & Plan  Continue home lexapro 5mg daily    Pancytopenia ([facility] [place] [address] )  Assessment & Plan  Secondary to cirrhosis, monitor labs    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) ([facility] [place] [address] )  Assessment & Plan  · Hx non-bleeding esophageal varices and hepatic encephalopathy   · At home, was on Rifaximin 550 mcg q12hrs/Lactulose 10Gm BID/Lasix 40mg qd/Nadolol 20mg qd/Aldactone 50mg qd  · Here:  Rifaximin 550 mg q12hrs/Lactulose 10Gm BID    Type 2 diabetes mellitus with hyperglycemia, with long-term current use of insulin [facility])  Assessment & Plan  Lab Results   Component Value Date    HGBA1C 8 2 (H) 05/31/2022       Recent Labs     07/07/22  1605 07/07/22  2117 07/08/22  0650 07/08/22  1033   POCGLU 243* 223* 95 143*     · Home:  Janumet XR  BID/Amaryl 8 mg qAM/Basaglar 48U qam, 36U qpm  · Here:  Lantus 30U q12hrs  · Add back Metformin 500 mg BID to start this evening   · QID Accuchecks/SSI and DM diet    Acquired hypothyroidism  Assessment & Plan  Continue levothyroxine 112mcg daily    Essential hypertension  Assessment & Plan  · On losartan 25mg daily  · Previously on Nadolol, aldactone, and lasix  · Medications held based on initial hypotension  · Will start adding back meds as indicated, likely starting with nadolol- started back on 7/7      Hyperlipidemia  Assessment & Plan  Continue statin      Appreciate IM consultants medical co-management  Labs, medications, and imaging personally reviewed  ROS:  A ten point review of systems was completed on 07/11/22 and pertinent positives are listed in subjective section   All other systems reviewed were negative  OBJECTIVE:   /54 (BP Location: Right arm)   Pulse 67   Temp 98 °F (36 7 °C) (Oral)   Resp 18   Ht 5' 5" (1 651 m)   Wt 100 kg (220 lb 12 8 oz)   SpO2 96%   BMI 36 74 kg/m²     Physical Exam  Constitutional:       General: She is not in acute distress  Appearance: She is obese  HENT:      Head: Normocephalic and atraumatic  Right Ear: External ear normal       Left Ear: External ear normal       Nose: Nose normal  No rhinorrhea  Mouth/Throat:      Mouth: Mucous membranes are moist       Pharynx: Oropharynx is clear  Eyes:      General: No scleral icterus  Right eye: No discharge  Left eye: No discharge  Cardiovascular:      Rate and Rhythm: Normal rate  Pulmonary:      Effort: Pulmonary effort is normal  No respiratory distress  Breath sounds: No wheezing, rhonchi or rales  Abdominal:      General: There is no distension  Palpations: Abdomen is soft  Musculoskeletal:      Cervical back: Normal range of motion  Right lower leg: Edema present  Left lower leg: Edema present  Comments: Left arm resolving ecchymosis, some swelling in the arm, unchanged, sling in place, tender to gentle movement  Edema in legs improved with ACE on, more pronounced in feet where the ACE wraps end   Skin:     General: Skin is warm and dry  Comments: Pacer site clean and intact   Neurological:      Mental Status: She is alert and oriented to person, place, and time  Sensory: No sensory deficit  Motor: No weakness     Psychiatric:         Mood and Affect: Mood normal          Behavior: Behavior normal           Lab Results   Component Value Date    WBC 3 19 (L) 07/08/2022    HGB 8 1 (L) 07/08/2022    HCT 26 9 (L) 07/08/2022    MCV 90 07/08/2022    PLT 67 (L) 07/08/2022     Lab Results   Component Value Date    SODIUM 136 07/08/2022    K 4 7 07/08/2022     07/08/2022    CO2 25 07/08/2022    BUN 35 (H) 07/08/2022    CREATININE 0 91 07/08/2022    GLUC 92 07/08/2022    CALCIUM 8 4 07/08/2022     Lab Results   Component Value Date    INR 1 33 (H) 06/30/2022    INR 1 20 (H) 06/29/2022    INR 1 23 (H) 05/31/2022    PROTIME 16 0 (H) 06/30/2022    PROTIME 15 1 (H) 06/29/2022    PROTIME 15 3 (H) 05/31/2022           Current Facility-Administered Medications:     aspirin chewable tablet 81 mg, 81 mg, Oral, Daily, Dickson Logan DO, 81 mg at 07/11/22 0933    bisacodyl (DULCOLAX) rectal suppository 10 mg, 10 mg, Rectal, Daily PRN, Dickson Logan DO    docusate sodium (COLACE) capsule 100 mg, 100 mg, Oral, BID, Dickson Logan DO, 100 mg at 07/11/22 0933    enoxaparin (LOVENOX) subcutaneous injection 30 mg, 30 mg, Subcutaneous, Q12H [facility], Dickson Logan DO, 30 mg at 07/11/22 0933    escitalopram (LEXAPRO) tablet 5 mg, 5 mg, Oral, Daily, Dickson Logan DO, 5 mg at 07/11/22 0933    insulin glargine (LANTUS) subcutaneous injection 32 Units 0 32 mL, 32 Units, Subcutaneous, Q12H [facility], JOSE JUAN Mathews, 32 Units at 07/11/22 0933    insulin lispro (HumaLOG) 100 units/mL subcutaneous injection 2-12 Units, 2-12 Units, Subcutaneous, 4x Daily (with meals and at bedtime), 2 Units at 07/11/22 1118 **AND** Fingerstick Glucose (POCT), , , 4x Daily AC and at bedtime, Dickson Logan DO    lactulose oral solution 10 g, 10 g, Oral, BID, Dorlene Doreen, DO, 10 g at 07/11/22 0932    levothyroxine tablet 112 mcg, 112 mcg, Oral, Early Morning, Dickson Logan, DO, 112 mcg at 07/11/22 0650    lidocaine (LIDODERM) 5 % patch 2 patch, 2 patch, Topical, Daily, Dorlene Holts, DO, 2 patch at 07/11/22 0948    losartan (COZAAR) tablet 25 mg, 25 mg, Oral, Daily, Dorlene Holts, DO, 25 mg at 07/11/22 0933    melatonin tablet 6 mg, 6 mg, Oral, HS, Dorlene Holts, DO, 6 mg at 07/10/22 2241    metFORMIN (GLUCOPHAGE) tablet 500 mg, 500 mg, Oral, BID With Meals, JOSE JUAN Rogers, 500 mg at 07/11/22 0955    nadolol (CORGARD) tablet 20 mg, 20 mg, Oral, Daily, Claribel Henson JOSE JUAN Longo, 20 mg at 07/10/22 0908    nystatin (MYCOSTATIN) powder, , Topical, BID, JOSE JUAN Infante, Given at 07/11/22 0944    ondansetron (ZOFRAN-ODT) dispersible tablet 4 mg, 4 mg, Oral, Q6H PRN, Renea Longest, DO, 4 mg at 07/10/22 0857    oxyCODONE (ROXICODONE) IR tablet 2 5 mg, 2 5 mg, Oral, Q6H PRN, Renea Longest, DO, 2 5 mg at 07/11/22 0902    pantoprazole (PROTONIX) EC tablet 40 mg, 40 mg, Oral, Early Morning, Renea Longest, DO, 40 mg at 07/11/22 0650    polyethylene glycol (MIRALAX) packet 17 g, 17 g, Oral, Daily, Renea Longest, DO, 17 g at 07/10/22 0856    pravastatin (PRAVACHOL) tablet 80 mg, 80 mg, Oral, Daily With Dinner, Renea Longest, DO, 80 mg at 07/10/22 1807    rifaximin (XIFAXAN) tablet 550 mg, 550 mg, Oral, Q12H [address], Renea Longest, DO, 550 mg at 07/11/22 2528    senna (SENOKOT) tablet 8 6 mg, 1 tablet, Oral, HS, Renea Longest, DO, 8 6 mg at 07/10/22 2241    Past Medical History:   Diagnosis Date    Anemia     [email or web address]     Diabetic neuropathy ([facility] )     Esophageal varices (HCC)     Fatty liver     GERD (gastroesophageal reflux disease)     Hepatic encephalopathy (HCC)     Hiatal hernia     Hyperlipidemia     Hypertension     Hypoglycemia     Hypothyroidism     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 ([facility] [address] [facility]     Osteoarthritis     Osteopenia     Pancytopenia (HCC)     Thrombocytopenia (HCC)     Type 2 diabetes mellitus ([facility] )        Patient Active Problem List    Diagnosis Date Noted    Closed fracture of proximal end of left humerus 07/02/2022    Complete heart block ([facility] ) 06/30/2022    Obesity (BMI 30-39 9) 07/06/2022    Acute blood loss anemia 07/03/2022    Depression 07/02/2022    Cardiomyopathy ([facility] ) 07/02/2022    Esophageal varices without bleeding ([email or web address] ) 09/29/2021    Pancytopenia ([email or web address] ) 08/06/2021    Iron deficiency anemia due to chronic blood loss 08/06/2021    Mild nonproliferative diabetic retinopathy of both eyes without macular edema associated with type 2 diabetes mellitus ([place] [address] ) 07/16/2021    Urinary tract infection 09/15/2020    Hepatic encephalopathy ([place] [address] ) 09/13/2020    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) ([place] [address] ) 09/13/2020    Thrombocytopenia ([place] [address] ) 09/13/2020    Hyperlipidemia 07/24/2018    Essential hypertension 07/24/2018    Acquired hypothyroidism 07/24/2018    Type 2 diabetes mellitus with hyperglycemia, with long-term current use of insulin ([place] [address] ) 07/24/2018    Diabetic polyneuropathy associated with type 2 diabetes mellitus ([place] [address] ) 07/24/2018          Inderjit Herman DO  Physical Medicine and Katie Stallworth    Total time spent:  35 minutes, with more than 50% spent counseling/coordinating care  Counseling includes discussion with patient re: progress in therapies, functional issues observed by therapy staff, and discussion with patient his/her current medical state/wellbeing  Coordination of patient's care was performed in conjunction with Internal Medicine service to monitor patient's labs, vitals, and management of their comorbidities

## 2022-07-11 NOTE — PROGRESS NOTES
07/11/22 1430   Pain Assessment   Pain Score 8   Pain Location/Orientation Orientation: Left; Location: Arm;Location: Shoulder   Restrictions/Precautions   Precautions Fall Risk;Pain;Supervision on toilet/commode   Braces or Orthoses Sling   Cognition   Overall Cognitive Status WFL   Subjective   Subjective cont to have pain with movement of LUE   Sit to Stand   Type of Assistance Needed Incidental touching;Supervision   Comment CS/CGA   Sit to Stand CARE Score 4   Bed-Chair Transfer   Type of Assistance Needed Incidental touching; Adaptive equipment   Comment with cane   Chair/Bed-to-Chair Transfer CARE Score 4   Transfer Bed/Chair/Wheelchair   Findings HW in room, cane during therapy   Walk 10 Feet   Type of Assistance Needed Incidental touching; Adaptive equipment   Comment SPC   Walk 10 Feet CARE Score 4   Walk 50 Feet with Two Turns   Type of Assistance Needed Incidental touching; Adaptive equipment   Comment with SPC   Walk 50 Feet with Two Turns CARE Score 4   Walk 150 Feet   Type of Assistance Needed Physical assistance; Adaptive equipment   Physical Assistance Level 25% or less   Comment SPC, CG/Eric due to fatigue   Walk 150 Feet CARE Score 3   Ambulation   Distance Walked (feet) 150 ft  (x2)   Limitations Noted In Speed   Curb or Single Stair   Style negotiated Single stair   Physical Assistance Level 25% or less   Comment single rail with R hand   1 Step (Curb) CARE Score 3   4 Steps   Type of Assistance Needed Physical assistance; Adaptive equipment   Physical Assistance Level 25% or less   4 Steps CARE Score 3   Stairs   # of Steps 4   Weight Bearing Precautions Fall Risk;LUE;NWB   Toilet Transfer   Type of Assistance Needed Physical assistance   Physical Assistance Level 25% or less   Comment boost to stand from standard toilet   Toilet Transfer CARE Score 3   Assessment   Treatment Assessment no physical changes but cont to be limited by pain  took time to get pt positioned in recliner    using 2 foam wedges pt was comfortable and arm positioned  cont to work on strengthening and and overall activity tolerance to achieve mod I goals for safe d/c   nustep L1 10mins LEs and RUE   Problem List Decreased strength;Decreased range of motion;Decreased endurance; Impaired balance;Decreased mobility; Impaired sensation; Impaired tone;Decreased skin integrity;Obesity;Orthopedic restrictions;Pain   Barriers to Discharge Inaccessible home environment;Decreased caregiver support   PT Barriers   Functional Limitation Car transfers; Ramp negotiation;Stair negotiation;Standing;Transfers; Walking   Plan   Progress Progressing toward goals   Recommendation   PT Discharge Recommendation Home with home health rehabilitation   Equipment Recommended Cane  (vs HW)   PT Therapy Minutes   PT Time In 1430   PT Time Out 1530   PT Total Time (minutes) 60   PT Mode of treatment - Individual (minutes) 60   PT Mode of treatment - Concurrent (minutes) 0   PT Mode of treatment - Group (minutes) 0   PT Mode of treatment - Co-treat (minutes) 0   PT Mode of Treatment - Total time(minutes) 60 minutes   PT Cumulative Minutes 480   Therapy Time missed   Time missed?  No

## 2022-07-11 NOTE — PROGRESS NOTES
Internal Medicine Progress Note  Patient: Akiko Gleason  Age/sex: 77 y o  female  Medical Record #: [id number]      ASSESSMENT/PLAN: (Interval History)  Akiko Gleason is seen and examined and management for following issues:    Left proximal humerus fracture  · Non-op  · NWB  · Sling for comfort  · See Ortho as OP     CHB  · S/p dual chamber PPM 6/29/22  · No heavy lift/push/pull >10 lbs  · No left arm above shoulder height      Stress cardiomyopathy  · D/W Dr Jacqui Mills he said no further intervention for now but see back in office  · Continue Losartan 25mg qd (Cardiology started in hospital)     ABLA  · S/p 1 unit PRBCs  · Stable     PAUL cirrhosis  · Hx non-bleeding esophageal varices (banded) and hepatic encephalopathy   · Sees Dr Tiago Sarmiento at [facility] as OP  · Home:  Rifaximin 550 mcg q12hrs/Lactulose 10Gm BID/Lasix 40mg qd/Corgard 20mg qd/Aldactone 50mg qd  · Here:  Rifaximin 550 mg q12hrs/Lactulose 10Gm BID/Corgard 20mg qd (hold SBP <120)  · Consider adding back a small dose of Lasix when BP allows     DM2  · Home:  Janumet XR  BID/Amaryl 8 mg qAM/Basaglar 48U qam, 36U qpm  · Here:  Lantus 32U q12hrs/Metformin 500 mg BID   · QID Accuchecks/SSI and DM diet  · FBS low 7/10 AM and Lantus was skipped altogether --> make sure patient has qhs snack     Pancytopenia  · Is 2/2 to cirrhosis/portal HTN  Additionally, has splenomegaly contributing to low platelet ct  · CBC 0/95/97  · Hemoglobin was down to 8 1 on 7/8/22  · Has had tx with Venofer in past   Sees Dr Jordon Alford from [facility]     Hypothyroidism  · Continue Levothyroxine 112 mcg qd     Depression  · Continue Lexapro as at home        Discharge date:  Team    The above assessment and plan was reviewed and updated as determined by my evaluation of the patient on 7/11/2022      Labs:   Results from last 7 days   Lab Units 07/08/22  0501 07/05/22  0513   WBC Thousand/uL 3 19* 3 95*   HEMOGLOBIN g/dL 8 1* 9 5*   HEMATOCRIT % 26 9* 31 1*   PLATELETS Thousands/uL 67* 66*     Results from last 7 days   Lab Units 07/08/22  0501 07/05/22  0513   SODIUM mmol/L 136 137   POTASSIUM mmol/L 4 7 4 6   CHLORIDE mmol/L 107 107   CO2 mmol/L 25 24   BUN mg/dL 35* 28*   CREATININE mg/dL 0 91 0 93   CALCIUM mg/dL 8 4 8 6             Results from last 7 days   Lab Units 07/11/22  0520 07/10/22  2040 07/10/22  1613   POC GLUCOSE mg/dl 131 270* 229*       Review of Scheduled Meds:  Current Facility-Administered Medications   Medication Dose Route Frequency Provider Last Rate    aspirin  81 mg Oral Daily [place] Tr[place]wood, DO      bisacodyl  10 mg Rectal Daily PRN [place] Tr[place], DO      docusate sodium  100 mg Oral BID [place] Tr[place], DO      enoxaparin  30 mg Subcutaneous Q12H [address] [place] Tr[place], DO      escitalopram  5 mg Oral Daily [place] Tr[place], DO      insulin glargine  32 Units Subcutaneous Q12H [address] Sahra Flannery, JOSE JUAN      insulin lispro  2-12 Units Subcutaneous 4x Daily (with meals and at bedtime) [place] Tr[place], DO      lactulose  10 g Oral BID Tim Traskwood, DO      levothyroxine  112 mcg Oral Early Morning Tim Traskwood, DO      lidocaine  2 patch Topical Daily Tim Traskwood, DO      losartan  25 mg Oral Daily [place] Traskwood, DO      melatonin  6 mg Oral HS [place] Traskwood, DO      metFORMIN  500 mg Oral BID With Meals Sofia Bumps, CRNP      nadolol  20 mg Oral Daily Sofia Bumps, CRNP      nystatin   Topical BID [facility], JOSE JUAN      ondansetron  4 mg Oral Q6H PRN [place] Traskwood, DO      oxyCODONE  2 5 mg Oral Q6H PRN [place] Tr[place], DO      pantoprazole  40 mg Oral Early Morning [place] Traskwood, DO      polyethylene glycol  17 g Oral Daily Tim Traskwood, DO      pravastatin  80 mg Oral Daily With [employer] Murlean Kasey, DO      rifaximin  550 mg Oral Q12H [address] [place] Traskwood, DO      senna  1 tablet Oral HS Tim Traskwood, DO         Subjective/ HPI: Patient seen and examined   Patients overnight issues or events were reviewed with nursing or staff during rounds or morning huddle session  New or overnight issues include the following:     No new or overnight issues  Offers no complaints    ROS:   A 10 point ROS was performed; negative except as noted above  Imaging:     No orders to display       *Labs /Radiology studies reviewed  *Medications reviewed and reconciled as needed  *Please refer to order section for additional ordered labs studies  *Case discussed with primary attending during morning huddle case rounds    Physical Examination:  Vitals:   Vitals:    07/10/22 2029 07/11/22 0600 07/11/22 0700 07/11/22 0944   BP: 127/62  92/50 114/55   BP Location: Right arm  Right arm    Pulse: 60  66 62   Resp: 18  16    Temp: 97 7 °F (36 5 °C)  98 °F (36 7 °C)    TempSrc: Oral  Oral    SpO2: 100%  96%    Weight:  100 kg (220 lb 12 8 oz)     Height:           General Appearance: no distress, conversive  HEENT: PERRLA, conjuctiva normal; oropharynx clear; mucous membranes moist   Neck:  Supple, normal ROM  Lungs: CTA, normal respiratory effort, no retractions, expiratory effort normal  CV: regular rate and rhythm; no rubs/murmurs/gallops, PMI normal   PPM site is w/o erythema/drainage  ABD: soft; ND/NT; +BS  EXT: no edema  Sling is on  Skin: normal turgor, normal texture, no rashes  Psych: affect normal, mood normal  Neuro: AAO      The above physical exam was reviewed and updated as determined by my evaluation of the patient on 7/11/2022  Invasive Devices  Report    None                    VTE Pharmacologic Prophylaxis: Enoxaparin  Code Status: Level 1 - Full Code  Current Length of Stay: 6 day(s)      Total time spent:  30 minutes with more than 50% spent counseling/coordinating care  Counseling includes discussion with patient re: progress  and discussion with patient of his/her current medical state/information  Coordination of patient's care was performed in conjunction with primary service   Time invested included review of patient's labs, vitals, and management of their comorbidities with continued monitoring  In addition, this patient was discussed with medical team including physician and advanced extenders  The care of the patient was extensively discussed and appropriate treatment plan was formulated unique for this patient  ** Please Note:  voice to text software may have been used in the creation of this document   Although proof errors in transcription or interpretation are a potential of such software**

## 2022-07-11 NOTE — PLAN OF CARE
Problem: PAIN - ADULT  Goal: Verbalizes/displays adequate comfort level or baseline comfort level  Description: Interventions:  - Encourage patient to monitor pain and request assistance  - Assess pain using appropriate pain scale  - Administer analgesics based on type and severity of pain and evaluate response  - Implement non-pharmacological measures as appropriate and evaluate response  - Consider cultural and social influences on pain and pain management  - Notify physician/advanced practitioner if interventions unsuccessful or patient reports new pain  Outcome: Progressing     Problem: INFECTION - ADULT  Goal: Absence or prevention of progression during hospitalization  Description: INTERVENTIONS:  - Assess and monitor for signs and symptoms of infection  - Monitor lab/diagnostic results  - Monitor all insertion sites, i e  indwelling lines, tubes, and drains  - Monitor endotracheal if appropriate and nasal secretions for changes in amount and color  - [place] appropriate cooling/warming therapies per order  - Administer medications as ordered  - Instruct and encourage patient and family to use good hand hygiene technique  - Identify and instruct in appropriate isolation precautions for identified infection/condition  Outcome: Progressing  Goal: Absence of fever/infection during neutropenic period  Description: INTERVENTIONS:  - Monitor WBC    Outcome: Progressing     Problem: SAFETY ADULT  Goal: Patient will remain free of falls  Description: INTERVENTIONS:  - Educate patient/family on patient safety including physical limitations  - Instruct patient to call for assistance with activity   - Consult OT/PT to assist with strengthening/mobility   - Keep Call bell within reach  - Keep bed low and locked with side rails adjusted as appropriate  - Keep care items and personal belongings within reach  - Initiate and maintain comfort rounds  - Make Fall Risk Sign visible to staff  - Offer Toileting every 2-4 Hours, in advance of need  - Initiate/Maintain bed/chair alarm  - Obtain necessary fall risk management equipment: yellow socks  - Apply yellow socks and bracelet for high fall risk patients  - Consider moving patient to room near nurses station  Outcome: Progressing  Goal: Maintain or return to baseline ADL function  Description: INTERVENTIONS:  -  Assess patient's ability to carry out ADLs; assess patient's baseline for ADL function and identify physical deficits which impact ability to perform ADLs (bathing, care of mouth/teeth, toileting, grooming, dressing, etc )  - Assess/evaluate cause of self-care deficits   - Assess range of motion  - Assess patient's mobility; develop plan if impaired  - Assess patient's need for assistive devices and provide as appropriate  - Encourage maximum independence but intervene and supervise when necessary  - Involve family in performance of ADLs  - Assess for home care needs following discharge   - Consider OT consult to assist with ADL evaluation and planning for discharge  - Provide patient education as appropriate  Outcome: Progressing     Problem: DISCHARGE PLANNING  Goal: Discharge to home or other facility with appropriate resources  Description: INTERVENTIONS:  - Identify barriers to discharge w/patient and caregiver  - Arrange for needed discharge resources and transportation as appropriate  - Identify discharge learning needs (meds, wound care, etc )  - Arrange for interpretive services to assist at discharge as needed  - Refer to Case Management Department for coordinating discharge planning if the patient needs post-hospital services based on physician/advanced practitioner order or complex needs related to functional status, cognitive ability, or social support system  Outcome: Progressing     Problem: Prexisting or High Potential for Compromised Skin Integrity  Goal: Skin integrity is maintained or improved  Description: INTERVENTIONS:  - Identify patients at risk for skin breakdown  - Assess and monitor skin integrity  - Assess and monitor nutrition and hydration status  - Monitor labs   - Assess for incontinence   - Turn and reposition patient  - Assist with mobility/ambulation  - Relieve pressure over bony prominences  - Avoid friction and shearing  - Provide appropriate hygiene as needed including keeping skin clean and dry  - Evaluate need for skin moisturizer/barrier cream  - Collaborate with interdisciplinary team   - Patient/family teaching  - Consider wound care consult   Outcome: Progressing     Problem: MOBILITY - ADULT  Goal: Maintain or return to baseline ADL function  Description: INTERVENTIONS:  -  Assess patient's ability to carry out ADLs; assess patient's baseline for ADL function and identify physical deficits which impact ability to perform ADLs (bathing, care of mouth/teeth, toileting, grooming, dressing, etc )  - Assess/evaluate cause of self-care deficits   - Assess range of motion  - Assess patient's mobility; develop plan if impaired  - Assess patient's need for assistive devices and provide as appropriate  - Encourage maximum independence but intervene and supervise when necessary  - Involve family in performance of ADLs  - Assess for home care needs following discharge   - Consider OT consult to assist with ADL evaluation and planning for discharge  - Provide patient education as appropriate  Outcome: Progressing  Goal: Maintains/Returns to pre admission functional level  Description: INTERVENTIONS:  - Perform BMAT or MOVE assessment daily    - Set and communicate daily mobility goal to care team and patient/family/caregiver  - Collaborate with rehabilitation services on mobility goals if consulted  - Perform Range of Motion 3 times a day  - Reposition patient every 2 hours    - Dangle patient 3 times a day  - Stand patient 3 times a day  - Ambulate patient 3 times a day  - Out of bed to chair 3 times a day   - Out of bed for meals 3 times a day  - Out of bed for toileting  - Record patient progress and toleration of activity level   Outcome: Progressing     Problem: Potential for Falls  Goal: Patient will remain free of falls  Description: INTERVENTIONS:  - Educate patient/family on patient safety including physical limitations  - Instruct patient to call for assistance with activity   - Consult OT/PT to assist with strengthening/mobility   - Keep Call bell within reach  - Keep bed low and locked with side rails adjusted as appropriate  - Keep care items and personal belongings within reach  - Initiate and maintain comfort rounds  - Make Fall Risk Sign visible to staff  - Offer Toileting every 2 Hours, in advance of need  - Initiate/Maintain bed/chair alarm  - Obtain necessary fall risk management equipment:   - Apply yellow socks and bracelet for high fall risk patients  - Consider moving patient to room near nurses station  Outcome: Progressing

## 2022-07-12 LAB
ANION GAP SERPL CALCULATED.3IONS-SCNC: 5 MMOL/L (ref 4–13)
BASOPHILS # BLD AUTO: 0.02 THOUSANDS/ΜL (ref 0–0.1)
BASOPHILS NFR BLD AUTO: 1 % (ref 0–1)
BUN SERPL-MCNC: 36 MG/DL (ref 5–25)
CALCIUM SERPL-MCNC: 8.6 MG/DL (ref 8.3–10.1)
CHLORIDE SERPL-SCNC: 107 MMOL/L (ref 100–108)
CO2 SERPL-SCNC: 25 MMOL/L (ref 21–32)
CREAT SERPL-MCNC: 1.07 MG/DL (ref 0.6–1.3)
EOSINOPHIL # BLD AUTO: 0.07 THOUSAND/ΜL (ref 0–0.61)
EOSINOPHIL NFR BLD AUTO: 2 % (ref 0–6)
ERYTHROCYTE [DISTWIDTH] IN BLOOD BY AUTOMATED COUNT: 18.3 % (ref 11.6–15.1)
GFR SERPL CREATININE-BSD FRML MDRD: 50 ML/MIN/1.73SQ M
GLUCOSE SERPL-MCNC: 132 MG/DL (ref 65–140)
GLUCOSE SERPL-MCNC: 168 MG/DL (ref 65–140)
GLUCOSE SERPL-MCNC: 175 MG/DL (ref 65–140)
GLUCOSE SERPL-MCNC: 93 MG/DL (ref 65–140)
GLUCOSE SERPL-MCNC: 95 MG/DL (ref 65–140)
HCT VFR BLD AUTO: 29.3 % (ref 34.8–46.1)
HGB BLD-MCNC: 8.8 G/DL (ref 11.5–15.4)
IMM GRANULOCYTES # BLD AUTO: 0.01 THOUSAND/UL (ref 0–0.2)
IMM GRANULOCYTES NFR BLD AUTO: 0 % (ref 0–2)
LYMPHOCYTES # BLD AUTO: 0.71 THOUSANDS/ΜL (ref 0.6–4.47)
LYMPHOCYTES NFR BLD AUTO: 25 % (ref 14–44)
MCH RBC QN AUTO: 27.8 PG (ref 26.8–34.3)
MCHC RBC AUTO-ENTMCNC: 30 G/DL (ref 31.4–37.4)
MCV RBC AUTO: 93 FL (ref 82–98)
MONOCYTES # BLD AUTO: 0.28 THOUSAND/ΜL (ref 0.17–1.22)
MONOCYTES NFR BLD AUTO: 10 % (ref 4–12)
NEUTROPHILS # BLD AUTO: 1.81 THOUSANDS/ΜL (ref 1.85–7.62)
NEUTS SEG NFR BLD AUTO: 62 % (ref 43–75)
NRBC BLD AUTO-RTO: 0 /100 WBCS
PLATELET # BLD AUTO: 76 THOUSANDS/UL (ref 149–390)
PMV BLD AUTO: 13 FL (ref 8.9–12.7)
POTASSIUM SERPL-SCNC: 5.2 MMOL/L (ref 3.5–5.3)
RBC # BLD AUTO: 3.16 MILLION/UL (ref 3.81–5.12)
SODIUM SERPL-SCNC: 137 MMOL/L (ref 136–145)
WBC # BLD AUTO: 2.9 THOUSAND/UL (ref 4.31–10.16)

## 2022-07-12 PROCEDURE — 97116 GAIT TRAINING THERAPY: CPT

## 2022-07-12 PROCEDURE — 80048 BASIC METABOLIC PNL TOTAL CA: CPT | Performed by: NURSE PRACTITIONER

## 2022-07-12 PROCEDURE — 82948 REAGENT STRIP/BLOOD GLUCOSE: CPT

## 2022-07-12 PROCEDURE — 97110 THERAPEUTIC EXERCISES: CPT

## 2022-07-12 PROCEDURE — 97535 SELF CARE MNGMENT TRAINING: CPT

## 2022-07-12 PROCEDURE — 97530 THERAPEUTIC ACTIVITIES: CPT

## 2022-07-12 PROCEDURE — 99232 SBSQ HOSP IP/OBS MODERATE 35: CPT | Performed by: STUDENT IN AN ORGANIZED HEALTH CARE EDUCATION/TRAINING PROGRAM

## 2022-07-12 PROCEDURE — 85025 COMPLETE CBC W/AUTO DIFF WBC: CPT | Performed by: NURSE PRACTITIONER

## 2022-07-12 PROCEDURE — 99232 SBSQ HOSP IP/OBS MODERATE 35: CPT | Performed by: INTERNAL MEDICINE

## 2022-07-12 RX ORDER — NADOLOL 20 MG/1
20 TABLET ORAL DAILY
Status: DISCONTINUED | OUTPATIENT
Start: 2022-07-13 | End: 2022-07-18

## 2022-07-12 RX ADMIN — INSULIN LISPRO 2 UNITS: 100 INJECTION, SOLUTION INTRAVENOUS; SUBCUTANEOUS at 21:31

## 2022-07-12 RX ADMIN — OXYCODONE HYDROCHLORIDE 2.5 MG: 5 TABLET ORAL at 08:25

## 2022-07-12 RX ADMIN — RIFAXIMIN 550 MG: 550 TABLET ORAL at 08:27

## 2022-07-12 RX ADMIN — PRAVASTATIN SODIUM 80 MG: 80 TABLET ORAL at 17:01

## 2022-07-12 RX ADMIN — DOCUSATE SODIUM 100 MG: 100 CAPSULE, LIQUID FILLED ORAL at 17:01

## 2022-07-12 RX ADMIN — ENOXAPARIN SODIUM 30 MG: 30 INJECTION SUBCUTANEOUS at 21:28

## 2022-07-12 RX ADMIN — ESCITALOPRAM OXALATE 5 MG: 10 TABLET ORAL at 08:19

## 2022-07-12 RX ADMIN — LEVOTHYROXINE SODIUM 112 MCG: 112 TABLET ORAL at 05:22

## 2022-07-12 RX ADMIN — SENNOSIDES 8.6 MG: 8.6 TABLET, FILM COATED ORAL at 21:28

## 2022-07-12 RX ADMIN — INSULIN LISPRO 2 UNITS: 100 INJECTION, SOLUTION INTRAVENOUS; SUBCUTANEOUS at 11:44

## 2022-07-12 RX ADMIN — DOCUSATE SODIUM 100 MG: 100 CAPSULE, LIQUID FILLED ORAL at 08:26

## 2022-07-12 RX ADMIN — METFORMIN HYDROCHLORIDE 500 MG: 500 TABLET ORAL at 08:21

## 2022-07-12 RX ADMIN — MELATONIN 6 MG: at 21:28

## 2022-07-12 RX ADMIN — LACTULOSE 10 G: 20 SOLUTION ORAL at 17:01

## 2022-07-12 RX ADMIN — LOSARTAN POTASSIUM 25 MG: 25 TABLET, FILM COATED ORAL at 08:21

## 2022-07-12 RX ADMIN — INSULIN GLARGINE 32 UNITS: 100 INJECTION, SOLUTION SUBCUTANEOUS at 21:28

## 2022-07-12 RX ADMIN — PANTOPRAZOLE SODIUM 40 MG: 40 TABLET, DELAYED RELEASE ORAL at 05:22

## 2022-07-12 RX ADMIN — METFORMIN HYDROCHLORIDE 500 MG: 500 TABLET ORAL at 17:01

## 2022-07-12 RX ADMIN — NYSTATIN 1 APPLICATION: 100000 POWDER TOPICAL at 08:27

## 2022-07-12 RX ADMIN — ENOXAPARIN SODIUM 30 MG: 30 INJECTION SUBCUTANEOUS at 08:19

## 2022-07-12 RX ADMIN — LACTULOSE 10 G: 20 SOLUTION ORAL at 08:19

## 2022-07-12 RX ADMIN — LIDOCAINE 5% 2 PATCH: 700 PATCH TOPICAL at 08:19

## 2022-07-12 RX ADMIN — RIFAXIMIN 550 MG: 550 TABLET ORAL at 21:29

## 2022-07-12 RX ADMIN — INSULIN GLARGINE 32 UNITS: 100 INJECTION, SOLUTION SUBCUTANEOUS at 08:20

## 2022-07-12 RX ADMIN — NADOLOL 20 MG: 20 TABLET ORAL at 08:26

## 2022-07-12 RX ADMIN — ASPIRIN 81 MG CHEWABLE TABLET 81 MG: 81 TABLET CHEWABLE at 08:19

## 2022-07-12 NOTE — PCC OCCUPATIONAL THERAPY
Family training has been completed with pt's daughter in law Devon Gonzalez who will be assisting pt at home  No barriers with d/c home Thursday with continued home OT services

## 2022-07-12 NOTE — PROGRESS NOTES
07/12/22 1330   Pain Assessment   Pain Assessment Tool 0-10   Pain Score 8   Pain Location/Orientation Orientation: Left; Location: Arm   Pain Onset/Description Onset: Ongoing   Effect of Pain on Daily Activities limits finding position of comfort   Patient's Stated Pain Goal No pain   Hospital Pain Intervention(s) Repositioned; Ambulation/increased activity; Emotional support;Cold applied   Multiple Pain Sites Yes   Pain 2   Pain Score 2 7   Pain Location/Orientation 2 Location: Buttocks   Pain Onset/Description 2 Onset: Ongoing   Patient's Stated Pain Goal 2 No pain   Hospital Pain Intervention(s) 2 Repositioned; Ambulation/increased activity; Emotional support   Restrictions/Precautions   Precautions Fall Risk;Pressure Ulcer;Supervision on toilet/commode;Pain;Pacemaker   Weight Bearing Restrictions Yes   LUE Weight Bearing Per Order NWB   ROM Restrictions Yes   LUE ROM Restriction Range Limitation  (no AROM  Ok for PROM limited to 45 degrees )   Braces or Orthoses Sling  (LUE  B/L LE ace wrap)   Cognition   Overall Cognitive Status Impaired   Arousal/Participation Responsive; Cooperative   Attention Attends with cues to redirect   Orientation Level Oriented X4   Memory Decreased recall of precautions;Decreased recall of recent events;Decreased short term memory   Following Commands Follows multistep commands with increased time or repetition   Comments Pt alert and cooperative during PT session  Pt cont to have pain in LUE and buttocks which limits her ability to find a position of comfort  Pt's BP intermittently low throughout session, no s/s of dizziness or LOB  Cont to monitor throughout, BP increased w/ activity  RNJohn made aware  Cont to monitor in future sessions  Subjective   Subjective "I use to love fishing and cutting wood "   Sit to Stand   Type of Assistance Needed Supervision   Physical Assistance Level No physical assistance   Comment CS from various surfaces  Pt reports STS transfer easily the more she does it  Sit to Stand CARE Score 4   Bed-Chair Transfer   Type of Assistance Needed Supervision   Physical Assistance Level No physical assistance   Comment CS w/ SPC vs elizabeth walker   Chair/Bed-to-Chair Transfer CARE Score 4   Walk 10 Feet   Type of Assistance Needed Supervision   Physical Assistance Level No physical assistance   Comment w/ SPC vs hemiwalker in room   Walk 10 Feet CARE Score 4   Walk 50 Feet with Two Turns   Type of Assistance Needed Supervision   Physical Assistance Level No physical assistance   Comment w/ SPC   Walk 50 Feet with Two Turns CARE Score 4   Walk 150 Feet   Type of Assistance Needed Incidental touching   Physical Assistance Level No physical assistance   Comment CGA w/ SPC   Walk 150 Feet CARE Score 4   Walking 10 Feet on Uneven Surfaces   Type of Assistance Needed Incidental touching   Physical Assistance Level No physical assistance   Comment CGA w/ SPC   Walking 10 Feet on Uneven Surfaces CARE Score 4   Ambulation   Does the patient walk? 2  Yes   Primary Mode of Locomotion Prior to Admission Walk   Distance Walked (feet) 200 ft  (x 2)   Assist Device Cane   Gait Pattern Inconsistant Ayala; Slow Ayala; Forward Flexion; Step through; Improper weight shift   Limitations Noted In Endurance;Strength;Speed;Swing;Balance   Provided Assistance with: Balance   Walk Assist Level Contact Guard;Close Supervision  (CGA vs CS)   Findings Pt showing improve w/ GT as indicated by her ability to ambulate 200 ft w/ SPC and CGA vs CS  Pt reports that feels better when she is more active     Wheel 50 Feet with Two Turns   Reason if not Attempted Activity not applicable   Wheel 50 Feet with Two Turns CARE Score 9   Wheel 150 Feet   Reason if not Attempted Activity not applicable   Wheel 061 Feet CARE Score 9   Curb or Single Stair   Style negotiated Curb   Type of Assistance Needed Physical assistance   Physical Assistance Level 25% or less   Comment 6" curb step w/ min A x 1 and VC for sequencing + AD management  Pt demonstrated slight LOB w/ ascending curb at first, and was unsure which leg to go up with  Pt requested to turn towards R when descending  No LOB notes on 2nd trial  Pt reports that she plans to use ramp entrance into home and will not need to navigate steps, however suggest cont'd practice to improve strength and confidence  1 Step (Curb) CARE Score 3   4 Steps   Type of Assistance Needed Incidental touching   Physical Assistance Level No physical assistance   Comment CGA w/ unilat HR  Pt reports that has b/l HR at home, however plans to use ramp entrance into home and will not need to navigate steps  Suggest cont'd practice to improve strength and confidence  4 Steps CARE Score 4   12 Steps   Reason if not Attempted Safety concerns   12 Steps CARE Score 88   Stairs   Type Stairs   # of Steps 4   Weight Bearing Precautions Fall Risk;LUE;NWB   Assist Devices Cane   Findings Pt able to navigate 4 steps on stair  w/ CGA  Pt seems more confidence on stair   No evidence of LOB during stair   Cont to work on stairs as a strength exercise  Stairs are currently no a barrier upon DC home  Therapeutic Interventions   Strengthening 5x STS completed twice to improve low BP  Flexibility seated hamstring stretch b/l x 3 min  Balance Ambulate over uneven surface 10 ft w/ SPC  Modalities Ice applied at end of session due to inc in LUE pain after session 7-8/10  Other Gentle PROM of L flex shoulder 5 min no greater than 45*  STM on R upper trap to improve pt comfort in sling  Equipment Use   NuStep 10 min  lvl 1 seat 9   (Cues to keep SPM >40  No use of LUE )   Other Comments   Comments Educated pt on how to perform STS w/ SPC in R hand  VC to push up from palm     Assessment   Treatment Assessment Pt engaged in 90 min skilled PT intervention, mainly focusing on STS transfers, improving ambulation distances w/ SPC, GT over uneven terrian, curb/stair negotiation to improve LE strength, gentle PROM of LUE, and STM to A w/ pain yielding of upper trap  Upon entry, PCA present who reports pt's BP: 90/51  Modified PT tx session due to abnormal vitals  Re-took BP manually at beginning of tx session, seated, R arm: 89/38, no s/s of dizziness/lightheadedness + no evidence of LOB  RN, Brooklyn Barrera notified  Cont'd to monitor vitals + symptoms throughout and provided appropriate rest breaks  Pt cont's to improve w/ PT intervention, needed less A during gait training and stair negotiation, however cont to struggle w/ sequencing and AD mgmt on 6" curb step  Pt reports that she plans to use ramped entrance upon DC home  Recommend cont to work on curb and stair negotiation for LE strengthening and to improve pt's confidence  DC planning discussed w/ pt, who reports that she has no concerns w/ returning home to [place]  When asked about plans for dtr, she reports that her dtr will return home w/ her, however may not be able to provide A 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  Pt cont to benefit from aggressive PT intervention to improve overall fxn and mobility status in order to reach mod I vs S goals upon DC  Pt positioned in [facility] at end of session, w/ ice applied to LUE and all current needs met  Anticipating d/c home next week with recommendation for home PT  Family/Caregiver Present no   Problem List Decreased strength;Decreased range of motion;Decreased endurance; Impaired balance;Decreased mobility; Decreased skin integrity;Obesity;Orthopedic restrictions;Pain;Decreased coordination;Decreased cognition  (pacemaker)   Barriers to Discharge Decreased caregiver support   PT Barriers   Physical Impairment Decreased strength;Decreased range of motion;Decreased endurance; Impaired balance;Decreased mobility; Decreased coordination;Decreased cognition;Obesity; Decreased skin integrity;Orthopedic restrictions;Pain   Functional Limitation Car transfers; Ramp negotiation;Stair negotiation;Standing;Transfers; Walking   Plan   Treatment/Interventions Functional transfer training;LE strengthening/ROM; Elevations; Therapeutic exercise; Endurance training;Cognitive reorientation;Patient/family training;Equipment eval/education; Bed mobility;Gait training; Compensatory technique education;Spoke to nursing   Recommendation   PT Discharge Recommendation Home with home health rehabilitation   Equipment Recommended Cane  (vs HW)   PT Therapy Minutes   PT Time In 1330   PT Time Out 1500   PT Total Time (minutes) 90   PT Mode of treatment - Individual (minutes) 90   PT Mode of treatment - Concurrent (minutes) 0   PT Mode of treatment - Group (minutes) 0   PT Mode of treatment - Co-treat (minutes) 0   PT Mode of Treatment - Total time(minutes) 90 minutes   PT Cumulative Minutes 570   Therapy Time missed   Time missed?  No

## 2022-07-12 NOTE — PLAN OF CARE
Problem: PAIN - ADULT  Goal: Verbalizes/displays adequate comfort level or baseline comfort level  Description: Interventions:  - Encourage patient to monitor pain and request assistance  - Assess pain using appropriate pain scale  - Administer analgesics based on type and severity of pain and evaluate response  - Implement non-pharmacological measures as appropriate and evaluate response  - Consider cultural and social influences on pain and pain management  - Notify physician/advanced practitioner if interventions unsuccessful or patient reports new pain  Outcome: Progressing     Problem: INFECTION - ADULT  Goal: Absence or prevention of progression during hospitalization  Description: INTERVENTIONS:  - Assess and monitor for signs and symptoms of infection  - Monitor lab/diagnostic results  - Monitor all insertion sites, i e  indwelling lines, tubes, and drains  - Monitor endotracheal if appropriate and nasal secretions for changes in amount and color  - Chauvin appropriate cooling/warming therapies per order  - Administer medications as ordered  - Instruct and encourage patient and family to use good hand hygiene technique  - Identify and instruct in appropriate isolation precautions for identified infection/condition  Outcome: Progressing  Goal: Absence of fever/infection during neutropenic period  Description: INTERVENTIONS:  - Monitor WBC    Outcome: Progressing     Problem: SAFETY ADULT  Goal: Patient will remain free of falls  Description: INTERVENTIONS:  - Educate patient/family on patient safety including physical limitations  - Instruct patient to call for assistance with activity   - Consult OT/PT to assist with strengthening/mobility   - Keep Call bell within reach  - Keep bed low and locked with side rails adjusted as appropriate  - Keep care items and personal belongings within reach  - Initiate and maintain comfort rounds  - Make Fall Risk Sign visible to staff  - Offer Toileting every 2-4 Hours, in advance of need  - Initiate/Maintain bed/chair alarm  - Obtain necessary fall risk management equipment: yellow socks  - Apply yellow socks and bracelet for high fall risk patients  - Consider moving patient to room near nurses station  Outcome: Progressing  Goal: Maintain or return to baseline ADL function  Description: INTERVENTIONS:  -  Assess patient's ability to carry out ADLs; assess patient's baseline for ADL function and identify physical deficits which impact ability to perform ADLs (bathing, care of mouth/teeth, toileting, grooming, dressing, etc )  - Assess/evaluate cause of self-care deficits   - Assess range of motion  - Assess patient's mobility; develop plan if impaired  - Assess patient's need for assistive devices and provide as appropriate  - Encourage maximum independence but intervene and supervise when necessary  - Involve family in performance of ADLs  - Assess for home care needs following discharge   - Consider OT consult to assist with ADL evaluation and planning for discharge  - Provide patient education as appropriate  Outcome: Progressing     Problem: DISCHARGE PLANNING  Goal: Discharge to home or other facility with appropriate resources  Description: INTERVENTIONS:  - Identify barriers to discharge w/patient and caregiver  - Arrange for needed discharge resources and transportation as appropriate  - Identify discharge learning needs (meds, wound care, etc )  - Arrange for interpretive services to assist at discharge as needed  - Refer to Case Management Department for coordinating discharge planning if the patient needs post-hospital services based on physician/advanced practitioner order or complex needs related to functional status, cognitive ability, or social support system  Outcome: Progressing     Problem: Prexisting or High Potential for Compromised Skin Integrity  Goal: Skin integrity is maintained or improved  Description: INTERVENTIONS:  - Identify patients at risk for skin breakdown  - Assess and monitor skin integrity  - Assess and monitor nutrition and hydration status  - Monitor labs   - Assess for incontinence   - Turn and reposition patient  - Assist with mobility/ambulation  - Relieve pressure over bony prominences  - Avoid friction and shearing  - Provide appropriate hygiene as needed including keeping skin clean and dry  - Evaluate need for skin moisturizer/barrier cream  - Collaborate with interdisciplinary team   - Patient/family teaching  - Consider wound care consult   Outcome: Progressing     Problem: MOBILITY - ADULT  Goal: Maintain or return to baseline ADL function  Description: INTERVENTIONS:  -  Assess patient's ability to carry out ADLs; assess patient's baseline for ADL function and identify physical deficits which impact ability to perform ADLs (bathing, care of mouth/teeth, toileting, grooming, dressing, etc )  - Assess/evaluate cause of self-care deficits   - Assess range of motion  - Assess patient's mobility; develop plan if impaired  - Assess patient's need for assistive devices and provide as appropriate  - Encourage maximum independence but intervene and supervise when necessary  - Involve family in performance of ADLs  - Assess for home care needs following discharge   - Consider OT consult to assist with ADL evaluation and planning for discharge  - Provide patient education as appropriate  Outcome: Progressing  Goal: Maintains/Returns to pre admission functional level  Description: INTERVENTIONS:  - Perform BMAT or MOVE assessment daily    - Set and communicate daily mobility goal to care team and patient/family/caregiver  - Collaborate with rehabilitation services on mobility goals if consulted  - Perform Range of Motion 3 times a day  - Reposition patient every 2 hours    - Dangle patient 3 times a day  - Stand patient 3 times a day  - Ambulate patient 3 times a day  - Out of bed to chair 3 times a day   - Out of bed for meals 3 times a day  - Out of bed for toileting  - Record patient progress and toleration of activity level   Outcome: Progressing     Problem: Potential for Falls  Goal: Patient will remain free of falls  Description: INTERVENTIONS:  - Educate patient/family on patient safety including physical limitations  - Instruct patient to call for assistance with activity   - Consult OT/PT to assist with strengthening/mobility   - Keep Call bell within reach  - Keep bed low and locked with side rails adjusted as appropriate  - Keep care items and personal belongings within reach  - Initiate and maintain comfort rounds  - Make Fall Risk Sign visible to staff  - Offer Toileting every 2 Hours, in advance of need  - Initiate/Maintain bed/chair alarm  - Obtain necessary fall risk management equipment: nonskid socks  - Apply yellow socks and bracelet for high fall risk patients  - Consider moving patient to room near nurses station  Outcome: Progressing

## 2022-07-12 NOTE — PROGRESS NOTES
PM&R PROGRESS NOTE:  Annie Apple 77 y o  female MRN: [id number]  Unit/Bed#: -74 Encounter: [id number]    Rehab Diagnosis: Impairment of mobility, safety and Activities of Daily Living (ADLs) due to Orthopedic Disorders:  08 9  Other Orthopedic Left Humerus fracture     Etiologic: L dreadmerus fx, Complete Heart Block  Date of Onset: 6/29/22     Date of surgery: 6/29/2022: Cardiac Pacer Implant (Chest)    HPI: Annie Apple is a 77 y o  female with type 2 diabetes, hypertension, depression, hypothyroidism, hx of PAUL cirrhosis, esophageal varices, pancytopenia, HLD, hypothyroidism who presented to the [address] on 6/29 after tripping over her flip flops, falling and sustaining a left humerus fracture treated non-operatively with NWB to the left arm in a sling  She was found to be in complete heart block with HR in the 20-230s range, developing cardiogenic shock requiring pressors and TVP  She underwent a Permanent pacemaker placement on 6/29 with Dr Lillie Carter  Course complicated by CLYDE and blood loss anemia with hemoglobin of 6 9 s/p 1 unit pRBC  Initial ECHO on 6/29/22 showed moderately dilated left ventricular cavity size, LVEF 55%, mild bi-atrial enlargement   There were regional wall motion abnormalities with akinesis of the apical anterior, apical septal, apical inferior, apical lateral walls and the apex   There was hyperkinesis of the basal anterior, basal anteroseptal, basal inferoseptal, basal inferior, basal inferolateral, basal anterolateral, mid anterior, mid anteroseptal, mid inferoseptal, mid inferior, mid inferolateral and mid anterolateral  RVEF was moderately reduced   The LV systolic function was abnormal in a pattern suggestive of apical stress cardiomyopathy   She was started on Losartan by Cardiology and a repeat ECHO done today 7/5/22   It showed LVEF of 50% with grade 1 diastolic dysfunction, akinesis of the apex, hypokinesis of the apical anterior, septal, inferior/lateral walls  The patient was evaluated by the Rehabilitation team and deemed an appropriate candidate for comprehensive inpatient rehabilitation and admitted to the [facility] on 7/5/2022  5:43 PM    SUBJECTIVE:  Patient seen and examined in room  No acute issues issues overnight  Started ranging the left shoulder and tolerating, but still with significant pain  Does not tolerate 2 5mg oxycodone without some sedation, but fully participating  She denies fever, chills, nausea, emesis, cough, shortness of breath  Sleeping ok, appetite is good  ASSESSMENT: Stable, progressing      PLAN:    Rehabilitation   Functional deficits:  Self care, mobility   Continue current rehabilitation plan of care to maximize function   Functional update:   o PT: transfer Min-Mod A, [id number] x2 Min A with SPC  o OT: Mod-Max A for UB and LB ADLs   Estimated Discharge: TBD in teams, reteam    DVT prophylaxis  · lovenox     Pain  · Lidocaine patch  · Oxycodone 2 5mg Q6H PRN     Bladder plan  · Continent     Bowel plan  · Continent, 7/12     Code Status  · Level 1: Full Code      * Closed fracture of proximal end of left humerus  Assessment & Plan  · Displaced comminuted fracture of the humeral head/neck on imaging  · NWB to the left upper limb, maintain in sling  · Ok for for gentle range of motion to prevent adhesive capsulitis- starting today 7/11  · Pain control  · Likely course of acute blood loss anemia, monitor labs  · PT/OT      Complete heart block [facility])  Assessment & Plan  · S/p dual chamber PPM 6/29/22 with Dr René Ch  · Monitor pacemaker site for signs of infection  · No heavy lifting/push/pull >10 lbs    Obesity (BMI 30-39  9)  Assessment & Plan  · BMI 36 03  · Weight loss counseling, promote increased activity    Acute blood loss anemia  Assessment & Plan  · Acute blood loss anemia, Hgb 6 9 previously, s/p transfusion  · Hgb on 7/11 of 8 8  · Will require close follow with labs  · Monitor clinically as well in therapy    Cardiomyopathy [facility])  Assessment & Plan  · Medicine team reached out to Cardiology re: advice with recs for further w/u based on ECHO results from 7/5/22  · Continue Losartan 25mg qd (Cardiology started in hospital)  · Per Cardiology, outpatient follow up, no acute changes based on echo      Depression  Assessment & Plan  Continue home lexapro 5mg daily    Pancytopenia ([address] )  Assessment & Plan  Secondary to cirrhosis, monitor labs    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) (HCC)  Assessment & Plan  · Hx non-bleeding esophageal varices and hepatic encephalopathy   · At home, was on Rifaximin 550 mcg q12hrs/Lactulose 10Gm BID/Lasix 40mg qd/Nadolol 20mg qd/Aldactone 50mg qd  · Here:  Rifaximin 550 mg q12hrs/Lactulose 10Gm BID    Type 2 diabetes mellitus with hyperglycemia, with long-term current use of insulin [facility])  Assessment & Plan  Lab Results   Component Value Date    HGBA1C 8 2 (H) 05/31/2022       Recent Labs     07/11/22  1546 07/11/22  2052 07/12/22  0636 07/12/22  1048   POCGLU 158* 203* 93 175*     · Home:  Janumet XR  BID/Amaryl 8 mg qAM/Basaglar 48U qam, 36U qpm  · Here:  Lantus 32U q12hrs, Metformin 500 mg BID  · QID Accuchecks/SSI and DM diet    Acquired hypothyroidism  Assessment & Plan  Continue levothyroxine 112mcg daily    Essential hypertension  Assessment & Plan  · On losartan 25mg daily  · Previously on Nadolol, aldactone, and lasix  · Medications held based on initial hypotension  · Will start adding back meds as indicated, likely starting with nadolol- started back on 7/7      Hyperlipidemia  Assessment & Plan  Continue statin      Appreciate IM consultants medical co-management  Labs, medications, and imaging personally reviewed  ROS:  A ten point review of systems was completed on 07/12/22 and pertinent positives are listed in subjective section  All other systems reviewed were negative         OBJECTIVE:   BP 90/51 (BP Location: Right arm) Pulse 59   Temp 97 8 °F (36 6 °C) (Oral)   Resp 20   Ht 5' 5" (1 651 m)   Wt 100 kg (220 lb 12 8 oz)   SpO2 99%   BMI 36 74 kg/m²     Physical Exam  Constitutional:       General: She is not in acute distress  Appearance: She is obese  HENT:      Head: Normocephalic and atraumatic  Right Ear: External ear normal       Left Ear: External ear normal       Nose: Nose normal  No rhinorrhea  Mouth/Throat:      Mouth: Mucous membranes are moist       Pharynx: Oropharynx is clear  Eyes:      General: No scleral icterus  Right eye: No discharge  Left eye: No discharge  Cardiovascular:      Rate and Rhythm: Normal rate  Pulmonary:      Effort: Pulmonary effort is normal  No respiratory distress  Breath sounds: No wheezing, rhonchi or rales  Abdominal:      General: There is no distension  Palpations: Abdomen is soft  Musculoskeletal:      Cervical back: Normal range of motion  Right lower leg: Edema present  Left lower leg: Edema present  Comments: Swelling in the arm, unchanged, sling in place, tender to gentle movement  Edema in legs improved with ACE on, more pronounced in feet where the ACE wraps end   Skin:     General: Skin is warm and dry  Comments: Pacer site clean and intact   Neurological:      Mental Status: She is alert and oriented to person, place, and time  Sensory: No sensory deficit  Motor: No weakness     Psychiatric:         Mood and Affect: Mood normal          Behavior: Behavior normal           Lab Results   Component Value Date    WBC 2 90 (L) 07/12/2022    HGB 8 8 (L) 07/12/2022    HCT 29 3 (L) 07/12/2022    MCV 93 07/12/2022    PLT 76 (L) 07/12/2022     Lab Results   Component Value Date    SODIUM 137 07/12/2022    K 5 2 07/12/2022     07/12/2022    CO2 25 07/12/2022    BUN 36 (H) 07/12/2022    CREATININE 1 07 07/12/2022    GLUC 95 07/12/2022    CALCIUM 8 6 07/12/2022     Lab Results   Component Value Date INR 1 33 (H) 06/30/2022    INR 1 20 (H) 06/29/2022    INR 1 23 (H) 05/31/2022    PROTIME 16 0 (H) 06/30/2022    PROTIME 15 1 (H) 06/29/2022    PROTIME 15 3 (H) 05/31/2022           Current Facility-Administered Medications:     aspirin chewable tablet 81 mg, 81 mg, Oral, Daily, Katerina Whalen DO, 81 mg at 07/12/22 9565    bisacodyl (DULCOLAX) rectal suppository 10 mg, 10 mg, Rectal, Daily PRN, Katerina Whalen DO    docusate sodium (COLACE) capsule 100 mg, 100 mg, Oral, BID, Katerina Whalen DO, 100 mg at 07/12/22 0826    enoxaparin (LOVENOX) subcutaneous injection 30 mg, 30 mg, Subcutaneous, Q12H [address], Katerina Whalen DO, 30 mg at 07/12/22 0819    escitalopram (LEXAPRO) tablet 5 mg, 5 mg, Oral, Daily, Katerina Whalen DO, 5 mg at 07/12/22 0819    insulin glargine (LANTUS) subcutaneous injection 32 Units 0 32 mL, 32 Units, Subcutaneous, Q12H [address], Sahra Flannery, CRNP, 32 Units at 07/12/22 0820    insulin lispro (HumaLOG) 100 units/mL subcutaneous injection 2-12 Units, 2-12 Units, Subcutaneous, 4x Daily (with meals and at bedtime), 2 Units at 07/12/22 1144 **AND** Fingerstick Glucose (POCT), , , 4x Daily AC and at bedtime, Katerina Whalen DO    lactulose oral solution 10 g, 10 g, Oral, BID, Katerina Whalen DO, 10 g at 07/12/22 0839    levothyroxine tablet 112 mcg, 112 mcg, Oral, Early Morning, Katerina Whalen DO, 112 mcg at 07/12/22 0522    lidocaine (LIDODERM) 5 % patch 2 patch, 2 patch, Topical, Daily, Katerina Whalen DO, 2 patch at 07/12/22 3194    losartan (COZAAR) tablet 25 mg, 25 mg, Oral, Daily, Katerina Whalen DO, 25 mg at 07/12/22 6152    melatonin tablet 6 mg, 6 mg, Oral, HS, Katerina Whalen DO, 6 mg at 07/11/22 2123    metFORMIN (GLUCOPHAGE) tablet 500 mg, 500 mg, Oral, BID With Meals, JOSE JUAN Barbosa, 500 mg at 07/12/22 0821    [START ON 7/13/2022] nadolol (CORGARD) tablet 20 mg, 20 mg, Oral, Daily, JOSE JUAN Barbosa    nystatin (MYCOSTATIN) powder, , Topical, BID, Elina [place], [address], 1 application at 86/46/04 0827    ondansetron (ZOFRAN-ODT) dispersible tablet 4 mg, 4 mg, Oral, Q6H PRN, Melven Roes, DO, 4 mg at 07/10/22 0857    oxyCODONE (ROXICODONE) IR tablet 2 5 mg, 2 5 mg, Oral, Q6H PRN, Melven Roes, DO, 2 5 mg at 07/12/22 0825    pantoprazole (PROTONIX) EC tablet 40 mg, 40 mg, Oral, Early Morning, Melven Roes, DO, 40 mg at 07/12/22 0522    polyethylene glycol (MIRALAX) packet 17 g, 17 g, Oral, Daily, Melven Roes, DO, 17 g at 07/10/22 0856    pravastatin (PRAVACHOL) tablet 80 mg, 80 mg, Oral, Daily With Dinner, Melven Roes, DO, 80 mg at 07/11/22 1710    rifaximin (XIFAXAN) tablet 550 mg, 550 mg, Oral, Q12H [address], Melven Roes, DO, 550 mg at 07/12/22 0827    senna (SENOKOT) tablet 8 6 mg, 1 tablet, Oral, HS, Melven Roes, DO, 8 6 mg at 07/11/22 2123    Past Medical History:   Diagnosis Date    Anemia     [facility] )     Diabetic neuropathy [facility] )     Esophageal varices (HCC)     Fatty liver     GERD (gastroesophageal reflux disease)     Hepatic encephalopathy (HCC)     Hiatal hernia     Hyperlipidemia     Hypertension     Hypoglycemia     Hypothyroidism     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 ([facility] )     Osteoarthritis     Osteopenia     Pancytopenia (HCC)     Thrombocytopenia (HCC)     Type 2 diabetes mellitus ([facility] )        Patient Active Problem List    Diagnosis Date Noted    Closed fracture of proximal end of left humerus 07/02/2022    Complete heart block ([address] ) 06/30/2022    Obesity (BMI 30-39 9) 07/06/2022    Acute blood loss anemia 07/03/2022    Depression 07/02/2022    Cardiomyopathy ([address] ) 07/02/2022    Esophageal varices without bleeding ([facility] [facility] ) 09/29/2021    Pancytopenia ([facility] [facility] ) 08/06/2021    Iron deficiency anemia due to chronic blood loss 08/06/2021    Mild nonproliferative diabetic retinopathy of both eyes without macular edema associated with type 2 diabetes mellitus [facility] ) 07/16/2021    Urinary tract infection 09/15/2020    Hepatic encephalopathy ([place] [id number] ) 09/13/2020    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) ([place] [id number] ) 09/13/2020    Thrombocytopenia ([place] [address] ) 09/13/2020    Hyperlipidemia 07/24/2018    Essential hypertension 07/24/2018    Acquired hypothyroidism 07/24/2018    Type 2 diabetes mellitus with hyperglycemia, with long-term current use of insulin ([place] [id number] ) 07/24/2018    Diabetic polyneuropathy associated with type 2 diabetes mellitus ([place] [id number] ) 07/24/2018          Keyana Cespedes DO  Physical Medicine and [facility]    Total time spent:  35 minutes, with more than 50% spent counseling/coordinating care  Counseling includes discussion with patient re: progress in therapies, functional issues observed by therapy staff, and discussion with patient his/her current medical state/wellbeing  Coordination of patient's care was performed in conjunction with Internal Medicine service to monitor patient's labs, vitals, and management of their comorbidities

## 2022-07-12 NOTE — PROGRESS NOTES
07/12/22 0700   Pain Assessment   Pain Assessment Tool 0-10   Pain Score 10 - Worst Possible Pain   Pain Location/Orientation Orientation: Left; Location: Shoulder; Location: Neck   Pain Onset/Description Onset: Gradual;Onset: Ongoing   Effect of Pain on Daily Activities limits participation in IND with self care tasks   Hospital Pain Intervention(s) Medication (See MAR); Repositioned;Relaxation technique; Rest;Shower/Bath   Restrictions/Precautions   Precautions Fall Risk;Pain;Supervision on toilet/commode   Weight Bearing Restrictions Yes   LUE Weight Bearing Per Order NWB   ROM Restrictions Yes   LUE ROM Restriction   (ok for passive rom of the arm and shoulder, no overhead, limit to maximum 45 degrees  Would start no earlier than 7/11)   Lifestyle   Autonomy "I'm just not comfortable"   Eating   Type of Assistance Needed Set-up / clean-up   Physical Assistance Level No physical assistance   Comment seated in recliner   Eating CARE Score 5   Oral Hygiene   Comment declined to complete until after breakfast   Shower/Bathe Self   Type of Assistance Needed Physical assistance   Physical Assistance Level 76% or more   Comment pt completes sponge bathing routine seated in recliner with set up of all ADL items  pt requires ext assist this date due to inc L UE/shoulder/neck pain as well as assist for thoroughness under abdominal fold, groin and rear due to body habitus  assist to manage L UE so pt can bathe under L UE     Shower/Bathe Self CARE Score 2   Upper Body Dressing   Type of Assistance Needed Physical assistance   Physical Assistance Level 76% or more   Comment total assist to thread L UE, pt able to thread R UE, assist to thread head and adjust over trunk; total assist for elevator sling management   Upper Body Dressing CARE Score 2   Lower Body Dressing   Type of Assistance Needed Physical assistance   Physical Assistance Level 51%-75%   Comment seated in recliner pt able to thread L LE into pants, requires assist to thread R LE; pt stands with CG for CM in which she is able to complete on R side, assist for L side   Lower Body Dressing CARE Score 2   Putting On/Taking Off Footwear   Type of Assistance Needed Physical assistance   Physical Assistance Level 76% or more   Comment pt able to doff L sock, requires assist to doff R sock, total assist for donning B socks  total assist for B/L LE ace wrapping for edema management  Putting On/Taking Off Footwear CARE Score 2   Sit to Stand   Type of Assistance Needed Physical assistance   Physical Assistance Level 25% or less   Comment from recliner, EOB to HW   Sit to Stand CARE Score 3   Bed-Chair Transfer   Type of Assistance Needed Physical assistance   Physical Assistance Level 25% or less   Comment CG/min assist with HW   Chair/Bed-to-Chair Transfer CARE Score 3   Activity Tolerance   Activity Tolerance Patient limited by pain   Medical Staff Made Aware RN informed of pt pain and provided pain medication   Assessment   Treatment Assessment pt engages in 90 minute skilled OT session focusing on ADL routine, func transfers, standing olegario/bal and L UE ROM  see above for full func details  pt noted with inc pain in L shoulder/neck this date however upon arrival, was seated poorly in recliner chair stating she'd been there "for an hour or so"  edu provided to ring jc bell to be repositioned if uncomfortable  pt requires ext ADL assist this date due to pain in L UE as well as requiring inc time to complete self care tasks due to pain  pt poorly tolerates basic ROM in order to don/doff shirt and bathe L arm pit  after ADL completion, pt repositioned in recliner chair with ext edu provided on appropriate L UE placement in sling as pt continues with L UE resting in sling on side vs in front of abdomen/chest in more neutral position   recommend continued skilled care to focus on ADL retraining, func transfers, standing olegario/bal, L UE AROM following MD orders, IADLs, in order to decrease burden of care at d/c  Prognosis Good   Problem List Decreased strength;Decreased range of motion;Decreased endurance; Impaired balance;Decreased mobility; Impaired sensation; Impaired tone;Decreased skin integrity;Obesity;Orthopedic restrictions;Pain   Barriers to Discharge Inaccessible home environment;Decreased caregiver support   Plan   Treatment/Interventions ADL retraining;Functional transfer training; Therapeutic exercise; Endurance training;Patient/family training;Equipment eval/education; Compensatory technique education   OT Therapy Minutes   OT Time In 0700   OT Time Out 0830   OT Total Time (minutes) 90   OT Mode of treatment - Individual (minutes) 90   OT Mode of treatment - Concurrent (minutes) 0   OT Mode of treatment - Group (minutes) 0   OT Mode of treatment - Co-treat (minutes) 0   OT Mode of Treatment - Total time(minutes) 90 minutes   OT Cumulative Minutes 530   Therapy Time missed   Time missed?  No

## 2022-07-12 NOTE — PROGRESS NOTES
Internal Medicine Progress Note  Patient: Maday Hogan  Age/sex: 77 y o  female  Medical Record #: [id number]      ASSESSMENT/PLAN: (Interval History)  Maday Hogan is seen and examined and management for following issues:    Left proximal humerus fracture  · Non-op  · NWB  · Sling for comfort  · See Ortho as OP     CHB  · S/p dual chamber PPM 6/29/22  · No heavy lift/push/pull >10 lbs  · No left arm above shoulder height      Stress cardiomyopathy  · D/W Dr Dayana Peck he said no further intervention for now but see back in office  · Continue Losartan 25mg qd (Cardiology started in hospital)     ABLA  · S/p 1 unit PRBCs  · Stable     PAUL cirrhosis  · Hx non-bleeding esophageal varices (banded) and hepatic encephalopathy   · Sees Dr Zaid Bailon at [facility] as OP  · Home:  Rifaximin 550 mcg q12hrs/Lactulose 10Gm BID/Lasix 40mg qd/Corgard 20mg qd/Aldactone 50mg qd  · Here:  Rifaximin 550 mg q12hrs/Lactulose 10Gm BID/Corgard 20mg qd (hold SBP <120)  · Consider adding back a small dose of Lasix when BP allows     DM2  · Home:  Janumet XR  BID/Amaryl 8 mg qAM/Basaglar 48U qam, 36U qpm  · Here:  Lantus 32U q12hrs/Metformin 500 mg BID   · QID Accuchecks/SSI and DM diet  · FBS low 7/10 AM and Lantus was skipped altogether --> make sure patient has qhs snack  · No changes today     Pancytopenia  · Is 2/2 to cirrhosis/portal HTN  Additionally, has splenomegaly contributing to low platelet ct  · Stable today  · Hemoglobin was down to 8 1 on 7/8/22  · Has had tx with Venofer in past   Sees Dr Dexter Bamberger from [facility]     Hypothyroidism  · Continue Levothyroxine 112 mcg qd     Depression  · Continue Lexapro as at home     Hyperkalemia  · Mild at 5 2 but may need to stop ARB if continues to climb  · Recheck 7/14/22       Discharge date:  Team       The above assessment and plan was reviewed and updated as determined by my evaluation of the patient on 7/12/2022      Labs:   Results from last 7 days   Lab Units 07/12/22  0551 07/08/22  0501   WBC Thousand/uL 2 90* 3 19*   HEMOGLOBIN g/dL 8 8* 8 1*   HEMATOCRIT % 29 3* 26 9*   PLATELETS Thousands/uL 76* 67*     Results from last 7 days   Lab Units 07/12/22  0518 07/08/22  0501   SODIUM mmol/L 137 136   POTASSIUM mmol/L 5 2 4 7   CHLORIDE mmol/L 107 107   CO2 mmol/L 25 25   BUN mg/dL 36* 35*   CREATININE mg/dL 1 07 0 91   CALCIUM mg/dL 8 6 8 4             Results from last 7 days   Lab Units 07/12/22  1048 07/12/22  0636 07/11/22 2052   POC GLUCOSE mg/dl 175* 93 203*       Review of Scheduled Meds:  Current Facility-Administered Medications   Medication Dose Route Frequency Provider Last Rate    aspirin  81 mg Oral Daily Reneaatul Keller, DO      bisacodyl  10 mg Rectal Daily PRN Reneaatul Keller, DO      docusate sodium  100 mg Oral BID Renealle Longest, DO      enoxaparin  30 mg Subcutaneous Q12H [address] Reneae Longest, DO      escitalopram  5 mg Oral Daily Reneae Longest, DO      insulin glargine  32 Units Subcutaneous Q12H [address] JOSE JUAN Mathews      insulin lispro  2-12 Units Subcutaneous 4x Daily (with meals and at bedtime) Reneaatul Keller, DO      lactulose  10 g Oral BID Renealle Longest, DO      levothyroxine  112 mcg Oral Early Morning Reneae Longest, DO      lidocaine  2 patch Topical Daily Reneae Longest, DO      losartan  25 mg Oral Daily Reneae Longest, DO      melatonin  6 mg Oral HS Renealle Longest, DO      metFORMIN  500 mg Oral BID With Meals JOSE JUAN Guerra      nadolol  20 mg Oral Daily Monique Mauricio, JOSE JUAN      nystatin   Topical BID JOSE JUAN Infante      ondansetron  4 mg Oral Q6H PRN Reneasteph Keller, DO      oxyCODONE  2 5 mg Oral Q6H PRN Reneaatul Keller, DO      pantoprazole  40 mg Oral Early Morning Reneasteph Keller, DO      polyethylene glycol  17 g Oral Daily Renea Arianna, DO      pravastatin  80 mg Oral Daily With [employer] Edy Jaquez, DO      rifaximin  550 mg Oral Q12H [address] Renea Keller, DO      senna  1 tablet Oral HS Renea Keller, DO Subjective/ HPI: Patient seen and examined  Patients overnight issues or events were reviewed with nursing or staff during rounds or morning huddle session  New or overnight issues include the following:     No new or overnight issues  Offers no complaints    ROS:   A 10 point ROS was performed; negative except as noted above  Imaging:     No orders to display       *Labs /Radiology studies reviewed  *Medications reviewed and reconciled as needed  *Please refer to order section for additional ordered labs studies  *Case discussed with primary attending during morning huddle case rounds    Physical Examination:  Vitals:   Vitals:    07/11/22 1304 07/11/22 2216 07/12/22 0506 07/12/22 0826   BP: 102/54 109/53 104/54 136/61   BP Location: Right arm Right arm Right arm    Pulse: 67 60 61 60   Resp: 18 17 18    Temp: 98 °F (36 7 °C) 98 °F (36 7 °C) 98 1 °F (36 7 °C)    TempSrc: Oral Oral Oral    SpO2: 96% 100% 100%    Weight:       Height:           General Appearance: no distress, conversive  HEENT: PERRLA, conjuctiva normal; oropharynx clear; mucous membranes moist   Neck:  Supple, normal ROM  Lungs: CTA, normal respiratory effort, no retractions, expiratory effort normal  CV: regular rate and rhythm; no rubs/murmurs/gallops, PMI normal   PPM site is w/o erythema/drainage  ABD: soft; ND/NT; +BS  EXT: no edema  Skin: normal turgor, normal texture, no rashes  Psych: affect normal, mood normal  Neuro: AAO     The above physical exam was reviewed and updated as determined by my evaluation of the patient on 7/12/2022  Invasive Devices  Report    None                    VTE Pharmacologic Prophylaxis: Enoxaparin  Code Status: Level 1 - Full Code  Current Length of Stay: 7 day(s)      Total time spent:  30 minutes with more than 50% spent counseling/coordinating care  Counseling includes discussion with patient re: progress  and discussion with patient of his/her current medical state/information   Coordination of patient's care was performed in conjunction with primary service  Time invested included review of patient's labs, vitals, and management of their comorbidities with continued monitoring  In addition, this patient was discussed with medical team including physician and advanced extenders  The care of the patient was extensively discussed and appropriate treatment plan was formulated unique for this patient  ** Please Note:  voice to text software may have been used in the creation of this document   Although proof errors in transcription or interpretation are a potential of such software**

## 2022-07-13 LAB
GLUCOSE SERPL-MCNC: 116 MG/DL (ref 65–140)
GLUCOSE SERPL-MCNC: 146 MG/DL (ref 65–140)
GLUCOSE SERPL-MCNC: 153 MG/DL (ref 65–140)
GLUCOSE SERPL-MCNC: 87 MG/DL (ref 65–140)

## 2022-07-13 PROCEDURE — 99233 SBSQ HOSP IP/OBS HIGH 50: CPT | Performed by: STUDENT IN AN ORGANIZED HEALTH CARE EDUCATION/TRAINING PROGRAM

## 2022-07-13 PROCEDURE — 97535 SELF CARE MNGMENT TRAINING: CPT

## 2022-07-13 PROCEDURE — 99232 SBSQ HOSP IP/OBS MODERATE 35: CPT | Performed by: INTERNAL MEDICINE

## 2022-07-13 PROCEDURE — 82948 REAGENT STRIP/BLOOD GLUCOSE: CPT

## 2022-07-13 PROCEDURE — 97116 GAIT TRAINING THERAPY: CPT

## 2022-07-13 PROCEDURE — 97530 THERAPEUTIC ACTIVITIES: CPT

## 2022-07-13 RX ORDER — LOSARTAN POTASSIUM 25 MG/1
12.5 TABLET ORAL DAILY
Status: DISCONTINUED | OUTPATIENT
Start: 2022-07-14 | End: 2022-07-18

## 2022-07-13 RX ADMIN — LIDOCAINE 5% 2 PATCH: 700 PATCH TOPICAL at 08:02

## 2022-07-13 RX ADMIN — RIFAXIMIN 550 MG: 550 TABLET ORAL at 08:20

## 2022-07-13 RX ADMIN — ASPIRIN 81 MG CHEWABLE TABLET 81 MG: 81 TABLET CHEWABLE at 08:02

## 2022-07-13 RX ADMIN — INSULIN LISPRO 2 UNITS: 100 INJECTION, SOLUTION INTRAVENOUS; SUBCUTANEOUS at 21:56

## 2022-07-13 RX ADMIN — DOCUSATE SODIUM 100 MG: 100 CAPSULE, LIQUID FILLED ORAL at 17:13

## 2022-07-13 RX ADMIN — LACTULOSE 10 G: 20 SOLUTION ORAL at 17:13

## 2022-07-13 RX ADMIN — MELATONIN 6 MG: at 21:29

## 2022-07-13 RX ADMIN — LEVOTHYROXINE SODIUM 112 MCG: 112 TABLET ORAL at 06:08

## 2022-07-13 RX ADMIN — PANTOPRAZOLE SODIUM 40 MG: 40 TABLET, DELAYED RELEASE ORAL at 06:08

## 2022-07-13 RX ADMIN — DOCUSATE SODIUM 100 MG: 100 CAPSULE, LIQUID FILLED ORAL at 08:02

## 2022-07-13 RX ADMIN — ESCITALOPRAM OXALATE 5 MG: 10 TABLET ORAL at 08:03

## 2022-07-13 RX ADMIN — NYSTATIN: 100000 POWDER TOPICAL at 08:20

## 2022-07-13 RX ADMIN — PRAVASTATIN SODIUM 80 MG: 80 TABLET ORAL at 17:13

## 2022-07-13 RX ADMIN — NADOLOL 20 MG: 20 TABLET ORAL at 08:20

## 2022-07-13 RX ADMIN — RIFAXIMIN 550 MG: 550 TABLET ORAL at 21:30

## 2022-07-13 RX ADMIN — ENOXAPARIN SODIUM 30 MG: 30 INJECTION SUBCUTANEOUS at 08:03

## 2022-07-13 RX ADMIN — METFORMIN HYDROCHLORIDE 500 MG: 500 TABLET ORAL at 17:13

## 2022-07-13 RX ADMIN — ENOXAPARIN SODIUM 30 MG: 30 INJECTION SUBCUTANEOUS at 21:29

## 2022-07-13 RX ADMIN — INSULIN GLARGINE 32 UNITS: 100 INJECTION, SOLUTION SUBCUTANEOUS at 21:28

## 2022-07-13 RX ADMIN — INSULIN GLARGINE 32 UNITS: 100 INJECTION, SOLUTION SUBCUTANEOUS at 08:06

## 2022-07-13 RX ADMIN — LOSARTAN POTASSIUM 25 MG: 25 TABLET, FILM COATED ORAL at 08:03

## 2022-07-13 RX ADMIN — LACTULOSE 10 G: 20 SOLUTION ORAL at 08:02

## 2022-07-13 RX ADMIN — METFORMIN HYDROCHLORIDE 500 MG: 500 TABLET ORAL at 08:03

## 2022-07-13 RX ADMIN — SENNOSIDES 8.6 MG: 8.6 TABLET, FILM COATED ORAL at 21:29

## 2022-07-13 NOTE — PROGRESS NOTES
PM&R PROGRESS NOTE:  Naresh Hodge 77 y o  female MRN: [id number]  Unit/Bed#: -00 Encounter: [id number]    Rehab Diagnosis: Impairment of mobility, safety and Activities of Daily Living (ADLs) due to Orthopedic Disorders:  08 9  Other Orthopedic Left Humerus fracture     Etiologic: L beau fx, Complete Heart Block  Date of Onset: 6/29/22     Date of surgery: 6/29/2022: Cardiac Pacer Implant (Chest)    HPI: Naresh Hodge is a 77 y o  female with type 2 diabetes, hypertension, depression, hypothyroidism, hx of PAUL cirrhosis, esophageal varices, pancytopenia, HLD, hypothyroidism who [facility] to [facility] after tripping over her flip flops, falling and sustaining a left humerus fracture treated non-operatively with NWB to the left arm in a sling  She was found to be in complete heart block with HR in the 20-230s range, developing cardiogenic shock requiring pressors and TVP  She underwent a Permanent pacemaker placement on 6/29 with Dr Abernathy Calloway  Course complicated by CLYDE and blood loss anemia with hemoglobin of 6 9 s/p 1 unit pRBC  Initial ECHO on 6/29/22 showed moderately dilated left ventricular cavity size, LVEF 55%, mild bi-atrial enlargement   There were regional wall motion abnormalities with akinesis of the apical anterior, apical septal, apical inferior, apical lateral walls and the apex   There was hyperkinesis of the basal anterior, basal anteroseptal, basal inferoseptal, basal inferior, basal inferolateral, basal anterolateral, mid anterior, mid anteroseptal, mid inferoseptal, mid inferior, mid inferolateral and mid anterolateral  RVEF was moderately reduced   The LV systolic function was abnormal in a pattern suggestive of apical stress cardiomyopathy   She was started on Losartan by Cardiology and a repeat ECHO done today 7/5/22   It showed LVEF of 50% with grade 1 diastolic dysfunction, akinesis of the apex, hypokinesis of the apical anterior, septal, inferior/lateral walls  The patient was evaluated by the Rehabilitation team and deemed an appropriate candidate for comprehensive inpatient rehabilitation and admitted to the [facility] on 7/5/2022  5:43 PM    SUBJECTIVE:  Patient seen and examined in room  No acute events overnight  Still with some arm pain but doing ok  She is more alert and awake today, was sedated from the oxycodone and doing better today  Still fully participating  Having BMs  Asked nursing to take a picture of the sacral area to review again regarding treatment and offloading  Continue frequent turns and repositioning  She is ambulating well, still with more ADL based needs and bed mobility, but plans for discharge next Thursday  She denies fever, chills, nausea, emesis, cough, shortness of breath, diarrhea, constipation  ASSESSMENT: Stable, progressing    PLAN:    Rehabilitation   Functional deficits:  Self care, mobility   Continue current rehabilitation plan of care to maximize function       Functional update:   Physical Therapy Occupational Therapy Speech Therapy   Weight Bearing Status: Non-weight bearing (LUE)  Transfers: Supervision  Bed Mobility: Moderate Assistance  Amulation Distance (ft): 200 feet  Ambulation: Incidental Touching, Supervision  Assistive Device for Ambulation: Single Point Cane  Wheelchair Mobility Distance:  (N/A)  Number of Stairs: 4  Assistive Device for Stairs: Single Point [facility] for curb step; b/l HRs for steps)  Stair Assistance: Minimal Assistance  Ramp: Incidental Touching  Assistive Device for Ramp: Single [employer]  Discharge Recommendations: Home with:  [address] with[de-identified] Family Support, First Floor Setup, Home Physical Therapy   Eating: Supervision  Grooming: Supervision  Bathing: Maximum Assistance  Bathing: Maximum Assistance  Upper Body Dressing: Maximum Assistance  Lower Body Dressing: Maximum Assistance  Toileting: Minimal Assistance  Toilet Transfer: Minimal Assistance  Cognition: Within Defined Limits  Orientation: Person, Place, Time, Situation                  Estimated Discharge: 7/21    DVT prophylaxis  · lovenox     Pain  · Lidocaine patch  · Oxycodone 2 5mg Q6H PRN     Bladder plan  · Continent     Bowel plan  · Continent, 7/12     Code Status  · Level 1: Full Code      * Closed fracture of proximal end of left humerus  Assessment & Plan  · Displaced comminuted fracture of the humeral head/neck on imaging  · NWB to the left upper limb, maintain in sling  · Ok for for gentle range of motion to prevent adhesive capsulitis- starting today 7/11  · Pain control  · Likely course of acute blood loss anemia, monitor labs  · PT/OT  · Follow up with Dr Royce Schwab per orthopaedics, signed off  Complete heart block [facility])  Assessment & Plan  · S/p dual chamber PPM 6/29/22 with Dr Annabel Roca  · Monitor pacemaker site for signs of infection  · No heavy lifting/push/pull >10 lbs    Obesity (BMI 30-39  9)  Assessment & Plan  · BMI 36 03  · Weight loss counseling, promote increased activity    Acute blood loss anemia  Assessment & Plan  · Acute blood loss anemia, Hgb 6 9 previously, s/p transfusion  · Hgb on 7/12 of 8 8  · Will require close follow with labs  · Monitor clinically as well in therapy    Cardiomyopathy [facility])  Assessment & Plan  · Medicine team reached out to Cardiology re: advice with recs for further w/u based on ECHO results from 7/5/22  · Continue Losartan 25mg qd (Cardiology started in hospital)  · Per Cardiology, outpatient follow up, no acute changes based on echo      Depression  Assessment & Plan  Continue home lexapro 5mg daily    Pancytopenia ([address] )  Assessment & Plan  Secondary to cirrhosis, monitor labs    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) (HCC)  Assessment & Plan  · Hx non-bleeding esophageal varices and hepatic encephalopathy   · At home, was on Rifaximin 550 mcg q12hrs/Lactulose 10Gm BID/Lasix 40mg qd/Nadolol 20mg qd/Aldactone 50mg qd  · Here:  Rifaximin 550 mg q12hrs/Lactulose 10Gm BID    Type 2 diabetes mellitus with hyperglycemia, with long-term current use of insulin [facility])  Assessment & Plan  Lab Results   Component Value Date    HGBA1C 8 2 (H) 05/31/2022       Recent Labs     07/12/22  1541 07/12/22  2101 07/13/22  0628 07/13/22  1022   POCGLU 132 168* 87 146*     · Home:  Janumet XR  BID/Amaryl 8 mg qAM/Basaglar 48U qam, 36U qpm  · Here:  Lantus 32U q12hrs, Metformin 500 mg BID  · QID Accuchecks/SSI and DM diet    Acquired hypothyroidism  Assessment & Plan  Continue levothyroxine 112mcg daily    Essential hypertension  Assessment & Plan  · On losartan 25mg daily  · Previously on Nadolol, aldactone, and lasix  · Medications held based on initial hypotension  · Will start adding back meds as indicated, likely starting with nadolol- started back on 7/7      Hyperlipidemia  Assessment & Plan  Continue statin      Appreciate IM consultants medical co-management  Labs, medications, and imaging personally reviewed  ROS:  A ten point review of systems was completed on 07/13/22 and pertinent positives are listed in subjective section  All other systems reviewed were negative  OBJECTIVE:   /53 (BP Location: Right arm)   Pulse 64   Temp 97 9 °F (36 6 °C) (Oral)   Resp 18   Ht 5' 5" (1 651 m)   Wt 98 7 kg (217 lb 9 5 oz)   SpO2 100%   BMI 36 21 kg/m²     Physical Exam  Constitutional:       General: She is not in acute distress  Appearance: She is obese  Comments: tired   HENT:      Head: Normocephalic and atraumatic  Right Ear: External ear normal       Left Ear: External ear normal       Nose: Nose normal  No rhinorrhea  Mouth/Throat:      Mouth: Mucous membranes are moist       Pharynx: Oropharynx is clear  Eyes:      General: No scleral icterus  Right eye: No discharge  Left eye: No discharge  Cardiovascular:      Rate and Rhythm: Normal rate     Pulmonary:      Effort: Pulmonary effort is normal  No respiratory distress  Breath sounds: No wheezing, rhonchi or rales  Abdominal:      General: There is no distension  Palpations: Abdomen is soft  Musculoskeletal:      Cervical back: Normal range of motion  Right lower leg: Edema present  Left lower leg: Edema present  Comments: Swelling in the arm, unchanged, sling in place, tender to gentle movement, but less pain with range  Skin:     General: Skin is warm and dry  Comments: Pacer site clean and intact   Neurological:      Mental Status: She is alert and oriented to person, place, and time  Sensory: No sensory deficit  Motor: No weakness     Psychiatric:         Mood and Affect: Mood normal          Behavior: Behavior normal           Lab Results   Component Value Date    WBC 2 90 (L) 07/12/2022    HGB 8 8 (L) 07/12/2022    HCT 29 3 (L) 07/12/2022    MCV 93 07/12/2022    PLT 76 (L) 07/12/2022     Lab Results   Component Value Date    SODIUM 137 07/12/2022    K 5 2 07/12/2022     07/12/2022    CO2 25 07/12/2022    BUN 36 (H) 07/12/2022    CREATININE 1 07 07/12/2022    GLUC 95 07/12/2022    CALCIUM 8 6 07/12/2022     Lab Results   Component Value Date    INR 1 33 (H) 06/30/2022    INR 1 20 (H) 06/29/2022    INR 1 23 (H) 05/31/2022    PROTIME 16 0 (H) 06/30/2022    PROTIME 15 1 (H) 06/29/2022    PROTIME 15 3 (H) 05/31/2022           Current Facility-Administered Medications:     aspirin chewable tablet 81 mg, 81 mg, Oral, Daily, Zachary Kan DO, 81 mg at 07/13/22 0802    bisacodyl (DULCOLAX) rectal suppository 10 mg, 10 mg, Rectal, Daily PRN, Zachary Kan DO    docusate sodium (COLACE) capsule 100 mg, 100 mg, Oral, BID, Zachary Kan DO, 100 mg at 07/13/22 0802    enoxaparin (LOVENOX) subcutaneous injection 30 mg, 30 mg, Subcutaneous, Q12H [facility], Zachary Kan DO, 30 mg at 07/13/22 0803    escitalopram (LEXAPRO) tablet 5 mg, 5 mg, Oral, Daily, Zachary Kan DO, 5 mg at 07/13/22 0803    insulin glargine (LANTUS) subcutaneous injection 32 Units 0 32 mL, 32 Units, Subcutaneous, Q12H [facility], JOSE JUAN Mathews, 32 Units at 07/13/22 0806    insulin lispro (HumaLOG) 100 units/mL subcutaneous injection 2-12 Units, 2-12 Units, Subcutaneous, 4x Daily (with meals and at bedtime), 2 Units at 07/12/22 2131 **AND** Fingerstick Glucose (POCT), , , 4x Daily AC and at bedtime, Riana Escobar DO    lactulose oral solution 10 g, 10 g, Oral, BID, Riana Escobar DO, 10 g at 07/13/22 0802    levothyroxine tablet 112 mcg, 112 mcg, Oral, Early Morning, Riana Escobar DO, 112 mcg at 07/13/22 0608    lidocaine (LIDODERM) 5 % patch 2 patch, 2 patch, Topical, Daily, iRana Escobar DO, 2 patch at 07/13/22 0802    [START ON 7/14/2022] losartan (COZAAR) tablet 12 5 mg, 12 5 mg, Oral, Daily, JOSE JUAN Pena    melatonin tablet 6 mg, 6 mg, Oral, HS, Riana Escobar DO, 6 mg at 07/12/22 2128    metFORMIN (GLUCOPHAGE) tablet 500 mg, 500 mg, Oral, BID With Meals, JOSE JUAN Pena, 500 mg at 07/13/22 0803    nadolol (CORGARD) tablet 20 mg, 20 mg, Oral, Daily, JOSE JUAN Pena, 20 mg at 07/13/22 0820    nystatin (MYCOSTATIN) powder, , Topical, BID, JOSE JUAN Pena, Given at 07/13/22 0820    ondansetron (ZOFRAN-ODT) dispersible tablet 4 mg, 4 mg, Oral, Q6H PRN, Riana Escobar DO, 4 mg at 07/10/22 0857    oxyCODONE (ROXICODONE) IR tablet 2 5 mg, 2 5 mg, Oral, Q6H PRN, Riana Escobar DO, 2 5 mg at 07/12/22 0825    pantoprazole (PROTONIX) EC tablet 40 mg, 40 mg, Oral, Early Morning, Riana Escobar DO, 40 mg at 07/13/22 0608    polyethylene glycol (MIRALAX) packet 17 g, 17 g, Oral, Daily, Riana Escobar DO, 17 g at 07/10/22 0856    pravastatin (PRAVACHOL) tablet 80 mg, 80 mg, Oral, Daily With Dinner, Riana Escobar DO, 80 mg at 07/12/22 1701    rifaximin (XIFAXAN) tablet 550 mg, 550 mg, Oral, Q12H [facility], Riana Escobar DO, 550 mg at 07/13/22 0820    senna (SENOKOT) tablet 8 6 mg, 1 tablet, Oral, HS, Lolly Rivas DO, 8 6 mg at 07/12/22 2128    Past Medical History:   Diagnosis Date    Anemia     Cirrhosis ([facility] )     Diabetic neuropathy ([facility] )     Esophageal varices ([facility] )     Fatty liver     GERD (gastroesophageal reflux disease)     Hepatic encephalopathy (HCC)     Hiatal hernia     Hyperlipidemia     Hypertension     Hypoglycemia     Hypothyroidism     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)     Osteoarthritis     Osteopenia     Pancytopenia (HCC)     Thrombocytopenia (HCC)     Type 2 diabetes mellitus ([facility] )        Patient Active Problem List    Diagnosis Date Noted    Closed fracture of proximal end of left humerus 07/02/2022    Complete heart block ([facility] ) 06/30/2022    Obesity (BMI 30-39 9) 07/06/2022    Acute blood loss anemia 07/03/2022    Depression 07/02/2022    Cardiomyopathy ([facility] ) 07/02/2022    Esophageal varices without bleeding ([facility] ) 09/29/2021    Pancytopenia ([facility] ) 08/06/2021    Iron deficiency anemia due to chronic blood loss 08/06/2021    Mild nonproliferative diabetic retinopathy of both eyes without macular edema associated with type 2 diabetes mellitus ([facility] ) 07/16/2021    Urinary tract infection 09/15/2020    Hepatic encephalopathy ([facility] ) 09/13/2020    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) ([facility] ) 09/13/2020    Thrombocytopenia ([facility] ) 09/13/2020    Hyperlipidemia 07/24/2018    Essential hypertension 07/24/2018    Acquired hypothyroidism 07/24/2018    Type 2 diabetes mellitus with hyperglycemia, with long-term current use of insulin ([facility] ) 07/24/2018    Diabetic polyneuropathy associated with type 2 diabetes mellitus ([facility] ) 07/24/2018          Kris Wayne DO  Physical Medicine and Katie Stallworth    Total time spent:  35 minutes, with more than 50% spent counseling/coordinating care   Counseling includes discussion with patient re: progress in therapies, functional issues observed by therapy staff, and discussion with patient his/her current medical state/wellbeing  Coordination of patient's care was performed in conjunction with Internal Medicine service to monitor patient's labs, vitals, and management of their comorbidities  In addition, this patient was discussed by the interdisciplinary team in weekly case conference today  The care of the patient was extensively discussed with all care providers and an appropriate rehabilitation plan was formulated unique for this patient  Barriers were identified preventing progression of therapy and appropriate interventions were discussed with each discipline  Please see the team note for input from all disciplines regarding barriers, intervention, and discharge planning

## 2022-07-13 NOTE — PROGRESS NOTES
07/13/22 1230   Pain Assessment   Pain Assessment Tool 0-10   Pain Score 10 - Worst Possible Pain  (7/10 at rest to 10/10 intermittently when limb is reposition)   Pain Location/Orientation Orientation: Left; Location: Shoulder   Pain Onset/Description Onset: Ongoing;Frequency: Constant/Continuous   Hospital Pain Intervention(s) Repositioned; Rest   Restrictions/Precautions   Precautions Fall Risk;Pacemaker;Pain;Pressure Ulcer;Supervision on toilet/commode   LUE Weight Bearing Per Order NWB   LUE ROM Restriction PROM  (only up to 45 degrees)   Braces or Orthoses Sling  (L UE, ace wraps bilat LE for edema management - rewrapped R side this session)   General   Change In Medical/Functional Status see vitals for BPs, asymptomatic   Cognition   Overall Cognitive Status Impaired   Arousal/Participation Alert; Cooperative   Attention Attends with cues to redirect   Orientation Level Oriented X4   Memory Decreased recall of precautions   Following Commands Follows multistep commands with increased time or repetition   Subjective   Subjective pt reported pain as above but denies any dizziness or lightheadedness and was agreeable to have PT   Sit to Stand   Type of Assistance Needed Supervision;Verbal cues; Adaptive equipment   Comment VC for hand placement while holding SPC during sit to stand transitions   Sit to Stand CARE Score 4   Bed-Chair Transfer   Type of Assistance Needed Supervision; Adaptive equipment   Comment extra time to complete SPT with SPC but steady   Chair/Bed-to-Chair Transfer CARE Score 4   Walk 10 Feet   Type of Assistance Needed Supervision;Verbal cues; Adaptive equipment   Walk 10 Feet CARE Score 4   Walk 50 Feet with Two Turns   Type of Assistance Needed Supervision;Verbal cues; Adaptive equipment   Walk 50 Feet with Two Turns CARE Score 4   Walk 150 Feet   Type of Assistance Needed Supervision;Verbal cues; Adaptive equipment   Comment 150' CS with SPC , steady without lateral deviations, appropriately move to the side to accomodate oncoming traffic   Walk 150 Feet CARE Score 4   Walking 10 Feet on Uneven Surfaces   Type of Assistance Needed Incidental touching;Verbal cues; Adaptive equipment   Comment outdoor ramp, pavement with SPC x 40'   Walking 10 Feet on Uneven Surfaces CARE Score 4   Curb or Single Stair   Style negotiated Curb   Type of Assistance Needed Incidental touching;Verbal cues; Adaptive equipment   Comment 6" curb step with SPC x 4 trials, VC for sequencing with AD but prefers to ascend/descend step leading with L LE without LOB  1 Step (Curb) CARE Score 4   Assessment   Treatment Assessment Despite the pain pt able to actively engaged in skilled PT intevention and tolerated tx  although due to the pain and weakness pt requires assist to donned/doffed L UE sling for pt unable to manage on her own  Still requires VC for proper R hand placement while holding SPC during sit to stand transfer with dec carry over noted at this time so will benefit from further training  Pt meme'd slow gait speed but was steady amb on indoor level and uneven surfaces outdoor using SPC  PT will cont to focus on strengthening, standing balance training and functional mobility indep to improve overall indep and reduce risk for falls at d/c    Barriers to Discharge Inaccessible home environment;Decreased caregiver support   PT Barriers   Functional Limitation Ramp negotiation;Car transfers;Stair negotiation;Transfers;Standing;Walking   Plan   Treatment/Interventions Functional transfer training; Therapeutic exercise; Endurance training;Gait training;Spoke to nursing;OT;Spoke to MD;Equipment eval/education   Progress Progressing toward goals   Recommendation   PT Discharge Recommendation Home with home health rehabilitation   Equipment Recommended Cane   PT Therapy Minutes   PT Time In 1230   PT Time Out 1330   PT Total Time (minutes) 60   PT Mode of treatment - Individual (minutes) 60   PT Mode of treatment - Concurrent (minutes) 0   PT Mode of treatment - Group (minutes) 0   PT Mode of treatment - Co-treat (minutes) 0   PT Mode of Treatment - Total time(minutes) 60 minutes   PT Cumulative Minutes 660   Therapy Time missed   Time missed?  No

## 2022-07-13 NOTE — PLAN OF CARE
Problem: PAIN - ADULT  Goal: Verbalizes/displays adequate comfort level or baseline comfort level  Description: Interventions:  - Encourage patient to monitor pain and request assistance  - Assess pain using appropriate pain scale  - Administer analgesics based on type and severity of pain and evaluate response  - Implement non-pharmacological measures as appropriate and evaluate response  - Consider cultural and social influences on pain and pain management  - Notify physician/advanced practitioner if interventions unsuccessful or patient reports new pain  Outcome: Progressing     Problem: INFECTION - ADULT  Goal: Absence or prevention of progression during hospitalization  Description: INTERVENTIONS:  - Assess and monitor for signs and symptoms of infection  - Monitor lab/diagnostic results  - Monitor all insertion sites, i e  indwelling lines, tubes, and drains  - Monitor endotracheal if appropriate and nasal secretions for changes in amount and color  - [place] appropriate cooling/warming therapies per order  - Administer medications as ordered  - Instruct and encourage patient and family to use good hand hygiene technique  - Identify and instruct in appropriate isolation precautions for identified infection/condition  Outcome: Progressing  Goal: Absence of fever/infection during neutropenic period  Description: INTERVENTIONS:  - Monitor WBC    Outcome: Progressing     Problem: SAFETY ADULT  Goal: Patient will remain free of falls  Description: INTERVENTIONS:  - Educate patient/family on patient safety including physical limitations  - Instruct patient to call for assistance with activity   - Consult OT/PT to assist with strengthening/mobility   - Keep Call bell within reach  - Keep bed low and locked with side rails adjusted as appropriate  - Keep care items and personal belongings within reach  - Initiate and maintain comfort rounds  - Make Fall Risk Sign visible to staff  - Offer Toileting every 2-4 Hours, in advance of need  - Initiate/Maintain bed/chair alarm  - Obtain necessary fall risk management equipment: yellow socks  - Apply yellow socks and bracelet for high fall risk patients  - Consider moving patient to room near nurses station  Outcome: Progressing  Goal: Maintain or return to baseline ADL function  Description: INTERVENTIONS:  -  Assess patient's ability to carry out ADLs; assess patient's baseline for ADL function and identify physical deficits which impact ability to perform ADLs (bathing, care of mouth/teeth, toileting, grooming, dressing, etc )  - Assess/evaluate cause of self-care deficits   - Assess range of motion  - Assess patient's mobility; develop plan if impaired  - Assess patient's need for assistive devices and provide as appropriate  - Encourage maximum independence but intervene and supervise when necessary  - Involve family in performance of ADLs  - Assess for home care needs following discharge   - Consider OT consult to assist with ADL evaluation and planning for discharge  - Provide patient education as appropriate  Outcome: Progressing     Problem: DISCHARGE PLANNING  Goal: Discharge to home or other facility with appropriate resources  Description: INTERVENTIONS:  - Identify barriers to discharge w/patient and caregiver  - Arrange for needed discharge resources and transportation as appropriate  - Identify discharge learning needs (meds, wound care, etc )  - Arrange for interpretive services to assist at discharge as needed  - Refer to Case Management Department for coordinating discharge planning if the patient needs post-hospital services based on physician/advanced practitioner order or complex needs related to functional status, cognitive ability, or social support system  Outcome: Progressing     Problem: Prexisting or High Potential for Compromised Skin Integrity  Goal: Skin integrity is maintained or improved  Description: INTERVENTIONS:  - Identify patients at risk for skin breakdown  - Assess and monitor skin integrity  - Assess and monitor nutrition and hydration status  - Monitor labs   - Assess for incontinence   - Turn and reposition patient  - Assist with mobility/ambulation  - Relieve pressure over bony prominences  - Avoid friction and shearing  - Provide appropriate hygiene as needed including keeping skin clean and dry  - Evaluate need for skin moisturizer/barrier cream  - Collaborate with interdisciplinary team   - Patient/family teaching  - Consider wound care consult   Outcome: Progressing     Problem: MOBILITY - ADULT  Goal: Maintain or return to baseline ADL function  Description: INTERVENTIONS:  -  Assess patient's ability to carry out ADLs; assess patient's baseline for ADL function and identify physical deficits which impact ability to perform ADLs (bathing, care of mouth/teeth, toileting, grooming, dressing, etc )  - Assess/evaluate cause of self-care deficits   - Assess range of motion  - Assess patient's mobility; develop plan if impaired  - Assess patient's need for assistive devices and provide as appropriate  - Encourage maximum independence but intervene and supervise when necessary  - Involve family in performance of ADLs  - Assess for home care needs following discharge   - Consider OT consult to assist with ADL evaluation and planning for discharge  - Provide patient education as appropriate  Outcome: Progressing  Goal: Maintains/Returns to pre admission functional level  Description: INTERVENTIONS:  - Perform BMAT or MOVE assessment daily    - Set and communicate daily mobility goal to care team and patient/family/caregiver  - Collaborate with rehabilitation services on mobility goals if consulted  - Perform Range of Motion 3 times a day  - Reposition patient every 2 hours    - Dangle patient 3 times a day  - Stand patient 3 times a day  - Ambulate patient 3 times a day  - Out of bed to chair 3 times a day   - Out of bed for meals 3 times a day  - Out of bed for toileting  - Record patient progress and toleration of activity level   Outcome: Progressing     Problem: Potential for Falls  Goal: Patient will remain free of falls  Description: INTERVENTIONS:  - Educate patient/family on patient safety including physical limitations  - Instruct patient to call for assistance with activity   - Consult OT/PT to assist with strengthening/mobility   - Keep Call bell within reach  - Keep bed low and locked with side rails adjusted as appropriate  - Keep care items and personal belongings within reach  - Initiate and maintain comfort rounds  - Make Fall Risk Sign visible to staff  - Offer Toileting every 2 Hours, in advance of need  - Initiate/Maintain bed/chair alarm  - Obtain necessary fall risk management equipment: nonskid socks  - Apply yellow socks and bracelet for high fall risk patients  - Consider moving patient to room near nurses station  Outcome: Progressing

## 2022-07-13 NOTE — PROGRESS NOTES
07/13/22 0836   Pain Assessment   Pain Assessment Tool 0-10   Pain Score 6   Pain Location/Orientation Orientation: Left; Location: Arm;Location: Shoulder   Pain Onset/Description Onset: Ongoing;Frequency: Intermittent   Restrictions/Precautions   Precautions Fall Risk;Pressure Ulcer;Supervision on toilet/commode   LUE Weight Bearing Per Order NWB   LUE ROM Restriction   (PROM only up to 45 degrees)   Braces or Orthoses Sling   Cognition   Overall Cognitive Status Impaired   Subjective   Subjective "edmund been waiting to go to the bathroom"   Roll Left and Right   Reason if not Attempted Safety concerns   Roll Left and Right CARE Score 88   Sit to Lying   Type of Assistance Needed Supervision;Verbal cues   Comment increase time to perform; pillow for arm positioning on L   Sit to Lying CARE Score 4   Lying to Sitting on Side of Bed   Type of Assistance Needed Supervision;Verbal cues   Comment increase time to perform   Lying to Sitting on Side of Bed CARE Score 4   Sit to Stand   Type of Assistance Needed Supervision   Sit to Stand CARE Score 4   Bed-Chair Transfer   Type of Assistance Needed Supervision   Comment with HW in room   Chair/Bed-to-Chair Transfer CARE Score 4   Walk 10 Feet   Type of Assistance Needed Supervision   Walk 10 Feet CARE Score 4   Walk 50 Feet with Two Turns   Type of Assistance Needed Supervision   Walk 50 Feet with Two Turns CARE Score 4   Walk 150 Feet   Type of Assistance Needed Supervision   Walk 150 Feet CARE Score 4   Ambulation   Distance Walked (feet) 200 ft   Assist Device Otis Walker   Gait Pattern Inconsistant Aayla; Improper weight shift   Limitations Noted In Endurance;Speed   Toilet Transfer   Type of Assistance Needed Incidental touching   Toilet Transfer CARE Score 4   Assessment   Treatment Assessment pt focused on bed mobility and discussing option for home  pt trying to utilize bed rail, even with it down    pt educated on bed cane that can be purchases to help pull to sitting position  pt also has the option of recliner that may provide more support with LUE to alleviate pain when lying down  pillow was used on L side for better positioning but would need help with placement  pt amb down to OT gym from room with no issues using HW   will cont to trial SPC to decrease bulkiness and overuse of HW as pt is making progress functionally at this time  Problem List Decreased strength;Decreased range of motion;Decreased endurance; Impaired balance;Decreased mobility; Decreased skin integrity;Obesity;Orthopedic restrictions;Pain;Decreased coordination;Decreased cognition   Barriers to Discharge Decreased caregiver support   PT Barriers   Functional Limitation Car transfers; Ramp negotiation;Stair negotiation;Standing;Transfers; Walking   Recommendation   Equipment Recommended Cane   PT Therapy Minutes   PT Time In 0830   PT Time Out 0900   PT Total Time (minutes) 30   PT Mode of treatment - Individual (minutes) 30   PT Mode of treatment - Concurrent (minutes) 0   PT Mode of treatment - Group (minutes) 0   PT Mode of treatment - Co-treat (minutes) 0   PT Mode of Treatment - Total time(minutes) 30 minutes   PT Cumulative Minutes 600   Therapy Time missed   Time missed?  No

## 2022-07-13 NOTE — TEAM CONFERENCE
Acute RehabilitationTeam Conference Note  Date: 7/13/2022   Time: 11:03 AM       Patient Name:  Brooklyn Woods       Medical Record Number: [id number]   YOB: 1945  Sex: Female          Room/Bed:  [facility]-01  Payor Info:  Payor: MEDICARE / Plan: MEDICARE A AND B / Product Type: Medicare A & B Fee for Service /      Admitting Diagnosis: Fracture closed, humerus [S42 309A]   Admit Date/Time:  7/5/2022  5:43 PM  Admission Comments: No comment available     Primary Diagnosis:  Closed fracture of proximal end of left humerus  Principal Problem: Closed fracture of proximal end of left humerus    Patient Active Problem List    Diagnosis Date Noted    Obesity (BMI 30-39 9) 07/06/2022    Acute blood loss anemia 07/03/2022    Closed fracture of proximal end of left humerus 07/02/2022    Depression 07/02/2022    Cardiomyopathy ([facility] ) 07/02/2022    Complete heart block ([facility] ) 06/30/2022    Esophageal varices without bleeding ([facility] ) 09/29/2021    Pancytopenia ([facility] ) 08/06/2021    Iron deficiency anemia due to chronic blood loss 08/06/2021    Mild nonproliferative diabetic retinopathy of both eyes without macular edema associated with type 2 diabetes mellitus ([facility] ) 07/16/2021    Urinary tract infection 09/15/2020    Hepatic encephalopathy ([facility] ) 09/13/2020    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) ([facility] ) 09/13/2020    Thrombocytopenia ([facility] ) 09/13/2020    Hyperlipidemia 07/24/2018    Essential hypertension 07/24/2018    Acquired hypothyroidism 07/24/2018    Type 2 diabetes mellitus with hyperglycemia, with long-term current use of insulin ([facility] ) 07/24/2018    Diabetic polyneuropathy associated with type 2 diabetes mellitus ([facility] ) 07/24/2018       Physical Therapy:    Weight Bearing Status: Non-weight bearing (LUE)  Transfers: Supervision  Bed Mobility: Moderate Assistance  Amulation Distance (ft): 200 feet  Ambulation: Incidental Touching, Supervision  Assistive Device for Ambulation: Single Point Cane  Wheelchair Mobility Distance:  (N/A)  Number of Stairs: 4  Assistive Device for Stairs: Single Point Cane [facility] for curb step; b/l HRs for steps)  Stair Assistance: Minimal Assistance  Ramp: Incidental Touching  Assistive Device for Ramp: Single Point Cane  Discharge Recommendations: Home with:  [address] with[de-identified] Family Support, First Floor Setup, Home Physical Therapy    Pt presents s/p fall at home with L humeral fracture and NWB status with sling PRN  Pt presents with inc pain and swelling in LUE, with inc pain with slight movements to adjust sling for positioning  Pt also has impaired cardiovascular function with dec activity tolerance and fatigue, needing rest breaks  Pt has a ramp or a few [place] with BHR and first floor setup  Pt's daughter has Down's Syndrome and lives with her; the dtr has a caregiver/aide who helps her intermittently during the week  Pt reported that her daughter won't be able to help her at d/c but she also doesn't have family/friends in the area who can help her  Currently anticipating need for A for all IADLs due to NWB status LUE and therefore very limited functional use of LUE  Pt presents with deconditioning and limited ambulation distance at this time as well  She also needs inc physical A for bed mobility due to LUE pain  Pt will benefit from skilled PT to progress safety and (I) with functional mobility to reach Mag for transfers, bed mobility and ambulation and S for stairs  Will need to plan on A in the house for IADLs      7/12/22  Barriers to d/c home include L humeral fracture and NWB LUE with sling, pain in LUE which limits pt's activity tolerance especially with mobilities, decreased endurance requiring frequent rest breaks, decreased family support as pt was caregiver for dtr with Down's Syndrome and family lives over an hour away, decreased dynamic standing balance, and decreased hip strength especially of hip abductors   POC has focused on functional mobilities with use of HW initially, progressed pt to [facility] at this time  Also have focused on stair trng however anticipating use of ramp at home for safe in/out of house as pt would not be able to manage [facility] up/down steps and does better with use of HR vs SPC  Pt overall progressed from Eric-CG/CS over past week, anticipating d/c home next week with recommendation for home PT  Occupational Therapy:  Eating: Supervision  Grooming: Supervision  Bathing: Maximum Assistance  Bathing: Maximum Assistance  Upper Body Dressing: Maximum Assistance  Lower Body Dressing: Maximum Assistance  Toileting: Minimal Assistance  Toilet Transfer: Minimal Assistance  Cognition: Within Defined Limits  Orientation: Person, Place, Time, Situation  Discharge Recommendations:  (pending progress)       Pt is a 77 y o  female who was admitted to [address] [place] [address] on 7/5/2022  Pt's current problem list includes: s/p fall, presented with L humeral fx now NWB in sling, complete heart block s/p pacer implant 6/29/22  Pt's precautions include: L UE NWB and pacer precautions  Comorbidities include - HTN, DM 2, liver cirrhosis 2* PAUL, urinary retention, ABLA  At baseline pt was completing ADLS independently and lives her daughter who has a disability  Pt does not need to physically assist her but supervise/oversee what she is doing  Currently pt requires max assist for overall ADLS and min assist for functional mobility/transfers with hemiwalker  Pt currently presents with impairments in the following categories - activity tolerance, endurance, standing balance/tolerance and UE strength   These impairments, as well as pt's pain, orthopedic restricitions, WBS, decreased caregiver support, risk for falls and home environment limit pt's ability to safely engage in all baseline areas of occupation, including eating, grooming, bathing, dressing, toileting, functional mobility/transfers, community mobility, laundry, house maintenance, medication management, meal prep, care of children, social participation  and leisure activities  Pt presents with good rehab potential  Pt is unsafe to D/C home at this time, recommending ELOS 2 weeks to achieve independent/supervision level goals  D/C date: TBD  Speech Therapy:           No notes on file    Nursing Notes:  Appetite: Fair  Diet Type: Diabetic                      Diet Patient/Family Education Complete: Yes                            Bladder: Continent     Bladder Patient/Family Education: Yes  Bowel: Continent     Bowel Patient/Family Education: Yes  Pain Location/Orientation: Orientation: Left, Location: Arm, Location: Shoulder  Pain Score: 0  Pain 2  Pain Score 2: 7  Pain Location/Orientation 2: Location: Buttocks  Pain Onset/Description 2: Onset: Ongoing  Patient's Stated Pain Goal 2: No pain  Hospital Pain Intervention(s) 2: Repositioned, Ambulation/increased activity, Emotional support                    Hospital Pain Intervention(s): Repositioned, Ambulation/increased activity, Emotional support, Cold applied  Pain Patient/Family Education: Yes  Medication Management/Safety  Injectable: Lovenox (insulin)  Safe Administration: Yes (by staff)  Medication Patient/Family Education Complete: No (on going)    Left proximal humerus fracture - Non-op, NWB, Sling for comfort, See Ortho as OP  CHB - S/p dual chamber PPM 6/29/22, No heavy lift/push/pull >10 lbs, No left arm above shoulder height  Stress cardiomyopathy - D/W Dr Case Cooper he said no further intervention for now but see back in office, Continue Losartan 25mg qd (Cardiology started in hospital)  ABLA - S/p 1 unit PRBCs, Stable   PAUL cirrhosis - Hx non-bleeding esophageal varices (banded) and hepatic encephalopathy, Sees Dr Eryn Oh at [facility] as OP, Home:  Rifaximin 550 mcg q12hrs/Lactulose 10Gm BID/Lasix 40mg qd/Corgard 20mg qd/Aldactone 50mg qd, Here:  Rifaximin 550 mg q12hrs/Lactulose 10Gm BID/Corgard 20mg qd (hold SBP <120), Consider adding back a small dose of Lasix when BP allows  DM2 - Home:  Janumet XR  BID/Amaryl 8 mg qAM/Basaglar 48U qam, 36U qpm, Here:  Lantus 32U q12hrs/Metformin 500 mg BID, QID Accuchecks/SSI and DM diet, FBS low 7/10 AM and Lantus was skipped altogether --> make sure patient has qhs snack, No changes today  Pancytopenia - Is 2/2 to cirrhosis/portal HTN  Additionally, has splenomegaly contributing to low platelet ct, Stable today, Hemoglobin was down to 8 1 on 7/8/22, Has had tx with Venofer in past   Sees Dr Ulices Shafer from [facility]  Hypothyroidism - Continue Levothyroxine 112 mcg qd  Depression - Continue Lexapro as at home  Hyperkalemia - Mild at 5 2 but may need to stop ARB if continues to climb, Recheck 7/14/22  Pt is continent of both bowel and bladder  This week we will continue to monitor vital signs and lab values  We will continue to educate on the importance of turning and offloading in order to prevent skin breakdown and preform routine skin checks  We will encourage independence with ADLs  We will monitor for constipation and medicate per bowel protocol  We will preform safe transfers and keep the pt free from falls  Case Management:     Discharge Planning  Living Arrangements: Lives w/ Children  Support Systems: Self, Daughter, Family members, Private Caregivers  Assistance Needed: none  Type of Current Residence: Private residence  Current Home Care Services: No  Pt is making good progress but continues to be limited with bed mobility and adls due to left ue restrictions  Pt plans on returning home, family to be educated on pts level of assist  Pt aware of options of hiring services to assist but is not interested at this time  Following to assist w/dc needs  Is the patient actively participating in therapies?  yes  List any modifications to the treatment plan:     Barriers Interventions   Left ue wb restrictions/movement restrictions effecting bed mobility and adls Continued therapy services Activity tolerance effecting goal attainment Energy conervation education   Body habitus effecting skin care Family education on skin care in folds             Is the patient making expected progress toward goals? yes  List any update or changes to goals:     Medical Goals: Patient will be medically [facility] completion of rehab program and Patient will be able to manage medical conditions and comorbid conditions with medications and follow up upon completion of rehab program    Weekly Team Goals:   Rehab Team Goals  ADL Team Goal: Patient will be independent with ADLs with least restrictive device upon completion of rehab program  Bowel/Bladder Team Goal: Patient will require assist with bladder/bowel management with least restrictive device upon completion of rehab program  Transfer Team Goal: Patient will be independent with transfers with least restrictive device upon completion of rehab program  Locomotion Team Goal: Patient will be independent with locomotion with least restrictive device upon completion of rehab program    Discussion: pt presents with the above  Barriers and is functioning contact guard for transfers, min a stairs but has a ramped entrance at her own home  adls mod to max a  Family training intiated and ongoing  dtr in law made aware pt will need hands on assist at dc  Pt has about 8 additional days to attain goals  Goals are for independence with mobility, min a with adls  Recommendations are to be made post family training  Anticipated Discharge Date: 7/21/2022   [facility] Team Members Present: The following team members are supervising care for this patient and were present during this Weekly Team Conference      Physician: Dr Alem Ovalles DO  : MAXI Matos  Registered Nurse: Katt Hall RN  Physical Therapist: Toni Segovia DPT  Occupational Therapist: Abena Smith MS, OTR/L, CBIS  Speech Therapist:

## 2022-07-13 NOTE — PROGRESS NOTES
Internal Medicine Progress Note  Patient: Roberto Reyes  Age/sex: 77 y o  female  Medical Record #: [id number]      ASSESSMENT/PLAN: (Interval History)  Roberto Reyes is seen and examined and management for following issues:    Left proximal humerus fracture  · Non-op  · NWB  · Sling for comfort  · See Ortho as OP     CHB  · S/p dual chamber PPM 6/29/22  · No heavy lift/push/pull >10 lbs  · No left arm above shoulder height      Stress cardiomyopathy  · D/W Dr Naif Vera he said no further intervention for now but see back in office  · on Losartan 25mg qd (Cardiology started in hospital) --> reduce to 12 5mg qd starting 7/14/22 since BPs get a bit soft in afternoon    May need to cut Corgard to 10mg qd     ABLA  · S/p 1 unit PRBCs  · Stable     PAUL cirrhosis  · Hx non-bleeding esophageal varices (banded) and hepatic encephalopathy   · Sees Dr Julita Scales at [facility] as OP  · Home:  Rifaximin 550 mcg q12hrs/Lactulose 10Gm BID/Lasix 40mg qd/Corgard 20mg qd/Aldactone 50mg qd  · Here:  Rifaximin 550 mg q12hrs/Lactulose 10Gm BID/Corgard 20mg qd (hold SBP <120)  · Consider adding back a small dose of Lasix when BP allows     DM2  · Home:  Janumet XR  BID/Amaryl 8 mg qAM/Basaglar 48U qam, 36U qpm  · Here:  Lantus 32U q12hrs/Metformin 500 mg BID   · QID Accuchecks/SSI and DM diet  · FBS low 7/10 AM and Lantus was skipped altogether --> make sure patient has qhs snack  · No changes again today     Pancytopenia  · Is 2/2 to cirrhosis/portal HTN   Additionally, has splenomegaly contributing to low platelet ct  · Stable today  · Hemoglobin was down to 8 1 on 7/8/22  · Has had tx with Venofer in past   Sees Dr Elsy Hendrix [facility]     Hypothyroidism  · Continue Levothyroxine 112 mcg qd     Depression  · Continue Lexapro as at home     Hyperkalemia  · Mild at 5 2 but may need to stop ARB if continues to climb  · Recheck 7/14/22        Discharge date:  7/21/22       The above assessment and plan was reviewed and updated as determined by my evaluation of the patient on 7/13/2022      Labs:   Results from last 7 days   Lab Units 07/12/22  0518 07/08/22  0501   WBC Thousand/uL 2 90* 3 19*   HEMOGLOBIN g/dL 8 8* 8 1*   HEMATOCRIT % 29 3* 26 9*   PLATELETS Thousands/uL 76* 67*     Results from last 7 days   Lab Units 07/12/22  0518 07/08/22  0501   SODIUM mmol/L 137 136   POTASSIUM mmol/L 5 2 4 7   CHLORIDE mmol/L 107 107   CO2 mmol/L 25 25   BUN mg/dL 36* 35*   CREATININE mg/dL 1 07 0 91   CALCIUM mg/dL 8 6 8 4             Results from last 7 days   Lab Units 07/13/22  1022 07/13/22  0628 07/12/22  2101   POC GLUCOSE mg/dl 146* 87 168*       Review of Scheduled Meds:  Current Facility-Administered Medications   Medication Dose Route Frequency Provider Last Rate    aspirin  81 mg Oral Daily Dorlene Holts, DO      bisacodyl  10 mg Rectal Daily PRN Dorlene Holts, DO      docusate sodium  100 mg Oral BID Dorlene Holts, DO      enoxaparin  30 mg Subcutaneous Q12H [facility] Dorlene Holts, DO      escitalopram  5 mg Oral Daily Dorlene Holts, DO      insulin glargine  32 Units Subcutaneous Q12H [facility] JOSE JUAN Mathews      insulin lispro  2-12 Units Subcutaneous 4x Daily (with meals and at bedtime) Dorlene Holts, DO      lactulose  10 g Oral BID Dorlene Holts, DO      levothyroxine  112 mcg Oral Early Morning Dorlene Holts, DO      lidocaine  2 patch Topical Daily Dorlene Holts, DO      losartan  25 mg Oral Daily Dorlene Holts, DO      melatonin  6 mg Oral HS Dorlene Holts, DO      metFORMIN  500 mg Oral BID With Meals JOSE JUAN Bellamy      nadolol  20 mg Oral Daily JOSE JUAN Barnes      nystatin   Topical BID JOSE JUAN Rogers      ondansetron  4 mg Oral Q6H PRN Dorlene Holts, DO      oxyCODONE  2 5 mg Oral Q6H PRN Dorlene Holts, DO      pantoprazole  40 mg Oral Early Morning Dorlene Holts, DO      polyethylene glycol  17 g Oral Daily Dorlene Holts, DO      pravastatin  80 mg Oral Daily With [employer] Lolly Evelyn, DO      rifaximin  550 mg Oral Q12H [address] Lolly Ihlen, DO      senna  1 tablet Oral HS Lolly Ihlen, DO         Subjective/ HPI: Patient seen and examined  Patients overnight issues or events were reviewed with nursing or staff during rounds or morning huddle session  New or overnight issues include the following:     No new or overnight issues  Offer no complaints    ROS:   A 10 point ROS was performed; negative except as noted above  Imaging:     No orders to display       *Labs /Radiology studies reviewed  *Medications reviewed and reconciled as needed  *Please refer to order section for additional ordered labs studies  *Case discussed with primary attending during morning huddle case rounds    Physical Examination:  Vitals:   Vitals:    07/12/22 2027 07/13/22 0501 07/13/22 0600 07/13/22 0820   BP: 102/59 119/56  131/58   BP Location: Right arm Right arm     Pulse: 67 61  65   Resp: 18 20     Temp: 97 9 °F (36 6 °C) 97 9 °F (36 6 °C)     TempSrc: Oral Oral     SpO2: 100% 97%     Weight:   98 7 kg (217 lb 9 5 oz)    Height:           General Appearance: no distress, conversive  HEENT: PERRLA, conjuctiva normal; oropharynx clear; mucous membranes moist   Neck:  Supple, normal ROM  Lungs: CTA, normal respiratory effort, no retractions, expiratory effort normal  CV: regular rate and rhythm; no rubs/murmurs/gallops, PMI normal   ABD: soft; ND/NT; +BS  EXT: no edema  Skin: normal turgor, normal texture, no rashes  Psych: affect normal, mood normal  Neuro: AAO      The above physical exam was reviewed and updated as determined by my evaluation of the patient on 7/13/2022  Invasive Devices  Report    None                    VTE Pharmacologic Prophylaxis: Enoxaparin  Code Status: Level 1 - Full Code  Current Length of Stay: 8 day(s)      Total time spent:  30 minutes with more than 50% spent counseling/coordinating care   Counseling includes discussion with patient re: progress  and discussion with patient of his/her current medical state/information  Coordination of patient's care was performed in conjunction with primary service  Time invested included review of patient's labs, vitals, and management of their comorbidities with continued monitoring  In addition, this patient was discussed with medical team including physician and advanced extenders  The care of the patient was extensively discussed and appropriate treatment plan was formulated unique for this patient  ** Please Note:  voice to text software may have been used in the creation of this document   Although proof errors in transcription or interpretation are a potential of such software**

## 2022-07-13 NOTE — CASE MANAGEMENT
Met w/pt and reviewed team update and confirmed dc for 7/21  Pt in agreement and stated she wished it was tomorrow  When asked about where she will be going on dc, pt states she'll spend two weeks at her son's and then two weeks at her home  Team to follow up with where son resides to determine if pt can receive services on dc  Therapy made aware

## 2022-07-13 NOTE — PROGRESS NOTES
Occupational Therapy Treatment Note       07/13/22 0900   Pain Assessment   Pain Assessment Tool 0-10   Pain Score 6   Pain Location/Orientation Orientation: Left; Location: Arm   Hospital Pain Intervention(s) Repositioned   Restrictions/Precautions   Precautions Fall Risk;Pacemaker;Pain;Pressure Ulcer;Supervision on toilet/commode   LUE Weight Bearing Per Order (S)  NWB   LUE ROM Restriction (S)    (see dr Dona Flores order for details)   Braces or Orthoses   (ordered for LE teds but pt is uncomfortable with them, spoke with Dr Dede Sears and he is ok for ace wrap of LEs  L UE sling for comfort, L edema glove)   Lifestyle   Autonomy "I guess I will need some help"   Sit to Stand   Type of Assistance Needed Supervision; Adaptive equipment   Physical Assistance Level No physical assistance   Comment SPC   Sit to Stand CARE Score 4   Bed-Chair Transfer   Type of Assistance Needed Incidental touching; Adaptive equipment   Physical Assistance Level No physical assistance   Comment SPC to/from bathroom and within room  spoke with PT, will be practicing [facility] instead of [facility]/Bed-to-Chair Transfer CARE Score 4   Toileting Hygiene   Type of Assistance Needed Physical assistance; Adaptive equipment   Physical Assistance Level 25% or less   Comment Pt stands and bends far forward to complete front and rear hygiene (she does this at baseline 2* body habitus), with guarding assist, requires assist for thoroughness  Pt able to don/doff clothing over hips with contact guard  Toileting Hygiene CARE Score 3   Toilet Transfer   Type of Assistance Needed Incidental touching; Adaptive equipment   Physical Assistance Level No physical assistance   Comment SPC on/off standard toilet   Toilet Transfer CARE Score 4   ROM - Left Upper Extremities    L Shoulder PROM; Flexion; Extension   LUE ROM Comment While seated, following order for "ok for passive rom of the arm and shoulder, no overhead, limit to maximum 45 degrees   Would start no earlier than 7/11", OT assisted pt in PROM L shoulder to less than 45 degrees for 1 set of 15 reps  Pt tolerates fairly regarding pain control  Cognition   Overall Cognitive Status Impaired   Arousal/Participation Alert   Attention Attends with cues to redirect   Orientation Level Oriented to person;Oriented to place;Oriented to time;Oriented to situation   Memory Decreased short term memory   Following Commands Follows one step commands with increased time or repetition   Activity Tolerance   Activity Tolerance Patient tolerated treatment well   Assessment   Treatment Assessment Pt participated in skilled OT tx session focused on discharge planning education, short distance functional mobility using SPC within room/bathroom and OT gym, L shoulder PROM  See above for further details on functional performance  Pt presents with L UE edema throughout entire arm, OT provided edema glove, plan to wear and check 1x/shift for skin integrity  At end of session  Removed sling and supported L UE on pillow with towel roll under hand, pt reporting "wow that actually feels good" after she adjusted to position  Long conversation with pt regarding that 2* L UE NWB, edema and pain control, pt will require assist for ADL and likely IADLs (as plan to use SPC in R UE and L UE in sling with mobility) upon d/c  OT recommending pt either stay with her son/daughter in law who live near [place], or see if daughter in law can stay with pt at pt's house  OT called pt's daughter in law Rylie Vicente and informed her that pt will be needing assistance upon d/c and it would be best for family to stay with pt at her house or pt to d/c to their house  Per Taylerpastora Vicente, it may be a combination of both places  Will discuss this further on Friday at 12:30 for family training  D/c date set for next Thursday 7/21, will follow up on need for continued therapy pending pt's family training on Friday   During family training, will explain pt requires assist for bathing (including under breasts and abdominal pannus) and dressing  Goal will be for pt to be independent in toileting and functional mobility/transfers with likely SPC  Prognosis Good   Plan   Treatment/Interventions ADL retraining;Functional transfer training; Therapeutic exercise;Cognitive reorientation; Endurance training;Patient/family training;Equipment eval/education; Bed mobility; Compensatory technique education   Progress Progressing toward goals   Recommendation   OT Discharge Recommendation   (pending progress/family training)   OT Therapy Minutes   OT Time In 0900   OT Time Out 1030   OT Total Time (minutes) 90   OT Mode of treatment - Individual (minutes) 90   OT Mode of treatment - Concurrent (minutes) 0   OT Mode of treatment - Group (minutes) 0   OT Mode of treatment - Co-treat (minutes) 0   OT Mode of Treatment - Total time(minutes) 90 minutes   OT Cumulative Minutes 620   Therapy Time missed   Time missed?  No

## 2022-07-14 DIAGNOSIS — Z79.4 TYPE 2 DIABETES MELLITUS WITH HYPERGLYCEMIA, WITH LONG-TERM CURRENT USE OF INSULIN (HCC): ICD-10-CM

## 2022-07-14 DIAGNOSIS — E11.65 TYPE 2 DIABETES MELLITUS WITH HYPERGLYCEMIA, WITH LONG-TERM CURRENT USE OF INSULIN (HCC): ICD-10-CM

## 2022-07-14 LAB
ANION GAP SERPL CALCULATED.3IONS-SCNC: 4 MMOL/L (ref 4–13)
BASOPHILS # BLD AUTO: 0.04 THOUSANDS/ΜL (ref 0–0.1)
BASOPHILS NFR BLD AUTO: 1 % (ref 0–1)
BUN SERPL-MCNC: 31 MG/DL (ref 5–25)
CALCIUM SERPL-MCNC: 8.8 MG/DL (ref 8.3–10.1)
CHLORIDE SERPL-SCNC: 109 MMOL/L (ref 100–108)
CO2 SERPL-SCNC: 25 MMOL/L (ref 21–32)
CREAT SERPL-MCNC: 0.91 MG/DL (ref 0.6–1.3)
EOSINOPHIL # BLD AUTO: 0.07 THOUSAND/ΜL (ref 0–0.61)
EOSINOPHIL NFR BLD AUTO: 2 % (ref 0–6)
ERYTHROCYTE [DISTWIDTH] IN BLOOD BY AUTOMATED COUNT: 18.8 % (ref 11.6–15.1)
GFR SERPL CREATININE-BSD FRML MDRD: 61 ML/MIN/1.73SQ M
GLUCOSE P FAST SERPL-MCNC: 52 MG/DL (ref 65–99)
GLUCOSE SERPL-MCNC: 150 MG/DL (ref 65–140)
GLUCOSE SERPL-MCNC: 165 MG/DL (ref 65–140)
GLUCOSE SERPL-MCNC: 175 MG/DL (ref 65–140)
GLUCOSE SERPL-MCNC: 52 MG/DL (ref 65–140)
GLUCOSE SERPL-MCNC: 71 MG/DL (ref 65–140)
HCT VFR BLD AUTO: 29.1 % (ref 34.8–46.1)
HGB BLD-MCNC: 8.6 G/DL (ref 11.5–15.4)
IMM GRANULOCYTES # BLD AUTO: 0.01 THOUSAND/UL (ref 0–0.2)
IMM GRANULOCYTES NFR BLD AUTO: 0 % (ref 0–2)
LYMPHOCYTES # BLD AUTO: 0.76 THOUSANDS/ΜL (ref 0.6–4.47)
LYMPHOCYTES NFR BLD AUTO: 26 % (ref 14–44)
MCH RBC QN AUTO: 27.5 PG (ref 26.8–34.3)
MCHC RBC AUTO-ENTMCNC: 29.6 G/DL (ref 31.4–37.4)
MCV RBC AUTO: 93 FL (ref 82–98)
MONOCYTES # BLD AUTO: 0.28 THOUSAND/ΜL (ref 0.17–1.22)
MONOCYTES NFR BLD AUTO: 10 % (ref 4–12)
NEUTROPHILS # BLD AUTO: 1.78 THOUSANDS/ΜL (ref 1.85–7.62)
NEUTS SEG NFR BLD AUTO: 61 % (ref 43–75)
NRBC BLD AUTO-RTO: 0 /100 WBCS
PLATELET # BLD AUTO: 81 THOUSANDS/UL (ref 149–390)
PMV BLD AUTO: 13.1 FL (ref 8.9–12.7)
POTASSIUM SERPL-SCNC: 4.5 MMOL/L (ref 3.5–5.3)
RBC # BLD AUTO: 3.13 MILLION/UL (ref 3.81–5.12)
SODIUM SERPL-SCNC: 138 MMOL/L (ref 136–145)
WBC # BLD AUTO: 2.94 THOUSAND/UL (ref 4.31–10.16)

## 2022-07-14 PROCEDURE — 97110 THERAPEUTIC EXERCISES: CPT

## 2022-07-14 PROCEDURE — 85025 COMPLETE CBC W/AUTO DIFF WBC: CPT | Performed by: NURSE PRACTITIONER

## 2022-07-14 PROCEDURE — 99233 SBSQ HOSP IP/OBS HIGH 50: CPT | Performed by: STUDENT IN AN ORGANIZED HEALTH CARE EDUCATION/TRAINING PROGRAM

## 2022-07-14 PROCEDURE — 82948 REAGENT STRIP/BLOOD GLUCOSE: CPT

## 2022-07-14 PROCEDURE — 97530 THERAPEUTIC ACTIVITIES: CPT

## 2022-07-14 PROCEDURE — 80048 BASIC METABOLIC PNL TOTAL CA: CPT | Performed by: NURSE PRACTITIONER

## 2022-07-14 PROCEDURE — 97116 GAIT TRAINING THERAPY: CPT

## 2022-07-14 PROCEDURE — 99232 SBSQ HOSP IP/OBS MODERATE 35: CPT | Performed by: INTERNAL MEDICINE

## 2022-07-14 RX ORDER — INSULIN GLARGINE 100 [IU]/ML
32 INJECTION, SOLUTION SUBCUTANEOUS EVERY MORNING
Status: DISCONTINUED | OUTPATIENT
Start: 2022-07-15 | End: 2022-07-19

## 2022-07-14 RX ORDER — PEN NEEDLE, DIABETIC 31 GX5/16"
NEEDLE, DISPOSABLE MISCELLANEOUS
Qty: 100 EACH | Refills: 6 | Status: SHIPPED | OUTPATIENT
Start: 2022-07-14

## 2022-07-14 RX ORDER — INSULIN GLARGINE 100 [IU]/ML
20 INJECTION, SOLUTION SUBCUTANEOUS
Status: DISCONTINUED | OUTPATIENT
Start: 2022-07-14 | End: 2022-07-16

## 2022-07-14 RX ADMIN — MELATONIN 6 MG: at 21:01

## 2022-07-14 RX ADMIN — ASPIRIN 81 MG CHEWABLE TABLET 81 MG: 81 TABLET CHEWABLE at 08:03

## 2022-07-14 RX ADMIN — INSULIN GLARGINE 20 UNITS: 100 INJECTION, SOLUTION SUBCUTANEOUS at 21:08

## 2022-07-14 RX ADMIN — DOCUSATE SODIUM 100 MG: 100 CAPSULE, LIQUID FILLED ORAL at 08:04

## 2022-07-14 RX ADMIN — NYSTATIN 1 APPLICATION: 100000 POWDER TOPICAL at 08:05

## 2022-07-14 RX ADMIN — NADOLOL 20 MG: 20 TABLET ORAL at 08:07

## 2022-07-14 RX ADMIN — INSULIN GLARGINE 32 UNITS: 100 INJECTION, SOLUTION SUBCUTANEOUS at 08:04

## 2022-07-14 RX ADMIN — NYSTATIN 1 APPLICATION: 100000 POWDER TOPICAL at 17:27

## 2022-07-14 RX ADMIN — RIFAXIMIN 550 MG: 550 TABLET ORAL at 21:00

## 2022-07-14 RX ADMIN — LACTULOSE 10 G: 20 SOLUTION ORAL at 08:04

## 2022-07-14 RX ADMIN — INSULIN LISPRO 2 UNITS: 100 INJECTION, SOLUTION INTRAVENOUS; SUBCUTANEOUS at 21:09

## 2022-07-14 RX ADMIN — LACTULOSE 10 G: 20 SOLUTION ORAL at 17:20

## 2022-07-14 RX ADMIN — PANTOPRAZOLE SODIUM 40 MG: 40 TABLET, DELAYED RELEASE ORAL at 05:08

## 2022-07-14 RX ADMIN — ENOXAPARIN SODIUM 30 MG: 30 INJECTION SUBCUTANEOUS at 08:03

## 2022-07-14 RX ADMIN — INSULIN LISPRO 2 UNITS: 100 INJECTION, SOLUTION INTRAVENOUS; SUBCUTANEOUS at 12:25

## 2022-07-14 RX ADMIN — LOSARTAN POTASSIUM 12.5 MG: 25 TABLET, FILM COATED ORAL at 08:04

## 2022-07-14 RX ADMIN — ESCITALOPRAM OXALATE 5 MG: 10 TABLET ORAL at 08:03

## 2022-07-14 RX ADMIN — METFORMIN HYDROCHLORIDE 500 MG: 500 TABLET ORAL at 17:26

## 2022-07-14 RX ADMIN — ENOXAPARIN SODIUM 30 MG: 30 INJECTION SUBCUTANEOUS at 21:00

## 2022-07-14 RX ADMIN — METFORMIN HYDROCHLORIDE 500 MG: 500 TABLET ORAL at 07:14

## 2022-07-14 RX ADMIN — PRAVASTATIN SODIUM 80 MG: 80 TABLET ORAL at 17:26

## 2022-07-14 RX ADMIN — LIDOCAINE 5% 2 PATCH: 700 PATCH TOPICAL at 08:03

## 2022-07-14 RX ADMIN — LEVOTHYROXINE SODIUM 112 MCG: 112 TABLET ORAL at 05:08

## 2022-07-14 RX ADMIN — INSULIN LISPRO 2 UNITS: 100 INJECTION, SOLUTION INTRAVENOUS; SUBCUTANEOUS at 17:27

## 2022-07-14 RX ADMIN — RIFAXIMIN 550 MG: 550 TABLET ORAL at 08:06

## 2022-07-14 NOTE — PLAN OF CARE
Problem: PAIN - ADULT  Goal: Verbalizes/displays adequate comfort level or baseline comfort level  Description: Interventions:  - Encourage patient to monitor pain and request assistance  - Assess pain using appropriate pain scale  - Administer analgesics based on type and severity of pain and evaluate response  - Implement non-pharmacological measures as appropriate and evaluate response  - Consider cultural and social influences on pain and pain management  - Notify physician/advanced practitioner if interventions unsuccessful or patient reports new pain  Outcome: Progressing     Problem: INFECTION - ADULT  Goal: Absence or prevention of progression during hospitalization  Description: INTERVENTIONS:  - Assess and monitor for signs and symptoms of infection  - Monitor lab/diagnostic results  - Monitor all insertion sites, i e  indwelling lines, tubes, and drains  - Monitor endotracheal if appropriate and nasal secretions for changes in amount and color  - [place] appropriate cooling/warming therapies per order  - Administer medications as ordered  - Instruct and encourage patient and family to use good hand hygiene technique  - Identify and instruct in appropriate isolation precautions for identified infection/condition  Outcome: Progressing  Goal: Absence of fever/infection during neutropenic period  Description: INTERVENTIONS:  - Monitor WBC    Outcome: Progressing     Problem: SAFETY ADULT  Goal: Patient will remain free of falls  Description: INTERVENTIONS:  - Educate patient/family on patient safety including physical limitations  - Instruct patient to call for assistance with activity   - Consult OT/PT to assist with strengthening/mobility   - Keep Call bell within reach  - Keep bed low and locked with side rails adjusted as appropriate  - Keep care items and personal belongings within reach  - Initiate and maintain comfort rounds  - Make Fall Risk Sign visible to staff  - Offer Toileting every 2-4 Hours, in advance of need  - Initiate/Maintain bed/chair alarm  - Obtain necessary fall risk management equipment: yellow socks  - Apply yellow socks and bracelet for high fall risk patients  - Consider moving patient to room near nurses station  Outcome: Progressing  Goal: Maintain or return to baseline ADL function  Description: INTERVENTIONS:  -  Assess patient's ability to carry out ADLs; assess patient's baseline for ADL function and identify physical deficits which impact ability to perform ADLs (bathing, care of mouth/teeth, toileting, grooming, dressing, etc )  - Assess/evaluate cause of self-care deficits   - Assess range of motion  - Assess patient's mobility; develop plan if impaired  - Assess patient's need for assistive devices and provide as appropriate  - Encourage maximum independence but intervene and supervise when necessary  - Involve family in performance of ADLs  - Assess for home care needs following discharge   - Consider OT consult to assist with ADL evaluation and planning for discharge  - Provide patient education as appropriate  Outcome: Progressing     Problem: DISCHARGE PLANNING  Goal: Discharge to home or other facility with appropriate resources  Description: INTERVENTIONS:  - Identify barriers to discharge w/patient and caregiver  - Arrange for needed discharge resources and transportation as appropriate  - Identify discharge learning needs (meds, wound care, etc )  - Arrange for interpretive services to assist at discharge as needed  - Refer to Case Management Department for coordinating discharge planning if the patient needs post-hospital services based on physician/advanced practitioner order or complex needs related to functional status, cognitive ability, or social support system  Outcome: Progressing     Problem: Prexisting or High Potential for Compromised Skin Integrity  Goal: Skin integrity is maintained or improved  Description: INTERVENTIONS:  - Identify patients at risk for skin breakdown  - Assess and monitor skin integrity  - Assess and monitor nutrition and hydration status  - Monitor labs   - Assess for incontinence   - Turn and reposition patient  - Assist with mobility/ambulation  - Relieve pressure over bony prominences  - Avoid friction and shearing  - Provide appropriate hygiene as needed including keeping skin clean and dry  - Evaluate need for skin moisturizer/barrier cream  - Collaborate with interdisciplinary team   - Patient/family teaching  - Consider wound care consult   Outcome: Progressing     Problem: MOBILITY - ADULT  Goal: Maintain or return to baseline ADL function  Description: INTERVENTIONS:  -  Assess patient's ability to carry out ADLs; assess patient's baseline for ADL function and identify physical deficits which impact ability to perform ADLs (bathing, care of mouth/teeth, toileting, grooming, dressing, etc )  - Assess/evaluate cause of self-care deficits   - Assess range of motion  - Assess patient's mobility; develop plan if impaired  - Assess patient's need for assistive devices and provide as appropriate  - Encourage maximum independence but intervene and supervise when necessary  - Involve family in performance of ADLs  - Assess for home care needs following discharge   - Consider OT consult to assist with ADL evaluation and planning for discharge  - Provide patient education as appropriate  Outcome: Progressing  Goal: Maintains/Returns to pre admission functional level  Description: INTERVENTIONS:  - Perform BMAT or MOVE assessment daily    - Set and communicate daily mobility goal to care team and patient/family/caregiver  - Collaborate with rehabilitation services on mobility goals if consulted  - Perform Range of Motion 3 times a day  - Reposition patient every 2 hours    - Dangle patient 3 times a day  - Stand patient 3 times a day  - Ambulate patient 3 times a day  - Out of bed to chair 3 times a day   - Out of bed for meals 3 times a day  - Out of bed for toileting  - Record patient progress and toleration of activity level   Outcome: Progressing     Problem: Potential for Falls  Goal: Patient will remain free of falls  Description: INTERVENTIONS:  - Educate patient/family on patient safety including physical limitations  - Instruct patient to call for assistance with activity   - Consult OT/PT to assist with strengthening/mobility   - Keep Call bell within reach  - Keep bed low and locked with side rails adjusted as appropriate  - Keep care items and personal belongings within reach  - Initiate and maintain comfort rounds  - Make Fall Risk Sign visible to staff  - Offer Toileting every 2 Hours, in advance of need  - Initiate/Maintain bed/chair alarm  - Obtain necessary fall risk management equipment: nonskid socks  - Apply yellow socks and bracelet for high fall risk patients  - Consider moving patient to room near nurses station  Outcome: Progressing

## 2022-07-14 NOTE — PROGRESS NOTES
07/14/22 1400   Pain Assessment   Pain Assessment Tool 0-10   Pain Score 8   Pain Location/Orientation Orientation: Left; Location: Arm  (bicep area)   Pain Onset/Description Onset: Ongoing; Descriptor: Aching   Effect of Pain on Daily Activities didn't effect today's session   Patient's Stated Pain Goal No pain   Hospital Pain Intervention(s) Repositioned; Ambulation/increased activity; Emotional support   Multiple Pain Sites Yes   Pain 2   [place] FACES Pain Rating 2 6   Pain Location/Orientation 2 Location: Buttocks   Pain Onset/Description 2 Onset: Ongoing; Descriptor: Burning   Patient's Stated Pain Goal 2 No pain   Hospital Pain Intervention(s) 2 Repositioned; Ambulation/increased activity; Emotional support   Restrictions/Precautions   Precautions Cognitive; Fall Risk;Pressure Ulcer;Pain;Supervision on toilet/commode   Weight Bearing Restrictions Yes   LUE Weight Bearing Per Order NWB   ROM Restrictions Yes   LUE ROM Restriction PROM  (no AROM LUE, PROM limited to max 45 degrees )   Braces or Orthoses Sling; Other (Comment)  (edema glove on L hand, b/l LE ace wrapping for edema )   General   Change In Medical/Functional Status see ARC vitals; pt denies s/s of dizziness/ lightheadedness   Cognition   Overall Cognitive Status Impaired   Arousal/Participation Alert; Cooperative   Attention Attends with cues to redirect   Orientation Level Oriented to person;Oriented to place;Oriented to situation;Disoriented to time   Memory Decreased recall of precautions   Following Commands Follows multistep commands with increased time or repetition   Subjective   Subjective "they have been giving me stuff to go to the bathroom, I went 4 times last night "   Sit to Stand   Type of Assistance Needed Supervision   Physical Assistance Level No physical assistance   Comment w/ SPC   Sit to Stand CARE Score 4   Bed-Chair Transfer   Type of Assistance Needed Supervision   Physical Assistance Level No physical assistance   Comment w/ SPC   Chair/Bed-to-Chair Transfer CARE Score 4   Car Transfer   Type of Assistance Needed Supervision   Physical Assistance Level No physical assistance   Comment w/ SPC and increased time to complete task  Pt able to perform w/ proper sequencing, buttock first followed by legs   Car Transfer CARE Score 4   Walk 10 Feet   Type of Assistance Needed Supervision; Adaptive equipment   Physical Assistance Level No physical assistance   Comment w/ SPC   Walk 10 Feet CARE Score 4   Walk 50 Feet with Two Turns   Type of Assistance Needed Supervision; Adaptive equipment   Physical Assistance Level No physical assistance   Comment w/ SPC   Walk 50 Feet with Two Turns CARE Score 4   Walk 150 Feet   Type of Assistance Needed Supervision; Adaptive equipment   Physical Assistance Level No physical assistance   Comment w/ SPC   Walk 150 Feet CARE Score 4   Ambulation   Does the patient walk? 2  Yes   Primary Mode of Locomotion Prior to Admission Walk   Distance Walked (feet) 200 ft  (x2)   Assist Device Cane   Gait Pattern Inconsistant Ayala; Slow Ayala; Wide SUGAR   Limitations Noted In Endurance;Balance; Heel Strike; Safety; Sequencing;Speed;Strength;Swing   Provided Assistance with: Balance   Walk Assist Level Close Supervision   Findings Pt able to ambulate w/ SPC +  ft x 2  Gait speed calculated during session: 0 21 m/sec indicating that pt is at an increased risk for falls and currently functioning at a household ambulation level  Curb or Single Stair   Style negotiated Curb   Type of Assistance Needed Incidental touching   Physical Assistance Level No physical assistance   Comment 6 " curb w/ SPC x 2  VC for sequencing w/ AD   Pt descending step laterally   1 Step (Curb) CARE Score 4   4 Steps   Type of Assistance Needed Supervision   Physical Assistance Level No physical assistance   Comment CS w/ unilat HR   4 Steps CARE Score 4   12 Steps   Type of Assistance Needed Incidental touching   Physical Assistance Level No physical assistance   Comment CGA for 12 steps on stair  w/ 1 HR  Pt requiring increased A on last 4 steps due to fatigue  12 Steps CARE Score 4   Stairs   Type Stairs   # of Steps 12   Weight Bearing Precautions NWB;LUE;Fall Risk   Assist Devices Single Rail   Findings Attempted 12 steps on stair  today due to pt reports that she may be staying at son's house w/ FF to 2nd flr bedroom  Pt able to navigate FF w/ CS vs CGA due to onset of fatigue  Toilet Transfer   Type of Assistance Needed Supervision   Physical Assistance Level No physical assistance   Comment S w/ SPC  (+) BM x 2  RNfitz and MD, Dr Dede Sears notified  Toilet Transfer CARE Score 4   Toilet Transfer   Surface Assessed Raised Toilet   Limitations Noted In Balance;UE Strength;LE Strength; Safety   Positioning Concerns Cognition; Safety  (LUE NWB )   Findings Pt requesting to use bathroom throughout session due to urgency to have BM  (+) BM x 2  S for toilet transfer, Mod A for don/doff pants, Max for hygiene   Therapeutic Interventions   Strengthening Seated TE: LAQ w/ 2 5 lb wt (3x10 b/l), seated marching x 30, hamstring curls w/ red T-band (3x10 b/L), hip add w/ 9 lb medicine ball x 30 w/ 10 sec hold   Flexibility seated b/l hamstring/ gastoc stretch 3 min  seated quad stretch 3 min b/l   Equipment Use   NuStep 7 min, lvl 1 seat 9  Had to stop before 10 min due to [facility] urgency   Other Comments   Comments Provided pt w/ hand out for bed cane  Assessment   Treatment Assessment Pt engaged in 90 min skilled PT intervention, mainly focusing on functional transfers from various surfaces, ambulation w/ SPC, stair negotiation w/ single HR, and LE strengthening  Upon entry, pt reports ongoing L bicep pain, however is eager to get out of her room and participate in activity  Pt is stiff when she first starts to move, however transfers and gait quality improve throughout session  Pt cc during today's session is urgency to have a BM  Session modified due to pt's urgency and time spent transfering to and from bathroom  2 BM's during session, RN, Ayan Dowell and MD Dr Clark Lorenz notified  Also addressed stair negotiation as pt reports she may stay w/ her son for some time after being dc home  Pt reports that her plan is to return home for 1 week w/ dtr and DIL, then stay at son's in multi-level home w/  FF to 2nd flr bedroom  Today's session modified to address stair barrier  Pt able to navigate 12 steps on stair  w/ CS vs  CGA w/ onset of fatigue  Updated pt's goals to reflect DC plan  Recommend trial of FF on hallway stairs to simulate similar set-up at son's  At this time, recommend use of SPC for ambulation + plan to cont w/ POC to improve pt's cardiovascular endurance, balance+ strength deficits, and overall functional mobility status  Provided pt w/ handout for bed cane upon DC given that bed mobility cont to be a challenge for her  Pt positioned in [facility] at end of session, w/ call bell within reach, and all current needs met  Family/Caregiver Present no   Problem List Decreased strength;Decreased range of motion;Decreased endurance; Impaired balance;Decreased mobility; Decreased cognition;Decreased skin integrity;Orthopedic restrictions;Pain;Obesity; Decreased coordination   Barriers to Discharge Inaccessible home environment;Decreased caregiver support   Barriers to Discharge Comments Pt will use ramped entrance for home   PT Barriers   Physical Impairment Decreased strength;Decreased range of motion;Decreased endurance; Impaired balance;Decreased mobility; Decreased coordination;Decreased cognition;Obesity; Decreased skin integrity;Orthopedic restrictions;Pain   Functional Limitation Car transfers; Ramp negotiation;Stair negotiation;Transfers; Walking   Plan   Treatment/Interventions Functional transfer training;LE strengthening/ROM; Elevations; Therapeutic exercise; Endurance training;Cognitive reorientation;Patient/family training;Equipment eval/education; Bed mobility;Gait training;Spoke to MD;Spoke to nursing;Family; Compensatory technique education   Progress Progressing toward goals   Recommendation   PT Discharge Recommendation Home with home health rehabilitation   Equipment Recommended Cane   PT Therapy Minutes   PT Time In 1400   PT Time Out 1530   PT Total Time (minutes) 90   PT Mode of treatment - Individual (minutes) 70   PT Mode of treatment - Concurrent (minutes) 20   PT Mode of treatment - Group (minutes) 0   PT Mode of treatment - Co-treat (minutes) 0   PT Mode of Treatment - Total time(minutes) 90 minutes   PT Cumulative Minutes 750   Therapy Time missed   Time missed?  No

## 2022-07-14 NOTE — PROGRESS NOTES
Internal Medicine Progress Note  Patient: Nathalie Palma  Age/sex: 77 y o  female  Medical Record #: [id number]      ASSESSMENT/PLAN: (Interval History)  Nathalie Palma is seen and examined and management for following issues:    Left proximal humerus fracture  · Non-op  · NWB  · Sling for comfort  · See Ortho as OP     CHB  · S/p dual chamber PPM 6/29/22  · No heavy lift/push/pull >10 lbs  · No left arm above shoulder height      Stress cardiomyopathy  · D/W Dr Ross Case he said no further intervention for now but see back in office  · Losartan reduced to 12 5mg qd starting 7/14/22 since BPs get a bit soft in afternoon (cards started in hospital)      · May need to cut Corgard to 10mg qd but will watch again today     ABLA  · S/p 1 unit PRBCs  · Stable     PAUL cirrhosis  · Hx non-bleeding esophageal varices (banded) and hepatic encephalopathy   · Sees Dr Phani Escalona at [facility] as OP  · Home:  Rifaximin 550 mcg q12hrs/Lactulose 10Gm BID/Lasix 40mg qd/Corgard 20mg qd/Aldactone 50mg qd  · Here:  Rifaximin 550 mg q12hrs/Lactulose 10Gm BID/Corgard 20mg qd (hold SBP <120)  · Consider adding back a small dose of Lasix when BP allows     DM2  · Home:  Janumet XR  BID/Amaryl 8 mg qAM/Basaglar 48U qam, 36U qpm  · Here:  Lantus 32U q12hrs/Metformin 500 mg BID   · QID Accuchecks/SSI and DM diet  · FBS low 7/10 AM and Lantus was skipped altogether --> make sure patient has qhs snack  · Too low this AM on 7/14/22 (52 by BMP)  · Will reduce HS Lantus dose to 20U to start tonight 7/14/22     Pancytopenia  · Is 2/2 to cirrhosis/portal HTN   Additionally, has splenomegaly contributing to low platelet ct  · Has had tx with Venofer in past   Sees Dr Cesar Morrow from [facility]  · Stable again today     Hypothyroidism  · Continue Levothyroxine 112 mcg qd     Depression  · Continue Lexapro as at home     Hyperkalemia  · resolved        Discharge date:  7/21/22    The above assessment and plan was reviewed and updated as determined by my evaluation of the patient on 7/14/2022      Labs:   Results from last 7 days   Lab Units 07/14/22  0510 07/12/22  0518   WBC Thousand/uL 2 94* 2 90*   HEMOGLOBIN g/dL 8 6* 8 8*   HEMATOCRIT % 29 1* 29 3*   PLATELETS Thousands/uL 81* 76*     Results from last 7 days   Lab Units 07/14/22  0510 07/12/22  0518   SODIUM mmol/L 138 137   POTASSIUM mmol/L 4 5 5 2   CHLORIDE mmol/L 109* 107   CO2 mmol/L 25 25   BUN mg/dL 31* 36*   CREATININE mg/dL 0 91 1 07   CALCIUM mg/dL 8 8 8 6             Results from last 7 days   Lab Units 07/14/22  0652 07/13/22 2055 07/13/22  1611   POC GLUCOSE mg/dl 71 153* 116       Review of Scheduled Meds:  Current Facility-Administered Medications   Medication Dose Route Frequency Provider Last Rate    aspirin  81 mg Oral Daily Tim North Jackson, DO      bisacodyl  10 mg Rectal Daily PRN Garrett [place], DO      docusate sodium  100 mg Oral BID Garrett [place], DO      enoxaparin  30 mg Subcutaneous Q12H [address] Garrett [place], DO      escitalopram  5 mg Oral Daily Garrett [place], DO      insulin glargine  32 Units Subcutaneous Q12H [address] Sahra Flannery, JOSE JUAN      insulin lispro  2-12 Units Subcutaneous 4x Daily (with meals and at bedtime) Tim [place], DO      lactulose  10 g Oral BID Garrett [place], DO      levothyroxine  112 mcg Oral Early Morning Garrett [place] Jackson, DO      lidocaine  2 patch Topical Daily Tim [place], DO      losartan  12 5 mg Oral Daily [facility] JOSE JUAN Martinez      melatonin  6 mg Oral HS Garrett [place], DO      metFORMIN  500 mg Oral BID With Meals [facility] JOSE JUAN Martinez      nadolol  20 mg Oral Daily Sofia BumpsJOSE JUAN      nystatin   Topical BID [facility] JOSE JUAN Longo      ondansetron  4 mg Oral Q6H PRN Tim [place], DO      oxyCODONE  2 5 mg Oral Q6H PRN Garrett [place], DO      pantoprazole  40 mg Oral Early Morning Garrett [place], DO      polyethylene glycol  17 g Oral Daily Garrett [place], DO      pravastatin  80 mg Oral Daily With [employer] DO Gina      rifaximin  550 mg Oral Q12H [address] Donnald Bad, DO      senna  1 tablet Oral HS Donnald Bad, DO         Subjective/ HPI: Patient seen and examined  Patients overnight issues or events were reviewed with nursing or staff during rounds or morning huddle session  New or overnight issues include the following:     No new or overnight issues  Offers no complaints    ROS:   A 10 point ROS was performed; negative except as noted above  Imaging:     No orders to display       *Labs /Radiology studies reviewed  *Medications reviewed and reconciled as needed  *Please refer to order section for additional ordered labs studies  *Case discussed with primary attending during morning huddle case rounds    Physical Examination:  Vitals:   Vitals:    07/13/22 1235 07/13/22 1409 07/14/22 0502 07/14/22 0807   BP: 97/64 104/53 117/54 117/58   BP Location: Right arm Right arm Right arm    Pulse: 60 64 60 60   Resp:  18 17    Temp:  97 9 °F (36 6 °C) 97 9 °F (36 6 °C)    TempSrc:  Oral Oral    SpO2:  100% 99%    Weight:   98 3 kg (216 lb 11 2 oz)    Height:           General Appearance: no distress, conversive  HEENT: PERRLA, conjuctiva normal; oropharynx clear; mucous membranes moist   Neck:  Supple, normal ROM  Lungs: CTA, normal respiratory effort, no retractions, expiratory effort normal  CV: regular rate and rhythm; no rubs/murmurs/gallops, PMI normal   PPM site healing well without erythema/drainage  ABD: soft; ND/NT; +BS  EXT: some mild left hand edema; + lower leg edema more so in feet  Has ACE wraps on  Skin: normal turgor, normal texture, no rashes  Psych: affect normal, mood normal  Neuro: AAO      The above physical exam was reviewed and updated as determined by my evaluation of the patient on 7/14/2022      Invasive Devices  Report    None                    VTE Pharmacologic Prophylaxis: Enoxaparin  Code Status: Level 1 - Full Code  Current Length of Stay: 9 day(s)      Total time spent:  30 minutes with more than 50% spent counseling/coordinating care  Counseling includes discussion with patient re: progress  and discussion with patient of his/her current medical state/information  Coordination of patient's care was performed in conjunction with primary service  Time invested included review of patient's labs, vitals, and management of their comorbidities with continued monitoring  In addition, this patient was discussed with medical team including physician and advanced extenders  The care of the patient was extensively discussed and appropriate treatment plan was formulated unique for this patient  ** Please Note:  voice to text software may have been used in the creation of this document   Although proof errors in transcription or interpretation are a potential of such software**

## 2022-07-14 NOTE — PROGRESS NOTES
PM&R PROGRESS NOTE:  Juana Butler 77 y o  female MRN: [id number]  Unit/Bed#: -25 Encounter: [id number]    Rehab Diagnosis: Impairment of mobility, safety and Activities of Daily Living (ADLs) due to Orthopedic Disorders:  08 9  Other Orthopedic Left Humerus fracture     Etiologic: L beau fx, Complete Heart Block  Date of Onset: 6/29/22     Date of surgery: 6/29/2022: Cardiac Pacer Implant (Chest)    HPI: Jauna Butler is a 77 y o  female with type 2 diabetes, hypertension, depression, hypothyroidism, hx of PAUL cirrhosis, esophageal varices, pancytopenia, HLD, hypothyroidism who presented to the [address] on 6/29 after tripping over her flip flops, falling and sustaining a left humerus fracture treated non-operatively with NWB to the left arm in a sling  She was found to be in complete heart block with HR in the 20-230s range, developing cardiogenic shock requiring pressors and TVP  She underwent a Permanent pacemaker placement on 6/29 with Dr Hakan Vega  Course complicated by CLYDE and blood loss anemia with hemoglobin of 6 9 s/p 1 unit pRBC  Initial ECHO on 6/29/22 showed moderately dilated left ventricular cavity size, LVEF 55%, mild bi-atrial enlargement   There were regional wall motion abnormalities with akinesis of the apical anterior, apical septal, apical inferior, apical lateral walls and the apex   There was hyperkinesis of the basal anterior, basal anteroseptal, basal inferoseptal, basal inferior, basal inferolateral, basal anterolateral, mid anterior, mid anteroseptal, mid inferoseptal, mid inferior, mid inferolateral and mid anterolateral  RVEF was moderately reduced   The LV systolic function was abnormal in a pattern suggestive of apical stress cardiomyopathy   She was started on Losartan by Cardiology and a repeat ECHO done today 7/5/22   It showed LVEF of 50% with grade 1 diastolic dysfunction, akinesis of the apex, hypokinesis of the apical anterior, septal, inferior/lateral walls  The patient was evaluated by the Rehabilitation team and deemed an appropriate candidate for comprehensive inpatient rehabilitation and admitted to the [facility] on 7/5/2022  5:43 PM    SUBJECTIVE:  Patient seen and examined in therapy  No acute issues overnight  Continues to have left arm pain, but tolerating passive ROM activity within the defined limitations  Still with some edema in the legs which appears stable  She was shown some potential equipment to assist with bed mobility which is a large barrier  She moves slowly but well with a cane, but transfers, and bed mobility are a bit tougher with her NWB status  ASSESSMENT: Stable, progressing    PLAN:    Rehabilitation   Functional deficits:  Self care, mobility   Continue current rehabilitation plan of care to maximize function       Functional update:   Physical Therapy Occupational Therapy Speech Therapy   Weight Bearing Status: Non-weight bearing (LUE)  Transfers: Supervision  Bed Mobility: Moderate Assistance  Amulation Distance (ft): 200 feet  Ambulation: Incidental Touching, Supervision  Assistive Device for Ambulation: Single Point Cane  Wheelchair Mobility Distance:  (N/A)  Number of Stairs: 4  Assistive Device for Stairs: Single Point Cane [facility] for curb step; b/l HRs for steps)  Stair Assistance: Minimal Assistance  Ramp: Incidental Touching  Assistive Device for Ramp: Single [employer]  Discharge Recommendations: Home with:  [address] with[de-identified] Family Support, First Floor Setup, Home Physical Therapy   Eating: Supervision  Grooming: Supervision  Bathing: Maximum Assistance  Bathing: Maximum Assistance  Upper Body Dressing: Maximum Assistance  Lower Body Dressing: Maximum Assistance  Toileting: Minimal Assistance  Toilet Transfer: Minimal Assistance  Cognition: Within Defined Limits  Orientation: Person, Place, Time, Situation                  Estimated Discharge: 7/21    DVT prophylaxis  · lovenox     Pain  · Lidocaine patch  · Oxycodone 2 5mg Q6H PRN     Bladder plan  · Continent     Bowel plan  · Continent, 7/12     Code Status  · Level 1: Full Code      * Closed fracture of proximal end of left humerus  Assessment & Plan  · Displaced comminuted fracture of the humeral head/neck on imaging  · NWB to the left upper limb, maintain in sling  · Ok for for gentle range of motion to prevent adhesive capsulitis- starting 7/11  · Pain control  · Likely course of acute blood loss anemia, monitor labs  · PT/OT  · Follow up with Dr Mariaelena Jones per orthopaedics, signed off  Complete heart block [facility])  Assessment & Plan  · S/p dual chamber PPM 6/29/22 with Dr Kiarra Jurado  · Monitor pacemaker site for signs of infection  · No heavy lifting/push/pull >10 lbs    Obesity (BMI 30-39  9)  Assessment & Plan  · BMI 36 03  · Weight loss counseling, promote increased activity    Acute blood loss anemia  Assessment & Plan  · Acute blood loss anemia, Hgb 6 9 previously, s/p transfusion  · Hgb on 7/12 of 8 8  · Will require close follow with labs  · Monitor clinically as well in therapy    Cardiomyopathy [facility])  Assessment & Plan  · Medicine team reached out to Cardiology re: advice with recs for further w/u based on ECHO results from 7/5/22  · Continue Losartan 25mg qd (Cardiology started in hospital)  · Per Cardiology, outpatient follow up, no acute changes based on echo      Depression  Assessment & Plan  Continue home lexapro 5mg daily    Pancytopenia ([address] )  Assessment & Plan  Secondary to cirrhosis, monitor labs    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) ([address] )  Assessment & Plan  · Hx non-bleeding esophageal varices and hepatic encephalopathy   · At home, was on Rifaximin 550 mcg q12hrs/Lactulose 10Gm BID/Lasix 40mg qd/Nadolol 20mg qd/Aldactone 50mg qd  · Here:  Rifaximin 550 mg q12hrs/Lactulose 10Gm BID    Type 2 diabetes mellitus with hyperglycemia, with long-term current use of insulin [facility])  Assessment & Plan  Lab Results Component Value Date    HGBA1C 8 2 (H) 05/31/2022       Recent Labs     07/13/22  1611 07/13/22  2055 07/14/22  0652 07/14/22  1044   POCGLU 116 153* 71 165*     · Home:  Janumet XR  BID/Amaryl 8 mg qAM/Basaglar 48U qam, 36U qpm  · Here:  Lantus 32U q12hrs, Metformin 500 mg BID  · QID Accuchecks/SSI and DM diet    Acquired hypothyroidism  Assessment & Plan  Continue levothyroxine 112mcg daily    Essential hypertension  Assessment & Plan  · On losartan 25mg daily  · Previously on Nadolol, aldactone, and lasix  · Medications held based on initial hypotension  · Will start adding back meds as indicated, likely starting with nadolol- started back on 7/7      Hyperlipidemia  Assessment & Plan  Continue statin      Appreciate IM consultants medical co-management  Labs, medications, and imaging personally reviewed  ROS:  A ten point review of systems was completed on 07/14/22 and pertinent positives are listed in subjective section  All other systems reviewed were negative  OBJECTIVE:   /53 (BP Location: Right arm)   Pulse 64   Temp 97 9 °F (36 6 °C) (Oral)   Resp 16   Ht 5' 5" (1 651 m)   Wt 98 3 kg (216 lb 11 2 oz)   SpO2 100%   BMI 36 06 kg/m²     Physical Exam  Constitutional:       General: She is not in acute distress  Appearance: She is obese  Comments: tired   HENT:      Head: Normocephalic and atraumatic  Right Ear: External ear normal       Left Ear: External ear normal       Nose: Nose normal  No rhinorrhea  Mouth/Throat:      Mouth: Mucous membranes are moist       Pharynx: Oropharynx is clear  Eyes:      General: No scleral icterus  Right eye: No discharge  Left eye: No discharge  Cardiovascular:      Rate and Rhythm: Normal rate  Pulmonary:      Effort: Pulmonary effort is normal  No respiratory distress  Breath sounds: No wheezing, rhonchi or rales  Abdominal:      General: There is no distension  Palpations: Abdomen is soft  Musculoskeletal:      Cervical back: Normal range of motion  Right lower leg: Edema present  Left lower leg: Edema present  Comments: Swelling in the arm, unchanged, sling in place, tender to gentle movement, but less pain with range  Skin:     General: Skin is warm and dry  Comments: Pacer site clean and intact   Neurological:      Mental Status: She is alert and oriented to person, place, and time  Sensory: No sensory deficit  Motor: No weakness     Psychiatric:         Mood and Affect: Mood normal          Behavior: Behavior normal           Lab Results   Component Value Date    WBC 2 94 (L) 07/14/2022    HGB 8 6 (L) 07/14/2022    HCT 29 1 (L) 07/14/2022    MCV 93 07/14/2022    PLT 81 (L) 07/14/2022     Lab Results   Component Value Date    SODIUM 138 07/14/2022    K 4 5 07/14/2022     (H) 07/14/2022    CO2 25 07/14/2022    BUN 31 (H) 07/14/2022    CREATININE 0 91 07/14/2022    GLUC 52 (L) 07/14/2022    CALCIUM 8 8 07/14/2022     Lab Results   Component Value Date    INR 1 33 (H) 06/30/2022    INR 1 20 (H) 06/29/2022    INR 1 23 (H) 05/31/2022    PROTIME 16 0 (H) 06/30/2022    PROTIME 15 1 (H) 06/29/2022    PROTIME 15 3 (H) 05/31/2022           Current Facility-Administered Medications:     aspirin chewable tablet 81 mg, 81 mg, Oral, Daily, Neil Me, DO, 81 mg at 07/14/22 0803    bisacodyl (DULCOLAX) rectal suppository 10 mg, 10 mg, Rectal, Daily PRN, Neil Me, DO    docusate sodium (COLACE) capsule 100 mg, 100 mg, Oral, BID, Neil Me, DO, 100 mg at 07/14/22 0804    enoxaparin (LOVENOX) subcutaneous injection 30 mg, 30 mg, Subcutaneous, Q12H [address], Neil Me, DO, 30 mg at 07/14/22 0803    escitalopram (LEXAPRO) tablet 5 mg, 5 mg, Oral, Daily, Neil Me, DO, 5 mg at 07/14/22 0803    insulin glargine (LANTUS) subcutaneous injection 20 Units 0 2 mL, 20 Units, Subcutaneous, , JOSE JUAN Helm    [START ON 7/15/2022] insulin glargine (LANTUS) subcutaneous injection 32 Units 0 32 mL, 32 Units, Subcutaneous, QAM, JOSE JUAN Higginbotham    insulin lispro (HumaLOG) 100 units/mL subcutaneous injection 2-12 Units, 2-12 Units, Subcutaneous, 4x Daily (with meals and at bedtime), 2 Units at 07/14/22 1225 **AND** Fingerstick Glucose (POCT), , , 4x Daily AC and at bedtime, Ilona Stef, DO    lactulose oral solution 10 g, 10 g, Oral, BID, Ilona Stef, DO, 10 g at 07/14/22 0804    levothyroxine tablet 112 mcg, 112 mcg, Oral, Early Morning, Ilona Stef, DO, 112 mcg at 07/14/22 0508    lidocaine (LIDODERM) 5 % patch 2 patch, 2 patch, Topical, Daily, Ilona Stef, DO, 2 patch at 07/14/22 0803    losartan (COZAAR) tablet 12 5 mg, 12 5 mg, Oral, Daily, JOSE JUAN Higginbotham, 12 5 mg at 07/14/22 0804    melatonin tablet 6 mg, 6 mg, Oral, HS, Ilona Stef, DO, 6 mg at 07/13/22 2129    metFORMIN (GLUCOPHAGE) tablet 500 mg, 500 mg, Oral, BID With Meals, JOSE JUAN Higginbotham, 500 mg at 07/14/22 0714    nadolol (CORGARD) tablet 20 mg, 20 mg, Oral, Daily, JOSE JUAN Higginbotham, 20 mg at 07/14/22 2232    nystatin (MYCOSTATIN) powder, , Topical, BID, JOSE JUAN Higginbotham, 1 application at 27/59/36 0805    ondansetron (ZOFRAN-ODT) dispersible tablet 4 mg, 4 mg, Oral, Q6H PRN, Ilona Stef, DO, 4 mg at 07/10/22 0857    oxyCODONE (ROXICODONE) IR tablet 2 5 mg, 2 5 mg, Oral, Q6H PRN, Ilona Stef, DO, 2 5 mg at 07/12/22 0825    pantoprazole (PROTONIX) EC tablet 40 mg, 40 mg, Oral, Early Morning, Ilona Stef, DO, 40 mg at 07/14/22 0508    polyethylene glycol (MIRALAX) packet 17 g, 17 g, Oral, Daily, Ilona Stef, DO, 17 g at 07/10/22 0856    pravastatin (PRAVACHOL) tablet 80 mg, 80 mg, Oral, Daily With Dinner, Ilona Stef, DO, 80 mg at 07/13/22 1713    rifaximin (XIFAXAN) tablet 550 mg, 550 mg, Oral, Q12H [facility], Nargis Headiff, DO, 550 mg at 07/14/22 0806    senna (SENOKOT) tablet 8 6 mg, 1 tablet, Oral, , Anabel Bender DO Gina, 8 6 mg at 07/13/22 2129    Past Medical History:   Diagnosis Date    Anemia     [address] [address]     Diabetic neuropathy (HCC)     Esophageal varices (HCC)     Fatty liver     GERD (gastroesophageal reflux disease)     Hepatic encephalopathy (HCC)     Hiatal hernia     Hyperlipidemia     Hypertension     Hypoglycemia     Hypothyroidism     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)     Osteoarthritis     Osteopenia     Pancytopenia (HCC)     Thrombocytopenia (HCC)     Type 2 diabetes mellitus ([facility] [address] )        Patient Active Problem List    Diagnosis Date Noted    Closed fracture of proximal end of left humerus 07/02/2022    Complete heart block ([address] ) 06/30/2022    Obesity (BMI 30-39 9) 07/06/2022    Acute blood loss anemia 07/03/2022    Depression 07/02/2022    Cardiomyopathy ([facility] [address] ) 07/02/2022    Es[facility]ices without bleeding [facility] ) 09/29/2021    Pancytopenia ([facility] ) 08/06/2021    Iron deficiency anemia due to chronic blood loss 08/06/2021    Mild nonproliferative diabetic retinopathy of both eyes without macular edema associated with type 2 diabetes mellitus ([facility] [address] ) 07/16/2021    Urinary tract infection 09/15/2020    Hepatic encephalopathy ([facility] [address] ) 09/13/2020    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) ([facility] [address] ) 09/13/2020    Thrombocytopenia ([facility] ) 09/13/2020    Hyperlipidemia 07/24/2018    Essential hypertension 07/24/2018    Acquired hypothyroidism 07/24/2018    Type 2 diabetes mellitus with hyperglycemia, with long-term current use of insulin ([facility] [address] ) 07/24/2018    Diabetic polyneuropathy associated with type 2 diabetes mellitus ([facility] ) 07/24/2018          Liz Graham DO  Physical Medicine and Katie Stallworth    Total time spent:  35 minutes, with more than 50% spent counseling/coordinating care   Counseling includes discussion with patient re: progress in therapies, functional issues observed by therapy staff, and discussion with patient his/her current medical state/wellbeing  Coordination of patient's care was performed in conjunction with Internal Medicine service to monitor patient's labs, vitals, and management of their comorbidities

## 2022-07-14 NOTE — PROGRESS NOTES
07/14/22 1000   Pain Assessment   Pain Assessment Tool 0-10   Pain Score No Pain  (intermittent with movement)   Pain Location/Orientation Orientation: Left; Location: Arm   Restrictions/Precautions   Precautions Fall Risk;Supervision on toilet/commode;Pressure Ulcer;Pain   LUE Weight Bearing Per Order NWB   ROM Restrictions Yes   Braces or Orthoses Sling   Lifestyle   Autonomy 'I love to bake  I guess theres alot I wont be doing now"  Eating   Type of Assistance Needed Set-up / clean-up   Physical Assistance Level No physical assistance   Comment seated in recliner with L hand elevated due to edema  Eating CARE Score 5   Oral Hygiene   Type of Assistance Needed Set-up / clean-up   Physical Assistance Level No physical assistance   Comment Pt required set up, able to rinse and put dentures in mouth indep  Oral Hygiene CARE Score 5   Grooming   Able To Initiate Tasks;Comb/Brush Hair;Wash/Dry Face;Brush/Clean Teeth;Wash/Dry Hands   Limitation Noted In Safety;Strength; Other  (NWB LUE)   Shower/Bathe Self   Type of Assistance Needed Physical assistance   Physical Assistance Level 26%-50%   Comment Pt able to wash chest, abdomen, L arm and under B arms  Pt declined to bathe below waist stating she needed to be washed up earlier in morning due to large bowel mvt  Pt required assist to bathe under B breasts and apply medicated powder due to impaired skin integrity  Shower/Bathe Self CARE Score 3   Bathing   Assessed Bath Style Sponge Bath   Able to Gather/Transport No   Able to [employer] Temperature No   Able to Wash/Rinse/Dry (body part) Left Arm; Chest;Abdomen   Upper Body Dressing   Type of Assistance Needed Physical assistance   Physical Assistance Level 76% or more   Comment Pt able to assist to thread RUE into OH shirt and pull overhead, unable to perform all components of task indep   Upper Body Dressing CARE Score 2   Lower Body Dressing   Reason if not Attempted Activity not applicable  (Pts LB bathed earlier in morning)   Lower Body Dressing CARE Score 9   Putting On/Taking Off Footwear   Type of Assistance Needed Physical assistance   Physical Assistance Level Total assistance   Comment B ace wrapping provide by therapist due to increased edema, pt unable to tolerate dwayne stockings  Pt unable to doff/oleg socks   Putting On/Taking Off Footwear CARE Score 1   Sit to Stand   Type of Assistance Needed Incidental touching   Physical Assistance Level   (CGA)   Comment cues for safe hand placment descending to chair from standing position  Sit to Stand CARE Score 4   Bed-Chair Transfer   Type of Assistance Needed Incidental touching   Physical Assistance Level No physical assistance   Comment CGA   Chair/Bed-to-Chair Transfer CARE Score 4   Toileting Hygiene   Type of Assistance Needed Physical assistance   Physical Assistance Level 26%-50%   Comment Assist for hygiene and hike pants up over hips  Pt able to hike pants down over hips prior to toileting task  Toileting Hygiene CARE Score 3   Toilet Transfer   Type of Assistance Needed Incidental touching   Physical Assistance Level No physical assistance   Comment CGA using SPC   Toilet Transfer CARE Score 4   Assessment   Treatment Assessment Pt engaged in 90 min skilled OT session focusing on ADL re-training, safety awareness, fxl mobility/transfers, edema mngt and overall activity endurance improving indep in ADL/IADL's to return to PLOF and safe d/c to home  See above for session details  Pt noted with bathroom urgency this session for bowel relief performing several toilet transfers w/continued demands of hygiene assist due to NWB status and intermittent pain to LUE  Retrograde massage provided by therapist to reduce edema and improve ROM w/pain reduction, lotion applied and compression glove donned  Continued skilled OT recommended focusing on POC     OT Therapy Minutes   OT Time In 1000   OT Time Out 1130   OT Total Time (minutes) 90   OT Mode of treatment - Individual (minutes) 90   OT Mode of treatment - Concurrent (minutes) 0   OT Mode of treatment - Group (minutes) 0   OT Mode of treatment - Co-treat (minutes) 0   OT Mode of Treatment - Total time(minutes) 90 minutes   OT Cumulative Minutes 710   Therapy Time missed   Time missed?  No

## 2022-07-15 ENCOUNTER — APPOINTMENT (INPATIENT)
Dept: RADIOLOGY | Facility: HOSPITAL | Age: 77
DRG: 560 | End: 2022-07-15
Payer: MEDICARE

## 2022-07-15 LAB
GLUCOSE SERPL-MCNC: 123 MG/DL (ref 65–140)
GLUCOSE SERPL-MCNC: 125 MG/DL (ref 65–140)
GLUCOSE SERPL-MCNC: 188 MG/DL (ref 65–140)
GLUCOSE SERPL-MCNC: 82 MG/DL (ref 65–140)

## 2022-07-15 PROCEDURE — 99232 SBSQ HOSP IP/OBS MODERATE 35: CPT | Performed by: INTERNAL MEDICINE

## 2022-07-15 PROCEDURE — 99233 SBSQ HOSP IP/OBS HIGH 50: CPT | Performed by: STUDENT IN AN ORGANIZED HEALTH CARE EDUCATION/TRAINING PROGRAM

## 2022-07-15 PROCEDURE — 73060 X-RAY EXAM OF HUMERUS: CPT

## 2022-07-15 PROCEDURE — 97535 SELF CARE MNGMENT TRAINING: CPT

## 2022-07-15 PROCEDURE — 97530 THERAPEUTIC ACTIVITIES: CPT

## 2022-07-15 PROCEDURE — 97116 GAIT TRAINING THERAPY: CPT

## 2022-07-15 PROCEDURE — 82948 REAGENT STRIP/BLOOD GLUCOSE: CPT

## 2022-07-15 RX ORDER — TRAMADOL HYDROCHLORIDE 50 MG/1
50 TABLET ORAL EVERY 6 HOURS PRN
Status: DISCONTINUED | OUTPATIENT
Start: 2022-07-15 | End: 2022-07-21 | Stop reason: HOSPADM

## 2022-07-15 RX ORDER — FUROSEMIDE 40 MG/1
40 TABLET ORAL ONCE
Status: COMPLETED | OUTPATIENT
Start: 2022-07-15 | End: 2022-07-15

## 2022-07-15 RX ORDER — METHOCARBAMOL 500 MG/1
250 TABLET, FILM COATED ORAL 4 TIMES DAILY PRN
Status: DISCONTINUED | OUTPATIENT
Start: 2022-07-15 | End: 2022-07-21 | Stop reason: HOSPADM

## 2022-07-15 RX ORDER — OXYCODONE HCL 5 MG/5 ML
1 SOLUTION, ORAL ORAL EVERY 4 HOURS PRN
Status: DISCONTINUED | OUTPATIENT
Start: 2022-07-15 | End: 2022-07-15

## 2022-07-15 RX ADMIN — NYSTATIN: 100000 POWDER TOPICAL at 08:38

## 2022-07-15 RX ADMIN — METFORMIN HYDROCHLORIDE 500 MG: 500 TABLET ORAL at 16:33

## 2022-07-15 RX ADMIN — PRAVASTATIN SODIUM 80 MG: 80 TABLET ORAL at 16:33

## 2022-07-15 RX ADMIN — LACTULOSE 10 G: 20 SOLUTION ORAL at 17:20

## 2022-07-15 RX ADMIN — RIFAXIMIN 550 MG: 550 TABLET ORAL at 08:35

## 2022-07-15 RX ADMIN — INSULIN GLARGINE 32 UNITS: 100 INJECTION, SOLUTION SUBCUTANEOUS at 08:09

## 2022-07-15 RX ADMIN — DOCUSATE SODIUM 100 MG: 100 CAPSULE, LIQUID FILLED ORAL at 17:22

## 2022-07-15 RX ADMIN — ASPIRIN 81 MG CHEWABLE TABLET 81 MG: 81 TABLET CHEWABLE at 08:33

## 2022-07-15 RX ADMIN — NYSTATIN: 100000 POWDER TOPICAL at 17:22

## 2022-07-15 RX ADMIN — SENNOSIDES 8.6 MG: 8.6 TABLET, FILM COATED ORAL at 21:19

## 2022-07-15 RX ADMIN — LACTULOSE 10 G: 20 SOLUTION ORAL at 08:36

## 2022-07-15 RX ADMIN — ENOXAPARIN SODIUM 30 MG: 30 INJECTION SUBCUTANEOUS at 08:10

## 2022-07-15 RX ADMIN — INSULIN GLARGINE 20 UNITS: 100 INJECTION, SOLUTION SUBCUTANEOUS at 21:46

## 2022-07-15 RX ADMIN — ESCITALOPRAM OXALATE 5 MG: 10 TABLET ORAL at 08:34

## 2022-07-15 RX ADMIN — LIDOCAINE 5% 2 PATCH: 700 PATCH TOPICAL at 08:10

## 2022-07-15 RX ADMIN — LOSARTAN POTASSIUM 12.5 MG: 25 TABLET, FILM COATED ORAL at 08:33

## 2022-07-15 RX ADMIN — FUROSEMIDE 40 MG: 40 TABLET ORAL at 12:10

## 2022-07-15 RX ADMIN — METHOCARBAMOL 250 MG: 500 TABLET ORAL at 12:42

## 2022-07-15 RX ADMIN — RIFAXIMIN 550 MG: 550 TABLET ORAL at 21:58

## 2022-07-15 RX ADMIN — METFORMIN HYDROCHLORIDE 500 MG: 500 TABLET ORAL at 06:37

## 2022-07-15 RX ADMIN — NADOLOL 20 MG: 20 TABLET ORAL at 08:35

## 2022-07-15 RX ADMIN — PANTOPRAZOLE SODIUM 40 MG: 40 TABLET, DELAYED RELEASE ORAL at 06:37

## 2022-07-15 RX ADMIN — LEVOTHYROXINE SODIUM 112 MCG: 112 TABLET ORAL at 06:37

## 2022-07-15 RX ADMIN — MELATONIN 6 MG: at 21:19

## 2022-07-15 RX ADMIN — ENOXAPARIN SODIUM 30 MG: 30 INJECTION SUBCUTANEOUS at 21:19

## 2022-07-15 NOTE — PROGRESS NOTES
Internal Medicine Progress Note  Patient: Eddie Manuel  Age/sex: 77 y o  female  Medical Record #: [id number]      ASSESSMENT/PLAN: (Interval History)  Eddie Manuel is seen and examined and management for following issues:    Left proximal humerus fracture  · Non-op  · NWB  · Sling for comfort  · See Ortho as OP  · C/o inc pain left shoulder = for xray  · Wearing edema glove on left hand     CHB  · S/p dual chamber PPM 6/29/22  · No heavy lift/push/pull >10 lbs  · No left arm above shoulder height      Stress cardiomyopathy  · D/W Dr Sandro Valente he said no further intervention for now but see back in office  · Losartan reduced to 12 5mg qd starting 7/14/22 since BPs get a bit soft in afternoon (cards started in hospital)     · No changes today     ABLA  · S/p 1 unit PRBCs  · Stable     PAUL cirrhosis  · Hx non-bleeding esophageal varices (banded) and hepatic encephalopathy   · Sees Dr Anastasia Yip at [facility] as OP  · Home:  Rifaximin 550 mcg q12hrs/Lactulose 10Gm BID/Lasix 40mg qd/Corgard 20mg qd/Aldactone 50mg qd  · Here:  Rifaximin 550 mg q12hrs/Lactulose 10Gm BID/Corgard 20mg qd (hold SBP <120)  · Will give Lasix 40mg x 1 today 7/15/22 to see if helps edema     DM2  · Home:  Janumet XR  BID/Amaryl 8 mg qAM/Basaglar 48U qam, 36U qpm  · Here:  Lantus 32U AM, 20U PM/Metformin 500 mg BID   · QID Accuchecks/SSI and DM diet  · Reduced Lantus to 20U qhs 7/14/22  · No changes today     Pancytopenia  · Is 2/2 to cirrhosis/portal HTN   Additionally, has splenomegaly contributing to low platelet ct  · Has had tx with Venofer in past   Sees Dr Elizabeth Hung from [facility]  · Stable 7/14/22     Hypothyroidism  · Continue Levothyroxine 112 mcg qd     Depression  · Continue Lexapro as at home     Hyperkalemia  · resolved        Discharge date:  7/21/22       The above assessment and plan was reviewed and updated as determined by my evaluation of the patient on 7/15/2022      Labs:   Results from last 7 days   Lab Units 07/14/22  0510 07/12/22  0518   WBC Thousand/uL 2 94* 2 90*   HEMOGLOBIN g/dL 8 6* 8 8*   HEMATOCRIT % 29 1* 29 3*   PLATELETS Thousands/uL 81* 76*     Results from last 7 days   Lab Units 07/14/22  0510 07/12/22  0518   SODIUM mmol/L 138 137   POTASSIUM mmol/L 4 5 5 2   CHLORIDE mmol/L 109* 107   CO2 mmol/L 25 25   BUN mg/dL 31* 36*   CREATININE mg/dL 0 91 1 07   CALCIUM mg/dL 8 8 8 6             Results from last 7 days   Lab Units 07/15/22  0637 07/14/22  2104 07/14/22  1550   POC GLUCOSE mg/dl 82 175* 150*       Review of Scheduled Meds:  Current Facility-Administered Medications   Medication Dose Route Frequency Provider Last Rate    aspirin  81 mg Oral Daily Melven Roes, DO      bisacodyl  10 mg Rectal Daily PRN Melven Roes, DO      docusate sodium  100 mg Oral BID Melven Roes, DO      enoxaparin  30 mg Subcutaneous Q12H [address] Melven Roes, DO      escitalopram  5 mg Oral Daily Melven Roes, DO      insulin glargine  20 Units Subcutaneous HS [facility] JOSE JUAN Longo      insulin glargine  32 Units Subcutaneous QAM [facility] JOSE JUAN Longo      insulin lispro  2-12 Units Subcutaneous 4x Daily (with meals and at bedtime) Melven Roes, DO      lactulose  10 g Oral BID Melven Roes, DO      levothyroxine  112 mcg Oral Early Morning Melven Roes, DO      lidocaine  2 patch Topical Daily Melven Roes, DO      losartan  12 5 mg Oral Daily [facility] JOSE JUAN Martinez      melatonin  6 mg Oral HS Melven Roes, DO      metFORMIN  500 mg Oral BID With Meals [facility] JOSE JUAN Martinez      nadolol  20 mg Oral Daily JOSE JUAN Saxena      nystatin   Topical BID [facility] JOSE JUAN Longo      ondansetron  4 mg Oral Q6H PRN Melven Roes, DO      oxyCODONE  2 5 mg Oral Q6H PRN Melven Roes, DO      pantoprazole  40 mg Oral Early Morning Melven Roes, DO      polyethylene glycol  17 g Oral Daily Melven Roes, DO      pravastatin  80 mg Oral Daily With Radio Waves, DO      rifaximin  550 mg Oral Q12H [facility] Sharyn Sweeney DO      senna  1 tablet Oral HS Sharyn Sweeney DO         Subjective/ HPI: Patient seen and examined  Patients overnight issues or events were reviewed with nursing or staff during rounds or morning huddle session  New or overnight issues include the following:     No new or overnight issues  C/o left shoulder pain that worsened this AM    ROS:   A 10 point ROS was performed; negative except as noted above  Imaging:     No orders to display       *Labs /Radiology studies reviewed  *Medications reviewed and reconciled as needed  *Please refer to order section for additional ordered labs studies  *Case discussed with primary attending during morning huddle case rounds    Physical Examination:  Vitals:   Vitals:    07/14/22 1332 07/14/22 1400 07/14/22 2225 07/15/22 0534   BP: 114/53 (!) 102/40 116/64 126/55   BP Location: Right arm Right leg Right arm Right arm   Pulse: 64 63 60 60   Resp: 16  17 18   Temp: 97 9 °F (36 6 °C)  97 9 °F (36 6 °C) 98 °F (36 7 °C)   TempSrc: Oral  Oral Oral   SpO2: 100% 100% 100% 99%   Weight:    99 6 kg (219 lb 9 3 oz)   Height:           General Appearance: no distress, conversive  HEENT: PERRLA, conjuctiva normal; oropharynx clear; mucous membranes moist   Neck:  Supple, normal ROM  Lungs: CTA, normal respiratory effort, no retractions, expiratory effort normal  CV: regular rate and rhythm; no rubs/murmurs/gallops, PMI normal   ABD: soft; ND/NT; +BS  EXT: + LE edema  Skin: normal turgor, normal texture, no rashes  Psych: affect normal, mood normal  Neuro: AAO      The above physical exam was reviewed and updated as determined by my evaluation of the patient on 7/15/2022  Invasive Devices  Report    None                    VTE Pharmacologic Prophylaxis: Enoxaparin  Code Status: Level 1 - Full Code  Current Length of Stay: 10 day(s)      Total time spent:  30 minutes with more than 50% spent counseling/coordinating care  Counseling includes discussion with patient re: progress  and discussion with patient of his/her current medical state/information  Coordination of patient's care was performed in conjunction with primary service  Time invested included review of patient's labs, vitals, and management of their comorbidities with continued monitoring  In addition, this patient was discussed with medical team including physician and advanced extenders  The care of the patient was extensively discussed and appropriate treatment plan was formulated unique for this patient  ** Please Note:  voice to text software may have been used in the creation of this document   Although proof errors in transcription or interpretation are a potential of such software**

## 2022-07-15 NOTE — PROGRESS NOTES
07/15/22 1330   Pain Assessment   Pain Assessment Tool 0-10   Pain Score 6   Pain Location/Orientation Orientation: Left; Location: Arm   Restrictions/Precautions   Precautions Cognitive; Fall Risk;Pain;Supervision on toilet/commode;Pressure Ulcer   LUE Weight Bearing Per Order NWB   LUE ROM Restriction AROM  (no AROM LUE, PROM limited to max 45 degrees )   Braces or Orthoses Sling  (UE sling for comfort and worn with mobility; L edema glove)   Cognition   Overall Cognitive Status Impaired   Arousal/Participation Alert; Cooperative   Attention Attends with cues to redirect   Orientation Level Oriented to person;Oriented to place;Oriented to situation   Memory Decreased short term memory   Following Commands Follows one step commands without difficulty   Sit to Stand   Type of Assistance Needed Supervision; Adaptive equipment   Comment / [facility]   Sit to Stand CARE Score 4   Bed-Chair Transfer   Type of Assistance Needed Supervision; Adaptive equipment   Comment [facility]   Chair/Bed-to-Chair Transfer CARE Score 4   Transfer Bed/Chair/Wheelchair   Adaptive Equipment Cane   Walk 10 Feet   Type of Assistance Needed Supervision; Adaptive equipment   Comment / [facility]   Walk 10 Feet CARE Score 4   Walk 50 Feet with Two Turns   Type of Assistance Needed Supervision; Adaptive equipment   Comment / [facility]   Walk 50 Feet with Two Turns CARE Score 4   Walk 150 Feet   Type of Assistance Needed Supervision; Adaptive equipment   Comment / [facility]   Walk 150 Feet CARE Score 4   Ambulation   Does the patient walk? 2  Yes   Primary Mode of Locomotion Prior to Admission Walk   Distance Walked (feet) 200 ft  (x2)   Assist Device Cane   Gait Pattern Slow Ayala;Decreased foot clearance; Forward Flexion; Wide SUGAR;Shuffle;Improper weight shift   Limitations Noted In Device Management; Endurance; Heel Strike;Speed;Strength;Swing   Provided Assistance with: Direction   Walk Assist Level Close Supervision;Supervision   Wheel 50 Feet with Two Turns   Reason if not Attempted Activity not applicable   Wheel 50 Feet with Two Turns CARE Score 9   Wheel 150 Feet   Reason if not Attempted Activity not applicable   Wheel 049 Feet CARE Score 9   Wheelchair mobility   Does the patient use a wheelchair? 0  No   Curb or Single Stair   Style negotiated Curb   Type of Assistance Needed Incidental touching; Adaptive equipment;Verbal cues   Physical Assistance Level No physical assistance   Comment 6" curb step with SPC and CGA provided by daughter in-law Alvina Neal   1 Step (Curb) CARE Score 4   4 Steps   Type of Assistance Needed Supervision; Adaptive equipment   Comment CS with RHR   4 Steps CARE Score 4   Stairs   Type Stairs   # of Steps 6   Weight Bearing Precautions Fall Risk;NWB;LUE   Assist Devices Single Rail   Findings 4-5 steps at son's house per Alvina Neal which she will be able to A pt  Toilet Transfer   Type of Assistance Needed Supervision; Adaptive equipment   Comment standard toilet with SPC, A with hygiene   Toilet Transfer CARE Score 4   Toilet Transfer   Surface Assessed Standard Toilet   Assessment   Treatment Assessment Pt and daughter in-law present for FT session  Pt's daughter in-law was able to provide adequate hands on A on curb step and CS on steps  With gait pt currently requires S and by discharge [facility]  Alvina Neal stated a walk in shower is located in basement and she will be able to pull car around back to get pt into house where there are 5 steps to enter  Otherwise pt will be one one level upstairs with family  Alvina Neal is to look and see what Roseline Sow has at home already and may purchase one out of pocket if they're in bad shape  Pt recommended diabetic shoes and asked to talk to podiatrist post discharge, several phone numbers given to Alvina Neal to pt with shoes  Pt will need A from therapy on discharge to perform car transfer as she will go into a van, 2/2  parking being used it would have taken too much time from training to get Saundra raymond   Both sitting in higher seat first and stepping in with L foot first were trialed in therapy car  Educated on pulling up along curbs to get into vehicle would also help  Pt will cont to benefit from increased I with HH dist amb, increased stair and curb negotiation for discharge  Cont POC as tolerated  Family/Caregiver Present yes, daughter in-law   PT Family training done with: Devon Gonzalez   PT Barriers   Physical Impairment Decreased strength;Decreased range of motion;Decreased endurance; Impaired balance; Impaired tone;Obesity;Orthopedic restrictions;Pain;Decreased skin integrity   Functional Limitation Car transfers;Stair negotiation; Walking;Transfers   Plan   Treatment/Interventions Functional transfer training;LE strengthening/ROM; Therapeutic exercise; Endurance training;Gait training   Progress Progressing toward goals   Recommendation   PT Discharge Recommendation Home with home health rehabilitation   Equipment Recommended Cane   PT Therapy Minutes   PT Time In 1330   PT Time Out 1430   PT Total Time (minutes) 60   PT Mode of treatment - Individual (minutes) 60   PT Mode of treatment - Concurrent (minutes) 0   PT Mode of treatment - Group (minutes) 0   PT Mode of treatment - Co-treat (minutes) 0   PT Mode of Treatment - Total time(minutes) 60 minutes   PT Cumulative Minutes 870   Therapy Time missed   Time missed?  No

## 2022-07-15 NOTE — PROGRESS NOTES
PM&R PROGRESS NOTE:  Evens Gillette 77 y o  female MRN: [id number]  Unit/Bed#: -20 Encounter: [id number]    Rehab Diagnosis: Impairment of mobility, safety and Activities of Daily Living (ADLs) due to Orthopedic Disorders:  08 9  Other Orthopedic Left Humerus fracture     Etiologic: L beau fx, Complete Heart Block  Date of Onset: 6/29/22     Date of surgery: 6/29/2022: Cardiac Pacer Implant (Chest)    HPI: Evens Gillette is a 77 y o  female with type 2 diabetes, hypertension, depression, hypothyroidism, hx of PAUL cirrhosis, esophageal varices, pancytopenia, HLD, hypothyroidism who presented to the [address] on 6/29 after tripping over her flip flops, falling and sustaining a left humerus fracture treated non-operatively with NWB to the left arm in a sling  She was found to be in complete heart block with HR in the 20-230s range, developing cardiogenic shock requiring pressors and TVP  She underwent a Permanent pacemaker placement on 6/29 with Dr Priscilla Jang  Course complicated by CLYDE and blood loss anemia with hemoglobin of 6 9 s/p 1 unit pRBC  Initial ECHO on 6/29/22 showed moderately dilated left ventricular cavity size, LVEF 55%, mild bi-atrial enlargement   There were regional wall motion abnormalities with akinesis of the apical anterior, apical septal, apical inferior, apical lateral walls and the apex   There was hyperkinesis of the basal anterior, basal anteroseptal, basal inferoseptal, basal inferior, basal inferolateral, basal anterolateral, mid anterior, mid anteroseptal, mid inferoseptal, mid inferior, mid inferolateral and mid anterolateral  RVEF was moderately reduced   The LV systolic function was abnormal in a pattern suggestive of apical stress cardiomyopathy   She was started on Losartan by Cardiology and a repeat ECHO done today 7/5/22   It showed LVEF of 50% with grade 1 diastolic dysfunction, akinesis of the apex, hypokinesis of the apical anterior, septal, inferior/lateral walls  The patient was evaluated by the Rehabilitation team and deemed an appropriate candidate for comprehensive inpatient rehabilitation and admitted to the [facility] on 7/5/2022  5:43 PM    SUBJECTIVE:  Patient seen and examined at bedside  Had some more pain in her arm and hand this morning, she felt like she moved it in her sleep which woke her up  She is able to still move her hand and fingers well  Will order an xray for follow up since it has been >2 weeks and her fracture is comminuted and displaced  Ecchymosis improving, still with some swelling  She denies fever, chills, nausea, emesis, cough, shortness of breath, diarrhea, constipation  We are also changing her pain regimen from oxycodone 2 5mg which she could not tolerate due to sedation  Changed to tramadol 50mg and robaxin 250mg  Spoke with IM and will give 1 time dose of lasix for swelling, Bps better and can tolerate  ASSESSMENT: Stable, progressing    PLAN:    Rehabilitation   Functional deficits:  Self care, mobility   Continue current rehabilitation plan of care to maximize function       Functional update:   Physical Therapy Occupational Therapy Speech Therapy   Weight Bearing Status: Non-weight bearing (LUE)  Transfers: Supervision  Bed Mobility: Moderate Assistance  Amulation Distance (ft): 200 feet  Ambulation: Incidental Touching, Supervision  Assistive Device for Ambulation: Single Point Cane  Wheelchair Mobility Distance:  (N/A)  Number of Stairs: 4  Assistive Device for Stairs: Single Point Cane [facility] for curb step; b/l HRs for steps)  Stair Assistance: Minimal Assistance  Ramp: Incidental Touching  Assistive Device for Ramp: Single [employer]  Discharge Recommendations: Home with:  [address] with[de-identified] Family Support, First Floor Setup, Home Physical Therapy   Eating: Supervision  Grooming: Supervision  Bathing: Maximum Assistance  Bathing: Maximum Assistance  Upper Body Dressing: Maximum Assistance  Lower Body Dressing: Maximum Assistance  Toileting: Minimal Assistance  Toilet Transfer: Minimal Assistance  Cognition: Within Defined Limits  Orientation: Person, Place, Time, Situation                  Estimated Discharge: 7/21    DVT prophylaxis  · lovenox     Pain  · Lidocaine patch Q4H PRN  · Tramadol 50mg Q6H PRN     Bladder plan  · Continent     Bowel plan  · Continent, 7/15     Code Status  · Level 1: Full Code      * Closed fracture of proximal end of left humerus  Assessment & Plan  · Displaced comminuted fracture of the humeral head/neck on imaging  · NWB to the left upper limb, maintain in sling  · Ok for for gentle range of motion to prevent adhesive capsulitis- starting 7/11  · Pain control  · Likely course of acute blood loss anemia, monitor labs  · PT/OT  · Follow up with Dr Dickson Cabral per orthopaedics, signed off  Complete heart block [facility])  Assessment & Plan  · S/p dual chamber PPM 6/29/22 with Dr Romeo Grissom  · Monitor pacemaker site for signs of infection  · No heavy lifting/push/pull >10 lbs    Obesity (BMI 30-39  9)  Assessment & Plan  · BMI 36 03  · Weight loss counseling, promote increased activity    Acute blood loss anemia  Assessment & Plan  · Acute blood loss anemia, Hgb 6 9 previously, s/p transfusion  · Hgb on 7/14 of 8 6 (8 8)  · Will require close follow with labs  · Monitor clinically as well in therapy    Cardiomyopathy [facility])  Assessment & Plan  · Medicine team reached out to Cardiology re: advice with recs for further w/u based on ECHO results from 7/5/22  · Continue Losartan 25mg qd (Cardiology started in hospital)  · Per Cardiology, outpatient follow up, no acute changes based on echo      Depression  Assessment & Plan  Continue home lexapro 5mg daily    Pancytopenia ([facility] )  Assessment & Plan  Secondary to cirrhosis, monitor labs    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) ([facility] )  Assessment & Plan  · Hx non-bleeding esophageal varices and hepatic encephalopathy   · At home, was on Rifaximin 550 mcg q12hrs/Lactulose 10Gm BID/Lasix 40mg qd/Nadolol 20mg qd/Aldactone 50mg qd  · Here:  Rifaximin 550 mg q12hrs/Lactulose 10Gm BID    Type 2 diabetes mellitus with hyperglycemia, with long-term current use of insulin [facility])  Assessment & Plan  Lab Results   Component Value Date    HGBA1C 8 2 (H) 05/31/2022       Recent Labs     07/13/22  1611 07/13/22  2055 07/14/22  0652 07/14/22  1044   POCGLU 116 153* 71 165*     · Home:  Janumet XR  BID/Amaryl 8 mg qAM/Basaglar 48U qam, 36U qpm  · Here:  Lantus 32U q12hrs, Metformin 500 mg BID  · QID Accuchecks/SSI and DM diet    Acquired hypothyroidism  Assessment & Plan  Continue levothyroxine 112mcg daily    Essential hypertension  Assessment & Plan  · On losartan 25mg daily  · Previously on Nadolol, aldactone, and lasix  · Medications held based on initial hypotension  · Will start adding back meds as indicated, likely starting with nadolol- started back on 7/7      Hyperlipidemia  Assessment & Plan  Continue statin      Appreciate IM consultants medical co-management  Labs, medications, and imaging personally reviewed  ROS:  A ten point review of systems was completed on 07/15/22 and pertinent positives are listed in subjective section  All other systems reviewed were negative  OBJECTIVE:   /55 (BP Location: Right arm)   Pulse 60   Temp 98 °F (36 7 °C) (Oral)   Resp 18   Ht 5' 5" (1 651 m)   Wt 99 6 kg (219 lb 9 3 oz)   SpO2 99%   BMI 36 54 kg/m²     Physical Exam  Constitutional:       General: She is not in acute distress  Appearance: She is obese  Comments: tired   HENT:      Head: Normocephalic and atraumatic  Right Ear: External ear normal       Left Ear: External ear normal       Nose: Nose normal  No rhinorrhea  Mouth/Throat:      Mouth: Mucous membranes are moist       Pharynx: Oropharynx is clear  Eyes:      General: No scleral icterus  Right eye: No discharge  Left eye: No discharge  Cardiovascular:      Rate and Rhythm: Normal rate  Pulmonary:      Effort: Pulmonary effort is normal  No respiratory distress  Breath sounds: No wheezing, rhonchi or rales  Abdominal:      General: There is no distension  Palpations: Abdomen is soft  Musculoskeletal:      Cervical back: Normal range of motion  Right lower leg: Edema present  Left lower leg: Edema present  Comments: Swelling in the arm, unchanged, sling in place  Ecchymosis improving   Skin:     General: Skin is warm and dry  Comments: Pacer site clean and intact   Neurological:      Mental Status: She is alert and oriented to person, place, and time  Sensory: No sensory deficit  Motor: No weakness     Psychiatric:         Mood and Affect: Mood normal          Behavior: Behavior normal           Lab Results   Component Value Date    WBC 2 94 (L) 07/14/2022    HGB 8 6 (L) 07/14/2022    HCT 29 1 (L) 07/14/2022    MCV 93 07/14/2022    PLT 81 (L) 07/14/2022     Lab Results   Component Value Date    SODIUM 138 07/14/2022    K 4 5 07/14/2022     (H) 07/14/2022    CO2 25 07/14/2022    BUN 31 (H) 07/14/2022    CREATININE 0 91 07/14/2022    GLUC 52 (L) 07/14/2022    CALCIUM 8 8 07/14/2022     Lab Results   Component Value Date    INR 1 33 (H) 06/30/2022    INR 1 20 (H) 06/29/2022    INR 1 23 (H) 05/31/2022    PROTIME 16 0 (H) 06/30/2022    PROTIME 15 1 (H) 06/29/2022    PROTIME 15 3 (H) 05/31/2022           Current Facility-Administered Medications:     aspirin chewable tablet 81 mg, 81 mg, Oral, Daily, Rowan Aguilar DO, 81 mg at 07/15/22 5858    bisacodyl (DULCOLAX) rectal suppository 10 mg, 10 mg, Rectal, Daily PRN, Rowan Aguilar DO    docusate sodium (COLACE) capsule 100 mg, 100 mg, Oral, BID, Aljavier Aguilar, DO, 100 mg at 07/14/22 0804    enoxaparin (LOVENOX) subcutaneous injection 30 mg, 30 mg, Subcutaneous, Q12H [address], Aljavier Aguilar DO, 30 mg at 07/15/22 0810    escitalopram (LEXAPRO) tablet 5 mg, 5 mg, Oral, Daily, Alisha Tatiana DO, 5 mg at 07/15/22 0834    insulin glargine (LANTUS) subcutaneous injection 20 Units 0 2 mL, 20 Units, Subcutaneous, HS, JOSE JUAN Wan, 20 Units at 07/14/22 2108    insulin glargine (LANTUS) subcutaneous injection 32 Units 0 32 mL, 32 Units, Subcutaneous, QAM, JOSE JUAN Wan, 32 Units at 07/15/22 0809    insulin lispro (HumaLOG) 100 units/mL subcutaneous injection 2-12 Units, 2-12 Units, Subcutaneous, 4x Daily (with meals and at bedtime), 2 Units at 07/14/22 2109 **AND** Fingerstick Glucose (POCT), , , 4x Daily AC and at bedtime, Alisha Simpson DO    lactulose oral solution 10 g, 10 g, Oral, BID, Alisha Simpson DO, 10 g at 07/15/22 0836    levothyroxine tablet 112 mcg, 112 mcg, Oral, Early Morning, Alisha Simpson DO, 112 mcg at 07/15/22 5756    lidocaine (LIDODERM) 5 % patch 2 patch, 2 patch, Topical, Daily, Alisha Simpson DO, 2 patch at 07/15/22 0810    losartan (COZAAR) tablet 12 5 mg, 12 5 mg, Oral, Daily, JOSE JUAN Wan, 12 5 mg at 07/15/22 2949    melatonin tablet 6 mg, 6 mg, Oral, HS, Alisha Simpson DO, 6 mg at 07/14/22 2101    metFORMIN (GLUCOPHAGE) tablet 500 mg, 500 mg, Oral, BID With Meals, JOSE JUAN Wan, 500 mg at 07/15/22 0797    nadolol (CORGARD) tablet 20 mg, 20 mg, Oral, Daily, JOSE JUAN Wan, 20 mg at 07/15/22 0835    nystatin (MYCOSTATIN) powder, , Topical, BID, JOSE JUAN Wan, Given at 07/15/22 7414    ondansetron (ZOFRAN-ODT) dispersible tablet 4 mg, 4 mg, Oral, Q6H PRN, Alisha Lien, DO, 4 mg at 07/10/22 0857    oxyCODONE (ROXICODONE) IR tablet 2 5 mg, 2 5 mg, Oral, Q6H PRN, Alisha Lien, DO, 2 5 mg at 07/12/22 0825    pantoprazole (PROTONIX) EC tablet 40 mg, 40 mg, Oral, Early Morning, Alisha Lien, DO, 40 mg at 07/15/22 9219    polyethylene glycol (MIRALAX) packet 17 g, 17 g, Oral, Daily, Alisha Lien, DO, 17 g at 07/10/22 0856    pravastatin (PRAVACHOL) tablet 80 mg, 80 mg, Oral, Daily With Maldonado Guthrie DO, 80 mg at 07/14/22 1726    rifaximin (XIFAXAN) tablet 550 mg, 550 mg, Oral, Q12H [address], Tim Eldridge, DO, 550 mg at 07/15/22 0835    senna (SENOKOT) tablet 8 6 mg, 1 tablet, Oral, HS, [place] Eldridge, DO, 8 6 mg at 07/13/22 2129    Past Medical History:   Diagnosis Date    Anemia     Cirrhosis ([facility] [address] )     [facility]     Esophageal varices (HCC)     Fatty liver     GERD (gastroesophageal reflux disease)     Hepatic encephalopathy (HCC)     Hiatal hernia     Hyperlipidemia     Hypertension     Hypoglycemia     Hypothyroidism     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 ([address] )     Osteoarthritis     Osteopenia     Pancytopenia (HCC)     Thrombocytopenia (HCC)     Type 2 diabetes mellitus ([facility] )        Patient Active Problem List    Diagnosis Date Noted    Closed fracture of proximal end of left humerus 07/02/2022    Complete heart block ([facility] ) 06/30/2022    Obesity (BMI 30-39 9) 07/06/2022    Acute blood loss anemia 07/03/2022    Depression 07/02/2022    Cardiomyopathy ([facility] ) 07/02/2022    Esophageal varices without bleeding ([facility] ) 09/29/2021    Pancytopenia ([facility] ) 08/06/2021    Iron deficiency anemia due to chronic blood loss 08/06/2021    Mild nonproliferative diabetic retinopathy of both eyes without macular edema associated with type 2 diabetes mellitus ([facility] [address] ) 07/16/2021    Urinary tract infection 09/15/2020    Hepatic encephalopathy ([facility] ) 09/13/2020    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) ([facility] [address] ) 09/13/2020    Thrombocytopenia ([facility] ) 09/13/2020    Hyperlipidemia 07/24/2018    Essential hypertension 07/24/2018    Acquired hypothyroidism 07/24/2018    Type 2 diabetes mellitus with hyperglycemia, with long-term current use of insulin ([facility] [address] ) 07/24/2018    Diabetic polyneuropathy associated with type 2 diabetes mellitus ([address] ) 07/24/2018          Giorgi Liu DO  Physical Medicine and Katie Stallworth    Total time spent:  35 minutes, with more than 50% spent counseling/coordinating care  Counseling includes discussion with patient re: progress in therapies, functional issues observed by therapy staff, and discussion with patient his/her current medical state/wellbeing  Coordination of patient's care was performed in conjunction with Internal Medicine service to monitor patient's labs, vitals, and management of their comorbidities

## 2022-07-15 NOTE — PROGRESS NOTES
07/15/22 0830   Pain Assessment   Pain Assessment Tool 0-10   Pain Score 8   Pain Location/Orientation Orientation: Left; Location: Arm   Hospital Pain Intervention(s) Medication (See MAR); Repositioned; Other (Comment); Rest  (Lidocaine patches applied Pt  refused PRN PO meds)   Restrictions/Precautions   Precautions Cognitive; Fall Risk;Pressure Ulcer;Pain;Supervision on toilet/commode   Weight Bearing Restrictions Yes   LUE Weight Bearing Per Order NWB   ROM Restrictions Yes   LUE ROM Restriction PROM  (no AROM LUE, PROM limited to max 45 degrees )   Braces or Orthoses Sling; Other (Comment)  (edema glove on L hand, b/l LE ace wrapping for edema )   Cognition   Overall Cognitive Status Impaired   Arousal/Participation Alert; Cooperative   Attention Attends with cues to redirect   Orientation Level Oriented to person;Oriented to place;Oriented to situation   Memory Decreased short term memory;Decreased recall of precautions   Following Commands Follows one step commands with increased time or repetition   Sit to Stand   Type of Assistance Needed Supervision; Adaptive equipment   Comment / [facility]   Sit to Stand CARE Score 4   Bed-Chair Transfer   Type of Assistance Needed Supervision; Adaptive equipment   Comment [facility]   Chair/Bed-to-Chair Transfer CARE Score 4   Transfer Bed/Chair/Wheelchair   Adaptive Equipment Cane   Walk 10 Feet   Type of Assistance Needed Supervision; Adaptive equipment   Comment / [facility]   Walk 10 Feet CARE Score 4   Walk 50 Feet with Two Turns   Type of Assistance Needed Supervision; Adaptive equipment   Comment [facility]   Walk 50 Feet with Two Turns CARE Score 4   Ambulation   Does the patient walk? 2  Yes   Primary Mode of Locomotion Prior to Admission Walk   Distance Walked (feet) 200 ft  (150' x2)   Assist Device Cane   Gait Pattern Slow Ayala; Inconsistant Ayala;Decreased foot clearance; Wide SUGAR; Improper weight shift   Limitations Noted In Device Management; Endurance;Speed;Strength   Provided Assistance with: Direction   Walk Assist Level Close Supervision;Supervision   Wheel 50 Feet with Two Turns   Reason if not Attempted Activity not applicable   Wheel 50 Feet with Two Turns CARE Score 9   Wheel 150 Feet   Reason if not Attempted Activity not applicable   Wheel 676 Feet CARE Score 9   Wheelchair mobility   Does the patient use a wheelchair? 0  No   Toilet Transfer   Type of Assistance Needed Supervision; Adaptive equipment   Comment [facility]   Toilet Transfer CARE Score 4   Toilet Transfer   Surface Assessed Standard Toilet   Positioning Concerns Safety   Assessment   Treatment Assessment Pt participated in skilled PT session with increased focus on gait and safety awareness  Pt limited by increased feeling of needing to have a BM  Pt was able to maintain S with SPC throughout session and anticipates dtr in -law Abida Ordaz here this afternoon for FT  Pt states she will be with her son and dtr in-law for about a week and they are in a split level meaning she will have steps to negotiate  Pt will cont to benefit from increased act tolerance, increased gait and increased stair negotiation prior to discharge set for 7/21/22  Problem List Decreased strength;Decreased range of motion;Decreased endurance;Decreased mobility; Impaired balance;Decreased coordination; Impaired judgement;Decreased safety awareness; Impaired sensation; Impaired tone;Obesity;Orthopedic restrictions;Pain   Barriers to Discharge Inaccessible home environment   PT Barriers   Functional Limitation Car transfers;Stair negotiation;Standing;Transfers; Walking   Plan   Treatment/Interventions Functional transfer training;LE strengthening/ROM; Therapeutic exercise; Endurance training;Patient/family training;Gait training   Progress Progressing toward goals   Recommendation   PT Discharge Recommendation Home with home health rehabilitation   Equipment Recommended Cane   PT Therapy Minutes   PT Time In 0830   PT Time Out 0930   PT Total Time (minutes) 60   PT Mode of treatment - Individual (minutes) 60   PT Mode of treatment - Concurrent (minutes) 0   PT Mode of treatment - Group (minutes) 0   PT Mode of treatment - Co-treat (minutes) 0   PT Mode of Treatment - Total time(minutes) 60 minutes   PT Cumulative Minutes 810   Therapy Time missed   Time missed?  No

## 2022-07-15 NOTE — PLAN OF CARE
Problem: PAIN - ADULT  Goal: Verbalizes/displays adequate comfort level or baseline comfort level  Description: Interventions:  - Encourage patient to monitor pain and request assistance  - Assess pain using appropriate pain scale  - Administer analgesics based on type and severity of pain and evaluate response  - Implement non-pharmacological measures as appropriate and evaluate response  - Consider cultural and social influences on pain and pain management  - Notify physician/advanced practitioner if interventions unsuccessful or patient reports new pain  Outcome: Progressing     Problem: SAFETY ADULT  Goal: Patient will remain free of falls  Description: INTERVENTIONS:  - Educate patient/family on patient safety including physical limitations  - Instruct patient to call for assistance with activity   - Consult OT/PT to assist with strengthening/mobility   - Keep Call bell within reach  - Keep bed low and locked with side rails adjusted as appropriate  - Keep care items and personal belongings within reach  - Initiate and maintain comfort rounds  - Make Fall Risk Sign visible to staff  - Offer Toileting every 2-4 Hours, in advance of need  - Initiate/Maintain bed/chair alarm  - Obtain necessary fall risk management equipment: yellow socks  - Apply yellow socks and bracelet for high fall risk patients  - Consider moving patient to room near nurses station  Outcome: Progressing     Problem: MOBILITY - ADULT  Goal: Maintain or return to baseline ADL function  Description: INTERVENTIONS:  -  Assess patient's ability to carry out ADLs; assess patient's baseline for ADL function and identify physical deficits which impact ability to perform ADLs (bathing, care of mouth/teeth, toileting, grooming, dressing, etc )  - Assess/evaluate cause of self-care deficits   - Assess range of motion  - Assess patient's mobility; develop plan if impaired  - Assess patient's need for assistive devices and provide as appropriate  - Encourage maximum independence but intervene and supervise when necessary  - Involve family in performance of ADLs  - Assess for home care needs following discharge   - Consider OT consult to assist with ADL evaluation and planning for discharge  - Provide patient education as appropriate  Outcome: Progressing

## 2022-07-15 NOTE — PLAN OF CARE
Problem: PAIN - ADULT  Goal: Verbalizes/displays adequate comfort level or baseline comfort level  Description: Interventions:  - Encourage patient to monitor pain and request assistance  - Assess pain using appropriate pain scale  - Administer analgesics based on type and severity of pain and evaluate response  - Implement non-pharmacological measures as appropriate and evaluate response  - Consider cultural and social influences on pain and pain management  - Notify physician/advanced practitioner if interventions unsuccessful or patient reports new pain  Outcome: Progressing     Problem: INFECTION - ADULT  Goal: Absence or prevention of progression during hospitalization  Description: INTERVENTIONS:  - Assess and monitor for signs and symptoms of infection  - Monitor lab/diagnostic results  - Monitor all insertion sites, i e  indwelling lines, tubes, and drains  - Monitor endotracheal if appropriate and nasal secretions for changes in amount and color  - [place] appropriate cooling/warming therapies per order  - Administer medications as ordered  - Instruct and encourage patient and family to use good hand hygiene technique  - Identify and instruct in appropriate isolation precautions for identified infection/condition  Outcome: Progressing  Goal: Absence of fever/infection during neutropenic period  Description: INTERVENTIONS:  - Monitor WBC    Outcome: Progressing     Problem: SAFETY ADULT  Goal: Patient will remain free of falls  Description: INTERVENTIONS:  - Educate patient/family on patient safety including physical limitations  - Instruct patient to call for assistance with activity   - Consult OT/PT to assist with strengthening/mobility   - Keep Call bell within reach  - Keep bed low and locked with side rails adjusted as appropriate  - Keep care items and personal belongings within reach  - Initiate and maintain comfort rounds  - Make Fall Risk Sign visible to staff  - Offer Toileting every 2-4 Hours, in advance of need  - Initiate/Maintain bed/chair alarm  - Obtain necessary fall risk management equipment: yellow socks  - Apply yellow socks and bracelet for high fall risk patients  - Consider moving patient to room near nurses station  Outcome: Progressing  Goal: Maintain or return to baseline ADL function  Description: INTERVENTIONS:  -  Assess patient's ability to carry out ADLs; assess patient's baseline for ADL function and identify physical deficits which impact ability to perform ADLs (bathing, care of mouth/teeth, toileting, grooming, dressing, etc )  - Assess/evaluate cause of self-care deficits   - Assess range of motion  - Assess patient's mobility; develop plan if impaired  - Assess patient's need for assistive devices and provide as appropriate  - Encourage maximum independence but intervene and supervise when necessary  - Involve family in performance of ADLs  - Assess for home care needs following discharge   - Consider OT consult to assist with ADL evaluation and planning for discharge  - Provide patient education as appropriate  Outcome: Progressing     Problem: DISCHARGE PLANNING  Goal: Discharge to home or other facility with appropriate resources  Description: INTERVENTIONS:  - Identify barriers to discharge w/patient and caregiver  - Arrange for needed discharge resources and transportation as appropriate  - Identify discharge learning needs (meds, wound care, etc )  - Arrange for interpretive services to assist at discharge as needed  - Refer to Case Management Department for coordinating discharge planning if the patient needs post-hospital services based on physician/advanced practitioner order or complex needs related to functional status, cognitive ability, or social support system  Outcome: Progressing     Problem: Prexisting or High Potential for Compromised Skin Integrity  Goal: Skin integrity is maintained or improved  Description: INTERVENTIONS:  - Identify patients at risk for skin breakdown  - Assess and monitor skin integrity  - Assess and monitor nutrition and hydration status  - Monitor labs   - Assess for incontinence   - Turn and reposition patient  - Assist with mobility/ambulation  - Relieve pressure over bony prominences  - Avoid friction and shearing  - Provide appropriate hygiene as needed including keeping skin clean and dry  - Evaluate need for skin moisturizer/barrier cream  - Collaborate with interdisciplinary team   - Patient/family teaching  - Consider wound care consult   Outcome: Progressing     Problem: MOBILITY - ADULT  Goal: Maintain or return to baseline ADL function  Description: INTERVENTIONS:  -  Assess patient's ability to carry out ADLs; assess patient's baseline for ADL function and identify physical deficits which impact ability to perform ADLs (bathing, care of mouth/teeth, toileting, grooming, dressing, etc )  - Assess/evaluate cause of self-care deficits   - Assess range of motion  - Assess patient's mobility; develop plan if impaired  - Assess patient's need for assistive devices and provide as appropriate  - Encourage maximum independence but intervene and supervise when necessary  - Involve family in performance of ADLs  - Assess for home care needs following discharge   - Consider OT consult to assist with ADL evaluation and planning for discharge  - Provide patient education as appropriate  Outcome: Progressing  Goal: Maintains/Returns to pre admission functional level  Description: INTERVENTIONS:  - Perform BMAT or MOVE assessment daily    - Set and communicate daily mobility goal to care team and patient/family/caregiver     - Collaborate with rehabilitation services on mobility goals if consulted  Problem: PAIN - ADULT  Goal: Verbalizes/displays adequate comfort level or baseline comfort level  Description: Interventions:  - Encourage patient to monitor pain and request assistance  - Assess pain using appropriate pain scale  - Administer analgesics based on type and severity of pain and evaluate response  - Implement non-pharmacological measures as appropriate and evaluate response  - Consider cultural and social influences on pain and pain management  - Notify physician/advanced practitioner if interventions unsuccessful or patient reports new pain  Outcome: Progressing     Problem: INFECTION - ADULT  Goal: Absence or prevention of progression during hospitalization  Description: INTERVENTIONS:  - Assess and monitor for signs and symptoms of infection  - Monitor lab/diagnostic results  - Monitor all insertion sites, i e  indwelling lines, tubes, and drains  - Monitor endotracheal if appropriate and nasal secretions for changes in amount and color  - [place] appropriate cooling/warming therapies per order  - Administer medications as ordered  - Instruct and encourage patient and family to use good hand hygiene technique  - Identify and instruct in appropriate isolation precautions for identified infection/condition  Outcome: Progressing  Goal: Absence of fever/infection during neutropenic period  Description: INTERVENTIONS:  - Monitor WBC    Outcome: Progressing     Problem: SAFETY ADULT  Goal: Patient will remain free of falls  Description: INTERVENTIONS:  - Educate patient/family on patient safety including physical limitations  - Instruct patient to call for assistance with activity   - Consult OT/PT to assist with strengthening/mobility   - Keep Call bell within reach  - Keep bed low and locked with side rails adjusted as appropriate  - Keep care items and personal belongings within reach  - Initiate and maintain comfort rounds  - Make Fall Risk Sign visible to staff  - Offer Toileting every 2-4 Hours, in advance of need  - Initiate/Maintain bed/chair alarm  - Obtain necessary fall risk management equipment: yellow socks  - Apply yellow socks and bracelet for high fall risk patients  - Consider moving patient to room near nurses station  Outcome: Progressing  Goal: Maintain or return to baseline ADL function  Description: INTERVENTIONS:  -  Assess patient's ability to carry out ADLs; assess patient's baseline for ADL function and identify physical deficits which impact ability to perform ADLs (bathing, care of mouth/teeth, toileting, grooming, dressing, etc )  - Assess/evaluate cause of self-care deficits   - Assess range of motion  - Assess patient's mobility; develop plan if impaired  - Assess patient's need for assistive devices and provide as appropriate  - Encourage maximum independence but intervene and supervise when necessary  - Involve family in performance of ADLs  - Assess for home care needs following discharge   - Consider OT consult to assist with ADL evaluation and planning for discharge  - Provide patient education as appropriate  Outcome: Progressing     Problem: DISCHARGE PLANNING  Goal: Discharge to home or other facility with appropriate resources  Description: INTERVENTIONS:  - Identify barriers to discharge w/patient and caregiver  - Arrange for needed discharge resources and transportation as appropriate  - Identify discharge learning needs (meds, wound care, etc )  - Arrange for interpretive services to assist at discharge as needed  - Refer to Case Management Department for coordinating discharge planning if the patient needs post-hospital services based on physician/advanced practitioner order or complex needs related to functional status, cognitive ability, or social support system  Outcome: Progressing     Problem: Prexisting or High Potential for Compromised Skin Integrity  Goal: Skin integrity is maintained or improved  Description: INTERVENTIONS:  - Identify patients at risk for skin breakdown  - Assess and monitor skin integrity  - Assess and monitor nutrition and hydration status  - Monitor labs   - Assess for incontinence   - Turn and reposition patient  - Assist with mobility/ambulation  - Relieve pressure over bony prominences  - Avoid friction and shearing  - Provide appropriate hygiene as needed including keeping skin clean and dry  - Evaluate need for skin moisturizer/barrier cream  - Collaborate with interdisciplinary team   - Patient/family teaching  - Consider wound care consult   Outcome: Progressing     Problem: MOBILITY - ADULT  Goal: Maintain or return to baseline ADL function  Description: INTERVENTIONS:  -  Assess patient's ability to carry out ADLs; assess patient's baseline for ADL function and identify physical deficits which impact ability to perform ADLs (bathing, care of mouth/teeth, toileting, grooming, dressing, etc )  - Assess/evaluate cause of self-care deficits   - Assess range of motion  - Assess patient's mobility; develop plan if impaired  - Assess patient's need for assistive devices and provide as appropriate  - Encourage maximum independence but intervene and supervise when necessary  - Involve family in performance of ADLs  - Assess for home care needs following discharge   - Consider OT consult to assist with ADL evaluation and planning for discharge  - Provide patient education as appropriate  Outcome: Progressing  Goal: Maintains/Returns to pre admission functional level  Description: INTERVENTIONS:  - Perform BMAT or MOVE assessment daily    - Set and communicate daily mobility goal to care team and patient/family/caregiver     - Collaborate with rehabilitation services on mobility goals if consulted  - Out of bed for toileting  - Record patient progress and toleration of activity level   Outcome: Progressing     Problem: Potential for Falls  Goal: Patient will remain free of falls  Description: INTERVENTIONS:  - Educate patient/family on patient safety including physical limitations  - Instruct patient to call for assistance with activity   - Consult OT/PT to assist with strengthening/mobility   - Keep Call bell within reach  - Keep bed low and locked with side rails adjusted as appropriate  - Keep care items and personal belongings within reach  - Initiate and maintain comfort rounds  - Make Fall Risk Sign visible to staff  - Apply yellow socks and bracelet for high fall risk patients  - Consider moving patient to room near nurses station  Outcome: Progressing     - Out of bed for toileting  - Record patient progress and toleration of activity level   Outcome: Progressing     Problem: Potential for Falls  Goal: Patient will remain free of falls  Description: INTERVENTIONS:  - Educate patient/family on patient safety including physical limitations  - Instruct patient to call for assistance with activity   - Consult OT/PT to assist with strengthening/mobility   - Keep Call bell within reach  - Keep bed low and locked with side rails adjusted as appropriate  - Keep care items and personal belongings within reach  - Initiate and maintain comfort rounds  - Make Fall Risk Sign visible to staff  - Apply yellow socks and bracelet for high fall risk patients  - Consider moving patient to room near nurses station  Outcome: Progressing

## 2022-07-15 NOTE — PROGRESS NOTES
Occupational Therapy Treatment Note         07/15/22 5810   Pain Assessment   Pain Assessment Tool 0-10   Restrictions/Precautions   Precautions Cognitive; Fall Risk;Supervision on toilet/commode;Pressure Ulcer;Pain   LUE Weight Bearing Per Order NWB   LUE ROM Restriction PROM  (see orders for PROM max 45 degrees)   Braces or Orthoses Sling  (L UE sling for comfort and worn with mobility; L edema glove )   Tub/Shower Transfer   Findings simulated pt's tub with cut out door, by using 2" bolster in hallway to step over lip of entrance  OT educated Mary Ellen Gongora on how to provide pt with min assist hand hold on R side while stepping over  they both confirm pt will be able to use that tub and there is a shower chair pt can then sit on  Upper Body Dressing   Type of Assistance Needed Physical assistance   Physical Assistance Level 76% or more   Comment OT educated pt and Mary Ellen Gongora on UB dressing  Pt requires max assist to don/doff tshirt overhead and assist to don L UE sling  With OT instruction, Mary Ellen Gongora is able to assist pt  Upper Body Dressing CARE Score 2   Sit to Stand   Type of Assistance Needed Supervision; Adaptive equipment   Physical Assistance Level No physical assistance   Comment SPC   Sit to Stand CARE Score 4   Bed-Chair Transfer   Type of Assistance Needed Supervision; Adaptive equipment   Physical Assistance Level No physical assistance   Comment SPC to/from bathroom   Chair/Bed-to-Chair Transfer CARE Score 4   Toileting Hygiene   Type of Assistance Needed Physical assistance; Adaptive equipment   Physical Assistance Level 25% or less   Comment in stance pt close supervision for front hygiene and clothing management, except requires slight assist to adjust pants under L UE sling  Pt's daughter in law Mary Ellen Gongora can assist with this if needed at home  Toileting Hygiene CARE Score 3   Toilet Transfer   Type of Assistance Needed Supervision; Adaptive equipment   Physical Assistance Level No physical assistance   Comment standard toilet with [facility]   Toilet Transfer CARE Score 4   Cognition   Overall Cognitive Status Impaired   Arousal/Participation Alert; Cooperative   Attention Attends with cues to redirect   Orientation Level Oriented to person;Oriented to place;Oriented to time;Oriented to situation   Memory Decreased short term memory   Following Commands Follows one step commands without difficulty   Activity Tolerance   Activity Tolerance Patient tolerated treatment well   Assessment   Treatment Assessment Pt participated in skilled OT tx session focused on family training with pt's daughter in law Alberto Goodrich, pt's daughter Selma Betancur was also present however will not be assisting pt 2* he own disability  See above for further details on functional performance  Alberto Goodrich confirms she will be staying at house with Alberto Goodrich and Selma Betancur, or they will go to her house for a few days if she needs to get things at home, they are working out details of their plan, however it seems they will be staying at pt's house most of the time  Alberto Goodrich also has a friend who was a CNA in a nursing home, and that friend will be at the house to assist pt and pt's daughter, as well  OT educated on the following: L UE NWB and only allowed PROM to shoulder at maximum of 45 degrees (demonstrated how to do this and Alberto Goodrich was able to carryover, attempt 3x/day for 10 reps each, right now pt only tolerates L shoulder moving not further than 20 degrees flexion PROM); wearing L edema glove to assist with L UE swelling (this was removed for 20 minutes during session  Skin intact and edema in hand much improved since 2 days ago); pt will require assist for bathing and dressing as well as tub transfer; goal for independence for mobility/transfers using SPC; occasional assist with clothing management during toileting; assist for IADLS and   Alberto Goodrich has no further questions from OT standpoint  Nursing to complete family training on wound care if needed still next week   Alberto Goodrich will be present Thursday between 12-1 for d/c  Plan by beginning of next week to complete shower in OT session using walk in shower  Sangeetha Henson had good carryover of education, do not anticipate need for home OT  When pt is cleared by ortho, would likely benefit from outpatient therapy for L UE  Problem List Decreased strength;Decreased endurance;Decreased mobility; Decreased cognition;Obesity; Decreased skin integrity;Orthopedic restrictions;Pain   Barriers to Discharge None   Plan   Treatment/Interventions ADL retraining; Therapeutic exercise; Endurance training;Patient/family training;Equipment eval/education; Bed mobility; Compensatory technique education   Progress Progressing toward goals   Recommendation   OT Discharge Recommendation No rehabilitation needs   OT Therapy Minutes   OT Time In 1230   OT Time Out 1330   OT Total Time (minutes) 60   OT Mode of treatment - Individual (minutes) 60   OT Mode of treatment - Concurrent (minutes) 0   OT Mode of treatment - Group (minutes) 0   OT Mode of treatment - Co-treat (minutes) 0   OT Mode of Treatment - Total time(minutes) 60 minutes   OT Cumulative Minutes 770   Therapy Time missed   Time missed?  No

## 2022-07-16 LAB
GLUCOSE SERPL-MCNC: 131 MG/DL (ref 65–140)
GLUCOSE SERPL-MCNC: 165 MG/DL (ref 65–140)
GLUCOSE SERPL-MCNC: 174 MG/DL (ref 65–140)
GLUCOSE SERPL-MCNC: 191 MG/DL (ref 65–140)
GLUCOSE SERPL-MCNC: 52 MG/DL (ref 65–140)
GLUCOSE SERPL-MCNC: 64 MG/DL (ref 65–140)
GLUCOSE SERPL-MCNC: 72 MG/DL (ref 65–140)

## 2022-07-16 PROCEDURE — 82948 REAGENT STRIP/BLOOD GLUCOSE: CPT

## 2022-07-16 PROCEDURE — 97112 NEUROMUSCULAR REEDUCATION: CPT

## 2022-07-16 PROCEDURE — 99232 SBSQ HOSP IP/OBS MODERATE 35: CPT | Performed by: INTERNAL MEDICINE

## 2022-07-16 PROCEDURE — 97530 THERAPEUTIC ACTIVITIES: CPT

## 2022-07-16 PROCEDURE — 97116 GAIT TRAINING THERAPY: CPT

## 2022-07-16 PROCEDURE — 99232 SBSQ HOSP IP/OBS MODERATE 35: CPT | Performed by: PHYSICAL MEDICINE & REHABILITATION

## 2022-07-16 RX ORDER — INSULIN GLARGINE 100 [IU]/ML
10 INJECTION, SOLUTION SUBCUTANEOUS
Status: DISCONTINUED | OUTPATIENT
Start: 2022-07-16 | End: 2022-07-21 | Stop reason: HOSPADM

## 2022-07-16 RX ADMIN — INSULIN GLARGINE 24 UNITS: 100 INJECTION, SOLUTION SUBCUTANEOUS at 08:45

## 2022-07-16 RX ADMIN — PANTOPRAZOLE SODIUM 40 MG: 40 TABLET, DELAYED RELEASE ORAL at 06:20

## 2022-07-16 RX ADMIN — ENOXAPARIN SODIUM 30 MG: 30 INJECTION SUBCUTANEOUS at 08:36

## 2022-07-16 RX ADMIN — NYSTATIN 1 APPLICATION: 100000 POWDER TOPICAL at 18:32

## 2022-07-16 RX ADMIN — INSULIN LISPRO 2 UNITS: 100 INJECTION, SOLUTION INTRAVENOUS; SUBCUTANEOUS at 12:02

## 2022-07-16 RX ADMIN — PRAVASTATIN SODIUM 80 MG: 80 TABLET ORAL at 16:37

## 2022-07-16 RX ADMIN — DOCUSATE SODIUM 100 MG: 100 CAPSULE, LIQUID FILLED ORAL at 18:32

## 2022-07-16 RX ADMIN — RIFAXIMIN 550 MG: 550 TABLET ORAL at 08:48

## 2022-07-16 RX ADMIN — LEVOTHYROXINE SODIUM 112 MCG: 112 TABLET ORAL at 06:20

## 2022-07-16 RX ADMIN — ENOXAPARIN SODIUM 30 MG: 30 INJECTION SUBCUTANEOUS at 21:19

## 2022-07-16 RX ADMIN — LACTULOSE 10 G: 20 SOLUTION ORAL at 08:36

## 2022-07-16 RX ADMIN — INSULIN LISPRO 2 UNITS: 100 INJECTION, SOLUTION INTRAVENOUS; SUBCUTANEOUS at 16:38

## 2022-07-16 RX ADMIN — ASPIRIN 81 MG CHEWABLE TABLET 81 MG: 81 TABLET CHEWABLE at 08:36

## 2022-07-16 RX ADMIN — INSULIN LISPRO 2 UNITS: 100 INJECTION, SOLUTION INTRAVENOUS; SUBCUTANEOUS at 21:19

## 2022-07-16 RX ADMIN — METFORMIN HYDROCHLORIDE 500 MG: 500 TABLET ORAL at 16:37

## 2022-07-16 RX ADMIN — LIDOCAINE 5% 2 PATCH: 700 PATCH TOPICAL at 09:21

## 2022-07-16 RX ADMIN — POLYETHYLENE GLYCOL 3350 17 G: 17 POWDER, FOR SOLUTION ORAL at 08:35

## 2022-07-16 RX ADMIN — INSULIN GLARGINE 10 UNITS: 100 INJECTION, SOLUTION SUBCUTANEOUS at 21:19

## 2022-07-16 RX ADMIN — METFORMIN HYDROCHLORIDE 500 MG: 500 TABLET ORAL at 08:45

## 2022-07-16 RX ADMIN — MELATONIN 6 MG: at 21:19

## 2022-07-16 RX ADMIN — ESCITALOPRAM OXALATE 5 MG: 10 TABLET ORAL at 08:36

## 2022-07-16 RX ADMIN — RIFAXIMIN 550 MG: 550 TABLET ORAL at 21:20

## 2022-07-16 RX ADMIN — NYSTATIN 1 APPLICATION: 100000 POWDER TOPICAL at 08:48

## 2022-07-16 RX ADMIN — LACTULOSE 10 G: 20 SOLUTION ORAL at 18:32

## 2022-07-16 NOTE — PLAN OF CARE
Problem: PAIN - ADULT  Goal: Verbalizes/displays adequate comfort level or baseline comfort level  Description: Interventions:  - Encourage patient to monitor pain and request assistance  - Assess pain using appropriate pain scale  - Administer analgesics based on type and severity of pain and evaluate response  - Implement non-pharmacological measures as appropriate and evaluate response  - Consider cultural and social influences on pain and pain management  - Notify physician/advanced practitioner if interventions unsuccessful or patient reports new pain  Outcome: Progressing     Problem: INFECTION - ADULT  Goal: Absence or prevention of progression during hospitalization  Description: INTERVENTIONS:  - Assess and monitor for signs and symptoms of infection  - Monitor lab/diagnostic results  - Monitor all insertion sites, i e  indwelling lines, tubes, and drains  - Monitor endotracheal if appropriate and nasal secretions for changes in amount and color  - Elrama appropriate cooling/warming therapies per order  - Administer medications as ordered  - Instruct and encourage patient and family to use good hand hygiene technique  - Identify and instruct in appropriate isolation precautions for identified infection/condition  Outcome: Progressing  Goal: Absence of fever/infection during neutropenic period  Description: INTERVENTIONS:  - Monitor WBC    Outcome: Progressing     Problem: SAFETY ADULT  Goal: Patient will remain free of falls  Description: INTERVENTIONS:  - Educate patient/family on patient safety including physical limitations  - Instruct patient to call for assistance with activity   - Consult OT/PT to assist with strengthening/mobility   - Keep Call bell within reach  - Keep bed low and locked with side rails adjusted as appropriate  - Keep care items and personal belongings within reach  - Initiate and maintain comfort rounds  - Make Fall Risk Sign visible to staff  - Offer Toileting every 2-4 Hours, in advance of need  - Initiate/Maintain bed/chair alarm  - Obtain necessary fall risk management equipment: yellow socks  - Apply yellow socks and bracelet for high fall risk patients  - Consider moving patient to room near nurses station  Outcome: Progressing  Goal: Maintain or return to baseline ADL function  Description: INTERVENTIONS:  -  Assess patient's ability to carry out ADLs; assess patient's baseline for ADL function and identify physical deficits which impact ability to perform ADLs (bathing, care of mouth/teeth, toileting, grooming, dressing, etc )  - Assess/evaluate cause of self-care deficits   - Assess range of motion  - Assess patient's mobility; develop plan if impaired  - Assess patient's need for assistive devices and provide as appropriate  - Encourage maximum independence but intervene and supervise when necessary  - Involve family in performance of ADLs  - Assess for home care needs following discharge   - Consider OT consult to assist with ADL evaluation and planning for discharge  - Provide patient education as appropriate  Outcome: Progressing     Problem: DISCHARGE PLANNING  Goal: Discharge to home or other facility with appropriate resources  Description: INTERVENTIONS:  - Identify barriers to discharge w/patient and caregiver  - Arrange for needed discharge resources and transportation as appropriate  - Identify discharge learning needs (meds, wound care, etc )  - Arrange for interpretive services to assist at discharge as needed  - Refer to Case Management Department for coordinating discharge planning if the patient needs post-hospital services based on physician/advanced practitioner order or complex needs related to functional status, cognitive ability, or social support system  Outcome: Progressing     Problem: Prexisting or High Potential for Compromised Skin Integrity  Goal: Skin integrity is maintained or improved  Description: INTERVENTIONS:  - Identify patients at risk for skin breakdown  - Assess and monitor skin integrity  - Assess and monitor nutrition and hydration status  - Monitor labs   - Assess for incontinence   - Turn and reposition patient  - Assist with mobility/ambulation  - Relieve pressure over bony prominences  - Avoid friction and shearing  - Provide appropriate hygiene as needed including keeping skin clean and dry  - Evaluate need for skin moisturizer/barrier cream  - Collaborate with interdisciplinary team   - Patient/family teaching  - Consider wound care consult   Outcome: Progressing     Problem: MOBILITY - ADULT  Goal: Maintain or return to baseline ADL function  Description: INTERVENTIONS:  -  Assess patient's ability to carry out ADLs; assess patient's baseline for ADL function and identify physical deficits which impact ability to perform ADLs (bathing, care of mouth/teeth, toileting, grooming, dressing, etc )  - Assess/evaluate cause of self-care deficits   - Assess range of motion  - Assess patient's mobility; develop plan if impaired  - Assess patient's need for assistive devices and provide as appropriate  - Encourage maximum independence but intervene and supervise when necessary  - Involve family in performance of ADLs  - Assess for home care needs following discharge   - Consider OT consult to assist with ADL evaluation and planning for discharge  - Provide patient education as appropriate  Outcome: Progressing  Goal: Maintains/Returns to pre admission functional level  Description: INTERVENTIONS:  - Perform BMAT or MOVE assessment daily    - Set and communicate daily mobility goal to care team and patient/family/caregiver  - Collaborate with rehabilitation services on mobility goals if consulted  - Perform Range of Motion  times a day  - Reposition patient every  hours    - Dangle patient times a day  - Stand patient  times a day  - Ambulate patient times a day  - Out of bed to chair  times a day   - Out of bed for meals  times a day  - Out of bed for toileting  - Record patient progress and toleration of activity level   Outcome: Progressing     Problem: Potential for Falls  Goal: Patient will remain free of falls  Description: INTERVENTIONS:  - Educate patient/family on patient safety including physical limitations  - Instruct patient to call for assistance with activity   - Consult OT/PT to assist with strengthening/mobility   - Keep Call bell within reach  - Keep bed low and locked with side rails adjusted as appropriate  - Keep care items and personal belongings within reach  - Initiate and maintain comfort rounds  - Make Fall Risk Sign visible to staff  - Offer Toileting every  Hours, in advance of need  - Initiate/Maintain alarm  - Obtain necessary fall risk management equipment:   - Apply yellow socks and bracelet for high fall risk patients  - Consider moving patient to room near nurses station  Outcome: Progressing     Problem: Nutrition/Hydration-ADULT  Goal: Nutrient/Hydration intake appropriate for improving, restoring or maintaining nutritional needs  Description: Monitor and assess patient's nutrition/hydration status for malnutrition  Collaborate with interdisciplinary team and initiate plan and interventions as ordered  Monitor patient's weight and dietary intake as ordered or per policy  Utilize nutrition screening tool and intervene as necessary  Determine patient's food preferences and provide high-protein, high-caloric foods as appropriate       INTERVENTIONS:  - Monitor oral intake, urinary output, labs, and treatment plans  - Assess nutrition and hydration status and recommend course of action  - Evaluate amount of meals eaten  - Assist patient with eating if necessary   - Allow adequate time for meals  - Recommend/ encourage appropriate diets, oral nutritional supplements, and vitamin/mineral supplements  - Order, calculate, and assess calorie counts as needed  - Recommend, monitor, and adjust tube feedings and TPN/PPN based on assessed needs  - Assess need for intravenous fluids  - Provide specific nutrition/hydration education as appropriate  - Include patient/family/caregiver in decisions related to nutrition  Outcome: Progressing

## 2022-07-16 NOTE — PROGRESS NOTES
Physical Medicine and Rehabilitation Progress Note  Colleen Tucker 77 y o  female MRN: [id number]  Unit/Bed#: -40 Encounter: [id number]    Chief Complaint: No new issues today  Interval History:   No acute events overnight  Last BM 7/16  Tailbone uncomfortable after sitting edge of bed for some time (noted to be somewhat reclined while she was sitting though, so not sitting on her ischium)  Denies any CP, SOB, fevers, chills, n/V, abdominal pain  Assessment/Plan - Continue plan of care as per primary attending, unless otherwise stated below:      · Closed fracture of proximal L humerus  · Continue PT/OT  Functioning at Supervision for mobility  · XR show stability in fracture  Reviewed with patient  · NWB in sling  · Gentle ROM started 7/11  Nothing above neutral   · Pain control as per primary team  · Outpatient f/u with Dr Gee Shaw  · Complete heart block  · S/p PPM 6/29  · Monitor site  · No heavy lifting, pushing, pulling  · Obesity  · Counseling   · ABLA  · Hgb 7/14 stable at 8 6  · Monitor  · Transfuse PRN  · Cardiomyopathy  · Continue ARB  · Outpatient f/u with Cards  · Depression  · Continue Lexapro  · Pancytopenia  · WBC stable, not neutropenic  · Hgb stable  · Plt low but has been improving lately  · 2/2 Lliver disease  · Monitor  · 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  · With non-bleeding varices and history of hepatic encephalopathy  · Here on Rifaximin and Lactulose and Nadolol  · IM following and at their discretion  · T2DM  · Continue current regimen/adjust as per IM  · Hypothyroidism  · Continue synthroid  · HTN  · Continue current regimen  · HLD  · Continue statin  · DVT Ppx: Lovenox  · Pain:  Continue current regimen  · Bladder: Continent  · Bowel:  Last BM 7/16   · Code: Full Regina Bey MD  Physical Medicine and Rehabilitation    Total visit time: 25 minutes, with more than 50% spent counseling/coordinating care   Counseling includes discussion with patient re: progress in therapies, functional issues observed by therapy staff, and discussion with patient regarding their current medical state and wellbeing  Coordination of patient's care was performed in conjunction with Internal Medicine service to monitor patient's labs, vitals, and management of their comorbidities  Review of Systems: A 10 point review of systems was negative except for what is noted in the HPI          Current Facility-Administered Medications:     aspirin chewable tablet 81 mg, 81 mg, Oral, Daily, Oral Colonel, DO, 81 mg at 07/16/22 2231    bisacodyl (DULCOLAX) rectal suppository 10 mg, 10 mg, Rectal, Daily PRN, Oral Colonel, DO    docusate sodium (COLACE) capsule 100 mg, 100 mg, Oral, BID, Oral Colonel, DO, 100 mg at 07/15/22 1722    enoxaparin (LOVENOX) subcutaneous injection 30 mg, 30 mg, Subcutaneous, Q12H [address], Oral Colonel, DO, 30 mg at 07/16/22 0836    escitalopram (LEXAPRO) tablet 5 mg, 5 mg, Oral, Daily, Oral Colonel, DO, 5 mg at 07/16/22 0836    insulin glargine (LANTUS) subcutaneous injection 20 Units 0 2 mL, 20 Units, Subcutaneous, HS, JOSE JUAN Palomares, 20 Units at 07/15/22 2146    insulin glargine (LANTUS) subcutaneous injection 32 Units 0 32 mL, 32 Units, Subcutaneous, QAM, JOSE JUAN Palomares, 24 Units at 07/16/22 0845    insulin lispro (HumaLOG) 100 units/mL subcutaneous injection 2-12 Units, 2-12 Units, Subcutaneous, 4x Daily (with meals and at bedtime), 2 Units at 07/14/22 2109 **AND** Fingerstick Glucose (POCT), , , 4x Daily AC and at bedtime, Oral Colonel, DO    lactulose oral solution 10 g, 10 g, Oral, BID, Oral Colonel, DO, 10 g at 07/16/22 0583    levothyroxine tablet 112 mcg, 112 mcg, Oral, Early Morning, Oral Colonel, DO, 112 mcg at 07/16/22 0620    lidocaine (LIDODERM) 5 % patch 2 patch, 2 patch, Topical, Daily, Oral Colonel, DO, 2 patch at 07/16/22 5946    losartan (COZAAR) tablet 12 5 mg, 12 5 mg, Oral, Daily, Laura Ge, CRNP, 12 5 mg at 07/15/22 3329    melatonin tablet 6 mg, 6 mg, Oral, HS, Jackqulyn Coil, DO, 6 mg at 07/15/22 2119    metFORMIN (GLUCOPHAGE) tablet 500 mg, 500 mg, Oral, BID With Meals, Benson Mynor Harleman, CRNP, 500 mg at 07/16/22 0845    methocarbamol (ROBAXIN) tablet 250 mg, 250 mg, Oral, 4x Daily PRN, Jackqulyn Coil, DO, 250 mg at 07/15/22 1242    nadolol (CORGARD) tablet 20 mg, 20 mg, Oral, Daily, Benson Mynor Donta, CRNP, 20 mg at 07/15/22 4432    nystatin (MYCOSTATIN) powder, , Topical, BID, Marin Morelos, JANESSANP, 1 application at 12/73/54 0848    ondansetron (ZOFRAN-ODT) dispersible tablet 4 mg, 4 mg, Oral, Q6H PRN, Jackqulyn Coil, DO, 4 mg at 07/10/22 0857    pantoprazole (PROTONIX) EC tablet 40 mg, 40 mg, Oral, Early Morning, Jackqulyn Coil, DO, 40 mg at 07/16/22 0620    polyethylene glycol (MIRALAX) packet 17 g, 17 g, Oral, Daily, Jackqulyn Coil, DO, 17 g at 07/16/22 0835    pravastatin (PRAVACHOL) tablet 80 mg, 80 mg, Oral, Daily With Dinner, Jackqulyn Coil, DO, 80 mg at 07/15/22 1633    rifaximin (XIFAXAN) tablet 550 mg, 550 mg, Oral, Q12H [facility], Jackqulyn Coil, DO, 550 mg at 07/16/22 0848    senna (SENOKOT) tablet 8 6 mg, 1 tablet, Oral, HS, Jackqulyn Coil, DO, 8 6 mg at 07/15/22 2119    traMADol (ULTRAM) tablet 50 mg, 50 mg, Oral, Q6H PRN, Jackqulyn Coil, DO    Physical Exam:  Temp:  [97 6 °F (36 4 °C)-98 3 °F (36 8 °C)] 98 °F (36 7 °C)  HR:  [60-65] 65  Resp:  [18-19] 18  BP: ()/(40-56) 102/40  SpO2:  [97 %-100 %] 97 %    Gen: No acute distress, chronically ill appearing  HEENT: Moist mucus membranes, Normocephalic/Atraumatic  Cardiovascular: Regular rate, rhythm, S1/S2  Distal pulses palpable  Heme/Extr: BL LE edema  Pulmonary: Non-labored breathing  Lungs CTAB  : No siddiqi  GI: Soft, non-tender, non-distended  MSK: L arm sling  Very tender to any movement whatsoever  Sling pulling at her R neck and causing discomfort  Integumentary: Skin is warm, dry    Neuro: AAOx3,Speech is intact  Appropriate to questioning  Psych: Normal mood and affect  Laboratory:  Labs reviewed  Results from last 7 days   Lab Units 07/14/22  0510 07/12/22  0518   HEMOGLOBIN g/dL 8 6* 8 8*   HEMATOCRIT % 29 1* 29 3*   WBC Thousand/uL 2 94* 2 90*     Results from last 7 days   Lab Units 07/14/22  0510 07/12/22  0518   BUN mg/dL 31* 36*   SODIUM mmol/L 138 137   POTASSIUM mmol/L 4 5 5 2   CHLORIDE mmol/L 109* 107   CREATININE mg/dL 0 91 1 07            Diagnostic Studies: Reviewed   XR humerus left   Final Result by Gene Jalene Collet, DO (07/15 1532)      Stable comminuted and deformed humeral head/neck fracture  Workstation performed: [id number]               ** Please Note: Fluency Direct voice to text software may have been used in the creation of this document   **

## 2022-07-16 NOTE — PROGRESS NOTES
Internal Medicine Progress Note  Patient: Amarilis Monroe  Age/sex: 77 y o  female  Medical Record #: [id number]      ASSESSMENT/PLAN: (Interval History)  Amarilis Monroe is seen and examined and management for following issues:    Left proximal humerus fracture  · Non-op  · NWB  · Sling for comfort  · See Ortho as OP  · C/o inc pain left shoulder 7/15 = xray stable  · Wearing edema glove on left hand     CHB  · S/p dual chamber PPM 6/29/22  · No heavy lift/push/pull >10 lbs  · No left arm above shoulder height      Stress cardiomyopathy  · D/W Dr Elaine Caruso he said no further intervention for now but see back in office  · Losartan reduced to 12 5mg qd starting 7/14/22 since BPs get a bit soft in afternoon (cards started in hospital)      · Losartan held this AM  · No changes today but may need to stop altogether     ABLA  · S/p 1 unit PRBCs  · Stable     PAUL cirrhosis  · Hx non-bleeding esophageal varices (banded) and hepatic encephalopathy   · Sees Dr Junaid Piper at [facility] as OP  · Home:  Rifaximin 550 mcg q12hrs/Lactulose 10Gm BID/Lasix 40mg qd/Corgard 20mg qd/Aldactone 50mg qd  · Here:  Rifaximin 550 mg q12hrs/Lactulose 10Gm BID/Corgard 20mg qd (hold SBP <120)  · On 7/15/22, gave Lasix 40mg x 1 for leg edema = mild improvement  · Will reassess for another dose lasix if labs OK monday     DM2  · Home:  Janumet XR  BID/Amaryl 8 mg qAM/Basaglar 48U qam, 36U qpm  · Here:  Lantus 32U AM, 20U PM/Metformin 500 mg BID   · QID Accuchecks/SSI and DM diet  · Reduced Lantus to 20U qhs 7/14/22 but still low this AM and gave 24U instead of 32  · Will reduce HS Lantus to 10U     Pancytopenia  · Is 2/2 to cirrhosis/portal HTN   Additionally, has splenomegaly contributing to low platelet ct  · Has had tx with Venofer in past   Sees Dr Leah Muñoz from [facility]  · Stable 7/14/22     Hypothyroidism  · Continue Levothyroxine 112 mcg qd     Depression  · Continue Lexapro as at home     Hyperkalemia  · resolved        Discharge date:  7/21/22       The above assessment and plan was reviewed and updated as determined by my evaluation of the patient on 7/16/2022      Labs:   Results from last 7 days   Lab Units 07/14/22  0510 07/12/22  0518   WBC Thousand/uL 2 94* 2 90*   HEMOGLOBIN g/dL 8 6* 8 8*   HEMATOCRIT % 29 1* 29 3*   PLATELETS Thousands/uL 81* 76*     Results from last 7 days   Lab Units 07/14/22  0510 07/12/22  0518   SODIUM mmol/L 138 137   POTASSIUM mmol/L 4 5 5 2   CHLORIDE mmol/L 109* 107   CO2 mmol/L 25 25   BUN mg/dL 31* 36*   CREATININE mg/dL 0 91 1 07   CALCIUM mg/dL 8 8 8 6             Results from last 7 days   Lab Units 07/16/22  1039 07/16/22  0824 07/16/22  0730   POC GLUCOSE mg/dl 174* 131 72       Review of Scheduled Meds:  Current Facility-Administered Medications   Medication Dose Route Frequency Provider Last Rate    aspirin  81 mg Oral Daily Lavern Cooley DO      bisacodyl  10 mg Rectal Daily PRN Lavern Cooley, DO      docusate sodium  100 mg Oral BID Lavern Yasir, DO      enoxaparin  30 mg Subcutaneous Q12H [address] Lavern Yasir, DO      escitalopram  5 mg Oral Daily Lavern Yasir, DO      insulin glargine  20 Units Subcutaneous HS Roetta Blazing Harleman, CRNP      insulin glargine  32 Units Subcutaneous QAM Roetta Blazing Harleman, CRNP      insulin lispro  2-12 Units Subcutaneous 4x Daily (with meals and at bedtime) Lavern Cooley, DO      lactulose  10 g Oral BID Lavern Yasir, DO      levothyroxine  112 mcg Oral Early Morning Lavern Yasir, DO      lidocaine  2 patch Topical Daily Lavern Yasir, DO      losartan  12 5 mg Oral Daily Roetta Blazing [place], CRNP      melatonin  6 mg Oral HS Lavern Cooley DO      metFORMIN  500 mg Oral BID With Meals Roetta Blazing [place], CRNP      methocarbamol  250 mg Oral 4x Daily PRN Lavern Yasir, DO      nadolol  20 mg Oral Daily Pablito Settles, CRNP      nystatin   Topical BID Roetta Blazing Harleman, CRNP      ondansetron  4 mg Oral Q6H PRN Lavern Yasir, DO  pantoprazole  40 mg Oral Early Morning Mariela Grumbling, DO      polyethylene glycol  17 g Oral Daily Mariela Grumbling, DO      pravastatin  80 mg Oral Daily With [employer] Lisbeth Sacks, DO      rifaximin  550 mg Oral Q12H [facility] Mariela Grumbling, DO      senna  1 tablet Oral HS Mariela Grumbling, DO      traMADol  50 mg Oral Q6H PRN Mariela Grumbling, DO         Subjective/ HPI: Patient seen and examined  Patients overnight issues or events were reviewed with nursing or staff during rounds or morning huddle session  New or overnight issues include the following:     No new or overnight issues  Offers no complaints except left shoulder pain    ROS:   A 10 point ROS was performed; negative except as noted above  Imaging:     XR humerus left   Final Result by Joshua Watson DO (07/15 1532)      Stable comminuted and deformed humeral head/neck fracture              Workstation performed: [id number]             *Labs /Radiology studies reviewed  *Medications reviewed and reconciled as needed  *Please refer to order section for additional ordered labs studies  *Case discussed with primary attending during morning huddle case rounds    Physical Examination:  Vitals:   Vitals:    07/16/22 0600 07/16/22 0620 07/16/22 0704 07/16/22 0838   BP:   92/52 (!) 102/40   BP Location:   Right arm Right arm   Pulse:  65     Resp:  18     Temp:  98 °F (36 7 °C)     TempSrc:  Oral     SpO2:  97%     Weight: 98 2 kg (216 lb 9 6 oz)      Height:           General Appearance: no distress, conversive  HEENT: PERRLA, conjuctiva normal; oropharynx clear; mucous membranes moist   Neck:  Supple, normal ROM  Lungs: CTA, normal respiratory effort, no retractions, expiratory effort normal  CV: regular rate and rhythm; no rubs/murmurs/gallops, PMI normal   PPM site w/o erythema/drainage and incision is healed  ABD: soft; ND/NT; +BS  EXT: + LE edema gorge in feet  Skin: normal turgor, normal texture, no rashes  Psych: affect normal, mood normal  Neuro: AAO      The above physical exam was reviewed and updated as determined by my evaluation of the patient on 7/16/2022  Invasive Devices  Report    None                    VTE Pharmacologic Prophylaxis: Enoxaparin  Code Status: Level 1 - Full Code  Current Length of Stay: 11 day(s)      Total time spent:  30 minutes with more than 50% spent counseling/coordinating care  Counseling includes discussion with patient re: progress  and discussion with patient of his/her current medical state/information  Coordination of patient's care was performed in conjunction with primary service  Time invested included review of patient's labs, vitals, and management of their comorbidities with continued monitoring  In addition, this patient was discussed with medical team including physician and advanced extenders  The care of the patient was extensively discussed and appropriate treatment plan was formulated unique for this patient  ** Please Note:  voice to text software may have been used in the creation of this document   Although proof errors in transcription or interpretation are a potential of such software**

## 2022-07-16 NOTE — NURSING NOTE
Blood sugar this AM was 52  Pt given OJ  Carlota notified  Recheck pt was 64  Given vini Van notified  BP low at 92/52 - Carlota notified  Asymptomatic

## 2022-07-16 NOTE — PROGRESS NOTES
07/16/22 0830   Pain Assessment   Pain Assessment Tool 0-10   Pain Score 4   Pain Location/Orientation Orientation: Left; Location: Arm;Location: Shoulder   Effect of Pain on Daily Activities pt c/o chest pain at one point during session, HILDA Cook notified, resolved and vitals WNL  Restrictions/Precautions   Precautions Cognitive; Fall Risk;Pressure Ulcer;Pain;Supervision on toilet/commode   Weight Bearing Restrictions Yes   LUE Weight Bearing Per Order NWB   ROM Restrictions Yes   LUE ROM Restriction PROM  (no AROM LUE; can perform PROM max 45*)   Braces or Orthoses Sling  (L edema glove)   Subjective   Subjective Pt's BS low this a m  prior to PT but improved, RN requesting BP be taken, /40   Sit to Lying   Type of Assistance Needed Supervision   Comment with R bedrail, increased time to complete; use of green wedges in bed for LUE comfort   Sit to Lying CARE Score 4   Sit to Stand   Type of Assistance Needed Verbal cues; Supervision   Comment SPC; requires cueing to reach back to chair with RUE   Sit to Stand CARE Score 4   Bed-Chair Transfer   Type of Assistance Needed Supervision; Adaptive equipment   Physical Assistance Level No physical assistance   Comment documented by RN; also S throughout session with [facility]   Chair/Bed-to-Chair Transfer CARE Score 4   Walk 10 Feet   Type of Assistance Needed Supervision   Comment SPC   Walk 10 Feet CARE Score 4   Walk 50 Feet with Two Turns   Type of Assistance Needed Supervision   Comment SPC   Walk 50 Feet with Two Turns CARE Score 4   Walk 150 Feet   Type of Assistance Needed Supervision   Comment SPC   Walk 150 Feet CARE Score 4   Ambulation   Does the patient walk? 2  Yes   Primary Mode of Locomotion Prior to Admission Walk   Distance Walked (feet) 200 ft  (x2, 75'x2, short distances in PT gym)   Assist Device Cane   Gait Pattern Slow Ayala;Decreased foot clearance; Forward Flexion; Wide SUGAR; Improper weight shift   Limitations Noted In Endurance;Device Management;Speed;Strength   Walk Assist Level Close Supervision;Supervision   Findings no LOB when amb with SPC; did c/o chest pain after walking 200' to PT gym; RN Annika Cui notified who presented to evaluate pt; vitals WNL  Pt c/o moreso of L ribcage pain then and L shoulder pain; pt reports that PTA she would also get this pain when walking to mailbox, would stop and pain would resolve once pt stood and rested; no other c/o chest pain during session   Wheel 50 Feet with Two Turns   Reason if not Attempted Activity not applicable   Wheel 50 Feet with Two Turns CARE Score 9   Wheel 150 Feet   Reason if not Attempted Activity not applicable   Wheel 085 Feet CARE Score 9   Curb or Single Stair   Style negotiated Curb   Type of Assistance Needed Physical assistance   Physical Assistance Level 25% or less   Comment 6" CURB; required vc's for [facility] placement as pt did not recall from yesterday   1 Step (Curb) CARE Score 3   4 Steps   Type of Assistance Needed Supervision   Comment CS R HR   4 Steps CARE Score 4   12 Steps   Comment fatigued at 8 steps this session   Stairs   Type Stairs;Curb   # of Steps 8   Weight Bearing Precautions NWB;LUE   Assist Devices Single Rail   Picking Up Object   Type of Assistance Needed Supervision   Comment CS having pt retrive reacher first, then use reacher to get marker off of floor and placed marker on chair   Picking Up Object CARE Score 4   Toilet Transfer   Type of Assistance Needed Supervision; Adaptive equipment   Physical Assistance Level No physical assistance   Comment charted by RN   Toilet Transfer CARE Score 4   Therapeutic Interventions   Flexibility seated b/l hamstring/gastroc stretch 3x30 sec hold   Balance alt 6" step taps w/o UE support 2x10 with CG/minAx1 for balance  Static stance balloon tapping 1' CS, on airex 1' and 2' minAx1 for balance and decreased righting reactions as pt with posterior LOB unable to correct x2 and sat on mat table behind her   Static stance on airex with FA EC 1 5' x2 with 1 post LOB requiring A to safely assist pt to sit on mat table, otherwise minAx1 to maintain balance   Other Comments   Comments ACE wraps applied to b/l LEs to knee start of session, edema glove also applied to L hand and BP assessed per request of RN   Assessment   Treatment Assessment 90 min skilled PT session focusing on gait trng with SPC, improving pt's endurance and balance trng to reduce risk for falls upon d/c  Pt with c/o chest pain this session, moreso pointing to L rib cage area and resolved with rest  Pt states pain was dull and reports to PT that she has had it in past PTA when walking long distance to mailbox, most often pt just stops and then pain resolves  No other c/o chest pain throughout and vitals WNL  Pt challenged with balance tasks on airex this session, delayed righting reactions noted and needs assist to stay upright  At times appears as though pt "gives up" and falls back onto mat with assist from therapist for slow safe landing  At this time pt cont to progress towards Kaitlyn goals  Cont with POC focusing on improving pt's LE strength, endurance trng, static and dynamic balance interventions, working on finding positions of comfort with pt as end of session returned pt to bed and required increased time with use of pillows, wedges and towel roll to find position of comfort due to LUE pain  Family/Caregiver Present no   Problem List Decreased strength;Decreased endurance;Decreased mobility; Decreased cognition;Obesity; Decreased skin integrity;Orthopedic restrictions;Pain   PT Barriers   Functional Limitation Car transfers;Stair negotiation; Walking   Plan   Treatment/Interventions Functional transfer training;LE strengthening/ROM; Elevations; Endurance training; Therapeutic exercise; Bed mobility;Gait training   Progress Progressing toward goals   Recommendation   PT Discharge Recommendation Home with home health rehabilitation   Equipment Recommended Cane   PT Therapy Minutes   PT Time In 0830   PT Time Out 1000   PT Total Time (minutes) 90   PT Mode of treatment - Individual (minutes) 90   PT Mode of treatment - Concurrent (minutes) 0   PT Mode of treatment - Group (minutes) 0   PT Mode of treatment - Co-treat (minutes) 0   PT Mode of Treatment - Total time(minutes) 90 minutes   PT Cumulative Minutes 960   Therapy Time missed   Time missed?  No

## 2022-07-16 NOTE — NURSING NOTE
BS recheck is 72 after bfast after eating 2 eggs, fresh fruit & 6oz hot tea  We added a 4oz juice with the grapes & cheese snack  Pt is asymptomatic & Radha Rodriguez has been notified   Hold metformin &  await dose age adjustment of current 32 units Lantus

## 2022-07-17 LAB
GLUCOSE SERPL-MCNC: 132 MG/DL (ref 65–140)
GLUCOSE SERPL-MCNC: 142 MG/DL (ref 65–140)
GLUCOSE SERPL-MCNC: 172 MG/DL (ref 65–140)
GLUCOSE SERPL-MCNC: 198 MG/DL (ref 65–140)

## 2022-07-17 PROCEDURE — 97535 SELF CARE MNGMENT TRAINING: CPT

## 2022-07-17 PROCEDURE — 82948 REAGENT STRIP/BLOOD GLUCOSE: CPT

## 2022-07-17 PROCEDURE — 97110 THERAPEUTIC EXERCISES: CPT

## 2022-07-17 PROCEDURE — 99232 SBSQ HOSP IP/OBS MODERATE 35: CPT | Performed by: INTERNAL MEDICINE

## 2022-07-17 RX ADMIN — DOCUSATE SODIUM 100 MG: 100 CAPSULE, LIQUID FILLED ORAL at 08:12

## 2022-07-17 RX ADMIN — LOSARTAN POTASSIUM 12.5 MG: 25 TABLET, FILM COATED ORAL at 08:12

## 2022-07-17 RX ADMIN — NYSTATIN 1 APPLICATION: 100000 POWDER TOPICAL at 18:32

## 2022-07-17 RX ADMIN — MELATONIN 6 MG: at 21:50

## 2022-07-17 RX ADMIN — LEVOTHYROXINE SODIUM 112 MCG: 112 TABLET ORAL at 05:45

## 2022-07-17 RX ADMIN — PANTOPRAZOLE SODIUM 40 MG: 40 TABLET, DELAYED RELEASE ORAL at 05:45

## 2022-07-17 RX ADMIN — ENOXAPARIN SODIUM 30 MG: 30 INJECTION SUBCUTANEOUS at 08:12

## 2022-07-17 RX ADMIN — RIFAXIMIN 550 MG: 550 TABLET ORAL at 08:14

## 2022-07-17 RX ADMIN — ESCITALOPRAM OXALATE 5 MG: 10 TABLET ORAL at 08:12

## 2022-07-17 RX ADMIN — NYSTATIN 1 APPLICATION: 100000 POWDER TOPICAL at 08:14

## 2022-07-17 RX ADMIN — POLYETHYLENE GLYCOL 3350 17 G: 17 POWDER, FOR SOLUTION ORAL at 08:12

## 2022-07-17 RX ADMIN — ENOXAPARIN SODIUM 30 MG: 30 INJECTION SUBCUTANEOUS at 21:49

## 2022-07-17 RX ADMIN — LIDOCAINE 5% 2 PATCH: 700 PATCH TOPICAL at 08:14

## 2022-07-17 RX ADMIN — METFORMIN HYDROCHLORIDE 500 MG: 500 TABLET ORAL at 16:43

## 2022-07-17 RX ADMIN — METFORMIN HYDROCHLORIDE 500 MG: 500 TABLET ORAL at 08:12

## 2022-07-17 RX ADMIN — INSULIN GLARGINE 10 UNITS: 100 INJECTION, SOLUTION SUBCUTANEOUS at 21:49

## 2022-07-17 RX ADMIN — PRAVASTATIN SODIUM 80 MG: 80 TABLET ORAL at 16:43

## 2022-07-17 RX ADMIN — ASPIRIN 81 MG CHEWABLE TABLET 81 MG: 81 TABLET CHEWABLE at 08:12

## 2022-07-17 RX ADMIN — RIFAXIMIN 550 MG: 550 TABLET ORAL at 21:52

## 2022-07-17 RX ADMIN — LACTULOSE 10 G: 20 SOLUTION ORAL at 18:31

## 2022-07-17 RX ADMIN — LACTULOSE 10 G: 20 SOLUTION ORAL at 08:12

## 2022-07-17 RX ADMIN — INSULIN GLARGINE 32 UNITS: 100 INJECTION, SOLUTION SUBCUTANEOUS at 08:12

## 2022-07-17 RX ADMIN — INSULIN LISPRO 2 UNITS: 100 INJECTION, SOLUTION INTRAVENOUS; SUBCUTANEOUS at 21:50

## 2022-07-17 RX ADMIN — NADOLOL 20 MG: 20 TABLET ORAL at 08:14

## 2022-07-17 RX ADMIN — INSULIN LISPRO 2 UNITS: 100 INJECTION, SOLUTION INTRAVENOUS; SUBCUTANEOUS at 16:44

## 2022-07-17 NOTE — PLAN OF CARE
Problem: SAFETY ADULT  Goal: Patient will remain free of falls  Description: INTERVENTIONS:  - Educate patient/family on patient safety including physical limitations  - Instruct patient to call for assistance with activity   - Consult OT/PT to assist with strengthening/mobility   - Keep Call bell within reach  - Keep bed low and locked with side rails adjusted as appropriate  - Keep care items and personal belongings within reach  - Initiate and maintain comfort rounds  - Make Fall Risk Sign visible to staff  - Offer Toileting every 2-4 Hours, in advance of need  - Initiate/Maintain bed/chair alarm  - Obtain necessary fall risk management equipment: yellow socks  - Apply yellow socks and bracelet for high fall risk patients  - Consider moving patient to room near nurses station  Outcome: Progressing

## 2022-07-17 NOTE — PLAN OF CARE
Problem: PAIN - ADULT  Goal: Verbalizes/displays adequate comfort level or baseline comfort level  Description: Interventions:  - Encourage patient to monitor pain and request assistance  - Assess pain using appropriate pain scale  - Administer analgesics based on type and severity of pain and evaluate response  - Implement non-pharmacological measures as appropriate and evaluate response  - Consider cultural and social influences on pain and pain management  - Notify physician/advanced practitioner if interventions unsuccessful or patient reports new pain  Outcome: Progressing     Problem: INFECTION - ADULT  Goal: Absence or prevention of progression during hospitalization  Description: INTERVENTIONS:  - Assess and monitor for signs and symptoms of infection  - Monitor lab/diagnostic results  - Monitor all insertion sites, i e  indwelling lines, tubes, and drains  - Monitor endotracheal if appropriate and nasal secretions for changes in amount and color  - [place] appropriate cooling/warming therapies per order  - Administer medications as ordered  - Instruct and encourage patient and family to use good hand hygiene technique  - Identify and instruct in appropriate isolation precautions for identified infection/condition  Outcome: Progressing  Goal: Absence of fever/infection during neutropenic period  Description: INTERVENTIONS:  - Monitor WBC    Outcome: Progressing     Problem: SAFETY ADULT  Goal: Patient will remain free of falls  Description: INTERVENTIONS:  - Educate patient/family on patient safety including physical limitations  - Instruct patient to call for assistance with activity   - Consult OT/PT to assist with strengthening/mobility   - Keep Call bell within reach  - Keep bed low and locked with side rails adjusted as appropriate  - Keep care items and personal belongings within reach  - Initiate and maintain comfort rounds  - Make Fall Risk Sign visible to staff  - Offer Toileting every 2-4 Hours, in advance of need  - Initiate/Maintain bed/chair alarm  - Obtain necessary fall risk management equipment: yellow socks  - Apply yellow socks and bracelet for high fall risk patients  - Consider moving patient to room near nurses station  Outcome: Progressing  Goal: Maintain or return to baseline ADL function  Description: INTERVENTIONS:  -  Assess patient's ability to carry out ADLs; assess patient's baseline for ADL function and identify physical deficits which impact ability to perform ADLs (bathing, care of mouth/teeth, toileting, grooming, dressing, etc )  - Assess/evaluate cause of self-care deficits   - Assess range of motion  - Assess patient's mobility; develop plan if impaired  - Assess patient's need for assistive devices and provide as appropriate  - Encourage maximum independence but intervene and supervise when necessary  - Involve family in performance of ADLs  - Assess for home care needs following discharge   - Consider OT consult to assist with ADL evaluation and planning for discharge  - Provide patient education as appropriate  Outcome: Progressing     Problem: DISCHARGE PLANNING  Goal: Discharge to home or other facility with appropriate resources  Description: INTERVENTIONS:  - Identify barriers to discharge w/patient and caregiver  - Arrange for needed discharge resources and transportation as appropriate  - Identify discharge learning needs (meds, wound care, etc )  - Arrange for interpretive services to assist at discharge as needed  - Refer to Case Management Department for coordinating discharge planning if the patient needs post-hospital services based on physician/advanced practitioner order or complex needs related to functional status, cognitive ability, or social support system  Outcome: Progressing     Problem: Prexisting or High Potential for Compromised Skin Integrity  Goal: Skin integrity is maintained or improved  Description: INTERVENTIONS:  - Identify patients at risk for skin breakdown  - Assess and monitor skin integrity  - Assess and monitor nutrition and hydration status  - Monitor labs   - Assess for incontinence   - Turn and reposition patient  - Assist with mobility/ambulation  - Relieve pressure over bony prominences  - Avoid friction and shearing  - Provide appropriate hygiene as needed including keeping skin clean and dry  - Evaluate need for skin moisturizer/barrier cream  - Collaborate with interdisciplinary team   - Patient/family teaching  - Consider wound care consult   Outcome: Progressing     Problem: MOBILITY - ADULT  Goal: Maintain or return to baseline ADL function  Description: INTERVENTIONS:  -  Assess patient's ability to carry out ADLs; assess patient's baseline for ADL function and identify physical deficits which impact ability to perform ADLs (bathing, care of mouth/teeth, toileting, grooming, dressing, etc )  - Assess/evaluate cause of self-care deficits   - Assess range of motion  - Assess patient's mobility; develop plan if impaired  - Assess patient's need for assistive devices and provide as appropriate  - Encourage maximum independence but intervene and supervise when necessary  - Involve family in performance of ADLs  - Assess for home care needs following discharge   - Consider OT consult to assist with ADL evaluation and planning for discharge  - Provide patient education as appropriate  Outcome: Progressing  Goal: Maintains/Returns to pre admission functional level  Description: INTERVENTIONS:  - Perform BMAT or MOVE assessment daily    - Set and communicate daily mobility goal to care team and patient/family/caregiver  - Collaborate with rehabilitation services on mobility goals if consulted  - Perform Range of Motion  times a day  - Reposition patient every  hours    - Dangle patient  times a day  - Stand patient  times a day  - Ambulate patient  times a day  - Out of bed to chair  times a day   - Out of bed for meals  times a day  - Out of bed for toileting  - Record patient progress and toleration of activity level   Outcome: Progressing     Problem: Potential for Falls  Goal: Patient will remain free of falls  Description: INTERVENTIONS:  - Educate patient/family on patient safety including physical limitations  - Instruct patient to call for assistance with activity   - Consult OT/PT to assist with strengthening/mobility   - Keep Call bell within reach  - Keep bed low and locked with side rails adjusted as appropriate  - Keep care items and personal belongings within reach  - Initiate and maintain comfort rounds  - Make Fall Risk Sign visible to staff  - Offer Toileting every  Hours, in advance of need  - Initiate/Maintain alarm  - Obtain necessary fall risk management equipment:   - Apply yellow socks and bracelet for high fall risk patients  - Consider moving patient to room near nurses station  Outcome: Progressing     Problem: Nutrition/Hydration-ADULT  Goal: Nutrient/Hydration intake appropriate for improving, restoring or maintaining nutritional needs  Description: Monitor and assess patient's nutrition/hydration status for malnutrition  Collaborate with interdisciplinary team and initiate plan and interventions as ordered  Monitor patient's weight and dietary intake as ordered or per policy  Utilize nutrition screening tool and intervene as necessary  Determine patient's food preferences and provide high-protein, high-caloric foods as appropriate       INTERVENTIONS:  - Monitor oral intake, urinary output, labs, and treatment plans  - Assess nutrition and hydration status and recommend course of action  - Evaluate amount of meals eaten  - Assist patient with eating if necessary   - Allow adequate time for meals  - Recommend/ encourage appropriate diets, oral nutritional supplements, and vitamin/mineral supplements  - Order, calculate, and assess calorie counts as needed  - Recommend, monitor, and adjust tube feedings and TPN/PPN based on assessed needs  - Assess need for intravenous fluids  - Provide specific nutrition/hydration education as appropriate  - Include patient/family/caregiver in decisions related to nutrition  Outcome: Progressing

## 2022-07-17 NOTE — PROGRESS NOTES
Internal Medicine Progress Note  Patient: Annie Apple  Age/sex: 77 y o  female  Medical Record #: [id number]      ASSESSMENT/PLAN: (Interval History)  Annie Apple is seen and examined and management for following issues:    Left proximal humerus fracture  · Non-op  · NWB  · Sling for comfort  · See Ortho as OP  · C/o inc pain left shoulder 7/15 = xray stable  · Wearing edema glove on left hand     CHB  · S/p dual chamber PPM 6/29/22  · No heavy lift/push/pull >10 lbs  · No left arm above shoulder height      Stress cardiomyopathy  · D/W Dr Taniya Kamara he said no further intervention for now but see back in office  · Losartan reduced to 12 5mg qd starting 7/14/22 since BPs get a bit soft in afternoon (cards started in hospital)      · Losartan held 7/16 but OK today  · No changes today      ABLA  · S/p 1 unit PRBCs  · Stable     PAUL cirrhosis  · Hx non-bleeding esophageal varices (banded) and hepatic encephalopathy   · Sees Dr Ivonne Sandy at [facility] as OP  · Home:  Rifaximin 550 mcg q12hrs/Lactulose 10Gm BID/Lasix 40mg qd/Corgard 20mg qd/Aldactone 50mg qd  · Here:  Rifaximin 550 mg q12hrs/Lactulose 10Gm BID/Corgard 20mg qd (hold SBP <120)  · On 7/15/22, gave Lasix 40mg x 1 for leg edema = mild improvement  · Will reassess for another dose lasix if labs OK monday     DM2  · Home:  Janumet XR  BID/Amaryl 8 mg qAM/Basaglar 48U qam, 36U qpm  · Here:  Lantus 32U AM, 10U PM/Metformin 500 mg BID   · QID Accuchecks/SSI and DM diet  · Reduced Lantus to 20U qhs 7/14/22 but still low AM 7/16 so gave 24U instead of 32U then reduced HS Lantus to 10U  · No changes today     Pancytopenia  · Is 2/2 to cirrhosis/portal HTN   Additionally, has splenomegaly contributing to low platelet ct  · Has had tx with Venofer in past   Sees Dr Sherrie Brooks from [facility]  · Stable 7/14/22     Hypothyroidism  · Continue Levothyroxine 112 mcg qd     Depression  · Continue Lexapro as at home     Hyperkalemia  · resolved        Discharge date:  7/21/22    The above assessment and plan was reviewed and updated as determined by my evaluation of the patient on 7/17/2022      Labs:   Results from last 7 days   Lab Units 07/14/22  0510 07/12/22  0518   WBC Thousand/uL 2 94* 2 90*   HEMOGLOBIN g/dL 8 6* 8 8*   HEMATOCRIT % 29 1* 29 3*   PLATELETS Thousands/uL 81* 76*     Results from last 7 days   Lab Units 07/14/22  0510 07/12/22  0518   SODIUM mmol/L 138 137   POTASSIUM mmol/L 4 5 5 2   CHLORIDE mmol/L 109* 107   CO2 mmol/L 25 25   BUN mg/dL 31* 36*   CREATININE mg/dL 0 91 1 07   CALCIUM mg/dL 8 8 8 6             Results from last 7 days   Lab Units 07/17/22  0634 07/16/22  2110 07/16/22  1552   POC GLUCOSE mg/dl 132 191* 165*       Review of Scheduled Meds:  Current Facility-Administered Medications   Medication Dose Route Frequency Provider Last Rate    aspirin  81 mg Oral Daily Candelario Mendes DO      bisacodyl  10 mg Rectal Daily PRN Candelario Mendes, DO      docusate sodium  100 mg Oral BID Candelario Mendes, DO      enoxaparin  30 mg Subcutaneous Q12H [address] Candealrio Mendes, DO      escitalopram  5 mg Oral Daily Candelario Mendes DO      insulin glargine  10 Units Subcutaneous HS JOSE JUAN Barclay      insulin glargine  32 Units Subcutaneous QAM JOSE JUAN Barclay      insulin lispro  2-12 Units Subcutaneous 4x Daily (with meals and at bedtime) Candelario Mendes,       lactulose  10 g Oral BID Candelario Mendes DO      levothyroxine  112 mcg Oral Early Morning Candelario Mendes, DO      lidocaine  2 patch Topical Daily Candelario Mendes, DO      losartan  12 5 mg Oral Daily JOSE JUAN Smith      melatonin  6 mg Oral HS Candelario Mendes, DO      metFORMIN  500 mg Oral BID With Meals JOSE JUAN Smith      methocarbamol  250 mg Oral 4x Daily PRN Candelario Mendes, DO      nadolol  20 mg Oral Daily JOSE JUAN Butcher      nystatin   Topical BID JOSE JUAN Barclay      ondansetron  4 mg Oral Q6H PRN Candelario Mendes, DO  pantoprazole  40 mg Oral Early Morning Jackqulyn Coil, DO      polyethylene glycol  17 g Oral Daily Jackqulyn Coil, DO      pravastatin  80 mg Oral Daily With [employer] Serena Reynaga, DO      rifaximin  550 mg Oral Q12H [facility] Jackqulyn Coil, DO      senna  1 tablet Oral HS Jackqulyn Coil, DO      traMADol  50 mg Oral Q6H PRN Jackqulyn Coil, DO         Subjective/ HPI: Patient seen and examined  Patients overnight issues or events were reviewed with nursing or staff during rounds or morning huddle session  New or overnight issues include the following:     No new or overnight issues  Offers no complaints    ROS:   A 10 point ROS was performed; negative except as noted above  *Labs /Radiology studies reviewed  *Medications reviewed and reconciled as needed  *Please refer to order section for additional ordered labs studies  *Case discussed with primary attending during morning huddle case rounds    Physical Examination:  Vitals:   Vitals:    07/16/22 2155 07/17/22 0540 07/17/22 0600 07/17/22 0814   BP: 126/90 105/54  115/54   BP Location: Right arm Right arm  Right arm   Pulse: 65 70     Resp: 18 18     Temp: 98 3 °F (36 8 °C) 98 4 °F (36 9 °C)     TempSrc: Oral Oral     SpO2: 96% 97%     Weight:   99 kg (218 lb 4 1 oz)    Height:           General Appearance: no distress, conversive  HEENT: PERRLA, conjuctiva normal; oropharynx clear; mucous membranes moist   Neck:  Supple, normal ROM  Lungs: CTA, normal respiratory effort, no retractions, expiratory effort normal  CV: regular rate and rhythm; no rubs/murmurs/gallops, PMI normal   Pacer site is w/o erythema/drainage  ABD: soft; ND/NT; +BS  EXT: +LE edema; +edema left hand  Skin: normal turgor, normal texture, no rashes  Psych: affect normal, mood normal  Neuro: AAO      The above physical exam was reviewed and updated as determined by my evaluation of the patient on 7/17/2022      Invasive Devices  Report    None                    VTE Pharmacologic Prophylaxis: Enoxaparin  Code Status: Level 1 - Full Code  Current Length of Stay: 12 day(s)      Total time spent:  30 minutes with more than 50% spent counseling/coordinating care  Counseling includes discussion with patient re: progress  and discussion with patient of his/her current medical state/information  Coordination of patient's care was performed in conjunction with primary service  Time invested included review of patient's labs, vitals, and management of their comorbidities with continued monitoring  In addition, this patient was discussed with medical team including physician and advanced extenders  The care of the patient was extensively discussed and appropriate treatment plan was formulated unique for this patient  ** Please Note:  voice to text software may have been used in the creation of this document   Although proof errors in transcription or interpretation are a potential of such software**

## 2022-07-17 NOTE — PROGRESS NOTES
07/17/22 0930   Pain Assessment   Pain Assessment Tool 0-10   Pain Score 2  (intermittent w/movement)   Restrictions/Precautions   Precautions Cognitive; Fall Risk;Supervision on toilet/commode;Pressure Ulcer   Weight Bearing Restrictions Yes   LUE Weight Bearing Per Order NWB   Lifestyle   Autonomy "I think my hand is more swollen and black and blue than yesterday"  Sit to Stand   Type of Assistance Needed Supervision   Physical Assistance Level No physical assistance   Comment CS using SPC   Sit to Stand CARE Score 4   Bed-Chair Transfer   Type of Assistance Needed Supervision   Physical Assistance Level No physical assistance   Comment CS using SPC   Chair/Bed-to-Chair Transfer CARE Score 4   Toileting Hygiene   Reason if not Attempted Refused to perform   [address] CARE Score 7   Toilet Transfer   Reason if not Attempted Refused to perform   Toilet Transfer CARE Score 7   Therapeutic Exercise - ROM   UE-ROM Yes   ROM - Left Upper Extremities    L Shoulder PROM   L Elbow PROM   L Wrist PROM;AROM   L Hand PROM;AROM   LUE ROM Comment PROM performed to L shoulder in FF/ext to near 45* with min pain reported followed by PROM to elbow, wrist qand hand in flex/ext x10  Educated pt on wrist pumps and finger crunches to be performed in room frequently during the day to assist with edema mngt  ROM exercises performed after retrograde massage  Assessment   Treatment Assessment Pt engaged in skilled 60 min OT session focusing on ADL re-training, safety awareness, fxl mobility/transfers and overall activity endurance improving indep in ADL/IADL's to return to PLOF and safe d/c to home  STODDARD provided retrograde massage to L hand and forearm this session focusing on edema and pain mngt  Lotion applied to same to maintain skin integrity followed by donning of compression glove  Ace wraps applied to B LE  Pt progressing towards goals with improvement noted in hand placement during fxl transfers using SPC  Recommend continued skilled OT focusing on POC in preparation for safe d/c to home  OT Therapy Minutes   OT Time In 0930   OT Time Out 1030   OT Total Time (minutes) 60   OT Mode of treatment - Individual (minutes) 60   OT Mode of treatment - Concurrent (minutes) 0   OT Mode of treatment - Group (minutes) 0   OT Mode of treatment - Co-treat (minutes) 0   OT Mode of Treatment - Total time(minutes) 60 minutes   OT Cumulative Minutes 830   Therapy Time missed   Time missed?  No

## 2022-07-18 ENCOUNTER — APPOINTMENT (INPATIENT)
Dept: RADIOLOGY | Facility: HOSPITAL | Age: 77
DRG: 560 | End: 2022-07-18
Payer: MEDICARE

## 2022-07-18 LAB
ANION GAP SERPL CALCULATED.3IONS-SCNC: 8 MMOL/L (ref 4–13)
BASOPHILS # BLD AUTO: 0.03 THOUSANDS/ΜL (ref 0–0.1)
BASOPHILS NFR BLD AUTO: 1 % (ref 0–1)
BUN SERPL-MCNC: 26 MG/DL (ref 5–25)
CALCIUM SERPL-MCNC: 9 MG/DL (ref 8.3–10.1)
CHLORIDE SERPL-SCNC: 106 MMOL/L (ref 100–108)
CO2 SERPL-SCNC: 26 MMOL/L (ref 21–32)
CREAT SERPL-MCNC: 0.91 MG/DL (ref 0.6–1.3)
EOSINOPHIL # BLD AUTO: 0.05 THOUSAND/ΜL (ref 0–0.61)
EOSINOPHIL NFR BLD AUTO: 2 % (ref 0–6)
ERYTHROCYTE [DISTWIDTH] IN BLOOD BY AUTOMATED COUNT: 19.6 % (ref 11.6–15.1)
GFR SERPL CREATININE-BSD FRML MDRD: 61 ML/MIN/1.73SQ M
GLUCOSE P FAST SERPL-MCNC: 121 MG/DL (ref 65–99)
GLUCOSE SERPL-MCNC: 121 MG/DL (ref 65–140)
GLUCOSE SERPL-MCNC: 123 MG/DL (ref 65–140)
GLUCOSE SERPL-MCNC: 186 MG/DL (ref 65–140)
GLUCOSE SERPL-MCNC: 200 MG/DL (ref 65–140)
GLUCOSE SERPL-MCNC: 211 MG/DL (ref 65–140)
HCT VFR BLD AUTO: 27.4 % (ref 34.8–46.1)
HGB BLD-MCNC: 8.2 G/DL (ref 11.5–15.4)
IMM GRANULOCYTES # BLD AUTO: 0.01 THOUSAND/UL (ref 0–0.2)
IMM GRANULOCYTES NFR BLD AUTO: 0 % (ref 0–2)
LYMPHOCYTES # BLD AUTO: 0.54 THOUSANDS/ΜL (ref 0.6–4.47)
LYMPHOCYTES NFR BLD AUTO: 22 % (ref 14–44)
MCH RBC QN AUTO: 28 PG (ref 26.8–34.3)
MCHC RBC AUTO-ENTMCNC: 29.9 G/DL (ref 31.4–37.4)
MCV RBC AUTO: 94 FL (ref 82–98)
MONOCYTES # BLD AUTO: 0.27 THOUSAND/ΜL (ref 0.17–1.22)
MONOCYTES NFR BLD AUTO: 11 % (ref 4–12)
NEUTROPHILS # BLD AUTO: 1.57 THOUSANDS/ΜL (ref 1.85–7.62)
NEUTS SEG NFR BLD AUTO: 64 % (ref 43–75)
NRBC BLD AUTO-RTO: 0 /100 WBCS
PLATELET # BLD AUTO: 79 THOUSANDS/UL (ref 149–390)
PMV BLD AUTO: 12.8 FL (ref 8.9–12.7)
POTASSIUM SERPL-SCNC: 4.1 MMOL/L (ref 3.5–5.3)
RBC # BLD AUTO: 2.93 MILLION/UL (ref 3.81–5.12)
SODIUM SERPL-SCNC: 140 MMOL/L (ref 136–145)
WBC # BLD AUTO: 2.47 THOUSAND/UL (ref 4.31–10.16)

## 2022-07-18 PROCEDURE — 97530 THERAPEUTIC ACTIVITIES: CPT

## 2022-07-18 PROCEDURE — 85025 COMPLETE CBC W/AUTO DIFF WBC: CPT | Performed by: NURSE PRACTITIONER

## 2022-07-18 PROCEDURE — 80048 BASIC METABOLIC PNL TOTAL CA: CPT | Performed by: NURSE PRACTITIONER

## 2022-07-18 PROCEDURE — 73060 X-RAY EXAM OF HUMERUS: CPT

## 2022-07-18 PROCEDURE — 97535 SELF CARE MNGMENT TRAINING: CPT

## 2022-07-18 PROCEDURE — 73030 X-RAY EXAM OF SHOULDER: CPT

## 2022-07-18 PROCEDURE — 82948 REAGENT STRIP/BLOOD GLUCOSE: CPT

## 2022-07-18 PROCEDURE — 99232 SBSQ HOSP IP/OBS MODERATE 35: CPT | Performed by: INTERNAL MEDICINE

## 2022-07-18 PROCEDURE — 97116 GAIT TRAINING THERAPY: CPT

## 2022-07-18 PROCEDURE — 99232 SBSQ HOSP IP/OBS MODERATE 35: CPT

## 2022-07-18 RX ORDER — POTASSIUM CHLORIDE 750 MG/1
10 TABLET, EXTENDED RELEASE ORAL DAILY
Status: DISCONTINUED | OUTPATIENT
Start: 2022-07-18 | End: 2022-07-20

## 2022-07-18 RX ORDER — FUROSEMIDE 20 MG/1
20 TABLET ORAL DAILY
Status: DISCONTINUED | OUTPATIENT
Start: 2022-07-18 | End: 2022-07-20

## 2022-07-18 RX ORDER — NADOLOL 20 MG/1
10 TABLET ORAL DAILY
Status: DISCONTINUED | OUTPATIENT
Start: 2022-07-18 | End: 2022-07-19

## 2022-07-18 RX ADMIN — ESCITALOPRAM OXALATE 5 MG: 10 TABLET ORAL at 08:33

## 2022-07-18 RX ADMIN — INSULIN GLARGINE 32 UNITS: 100 INJECTION, SOLUTION SUBCUTANEOUS at 08:28

## 2022-07-18 RX ADMIN — INSULIN LISPRO 2 UNITS: 100 INJECTION, SOLUTION INTRAVENOUS; SUBCUTANEOUS at 12:37

## 2022-07-18 RX ADMIN — METFORMIN HYDROCHLORIDE 500 MG: 500 TABLET ORAL at 16:46

## 2022-07-18 RX ADMIN — PANTOPRAZOLE SODIUM 40 MG: 40 TABLET, DELAYED RELEASE ORAL at 05:07

## 2022-07-18 RX ADMIN — LACTULOSE 10 G: 20 SOLUTION ORAL at 17:31

## 2022-07-18 RX ADMIN — INSULIN LISPRO 4 UNITS: 100 INJECTION, SOLUTION INTRAVENOUS; SUBCUTANEOUS at 21:14

## 2022-07-18 RX ADMIN — RIFAXIMIN 550 MG: 550 TABLET ORAL at 21:13

## 2022-07-18 RX ADMIN — LIDOCAINE 5% 2 PATCH: 700 PATCH TOPICAL at 08:29

## 2022-07-18 RX ADMIN — NYSTATIN: 100000 POWDER TOPICAL at 17:31

## 2022-07-18 RX ADMIN — METFORMIN HYDROCHLORIDE 500 MG: 500 TABLET ORAL at 07:57

## 2022-07-18 RX ADMIN — POTASSIUM CHLORIDE 10 MEQ: 750 TABLET, EXTENDED RELEASE ORAL at 16:46

## 2022-07-18 RX ADMIN — NYSTATIN: 100000 POWDER TOPICAL at 08:59

## 2022-07-18 RX ADMIN — DOCUSATE SODIUM 100 MG: 100 CAPSULE, LIQUID FILLED ORAL at 17:31

## 2022-07-18 RX ADMIN — MELATONIN 6 MG: at 21:14

## 2022-07-18 RX ADMIN — TRAMADOL HYDROCHLORIDE 50 MG: 50 TABLET, FILM COATED ORAL at 03:23

## 2022-07-18 RX ADMIN — INSULIN GLARGINE 10 UNITS: 100 INJECTION, SOLUTION SUBCUTANEOUS at 21:14

## 2022-07-18 RX ADMIN — SENNOSIDES 8.6 MG: 8.6 TABLET, FILM COATED ORAL at 21:13

## 2022-07-18 RX ADMIN — PRAVASTATIN SODIUM 80 MG: 80 TABLET ORAL at 16:46

## 2022-07-18 RX ADMIN — ASPIRIN 81 MG CHEWABLE TABLET 81 MG: 81 TABLET CHEWABLE at 08:31

## 2022-07-18 RX ADMIN — RIFAXIMIN 550 MG: 550 TABLET ORAL at 09:00

## 2022-07-18 RX ADMIN — LACTULOSE 10 G: 20 SOLUTION ORAL at 08:30

## 2022-07-18 RX ADMIN — INSULIN LISPRO 4 UNITS: 100 INJECTION, SOLUTION INTRAVENOUS; SUBCUTANEOUS at 16:58

## 2022-07-18 RX ADMIN — LEVOTHYROXINE SODIUM 112 MCG: 112 TABLET ORAL at 05:07

## 2022-07-18 RX ADMIN — ENOXAPARIN SODIUM 30 MG: 30 INJECTION SUBCUTANEOUS at 21:14

## 2022-07-18 RX ADMIN — ENOXAPARIN SODIUM 30 MG: 30 INJECTION SUBCUTANEOUS at 08:42

## 2022-07-18 RX ADMIN — DOCUSATE SODIUM 100 MG: 100 CAPSULE, LIQUID FILLED ORAL at 08:32

## 2022-07-18 NOTE — ASSESSMENT & PLAN NOTE
Lab Results   Component Value Date    HGBA1C 8 2 (H) 05/31/2022       Recent Labs     07/19/22  1053 07/19/22  1556 07/19/22 2057 07/20/22  0653   POCGLU 169* 192* 195* 101       Blood Sugar Average: Last 72 hrs:  (P) 164

## 2022-07-18 NOTE — PROGRESS NOTES
07/18/22 1400   Pain Assessment   Pain Assessment Tool 0-10   Pain Score 8   Pain Location/Orientation Orientation: Left; Location: Shoulder   Pain Onset/Description Onset: Ongoing;Frequency: Intermittent   Patient's Stated Pain Goal No pain   Hospital Pain Intervention(s) Repositioned   Restrictions/Precautions   Precautions Fall Risk;Supervision on toilet/commode;Pain;Pressure Ulcer   LUE Weight Bearing Per Order NWB   ROM Restrictions Yes   LUE ROM Restriction AROM  (only PROM at L shoulder refer to OT/dr order)   Braces or Orthoses Sling  (L sling for comfort)   Cognition   Overall Cognitive Status Impaired   Arousal/Participation Alert; Cooperative   Attention Attends with cues to redirect   Orientation Level Oriented X4   Memory Decreased recall of recent events   Following Commands Follows one step commands with increased time or repetition   Sit to Stand   Type of Assistance Needed Adaptive equipment;Set-up / clean-up   Comment SPC   Sit to Stand CARE Score 5   Bed-Chair Transfer   Type of Assistance Needed Supervision; Adaptive equipment   Comment SPC DS   Chair/Bed-to-Chair Transfer CARE Score 4   Transfer Bed/Chair/Wheelchair   Adaptive Equipment Cane   Walk 10 Feet   Type of Assistance Needed Supervision; Adaptive equipment   Comment DS with SPC   Walk 10 Feet CARE Score 4   Walk 50 Feet with Two Turns   Type of Assistance Needed Supervision; Adaptive equipment   Comment with SPC   Walk 50 Feet with Two Turns CARE Score 4   Walk 150 Feet   Type of Assistance Needed Supervision; Adaptive equipment   Comment with SPC   Walk 150 Feet CARE Score 4   Walking 10 Feet on Uneven Surfaces   Type of Assistance Needed Incidental touching;Supervision; Adaptive equipment   Comment pt had mild LOB requiring CGA as she tripped over floor mat  Walking 10 Feet on Uneven Surfaces CARE Score 4   Ambulation   Does the patient walk? 2   Yes   Primary Mode of Locomotion Prior to Admission Walk   Distance Walked (feet) 200 ft  (x2)   Assist Device Cane   Gait Pattern Decreased foot clearance; Slow [place]; Lateral deviation; Wide SUGAR; Improper weight shift   Limitations Noted In Device Management; Endurance;Speed;Strength   Provided Assistance with: Direction   Walk Assist Level Supervision   Wheel 50 Feet with Two Turns   Reason if not Attempted Activity not applicable   Wheel 50 Feet with Two Turns CARE Score 9   Wheel 150 Feet   Reason if not Attempted Activity not applicable   Wheel 709 Feet CARE Score 9   Wheelchair mobility   Does the patient use a wheelchair? 0  No   Curb or Single Stair   Style negotiated Curb   Type of Assistance Needed Incidental touching; Adaptive equipment;Physical assistance   Physical Assistance Level 25% or less   Comment LETTY initially as she had mild LOB when stepping down, no LOB on second trial   1 Step (Curb) CARE Score 3   4 Steps   Type of Assistance Needed Supervision; Adaptive equipment   Comment CS with RHR   4 Steps CARE Score 4   Stairs   Type Stairs;Curb   # of Steps 8  (+2)   Weight Bearing Precautions NWB;LUE   Assist Devices Single Rail;Cane   Picking Up Object   Type of Assistance Needed Supervision; Adaptive equipment   Comment CS with use of reacher   Picking Up Object CARE Score 4   Therapeutic Interventions   Balance standing balloon taps with CS, no LOB, weaving through cones fwd/bwds no overt LOB x4 reps  Other Comments   Comments re-wrapped RLE   Assessment   Treatment Assessment Pt participated in skilled PT session with increased focus on gait, stair negotiation, balance and act tolerance  Pt noted increased LOB this session when stepping over objects or stepping down curb step requiring increased A  Pt will cont to benefit from increased balance act, increased curb step negotiation and increased to I with all gait and functional transfers  Cont POC as tolerated with anticipated discharge Thursday 7/21/22  Problem List Decreased strength; Impaired balance;Decreased mobility; Decreased coordination; Impaired tone;Obesity;Pain;Orthopedic restrictions   PT Barriers   Functional Limitation Car transfers;Stair negotiation;Standing;Transfers   Plan   Treatment/Interventions Functional transfer training;LE strengthening/ROM; Therapeutic exercise; Endurance training;Gait training   Progress Progressing toward goals   Recommendation   PT Discharge Recommendation Home with home health rehabilitation   Equipment Recommended Cane  (Family to provide)   PT Therapy Minutes   PT Time In 1400   PT Time Out 1530   PT Total Time (minutes) 90   PT Mode of treatment - Individual (minutes) 90   PT Mode of treatment - Concurrent (minutes) 0   PT Mode of treatment - Group (minutes) 0   PT Mode of treatment - Co-treat (minutes) 0   PT Mode of Treatment - Total time(minutes) 90 minutes   PT Cumulative Minutes 1050   Therapy Time missed   Time missed?  No

## 2022-07-18 NOTE — PROGRESS NOTES
Occupational Therapy Treatment Note         07/18/22 7530   Pain Assessment   Pain Assessment Tool 0-10   Pain Score 6   Pain Location/Orientation Orientation: Left; Location: Arm   Hospital Pain Intervention(s) Repositioned   Restrictions/Precautions   Precautions Fall Risk;Pain;Supervision on toilet/commode;Pressure Ulcer   LUE Weight Bearing Per Order NWB   LUE ROM Restriction   (refer to orders for PROM at L shoulder)   Braces or Orthoses Sling  (L sling for comfort)   Lifestyle   Autonomy "I am ready to go home"   Eating   Type of Assistance Needed Set-up / clean-up   Physical Assistance Level No physical assistance   Eating CARE Score 5   Oral Hygiene   Type of Assistance Needed Set-up / clean-up   Physical Assistance Level No physical assistance   Comment standing at sink   Oral Hygiene CARE Score 5   Shower/Bathe Self   Type of Assistance Needed Physical assistance   Physical Assistance Level 26%-50%   Comment Seated in w/c at sink (pt declined shower today, will attempt tomorrow)  Pt requires assist to bathe B/L UEs and under breasts  Pt able to bathe stomach/chest area and to knee level with supervision  Pt requires assist for lower legs/feet  Then in stance, RN present for bathing of buttock/sacral wound, and pt able to bathe groin with distant supervision while in stance  Shower/Bathe Self CARE Score 3   Bathing   Assessed Bath Style Sponge Bath   Anticipated D/C Bath Style Shower;Sponge Bath   Tub/Shower Transfer   Findings will attempt to complete shower tomorrow   pt has old standard L UE sling that can be worn for support in shower   Upper Body Dressing   Type of Assistance Needed Physical assistance   Physical Assistance Level 51%-75%   Comment don/doff tshirt while seated, pt able to doff sling with min assist but requires assist to fully don   Upper Body Dressing CARE Score 2   Lower Body Dressing   Type of Assistance Needed Supervision;Verbal cues   Physical Assistance Level No physical assistance   Comment pt able to stand to manage clothing on/off hips at supervision level, sits to thread/unthread LEs with increased time, but overall supervision level   Lower Body Dressing CARE Score 4   Putting On/Taking Off Footwear   Type of Assistance Needed Physical assistance   Physical Assistance Level Total assistance   Comment assist for B/L ace wrapping and don/doff socks   Putting On/Taking Off Footwear CARE Score 1   Sit to Stand   Type of Assistance Needed Set-up / [place]; Adaptive equipment   Physical Assistance Level No physical assistance   Comment SPC   Sit to Stand CARE Score 5   Bed-Chair Transfer   Type of Assistance Needed Supervision; Adaptive equipment   Physical Assistance Level No physical assistance   Comment SPC to/from bathroom with distant supervision  Chair/Bed-to-Chair Transfer CARE Score 4   Toileting Hygiene   Type of Assistance Needed Supervision; Adaptive equipment   Physical Assistance Level No physical assistance   Comment pt able to stand at toilet for clothing management without SPC, also stands to complete front hygiene  pt completes at distant supervision level, cued pt to help use L hand to hold toilet paper when ripping off of roll  Toileting Hygiene CARE Score 4   Toilet Transfer   Type of Assistance Needed Supervision; Adaptive equipment   Physical Assistance Level No physical assistance   Comment standard toilet, use SPC to/from bathroom but then places on towel rack and does not require to transfer on/off toilet   Toilet Transfer CARE Score 4   Cognition   Overall Cognitive Status Impaired   Arousal/Participation Alert; Cooperative   Attention Attends with cues to redirect   Orientation Level Oriented to person;Oriented to place;Oriented to situation   Memory Decreased recall of recent events   Following Commands Follows one step commands without difficulty   Activity Tolerance   Activity Tolerance Patient limited by pain   Medical Staff Made Aware HILDA Chin present, as pt observed with ? DTI on R heel  RN present to take pictures of R heel and sacrum   Assessment   Treatment Assessment Pt participated in skilled OT tx session focused on ADL routine  See above for further details on functional performance  Plan for shower tomorrow  Pt will require assist for UB ADLs, however is demonstrating progress in LB dressing  Pt's pain continues to be barrier  OT removed and washed L edema glove, which is helping in distal L UE edema  Encouraged pt to have L UE out of sling while seated in recliner, prop on green wedge and have towel roll under L hand  While pt requires assist for ADL routine, her daughter in law has been educated on this and per pt, they both still feel comfortable with d/c this Thursday  Spoke with nursing in [facility], nursing will need to educate on wound care prior to d/c, however from therapy standpoint family training is completed  Did discuss in standup that 2* skin care, pt will require home nursing, will recommend initially having home therapy to make any further recommendations to home setup and increase independence as possible at home and continue to address L edema management  Plan for shower tomorrow, can use walk in shower with hand hold assist  From OT standpoint, pt could be independent with in room mobility using [facility] for toileting of urination, however pt requires assist to comfortably be setup in her recliner chair with pillows, and therefore may function as more of a "setup" assist level while at [facility]  Prognosis Good   Problem List Decreased strength;Decreased endurance; Impaired balance;Decreased mobility;Orthopedic restrictions;Pain;Decreased cognition   Plan   Treatment/Interventions ADL retraining;Functional transfer training; Therapeutic exercise; Endurance training;Equipment eval/education; Bed mobility; Compensatory technique education   Progress Progressing toward goals   Recommendation   OT Discharge Recommendation Home with home health rehabilitation   OT Therapy Minutes   OT Time In 0930   OT Time Out 1100   OT Total Time (minutes) 90   OT Mode of treatment - Individual (minutes) 90   OT Mode of treatment - Concurrent (minutes) 0   OT Mode of treatment - Group (minutes) 0   OT Mode of treatment - Co-treat (minutes) 0   OT Mode of Treatment - Total time(minutes) 90 minutes   OT Cumulative Minutes 920   Therapy Time missed   Time missed?  No

## 2022-07-18 NOTE — PROGRESS NOTES
PM&R PROGRESS NOTE:  Nathalie Palma 77 y o  female MRN: [id number]  Unit/Bed#: -47 Encounter: [id number]        Rehabilitation Diagnosis: Impairment of mobility, safety and Activities of Daily Living (ADLs) due to Orthopedic Disorders:  08 9  Other Orthopedic Left Humerus fracture    HPI: Nathalie Palma is a 77 y o  female with type 2 diabetes, hypertension, depression, hypothyroidism, hx of PAUL cirrhosis, esophageal varices, pancytopenia, HLD, hypothyroidism who presented to the [facility] on 6/29 after tripping over her flip flops, falling and sustaining a left humerus fracture treated non-operatively with NWB to the left arm in a sling  She was found to be in complete heart block with HR in the 20-230s range, developing cardiogenic shock requiring pressors and TVP  She underwent a Permanent pacemaker placement on 6/29 with Dr René Ch  Course complicated by CLYDE and blood loss anemia with hemoglobin of 6 9 s/p 1 unit pRBC  Initial ECHO on 6/29/22 showed moderately dilated left ventricular cavity size, LVEF 55%, mild bi-atrial enlargement   There were regional wall motion abnormalities with akinesis of the apical anterior, apical septal, apical inferior, apical lateral walls and the apex   There was hyperkinesis of the basal anterior, basal anteroseptal, basal inferoseptal, basal inferior, basal inferolateral, basal anterolateral, mid anterior, mid anteroseptal, mid inferoseptal, mid inferior, mid inferolateral and mid anterolateral  RVEF was moderately reduced   The LV systolic function was abnormal in a pattern suggestive of apical stress cardiomyopathy   She was started on Losartan by Cardiology and a repeat ECHO done today 7/5/22  It showed LVEF of 50% with grade 1 diastolic dysfunction, akinesis of the apex, hypokinesis of the apical anterior, septal, inferior/lateral walls   The patient was evaluated by the Rehabilitation team and deemed an appropriate candidate for comprehensive inpatient rehabilitation and admitted to the [facility] on 7/5/2022     SUBJECTIVE: Patient seen face to face  Admits to EYAL discomfort stating "it feels dislocated at the shoulder even thought the fracture is lower " States she started feeling this sensation as of last night after repositioning herself  States she had several BM yesterday but none today  Nursing reported new DTI to right heel  Encourage to place pillow and let right foot dangle at the edge of the pillow  Discomfort at sacrum  ASSESSMENT: Stable, progressing      PLAN:    Rehabilitation   Functional deficits:  Impaired mobility, self-care, pain, NWB to LUE   Continue current rehabilitation plan of care to maximize function   Functional update:   o PT:  Sit-lying: supervision  Sit-stand: supervision  Bed-chair transfer:  supervision  Ambulation: supervision  Stairs: supervision  o OT:  Eating: s/u c/u  Oral hygiene: s/u c/u  Showering: min assist  UB dressing: mod assist  LB dressing: supervision  Putting on/taking off footwear: total assist  toileting hygiene: supervision  Toilet transfer: supervision     Estimated Discharge: 7/21/22      Pain   Robaxin 250 mg 4x PRN for muscle spasms   Tramadol 50 mg Q6H PRN for mod-severe pain    DVT prophylaxis   Lovenox   Will not be d/c home on this    Bladder plan   Continent    Bowel plan   Continent      Obesity (BMI 30-39  9)  Assessment & Plan  · BMI 36 03  · Weight loss counseling, promote increased activity    Acute blood loss anemia  Assessment & Plan  · Acute blood loss anemia, Hgb 6 9 previously, s/p transfusion  · Hgb on 7/18 of 8 2 (8 6)  · Will require close follow with labs  · Monitor clinically as well in therapy    Cardiomyopathy [facility])  Assessment & Plan  · Medicine team reached out to Cardiology re: advice with recs for further w/u based on ECHO results from 7/5/22  · Continue Losartan 25mg qd (Cardiology started in hospital)  · Per Cardiology, outpatient follow up, no acute changes based on echo      Depression  Assessment & Plan  · Continue home lexapro 5mg daily    Complete heart block (HCC)  Assessment & Plan  · S/p dual chamber PPM 6/29/22 with Dr Romeo Grissom  · Monitor pacemaker site for signs of infection  · No heavy lifting/push/pull >10 lbs    Iron deficiency anemia due to chronic blood loss  Assessment & Plan  · 8 2 today    Pancytopenia ([facility] )  Assessment & Plan  · Secondary to cirrhosis, monitor labs    Mild nonproliferative diabetic retinopathy of both eyes without macular edema associated with type 2 diabetes mellitus [facility])  Assessment & Plan  Lab Results   Component Value Date    HGBA1C 8 2 (H) 05/31/2022       Recent Labs     07/17/22  1553 07/17/22 2138 07/18/22  0633 07/18/22  1043   POCGLU 172* 198* 123 186*       Blood Sugar Average: Last 72 hrs:  (P) 542 [id number]    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) (HCC)  Assessment & Plan  · Hx non-bleeding esophageal varices and hepatic encephalopathy   · At home, was on Rifaximin 550 mcg q12hrs/Lactulose 10Gm BID/Lasix 40mg qd/Nadolol 20mg qd/Aldactone 50mg qd  · Here:  Rifaximin 550 mg q12hrs/Lactulose 10Gm BID    Diabetic polyneuropathy associated with type 2 diabetes mellitus ([facility] )  Assessment & Plan  Lab Results   Component Value Date    HGBA1C 8 2 (H) 05/31/2022       Recent Labs     07/17/22  1553 07/17/22 2138 07/18/22  0633 07/18/22  1043   POCGLU 172* 198* 123 186*       Blood Sugar Average: Last 72 hrs:  (P) 245 [id number]    Type 2 diabetes mellitus with hyperglycemia, with long-term current use of insulin [facility])  Assessment & Plan  Lab Results   Component Value Date    HGBA1C 8 2 (H) 05/31/2022       Recent Labs     07/17/22  1553 07/17/22  2138 07/18/22  0633 07/18/22  1043   POCGLU 172* 198* 123 186*     · Home:  Janumet XR  BID/Amaryl 8 mg qAM/Basaglar 48U qam, 36U qpm  · Here:  Lantus 32U q12hrs, Metformin 500 mg BID  · QID Accuchecks/SSI and DM diet    Acquired hypothyroidism  Assessment & Plan  · Continue levothyroxine 112mcg daily    Essential hypertension  Assessment & Plan  · On losartan 25mg daily  · Previously on Nadolol, aldactone, and lasix  · Medications held based on initial hypotension  · Will start adding back meds as indicated, likely starting with nadolol- started back on 7/7  · BP stable on Nadolol, aldactone and lasix still on hold      Hyperlipidemia  Assessment & Plan  · Continue statin  · Here: Pravastatin 80 mg daily with dinner  Home: Simvastatin 40 mg daily    * Closed fracture of proximal end of left humerus  Assessment & Plan  · Displaced comminuted fracture of the humeral head/neck on imaging  · NWB to the left upper limb, maintain in sling  · Ok for for gentle range of motion to prevent adhesive capsulitis- starting 7/11  · Pain control  · Likely course of acute blood loss anemia, monitor labs  · PT/OT  · Follow up with Dr Radames Santoyo per orthopaedics, signed off  · Shoulder/humerus xray ordered due to new onset of discomfort to LUE with sensation of feeling as though the LUE is dislocated          Appreciate IM consultants medical co-management  Labs, medications, and imaging personally reviewed  ROS:  A ten point review of systems was completed and pertinent positives are listed in subjective section  All other systems reviewed were negative  Review of Systems   Constitutional: Negative  Negative for appetite change and fatigue  HENT: Negative  Negative for trouble swallowing  Eyes: Negative  Negative for visual disturbance  Respiratory: Negative  Negative for shortness of breath  Cardiovascular: Negative  Negative for chest pain  Gastrointestinal: Negative  Negative for abdominal pain, constipation and nausea  Genitourinary: Negative  Negative for difficulty urinating  Musculoskeletal: Positive for arthralgias and joint swelling          LUE discomfort/sensation of dislocated shoulder   Sacrum discomfort   Neurological: Negative  Psychiatric/Behavioral: Negative  OBJECTIVE:   /50 (BP Location: Right arm)   Pulse 68   Temp 98 °F (36 7 °C) (Oral)   Resp 18   Ht 5' 5" (1 651 m)   Wt 99 2 kg (218 lb 9 6 oz)   SpO2 100%   BMI 36 38 kg/m²     Physical Exam  Vitals reviewed  Constitutional:       General: She is not in acute distress  Appearance: Normal appearance  She is obese  She is not ill-appearing  HENT:      Head: Normocephalic and atraumatic  Right Ear: External ear normal       Left Ear: External ear normal       Nose: Nose normal       Mouth/Throat:      Mouth: Mucous membranes are moist       Pharynx: Oropharynx is clear  Eyes:      Pupils: Pupils are equal, round, and reactive to light  Cardiovascular:      Rate and Rhythm: Normal rate and regular rhythm  Pulses: Normal pulses  Heart sounds: Normal heart sounds  No murmur heard  Pulmonary:      Effort: Pulmonary effort is normal  No respiratory distress  Breath sounds: Normal breath sounds  Abdominal:      General: Bowel sounds are normal  There is no distension  Palpations: Abdomen is soft  Musculoskeletal:         General: Swelling present  Normal range of motion  Cervical back: Normal range of motion  Right lower leg: Edema present  Left lower leg: Edema present  Comments: LUE edema and ecchymosis, pulse hard to palpate, skin is warm to touch + soft   Skin:     General: Skin is warm  Findings: Bruising present  Comments: DTI noted to right heel/ankle region   Neurological:      General: No focal deficit present  Mental Status: She is alert and oriented to person, place, and time     Psychiatric:         Mood and Affect: Mood normal           Lab Results   Component Value Date    WBC 2 47 (L) 07/18/2022    HGB 8 2 (L) 07/18/2022    HCT 27 4 (L) 07/18/2022    MCV 94 07/18/2022    PLT 79 (L) 07/18/2022     Lab Results   Component Value Date    SODIUM 140 07/18/2022    K 4 1 07/18/2022     07/18/2022    CO2 26 07/18/2022    BUN 26 (H) 07/18/2022    CREATININE 0 91 07/18/2022    GLUC 121 07/18/2022    CALCIUM 9 0 07/18/2022     Lab Results   Component Value Date    INR 1 33 (H) 06/30/2022    INR 1 20 (H) 06/29/2022    INR 1 23 (H) 05/31/2022    PROTIME 16 0 (H) 06/30/2022    PROTIME 15 1 (H) 06/29/2022    PROTIME 15 3 (H) 05/31/2022           Current Facility-Administered Medications:     aspirin chewable tablet 81 mg, 81 mg, Oral, Daily, Lavern Cooley DO, 81 mg at 07/18/22 0831    bisacodyl (DULCOLAX) rectal suppository 10 mg, 10 mg, Rectal, Daily PRN, Lavern Cooley DO    docusate sodium (COLACE) capsule 100 mg, 100 mg, Oral, BID, Lavern Cooley DO, 100 mg at 07/18/22 4571    enoxaparin (LOVENOX) subcutaneous injection 30 mg, 30 mg, Subcutaneous, Q12H [facility], Lavern Cooley DO, 30 mg at 07/18/22 0842    escitalopram (LEXAPRO) tablet 5 mg, 5 mg, Oral, Daily, Lavern Cooley DO, 5 mg at 07/18/22 0833    insulin glargine (LANTUS) subcutaneous injection 10 Units 0 1 mL, 10 Units, Subcutaneous, HS, JOSE JUAN Rhoades, 10 Units at 07/17/22 2149    insulin glargine (LANTUS) subcutaneous injection 32 Units 0 32 mL, 32 Units, Subcutaneous, QAM, JOSE JUAN Rhaodes, 32 Units at 07/18/22 6083    insulin lispro (HumaLOG) 100 units/mL subcutaneous injection 2-12 Units, 2-12 Units, Subcutaneous, 4x Daily (with meals and at bedtime), 2 Units at 07/18/22 1237 **AND** Fingerstick Glucose (POCT), , , 4x Daily AC and at bedtime, Lavern Cooley DO    lactulose oral solution 10 g, 10 g, Oral, BID, Lavern Linwoode, DO, 10 g at 07/18/22 0830    levothyroxine tablet 112 mcg, 112 mcg, Oral, Early Morning, Lavern Cooley DO, 112 mcg at 07/18/22 0507    lidocaine (LIDODERM) 5 % patch 2 patch, 2 patch, Topical, Daily, Lavern Cooley DO, 2 patch at 07/18/22 0829    melatonin tablet 6 mg, 6 mg, Oral, HS, Lavern Cooley, DO, 6 mg at 07/17/22 2150    metFORMIN (GLUCOPHAGE) tablet 500 mg, 500 mg, Oral, BID With Meals, JOSE JUAN Cordero, 500 mg at 07/18/22 0757    methocarbamol (ROBAXIN) tablet 250 mg, 250 mg, Oral, 4x Daily PRN, Rowan Hinojosad, DO, 250 mg at 07/15/22 1242    nadolol (CORGARD) tablet 10 mg, 10 mg, Oral, Daily, JOSE JUAN Cordero    nystatin (MYCOSTATIN) powder, , Topical, BID, JOSE JUAN Cordero, Given at 07/18/22 0859    ondansetron (ZOFRAN-ODT) dispersible tablet 4 mg, 4 mg, Oral, Q6H PRN, Rowan Hinojosad, DO, 4 mg at 07/10/22 0857    pantoprazole (PROTONIX) EC tablet 40 mg, 40 mg, Oral, Early Morning, Rowan Hinojosad, DO, 40 mg at 07/18/22 0507    polyethylene glycol (MIRALAX) packet 17 g, 17 g, Oral, Daily, Alonna Jeff, DO, 17 g at 07/17/22 5039    pravastatin (PRAVACHOL) tablet 80 mg, 80 mg, Oral, Daily With Dinner, Aljavier Hinojosad, DO, 80 mg at 07/17/22 1643    rifaximin (XIFAXAN) tablet 550 mg, 550 mg, Oral, Q12H [address], Alonna Le Mars, DO, 550 mg at 07/18/22 0900    senna (SENOKOT) tablet 8 6 mg, 1 tablet, Oral, HS, Aljavier Hinojosad, DO, 8 6 mg at 07/15/22 2119    traMADol (ULTRAM) tablet 50 mg, 50 mg, Oral, Q6H PRN, Alonna Le Mars, DO, 50 mg at 07/18/22 8101    Past Medical History:   Diagnosis Date    Anemia     Cirrhosis ([facility] )     Diabetic neuropathy (HCC)     Esophageal varices (HCC)     Fatty liver     GERD (gastroesophageal reflux disease)     Hepatic encephalopathy (HCC)     Hiatal hernia     Hyperlipidemia     Hypertension     Hypoglycemia     Hypothyroidism     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 ([facility] )     Osteoarthritis     Osteopenia     Pancytopenia (HCC)     Thrombocytopenia (HCC)     Type 2 diabetes mellitus ([facility] )        Patient Active Problem List    Diagnosis Date Noted    Obesity (BMI 30-39 9) 07/06/2022    Acute blood loss anemia 07/03/2022    Closed fracture of proximal end of left humerus 07/02/2022    Depression 07/02/2022    Cardiomyopathy ([facility] ) 07/02/2022    Complete heart block ([place] [id number] ) 06/30/2022    Esophageal varices without bleeding ([place] [id number] ) 09/29/2021    Pancytopenia ([place] [address] ) 08/06/2021    Iron deficiency anemia due to chronic blood loss 08/06/2021    Mild nonproliferative diabetic retinopathy of both eyes without macular edema associated with type 2 diabetes mellitus ([place] [id number] ) 07/16/2021    Urinary tract infection 09/15/2020    Hepatic encephalopathy ([place] [address] ) 09/13/2020    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) ([place] [id number] ) 09/13/2020    Thrombocytopenia ([place] [address] ) 09/13/2020    Hyperlipidemia 07/24/2018    Essential hypertension 07/24/2018    Acquired hypothyroidism 07/24/2018    Type 2 diabetes mellitus with hyperglycemia, with long-term current use of insulin ([place] [address] ) 07/24/2018    Diabetic polyneuropathy associated with type 2 diabetes mellitus ([place] [address] ) 07/24/2018          Niko Sim PA-C    Total time spent:  30 minutes with more than 50% spent counseling/coordinating care  Counseling includes discussion with patient re: progress and discussion with patient of his/her current medical/functional state/information  Coordination of patient's care was performed in conjunction with consulting services  Time invested included review of patient's labs, vitals, and management of their comorbidities with continued monitoring  The care of the patient was extensively discussed and appropriate treatment plan was formulated unique for this patient  ** Please Note:  voice to text software may have been used in the creation of this document   Although proof errors in transcription or interpretation are a potential of such software**

## 2022-07-18 NOTE — WOUND OSTOMY CARE
Progress Note - Wound   Elena Crest 77 y o  female MRN: [id number]  Unit/Bed#: -46 Encounter: [id number]        Assessment:   Patient is seen for wound care follow-up  Patient is a 78 yo female that came to [facility] after a fall and left humerus fracture  Patient's left arm is supported via sling  Patient is moderate assist X1 with a cane for standing and transfers  Patient is continent of bowel and bladder  Patient is independent with meals after set up assistance as she is non weight bearing to the left arm  Underneath breast folds and pannus remain pink and intact  Patients left arm support in sling  Patient seen today walking to the bathroom  Findings:  2  POA evolving DTI to sacral/buttocks area: Partial thickness skin loss with pink tissue and non-blanchable erythema noted  Maribell-wound is pink and fragile  No drainage noted  Allevyn dressing applied to site  Order listed below  No induration, fluctuance, odor, warmth/temperature differences, redness, or purulence noted to the above noted wounds and skin areas assessed  New dressings applied per orders listed below  Patient tolerated well- no s/s of non-verbal pain or discomfort observed during the encounter  Bedside nurse aware of plan of care  See flow sheets for more detailed assessment findings  Orders listed below and wound care will continue to follow, call or tiger text with questions  Skin Care Plan:  1-Cleanse sacro-buttocks with soap and water  Apply Allevyn Foam Dressing  Brandon with T for treatment  Change every other day and PRN  2-Turn/reposition q2h or when medically stable for pressure re-distribution on skin   3-Elevate heels to offload pressure  4-Moisturize skin daily with skin nourishing cream  5-Ehob cushion in chair when out of bed    6-Hydraguard to bilateral heels BID and PRN          WOUNDS:  Wound 07/02/22 Pressure Injury Coccyx Inner (Active)   Wound Image   07/18/22 1216   Wound Description Pink;Non-blanchable erythema 07/18/22 1216   Pressure Injury Stage DTPI 07/18/22 1216   Maribell-wound Assessment Fragile;Pink 07/18/22 1216   Wound Length (cm) 0 5 cm 07/18/22 1216   Wound Width (cm) 0 7 cm 07/18/22 1216   Wound Depth (cm) 0 1 cm 07/18/22 1216   Wound Surface Area (cm^2) 0 35 cm^2 07/18/22 1216   Wound Volume (cm^3) 0 035 cm^3 07/18/22 1216   Calculated Wound Volume (cm^3) 0 04 cm^3 07/18/22 1216   Change in Wound Size % 99 05 07/18/22 1216   Tunneling 0 cm 07/18/22 1216   Tunneling in depth located at 0 07/18/22 1216   Undermining 0 07/18/22 1216   Undermining is depth extending from 00 07/18/22 1216   Wound Site Closure BILLY 07/18/22 1216   Drainage Amount None 07/18/22 1216   Drainage Description Sanguineous 07/11/22 1123   Non-staged Wound Description Partial thickness 07/18/22 1216   Treatments Cleansed 07/18/22 1216   Dressing Foam, Silicon (eg  Allevyn, etc) 07/18/22 1216   Wound packed? No 07/18/22 1216   Packing- # removed 0 07/11/22 1123   Packing- # inserted 0 07/11/22 1123   Dressing Changed Changed 07/18/22 1216   Patient Tolerance Tolerated well 07/18/22 1216   Dressing Status Clean;Dry; Intact 07/18/22 1216                Annette Diaz RN, BSN

## 2022-07-18 NOTE — PROGRESS NOTES
Internal Medicine Progress Note  Patient: Manish Molina  Age/sex: 77 y o  female  Medical Record #: [id number]      ASSESSMENT/PLAN: (Interval History)  Manish Molina is seen and examined and management for following issues:    Left proximal humerus fracture  · Non-op  · NWB  · Sling for comfort  · See Ortho as OP  · C/o inc pain left shoulder 7/15 = xray stable  · Wearing edema glove on left hand     CHB  · S/p dual chamber PPM 6/29/22  · No heavy lift/push/pull >10 lbs  · No left arm above shoulder height   · Site healed     Stress cardiomyopathy  · D/W Dr Misa Negro on 7/5/22  He said no further intervention for now but see back in office  · Losartan reduced to 12 5mg qd starting 7/14/22 since BPs get a bit soft in afternoon (cards started in hospital)     · Losartan held 7/16, 7/18  · Will stop Losartan for now     ABLA  · S/p 1 unit PRBCs  · Stable     PAUL cirrhosis  · Hx non-bleeding esophageal varices (banded) and hepatic encephalopathy   · Sees Dr Mauricio Parsons at [facility] as OP  · Home:  Rifaximin 550 mcg q12hrs/Lactulose 10Gm BID/Lasix 40mg qd/Corgard 20mg qd/Aldactone 50mg qd  · Here:  Rifaximin 550 mg q12hrs/Lactulose 10Gm BID/Corgard 20mg qd (hold SBP <120)  · On 7/15/22, gave Lasix 40mg x 1 for leg edema = mild improvement  · Will reassess for another dose lasix if BP is OK later today 7/18  · Will drop Corgard down to 10mg qd since dose missed this AM for      DM2  · Home:  Janumet XR  BID/Amaryl 8 mg qAM/Basaglar 48U qam, 36U qpm  · Here:  Lantus 32U AM + 10U PM/Metformin 500 mg BID   · QID Accuchecks/SSI and DM diet  · No changes today     Pancytopenia  · Is 2/2 to cirrhosis/portal HTN   Additionally, has splenomegaly contributing to low platelet ct  · Has had tx with Venofer in past   Sees Dr Chuck Pallas from [facility]  · Today 7/18, WBC is 2 4 with prev count 2 9  This hospital stay, has been in the 2's to 3's  In 1/2022 was 3 5, 7/2021 2 9, 9/2020 2 8  Will continue to watch     · Had BMB 8/2021 and was with no major abnormalities or MDS = did have low iron stores     Hypothyroidism  · Continue Levothyroxine 112 mcg qd     Depression  · Continue Lexapro as at home     Hyperkalemia  · resolved        Discharge date:  7/21/22       The above assessment and plan was reviewed and updated as determined by my evaluation of the patient on 7/18/2022      Labs:   Results from last 7 days   Lab Units 07/18/22  0514 07/14/22  0510   WBC Thousand/uL 2 47* 2 94*   HEMOGLOBIN g/dL 8 2* 8 6*   HEMATOCRIT % 27 4* 29 1*   PLATELETS Thousands/uL 79* 81*     Results from last 7 days   Lab Units 07/18/22  0514 07/14/22  0510   SODIUM mmol/L 140 138   POTASSIUM mmol/L 4 1 4 5   CHLORIDE mmol/L 106 109*   CO2 mmol/L 26 25   BUN mg/dL 26* 31*   CREATININE mg/dL 0 91 0 91   CALCIUM mg/dL 9 0 8 8             Results from last 7 days   Lab Units 07/18/22  1043 07/18/22  0633 07/17/22  2138   POC GLUCOSE mg/dl 186* 123 198*       Review of Scheduled Meds:  Current Facility-Administered Medications   Medication Dose Route Frequency Provider Last Rate    aspirin  81 mg Oral Daily Donnald Bad, DO      bisacodyl  10 mg Rectal Daily PRN Donnald Bad, DO      docusate sodium  100 mg Oral BID Donnald Bad, DO      enoxaparin  30 mg Subcutaneous Q12H [address] Donnald Bad, DO      escitalopram  5 mg Oral Daily Donnald Bad, DO      insulin glargine  10 Units Subcutaneous HS Cleophus JOSE JUAN Light      insulin glargine  32 Units Subcutaneous QAM Cleophus JOSE JUAN Light      insulin lispro  2-12 Units Subcutaneous 4x Daily (with meals and at bedtime) Donnald Bad, DO      lactulose  10 g Oral BID Donnald Bad, DO      levothyroxine  112 mcg Oral Early Morning Donnald Bad, DO      lidocaine  2 patch Topical Daily Donnald Bad, DO      losartan  12 5 mg Oral Daily Cleophus JOSE JUAN Meza      melatonin  6 mg Oral HS Donnald Bad, DO      metFORMIN  500 mg Oral BID With Meals JOSE JUAN Blackburn  methocarbamol  250 mg Oral 4x Daily PRN Sharyn Pyo, DO      nadolol  20 mg Oral Daily Sherry Martinez CRHARJIT      nystatin   Topical BID JOSE JUAN Byers      ondansetron  4 mg Oral Q6H PRN Sharyn Pyo, DO      pantoprazole  40 mg Oral Early Morning Sharyn Pyo, DO      polyethylene glycol  17 g Oral Daily Sharyn Pyo, DO      pravastatin  80 mg Oral Daily With [employer] Emir Graves, DO      rifaximin  550 mg Oral Q12H [facility] Sharyn Pyo, DO      senna  1 tablet Oral HS Sharyn Pyo, DO      traMADol  50 mg Oral Q6H PRN Sharyn Pyo, DO         Subjective/ HPI: Patient seen and examined  Patients overnight issues or events were reviewed with nursing or staff during rounds or morning huddle session  New or overnight issues include the following:     No new or overnight issues  Offers no complaints except left arm fracture pain  ROS:   A 10 point ROS was performed; negative except as noted above       *Labs /Radiology studies reviewed  *Medications reviewed and reconciled as needed  *Please refer to order section for additional ordered labs studies  *Case discussed with primary attending during morning huddle case rounds    Physical Examination:  Vitals:   Vitals:    07/17/22 1423 07/17/22 2134 07/18/22 0436 07/18/22 0532   BP: 114/56 121/60 112/55    BP Location: Right arm Right arm Right arm    Pulse: 60 66 69    Resp: 16 17 18    Temp: 97 8 °F (36 6 °C) 98 1 °F (36 7 °C) 98 °F (36 7 °C)    TempSrc: Oral Oral Oral    SpO2: 99% 99% 100%    Weight:   99 2 kg (218 lb 9 6 oz) 99 2 kg (218 lb 9 6 oz)   Height:           General Appearance: no distress, conversive  HEENT: PERRLA, conjuctiva normal; oropharynx clear; mucous membranes moist   Neck:  Supple, normal ROM  Lungs: CTA, normal respiratory effort, no retractions, expiratory effort normal  CV: regular rate and rhythm; no rubs/murmurs/gallops, PMI normal   ABD: soft; ND/NT; +BS  EXT: +LE edema  Skin: normal turgor, normal texture, no rashes  Psych: affect normal, mood normal  Neuro: AAO      The above physical exam was reviewed and updated as determined by my evaluation of the patient on 7/18/2022  Invasive Devices  Report    None                    VTE Pharmacologic Prophylaxis: Enoxaparin  Code Status: Level 1 - Full Code  Current Length of Stay: 13 day(s)      Total time spent:  30 minutes with more than 50% spent counseling/coordinating care  Counseling includes discussion with patient re: progress  and discussion with patient of his/her current medical state/information  Coordination of patient's care was performed in conjunction with primary service  Time invested included review of patient's labs, vitals, and management of their comorbidities with continued monitoring  In addition, this patient was discussed with medical team including physician and advanced extenders  The care of the patient was extensively discussed and appropriate treatment plan was formulated unique for this patient  ** Please Note:  voice to text software may have been used in the creation of this document   Although proof errors in transcription or interpretation are a potential of such software**

## 2022-07-19 ENCOUNTER — APPOINTMENT (INPATIENT)
Dept: RADIOLOGY | Facility: HOSPITAL | Age: 77
DRG: 560 | End: 2022-07-19
Payer: MEDICARE

## 2022-07-19 LAB
GLUCOSE SERPL-MCNC: 111 MG/DL (ref 65–140)
GLUCOSE SERPL-MCNC: 169 MG/DL (ref 65–140)
GLUCOSE SERPL-MCNC: 192 MG/DL (ref 65–140)
GLUCOSE SERPL-MCNC: 195 MG/DL (ref 65–140)

## 2022-07-19 PROCEDURE — 73200 CT UPPER EXTREMITY W/O DYE: CPT

## 2022-07-19 PROCEDURE — 97110 THERAPEUTIC EXERCISES: CPT

## 2022-07-19 PROCEDURE — 97535 SELF CARE MNGMENT TRAINING: CPT

## 2022-07-19 PROCEDURE — 82948 REAGENT STRIP/BLOOD GLUCOSE: CPT

## 2022-07-19 PROCEDURE — 97530 THERAPEUTIC ACTIVITIES: CPT

## 2022-07-19 PROCEDURE — G1004 CDSM NDSC: HCPCS

## 2022-07-19 PROCEDURE — 99232 SBSQ HOSP IP/OBS MODERATE 35: CPT

## 2022-07-19 PROCEDURE — 99232 SBSQ HOSP IP/OBS MODERATE 35: CPT | Performed by: INTERNAL MEDICINE

## 2022-07-19 RX ORDER — NADOLOL 20 MG/1
10 TABLET ORAL DAILY
Status: DISCONTINUED | OUTPATIENT
Start: 2022-07-20 | End: 2022-07-21 | Stop reason: HOSPADM

## 2022-07-19 RX ORDER — INSULIN GLARGINE 100 [IU]/ML
38 INJECTION, SOLUTION SUBCUTANEOUS EVERY MORNING
Status: DISCONTINUED | OUTPATIENT
Start: 2022-07-20 | End: 2022-07-21 | Stop reason: HOSPADM

## 2022-07-19 RX ADMIN — METFORMIN HYDROCHLORIDE 500 MG: 500 TABLET ORAL at 08:06

## 2022-07-19 RX ADMIN — RIFAXIMIN 550 MG: 550 TABLET ORAL at 21:13

## 2022-07-19 RX ADMIN — POTASSIUM CHLORIDE 10 MEQ: 750 TABLET, EXTENDED RELEASE ORAL at 08:00

## 2022-07-19 RX ADMIN — LACTULOSE 10 G: 20 SOLUTION ORAL at 17:03

## 2022-07-19 RX ADMIN — PRAVASTATIN SODIUM 80 MG: 80 TABLET ORAL at 16:27

## 2022-07-19 RX ADMIN — DOCUSATE SODIUM 100 MG: 100 CAPSULE, LIQUID FILLED ORAL at 08:00

## 2022-07-19 RX ADMIN — TRAMADOL HYDROCHLORIDE 50 MG: 50 TABLET, FILM COATED ORAL at 12:46

## 2022-07-19 RX ADMIN — DOCUSATE SODIUM 100 MG: 100 CAPSULE, LIQUID FILLED ORAL at 17:03

## 2022-07-19 RX ADMIN — NADOLOL 10 MG: 20 TABLET ORAL at 08:08

## 2022-07-19 RX ADMIN — LACTULOSE 10 G: 20 SOLUTION ORAL at 08:03

## 2022-07-19 RX ADMIN — LIDOCAINE 5% 2 PATCH: 700 PATCH TOPICAL at 10:13

## 2022-07-19 RX ADMIN — INSULIN GLARGINE 32 UNITS: 100 INJECTION, SOLUTION SUBCUTANEOUS at 08:04

## 2022-07-19 RX ADMIN — NYSTATIN 1 APPLICATION: 100000 POWDER TOPICAL at 17:03

## 2022-07-19 RX ADMIN — NYSTATIN: 100000 POWDER TOPICAL at 08:08

## 2022-07-19 RX ADMIN — MELATONIN 6 MG: at 21:13

## 2022-07-19 RX ADMIN — ASPIRIN 81 MG CHEWABLE TABLET 81 MG: 81 TABLET CHEWABLE at 08:00

## 2022-07-19 RX ADMIN — METFORMIN HYDROCHLORIDE 500 MG: 500 TABLET ORAL at 16:27

## 2022-07-19 RX ADMIN — FUROSEMIDE 20 MG: 20 TABLET ORAL at 08:00

## 2022-07-19 RX ADMIN — INSULIN LISPRO 2 UNITS: 100 INJECTION, SOLUTION INTRAVENOUS; SUBCUTANEOUS at 16:27

## 2022-07-19 RX ADMIN — RIFAXIMIN 550 MG: 550 TABLET ORAL at 08:08

## 2022-07-19 RX ADMIN — INSULIN GLARGINE 10 UNITS: 100 INJECTION, SOLUTION SUBCUTANEOUS at 21:13

## 2022-07-19 RX ADMIN — ENOXAPARIN SODIUM 30 MG: 30 INJECTION SUBCUTANEOUS at 21:13

## 2022-07-19 RX ADMIN — ENOXAPARIN SODIUM 30 MG: 30 INJECTION SUBCUTANEOUS at 08:02

## 2022-07-19 RX ADMIN — SENNOSIDES 8.6 MG: 8.6 TABLET, FILM COATED ORAL at 21:13

## 2022-07-19 RX ADMIN — PANTOPRAZOLE SODIUM 40 MG: 40 TABLET, DELAYED RELEASE ORAL at 05:37

## 2022-07-19 RX ADMIN — LEVOTHYROXINE SODIUM 112 MCG: 112 TABLET ORAL at 05:37

## 2022-07-19 RX ADMIN — ESCITALOPRAM OXALATE 5 MG: 10 TABLET ORAL at 08:00

## 2022-07-19 RX ADMIN — INSULIN LISPRO 2 UNITS: 100 INJECTION, SOLUTION INTRAVENOUS; SUBCUTANEOUS at 11:16

## 2022-07-19 RX ADMIN — INSULIN LISPRO 2 UNITS: 100 INJECTION, SOLUTION INTRAVENOUS; SUBCUTANEOUS at 21:13

## 2022-07-19 NOTE — PROGRESS NOTES
Occupational Therapy Treatment Note       07/19/22 2090   Pain Assessment   Pain Assessment Tool 0-10   Pain Score 4   Pain Location/Orientation Orientation: Left; Location: Arm   Hospital Pain Intervention(s) Repositioned   Restrictions/Precautions   Precautions Fall Risk;Pain;Supervision on toilet/commode;Pressure Ulcer   LUE Weight Bearing Per Order NWB   LUE ROM Restriction PROM  (review orders for details)   Braces or Orthoses Sling  (L UE for comfort)   Lifestyle   Autonomy "this shower feels good on my shoulder"   Eating   Type of Assistance Needed Set-up / clean-up   Physical Assistance Level No physical assistance   Eating CARE Score 5   Oral Hygiene   Type of Assistance Needed Independent   Physical Assistance Level No physical assistance   Comment standing at sink   Oral Hygiene CARE Score 6   Shower/Bathe Self   Type of Assistance Needed Physical assistance   Physical Assistance Level 51%-75%   Comment Seated on tub transfer bench, OT assisted with under breasts/pannus, B/L feet, and buttock (while pt in stance), and B/L UEs  Pt able to bathe chest, upper legs and groin with supervision  wore standard L UE sling in shower for comfort/positioning     Shower/Bathe Self CARE Score 2   Tub/Shower Transfer   Findings min assist hand hold assist on R for step over walk in shower transfer   Upper Body Dressing   Type of Assistance Needed Physical assistance   Physical Assistance Level 51%-75%   Comment moderate assist to don/doff tshirt using elizabeth dressing techiques, pt able to doff sling but requires assist to don   Upper Body Dressing CARE Score 2   Lower Body Dressing   Type of Assistance Needed Physical assistance   Physical Assistance Level 25% or less   Comment pt able to thread/unthread LEs into pants while seated, stands with supervision to manage over hips but requires min assist to adjust under L UE/sling   Lower Body Dressing CARE Score 3   Putting On/Taking Off Footwear   Type of Assistance Needed Physical assistance   Physical Assistance Level Total assistance   Putting On/Taking Off Footwear CARE Score 1   Sit to Stand   Type of Assistance Needed Independent; Adaptive equipment   Physical Assistance Level No physical assistance   Comment SPC   Sit to Stand CARE Score 6   Bed-Chair Transfer   Type of Assistance Needed Independent; Adaptive equipment   Physical Assistance Level No physical assistance   Comment SPC to/from bathroom  however pt requires setup assist for environement and recliner chair, and therefore not progressed to in room privileges   Chair/Bed-to-Chair Transfer CARE Score 6   [address]   Type of Assistance Needed Physical assistance; Adaptive equipment   Physical Assistance Level 25% or less   Comment pt able to complete front hygiene and clothing management at independent level in stance, but requires assist for rear hygiene 2* sacral wound   Toileting Hygiene CARE Score 3   Toilet Transfer   Type of Assistance Needed Independent; Adaptive equipment   Physical Assistance Level No physical assistance   Comment SPC to/from bathroom, able to stand on/off toilet with no device   Toilet Transfer CARE Score 6   Cognition   Arousal/Participation Alert; Cooperative   Attention Attends with cues to redirect   Orientation Level Oriented X4   Following Commands Follows one step commands without difficulty   Comments [facility] for basic tasks  benefits from repetition of new learning  decreased health literacy and limited financial resources  Activity Tolerance   Activity Tolerance Patient tolerated treatment well   Assessment   Treatment Assessment Pt participated in skilled OT tx session focused on ADL session with shower  See above for further details on functional performance  Plan for d/c adl tomorrow to be sponge bathing   Of note, pt is able to complete sit <> stand and short distance functional mobility in room at [facility] level independently, however she requires assist for recliner chair/environmental setup and bowel hygiene with toileting, and therefore will not fully progress to in room privileges  Nursing to complete family training on day of d/c for wound care  Per RN Lacie Aguilera, applied allevyn marked with T to sacrum and R heel, as well as nystatin under breasts  Problem List Decreased endurance; Impaired balance;Decreased mobility; Decreased skin integrity;Pain;Orthopedic restrictions   Plan   Treatment/Interventions ADL retraining;Functional transfer training; Endurance training;Cognitive reorientation;Patient/family training;Bed mobility; Equipment eval/education; Compensatory technique education   Progress Progressing toward goals   Recommendation   OT Discharge Recommendation Home with home health rehabilitation   OT Therapy Minutes   OT Time In 0830   OT Time Out 1000   OT Total Time (minutes) 90   OT Mode of treatment - Individual (minutes) 90   OT Mode of treatment - Concurrent (minutes) 0   OT Mode of treatment - Group (minutes) 0   OT Mode of treatment - Co-treat (minutes) 0   OT Mode of Treatment - Total time(minutes) 90 minutes   OT Cumulative Minutes 1010   Therapy Time missed   Time missed?  No

## 2022-07-19 NOTE — PLAN OF CARE
Problem: PAIN - ADULT  Goal: Verbalizes/displays adequate comfort level or baseline comfort level  Description: Interventions:  - Encourage patient to monitor pain and request assistance  - Assess pain using appropriate pain scale  - Administer analgesics based on type and severity of pain and evaluate response  - Implement non-pharmacological measures as appropriate and evaluate response  - Consider cultural and social influences on pain and pain management  - Notify physician/advanced practitioner if interventions unsuccessful or patient reports new pain  Outcome: Progressing     Problem: INFECTION - ADULT  Goal: Absence or prevention of progression during hospitalization  Description: INTERVENTIONS:  - Assess and monitor for signs and symptoms of infection  - Monitor lab/diagnostic results  - Monitor all insertion sites, i e  indwelling lines, tubes, and drains  - Monitor endotracheal if appropriate and nasal secretions for changes in amount and color  - Hendricks appropriate cooling/warming therapies per order  - Administer medications as ordered  - Instruct and encourage patient and family to use good hand hygiene technique  - Identify and instruct in appropriate isolation precautions for identified infection/condition  Outcome: Progressing  Goal: Absence of fever/infection during neutropenic period  Description: INTERVENTIONS:  - Monitor WBC    Outcome: Progressing     Problem: SAFETY ADULT  Goal: Patient will remain free of falls  Description: INTERVENTIONS:  - Educate patient/family on patient safety including physical limitations  - Instruct patient to call for assistance with activity   - Consult OT/PT to assist with strengthening/mobility   - Keep Call bell within reach  - Keep bed low and locked with side rails adjusted as appropriate  - Keep care items and personal belongings within reach  - Initiate and maintain comfort rounds  - Make Fall Risk Sign visible to staff  - Offer Toileting every 2-4 Hours, in advance of need  - Initiate/Maintain bed/chair alarm  - Obtain necessary fall risk management equipment: yellow socks  - Apply yellow socks and bracelet for high fall risk patients  - Consider moving patient to room near nurses station  Outcome: Progressing  Goal: Maintain or return to baseline ADL function  Description: INTERVENTIONS:  -  Assess patient's ability to carry out ADLs; assess patient's baseline for ADL function and identify physical deficits which impact ability to perform ADLs (bathing, care of mouth/teeth, toileting, grooming, dressing, etc )  - Assess/evaluate cause of self-care deficits   - Assess range of motion  - Assess patient's mobility; develop plan if impaired  - Assess patient's need for assistive devices and provide as appropriate  - Encourage maximum independence but intervene and supervise when necessary  - Involve family in performance of ADLs  - Assess for home care needs following discharge   - Consider OT consult to assist with ADL evaluation and planning for discharge  - Provide patient education as appropriate  Outcome: Progressing     Problem: DISCHARGE PLANNING  Goal: Discharge to home or other facility with appropriate resources  Description: INTERVENTIONS:  - Identify barriers to discharge w/patient and caregiver  - Arrange for needed discharge resources and transportation as appropriate  - Identify discharge learning needs (meds, wound care, etc )  - Arrange for interpretive services to assist at discharge as needed  - Refer to Case Management Department for coordinating discharge planning if the patient needs post-hospital services based on physician/advanced practitioner order or complex needs related to functional status, cognitive ability, or social support system  Outcome: Progressing     Problem: Prexisting or High Potential for Compromised Skin Integrity  Goal: Skin integrity is maintained or improved  Description: INTERVENTIONS:  - Identify patients at risk for skin breakdown  - Assess and monitor skin integrity  - Assess and monitor nutrition and hydration status  - Monitor labs   - Assess for incontinence   - Turn and reposition patient  - Assist with mobility/ambulation  - Relieve pressure over bony prominences  - Avoid friction and shearing  - Provide appropriate hygiene as needed including keeping skin clean and dry  - Evaluate need for skin moisturizer/barrier cream  - Collaborate with interdisciplinary team   - Patient/family teaching  - Consider wound care consult   Outcome: Progressing     Problem: MOBILITY - ADULT  Goal: Maintain or return to baseline ADL function  Description: INTERVENTIONS:  -  Assess patient's ability to carry out ADLs; assess patient's baseline for ADL function and identify physical deficits which impact ability to perform ADLs (bathing, care of mouth/teeth, toileting, grooming, dressing, etc )  - Assess/evaluate cause of self-care deficits   - Assess range of motion  - Assess patient's mobility; develop plan if impaired  - Assess patient's need for assistive devices and provide as appropriate  - Encourage maximum independence but intervene and supervise when necessary  - Involve family in performance of ADLs  - Assess for home care needs following discharge   - Consider OT consult to assist with ADL evaluation and planning for discharge  - Provide patient education as appropriate  Outcome: Progressing  Goal: Maintains/Returns to pre admission functional level  Description: INTERVENTIONS:  - Perform BMAT or MOVE assessment daily    - Set and communicate daily mobility goal to care team and patient/family/caregiver  - Collaborate with rehabilitation services on mobility goals if consulted  - Perform Range of Motion 3 times a day  - Reposition patient every 2 hours    - Dangle patient 3 times a day  - Stand patient 3 times a day  - Ambulate patient 3 times a day  - Out of bed to chair 3 times a day   - Out of bed for meals 3 times a day  - Out of bed for toileting        - Record patient progress and toleration of activity level   Outcome: Progressing     Problem: Potential for Falls  Goal: Patient will remain free of falls  Description: INTERVENTIONS:  - Educate patient/family on patient safety including physical limitations  - Instruct patient to call for assistance with activity   - Consult OT/PT to assist with strengthening/mobility   - Keep Call bell within reach  - Keep bed low and locked with side rails adjusted as appropriate  - Keep care items and personal belongings within reach  - Initiate and maintain comfort rounds  - Make Fall Risk Sign visible to staff  - Offer Toileting every 2 Hours, in advance of need  - Initiate/Maintain bed/chair alarm  - Obtain necessary fall risk management equipment: alarms  - Apply yellow socks and bracelet for high fall risk patients  - Consider moving patient to room near nurses station  Outcome: Progressing     Problem: Nutrition/Hydration-ADULT  Goal: Nutrient/Hydration intake appropriate for improving, restoring or maintaining nutritional needs  Description: Monitor and assess patient's nutrition/hydration status for malnutrition  Collaborate with interdisciplinary team and initiate plan and interventions as ordered  Monitor patient's weight and dietary intake as ordered or per policy  Utilize nutrition screening tool and intervene as necessary  Determine patient's food preferences and provide high-protein, high-caloric foods as appropriate       INTERVENTIONS:  - Monitor oral intake, urinary output, labs, and treatment plans  - Assess nutrition and hydration status and recommend course of action  - Evaluate amount of meals eaten  - Assist patient with eating if necessary   - Allow adequate time for meals  - Recommend/ encourage appropriate diets, oral nutritional supplements, and vitamin/mineral supplements  - Order, calculate, and assess calorie counts as needed  - Recommend, monitor, and adjust tube feedings and TPN/PPN based on assessed needs  - Assess need for intravenous fluids  - Provide specific nutrition/hydration education as appropriate  - Include patient/family/caregiver in decisions related to nutrition  Outcome: Progressing

## 2022-07-19 NOTE — QUICK NOTE
Reached out to Dr Sergei James to evaluate x-ray  Request for CT scan of left shoulder prior to discharge  Informed patient who is agreeable  Dr Sergei James would like to see patient as an outpatient then

## 2022-07-19 NOTE — PROGRESS NOTES
Internal Medicine Progress Note  Patient: Ericka Ruiz  Age/sex: 77 y o  female  Medical Record #: [id number]      ASSESSMENT/PLAN: (Interval History)  Ericka Ruiz is seen and examined and management for following issues:    Left proximal humerus fracture  · Non-op  · NWB  · Sling for comfort  · See Ortho as OP  · C/o inc pain left shoulder 7/15 = xray stable  · Wearing edema glove on left hand     CHB  · S/p dual chamber PPM 6/29/22  · No heavy lift/push/pull >10 lbs  · No left arm above shoulder height   · Site healed     Stress cardiomyopathy  · D/W Dr Henriquez Pulse on 7/5/22  He said no further intervention for now but see back in office  · Losartan reduced to 12 5mg qd starting 7/14/22 since BPs were soft in afternoon (cards started in hospital)     · Losartan held 7/16, 7/18 so stopped Losartan for now     ABLA  · S/p 1 unit PRBCs  · Stable     PAUL cirrhosis  · Hx non-bleeding esophageal varices (banded) and hepatic encephalopathy   · Sees Dr Catracho Oropeza at [facility] as OP  · Home:  Rifaximin 550 mcg q12hrs/Lactulose 10Gm BID/Lasix 40mg qd/Corgard 20mg qd/Aldactone 50mg qd  · Here:  Rifaximin 550 mg q12hrs/Lactulose 10Gm BID/Corgard 20mg qd (hold SBP <120)  · On 7/15/22, gave Lasix 40 mg x 1 for leg edema = mild improvement  · started Lasix 20mg qd 7/18  · Starting 7/19, dropped Corgard down to 10mg qd since BPs too soft     DM2  · Home:  Janumet XR  BID/Amaryl 8 mg qAM/Basaglar 48U qam, 36U qpm  · Here:  Lantus 32U AM + 10U PM/Metformin 500 mg BID   · QID Accuchecks/SSI and DM diet  · Will inc AM Lantus dose to 38U     Pancytopenia  · Is 2/2 to cirrhosis/portal HTN   Additionally, has splenomegaly contributing to low platelet ct  · Has had tx with Venofer in past   Sees Dr Elton Garcia from [facility]  · on 7/18, WBC is 2 4 with prev count 2 9  This hospital stay, has been in the 2's to 3's  In 1/2022 was 3 5, 7/2021 2 9, 9/2020 2 8  Will continue to watch     · Had BMB 8/2021 and was with no major abnormalities or MDS = did have low iron stores     Hypothyroidism  · Continue Levothyroxine 112 mcg qd     Depression  · Continue Lexapro as at home     Hyperkalemia  · resolved        Discharge date:  7/21/22    The above assessment and plan was reviewed and updated as determined by my evaluation of the patient on 7/19/2022      Labs:   Results from last 7 days   Lab Units 07/18/22  0514 07/14/22  0510   WBC Thousand/uL 2 47* 2 94*   HEMOGLOBIN g/dL 8 2* 8 6*   HEMATOCRIT % 27 4* 29 1*   PLATELETS Thousands/uL 79* 81*     Results from last 7 days   Lab Units 07/18/22  0514 07/14/22  0510   SODIUM mmol/L 140 138   POTASSIUM mmol/L 4 1 4 5   CHLORIDE mmol/L 106 109*   CO2 mmol/L 26 25   BUN mg/dL 26* 31*   CREATININE mg/dL 0 91 0 91   CALCIUM mg/dL 9 0 8 8             Results from last 7 days   Lab Units 07/19/22  1053 07/19/22  0629 07/18/22  2045   POC GLUCOSE mg/dl 169* 111 211*       Review of Scheduled Meds:  Current Facility-Administered Medications   Medication Dose Route Frequency Provider Last Rate    aspirin  81 mg Oral Daily Romayne Carrion, DO      bisacodyl  10 mg Rectal Daily PRN Romayne Carrion, DO      docusate sodium  100 mg Oral BID Romayne Carrion, DO      enoxaparin  30 mg Subcutaneous Q12H [address] Romayne Carrion, DO      escitalopram  5 mg Oral Daily Romayne Carrion, DO      furosemide  20 mg Oral Daily JOSE JUAN Uribe      insulin glargine  10 Units Subcutaneous HS JOSE JUAN Hines      insulin glargine  32 Units Subcutaneous QAM JOSE JUAN Hines      insulin lispro  2-12 Units Subcutaneous 4x Daily (with meals and at bedtime) Romayne Carrion, DO      lactulose  10 g Oral BID Romayne Carrion, DO      levothyroxine  112 mcg Oral Early Morning Romayne Carrion, DO      lidocaine  2 patch Topical Daily Romayne Carrion, DO      melatonin  6 mg Oral HS Romayne Carrion, DO      metFORMIN  500 mg Oral BID With Meals JOSE JUAN Murphy      methocarbamol  250 mg Oral 4x Daily PRN Alonna [place], DO      nadolol  10 mg Oral Daily Fungpete Barros JOSE JUAN Martinez      nystatin   Topical BID F[place] Barros JOSE JUAN Martinez      ondansetron  4 mg Oral Q6H PRN Alonna Jeff, DO      pantoprazole  40 mg Oral Early Morning Alonna [place], DO      polyethylene glycol  17 g Oral Daily Alonna Jeff, DO      potassium chloride  10 mEq Oral Daily Fungpete Barros JOSE JUAN Martinez      pravastatin  80 mg Oral Daily With [employer] Willy Laser, DO      rifaximin  550 mg Oral Q12H [address] Alonna [place], DO      senna  1 tablet Oral HS Alonna [place], DO      traMADol  50 mg Oral Q6H PRN Alonna [place], DO         Subjective/ HPI: Patient seen and examined  Patients overnight issues or events were reviewed with nursing or staff during rounds or morning huddle session  New or overnight issues include the following:     No new or overnight issues  Offers no complaints    ROS:   A 10 point ROS was performed; negative except as noted above  Imaging:     XR shoulder 2+ vw left   Final Result by Remington Ochoa MD (80/96 [id number])   Addendum 1 of 1 by Remington Ochoa MD (07/19 2646)   ADDENDUM:      Compared to a study obtained on 07/15/2022, there is no significant change    in alignment  Final      Acute impaction fracture in the proximal left humerus with lateral rotation of the humeral head and widening of the glenohumeral joint  Workstation performed: [id number]         XR humerus left   Final Result by Remington Ochoa MD (07/19 4683)      Comminuted impaction fracture in the proximal left humerus fairly similar in alignment  Soft tissue swelling about the elbow with a number of small radiopaque foreign bodies  Workstation performed: [id number]         XR humerus left   Final Result by Joshua Marie DO (07/15 1532)      Stable comminuted and deformed humeral head/neck fracture              Workstation performed: [id number]             *Labs /Radiology studies reviewed  *Medications reviewed and reconciled as needed  *Please refer to order section for additional ordered labs studies  *Case discussed with primary attending during morning huddle case rounds    Physical Examination:  Vitals:   Vitals:    07/18/22 2150 07/19/22 0534 07/19/22 0808 07/19/22 0900   BP: 107/56 113/68 130/59    BP Location: Right arm Right arm Right arm    Pulse: 60 68 63    Resp: 17 16     Temp: 97 7 °F (36 5 °C) 97 8 °F (36 6 °C)     TempSrc: Oral Oral     SpO2: 99% 98%     Weight:    100 kg (221 lb)   Height:           General Appearance: no distress, conversive  HEENT: PERRLA, conjuctiva normal; oropharynx clear; mucous membranes moist   Neck:  Supple, normal ROM  Lungs: CTA, normal respiratory effort, no retractions, expiratory effort normal  CV: regular rate and rhythm; no rubs/murmurs/gallops, PMI normal   ABD: soft; ND/NT; +BS  EXT: +LE and left arm edema  Skin: normal turgor, normal texture, no rashes  Psych: affect normal, mood normal  Neuro: AAO      The above physical exam was reviewed and updated as determined by my evaluation of the patient on 7/19/2022  Invasive Devices  Report    None                    VTE Pharmacologic Prophylaxis: Enoxaparin  Code Status: Level 1 - Full Code  Current Length of Stay: 14 day(s)      Total time spent:  30 minutes with more than 50% spent counseling/coordinating care  Counseling includes discussion with patient re: progress  and discussion with patient of his/her current medical state/information  Coordination of patient's care was performed in conjunction with primary service  Time invested included review of patient's labs, vitals, and management of their comorbidities with continued monitoring  In addition, this patient was discussed with medical team including physician and advanced extenders  The care of the patient was extensively discussed and appropriate treatment plan was formulated unique for this patient       ** Please Note:  voice to text software may have been used in the creation of this document   Although proof errors in transcription or interpretation are a potential of such software**

## 2022-07-19 NOTE — WOUND OSTOMY CARE
Consult Note - Wound   Senia Query 77 y o  female MRN: [id number]  Unit/Bed#: -27 Encounter: [id number]        History and Present Illness:  Patient is seen by wound care weekly for POA Evolving DTI on sacro-buttocks  Patient seen yesterday ambulating with cane and buttocks wound was treated  Patient is continent of bowel and bladder  Patient is continent of bowel and bladder  Regular mattress in room  Patient's leg arm is in sling and is very sensitive  Patients sacral dressing intact, clean, and dry- changed yesterday by wound care team  Specialty Mattress ordered for patient  Wound Care was consulted for a possible DTI on Right Heel     Assessment Findings:   1  HA DTPI to Right heel: area of light purple, non-blanchable erythema noted on wound bed  Maribell-wound is clean dry and intact  No openings or drainage noted  Allevyn dressing applied  Orders placed below  No induration, fluctuance, odor, warmth/temperature differences, redness, or purulence noted to the above noted wounds and skin areas assessed  New dressings applied per orders listed below  Patient tolerated well- no s/s of non-verbal pain or discomfort observed during the encounter  Bedside nurse aware of plan of care  See flow sheets for more detailed assessment findings  Orders listed below and wound care will continue to follow, call or tiger text with questions  Wound will continue to follow for sacro-buttocks DTI an Right Heel DTI  Skin care Plan:  1-Cleanse sacro-buttocks with soap and water  Apply Allevyn Foam Dressing  Brandon with T for treatment  Change every other day and PRN  2-Turn/reposition q2h or when medically stable for pressure re-distribution on skin   3-Elevate heels to offload pressure  4-Moisturize skin daily with skin nourishing cream  5-Ehob cushion in chair when out of bed  6-Hydraguard to bilateral heels BID and PRN  7- Right heel- Apply Allevyn Life foam dressings for treatment    Trish Geller with T for Treatment  Peel back at least daily for skin assessment and re-apply  Change dressing every other day and PRN soilage or displacement   8-P-500 Specialty Mattress Ordered  Wounds:  Wound 07/19/22 Pressure Injury Heel Posterior;Right (Active)   Wound Image   07/19/22 0852   Wound Description Light purple;Non-blanchable erythema 07/19/22 0852   Pressure Injury Stage DTPI 07/19/22 0852   Maribell-wound Assessment Intact;Dry 07/19/22 0852   Wound Length (cm) 0 5 cm 07/19/22 0852   Wound Width (cm) 1 cm 07/19/22 0852   Wound Depth (cm) 0 cm 07/19/22 0852   Wound Surface Area (cm^2) 0 5 cm^2 07/19/22 0852   Wound Volume (cm^3) 0 cm^3 07/19/22 0852   Calculated Wound Volume (cm^3) 0 cm^3 07/19/22 0852   Tunneling 0 cm 07/19/22 0852   Tunneling in depth located at 0 07/19/22 0852   Undermining 0 07/19/22 0852   Undermining is depth extending from 0 07/19/22 0852   Wound Site Closure BILLY 07/19/22 0852   Drainage Amount None 07/19/22 0852   Treatments Cleansed;Elevated 07/19/22 0852   Dressing Foam, Silicon (eg  Allevyn, etc) 07/19/22 3610   Wound packed? No 07/19/22 0852   Dressing Changed New 07/19/22 0852   Patient Tolerance Tolerated well 07/19/22 0852   Dressing Status Clean;Dry; Intact 07/19/22 0852                Hany Anderson RN, BSN

## 2022-07-19 NOTE — PROGRESS NOTES
PM&R PROGRESS NOTE:  Hardik Rowland 77 y o  female MRN: [id number]  Unit/Bed#: -86 Encounter: [id number]        Rehabilitation Diagnosis: Impairment of mobility, safety and Activities of Daily Living (ADLs) due to Orthopedic Disorders:  08 9  Other Orthopedic Left Humerus fracture    HPI: Hardik Rowland is a 77 y o  female with type 2 diabetes, hypertension, depression, hypothyroidism, hx of PAUL cirrhosis, esophageal varices, pancytopenia, HLD, hypothyroidism who presented to the [facility] on 6/29 after tripping over her flip flops, falling and sustaining a left humerus fracture treated non-operatively with NWB to the left arm in a sling  She was found to be in complete heart block with HR in the 20-230s range, developing cardiogenic shock requiring pressors and TVP  She underwent a Permanent pacemaker placement on 6/29 with Dr Armstrong Sos  Course complicated by CLYDE and blood loss anemia with hemoglobin of 6 9 s/p 1 unit pRBC  Initial ECHO on 6/29/22 showed moderately dilated left ventricular cavity size, LVEF 55%, mild bi-atrial enlargement   There were regional wall motion abnormalities with akinesis of the apical anterior, apical septal, apical inferior, apical lateral walls and the apex   There was hyperkinesis of the basal anterior, basal anteroseptal, basal inferoseptal, basal inferior, basal inferolateral, basal anterolateral, mid anterior, mid anteroseptal, mid inferoseptal, mid inferior, mid inferolateral and mid anterolateral  RVEF was moderately reduced   The LV systolic function was abnormal in a pattern suggestive of apical stress cardiomyopathy   She was started on Losartan by Cardiology and a repeat ECHO done today 7/5/22  It showed LVEF of 50% with grade 1 diastolic dysfunction, akinesis of the apex, hypokinesis of the apical anterior, septal, inferior/lateral walls   The patient was evaluated by the Rehabilitation team and deemed an appropriate candidate for comprehensive inpatient rehabilitation and admitted to the [facility] on 7/5/2022     SUBJECTIVE: Patient seen face to face  Informed patient of x-ray results  Will reach out to shoulder specialist to see if they can evaluate prior to discharge  Patient states she feels comfortable returning home at the end of this week and notes she will have her daughter in law  ASSESSMENT: Stable, progressing      PLAN:    Rehabilitation   Functional deficits:  Impaired mobility, self-care, pain, NWB to LUE   Continue current rehabilitation plan of care to maximize function   Functional update:   o PT:  Sit-lying: supervision  Sit-stand: supervision  Bed-chair transfer:  supervision  Ambulation: supervision  Stairs: supervision  o OT:  Eating: s/u c/u  Oral hygiene: s/u c/u  Showering: min assist  UB dressing: mod assist  LB dressing: supervision  Putting on/taking off footwear: total assist  toileting hygiene: supervision  Toilet transfer: supervision     Estimated Discharge: 7/21/22      Pain   Robaxin 250 mg 4x PRN for muscle spasms   Tramadol 50 mg Q6H PRN for mod-severe pain    DVT prophylaxis   Lovenox    Bladder plan   Continent    Bowel plan   Continent      Obesity (BMI 30-39  9)  Assessment & Plan  · BMI 36 03  · Weight loss counseling, promote increased activity    Acute blood loss anemia  Assessment & Plan  · Acute blood loss anemia, Hgb 6 9 previously, s/p transfusion  · Hgb on 7/18 of 8 2 (8 6)  · Will require close follow with labs  · Monitor clinically as well in therapy    Cardiomyopathy [facility])  Assessment & Plan  · Medicine team reached out to Cardiology re: advice with recs for further w/u based on ECHO results from 7/5/22  · Continue Losartan 25mg qd (Cardiology started in hospital)  · Per Cardiology, outpatient follow up, no acute changes based on echo      Depression  Assessment & Plan  · Continue home lexapro 5mg daily    Complete heart block (HCC)  Assessment & Plan  · S/p dual chamber PPM 6/29/22 with Dr Sarah Abdul  · Monitor pacemaker site for signs of infection  · No heavy lifting/push/pull >10 lbs    Iron deficiency anemia due to chronic blood loss  Assessment & Plan  · 8 2 7/18    Pancytopenia ([address] )  Assessment & Plan  · Secondary to cirrhosis, monitor labs    Mild nonproliferative diabetic retinopathy of both eyes without macular edema associated with type 2 diabetes mellitus [facility])  Assessment & Plan  Lab Results   Component Value Date    HGBA1C 8 2 (H) 05/31/2022       Recent Labs     07/18/22  1552 07/18/22  2045 07/19/22  0629 07/19/22  1053   POCGLU 200* 211* 111 169*       Blood Sugar Average: Last 72 hrs:  (P) 264 [id number]    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) (HCC)  Assessment & Plan  · Hx non-bleeding esophageal varices and hepatic encephalopathy   · At home, was on Rifaximin 550 mcg q12hrs/Lactulose 10Gm BID/Lasix 40mg qd/Nadolol 20mg qd/Aldactone 50mg qd  · Here:  Rifaximin 550 mg q12hrs/Lactulose 10Gm BID    Diabetic polyneuropathy associated with type 2 diabetes mellitus [facility])  Assessment & Plan  Lab Results   Component Value Date    HGBA1C 8 2 (H) 05/31/2022       Recent Labs     07/18/22  1552 07/18/22  2045 07/19/22  0629 07/19/22  1053   POCGLU 200* 211* 111 169*       Blood Sugar Average: Last 72 hrs:  (P) 960 [id number]    Type 2 diabetes mellitus with hyperglycemia, with long-term current use of insulin [facility])  Assessment & Plan  Lab Results   Component Value Date    HGBA1C 8 2 (H) 05/31/2022       Recent Labs     07/18/22  1552 07/18/22  2045 07/19/22  0629 07/19/22  1053   POCGLU 200* 211* 111 169*     · Home:  Janumet XR  BID/Amaryl 8 mg qAM/Basaglar 48U qam, 36U qpm  · Here:  Lantus 32U q12hrs, Metformin 500 mg BID  · QID Accuchecks/SSI and DM diet    Acquired hypothyroidism  Assessment & Plan  · Continue levothyroxine 112mcg daily    Essential hypertension  Assessment & Plan  · On losartan 25mg daily  · Previously on Nadolol, aldactone, and lasix  · Medications held based on initial hypotension  · Will start adding back meds as indicated, likely starting with nadolol- started back on 7/7    Hyperlipidemia  Assessment & Plan  · Continue statin  · Here: Pravastatin 80 mg daily with dinner  Home: Simvastatin 40 mg daily    * Closed fracture of proximal end of left humerus  Assessment & Plan  · Displaced comminuted fracture of the humeral head/neck on imaging  · NWB to the left upper limb, maintain in sling  · Ok for for gentle range of motion to prevent adhesive capsulitis- starting 7/11  · Pain control  · Likely course of acute blood loss anemia, monitor labs  · PT/OT  · Follow up with Dr Birtha Siemens per orthopaedics, signed off  · Shoulder/humerus xray ordered due to new onset of discomfort to LUE with sensation of feeling as though the LUE is dislocated  · Impression:  Comminuted impaction fracture in the proximal left humerus fairly similar in alignment  Soft tissue swelling about the elbow with a number of small radiopaque foreign bodies  Acute impaction fracture in the proximal left humerus with lateral rotation of the humeral head and widening of the glenohumeral joint  · Will reach out to shoulder specialist to see if they can evaluate prior to discharge          Appreciate IM consultants medical co-management  Labs, medications, and imaging personally reviewed  ROS:  A ten point review of systems was completed and pertinent positives are listed in subjective section  All other systems reviewed were negative  Review of Systems   Constitutional: Negative  Negative for appetite change and fatigue  HENT: Negative  Negative for trouble swallowing  Eyes: Negative  Negative for visual disturbance  Respiratory: Negative  Negative for shortness of breath  Cardiovascular: Negative  Negative for chest pain  Gastrointestinal: Negative  Negative for abdominal pain, constipation and nausea  Genitourinary: Negative  Negative for difficulty urinating  Musculoskeletal: Positive for arthralgias and joint swelling  LUE    Neurological: Negative  Psychiatric/Behavioral: Negative  OBJECTIVE:   /59 (BP Location: Right arm)   Pulse 63   Temp 97 8 °F (36 6 °C) (Oral)   Resp 16   Ht 5' 5" (1 651 m)   Wt 100 kg (221 lb)   SpO2 98%   BMI 36 78 kg/m²     Physical Exam  Vitals reviewed  Constitutional:       General: She is not in acute distress  Appearance: Normal appearance  She is obese  She is not ill-appearing  HENT:      Head: Normocephalic and atraumatic  Eyes:      Pupils: Pupils are equal, round, and reactive to light  Cardiovascular:      Rate and Rhythm: Normal rate and regular rhythm  Pulses: Normal pulses  Heart sounds: Normal heart sounds  No murmur heard  Pulmonary:      Effort: Pulmonary effort is normal  No respiratory distress  Breath sounds: Normal breath sounds  Abdominal:      General: Bowel sounds are normal  There is no distension  Palpations: Abdomen is soft  Musculoskeletal:         General: Swelling present  Normal range of motion  Cervical back: Normal range of motion  Right lower leg: Edema present  Left lower leg: Edema present  Comments: Glove/sling on to LUE   Skin:     General: Skin is warm  Neurological:      General: No focal deficit present  Mental Status: She is alert and oriented to person, place, and time     Psychiatric:         Mood and Affect: Mood normal           Lab Results   Component Value Date    WBC 2 47 (L) 07/18/2022    HGB 8 2 (L) 07/18/2022    HCT 27 4 (L) 07/18/2022    MCV 94 07/18/2022    PLT 79 (L) 07/18/2022     Lab Results   Component Value Date    SODIUM 140 07/18/2022    K 4 1 07/18/2022     07/18/2022    CO2 26 07/18/2022    BUN 26 (H) 07/18/2022    CREATININE 0 91 07/18/2022    GLUC 121 07/18/2022    CALCIUM 9 0 07/18/2022     Lab Results   Component Value Date    INR 1 33 (H) 06/30/2022    INR 1 20 (H) 06/29/2022    INR 1 23 (H) 05/31/2022    PROTIME 16 0 (H) 06/30/2022    PROTIME 15 1 (H) 06/29/2022    PROTIME 15 3 (H) 05/31/2022           Current Facility-Administered Medications:     aspirin chewable tablet 81 mg, 81 mg, Oral, Daily, Katerina Whalen DO, 81 mg at 07/19/22 0800    bisacodyl (DULCOLAX) rectal suppository 10 mg, 10 mg, Rectal, Daily PRN, Katerina Whalen DO    docusate sodium (COLACE) capsule 100 mg, 100 mg, Oral, BID, Katerina Whalen DO, 100 mg at 07/19/22 0800    enoxaparin (LOVENOX) subcutaneous injection 30 mg, 30 mg, Subcutaneous, Q12H [address], Katerina Whalen DO, 30 mg at 07/19/22 0802    escitalopram (LEXAPRO) tablet 5 mg, 5 mg, Oral, Daily, Kaetrina Whalen DO, 5 mg at 07/19/22 0800    furosemide (LASIX) tablet 20 mg, 20 mg, Oral, Daily, JOSE JUAN Barbosa, 20 mg at 07/19/22 0800    insulin glargine (LANTUS) subcutaneous injection 10 Units 0 1 mL, 10 Units, Subcutaneous, HS, JOSE JUAN Barbosa, 10 Units at 07/18/22 2114    [START ON 7/20/2022] insulin glargine (LANTUS) subcutaneous injection 38 Units 0 38 mL, 38 Units, Subcutaneous, QAM, JOSE JUAN Barbosa    insulin lispro (HumaLOG) 100 units/mL subcutaneous injection 2-12 Units, 2-12 Units, Subcutaneous, 4x Daily (with meals and at bedtime), 2 Units at 07/19/22 1116 **AND** Fingerstick Glucose (POCT), , , 4x Daily AC and at bedtime, Katerina Whalen DO    lactulose oral solution 10 g, 10 g, Oral, BID, Katerina Whalne DO, 10 g at 07/19/22 0803    levothyroxine tablet 112 mcg, 112 mcg, Oral, Early Morning, Katerina Whalen DO, 112 mcg at 07/19/22 0537    lidocaine (LIDODERM) 5 % patch 2 patch, 2 patch, Topical, Daily, Katerina Whalen DO, 2 patch at 07/19/22 1013    melatonin tablet 6 mg, 6 mg, Oral, HS, Katerina Whalen DO, 6 mg at 07/18/22 2114    metFORMIN (GLUCOPHAGE) tablet 500 mg, 500 mg, Oral, BID With Meals, JOSE JUAN Barbosa, 500 mg at 07/19/22 0806    methocarbamol (ROBAXIN) tablet 250 mg, 250 mg, Oral, 4x Daily PRN, Trula Jose, DO, 250 mg at 07/15/22 1242    [START ON 7/20/2022] nadolol (CORGARD) tablet 10 mg, 10 mg, Oral, Daily, JOSE JUAN Webb    nystatin (MYCOSTATIN) powder, , Topical, BID, JOSE JUAN Webb, Given at 07/19/22 3091    ondansetron (ZOFRAN-ODT) dispersible tablet 4 mg, 4 mg, Oral, Q6H PRN, Trula Jose, DO, 4 mg at 07/10/22 0857    pantoprazole (PROTONIX) EC tablet 40 mg, 40 mg, Oral, Early Morning, Trula Jose, DO, 40 mg at 07/19/22 0537    polyethylene glycol (MIRALAX) packet 17 g, 17 g, Oral, Daily, Trula Jose, DO, 17 g at 07/17/22 3290    potassium chloride (K-DUR,KLOR-CON) CR tablet 10 mEq, 10 mEq, Oral, Daily, JOSE JUAN Webb, 10 mEq at 07/19/22 0800    pravastatin (PRAVACHOL) tablet 80 mg, 80 mg, Oral, Daily With Dinner, Trula Jose, DO, 80 mg at 07/18/22 1646    rifaximin (XIFAXAN) tablet 550 mg, 550 mg, Oral, Q12H [facility], Trula Jose, DO, 550 mg at 07/19/22 1797    senna (SENOKOT) tablet 8 6 mg, 1 tablet, Oral, HS, Trula Jose, DO, 8 6 mg at 07/18/22 2113    traMADol (ULTRAM) tablet 50 mg, 50 mg, Oral, Q6H PRN, Trula Jose, DO, 50 mg at 07/19/22 1246    Past Medical History:   Diagnosis Date    Anemia     [facility]osis ([facility] )     Diabetic neuropathy ([facility] )     Esophageal varices (HCC)     Fatty liver     GERD (gastroesophageal reflux disease)     Hepatic encephalopathy (HCC)     Hiatal hernia     Hyperlipidemia     Hypertension     Hypoglycemia     Hypothyroidism     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 ([facility] )     Osteoarthritis     Osteopenia     Pancytopenia (HCC)     Thrombocytopenia (HCC)     Type 2 diabetes mellitus ([facility] )        Patient Active Problem List    Diagnosis Date Noted    Obesity (BMI 30-39 9) 07/06/2022    Acute blood loss anemia 07/03/2022    Closed fracture of proximal end of left humerus 07/02/2022    Depression 07/02/2022    Cardiomyopathy ([facility] ) 07/02/2022  Complete heart block ([place] [address] ) 06/30/2022    Esophageal varices without bleeding ([place] [address] ) 09/29/2021    Pancytopenia ([place] [address] ) 08/06/2021    Iron deficiency anemia due to chronic blood loss 08/06/2021    Mild nonproliferative diabetic retinopathy of both eyes without macular edema associated with type 2 diabetes mellitus ([place] [address] ) 07/16/2021    Urinary tract infection 09/15/2020    Hepatic encephalopathy ([place] [address] ) 09/13/2020    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) ([place] [address] ) 09/13/2020    Thrombocytopenia ([place] [address] ) 09/13/2020    Hyperlipidemia 07/24/2018    Essential hypertension 07/24/2018    Acquired hypothyroidism 07/24/2018    Type 2 diabetes mellitus with hyperglycemia, with long-term current use of insulin ([place] [address] ) 07/24/2018    Diabetic polyneuropathy associated with type 2 diabetes mellitus ([place] [address] ) 07/24/2018          Chuck Robledo PA-C    Total time spent:  30 minutes with more than 50% spent counseling/coordinating care  Counseling includes discussion with patient re: progress and discussion with patient of his/her current medical/functional state/information  Coordination of patient's care was performed in conjunction with consulting services  Time invested included review of patient's labs, vitals, and management of their comorbidities with continued monitoring  The care of the patient was extensively discussed and appropriate treatment plan was formulated unique for this patient  ** Please Note:  voice to text software may have been used in the creation of this document   Although proof errors in transcription or interpretation are a potential of such software**

## 2022-07-19 NOTE — PROGRESS NOTES
07/19/22 1230   Pain Assessment   Pain Assessment Tool 0-10   Pain Score 4   Restrictions/Precautions   Precautions Fall Risk;Pain;Supervision on toilet/commode   LUE Weight Bearing Per Order NWB   LUE ROM Restriction PROM   Braces or Orthoses Sling   Subjective   Subjective pt agreeable to perform skilled PT   Sit to Stand   Type of Assistance Needed Independent; Adaptive equipment   Comment [facility]   Sit to Stand CARE Score 6   Bed-Chair Transfer   Type of Assistance Needed Independent; Adaptive equipment   Comment SPC   Chair/Bed-to-Chair Transfer CARE Score 6   Car Transfer   Type of Assistance Needed Adaptive equipment; Independent   Comment SPC   Car Transfer CARE Score 6   Walk 10 Feet   Type of Assistance Needed Supervision; Adaptive equipment   Comment SPC   Walk 10 Feet CARE Score 4   Walk 50 Feet with Two Turns   Type of Assistance Needed Supervision; Adaptive equipment   Comment SPC   Walk 50 Feet with Two Turns CARE Score 4   Walk 150 Feet   Type of Assistance Needed Supervision; Adaptive equipment   Comment [facility]   Walk 150 Feet CARE Score 4   Walking 10 Feet on Uneven Surfaces   Type of Assistance Needed Supervision; Adaptive equipment   Comment [facility] floor mat   Walking 10 Feet on Uneven Surfaces CARE Score 4   Ambulation   Does the patient walk? 2  Yes   Primary Mode of Locomotion Prior to Admission Walk   Distance Walked (feet) 200 ft  (X2)   Assist Device [employer] or Single Stair   Style negotiated Single stair   Type of Assistance Needed Supervision   Comment  steps single HR   1 Step (Curb) CARE Score 4   4 Steps   Type of Assistance Needed Supervision   4 Steps CARE Score 4   Stairs   Type Stairs   # of Steps 10   Picking Up Object   Type of Assistance Needed Supervision; Adaptive equipment   Comment reacher marker   Picking Up Object CARE Score 4   Therapeutic Interventions   Strengthening LAQ AP marching x20 repes gluts x10 ball adduction and STS x5   Balance standing balance SPC and reacher for markers   Assessment   Treatment Assessment pt focus on indep lvl walking in her room with SPC walking to bathroom, OT progressing pt Indep lvl and cleaning herself with nsging if needed   Pt also perfrom ambulation with SPC up to 200 feet and short distance for minic home distance   Pt return to her room with all needs in reach and possible IRP marie and STEPH this Thursday   Plan   Progress Progressing toward goals   PT Therapy Minutes   PT Time In 1230   PT Time Out 1400   PT Total Time (minutes) 90   PT Mode of treatment - Individual (minutes) 90   PT Mode of treatment - Concurrent (minutes) 0   PT Mode of treatment - Group (minutes) 0   PT Mode of treatment - Co-treat (minutes) 0   PT Mode of Treatment - Total time(minutes) 90 minutes   PT Cumulative Minutes 1140   Therapy Time missed   Time missed?  No

## 2022-07-20 ENCOUNTER — APPOINTMENT (INPATIENT)
Dept: NON INVASIVE DIAGNOSTICS | Facility: HOSPITAL | Age: 77
DRG: 560 | End: 2022-07-20
Payer: MEDICARE

## 2022-07-20 LAB
ANION GAP SERPL CALCULATED.3IONS-SCNC: 8 MMOL/L (ref 4–13)
BASOPHILS # BLD AUTO: 0.03 THOUSANDS/ΜL (ref 0–0.1)
BASOPHILS NFR BLD AUTO: 1 % (ref 0–1)
BUN SERPL-MCNC: 23 MG/DL (ref 5–25)
CALCIUM SERPL-MCNC: 8.9 MG/DL (ref 8.3–10.1)
CHLORIDE SERPL-SCNC: 107 MMOL/L (ref 96–108)
CO2 SERPL-SCNC: 26 MMOL/L (ref 21–32)
CREAT SERPL-MCNC: 0.8 MG/DL (ref 0.6–1.3)
EOSINOPHIL # BLD AUTO: 0.06 THOUSAND/ΜL (ref 0–0.61)
EOSINOPHIL NFR BLD AUTO: 3 % (ref 0–6)
ERYTHROCYTE [DISTWIDTH] IN BLOOD BY AUTOMATED COUNT: 19.7 % (ref 11.6–15.1)
GFR SERPL CREATININE-BSD FRML MDRD: 72 ML/MIN/1.73SQ M
GLUCOSE P FAST SERPL-MCNC: 94 MG/DL (ref 65–99)
GLUCOSE SERPL-MCNC: 101 MG/DL (ref 65–140)
GLUCOSE SERPL-MCNC: 155 MG/DL (ref 65–140)
GLUCOSE SERPL-MCNC: 157 MG/DL (ref 65–140)
GLUCOSE SERPL-MCNC: 169 MG/DL (ref 65–140)
GLUCOSE SERPL-MCNC: 94 MG/DL (ref 65–140)
HCT VFR BLD AUTO: 25.7 % (ref 34.8–46.1)
HGB BLD-MCNC: 8.1 G/DL (ref 11.5–15.4)
IMM GRANULOCYTES # BLD AUTO: 0.01 THOUSAND/UL (ref 0–0.2)
IMM GRANULOCYTES NFR BLD AUTO: 1 % (ref 0–2)
LYMPHOCYTES # BLD AUTO: 0.56 THOUSANDS/ΜL (ref 0.6–4.47)
LYMPHOCYTES NFR BLD AUTO: 26 % (ref 14–44)
MCH RBC QN AUTO: 28.2 PG (ref 26.8–34.3)
MCHC RBC AUTO-ENTMCNC: 31.5 G/DL (ref 31.4–37.4)
MCV RBC AUTO: 90 FL (ref 82–98)
MONOCYTES # BLD AUTO: 0.21 THOUSAND/ΜL (ref 0.17–1.22)
MONOCYTES NFR BLD AUTO: 10 % (ref 4–12)
NEUTROPHILS # BLD AUTO: 1.3 THOUSANDS/ΜL (ref 1.85–7.62)
NEUTS SEG NFR BLD AUTO: 59 % (ref 43–75)
NRBC BLD AUTO-RTO: 0 /100 WBCS
PLATELET # BLD AUTO: 82 THOUSANDS/UL (ref 149–390)
PMV BLD AUTO: 12.8 FL (ref 8.9–12.7)
POTASSIUM SERPL-SCNC: 4.3 MMOL/L (ref 3.5–5.3)
RBC # BLD AUTO: 2.87 MILLION/UL (ref 3.81–5.12)
SODIUM SERPL-SCNC: 141 MMOL/L (ref 135–147)
WBC # BLD AUTO: 2.17 THOUSAND/UL (ref 4.31–10.16)

## 2022-07-20 PROCEDURE — 93970 EXTREMITY STUDY: CPT | Performed by: SURGERY

## 2022-07-20 PROCEDURE — 97535 SELF CARE MNGMENT TRAINING: CPT

## 2022-07-20 PROCEDURE — 80048 BASIC METABOLIC PNL TOTAL CA: CPT | Performed by: PHYSICAL MEDICINE & REHABILITATION

## 2022-07-20 PROCEDURE — 97110 THERAPEUTIC EXERCISES: CPT

## 2022-07-20 PROCEDURE — 93971 EXTREMITY STUDY: CPT | Performed by: SURGERY

## 2022-07-20 PROCEDURE — 93970 EXTREMITY STUDY: CPT

## 2022-07-20 PROCEDURE — 85025 COMPLETE CBC W/AUTO DIFF WBC: CPT | Performed by: PHYSICAL MEDICINE & REHABILITATION

## 2022-07-20 PROCEDURE — 93971 EXTREMITY STUDY: CPT

## 2022-07-20 PROCEDURE — 99233 SBSQ HOSP IP/OBS HIGH 50: CPT

## 2022-07-20 PROCEDURE — 82948 REAGENT STRIP/BLOOD GLUCOSE: CPT

## 2022-07-20 PROCEDURE — 99232 SBSQ HOSP IP/OBS MODERATE 35: CPT | Performed by: INTERNAL MEDICINE

## 2022-07-20 PROCEDURE — 97530 THERAPEUTIC ACTIVITIES: CPT

## 2022-07-20 RX ORDER — SPIRONOLACTONE 25 MG/1
25 TABLET ORAL DAILY
Qty: 30 TABLET | Refills: 0 | OUTPATIENT
Start: 2022-07-21

## 2022-07-20 RX ORDER — LACTULOSE 20 G/30ML
10 SOLUTION ORAL 2 TIMES DAILY
Qty: 946 ML | Refills: 0 | OUTPATIENT
Start: 2022-07-20

## 2022-07-20 RX ORDER — LEVOTHYROXINE SODIUM 112 UG/1
112 TABLET ORAL
Qty: 30 TABLET | Refills: 0 | OUTPATIENT
Start: 2022-07-21

## 2022-07-20 RX ORDER — SIMVASTATIN 40 MG
40 TABLET ORAL DAILY
Qty: 30 TABLET | Refills: 0 | OUTPATIENT
Start: 2022-07-20

## 2022-07-20 RX ORDER — NADOLOL 20 MG/1
10 TABLET ORAL DAILY
Qty: 15 TABLET | Refills: 0 | OUTPATIENT
Start: 2022-07-21

## 2022-07-20 RX ORDER — FUROSEMIDE 40 MG/1
40 TABLET ORAL DAILY
Status: DISCONTINUED | OUTPATIENT
Start: 2022-07-21 | End: 2022-07-21 | Stop reason: HOSPADM

## 2022-07-20 RX ORDER — SPIRONOLACTONE 25 MG/1
25 TABLET ORAL DAILY
Status: DISCONTINUED | OUTPATIENT
Start: 2022-07-20 | End: 2022-07-21 | Stop reason: HOSPADM

## 2022-07-20 RX ORDER — FUROSEMIDE 20 MG/1
20 TABLET ORAL ONCE
Status: COMPLETED | OUTPATIENT
Start: 2022-07-20 | End: 2022-07-20

## 2022-07-20 RX ORDER — METHOCARBAMOL 500 MG/1
250 TABLET, FILM COATED ORAL 4 TIMES DAILY PRN
Qty: 20 TABLET | Refills: 0 | OUTPATIENT
Start: 2022-07-20 | End: 2022-07-30

## 2022-07-20 RX ORDER — TRAMADOL HYDROCHLORIDE 50 MG/1
50 TABLET ORAL EVERY 6 HOURS PRN
Qty: 20 TABLET | Refills: 0 | OUTPATIENT
Start: 2022-07-20 | End: 2022-07-30

## 2022-07-20 RX ORDER — ESCITALOPRAM OXALATE 5 MG/1
5 TABLET ORAL DAILY
Qty: 30 TABLET | Refills: 0 | Status: CANCELLED | OUTPATIENT
Start: 2022-07-20

## 2022-07-20 RX ORDER — ASPIRIN 81 MG/1
81 TABLET, CHEWABLE ORAL DAILY
Qty: 30 TABLET | Refills: 0 | OUTPATIENT
Start: 2022-07-21

## 2022-07-20 RX ORDER — OMEPRAZOLE 20 MG/1
20 CAPSULE, DELAYED RELEASE ORAL DAILY
Qty: 30 CAPSULE | Refills: 0 | OUTPATIENT
Start: 2022-07-20

## 2022-07-20 RX ADMIN — INSULIN GLARGINE 38 UNITS: 100 INJECTION, SOLUTION SUBCUTANEOUS at 09:18

## 2022-07-20 RX ADMIN — NYSTATIN: 100000 POWDER TOPICAL at 09:21

## 2022-07-20 RX ADMIN — ESCITALOPRAM OXALATE 5 MG: 10 TABLET ORAL at 09:17

## 2022-07-20 RX ADMIN — LEVOTHYROXINE SODIUM 112 MCG: 112 TABLET ORAL at 06:01

## 2022-07-20 RX ADMIN — POTASSIUM CHLORIDE 10 MEQ: 750 TABLET, EXTENDED RELEASE ORAL at 09:16

## 2022-07-20 RX ADMIN — NYSTATIN: 100000 POWDER TOPICAL at 17:38

## 2022-07-20 RX ADMIN — INSULIN LISPRO 2 UNITS: 100 INJECTION, SOLUTION INTRAVENOUS; SUBCUTANEOUS at 21:15

## 2022-07-20 RX ADMIN — INSULIN LISPRO 2 UNITS: 100 INJECTION, SOLUTION INTRAVENOUS; SUBCUTANEOUS at 17:33

## 2022-07-20 RX ADMIN — TRAMADOL HYDROCHLORIDE 50 MG: 50 TABLET, FILM COATED ORAL at 07:29

## 2022-07-20 RX ADMIN — METFORMIN HYDROCHLORIDE 500 MG: 500 TABLET ORAL at 07:29

## 2022-07-20 RX ADMIN — DOCUSATE SODIUM 100 MG: 100 CAPSULE, LIQUID FILLED ORAL at 09:16

## 2022-07-20 RX ADMIN — PANTOPRAZOLE SODIUM 40 MG: 40 TABLET, DELAYED RELEASE ORAL at 06:01

## 2022-07-20 RX ADMIN — RIFAXIMIN 550 MG: 550 TABLET ORAL at 21:19

## 2022-07-20 RX ADMIN — ENOXAPARIN SODIUM 30 MG: 30 INJECTION SUBCUTANEOUS at 09:18

## 2022-07-20 RX ADMIN — DOCUSATE SODIUM 100 MG: 100 CAPSULE, LIQUID FILLED ORAL at 17:34

## 2022-07-20 RX ADMIN — PRAVASTATIN SODIUM 80 MG: 80 TABLET ORAL at 16:17

## 2022-07-20 RX ADMIN — LIDOCAINE 5% 2 PATCH: 700 PATCH TOPICAL at 09:20

## 2022-07-20 RX ADMIN — NADOLOL 10 MG: 20 TABLET ORAL at 09:22

## 2022-07-20 RX ADMIN — ASPIRIN 81 MG CHEWABLE TABLET 81 MG: 81 TABLET CHEWABLE at 09:16

## 2022-07-20 RX ADMIN — INSULIN GLARGINE 10 UNITS: 100 INJECTION, SOLUTION SUBCUTANEOUS at 21:18

## 2022-07-20 RX ADMIN — RIFAXIMIN 550 MG: 550 TABLET ORAL at 09:22

## 2022-07-20 RX ADMIN — LACTULOSE 10 G: 20 SOLUTION ORAL at 09:19

## 2022-07-20 RX ADMIN — SENNOSIDES 8.6 MG: 8.6 TABLET, FILM COATED ORAL at 21:18

## 2022-07-20 RX ADMIN — MELATONIN 6 MG: at 21:18

## 2022-07-20 RX ADMIN — ENOXAPARIN SODIUM 30 MG: 30 INJECTION SUBCUTANEOUS at 21:17

## 2022-07-20 RX ADMIN — INSULIN LISPRO 2 UNITS: 100 INJECTION, SOLUTION INTRAVENOUS; SUBCUTANEOUS at 12:00

## 2022-07-20 RX ADMIN — FUROSEMIDE 20 MG: 20 TABLET ORAL at 11:58

## 2022-07-20 RX ADMIN — LACTULOSE 10 G: 20 SOLUTION ORAL at 17:34

## 2022-07-20 RX ADMIN — FUROSEMIDE 20 MG: 20 TABLET ORAL at 09:16

## 2022-07-20 RX ADMIN — METFORMIN HYDROCHLORIDE 500 MG: 500 TABLET ORAL at 16:17

## 2022-07-20 NOTE — DISCHARGE INSTRUCTIONS
DISCHARGE INSTRUCTIONS: Quique Carl [age]    Bring these instructions with you to your Outpatient Physician appointments so they can order and follow-up any additional lab work or imaging recommended at time of discharge  It  is you or your caregivers responsibility to obtain follow-up MEDICATION REFILLS  As indicated through your Primary Care Physician (PCP) and other outpatient specialty provider(s) after discharge  Please follow-up with your PCP as soon as possible after discharge to set-up follow-up management and when appropriate refills  It is YOUR CAREGIVER'S RESPONSIBILITY to ensure appropriate follow-up which includes:  - APPOINTMENTS are scheduled and safe transportation is arranged  - LABS and IMAGING are completed  - MEDICATION MANAGEMENT at home is carried out appropriately     You remain a fall and injury risk which could be severe  - Your risk of fall has decreased however since admission to acute rehab  Caregiver training has been completed with our staff  - Appropriate supervision +/- assistance as instructed during your rehab course is recommended to decrease risk of fall and injury  If you (or your health care proxy) have any questions or concerns regarding your acute rehabilitation stay including issues with medications, rehabilitation, and follow-up plan, please call:          [address] [place] [address] at [phone] or [phone]  PHYSICIANS to see:  Please see your doctors listed in the follow up providers section of your discharge paperwork, and take the discharge paperwork with you to your appointments  Home health has been ordered for you: Your home health agency should reach out to you or your family soon to arrange follow-up      (If [facility] home [facility] is your provider their phone number is [phone])    If you are unable to get in touch with home health you may contact your  at [phone]  [place]      LAB WORK to recommended after discharge: Follow-up lab work at discretion of your outpatient physicians to be determined at time of your future appointments  Obtain the following lab orders and follow-up management through primary care physician and outpatient specialists  CBC and BMP to monitor hemoglobin, white blood cell, electrolytes and kidney function at next visit within 1-2 weeks     Excessive delay in labs and appropriate follow-up could potentially increase your risk of complications which could be severe and even life-threatening  It is you or your caregiver's responsibility to ensure these tests are ordered by your outpatient providers and followed up with accordingly  Call [facility] to locate the closest open outpatient lab center where you can have your lab work drawn  Contact them at [phone]    FINDINGS TO FOLLOW-UP  Left shoulder fracture - Comminuted impacted humeral head and neck fracture, without significant change in alignment  No significant osseous bridging  Mild incongruity of the articular surface  Involvement of the greater tuberosity  No glenoid fracture identified  Moderate acromioclavicular osteoarthritis  Small glenohumeral joint effusion   - Follow-up with orthopedics as soon as possible after discharge    INCIDENTAL FINDINGS TO FOLLOW-UP  Left Humerus XR incidental finding 7/18  "Soft tissue swelling about the elbow with a number of small radiopaque foreign bodies  "  - Reached out to orthopedics and await call back  - Follow-up with outpatient primary care and orthopedics  - Notify physician if you develop increased redness, pain or other concerns around L elbow   - Notify radiology prior to MRI - uncertain if these are metal    *Follow-up additional incidental findings with GI and PCP as listed elsewhere     SKIN CARE INSTRUCTIONS to follow:    Monitor skin for increased redness or breadown and promptly notify your physician should these develop    Be sure you stand and walk some every 1-2 hours during day  While seated or lying in bed shift positions and from side to side often  Skin care Plan:  -Buttock/sacrum skin breakdown - Cleanse sacro-buttocks with soap and water  Apply Allevyn Foam Dressing  Change every other day and as needed if soiled   Right heel deep tissue injury - Apply Allevyn Life foam dressing  Change dressing every 3 days and as needed if soiled  - Elevate/Float heels to offload pressure   -Monitor skin closely and notify MD if not improving or worsening  -Turn/reposition every 2 hours   -Elevate/Float heels to offload pressure   -Use cushion in chair when out of bed  -Frequent weight shifts while in chair   Left arm swelling - Monitor left arm closely - if you notice increased edema, redness, or tenderness notify physician  Follow-up left arm swelling with primary care and orthopedics  You can use glove during day with skin checks every few hours  Remove at night and do NOT use if increased skin discoloration, pain, or other concerns  You can gentle wrap left hand and arm without glove with ACE wrap as discussed but do NOT over tighten  Remove at night and do NOT use if increased skin discoloration, pain, or other concerns  WOUND CARE INSTRUCTIONS to follow:  Home health nursing will assist you with wound management  You may contact them with issues as well once this service is set-up  If you are unable to get in touch with home health you may contact your  at [phone]        Buttocks/Sacrum  Turn as full as possible off sacrum/buttocks every 2 hours  Use Cushion while in chair or wheelchair  Weight shift every 10-15 minutes while in chair  Monitor skin for increased breakdown which you are at risk of and notify nursing, PCP, or other physician providers should this occur right away    Heels/bony edges of feet  You can also float heels off edge of pillow for added pressure relief  Monitor heels and bony protuberances and notify physician or nursing for any increased redness, bogginess, or breakdown    Ensure appropriate wound care to optimize chances for healing and decrease risks of complications  Your wound(s) remains at risk for worsening and infection     - Should you develop significant pain, swelling, or drainage obtain transportation to nearest emergency room for immediate evaluation if unable to reach your surgeon promptly   - Should you develop uncontrolled pain, fever, chills, sweats, changes in strength, sensation, or color of this area obtain transportation to nearest emergency room for immediate evaluation  WEIGHTBEARING/ACTIVITY PRECAUTIONS to follow:  Maintain NONWEIGHTBEARING in left arm/upper extremity     Driving restrictions: You are recommended against driving until cleared by an outpatient physician  MEDICATIONS:  Please see a full list of your medications outlined in the After Visit Summary that is attached to these Discharge Instructions  Please note changes may have been made to your medications please refer to your discharge paperwork for your current medications and take this list with you to all your doctors appointments for your doctors to review  Please do not resume a home medication unless the medication reconciliation sheet indicates to do so, please do not assume that a medication that you were given a prescription for is the same as a medication you have at home based on both medications having the same name as dosages and frequency may have changed  Unless specifically noted in your medication list provided to you in your discharge paper work do not resume prior vitamins, minerals, or supplements you may have been taking prior to your hospitalization unless instructed by an outpatient physician in the future        Blood thinners prescribed to optimize long and short term health and decrease risk of complications but with risks related to bleeding and other complications  Aspirin instructions  TAKE aspirin every other day - follow-up with primary care within 1-2 weeks to confirm dose  Follow-up with GI as well to ensure appropriate dose  This medication will need to be managed by your outpatient physician(s) after discharge  Follow-up with the appropriate provider as soon as possible to ensure appropriate use  This is a blood thinner  It is being recommended for use by you (or your family) to decrease the risk of clotting which can be severely disabling and even life-threatening  Even when provided as recommended it can cause severe disabling and even life-threatening bleeding  Too much or too little of this blood thinner further increases your risk of this medication causing serious and even life-threatening complications such as severe bleeding, clots, strokes, and death  With that said, at this time based on best available evidence and consensus agreement, your physicians recommend you take aspirin based on your overall risks and benefits in your specific medical situation  If you (or your family/caregiver) notice black stools, bloody stools, vomit blood, develop new weakness, slurred speech, confusion or have any other concerning symptoms call 911 or obtain transportation to nearest emergency room immediately  Sedating Medications with increased risk of complications:    This(these) medication(s) was(were) started prior to your acute rehabilitation course as recommended by your prior physicians  It was continued during your acute rehabilitation course  This(these) medication(s) will need to be managed by your outpatient physician(s) after discharge  Follow-up with the appropriate provider as soon as possible to ensure appropriate use          Your goal will be to wean off of opiate medications    There are risks associated with opioid medications, including dependence, addiction and tolerance  The patient understands and agrees to use these medications only as prescribed  Potential side effects of the medications include, but are not limited to, constipation, drowsiness, addiction, impaired judgment and risk of fatal overdose if not taken as prescribed  You should not drive after taking this medication  The patient was warned against driving while taking sedation medications  Sharing medications is a felony  At this point in time, the patient is showing no signs of addiction, abuse, diversion or suicidal ideation  Tramadol (opiate) pain medication has been used to help your acute pain  You tolerated this medication adequately during your recent hospital stay  You will be given a limited supply of opiate pain medications on discharge; should you require additional refills/medications, your PCP and/or surgeon may prescribe at his/her discretion  This can be quite helpful particularly for severe acute pain  There are risks associated with opioid medications  They can increase your risk of falling, injury, and breathing difficulties which can be severe and even life-threatening  Note it is advisable to limit use of opiate pain medications as they can become habit forming and lead to addiction  Other potential side effects of the medication include, but are not limited to, constipation, drowsiness, impaired judgment and risk of fatal overdose if not taken as prescribed  You should not drive while taking this medication  The patient was warned against driving while taking sedation medications  Sharing medications is a felony  At this point in time, the patient is showing no signs of addiction, abuse, diversion or suicidal ideation  The patient (you/or relevant caregiver) understands and agrees to use this medication only as prescribed      MEDICAL MANAGEMENT AT HOME specific to you:    Diabetes Management:    Please check your blood sugars 3-4 times daily before breakfast (+/-lunch), dinner, and bedtime and record them to provide to your doctors, contact your family doctor as soon as possible for blood sugars higher than 220 or lower than 100  Follow-up with Endocrinology - PCP/family doctor regularly to ensure blood sugars remain adequately controlled  Hypertension Management:  Only take the medications prescribed for you at time of discharge - overly high or low blood pressure increases your risk for health complications    Follow-up with PCP/family doctor regularly to ensure blood pressure remains adequately controlled  Pacemaker  You recently had an implantable pacemaker inserted  Contact cardiology and the cardiology device clinic as outlined under follow-up providers with any questions and to ensure appropriate follow-up after discharge  Cirrhosis Management:  Follow-up with Gastroenterology (GI) doctor after discharge  Cirrhosis is a disease that scars the liver  Damage to the liver can cause heavy bleeding, swelling (increased abdominal swelling or limbs), breathing problems, and confusion  In order to protect your liver AVOID alcohol  Avoid NSAIDs (including but not limited to advil, aleve, motrin, naproxen, ibuprofen, mobic, meloxicam, diclofenac etc) as they can increase your risk of bleeding  NSAID Warning:  Please avoid NSAID (including but not limited to advil, aleve, motrin, naproxen, ibuprofen, mobic, meloxicam, diclofenac etc) medications as NSAID medications may increase your risk of bleeding (which can be life-threatening)  Please note a summary of your hospital stay with relevant information for your doctors will try to be sent to them  Please confirm with your doctors at your follow up visits that they have received this summary and have them contact [address] [place] [address] if they have not received them along with any other medical records they may require

## 2022-07-20 NOTE — PROGRESS NOTES
07/20/22 0835   Pain Assessment   Pain Assessment Tool 0-10   Pain Score 9   Pain Location/Orientation Orientation: Left; Location: Shoulder   Restrictions/Precautions   Precautions Fall Risk;Pain   Weight Bearing Restrictions Yes   LUE Weight Bearing Per Order NWB   ROM Restrictions Yes   LUE ROM Restriction PROM   Braces or Orthoses Sling   Subjective   Subjective States she slept wrong last night and having increased LUE pain   Roll Left and Right   Reason if not Attempted Safety concerns   Roll Left and Right CARE Score 88   Sit to Stand   Type of Assistance Needed Independent   Comment SPC   Sit to Stand CARE Score 6   Bed-Chair Transfer   Type of Assistance Needed Independent   Comment [facility]   Chair/Bed-to-Chair Transfer CARE Score 6   Walk 10 Feet   Type of Assistance Needed Independent   Comment SPC, in room from bathroom to [address] Score 6   Wheel 50 Feet with Two Turns   Reason if not Attempted Activity not applicable   Wheel 50 Feet with Two Turns CARE Score 9   Wheel 150 Feet   Reason if not Attempted Activity not applicable   Wheel 983 Feet CARE Score 9   Toilet Transfer   Type of Assistance Needed Independent   Comment [facility]   Toilet Transfer CARE Score 6   Therapeutic Interventions   Strengthening seated APs x30, LAQs 3x10 and hip flex 3x10 as part of HEP  Assessment   Treatment Assessment 25 min skilled PT session focusing on repetitive sit to stands to assist with rear hygiene, short distance amb and providing pt with HEP for d/c home  Pt overall Kaitlyn for transfers and short distance amb with use of SPC, no LOB or evidence of instability  She did require A for rear hygiene and also changed sacral allevyn pad due to sanitary purposes, sacral area cleansed with soap and water and patted dry with new allevyn placed over top of wound   Seated HEP provided to pt this session, reviewed 3 exercises, will cont to review in upcoming session as well as capture all care scores in prep for d/c home tomorrow 7/21/22 with recommendations for home PT  Barriers to Discharge None   PT Barriers   Physical Impairment Decreased strength;Decreased range of motion;Decreased endurance; Impaired balance; Impaired tone;Obesity;Orthopedic restrictions;Pain;Decreased skin integrity   Functional Limitation Car transfers;Stair negotiation;Standing;Transfers   Plan   Treatment/Interventions Functional transfer training;LE strengthening/ROM; Elevations; Therapeutic exercise; Endurance training;Bed mobility;Gait training   Progress Progressing toward goals   Recommendation   PT Discharge Recommendation Home with home health rehabilitation   Equipment Recommended Cane   PT Therapy Minutes   PT Time In 0835   PT Time Out 0900   PT Total Time (minutes) 25   PT Mode of treatment - Individual (minutes) 25   PT Mode of treatment - Concurrent (minutes) 0   PT Mode of treatment - Group (minutes) 0   PT Mode of treatment - Co-treat (minutes) 0   PT Mode of Treatment - Total time(minutes) 25 minutes   PT Cumulative Minutes 1165   Therapy Time missed   Time missed?  No

## 2022-07-20 NOTE — PROGRESS NOTES
Occupational Therapy Treatment Note         07/20/22 0900   Pain Assessment   Pain Assessment Tool 0-10   Pain Score 5   Pain Location/Orientation Orientation: Left; Location: Arm   Hospital Pain Intervention(s) Repositioned;Relaxation technique   Restrictions/Precautions   Precautions Fall Risk;Pain   LUE Weight Bearing Per Order NWB   LUE ROM Restriction   (PROM - needs orders)   Braces or Orthoses Sling   Lifestyle   Autonomy "I am glad to go home"   Eating   Type of Assistance Needed Set-up / clean-up   Physical Assistance Level No physical assistance   Eating CARE Score 5   Oral Hygiene   Type of Assistance Needed Independent   Physical Assistance Level No physical assistance   Comment in stance at sink   Oral Hygiene CARE Score 6   Shower/Bathe Self   Type of Assistance Needed Physical assistance   Physical Assistance Level 51%-75%   Comment Sponge bathing while seated in arm chair at sink  Pt able to bathe top of breast/stomach and part of L UE, as well as thighs  Pt stands with supervision to bathe groin and buttock, but requires assist for thoroughness of buttock  Pt requires assist for B/L UE fully, lower legs/feet, under breast/abdomen  Per nursing, nyastatin powder applied under breasts  Shower/Bathe Self CARE Score 2   Bathing   Assessed Bath Style Sponge Bath   Anticipated D/C Bath Style Sponge Bath; Shower   Upper Body Dressing   Type of Assistance Needed Physical assistance   Physical Assistance Level 51%-75%   Comment pt able to doff edema glove, requires assist to don; able to partially doff L UE sling but requires assist to don  pt completes 50% of don/doffting tshirt overhead using elizabeth dressing techique   Upper Body Dressing CARE Score 2   Lower Body Dressing   Type of Assistance Needed Supervision   Physical Assistance Level No physical assistance   Comment Seated pt able to thread/unthread LEs from pants while seated with supervision, stands with supervision to manage clothing over hips   At times, depending on pt's pants, she may require min assist at L hip, but today did not require assist    Lower Body Dressing CARE Score 4   Putting On/Taking Off Footwear   Type of Assistance Needed Physical assistance   Physical Assistance Level 76% or more   Comment assist for ace wraps of LEs, however left off after session as pt being ordered for dopplers  pt able to doff socks using her feet, requires assist to don   Putting On/Taking Off Footwear CARE Score 2   Sit to Stand   Type of Assistance Needed Independent; Adaptive equipment   Physical Assistance Level No physical assistance   Comment [facility]   Sit to Stand CARE Score 6   Bed-Chair Transfer   Type of Assistance Needed Independent; Adaptive equipment   Physical Assistance Level No physical assistance   Comment SPC to/from bathroom   Chair/Bed-to-Chair Transfer CARE Score 6   Toileting Hygiene   Type of Assistance Needed Independent   Physical Assistance Level No physical assistance   Comment independent after urination, pt able to stand to manage hygiene and clothing at independent level  however pt requires assist for bowel hygiene 2* wound, when she has a bowel movement   Toileting Hygiene CARE Score 6   Toilet Transfer   Type of Assistance Needed Independent   Physical Assistance Level No physical assistance   Comment SPC on/off standard toilet   Toilet Transfer CARE Score 6   Cognition   Arousal/Participation Alert; Cooperative   Attention Attends with cues to redirect   Orientation Level Oriented X4   Memory Decreased short term memory   Following Commands Follows one step commands without difficulty   Activity Tolerance   Activity Tolerance Patient tolerated treatment well   Assessment   Treatment Assessment Pt participated in skilled OT tx session focused on D/C ADL  See above for further details on functional performance   OT spoke with Dr Charlene Meredith regarding continued L UE pain and swelling from below to distal UE, doppler being ordered and Dr Charlene Meredith present to observe  Pt is at independent level using SPC for toileting with urination, short distance mobility in room and sit <> stand transfers, however pt reports having frequent bowel movements and she requires assist for hygiene, she also requires assist for setup of recliner chair in room and for ADL routine, therefore not progressing pt to "in room privileges"  Pt's daughter in law has been present for therapy training  Discussed in team need for nursing training on wound care prior to d/c tomorrow  D/C is planned for tomorrow, pending medical status  Recommendation   OT Discharge Recommendation Home with home health rehabilitation   OT Therapy Minutes   OT Time In 0900   OT Time Out 1030   OT Total Time (minutes) 90   OT Mode of treatment - Individual (minutes) 90   OT Mode of treatment - Concurrent (minutes) 0   OT Mode of treatment - Group (minutes) 0   OT Mode of treatment - Co-treat (minutes) 0   OT Mode of Treatment - Total time(minutes) 90 minutes   OT Cumulative Minutes 1100   Therapy Time missed   Time missed?  No

## 2022-07-20 NOTE — PROGRESS NOTES
PM&R PROGRESS NOTE:  Ashley Meeks 77 y o  female MRN: [id number]  Unit/Bed#: -93 Encounter: [id number]        Rehabilitation Diagnosis: Impairment of mobility, safety and Activities of Daily Living (ADLs) due to Orthopedic Disorders:  08 9  Other Orthopedic Left Humerus fracture    HPI: Ashley Meeks is a 77 y o  female with type 2 diabetes, hypertension, depression, hypothyroidism, hx of PAUL cirrhosis, esophageal varices, pancytopenia, HLD, hypothyroidism who presented to the [facility] on 6/29 after tripping over her flip flops, falling and sustaining a left humerus fracture treated non-operatively with NWB to the left arm in a sling  She was found to be in complete heart block with HR in the 20-230s range, developing cardiogenic shock requiring pressors and TVP  She underwent a Permanent pacemaker placement on 6/29 with Dr Ziggy Rose  Course complicated by CLYDE and blood loss anemia with hemoglobin of 6 9 s/p 1 unit pRBC  Initial ECHO on 6/29/22 showed moderately dilated left ventricular cavity size, LVEF 55%, mild bi-atrial enlargement   There were regional wall motion abnormalities with akinesis of the apical anterior, apical septal, apical inferior, apical lateral walls and the apex   There was hyperkinesis of the basal anterior, basal anteroseptal, basal inferoseptal, basal inferior, basal inferolateral, basal anterolateral, mid anterior, mid anteroseptal, mid inferoseptal, mid inferior, mid inferolateral and mid anterolateral  RVEF was moderately reduced   The LV systolic function was abnormal in a pattern suggestive of apical stress cardiomyopathy   She was started on Losartan by Cardiology and a repeat ECHO done today 7/5/22  It showed LVEF of 50% with grade 1 diastolic dysfunction, akinesis of the apex, hypokinesis of the apical anterior, septal, inferior/lateral walls   The patient was evaluated by the Rehabilitation team and deemed an appropriate candidate for comprehensive inpatient rehabilitation and admitted to the [facility] on 7/5/2022     SUBJECTIVE: Patient seen face to face  Discussed CT results with patient  Ordered venous duplex to rule out blood clot  Dependent on results will depend if we proceed with discharge  Speciality bed ordered for wound management  Possibly require hospital bed at home  Will be splitting living at own home and daughter in laws home  ASSESSMENT: Stable, progressing      PLAN:    Rehabilitation   Functional deficits:  Impaired mobility, self-care, pain, NWB to LUE   Continue current rehabilitation plan of care to maximize function   Functional update:   o PT:  Updated below  o OT:  Updated below    Physical therapy Occupational therapy    Weight Bearing Status: Non-weight bearing (LUE)  Transfers: Independent  Bed Mobility: Independent, Supervision  Amulation Distance (ft): 200 feet  Ambulation: Independent, Supervision (indep lvl in room up to 10-20 feet)  Assistive Device for Ambulation: Single Point Cane  Wheelchair Mobility Distance: 0 ft  Number of Stairs: 10  Assistive Device for Stairs: Lehft Hand Rail  Stair Assistance: Supervision  Ramp: Incidental Touching  Assistive Device for Ramp: Single [address]  Discharge Recommendations: Home with:  [address] with[de-identified] Family Support, Home Physical Therapy, First Floor Setup Eating: Supervision  Grooming: Supervision  Bathing: Moderate Assistance  Bathing: Moderate Assistance  Upper Body Dressing: Moderate Assistance  Lower Body Dressing: Minimal Assistance  Toileting: Minimal Assistance  Tub/Shower Transfer: Minimal Assistance  Toilet Transfer: Independent  Cognition: Within Defined Limits  Cognition: Decreased Memory  Orientation: Person, Place, Time, Situation  Discharge Recommendations: Home with:  [address] with[de-identified] Family Support, 24 Hour Assistance, Home Occupational Therapy      This patient was discussed by the Interdisciplinary Team in weekly case conference today   The care of the patient was extensively discussed with all care providers and an appropriate rehabilitation plan was formulated unique for this patient  Barriers were identified preventing progression of therapy and appropriate interventions were discussed with each discipline  Please see the team note for input from all disciplines regarding barriers, intervention, and discharge planning  Total time spent: 35 Mins, and greater than 50% of this time was spent counseling/coordinating care       Estimated Discharge: 7/21/22 with [facility] PT/OT/N      Pain   Robaxin 250 mg 4x PRN for muscle spasms   Tramadol 50 mg Q6H PRN for mod-severe pain    DVT prophylaxis   Lovenox   Will not need Lovenox at time of discharge dependent upon US results    Bladder plan   Continent    Bowel plan   Multiple loose BM, on lactulose  Skin care plan  · Right heel DTI: offload, use allevyn  · Assess daily, change every other day and PRN with soilage or displacement  · Sacro-buttocks DTI- ordered p500 speciality bed while IP; skin mobilization protocol - needs more frequent standing/offloading, continue ROHO  · Cleanse with soap and water, pat dry  Apply allevyn Dirk Philip with T change every other day and PRN        Obesity (BMI 30-39  9)  Assessment & Plan  · BMI 36 03  · Weight loss counseling, promote increased activity    Acute blood loss anemia  Assessment & Plan  · Acute blood loss anemia, Hgb 6 9 previously, s/p transfusion  · Hgb on 7/20 of 8 1  · Will require close follow with labs  · Monitor clinically as well in therapy    Cardiomyopathy [facility])  Assessment & Plan  · Medicine team reached out to Cardiology re: advice with recs for further w/u based on ECHO results from 7/5/22  · Continue Losartan 25mg qd (Cardiology started in hospital)  · Per Cardiology, outpatient follow up, no acute changes based on echo      Depression  Assessment & Plan  · Continue home lexapro 5mg daily    Complete heart block ([address] )  Assessment & Plan  · S/p dual chamber PPM 6/29/22 with Dr Latricia Crump  · Monitor pacemaker site for signs of infection  · No heavy lifting/push/pull >10 lbs    Iron deficiency anemia due to chronic blood loss  Assessment & Plan  · 8 1 7/20    Pancytopenia ([address] )  Assessment & Plan  · Secondary to cirrhosis, monitor labs    Mild nonproliferative diabetic retinopathy of both eyes without macular edema associated with type 2 diabetes mellitus [facility])  Assessment & Plan  Lab Results   Component Value Date    HGBA1C 8 2 (H) 05/31/2022       Recent Labs     07/19/22  1053 07/19/22  1556 07/19/22 2057 07/20/22  0653   POCGLU 169* 192* 195* 101       Blood Sugar Average: Last 72 hrs:  (P) 164    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) (HCC)  Assessment & Plan  · Hx non-bleeding esophageal varices and hepatic encephalopathy   · At home, was on Rifaximin 550 mcg q12hrs/Lactulose 10Gm BID/Lasix 40mg qd/Nadolol 20mg qd/Aldactone 50mg qd  · Here:  Rifaximin 550 mg q12hrs/Lactulose 10Gm BID    Diabetic polyneuropathy associated with type 2 diabetes mellitus [facility])  Assessment & Plan  Lab Results   Component Value Date    HGBA1C 8 2 (H) 05/31/2022       Recent Labs     07/19/22  1053 07/19/22  1556 07/19/22 2057 07/20/22  0653   POCGLU 169* 192* 195* 101       Blood Sugar Average: Last 72 hrs:  (P) 164    Type 2 diabetes mellitus with hyperglycemia, with long-term current use of insulin [facility])  Assessment & Plan  Lab Results   Component Value Date    HGBA1C 8 2 (H) 05/31/2022       Recent Labs     07/19/22  1053 07/19/22  1556 07/19/22 2057 07/20/22  0653   POCGLU 169* 192* 195* 101     · Home:  Janumet XR  BID/Amaryl 8 mg qAM/Basaglar 48U qam, 36U qpm  · Here:  Lantus 38U qam/10U Qpm, Metformin 500 mg BID  · QID Accuchecks/SSI and DM diet    Acquired hypothyroidism  Assessment & Plan  · Continue levothyroxine 112mcg daily    Essential hypertension  Assessment & Plan  · On losartan 25mg daily  · Previously on Nadolol, aldactone, and lasix  · Medications held based on initial hypotension  · Will start adding back meds as indicated, likely starting with nadolol- started back on 7/7  · Lasix restarted 7/20 at 20 mg- home 40 mg    Hyperlipidemia  Assessment & Plan  · Continue statin  · Here: Pravastatin 80 mg daily with dinner  Home: Simvastatin 40 mg daily    * Closed fracture of proximal end of left humerus  Assessment & Plan  · Displaced comminuted fracture of the humeral head/neck on imaging  · NWB to the left upper limb, maintain in sling  · Ok for for gentle range of motion to prevent adhesive capsulitis- starting 7/11  · Pain control  · Likely course of acute blood loss anemia, monitor labs  · PT/OT  · Follow up with Dr Brena Sandhoff per orthopaedics, signed off  · Shoulder/humerus xray ordered  · Impression:  Comminuted impaction fracture in the proximal left humerus fairly similar in alignment  Soft tissue swelling about the elbow with a number of small radiopaque foreign bodies  Acute impaction fracture in the proximal left humerus with lateral rotation of the humeral head and widening of the glenohumeral joint  · Ortho recommended CT of shoulder and can be seen as an OP  · CT findings: Comminuted impacted humeral head and neck fracture, without significant change in alignment  No significant osseous bridging  Mild incongruity of the articular surface  Involvement of the greater tuberosity  No glenoid fracture identified  Moderate acromioclavicular osteoarthritis  Small glenohumeral joint effusion  Muscle bulk is preserved  Tendon contours poorly evaluated  Small left pleural effusion  · US ordered to rule out blood clot          Appreciate IM consultants medical co-management  Labs, medications, and imaging personally reviewed  ROS:  A ten point review of systems was completed and pertinent positives are listed in subjective section  All other systems reviewed were negative  Review of Systems   Constitutional: Negative  Negative for appetite change and fatigue  HENT: Negative  Negative for trouble swallowing  Eyes: Negative  Negative for visual disturbance  Respiratory: Negative  Negative for shortness of breath  Cardiovascular: Negative  Negative for chest pain  Gastrointestinal: Negative  Negative for abdominal pain, constipation and nausea  Genitourinary: Negative  Negative for difficulty urinating  Musculoskeletal: Positive for arthralgias and joint swelling  LUE, mostly at the elbow/forearm regions   Neurological: Negative  Psychiatric/Behavioral: Negative  OBJECTIVE:   /53   Pulse 59   Temp 97 8 °F (36 6 °C) (Oral)   Resp 18   Ht 5' 5" (1 651 m)   Wt 99 9 kg (220 lb 3 2 oz)   SpO2 99%   BMI 36 64 kg/m²     Physical Exam  Vitals reviewed  Constitutional:       General: She is not in acute distress  Appearance: Normal appearance  She is obese  She is not ill-appearing  HENT:      Head: Normocephalic and atraumatic  Eyes:      Pupils: Pupils are equal, round, and reactive to light  Cardiovascular:      Rate and Rhythm: Normal rate and regular rhythm  Pulses: Normal pulses  Heart sounds: Normal heart sounds  No murmur heard  Pulmonary:      Effort: Pulmonary effort is normal  No respiratory distress  Breath sounds: Normal breath sounds  Abdominal:      General: Bowel sounds are normal  There is no distension  Palpations: Abdomen is soft  Musculoskeletal:         General: Swelling and tenderness present  Normal range of motion  Cervical back: Normal range of motion  Right lower leg: Edema present  Left lower leg: Edema present  Comments: Unchanged edema to LUE and BLE  Tender to palpate LUE noted over the left hand and elbow region   Skin:     General: Skin is warm  Findings: Bruising present  Comments: Ecchymosis to LUE   Neurological:      General: No focal deficit present        Mental Status: She is alert and oriented to person, place, and time            Lab Results   Component Value Date    WBC 2 17 (L) 07/20/2022    HGB 8 1 (L) 07/20/2022    HCT 25 7 (L) 07/20/2022    MCV 90 07/20/2022    PLT 82 (L) 07/20/2022     Lab Results   Component Value Date    SODIUM 141 07/20/2022    K 4 3 07/20/2022     07/20/2022    CO2 26 07/20/2022    BUN 23 07/20/2022    CREATININE 0 80 07/20/2022    GLUC 94 07/20/2022    CALCIUM 8 9 07/20/2022     Lab Results   Component Value Date    INR 1 33 (H) 06/30/2022    INR 1 20 (H) 06/29/2022    INR 1 23 (H) 05/31/2022    PROTIME 16 0 (H) 06/30/2022    PROTIME 15 1 (H) 06/29/2022    PROTIME 15 3 (H) 05/31/2022           Current Facility-Administered Medications:     aspirin chewable tablet 81 mg, 81 mg, Oral, Daily, Neil Me, DO, 81 mg at 07/20/22 1222    bisacodyl (DULCOLAX) rectal suppository 10 mg, 10 mg, Rectal, Daily PRN, Neil Me, DO    docusate sodium (COLACE) capsule 100 mg, 100 mg, Oral, BID, Neil Me, DO, 100 mg at 07/20/22 0916    enoxaparin (LOVENOX) subcutaneous injection 30 mg, 30 mg, Subcutaneous, Q12H [address], Neil Me, DO, 30 mg at 07/20/22 5368    escitalopram (LEXAPRO) tablet 5 mg, 5 mg, Oral, Daily, Neil Me, DO, 5 mg at 07/20/22 0917    [START ON 7/21/2022] furosemide (LASIX) tablet 40 mg, 40 mg, Oral, Daily, JOSE JUAN Helm    insulin glargine (LANTUS) subcutaneous injection 10 Units 0 1 mL, 10 Units, Subcutaneous, HS, JOSE JUAN Helm, 10 Units at 07/19/22 2113    insulin glargine (LANTUS) subcutaneous injection 38 Units 0 38 mL, 38 Units, Subcutaneous, QAARMANDO, Bita Longo, JOSE JUAN, 38 Units at 07/20/22 0918    insulin lispro (HumaLOG) 100 units/mL subcutaneous injection 2-12 Units, 2-12 Units, Subcutaneous, 4x Daily (with meals and at bedtime), 2 Units at 07/20/22 1200 **AND** Fingerstick Glucose (POCT), , , 4x Daily AC and at bedtime, Neil Me, DO    lactulose oral solution 10 g, 10 g, Oral, BID, Artis Moncada Fuentes, DO, 10 g at 07/20/22 0919    levothyroxine tablet 112 mcg, 112 mcg, Oral, Early Morning, Lavern Linwoode, DO, 112 mcg at 07/20/22 0601    lidocaine (LIDODERM) 5 % patch 2 patch, 2 patch, Topical, Daily, Lavern Cooley, DO, 2 patch at 07/20/22 0920    melatonin tablet 6 mg, 6 mg, Oral, HS, Lavern Palumboe, DO, 6 mg at 07/19/22 2113    metFORMIN (GLUCOPHAGE) tablet 500 mg, 500 mg, Oral, BID With Meals, JOSE JUAN Rhoades, 500 mg at 07/20/22 1759    methocarbamol (ROBAXIN) tablet 250 mg, 250 mg, Oral, 4x Daily PRN, Lavern Cooley, DO, 250 mg at 07/15/22 1242    nadolol (CORGARD) tablet 10 mg, 10 mg, Oral, Daily, JOSE JUAN Rhoades, 10 mg at 07/20/22 4993    nystatin (MYCOSTATIN) powder, , Topical, BID, RoJOSE JUAN Darling, Given at 07/20/22 1100    ondansetron (ZOFRAN-ODT) dispersible tablet 4 mg, 4 mg, Oral, Q6H PRN, Lavern Cooley, DO, 4 mg at 07/10/22 0857    pantoprazole (PROTONIX) EC tablet 40 mg, 40 mg, Oral, Early Morning, Lavern Palumboe, DO, 40 mg at 07/20/22 0601    polyethylene glycol (MIRALAX) packet 17 g, 17 g, Oral, Daily, Lavern Kale, DO, 17 g at 07/17/22 5495    pravastatin (PRAVACHOL) tablet 80 mg, 80 mg, Oral, Daily With Dinner, Lavern Palumboe, DO, 80 mg at 07/19/22 1627    rifaximin (XIFAXAN) tablet 550 mg, 550 mg, Oral, Q12H [address], Lavern Kale, DO, 550 mg at 07/20/22 3562    senna (SENOKOT) tablet 8 6 mg, 1 tablet, Oral, HS, Lavern Cooley, DO, 8 6 mg at 07/19/22 2113    spironolactone (ALDACTONE) tablet 25 mg, 25 mg, Oral, Daily, JOSE JUAN Rhoades    traMADol (ULTRAM) tablet 50 mg, 50 mg, Oral, Q6H PRN, Lavern Cooley DO, 50 mg at 07/20/22 7390    Past Medical History:   Diagnosis Date    Anemia     Cirrhosis ([facility] )     Diabetic neuropathy ([facility] )     Esophageal varices (HCC)     Fatty liver     GERD (gastroesophageal reflux disease)     Hepatic encephalopathy (HCC)     Hiatal hernia     Hyperlipidemia     Hypertension     Hypoglycemia     Hypothyroidism     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 ([facility] )     Osteoarthritis     Osteopenia     Pancytopenia (HCC)     Thrombocytopenia (HCC)     Type 2 diabetes mellitus ([facility] )        Patient Active Problem List    Diagnosis Date Noted    Obesity (BMI 30-39 9) 07/06/2022    Acute blood loss anemia 07/03/2022    Closed fracture of proximal end of left humerus 07/02/2022    Depression 07/02/2022    Cardiomyopathy ([facility] ) 07/02/2022    Complete heart block ([facility] ) 06/30/2022    Esophageal varices without bleeding ([place] [address] ) 09/29/2021    Pancytopenia ([place] [address] ) 08/06/2021    Iron deficiency anemia due to chronic blood loss 08/06/2021    Mild nonproliferative diabetic retinopathy of both eyes without macular edema associated with type 2 diabetes mellitus ([place] [address] ) 07/16/2021    Urinary tract infection 09/15/2020    Hepatic encephalopathy ([place] [address] ) 09/13/2020    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) ([place] [address] ) 09/13/2020    Thrombocytopenia ([place] [address] ) 09/13/2020    Hyperlipidemia 07/24/2018    Essential hypertension 07/24/2018    Acquired hypothyroidism 07/24/2018    Type 2 diabetes mellitus with hyperglycemia, with long-term current use of insulin ([place] [address] ) 07/24/2018    Diabetic polyneuropathy associated with type 2 diabetes mellitus ([place] [address] ) 07/24/2018          Lluvia Ramirez PA-C    Total time spent:  30 minutes with more than 50% spent counseling/coordinating care  Counseling includes discussion with patient re: progress and discussion with patient of his/her current medical/functional state/information  Coordination of patient's care was performed in conjunction with consulting services  Time invested included review of patient's labs, vitals, and management of their comorbidities with continued monitoring  The care of the patient was extensively discussed and appropriate treatment plan was formulated unique for this patient       ** Please Note:  voice to text software may have been used in the creation of this document   Although proof errors in transcription or interpretation are a potential of such software**

## 2022-07-20 NOTE — TEAM CONFERENCE
Acute RehabilitationTeam Conference Note  Date: 7/20/2022   Time: 11:00 AM       Patient Name:  Annie Apple       Medical Record Number: [id number]   YOB: 1945  Sex: Female          [facility]  [facility]  Payor Info:  Payor: MEDICARE / Plan: MEDICARE A AND B / Product Type: Medicare A & B Fee for Service /      Admitting Diagnosis: Fracture closed, humerus [S42 309A]   Admit Date/Time:  7/5/2022  5:43 PM  Admission Comments: No comment available     Primary Diagnosis:  Closed fracture of proximal end of left humerus  Principal Problem: Closed fracture of proximal end of left humerus    Patient Active Problem List    Diagnosis Date Noted    Obesity (BMI 30-39 9) 07/06/2022    Acute blood loss anemia 07/03/2022    Closed fracture of proximal end of left humerus 07/02/2022    Depression 07/02/2022    Cardiomyopathy ([facility] [address] ) 07/02/2022    Complete heart block ([facility] [address] ) 06/30/2022    Esophageal varices without bleeding ([facility] [address] ) 09/29/2021    Pancytopenia ([facility] [address] ) 08/06/2021    Iron deficiency anemia due to chronic blood loss 08/06/2021    Mild nonproliferative diabetic retinopathy of both eyes without macular edema associated with type 2 diabetes mellitus ([facility] [address] ) 07/16/2021    Urinary tract infection 09/15/2020    Hepatic encephalopathy ([facility] [address] ) 09/13/2020    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) ([facility] [address] ) 09/13/2020    Thrombocytopenia ([facility] [address] ) 09/13/2020    Hyperlipidemia 07/24/2018    Essential hypertension 07/24/2018    Acquired hypothyroidism 07/24/2018    Type 2 diabetes mellitus with hyperglycemia, with long-term current use of insulin ([facility] [address] ) 07/24/2018    Diabetic polyneuropathy associated with type 2 diabetes mellitus ([facility] [address] ) 07/24/2018       Physical Therapy:    Weight Bearing Status: Non-weight bearing (LUE)  Transfers: Independent  Bed Mobility: Independent, Supervision  Amulation Distance (ft): 200 feet  Ambulation: Independent, Supervision (indep lvl in room up to 10-20 feet)  Assistive Device for Ambulation: Single Point Cane  Wheelchair Mobility Distance: 0 ft  Number of Stairs: 10  Assistive Device for Stairs: Lehft Hand Rail  Stair Assistance: Supervision  Ramp: Incidental Touching  Assistive Device for Ramp: Single Point Cane  Discharge Recommendations: Home with:  [address] with[de-identified] Family Support, Home Physical Therapy, First Floor Setup    Pt presents s/p fall at home with L humeral fracture and NWB status with sling PRN  Pt presents with inc pain and swelling in LUE, with inc pain with slight movements to adjust sling for positioning  Pt also has impaired cardiovascular function with dec activity tolerance and fatigue, needing rest breaks  Pt has a ramp or a few [place] with BHR and first floor setup  Pt's daughter has Down's Syndrome and lives with her; the dtr has a caregiver/aide who helps her intermittently during the week  Pt reported that her daughter won't be able to help her at d/c but she also doesn't have family/friends in the area who can help her  Currently anticipating need for A for all IADLs due to NWB status LUE and therefore very limited functional use of LUE  Pt presents with deconditioning and limited ambulation distance at this time as well  She also needs inc physical A for bed mobility due to LUE pain  Pt will benefit from skilled PT to progress safety and (I) with functional mobility to reach Mga for transfers, bed mobility and ambulation and S for stairs  Will need to plan on A in the house for IADLs      7/12/22  Barriers to d/c home include L humeral fracture and NWB LUE with sling, pain in LUE which limits pt's activity tolerance especially with mobilities, decreased endurance requiring frequent rest breaks, decreased family support as pt was caregiver for dtr with Down's Syndrome and family lives over an hour away, decreased dynamic standing balance, and decreased hip strength especially of hip abductors   POC has focused on functional mobilities with use of HW initially, progressed pt to [facility] at this time  Also have focused on stair trng however anticipating use of ramp at home for safe in/out of house as pt would not be able to manage [facility] up/down steps and does better with use of HR vs SPC  Pt overall progressed from Eric-CG/CS over past week, anticipating d/c home next week with recommendation for home PT        7/19/2022  Pt progressing possible tomorrow  form IRP walking to bathroom with SPC but may need to be clean post BM see OT note  Pt barriers cont with pain left S area and Dtr with DS  Pt possible has SPC at baseline before coming to [facility]   Will f/u tomorrow with DME's order  Pt will cont to work on [facility] with [facility] and focus on safety and Dec fall risk   Occupational Therapy:  Eating: Supervision  Grooming: Supervision  Bathing: Moderate Assistance  Bathing: Moderate Assistance  Upper Body Dressing: Moderate Assistance  Lower Body Dressing: Minimal Assistance  Toileting: Minimal Assistance  Tub/Shower Transfer: Minimal Assistance  Toilet Transfer: Independent  Cognition: Within Defined Limits  Cognition: Decreased Memory  Orientation: Person, Place, Time, Situation  Discharge Recommendations: Home with:  [address] with[de-identified] Family Support, [facility] Occupational Therapy       Family training has been completed with pt's daughter in law Rylie Vicente who will be assisting pt at home  No barriers with d/c home Thursday with continued home OT services  Speech Therapy:           No notes on file    Nursing Notes:  Appetite: Fair  Diet Type: Diabetic                      Diet Patient/Family Education Complete: Yes    Type of Wound (LDA):  Wound                    Type of Wound Patient/Family Education: Yes  Bladder: Continent     Bladder Patient/Family Education: Yes  Bowel: Continent     Bowel Patient/Family Education: Yes  Pain Location/Orientation: Orientation: Left, Location: Arm  Pain Score: 6  Pain 2  Pain Score 2: 7  David-Baker FACES Pain Rating 2: Hurts even more  Pain Location/Orientation 2: Location: Buttocks  Pain Onset/Description 2: Onset: Ongoing, Descriptor: Burning  Patient's Stated Pain Goal 2: No pain  Hospital Pain Intervention(s) 2: Repositioned, Ambulation/increased activity, Emotional support                    Hospital Pain Intervention(s): Medication (See MAR)  Pain Patient/Family Education: Yes  Medication Management/Safety  Injectable: Lovenox (insulin)  Safe Administration: Yes (by staff)  Medication Patient/Family Education Complete: Yes (ongoing)    Left proximal humerus fracture - Non-op, NWB, Sling for comfort, See Ortho as OP  CHB - S/p dual chamber PPM 6/29/22, No heavy lift/push/pull >10 lbs, No left arm above shoulder height  Stress cardiomyopathy - D/W Dr Malissa Dupree he said no further intervention for now but see back in office, Continue Losartan 25mg qd (Cardiology started in hospital)  ABLA - S/p 1 unit PRBCs, Stable  PAUL cirrhosis - Hx non-bleeding esophageal varices (banded) and hepatic encephalopathy, Sees Dr Heber [facility] at [facility] as OP, Home:  Rifaximin 550 mcg q12hrs/Lactulose 10Gm BID/Lasix 40mg qd/Corgard 20mg qd/Aldactone 50mg qd, Here:  Rifaximin 550 mg q12hrs/Lactulose 10Gm BID/Corgard 20mg qd (hold SBP <120), Consider adding back a small dose of Lasix when BP allows  DM2 - Home:  Janumet XR  BID/Amaryl 8 mg qAM/Basaglar 48U qam, 36U qpm, Here:  Lantus 32U q12hrs/Metformin 500 mg BID, QID Accuchecks/SSI and DM diet, FBS low 7/10 AM and Lantus was skipped altogether --> make sure patient has qhs snack, No changes today  Pancytopenia - Is 2/2 to cirrhosis/portal HTN  Additionally, has splenomegaly contributing to low platelet ct, Stable today, Hemoglobin was down to 8 1 on 7/8/22, Has had tx with Venofer in past   Sees Dr Stephani Srinivasan from [facility]  Hypothyroidism - Continue Levothyroxine 112 mcg qd  Depression - Continue Lexapro as at home   Hyperkalemia - Mild at 5 2 but may need to stop ARB if continues to climb, Recheck 7/14/22  Pt is continent of both bowel and bladder  7/20- C/o inc pain left shoulder 7/15 = xray stable  Wearing edema glove on left hand  CT done- results P DM2-Home:  Janumet XR  BID/Amaryl 8 mg qAM/Basaglar 48U qam, 36U qpmHere:  Lantus 32U AM + 10U PM/Metformin 500 mg BID QID Accuchecks/SSI and DM diet Will inc AM Lantus dose to 38U     This week we will continue to monitor vital signs and lab values  We will continue to educate on the importance of turning and offloading in order to prevent skin breakdown and preform routine skin checks  We will encourage independence with ADLs  We will monitor for constipation and medicate per bowel protocol  We will preform safe transfers and keep the pt free from falls  Will provide pt/family with updated DM education  Case Management:     Discharge Planning  Living Arrangements: Lives w/ Children  Support Systems: Self, Daughter, Family members, Private Caregivers  Assistance Needed: none  Type of Current Residence: Private residence  Current Home Care Services: No  Pt continues to make gains and is expected to return home tomorrow with family and contd therapy services  Family education/training completed and they are aware of the assist needed for pt to maintain safety at home  Is the patient actively participating in therapies? yes  List any modifications to the treatment plan:     Barriers Interventions   Left arm swelling R/o clot, cellutlitis, ct scan tbd   Buttock wounds Wound care, nursing following                 Is the patient making expected progress toward goals?  yes  List any update or changes to goals:     Medical Goals: Patient will be medically stable for discharge to [facility] upon completion of rehab program and Patient will be able to manage medical conditions and comorbid conditions with medications and follow up upon completion of rehab program    Weekly Team Goals:   Rehab Team Goals  ADL Team Goal: Patient will be independent with ADLs with least restrictive device upon completion of rehab program  Bowel/Bladder Team Goal: Patient will require assist with bladder/bowel management with least restrictive device upon completion of rehab program  Transfer Team Goal: Patient will be independent with transfers with least restrictive device upon completion of rehab program  Locomotion Team Goal: Patient will be independent with locomotion with least restrictive device upon completion of rehab program    Discussion: pt presents with the above barriers and physician is not confident in pt dc'ing tomorrow due to arm swelling, buttock wounds and overall function  Family training was completed from a therapy standpoint  Wound education was scheduled at the time of dc  Physician determining if a hospital bed is needed  Pt is functioning at independent with transfers and ambulation  adls are overall mod a   Pt will continue with [facility] for rn pt and ot services  Anticipated Discharge Date:  7/21/2022  [facility] Team Members Present: The following team members are supervising care for this patient and were present during this Weekly Team Conference      Physician: Dr Tico Weinberg MD  : MAXI Herrera  Registered Nurse: Iker Braswell RN  Physical Therapist: Kedar Nelson DPT  Occupational Therapist: Patrick Hook MS, OTR/L  Speech Therapist:

## 2022-07-20 NOTE — PROGRESS NOTES
07/20/22 1420   Pain Assessment   Pain Assessment Tool 0-10   Pain Score 4   Pain Location/Orientation Orientation: Left; Location: Arm   Restrictions/Precautions   Precautions Fall Risk;Pain   Weight Bearing Restrictions Yes   LUE Weight Bearing Per Order NWB   ROM Restrictions Yes   LUE ROM Restriction   (PROM to 45* forward flex max)   Braces or Orthoses Sling   Subjective   Subjective Pt being seen by vascular for doppler, assisted with PROM of LUE so ultrasound could be performed   Roll Left and Right   Reason if not Attempted Safety concerns   Roll Left and Right CARE Score 88   Sit to Lying   Type of Assistance Needed Independent   Comment with use of R bedrail which family to purchase   Sit to Lying CARE Score 6   Lying to Sitting on Side of Bed   Type of Assistance Needed Independent   Comment with use of R bedrail which family to purchase   Lying to Sitting on Side of Bed CARE Score 6   Sit to Stand   Type of Assistance Needed Independent   Comment [facility]   Sit to Stand CARE Score 6   Bed-Chair Transfer   Type of Assistance Needed Independent   Comment [facility]   Chair/Bed-to-Chair Transfer CARE Score 6   Car Transfer   Type of Assistance Needed Set-up / clean-up   Comment SPC, will be using van upon d/c   Car Transfer CARE Score 5   Walk 10 Feet   Type of Assistance Needed Independent   Comment SPC   Walk 10 Feet CARE Score 6   Walk 50 Feet with Two Turns   Type of Assistance Needed Independent   Comment SPC   Walk 50 Feet with Two Turns CARE Score 6   Walk 150 Feet   Type of Assistance Needed Independent   Comment [facility]   Walk 150 Feet CARE Score 6   Walking 10 Feet on Uneven Surfaces   Type of Assistance Needed Supervision   Comment SPC over floor mat   Walking 10 Feet on Uneven Surfaces CARE Score 4   Ambulation   Does the patient walk? 2  Yes   Primary Mode of Locomotion Prior to Admission Walk   Distance Walked (feet) 200 ft  (x2)   Assist Device Cane   Gait Pattern Decreased foot clearance; Slow Ayala; Step through; Wide SUGAR   Limitations Noted In Endurance;Speed;Strength   Walk Assist Level Modified Independent   Wheel 50 Feet with Two Turns   Reason if not Attempted Activity not applicable   Wheel 50 Feet with Two Turns CARE Score 9   Wheel 150 Feet   Reason if not Attempted Activity not applicable   Wheel 814 Feet CARE Score 9   Curb or Single Stair   Style negotiated Curb   Type of Assistance Needed Incidental touching   Comment CG with SPC 6" curb   1 Step (Curb) CARE Score 4   4 Steps   Type of Assistance Needed Supervision   Comment CS R hand on HR   4 Steps CARE Score 4   12 Steps   Comment fatigued after 10 steps   Reason if not Attempted Safety concerns   12 Steps CARE Score 88   Stairs   Type Stairs;Curb   # of Steps 10   Weight Bearing Precautions Fall Risk;NWB;LUE   Assist Devices Single Rail;Cane   Picking Up Object   Type of Assistance Needed Independent   Comment reacher to retrieve marker off of floor   Picking Up Object CARE Score 6   Toilet Transfer   Type of Assistance Needed Independent   Comment [facility]   Toilet Transfer CARE Score 6   Therapeutic Interventions   Strengthening reviewed rest of HEP; hip add 3x10 with 5 sec hold, repeated STSs 3x5; instructed pt to perform HEP on non home PT days and to stand 1x/hour, amb around house 1x per hour   Flexibility PROM to LUE, up to 45* of shoulder flexion, approx 5 mins to assist with getting ultrasound on LUE   Other assisted pt with doffing and donning T-shirt to aide in getting ultrasound of LUE; also assisted with donning sweatshirt   Other Comments   Comments reviewed use of green wedges for pressure offloading of bottom once pt home to reduce risk for pressure injuries   Assessment   Treatment Assessment Skilled PT session focusing on capturing care scores in prep for d/c home tomorrow 7/21/22   Pt has progressed well towards LTGs, currently she is independent for bed mobility with use of R bedrail which family will purchase, independent for transfers and ambulation household distances as well as level community distances with use of SPC, and S for uneven surfaces with SPC  Pt does require CGA for single curb step, CS for stairs with HR to use with RUE  Plan is for pt to return home with 24/7 support from dtr in law Alfreda Tan who will also be caring for pt's dtr who has Down's Syndrome  Pt will be going between her home and Raoul's home upon d/c  Recommend pt have first floor setup upon d/c  Did discuss with pt possibility of hospital bed at home if MD deemed necessary, however pt reporting that she has no room in her home for hospital bed which is where it would be delivered to  At this time pt okay to d/c home tomorrow pending medical stability  Family/Caregiver Present no   PT Therapy Minutes   PT Time In 1420   PT Time Out 1530   PT Total Time (minutes) 70   PT Mode of treatment - Individual (minutes) 70   PT Mode of treatment - Concurrent (minutes) 0   PT Mode of treatment - Group (minutes) 0   PT Mode of treatment - Co-treat (minutes) 0   PT Mode of Treatment - Total time(minutes) 70 minutes   PT Cumulative Minutes 3641   Therapy Time missed   Time missed?  No

## 2022-07-20 NOTE — CASE MANAGEMENT
Met w/pt and reviewed team update and discussed dc for tomorrow  Pt did not show any emotion and stated "its just another day"  Pt confirmed her dtr in law is scheduled to pick her up and take her to her home near [place], then return home Sunday to her home for the remainder of her recovery  But pt could not confirm that was the plan and stated she was having difficulty getting a hold of her dtr in law rafael Mckinley explained her dc destination is important so follow up services from home health can be made  Pt stated she would call  when she speaks with her dtr in law  Referral made to [facility] for contd rn pt ot and sw services  They are unable to accept the referral due to staffing in pts home area  Referral then made to [facility]  Awaiting determination  [facility] accepted referral  Info placed on dc instructions

## 2022-07-20 NOTE — PROGRESS NOTES
Internal Medicine Progress Note  Patient: Leroy Neal  Age/sex: 77 y o  female  Medical Record #: [id number]      ASSESSMENT/PLAN: (Interval History)  Leroy Neal is seen and examined and management for following issues:    Left proximal humerus fracture  · Non-op  · NWB  · Sling for comfort  · See Ortho as OP  · C/o inc pain left shoulder 7/15 = xray stable alignment  · Had CT of the LUE 7/19 per Ortho's request = no change  · Was wearing edema glove on left hand but now ACE wrapping 2/2 edema  · For venous doppler of left arm today      CHB  · S/p dual chamber PPM 6/29/22  · No heavy lift/push/pull >10 lbs  · No left arm above shoulder height   · Site healed  · See Cards OP     Stress cardiomyopathy  · D/W Dr Suni Guajardo 7/5/22Mateus Leung said no further intervention for now but see back in office  · Losartan reduced to 12 5mg qd starting 7/14/22 since BPs were soft in afternoon (cards started in hospital)     · Losartan stopped Losartan for now since SBPs were too low (on 7/16 were  systolic)     ABLA  · S/p 1 unit PRBCs  · Stable     PAUL cirrhosis  · Hx non-bleeding esophageal varices (banded) and hepatic encephalopathy   · Sees Dr Mendel Nunn at [facility] as OP  · Home:  Rifaximin 550 mcg q12hrs/Lactulose 10Gm BID/Lasix 40mg qd/Corgard 20mg qd/Aldactone 50mg qd  · Here:  Rifaximin 550 mg q12hrs/Lactulose 10Gm BID/Corgard 10mg qd (hold SBP <120)/Lasix inc today (7/20) to 40 mg qd/start Aldactone 25mg qd (7/20), stop Kdur since adding back Aldactone  · On 7/15/22, gave Lasix 40 mg x 1 for leg edema = mild improvement  · started Lasix 20mg qd 7/18 (held then since SBP was 98) but since still has significant edema, try to increase to 40 mg daily and watch BPs  Had been holding Lasix since BPs often were too low (90's to low 100's)    She sits in the recliner with her feet down for long periods of time so edema has slowly increased  · For doppler of LEs as below  · Starting 7/19, dropped Corgard down to 10mg qd since BPs still too soft     LE edema  · As above  · For Venous doppler    DM2  · Home:  Janumet XR  BID/Amaryl 8 mg qAM/Basaglar 48U qam, 36U qpm  · Here:  Lantus 38U AM (inc today 7/20) + 10U PM/Metformin 500 mg BID   · QID Accuchecks/SSI and DM diet     Pancytopenia  · Is 2/2 to cirrhosis/portal HTN   Additionally, has splenomegaly contributing to low platelet ct and leukopenia  · Has had tx with Venofer in past   Sees Dr Andrea Liang from [facility]  · on 7/18, WBC was 2 4 with prev count 2 9   This hospital stay, has been in the 2's to 3's   In 1/2022 was 3 5, 7/2021 2 9, 9/2020 2 8   Will continue to watch  · Had BMB 8/2021 and was with no major abnormalities or MDS, only low iron stores  · Continue to watch     Hypothyroidism  · Continue Levothyroxine 112 mcg qd     Depression  · Continue Lexapro as at home        Discharge date:  7/21/22    The above assessment and plan was reviewed and updated as determined by my evaluation of the patient on 7/20/2022      Labs:   Results from last 7 days   Lab Units 07/20/22  0606 07/18/22  0514   WBC Thousand/uL 2 17* 2 47*   HEMOGLOBIN g/dL 8 1* 8 2*   HEMATOCRIT % 25 7* 27 4*   PLATELETS Thousands/uL 82* 79*     Results from last 7 days   Lab Units 07/20/22  0606 07/18/22  0514   SODIUM mmol/L 141 140   POTASSIUM mmol/L 4 3 4 1   CHLORIDE mmol/L 107 106   CO2 mmol/L 26 26   BUN mg/dL 23 26*   CREATININE mg/dL 0 80 0 91   CALCIUM mg/dL 8 9 9 0             Results from last 7 days   Lab Units 07/20/22  0653 07/19/22  2057 07/19/22  1556   POC GLUCOSE mg/dl 101 195* 192*       Review of Scheduled Meds:  Current Facility-Administered Medications   Medication Dose Route Frequency Provider Last Rate    aspirin  81 mg Oral Daily Ilona Stef, DO      bisacodyl  10 mg Rectal Daily PRN Ilona Stef, DO      docusate sodium  100 mg Oral BID Ilona Stef, DO      enoxaparin  30 mg Subcutaneous Q12H [address] Ilona Stef, DO      escitalopram  5 mg Oral Daily Ilona Stef, DO      furosemide  20 mg Oral Daily JOSE JUAN Vásquez      insulin glargine  10 Units Subcutaneous HS Thelma ReinosoJOSE JUAN palencia      insulin glargine  38 Units Subcutaneous QAM JOSE JUAN Vargas      insulin lispro  2-12 Units Subcutaneous 4x Daily (with meals and at bedtime) Alihsa Liejeana, DO      lactulose  10 g Oral BID Alisha Lien, DO      levothyroxine  112 mcg Oral Early Morning Alisha Marianon, DO      lidocaine  2 patch Topical Daily Alisha Lien, DO      melatonin  6 mg Oral HS Alisha Marianon, DO      metFORMIN  500 mg Oral BID With Meals JOSE JUAN Vásquez      methocarbamol  250 mg Oral 4x Daily PRN Alisha Tatiana, DO      nadolol  10 mg Oral Daily Thelma Adkins, JOSE JUAN      nystatin   Topical BID Trish Longo, JOSE JUAN      ondansetron  4 mg Oral Q6H PRN Alisha Lien, DO      pantoprazole  40 mg Oral Early Morning Alisha Marianon, DO      polyethylene glycol  17 g Oral Daily Alisha Lien, DO      potassium chloride  10 mEq Oral Daily JOSE JUAN Vásquez      pravastatin  80 mg Oral Daily With [employer] Nathaniel Harmonudsen, DO      rifaximin  550 mg Oral Q12H [facility] Alisha Liejeana, DO      senna  1 tablet Oral HS Alisha Lien, DO      traMADol  50 mg Oral Q6H PRN Alisha Liejeana, DO         Subjective/ HPI: Patient seen and examined  Patients overnight issues or events were reviewed with nursing or staff during rounds or morning huddle session  New or overnight issues include the following:     Had CT LUE per Ortho's request   WBC is a bit lower this AM    ROS:   A 10 point ROS was performed; negative except as noted above  Imaging:     CT upper extremity wo contrast left   Final Result by Jackson James DO (07/20 0104)      No significant change in proximal humerus fracture           Workstation performed: [id number]         XR shoulder 2+ vw left   Final Result by Diandra Maki MD (07/19 0841)   Addendum 1 of 1 by Diandra Maki MD (07/19 3457)   ADDENDUM:      Compared to a study obtained on 07/15/2022, there is no significant change    in alignment  Final      Acute impaction fracture in the proximal left humerus with lateral rotation of the humeral head and widening of the glenohumeral joint  Workstation performed: [id number]         XR humerus left   Final Result by Delfin Cabral MD (07/19 8746)      Comminuted impaction fracture in the proximal left humerus fairly similar in alignment  Soft tissue swelling about the elbow with a number of small radiopaque foreign bodies  Workstation performed: [id number]         XR humerus left   Final Result by Joshua Santiago DO (07/15 0572)      Stable comminuted and deformed humeral head/neck fracture  Workstation performed: [id number]             *Labs /Radiology studies reviewed  *Medications reviewed and reconciled as needed  *Please refer to order section for additional ordered labs studies  *Case discussed with primary attending during morning huddle case rounds    Physical Examination:  Vitals:   Vitals:    07/19/22 2103 07/20/22 0558 07/20/22 0600 07/20/22 0858   BP: 118/58 118/58  146/63   BP Location: Right arm Right arm     Pulse: 62 63  63   Resp: 18 18     Temp: 98 1 °F (36 7 °C) 97 8 °F (36 6 °C)     TempSrc: Oral Oral     SpO2: 100% 99%     Weight:   99 9 kg (220 lb 3 2 oz)    Height:           General Appearance: no distress, conversive  HEENT: PERRLA, conjuctiva normal; oropharynx clear; mucous membranes moist   Neck:  Supple, normal ROM  Lungs: CTA, normal respiratory effort, no retractions, expiratory effort normal  CV: regular rate and rhythm; no rubs/murmurs/gallops, PMI normal   ABD: soft; ND/NT; +BS  EXT: +LE and left arm edema  Left arm has old ecchymosis  Does not appear to have cellulitis    Skin: normal turgor, normal texture, no rashes  Psych: affect normal, mood normal  Neuro: AAO     The above physical exam was reviewed and updated as determined by my evaluation of the patient on 7/20/2022  Invasive Devices  Report    None                    VTE Pharmacologic Prophylaxis: Enoxaparin  Code Status: Level 1 - Full Code  Current Length of Stay: 15 day(s)      Total time spent:  30 minutes with more than 50% spent counseling/coordinating care  Counseling includes discussion with patient re: progress  and discussion with patient of his/her current medical state/information  Coordination of patient's care was performed in conjunction with primary service  Time invested included review of patient's labs, vitals, and management of their comorbidities with continued monitoring  In addition, this patient was discussed with medical team including physician and advanced extenders  The care of the patient was extensively discussed and appropriate treatment plan was formulated unique for this patient  ** Please Note:  voice to text software may have been used in the creation of this document   Although proof errors in transcription or interpretation are a potential of such software**

## 2022-07-21 VITALS
HEART RATE: 71 BPM | BODY MASS INDEX: 36.44 KG/M2 | TEMPERATURE: 98 F | DIASTOLIC BLOOD PRESSURE: 62 MMHG | SYSTOLIC BLOOD PRESSURE: 147 MMHG | WEIGHT: 218.7 LBS | HEIGHT: 65 IN | RESPIRATION RATE: 18 BRPM | OXYGEN SATURATION: 94 %

## 2022-07-21 PROBLEM — R11.0 NAUSEA: Status: ACTIVE | Noted: 2022-07-21

## 2022-07-21 PROBLEM — Z00.00 HEALTHCARE MAINTENANCE: Status: ACTIVE | Noted: 2022-07-21

## 2022-07-21 PROBLEM — R93.7 ABNORMAL BONE XRAY: Status: ACTIVE | Noted: 2022-07-21

## 2022-07-21 LAB
GLUCOSE SERPL-MCNC: 120 MG/DL (ref 65–140)
GLUCOSE SERPL-MCNC: 169 MG/DL (ref 65–140)
GLUCOSE SERPL-MCNC: 176 MG/DL (ref 65–140)

## 2022-07-21 PROCEDURE — 97116 GAIT TRAINING THERAPY: CPT

## 2022-07-21 PROCEDURE — 97530 THERAPEUTIC ACTIVITIES: CPT

## 2022-07-21 PROCEDURE — 99239 HOSP IP/OBS DSCHRG MGMT >30: CPT

## 2022-07-21 PROCEDURE — 82948 REAGENT STRIP/BLOOD GLUCOSE: CPT

## 2022-07-21 PROCEDURE — 99232 SBSQ HOSP IP/OBS MODERATE 35: CPT | Performed by: INTERNAL MEDICINE

## 2022-07-21 PROCEDURE — 97110 THERAPEUTIC EXERCISES: CPT

## 2022-07-21 RX ORDER — NYSTATIN 100000 [USP'U]/G
POWDER TOPICAL 2 TIMES DAILY
Qty: 15 G | Refills: 0 | Status: SHIPPED | OUTPATIENT
Start: 2022-07-21

## 2022-07-21 RX ORDER — SPIRONOLACTONE 25 MG/1
25 TABLET ORAL DAILY
Qty: 30 TABLET | Refills: 0 | Status: SHIPPED | OUTPATIENT
Start: 2022-07-22 | End: 2022-08-22

## 2022-07-21 RX ORDER — ASPIRIN 81 MG/1
81 TABLET, CHEWABLE ORAL EVERY OTHER DAY
Refills: 0
Start: 2022-07-21 | End: 2022-07-21 | Stop reason: SDUPTHER

## 2022-07-21 RX ORDER — TRAMADOL HYDROCHLORIDE 50 MG/1
50 TABLET ORAL 2 TIMES DAILY PRN
Qty: 20 TABLET | Refills: 0 | Status: SHIPPED | OUTPATIENT
Start: 2022-07-21 | End: 2022-07-31

## 2022-07-21 RX ORDER — NADOLOL 20 MG/1
10 TABLET ORAL DAILY
Qty: 30 TABLET | Refills: 0 | Status: SHIPPED | OUTPATIENT
Start: 2022-07-22 | End: 2022-09-28

## 2022-07-21 RX ORDER — INSULIN GLARGINE 100 [IU]/ML
INJECTION, SOLUTION SUBCUTANEOUS
Qty: 30 ML | Refills: 0 | Status: SHIPPED | OUTPATIENT
Start: 2022-07-21 | End: 2022-09-21

## 2022-07-21 RX ORDER — ASPIRIN 81 MG/1
81 TABLET, CHEWABLE ORAL EVERY OTHER DAY
Qty: 30 TABLET | Refills: 0 | Status: SHIPPED | OUTPATIENT
Start: 2022-07-21 | End: 2022-08-29

## 2022-07-21 RX ADMIN — ASPIRIN 81 MG CHEWABLE TABLET 81 MG: 81 TABLET CHEWABLE at 08:32

## 2022-07-21 RX ADMIN — TRAMADOL HYDROCHLORIDE 50 MG: 50 TABLET, FILM COATED ORAL at 01:31

## 2022-07-21 RX ADMIN — NYSTATIN 1 APPLICATION: 100000 POWDER TOPICAL at 08:38

## 2022-07-21 RX ADMIN — ENOXAPARIN SODIUM 30 MG: 30 INJECTION SUBCUTANEOUS at 08:42

## 2022-07-21 RX ADMIN — NADOLOL 10 MG: 20 TABLET ORAL at 08:46

## 2022-07-21 RX ADMIN — LIDOCAINE 5% 2 PATCH: 700 PATCH TOPICAL at 08:31

## 2022-07-21 RX ADMIN — SPIRONOLACTONE 25 MG: 25 TABLET, FILM COATED ORAL at 08:47

## 2022-07-21 RX ADMIN — DOCUSATE SODIUM 100 MG: 100 CAPSULE, LIQUID FILLED ORAL at 08:32

## 2022-07-21 RX ADMIN — METFORMIN HYDROCHLORIDE 500 MG: 500 TABLET ORAL at 08:00

## 2022-07-21 RX ADMIN — INSULIN GLARGINE 38 UNITS: 100 INJECTION, SOLUTION SUBCUTANEOUS at 08:31

## 2022-07-21 RX ADMIN — INSULIN LISPRO 2 UNITS: 100 INJECTION, SOLUTION INTRAVENOUS; SUBCUTANEOUS at 11:52

## 2022-07-21 RX ADMIN — LACTULOSE 10 G: 20 SOLUTION ORAL at 08:32

## 2022-07-21 RX ADMIN — ESCITALOPRAM OXALATE 5 MG: 10 TABLET ORAL at 08:32

## 2022-07-21 RX ADMIN — FUROSEMIDE 40 MG: 40 TABLET ORAL at 08:32

## 2022-07-21 RX ADMIN — PANTOPRAZOLE SODIUM 40 MG: 40 TABLET, DELAYED RELEASE ORAL at 06:19

## 2022-07-21 RX ADMIN — LEVOTHYROXINE SODIUM 112 MCG: 112 TABLET ORAL at 06:19

## 2022-07-21 RX ADMIN — RIFAXIMIN 550 MG: 550 TABLET ORAL at 08:46

## 2022-07-21 NOTE — PROGRESS NOTES
07/21/22 0830   Pain Assessment   Pain Assessment Tool 0-10   Pain Score 6   Pain Location/Orientation Orientation: Left; Location: Shoulder   Pain Onset/Description Onset: Ongoing;Frequency: Intermittent   Restrictions/Precautions   Precautions Fall Risk;Pain   LUE Weight Bearing Per Order NWB   ROM Restrictions Yes   Braces or Orthoses Sling   Cognition   Overall Cognitive Status Impaired   Arousal/Participation Alert; Cooperative   Attention Attends with cues to redirect   Orientation Level Oriented X4   Memory Decreased short term memory   Following Commands Follows one step commands with increased time or repetition   Sit to Lying   Type of Assistance Needed Independent   Sit to Lying CARE Score 6   Lying to Sitting on Side of Bed   Type of Assistance Needed Independent   Lying to Sitting on Side of Bed CARE Score 6   Sit to Stand   Type of Assistance Needed Independent; Adaptive equipment   Comment [facility] [place]   Sit to Stand CARE Score 6   Bed-Chair Transfer   Type of Assistance Needed Independent; Adaptive equipment   Comment [facility]   Chair/Bed-to-Chair Transfer CARE Score 6   Transfer Bed/Chair/Wheelchair   Adaptive Equipment Cane   Walk 10 Feet   Type of Assistance Needed Adaptive equipment; Independent   Comment [facility] [place]   Walk 10 Feet CARE Score 6   Walk 50 Feet with Two Turns   Type of Assistance Needed Independent; Adaptive equipment   Comment [facility]   Walk 50 Feet with Two Turns CARE Score 6   Walk 150 Feet   Type of Assistance Needed Independent; Adaptive equipment   Comment [facility]   Walk 150 Feet CARE Score 6   Ambulation   Does the patient walk? 2  Yes   Primary Mode of Locomotion Prior to Admission Walk   Distance Walked (feet) 200 ft  (x2)   Assist Device Cane   Gait Pattern Decreased foot clearance; Slow Ayala; Inconsistant Ayala; Wide SUGAR   Limitations Noted In Endurance;Speed;Strength   Provided Assistance with: Direction   Walk Assist Level Modified Independent   Wheel 50 Feet with Two Turns   Reason if not Attempted Activity not applicable   Wheel 50 Feet with Two Turns CARE Score 9   Wheel 150 Feet   Reason if not Attempted Activity not applicable   Wheel 086 Feet CARE Score 9   Wheelchair mobility   Does the patient use a wheelchair? 0  No   Toilet Transfer   Type of Assistance Needed Independent; Adaptive equipment   Comment [facility]   Toilet Transfer CARE Score 6   Toilet Transfer   Surface Assessed Standard Toilet   Findings A with hygiene after BM   Therapeutic Interventions   Strengthening seated LAQ, hip flex, ankle DF/PF 3 x10 reps BLE with 2#   Other wrapped BLE with ace wraps at start of session and placed edema glove on to L hand  Assessment   Treatment Assessment Pt participated in skilled PT session with increased focus on gait, LE strengthening and functional transfers  Initially pt was taken outside to trial ramp but as soon as she was parked outside she began to vomit  Pt's vomit was yellow in color and just fluid initially then she vomited a second time and was then with chunks of fruit  Pt was taken back inside and nurse Karina Littlejohn was made aware of situation  Pt's vitals taken, temp 98, /62, HR 71 and BS taken by nurse  Pt states she had no dizziness or nausea after vomiting, also that she had no warning of feeling like vomiting  She was agreeable to cont with session and perform what she could  Dr Maximilian Shelley was in to see pt and needed to discuss some medications and her discharge so pt session cut short  Plan at this time is discharge home today with increased A in home provided by dtr in-law Markell Rudolph

## 2022-07-21 NOTE — PROGRESS NOTES
07/21/22 0830   Pain Assessment   Pain Assessment Tool 0-10   Pain Score 6   Pain Location/Orientation Orientation: Left; Location: Shoulder   Pain Onset/Description Onset: Ongoing;Frequency: Intermittent   Restrictions/Precautions   Precautions Fall Risk;Pain   LUE Weight Bearing Per Order NWB   ROM Restrictions Yes   Braces or Orthoses Sling   Cognition   Overall Cognitive Status Impaired   Arousal/Participation Alert; Cooperative   Attention Attends with cues to redirect   Orientation Level Oriented X4   Memory Decreased short term memory   Following Commands Follows one step commands with increased time or repetition   Sit to Lying   Type of Assistance Needed Independent   Sit to Lying CARE Score 6   Lying to Sitting on Side of Bed   Type of Assistance Needed Independent   Lying to Sitting on Side of Bed CARE Score 6   Sit to Stand   Type of Assistance Needed Independent; Adaptive equipment   [place] [facility] [place]   Sit to Stand CARE Score 6   Bed-Chair Transfer   Type of Assistance Needed Independent; Adaptive equipment   [place] [facility]   Chair/Bed-to-Chair Transfer CARE Score 6   Transfer Bed/Chair/Wheelchair   Adaptive Equipment Cane   Walk 10 Feet   Type of Assistance Needed Adaptive equipment; Independent   Comment [facility] [place]   Walk 10 Feet CARE Score 6   Walk 50 Feet with Two Turns   Type of Assistance Needed Independent; Adaptive equipment   Comment [facility]   Walk 50 Feet with Two Turns CARE Score 6   Walk 150 Feet   Type of Assistance Needed Independent; Adaptive equipment   Comment [facility] [place]   Walk 150 Feet CARE Score 6   Ambulation   Does the patient walk? 2  Yes   Primary Mode of Locomotion Prior to Admission Walk   Distance Walked (feet) 200 ft  (x2)   Assist Device Cane   Gait Pattern Decreased foot clearance; Slow Ayala; Inconsistant Ayala; Wide SUGAR   Limitations Noted In Endurance;Speed;Strength   Provided Assistance with: Direction   Walk Assist Level Modified Independent   Wheel 50 Feet with Two Turns   Reason if not Attempted Activity not applicable   Wheel 50 Feet with Two Turns CARE Score 9   Wheel 150 Feet   Reason if not Attempted Activity not applicable   Wheel 268 Feet CARE Score 9   Wheelchair mobility   Does the patient use a wheelchair? 0  No   Toilet Transfer   Type of Assistance Needed Independent; Adaptive equipment   Comment [facility]   Toilet Transfer CARE Score 6   Toilet Transfer   Surface Assessed Standard Toilet   Findings A with hygiene after BM   Therapeutic Interventions   Strengthening seated LAQ, hip flex, ankle DF/PF 3 x10 reps BLE with 2#   Other wrapped BLE with ace wraps at start of session and placed edema glove on to L hand  Assessment   Treatment Assessment Pt participated in skilled PT session with increased focus on gait, LE strengthening and functional transfers  Initially pt was taken outside to trial ramp but as soon as she was parked outside she began to vomit  Pt's vomit was yellow in color and just fluid initially then she vomited a second time and was then with chunks of fruit  Pt was taken back inside and nurse Cristina Garner was made aware of situation  Pt's vitals taken, temp 98, /62, HR 71 and BS taken by nurse  Pt states she had no dizziness or nausea after vomiting, also that she had no warning of feeling like vomiting  She was agreeable to cont with session and perform what she could  Dr Jose Daniel West was in to see pt and needed to discuss some medications and her discharge so pt session cut short  Plan at this time is discharge home today with increased A in home provided by dtr in-law Gildardo Bello  PT Barriers   Physical Impairment Decreased strength;Decreased range of motion; Impaired tone;Obesity;Orthopedic restrictions;Pain   Functional Limitation Stair negotiation;Car transfers   Recommendation   PT Discharge Recommendation Home with home health rehabilitation   Equipment Recommended Cane   PT Therapy Minutes   PT Time In 0830   PT Time Out 0945   PT Total Time (minutes) 75   PT Mode of treatment - Individual (minutes) 75   PT Mode of treatment - Concurrent (minutes) 0   PT Mode of treatment - Group (minutes) 0   PT Mode of treatment - Co-treat (minutes) 0   PT Mode of Treatment - Total time(minutes) 75 minutes   PT Cumulative Minutes 1310   Therapy Time missed   Time missed?  No

## 2022-07-21 NOTE — ASSESSMENT & PLAN NOTE
L Humerus XR incidental finding 7/18  "Soft tissue swelling about the elbow with a number of small radiopaque foreign bodies  "  - Reached out to orthopedics - spoke to ortho resident after d/c who spoke to attending and they felt this was benign but would still follow-up with outpatient primary care and orthopedics - which pt/family are aware of   - Notify physician if you develop increased redness, pain or other concerns around L elbow

## 2022-07-21 NOTE — ASSESSMENT & PLAN NOTE
Patient has been on chronic aspirin   - She states she thinks she was on aspirin 81mg every 3 days but is not certain who told her this and why  - Prior notes documenting aspirin 81mg qday and she was taking daily in hospital   - Spoke to PCP office prior to d/c on 7/21 - and they saw her 3/2022 and at that time aspirin listed as every other day but per MA they do not have why  - Recent GI notes do not discuss aspiring  - Recommend aspirin 81mg QOD and follow-up with PCP within 1-2 weeks to confirm; also follow-up GI

## 2022-07-21 NOTE — NURSING NOTE
Patient outside with therapy during therapy session, when therapist noted patient making a retching sound  She then had a small amount of emesis, fluid consistency  That emesis was then followed by a moderate amount of emesis with food particles from breakfast  Lung sounds clear bilaterally  Positive bowel sounds x 4 quadrants  No s/s of hypo/hyperglycemia BS was 176  VS: T- 98 0 P- 71 R- 20 BP- 147/62  Patient continent of a medium formed BM once she returned to unit  At present time pt  Is participating with therapy in gym  Denies any pain/discomfort   No further N/V

## 2022-07-21 NOTE — NURSING NOTE
AVS reviewed with patient and daughter in law  Daughter in law educated on providing wound care by nursing to sacrum and right heel  Daughter in law was able to verbalize understanding on education and teach back  Patient was given some supplies to take home for right heel and sacrum  Patient is continent of bowel and bladder  She requires minimum assist of one with single point cane to transfer and ambulate  While reviewing discharge instructions Dr Loraine Saavedra from Orthopedics Surgery came into room to see patient  He verbalized to patient and daughter in law he was not concerned regarding findings of small radiopaque foreign bodies, and that she was cleared from his point for discharge

## 2022-07-21 NOTE — PROGRESS NOTES
Internal Medicine Progress Note  Patient: Jenn uKnz  Age/sex: 77 y o  female  Medical Record #: [id number]      ASSESSMENT/PLAN: (Interval History)  Jenn Kunz is seen and examined and management for following issues:    Left proximal humerus fracture  · Medical mgmt/NWB/Sling for comfort  · f/u Ortho as OP  · 7/15 = xray stable alignment/CT completed as well no acute issues noted  · LUE edema ongoing  · Doppler 7/20 negative     CHB  · S/p dual chamber PPM 6/29/22  · No heavy lift/push/pull >10 lbs  · No left arm above shoulder height   · See Cards OP     Stress cardiomyopathy  · D/W Dr Mamta Ngo 7/5/22 recommend outpt f/u    · [facility]B dc'd d/t hypotension     ABLA  · S/p 1 unit PRBCs  · Stable     PAUL cirrhosis  · Hx non-bleeding esophageal varices (banded) and hepatic encephalopathy   · Sees Dr Nyla Everett at [facility] as OP  · Home:  Rifaximin 550 mcg q12hrs/Lactulose 10Gm BID/Lasix 40mg qd/Corgard 20mg qd/Aldactone 50mg qd  · Here:  Rifaximin 550 mg q12hrs/Lactulose 10Gm BID/Corgard 10mg qd (hold SBP <120)/Lasix 40 mg qdAldactone 25mg qd   · Would recommend dc home on same  · Dopplers negative    DM2  · Home:  Janumet XR  BID/Amaryl 8 mg qAM/Basaglar 48U qam, 36U qpm  · Here:  Lantus 38U AM + 10U PM/Metformin 500 mg BID   · QID Accuchecks/SSI and DM diet  · Recommend dc home on same inpatient meds with close f/u PCP as well as ongoing BS checks at home at least 2x daily  · BS stable     Pancytopenia  · Is 2/2 to cirrhosis/portal HTN      · Additionally, has splenomegaly contributing to low platelet ct and leukopenia  · Has had tx with Venofer in past   Sees Dr Roshni Coffey from [facility]  · Baseline wbc 2-3 range   · Had BMB 8/2021 and was with no major abnormalities or MDS, only low iron stores  · stable     Hypothyroidism  · Continue Levothyroxine 112 mcg qd     Depression  · Continue Lexapro as at home    Emesis  · One time this AM during therapy (outside)  · Feels better now  · Monitor and dc after lunch if no further vomiting        Discharge date:  7/21/22=OK from medicine standpoint    The above assessment and plan was reviewed and updated as determined by my evaluation of the patient on 7/21/2022      Labs:   Results from last 7 days   Lab Units 07/20/22  0606 07/18/22  0514   WBC Thousand/uL 2 17* 2 47*   HEMOGLOBIN g/dL 8 1* 8 2*   HEMATOCRIT % 25 7* 27 4*   PLATELETS Thousands/uL 82* 79*     Results from last 7 days   Lab Units 07/20/22  0606 07/18/22  0514   SODIUM mmol/L 141 140   POTASSIUM mmol/L 4 3 4 1   CHLORIDE mmol/L 107 106   CO2 mmol/L 26 26   BUN mg/dL 23 26*   CREATININE mg/dL 0 80 0 91   CALCIUM mg/dL 8 9 9 0             Results from last 7 days   Lab Units 07/21/22  0645 07/20/22  2104 07/20/22  1537   POC GLUCOSE mg/dl 120 155* 169*       Review of Scheduled Meds:  Current Facility-Administered Medications   Medication Dose Route Frequency Provider Last Rate    aspirin  81 mg Oral Daily Candelario Mendes DO      bisacodyl  10 mg Rectal Daily PRN Candelario Mendes, DO      docusate sodium  100 mg Oral BID Candelario Mendes DO      enoxaparin  30 mg Subcutaneous Q12H [address] Candelario Mendes, DO      escitalopram  5 mg Oral Daily Candelario Mendes DO      furosemide  40 mg Oral Daily JOSE JUAN Smith      insulin glargine  10 Units Subcutaneous HS JOSE JUAN Barclay      insulin glargine  38 Units Subcutaneous QAM JOSE JUAN Barclay      insulin lispro  2-12 Units Subcutaneous 4x Daily (with meals and at bedtime) Candelario Mendes, DO      lactulose  10 g Oral BID Candelario Mendes DO      levothyroxine  112 mcg Oral Early Morning Candelario Mendes DO      lidocaine  2 patch Topical Daily Candelario Mendes DO      melatonin  6 mg Oral HS Candelario Mendes DO      metFORMIN  500 mg Oral BID With Meals JOSE JUAN Smith      methocarbamol  250 mg Oral 4x Daily PRN Candelario Mendes DO      nadolol  10 mg Oral Daily JOSE UJAN Barclay      nystatin   Topical BID JOSE JUAN Ruby      ondansetron  4 mg Oral Q6H PRN Bonna Payette, DO      pantoprazole  40 mg Oral Early Morning Bonna Payette, DO      polyethylene glycol  17 g Oral Daily Bonna Stefania, DO      pravastatin  80 mg Oral Daily With [employer] Cephus Grain, DO      rifaximin  550 mg Oral Q12H [address] Bonna Stefania, DO      senna  1 tablet Oral HS Bonna Stefania, DO      spironolactone  25 mg Oral Daily Stevie Nelsony [place], CRNP      traMADol  50 mg Oral Q6H PRN Bonna Stefania, DO         Subjective/ HPI: Patient seen and examined  Patients overnight issues or events were reviewed with nursing or staff during rounds or morning huddle session  New or overnight issues include the following:     Pt seen and examined walking in therapy; no overnight issues, she states she is ready for dc home today    ROS:   A 10 point ROS was performed; negative except as noted above  Imaging:     VAS upper limb venous duplex scan, unilateral/limited   Final Result by Niall Pastor MD (07/20 1635)      VAS lower limb venous duplex study, complete bilateral   Final Result by Manuel Pantoja DO (07/20 1542)      CT upper extremity wo contrast left   Final Result by Shauna West DO (07/20 0104)      No significant change in proximal humerus fracture  Workstation performed: [id number]         XR shoulder 2+ vw left   Final Result by Pasha Klein MD (82/67 1163)   Addendum 1 of 1 by Pasha Klein MD (07/19 4187)   ADDENDUM:      Compared to a study obtained on 07/15/2022, there is no significant change    in alignment  Final      Acute impaction fracture in the proximal left humerus with lateral rotation of the humeral head and widening of the glenohumeral joint  Workstation performed: [id number]         XR humerus left   Final Result by Pasha Klein MD (07/19 4088)      Comminuted impaction fracture in the proximal left humerus fairly similar in alignment     Soft tissue swelling about the elbow with a number of small radiopaque foreign bodies  Workstation performed: [id number]         XR humerus left   Final Result by Joshua Marcial DO (07/15 1532)      Stable comminuted and deformed humeral head/neck fracture  Workstation performed: [id number]             *Labs /Radiology studies reviewed  *Medications reviewed and reconciled as needed  *Please refer to order section for additional ordered labs studies  *Case discussed with primary attending during morning huddle case rounds    Physical Examination:  Vitals:   Vitals:    07/20/22 1220 07/20/22 1325 07/20/22 2043 07/21/22 0518   BP: 103/53 116/64 (!) 109/46 108/53   BP Location:  Right arm Right arm Right arm   Pulse: 59 67 63 75   Resp: 18 22 18 16   Temp:  98 °F (36 7 °C) 98 °F (36 7 °C) 98 8 °F (37 1 °C)   TempSrc:  Oral Oral Oral   SpO2:  98% 100% 94%   Weight:       Height:           GEN: No apparent distress, interactive  NEURO: Alert and oriented x3  HEENT: Pupils are equal and reactive, EOMI, mucous membranes are moist, face symmetrical  CV: S1 S2 regular, no MRG, 2/4 b/l LE peripheral edema; LUE 2/4 edema noted as well  RESP: Lungs are clear bilaterally, no wheezes, rales or rhonchi noted, on room air, respirations easy and non labored  GI: obese, soft non tender, non distended; +BS x4  : Voiding without difficulty  MUSC: Moves all extremities; except LUE, sling in place  SKIN: pink, warm and dry, normal turgor, no rashes, lesions        The above physical exam was reviewed and updated as determined by my evaluation of the patient on 7/21/2022  Invasive Devices  Report    None                    VTE Pharmacologic Prophylaxis: Enoxaparin  Code Status: Level 1 - Full Code  Current Length of Stay: 16 day(s)      Total time spent:  30 minutes with more than 50% spent counseling/coordinating care   Counseling includes discussion with patient re: progress  and discussion with patient of his/her current medical state/information  Coordination of patient's care was performed in conjunction with primary service  Time invested included review of patient's labs, vitals, and management of their comorbidities with continued monitoring  In addition, this patient was discussed with medical team including physician and advanced extenders  The care of the patient was extensively discussed and appropriate treatment plan was formulated unique for this patient  ** Please Note:  voice to text software may have been used in the creation of this document   Although proof errors in transcription or interpretation are a potential of such software**

## 2022-07-21 NOTE — PROGRESS NOTES
Occupational Therapy Treatment Note         07/21/22 1255   Assessment   Treatment Assessment OT met with pt and daughter in law Katheryn Lovell to review don/doffing of L edema glove and L ace wrap from wrist to elbow  Educated to remove these at night for skin integrity, but can wear for about 4 hours, 2x/day, and then remove to check skin integrity  Goal is to decrease L UE edema  Katheryn Lovell demonstrated ability to complete ace wrap of L UE wrist to elbow  OT educated that same method can be used for B/L LEs ace wrap, as pt does not tolerate dwayne stockings  Informed her of being careful of R heel DTI during wrapping  RN coming in after OT to review wound care  OT educated pt/family on bringing home green wedges and floating heels while lying in bed  Informed home therapy and nursing will be at pt's house, Katheryn Lovell reports plan will be to be at pt's house for 1-2 weeks prior to going back to [place] for a few days  Recommendation   OT Discharge Recommendation Home with home health rehabilitation   OT Therapy Minutes   OT Time In 1255   OT Time Out 1305   OT Total Time (minutes) 10   OT Mode of treatment - Individual (minutes) 10   OT Mode of treatment - Concurrent (minutes) 0   OT Mode of treatment - Group (minutes) 0   OT Mode of treatment - Co-treat (minutes) 0   OT Mode of Treatment - Total time(minutes) 10 minutes   OT Cumulative Minutes 1110   Therapy Time missed   Time missed?  No

## 2022-07-21 NOTE — ASSESSMENT & PLAN NOTE
1 isolated episode of vomiting while with therapy outside 7/21 am - no symptoms since  - Vitals stable, blood sugars stable   - Neuro and entire exam stable  - Seen by IM - still cleared for d/c  - Monitor

## 2022-07-22 NOTE — CASE MANAGEMENT
Team dc summary - pt made gains and returned home w/family support and contd hhc services through [facility] for rn pt ot and sw services  No dme needs were identified at the time of dc  Family training occurred with pts dtr in law who would be the one to provide hands on assist  dtr in law present for dc instructions

## 2022-07-23 PROBLEM — R23.8 SKIN BREAKDOWN: Status: ACTIVE | Noted: 2022-07-23

## 2022-07-23 NOTE — ASSESSMENT & PLAN NOTE
Buttock/sacrum DTI - POA - stable to improving  R heel DTI   Wound care c/s'd and assist with mgmt during ARC course  Completed CG training  [facility] RN, OP PCP follow-up     -Buttock/sacrum skin breakdown - Cleanse sacro-buttocks with soap and water  Apply Allevyn Foam Dressing  Change every other day and as needed if soiled   Right heel deep tissue injury - Apply Allevyn Life foam dressing  Change dressing every 3 days and as needed if soiled  - Elevate/Float heels to offload pressure   -Monitor skin closely and notify MD if not improving or worsening  -Turn/reposition every 2 hours   -Elevate/Float heels to offload pressure   -Use cushion in chair when out of bed    -Frequent weight shifts while in chair

## 2022-07-23 NOTE — DISCHARGE SUMMARY
Discharge Summary - PMR   Tom Perkins 77 y o  female MRN: [id number]  Unit/Bed#: -55 Encounter: [id number]    Admission Date: 7/5/2022     Discharge Date: 7/21/22    Rehabilitation/Etiologic Diagnosis:    Orthopedic Disorders:  08 9  Other Orthopedic Left Humerus fracture   L humerus fx (Complete Heart Block s/p PM placement)    Discharge Diagnoses:      Patient Active Problem List   Diagnosis    Hyperlipidemia    Essential hypertension    Acquired hypothyroidism    Type 2 diabetes mellitus with hyperglycemia, with long-term current use of insulin ([facility] )    Diabetic polyneuropathy associated with type 2 diabetes mellitus ([facility] )    Hepatic encephalopathy ([facility] )    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) (HCC)    Thrombocytopenia (HCC)    Urinary tract infection    Mild nonproliferative diabetic retinopathy of both eyes without macular edema associated with type 2 diabetes mellitus (HCC)    Pancytopenia (HCC)    Iron deficiency anemia due to chronic blood loss    Esophageal varices without bleeding (HCC)    Complete heart block (HCC)    Closed fracture of proximal end of left humerus    Depression    Cardiomyopathy ([facility] )    Acute blood loss anemia    Obesity (BMI 30-39  9)    Abnormal bone xray    Healthcare maintenance    Nausea    Skin breakdown       Acute Rehabilitation Center Course:      Patient participated in a comprehensive interdisciplinary inpatient rehabilitation program which included involvment of MD, therapies (PT, OT), RN, CM/SW, dietary  She made significant functional gains and was able to be advanced to a largely mod indep for transfers and ambulation but still needs assist for several ADLs  and is considered adequately safe for discharge home with family  Patient remains some fall/injury risk however this risk has decreased since admission to [facility]    Caregiver training completed and with adequate oversight patient is cleared for discharge home with family with supervision/assist      Medical issues with comanagement from our internal medicine, hematology, and orthopedic teams as outlined below  Please see below for patient's hospital course and day to day management of medical needs with significant findings, complications (if applicable), treatment, and services provided in problem list format  Functional status on admission to ARC:  Total assist LBD  Significant assist to hygiene, bathing, UBD  Transfers Significant assist   Ambulation/Mobility 10 ft mod assist     Functional status on discharge from ARC:  Significant assist Bathing, UBD  Supervision LBD  Mod indep To hygiene  Transfers mod indep   Ambulation/Mobility 150 ft mod indep        Skin breakdown  Assessment & Plan  Buttock/sacrum DTI - POA - stable to improving  R heel DTI   Wound care c/s'd and assist with mgmt during ARC course  Completed CG training  HH RN, OP PCP follow-up     -Buttock/sacrum skin breakdown - Cleanse sacro-buttocks with soap and water  Apply Allevyn Foam Dressing  Change every other day and as needed if soiled   Right heel deep tissue injury - Apply Allevyn Life foam dressing  Change dressing every 3 days and as needed if soiled  - Elevate/Float heels to offload pressure   -Monitor skin closely and notify MD if not improving or worsening  -Turn/reposition every 2 hours   -Elevate/Float heels to offload pressure   -Use cushion in chair when out of bed    -Frequent weight shifts while in chair    Nausea  Assessment & Plan  1 isolated episode of vomiting while with therapy outside 7/21 am - no symptoms since  - Vitals stable, blood sugars stable   - Neuro and entire exam stable  - Seen by IM - still cleared for d/c  - Monitor     Healthcare maintenance  Assessment & Plan  Patient has been on chronic aspirin   - She states she thinks she was on aspirin 81mg every 3 days but is not certain who told her this and why  - Prior notes documenting aspirin 81mg qday and she was taking daily in hospital   - Spoke to PCP office prior to d/c on 7/21 - and they saw her 3/2022 and at that time aspirin listed as every other day but per MA they do not have why  - Recent GI notes do not discuss aspiring  - Recommend aspirin 81mg QOD and follow-up with PCP within 1-2 weeks to confirm; also follow-up GI     Abnormal bone xray  Assessment & Plan  L Humerus XR incidental finding 7/18  "Soft tissue swelling about the elbow with a number of small radiopaque foreign bodies  "  - Reached out to orthopedics - spoke to ortho resident after d/c who spoke to attending and they felt this was benign but would still follow-up with outpatient primary care and orthopedics - which pt/family are aware of   - Notify physician if you develop increased redness, pain or other concerns around L elbow     Obesity (BMI 30-39  9)  Assessment & Plan  · BMI 36 03  · Weight loss counseling, promote increased activity    Acute blood loss anemia  Assessment & Plan  · Acute blood loss anemia, Hgb 6 9 previously, s/p transfusion  · Hgb on 7/20 of 8 1  · Will require close follow with labs  · Monitor clinically as well in therapy    Cardiomyopathy [facility])  Assessment & Plan  · Medicine team reached out to Cardiology re: advice with recs for further w/u based on ECHO results from 7/5/22  · Per Cardiology, outpatient follow up, no acute changes based on echo  · (Losartan on hold with borderline low BP with cirrhosis meds)      Depression  Assessment & Plan  · Continue home lexapro 5mg daily    Complete heart block [facility])  Assessment & Plan  · S/p dual chamber PPM 6/29/22 with Dr Tari Baer  · Monitor pacemaker site for signs of infection - not currently  · Called Cardiac Device clinic 7/21 - patient had site check by ROBLES Fraga on 7/13/22 while in [facility] (no note) - per clinic there were no sutures and likely will not need OP follow-up with device clinic until October   · OP Cards follow-up   · No heavy lifting/push/pull >10 lbs    Esophageal varices without bleeding ([facility] )  Assessment & Plan  See cirrhosis above  OP GI follow-up    Iron deficiency anemia due to chronic blood loss  Assessment & Plan  · 8 1 7/20    Pancytopenia ([facility] )  Assessment & Plan  · Secondary to cirrhosis, monitor labs  · Stable per recent H-O c/s in ARC  · Can follow-up with PCP and also hem-onc    Mild nonproliferative diabetic retinopathy of both eyes without macular edema associated with type 2 diabetes mellitus [facility])  Assessment & Plan  Lab Results   Component Value Date    HGBA1C 8 2 (H) 05/31/2022       Recent Labs     07/19/22  1053 07/19/22  1556 07/19/22  2057 07/20/22  0653   POCGLU 169* 192* 195* 101       Blood Sugar Average: Last 72 hrs:  (P) 164    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) (HCC)  Assessment & Plan  · Hx non-bleeding esophageal varices and hepatic encephalopathy   · Here:  Rifaximin 550 mg q12hrs/Lactulose 10Gm BID/Nadolol 10mg qday/Spironolactone 25mg qdya  · OP GI follow-up     Diabetic polyneuropathy associated with type 2 diabetes mellitus ([facility] )  Assessment & Plan  Lab Results   Component Value Date    HGBA1C 8 2 (H) 05/31/2022       Recent Labs     07/19/22  1053 07/19/22  1556 07/19/22  2057 07/20/22  0653   POCGLU 169* 192* 195* 101       Blood Sugar Average: Last 72 hrs:  (P) 164    Type 2 diabetes mellitus with hyperglycemia, with long-term current use of insulin [facility])  Assessment & Plan  Lab Results   Component Value Date    HGBA1C 8 2 (H) 05/31/2022       Recent Labs     07/20/22  2104 07/21/22  0645 07/21/22  0911 07/21/22  1035   POCGLU 155* 120 176* 169*     · Home:  Janumet XR  BID/Amaryl 8 mg qAM/Basaglar 48U qam, 36U qpm  · Here:  Lantus 38U qam/10U Qpm, Metformin 500 mg BID  · QID Accuchecks to start    Acquired hypothyroidism  Assessment & Plan  · Discussed with IM prior to d/c  · Can go back on home regimen - levothyroxine 125mcg 6 days per week and 1/2 tab on Sunday; prior TSH acceptable   · OP Endo/PCP follow-up     Essential hypertension  Assessment & Plan  · Mgmt per IM   · They recommend d/c meds:  · Lasix 40mg qday, Spironolactone 25mg qday, Nadolol 10mg qday    Hyperlipidemia  Assessment & Plan  · Continue statin      * Closed fracture of proximal end of left humerus  Assessment & Plan  · Function improving  · CG training completed   · Displaced comminuted fracture of the humeral head/neck on imaging  · NWB to the left upper limb, maintain in sling  · Ok for for gentle range of motion to prevent adhesive capsulitis  · Pain control  · PT/OT in ARC completed recommend  PT, OT   · Follow up with Dr Esdras Reed per orthopaedics as OP    · Shoulder/humerus XR 7/18  · Impression:  Comminuted impaction fracture in the proximal left humerus fairly similar in alignment  Soft tissue swelling about the elbow with a number of small radiopaque foreign bodies  Acute impaction fracture in the proximal left humerus with lateral rotation of the humeral head and widening of the glenohumeral joint  · Ortho recommended CT of shoulder - completed - per Dr Esdras Reed can still follow-up as OP   · CT 7/19 : Comminuted impacted humeral head and neck fracture, without significant change in alignment  No significant osseous bridging  Mild incongruity of the articular surface  Involvement of the greater tuberosity  No glenoid fracture identified  Moderate acromioclavicular osteoarthritis  Small glenohumeral joint effusion  Muscle bulk is preserved  Tendon contours poorly evaluated  Small left pleural effusion  · LUE still fairly swollen and painful - neurovascularly intact  · IM team evaluated arm prior to d/c and do not feel this is cellulitis but rather residual edema from injury   · Continue close monitoring at home and with PCP/ortho  · You can use glove during day with skin checks every few hours  Remove at night and do NOT use if increased skin discoloration, pain, or other concerns  You can gentle wrap left hand and arm without glove with ACE wrap as discussed but do NOT over tighten  Remove at night and do NOT use if increased skin discoloration, pain, or other concerns  · 7/20 Venous US LUE - negative DVT         Follow-up providers and other issues to be followed up after discharge through PCP and other specialists   PCP   Evelyn Akhtar   Hematology   GI   Cards   Device     - See AVS (After visit summary) with discharge instructions, discharge medications, and other details  Imaging (See above as well):  VAS upper limb venous duplex scan, unilateral/limited   Final Result by Cuca Castillo MD (07/20 1635)      VAS lower limb venous duplex study, complete bilateral   Final Result by Beverley Gonzalez DO (07/20 1542)      CT upper extremity wo contrast left   Final Result by Inna Loaiza DO (07/20 0104)      No significant change in proximal humerus fracture  Workstation performed: [id number]         XR shoulder 2+ vw left   Final Result by Deny Choudhary MD (10/23 9931)   Addendum 1 of 1 by Deny Choudhary MD (07/19 8888)   ADDENDUM:      Compared to a study obtained on 07/15/2022, there is no significant change    in alignment  Final      Acute impaction fracture in the proximal left humerus with lateral rotation of the humeral head and widening of the glenohumeral joint  Workstation performed: [id number]         XR humerus left   Final Result by Deny Choudhary MD (07/19 1190)      Comminuted impaction fracture in the proximal left humerus fairly similar in alignment  Soft tissue swelling about the elbow with a number of small radiopaque foreign bodies  Workstation performed: [id number]         XR humerus left   Final Result by Joshua Barber DO (07/15 1532)      Stable comminuted and deformed humeral head/neck fracture              Workstation performed: [id number]             Pertinent Recent Labs (See Course above, EPIC EMR, and if needed request additional records):   Latest Reference Range & Units 07/20/22 06:06   Sodium 135 - 147 mmol/L 141   Potassium 3 5 - 5 3 mmol/L 4 3   Chloride 96 - 108 mmol/L 107   CO2 21 - 32 mmol/L 26   Anion Gap 4 - 13 mmol/L 8   BUN 5 - 25 mg/dL 23   Creatinine 0 60 - 1 30 mg/dL 0 80   Glucose, Random 65 - 140 mg/dL 94   GLUCOSE FASTING 65 - 99 mg/dL 94   Calcium 8 3 - 10 1 mg/dL 8 9   eGFR ml/min/1 73sq m 72   WBC 4 31 - 10 16 Thousand/uL 2 17 (L)   Red Blood Cell Count 3 81 - 5 12 Million/uL 2 87 (L)   Hemoglobin 11 5 - 15 4 g/dL 8 1 (L)   HCT 34 8 - 46 1 % 25 7 (L)   MCV 82 - 98 fL 90   MCH 26 8 - 34 3 pg 28 2   MCHC 31 4 - 37 4 g/dL 31 5   RDW 11 6 - 15 1 % 19 7 (H)   Platelet Count 189 - 390 Thousands/uL 82 (L)   (L): Data is abnormally low  (H): Data is abnormally high    Procedures Performed During ARC Admission: None    Discharge Physical Examination:      Vitals:    07/21/22 0903   BP: 147/62   Pulse: 71   Resp: 18   Temp: 98 °F (36 7 °C)   SpO2:      Vitals above reviewed on date of encounter    GEN:  Sitting in NAD   HEENT/NECK: Normocephalic, atraumatic, moist mucous membranes   CARDIAC: Regular rate rhythm, no murmers, no rubs, no gallops  LUNGS:  clear to auscultation, no wheezes, rales, or rhonchi  ABDOMEN: Soft, non-tender, non-distended, normal active bowel sounds  EXTREMITIES/SKIN:  LUE with generalized edema, N/V intact, some tenderness still around elbow and shoulder, Buttock evolving DTI stable; R heel DTI stable    NEURO:   MENTAL STATUS: awake, oriented to person, place, time, and situation and MENTAL STATUS:  Appropriate wakefulness and interaction   PSYCH:  Affect:  Euthymic     HPI:   Per Dr Marcela Goldman  "Malachi Clifton is a 77 y o  female with type 2 diabetes, hypertension, depression, hypothyroidism, hx of PAUL cirrhosis, esophageal varices, pancytopenia, HLD, hypothyroidism who presented to the [facility] on 6/29 after tripping over her flip flops, falling and sustaining a left humerus fracture treated non-operatively with NWB to the left arm in a sling  She was found to be in complete heart block with HR in the 20-230s range, developing cardiogenic shock requiring pressors and TVP  She underwent a Permanent pacemaker placement on 6/29 with Dr Sarah Abdul  Course complicated by CLYDE and blood loss anemia with hemoglobin of 6 9 s/p 1 unit pRBC  Initial ECHO on 6/29/22 showed moderately dilated left ventricular cavity size, LVEF 55%, mild bi-atrial enlargement   There were regional wall motion abnormalities with akinesis of the apical anterior, apical septal, apical inferior, apical lateral walls and the apex   There was hyperkinesis of the basal anterior, basal anteroseptal, basal inferoseptal, basal inferior, basal inferolateral, basal anterolateral, mid anterior, mid anteroseptal, mid inferoseptal, mid inferior, mid inferolateral and mid anterolateral  RVEF was moderately reduced   The LV systolic function was abnormal in a pattern suggestive of apical stress cardiomyopathy   She was started on Losartan by Cardiology and a repeat ECHO done today 7/5/22  It showed LVEF of 50% with grade 1 diastolic dysfunction, akinesis of the apex, hypokinesis of the apical anterior, septal, inferior/lateral walls  The patient was evaluated by the Rehabilitation team and deemed an appropriate candidate for comprehensive inpatient rehabilitation and admitted to the [facility] on 7/5/2022  5:43 PM"    Condition at Discharge: stable     Discharge instructions/Information to patient and family:   See after visit summary for information provided to patient and family  Provisions for Follow-Up Care:  See after visit summary for information related to follow-up care and any pertinent home health orders        Disposition: Home with family     Planned Readmission:  No    Discharge Statement   Total time spent examining patient, counseling patient and family on condition, medication, rehabilitation/medical plan, and coordinating care on day of discharge: at least 65 minutes  Greater than 50% of the total time was spent examining patient, answering all questions, directly discussing plan of care and post-discharge instructions with patient (or patient and family) some of which specifically related to recent fracture/hospitalization  Additional time spent on coordinating care and other discharge activities  Discharge Medications:  See after visit summary for reconciled discharge medications provided to patient and family  I personally performed the required components and examined the patient myself in person on 7/21/22

## 2022-07-26 ENCOUNTER — TELEPHONE (OUTPATIENT)
Dept: HEMATOLOGY ONCOLOGY | Facility: CLINIC | Age: 77
End: 2022-07-26

## 2022-07-26 ENCOUNTER — OFFICE VISIT (OUTPATIENT)
Dept: OBGYN CLINIC | Facility: CLINIC | Age: 77
End: 2022-07-26
Payer: MEDICARE

## 2022-07-26 VITALS — BODY MASS INDEX: 37.54 KG/M2 | SYSTOLIC BLOOD PRESSURE: 120 MMHG | HEIGHT: 64 IN | DIASTOLIC BLOOD PRESSURE: 68 MMHG

## 2022-07-26 DIAGNOSIS — S42.202A CLOSED TRAUMATIC DISPLACED FRACTURE OF PROXIMAL END OF LEFT HUMERUS, INITIAL ENCOUNTER: Primary | ICD-10-CM

## 2022-07-26 PROCEDURE — 99214 OFFICE O/P EST MOD 30 MIN: CPT | Performed by: ORTHOPAEDIC SURGERY

## 2022-07-26 NOTE — PROGRESS NOTES
Assessment:     1  Closed traumatic displaced fracture of proximal end of left humerus, initial encounter        Plan:     Problem List Items Addressed This Visit        Musculoskeletal and Integument    Closed traumatic displaced fracture of proximal end of left humerus - Primary     Findings consistent with left proximal humerus displaced fracture  Discussed findings and treatment options with the patient  I reviewed patient's left shoulder x-ray with her and her daughter  I discussed prognosis of her shoulder condition  Discussed with patient has high risk at this time due to her medial problems, recently had a pacemaker placement on 6/29/22  Patient and family also does not want to consider surgical treatment due to the higher surgical risk  She is aware she will have some limited motion in her left shoulder if not having surgery but will be able to do things at waist height  Continue wearing protective sling  Provided patient with a new protect sling and instructed how to with the sling  Advised patient to come out of the sling and do pendulum exercises and gentle range of motion exercises to the wrist and elbow  Repeat x-rays at the next office visit  Follow up in 2-3 weeks  Will need to initiate physical therapy when the fracture heals  All patient's questions were answered to her satisfaction  This note is created using dictation transcription  It may contain typographical errors, grammatical errors, improperly dictated words, background noise and other errors  Relevant Orders    Durable Medical Equipment          Subjective:     Patient ID: Joe Mobley is a 77 y o  female  Chief Complaint:  50-year-old female who presents today for evaluation for left shoulder pain  She had injury on 06/29/2022  She is RHD  Nikita Chandler She states she was walking a grassy surface and her left foot got caught in a  ditch causing her to fall onto her left side   She states she was laying on the ground for 2 hours before someone heard her yelling  She taken to the ED by EMS and had x-rays of the left shoulder and humerus which showed comminuted impacted proximal humerus fracture and was placed in a protective sling  She also had a pacemaker placement on 6/29/22 due to cardiac blockage  She was discharged from rehab center last week  Patient was to have appointment with Dr Royce Schwab but too far to travel  She is having pain in the proximal humerus region left  She is present in the protective sling  She is taking Tramadol for pain relief  Patient is here with her family today  Information on patient's intake form was reviewed  Allergy:  Allergies   Allergen Reactions    Bee Venom Swelling    Latex     Sesame Seed (Diagnostic) - Food Allergy      Other reaction(s): swelling inside mouth    Strawberry C [Ascorbate - Food Allergy] Other (See Comments)     Mouth sores    Penicillins Rash     Medications:  all current active meds have been reviewed  Past Medical History:  Past Medical History:   Diagnosis Date    Anemia     Cirrhosis (HCC)     Diabetic neuropathy (HCC)     Esophageal varices (HCC)     Fatty liver     GERD (gastroesophageal reflux disease)     Hepatic encephalopathy (HCC)     Hiatal hernia     Hyperlipidemia     Hypertension     Hypoglycemia     Hypothyroidism     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)     Osteoarthritis     Osteopenia     Pancytopenia (HCC)     Thrombocytopenia (HCC)     Type 2 diabetes mellitus ([facility] [address] )      Past Surgical History:  Past Surgical History:   Procedure Laterality Date    ABDOMINAL SURGERY      Abdominal plasty with mesh insertion    CARDIAC ELECTROPHYSIOLOGY PROCEDURE N/A 6/29/2022    Procedure: Cardiac pacer implant;  Surgeon: Sterling White MD;  Location: [facility];   Service: Cardiology    CATARACT EXTRACTION, BILATERAL      CATARACT EXTRACTION, BILATERAL      COLONOSCOPY      EGD AND COLONOSCOPY  03/18/2015    IR BIOPSY BONE MARROW 8/24/2021    LAPAROSCOPIC CHOLECYSTECTOMY      SHOULDER SURGERY Right     calcium depsoti    TUBAL LIGATION      UMBILICAL HERNIA REPAIR      with mesh placed    UPPER GASTROINTESTINAL ENDOSCOPY       Family History:  Family History   Problem Relation Age of Onset   [de-identified] Breast cancer Mother     Diabetes type II Father     Thyroid disease unspecified Daughter     Down syndrome Daughter     Diabetes type II Daughter     Diabetes type II Sister     No Known Problems Brother     Prostate cancer Brother     No Known Problems Sister     Stroke Sister     No Known Problems Son     Breast cancer Maternal Aunt     Colon cancer Neg Hx      Social History:  Social History     Substance and Sexual Activity   Alcohol Use Not Currently     Social History     Substance and Sexual Activity   Drug Use No     Social History     Tobacco Use   Smoking Status Never Smoker   Smokeless Tobacco Never Used     Review of Systems   Constitutional: Negative for chills and fever  HENT: Negative for ear pain and sore throat  Eyes: Negative for pain and visual disturbance  Respiratory: Negative for cough and shortness of breath  Cardiovascular: Negative for chest pain and palpitations  Gastrointestinal: Negative for abdominal pain and vomiting  Genitourinary: Negative for dysuria and hematuria  Musculoskeletal: Positive for arthralgias (Left shoulder) and joint swelling (Left upper extremity)  Negative for back pain  Skin: Negative for color change and rash  Neurological: Negative for seizures and syncope  Psychiatric/Behavioral: Negative  All other systems reviewed and are negative  Objective:  BP Readings from Last 1 Encounters:   07/26/22 120/68      Wt Readings from Last 1 Encounters:   07/21/22 99 2 kg (218 lb 11 1 oz)      BMI:   Estimated body mass index is 37 54 kg/m² as calculated from the following:    Height as of this encounter: 5' 4" (1 626 m)      Weight as of 7/21/22: 99 2 kg (218 lb 11 1 oz)  BSA:   Estimated body surface area is 2 03 meters squared as calculated from the following:    Height as of this encounter: 5' 4" (1 626 m)  Weight as of 7/21/22: 99 2 kg (218 lb 11 1 oz)  Physical Exam  Vitals and nursing note reviewed  Constitutional:       Appearance: She is well-developed  She is obese  HENT:      Head: Normocephalic and atraumatic  Right Ear: External ear normal       Left Ear: External ear normal    Eyes:      Extraocular Movements: Extraocular movements intact  Conjunctiva/sclera: Conjunctivae normal    Cardiovascular:      Rate and Rhythm: Regular rhythm  Pulmonary:      Effort: Pulmonary effort is normal    Musculoskeletal:      Cervical back: Neck supple  Skin:     General: Skin is warm and dry  Neurological:      Mental Status: She is alert and oriented to person, place, and time  Deep Tendon Reflexes: Reflexes are normal and symmetric  Psychiatric:         Mood and Affect: Mood normal          Behavior: Behavior normal        Left Shoulder Exam     Tenderness   Left shoulder tenderness location: Diffuse proximal humerus  Other   Erythema: absent  Scars: absent  Sensation: normal  Pulse: present     Comments:  Patient is present in the protective sling   ecchymosis in the wrist and hand region  Swelling in the upper extremity  Range of motion and strength not assessed due to known fracture            I have personally reviewed pertinent films in PACS and my interpretation is x-ray left shoulder demonstrates comminuted displaced proximal humerus fracture       Scribe Attestation    I,:  Delaney Chapman am acting as a scribe while in the presence of the attending physician :       I,:  Roger Méndez MD personally performed the services described in this documentation    as scribed in my presence :

## 2022-07-26 NOTE — ASSESSMENT & PLAN NOTE
Findings consistent with left proximal humerus displaced fracture  Discussed findings and treatment options with the patient  I reviewed patient's left shoulder x-ray with her and her daughter  I discussed prognosis of her shoulder condition  Discussed with patient has high risk at this time due to her medial problems, recently had a pacemaker placement on 6/29/22  Patient and family also does not want to consider surgical treatment due to the higher surgical risk  She is aware she will have some limited motion in her left shoulder if not having surgery but will be able to do things at waist height  Continue wearing protective sling  Provided patient with a new protect sling and instructed how to with the sling  Advised patient to come out of the sling and do pendulum exercises and gentle range of motion exercises to the wrist and elbow  Repeat x-rays at the next office visit  Follow up in 2-3 weeks  Will need to initiate physical therapy when the fracture heals  All patient's questions were answered to her satisfaction  This note is created using dictation transcription  It may contain typographical errors, grammatical errors, improperly dictated words, background noise and other errors

## 2022-07-27 ENCOUNTER — APPOINTMENT (OUTPATIENT)
Dept: LAB | Facility: HOSPITAL | Age: 77
End: 2022-07-27
Payer: MEDICARE

## 2022-07-27 DIAGNOSIS — D61.818 PANCYTOPENIA (HCC): ICD-10-CM

## 2022-07-27 DIAGNOSIS — D50.0 IRON DEFICIENCY ANEMIA DUE TO CHRONIC BLOOD LOSS: ICD-10-CM

## 2022-07-27 LAB
ALBUMIN SERPL BCP-MCNC: 2.6 G/DL (ref 3.5–5)
ALP SERPL-CCNC: 84 U/L (ref 46–116)
ALT SERPL W P-5'-P-CCNC: 33 U/L (ref 12–78)
ANION GAP SERPL CALCULATED.3IONS-SCNC: 5 MMOL/L (ref 4–13)
AST SERPL W P-5'-P-CCNC: 36 U/L (ref 5–45)
BASOPHILS # BLD AUTO: 0.02 THOUSANDS/ΜL (ref 0–0.1)
BASOPHILS NFR BLD AUTO: 1 % (ref 0–1)
BILIRUB SERPL-MCNC: 0.71 MG/DL (ref 0.2–1)
BUN SERPL-MCNC: 23 MG/DL (ref 5–25)
CALCIUM ALBUM COR SERPL-MCNC: 10 MG/DL (ref 8.3–10.1)
CALCIUM SERPL-MCNC: 8.9 MG/DL (ref 8.3–10.1)
CHLORIDE SERPL-SCNC: 108 MMOL/L (ref 96–108)
CO2 SERPL-SCNC: 26 MMOL/L (ref 21–32)
CREAT SERPL-MCNC: 1.01 MG/DL (ref 0.6–1.3)
EOSINOPHIL # BLD AUTO: 0.08 THOUSAND/ΜL (ref 0–0.61)
EOSINOPHIL NFR BLD AUTO: 3 % (ref 0–6)
ERYTHROCYTE [DISTWIDTH] IN BLOOD BY AUTOMATED COUNT: 19.2 % (ref 11.6–15.1)
FERRITIN SERPL-MCNC: 25 NG/ML (ref 8–388)
GFR SERPL CREATININE-BSD FRML MDRD: 54 ML/MIN/1.73SQ M
GLUCOSE P FAST SERPL-MCNC: 197 MG/DL (ref 65–99)
HCT VFR BLD AUTO: 28 % (ref 34.8–46.1)
HGB BLD-MCNC: 8.3 G/DL (ref 11.5–15.4)
IMM GRANULOCYTES # BLD AUTO: 0.01 THOUSAND/UL (ref 0–0.2)
IMM GRANULOCYTES NFR BLD AUTO: 0 % (ref 0–2)
IRON SATN MFR SERPL: 8 % (ref 15–50)
IRON SERPL-MCNC: 28 UG/DL (ref 50–170)
LYMPHOCYTES # BLD AUTO: 0.52 THOUSANDS/ΜL (ref 0.6–4.47)
LYMPHOCYTES NFR BLD AUTO: 17 % (ref 14–44)
MCH RBC QN AUTO: 27.6 PG (ref 26.8–34.3)
MCHC RBC AUTO-ENTMCNC: 29.6 G/DL (ref 31.4–37.4)
MCV RBC AUTO: 93 FL (ref 82–98)
MONOCYTES # BLD AUTO: 0.42 THOUSAND/ΜL (ref 0.17–1.22)
MONOCYTES NFR BLD AUTO: 14 % (ref 4–12)
NEUTROPHILS # BLD AUTO: 1.97 THOUSANDS/ΜL (ref 1.85–7.62)
NEUTS SEG NFR BLD AUTO: 65 % (ref 43–75)
NRBC BLD AUTO-RTO: 0 /100 WBCS
PLATELET # BLD AUTO: 80 THOUSANDS/UL (ref 149–390)
PMV BLD AUTO: 13.4 FL (ref 8.9–12.7)
POTASSIUM SERPL-SCNC: 4.2 MMOL/L (ref 3.5–5.3)
PROT SERPL-MCNC: 6.1 G/DL (ref 6.4–8.4)
RBC # BLD AUTO: 3.01 MILLION/UL (ref 3.81–5.12)
SODIUM SERPL-SCNC: 139 MMOL/L (ref 135–147)
TIBC SERPL-MCNC: 369 UG/DL (ref 250–450)
WBC # BLD AUTO: 3.02 THOUSAND/UL (ref 4.31–10.16)

## 2022-07-27 PROCEDURE — 82728 ASSAY OF FERRITIN: CPT

## 2022-07-27 PROCEDURE — 85025 COMPLETE CBC W/AUTO DIFF WBC: CPT

## 2022-07-27 PROCEDURE — 36415 COLL VENOUS BLD VENIPUNCTURE: CPT

## 2022-07-27 PROCEDURE — 83550 IRON BINDING TEST: CPT

## 2022-07-27 PROCEDURE — 83540 ASSAY OF IRON: CPT

## 2022-07-27 PROCEDURE — 80053 COMPREHEN METABOLIC PANEL: CPT

## 2022-07-29 ENCOUNTER — TELEPHONE (OUTPATIENT)
Dept: HEMATOLOGY ONCOLOGY | Facility: CLINIC | Age: 77
End: 2022-07-29

## 2022-07-29 NOTE — TELEPHONE ENCOUNTER
Appointment Cancellation Or Reschedule     Person calling in patient   Provider Mir Mcclain   Office Visit Date and Time 7/29 @ 9am   Office Visit Location [place]   Did patient want to reschedule their office appointment? If so, when was it scheduled to? Yes 8/5 @ 1030   Is this patient calling to reschedule an infusion appointment? no   When is their next infusion appointment? n/a   Is this patient a Chemo patient? no   Reason for Cancellation or Reschedule Patient states she over slept and had transportation issue for appt on 7/29      If the patient is a treatment patient, please route this to the office nurse  If the patient is not on treatment, please route to the office MA  If the patient is a surgical oncology patient, please route to surg/onc clinical pool

## 2022-08-04 ENCOUNTER — HOSPITAL ENCOUNTER (INPATIENT)
Facility: HOSPITAL | Age: 77
LOS: 10 days | Discharge: NURSING, REHAB OR LONG-TERM CARE | DRG: 441 | End: 2022-08-15
Attending: EMERGENCY MEDICINE | Admitting: INTERNAL MEDICINE
Payer: MEDICARE

## 2022-08-04 DIAGNOSIS — D50.0 IRON DEFICIENCY ANEMIA DUE TO CHRONIC BLOOD LOSS: ICD-10-CM

## 2022-08-04 DIAGNOSIS — D61.818 PANCYTOPENIA (HCC): ICD-10-CM

## 2022-08-04 DIAGNOSIS — R73.9 HYPERGLYCEMIA: ICD-10-CM

## 2022-08-04 DIAGNOSIS — U07.1 COVID-19: ICD-10-CM

## 2022-08-04 DIAGNOSIS — K74.60 LIVER CIRRHOSIS SECONDARY TO NASH (NONALCOHOLIC STEATOHEPATITIS) (HCC): Chronic | ICD-10-CM

## 2022-08-04 DIAGNOSIS — E72.20 HYPERAMMONEMIA (HCC): Primary | ICD-10-CM

## 2022-08-04 DIAGNOSIS — K75.81 LIVER CIRRHOSIS SECONDARY TO NASH (NONALCOHOLIC STEATOHEPATITIS) (HCC): Chronic | ICD-10-CM

## 2022-08-04 DIAGNOSIS — E66.9 OBESITY (BMI 30-39.9): ICD-10-CM

## 2022-08-04 DIAGNOSIS — E03.9 HYPOTHYROIDISM: ICD-10-CM

## 2022-08-04 DIAGNOSIS — R79.89 ELEVATED SERUM CREATININE: ICD-10-CM

## 2022-08-04 DIAGNOSIS — E83.42 HYPOMAGNESEMIA: ICD-10-CM

## 2022-08-04 DIAGNOSIS — F32.A DEPRESSION, UNSPECIFIED DEPRESSION TYPE: Chronic | ICD-10-CM

## 2022-08-04 DIAGNOSIS — N17.9 ACUTE KIDNEY INJURY (HCC): ICD-10-CM

## 2022-08-04 DIAGNOSIS — Z79.4 TYPE 2 DIABETES MELLITUS WITH HYPERGLYCEMIA, WITH LONG-TERM CURRENT USE OF INSULIN (HCC): Chronic | ICD-10-CM

## 2022-08-04 DIAGNOSIS — K76.82 HEPATIC ENCEPHALOPATHY: ICD-10-CM

## 2022-08-04 DIAGNOSIS — I85.10 SECONDARY ESOPHAGEAL VARICES WITHOUT BLEEDING (HCC): ICD-10-CM

## 2022-08-04 DIAGNOSIS — E11.65 TYPE 2 DIABETES MELLITUS WITH HYPERGLYCEMIA, WITH LONG-TERM CURRENT USE OF INSULIN (HCC): Chronic | ICD-10-CM

## 2022-08-04 LAB
ALBUMIN SERPL BCP-MCNC: 2.4 G/DL (ref 3.5–5)
ALP SERPL-CCNC: 92 U/L (ref 46–116)
ALT SERPL W P-5'-P-CCNC: 22 U/L (ref 12–78)
AMMONIA PLAS-SCNC: 60 UMOL/L (ref 11–35)
ANION GAP SERPL CALCULATED.3IONS-SCNC: 7 MMOL/L (ref 4–13)
AST SERPL W P-5'-P-CCNC: 30 U/L (ref 5–45)
BASE EXCESS BLDA CALC-SCNC: 1 MMOL/L (ref -2–3)
BASOPHILS # BLD AUTO: 0.04 THOUSANDS/ΜL (ref 0–0.1)
BASOPHILS NFR BLD AUTO: 1 % (ref 0–1)
BETA-HYDROXYBUTYRATE: 0.1 MMOL/L
BILIRUB SERPL-MCNC: 0.7 MG/DL (ref 0.2–1)
BUN SERPL-MCNC: 35 MG/DL (ref 5–25)
CA-I BLD-SCNC: 1.15 MMOL/L (ref 1.12–1.32)
CALCIUM ALBUM COR SERPL-MCNC: 10 MG/DL (ref 8.3–10.1)
CALCIUM SERPL-MCNC: 8.7 MG/DL (ref 8.3–10.1)
CHLORIDE SERPL-SCNC: 105 MMOL/L (ref 96–108)
CO2 SERPL-SCNC: 28 MMOL/L (ref 21–32)
CREAT SERPL-MCNC: 1.19 MG/DL (ref 0.6–1.3)
EOSINOPHIL # BLD AUTO: 0.04 THOUSAND/ΜL (ref 0–0.61)
EOSINOPHIL NFR BLD AUTO: 1 % (ref 0–6)
ERYTHROCYTE [DISTWIDTH] IN BLOOD BY AUTOMATED COUNT: 18.4 % (ref 11.6–15.1)
GFR SERPL CREATININE-BSD FRML MDRD: 44 ML/MIN/1.73SQ M
GLUCOSE SERPL-MCNC: 280 MG/DL (ref 65–140)
GLUCOSE SERPL-MCNC: 283 MG/DL (ref 65–140)
GLUCOSE SERPL-MCNC: 322 MG/DL (ref 65–140)
HCO3 BLDA-SCNC: 25.9 MMOL/L (ref 24–30)
HCT VFR BLD AUTO: 23.5 % (ref 34.8–46.1)
HCT VFR BLD CALC: 22 % (ref 34.8–46.1)
HGB BLD-MCNC: 7.1 G/DL (ref 11.5–15.4)
HGB BLDA-MCNC: 7.5 G/DL (ref 11.5–15.4)
IMM GRANULOCYTES # BLD AUTO: 0.01 THOUSAND/UL (ref 0–0.2)
IMM GRANULOCYTES NFR BLD AUTO: 0 % (ref 0–2)
LYMPHOCYTES # BLD AUTO: 0.62 THOUSANDS/ΜL (ref 0.6–4.47)
LYMPHOCYTES NFR BLD AUTO: 18 % (ref 14–44)
MAGNESIUM SERPL-MCNC: 1.5 MG/DL (ref 1.6–2.6)
MCH RBC QN AUTO: 27.6 PG (ref 26.8–34.3)
MCHC RBC AUTO-ENTMCNC: 30.2 G/DL (ref 31.4–37.4)
MCV RBC AUTO: 91 FL (ref 82–98)
MONOCYTES # BLD AUTO: 0.39 THOUSAND/ΜL (ref 0.17–1.22)
MONOCYTES NFR BLD AUTO: 11 % (ref 4–12)
NEUTROPHILS # BLD AUTO: 2.37 THOUSANDS/ΜL (ref 1.85–7.62)
NEUTS SEG NFR BLD AUTO: 69 % (ref 43–75)
NRBC BLD AUTO-RTO: 0 /100 WBCS
PCO2 BLD: 27 MMOL/L (ref 21–32)
PCO2 BLD: 39.6 MM HG (ref 42–50)
PH BLD: 7.42 [PH] (ref 7.3–7.4)
PHOSPHATE SERPL-MCNC: 3.4 MG/DL (ref 2.3–4.1)
PLATELET # BLD AUTO: 79 THOUSANDS/UL (ref 149–390)
PMV BLD AUTO: 12.5 FL (ref 8.9–12.7)
PO2 BLD: 29 MM HG (ref 35–45)
POTASSIUM BLD-SCNC: 4.2 MMOL/L (ref 3.5–5.3)
POTASSIUM SERPL-SCNC: 4.2 MMOL/L (ref 3.5–5.3)
PROT SERPL-MCNC: 5.9 G/DL (ref 6.4–8.4)
RBC # BLD AUTO: 2.57 MILLION/UL (ref 3.81–5.12)
SAO2 % BLD FROM PO2: 57 % (ref 60–85)
SODIUM BLD-SCNC: 140 MMOL/L (ref 136–145)
SODIUM SERPL-SCNC: 140 MMOL/L (ref 135–147)
SPECIMEN SOURCE: ABNORMAL
TSH SERPL DL<=0.05 MIU/L-ACNC: 8.43 UIU/ML (ref 0.45–4.5)
WBC # BLD AUTO: 3.47 THOUSAND/UL (ref 4.31–10.16)

## 2022-08-04 PROCEDURE — 82010 KETONE BODYS QUAN: CPT | Performed by: EMERGENCY MEDICINE

## 2022-08-04 PROCEDURE — 99285 EMERGENCY DEPT VISIT HI MDM: CPT | Performed by: EMERGENCY MEDICINE

## 2022-08-04 PROCEDURE — 84100 ASSAY OF PHOSPHORUS: CPT | Performed by: EMERGENCY MEDICINE

## 2022-08-04 PROCEDURE — 96361 HYDRATE IV INFUSION ADD-ON: CPT

## 2022-08-04 PROCEDURE — 84439 ASSAY OF FREE THYROXINE: CPT | Performed by: EMERGENCY MEDICINE

## 2022-08-04 PROCEDURE — 96365 THER/PROPH/DIAG IV INF INIT: CPT

## 2022-08-04 PROCEDURE — 84295 ASSAY OF SERUM SODIUM: CPT

## 2022-08-04 PROCEDURE — 82947 ASSAY GLUCOSE BLOOD QUANT: CPT

## 2022-08-04 PROCEDURE — 36415 COLL VENOUS BLD VENIPUNCTURE: CPT | Performed by: EMERGENCY MEDICINE

## 2022-08-04 PROCEDURE — 82330 ASSAY OF CALCIUM: CPT

## 2022-08-04 PROCEDURE — 80053 COMPREHEN METABOLIC PANEL: CPT | Performed by: EMERGENCY MEDICINE

## 2022-08-04 PROCEDURE — 82948 REAGENT STRIP/BLOOD GLUCOSE: CPT

## 2022-08-04 PROCEDURE — 85014 HEMATOCRIT: CPT

## 2022-08-04 PROCEDURE — 82140 ASSAY OF AMMONIA: CPT | Performed by: EMERGENCY MEDICINE

## 2022-08-04 PROCEDURE — 84132 ASSAY OF SERUM POTASSIUM: CPT

## 2022-08-04 PROCEDURE — 83735 ASSAY OF MAGNESIUM: CPT | Performed by: EMERGENCY MEDICINE

## 2022-08-04 PROCEDURE — 82803 BLOOD GASES ANY COMBINATION: CPT

## 2022-08-04 PROCEDURE — 85025 COMPLETE CBC W/AUTO DIFF WBC: CPT | Performed by: EMERGENCY MEDICINE

## 2022-08-04 PROCEDURE — 93005 ELECTROCARDIOGRAM TRACING: CPT

## 2022-08-04 PROCEDURE — 99285 EMERGENCY DEPT VISIT HI MDM: CPT

## 2022-08-04 PROCEDURE — 84443 ASSAY THYROID STIM HORMONE: CPT | Performed by: EMERGENCY MEDICINE

## 2022-08-04 RX ORDER — MAGNESIUM SULFATE HEPTAHYDRATE 40 MG/ML
2 INJECTION, SOLUTION INTRAVENOUS ONCE
Status: COMPLETED | OUTPATIENT
Start: 2022-08-04 | End: 2022-08-04

## 2022-08-04 RX ADMIN — SODIUM CHLORIDE 500 ML: 0.9 INJECTION, SOLUTION INTRAVENOUS at 19:15

## 2022-08-04 RX ADMIN — MAGNESIUM SULFATE HEPTAHYDRATE 2 G: 40 INJECTION, SOLUTION INTRAVENOUS at 20:30

## 2022-08-04 NOTE — LETTER
Thank you for allowing us to participate in the care of your patient, Colleen Tucker, who was hospitalized from 8/5/2022 through 8/15/2022 with the admitting diagnosis of Hepatic encephalopathy  The patient was evaluated by GI who recommended initiation of diuretics and lactulose with improvement in symptoms  The patient was incidentally found to have COVID-19 and was treated with IV steroids and remdesivir  Prior to discharge the patient was successfully weaned off O2 and was without respiratory complaints  The patient was discharged to rehab on 8/15  She requires outpatient follow up with GI in 4-6 weeks  If you have any additional questions or would like to discuss further, please feel free to contact me      DARRELL Calvillo  Internal Medicine, Hospitalist  [phone]

## 2022-08-04 NOTE — ED PROVIDER NOTES
History  Chief Complaint   Patient presents with    Hyperglycemia - Symptomatic     Patient presents to the ED via EMS with c/o not feeling well, states fall two weeks ago and has residual back pain and left shoulder fx (seen at [facility] ED s/p fall)  States her sugars have been high, EMS report >300 en route, recent pacemaker placement       77year old female presents for evaluation of fatigue and generally not feeling well which has been worsening since her fall on 6/29/22  Patient had been admitted to [facility] from 6/29/22-7/5/22 during which she was found to be in complete heart block requiring pacemaker placement, anemic requiring 1 unit PRBCs, stress cardiomyopathy with EF 55%, left humerus fracture which is being treated conservatively  She was discharged to acute rehab 7/5-7/21 and is now home with help from her family  She has been having back pain since her fall; however, no acute traumatic pathology of the spine was identified on CT chest/abd/pelvis  She denies any falls after her admission to [facility]  Patient states her family has been helping her with her medications, but she does not know if she is taking all of her medications as directed  She states she does not think she has taken her lactulose in a few days and does not recall when she last had a bowel movement  Patient had been staying with her sister for the past 4 days  Her daughter-in-law returned today and seemed confused  She didn't take her insulin last night as she refused to take it since she couldn't find the needle to check her blood glucose         History provided by:  Patient and relative  Fatigue  Severity:  Severe  Onset quality:  Gradual  Duration:  2 weeks  Timing:  Constant  Progression:  Worsening  Chronicity:  New  Relieved by:  Nothing  Worsened by:  Nothing  Ineffective treatments:  Rest  Associated symptoms: no abdominal pain, no chest pain, no cough, no diarrhea, no dizziness, no fever, no headaches, no myalgias, no nausea, no shortness of breath and no vomiting    Risk factors: anemia and recent stressors        Prior to Admission Medications   Prescriptions Last Dose Informant Patient Reported? Taking? B-D UF III MINI PEN NEEDLES 31G X 5 MM MISC   No No   Sig: USE UP TO 3 TIMES A DAY   Basaglar KwikPen 100 units/mL SOPN   No No   Si units in am and 10 units at bedtime   Blood Glucose Monitoring Suppl (ONE TOUCH ULTRA 2) w/Device KIT  Self No No   Sig: by Does not apply route once for 1 dose Use Glucometer to test up to 3 times daily  Coenzyme Q10 (Co Q10) 100 MG CAPS  Self Yes No   Sig: Take by mouth   ONETOUCH DELICA LANCETS FINE MISC  Self No No   Sig: Use 1-3 times a day   OneTouch Ultra test strip  Self No No   Sig: TEST UP TO 3 TIMES DAILY  aspirin 81 mg chewable tablet   No No   Sig: Chew 1 tablet (81 mg total) every other day   calcium citrate-Vitamin D ([employer] Calcium Citrate+D3 Petites) 200 mg-250 units  Self Yes No   Sig: Take 1 tablet by mouth daily with breakfast   escitalopram (LEXAPRO) 5 mg tablet  Self Yes No   Sig: Take 5 mg by mouth daily   fluticasone (FLONASE) 50 mcg/act nasal spray  Self Yes No   Si spray into each nostril daily prn   furosemide (LASIX) 40 mg tablet  Self No No   Sig: TAKE 1 TABLET BY MOUTH EVERY DAY   lactulose (CHRONULAC) 10 g/15 mL solution  Self No No   Sig: TAKE 15 ML (10 G TOTAL) BY MOUTH 2 (TWO) TIMES A DAY   levothyroxine 125 mcg tablet   No No   Sig: Take 1 tablet 6 days a week and half a tablet on      metFORMIN (GLUCOPHAGE) 500 mg tablet   No No   Sig: Take 1 tablet (500 mg total) by mouth 2 (two) times a day with meals   nadolol (CORGARD) 20 mg tablet   No No   Sig: Take 0 5 tablets (10 mg total) by mouth daily   nystatin (MYCOSTATIN) powder   No No   Sig: Apply topically 2 (two) times a day   omeprazole (PriLOSEC) 20 mg delayed release capsule  Self No No   Sig: TAKE 1 CAPSULE BY MOUTH EVERY DAY   rifaximin (Xifaxan) 550 mg tablet   No No   Sig: Take 1 tablet (550 mg total) by mouth every 12 (twelve) hours   simvastatin (ZOCOR) 40 mg tablet  Self Yes No   Sig: Take 40 mg by mouth daily   spironolactone (ALDACTONE) 25 mg tablet   No No   Sig: Take 1 tablet (25 mg total) by mouth daily      Facility-Administered Medications: None       Past Medical History:   Diagnosis Date    Anemia     Cirrhosis ([facility] )     Diabetic neuropathy (HCC)     Esophageal varices (HCC)     Fatty liver     GERD (gastroesophageal reflux disease)     Hepatic encephalopathy (HCC)     Hiatal hernia     Hyperlipidemia     Hypertension     Hypoglycemia     Hypothyroidism     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)     Osteoarthritis     Osteopenia     Pancytopenia (HCC)     Thrombocytopenia (HCC)     Type 2 diabetes mellitus ([place] [address] )        Past Surgical History:   Procedure Laterality Date    ABDOMINAL SURGERY      Abdominal plasty with mesh insertion    CARDIAC ELECTROPHYSIOLOGY PROCEDURE N/A 6/29/2022    Procedure: Cardiac pacer implant;  Surgeon: Romeo Cali MD;  Location:  CARDIAC CATH LAB; Service: Cardiology    CATARACT EXTRACTION, BILATERAL      CATARACT EXTRACTION, BILATERAL      COLONOSCOPY      EGD AND COLONOSCOPY  03/18/2015    IR BIOPSY BONE MARROW  8/24/2021    LAPAROSCOPIC CHOLECYSTECTOMY      SHOULDER SURGERY Right     calcium depsoti    TUBAL LIGATION      UMBILICAL HERNIA REPAIR      with mesh placed    UPPER GASTROINTESTINAL ENDOSCOPY         Family History   Problem Relation Age of Onset   Heber Adamson Breast cancer Mother     Diabetes type II Father     Thyroid disease unspecified Daughter     Down syndrome Daughter     Diabetes type II Daughter     Diabetes type II Sister     No Known Problems Brother     Prostate cancer Brother     No Known Problems Sister     Stroke Sister     No Known Problems Son     Breast cancer Maternal Aunt     Colon cancer Neg Hx      I have reviewed and agree with the history as documented      E-Cigarette/Vaping    E-Cigarette Use Never User      E-Cigarette/Vaping Substances    Nicotine No     THC No     CBD No     Flavoring No     Other No     Unknown No      Social History     Tobacco Use    Smoking status: Never Smoker    Smokeless tobacco: Never Used   Vaping Use    Vaping Use: Never used   Substance Use Topics    Alcohol use: Not Currently    Drug use: No       Review of Systems   Constitutional: Positive for fatigue  Negative for appetite change, chills and fever  HENT: Negative for congestion and sore throat  Respiratory: Negative for cough, chest tightness and shortness of breath  Cardiovascular: Positive for leg swelling (chronic, unchanged from baseline)  Negative for chest pain  Gastrointestinal: Negative for abdominal pain, constipation, diarrhea, nausea and vomiting  Musculoskeletal: Negative for myalgias  Skin: Positive for wound (sacral)  Negative for rash  Neurological: Negative for dizziness, syncope and headaches  All other systems reviewed and are negative  Physical Exam  Physical Exam  Vitals and nursing note reviewed  Constitutional:       General: She is not in acute distress  Appearance: She is well-developed  She is not toxic-appearing or diaphoretic  HENT:      Head: Normocephalic and atraumatic  Right Ear: External ear normal       Left Ear: External ear normal       Nose: Nose normal    Eyes:      General: No scleral icterus  Cardiovascular:      Rate and Rhythm: Normal rate and regular rhythm  Heart sounds: Normal heart sounds  Pulmonary:      Effort: Pulmonary effort is normal  No respiratory distress  Breath sounds: Normal breath sounds  Abdominal:      General: There is no distension  Palpations: Abdomen is soft  Tenderness: There is no abdominal tenderness  Musculoskeletal:         General: No deformity  Normal range of motion  Right lower leg: Edema (1+) present  Left lower leg: Edema (1+) present        Comments: Left upper extremity in sling   Skin:     General: Skin is warm and dry  Findings: No rash  Neurological:      General: No focal deficit present  Mental Status: She is alert and oriented to person, place, and time        Comments: No asterixis   Psychiatric:         Mood and Affect: Mood normal          Vital Signs  ED Triage Vitals   Temperature Pulse Respirations Blood Pressure SpO2   08/04/22 1922 08/04/22 1848 08/04/22 1848 08/04/22 1848 08/04/22 1848   98 2 °F (36 8 °C) 76 18 126/59 97 %      Temp Source Heart Rate Source Patient Position - Orthostatic VS BP Location FiO2 (%)   08/04/22 1922 08/04/22 1848 -- -- --   Oral Monitor         Pain Score       08/04/22 1848       9           Vitals:    08/04/22 1848 08/04/22 1900   BP: 126/59 112/55   Pulse: 76 60         Visual Acuity      ED Medications  Medications   sodium chloride 0 9 % bolus 500 mL (0 mL Intravenous Stopped 8/4/22 2015)   magnesium sulfate 2 g/50 mL IVPB (premix) 2 g (2 g Intravenous New Bag 8/4/22 2030)       Diagnostic Studies  Results Reviewed     Procedure Component Value Units Date/Time    Occult blood 1-3, stool [[id number]]     Lab Status: No result Specimen: Stool     CBC and differential [[id number]]  (Abnormal) Collected: 08/04/22 1912    Lab Status: Final result Specimen: Blood from Arm, Right Updated: 08/04/22 2037     WBC 3 47 Thousand/uL      RBC 2 57 Million/uL      Hemoglobin 7 1 g/dL      Hematocrit 23 5 %      MCV 91 fL      MCH 27 6 pg      MCHC 30 2 g/dL      RDW 18 4 %      MPV 12 5 fL      Platelets 79 Thousands/uL      nRBC 0 /100 WBCs      Neutrophils Relative 69 %      Immat GRANS % 0 %      Lymphocytes Relative 18 %      Monocytes Relative 11 %      Eosinophils Relative 1 %      Basophils Relative 1 %      Neutrophils Absolute 2 37 Thousands/µL      Immature Grans Absolute 0 01 Thousand/uL      Lymphocytes Absolute 0 62 Thousands/µL      Monocytes Absolute 0 39 Thousand/µL      Eosinophils Absolute 0 04 Thousand/µL Basophils Absolute 0 04 Thousands/µL     TSH, 3rd generation with Free T4 reflex [[id number]]  (Abnormal) Collected: 08/04/22 1912    Lab Status: Final result Specimen: Blood from Arm, Right Updated: 08/04/22 2008     TSH 3RD GENERATON 8 434 uIU/mL     Narrative:      Patients undergoing fluorescein dye angiography may retain small amounts of fluorescein in the body for 48-72 hours post procedure  Samples containing fluorescein can produce falsely depressed TSH values  If the patient had this procedure,a specimen should be resubmitted post fluorescein clearance  see Espinoza [[id number]] Collected: 08/04/22 1912    Lab Status:  In process Specimen: Blood from Arm, Right Updated: 08/04/22 2008    Comprehensive metabolic panel [[id number]]  (Abnormal) Collected: 08/04/22 1912    Lab Status: Final result Specimen: Blood from Arm, Right Updated: 08/04/22 2004     Sodium 140 mmol/L      Potassium 4 2 mmol/L      Chloride 105 mmol/L      CO2 28 mmol/L      ANION GAP 7 mmol/L      BUN 35 mg/dL      Creatinine 1 19 mg/dL      Glucose 283 mg/dL      Calcium 8 7 mg/dL      Corrected Calcium 10 0 mg/dL      AST 30 U/L      ALT 22 U/L      Alkaline Phosphatase 92 U/L      Total Protein 5 9 g/dL      Albumin 2 4 g/dL      Total Bilirubin 0 70 mg/dL      eGFR 44 ml/min/1 73sq m     Narrative:      [place] guidelines for Chronic Kidney Disease (CKD):     Stage 1 with normal or high GFR (GFR > 90 mL/min/1 73 square meters)    Stage 2 Mild CKD (GFR = 60-89 mL/min/1 73 square meters)    Stage 3A Moderate CKD (GFR = 45-59 mL/min/1 73 square meters)    Stage 3B Moderate CKD (GFR = 30-44 mL/min/1 73 square meters)    Stage 4 Severe CKD (GFR = 15-29 mL/min/1 73 square meters)    Stage 5 End Stage CKD (GFR <15 mL/min/1 73 square meters)  Note: GFR calculation is accurate only with a steady state creatinine    Phosphorus [[id number]]  (Normal) Collected: 08/04/22 1912    Lab Status: Final result Specimen: Blood from Arm, Right Updated: 08/04/22 2004     Phosphorus 3 4 mg/dL     Magnesium [[id number]]  (Abnormal) Collected: 08/04/22 1912    Lab Status: Final result Specimen: Blood from Arm, Right Updated: 08/04/22 2004     Magnesium 1 5 mg/dL     Beta Hydroxybutyrate [[id number]]  (Normal) Collected: 08/04/22 1912    Lab Status: Final result Specimen: Blood from Arm, Right Updated: 08/04/22 1944     BETA-HYDROXYBUTYRATE 0 1 mmol/L     Ammonia [[id number]]  (Abnormal) Collected: 08/04/22 1912    Lab Status: Final result Specimen: Blood from Arm, Right Updated: 08/04/22 1943     Ammonia 60 umol/L     POCT Blood Gas (CG8+) [[id number]]  (Abnormal) Collected: 08/04/22 1922    Lab Status: Final result Specimen: Venous Updated: 08/04/22 1927     ph, Alfredo ISTAT 7 425     pCO2, Alfredo i-STAT 39 6 mm HG      pO2, Alfredo i-STAT 29 0 mm HG      BE, i-STAT 1 mmol/L      HCO3, Alfredo i-STAT 25 9 mmol/L      CO2, i-STAT 27 mmol/L      O2 Sat, i-STAT 57 %      SODIUM, I-STAT 140 mmol/l      Potassium, i-STAT 4 2 mmol/L      Calcium, Ionized i-STAT 1 15 mmol/L      Hct, i-STAT 22 %      Hgb, i-STAT 7 5 g/dl      Glucose, i-STAT 280 mg/dl      Specimen Type VENOUS    Fingerstick Glucose (POCT) [[id number]]  (Abnormal) Collected: 08/04/22 1852    Lab Status: Final result Updated: 08/04/22 1853     POC Glucose 322 mg/dl                  No orders to display              Procedures  ECG 12 Lead Documentation Only    Date/Time: 8/4/2022 7:28 PM  Performed by: Annalee Salamanca MD  Authorized by: Annalee Salamanca MD     Indications / Diagnosis:  Fatigue  ECG reviewed by me, the ED Provider: yes    Patient location:  ED  Previous ECG:     Previous ECG:  Compared to current    Comparison ECG info:  7/3/22 av paced    Similarity:  No change  Interpretation:     Interpretation: abnormal    Rate:     ECG rate:  82    ECG rate assessment: normal    Rhythm:     Rhythm: paced    Pacing:     Capture:  Complete    Type of pacing:  AV  Ectopy: Ectopy: PAC    QRS:     QRS axis:  Left    QRS intervals:  Normal  Conduction:     Conduction: normal    T waves:     T waves: inverted      Inverted:  AVL             ED Course  ED Course as of 08/04/22 2232   Thu Aug 04, 2022   2105 Hemoglobin(!): 7 1  8 3 eight days ago   2105 Platelet Count(!): 79  80 eight days ago   2105 WBC(!): 3 47  3 02 eight days ago                                             [facility]  Number of Diagnoses or Management Options  Hyperammonemia ([facility] [address] ): new and requires workup  Hyperglycemia: new and requires workup  Hypomagnesemia: new and requires workup  Hypothyroidism: new and requires workup  Pancytopenia [facility]): new and requires workup  Diagnosis management comments: 77year old female presents for evaluation of fatigue  Patient has been noncompliant with some medications including lactulose  Hyperammonemia on labs with reported confusion at home  Patient oriented x4 in the ED with no asterixis  Mild hypomagnesemia  Patient given 2 g IV magnesium  Anemia on labs; however, does not meet transfusion criteria at this time  Patient placed in observation for monitoring of her hemoglobin and treatment of her hyperammonemia          Amount and/or Complexity of Data Reviewed  Clinical lab tests: ordered and reviewed    Patient Progress  Patient progress: stable      Disposition  Final diagnoses:   Hypothyroidism   Hypomagnesemia   Hyperammonemia ([facility] [address] )   Hyperglycemia   Pancytopenia ([facility] [address] )     Time reflects when diagnosis was documented in both MDM as applicable and the Disposition within this note     Time User Action Codes Description Comment    8/4/2022  8:17 PM Lennart Hdz Add [E03 9] Hypothyroidism     8/4/2022  8:17 PM Lennart Hdz Add [E83 42] Hypomagnesemia     8/4/2022  8:18 PM Lennart Hdz Add [E72 20] Hyperammonemia ([address] )     8/4/2022  8:18 PM Lennart Hdz Modify [E03 9] Hypothyroidism     8/4/2022  8:18 PM Lennart Hdz Modify [E72 20] Hyperammonemia ([facility] [address] ) 8/4/2022  9:24 PM Sanjeev Alegre [place] J Add [R73 9] Hyperglycemia     8/4/2022  9:50 PM Maureen Gleason Add [L17 559] Pancytopenia [facility])       ED Disposition     ED Disposition   Admit    Condition   Stable    Date/Time   Thu Aug 4, 2022  9:55 PM    Comment   Case was discussed with SANTA and the patient's admission status was agreed to be Admission Status: observation status to the service of Dr Dominga Santos   Follow-up Information    None         Patient's Medications   Discharge Prescriptions    No medications on file       No discharge procedures on file      PDMP Review       Value Time User    PDMP Reviewed  Yes 7/21/2022  9:58 AM Sylvia Ortez MD          ED Provider  Electronically Signed by           Melba Valle MD  08/04/22 [id number]

## 2022-08-05 PROBLEM — R82.81 PYURIA: Status: ACTIVE | Noted: 2022-08-05

## 2022-08-05 PROBLEM — R33.9 URINARY RETENTION: Status: ACTIVE | Noted: 2022-08-05

## 2022-08-05 LAB
ALBUMIN SERPL BCP-MCNC: 2.3 G/DL (ref 3.5–5)
ALP SERPL-CCNC: 83 U/L (ref 46–116)
ALT SERPL W P-5'-P-CCNC: 22 U/L (ref 12–78)
ANION GAP SERPL CALCULATED.3IONS-SCNC: 9 MMOL/L (ref 4–13)
AST SERPL W P-5'-P-CCNC: 22 U/L (ref 5–45)
ATRIAL RATE: 82 BPM
BACTERIA UR QL AUTO: ABNORMAL /HPF
BILIRUB SERPL-MCNC: 0.6 MG/DL (ref 0.2–1)
BILIRUB UR QL STRIP: NEGATIVE
BUN SERPL-MCNC: 38 MG/DL (ref 5–25)
CALCIUM ALBUM COR SERPL-MCNC: 10.4 MG/DL (ref 8.3–10.1)
CALCIUM SERPL-MCNC: 9 MG/DL (ref 8.3–10.1)
CHLORIDE SERPL-SCNC: 106 MMOL/L (ref 96–108)
CLARITY UR: ABNORMAL
CO2 SERPL-SCNC: 26 MMOL/L (ref 21–32)
COLOR UR: YELLOW
CREAT SERPL-MCNC: 1.16 MG/DL (ref 0.6–1.3)
ERYTHROCYTE [DISTWIDTH] IN BLOOD BY AUTOMATED COUNT: 18.6 % (ref 11.6–15.1)
FERRITIN SERPL-MCNC: 20 NG/ML (ref 8–388)
GFR SERPL CREATININE-BSD FRML MDRD: 46 ML/MIN/1.73SQ M
GLUCOSE SERPL-MCNC: 231 MG/DL (ref 65–140)
GLUCOSE SERPL-MCNC: 244 MG/DL (ref 65–140)
GLUCOSE SERPL-MCNC: 252 MG/DL (ref 65–140)
GLUCOSE SERPL-MCNC: 262 MG/DL (ref 65–140)
GLUCOSE SERPL-MCNC: 272 MG/DL (ref 65–140)
GLUCOSE SERPL-MCNC: 299 MG/DL (ref 65–140)
GLUCOSE UR STRIP-MCNC: ABNORMAL MG/DL
HCT VFR BLD AUTO: 23.5 % (ref 34.8–46.1)
HCT VFR BLD AUTO: 24.4 % (ref 34.8–46.1)
HGB BLD-MCNC: 7.1 G/DL (ref 11.5–15.4)
HGB BLD-MCNC: 7.5 G/DL (ref 11.5–15.4)
HGB UR QL STRIP.AUTO: NEGATIVE
IRON SATN MFR SERPL: 46 % (ref 15–50)
IRON SERPL-MCNC: 190 UG/DL (ref 50–170)
KETONES UR STRIP-MCNC: NEGATIVE MG/DL
LEUKOCYTE ESTERASE UR QL STRIP: NEGATIVE
MAGNESIUM SERPL-MCNC: 1.9 MG/DL (ref 1.6–2.6)
MCH RBC QN AUTO: 27.5 PG (ref 26.8–34.3)
MCHC RBC AUTO-ENTMCNC: 30.2 G/DL (ref 31.4–37.4)
MCV RBC AUTO: 91 FL (ref 82–98)
NITRITE UR QL STRIP: NEGATIVE
NON-SQ EPI CELLS URNS QL MICRO: ABNORMAL /HPF
P AXIS: 69 DEGREES
PH UR STRIP.AUTO: 5.5 [PH]
PHOSPHATE SERPL-MCNC: 3.6 MG/DL (ref 2.3–4.1)
PLATELET # BLD AUTO: 79 THOUSANDS/UL (ref 149–390)
PMV BLD AUTO: 12.8 FL (ref 8.9–12.7)
POTASSIUM SERPL-SCNC: 4.4 MMOL/L (ref 3.5–5.3)
PR INTERVAL: 210 MS
PROT SERPL-MCNC: 5.8 G/DL (ref 6.4–8.4)
PROT UR STRIP-MCNC: NEGATIVE MG/DL
QRS AXIS: -42 DEGREES
QRSD INTERVAL: 72 MS
QT INTERVAL: 424 MS
QTC INTERVAL: 495 MS
RBC # BLD AUTO: 2.58 MILLION/UL (ref 3.81–5.12)
RBC #/AREA URNS AUTO: ABNORMAL /HPF
SODIUM SERPL-SCNC: 141 MMOL/L (ref 135–147)
SP GR UR STRIP.AUTO: 1.02 (ref 1–1.03)
T WAVE AXIS: 109 DEGREES
T4 FREE SERPL-MCNC: 1.31 NG/DL (ref 0.76–1.46)
TIBC SERPL-MCNC: 413 UG/DL (ref 250–450)
UROBILINOGEN UR QL STRIP.AUTO: 1 E.U./DL
VENTRICULAR RATE: 82 BPM
WBC # BLD AUTO: 3.45 THOUSAND/UL (ref 4.31–10.16)
WBC #/AREA URNS AUTO: ABNORMAL /HPF

## 2022-08-05 PROCEDURE — 84100 ASSAY OF PHOSPHORUS: CPT | Performed by: INTERNAL MEDICINE

## 2022-08-05 PROCEDURE — 82948 REAGENT STRIP/BLOOD GLUCOSE: CPT

## 2022-08-05 PROCEDURE — 97163 PT EVAL HIGH COMPLEX 45 MIN: CPT

## 2022-08-05 PROCEDURE — 85018 HEMOGLOBIN: CPT | Performed by: HOSPITALIST

## 2022-08-05 PROCEDURE — 83735 ASSAY OF MAGNESIUM: CPT | Performed by: INTERNAL MEDICINE

## 2022-08-05 PROCEDURE — 87186 SC STD MICRODIL/AGAR DIL: CPT | Performed by: PHYSICIAN ASSISTANT

## 2022-08-05 PROCEDURE — 85014 HEMATOCRIT: CPT | Performed by: HOSPITALIST

## 2022-08-05 PROCEDURE — 87077 CULTURE AEROBIC IDENTIFY: CPT | Performed by: PHYSICIAN ASSISTANT

## 2022-08-05 PROCEDURE — 99223 1ST HOSP IP/OBS HIGH 75: CPT | Performed by: HOSPITALIST

## 2022-08-05 PROCEDURE — 82272 OCCULT BLD FECES 1-3 TESTS: CPT | Performed by: PHYSICIAN ASSISTANT

## 2022-08-05 PROCEDURE — 97535 SELF CARE MNGMENT TRAINING: CPT

## 2022-08-05 PROCEDURE — 81001 URINALYSIS AUTO W/SCOPE: CPT | Performed by: PHYSICIAN ASSISTANT

## 2022-08-05 PROCEDURE — 99223 1ST HOSP IP/OBS HIGH 75: CPT | Performed by: NURSE PRACTITIONER

## 2022-08-05 PROCEDURE — 85027 COMPLETE CBC AUTOMATED: CPT | Performed by: INTERNAL MEDICINE

## 2022-08-05 PROCEDURE — 82728 ASSAY OF FERRITIN: CPT | Performed by: PHYSICIAN ASSISTANT

## 2022-08-05 PROCEDURE — 80053 COMPREHEN METABOLIC PANEL: CPT | Performed by: INTERNAL MEDICINE

## 2022-08-05 PROCEDURE — 83550 IRON BINDING TEST: CPT | Performed by: PHYSICIAN ASSISTANT

## 2022-08-05 PROCEDURE — 87086 URINE CULTURE/COLONY COUNT: CPT | Performed by: PHYSICIAN ASSISTANT

## 2022-08-05 PROCEDURE — 93010 ELECTROCARDIOGRAM REPORT: CPT | Performed by: INTERNAL MEDICINE

## 2022-08-05 PROCEDURE — 83540 ASSAY OF IRON: CPT | Performed by: PHYSICIAN ASSISTANT

## 2022-08-05 PROCEDURE — 97530 THERAPEUTIC ACTIVITIES: CPT

## 2022-08-05 PROCEDURE — 97167 OT EVAL HIGH COMPLEX 60 MIN: CPT

## 2022-08-05 PROCEDURE — C9113 INJ PANTOPRAZOLE SODIUM, VIA: HCPCS | Performed by: PHYSICIAN ASSISTANT

## 2022-08-05 RX ORDER — INSULIN GLARGINE 100 [IU]/ML
20 INJECTION, SOLUTION SUBCUTANEOUS
Status: DISCONTINUED | OUTPATIENT
Start: 2022-08-05 | End: 2022-08-07

## 2022-08-05 RX ORDER — INSULIN LISPRO 100 [IU]/ML
1-6 INJECTION, SOLUTION INTRAVENOUS; SUBCUTANEOUS
Status: DISCONTINUED | OUTPATIENT
Start: 2022-08-05 | End: 2022-08-07

## 2022-08-05 RX ORDER — LACTULOSE 20 G/30ML
10 SOLUTION ORAL 2 TIMES DAILY
Status: DISCONTINUED | OUTPATIENT
Start: 2022-08-05 | End: 2022-08-06

## 2022-08-05 RX ORDER — ONDANSETRON 2 MG/ML
4 INJECTION INTRAMUSCULAR; INTRAVENOUS EVERY 6 HOURS PRN
Status: DISCONTINUED | OUTPATIENT
Start: 2022-08-05 | End: 2022-08-15 | Stop reason: HOSPADM

## 2022-08-05 RX ORDER — ESCITALOPRAM OXALATE 5 MG/1
5 TABLET ORAL DAILY
Status: DISCONTINUED | OUTPATIENT
Start: 2022-08-05 | End: 2022-08-06

## 2022-08-05 RX ORDER — INSULIN LISPRO 100 [IU]/ML
1-6 INJECTION, SOLUTION INTRAVENOUS; SUBCUTANEOUS EVERY 6 HOURS SCHEDULED
Status: DISCONTINUED | OUTPATIENT
Start: 2022-08-05 | End: 2022-08-05

## 2022-08-05 RX ORDER — LANOLIN ALCOHOL/MO/W.PET/CERES
6 CREAM (GRAM) TOPICAL
Status: DISCONTINUED | OUTPATIENT
Start: 2022-08-05 | End: 2022-08-15 | Stop reason: HOSPADM

## 2022-08-05 RX ORDER — ASPIRIN 81 MG/1
81 TABLET, CHEWABLE ORAL EVERY OTHER DAY
Status: DISCONTINUED | OUTPATIENT
Start: 2022-08-05 | End: 2022-08-05

## 2022-08-05 RX ORDER — ACETAMINOPHEN 325 MG/1
650 TABLET ORAL EVERY 6 HOURS PRN
Status: DISCONTINUED | OUTPATIENT
Start: 2022-08-05 | End: 2022-08-15 | Stop reason: HOSPADM

## 2022-08-05 RX ORDER — NADOLOL 20 MG/1
10 TABLET ORAL DAILY
Status: DISCONTINUED | OUTPATIENT
Start: 2022-08-05 | End: 2022-08-09

## 2022-08-05 RX ORDER — PRAVASTATIN SODIUM 80 MG/1
80 TABLET ORAL
Refills: 3 | Status: DISCONTINUED | OUTPATIENT
Start: 2022-08-05 | End: 2022-08-07

## 2022-08-05 RX ORDER — PANTOPRAZOLE SODIUM 40 MG/10ML
40 INJECTION, POWDER, LYOPHILIZED, FOR SOLUTION INTRAVENOUS EVERY 12 HOURS SCHEDULED
Status: DISCONTINUED | OUTPATIENT
Start: 2022-08-05 | End: 2022-08-08

## 2022-08-05 RX ORDER — FUROSEMIDE 40 MG/1
40 TABLET ORAL DAILY
Status: DISCONTINUED | OUTPATIENT
Start: 2022-08-05 | End: 2022-08-05

## 2022-08-05 RX ORDER — INSULIN GLARGINE 100 [IU]/ML
10 INJECTION, SOLUTION SUBCUTANEOUS
Status: DISCONTINUED | OUTPATIENT
Start: 2022-08-05 | End: 2022-08-05

## 2022-08-05 RX ORDER — SPIRONOLACTONE 25 MG/1
25 TABLET ORAL DAILY
Status: DISCONTINUED | OUTPATIENT
Start: 2022-08-05 | End: 2022-08-07

## 2022-08-05 RX ADMIN — ESCITALOPRAM 5 MG: 5 TABLET, FILM COATED ORAL at 08:50

## 2022-08-05 RX ADMIN — INSULIN GLARGINE 10 UNITS: 100 INJECTION, SOLUTION SUBCUTANEOUS at 03:31

## 2022-08-05 RX ADMIN — NADOLOL 10 MG: 20 TABLET ORAL at 08:50

## 2022-08-05 RX ADMIN — INSULIN LISPRO 3 UNITS: 100 INJECTION, SOLUTION INTRAVENOUS; SUBCUTANEOUS at 12:46

## 2022-08-05 RX ADMIN — LACTULOSE 10 G: 20 SOLUTION ORAL at 17:49

## 2022-08-05 RX ADMIN — SPIRONOLACTONE 25 MG: 25 TABLET ORAL at 08:50

## 2022-08-05 RX ADMIN — RIFAXIMIN 550 MG: 550 TABLET ORAL at 08:50

## 2022-08-05 RX ADMIN — Medication 6 MG: at 23:07

## 2022-08-05 RX ADMIN — FUROSEMIDE 40 MG: 40 TABLET ORAL at 08:50

## 2022-08-05 RX ADMIN — PANTOPRAZOLE SODIUM 40 MG: 40 INJECTION, POWDER, FOR SOLUTION INTRAVENOUS at 03:33

## 2022-08-05 RX ADMIN — PRAVASTATIN SODIUM 80 MG: 80 TABLET ORAL at 17:49

## 2022-08-05 RX ADMIN — LACTULOSE 10 G: 20 SOLUTION ORAL at 03:30

## 2022-08-05 RX ADMIN — INSULIN LISPRO 4 UNITS: 100 INJECTION, SOLUTION INTRAVENOUS; SUBCUTANEOUS at 03:32

## 2022-08-05 RX ADMIN — LACTULOSE 10 G: 20 SOLUTION ORAL at 09:07

## 2022-08-05 RX ADMIN — INSULIN GLARGINE 20 UNITS: 100 INJECTION, SOLUTION SUBCUTANEOUS at 22:58

## 2022-08-05 RX ADMIN — INSULIN LISPRO 3 UNITS: 100 INJECTION, SOLUTION INTRAVENOUS; SUBCUTANEOUS at 17:49

## 2022-08-05 RX ADMIN — PANTOPRAZOLE SODIUM 40 MG: 40 INJECTION, POWDER, FOR SOLUTION INTRAVENOUS at 22:58

## 2022-08-05 RX ADMIN — SODIUM CHLORIDE 200 MG: 9 INJECTION, SOLUTION INTRAVENOUS at 11:57

## 2022-08-05 RX ADMIN — RIFAXIMIN 550 MG: 550 TABLET ORAL at 22:58

## 2022-08-05 RX ADMIN — LEVOTHYROXINE SODIUM 125 MCG: 25 TABLET ORAL at 06:37

## 2022-08-05 NOTE — H&P
Aquilino Rogers  H&P- Dayne Lock 1945, 77 y o  female MRN: [id number]  Unit/Bed#: -Beth Encounter: [id number]  Primary Care Provider: David Segal DO   Date and time admitted to hospital: 8/4/2022  6:45 PM    * Hepatic encephalopathy [facility])  Assessment & Plan  · Presents to the ED with family due to concern for confusion   · Daughter-in-law reports that she had been away for the last 3 days when she came home last night the patient was confused to the point that she was unable to utilize her insulin pen - daughter in-law is unsure if patient had taken lactulose 3 days she was gone as her sister was staying with her  · On admission - pt is alert and oriented x3, however her responses are slow to respond and she does have asterixis in her right upper extremity  · Lactulose given on admission  · Ammonia level 60 on admission  · GI consult    Pancytopenia [facility])  Assessment & Plan  · History of pancytopenia with outpatient Hematology-Oncology following  · Bone marrow biopsy 08/24/2021 showed no major abnormalities or MDS    Did reveal low iron stores   · Treated with IV Venofer x 10 doses (08/2021 - 10/2021) with improvement in hemoglobin  · Hgb on admission 7 1 - had ABLA during admission where PPM was placed due CHB on 6/29 - received blood 6/29 and 7/3 with Hgb downtrending since last transfusion   · Hx of esophageal varices with banding x2 in 02/2022 no reports of hematemesis, melena, or hematochezia - consult GI for possible EGD due to hx   · AM Hgb stable at 7 1   · Check Iron panel   · WBC 3 45 and platelets at 79   · Aspirin and VTE prophylaxis held on admission due to anemia    Esophageal varices without bleeding ([facility] [address] )  Assessment & Plan  · Banding x2 in 02/2022  · Follows closely with GI  · No hematemesis, melena, or hematochezia reported   · BP stable     Pyuria  Assessment & Plan  · Urine microscopy with innumerable bacteria but only 2-4 wbc's  · Patient denies any urinary symptoms  · Hold on antibiotics for now and send urine culture    Urinary retention  Assessment & Plan  · Patient was straight cathed for 500 cc on arrival to the floor   · Urinary retention protocol    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) ([facility] [address] )  Assessment & Plan  · Complicated by esophageal varices status post banding with prior hepatic encephalopathy  · Daughter in-law, who is currently staying with the patient, reports that patient only takes lactulose daily but does have at least 2 bowel movements daily  · No daughter had been away for the last 4 days and is unsure if patient was taking her lactulose  · Patient oriented on admission, however is slow to respond and has asterixis   · Patient 4+ pitting edema to bilateral lower extremities admission  · Continue lactulose b i d  with goal of 2-3 soft bowel movements daily  · Continue Xifaxan 550 mg b i d , nadolol 10 mg daily, Lasix 40 milligrams daily, and spironolactone 25 milligrams daily  · GI consult    Type 2 diabetes mellitus with hyperglycemia, with long-term current use of insulin [facility])  Assessment & Plan  Lab Results   Component Value Date    HGBA1C 8 2 (H) 05/31/2022       Recent Labs     08/04/22  1852 08/05/22  0208   POCGLU 322* 272*       Blood Sugar Average: Last 72 hrs:  (P) 297   · Home regimen:  Lantus 38 units in a m  and 10 units HS and metformin b i d    · Started on clear liquid diet due to concern for possible GI bleed with dropping hemoglobin history of esophageal varices - while on clear liquid diet Lantus 10 units HS with SSI q 6 hours  · Once diet is advanced restart a m   Lantus    Complete heart block (HCC)  Assessment & Plan  · S/p permanent pacemaker 6/29 [facility]  · Prior to placement patient required emergent transvenous pacing and did require vasopressors after placement    Essential hypertension  Assessment & Plan  · Continue medications as outlined under liver cirrhosis    Cardiomyopathy [facility])  Assessment & Plan  · Newly diagnosed during recent admission  · 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% and abnormal diastolic function on 90/79 echo   · Repeat echo on 07/05 status post ppm:  LVEF 50 percent  G1 DD  · Continue Lasix    Closed traumatic displaced fracture of proximal end of left humerus  Assessment & Plan  · Status post fall 6/26   · Managing conservatively with sling    Depression  Assessment & Plan  · Home regimen: Lexapro 5 milligrams daily   · Patient was seen by geriatric medicine has SLB during recent admission and recommended increased to 10 mg daily if needed    Acquired hypothyroidism  Assessment & Plan  · Home regimen:  Levothyroxine 125 mcg daily except for Sundays 62 5 micrograms  · TSH elevated on admission, however free T4 1 31   · Continue levothyroxine at current dose  · Recommend recheck TSH in 6-8 weeks outpatient    VTE Pharmacologic Prophylaxis: VTE Score: 4 Moderate Risk (Score 3-4) - Pharmacological DVT Prophylaxis Ordered: VTE prophylaxis held due to anemia      Code Status: Level 1 - Full Code   Discussion with family: Updated  (daughter in law) via phone  Anticipated Length of Stay: Patient will be admitted on an inpatient basis with an anticipated length of stay of greater than 2 midnights secondary to Hepatic encephalopathy, pancytopenia, cardiomyopathy, and IDDM 2, hypothyroidism, 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  Of note, patient was initially admitted observation status, however on evaluation after admission patient was deemed appropriate for inpatient  Total Time for Visit, including Counseling / Coordination of Care: 60 minutes Greater than 50% of this total time spent on direct patient counseling and coordination of care      Chief Complaint:  "My daughter in-law made me come"    History of Present Illness:  Adrianna White is a 77 y o  female with a PMH of cardiomyopathy, recent ppm 6/29 due to complete heart block, 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 complicated by esophageal varices, IDDM 2, pancytopenia, depression, hypothyroidism, and recent fracture of left humerus who presents from home due to concern for confusion from family  Daughter in-law reports that she went out of town 4 days ago and patient was her normal self  Upon returning home last night the patient was confused and unable to administer her insulin herself like she normally can  Daughter in-law is unsure if she has been taking all her medications as she is supposed to while she was away  She did have a sister staying with her  Prior to 2 daughter in-law leaving patient was in her normal state of health  She had no confusion  She was taking her lactulose daily instead of twice a day, however she was having 2-3 bowel movements daily  No recent fevers or chills per daughter in-law  Patient offers no complaints on admission except for leg edema  No hematemesis, melena, or hematochezia  Review of Systems:  Review of Systems   Constitutional: Negative for chills and fever  HENT: Negative for congestion  Respiratory: Negative for cough and shortness of breath  Cardiovascular: Positive for leg swelling  Negative for chest pain  Gastrointestinal: Negative for abdominal distention, abdominal pain, constipation, diarrhea, nausea and vomiting  Genitourinary: Negative for difficulty urinating, dysuria and hematuria  Musculoskeletal: Positive for gait problem (Uses cane at baseline)  Neurological: Negative for weakness and numbness  Psychiatric/Behavioral: Positive for confusion  All other systems reviewed and are negative        Past Medical and Surgical History:   Past Medical History:   Diagnosis Date    Anemia     Cirrhosis ([facility] [address] )     [facility]     Esophageal varices (HCC)     Fatty liver     GERD (gastroesophageal reflux disease)     Hepatic encephalopathy (HCC)     Hiatal hernia     Hyperlipidemia     Hypertension     Hypoglycemia     Hypothyroidism     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)     Osteoarthritis     Osteopenia     Pancytopenia (HCC)     Thrombocytopenia (HCC)     Type 2 diabetes mellitus ([facility] [address] )        Past Surgical History:   Procedure Laterality Date    ABDOMINAL SURGERY      Abdominal plasty with mesh insertion    CARDIAC ELECTROPHYSIOLOGY PROCEDURE N/A 6/29/2022    Procedure: Cardiac pacer implant;  Surgeon: Feliz Agosto MD;  Location:  CARDIAC CATH LAB; Service: Cardiology    CATARACT EXTRACTION, BILATERAL      CATARACT EXTRACTION, BILATERAL      COLONOSCOPY      EGD AND COLONOSCOPY  03/18/2015    IR BIOPSY BONE MARROW  8/24/2021    LAPAROSCOPIC CHOLECYSTECTOMY      SHOULDER SURGERY Right     calcium depsoti    TUBAL LIGATION      UMBILICAL HERNIA REPAIR      with mesh placed    UPPER GASTROINTESTINAL ENDOSCOPY         Meds/Allergies:  Prior to Admission medications    Medication Sig Start Date End Date Taking? Authorizing Provider   aspirin 81 mg chewable tablet Chew 1 tablet (81 mg total) every other day 7/21/22  Yes Sachi Waller MD   Basaglstas GabrielikPen 100 units/mL SOPN 38 units in am and 10 units at bedtime 7/21/22  Yes Sachi Waller MD   Blood Glucose Monitoring Suppl (ONE TOUCH ULTRA 2) w/Device KIT by Does not apply route once for 1 dose Use Glucometer to test up to 3 times daily   9/28/20 8/5/22 Yes Mendel Jimenez MD   calcium citrate-Vitamin D ([employer] Calcium Citrate+D3 [place]) 200 mg-250 units Take 1 tablet by mouth daily with breakfast   Yes Historical Provider, MD   Coenzyme Q10 (Co Q10) 100 MG CAPS Take by mouth   Yes Historical Provider, MD   escitalopram (LEXAPRO) 5 mg tablet Take 5 mg by mouth daily   Yes Historical Provider, MD   furosemide (LASIX) 40 mg tablet TAKE 1 TABLET BY MOUTH EVERY DAY 12/6/21  Yes Meka Villar MD   lactulose (CHRONULAC) 10 g/15 mL solution TAKE 15 ML (10 G TOTAL) BY MOUTH 2 (TWO) TIMES A DAY 2/15/22  Yes Aishwarya Kramer MD   levothyroxine 125 mcg tablet Take 1 tablet 6 days a week and half a tablet on Sunday 6/17/22  Yes Mikaela Boyd PA-C   metFORMIN (GLUCOPHAGE) 500 mg tablet Take 1 tablet (500 mg total) by mouth 2 (two) times a day with meals 7/21/22  Yes Susanne Loaiza MD   nadolol (CORGARD) 20 mg tablet Take 0 5 tablets (10 mg total) by mouth daily 7/22/22  Yes Susanne Loaiza MD   omeprazole (PriLOSEC) 20 mg delayed release capsule TAKE 1 CAPSULE BY MOUTH EVERY DAY 7/30/21  Yes JOSE JUAN Bolton   rifaximin (Xifaxan) 550 mg tablet Take 1 tablet (550 mg total) by mouth every 12 (twelve) hours 5/10/22 4/9/23 Yes JOSE JUAN Astorga   simvastatin (ZOCOR) 40 mg tablet Take 40 mg by mouth daily 6/1/18  Yes Historical Provider, MD   spironolactone (ALDACTONE) 25 mg tablet Take 1 tablet (25 mg total) by mouth daily 7/22/22  Yes Susanne Loaiza MD   B-D UF III MINI PEN NEEDLES 31G X 5 MM MISC USE UP TO 3 TIMES A DAY 7/14/22   JOSE JUAN Valdez   fluticasone [facility]) 50 mcg/act nasal spray 1 spray into each nostril daily prn 11/21/11   Historical Provider, MD   nystatin (MYCOSTATIN) powder Apply topically 2 (two) times a day 7/21/22   Susanne Loaiza MD   [facility] FINE MISC Use 1-3 times a day 1/18/19   Rossy Stephens MD   OneTouch Ultra test strip TEST UP TO 3 TIMES DAILY  1/21/22   Rossy Stephens MD     I have reviewed home medications with patient family member  Allergies: Allergies   Allergen Reactions    Bee Venom Swelling    Latex     Sesame Seed (Diagnostic) - Food Allergy      Other reaction(s): swelling inside mouth    Strawberry C [Ascorbate - Food Allergy] Other (See Comments)     Mouth sores    Penicillins Rash       Social History:  Marital Status:     Occupation: retired   Patient Pre-hospital Living Situation: Home - 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, however her daughter in-law is also currently staying with them to help care for her  Patient Pre-hospital Level of Mobility: walks with cane  Patient Pre-hospital Diet Restrictions: none   Substance Use History:   Social History     Substance and Sexual Activity   Alcohol Use Not Currently     Social History     Tobacco Use   Smoking Status Never Smoker   Smokeless Tobacco Never Used     Social History     Substance and Sexual Activity   Drug Use No       Family History:  Family History   Problem Relation Age of Onset    Breast cancer Mother     Diabetes type II Father     Thyroid disease unspecified Daughter     Down syndrome Daughter     Diabetes type II Daughter     Diabetes type II Sister     No Known Problems Brother     Prostate cancer Brother     No Known Problems Sister     Stroke Sister     No Known Problems Son     Breast cancer Maternal Aunt     Colon cancer Neg Hx        Physical Exam:     Vitals:   Blood Pressure: 116/59 (08/04/22 2307)  Pulse: 70 (08/04/22 2307)  Temperature: 98 7 °F (37 1 °C) (08/04/22 2307)  Temp Source: Oral (08/04/22 1922)  Respirations: 18 (08/04/22 1900)  Height: 5' 4" (162 6 cm) (08/04/22 1848)  Weight - Scale: 104 kg (229 lb) (08/05/22 0600)  SpO2: 96 % (08/04/22 2307)    Physical Exam  Vitals and nursing note reviewed  Constitutional:       Appearance: Normal appearance  Comments: Awake  Conversational    HENT:      Head: Normocephalic  Nose: Nose normal       Mouth/Throat:      Mouth: Mucous membranes are dry  Eyes:      Extraocular Movements: Extraocular movements intact  Conjunctiva/sclera: Conjunctivae normal    Cardiovascular:      Rate and Rhythm: Normal rate and regular rhythm  Pulses: Normal pulses  Heart sounds: No murmur heard  Pulmonary:      Effort: Pulmonary effort is normal  No respiratory distress  Breath sounds: Normal breath sounds  No wheezing, rhonchi or rales  Abdominal:      General: Abdomen is flat  Bowel sounds are normal  There is no distension  Palpations: Abdomen is soft  Tenderness: There is no abdominal tenderness  There is no guarding or rebound  Comments: Asterixis present   Musculoskeletal:      Cervical back: Normal range of motion  Comments: 4+ pitting edema to bilateral lower extremities  Left upper extremity in sling   Skin:     General: Skin is warm and dry  Coloration: Skin is pale  Neurological:      General: No focal deficit present  Mental Status: She is alert  Comments: Oriented to self, place, month, year, president  She is slow to respond to questioning  Psychiatric:      Comments: Dysthymic          Additional Data:     Lab Results:  Results from last 7 days   Lab Units 08/05/22  0352 08/04/22  1922 08/04/22  1912   WBC Thousand/uL 3 45*  --  3 47*   HEMOGLOBIN g/dL 7 1*  --  7 1*   I STAT HEMOGLOBIN   --    < >  --    HEMATOCRIT % 23 5*  --  23 5*   HEMATOCRIT, ISTAT   --    < >  --    PLATELETS Thousands/uL 79*  --  79*   NEUTROS PCT %  --   --  69   LYMPHS PCT %  --   --  18   MONOS PCT %  --   --  11   EOS PCT %  --   --  1    < > = values in this interval not displayed  Results from last 7 days   Lab Units 08/05/22  0352   SODIUM mmol/L 141   POTASSIUM mmol/L 4 4   CHLORIDE mmol/L 106   CO2 mmol/L 26   BUN mg/dL 38*   CREATININE mg/dL 1 16   ANION GAP mmol/L 9   CALCIUM mg/dL 9 0   ALBUMIN g/dL 2 3*   TOTAL BILIRUBIN mg/dL 0 60   ALK PHOS U/L 83   ALT U/L 22   AST U/L 22   GLUCOSE RANDOM mg/dL 252*         Results from last 7 days   Lab Units 08/05/22  0208 08/04/22  1852   POC GLUCOSE mg/dl 272* 322*               Imaging: No pertinent imaging reviewed  No orders to display       EKG and Other Studies Reviewed on Admission:   · EKG: Paced rhythm  HR 82 bpm       ** Please Note: This note has been constructed using a voice recognition system   **

## 2022-08-05 NOTE — ASSESSMENT & PLAN NOTE
Lab Results   Component Value Date    HGBA1C 8 2 (H) 05/31/2022       Recent Labs     08/04/22  1852 08/05/22  0208   POCGLU 322* 272*       Blood Sugar Average: Last 72 hrs:  (P) 297   · Home regimen:  Lantus 38 units in a m  and 10 units HS and metformin b i d    · Started on clear liquid diet due to concern for possible GI bleed with dropping hemoglobin history of esophageal varices - while on clear liquid diet Lantus 10 units HS with SSI q 6 hours  · Once diet is advanced restart a m   Lantus

## 2022-08-05 NOTE — ASSESSMENT & PLAN NOTE
· Urine microscopy with innumerable bacteria but only 2-4 wbc's  · Patient denies any urinary symptoms  · Hold on antibiotics for now and send urine culture

## 2022-08-05 NOTE — ASSESSMENT & PLAN NOTE
· History of pancytopenia with outpatient Hematology-Oncology following  · Bone marrow biopsy 08/24/2021 showed no major abnormalities or MDS    Did reveal low iron stores   · Treated with IV Venofer x 10 doses (08/2021 - 10/2021) with improvement in hemoglobin  · Hgb on admission 7 1 - had ABLA during admission where PPM was placed due CHB on 6/29 - received blood 6/29 and 7/3 with Hgb downtrending since last transfusion   · Hx of esophageal varices with banding x2 in 02/2022 no reports of hematemesis, melena, or hematochezia - consult GI for possible EGD due to hx   · AM Hgb stable at 7 1   · Check Iron panel   · WBC 3 45 and platelets at 79   · Aspirin and VTE prophylaxis held on admission due to anemia

## 2022-08-05 NOTE — CASE MANAGEMENT
Case Management Assessment & Discharge Planning Note    Patient name Shena Lux  Location /-53 MRN [id number]  : 1945 Date 2022       Current Admission Date: 2022  Current Admission Diagnosis:Hepatic encephalopathy [facility])   Patient Active Problem List    Diagnosis Date Noted    Pyuria 2022    Urinary retention 2022    Skin breakdown 2022    Abnormal bone xray 2022    Healthcare maintenance 2022    Nausea 2022    Obesity (BMI 30-39 9) 2022    Acute blood loss anemia 2022    Closed traumatic displaced fracture of proximal end of left humerus 2022    Depression 2022    Cardiomyopathy ([facility] ) 2022    Complete heart block ([facility] ) 2022    Esophageal varices without bleeding ([facility] ) 2021    Pancytopenia ([facility] ) 2021    Iron deficiency anemia due to chronic blood loss 2021    Mild nonproliferative diabetic retinopathy of both eyes without macular edema associated with type 2 diabetes mellitus ([facility] ) 2021    Urinary tract infection 09/15/2020    Hepatic encephalopathy ([facility] ) 2020    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) ([facility] ) 2020    Thrombocytopenia ([facility] ) 2020    Hyperlipidemia 2018    Essential hypertension 2018    Acquired hypothyroidism 2018    Type 2 diabetes mellitus with hyperglycemia, with long-term current use of insulin ([facility] ) 2018    Diabetic polyneuropathy associated with type 2 diabetes mellitus ([facility] ) 2018      LOS (days): 0  Geometric Mean LOS (GMLOS) (days): 2 90  Days to GMLOS:2 5     OBJECTIVE:  [facility]  of Unplanned Readmission Score: 22 82         Current admission status: Inpatient       Preferred Pharmacy:   [id number], [address], [place]  [address] [address]  Phone: [phone] Fax: [phone]    [facility] [address] [place] [address], [address] [place] [address] Aly Aguilera  [place] [address]  Phone: [phone] Fax: [phone]    Primary Care Provider: eRji Prado,     Primary Insurance: MEDICARE  Secondary Insurance: CIG[employer]    ASSESSMENT:  Active Health Care Proxies     [facility] Representative - Daughter In-Law   Primary Phone: [phone] (Mobile)               Advance Directives  Does patient have a [address] [place] [address] Avenue?: No  Was patient offered paperwork?: Yes  Does patient currently have a Health Care decision maker?: Yes, please see Health Care Proxy section  Does patient have Advance Directives?: No  Was patient offered paperwork?: Yes  Primary Contact: Dtr in law says she thinks she is Benedict Rogers         Readmission Root Cause  30 Day Readmission: Yes  Who directed you to return to the hospital?: Family  Did you understand whom to contact if you had questions or problems?: Yes  Did you get your prescriptions before you left the hospital?: Yes  Were you able to get your prescriptions filled when you left the hospital?: Yes  Did you take your medications as prescribed?: Yes  Were you able to get to your follow-up appointments?: Yes  During previous admission, was a post-acute recommendation made?: Yes  What post-acute resources were offered?: STR  Patient was readmitted due to: Hypomagnesemia, Readmit from [facility]  Action Plan: Evaluate and treat  Pt needs STR again    Patient Information  Admitted from[de-identified] Home  Mental Status: Confused  During Assessment patient was accompanied by: Not accompanied during assessment  Assessment information provided by[de-identified] Daughter  Primary Caregiver: Self  Support Systems: Daughter, Son  [place] of Residence: [address] [facility] do you live in?: [address] [place] [address] entry access options   Select all that apply : Stairs  Number of steps to enter home : 5  Do the steps have railings?: Yes  Type of Current Residence: [facility]  In the last 12 months, was there a time when you were not able to pay the mortgage or rent on time?: No  In the last 12 months, how many places have you lived?: 1  In the last 12 months, was there a time when you did not have a steady place to sleep or slept in a shelter (including now)?: No  Homeless/housing insecurity resource given?: N/A  Living Arrangements: Lives w/ Daughter  Is patient a ?: No    Activities of Daily Living Prior to Admission  Functional Status: Independent  Completes ADLs independently?: Yes  Ambulates independently?: Yes  Does patient use assisted devices?: Yes  Assisted Devices (DME) used: Straight Walthall Bride, Wheelchair  Does patient currently own DME?: Yes  What DME does the patient currently own?: Straight Cane, Wheelchair  Does patient have a history of Outpatient Therapy (PT/OT)?: No  Does the patient have a history of Short-Term Rehab?: Yes  Does patient have a history of HHC?: No  Does patient currently have [facility]?: No         Patient Information Continued  Income Source: Pension/detention  Does patient have prescription coverage?: Yes  Within the past 12 months, you worried that your food would run out before you got the money to buy more : Never true  Within the past 12 months, the food you bought just didn't last and you didn't have money to get more : Never true  Food insecurity resource given?: N/A  Does patient receive dialysis treatments?: No  Does patient have a history of substance abuse?: No  Does patient have a history of Mental Health Diagnosis?: No         Means of Transportation  Means of Transport to Appts[de-identified] Drives Self  In the past 12 months, has lack of transportation kept you from medical appointments or from getting medications?: No  In the past 12 months, has lack of transportation kept you from meetings, work, or from getting things needed for daily living?: No  Was application for public transport provided?: N/A        DISCHARGE DETAILS:    Discharge planning discussed with[de-identified] Pt dtr in law Raul Connie of Choice: Yes  Comments - Freedom of Choice: discussed  CM contacted family/caregiver?: Yes  Were Treatment Team discharge recommendations reviewed with patient/caregiver?: Yes  Did patient/caregiver verbalize understanding of patient care needs?: Yes       Contacts  Patient Contacts: Erik Marti in law  [phone]  Relationship to Patient[de-identified] Family  Contact Method: Phone  Phone Number: [phone]  Reason/Outcome: Continuity of Care, Emergency Contact, Discharge [address]         Is the patient interested in [facility] at discharge?: No    DME Referral Provided  Referral made for DME?: No    Other Referral/Resources/Interventions Provided:  Interventions: Short Term Rehab  Referral Comments: Pt in need of STR  Dtr in law agreed to referrals to local facilities to start         Treatment Team Recommendation: Short Term Rehab  Discharge Destination Plan[de-identified] Short Term Rehab  Transport at Discharge : Wheelchair van  Dispatcher Contacted: No                          IMM Given (Date):: 08/05/22  IMM Given to[de-identified] Family  Family notified[de-identified] Diane Wright  Additional Comments: Cm spoke with pts dtr in padma attempt to reach pt's son  Pts dtr in law states that she has been to one caring for pt and helping her with decisions as needed  She states that she thinks she is POA  Cm told her that the paperwork would have to be brought in She states that pt resides in a [facility] home with 5 augustina  Pts dtr Edd Ramirez who has downs syndrom lives with her  Chio Caicedo states that she has been staying with Edd Ramirez while pt has had medical concerns  She has also been there helping pt  She reports that pt has a walker, wc and cane  PT uses walker  Pt can drive bt has not been due to health  She has been to STr at a facility in [place] before  Pts dtr in law agreeable to [employer] starting with referrals to local facilities and a preference of [facility]   Pt sees [place] PCP and uses [facility] pharmacy

## 2022-08-05 NOTE — ASSESSMENT & PLAN NOTE
· Home regimen: Lexapro 5 milligrams daily   · Patient was seen by geriatric medicine has [facility] during recent admission and recommended increased to 10 mg daily if needed

## 2022-08-05 NOTE — PLAN OF CARE
Problem: Potential for Falls  Goal: Patient will remain free of falls  Description: INTERVENTIONS:  - Educate patient/family on patient safety including physical limitations  - Instruct patient to call for assistance with activity   - Consult OT/PT to assist with strengthening/mobility   - Keep Call bell within reach  - Keep bed low and locked with side rails adjusted as appropriate  - Keep care items and personal belongings within reach  - Initiate and maintain comfort rounds  - Make Fall Risk Sign visible to staff  - Offer Toileting every  Hours, in advance of need  - Initiate/Maintain alarm  - Obtain necessary fall risk management equipment:   - Apply yellow socks and bracelet for high fall risk patients  - Consider moving patient to room near nurses station  Outcome: Progressing     Problem: Prexisting or High Potential for Compromised Skin Integrity  Goal: Skin integrity is maintained or improved  Description: INTERVENTIONS:  - Identify patients at risk for skin breakdown  - Assess and monitor skin integrity  - Assess and monitor nutrition and hydration status  - Monitor labs   - Assess for incontinence   - Turn and reposition patient  - Assist with mobility/ambulation  - Relieve pressure over bony prominences  - Avoid friction and shearing  - Provide appropriate hygiene as needed including keeping skin clean and dry  - Evaluate need for skin moisturizer/barrier cream  - Collaborate with interdisciplinary team   - Patient/family teaching  - Consider wound care consult   Outcome: Progressing     Problem: MOBILITY - ADULT  Goal: Maintain or return to baseline ADL function  Description: INTERVENTIONS:  -  Assess patient's ability to carry out ADLs; assess patient's baseline for ADL function and identify physical deficits which impact ability to perform ADLs (bathing, care of mouth/teeth, toileting, grooming, dressing, etc )  - Assess/evaluate cause of self-care deficits   - Assess range of motion  - Assess patient's mobility; develop plan if impaired  - Assess patient's need for assistive devices and provide as appropriate  - Encourage maximum independence but intervene and supervise when necessary  - Involve family in performance of ADLs  - Assess for home care needs following discharge   - Consider OT consult to assist with ADL evaluation and planning for discharge  - Provide patient education as appropriate  Outcome: Progressing  Goal: Maintains/Returns to pre admission functional level  Description: INTERVENTIONS:  - Perform BMAT or MOVE assessment daily    - Set and communicate daily mobility goal to care team and patient/family/caregiver  - Collaborate with rehabilitation services on mobility goals if consulted  - Perform Range of Motion 3 times a day  - Reposition patient every 3 hours    - Dangle patient 3 times a day  - Stand patient 3 times a day  - Ambulate patient 3 times a day  - Out of bed to chair 3 times a day   - Out of bed for meals 3 times a day  - Out of bed for toileting  - Record patient progress and toleration of activity level   Outcome: Progressing

## 2022-08-05 NOTE — PHYSICAL THERAPY NOTE
PHYSICAL THERAPY Evaluation    Performed at least 2 patient identifiers during session:  Patient Active Problem List   Diagnosis    Hyperlipidemia    Essential hypertension    Acquired hypothyroidism    Type 2 diabetes mellitus with hyperglycemia, with long-term current use of insulin ([facility] )    Diabetic polyneuropathy associated with type 2 diabetes mellitus ([address] )    Hepatic encephalopathy ([address] )    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) (HCC)    Thrombocytopenia (HCC)    Urinary tract infection    Mild nonproliferative diabetic retinopathy of both eyes without macular edema associated with type 2 diabetes mellitus (HCC)    Pancytopenia (HCC)    Iron deficiency anemia due to chronic blood loss    Esophageal varices without bleeding (HCC)    Complete heart block (HCC)    Closed traumatic displaced fracture of proximal end of left humerus    Depression    Cardiomyopathy ([address] )    Acute blood loss anemia    Obesity (BMI 30-39  9)    Abnormal bone xray    Healthcare maintenance    Nausea    Skin breakdown    Pyuria    Urinary retention       Past Medical History:   Diagnosis Date    Anemia     Cirrhosis ([address] )     Diabetic neuropathy ([address] )     Esophageal varices (HCC)     Fatty liver     GERD (gastroesophageal reflux disease)     Hepatic encephalopathy (HCC)     Hiatal hernia     Hyperlipidemia     Hypertension     Hypoglycemia     Hypothyroidism     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)     Osteoarthritis     Osteopenia     Pancytopenia (HCC)     Thrombocytopenia (HCC)     Type 2 diabetes mellitus ([address] )        Past Surgical History:   Procedure Laterality Date    ABDOMINAL SURGERY      Abdominal plasty with mesh insertion    CARDIAC ELECTROPHYSIOLOGY PROCEDURE N/A 6/29/2022    Procedure: Cardiac pacer implant;  Surgeon: Eddi Singh MD;  [facility]: [facility];   Service: Cardiology    CATARACT EXTRACTION, BILATERAL      CATARACT EXTRACTION, BILATERAL      COLONOSCOPY      EGD AND COLONOSCOPY  03/18/2015    IR BIOPSY BONE MARROW  8/24/2021    LAPAROSCOPIC CHOLECYSTECTOMY      SHOULDER SURGERY Right     calcium depsoti    TUBAL LIGATION      UMBILICAL HERNIA REPAIR      with mesh placed    UPPER GASTROINTESTINAL ENDOSCOPY          08/05/22 1013   PT Last Visit   PT Visit Date 08/05/22   Note Type   Note type Evaluation   Pain Assessment   Pain Assessment Tool 0-10   Pain Score No Pain   Restrictions/Precautions   Weight Bearing Precautions Per Order Yes   LUE Weight Bearing Per Order NWB   Braces or Orthoses Sling  (LUE)   Home Living   Type of [address] [place] [address] One level; Able to live on main level with bedroom/bathroom; Performs ADLs on one level  (2 [place])   Bathroom Shower/Tub Tub/shower unit  (pt sponge bathes, does not get in/out of tub/shower)   Home Equipment Cane   Prior Function   Level of [place] Needs assistance with ADLs and functional mobility; Needs assistance with IADLs   Lives With   (Dtr and Dtr-in-law )   Receives Help From Family   ADL Assistance Needs assistance  (pt states someone is usually with her for toileting, assist with dressing and bathing )   IADLs Needs assistance   General   Additional Pertinent History (S)  LUE NWB, sling;  this session pt able to transfer to [facility] with min-mod A x 2, pt passed large bowel movement;  pt then transferred to recliner chair with min A x 2;  pt then had nausea and vomiting;  nurse present and aware  Family/Caregiver Present No   Cognition   Overall Cognitive Status Impaired   Arousal/Participation Cooperative   Orientation Level Oriented to person;Oriented to place; Disoriented to time   Memory Decreased long term memory;Decreased short term memory;Decreased recall of recent events;Decreased recall of precautions   Following Commands Follows one step commands with increased time or repetition   RUE Assessment   RUE Assessment   (3-/5 at shoulder, 3+/5 at elbow, wrist/hand)   LUE Assessment   LUE Assessment   (not tested;  NWB, maintained in sling)   RLE Assessment   RLE Assessment WFL  (BLE edema noted)   LLE Assessment   LLE Assessment WFL  (BLE edema noted)   Bed Mobility   Supine to Sit 3  Moderate assistance   Additional items Assist x 2   Additional Comments Pt remains OOB in chair at end of session   Transfers   Sit to Stand 3  Moderate assistance  (mod A x 2 for first stand;  min A x 2 for second sit to stand )   Additional items Assist x 2   Stand to Sit 4  Minimal assistance   Additional items Assist x 2; Increased time required;Bed elevated   Stand pivot 4  Minimal assistance   Additional items Assist x 2; Increased time required;Verbal cues  (RUE support on bedrail)   Additional Comments pt requires max A for hygiene after bowel movement on commode while Eric for standing balance  Ambulation/Elevation   Gait pattern Decreased foot clearance; Forward Flexion;Decreased R stance;Decreased L stance; Excessively slow; Short stride   Gait Assistance 4  Minimal assist   Additional items Assist x 2   Assistive Device   (bedrail)   Distance 2ft bed to BSC, 2ft BSC to chair;  min A for balance, VC for technique and bigger steps   Balance   Static Sitting Fair -   Dynamic Sitting Fair -   Static Standing Fair -   Dynamic Standing Fair -   Ambulatory Fair -   Activity Tolerance   Activity Tolerance Patient limited by fatigue;Treatment limited secondary to medical complications (Comment)  (nausea/vomiting)   Medical Staff Made Aware DOMINIC Casas   Nurse Made Aware Carito   Assessment   Prognosis Guarded   Problem List Decreased strength;Decreased range of motion;Decreased endurance; Impaired balance;Decreased mobility; Decreased coordination;Decreased cognition; Impaired judgement;Decreased safety awareness; Obesity   Assessment Pt is a 77 y o  female who presented to ED 8/4/22 with c/o weakness, fall, hyperglycemia    Dx:  Hyperammonemia, hyperglycemia, hypomagnesemia, hypothyroidism, pancytopenia, non-compliant with meds, hepatic encephalopathy, pyuria, urinary retention  Comorbidities affecting pt's physical performance at time of assessment include: obesity, L humeral fx with sling, NWB, DM, OA   Personal factors affecting pt at time of IE include: fatigue, requires A x 2  PLOF and home set up listed above;  concerns for return home include but are not limited to physical assist, elevations, pt is below baseline mobility;  Upon evaluation: Pt requires mod A x 2 for bed mobility, min-mod A x 2 for sit to stand, and min A x 2 for stepping to commode and chair with RUE support  Full objective findings from PT assessment regarding body systems outlined above  Current limitations include impaired balance, decreased endurance/activity tolerance, gait deviations, fall risk and nausea/vomiting  Treatment consisted of large BM while on commode, max A for hygiene while min A for standing balance  Pt's clinical presentation is currently unstable/unpredictable seen in pt's presentation of continuous monitoring in hospital, fall risk, and nausea/vomiting  Pt would benefit from continued PT while in hospital and follow up with inpt rehab at D/C to increase strength, balance, endurance, independence with funcitonal mobility to return to PLOF, maximize independence, decrease caregiver burden and improve quality of life  PT/OT co-evaluation for pt's current medical status, mobility/balance deficits, and cognitive deficits requiring 2 skilled therapists  The patient's AM-PAC Basic Mobility Inpatient Short Form Raw Score is 10  A Raw score of less than or equal to 17 suggests the patient may benefit from discharge to post-acute rehabilitation services  Please also refer to the recommendation of the Physical Therapist for safe discharge planning  Barriers to Discharge Decreased caregiver support; Inaccessible home environment   Goals   Patient Goals pt does not state goal   STG Expiration Date 08/19/22   Short Term Goal #1 Pt will be able to demo:  mod I sit to/from supine, mod I sit to/from standing with SPC, mod I to ambulate 50ft with SPC;  to increase independence, decrease caregiver burden and improve quality of life   Plan   Treatment/Interventions Functional transfer training;ADL retraining;LE strengthening/ROM; Therapeutic exercise;Cognitive reorientation; Endurance training;Patient/family training;Equipment eval/education;Gait training;Bed mobility; Compensatory technique education;Continued evaluation;Spoke to nursing;OT   PT Frequency 3-5x/wk   Recommendation   PT Discharge Recommendation Post acute rehabilitation services   AM-PAC Basic Mobility Inpatient   Turning in Bed Without Bedrails 2   Lying on Back to Sitting on Edge of Flat Bed 2   Moving Bed to Chair 2   Standing Up From Chair 2   Walk in Room 1   Climb 3-5 Stairs 1   Basic Mobility Inpatient Raw Score 10   Turning Head Towards Sound 3   Follow Simple Instructions 3   Low Function Basic Mobility Raw Score 16   Low Function Basic Mobility Standardized Score 25 72   Highest Level Of Mobility   JH-HLM Goal 4: Move to chair/commode   JH-HLM Achieved 4: Move to chair/commode       Scott Childers PT    Patient Name: Ashley Meeks  [id number] Date: 8/5/2022

## 2022-08-05 NOTE — ASSESSMENT & PLAN NOTE
· Banding x2 in 02/2022  · Follows closely with GI  · No hematemesis, melena, or hematochezia reported   · BP stable

## 2022-08-05 NOTE — ASSESSMENT & PLAN NOTE
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

## 2022-08-05 NOTE — PLAN OF CARE
Problem: PHYSICAL THERAPY ADULT  Goal: Performs mobility at highest level of function for planned discharge setting  See evaluation for individualized goals  Description: Treatment/Interventions: Functional transfer training, ADL retraining, LE strengthening/ROM, Therapeutic exercise, Cognitive reorientation, Endurance training, Patient/family training, Equipment eval/education, Gait training, Bed mobility, Compensatory technique education, Continued evaluation, Spoke to nursing, OT          See flowsheet documentation for full assessment, interventions and recommendations  Note: Prognosis: Guarded  Problem List: Decreased strength, Decreased range of motion, Decreased endurance, Impaired balance, Decreased mobility, Decreased coordination, Decreased cognition, Impaired judgement, Decreased safety awareness, Obesity  Assessment: Pt is a 77 y o  female who presented to ED 8/4/22 with c/o weakness, fall, hyperglycemia  Dx:  Hyperammonemia, hyperglycemia, hypomagnesemia, hypothyroidism, pancytopenia, non-compliant with meds, hepatic encephalopathy, pyuria, urinary retention  Comorbidities affecting pt's physical performance at time of assessment include: obesity, L humeral fx with sling, NWB, DM, OA   Personal factors affecting pt at time of IE include: fatigue, requires A x 2  PLOF and home set up listed above;  concerns for return home include but are not limited to physical assist, elevations, pt is below baseline mobility;  Upon evaluation: Pt requires mod A x 2 for bed mobility, min-mod A x 2 for sit to stand, and min A x 2 for stepping to commode and chair with RUE support  Full objective findings from PT assessment regarding body systems outlined above  Current limitations include impaired balance, decreased endurance/activity tolerance, gait deviations, fall risk and nausea/vomiting   The following objective measures performed on IE also reveal limitations: large BM while on commode, max A for hygiene while min A for standing balance  Pt's clinical presentation is currently unstable/unpredictable seen in pt's presentation of continuous monitoring in hospital, fall risk, and nausea/vomiting  Pt would benefit from continued PT while in hospital and follow up with inpt rehab at D/C to increase strength, balance, endurance, independence with funcitonal mobility to return to PLOF, maximize independence, decrease caregiver burden and improve quality of life  PT/OT co-evaluation for pt's current medical status, mobility/balance deficits, and cognitive deficits requiring 2 skilled therapists  The patient's AM-PAC Basic Mobility Inpatient Short Form Raw Score is 10  A Raw score of less than or equal to 17 suggests the patient may benefit from discharge to post-acute rehabilitation services  Please also refer to the recommendation of the Physical Therapist for safe discharge planning  Barriers to Discharge: Decreased caregiver support, Inaccessible home environment     PT Discharge Recommendation: Post acute rehabilitation services    See flowsheet documentation for full assessment

## 2022-08-05 NOTE — ASSESSMENT & PLAN NOTE
· Presents to the ED with family due to concern for confusion   · Daughter-in-law reports that she had been away for the last 3 days when she came home last night the patient was confused to the point that she was unable to utilize her insulin pen - daughter in-law is unsure if patient had taken lactulose 3 days she was gone as her sister was staying with her  · On admission - pt is alert and oriented x3, however her responses are slow to respond and she does have asterixis in her right upper extremity  · Lactulose given on admission  · Ammonia level 60 on admission  · GI consult

## 2022-08-05 NOTE — OCCUPATIONAL THERAPY NOTE
Occupational Therapy Evaluation & Treat       Patient Name: Eddie [id number] Date: 8/5/2022  Problem List  Principal Problem:    Hepatic encephalopathy ([facility] )  Active Problems:    Essential hypertension    Acquired hypothyroidism    Type 2 diabetes mellitus with hyperglycemia, with long-term current use of insulin ([facility] )    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) ([facility] )    Pancytopenia (HCC)    Esophageal varices without bleeding (HCC)    Complete heart block (HCC)    Closed traumatic displaced fracture of proximal end of left humerus    Depression    Cardiomyopathy ([facility] )    Pyuria    Urinary retention    Past Medical History  Past Medical History:   Diagnosis Date    Anemia     Cirrhosis ([facility] )     Diabetic neuropathy ([facility] )     Esophageal varices ([facility] )     Fatty liver     GERD (gastroesophageal reflux disease)     Hepatic encephalopathy (HCC)     Hiatal hernia     Hyperlipidemia     Hypertension     Hypoglycemia     Hypothyroidism     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)     Osteoarthritis     Osteopenia     Pancytopenia (HCC)     Thrombocytopenia (HCC)     Type 2 diabetes mellitus ([facility] )      Past Surgical History  Past Surgical History:   Procedure Laterality Date    ABDOMINAL SURGERY      Abdominal plasty with mesh insertion    CARDIAC ELECTROPHYSIOLOGY PROCEDURE N/A 6/29/2022    Procedure: Cardiac pacer implant;  Surgeon: Yaneli Grey MD;  Location: [facility];   Service: Cardiology    CATARACT EXTRACTION, BILATERAL      CATARACT EXTRACTION, BILATERAL      COLONOSCOPY      EGD AND COLONOSCOPY  03/18/2015    IR BIOPSY BONE MARROW  8/24/2021    LAPAROSCOPIC CHOLECYSTECTOMY      SHOULDER SURGERY Right     calcium depsoti    TUBAL LIGATION      UMBILICAL HERNIA REPAIR      with mesh placed    UPPER GASTROINTESTINAL ENDOSCOPY           08/05/22 1018   OT Last Visit   OT Visit Date 08/05/22   Note Type   Note type Evaluation   Additional Comments & Treat Restrictions/Precautions   Weight Bearing Precautions Per Order Yes   LUE Weight Bearing Per Order NWB   Braces or Orthoses Sling  (LUE)   Pain Assessment   Pain Assessment Tool 0-10   Pain Score No Pain   Home Living   Type of [address] One level; Able to live on main level with bedroom/bathroom   Bathroom Shower/Tub Tub/shower unit  (Pt reports that she sponges bathes at baseline )   [facility]   Prior Function   Level of [place] Needs assistance with ADLs and functional mobility; Needs assistance with IADLs   Lives With Daughter; Other (Comment)  (DIL)   Receives Help From Family   ADL Assistance Needs assistance   IADLs Needs assistance   Falls in the last 6 months 1 to 4   Subjective   Subjective Pt received in supine position  Pt lethargic but arousable  Pt agreeable to session     ADL   Eating Assistance 5  Supervision/Setup   Grooming Assistance 5  Supervision/Setup   UB Bathing Assistance 3  Moderate Assistance   LB Bathing Assistance 2  Maximal Assistance   UB Dressing Assistance 3  Moderate Assistance   LB Dressing Assistance 2  Maximal Assistance   Toileting Assistance  2  Maximal Assistance   Bed Mobility   Supine to Sit 3  Moderate assistance   Additional items Assist x 2;Verbal cues   Transfers   Sit to Stand 3  Moderate assistance   Additional items Assist x 2;Verbal cues   Stand to Sit 4  Minimal assistance   Additional items Assist x 2;Bed elevated   Stand pivot 4  Minimal assistance   Additional items Assist x 2;Verbal cues  (+ close armhold/trunk assist)   Balance   Static Sitting Fair   Dynamic Sitting Fair -   Static Standing Fair -   Dynamic Standing Poor +   Activity Tolerance   Activity Tolerance Patient limited by fatigue   RUE Assessment   RUE Assessment WFL   LUE Assessment   LUE Assessment X  (NT, LUE within sling)   Cognition   Overall Cognitive Status Impaired   Arousal/Participation Arousable   Attention Within functional limits Orientation Level Oriented to person;Oriented to place   Memory Decreased recall of precautions;Decreased recall of recent events   Following Commands Follows one step commands with increased time or repetition   Assessment   Limitation Decreased ADL status; Decreased high-level ADLs; Decreased self-care trans;Decreased endurance   Prognosis Good   Assessment Pt is a 77 y o  female seen for OT evaluation at [facility]  [facility], admitted 8/4/2022 w/ Hepatic encephalopathy ([facility] )  OT completed extensive review of pt's medical and social history  Comorbidities affecting pt's functional performance at time of assessment include: type II DM, liver cirrhosis 2/2 PAUL, left humerus fx (within sling), complete heart block s/p recent PPM, etc (see chart)  Personal factors affecting pt at time of IE include:steps to enter environment, difficulty performing ADLS, difficulty performing IADLS  and decreased functional mobility  Prior to admission, pt was living with daughter/DIL in house with 1st floor set-up  Pt required assist with ADLS and IADLS, & required cane PTA  Upon evaluation: Pt requires mod AX 2 for bed mobility, min-mod Ax 2 for functional mobility/transfers, sup-mod A for UB ADLs and max A for LB ADLS 2* the following deficits impacting occupational performance: weakness, decreased strength, decreased balance, decreased tolerance and impaired arousal  Full objective findings from OT assessment regarding body systems outlined above  Pt to benefit from continued skilled OT tx while in the hospital to address deficits as defined above and maximize level of functional independence w ADL's and functional mobility  Occupational Performance areas to address include: bathing/shower, toilet hygiene, dressing and functional mobility  Based on findings, pt is of high complexity  The patient's raw score on the AM-PAC Daily Activity inpatient short form is 15, standardized score is 34 69, less than 39 4   Patients at this level are likely to benefit from DC to post-acute rehabilitation services, which DOES coincide with CURRENT above OT recommendations  However please refer to therapist recommendation for discharge planning given other factors that may influence destination  At this time, OT recommendations at time of discharge are short term rehab   Goals   Patient Goals Pt wishes to get better   Plan   Treatment Interventions ADL retraining;Functional transfer training;Patient/family training; Compensatory technique education; Endurance training   Goal Expiration Date 08/15/22   OT Treatment Day 0   OT Frequency 3-5x/wk   Additional Treatment Session   Start Time 1000   End Time 0136   Treatment Assessment Pt seen for subsequent OT tx session with focus on functional balance, functional mobility, ADL status, and transfer safety  Patient agreeable to OT treatment session  Pt seated at EOB  Pt endorsed need to have bowel movement at this time  Performed sit> stand with mod A x 2 with close armhold/trunk assist  Performed commode transfer with min Ax 2  Required total A for rebeca-care and mod AX 1 for balance  Performed additional transfer to recliner with min  X 1  Patient continues to be functioning below baseline level, occupational performance remains limited secondary to factors listed above, and pt at increased risk for falls and injury  The patient's raw score on the AM-PAC Daily Activity inpatient short form is 15, standardized score is 34 69, less than 39 4  Patients at this level are likely to benefit from DC to post-acute rehabilitation services  Please refer to the recommendation of the Occupational Therapist for safe DC planning  From OT standpoint, recommendation at time of d/c would be Short Term Rehab  Patient to benefit from continued Occupational Therapy treatment while in the hospital to address deficits as defined above and maximize level of functional independence with ADLs and functional mobility   Pt left with call bell in reach, tray table in reach, needs met, chair alarm activated  Recommendation   OT Discharge Recommendation Post acute rehabilitation services   AM-PAC Daily Activity Inpatient   Lower Body Dressing 2   Bathing 2   Toileting 2   Upper Body Dressing 2   Grooming 3   Eating 4   Daily Activity Raw Score 15   Daily Activity Standardized Score (Calc for Raw Score >=11) 34 69   AM-PAC Applied Cognition Inpatient   Following a Speech/Presentation 4   Understanding Ordinary Conversation 4   Taking Medications 3   Remembering Where Things Are Placed or Put Away 3   Remembering List of 4-5 Errands 2   Taking Care of Complicated Tasks 2   Applied Cognition Raw Score 18   Applied Cognition Standardized Score 38 07         Pt will achieve the following goals within 10 days  *Pt will complete UB bathing and dressing with min A     *Pt will complete LB bathing and dressing with mod A      * Pt will complete toileting w/ min A w/ G hygiene/thoroughness using DME PRN    *Pt will complete bed mobility with min Ax 1, with bed flat and no side rail to prep for purposeful tasks    *Pt will perform functional transfers with on/off all surfaces with min A x 1 using DME as needed w/ G balance/safety  *Pt will increase standing tolerance to 5 minutes in order to complete sinkside ADL task              Rolanda Rutherford, OTR/L

## 2022-08-05 NOTE — ASSESSMENT & PLAN NOTE
· Home regimen:  Levothyroxine 125 mcg daily except for Sundays 62 5 micrograms  · TSH elevated on admission, however free T4 1 31   · Continue levothyroxine at current dose  · Recommend recheck TSH in 6-8 weeks outpatient

## 2022-08-05 NOTE — ASSESSMENT & PLAN NOTE
· S/p permanent pacemaker 6/29 at [facility]  · Prior to placement patient required emergent transvenous pacing and did require vasopressors after placement

## 2022-08-05 NOTE — PLAN OF CARE
Problem: Potential for Falls  Goal: Patient will remain free of falls  Description: INTERVENTIONS:  - Educate patient/family on patient safety including physical limitations  - Instruct patient to call for assistance with activity   - Consult OT/PT to assist with strengthening/mobility   - Keep Call bell within reach  - Keep bed low and locked with side rails adjusted as appropriate  - Keep care items and personal belongings within reach  - Initiate and maintain comfort rounds  - Make Fall Risk Sign visible to staff  - Offer Toileting every 2 Hours, in advance of need  - Initiate/Maintain bed alarm  - Obtain necessary fall risk management equipment:   - Apply yellow socks and bracelet for high fall risk patients  - Consider moving patient to room near nurses station  Outcome: Progressing     Problem: Prexisting or High Potential for Compromised Skin Integrity  Goal: Skin integrity is maintained or improved  Description: INTERVENTIONS:  - Identify patients at risk for skin breakdown  - Assess and monitor skin integrity  - Assess and monitor nutrition and hydration status  - Monitor labs   - Assess for incontinence   - Turn and reposition patient  - Assist with mobility/ambulation  - Relieve pressure over bony prominences  - Avoid friction and shearing  - Provide appropriate hygiene as needed including keeping skin clean and dry  - Evaluate need for skin moisturizer/barrier cream  - Collaborate with interdisciplinary team   - Patient/family teaching  - Consider wound care consult   Outcome: Progressing     Problem: MOBILITY - ADULT  Goal: Maintain or return to baseline ADL function  Description: INTERVENTIONS:  -  Assess patient's ability to carry out ADLs; assess patient's baseline for ADL function and identify physical deficits which impact ability to perform ADLs (bathing, care of mouth/teeth, toileting, grooming, dressing, etc )  - Assess/evaluate cause of self-care deficits   - Assess range of motion  - Assess patient's mobility; develop plan if impaired  - Assess patient's need for assistive devices and provide as appropriate  - Encourage maximum independence but intervene and supervise when necessary  - Involve family in performance of ADLs  - Assess for home care needs following discharge   - Consider OT consult to assist with ADL evaluation and planning for discharge  - Provide patient education as appropriate  Outcome: Progressing  Goal: Maintains/Returns to pre admission functional level  Description: INTERVENTIONS:  - Perform BMAT or MOVE assessment daily    - Set and communicate daily mobility goal to care team and patient/family/caregiver  - Collaborate with rehabilitation services on mobility goals if consulted  - Perform Range of Motion 3 times a day  - Reposition patient every 2 hours    - Dangle patient 3 times a day  - Stand patient 3 times a day  - Ambulate patient 3 times a day  - Out of bed to chair 3 times a day   - Out of bed for meals 3 times a day  - Out of bed for toileting  - Record patient progress and toleration of activity level   Outcome: Progressing

## 2022-08-05 NOTE — ASSESSMENT & PLAN NOTE
· Complicated by esophageal varices status post banding with prior hepatic encephalopathy  · Daughter in-law, who is currently staying with the patient, reports that patient only takes lactulose daily but does have at least 2 bowel movements daily  · No daughter had been away for the last 4 days and is unsure if patient was taking her lactulose  · Patient oriented on admission, however is slow to respond and has asterixis   · Patient 4+ pitting edema to bilateral lower extremities admission  · Continue lactulose b i d  with goal of 2-3 soft bowel movements daily  · Continue Xifaxan 550 mg b i d , nadolol 10 mg daily, Lasix 40 milligrams daily, and spironolactone 25 milligrams daily  · GI consult

## 2022-08-05 NOTE — PLAN OF CARE
Problem: OCCUPATIONAL THERAPY ADULT  Goal: Performs self-care activities at highest level of function for planned discharge setting  See evaluation for individualized goals  Description: Treatment Interventions: ADL retraining, Functional transfer training, Patient/family training, Compensatory technique education, Endurance training          See flowsheet documentation for full assessment, interventions and recommendations  Note: Limitation: Decreased ADL status, Decreased high-level ADLs, Decreased self-care trans, Decreased endurance  Prognosis: Good  Assessment: Pt is a 77 y o  female seen for OT evaluation at [facility]  [facility], admitted 8/4/2022 w/ Hepatic encephalopathy ([facility] )  OT completed extensive review of pt's medical and social history  Comorbidities affecting pt's functional performance at time of assessment include: type II DM, liver cirrhosis 2/2 PAUL, left humerus fx (within sling), complete heart block s/p recent PPM, etc (see chart)  Personal factors affecting pt at time of IE include:steps to enter environment, difficulty performing ADLS, difficulty performing IADLS  and decreased functional mobility  Prior to admission, pt was living with daughter/DIL in house with 1st floor set-up  Pt required assist with ADLS and IADLS, & required cane PTA  Upon evaluation: Pt requires mod AX 2 for bed mobility, min-mod Ax 2 for functional mobility/transfers, sup-mod A for UB ADLs and max A for LB ADLS 2* the following deficits impacting occupational performance: weakness, decreased strength, decreased balance, decreased tolerance and impaired arousal  Full objective findings from OT assessment regarding body systems outlined above  Pt to benefit from continued skilled OT tx while in the hospital to address deficits as defined above and maximize level of functional independence w ADL's and functional mobility   Occupational Performance areas to address include: bathing/shower, toilet hygiene, dressing and functional mobility  Based on findings, pt is of high complexity  The patient's raw score on the AM-PAC Daily Activity inpatient short form is 15, standardized score is 34 69, less than 39 4  Patients at this level are likely to benefit from DC to post-acute rehabilitation services, which DOES coincide with CURRENT above OT recommendations  However please refer to therapist recommendation for discharge planning given other factors that may influence destination   At this time, OT recommendations at time of discharge are short term rehab     OT Discharge Recommendation: Post acute rehabilitation services

## 2022-08-05 NOTE — CASE MANAGEMENT
Case Management Progress Note    Patient name Eddie Manuel  Location /-94 MRN [id number]  : 1945 Date 2022       LOS (days): 0  Geometric Mean LOS (GMLOS) (days): 2 90  Days to GMLOS:2 5        OBJECTIVE:        Current admission status: Inpatient  Preferred Pharmacy:   [id number], [address] [place], [place]  [address]  [address] [facility] [address]  Phone: [phone] Fax: [phone]    [facility], [address] [place] [address] Drive [address] [facility] [address]  Phone: [phone] Fax: [phone]    Primary Care Provider: Lizett Kenney DO    Primary Insurance: MEDICARE  Secondary Insurance: [employer]    PROGRESS NOTE: Cm attempt to reach pts son or dtr in-law at [phone]  First called answered an hung up  Second call went right to voicemail  Cm requested call back a get phone number

## 2022-08-06 LAB
ALBUMIN SERPL BCP-MCNC: 2.5 G/DL (ref 3.5–5)
ALP SERPL-CCNC: 88 U/L (ref 46–116)
ALT SERPL W P-5'-P-CCNC: 23 U/L (ref 12–78)
ANION GAP SERPL CALCULATED.3IONS-SCNC: 8 MMOL/L (ref 4–13)
AST SERPL W P-5'-P-CCNC: 30 U/L (ref 5–45)
BASOPHILS # BLD AUTO: 0.04 THOUSANDS/ΜL (ref 0–0.1)
BASOPHILS NFR BLD AUTO: 1 % (ref 0–1)
BILIRUB SERPL-MCNC: 0.9 MG/DL (ref 0.2–1)
BUN SERPL-MCNC: 36 MG/DL (ref 5–25)
CALCIUM ALBUM COR SERPL-MCNC: 10.5 MG/DL (ref 8.3–10.1)
CALCIUM SERPL-MCNC: 9.3 MG/DL (ref 8.3–10.1)
CHLORIDE SERPL-SCNC: 104 MMOL/L (ref 96–108)
CO2 SERPL-SCNC: 27 MMOL/L (ref 21–32)
CREAT SERPL-MCNC: 1.19 MG/DL (ref 0.6–1.3)
EOSINOPHIL # BLD AUTO: 0.07 THOUSAND/ΜL (ref 0–0.61)
EOSINOPHIL NFR BLD AUTO: 2 % (ref 0–6)
ERYTHROCYTE [DISTWIDTH] IN BLOOD BY AUTOMATED COUNT: 18.4 % (ref 11.6–15.1)
GFR SERPL CREATININE-BSD FRML MDRD: 44 ML/MIN/1.73SQ M
GLUCOSE SERPL-MCNC: 240 MG/DL (ref 65–140)
GLUCOSE SERPL-MCNC: 247 MG/DL (ref 65–140)
GLUCOSE SERPL-MCNC: 276 MG/DL (ref 65–140)
GLUCOSE SERPL-MCNC: 289 MG/DL (ref 65–140)
GLUCOSE SERPL-MCNC: 398 MG/DL (ref 65–140)
GLUCOSE SERPL-MCNC: 399 MG/DL (ref 65–140)
GLUCOSE SERPL-MCNC: 400 MG/DL (ref 65–140)
HCT VFR BLD AUTO: 25.4 % (ref 34.8–46.1)
HGB BLD-MCNC: 7.7 G/DL (ref 11.5–15.4)
IMM GRANULOCYTES # BLD AUTO: 0.01 THOUSAND/UL (ref 0–0.2)
IMM GRANULOCYTES NFR BLD AUTO: 0 % (ref 0–2)
INR PPP: 1.26 (ref 0.84–1.19)
LYMPHOCYTES # BLD AUTO: 0.8 THOUSANDS/ΜL (ref 0.6–4.47)
LYMPHOCYTES NFR BLD AUTO: 20 % (ref 14–44)
MCH RBC QN AUTO: 27.4 PG (ref 26.8–34.3)
MCHC RBC AUTO-ENTMCNC: 30.3 G/DL (ref 31.4–37.4)
MCV RBC AUTO: 90 FL (ref 82–98)
MONOCYTES # BLD AUTO: 0.45 THOUSAND/ΜL (ref 0.17–1.22)
MONOCYTES NFR BLD AUTO: 11 % (ref 4–12)
NEUTROPHILS # BLD AUTO: 2.73 THOUSANDS/ΜL (ref 1.85–7.62)
NEUTS SEG NFR BLD AUTO: 66 % (ref 43–75)
NRBC BLD AUTO-RTO: 0 /100 WBCS
PLATELET # BLD AUTO: 86 THOUSANDS/UL (ref 149–390)
PMV BLD AUTO: 13.1 FL (ref 8.9–12.7)
POTASSIUM SERPL-SCNC: 4 MMOL/L (ref 3.5–5.3)
PROT SERPL-MCNC: 6.2 G/DL (ref 6.4–8.4)
PROTHROMBIN TIME: 16.7 SECONDS (ref 11.6–14.5)
RBC # BLD AUTO: 2.81 MILLION/UL (ref 3.81–5.12)
SODIUM SERPL-SCNC: 139 MMOL/L (ref 135–147)
WBC # BLD AUTO: 4.1 THOUSAND/UL (ref 4.31–10.16)

## 2022-08-06 PROCEDURE — 99232 SBSQ HOSP IP/OBS MODERATE 35: CPT | Performed by: HOSPITALIST

## 2022-08-06 PROCEDURE — 85025 COMPLETE CBC W/AUTO DIFF WBC: CPT | Performed by: HOSPITALIST

## 2022-08-06 PROCEDURE — 80053 COMPREHEN METABOLIC PANEL: CPT | Performed by: HOSPITALIST

## 2022-08-06 PROCEDURE — 99232 SBSQ HOSP IP/OBS MODERATE 35: CPT | Performed by: NURSE PRACTITIONER

## 2022-08-06 PROCEDURE — 82948 REAGENT STRIP/BLOOD GLUCOSE: CPT

## 2022-08-06 PROCEDURE — 85610 PROTHROMBIN TIME: CPT | Performed by: HOSPITALIST

## 2022-08-06 PROCEDURE — C9113 INJ PANTOPRAZOLE SODIUM, VIA: HCPCS | Performed by: PHYSICIAN ASSISTANT

## 2022-08-06 RX ORDER — LIDOCAINE 50 MG/G
1 PATCH TOPICAL DAILY
Status: DISCONTINUED | OUTPATIENT
Start: 2022-08-06 | End: 2022-08-15 | Stop reason: HOSPADM

## 2022-08-06 RX ORDER — ESCITALOPRAM OXALATE 10 MG/1
10 TABLET ORAL DAILY
Status: DISCONTINUED | OUTPATIENT
Start: 2022-08-07 | End: 2022-08-15 | Stop reason: HOSPADM

## 2022-08-06 RX ORDER — LACTULOSE 20 G/30ML
20 SOLUTION ORAL 2 TIMES DAILY
Status: DISCONTINUED | OUTPATIENT
Start: 2022-08-06 | End: 2022-08-07

## 2022-08-06 RX ORDER — FUROSEMIDE 10 MG/ML
40 INJECTION INTRAMUSCULAR; INTRAVENOUS DAILY
Status: DISCONTINUED | OUTPATIENT
Start: 2022-08-06 | End: 2022-08-10

## 2022-08-06 RX ADMIN — PANTOPRAZOLE SODIUM 40 MG: 40 INJECTION, POWDER, FOR SOLUTION INTRAVENOUS at 21:47

## 2022-08-06 RX ADMIN — SODIUM CHLORIDE 200 MG: 9 INJECTION, SOLUTION INTRAVENOUS at 08:37

## 2022-08-06 RX ADMIN — PANTOPRAZOLE SODIUM 40 MG: 40 INJECTION, POWDER, FOR SOLUTION INTRAVENOUS at 08:35

## 2022-08-06 RX ADMIN — LACTULOSE 20 G: 20 SOLUTION ORAL at 17:02

## 2022-08-06 RX ADMIN — LEVOTHYROXINE SODIUM 125 MCG: 25 TABLET ORAL at 05:07

## 2022-08-06 RX ADMIN — PRAVASTATIN SODIUM 80 MG: 80 TABLET ORAL at 16:58

## 2022-08-06 RX ADMIN — INSULIN GLARGINE 20 UNITS: 100 INJECTION, SOLUTION SUBCUTANEOUS at 21:48

## 2022-08-06 RX ADMIN — DICLOFENAC SODIUM 2 G: 10 GEL TOPICAL at 21:49

## 2022-08-06 RX ADMIN — RIFAXIMIN 550 MG: 550 TABLET ORAL at 08:35

## 2022-08-06 RX ADMIN — Medication 6 MG: at 23:01

## 2022-08-06 RX ADMIN — LIDOCAINE 5% 1 PATCH: 700 PATCH TOPICAL at 05:07

## 2022-08-06 RX ADMIN — LACTULOSE 10 G: 20 SOLUTION ORAL at 08:35

## 2022-08-06 RX ADMIN — SPIRONOLACTONE 25 MG: 25 TABLET ORAL at 08:35

## 2022-08-06 RX ADMIN — DICLOFENAC SODIUM 2 G: 10 GEL TOPICAL at 08:38

## 2022-08-06 RX ADMIN — NADOLOL 10 MG: 20 TABLET ORAL at 08:35

## 2022-08-06 RX ADMIN — INSULIN LISPRO 6 UNITS: 100 INJECTION, SOLUTION INTRAVENOUS; SUBCUTANEOUS at 12:33

## 2022-08-06 RX ADMIN — FUROSEMIDE 40 MG: 10 INJECTION, SOLUTION INTRAMUSCULAR; INTRAVENOUS at 15:27

## 2022-08-06 RX ADMIN — ESCITALOPRAM 5 MG: 5 TABLET, FILM COATED ORAL at 08:35

## 2022-08-06 RX ADMIN — INSULIN LISPRO 4 UNITS: 100 INJECTION, SOLUTION INTRAVENOUS; SUBCUTANEOUS at 07:33

## 2022-08-06 RX ADMIN — RIFAXIMIN 550 MG: 550 TABLET ORAL at 21:48

## 2022-08-06 RX ADMIN — INSULIN LISPRO 6 UNITS: 100 INJECTION, SOLUTION INTRAVENOUS; SUBCUTANEOUS at 16:58

## 2022-08-06 RX ADMIN — ACETAMINOPHEN 650 MG: 325 TABLET ORAL at 15:27

## 2022-08-06 NOTE — ASSESSMENT & PLAN NOTE
· History of pancytopenia with outpatient Hematology-Oncology following  · Bone marrow biopsy 08/24/2021 showed no major abnormalities or MDS    Did reveal low iron stores   · Treated with IV Venofer x 10 doses (08/2021 - 10/2021) with improvement in hemoglobin  · Hgb on admission 7 1 - had ABLA during admission where PPM was placed due CHB on 6/29 - received blood 6/29 and 7/3 with Hgb downtrending since last transfusion   · Hx of esophageal varices with banding x2 in 02/2022 no reports of hematemesis, melena, or hematochezia - consult GI for possible EGD due to hx   · AM Hgb stable at 7 7   · Check Iron panel   · WBC 4 1 and platelets at 86   · Aspirin and VTE prophylaxis held on admission due to anemia

## 2022-08-06 NOTE — ASSESSMENT & PLAN NOTE
· Complicated by esophageal varices status post banding with prior hepatic encephalopathy  · Daughter in-law, who is currently staying with the patient, reports that patient only takes lactulose daily but does have at least 2 bowel movements daily  · DIL had been away for the last 4 days and is unsure if patient was taking her lactulose  · Patient confused on admission, -has improved somewhat with lactulose  · Patient 4+ pitting edema to bilateral lower extremities admission  · Continue lactulose b i d  with goal of 3-4 soft bowel movements daily  · Continue Xifaxan 550 mg b i d , nadolol 10 mg daily, Lasix 40 milligrams daily, and spironolactone 25 milligrams daily  · GI consult appreciated

## 2022-08-06 NOTE — ASSESSMENT & PLAN NOTE
· Banding x2 in 02/2022  · Follows closely with GI  · No hematemesis, melena, or hematochezia reported   · BP stable   · Will need repeat EGD as outpatient

## 2022-08-06 NOTE — ASSESSMENT & PLAN NOTE
Lab Results   Component Value Date    HGBA1C 8 2 (H) 05/31/2022       Recent Labs     08/06/22  0004 08/06/22  0450 08/06/22  0729 08/06/22  1120   POCGLU 276* 240* 289* 398*       Blood Sugar Average: Last 72 hrs:  (P) 283 3   · Home regimen:  Lantus 38 units in a m  and 10 units HS and metformin b i d    · Started on clear liquid diet due to concern for possible GI bleed with dropping hemoglobin history of esophageal varices - while on clear liquid diet Lantus 20 units HS with SSI q 6 hours  · Once diet is advanced restart a m   Lantus

## 2022-08-06 NOTE — PROGRESS NOTES
Progress note - Gastroenterology   Trinity España 77 y o  female MRN: [id number]  Unit/Bed#: -01 Encounter: [id number]    ASSESSMENT and PLAN    1  PAUL cirrhosis with ascites and edema  2  Hepatic encephalopathy   3  Thrombocytopenia   4  Chronic anemia   5  H/o esophageal varices - no n/v/hematemesis at this time   6  Type 2 DM   7  L humerus fracture       - agree with lactulose PO and titrate to 3-4 BM/day   - continue Xifaxan  - avoid narcotics   - trend LFTs and INR   - trend H&H and transfuse to hgb >7   - monitor for overt GIB   - Getting IV Iron   - eventually needs a repeat EGD likely outpatient   - currently on nadolol  - tolerating diet at this point  - On Aldactone but not the lasix  Will add Lasix 40IV daily for now and monitor her Cr  Chief Complaint   Patient presents with    Hyperglycemia - Symptomatic     Patient presents to the ED via EMS with c/o not feeling well, states fall two weeks ago and has residual back pain and left shoulder fx (seen at [facility] ED s/p fall)  States her sugars have been high, EMS report >300 en route, recent pacemaker placement         SUBJECTIVE/HPI   No acute events o/n  Tolerating diet  Complaining of LE edema  /51   Pulse 68   Temp 97 9 °F (36 6 °C)   Resp 18   Ht 5' 4" (1 626 m)   Wt 103 kg (226 lb 4 8 oz)   SpO2 94%   BMI 38 84 kg/m²     PHYSICALEXAM  General appearance: alert, appears stated age and cooperative  Eyes: Jacolyn Meager, no scleral icterus   Head: Normocephalic, without obvious abnormality, atraumatic  Lungs: clear to auscultation bilaterally, no c/w/r  Heart: regular rate and rhythm, S1, S2 normal, no murmur, click, rub or gallop  Abdomen: soft, non-tender, non-distended; bowel sounds normal; no masses,  no organomegaly  Extremities: extremities normal, atraumatic, no clubbing or cyanosis   B/L LE edema  Neurologic: Grossly normal, AAOx3, no asterixis    Lab Results   Component Value Date    GLUCOSE 280 (H) 08/04/2022 CALCIUM 9 3 08/06/2022    K 4 0 08/06/2022    CO2 27 08/06/2022     08/06/2022    BUN 36 (H) 08/06/2022    CREATININE 1 19 08/06/2022     Lab Results   Component Value Date    WBC 4 10 (L) 08/06/2022    HGB 7 7 (L) 08/06/2022    HCT 25 4 (L) 08/06/2022    MCV 90 08/06/2022    PLT 86 (L) 08/06/2022     Lab Results   Component Value Date    ALT 23 08/06/2022    AST 30 08/06/2022    ALKPHOS 88 08/06/2022     No results found for: AMYLASE  No results found for: LIPASE  Lab Results   Component Value Date    IRON 190 (H) 08/05/2022    TIBC 413 08/05/2022    FERRITIN 20 08/05/2022     Lab Results   Component Value Date    INR 1 26 (H) 08/06/2022

## 2022-08-06 NOTE — PLAN OF CARE
Problem: Potential for Falls  Goal: Patient will remain free of falls  Description: INTERVENTIONS:  - Educate patient/family on patient safety including physical limitations  - Instruct patient to call for assistance with activity   - Consult OT/PT to assist with strengthening/mobility   - Keep Call bell within reach  - Keep bed low and locked with side rails adjusted as appropriate  - Keep care items and personal belongings within reach  - Initiate and maintain comfort rounds  - Make Fall Risk Sign visible to staff  - Offer Toileting every 2 Hours, in advance of need  - Initiate/Maintain bed/chair alarm  - Obtain necessary fall risk management equipment:  socks;bedside commode  - Apply yellow socks and bracelet for high fall risk patients  - Consider moving patient to room near nurses station  Outcome: Progressing     Problem: Prexisting or High Potential for Compromised Skin Integrity  Goal: Skin integrity is maintained or improved  Description: INTERVENTIONS:  - Identify patients at risk for skin breakdown  - Assess and monitor skin integrity  - Assess and monitor nutrition and hydration status  - Monitor labs   - Assess for incontinence   - Turn and reposition patient  - Assist with mobility/ambulation  - Relieve pressure over bony prominences  - Avoid friction and shearing  - Provide appropriate hygiene as needed including keeping skin clean and dry  - Evaluate need for skin moisturizer/barrier cream  - Collaborate with interdisciplinary team   - Patient/family teaching  - Consider wound care consult   Outcome: Progressing     Problem: MOBILITY - ADULT  Goal: Maintain or return to baseline ADL function  Description: INTERVENTIONS:  -  Assess patient's ability to carry out ADLs; assess patient's baseline for ADL function and identify physical deficits which impact ability to perform ADLs (bathing, care of mouth/teeth, toileting, grooming, dressing, etc )  - Assess/evaluate cause of self-care deficits   - Assess range of motion  - Assess patient's mobility; develop plan if impaired  - Assess patient's need for assistive devices and provide as appropriate  - Encourage maximum independence but intervene and supervise when necessary  - Involve family in performance of ADLs  - Assess for home care needs following discharge   - Consider OT consult to assist with ADL evaluation and planning for discharge  - Provide patient education as appropriate  Outcome: Progressing  Goal: Maintains/Returns to pre admission functional level  Description: INTERVENTIONS:  - Perform BMAT or MOVE assessment daily    - Set and communicate daily mobility goal to care team and patient/family/caregiver  - Collaborate with rehabilitation services on mobility goals if consulted  - Perform Range of Motion 3 times a day  - Reposition patient every 2 hours    - Dangle patient 3 times a day  - Stand patient 3 times a day  - Ambulate patient 3 times a day  - Out of bed to chair 3 times a day   - Out of bed for meals 3 times a day  - Out of bed for toileting  - Record patient progress and toleration of activity level   Outcome: Progressing

## 2022-08-06 NOTE — ASSESSMENT & PLAN NOTE
· Patient was straight cathed for 500 cc on arrival to the floor   · Urinary retention protocol  · Urine culture with [id number] CFU/mL Gram-negative rods-likely contaminant

## 2022-08-06 NOTE — ASSESSMENT & PLAN NOTE
· Patient with a history of PAUL cirrhosis admitted with worsening confusion   · Daughter-in-law reports that she had been away for the last 3 days when she came home on the day of admission the patient was confused to the point that she was unable to utilize her insulin pen - daughter in-law is unsure if patient had taken lactulose 3 days she was gone as her sister was staying with her  · On admission - patient was confused but is now improved after restarting lactulose   pt is alert and oriented x2, r her responses are slow to respond and she does have asterixis in her right upper extremity  · Lactulose given on admission-dose being adjusted to get 3-4 bowel movements a day  · Ammonia level 60 on admission  · GI consult appreciated  · Will need EGD as outpatient

## 2022-08-06 NOTE — PROGRESS NOTES
[place]  Progress Note Francisco Hutton 1945, 77 y o  female MRN: [id number]  Unit/Bed#: -01 Encounter: [id number]  Primary Care Provider: Muriel Webb DO   Date and time admitted to hospital: 8/4/2022  6:45 PM    * Hepatic encephalopathy [facility])  Assessment & Plan  · Patient with a history of PAUL cirrhosis admitted with worsening confusion   · Daughter-in-law reports that she had been away for the last 3 days when she came home on the day of admission the patient was confused to the point that she was unable to utilize her insulin pen - daughter in-law is unsure if patient had taken lactulose 3 days she was gone as her sister was staying with her  · On admission - patient was confused but is now improved after restarting lactulose   pt is alert and oriented x2, r her responses are slow to respond and she does have asterixis in her right upper extremity  · Lactulose given on admission-dose being adjusted to get 3-4 bowel movements a day  · Ammonia level 60 on admission  · GI consult appreciated  · Will need EGD as outpatient    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) ([facility] [address] )  Assessment & Plan  · Complicated by esophageal varices status post banding with prior hepatic encephalopathy  · Daughter in-law, who is currently staying with the patient, reports that patient only takes lactulose daily but does have at least 2 bowel movements daily  · DIL had been away for the last 4 days and is unsure if patient was taking her lactulose  · Patient confused on admission, -has improved somewhat with lactulose  · Patient 4+ pitting edema to bilateral lower extremities admission  · Continue lactulose b i d  with goal of 3-4 soft bowel movements daily  · Continue Xifaxan 550 mg b i d , nadolol 10 mg daily, Lasix 40 milligrams daily, and spironolactone 25 milligrams daily  · GI consult appreciated    Pancytopenia ([facility] [address] )  Assessment & Plan  · History of pancytopenia with outpatient Hematology-Oncology following  · Bone marrow biopsy 08/24/2021 showed no major abnormalities or MDS  Did reveal low iron stores   · Treated with IV Venofer x 10 doses (08/2021 - 10/2021) with improvement in hemoglobin  · Hgb on admission 7 1 - had ABLA during admission where PPM was placed due CHB on 6/29 - received blood 6/29 and 7/3 with Hgb downtrending since last transfusion   · Hx of esophageal varices with banding x2 in 02/2022 no reports of hematemesis, melena, or hematochezia - consult GI for possible EGD due to hx   · AM Hgb stable at 7 7   · Check Iron panel   · WBC 4 1 and platelets at 86   · Aspirin and VTE prophylaxis held on admission due to anemia    Type 2 diabetes mellitus with hyperglycemia, with long-term current use of insulin [facility])  Assessment & Plan  Lab Results   Component Value Date    HGBA1C 8 2 (H) 05/31/2022       Recent Labs     08/06/22  0004 08/06/22  0450 08/06/22  0729 08/06/22  1120   POCGLU 276* 240* 289* 398*       Blood Sugar Average: Last 72 hrs:  (P) 283 3   · Home regimen:  Lantus 38 units in a m  and 10 units HS and metformin b i d    · Started on clear liquid diet due to concern for possible GI bleed with dropping hemoglobin history of esophageal varices - while on clear liquid diet Lantus 20 units HS with SSI q 6 hours  · Once diet is advanced restart a m   Lantus    Depression  Assessment & Plan  · Home regimen: Lexapro 5 milligrams daily   · Patient was seen by geriatric medicine has [facility] during recent admission and recommended increased to 10 mg daily if needed    Essential hypertension  Assessment & Plan  · Continue medications as outlined under liver cirrhosis    Acquired hypothyroidism  Assessment & Plan  · Home regimen:  Levothyroxine 125 mcg daily except for Sundays 62 5 micrograms  · TSH elevated on admission, however free T4 1 31   · Continue levothyroxine at current dose  · Recommend recheck TSH in 6-8 weeks outpatient    Esophageal varices without bleeding ([facility] [address] )  Assessment & Plan  · Banding x2 in 02/2022  · Follows closely with GI  · No hematemesis, melena, or hematochezia reported   · BP stable   · Will need repeat EGD as outpatient    Complete heart block ([facility] [address] )  Assessment & Plan  · S/p permanent pacemaker 6/29 at [facility]  · Prior to placement patient required emergent transvenous pacing and did require vasopressors after placement    Cardiomyopathy [facility])  Assessment & Plan  · Newly diagnosed during recent admission  · 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% and abnormal diastolic function on 44/20 echo   · Repeat echo on 07/05 status post ppm:  LVEF 50 percent  G1 DD  · Continue Lasix    Urinary retention  Assessment & Plan  · Patient was straight cathed for 500 cc on arrival to the floor   · Urinary retention protocol  · Urine culture with [phone] CFU/mL Gram-negative rods-likely contaminant    And needs rehab upon the DC    Subjective:  Patient will awake and verbal but intermittently confused and has some asterixis  Physical Exam:   Vitals: Blood pressure 123/51, pulse 68, temperature 97 9 °F (36 6 °C), resp  rate 18, height 5' 4" (1 626 m), weight 103 kg (226 lb 4 8 oz), SpO2 94 %, not currently breastfeeding  ,Body mass index is 38 84 kg/m²  Gen:  non-tachypnic, non-dyspnic   t  Heart: regular rate and rhythm, S1S2 present, no murmur, rub or gallop  Lungs: clear to ausculatation bilaterally  No wheezing, crackless, or rhonchi  No accessory muscle use or respiratory distress  Abd: soft, non-tender, non-distended  NABS, no guarding, rebound or peritoneal signs  Extremities: no clubbing, cyanosis or edema  2+pedal pulses bilaterally  Full range of motion  Neuro: awake, alert and orientedx2  Cranial nerves 2-12 intact  Strength and sensation grossly intact  Skin: warm and dry: no petechiae, purpura and rash      LABS:   Results from last 7 days   Lab Units 08/06/22  0445 08/05/22  3296 08/05/22 0352 08/04/22 1922 08/04/22 1912   WBC Thousand/uL 4 10*  --  3 45*  --  3 47*   HEMOGLOBIN g/dL 7 7* 7 5* 7 1*  --  7 1*   I STAT HEMOGLOBIN   --   --   --    < >  --    HEMATOCRIT % 25 4* 24 4* 23 5*  --  23 5*   HEMATOCRIT, ISTAT   --   --   --    < >  --    PLATELETS Thousands/uL 86*  --  79*  --  79*    < > = values in this interval not displayed       Results from last 7 days   Lab Units 08/06/22 0445 08/05/22 0352 08/04/22 1922 08/04/22 1912   POTASSIUM mmol/L 4 0 4 4  --  4 2   CHLORIDE mmol/L 104 106  --  105   CO2 mmol/L 27 26  --  28   CO2, I-STAT mmol/L  --   --  27  --    BUN mg/dL 36* 38*  --  35*   CREATININE mg/dL 1 19 1 16  --  1 19   GLUCOSE, ISTAT mg/dl  --   --  280*  --    CALCIUM mg/dL 9 3 9 0  --  8 7       Intake/Output Summary (Last 24 hours) at 8/6/2022 1239  Last data filed at 8/6/2022 1215  Gross per 24 hour   Intake 360 ml   Output 1000 ml   Net -640 ml         Current Facility-Administered Medications   Medication Dose Route Frequency Provider Last Rate    acetaminophen  650 mg Oral Q6H PRN Aureliano Koyanagi, PA-C      Diclofenac Sodium  2 g Topical BID Taryn Lowe PA-C      escitalopram  5 mg Oral Daily Aureliano Koyanagi, PA-C      insulin glargine  20 Units Subcutaneous HS Ban Sexton MD      insulin lispro  1-6 Units Subcutaneous TID With Meals Ban Sexton MD      iron sucrose  200 mg Intravenous Daily Jonna Alvarado MD      lactulose  10 g Oral BID Aureliano Koyanagi, PA-C      levothyroxine  125 mcg Oral Once per day on Mon Tue Wed Thu Fri Sat Aureliano Koyanagi, PA-C      [START ON 8/7/2022] levothyroxine  62 5 mcg Oral Once per day on Sun Aureliano Koyanagi, PA-C      lidocaine  1 patch Topical Daily Taryn Diasio, PA-C      melatonin  6 mg Oral HS PRN Aureliano Koyanagi, PA-C      nadolol  10 mg Oral Daily Aureliano Koyanagi, PA-C      ondansetron  4 mg Intravenous Q6H PRN Ban Sexton MD      pantoprazole  40 mg Intravenous Q12H [address] Mila Chase PA-C      pravastatin  80 mg Oral Daily With Jabil DARRELL Webster      rifaximin  550 mg Oral Q12H [address] Mila Chase PA-C      spironolactone  25 mg Oral Daily Mila Chase PA-C

## 2022-08-06 NOTE — ASSESSMENT & PLAN NOTE
· S/p permanent pacemaker 6/29 at [facility]  · Prior to placement patient required emergent transvenous pacing and did require vasopressors after placement

## 2022-08-06 NOTE — ASSESSMENT & PLAN NOTE
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

## 2022-08-07 ENCOUNTER — APPOINTMENT (INPATIENT)
Dept: ULTRASOUND IMAGING | Facility: HOSPITAL | Age: 77
DRG: 441 | End: 2022-08-07
Payer: MEDICARE

## 2022-08-07 LAB
ABO GROUP BLD: NORMAL
ANION GAP SERPL CALCULATED.3IONS-SCNC: 7 MMOL/L (ref 4–13)
BACTERIA UR CULT: ABNORMAL
BACTERIA UR CULT: ABNORMAL
BLD GP AB SCN SERPL QL: NEGATIVE
BUN SERPL-MCNC: 33 MG/DL (ref 5–25)
CALCIUM SERPL-MCNC: 9 MG/DL (ref 8.3–10.1)
CHLORIDE SERPL-SCNC: 103 MMOL/L (ref 96–108)
CO2 SERPL-SCNC: 28 MMOL/L (ref 21–32)
CREAT SERPL-MCNC: 1.17 MG/DL (ref 0.6–1.3)
ERYTHROCYTE [DISTWIDTH] IN BLOOD BY AUTOMATED COUNT: 18.3 % (ref 11.6–15.1)
GFR SERPL CREATININE-BSD FRML MDRD: 45 ML/MIN/1.73SQ M
GLUCOSE SERPL-MCNC: 284 MG/DL (ref 65–140)
GLUCOSE SERPL-MCNC: 287 MG/DL (ref 65–140)
GLUCOSE SERPL-MCNC: 375 MG/DL (ref 65–140)
GLUCOSE SERPL-MCNC: 416 MG/DL (ref 65–140)
GLUCOSE SERPL-MCNC: 436 MG/DL (ref 65–140)
GLUCOSE SERPL-MCNC: >500 MG/DL (ref 65–140)
HCT VFR BLD AUTO: 22.9 % (ref 34.8–46.1)
HCT VFR BLD AUTO: 24.9 % (ref 34.8–46.1)
HGB BLD-MCNC: 6.9 G/DL (ref 11.5–15.4)
HGB BLD-MCNC: 7.5 G/DL (ref 11.5–15.4)
MCH RBC QN AUTO: 27.1 PG (ref 26.8–34.3)
MCHC RBC AUTO-ENTMCNC: 30.1 G/DL (ref 31.4–37.4)
MCV RBC AUTO: 90 FL (ref 82–98)
PLATELET # BLD AUTO: 75 THOUSANDS/UL (ref 149–390)
PMV BLD AUTO: 13.1 FL (ref 8.9–12.7)
POTASSIUM SERPL-SCNC: 3.9 MMOL/L (ref 3.5–5.3)
RBC # BLD AUTO: 2.55 MILLION/UL (ref 3.81–5.12)
RH BLD: NEGATIVE
SODIUM SERPL-SCNC: 138 MMOL/L (ref 135–147)
SPECIMEN EXPIRATION DATE: NORMAL
WBC # BLD AUTO: 3.61 THOUSAND/UL (ref 4.31–10.16)

## 2022-08-07 PROCEDURE — 85014 HEMATOCRIT: CPT | Performed by: INTERNAL MEDICINE

## 2022-08-07 PROCEDURE — C9113 INJ PANTOPRAZOLE SODIUM, VIA: HCPCS | Performed by: PHYSICIAN ASSISTANT

## 2022-08-07 PROCEDURE — 86900 BLOOD TYPING SEROLOGIC ABO: CPT | Performed by: INTERNAL MEDICINE

## 2022-08-07 PROCEDURE — 85027 COMPLETE CBC AUTOMATED: CPT | Performed by: HOSPITALIST

## 2022-08-07 PROCEDURE — 82948 REAGENT STRIP/BLOOD GLUCOSE: CPT

## 2022-08-07 PROCEDURE — 99232 SBSQ HOSP IP/OBS MODERATE 35: CPT | Performed by: NURSE PRACTITIONER

## 2022-08-07 PROCEDURE — 76705 ECHO EXAM OF ABDOMEN: CPT

## 2022-08-07 PROCEDURE — 99232 SBSQ HOSP IP/OBS MODERATE 35: CPT | Performed by: INTERNAL MEDICINE

## 2022-08-07 PROCEDURE — 86901 BLOOD TYPING SEROLOGIC RH(D): CPT | Performed by: INTERNAL MEDICINE

## 2022-08-07 PROCEDURE — 85018 HEMOGLOBIN: CPT | Performed by: INTERNAL MEDICINE

## 2022-08-07 PROCEDURE — 80048 BASIC METABOLIC PNL TOTAL CA: CPT | Performed by: HOSPITALIST

## 2022-08-07 PROCEDURE — 86850 RBC ANTIBODY SCREEN: CPT | Performed by: INTERNAL MEDICINE

## 2022-08-07 RX ORDER — INSULIN GLARGINE 100 [IU]/ML
30 INJECTION, SOLUTION SUBCUTANEOUS
Status: DISCONTINUED | OUTPATIENT
Start: 2022-08-07 | End: 2022-08-08

## 2022-08-07 RX ORDER — NYSTATIN 100000 [USP'U]/G
POWDER TOPICAL 2 TIMES DAILY
Status: DISCONTINUED | OUTPATIENT
Start: 2022-08-07 | End: 2022-08-15 | Stop reason: HOSPADM

## 2022-08-07 RX ORDER — PRAVASTATIN SODIUM 20 MG
20 TABLET ORAL
Status: DISCONTINUED | OUTPATIENT
Start: 2022-08-07 | End: 2022-08-15 | Stop reason: HOSPADM

## 2022-08-07 RX ORDER — LACTULOSE 20 G/30ML
20 SOLUTION ORAL 3 TIMES DAILY
Status: DISCONTINUED | OUTPATIENT
Start: 2022-08-07 | End: 2022-08-15 | Stop reason: HOSPADM

## 2022-08-07 RX ORDER — SPIRONOLACTONE 25 MG/1
25 TABLET ORAL ONCE
Status: COMPLETED | OUTPATIENT
Start: 2022-08-07 | End: 2022-08-07

## 2022-08-07 RX ORDER — INSULIN LISPRO 100 [IU]/ML
12 INJECTION, SOLUTION INTRAVENOUS; SUBCUTANEOUS
Status: DISCONTINUED | OUTPATIENT
Start: 2022-08-07 | End: 2022-08-11

## 2022-08-07 RX ORDER — SPIRONOLACTONE 25 MG/1
50 TABLET ORAL DAILY
Status: DISCONTINUED | OUTPATIENT
Start: 2022-08-08 | End: 2022-08-10

## 2022-08-07 RX ADMIN — PANTOPRAZOLE SODIUM 40 MG: 40 INJECTION, POWDER, FOR SOLUTION INTRAVENOUS at 08:35

## 2022-08-07 RX ADMIN — DICLOFENAC SODIUM 2 G: 10 GEL TOPICAL at 08:36

## 2022-08-07 RX ADMIN — RIFAXIMIN 550 MG: 550 TABLET ORAL at 08:35

## 2022-08-07 RX ADMIN — LACTULOSE 20 G: 20 SOLUTION ORAL at 08:35

## 2022-08-07 RX ADMIN — SPIRONOLACTONE 25 MG: 25 TABLET ORAL at 08:35

## 2022-08-07 RX ADMIN — FUROSEMIDE 40 MG: 10 INJECTION, SOLUTION INTRAMUSCULAR; INTRAVENOUS at 08:35

## 2022-08-07 RX ADMIN — SPIRONOLACTONE 25 MG: 25 TABLET ORAL at 14:16

## 2022-08-07 RX ADMIN — LEVOTHYROXINE SODIUM 62.5 MCG: 25 TABLET ORAL at 06:25

## 2022-08-07 RX ADMIN — INSULIN LISPRO 12 UNITS: 100 INJECTION, SOLUTION INTRAVENOUS; SUBCUTANEOUS at 17:52

## 2022-08-07 RX ADMIN — DICLOFENAC SODIUM 2 G: 10 GEL TOPICAL at 22:50

## 2022-08-07 RX ADMIN — LIDOCAINE 5% 1 PATCH: 700 PATCH TOPICAL at 08:35

## 2022-08-07 RX ADMIN — LACTULOSE 20 G: 20 SOLUTION ORAL at 17:52

## 2022-08-07 RX ADMIN — ESCITALOPRAM OXALATE 10 MG: 10 TABLET ORAL at 08:35

## 2022-08-07 RX ADMIN — LACTULOSE 20 G: 20 SOLUTION ORAL at 22:41

## 2022-08-07 RX ADMIN — RIFAXIMIN 550 MG: 550 TABLET ORAL at 22:41

## 2022-08-07 RX ADMIN — NADOLOL 10 MG: 20 TABLET ORAL at 08:35

## 2022-08-07 RX ADMIN — INSULIN LISPRO 4 UNITS: 100 INJECTION, SOLUTION INTRAVENOUS; SUBCUTANEOUS at 08:36

## 2022-08-07 RX ADMIN — SODIUM CHLORIDE 200 MG: 9 INJECTION, SOLUTION INTRAVENOUS at 08:42

## 2022-08-07 RX ADMIN — PRAVASTATIN SODIUM 20 MG: 20 TABLET ORAL at 17:52

## 2022-08-07 RX ADMIN — INSULIN LISPRO 12 UNITS: 100 INJECTION, SOLUTION INTRAVENOUS; SUBCUTANEOUS at 11:49

## 2022-08-07 RX ADMIN — NYSTATIN: 100000 POWDER TOPICAL at 19:48

## 2022-08-07 RX ADMIN — INSULIN GLARGINE 30 UNITS: 100 INJECTION, SOLUTION SUBCUTANEOUS at 22:41

## 2022-08-07 RX ADMIN — PANTOPRAZOLE SODIUM 40 MG: 40 INJECTION, POWDER, FOR SOLUTION INTRAVENOUS at 22:41

## 2022-08-07 NOTE — PLAN OF CARE
Problem: Potential for Falls  Goal: Patient will remain free of falls  Description: INTERVENTIONS:  - Educate patient/family on patient safety including physical limitations  - Instruct patient to call for assistance with activity   - Consult OT/PT to assist with strengthening/mobility   - Keep Call bell within reach  - Keep bed low and locked with side rails adjusted as appropriate  - Keep care items and personal belongings within reach  - Initiate and maintain comfort rounds  - Make Fall Risk Sign visible to staff  - Offer Toileting every 2 Hours, in advance of need  - Initiate/Maintain bed/chair alarm  - Obtain necessary fall risk management equipment:  socks;bedside commode  - Apply yellow socks and bracelet for high fall risk patients  - Consider moving patient to room near nurses station  8/6/2022 2028 by Farzana Reyes RN  Outcome: Progressing  8/6/2022 2028 by Farzana Reyes RN  Outcome: Progressing  8/6/2022 2028 by Farzana Reyes RN  Outcome: Progressing  8/6/2022 2028 by Farzana Reyes RN  Outcome: Progressing     Problem: Prexisting or High Potential for Compromised Skin Integrity  Goal: Skin integrity is maintained or improved  Description: INTERVENTIONS:  - Identify patients at risk for skin breakdown  - Assess and monitor skin integrity  - Assess and monitor nutrition and hydration status  - Monitor labs   - Assess for incontinence   - Turn and reposition patient  - Assist with mobility/ambulation  - Relieve pressure over bony prominences  - Avoid friction and shearing  - Provide appropriate hygiene as needed including keeping skin clean and dry  - Evaluate need for skin moisturizer/barrier cream  - Collaborate with interdisciplinary team   - Patient/family teaching  - Consider wound care consult   8/6/2022 2028 by Farzana Reyes RN  Outcome: Progressing  8/6/2022 2028 by Farzana Reyes RN  Outcome: Progressing  8/6/2022 2028 by Farzana Reyes RN  Outcome: Progressing  8/6/2022 2028 by Farzana Reyes RN  Outcome: Progressing     Problem: MOBILITY - ADULT  Goal: Maintain or return to baseline ADL function  Description: INTERVENTIONS:  -  Assess patient's ability to carry out ADLs; assess patient's baseline for ADL function and identify physical deficits which impact ability to perform ADLs (bathing, care of mouth/teeth, toileting, grooming, dressing, etc )  - Assess/evaluate cause of self-care deficits   - Assess range of motion  - Assess patient's mobility; develop plan if impaired  - Assess patient's need for assistive devices and provide as appropriate  - Encourage maximum independence but intervene and supervise when necessary  - Involve family in performance of ADLs  - Assess for home care needs following discharge   - Consider OT consult to assist with ADL evaluation and planning for discharge  - Provide patient education as appropriate  8/6/2022 2028 by Demond Arzola RN  Outcome: Progressing  8/6/2022 2028 by Demond Arzola RN  Outcome: Progressing  8/6/2022 2028 by Demond Arzola RN  Outcome: Progressing  8/6/2022 2028 by Demond Arzola RN  Outcome: Progressing  Goal: Maintains/Returns to pre admission functional level  Description: INTERVENTIONS:  - Perform BMAT or MOVE assessment daily    - Set and communicate daily mobility goal to care team and patient/family/caregiver  - Collaborate with rehabilitation services on mobility goals if consulted  - Perform Range of Motion 3 times a day  - Reposition patient every 2 hours    - Dangle patient 3 times a day  - Stand patient 3 times a day  - Ambulate patient 3 times a day  - Out of bed to chair 3 times a day   - Out of bed for meals 3 times a day  - Out of bed for toileting  - Record patient progress and toleration of activity level   8/6/2022 2028 by Demond Arzola RN  Outcome: Progressing  8/6/2022 2028 by Demond Arzola RN  Outcome: Progressing  8/6/2022 2028 by Demond Arzola RN  Outcome: Progressing  8/6/2022 2028 by Demond Arzola RN  Outcome: Progressing Problem: PAIN - ADULT  Goal: Verbalizes/displays adequate comfort level or baseline comfort level  Description: Interventions:  - Encourage patient to monitor pain and request assistance  - Assess pain using appropriate pain scale  - Administer analgesics based on type and severity of pain and evaluate response  - Implement non-pharmacological measures as appropriate and evaluate response  - Consider cultural and social influences on pain and pain management  - Notify physician/advanced practitioner if interventions unsuccessful or patient reports new pain  8/6/2022 2028 by Shayy Kamara RN  Outcome: Progressing  8/6/2022 2028 by Shayy Kamara RN  Outcome: Progressing  8/6/2022 2028 by Shayy Kamara RN  Outcome: Progressing     Problem: INFECTION - ADULT  Goal: Absence or prevention of progression during hospitalization  Description: INTERVENTIONS:  - Assess and monitor for signs and symptoms of infection  - Monitor lab/diagnostic results  - Monitor all insertion sites, i e  indwelling lines, tubes, and drains  - Monitor endotracheal if appropriate and nasal secretions for changes in amount and color  - [place] appropriate cooling/warming therapies per order  - Administer medications as ordered  - Instruct and encourage patient and family to use good hand hygiene technique  - Identify and instruct in appropriate isolation precautions for identified infection/condition  8/6/2022 2028 by Shayy Kamara RN  Outcome: Progressing  8/6/2022 2028 by Shayy Kamara RN  Outcome: Progressing  8/6/2022 2028 by Shayy Kamara RN  Outcome: Progressing  Goal: Absence of fever/infection during neutropenic period  Description: INTERVENTIONS:  - Monitor WBC    8/6/2022 2028 by Shayy Kamara RN  Outcome: Progressing  8/6/2022 2028 by Shayy Kamara RN  Outcome: Progressing  8/6/2022 2028 by Shayy Kamara RN  Outcome: Progressing     Problem: SAFETY ADULT  Goal: Patient will remain free of falls  Description: INTERVENTIONS:  - Educate patient/family on patient safety including physical limitations  - Instruct patient to call for assistance with activity   - Consult OT/PT to assist with strengthening/mobility   - Keep Call bell within reach  - Keep bed low and locked with side rails adjusted as appropriate  - Keep care items and personal belongings within reach  - Initiate and maintain comfort rounds  - Make Fall Risk Sign visible to staff  - Offer Toileting every 2 Hours, in advance of need  - Initiate/Maintain bed/chair alarm  - Obtain necessary fall risk management equipment:  socks;bedside commode  - Apply yellow socks and bracelet for high fall risk patients  - Consider moving patient to room near nurses station  8/6/2022 2028 by Shima Thomas RN  Outcome: Progressing  8/6/2022 2028 by Shima Thomas RN  Outcome: Progressing  8/6/2022 2028 by Shima Thomas RN  Outcome: Progressing  8/6/2022 2028 by Shima Thomas RN  Outcome: Progressing  Goal: Maintain or return to baseline ADL function  Description: INTERVENTIONS:  -  Assess patient's ability to carry out ADLs; assess patient's baseline for ADL function and identify physical deficits which impact ability to perform ADLs (bathing, care of mouth/teeth, toileting, grooming, dressing, etc )  - Assess/evaluate cause of self-care deficits   - Assess range of motion  - Assess patient's mobility; develop plan if impaired  - Assess patient's need for assistive devices and provide as appropriate  - Encourage maximum independence but intervene and supervise when necessary  - Involve family in performance of ADLs  - Assess for home care needs following discharge   - Consider OT consult to assist with ADL evaluation and planning for discharge  - Provide patient education as appropriate  8/6/2022 2028 by Shima Thomas RN  Outcome: Progressing  8/6/2022 2028 by Shima Thomas RN  Outcome: Progressing  8/6/2022 2028 by Shima Thomas RN  Outcome: Progressing  8/6/2022 2028 by Shima Thomas RN  Outcome: Progressing  Goal: Maintains/Returns to pre admission functional level  Description: INTERVENTIONS:  - Perform BMAT or MOVE assessment daily    - Set and communicate daily mobility goal to care team and patient/family/caregiver  - Collaborate with rehabilitation services on mobility goals if consulted  - Perform Range of Motion 3 times a day  - Reposition patient every 2 hours  - Dangle patient 3 times a day  - Stand patient 3 times a day  - Ambulate patient 3 times a day  - Out of bed to chair 3 times a day   - Out of bed for meals 3 times a day  - Out of bed for toileting  - Record patient progress and toleration of activity level   8/6/2022 2028 by Shilpi Salmon RN  Outcome: Progressing  8/6/2022 2028 by Shilpi Salmon RN  Outcome: Progressing  8/6/2022 2028 by Shilpi Salmon RN  Outcome: Progressing  8/6/2022 2028 by Shilpi Salmon RN  Outcome: Progressing     Problem: DISCHARGE PLANNING  Goal: Discharge to home or other facility with appropriate resources  Description: INTERVENTIONS:  - Identify barriers to discharge w/patient and caregiver  - Arrange for needed discharge resources and transportation as appropriate  - Identify discharge learning needs (meds, wound care, etc )  - Arrange for interpretive services to assist at discharge as needed  - Refer to Case Management Department for coordinating discharge planning if the patient needs post-hospital services based on physician/advanced practitioner order or complex needs related to functional status, cognitive ability, or social support system  8/6/2022 2028 by Shilpi Salmon RN  Outcome: Progressing  8/6/2022 2028 by Shilpi Salmon RN  Outcome: Progressing  8/6/2022 2028 by Shilpi Salmon RN  Outcome: Progressing     Problem: Knowledge Deficit  Goal: Patient/family/caregiver demonstrates understanding of disease process, treatment plan, medications, and discharge instructions  Description: Complete learning assessment and assess knowledge base    Interventions:  - Provide teaching at level of understanding  - Provide teaching via preferred learning methods  8/6/2022 2028 by Newton Mendoza RN  Outcome: Progressing  8/6/2022 2028 by Newton Mendoza RN  Outcome: Progressing  8/6/2022 2028 by Newton Mendoza RN  Outcome: Progressing

## 2022-08-07 NOTE — PROGRESS NOTES
Progress Note - Akiko Gleason 77 y o  female MRN: [id number]    Unit/Bed#: -01 Encounter: [id number]    Assessment/Plan:    Hepatic encephalopathy  history of PAUL cirrhosis, mental status improving with lactulose and rifaximin, continue cognitive support    Liver cirrhosis    history of portal hypertension, esophageal varices, ascites, continue Lasix with Aldactone and Nadolol with PPI    Pancytopenia    most likely related to liver cirrhosis continue to monitor    Diabetes     poorly controlled last 24 hour blood glucose 287 to 400, will increase Lantus to 25 units and add NovoLog before meals continue sliding scale    Morbid obesity    would benefit from weight loss    Hypertension    well controlled with current medication    Hypothyroid    continue Synthroid    Subjective: Thoughts are clear, still swelling of the abdomen and legs, denies chest pain or shortness of breath no nausea vomiting appetite good      Objective:     Vitals: Blood pressure 120/53, pulse 65, temperature 98 3 °F (36 8 °C), temperature source Oral, resp  rate 15, height 5' 4" (1 626 m), weight 104 kg (230 lb), SpO2 98 %, not currently breastfeeding  ,Body mass index is 39 48 kg/m²        Results from last 7 days   Lab Units 08/07/22  0735 08/07/22  0505 08/06/22  0445   WBC Thousand/uL  --  3 61* 4 10*   HEMOGLOBIN g/dL 7 5* 6 9* 7 7*   HEMATOCRIT % 24 9* 22 9* 25 4*   PLATELETS Thousands/uL  --  75* 86*   INR   --   --  1 26*     Results from last 7 days   Lab Units 08/07/22  0505 08/06/22  0445 08/05/22  0352 08/04/22  1922   POTASSIUM mmol/L 3 9 4 0   < >  --    CHLORIDE mmol/L 103 104   < >  --    CO2 mmol/L 28 27   < >  --    CO2, I-STAT mmol/L  --   --   --  27   BUN mg/dL 33* 36*   < >  --    CREATININE mg/dL 1 17 1 19   < >  --    CALCIUM mg/dL 9 0 9 3   < >  --    ALK PHOS U/L  --  88   < >  --    ALT U/L  --  23   < >  --    AST U/L  --  30   < >  --    GLUCOSE, ISTAT mg/dl  --   --   --  280*    < > = values in this interval not displayed         Scheduled Meds:  Current Facility-Administered Medications   Medication Dose Route Frequency Provider Last Rate    acetaminophen  650 mg Oral Q6H PRN Geroge Bundsteph, DARRELL      Diclofenac Sodium  2 g Topical BID Taryn Lowe PA-C      escitalopram  10 mg Oral Daily Armando Sánchez MD      furosemide  40 mg Intravenous Daily Ifeoma Rodriguez MD      insulin glargine  20 Units Subcutaneous HS Lakia Hernadez MD      insulin lispro  1-6 Units Subcutaneous TID With Meals Lakia Hernadez MD      iron sucrose  200 mg Intravenous Daily Lakia Hernadez MD      lactulose  20 g Oral BID Ifeoma Rodriguez MD      levothyroxine  125 mcg Oral Once per day on Mon Tue Wed Thu Fri Sat Geroge Bunde, DARRELL      levothyroxine  62 5 mcg Oral Once per day on Sun [facility], DARRELL      lidocaine  1 patch Topical Daily Jayme Puff, PA-C      melatonin  6 mg Oral HS PRN Geroge Bundsteph, PA-VAMSHI      nadolol  10 mg Oral Daily [facility], DARRELL      ondansetron  4 mg Intravenous Q6H PRN Lakia Hernadez MD      pantoprazole  40 mg Intravenous Q12H [address] Geroge Bunde, DARRELL      pravastatin  80 mg Oral Daily With [employer], PA-C      rifaximin  550 mg Oral Q12H [address] Geroge Bunde, PA-VAMSHI      spironolactone  25 mg Oral Daily Geroge Bunde, PA-C         Continuous Infusions:         Physical exam:  General appearance:  Alert no distress interaction appropriate  Head/Eyes:  Nonicteric pale sluggish  Neck:  Supple  Lungs:  Decreased breath sounds especially bases bilateral   Heart: normal S1 S2 regular  Abdomen:  Obese distended nontender with bowel sounds  Extremities:  2+ pitting edema  Skin: no rash    Invasive Devices  Report    Peripheral Intravenous Line  Duration           Peripheral IV 08/05/22 Right Forearm 1 day          Drain  Duration           External Urinary Catheter 2 days                      Counseling / Coordination of Care  Total floor / unit time spent today 30  minutes  Greater than 50% of total time was spent with the patient and / or family counseling and / or coordination of care  A description of the counseling / coordination of care: Tiffany Salmon

## 2022-08-07 NOTE — PROGRESS NOTES
Progress note - Gastroenterology   Modesto Ortiz 77 y o  female MRN: [id number]  Unit/Bed#: -01 Encounter: [id number]    ASSESSMENT and PLAN    1  PAUL cirrhosis with ascites and edema, low MELD    · Increasing abd distension - check US with dopplers  · Added Lasix back yesterday with incr urine output  Increase Aldactone to 50mg daily  · Strict I&O's and daily weight  · 2g Na diet and fluid restriction  · Trend LFTs and INR    2  Hepatic encephalopathy - improved    · Continue lactulose p o  And titrate to 3-4 bowel movements a day  · Continue Xifaxan  · Avoid narcotics    3  Thrombocytopenia     4  Chronic anemia, FOBT + but no overt GIB    · Monitor for overt GI bleeding  · Transfuse to hgb >7 although currently there is a severe blood shortage so we are told to limit transfusions   · S/p IV Iron    5  H/o esophageal varices - no n/v/hematemesis at this time     · Eventually needs a repeat EGD, outpatient or inpatient if overt bleeding noted  · Continue nadolol    6  Type 2 DM     7  L humerus fracture         Chief Complaint   Patient presents with    Hyperglycemia - Symptomatic     Patient presents to the ED via EMS with c/o not feeling well, states fall two weeks ago and has residual back pain and left shoulder fx (seen at [facility] ED s/p fall)   States her sugars have been high, EMS report >300 en route, recent pacemaker placement         SUBJECTIVE/HPI   Complaining of incr abd distension    BP 91/55 (BP Location: Right arm)   Pulse 64   Temp (!) 97 3 °F (36 3 °C) (Oral)   Resp 15   Ht 5' 4" (1 626 m)   Wt 104 kg (230 lb)   SpO2 97%   BMI 39 48 kg/m²     PHYSICALEXAM  General appearance: alert, appears stated age and cooperative  Eyes: [place], EOMI, no scleral icterus   Head: Normocephalic, without obvious abnormality, atraumatic  Lungs: clear to auscultation bilaterally, no c/w/r  Heart: regular rate and rhythm, S1, S2 normal, no murmur, click, rub or gallop  Abdomen: soft, non-tender, distended; bowel sounds normal; no masses,  no organomegaly  Extremities: extremities normal, atraumatic, no clubbing or cyanosis   B/L LE edema  Neurologic: Grossly normal, AAOx3, mild asterixis    Lab Results   Component Value Date    GLUCOSE 280 (H) 08/04/2022    CALCIUM 9 0 08/07/2022    K 3 9 08/07/2022    CO2 28 08/07/2022     08/07/2022    BUN 33 (H) 08/07/2022    CREATININE 1 17 08/07/2022     Lab Results   Component Value Date    WBC 3 61 (L) 08/07/2022    HGB 7 5 (L) 08/07/2022    HCT 24 9 (L) 08/07/2022    MCV 90 08/07/2022    PLT 75 (L) 08/07/2022     Lab Results   Component Value Date    ALT 23 08/06/2022    AST 30 08/06/2022    ALKPHOS 88 08/06/2022     No results found for: AMYLASE  No results found for: LIPASE  Lab Results   Component Value Date    IRON 190 (H) 08/05/2022    TIBC 413 08/05/2022    FERRITIN 20 08/05/2022     Lab Results   Component Value Date    INR 1 26 (H) 08/06/2022

## 2022-08-08 LAB
ALBUMIN SERPL BCP-MCNC: 2.5 G/DL (ref 3.5–5)
ALP SERPL-CCNC: 89 U/L (ref 46–116)
ALT SERPL W P-5'-P-CCNC: 24 U/L (ref 12–78)
ANION GAP SERPL CALCULATED.3IONS-SCNC: 9 MMOL/L (ref 4–13)
AST SERPL W P-5'-P-CCNC: 35 U/L (ref 5–45)
BASOPHILS # BLD AUTO: 0.06 THOUSANDS/ΜL (ref 0–0.1)
BASOPHILS NFR BLD AUTO: 1 % (ref 0–1)
BILIRUB SERPL-MCNC: 1.1 MG/DL (ref 0.2–1)
BUN SERPL-MCNC: 30 MG/DL (ref 5–25)
CALCIUM ALBUM COR SERPL-MCNC: 10.2 MG/DL (ref 8.3–10.1)
CALCIUM SERPL-MCNC: 9 MG/DL (ref 8.3–10.1)
CHLORIDE SERPL-SCNC: 102 MMOL/L (ref 96–108)
CO2 SERPL-SCNC: 26 MMOL/L (ref 21–32)
CREAT SERPL-MCNC: 1.28 MG/DL (ref 0.6–1.3)
EOSINOPHIL # BLD AUTO: 0.06 THOUSAND/ΜL (ref 0–0.61)
EOSINOPHIL NFR BLD AUTO: 1 % (ref 0–6)
ERYTHROCYTE [DISTWIDTH] IN BLOOD BY AUTOMATED COUNT: 18.2 % (ref 11.6–15.1)
GFR SERPL CREATININE-BSD FRML MDRD: 40 ML/MIN/1.73SQ M
GLUCOSE SERPL-MCNC: 282 MG/DL (ref 65–140)
GLUCOSE SERPL-MCNC: 284 MG/DL (ref 65–140)
GLUCOSE SERPL-MCNC: 353 MG/DL (ref 65–140)
GLUCOSE SERPL-MCNC: 361 MG/DL (ref 65–140)
GLUCOSE SERPL-MCNC: 363 MG/DL (ref 65–140)
HCT VFR BLD AUTO: 24.9 % (ref 34.8–46.1)
HEMOCCULT STL QL: POSITIVE
HGB BLD-MCNC: 7.7 G/DL (ref 11.5–15.4)
IMM GRANULOCYTES # BLD AUTO: 0.04 THOUSAND/UL (ref 0–0.2)
IMM GRANULOCYTES NFR BLD AUTO: 1 % (ref 0–2)
LYMPHOCYTES # BLD AUTO: 0.71 THOUSANDS/ΜL (ref 0.6–4.47)
LYMPHOCYTES NFR BLD AUTO: 12 % (ref 14–44)
MCH RBC QN AUTO: 27.7 PG (ref 26.8–34.3)
MCHC RBC AUTO-ENTMCNC: 30.9 G/DL (ref 31.4–37.4)
MCV RBC AUTO: 90 FL (ref 82–98)
MONOCYTES # BLD AUTO: 0.54 THOUSAND/ΜL (ref 0.17–1.22)
MONOCYTES NFR BLD AUTO: 9 % (ref 4–12)
NEUTROPHILS # BLD AUTO: 4.4 THOUSANDS/ΜL (ref 1.85–7.62)
NEUTS SEG NFR BLD AUTO: 76 % (ref 43–75)
NRBC BLD AUTO-RTO: 0 /100 WBCS
PLATELET # BLD AUTO: 75 THOUSANDS/UL (ref 149–390)
PMV BLD AUTO: 12.4 FL (ref 8.9–12.7)
POTASSIUM SERPL-SCNC: 4.1 MMOL/L (ref 3.5–5.3)
PROT SERPL-MCNC: 6 G/DL (ref 6.4–8.4)
RBC # BLD AUTO: 2.78 MILLION/UL (ref 3.81–5.12)
SODIUM SERPL-SCNC: 137 MMOL/L (ref 135–147)
WBC # BLD AUTO: 5.81 THOUSAND/UL (ref 4.31–10.16)

## 2022-08-08 PROCEDURE — 99232 SBSQ HOSP IP/OBS MODERATE 35: CPT | Performed by: NURSE PRACTITIONER

## 2022-08-08 PROCEDURE — 80053 COMPREHEN METABOLIC PANEL: CPT | Performed by: INTERNAL MEDICINE

## 2022-08-08 PROCEDURE — 99232 SBSQ HOSP IP/OBS MODERATE 35: CPT | Performed by: INTERNAL MEDICINE

## 2022-08-08 PROCEDURE — 82948 REAGENT STRIP/BLOOD GLUCOSE: CPT

## 2022-08-08 PROCEDURE — 85025 COMPLETE CBC W/AUTO DIFF WBC: CPT | Performed by: INTERNAL MEDICINE

## 2022-08-08 PROCEDURE — C9113 INJ PANTOPRAZOLE SODIUM, VIA: HCPCS | Performed by: PHYSICIAN ASSISTANT

## 2022-08-08 RX ORDER — SIMETHICONE 80 MG
80 TABLET,CHEWABLE ORAL EVERY 6 HOURS PRN
Status: DISCONTINUED | OUTPATIENT
Start: 2022-08-08 | End: 2022-08-15 | Stop reason: HOSPADM

## 2022-08-08 RX ORDER — TRAMADOL HYDROCHLORIDE 50 MG/1
50 TABLET ORAL EVERY 6 HOURS PRN
Status: DISCONTINUED | OUTPATIENT
Start: 2022-08-08 | End: 2022-08-09

## 2022-08-08 RX ORDER — CYCLOBENZAPRINE HCL 10 MG
10 TABLET ORAL 3 TIMES DAILY PRN
Status: DISCONTINUED | OUTPATIENT
Start: 2022-08-08 | End: 2022-08-09

## 2022-08-08 RX ORDER — ALBUMIN (HUMAN) 12.5 G/50ML
12.5 SOLUTION INTRAVENOUS ONCE
Status: CANCELLED | OUTPATIENT
Start: 2022-08-08 | End: 2022-08-08

## 2022-08-08 RX ORDER — INSULIN GLARGINE 100 [IU]/ML
40 INJECTION, SOLUTION SUBCUTANEOUS
Status: DISCONTINUED | OUTPATIENT
Start: 2022-08-08 | End: 2022-08-10

## 2022-08-08 RX ORDER — PANTOPRAZOLE SODIUM 40 MG/1
40 TABLET, DELAYED RELEASE ORAL
Status: DISCONTINUED | OUTPATIENT
Start: 2022-08-09 | End: 2022-08-15 | Stop reason: HOSPADM

## 2022-08-08 RX ADMIN — CYCLOBENZAPRINE HYDROCHLORIDE 10 MG: 10 TABLET, FILM COATED ORAL at 21:24

## 2022-08-08 RX ADMIN — SPIRONOLACTONE 50 MG: 25 TABLET ORAL at 08:35

## 2022-08-08 RX ADMIN — LACTULOSE 20 G: 20 SOLUTION ORAL at 08:34

## 2022-08-08 RX ADMIN — RIFAXIMIN 550 MG: 550 TABLET ORAL at 21:24

## 2022-08-08 RX ADMIN — INSULIN LISPRO 12 UNITS: 100 INJECTION, SOLUTION INTRAVENOUS; SUBCUTANEOUS at 17:13

## 2022-08-08 RX ADMIN — FUROSEMIDE 40 MG: 10 INJECTION, SOLUTION INTRAMUSCULAR; INTRAVENOUS at 08:35

## 2022-08-08 RX ADMIN — PRAVASTATIN SODIUM 20 MG: 20 TABLET ORAL at 17:12

## 2022-08-08 RX ADMIN — LACTULOSE 20 G: 20 SOLUTION ORAL at 17:12

## 2022-08-08 RX ADMIN — TRAMADOL HYDROCHLORIDE 50 MG: 50 TABLET, COATED ORAL at 21:24

## 2022-08-08 RX ADMIN — DICLOFENAC SODIUM 2 G: 10 GEL TOPICAL at 08:36

## 2022-08-08 RX ADMIN — DICLOFENAC SODIUM 2 G: 10 GEL TOPICAL at 21:25

## 2022-08-08 RX ADMIN — ESCITALOPRAM OXALATE 10 MG: 10 TABLET ORAL at 08:35

## 2022-08-08 RX ADMIN — LEVOTHYROXINE SODIUM 125 MCG: 25 TABLET ORAL at 05:07

## 2022-08-08 RX ADMIN — NYSTATIN: 100000 POWDER TOPICAL at 08:36

## 2022-08-08 RX ADMIN — LACTULOSE 20 G: 20 SOLUTION ORAL at 21:24

## 2022-08-08 RX ADMIN — PANTOPRAZOLE SODIUM 40 MG: 40 INJECTION, POWDER, FOR SOLUTION INTRAVENOUS at 08:35

## 2022-08-08 RX ADMIN — LIDOCAINE 5% 1 PATCH: 700 PATCH TOPICAL at 08:35

## 2022-08-08 RX ADMIN — INSULIN LISPRO 12 UNITS: 100 INJECTION, SOLUTION INTRAVENOUS; SUBCUTANEOUS at 12:35

## 2022-08-08 RX ADMIN — INSULIN GLARGINE 40 UNITS: 100 INJECTION, SOLUTION SUBCUTANEOUS at 21:24

## 2022-08-08 RX ADMIN — INSULIN LISPRO 12 UNITS: 100 INJECTION, SOLUTION INTRAVENOUS; SUBCUTANEOUS at 08:36

## 2022-08-08 RX ADMIN — NYSTATIN: 100000 POWDER TOPICAL at 17:13

## 2022-08-08 RX ADMIN — RIFAXIMIN 550 MG: 550 TABLET ORAL at 08:35

## 2022-08-08 NOTE — ASSESSMENT & PLAN NOTE
Patient has pancytopenia due to portal hypertension, liver cirrhosis  She required blood transfusion during this hospital stay  She had no signs of GI bleeding      Will monitor blood counts closely

## 2022-08-08 NOTE — CASE MANAGEMENT
Case Management Discharge Planning Note    Patient name Evens Gillette  Location /-98 MRN [id number]  : 1945 Date 2022       Current Admission Date: 2022  Current Admission Diagnosis:Hepatic encephalopathy [facility])   Patient Active Problem List    Diagnosis Date Noted    Pyuria 2022    Urinary retention 2022    Skin breakdown 2022    Abnormal bone xray 2022    Healthcare maintenance 2022    Nausea 2022    Obesity (BMI 30-39 9) 2022    Acute blood loss anemia 2022    Closed traumatic displaced fracture of proximal end of left humerus 2022    Depression 2022    Cardiomyopathy ([facility] [address] ) 2022    Complete heart block ([facility] ) 2022    Esophageal varices without bleeding ([facility] [facility] ) 2021    Pancytopenia ([facility] [address] ) 2021    Iron deficiency anemia due to chronic blood loss 2021    Mild nonproliferative diabetic retinopathy of both eyes without macular edema associated with type 2 diabetes mellitus ([facility] [address] ) 2021    Urinary tract infection 09/15/2020    Hepatic encephalopathy ([facility] [address] [facility] ) 2020    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) ([facility] [address] ) 2020    Thrombocytopenia ([facility] [address] ) 2020    Hyperlipidemia 2018    Essential hypertension 2018    Acquired hypothyroidism 2018    Type 2 diabetes mellitus with hyperglycemia, with long-term current use of insulin ([facility] [address] ) 2018    Diabetic polyneuropathy associated with type 2 diabetes mellitus ([facility] [address] ) 2018      LOS (days): 3  Geometric Mean LOS (GMLOS) (days): 3 20  Days to GMLOS:-0 1     OBJECTIVE:  Risk of Unplanned Readmission Score: 24 6         Current admission status: Inpatient   Preferred Pharmacy:   [id number], [address] [place], [place]  [address] [facility] [address]    [place] [address]  Phone: [phone] Fax: [phone]    [address] [place]  Phone: [phone] Fax: [phone]    Primary Care Provider: Carol Funes DO    Primary Insurance: MEDICARE  Secondary Insurance: [employer]    DISCHARGE DETAILS:  PT/OT recommending SNF  Met with patient to discuss same  Patient is not sure that she wants to go to IP rehab  She acknowledges that she is weak, but still would prefer to go home  Spoke with Pilar at [facility] and she may have an available bed tomorrow or Thursday  Informed patient of same and she will think about going to rehab

## 2022-08-08 NOTE — NURSING NOTE
Pt slept slightly more than half of hrly rounds overnight until approx 0500; OOB to BSC to void and BM, standing weight, and blood draw  Presently sitting in recliner

## 2022-08-08 NOTE — PLAN OF CARE
Problem: Potential for Falls  Goal: Patient will remain free of falls  Description: INTERVENTIONS:  - Educate patient/family on patient safety including physical limitations  - Instruct patient to call for assistance with activity   - Consult OT/PT to assist with strengthening/mobility   - Keep Call bell within reach  - Keep bed low and locked with side rails adjusted as appropriate  - Keep care items and personal belongings within reach  - Initiate and maintain comfort rounds  - Make Fall Risk Sign visible to staff  - Offer Toileting every 2 Hours, in advance of need  - Initiate/Maintain bed/chair alarm  - Obtain necessary fall risk management equipment:  socks;bedside commode  - Apply yellow socks and bracelet for high fall risk patients  - Consider moving patient to room near nurses station  Outcome: Progressing     Problem: Prexisting or High Potential for Compromised Skin Integrity  Goal: Skin integrity is maintained or improved  Description: INTERVENTIONS:  - Identify patients at risk for skin breakdown  - Assess and monitor skin integrity  - Assess and monitor nutrition and hydration status  - Monitor labs   - Assess for incontinence   - Turn and reposition patient  - Assist with mobility/ambulation  - Relieve pressure over bony prominences  - Avoid friction and shearing  - Provide appropriate hygiene as needed including keeping skin clean and dry  - Evaluate need for skin moisturizer/barrier cream  - Collaborate with interdisciplinary team   - Patient/family teaching  - Consider wound care consult   Outcome: Progressing     Problem: MOBILITY - ADULT  Goal: Maintain or return to baseline ADL function  Description: INTERVENTIONS:  -  Assess patient's ability to carry out ADLs; assess patient's baseline for ADL function and identify physical deficits which impact ability to perform ADLs (bathing, care of mouth/teeth, toileting, grooming, dressing, etc )  - Assess/evaluate cause of self-care deficits   - Assess range of motion  - Assess patient's mobility; develop plan if impaired  - Assess patient's need for assistive devices and provide as appropriate  - Encourage maximum independence but intervene and supervise when necessary  - Involve family in performance of ADLs  - Assess for home care needs following discharge   - Consider OT consult to assist with ADL evaluation and planning for discharge  - Provide patient education as appropriate  Outcome: Progressing  Goal: Maintains/Returns to pre admission functional level  Description: INTERVENTIONS:  - Perform BMAT or MOVE assessment daily    - Set and communicate daily mobility goal to care team and patient/family/caregiver  - Collaborate with rehabilitation services on mobility goals if consulted  - Perform Range of Motion 3 times a day  - Reposition patient every 2 hours    - Dangle patient 3 times a day  - Stand patient 3 times a day  - Ambulate patient 3 times a day  - Out of bed to chair 3 times a day   - Out of bed for meals 3 times a day  - Out of bed for toileting  - Record patient progress and toleration of activity level   Outcome: Progressing     Problem: PAIN - ADULT  Goal: Verbalizes/displays adequate comfort level or baseline comfort level  Description: Interventions:  - Encourage patient to monitor pain and request assistance  - Assess pain using appropriate pain scale  - Administer analgesics based on type and severity of pain and evaluate response  - Implement non-pharmacological measures as appropriate and evaluate response  - Consider cultural and social influences on pain and pain management  - Notify physician/advanced practitioner if interventions unsuccessful or patient reports new pain  Outcome: Progressing     Problem: INFECTION - ADULT  Goal: Absence or prevention of progression during hospitalization  Description: INTERVENTIONS:  - Assess and monitor for signs and symptoms of infection  - Monitor lab/diagnostic results  - Monitor all insertion sites, i e  indwelling lines, tubes, and drains  - Monitor endotracheal if appropriate and nasal secretions for changes in amount and color  - [place] appropriate cooling/warming therapies per order  - Administer medications as ordered  - Instruct and encourage patient and family to use good hand hygiene technique  - Identify and instruct in appropriate isolation precautions for identified infection/condition  Outcome: Progressing  Goal: Absence of fever/infection during neutropenic period  Description: INTERVENTIONS:  - Monitor WBC    Outcome: Progressing     Problem: SAFETY ADULT  Goal: Patient will remain free of falls  Description: INTERVENTIONS:  - Educate patient/family on patient safety including physical limitations  - Instruct patient to call for assistance with activity   - Consult OT/PT to assist with strengthening/mobility   - Keep Call bell within reach  - Keep bed low and locked with side rails adjusted as appropriate  - Keep care items and personal belongings within reach  - Initiate and maintain comfort rounds  - Make Fall Risk Sign visible to staff  - Offer Toileting every 2 Hours, in advance of need  - Initiate/Maintain bed/chair alarm  - Obtain necessary fall risk management equipment:  socks;bedside commode  - Apply yellow socks and bracelet for high fall risk patients  - Consider moving patient to room near nurses station  Outcome: Progressing  Goal: Maintain or return to baseline ADL function  Description: INTERVENTIONS:  -  Assess patient's ability to carry out ADLs; assess patient's baseline for ADL function and identify physical deficits which impact ability to perform ADLs (bathing, care of mouth/teeth, toileting, grooming, dressing, etc )  - Assess/evaluate cause of self-care deficits   - Assess range of motion  - Assess patient's mobility; develop plan if impaired  - Assess patient's need for assistive devices and provide as appropriate  - Encourage maximum independence but intervene and supervise when necessary  - Involve family in performance of ADLs  - Assess for home care needs following discharge   - Consider OT consult to assist with ADL evaluation and planning for discharge  - Provide patient education as appropriate  Outcome: Progressing  Goal: Maintains/Returns to pre admission functional level  Description: INTERVENTIONS:  - Perform BMAT or MOVE assessment daily    - Set and communicate daily mobility goal to care team and patient/family/caregiver  - Collaborate with rehabilitation services on mobility goals if consulted  - Perform Range of Motion 3 times a day  - Reposition patient every 2 hours  - Dangle patient 3 times a day  - Stand patient 3 times a day  - Ambulate patient 3 times a day  - Out of bed to chair 3 times a day   - Out of bed for meals 3 times a day  - Out of bed for toileting  - Record patient progress and toleration of activity level   Outcome: Progressing     Problem: DISCHARGE PLANNING  Goal: Discharge to home or other facility with appropriate resources  Description: INTERVENTIONS:  - Identify barriers to discharge w/patient and caregiver  - Arrange for needed discharge resources and transportation as appropriate  - Identify discharge learning needs (meds, wound care, etc )  - Arrange for interpretive services to assist at discharge as needed  - Refer to Case Management Department for coordinating discharge planning if the patient needs post-hospital services based on physician/advanced practitioner order or complex needs related to functional status, cognitive ability, or social support system  Outcome: Progressing     Problem: Knowledge Deficit  Goal: Patient/family/caregiver demonstrates understanding of disease process, treatment plan, medications, and discharge instructions  Description: Complete learning assessment and assess knowledge base    Interventions:  - Provide teaching at level of understanding  - Provide teaching via preferred learning methods  Outcome: Progressing     Problem: METABOLIC, FLUID AND ELECTROLYTES - ADULT  Goal: Glucose maintained within target range  Description: INTERVENTIONS:  - Monitor Blood Glucose as ordered  - Assess for signs and symptoms of hyperglycemia and hypoglycemia  - Administer ordered medications to maintain glucose within target range  - Assess nutritional intake and initiate nutrition service referral as needed  Outcome: Progressing

## 2022-08-08 NOTE — ASSESSMENT & PLAN NOTE
Patient was admitted with hepatic encephalopathy was treated with lactulose and Xifaxan      Encephalopathy resolved    Will check ammonia level tomorrow, continue Xifaxan and lactulose

## 2022-08-08 NOTE — PROGRESS NOTES
Progress note - Gastroenterology   Trinity España 77 y o  female MRN: [id number]  Unit/Bed#: -01 Encounter: [id number]    ASSESSMENT and PLAN    1  PAUL cirrhosis with ascites and edema, low MELD     · Increasing abd distension - check US with dopplers  · Added Lasix back yesterday with incr urine output  Increase Aldactone to 50mg daily  · Strict I&O's and daily weight  · 2g Na diet and fluid restriction  · Trend LFTs and INR  -ultrasound with Dopplers negative for PVT  New ascites noted  Will proceed with paracentesis, diagnostic and therapeutic  Slight elevation T bili at 1 1  Remainder of LFTs limits-  INR are not ordered- will order     2  Hepatic encephalopathy - improved     · Continue lactulose p o  And titrate to 3-4 bowel movements a day  · Continue Xifaxan  · Avoid narcotics     3  Thrombocytopenia      4  Chronic anemia, FOBT + but no overt GIB     · Monitor for overt GI bleeding  · Transfuse to hgb >7 although currently there is a severe blood shortage so we are told to limit transfusions   · S/p IV Iron  Hemoglobin currently stable at 7 7     5  H/o esophageal varices - no n/v/hematemesis at this time      · Eventually needs a repeat EGD, outpatient or inpatient if overt bleeding noted  · Continue nadolol     6  Type 2 DM      7  L humerus fracture       Chief Complaint   Patient presents with    Hyperglycemia - Symptomatic     Patient presents to the ED via EMS with c/o not feeling well, states fall two weeks ago and has residual back pain and left shoulder fx (seen at [facility] s/p fall)  States her sugars have been high, EMS report >300 en route, recent pacemaker placement         SUBJECTIVE/HPI   No overt signs of GI bleed  No complaints of abdominal pain, nausea  She is moving her hair      /51 (BP Location: Right arm)   Pulse 75   Temp 98 3 °F (36 8 °C) (Oral)   Resp 18   Ht 5' 4" (1 626 m)   Wt 98 6 kg (217 lb 6 3 oz)   SpO2 97%   BMI 37 32 kg/m² PHYSICALEXAM  General appearance: alert, appears stated age and cooperative  Eyes: PERLLA, EOMI, no icterus   Head: Normocephalic, without obvious abnormality, atraumatic  Lungs: clear to auscultation bilaterally  Heart: regular rate and rhythm, S1, S2 normal, no murmur, click, rub or gallop  Abdomen: soft, non-tender; bowel sounds normal; no masses,  no organomegaly  Extremities: extremities normal, atraumatic, no cyanosis or edema  Neurologic: Grossly normal    Lab Results   Component Value Date    GLUCOSE 280 (H) 08/04/2022    CALCIUM 9 0 08/08/2022    K 4 1 08/08/2022    CO2 26 08/08/2022     08/08/2022    BUN 30 (H) 08/08/2022    CREATININE 1 28 08/08/2022     Lab Results   Component Value Date    WBC 5 81 08/08/2022    HGB 7 7 (L) 08/08/2022    HCT 24 9 (L) 08/08/2022    MCV 90 08/08/2022    PLT 75 (L) 08/08/2022     Lab Results   Component Value Date    ALT 24 08/08/2022    AST 35 08/08/2022    ALKPHOS 89 08/08/2022     No results found for: AMYLASE  No results found for: LIPASE  Lab Results   Component Value Date    IRON 190 (H) 08/05/2022    TIBC 413 08/05/2022    FERRITIN 20 08/05/2022     Lab Results   Component Value Date    INR 1 26 (H) 08/06/2022

## 2022-08-08 NOTE — ASSESSMENT & PLAN NOTE
Patient has 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  Ultrasound shows ascites      She will have paracentesis tomorrow    Continue Lasix 40 mg IV daily and spironolactone 50 mg daily    Will monitor renal function and electrolytes closely

## 2022-08-08 NOTE — ASSESSMENT & PLAN NOTE
Lab Results   Component Value Date    HGBA1C 8 2 (H) 05/31/2022       Recent Labs     08/07/22  2152 08/07/22  2155 08/08/22  0728 08/08/22  1118   POCGLU >500* 375* 284* 363*       Blood Sugar Average: Last 72 hrs:  (P) 618 [id number]     Patient has uncontrolled hyperglycemia    I will increase Lantus to 40 units at bedtime and continue pre meal Humalog 12 units t i d

## 2022-08-08 NOTE — PROGRESS NOTES
[place]  Progress Note Dayne Lock 1945, 77 y o  female MRN: [id number]  Unit/Bed#: -01 Encounter: [id number]  Primary Care Provider: David Segal DO   Date and time admitted to hospital: 8/4/2022  6:45 PM    * Hepatic encephalopathy [facility])  Assessment & Plan  Patient was admitted with hepatic encephalopathy was treated with lactulose and Xifaxan  Encephalopathy resolved    Will check ammonia level tomorrow, continue Xifaxan and lactulose    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) [facility])  Assessment & Plan  Patient has 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  Ultrasound shows ascites  She will have paracentesis tomorrow    Continue Lasix 40 mg IV daily and spironolactone 50 mg daily    Will monitor renal function and electrolytes closely    Type 2 diabetes mellitus with hyperglycemia, with long-term current use of insulin [facility])  Assessment & Plan  Lab Results   Component Value Date    HGBA1C 8 2 (H) 05/31/2022       Recent Labs     08/07/22  2152 08/07/22  2155 08/08/22  0728 08/08/22  1118   POCGLU >500* 375* 284* 363*       Blood Sugar Average: Last 72 hrs:  (P) 840 [id number]     Patient has uncontrolled hyperglycemia    I will increase Lantus to 40 units at bedtime and continue pre meal Humalog 12 units t i d  Pancytopenia [facility])  Assessment & Plan  Patient has pancytopenia due to portal hypertension, liver cirrhosis  She required blood transfusion during this hospital stay  She had no signs of GI bleeding  Will monitor blood counts closely        VTE Prophylaxis: in place    Patient Centered Rounds: I rounded with patient's nurse    Current Length of Stay: 3 day(s)    Current Patient Status: Inpatient    Certification Statement: Pt requires additional inpatient hospital stay due to: see assessment and plan        Subjective:   Patient complains of a dry cough, in abdominal distension    Denies chest pain, shortness of breath, signs of GI  bleeding  Denies dysuria  All other ROS are negative    Objective:     Vitals:   Temp (24hrs), Av 2 °F (36 8 °C), Min:98 1 °F (36 7 °C), Max:98 3 °F (36 8 °C)    Temp:  [98 1 °F (36 7 °C)-98 3 °F (36 8 °C)] 98 3 °F (36 8 °C)  HR:  [67-75] 75  Resp:  [18-21] 18  BP: (102-119)/(51-56) 102/51  SpO2:  [97 %-98 %] 97 %  Body mass index is 37 32 kg/m²  Input and Output Summary (last 24 hours): Intake/Output Summary (Last 24 hours) at 2022 1506  Last data filed at 2022 0517  Gross per 24 hour   Intake 180 ml   Output 325 ml   Net -145 ml       Physical Exam:     Physical Exam  Constitutional:       General: She is not in acute distress  Appearance: She is not toxic-appearing  HENT:      Head: Normocephalic  Eyes:      Conjunctiva/sclera: Conjunctivae normal    Cardiovascular:      Rate and Rhythm: Normal rate and regular rhythm  Heart sounds: No murmur heard  Pulmonary:      Effort: No respiratory distress  Breath sounds: No wheezing or rales  Abdominal:      General: Bowel sounds are normal  There is distension  Palpations: Abdomen is soft  Tenderness: There is no abdominal tenderness  Skin:     General: Skin is warm and dry  Findings: Rash (Patient has fungal rash under the breasts) present  Neurological:      Mental Status: She is alert  Motor: No weakness ( she moves all extremities on command)  I personally reviewed labs and imaging reports for today        Last 24 Hours Medication List:   Current Facility-Administered Medications   Medication Dose Route Frequency Provider Last Rate    acetaminophen  650 mg Oral Q6H PRN Jennifer York PA-C      Diclofenac Sodium  2 g Topical BID Taryn Lowe PA-C      escitalopram  10 mg Oral Daily Martinez Segal MD      furosemide  40 mg Intravenous Daily Mary Ellen Yi MD      insulin glargine  40 Units Subcutaneous HS Elvin Wright MD      insulin lispro  12 Units Subcutaneous TID With Meals Maria Esther, DO      lactulose  20 g Oral TID Maria Esther, DO      levothyroxine  125 mcg Oral Once per day on Mon Tue Wed Thu Fri Sat Luisito Ramos PA-C      levothyroxine  62 5 mcg Oral Once per day on Sun Luisito Ramos PA-C      lidocaine  1 patch Topical Daily Jayme Rosales PA-C      melatonin  6 mg Oral HS PRN Luisito Ramos PA-C      nadolol  10 mg Oral Daily Luisito Ramos PA-C      nystatin   Topical BID [place], [place]      ondansetron  4 mg Intravenous Q6H PRN MD Manoj Berry ON 8/9/2022] pantoprazole  40 mg Oral Daily Before Breakfast Mary Oliver MD      pravastatin  20 mg Oral Daily With [place], [place]      rifaximin  550 mg Oral Q12H [address] Luisito Ramos PA-C      spironolactone  50 mg Oral Daily Ifeoma Rodriguez MD            Today, Patient Was Seen By: Mary Oliver MD    ** Please Note: Dictation voice to text software may have been used in the creation of this document   **

## 2022-08-09 ENCOUNTER — APPOINTMENT (INPATIENT)
Dept: INTERVENTIONAL RADIOLOGY/VASCULAR | Facility: HOSPITAL | Age: 77
DRG: 441 | End: 2022-08-09
Payer: MEDICARE

## 2022-08-09 LAB
ALBUMIN SERPL BCP-MCNC: 2.5 G/DL (ref 3.5–5)
ALP SERPL-CCNC: 95 U/L (ref 46–116)
ALT SERPL W P-5'-P-CCNC: 29 U/L (ref 12–78)
AMMONIA PLAS-SCNC: 55 UMOL/L (ref 11–35)
ANION GAP SERPL CALCULATED.3IONS-SCNC: 7 MMOL/L (ref 4–13)
AST SERPL W P-5'-P-CCNC: 65 U/L (ref 5–45)
BILIRUB SERPL-MCNC: 1.4 MG/DL (ref 0.2–1)
BUN SERPL-MCNC: 26 MG/DL (ref 5–25)
CALCIUM ALBUM COR SERPL-MCNC: 10 MG/DL (ref 8.3–10.1)
CALCIUM SERPL-MCNC: 8.8 MG/DL (ref 8.3–10.1)
CHLORIDE SERPL-SCNC: 101 MMOL/L (ref 96–108)
CO2 SERPL-SCNC: 28 MMOL/L (ref 21–32)
CREAT SERPL-MCNC: 1.28 MG/DL (ref 0.6–1.3)
ERYTHROCYTE [DISTWIDTH] IN BLOOD BY AUTOMATED COUNT: 19.1 % (ref 11.6–15.1)
GFR SERPL CREATININE-BSD FRML MDRD: 40 ML/MIN/1.73SQ M
GLUCOSE SERPL-MCNC: 189 MG/DL (ref 65–140)
GLUCOSE SERPL-MCNC: 251 MG/DL (ref 65–140)
GLUCOSE SERPL-MCNC: 283 MG/DL (ref 65–140)
GLUCOSE SERPL-MCNC: 284 MG/DL (ref 65–140)
GLUCOSE SERPL-MCNC: 317 MG/DL (ref 65–140)
HCT VFR BLD AUTO: 24.3 % (ref 34.8–46.1)
HGB BLD-MCNC: 7.2 G/DL (ref 11.5–15.4)
INR PPP: 1.31 (ref 0.84–1.19)
MCH RBC QN AUTO: 27 PG (ref 26.8–34.3)
MCHC RBC AUTO-ENTMCNC: 29.6 G/DL (ref 31.4–37.4)
MCV RBC AUTO: 91 FL (ref 82–98)
PLATELET # BLD AUTO: 86 THOUSANDS/UL (ref 149–390)
PMV BLD AUTO: 13.4 FL (ref 8.9–12.7)
POTASSIUM SERPL-SCNC: 4.2 MMOL/L (ref 3.5–5.3)
PROT SERPL-MCNC: 6.1 G/DL (ref 6.4–8.4)
PROTHROMBIN TIME: 17.2 SECONDS (ref 11.6–14.5)
RBC # BLD AUTO: 2.67 MILLION/UL (ref 3.81–5.12)
SODIUM SERPL-SCNC: 136 MMOL/L (ref 135–147)
WBC # BLD AUTO: 6.44 THOUSAND/UL (ref 4.31–10.16)

## 2022-08-09 PROCEDURE — 49083 ABD PARACENTESIS W/IMAGING: CPT

## 2022-08-09 PROCEDURE — 85027 COMPLETE CBC AUTOMATED: CPT | Performed by: INTERNAL MEDICINE

## 2022-08-09 PROCEDURE — 82948 REAGENT STRIP/BLOOD GLUCOSE: CPT

## 2022-08-09 PROCEDURE — 99232 SBSQ HOSP IP/OBS MODERATE 35: CPT | Performed by: INTERNAL MEDICINE

## 2022-08-09 PROCEDURE — 80053 COMPREHEN METABOLIC PANEL: CPT | Performed by: INTERNAL MEDICINE

## 2022-08-09 PROCEDURE — 82140 ASSAY OF AMMONIA: CPT | Performed by: INTERNAL MEDICINE

## 2022-08-09 PROCEDURE — 85610 PROTHROMBIN TIME: CPT | Performed by: INTERNAL MEDICINE

## 2022-08-09 RX ORDER — TRAMADOL HYDROCHLORIDE 50 MG/1
50 TABLET ORAL 2 TIMES DAILY PRN
Status: DISCONTINUED | OUTPATIENT
Start: 2022-08-09 | End: 2022-08-09

## 2022-08-09 RX ADMIN — INSULIN LISPRO 12 UNITS: 100 INJECTION, SOLUTION INTRAVENOUS; SUBCUTANEOUS at 11:51

## 2022-08-09 RX ADMIN — LACTULOSE 20 G: 20 SOLUTION ORAL at 21:35

## 2022-08-09 RX ADMIN — LACTULOSE 20 G: 20 SOLUTION ORAL at 17:35

## 2022-08-09 RX ADMIN — ACETAMINOPHEN 650 MG: 325 TABLET ORAL at 00:23

## 2022-08-09 RX ADMIN — LEVOTHYROXINE SODIUM 125 MCG: 25 TABLET ORAL at 06:33

## 2022-08-09 RX ADMIN — PANTOPRAZOLE SODIUM 40 MG: 40 TABLET, DELAYED RELEASE ORAL at 06:34

## 2022-08-09 RX ADMIN — RIFAXIMIN 550 MG: 550 TABLET ORAL at 21:35

## 2022-08-09 RX ADMIN — INSULIN LISPRO 12 UNITS: 100 INJECTION, SOLUTION INTRAVENOUS; SUBCUTANEOUS at 08:46

## 2022-08-09 RX ADMIN — NYSTATIN: 100000 POWDER TOPICAL at 08:59

## 2022-08-09 RX ADMIN — ESCITALOPRAM OXALATE 10 MG: 10 TABLET ORAL at 08:54

## 2022-08-09 RX ADMIN — INSULIN GLARGINE 40 UNITS: 100 INJECTION, SOLUTION SUBCUTANEOUS at 21:35

## 2022-08-09 RX ADMIN — NYSTATIN: 100000 POWDER TOPICAL at 17:36

## 2022-08-09 RX ADMIN — FUROSEMIDE 40 MG: 10 INJECTION, SOLUTION INTRAMUSCULAR; INTRAVENOUS at 08:49

## 2022-08-09 RX ADMIN — ONDANSETRON 4 MG: 2 INJECTION INTRAMUSCULAR; INTRAVENOUS at 09:39

## 2022-08-09 RX ADMIN — LACTULOSE 20 G: 20 SOLUTION ORAL at 08:54

## 2022-08-09 RX ADMIN — NADOLOL 10 MG: 20 TABLET ORAL at 08:55

## 2022-08-09 RX ADMIN — DICLOFENAC SODIUM 2 G: 10 GEL TOPICAL at 08:58

## 2022-08-09 RX ADMIN — LIDOCAINE 5% 1 PATCH: 700 PATCH TOPICAL at 08:53

## 2022-08-09 RX ADMIN — INSULIN LISPRO 12 UNITS: 100 INJECTION, SOLUTION INTRAVENOUS; SUBCUTANEOUS at 17:36

## 2022-08-09 RX ADMIN — PRAVASTATIN SODIUM 20 MG: 20 TABLET ORAL at 17:35

## 2022-08-09 RX ADMIN — SPIRONOLACTONE 50 MG: 25 TABLET ORAL at 08:54

## 2022-08-09 RX ADMIN — Medication 6 MG: at 00:23

## 2022-08-09 RX ADMIN — RIFAXIMIN 550 MG: 550 TABLET ORAL at 08:54

## 2022-08-09 RX ADMIN — DICLOFENAC SODIUM 2 G: 10 GEL TOPICAL at 21:35

## 2022-08-09 NOTE — ASSESSMENT & PLAN NOTE
· S/p permanent pacemaker 6/29 at [facility]  · Prior to placement patient required emergent transvenous pacing and did require vasopressors after placement

## 2022-08-09 NOTE — ASSESSMENT & PLAN NOTE
· Home regimen:  Levothyroxine 125 mcg daily except for Sundays 62 5 micrograms  · TSH elevated on admission, however free T4 1 31   · Continue levothyroxine at current dose  · Recommend recheck TSH in 6-8 weeks outpatient [Negative] : Cardiovascular

## 2022-08-09 NOTE — PLAN OF CARE
Problem: Potential for Falls  Goal: Patient will remain free of falls  Description: INTERVENTIONS:  - Educate patient/family on patient safety including physical limitations  - Instruct patient to call for assistance with activity   - Consult OT/PT to assist with strengthening/mobility   - Keep Call bell within reach  - Keep bed low and locked with side rails adjusted as appropriate  - Keep care items and personal belongings within reach  - Initiate and maintain comfort rounds  - Make Fall Risk Sign visible to staff  - Offer Toileting every 2 Hours, in advance of need  - Initiate/Maintain bed/chair alarm  - Obtain necessary fall risk management equipment:  socks;bedside commode  - Apply yellow socks and bracelet for high fall risk patients  - Consider moving patient to room near nurses station  Outcome: Progressing     Problem: Prexisting or High Potential for Compromised Skin Integrity  Goal: Skin integrity is maintained or improved  Description: INTERVENTIONS:  - Identify patients at risk for skin breakdown  - Assess and monitor skin integrity  - Assess and monitor nutrition and hydration status  - Monitor labs   - Assess for incontinence   - Turn and reposition patient  - Assist with mobility/ambulation  - Relieve pressure over bony prominences  - Avoid friction and shearing  - Provide appropriate hygiene as needed including keeping skin clean and dry  - Evaluate need for skin moisturizer/barrier cream  - Collaborate with interdisciplinary team   - Patient/family teaching  - Consider wound care consult   Outcome: Progressing     Problem: MOBILITY - ADULT  Goal: Maintain or return to baseline ADL function  Description: INTERVENTIONS:  -  Assess patient's ability to carry out ADLs; assess patient's baseline for ADL function and identify physical deficits which impact ability to perform ADLs (bathing, care of mouth/teeth, toileting, grooming, dressing, etc )  - Assess/evaluate cause of self-care deficits   - Assess range of motion  - Assess patient's mobility; develop plan if impaired  - Assess patient's need for assistive devices and provide as appropriate  - Encourage maximum independence but intervene and supervise when necessary  - Involve family in performance of ADLs  - Assess for home care needs following discharge   - Consider OT consult to assist with ADL evaluation and planning for discharge  - Provide patient education as appropriate  Outcome: Progressing  Goal: Maintains/Returns to pre admission functional level  Description: INTERVENTIONS:  - Perform BMAT or MOVE assessment daily    - Set and communicate daily mobility goal to care team and patient/family/caregiver  - Collaborate with rehabilitation services on mobility goals if consulted  - Perform Range of Motion 3 times a day  - Reposition patient every 2 hours    - Dangle patient 3 times a day  - Stand patient 3 times a day  - Ambulate patient 3 times a day  - Out of bed to chair 3 times a day   - Out of bed for meals 3 times a day  - Out of bed for toileting  - Record patient progress and toleration of activity level   Outcome: Progressing     Problem: PAIN - ADULT  Goal: Verbalizes/displays adequate comfort level or baseline comfort level  Description: Interventions:  - Encourage patient to monitor pain and request assistance  - Assess pain using appropriate pain scale  - Administer analgesics based on type and severity of pain and evaluate response  - Implement non-pharmacological measures as appropriate and evaluate response  - Consider cultural and social influences on pain and pain management  - Notify physician/advanced practitioner if interventions unsuccessful or patient reports new pain  Outcome: Progressing     Problem: INFECTION - ADULT  Goal: Absence or prevention of progression during hospitalization  Description: INTERVENTIONS:  - Assess and monitor for signs and symptoms of infection  - Monitor lab/diagnostic results  - Monitor all insertion sites, i e  indwelling lines, tubes, and drains  - Monitor endotracheal if appropriate and nasal secretions for changes in amount and color  - [place] appropriate cooling/warming therapies per order  - Administer medications as ordered  - Instruct and encourage patient and family to use good hand hygiene technique  - Identify and instruct in appropriate isolation precautions for identified infection/condition  Outcome: Progressing  Goal: Absence of fever/infection during neutropenic period  Description: INTERVENTIONS:  - Monitor WBC    Outcome: Progressing     Problem: SAFETY ADULT  Goal: Patient will remain free of falls  Description: INTERVENTIONS:  - Educate patient/family on patient safety including physical limitations  - Instruct patient to call for assistance with activity   - Consult OT/PT to assist with strengthening/mobility   - Keep Call bell within reach  - Keep bed low and locked with side rails adjusted as appropriate  - Keep care items and personal belongings within reach  - Initiate and maintain comfort rounds  - Make Fall Risk Sign visible to staff  - Offer Toileting every 2 Hours, in advance of need  - Initiate/Maintain bed/chair alarm  - Obtain necessary fall risk management equipment:  socks;bedside commode  - Apply yellow socks and bracelet for high fall risk patients  - Consider moving patient to room near nurses station  Outcome: Progressing  Goal: Maintain or return to baseline ADL function  Description: INTERVENTIONS:  -  Assess patient's ability to carry out ADLs; assess patient's baseline for ADL function and identify physical deficits which impact ability to perform ADLs (bathing, care of mouth/teeth, toileting, grooming, dressing, etc )  - Assess/evaluate cause of self-care deficits   - Assess range of motion  - Assess patient's mobility; develop plan if impaired  - Assess patient's need for assistive devices and provide as appropriate  - Encourage maximum independence but intervene and supervise when necessary  - Involve family in performance of ADLs  - Assess for home care needs following discharge   - Consider OT consult to assist with ADL evaluation and planning for discharge  - Provide patient education as appropriate  Outcome: Progressing  Goal: Maintains/Returns to pre admission functional level  Description: INTERVENTIONS:  - Perform BMAT or MOVE assessment daily    - Set and communicate daily mobility goal to care team and patient/family/caregiver  - Collaborate with rehabilitation services on mobility goals if consulted  - Perform Range of Motion 3 times a day  - Reposition patient every 2 hours  - Dangle patient 3 times a day  - Stand patient 3 times a day  - Ambulate patient 3 times a day  - Out of bed to chair 3 times a day   - Out of bed for meals 3 times a day  - Out of bed for toileting  - Record patient progress and toleration of activity level   Outcome: Progressing     Problem: DISCHARGE PLANNING  Goal: Discharge to home or other facility with appropriate resources  Description: INTERVENTIONS:  - Identify barriers to discharge w/patient and caregiver  - Arrange for needed discharge resources and transportation as appropriate  - Identify discharge learning needs (meds, wound care, etc )  - Arrange for interpretive services to assist at discharge as needed  - Refer to Case Management Department for coordinating discharge planning if the patient needs post-hospital services based on physician/advanced practitioner order or complex needs related to functional status, cognitive ability, or social support system  Outcome: Progressing     Problem: Knowledge Deficit  Goal: Patient/family/caregiver demonstrates understanding of disease process, treatment plan, medications, and discharge instructions  Description: Complete learning assessment and assess knowledge base    Interventions:  - Provide teaching at level of understanding  - Provide teaching via preferred learning methods  Outcome: Progressing     Problem: METABOLIC, FLUID AND ELECTROLYTES - ADULT  Goal: Glucose maintained within target range  Description: INTERVENTIONS:  - Monitor Blood Glucose as ordered  - Assess for signs and symptoms of hyperglycemia and hypoglycemia  - Administer ordered medications to maintain glucose within target range  - Assess nutritional intake and initiate nutrition service referral as needed  Outcome: Progressing

## 2022-08-09 NOTE — ASSESSMENT & PLAN NOTE
Patient has 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  Ultrasound shows ascites    Scheduled for paracentesis today   Continue Lasix 40 mg IV daily and spironolactone 50 mg daily  Will monitor renal function and electrolytes closely

## 2022-08-09 NOTE — PLAN OF CARE
Problem: Potential for Falls  Goal: Patient will remain free of falls  Description: INTERVENTIONS:  - Educate patient/family on patient safety including physical limitations  - Instruct patient to call for assistance with activity   - Consult OT/PT to assist with strengthening/mobility   - Keep Call bell within reach  - Keep bed low and locked with side rails adjusted as appropriate  - Keep care items and personal belongings within reach  - Initiate and maintain comfort rounds  - Make Fall Risk Sign visible to staff  - Offer Toileting every 2 Hours, in advance of need  - Initiate/Maintain bed/chair alarm  - Obtain necessary fall risk management equipment:  socks;bedside commode  - Apply yellow socks and bracelet for high fall risk patients  - Consider moving patient to room near nurses station  Outcome: Progressing     Problem: Prexisting or High Potential for Compromised Skin Integrity  Goal: Skin integrity is maintained or improved  Description: INTERVENTIONS:  - Identify patients at risk for skin breakdown  - Assess and monitor skin integrity  - Assess and monitor nutrition and hydration status  - Monitor labs   - Assess for incontinence   - Turn and reposition patient  - Assist with mobility/ambulation  - Relieve pressure over bony prominences  - Avoid friction and shearing  - Provide appropriate hygiene as needed including keeping skin clean and dry  - Evaluate need for skin moisturizer/barrier cream  - Collaborate with interdisciplinary team   - Patient/family teaching  - Consider wound care consult   Outcome: Progressing     Problem: MOBILITY - ADULT  Goal: Maintain or return to baseline ADL function  Description: INTERVENTIONS:  -  Assess patient's ability to carry out ADLs; assess patient's baseline for ADL function and identify physical deficits which impact ability to perform ADLs (bathing, care of mouth/teeth, toileting, grooming, dressing, etc )  - Assess/evaluate cause of self-care deficits   - Assess range of motion  - Assess patient's mobility; develop plan if impaired  - Assess patient's need for assistive devices and provide as appropriate  - Encourage maximum independence but intervene and supervise when necessary  - Involve family in performance of ADLs  - Assess for home care needs following discharge   - Consider OT consult to assist with ADL evaluation and planning for discharge  - Provide patient education as appropriate  Outcome: Progressing  Goal: Maintains/Returns to pre admission functional level  Description: INTERVENTIONS:  - Perform BMAT or MOVE assessment daily    - Set and communicate daily mobility goal to care team and patient/family/caregiver  - Collaborate with rehabilitation services on mobility goals if consulted  - Perform Range of Motion 3 times a day  - Reposition patient every 2 hours    - Dangle patient 3 times a day  - Stand patient 3 times a day  - Ambulate patient 3 times a day  - Out of bed to chair 3 times a day   - Out of bed for meals 3 times a day  - Out of bed for toileting  - Record patient progress and toleration of activity level   Outcome: Progressing     Problem: PAIN - ADULT  Goal: Verbalizes/displays adequate comfort level or baseline comfort level  Description: Interventions:  - Encourage patient to monitor pain and request assistance  - Assess pain using appropriate pain scale  - Administer analgesics based on type and severity of pain and evaluate response  - Implement non-pharmacological measures as appropriate and evaluate response  - Consider cultural and social influences on pain and pain management  - Notify physician/advanced practitioner if interventions unsuccessful or patient reports new pain  Outcome: Progressing     Problem: INFECTION - ADULT  Goal: Absence or prevention of progression during hospitalization  Description: INTERVENTIONS:  - Assess and monitor for signs and symptoms of infection  - Monitor lab/diagnostic results  - Monitor all insertion sites, i e  indwelling lines, tubes, and drains  - Monitor endotracheal if appropriate and nasal secretions for changes in amount and color  - [place] appropriate cooling/warming therapies per order  - Administer medications as ordered  - Instruct and encourage patient and family to use good hand hygiene technique  - Identify and instruct in appropriate isolation precautions for identified infection/condition  Outcome: Progressing  Goal: Absence of fever/infection during neutropenic period  Description: INTERVENTIONS:  - Monitor WBC    Outcome: Progressing     Problem: SAFETY ADULT  Goal: Patient will remain free of falls  Description: INTERVENTIONS:  - Educate patient/family on patient safety including physical limitations  - Instruct patient to call for assistance with activity   - Consult OT/PT to assist with strengthening/mobility   - Keep Call bell within reach  - Keep bed low and locked with side rails adjusted as appropriate  - Keep care items and personal belongings within reach  - Initiate and maintain comfort rounds  - Make Fall Risk Sign visible to staff  - Offer Toileting every 2 Hours, in advance of need  - Initiate/Maintain bed/chair alarm  - Obtain necessary fall risk management equipment:  socks;bedside commode  - Apply yellow socks and bracelet for high fall risk patients  - Consider moving patient to room near nurses station  Outcome: Progressing  Goal: Maintain or return to baseline ADL function  Description: INTERVENTIONS:  -  Assess patient's ability to carry out ADLs; assess patient's baseline for ADL function and identify physical deficits which impact ability to perform ADLs (bathing, care of mouth/teeth, toileting, grooming, dressing, etc )  - Assess/evaluate cause of self-care deficits   - Assess range of motion  - Assess patient's mobility; develop plan if impaired  - Assess patient's need for assistive devices and provide as appropriate  - Encourage maximum independence but intervene and supervise when necessary  - Involve family in performance of ADLs  - Assess for home care needs following discharge   - Consider OT consult to assist with ADL evaluation and planning for discharge  - Provide patient education as appropriate  Outcome: Progressing  Goal: Maintains/Returns to pre admission functional level  Description: INTERVENTIONS:  - Perform BMAT or MOVE assessment daily    - Set and communicate daily mobility goal to care team and patient/family/caregiver  - Collaborate with rehabilitation services on mobility goals if consulted  - Perform Range of Motion 3 times a day  - Reposition patient every 2 hours  - Dangle patient 3 times a day  - Stand patient 3 times a day  - Ambulate patient 3 times a day  - Out of bed to chair 3 times a day   - Out of bed for meals 3 times a day  - Out of bed for toileting  - Record patient progress and toleration of activity level   Outcome: Progressing     Problem: DISCHARGE PLANNING  Goal: Discharge to home or other facility with appropriate resources  Description: INTERVENTIONS:  - Identify barriers to discharge w/patient and caregiver  - Arrange for needed discharge resources and transportation as appropriate  - Identify discharge learning needs (meds, wound care, etc )  - Arrange for interpretive services to assist at discharge as needed  - Refer to Case Management Department for coordinating discharge planning if the patient needs post-hospital services based on physician/advanced practitioner order or complex needs related to functional status, cognitive ability, or social support system  Outcome: Progressing     Problem: Knowledge Deficit  Goal: Patient/family/caregiver demonstrates understanding of disease process, treatment plan, medications, and discharge instructions  Description: Complete learning assessment and assess knowledge base    Interventions:  - Provide teaching at level of understanding  - Provide teaching via preferred learning methods  Outcome: Progressing     Problem: METABOLIC, FLUID AND ELECTROLYTES - ADULT  Goal: Glucose maintained within target range  Description: INTERVENTIONS:  - Monitor Blood Glucose as ordered  - Assess for signs and symptoms of hyperglycemia and hypoglycemia  - Administer ordered medications to maintain glucose within target range  - Assess nutritional intake and initiate nutrition service referral as needed  Outcome: Progressing  Goal: Electrolytes maintained within normal limits  Description: INTERVENTIONS:  - Monitor labs and assess patient for signs and symptoms of electrolyte imbalances  - Administer electrolyte replacement as ordered  - Monitor response to electrolyte replacements, including repeat lab results as appropriate  - Instruct patient on fluid and nutrition as appropriate  Outcome: Progressing  Goal: Fluid balance maintained  Description: INTERVENTIONS:  - Monitor labs   - Monitor I/O and WT  - Instruct patient on fluid and nutrition as appropriate  - Assess for signs & symptoms of volume excess or deficit  Outcome: Progressing     Problem: GASTROINTESTINAL - ADULT  Goal: Minimal or absence of nausea and/or vomiting  Description: INTERVENTIONS:  - Administer IV fluids if ordered to ensure adequate hydration  - Maintain NPO status until nausea and vomiting are resolved  - Nasogastric tube if ordered  - Administer ordered antiemetic medications as needed  - Provide nonpharmacologic comfort measures as appropriate  - Advance diet as tolerated, if ordered  - Consider nutrition services referral to assist patient with adequate nutrition and appropriate food choices  Outcome: Progressing  Goal: Maintains or returns to baseline bowel function  Description: INTERVENTIONS:  - Assess bowel function  - Encourage oral fluids to ensure adequate hydration  - Administer IV fluids if ordered to ensure adequate hydration  - Administer ordered medications as needed  - Encourage mobilization and activity  - Consider nutritional services referral to assist patient with adequate nutrition and appropriate food choices  Outcome: Progressing  Goal: Maintains adequate nutritional intake  Description: INTERVENTIONS:  - Monitor percentage of each meal consumed  - Identify factors contributing to decreased intake, treat as appropriate  - Assist with meals as needed  - Monitor I&O, weight, and lab values if indicated  - Obtain nutrition services referral as needed  Outcome: Progressing     Problem: SKIN/TISSUE INTEGRITY - ADULT  Goal: Skin Integrity remains intact(Skin Breakdown Prevention)  Description: Assess:  -Perform [place] assessment every x  -Clean and moisturize skin every x  -Inspect skin when repositioning, toileting, and assisting with ADLS  -Assess under medical devices such as x every x  -Assess extremities for adequate circulation and sensation     Bed Management:  -Have minimal linens on bed & keep smooth, unwrinkled  -Change linens as needed when moist or perspiring  -Avoid sitting or lying in one position for more than x hours while in bed  -Keep HOB at [facility]     Toileting:  -Offer bedside commode  -Assess for incontinence every x  -Use incontinent care products after each incontinent episode such as x    Activity:  -Mobilize patient x times a day  -Encourage activity and walks on unit  -Encourage or provide ROM exercises   -Turn and reposition patient every x Hours  -Use appropriate equipment to lift or move patient in bed  -Instruct/ Assist with weight shifting every x when out of bed in chair  -Consider limitation of chair time xxxx hour intervals    Skin Care:  -Avoid use of baby powder, tape, friction and shearing, hot water or constrictive clothing  -Relieve pressure over bony prominences using x  -Do not massage red bony areas    Next Steps:  -Teach patient strategies to minimize risks such as x   -Consider consults to  interdisciplinary teams such as x  Outcome: Progressing     Problem: HEMATOLOGIC - ADULT  Goal: Maintains hematologic stability  Description: INTERVENTIONS  - Assess for signs and symptoms of bleeding or hemorrhage  - Monitor labs  - Administer supportive blood products/factors as ordered and appropriate  Outcome: Progressing     Problem: MUSCULOSKELETAL - ADULT  Goal: Maintain or return mobility to safest level of function  Description: INTERVENTIONS:  - Assess patient's ability to carry out ADLs; assess patient's baseline for ADL function and identify physical deficits which impact ability to perform ADLs (bathing, care of mouth/teeth, toileting, grooming, dressing, etc )  - Assess/evaluate cause of self-care deficits   - Assess range of motion  - Assess patient's mobility  - Assess patient's need for assistive devices and provide as appropriate  - Encourage maximum independence but intervene and supervise when necessary  - Involve family in performance of ADLs  - Assess for home care needs following discharge   - Consider OT consult to assist with ADL evaluation and planning for discharge  - Provide patient education as appropriate  Outcome: Progressing     Problem: Nutrition/Hydration-ADULT  Goal: Nutrient/Hydration intake appropriate for improving, restoring or maintaining nutritional needs  Description: Monitor and assess patient's nutrition/hydration status for malnutrition  Collaborate with interdisciplinary team and initiate plan and interventions as ordered  Monitor patient's weight and dietary intake as ordered or per policy  Utilize nutrition screening tool and intervene as necessary  Determine patient's food preferences and provide high-protein, high-caloric foods as appropriate       INTERVENTIONS:  - Monitor oral intake, urinary output, labs, and treatment plans  - Assess nutrition and hydration status and recommend course of action  - Evaluate amount of meals eaten  - Assist patient with eating if necessary   - Allow adequate time for meals  - Recommend/ encourage appropriate diets, oral nutritional supplements, and vitamin/mineral supplements  - Order, calculate, and assess calorie counts as needed  - Recommend, monitor, and adjust tube feedings and TPN/PPN based on assessed needs  - Assess need for intravenous fluids  - Provide specific nutrition/hydration education as appropriate  - Include patient/family/caregiver in decisions related to nutrition  Outcome: Progressing

## 2022-08-09 NOTE — ASSESSMENT & PLAN NOTE
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

## 2022-08-09 NOTE — ASSESSMENT & PLAN NOTE
· Patient has pancytopenia due to portal hypertension, liver cirrhosis  · She required blood transfusion during this hospital stay  · She had no signs of GI bleeding    · Will monitor blood counts closely

## 2022-08-09 NOTE — ASSESSMENT & PLAN NOTE
· Patient was admitted with hepatic encephalopathy was treated with lactulose and Xifaxan  · Improving though pt with some lethargy and confusion with vomiting today after getting Flexeril and Tramadol last evening    DIL states patient takes Tramadol at home without a problem but does not take Flexeril so suspect combination of the medications contributing to mental status today  · Monitor

## 2022-08-09 NOTE — PROGRESS NOTES
Progress note - Gastroenterology   Almer Fuel 77 y o  female MRN: [id number]  Unit/Bed#: -01 Encounter: [id number]    ASSESSMENT and PLAN  1  PAUL cirrhosis with ascites and edema, low MELD     · Continue Lasix and Aldactone  Monitor kidney function  · Strict I&O's and daily weight  · 2g Na diet and fluid restriction  · Trend LFTs and INR  - increasing abdominal distension- ultrasound with Dopplers negative for PVT  New ascites noted  Will proceed with paracentesis, diagnostic and therapeutic -scheduled for today 8/9       2  Hepatic encephalopathy - improved     · Continue lactulose p o  And titrate to 3-4 bowel movements a day  · Continue Xifaxan  · Avoid narcotics  - Ammonia today 55-mild asterixis  Alert and oriented     3  Thrombocytopenia in the setting of cirrhosis     4  Chronic anemia, FOBT + but no overt GIB     · Monitor for overt GI bleeding  · Transfuse to hgb >7 although currently there is a severe blood shortage so we are told to limit transfusions   · S/p IV Iron  Hemoglobin currently stable at 7 2 no overt signs of GI bleed     5  H/o esophageal varices - no n/v/hematemesis at this time      · Eventually needs a repeat EGD, outpatient or inpatient if overt bleeding noted  · Continue nadolol     6  Type 2 DM      7  L humerus fracture         Chief Complaint   Patient presents with    Hyperglycemia - Symptomatic     Patient presents to the ED via EMS with c/o not feeling well, states fall two weeks ago and has residual back pain and left shoulder fx (seen at [facility] ED s/p fall)  States her sugars have been high, EMS report >300 en route, recent pacemaker placement         SUBJECTIVE/HPI   No GI complaints  No overt signs of GI bleeding      /50 (BP Location: Right arm)   Pulse 83   Temp 98 6 °F (37 °C) (Axillary)   Resp 22   Ht 5' 4" (1 626 m)   Wt 103 kg (227 lb 1 2 oz)   SpO2 96% Comment: Pt  "mouth breathing" 2L O2 placed on Pt  while sleeping this AM   BMI 38 98 kg/m²     PHYSICALEXAM  General appearance: alert, appears stated age and cooperative  Eyes: PERLLA, EOMI, no icterus   Head: Normocephalic, without obvious abnormality, atraumatic  Lungs: clear to auscultation bilaterally  Heart: regular rate and rhythm, S1, S2 normal, no murmur, click, rub or gallop  Abdomen: soft, non-tender; bowel sounds normal; no masses,  no organomegaly  Extremities: extremities normal, atraumatic, no cyanosis or edema  Neurologic: Grossly normal    Lab Results   Component Value Date    GLUCOSE 280 (H) 08/04/2022    CALCIUM 8 8 08/09/2022    K 4 2 08/09/2022    CO2 28 08/09/2022     08/09/2022    BUN 26 (H) 08/09/2022    CREATININE 1 28 08/09/2022     Lab Results   Component Value Date    WBC 6 44 08/09/2022    HGB 7 2 (L) 08/09/2022    HCT 24 3 (L) 08/09/2022    MCV 91 08/09/2022    PLT 86 (L) 08/09/2022     Lab Results   Component Value Date    ALT 29 08/09/2022    AST 65 (H) 08/09/2022    ALKPHOS 95 08/09/2022     No results found for: AMYLASE  No results found for: LIPASE  Lab Results   Component Value Date    IRON 190 (H) 08/05/2022    TIBC 413 08/05/2022    FERRITIN 20 08/05/2022     Lab Results   Component Value Date    INR 1 31 (H) 08/09/2022

## 2022-08-09 NOTE — PROGRESS NOTES
[place]  Progress Note Eddie Manuel 1945, 77 y o  female MRN: [id number]  Unit/Bed#: -01 Encounter: [id number]  Primary Care Provider: Lizett Kenney DO   Date and time admitted to hospital: 8/4/2022  6:45 PM    * Hepatic encephalopathy [facility])  Assessment & Plan  · Patient was admitted with hepatic encephalopathy was treated with lactulose and Xifaxan  · Improving though pt with some lethargy and confusion with vomiting today after getting Flexeril and Tramadol last evening  DIL states patient takes Tramadol at home without a problem but does not take Flexeril so suspect combination of the medications contributing to mental status today  · Monitor     Cardiomyopathy [facility])  Assessment & Plan  · Newly diagnosed during recent admission  · 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% and abnormal diastolic function on 94/29 echo   · Repeat echo on 07/05 status post ppm:  LVEF 50 percent  G1 DD  · Continue Lasix    Depression  Assessment & Plan  · Home regimen: Lexapro 5 milligrams daily   · Patient was seen by geriatric medicine has [facility] during recent admission and recommended increased to 10 mg daily if needed    Closed traumatic displaced fracture of proximal end of left humerus  Assessment & Plan  · Status post fall 6/26   · Managing conservatively with sling    Complete heart block ([facility] [address] )  Assessment & Plan  · S/p permanent pacemaker 6/29 at [facility]  · Prior to placement patient required emergent transvenous pacing and did require vasopressors after placement    Esophageal varices without bleeding ([address] )  Assessment & Plan  · Banding x2 in 02/2022  · Follows closely with GI  · No hematemesis, melena, or hematochezia reported   · BP stable   · Will need repeat EGD as outpatient    Pancytopenia ([address] )  Assessment & Plan  · Patient has pancytopenia due to portal hypertension, liver cirrhosis    · She required blood transfusion during this hospital stay  · She had no signs of GI bleeding  · Will monitor blood counts closely    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) [facility])  Assessment & Plan  Patient has 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  Ultrasound shows ascites  Scheduled for paracentesis today   Continue Lasix 40 mg IV daily and spironolactone 50 mg daily  Will monitor renal function and electrolytes closely    Type 2 diabetes mellitus with hyperglycemia, with long-term current use of insulin [facility])  Assessment & Plan  Lab Results   Component Value Date    HGBA1C 8 2 (H) 05/31/2022       Recent Labs     08/08/22  1603 08/08/22  2045 08/09/22  0728 08/09/22  1127   POCGLU 361* 353* 317* 283*       Blood Sugar Average: Last 72 hrs:  (P) 342 3125     Patient has uncontrolled hyperglycemia  Increased Lantus to 40 units at bedtime and continue pre meal Humalog 12 units t i d     Acquired hypothyroidism  Assessment & Plan  · Home regimen:  Levothyroxine 125 mcg daily except for Sundays 62 5 micrograms  · TSH elevated on admission, however free T4 1 31   · Continue levothyroxine at current dose  · Recommend recheck TSH in 6-8 weeks outpatient    Essential hypertension  Assessment & Plan  · Continue medications as outlined under liver cirrhosis        VTE Pharmacologic Prophylaxis: VTE Score: 4 Moderate Risk (Score 3-4) - Pharmacological DVT Prophylaxis Contraindicated  Sequential Compression Devices Ordered  Patient Centered Rounds: I performed bedside rounds with nursing staff today  Discussions with Specialists or Other Care Team Provider: RNSEAN     Education and Discussions with Family / Patient: Updated  (daughter) via phone  Time Spent for Care: 30 minutes  More than 50% of total time spent on counseling and coordination of care as described above      Current Length of Stay: 4 day(s)  Current Patient Status: Inpatient   Certification Statement: The patient will continue to require additional inpatient hospital stay due to encephalopathy, paracentesis ,needs rehab  Discharge Plan: Anticipate discharge in 24-48 hrs to rehab facility  Code Status: Level 1 - Full Code    Subjective:   Patient reports she feels "out of it" today, thinks its from the medications last night  Vomited this AM but does not feel nauseous right now  She reports she is uncomfortable in the bed     Objective:     Vitals:   Temp (24hrs), Av 1 °F (36 7 °C), Min:97 5 °F (36 4 °C), Max:98 6 °F (37 °C)    Temp:  [97 5 °F (36 4 °C)-98 6 °F (37 °C)] 98 6 °F (37 °C)  HR:  [63-98] 83  Resp:  [18-22] 22  BP: (101-113)/(50-77) 102/50  SpO2:  [88 %-100 %] 96 %  Body mass index is 38 98 kg/m²  Input and Output Summary (last 24 hours): Intake/Output Summary (Last 24 hours) at 2022 1245  Last data filed at 2022 0935  Gross per 24 hour   Intake 240 ml   Output 380 ml   Net -140 ml       Physical Exam:   Physical Exam  Vitals reviewed  Constitutional:       General: She is not in acute distress  Appearance: She is not toxic-appearing  HENT:      Head: Normocephalic and atraumatic  Eyes:      Extraocular Movements: Extraocular movements intact  Cardiovascular:      Rate and Rhythm: Normal rate and regular rhythm  Pulmonary:      Effort: Pulmonary effort is normal       Breath sounds: Normal breath sounds  Abdominal:      General: Bowel sounds are normal  There is no distension  Palpations: Abdomen is soft  Tenderness: There is no abdominal tenderness  Musculoskeletal:         General: Normal range of motion  Cervical back: Normal range of motion  Skin:     General: Skin is warm and dry  Neurological:      General: No focal deficit present  Mental Status: She is alert and oriented to person, place, and time  Psychiatric:         Mood and Affect: Mood normal          Behavior: Behavior normal          Thought Content:  Thought content normal           Additional Data:     Labs:  Results from last 7 days   Lab Units 08/09/22  0629 08/08/22  0530   WBC Thousand/uL 6 44 5 81   HEMOGLOBIN g/dL 7 2* 7 7*   HEMATOCRIT % 24 3* 24 9*   PLATELETS Thousands/uL 86* 75*   NEUTROS PCT %  --  76*   LYMPHS PCT %  --  12*   MONOS PCT %  --  9   EOS PCT %  --  1     Results from last 7 days   Lab Units 08/09/22  0629   SODIUM mmol/L 136   POTASSIUM mmol/L 4 2   CHLORIDE mmol/L 101   CO2 mmol/L 28   BUN mg/dL 26*   CREATININE mg/dL 1 28   ANION GAP mmol/L 7   CALCIUM mg/dL 8 8   ALBUMIN g/dL 2 5*   TOTAL BILIRUBIN mg/dL 1 40*   ALK PHOS U/L 95   ALT U/L 29   AST U/L 65*   GLUCOSE RANDOM mg/dL 284*     Results from last 7 days   Lab Units 08/09/22  0629   INR  1 31*     Results from last 7 days   Lab Units 08/09/22  1127 08/09/22  0728 08/08/22  2045 08/08/22  1603 08/08/22  1118 08/08/22  0728 08/07/22  2155 08/07/22  2152 08/07/22  1630 08/07/22  1121 08/07/22  0725 08/06/22  2103   POC GLUCOSE mg/dl 283* 317* 353* 361* 363* 284* 375* >500* 416* 436* 287* 399*               Lines/Drains:  Invasive Devices  Report    Peripheral Intravenous Line  Duration           Peripheral IV 08/09/22 Distal;Dorsal (posterior); Right Forearm <1 day          Drain  Duration           External Urinary Catheter 4 days                      Imaging: No pertinent imaging reviewed      Recent Cultures (last 7 days):   Results from last 7 days   Lab Units 08/05/22  0422   URINE CULTURE  80,000-89,000 cfu/ml Escherichia coli*  10,000-19,000 cfu/ml Alpha Hemolytic Streptococcus NOT Enterococcus*       Last 24 Hours Medication List:   Current Facility-Administered Medications   Medication Dose Route Frequency Provider Last Rate    acetaminophen  650 mg Oral Q6H PRN Maru Martin PA-C      Diclofenac Sodium  2 g Topical BID Taryn Lowe PA-C      escitalopram  10 mg Oral Daily Roel Mg MD      furosemide  40 mg Intravenous Daily Chau Hernandez MD      insulin glargine  40 Units Subcutaneous HS Sintia Monroe MD      insulin lispro  12 Units Subcutaneous TID With [employer],       lactulose  20 g Oral TID Maria Esther,       levothyroxine  125 mcg Oral Once per day on Mon Tue Wed Thu Fri Sat Meaghan Hernandez PA-C      levothyroxine  62 5 mcg Oral Once per day on Sun Meaghan Hernandez PA-C      lidocaine  1 patch Topical Daily Antonio Chin PA-C      melatonin  6 mg Oral HS PRN Meaghan Hernandez PA-C      nadolol  10 mg Oral Daily Meaghan Hernandez PA-C      nystatin   Topical BID [place], [place]      ondansetron  4 mg Intravenous Q6H PRN Nadege Martini MD      pantoprazole  40 mg Oral Daily Before Breakfast Lu Carpenter MD      pravastatin  20 mg Oral Daily With [place], [place]      rifaximin  550 mg Oral Q12H [address] Meaghan Hernandez PA-C      simethicone  80 mg Oral Q6H PRN Cierra Darnell PA-C      spironolactone  50 mg Oral Daily Manuel Lamb MD      traMADol  50 mg Oral BID PRN Felicitas Cobb PA-C          Today, Patient Was Seen By: Felicitas Cobb PA-C    **Please Note: This note may have been constructed using a voice recognition system  **

## 2022-08-09 NOTE — ASSESSMENT & PLAN NOTE
Lab Results   Component Value Date    HGBA1C 8 2 (H) 05/31/2022       Recent Labs     08/08/22  1603 08/08/22  2045 08/09/22  0728 08/09/22  1127   POCGLU 361* 353* 317* 283*       Blood Sugar Average: Last 72 hrs:  (P) 342 3129     Patient has uncontrolled hyperglycemia  Increased Lantus to 40 units at bedtime and continue pre meal Humalog 12 units t i d

## 2022-08-10 LAB
ALBUMIN SERPL BCP-MCNC: 2.5 G/DL (ref 3.5–5)
ALP SERPL-CCNC: 91 U/L (ref 46–116)
ALT SERPL W P-5'-P-CCNC: 39 U/L (ref 12–78)
AMMONIA PLAS-SCNC: 22 UMOL/L (ref 11–35)
ANION GAP SERPL CALCULATED.3IONS-SCNC: 5 MMOL/L (ref 4–13)
AST SERPL W P-5'-P-CCNC: 122 U/L (ref 5–45)
BASOPHILS # BLD AUTO: 0.03 THOUSANDS/ΜL (ref 0–0.1)
BASOPHILS NFR BLD AUTO: 1 % (ref 0–1)
BILIRUB SERPL-MCNC: 1.2 MG/DL (ref 0.2–1)
BUN SERPL-MCNC: 30 MG/DL (ref 5–25)
CALCIUM ALBUM COR SERPL-MCNC: 10 MG/DL (ref 8.3–10.1)
CALCIUM SERPL-MCNC: 8.8 MG/DL (ref 8.3–10.1)
CHLORIDE SERPL-SCNC: 101 MMOL/L (ref 96–108)
CO2 SERPL-SCNC: 31 MMOL/L (ref 21–32)
CREAT SERPL-MCNC: 1.47 MG/DL (ref 0.6–1.3)
EOSINOPHIL # BLD AUTO: 0.02 THOUSAND/ΜL (ref 0–0.61)
EOSINOPHIL NFR BLD AUTO: 0 % (ref 0–6)
ERYTHROCYTE [DISTWIDTH] IN BLOOD BY AUTOMATED COUNT: 20.2 % (ref 11.6–15.1)
GFR SERPL CREATININE-BSD FRML MDRD: 34 ML/MIN/1.73SQ M
GLUCOSE SERPL-MCNC: 161 MG/DL (ref 65–140)
GLUCOSE SERPL-MCNC: 180 MG/DL (ref 65–140)
GLUCOSE SERPL-MCNC: 220 MG/DL (ref 65–140)
GLUCOSE SERPL-MCNC: 263 MG/DL (ref 65–140)
GLUCOSE SERPL-MCNC: 284 MG/DL (ref 65–140)
HCT VFR BLD AUTO: 25.2 % (ref 34.8–46.1)
HGB BLD-MCNC: 7.6 G/DL (ref 11.5–15.4)
IMM GRANULOCYTES # BLD AUTO: 0.03 THOUSAND/UL (ref 0–0.2)
IMM GRANULOCYTES NFR BLD AUTO: 1 % (ref 0–2)
LYMPHOCYTES # BLD AUTO: 0.57 THOUSANDS/ΜL (ref 0.6–4.47)
LYMPHOCYTES NFR BLD AUTO: 12 % (ref 14–44)
MCH RBC QN AUTO: 28.1 PG (ref 26.8–34.3)
MCHC RBC AUTO-ENTMCNC: 30.2 G/DL (ref 31.4–37.4)
MCV RBC AUTO: 93 FL (ref 82–98)
MONOCYTES # BLD AUTO: 0.75 THOUSAND/ΜL (ref 0.17–1.22)
MONOCYTES NFR BLD AUTO: 16 % (ref 4–12)
NEUTROPHILS # BLD AUTO: 3.38 THOUSANDS/ΜL (ref 1.85–7.62)
NEUTS SEG NFR BLD AUTO: 70 % (ref 43–75)
NRBC BLD AUTO-RTO: 0 /100 WBCS
PLATELET # BLD AUTO: 69 THOUSANDS/UL (ref 149–390)
PMV BLD AUTO: 13.6 FL (ref 8.9–12.7)
POTASSIUM SERPL-SCNC: 4.6 MMOL/L (ref 3.5–5.3)
PROT SERPL-MCNC: 5.8 G/DL (ref 6.4–8.4)
RBC # BLD AUTO: 2.7 MILLION/UL (ref 3.81–5.12)
SODIUM SERPL-SCNC: 137 MMOL/L (ref 135–147)
WBC # BLD AUTO: 4.78 THOUSAND/UL (ref 4.31–10.16)

## 2022-08-10 PROCEDURE — 82140 ASSAY OF AMMONIA: CPT | Performed by: INTERNAL MEDICINE

## 2022-08-10 PROCEDURE — 80053 COMPREHEN METABOLIC PANEL: CPT | Performed by: PHYSICIAN ASSISTANT

## 2022-08-10 PROCEDURE — 99232 SBSQ HOSP IP/OBS MODERATE 35: CPT | Performed by: INTERNAL MEDICINE

## 2022-08-10 PROCEDURE — 82948 REAGENT STRIP/BLOOD GLUCOSE: CPT

## 2022-08-10 PROCEDURE — 99239 HOSP IP/OBS DSCHRG MGMT >30: CPT | Performed by: INTERNAL MEDICINE

## 2022-08-10 PROCEDURE — 0WJG3ZZ INSPECTION OF PERITONEAL CAVITY, PERCUTANEOUS APPROACH: ICD-10-PCS | Performed by: INTERNAL MEDICINE

## 2022-08-10 PROCEDURE — 85025 COMPLETE CBC W/AUTO DIFF WBC: CPT | Performed by: PHYSICIAN ASSISTANT

## 2022-08-10 RX ORDER — BENZONATATE 100 MG/1
100 CAPSULE ORAL 3 TIMES DAILY PRN
Status: DISCONTINUED | OUTPATIENT
Start: 2022-08-10 | End: 2022-08-15 | Stop reason: HOSPADM

## 2022-08-10 RX ORDER — ALBUMIN (HUMAN) 12.5 G/50ML
25 SOLUTION INTRAVENOUS ONCE
Status: COMPLETED | OUTPATIENT
Start: 2022-08-10 | End: 2022-08-10

## 2022-08-10 RX ORDER — INSULIN GLARGINE 100 [IU]/ML
45 INJECTION, SOLUTION SUBCUTANEOUS
Status: DISCONTINUED | OUTPATIENT
Start: 2022-08-10 | End: 2022-08-15 | Stop reason: HOSPADM

## 2022-08-10 RX ADMIN — INSULIN GLARGINE 45 UNITS: 100 INJECTION, SOLUTION SUBCUTANEOUS at 21:56

## 2022-08-10 RX ADMIN — RIFAXIMIN 550 MG: 550 TABLET ORAL at 21:56

## 2022-08-10 RX ADMIN — BENZONATATE 100 MG: 100 CAPSULE ORAL at 06:02

## 2022-08-10 RX ADMIN — NYSTATIN: 100000 POWDER TOPICAL at 09:13

## 2022-08-10 RX ADMIN — RIFAXIMIN 550 MG: 550 TABLET ORAL at 09:13

## 2022-08-10 RX ADMIN — PRAVASTATIN SODIUM 20 MG: 20 TABLET ORAL at 16:44

## 2022-08-10 RX ADMIN — NYSTATIN: 100000 POWDER TOPICAL at 17:13

## 2022-08-10 RX ADMIN — DICLOFENAC SODIUM 2 G: 10 GEL TOPICAL at 09:13

## 2022-08-10 RX ADMIN — LACTULOSE 20 G: 20 SOLUTION ORAL at 21:56

## 2022-08-10 RX ADMIN — INSULIN LISPRO 12 UNITS: 100 INJECTION, SOLUTION INTRAVENOUS; SUBCUTANEOUS at 12:41

## 2022-08-10 RX ADMIN — LACTULOSE 20 G: 20 SOLUTION ORAL at 09:12

## 2022-08-10 RX ADMIN — PANTOPRAZOLE SODIUM 40 MG: 40 TABLET, DELAYED RELEASE ORAL at 05:46

## 2022-08-10 RX ADMIN — LACTULOSE 20 G: 20 SOLUTION ORAL at 16:44

## 2022-08-10 RX ADMIN — LEVOTHYROXINE SODIUM 125 MCG: 25 TABLET ORAL at 05:46

## 2022-08-10 RX ADMIN — ESCITALOPRAM OXALATE 10 MG: 10 TABLET ORAL at 09:13

## 2022-08-10 RX ADMIN — ALBUMIN (HUMAN) 25 G: 0.25 INJECTION, SOLUTION INTRAVENOUS at 14:41

## 2022-08-10 RX ADMIN — INSULIN LISPRO 12 UNITS: 100 INJECTION, SOLUTION INTRAVENOUS; SUBCUTANEOUS at 09:12

## 2022-08-10 RX ADMIN — LIDOCAINE 5% 1 PATCH: 700 PATCH TOPICAL at 09:14

## 2022-08-10 RX ADMIN — INSULIN LISPRO 12 UNITS: 100 INJECTION, SOLUTION INTRAVENOUS; SUBCUTANEOUS at 16:44

## 2022-08-10 NOTE — ASSESSMENT & PLAN NOTE
· S/p permanent pacemaker 6/29 at [facility]  · Prior to placement patient required emergent transvenous pacing and did require vasopressors after placement

## 2022-08-10 NOTE — PLAN OF CARE
Problem: Potential for Falls  Goal: Patient will remain free of falls  Description: INTERVENTIONS:  - Educate patient/family on patient safety including physical limitations  - Instruct patient to call for assistance with activity   - Consult OT/PT to assist with strengthening/mobility   - Keep Call bell within reach  - Keep bed low and locked with side rails adjusted as appropriate  - Keep care items and personal belongings within reach  - Initiate and maintain comfort rounds  - Make Fall Risk Sign visible to staff  - Offer Toileting every 2 Hours, in advance of need  - Initiate/Maintain bed/chair alarm  - Obtain necessary fall risk management equipment:  socks;bedside commode  - Apply yellow socks and bracelet for high fall risk patients  - Consider moving patient to room near nurses station  Outcome: Progressing     Problem: Prexisting or High Potential for Compromised Skin Integrity  Goal: Skin integrity is maintained or improved  Description: INTERVENTIONS:  - Identify patients at risk for skin breakdown  - Assess and monitor skin integrity  - Assess and monitor nutrition and hydration status  - Monitor labs   - Assess for incontinence   - Turn and reposition patient  - Assist with mobility/ambulation  - Relieve pressure over bony prominences  - Avoid friction and shearing  - Provide appropriate hygiene as needed including keeping skin clean and dry  - Evaluate need for skin moisturizer/barrier cream  - Collaborate with interdisciplinary team   - Patient/family teaching  - Consider wound care consult   Outcome: Progressing     Problem: MOBILITY - ADULT  Goal: Maintain or return to baseline ADL function  Description: INTERVENTIONS:  -  Assess patient's ability to carry out ADLs; assess patient's baseline for ADL function and identify physical deficits which impact ability to perform ADLs (bathing, care of mouth/teeth, toileting, grooming, dressing, etc )  - Assess/evaluate cause of self-care deficits   - Assess range of motion  - Assess patient's mobility; develop plan if impaired  - Assess patient's need for assistive devices and provide as appropriate  - Encourage maximum independence but intervene and supervise when necessary  - Involve family in performance of ADLs  - Assess for home care needs following discharge   - Consider OT consult to assist with ADL evaluation and planning for discharge  - Provide patient education as appropriate  Outcome: Progressing  Goal: Maintains/Returns to pre admission functional level  Description: INTERVENTIONS:  - Perform BMAT or MOVE assessment daily    - Set and communicate daily mobility goal to care team and patient/family/caregiver  - Collaborate with rehabilitation services on mobility goals if consulted  - Perform Range of Motion 3 times a day  - Reposition patient every 2 hours    - Dangle patient 3 times a day  - Stand patient 3 times a day  - Ambulate patient 3 times a day  - Out of bed to chair 3 times a day   - Out of bed for meals 3 times a day  - Out of bed for toileting  - Record patient progress and toleration of activity level   Outcome: Progressing     Problem: PAIN - ADULT  Goal: Verbalizes/displays adequate comfort level or baseline comfort level  Description: Interventions:  - Encourage patient to monitor pain and request assistance  - Assess pain using appropriate pain scale  - Administer analgesics based on type and severity of pain and evaluate response  - Implement non-pharmacological measures as appropriate and evaluate response  - Consider cultural and social influences on pain and pain management  - Notify physician/advanced practitioner if interventions unsuccessful or patient reports new pain  Outcome: Progressing     Problem: INFECTION - ADULT  Goal: Absence or prevention of progression during hospitalization  Description: INTERVENTIONS:  - Assess and monitor for signs and symptoms of infection  - Monitor lab/diagnostic results  - Monitor all insertion sites, i e  indwelling lines, tubes, and drains  - Monitor endotracheal if appropriate and nasal secretions for changes in amount and color  - [place] appropriate cooling/warming therapies per order  - Administer medications as ordered  - Instruct and encourage patient and family to use good hand hygiene technique  - Identify and instruct in appropriate isolation precautions for identified infection/condition  Outcome: Progressing  Goal: Absence of fever/infection during neutropenic period  Description: INTERVENTIONS:  - Monitor WBC    Outcome: Progressing     Problem: SAFETY ADULT  Goal: Patient will remain free of falls  Description: INTERVENTIONS:  - Educate patient/family on patient safety including physical limitations  - Instruct patient to call for assistance with activity   - Consult OT/PT to assist with strengthening/mobility   - Keep Call bell within reach  - Keep bed low and locked with side rails adjusted as appropriate  - Keep care items and personal belongings within reach  - Initiate and maintain comfort rounds  - Make Fall Risk Sign visible to staff  - Offer Toileting every 2 Hours, in advance of need  - Initiate/Maintain bed/chair alarm  - Obtain necessary fall risk management equipment:  socks;bedside commode  - Apply yellow socks and bracelet for high fall risk patients  - Consider moving patient to room near nurses station  Outcome: Progressing  Goal: Maintain or return to baseline ADL function  Description: INTERVENTIONS:  -  Assess patient's ability to carry out ADLs; assess patient's baseline for ADL function and identify physical deficits which impact ability to perform ADLs (bathing, care of mouth/teeth, toileting, grooming, dressing, etc )  - Assess/evaluate cause of self-care deficits   - Assess range of motion  - Assess patient's mobility; develop plan if impaired  - Assess patient's need for assistive devices and provide as appropriate  - Encourage maximum independence but intervene and supervise when necessary  - Involve family in performance of ADLs  - Assess for home care needs following discharge   - Consider OT consult to assist with ADL evaluation and planning for discharge  - Provide patient education as appropriate  Outcome: Progressing  Goal: Maintains/Returns to pre admission functional level  Description: INTERVENTIONS:  - Perform BMAT or MOVE assessment daily    - Set and communicate daily mobility goal to care team and patient/family/caregiver  - Collaborate with rehabilitation services on mobility goals if consulted  - Perform Range of Motion 3 times a day  - Reposition patient every 2 hours  - Dangle patient 3 times a day  - Stand patient 3 times a day  - Ambulate patient 3 times a day  - Out of bed to chair 3 times a day   - Out of bed for meals 3 times a day  - Out of bed for toileting  - Record patient progress and toleration of activity level   Outcome: Progressing     Problem: DISCHARGE PLANNING  Goal: Discharge to home or other facility with appropriate resources  Description: INTERVENTIONS:  - Identify barriers to discharge w/patient and caregiver  - Arrange for needed discharge resources and transportation as appropriate  - Identify discharge learning needs (meds, wound care, etc )  - Arrange for interpretive services to assist at discharge as needed  - Refer to Case Management Department for coordinating discharge planning if the patient needs post-hospital services based on physician/advanced practitioner order or complex needs related to functional status, cognitive ability, or social support system  Outcome: Progressing     Problem: Knowledge Deficit  Goal: Patient/family/caregiver demonstrates understanding of disease process, treatment plan, medications, and discharge instructions  Description: Complete learning assessment and assess knowledge base    Interventions:  - Provide teaching at level of understanding  - Provide teaching via preferred learning methods  Outcome: Progressing     Problem: METABOLIC, FLUID AND ELECTROLYTES - ADULT  Goal: Glucose maintained within target range  Description: INTERVENTIONS:  - Monitor Blood Glucose as ordered  - Assess for signs and symptoms of hyperglycemia and hypoglycemia  - Administer ordered medications to maintain glucose within target range  - Assess nutritional intake and initiate nutrition service referral as needed  Outcome: Progressing  Goal: Electrolytes maintained within normal limits  Description: INTERVENTIONS:  - Monitor labs and assess patient for signs and symptoms of electrolyte imbalances  - Administer electrolyte replacement as ordered  - Monitor response to electrolyte replacements, including repeat lab results as appropriate  - Instruct patient on fluid and nutrition as appropriate  Outcome: Progressing  Goal: Fluid balance maintained  Description: INTERVENTIONS:  - Monitor labs   - Monitor I/O and WT  - Instruct patient on fluid and nutrition as appropriate  - Assess for signs & symptoms of volume excess or deficit  Outcome: Progressing     Problem: GASTROINTESTINAL - ADULT  Goal: Minimal or absence of nausea and/or vomiting  Description: INTERVENTIONS:  - Administer IV fluids if ordered to ensure adequate hydration  - Maintain NPO status until nausea and vomiting are resolved  - Nasogastric tube if ordered  - Administer ordered antiemetic medications as needed  - Provide nonpharmacologic comfort measures as appropriate  - Advance diet as tolerated, if ordered  - Consider nutrition services referral to assist patient with adequate nutrition and appropriate food choices  Outcome: Progressing  Goal: Maintains or returns to baseline bowel function  Description: INTERVENTIONS:  - Assess bowel function  - Encourage oral fluids to ensure adequate hydration  - Administer IV fluids if ordered to ensure adequate hydration  - Administer ordered medications as needed  - Encourage mobilization and activity  - Consider nutritional services referral to assist patient with adequate nutrition and appropriate food choices  Outcome: Progressing  Goal: Maintains adequate nutritional intake  Description: INTERVENTIONS:  - Monitor percentage of each meal consumed  - Identify factors contributing to decreased intake, treat as appropriate  - Assist with meals as needed  - Monitor I&O, weight, and lab values if indicated  - Obtain nutrition services referral as needed  Outcome: Progressing        Problem: HEMATOLOGIC - ADULT  Goal: Maintains hematologic stability  Description: INTERVENTIONS  - Assess for signs and symptoms of bleeding or hemorrhage  - Monitor labs  - Administer supportive blood products/factors as ordered and appropriate  Outcome: Progressing     Problem: MUSCULOSKELETAL - ADULT  Goal: Maintain or return mobility to safest level of function  Description: INTERVENTIONS:  - Assess patient's ability to carry out ADLs; assess patient's baseline for ADL function and identify physical deficits which impact ability to perform ADLs (bathing, care of mouth/teeth, toileting, grooming, dressing, etc )  - Assess/evaluate cause of self-care deficits   - Assess range of motion  - Assess patient's mobility  - Assess patient's need for assistive devices and provide as appropriate  - Encourage maximum independence but intervene and supervise when necessary  - Involve family in performance of ADLs  - Assess for home care needs following discharge   - Consider OT consult to assist with ADL evaluation and planning for discharge  - Provide patient education as appropriate  Outcome: Progressing     Problem: Nutrition/Hydration-ADULT  Goal: Nutrient/Hydration intake appropriate for improving, restoring or maintaining nutritional needs  Description: Monitor and assess patient's nutrition/hydration status for malnutrition  Collaborate with interdisciplinary team and initiate plan and interventions as ordered    Monitor patient's weight and dietary intake as ordered or per policy  Utilize nutrition screening tool and intervene as necessary  Determine patient's food preferences and provide high-protein, high-caloric foods as appropriate       INTERVENTIONS:  - Monitor oral intake, urinary output, labs, and treatment plans  - Assess nutrition and hydration status and recommend course of action  - Evaluate amount of meals eaten  - Assist patient with eating if necessary   - Allow adequate time for meals  - Recommend/ encourage appropriate diets, oral nutritional supplements, and vitamin/mineral supplements  - Order, calculate, and assess calorie counts as needed  - Recommend, monitor, and adjust tube feedings and TPN/PPN based on assessed needs  - Assess need for intravenous fluids  - Provide specific nutrition/hydration education as appropriate  - Include patient/family/caregiver in decisions related to nutrition  Outcome: Progressing

## 2022-08-10 NOTE — PLAN OF CARE
Problem: Potential for Falls  Goal: Patient will remain free of falls  Description: INTERVENTIONS:  - Educate patient/family on patient safety including physical limitations  - Instruct patient to call for assistance with activity   - Consult OT/PT to assist with strengthening/mobility   - Keep Call bell within reach  - Keep bed low and locked with side rails adjusted as appropriate  - Keep care items and personal belongings within reach  - Initiate and maintain comfort rounds  - Make Fall Risk Sign visible to staff  - Offer Toileting every 2 Hours, in advance of need  - Initiate/Maintain bed/chair alarm  - Obtain necessary fall risk management equipment:  socks;bedside commode  - Apply yellow socks and bracelet for high fall risk patients  - Consider moving patient to room near nurses station  Outcome: Progressing     Problem: Prexisting or High Potential for Compromised Skin Integrity  Goal: Skin integrity is maintained or improved  Description: INTERVENTIONS:  - Identify patients at risk for skin breakdown  - Assess and monitor skin integrity  - Assess and monitor nutrition and hydration status  - Monitor labs   - Assess for incontinence   - Turn and reposition patient  - Assist with mobility/ambulation  - Relieve pressure over bony prominences  - Avoid friction and shearing  - Provide appropriate hygiene as needed including keeping skin clean and dry  - Evaluate need for skin moisturizer/barrier cream  - Collaborate with interdisciplinary team   - Patient/family teaching  - Consider wound care consult   Outcome: Progressing     Problem: MOBILITY - ADULT  Goal: Maintain or return to baseline ADL function  Description: INTERVENTIONS:  -  Assess patient's ability to carry out ADLs; assess patient's baseline for ADL function and identify physical deficits which impact ability to perform ADLs (bathing, care of mouth/teeth, toileting, grooming, dressing, etc )  - Assess/evaluate cause of self-care deficits   - Assess range of motion  - Assess patient's mobility; develop plan if impaired  - Assess patient's need for assistive devices and provide as appropriate  - Encourage maximum independence but intervene and supervise when necessary  - Involve family in performance of ADLs  - Assess for home care needs following discharge   - Consider OT consult to assist with ADL evaluation and planning for discharge  - Provide patient education as appropriate  Outcome: Progressing  Goal: Maintains/Returns to pre admission functional level  Description: INTERVENTIONS:  - Perform BMAT or MOVE assessment daily    - Set and communicate daily mobility goal to care team and patient/family/caregiver  - Collaborate with rehabilitation services on mobility goals if consulted  - Perform Range of Motion 3 times a day  - Reposition patient every 2 hours    - Dangle patient 3 times a day  - Stand patient 3 times a day  - Ambulate patient 3 times a day  - Out of bed to chair 3 times a day   - Out of bed for meals 3 times a day  - Out of bed for toileting  - Record patient progress and toleration of activity level   Outcome: Progressing     Problem: PAIN - ADULT  Goal: Verbalizes/displays adequate comfort level or baseline comfort level  Description: Interventions:  - Encourage patient to monitor pain and request assistance  - Assess pain using appropriate pain scale  - Administer analgesics based on type and severity of pain and evaluate response  - Implement non-pharmacological measures as appropriate and evaluate response  - Consider cultural and social influences on pain and pain management  - Notify physician/advanced practitioner if interventions unsuccessful or patient reports new pain  Outcome: Progressing     Problem: INFECTION - ADULT  Goal: Absence or prevention of progression during hospitalization  Description: INTERVENTIONS:  - Assess and monitor for signs and symptoms of infection  - Monitor lab/diagnostic results  - Monitor all insertion sites, i e  indwelling lines, tubes, and drains  - Monitor endotracheal if appropriate and nasal secretions for changes in amount and color  - [place] appropriate cooling/warming therapies per order  - Administer medications as ordered  - Instruct and encourage patient and family to use good hand hygiene technique  - Identify and instruct in appropriate isolation precautions for identified infection/condition  Outcome: Progressing  Goal: Absence of fever/infection during neutropenic period  Description: INTERVENTIONS:  - Monitor WBC    Outcome: Progressing     Problem: SAFETY ADULT  Goal: Patient will remain free of falls  Description: INTERVENTIONS:  - Educate patient/family on patient safety including physical limitations  - Instruct patient to call for assistance with activity   - Consult OT/PT to assist with strengthening/mobility   - Keep Call bell within reach  - Keep bed low and locked with side rails adjusted as appropriate  - Keep care items and personal belongings within reach  - Initiate and maintain comfort rounds  - Make Fall Risk Sign visible to staff  - Offer Toileting every 2 Hours, in advance of need  - Initiate/Maintain bed/chair alarm  - Obtain necessary fall risk management equipment:  socks;bedside commode  - Apply yellow socks and bracelet for high fall risk patients  - Consider moving patient to room near nurses station  Outcome: Progressing  Goal: Maintain or return to baseline ADL function  Description: INTERVENTIONS:  -  Assess patient's ability to carry out ADLs; assess patient's baseline for ADL function and identify physical deficits which impact ability to perform ADLs (bathing, care of mouth/teeth, toileting, grooming, dressing, etc )  - Assess/evaluate cause of self-care deficits   - Assess range of motion  - Assess patient's mobility; develop plan if impaired  - Assess patient's need for assistive devices and provide as appropriate  - Encourage maximum independence but intervene and supervise when necessary  - Involve family in performance of ADLs  - Assess for home care needs following discharge   - Consider OT consult to assist with ADL evaluation and planning for discharge  - Provide patient education as appropriate  Outcome: Progressing  Goal: Maintains/Returns to pre admission functional level  Description: INTERVENTIONS:  - Perform BMAT or MOVE assessment daily    - Set and communicate daily mobility goal to care team and patient/family/caregiver  - Collaborate with rehabilitation services on mobility goals if consulted  - Perform Range of Motion 3 times a day  - Reposition patient every 2 hours  - Dangle patient 3 times a day  - Stand patient 3 times a day  - Ambulate patient 3 times a day  - Out of bed to chair 3 times a day   - Out of bed for meals 3 times a day  - Out of bed for toileting  - Record patient progress and toleration of activity level   Outcome: Progressing     Problem: DISCHARGE PLANNING  Goal: Discharge to home or other facility with appropriate resources  Description: INTERVENTIONS:  - Identify barriers to discharge w/patient and caregiver  - Arrange for needed discharge resources and transportation as appropriate  - Identify discharge learning needs (meds, wound care, etc )  - Arrange for interpretive services to assist at discharge as needed  - Refer to Case Management Department for coordinating discharge planning if the patient needs post-hospital services based on physician/advanced practitioner order or complex needs related to functional status, cognitive ability, or social support system  Outcome: Progressing     Problem: Knowledge Deficit  Goal: Patient/family/caregiver demonstrates understanding of disease process, treatment plan, medications, and discharge instructions  Description: Complete learning assessment and assess knowledge base    Interventions:  - Provide teaching at level of understanding  - Provide teaching via preferred learning methods  Outcome: Progressing     Problem: METABOLIC, FLUID AND ELECTROLYTES - ADULT  Goal: Glucose maintained within target range  Description: INTERVENTIONS:  - Monitor Blood Glucose as ordered  - Assess for signs and symptoms of hyperglycemia and hypoglycemia  - Administer ordered medications to maintain glucose within target range  - Assess nutritional intake and initiate nutrition service referral as needed  Outcome: Progressing  Goal: Electrolytes maintained within normal limits  Description: INTERVENTIONS:  - Monitor labs and assess patient for signs and symptoms of electrolyte imbalances  - Administer electrolyte replacement as ordered  - Monitor response to electrolyte replacements, including repeat lab results as appropriate  - Instruct patient on fluid and nutrition as appropriate  Outcome: Progressing  Goal: Fluid balance maintained  Description: INTERVENTIONS:  - Monitor labs   - Monitor I/O and WT  - Instruct patient on fluid and nutrition as appropriate  - Assess for signs & symptoms of volume excess or deficit  Outcome: Progressing     Problem: GASTROINTESTINAL - ADULT  Goal: Minimal or absence of nausea and/or vomiting  Description: INTERVENTIONS:  - Administer IV fluids if ordered to ensure adequate hydration  - Maintain NPO status until nausea and vomiting are resolved  - Nasogastric tube if ordered  - Administer ordered antiemetic medications as needed  - Provide nonpharmacologic comfort measures as appropriate  - Advance diet as tolerated, if ordered  - Consider nutrition services referral to assist patient with adequate nutrition and appropriate food choices  Outcome: Progressing  Goal: Maintains or returns to baseline bowel function  Description: INTERVENTIONS:  - Assess bowel function  - Encourage oral fluids to ensure adequate hydration  - Administer IV fluids if ordered to ensure adequate hydration  - Administer ordered medications as needed  - Encourage mobilization and activity  - Consider nutritional services referral to assist patient with adequate nutrition and appropriate food choices  Outcome: Progressing  Goal: Maintains adequate nutritional intake  Description: INTERVENTIONS:  - Monitor percentage of each meal consumed  - Identify factors contributing to decreased intake, treat as appropriate  - Assist with meals as needed  - Monitor I&O, weight, and lab values if indicated  - Obtain nutrition services referral as needed  Outcome: Progressing     Problem: SKIN/TISSUE INTEGRITY - ADULT  Goal: Skin Integrity remains intact(Skin Breakdown Prevention)  Description: Assess:  -Perform Conrad assessment every   -Clean and moisturize skin every   -Inspect skin when repositioning, toileting, and assisting with ADLS  -Assess under medical devices such as  every   -Assess extremities for adequate circulation and sensation     Bed Management:  -Have minimal linens on bed & keep smooth, unwrinkled  -Change linens as needed when moist or perspiring  -Avoid sitting or lying in one position for more than  hours while in bed  -Keep HOB at degrees     Toileting:  -Offer bedside commode  -Assess for incontinence every   -Use incontinent care products after each incontinent episode such as     Activity:  -Mobilize patient  times a day  -Encourage activity and walks on unit  -Encourage or provide ROM exercises   -Turn and reposition patient every  Hours  -Use appropriate equipment to lift or move patient in bed  -Instruct/ Assist with weight shifting every  when out of bed in chair  -Consider limitation of chair time  hour intervals    Skin Care:  -Avoid use of baby powder, tape, friction and shearing, hot water or constrictive clothing  -Relieve pressure over bony prominences using   -Do not massage red bony areas    Next Steps:  -Teach patient strategies to minimize risks such as    -Consider consults to  interdisciplinary teams such as  Outcome: Progressing     Problem: HEMATOLOGIC - ADULT  Goal: Maintains hematologic stability  Description: INTERVENTIONS  - Assess for signs and symptoms of bleeding or hemorrhage  - Monitor labs  - Administer supportive blood products/factors as ordered and appropriate  Outcome: Progressing     Problem: MUSCULOSKELETAL - ADULT  Goal: Maintain or return mobility to safest level of function  Description: INTERVENTIONS:  - Assess patient's ability to carry out ADLs; assess patient's baseline for ADL function and identify physical deficits which impact ability to perform ADLs (bathing, care of mouth/teeth, toileting, grooming, dressing, etc )  - Assess/evaluate cause of self-care deficits   - Assess range of motion  - Assess patient's mobility  - Assess patient's need for assistive devices and provide as appropriate  - Encourage maximum independence but intervene and supervise when necessary  - Involve family in performance of ADLs  - Assess for home care needs following discharge   - Consider OT consult to assist with ADL evaluation and planning for discharge  - Provide patient education as appropriate  Outcome: Progressing     Problem: Nutrition/Hydration-ADULT  Goal: Nutrient/Hydration intake appropriate for improving, restoring or maintaining nutritional needs  Description: Monitor and assess patient's nutrition/hydration status for malnutrition  Collaborate with interdisciplinary team and initiate plan and interventions as ordered  Monitor patient's weight and dietary intake as ordered or per policy  Utilize nutrition screening tool and intervene as necessary  Determine patient's food preferences and provide high-protein, high-caloric foods as appropriate       INTERVENTIONS:  - Monitor oral intake, urinary output, labs, and treatment plans  - Assess nutrition and hydration status and recommend course of action  - Evaluate amount of meals eaten  - Assist patient with eating if necessary   - Allow adequate time for meals  - Recommend/ encourage appropriate diets, oral nutritional supplements, and vitamin/mineral supplements  - Order, calculate, and assess calorie counts as needed  - Recommend, monitor, and adjust tube feedings and TPN/PPN based on assessed needs  - Assess need for intravenous fluids  - Provide specific nutrition/hydration education as appropriate  - Include patient/family/caregiver in decisions related to nutrition  Outcome: Progressing

## 2022-08-10 NOTE — ASSESSMENT & PLAN NOTE
· Patient was straight cathed for 500 cc on arrival to the floor   · Urinary retention protocol  · Urine culture with [id number] CFU/mL E   Coli - likely contaminant  · Monitor off of abx for fevers / dysuria

## 2022-08-10 NOTE — PHYSICAL THERAPY NOTE
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  Pt has progressed well towards LTGs during rehab stay  Currently she is independent for bed mobility with use of R bedrail which family will purchase, independent for transfers and ambulation household distances as well as level community distances with use of SPC, and S for uneven surfaces with SPC  Pt does require CGA for single curb step, CS for stairs with HR to use with RUE  Plan is for pt to return home with 24/7 support from dtr in law Rachelle Aaron who will also be caring for pt's dtr who has Down's Syndrome  Pt will be going between her home and Raoul's home upon d/c  FT was performed with Rachelle Walker prior to d/c who was safely able to assist pt with mobilities  Recommend pt have first floor setup upon d/c  Did discuss with pt possibility of hospital bed at home if MD deemed necessary, however pt reporting that she has no room in her home for hospital bed which is where it would be delivered to  HEP was provided to pt to cont to improve LE strength  No DME needs at this time from PT perspective  Pt to cont with home PT to further improve her balance, endurance and strength to reduce her risk for falls

## 2022-08-10 NOTE — PROGRESS NOTES
[place]  Progress Note Renuka Mari 1945, 77 y o  female MRN: [id number]  Unit/Bed#: -01 Encounter: [id number]  Primary Care Provider: Abby Acosta DO   Date and time admitted to hospital: 8/4/2022  6:45 PM    * Hepatic encephalopathy [facility])  Assessment & Plan  · Patient was admitted with hepatic encephalopathy was treated with lactulose and Xifaxan  · Improving though pt with some lethargy and confusion with vomiting today after getting Flexeril and Tramadol last evening  DIL states patient takes Tramadol at home without a problem but does not take Flexeril so suspect combination of the medications contributing to mental status today  · 8/10 - mentation improved, although patient is still slow to respond  Continue to hold flexeril and tramadol  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) [facility])  Assessment & Plan  Patient has 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  Ultrasound shows ascites  8/09 attempted paracentesis - Insufficient fluid for safe paracentesis at this time  Hold IV Lasix 40 mg IV daily and spironolactone 50 mg daily in setting of hypotension and bumped creatinine  Will monitor renal function and electrolytes closely    Esophageal varices without bleeding ([facility] [address] )  Assessment & Plan  · Banding x2 in 02/2022  · Follows closely with GI  · No hematemesis, melena, or hematochezia reported   · BP stable   · Will need repeat EGD as outpatient    Urinary retention  Assessment & Plan  · Patient was straight cathed for 500 cc on arrival to the floor   · Urinary retention protocol  · Urine culture with 80-29097 CFU/mL E   Coli - likely contaminant  · Monitor off of abx for fevers / dysuria    Pyuria  Assessment & Plan  · Urine microscopy with innumerable bacteria but only 2-4 wbc's  · Patient denies any urinary symptoms  · Monitor off abx for fever/dysuria/leukocytosis    Cardiomyopathy [facility])  Assessment & Plan  · Newly diagnosed during recent admission  · 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% and abnormal diastolic function on 68/59 echo   · Repeat echo on 07/05 status post ppm:  LVEF 50 percent  G1 DD  · Was receiving IV lasix  On hold as of 8/10 secondary to hypotension and bump in renal function    Depression  Assessment & Plan  · Home regimen: Lexapro 5 milligrams daily   · Patient was seen by geriatric medicine has [facility] during recent admission and recommended increased to 10 mg daily if needed    Closed traumatic displaced fracture of proximal end of left humerus  Assessment & Plan  · Status post fall 6/26   · Managing conservatively with sling    Complete heart block ([facility] [address] )  Assessment & Plan  · S/p permanent pacemaker 6/29 at [facility]  · Prior to placement patient required emergent transvenous pacing and did require vasopressors after placement    Pancytopenia ([facility] [address] )  Assessment & Plan  · Patient has pancytopenia due to portal hypertension, liver cirrhosis  · She required blood transfusion during this hospital stay  · She had no signs of GI bleeding    · Will monitor blood counts closely    Type 2 diabetes mellitus with hyperglycemia, with long-term current use of insulin [facility])  Assessment & Plan  Lab Results   Component Value Date    HGBA1C 8 2 (H) 05/31/2022       Recent Labs     08/09/22  1624 08/09/22  2104 08/10/22  0902 08/10/22  1108   POCGLU 251* 189* 263* 284*       Blood Sugar Average: Last 72 hrs:  (P) 575 [id number]     Patient has uncontrolled hyperglycemia  Increased Lantus to 45 units at bedtime and continue pre meal Humalog 12 units t i d     Acquired hypothyroidism  Assessment & Plan  · Home regimen:  Levothyroxine 125 mcg daily except for Sundays 62 5 micrograms  · TSH elevated on admission, however free T4 1 31   · Continue levothyroxine at current dose  · Recommend recheck TSH in 6-8 weeks outpatient    Essential hypertension  Assessment & Plan  · Continue medications as outlined under liver cirrhosis        VTE Pharmacologic Prophylaxis: VTE Score: 4 Moderate Risk (Score 3-4) - Pharmacological DVT Prophylaxis Contraindicated  Sequential Compression Devices Ordered  Patient Centered Rounds: I performed bedside rounds with nursing staff today  Discussions with Specialists or Other Care Team Provider:  GI    Education and Discussions with Family / Patient: Updated  (daughter in law) via phone  Time Spent for Care: 45 minutes  More than 50% of total time spent on counseling and coordination of care as described above  Current Length of Stay: 5 day(s)  Current Patient Status: Inpatient   Certification Statement: The patient will continue to require additional inpatient hospital stay due to Renal function monitor  Discharge Plan: Anticipate discharge tomorrow to rehab facility  Code Status: Level 1 - Full Code    Subjective:   Patient complains of sacral pain, no open wounds  She is slow to answer, but is able to hold a conversation  She denies any other symptoms include chest pain, shortness of breath, nausea, vomiting, abdominal pain  Objective:     Vitals:   Temp (24hrs), Av 6 °F (37 °C), Min:98 4 °F (36 9 °C), Max:98 7 °F (37 1 °C)    Temp:  [98 4 °F (36 9 °C)-98 7 °F (37 1 °C)] 98 7 °F (37 1 °C)  HR:  [64-76] 64  Resp:  [18-25] 18  BP: ()/(46-54) 105/54  SpO2:  [94 %-98 %] 95 %  Body mass index is 38 83 kg/m²  Input and Output Summary (last 24 hours): Intake/Output Summary (Last 24 hours) at 8/10/2022 1130  Last data filed at 8/10/2022 0601  Gross per 24 hour   Intake --   Output 200 ml   Net -200 ml       Physical Exam:   Physical Exam  Vitals and nursing note reviewed  Constitutional:       Appearance: Normal appearance  HENT:      Head: Normocephalic and atraumatic  Mouth/Throat:      Mouth: Mucous membranes are moist       Pharynx: Oropharynx is clear  No oropharyngeal exudate     Eyes:      Extraocular Movements: Extraocular movements intact  Cardiovascular:      Rate and Rhythm: Normal rate and regular rhythm  Pulses: Normal pulses  Heart sounds: Normal heart sounds  No murmur heard  No friction rub  No gallop  Pulmonary:      Effort: Pulmonary effort is normal  No respiratory distress  Breath sounds: Normal breath sounds  No stridor  No wheezing or rales  Abdominal:      General: Bowel sounds are normal  There is distension  Tenderness: There is no abdominal tenderness  Musculoskeletal:      Right lower leg: No edema  Left lower leg: No edema  Skin:     General: Skin is warm and dry  Neurological:      General: No focal deficit present  Mental Status: She is alert  Mental status is at baseline  Comments: Slow to respond         Additional Data:     Labs:  Results from last 7 days   Lab Units 08/10/22  0543   WBC Thousand/uL 4 78   HEMOGLOBIN g/dL 7 6*   HEMATOCRIT % 25 2*   PLATELETS Thousands/uL 69*   NEUTROS PCT % 70   LYMPHS PCT % 12*   MONOS PCT % 16*   EOS PCT % 0     Results from last 7 days   Lab Units 08/10/22  0543   SODIUM mmol/L 137   POTASSIUM mmol/L 4 6   CHLORIDE mmol/L 101   CO2 mmol/L 31   BUN mg/dL 30*   CREATININE mg/dL 1 47*   ANION GAP mmol/L 5   CALCIUM mg/dL 8 8   ALBUMIN g/dL 2 5*   TOTAL BILIRUBIN mg/dL 1 20*   ALK PHOS U/L 91   ALT U/L 39   AST U/L 122*   GLUCOSE RANDOM mg/dL 180*     Results from last 7 days   Lab Units 08/09/22  0629   INR  1 31*     Results from last 7 days   Lab Units 08/10/22  1108 08/10/22  0902 08/09/22  2104 08/09/22  1624 08/09/22  1127 08/09/22  0728 08/08/22  2045 08/08/22  1603 08/08/22  1118 08/08/22  0728 08/07/22  2155 08/07/22  2152   POC GLUCOSE mg/dl 284* 263* 189* 251* 283* 317* 353* 361* 363* 284* 375* >500*               Lines/Drains:  Invasive Devices  Report    Peripheral Intravenous Line  Duration           Peripheral IV 08/09/22 Distal;Dorsal (posterior); Right Forearm 1 day          Drain  Duration           External Urinary Catheter 5 days                      Imaging: Reviewed radiology reports from this admission including: abdominal/pelvic CT and ultrasound(s)    Recent Cultures (last 7 days):   Results from last 7 days   Lab Units 08/05/22  0422   URINE CULTURE  80,000-89,000 cfu/ml Escherichia coli*  10,000-19,000 cfu/ml Alpha Hemolytic Streptococcus NOT Enterococcus*       Last 24 Hours Medication List:   Current Facility-Administered Medications   Medication Dose Route Frequency Provider Last Rate    acetaminophen  650 mg Oral Q6H PRN Jennifer York PA-C      benzonatate  100 mg Oral TID PRN Michael Anderson PA-C      Diclofenac Sodium  2 g Topical BID Taryn Lowe PA-C      escitalopram  10 mg Oral Daily Martinez Segal MD      insulin glargine  45 Units Subcutaneous HS DARRELL Leger      insulin lispro  12 Units Subcutaneous TID With Meals [facility],       lactulose  20 g Oral TID [facility],       levothyroxine  125 mcg Oral Once per day on Mon Tue Wed Thu Fri Sat Jennifer York PA-C      levothyroxine  62 5 mcg Oral Once per day on Sun Jennifer York PA-C      lidocaine  1 patch Topical Daily Taryn Lowe PA-C      melatonin  6 mg Oral HS PRN Jennifer York PA-C      nystatin   Topical BID DARRELL Leger      ondansetron  4 mg Intravenous Q6H PRN Fernanda Dexter MD      pantoprazole  40 mg Oral Daily Before Breakfast Elvin Wright MD      pravastatin  20 mg Oral Daily With [place], [place]      rifaximin  550 mg Oral Q12H [address] Jennifer York PA-C      simethicone  80 mg Oral Q6H PRN Michael Anderson PA-C      spironolactone  50 mg Oral Daily Mary Ellen Yi MD          Today, Patient Was Seen By: DARRELL Leger    **Please Note: This note may have been constructed using a voice recognition system  **

## 2022-08-10 NOTE — ASSESSMENT & PLAN NOTE
Lab Results   Component Value Date    HGBA1C 8 2 (H) 05/31/2022       Recent Labs     08/09/22  1624 08/09/22  2104 08/10/22  0902 08/10/22  1108   POCGLU 251* 189* 263* 284*       Blood Sugar Average: Last 72 hrs:  (P) 91 [id number]     Patient has uncontrolled hyperglycemia  Increased Lantus to 45 units at bedtime and continue pre meal Humalog 12 units t i d

## 2022-08-10 NOTE — ASSESSMENT & PLAN NOTE
· Patient was admitted with hepatic encephalopathy was treated with lactulose and Xifaxan  · Improving though pt with some lethargy and confusion with vomiting today after getting Flexeril and Tramadol last evening  DIL states patient takes Tramadol at home without a problem but does not take Flexeril so suspect combination of the medications contributing to mental status today  · 8/10 - mentation improved, although patient is still slow to respond  Continue to hold flexeril and tramadol

## 2022-08-10 NOTE — ASSESSMENT & PLAN NOTE
· Urine microscopy with innumerable bacteria but only 2-4 wbc's  · Patient denies any urinary symptoms  · Monitor off abx for fever/dysuria/leukocytosis

## 2022-08-10 NOTE — ASSESSMENT & PLAN NOTE
· Newly diagnosed during recent admission  · 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% and abnormal diastolic function on 19/89 echo   · Repeat echo on 07/05 status post ppm:  LVEF 50 percent  G1 DD  · Was receiving IV lasix   On hold as of 8/10 secondary to hypotension and bump in renal function

## 2022-08-10 NOTE — CASE MANAGEMENT
Case Management Discharge Planning Note    Patient name Surendra Polanco  Location /-35 MRN [id number]  : 1945 Date 8/10/2022       Current Admission Date: 2022  Current Admission Diagnosis:Hepatic encephalopathy [facility])   Patient Active Problem List    Diagnosis Date Noted    Pyuria 2022    Urinary retention 2022    Skin breakdown 2022    Abnormal bone xray 2022    Healthcare maintenance 2022    Nausea 2022    Obesity (BMI 30-39 9) 2022    Acute blood loss anemia 2022    Closed traumatic displaced fracture of proximal end of left humerus 2022    Depression 2022    Cardiomyopathy ([facility] [address] ) 2022    Complete heart block ([facility] [address] ) 2022    Esophageal varices without bleeding ([facility] [address] ) 2021    Pancytopenia ([facility] [address] ) 2021    Iron deficiency anemia due to chronic blood loss 2021    Mild nonproliferative diabetic retinopathy of both eyes without macular edema associated with type 2 diabetes mellitus ([facility] [address] ) 2021    Urinary tract infection 09/15/2020    Hepatic encephalopathy ([facility] [address] ) 2020    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) ([facility] [address] ) 2020    Thrombocytopenia ([facility] [address] ) 2020    Hyperlipidemia 2018    Essential hypertension 2018    Acquired hypothyroidism 2018    Type 2 diabetes mellitus with hyperglycemia, with long-term current use of insulin ([facility] [address] ) 2018    Diabetic polyneuropathy associated with type 2 diabetes mellitus ([facility] [address] ) 2018      LOS (days): 5  Geometric Mean LOS (GMLOS) (days): 3 20  Days to GMLOS:-2 1     OBJECTIVE:  Risk of Unplanned Readmission Score: 26 76         Current admission status: Inpatient   Preferred Pharmacy:   [id number], [address]  [address]    [place] [address]  Phone: [phone] Fax: [phone]    [address] [place]  Phone: [phone] Fax: [phone]    Primary Care Provider: Jony Sands DO    Primary Insurance: MEDICARE  Secondary Insurance: [employer]    DISCHARGE DETAILS:       IMM Given (Date):: 08/10/22  IMM Given to[de-identified] Patient     Additional Comments: CM spoke to pt and pt's daughter in-law Maria T Mitchell to provide update with plan for dc to [facility] tomorrow; both are agreeable

## 2022-08-10 NOTE — PROGRESS NOTES
Progress note - [facility]   Joe Mobley 77 y o  female MRN: [id number]  Unit/Bed#: -01 Encounter: [id number]    ASSESSMENT and PLAN  1  PAUL cirrhosis with ascites and edema, low MELD     · Continue Lasix and Aldactone  Monitor kidney function  · Strict I&O's and daily weight  · 2g Na diet and fluid restriction-advanced to this as tolerated  · Trend LFTs and INR  -  attempted paracentesis yesterday was not done due to small amount of fluid  It is possible the diuretics have improve this  If it reaccumulates in the future I would consider diagnostic 1st paracentesis however this is not imperative for right now        2  Hepatic encephalopathy - improved     · Continue lactulose p o  And titrate to 3-4 bowel movements a day  · Continue Xifaxan  · Avoid narcotics  - Ammonia improved  Alert and oriented     3  Thrombocytopenia in the setting of cirrhosis     4  Chronic anemia, FOBT + but no overt GIB     · Monitor for overt GI bleeding  · S/p IV Iron  Hemoglobin currently stable at 7 2 no overt signs of GI bleed     5  H/o esophageal varices - no n/v/hematemesis at this time      · Eventually needs a repeat EGD, outpatient or inpatient if overt bleeding noted  · Continue nadolol     6  Type 2 DM      7  L humerus fracture        Chief Complaint   Patient presents with    Hyperglycemia - Symptomatic     Patient presents to the ED via EMS with c/o not feeling well, states fall two weeks ago and has residual back pain and left shoulder fx (seen at [facility] ED s/p fall)  States her sugars have been high, EMS report >300 en route, recent pacemaker placement         SUBJECTIVE/HPI   Complaining of pain the coccygeal area she says from the way she is sitting  No abdominal pain no nausea or vomiting today tolerating water without nausea and vomiting        /54   Pulse 64   Temp 98 7 °F (37 1 °C) (Oral)   Resp 18   Ht 5' 4" (1 626 m)   Wt 103 kg (226 lb 3 1 oz)   SpO2 95% BMI 38 83 kg/m²     PHYSICALEXAM  General appearance: alert, appears stated age and cooperative generally oriented  Eyes:  Normal no obvious icterus   Head: Normocephalic, without obvious abnormality, atraumatic  Abdomen: soft, distended I think there is a fluid wave   non-tender; bowel sounds normal; no masses,  no organomegaly  Extremities: extremities normal, atraumatic, no cyanosis or edema  Neurologic: Grossly normal    Lab Results   Component Value Date    GLUCOSE 280 (H) 08/04/2022    CALCIUM 8 8 08/10/2022    K 4 6 08/10/2022    CO2 31 08/10/2022     08/10/2022    BUN 30 (H) 08/10/2022    CREATININE 1 47 (H) 08/10/2022     Lab Results   Component Value Date    WBC 4 78 08/10/2022    HGB 7 6 (L) 08/10/2022    HCT 25 2 (L) 08/10/2022    MCV 93 08/10/2022    PLT 69 (L) 08/10/2022     Lab Results   Component Value Date    ALT 39 08/10/2022     (H) 08/10/2022    ALKPHOS 91 08/10/2022     No results found for: AMYLASE  No results found for: LIPASE  Lab Results   Component Value Date    IRON 190 (H) 08/05/2022    TIBC 413 08/05/2022    FERRITIN 20 08/05/2022     Lab Results   Component Value Date    INR 1 31 (H) 08/09/2022

## 2022-08-10 NOTE — ASSESSMENT & PLAN NOTE
Patient has liver cirrhosis due to PALU  Ultrasound shows ascites  8/09 attempted paracentesis - Insufficient fluid for safe paracentesis at this time    Hold IV Lasix 40 mg IV daily and spironolactone 50 mg daily in setting of hypotension and bumped creatinine  Will monitor renal function and electrolytes closely

## 2022-08-10 NOTE — DISCHARGE SUMMARY
[place]  Discharge- Dee Bucio 1945, 77 y o  female MRN: [id number]  Unit/Bed#: -01 Encounter: [id number]  Primary Care Provider: Em Stanley DO   Date and time admitted to hospital: 8/4/2022  6:45 PM    * Hepatic encephalopathy [facility])  Assessment & Plan  · Patient was admitted with hepatic encephalopathy, GI on board (see below)  · Ammonia 10   · Hold home tramadol and further doses of flexeril to avoid sedative effects  · 8/15: no lethargy, A&O x 4   · PT/OT recommending acute rehab   · CM on board, potential bed available at [facility] --> bed available, [place] today     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) ([facility] )  Assessment & Plan  · Patient has 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  Ultrasound shows ascites  · GI consult, appreciate recs  · 8/09 attempted paracentesis - Insufficient fluid for safe paracentesis  · Continue lactulose/rifampin   · Restarted on Lasix 40 mg p o  And spironolactone 25 mg p o  On 08/14, blood pressure and creatinine remains stable today  Continue upon discharge  · Cleared for discharge by GI, follow-up with outpatient GI in approximately 6 weeks     COVID-19  Assessment & Plan  · New non productive cough as of 8/10  Associated sore throat  Afebrile, no significant leukocytosis  · Covid positive 8/11  · CRP 24, D dimer 2 47  · Per COVID guidelines --> qualified for ACS heparin drip for [facility] however PLT/Hgb low, continue high dose SC heparin  · IV decadron x 10 days (initiated 8/11)  · IV remdesivir x 5 days  (initiated 8/11)  · Supportive care, wean O2 as able --> on RA at time of interview 97% while resting  · Resp protocol   · No further treatment required for COVID    Elevated serum creatinine  Assessment & Plan  · Cr 1 28 --> 1 5 --> 1 17 --> 1 02 today   · Likely secondary to acute episode hypotension + diuretics causing prerenal kidney injury    · Tolerating diuretics well     Pancytopenia [facility])  Assessment & Plan  · Patient has pancytopenia due to portal hypertension, liver cirrhosis  · She required blood transfusion during this hospital stay + IV iron (iron panel not reflective of deficiency)  · FOBT + however no signs of overt GI bleeding --> GI is aware   · Will monitor blood counts closely, continue to remain stable --> outpatient follow up with GI   · PLTs currently 53 (maintain >20 unless active bleeding)  · Hgb 8 (stable   · WBC 3    Cardiomyopathy ([facility] )  Assessment & Plan  · Newly diagnosed during recent admission  · 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% and abnormal diastolic function on 45/32 echo   · Repeat echo on 07/05 status post ppm:  LVEF 50 percent  G1 DD    · Restarted lasix 40 mg PO today + spironolactone 25 mg per GI     Depression  Assessment & Plan  · Home regimen: Lexapro 5 milligrams daily   · Patient was seen by geriatric medicine has SLB during recent admission and recommended increased to 10 mg daily if needed    Closed traumatic displaced fracture of proximal end of left humerus  Assessment & Plan  · Status post fall 6/26   · Managing conservatively with sling    Complete heart block ([facility] )  Assessment & Plan  · S/p permanent pacemaker 6/29 at [facility]  · Prior to placement patient required emergent transvenous pacing and did require vasopressors after placement    Esophageal var[facility] without bleeding [facility] )  Assessment & Plan  · S/P banding x2 in 02/2022  · Follows closely with GI  · No hematemesis, melena, or hematochezia reported   · BP stable   · Will need repeat EGD as outpatient    Type 2 diabetes mellitus with hyperglycemia, with long-term current use of insulin [facility])  Assessment & Plan  Lab Results   Component Value Date    HGBA1C 8 2 (H) 05/31/2022       Recent Labs     08/14/22  1555 08/14/22  2141 08/15/22  0849 08/15/22  1159   POCGLU 224* 284* 133 119       Blood Sugar Average: Last 72 hrs:  (P) 242 [id number]     · Patient has uncontrolled hyperglycemia  · Home regimen (hold)  · Metformin 500 BID  · Insulin lantus 38 units am and 10 units HS  · Humalog 15 units TID AC  · Inpatient regimen: Lantus 45 units at bedtime and pre meal Humalog 15 units t i d  --> some hyperglycemia on this regimen  · Resume prior home regimen upon discharge     Acquired hypothyroidism  Assessment & Plan  · Home regimen:  Levothyroxine 125 mcg daily except for Sundays 62 5 micrograms  · TSH elevated on admission, however free T4 1 31   · Continue levothyroxine at current dose  · Recommend recheck TSH in 6-8 weeks outpatient    Essential hypertension  Assessment & Plan  · Soft Bps throughout hospital stay with systolic   SBP currently > 110 today  · On lasix, spirinolactone, and lactulose previously  Continue lasix 40 and spironolactone 25 mg daily   · Received 1 dose of albumin 8/10  · BP stable 120s prior to DC      Medical Problems             Resolved Problems  Date Reviewed: 8/15/2022          Resolved    Pyuria 8/13/2022     Resolved by  Jonas Correia PA-C    Urinary retention 8/13/2022     Resolved by  Jonas Correia PA-C              Discharging Physician / Practitioner: Jonas Correia PA-C  PCP: Elissa Mccullough DO  Admission Date:   Admission Orders (From admission, onward)     Ordered        08/05/22 0534  Inpatient Admission  Once            08/04/22 2156  Place in Observation  Once                      Discharge Date: 08/15/22    Consultations During Hospital Stay:  · GI    Procedures Performed:   · 8/9 paracentesis-insufficient fluid for safe paracentesis    Significant Findings / Test Results:   · Ammonia 60 on arrival  · RUQ ultrasound-cirrhotic changes to the liver, new ascites    Incidental Findings:   · None     Test Results Pending at Discharge (will require follow up):    · None     Outpatient Tests Requested:  · Outpatient EGD   · CBC 1 week  · CMP 1 week   · TSH 6-8 weeks     Complications: None    Reason for Admission:  Encephalopathy    Hospital Course:   Roberto Reyes is a 77 y o  female patient with a PMH of cardiomyopathy, recent ppm 6/29 due to complete heart block, 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 complicated by esophageal varices, IDDM 2, pancytopenia, depression, hypothyroidismwho originally presented to the hospital on 8/4/2022 due to confusion x4 days  Day prior to arrival,, family reports that she was unable to take her insulin herself which she normally does  She was found to have an ammonia level of 60 on arrival and was evaluated by GI  Her lactulose was increased and her ammonia levels normalized along with her mentation  RUQ US revealed cirrhotic changes as well as new ascites for which the patient was initiated on IV lasix and spironolactone  Paracentesis was attempted, however was unable to be safely completed due to insufficient fluid  Diuretics were temporarily held due to increased Cr and hypotension, however these were restarted prior to discharge and both BP and Cr remained stable  Per GI the patient will be discharge on lasix 40 mg and spironolactone 25 mg daily with plan for outpatient EGD for esophageal varices surveillance  The patient should follow up with GI in 4-6 weeks  The patient incidentally was diagnosed with COVID 19 on 8/11 due to cough and 2L O2 requirement  The patient was treated with IV decadron and IV remdesivir with marked improvement in symptoms  Prior to discharge the patient remained with SpO2 97% on RA and was without mild residual cough  She denied additional physical complaints prior to discharge  All vitals remained stable  She was evaluated by PT/OT who recommended short-term rehab  She will be discharged to rehab at [facility] 8/15 in stable condition  Please see above list of diagnoses and related plan for additional information       Condition at Discharge: stable    Discharge Day Visit / Exam:   Subjective:  Patient reports she is eating well today, no additional complaints including chest pain, SOB, abdominal symptoms, or urinary symptoms  The patient continues with residual cough but is breathing comfortably on RA  Patient agreeable to rehab  Vitals: Blood Pressure: 124/58 (08/15/22 0846)  Pulse: 68 (08/15/22 0846)  Temperature: 98 1 °F (36 7 °C) (08/15/22 0846)  Temp Source: Oral (08/12/22 2250)  Respirations: 20 (08/15/22 0846)  Height: 5' 4" (162 6 cm) (08/04/22 1848)  Weight - Scale: 102 kg (225 lb) (08/15/22 0532)  SpO2: 95 % (08/15/22 0846)    Exam:   Physical Exam  Vitals and nursing note reviewed  Constitutional:       General: She is not in acute distress  Appearance: She is well-developed  She is obese  She is not ill-appearing  HENT:      Head: Normocephalic and atraumatic  Eyes:      General:         Right eye: No discharge  Left eye: No discharge  Extraocular Movements: Extraocular movements intact  Conjunctiva/sclera: Conjunctivae normal    Cardiovascular:      Rate and Rhythm: Normal rate and regular rhythm  Heart sounds: No murmur heard  Pulmonary:      Effort: Pulmonary effort is normal  No respiratory distress  Breath sounds: Rhonchi present  No wheezing or rales  Comments: Intermittent wet cough   Abdominal:      General: Bowel sounds are normal  There is no distension  Palpations: Abdomen is soft  Tenderness: There is no abdominal tenderness  Musculoskeletal:      Cervical back: Neck supple  Right lower leg: Edema present  Left lower leg: Edema present  Skin:     General: Skin is warm and dry  Neurological:      Mental Status: She is alert and oriented to person, place, and time  Psychiatric:         Mood and Affect: Mood normal          Behavior: Behavior normal           Discussion with Family: Updated  (daughter in law) at bedside      Discharge instructions/Information to patient and family:   See after visit summary for information provided to patient and family  Provisions for Follow-Up Care:  See after visit summary for information related to follow-up care and any pertinent home health orders  Disposition:   Acute Rehab at [address] [place] [address]     Planned Readmission: None     Discharge Statement:  I spent 60 minutes discharging the patient  This time was spent on the day of discharge  I had direct contact with the patient on the day of discharge  Greater than 50% of the total time was spent examining patient, answering all patient questions, arranging and discussing plan of care with patient as well as directly providing post-discharge instructions  Additional time then spent on discharge activities  Discharge Medications:  See after visit summary for reconciled discharge medications provided to patient and/or family        **Please Note: This note may have been constructed using a voice recognition system**

## 2022-08-11 ENCOUNTER — APPOINTMENT (INPATIENT)
Dept: RADIOLOGY | Facility: HOSPITAL | Age: 77
DRG: 441 | End: 2022-08-11
Payer: MEDICARE

## 2022-08-11 PROBLEM — R05.9 COUGH: Status: ACTIVE | Noted: 2022-08-11

## 2022-08-11 LAB
AMMONIA PLAS-SCNC: 12 UMOL/L (ref 11–35)
ANION GAP SERPL CALCULATED.3IONS-SCNC: 3 MMOL/L (ref 4–13)
BASOPHILS # BLD AUTO: 0.03 THOUSANDS/ΜL (ref 0–0.1)
BASOPHILS NFR BLD AUTO: 1 % (ref 0–1)
BUN SERPL-MCNC: 30 MG/DL (ref 5–25)
CALCIUM SERPL-MCNC: 9.2 MG/DL (ref 8.3–10.1)
CHLORIDE SERPL-SCNC: 99 MMOL/L (ref 96–108)
CO2 SERPL-SCNC: 31 MMOL/L (ref 21–32)
CREAT SERPL-MCNC: 1.5 MG/DL (ref 0.6–1.3)
CRP SERPL QL: 24 MG/L
D DIMER PPP FEU-MCNC: 2.47 UG/ML FEU
EOSINOPHIL # BLD AUTO: 0.04 THOUSAND/ΜL (ref 0–0.61)
EOSINOPHIL NFR BLD AUTO: 1 % (ref 0–6)
ERYTHROCYTE [DISTWIDTH] IN BLOOD BY AUTOMATED COUNT: 21.2 % (ref 11.6–15.1)
FLUAV RNA RESP QL NAA+PROBE: NEGATIVE
FLUBV RNA RESP QL NAA+PROBE: NEGATIVE
GFR SERPL CREATININE-BSD FRML MDRD: 33 ML/MIN/1.73SQ M
GLUCOSE SERPL-MCNC: 174 MG/DL (ref 65–140)
GLUCOSE SERPL-MCNC: 200 MG/DL (ref 65–140)
GLUCOSE SERPL-MCNC: 220 MG/DL (ref 65–140)
GLUCOSE SERPL-MCNC: 224 MG/DL (ref 65–140)
GLUCOSE SERPL-MCNC: 243 MG/DL (ref 65–140)
HCT VFR BLD AUTO: 26.1 % (ref 34.8–46.1)
HGB BLD-MCNC: 7.8 G/DL (ref 11.5–15.4)
IMM GRANULOCYTES # BLD AUTO: 0.04 THOUSAND/UL (ref 0–0.2)
IMM GRANULOCYTES NFR BLD AUTO: 1 % (ref 0–2)
LYMPHOCYTES # BLD AUTO: 0.58 THOUSANDS/ΜL (ref 0.6–4.47)
LYMPHOCYTES NFR BLD AUTO: 13 % (ref 14–44)
MCH RBC QN AUTO: 28.1 PG (ref 26.8–34.3)
MCHC RBC AUTO-ENTMCNC: 29.9 G/DL (ref 31.4–37.4)
MCV RBC AUTO: 94 FL (ref 82–98)
MONOCYTES # BLD AUTO: 0.53 THOUSAND/ΜL (ref 0.17–1.22)
MONOCYTES NFR BLD AUTO: 12 % (ref 4–12)
NEUTROPHILS # BLD AUTO: 3.16 THOUSANDS/ΜL (ref 1.85–7.62)
NEUTS SEG NFR BLD AUTO: 72 % (ref 43–75)
NRBC BLD AUTO-RTO: 0 /100 WBCS
NT-PROBNP SERPL-MCNC: 6088 PG/ML
PLATELET # BLD AUTO: 65 THOUSANDS/UL (ref 149–390)
PMV BLD AUTO: 14.2 FL (ref 8.9–12.7)
POTASSIUM SERPL-SCNC: 3.9 MMOL/L (ref 3.5–5.3)
RBC # BLD AUTO: 2.78 MILLION/UL (ref 3.81–5.12)
RSV RNA RESP QL NAA+PROBE: NEGATIVE
SARS-COV-2 RNA RESP QL NAA+PROBE: POSITIVE
SODIUM SERPL-SCNC: 133 MMOL/L (ref 135–147)
WBC # BLD AUTO: 4.38 THOUSAND/UL (ref 4.31–10.16)

## 2022-08-11 PROCEDURE — 83880 ASSAY OF NATRIURETIC PEPTIDE: CPT | Performed by: PHYSICIAN ASSISTANT

## 2022-08-11 PROCEDURE — XW033E5 INTRODUCTION OF REMDESIVIR ANTI-INFECTIVE INTO PERIPHERAL VEIN, PERCUTANEOUS APPROACH, NEW TECHNOLOGY GROUP 5: ICD-10-PCS | Performed by: INTERNAL MEDICINE

## 2022-08-11 PROCEDURE — 71045 X-RAY EXAM CHEST 1 VIEW: CPT

## 2022-08-11 PROCEDURE — 99232 SBSQ HOSP IP/OBS MODERATE 35: CPT | Performed by: PHYSICIAN ASSISTANT

## 2022-08-11 PROCEDURE — 85025 COMPLETE CBC W/AUTO DIFF WBC: CPT | Performed by: PHYSICIAN ASSISTANT

## 2022-08-11 PROCEDURE — 99232 SBSQ HOSP IP/OBS MODERATE 35: CPT | Performed by: INTERNAL MEDICINE

## 2022-08-11 PROCEDURE — 82140 ASSAY OF AMMONIA: CPT | Performed by: INTERNAL MEDICINE

## 2022-08-11 PROCEDURE — 80048 BASIC METABOLIC PNL TOTAL CA: CPT | Performed by: PHYSICIAN ASSISTANT

## 2022-08-11 PROCEDURE — 85379 FIBRIN DEGRADATION QUANT: CPT | Performed by: PHYSICIAN ASSISTANT

## 2022-08-11 PROCEDURE — 86140 C-REACTIVE PROTEIN: CPT | Performed by: PHYSICIAN ASSISTANT

## 2022-08-11 PROCEDURE — 0241U HB NFCT DS VIR RESP RNA 4 TRGT: CPT | Performed by: PHYSICIAN ASSISTANT

## 2022-08-11 PROCEDURE — 82948 REAGENT STRIP/BLOOD GLUCOSE: CPT

## 2022-08-11 RX ORDER — ENOXAPARIN SODIUM 100 MG/ML
30 INJECTION SUBCUTANEOUS EVERY 12 HOURS SCHEDULED
Status: DISCONTINUED | OUTPATIENT
Start: 2022-08-11 | End: 2022-08-12

## 2022-08-11 RX ORDER — INSULIN LISPRO 100 [IU]/ML
15 INJECTION, SOLUTION INTRAVENOUS; SUBCUTANEOUS
Status: DISCONTINUED | OUTPATIENT
Start: 2022-08-11 | End: 2022-08-15 | Stop reason: HOSPADM

## 2022-08-11 RX ORDER — DEXAMETHASONE SODIUM PHOSPHATE 4 MG/ML
6 INJECTION, SOLUTION INTRA-ARTICULAR; INTRALESIONAL; INTRAMUSCULAR; INTRAVENOUS; SOFT TISSUE EVERY 24 HOURS
Status: DISCONTINUED | OUTPATIENT
Start: 2022-08-11 | End: 2022-08-15 | Stop reason: HOSPADM

## 2022-08-11 RX ORDER — INSULIN LISPRO 100 [IU]/ML
1-5 INJECTION, SOLUTION INTRAVENOUS; SUBCUTANEOUS
Status: DISCONTINUED | OUTPATIENT
Start: 2022-08-11 | End: 2022-08-15 | Stop reason: HOSPADM

## 2022-08-11 RX ORDER — HEPARIN SODIUM 5000 [USP'U]/ML
5000 INJECTION, SOLUTION INTRAVENOUS; SUBCUTANEOUS EVERY 8 HOURS SCHEDULED
Status: DISCONTINUED | OUTPATIENT
Start: 2022-08-11 | End: 2022-08-11

## 2022-08-11 RX ADMIN — ENOXAPARIN SODIUM 30 MG: 30 INJECTION SUBCUTANEOUS at 14:09

## 2022-08-11 RX ADMIN — INSULIN GLARGINE 45 UNITS: 100 INJECTION, SOLUTION SUBCUTANEOUS at 22:25

## 2022-08-11 RX ADMIN — PANTOPRAZOLE SODIUM 40 MG: 40 TABLET, DELAYED RELEASE ORAL at 05:56

## 2022-08-11 RX ADMIN — LACTULOSE 20 G: 20 SOLUTION ORAL at 22:08

## 2022-08-11 RX ADMIN — LEVOTHYROXINE SODIUM 125 MCG: 25 TABLET ORAL at 05:56

## 2022-08-11 RX ADMIN — INSULIN LISPRO 15 UNITS: 100 INJECTION, SOLUTION INTRAVENOUS; SUBCUTANEOUS at 11:41

## 2022-08-11 RX ADMIN — NYSTATIN: 100000 POWDER TOPICAL at 08:27

## 2022-08-11 RX ADMIN — LIDOCAINE 5% 1 PATCH: 700 PATCH TOPICAL at 08:25

## 2022-08-11 RX ADMIN — ESCITALOPRAM OXALATE 10 MG: 10 TABLET ORAL at 08:25

## 2022-08-11 RX ADMIN — INSULIN LISPRO 15 UNITS: 100 INJECTION, SOLUTION INTRAVENOUS; SUBCUTANEOUS at 18:00

## 2022-08-11 RX ADMIN — PRAVASTATIN SODIUM 20 MG: 20 TABLET ORAL at 16:42

## 2022-08-11 RX ADMIN — ENOXAPARIN SODIUM 30 MG: 30 INJECTION SUBCUTANEOUS at 22:07

## 2022-08-11 RX ADMIN — RIFAXIMIN 550 MG: 550 TABLET ORAL at 22:07

## 2022-08-11 RX ADMIN — INSULIN LISPRO 2 UNITS: 100 INJECTION, SOLUTION INTRAVENOUS; SUBCUTANEOUS at 18:00

## 2022-08-11 RX ADMIN — DEXAMETHASONE SODIUM PHOSPHATE 6 MG: 4 INJECTION, SOLUTION INTRAMUSCULAR; INTRAVENOUS at 14:09

## 2022-08-11 RX ADMIN — INSULIN LISPRO 2 UNITS: 100 INJECTION, SOLUTION INTRAVENOUS; SUBCUTANEOUS at 22:25

## 2022-08-11 RX ADMIN — LACTULOSE 20 G: 20 SOLUTION ORAL at 08:25

## 2022-08-11 RX ADMIN — REMDESIVIR 200 MG: 100 INJECTION, POWDER, LYOPHILIZED, FOR SOLUTION INTRAVENOUS at 14:08

## 2022-08-11 RX ADMIN — NYSTATIN: 100000 POWDER TOPICAL at 18:02

## 2022-08-11 RX ADMIN — RIFAXIMIN 550 MG: 550 TABLET ORAL at 08:25

## 2022-08-11 RX ADMIN — LACTULOSE 20 G: 20 SOLUTION ORAL at 16:42

## 2022-08-11 RX ADMIN — INSULIN LISPRO 12 UNITS: 100 INJECTION, SOLUTION INTRAVENOUS; SUBCUTANEOUS at 08:26

## 2022-08-11 NOTE — PLAN OF CARE
Problem: Potential for Falls  Goal: Patient will remain free of falls  Description: INTERVENTIONS:  - Educate patient/family on patient safety including physical limitations  - Instruct patient to call for assistance with activity   - Consult OT/PT to assist with strengthening/mobility   - Keep Call bell within reach  - Keep bed low and locked with side rails adjusted as appropriate  - Keep care items and personal belongings within reach  - Initiate and maintain comfort rounds  - Make Fall Risk Sign visible to staff  - Offer Toileting every 2 Hours, in advance of need  - Initiate/Maintain bed/chair alarm  - Obtain necessary fall risk management equipment:  socks;bedside commode  - Apply yellow socks and bracelet for high fall risk patients  - Consider moving patient to room near nurses station  Outcome: Progressing     Problem: Prexisting or High Potential for Compromised Skin Integrity  Goal: Skin integrity is maintained or improved  Description: INTERVENTIONS:  - Identify patients at risk for skin breakdown  - Assess and monitor skin integrity  - Assess and monitor nutrition and hydration status  - Monitor labs   - Assess for incontinence   - Turn and reposition patient  - Assist with mobility/ambulation  - Relieve pressure over bony prominences  - Avoid friction and shearing  - Provide appropriate hygiene as needed including keeping skin clean and dry  - Evaluate need for skin moisturizer/barrier cream  - Collaborate with interdisciplinary team   - Patient/family teaching  - Consider wound care consult   Outcome: Progressing     Problem: MOBILITY - ADULT  Goal: Maintain or return to baseline ADL function  Description: INTERVENTIONS:  -  Assess patient's ability to carry out ADLs; assess patient's baseline for ADL function and identify physical deficits which impact ability to perform ADLs (bathing, care of mouth/teeth, toileting, grooming, dressing, etc )  - Assess/evaluate cause of self-care deficits   - Assess range of motion  - Assess patient's mobility; develop plan if impaired  - Assess patient's need for assistive devices and provide as appropriate  - Encourage maximum independence but intervene and supervise when necessary  - Involve family in performance of ADLs  - Assess for home care needs following discharge   - Consider OT consult to assist with ADL evaluation and planning for discharge  - Provide patient education as appropriate  Outcome: Progressing  Goal: Maintains/Returns to pre admission functional level  Description: INTERVENTIONS:  - Perform BMAT or MOVE assessment daily    - Set and communicate daily mobility goal to care team and patient/family/caregiver  - Collaborate with rehabilitation services on mobility goals if consulted  - Perform Range of Motion 3 times a day  - Reposition patient every 2 hours    - Dangle patient 3 times a day  - Stand patient 3 times a day  - Ambulate patient 3 times a day  - Out of bed to chair 3 times a day   - Out of bed for meals 3 times a day  - Out of bed for toileting  - Record patient progress and toleration of activity level   Outcome: Progressing     Problem: PAIN - ADULT  Goal: Verbalizes/displays adequate comfort level or baseline comfort level  Description: Interventions:  - Encourage patient to monitor pain and request assistance  - Assess pain using appropriate pain scale  - Administer analgesics based on type and severity of pain and evaluate response  - Implement non-pharmacological measures as appropriate and evaluate response  - Consider cultural and social influences on pain and pain management  - Notify physician/advanced practitioner if interventions unsuccessful or patient reports new pain  Outcome: Progressing     Problem: INFECTION - ADULT  Goal: Absence or prevention of progression during hospitalization  Description: INTERVENTIONS:  - Assess and monitor for signs and symptoms of infection  - Monitor lab/diagnostic results  - Monitor all insertion sites, i e  indwelling lines, tubes, and drains  - Monitor endotracheal if appropriate and nasal secretions for changes in amount and color  - [place] appropriate cooling/warming therapies per order  - Administer medications as ordered  - Instruct and encourage patient and family to use good hand hygiene technique  - Identify and instruct in appropriate isolation precautions for identified infection/condition  Outcome: Progressing  Goal: Absence of fever/infection during neutropenic period  Description: INTERVENTIONS:  - Monitor WBC    Outcome: Progressing     Problem: SAFETY ADULT  Goal: Patient will remain free of falls  Description: INTERVENTIONS:  - Educate patient/family on patient safety including physical limitations  - Instruct patient to call for assistance with activity   - Consult OT/PT to assist with strengthening/mobility   - Keep Call bell within reach  - Keep bed low and locked with side rails adjusted as appropriate  - Keep care items and personal belongings within reach  - Initiate and maintain comfort rounds  - Make Fall Risk Sign visible to staff  - Offer Toileting every 2 Hours, in advance of need  - Initiate/Maintain bed/chair alarm  - Obtain necessary fall risk management equipment:  socks;bedside commode  - Apply yellow socks and bracelet for high fall risk patients  - Consider moving patient to room near nurses station  Outcome: Progressing  Goal: Maintain or return to baseline ADL function  Description: INTERVENTIONS:  -  Assess patient's ability to carry out ADLs; assess patient's baseline for ADL function and identify physical deficits which impact ability to perform ADLs (bathing, care of mouth/teeth, toileting, grooming, dressing, etc )  - Assess/evaluate cause of self-care deficits   - Assess range of motion  - Assess patient's mobility; develop plan if impaired  - Assess patient's need for assistive devices and provide as appropriate  - Encourage maximum independence but intervene and supervise when necessary  - Involve family in performance of ADLs  - Assess for home care needs following discharge   - Consider OT consult to assist with ADL evaluation and planning for discharge  - Provide patient education as appropriate  Outcome: Progressing  Goal: Maintains/Returns to pre admission functional level  Description: INTERVENTIONS:  - Perform BMAT or MOVE assessment daily    - Set and communicate daily mobility goal to care team and patient/family/caregiver  - Collaborate with rehabilitation services on mobility goals if consulted  - Perform Range of Motion 3 times a day  - Reposition patient every 2 hours  - Dangle patient 3 times a day  - Stand patient 3 times a day  - Ambulate patient 3 times a day  - Out of bed to chair 3 times a day   - Out of bed for meals 3 times a day  - Out of bed for toileting  - Record patient progress and toleration of activity level   Outcome: Progressing     Problem: DISCHARGE PLANNING  Goal: Discharge to home or other facility with appropriate resources  Description: INTERVENTIONS:  - Identify barriers to discharge w/patient and caregiver  - Arrange for needed discharge resources and transportation as appropriate  - Identify discharge learning needs (meds, wound care, etc )  - Arrange for interpretive services to assist at discharge as needed  - Refer to Case Management Department for coordinating discharge planning if the patient needs post-hospital services based on physician/advanced practitioner order or complex needs related to functional status, cognitive ability, or social support system  Outcome: Progressing     Problem: Knowledge Deficit  Goal: Patient/family/caregiver demonstrates understanding of disease process, treatment plan, medications, and discharge instructions  Description: Complete learning assessment and assess knowledge base    Interventions:  - Provide teaching at level of understanding  - Provide teaching via preferred learning methods  Outcome: Progressing     Problem: METABOLIC, FLUID AND ELECTROLYTES - ADULT  Goal: Glucose maintained within target range  Description: INTERVENTIONS:  - Monitor Blood Glucose as ordered  - Assess for signs and symptoms of hyperglycemia and hypoglycemia  - Administer ordered medications to maintain glucose within target range  - Assess nutritional intake and initiate nutrition service referral as needed  Outcome: Progressing  Goal: Electrolytes maintained within normal limits  Description: INTERVENTIONS:  - Monitor labs and assess patient for signs and symptoms of electrolyte imbalances  - Administer electrolyte replacement as ordered  - Monitor response to electrolyte replacements, including repeat lab results as appropriate  - Instruct patient on fluid and nutrition as appropriate  Outcome: Progressing  Goal: Fluid balance maintained  Description: INTERVENTIONS:  - Monitor labs   - Monitor I/O and WT  - Instruct patient on fluid and nutrition as appropriate  - Assess for signs & symptoms of volume excess or deficit  Outcome: Progressing     Problem: GASTROINTESTINAL - ADULT  Goal: Minimal or absence of nausea and/or vomiting  Description: INTERVENTIONS:  - Administer IV fluids if ordered to ensure adequate hydration  - Maintain NPO status until nausea and vomiting are resolved  - Nasogastric tube if ordered  - Administer ordered antiemetic medications as needed  - Provide nonpharmacologic comfort measures as appropriate  - Advance diet as tolerated, if ordered  - Consider nutrition services referral to assist patient with adequate nutrition and appropriate food choices  Outcome: Progressing  Goal: Maintains or returns to baseline bowel function  Description: INTERVENTIONS:  - Assess bowel function  - Encourage oral fluids to ensure adequate hydration  - Administer IV fluids if ordered to ensure adequate hydration  - Administer ordered medications as needed  - Encourage mobilization and activity  - Consider nutritional services referral to assist patient with adequate nutrition and appropriate food choices  Outcome: Progressing  Goal: Maintains adequate nutritional intake  Description: INTERVENTIONS:  - Monitor percentage of each meal consumed  - Identify factors contributing to decreased intake, treat as appropriate  - Assist with meals as needed  - Monitor I&O, weight, and lab values if indicated  - Obtain nutrition services referral as needed  Outcome: Progressing     Problem: SKIN/TISSUE INTEGRITY - ADULT  Goal: Skin Integrity remains intact(Skin Breakdown Prevention)  Description: Assess:  -Perform Conrad assessment  -Clean and moisturize skin   -Inspect skin when repositioning, toileting, and assisting with ADLS  -Assess under medical devices   -Assess extremities for adequate circulation and sensation     Bed Management:  -Have minimal linens on bed & keep smooth, unwrinkled  -Change linens as needed when moist or perspiring  -Avoid sitting or lying in one position for more than 2 hours while in bed  -Keep HOB at 30 degrees     Toileting:  -Offer bedside commode  -Assess for incontinence  -Use incontinent care products after each incontinent episode     Activity:  -Mobilize patient 3 times a day  -Encourage activity and walks on unit  -Encourage or provide ROM exercises   -Turn and reposition patient every 2 Hours  -Use appropriate equipment to lift or move patient in bed  -Instruct/ Assist with weight shifting when out of bed in chair  -Consider limitation of chair time 2 hour intervals    Skin Care:  -Avoid use of baby powder, tape, friction and shearing, hot water or constrictive clothing  -Relieve pressure over bony prominences   -Do not massage red bony areas    Next Steps:  -Teach patient strategies to minimize risks   -Consider consults to  interdisciplinary teams   Outcome: Progressing     Problem: HEMATOLOGIC - ADULT  Goal: Maintains hematologic stability  Description: INTERVENTIONS  - Assess for signs and symptoms of bleeding or hemorrhage  - Monitor labs  - Administer supportive blood products/factors as ordered and appropriate  Outcome: Progressing     Problem: MUSCULOSKELETAL - ADULT  Goal: Maintain or return mobility to safest level of function  Description: INTERVENTIONS:  - Assess patient's ability to carry out ADLs; assess patient's baseline for ADL function and identify physical deficits which impact ability to perform ADLs (bathing, care of mouth/teeth, toileting, grooming, dressing, etc )  - Assess/evaluate cause of self-care deficits   - Assess range of motion  - Assess patient's mobility  - Assess patient's need for assistive devices and provide as appropriate  - Encourage maximum independence but intervene and supervise when necessary  - Involve family in performance of ADLs  - Assess for home care needs following discharge   - Consider OT consult to assist with ADL evaluation and planning for discharge  - Provide patient education as appropriate  Outcome: Progressing     Problem: Nutrition/Hydration-ADULT  Goal: Nutrient/Hydration intake appropriate for improving, restoring or maintaining nutritional needs  Description: Monitor and assess patient's nutrition/hydration status for malnutrition  Collaborate with interdisciplinary team and initiate plan and interventions as ordered  Monitor patient's weight and dietary intake as ordered or per policy  Utilize nutrition screening tool and intervene as necessary  Determine patient's food preferences and provide high-protein, high-caloric foods as appropriate       INTERVENTIONS:  - Monitor oral intake, urinary output, labs, and treatment plans  - Assess nutrition and hydration status and recommend course of action  - Evaluate amount of meals eaten  - Assist patient with eating if necessary   - Allow adequate time for meals  - Recommend/ encourage appropriate diets, oral nutritional supplements, and vitamin/mineral supplements  - Order, calculate, and assess calorie counts as needed  - Recommend, monitor, and adjust tube feedings and TPN/PPN based on assessed needs  - Assess need for intravenous fluids  - Provide specific nutrition/hydration education as appropriate  - Include patient/family/caregiver in decisions related to nutrition  Outcome: Progressing

## 2022-08-11 NOTE — ASSESSMENT & PLAN NOTE
· Patient was straight cathed for 500 cc on arrival to the floor   · Urinary retention protocol  · Urine culture with [id number] CFU/mL E   Coli - likely contaminant  · Monitor off of abx for fevers / dysuria

## 2022-08-11 NOTE — ASSESSMENT & PLAN NOTE
New non productive cough as of 8/10, worse today  Associated sore throat  Remains afebrile off of abx, no significant leukocytosis  Suspect possible aspiration pneumonitis as she had an episode of lethargy/stupor after tramadol/flexeril on 8/9    Will check COVID  CXR ordered

## 2022-08-11 NOTE — ASSESSMENT & PLAN NOTE
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

## 2022-08-11 NOTE — ASSESSMENT & PLAN NOTE
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

## 2022-08-11 NOTE — ASSESSMENT & PLAN NOTE
· Patient was admitted with hepatic encephalopathy was treated with lactulose and Xifaxan  · Improving though pt with some lethargy and confusion with vomiting today after getting Flexeril and Tramadol last evening  DIL states patient takes Tramadol at home without a problem but does not take Flexeril so suspect combination of the medications contributing to mental status today  · 8/10 - mentation improved, although patient is still slow to respond  Continue to hold flexeril and tramadol  · 8/11 - Mentation continues to improve  She is AAOx2 to person and place  Responds quicker than yesterday

## 2022-08-11 NOTE — PROGRESS NOTES
Progress note - Gastroenterology   Ernesto Gonzalez 77 y o  female MRN: [id number]  Unit/Bed#: -01 Encounter: [id number]    ASSESSMENT and PLAN  1  PAUL cirrhosis with ascites and edema, low MELD  New ascites on CT scan on admission however unable to perform paracentesis 8/9 due to insufficient fluid   Diuretics held yesterday due to increasing creatinine and systolic BP in the 02Z  Her pressure is stable today at 112/60   LFT stable  Meld 14  · Lasix and Aldactone on hold   Monitor kidney function-nephrology consulted  · 2g Na diet and fluid restrictriction  · Trend LFTs and INR      2 Hepatic encephalopathy - improved  · Continue lactulose p o  T i d  And titrate to 3-4 bowel movements a day  · Continue Xifaxan  · Avoid narcotics  - Ammonia improved   Alert and oriented       3  Chronic anemia  FOBT + but no overt GIB  · Monitor for overt GI bleeding  · S/p IV Iron  Hemoglobin currently stable at 7 2 no overt signs of GI bleed     4 H/o esophageal varices - no n/v/hematemesis at this time      · Eventually needs a repeat EGD, outpatient or inpatient if overt bleeding noted  · Continue nadolol     5  Type 2 DM      6  L humerus fracture     Chief Complaint   Patient presents with    Hyperglycemia - Symptomatic     Patient presents to the ED via EMS with c/o not feeling well, states fall two weeks ago and has residual back pain and left shoulder fx (seen at [facility] s/p fall)  States her sugars have been high, EMS report >300 en route, recent pacemaker placement         SUBJECTIVE/HPI   Denies abdominal pain, no nausea or vomiting  Decreased appetite but able to tolerate small amount p o    Reports multiple loose stools yesterday, unable to quantify  She is oriented x3, slow to respond to question  LFT stable yesterday-AST elevated at 122, normal ALT, normal alkaline phosphatase, total bilirubin 1 20  Ammonia level normal        /59 (BP Location: Right arm)   Pulse 86   Temp 98 5 °F (36 9 °C) (Oral)   Resp 17   Ht 5' 4" (1 626 m)   Wt 79 5 kg (175 lb 4 3 oz)   SpO2 94%   BMI 30 08 kg/m²     PHYSICALEXAM  General appearance:  Out of bed to chair  She is oriented x3 and conversing appropriate but responds slowly  Eyes: no icterus   Head: Normocephalic, without obvious abnormality, atraumatic  Lungs: clear to auscultation bilaterally  Heart: regular rate and rhythm, S1, S2 normal, no murmur, click, rub or gallop  Abdomen: soft, non-tender; bowel sounds norml, reports multiple loose stools yesterday    Denies rectal bleeding  Extremities: extremities normal, atraumatic, no cyanosis or edema  Neurologic: Grossly normal, + asterixis right hand    Lab Results   Component Value Date    GLUCOSE 280 (H) 08/04/2022    CALCIUM 8 8 08/10/2022    K 4 6 08/10/2022    CO2 31 08/10/2022     08/10/2022    BUN 30 (H) 08/10/2022    CREATININE 1 47 (H) 08/10/2022     Lab Results   Component Value Date    WBC 4 78 08/10/2022    HGB 7 6 (L) 08/10/2022    HCT 25 2 (L) 08/10/2022    MCV 93 08/10/2022    PLT 69 (L) 08/10/2022     Lab Results   Component Value Date    ALT 39 08/10/2022     (H) 08/10/2022    ALKPHOS 91 08/10/2022       Lab Results   Component Value Date    IRON 190 (H) 08/05/2022    TIBC 413 08/05/2022    FERRITIN 20 08/05/2022     Lab Results   Component Value Date    INR 1 31 (H) 08/09/2022

## 2022-08-11 NOTE — PROGRESS NOTES
[place]  Progress Note Ángel Wolf 1945, 77 y o  female MRN: [id number]  Unit/Bed#: -01 Encounter: [id number]  Primary Care Provider: Jony Sands DO   Date and time admitted to hospital: 8/4/2022  6:45 PM    * Hepatic encephalopathy [facility])  Assessment & Plan  · Patient was admitted with hepatic encephalopathy was treated with lactulose and Xifaxan  · Improving though pt with some lethargy and confusion with vomiting today after getting Flexeril and Tramadol last evening  DIL states patient takes Tramadol at home without a problem but does not take Flexeril so suspect combination of the medications contributing to mental status today  · 8/10 - mentation improved, although patient is still slow to respond  Continue to hold flexeril and tramadol  · 8/11 - Mentation continues to improve  She is AAOx2 to person and place  Responds quicker than yesterday  Cough  Assessment & Plan  New non productive cough as of 8/10, worse today  Associated sore throat  Remains afebrile off of abx, no significant leukocytosis  Suspect possible aspiration pneumonitis as she had an episode of lethargy/stupor after tramadol/flexeril on 8/9  Will check COVID  CXR ordered    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) [facility])  Assessment & Plan  Patient has 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  Ultrasound shows ascites  8/09 attempted paracentesis - Insufficient fluid for safe paracentesis at this time  Hold IV Lasix 40 mg IV daily and spironolactone 50 mg daily in setting of hypotension and bumped creatinine  Will monitor renal function and electrolytes closely   Difficult stick - awaiting this am's BMP    Esophageal varices without bleeding ([facility] [address] )  Assessment & Plan  · Banding x2 in 02/2022  · Follows closely with GI  · No hematemesis, melena, or hematochezia reported   · BP stable   · Will need repeat EGD as outpatient    Urinary retention  Assessment & Plan  · Patient was straight cathed for 500 cc on arrival to the floor   · Urinary retention protocol  · Urine culture with [id number] CFU/mL E  Coli - likely contaminant  · Monitor off of abx for fevers / dysuria    Pyuria  Assessment & Plan  · Urine microscopy with innumerable bacteria but only 2-4 wbc's  · Patient denies any urinary symptoms  · Monitor off abx for fever/dysuria/leukocytosis    Cardiomyopathy [facility])  Assessment & Plan  · Newly diagnosed during recent admission  · 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% and abnormal diastolic function on 15/53 echo   · Repeat echo on 07/05 status post ppm:  LVEF 50 percent  G1 DD  · Was receiving IV lasix  On hold as of 8/10 secondary to hypotension and bump in renal function  Depression  Assessment & Plan  · Home regimen: Lexapro 5 milligrams daily   · Patient was seen by geriatric medicine has SLB during recent admission and recommended increased to 10 mg daily if needed    Closed traumatic displaced fracture of proximal end of left humerus  Assessment & Plan  · Status post fall 6/26   · Managing conservatively with sling    Complete heart block ([address] )  Assessment & Plan  · S/p permanent pacemaker 6/29 at [facility]  · Prior to placement patient required emergent transvenous pacing and did require vasopressors after placement    Pancytopenia ([address] )  Assessment & Plan  · Patient has pancytopenia due to portal hypertension, liver cirrhosis  · She required blood transfusion during this hospital stay  · She had no signs of GI bleeding    · Will monitor blood counts closely    Type 2 diabetes mellitus with hyperglycemia, with long-term current use of insulin [facility])  Assessment & Plan  Lab Results   Component Value Date    HGBA1C 8 2 (H) 05/31/2022       Recent Labs     08/10/22  1629 08/10/22  2151 08/11/22  0702 08/11/22  1042   POCGLU 220* 161* 174* 224*       Blood Sugar Average: Last 72 hrs:  (P) 677 [id number]     Patient has uncontrolled hyperglycemia  Increased Lantus to 45 units at bedtime and continue pre meal Humalog 15 units t i d     Acquired hypothyroidism  Assessment & Plan  · Home regimen:  Levothyroxine 125 mcg daily except for Sundays 62 5 micrograms  · TSH elevated on admission, however free T4 1 31   · Continue levothyroxine at current dose  · Recommend recheck TSH in 6-8 weeks outpatient    Essential hypertension  Assessment & Plan  · Soft Bps throughout hospital stay with systolic   · On lasix, spirinolactone, and lactulose  Currently lasix and spirinolactone on hold  · Received 1 dose of albumin yesterday  · Soft Bps likely due to cirrhotic physiology, may benefit from midodrine in adjunct to her diuretics and lactulose  VTE Pharmacologic Prophylaxis: VTE Score: 4 Moderate Risk (Score 3-4) - Pharmacological DVT Prophylaxis Ordered: heparin  Patient Centered Rounds: I performed bedside rounds with nursing staff today  Discussions with Specialists or Other Care Team Provider: GI    Education and Discussions with Family / Patient: Updated  (daughter in law) via phone  Time Spent for Care: 45 minutes  More than 50% of total time spent on counseling and coordination of care as described above  Current Length of Stay: 6 day(s)  Current Patient Status: Inpatient   Certification Statement: The patient will continue to require additional inpatient hospital stay due to weakness, working up new cough, monitoring renal function  Discharge Plan: Anticipate discharge in 24-48 hrs to discharge location to be determined pending rehab evaluations  Code Status: Level 1 - Full Code    Subjective:   Pt complains of a sore throat and cough, non productive  Continues with weakness, reports she does not feel at her baseline       Objective:     Vitals:   Temp (24hrs), Av 3 °F (36 8 °C), Min:98 1 °F (36 7 °C), Max:98 5 °F (36 9 °C)    Temp:  [98 1 °F (36 7 °C)-98 5 °F (36 9 °C)] 98 5 °F (36 9 °C)  HR:  [72-86] 86  Resp:  [17] 17  BP: (101-112)/(53-59) 112/59  SpO2:  [94 %-99 %] 94 %  Body mass index is 30 08 kg/m²  Input and Output Summary (last 24 hours): Intake/Output Summary (Last 24 hours) at 8/11/2022 1117  Last data filed at 8/11/2022 0826  Gross per 24 hour   Intake 180 ml   Output --   Net 180 ml       Physical Exam:   Physical Exam  Vitals and nursing note reviewed  Constitutional:       Appearance: Normal appearance  HENT:      Head: Normocephalic and atraumatic  Mouth/Throat:      Mouth: Mucous membranes are moist       Pharynx: Oropharynx is clear  No oropharyngeal exudate  Eyes:      Extraocular Movements: Extraocular movements intact  Cardiovascular:      Rate and Rhythm: Normal rate and regular rhythm  Pulses: Normal pulses  Heart sounds: Normal heart sounds  No murmur heard  No friction rub  No gallop  Pulmonary:      Effort: Pulmonary effort is normal  No respiratory distress  Breath sounds: Normal breath sounds  No stridor  No wheezing or rales  Abdominal:      General: Abdomen is flat  Bowel sounds are normal  There is no distension  Palpations: Abdomen is soft  Tenderness: There is no abdominal tenderness  Musculoskeletal:      Right lower leg: Edema present  Left lower leg: Edema present  Skin:     General: Skin is warm and dry  Neurological:      General: No focal deficit present  Mental Status: She is alert and oriented to person, place, and time           Additional Data:     Labs:  Results from last 7 days   Lab Units 08/11/22  1033   WBC Thousand/uL 4 38   HEMOGLOBIN g/dL 7 8*   HEMATOCRIT % 26 1*   PLATELETS Thousands/uL 65*   NEUTROS PCT % 72   LYMPHS PCT % 13*   MONOS PCT % 12   EOS PCT % 1     Results from last 7 days   Lab Units 08/10/22  0543   SODIUM mmol/L 137   POTASSIUM mmol/L 4 6   CHLORIDE mmol/L 101   CO2 mmol/L 31   BUN mg/dL 30*   CREATININE mg/dL 1 47*   ANION GAP mmol/L 5   CALCIUM mg/dL 8 8 ALBUMIN g/dL 2 5*   TOTAL BILIRUBIN mg/dL 1 20*   ALK PHOS U/L 91   ALT U/L 39   AST U/L 122*   GLUCOSE RANDOM mg/dL 180*     Results from last 7 days   Lab Units 08/09/22  0629   INR  1 31*     Results from last 7 days   Lab Units 08/11/22  1042 08/11/22  0702 08/10/22  2151 08/10/22  1629 08/10/22  1108 08/10/22  0902 08/09/22  2104 08/09/22  1624 08/09/22  1127 08/09/22  0728 08/08/22  2045 08/08/22  1603   POC GLUCOSE mg/dl 224* 174* 161* 220* 284* 263* 189* 251* 283* 317* 353* 361*               Lines/Drains:  Invasive Devices  Report    Peripheral Intravenous Line  Duration           Peripheral IV 08/09/22 Distal;Dorsal (posterior); Right Forearm 2 days                      Imaging: No pertinent imaging reviewed      Recent Cultures (last 7 days):   Results from last 7 days   Lab Units 08/05/22  0422   URINE CULTURE  80,000-89,000 cfu/ml Escherichia coli*  10,000-19,000 cfu/ml Alpha Hemolytic Streptococcus NOT Enterococcus*       Last 24 Hours Medication List:   Current Facility-Administered Medications   Medication Dose Route Frequency Provider Last Rate    acetaminophen  650 mg Oral Q6H PRN Reginaldo Parker PA-C      benzonatate  100 mg Oral TID PRN Trevor Trejo PA-C      escitalopram  10 mg Oral Daily Wyatt Arias MD      insulin glargine  45 Units Subcutaneous HS Adilene Richard PA-C      insulin lispro  15 Units Subcutaneous TID With [address] [place], [place] [address]DARRELL      lactulose  20 g Oral TID Andre Pun, DO      levothyroxine  125 mcg Oral Once per day on Mon Tue Wed Thu Fri Sat Reginaldo Parker PA-C      levothyroxine  62 5 mcg Oral Once per day on Sun Reginaldo Parker PA-C      lidocaine  1 patch Topical Daily Taryn Lowe PA-C      melatonin  6 mg Oral HS PRN Reginaldo Parker PA-C      nystatin   Topical BID Adilene Richard PA-C      ondansetron  4 mg Intravenous Q6H PRN Yan Caicedo MD      pantoprazole  40 mg Oral Daily Before Jenniferjemima Little MD     [facility] pravastatin  20 mg Oral Daily With [place], [place]      rifaximin  550 mg Oral Q12H [address] Don[place] DARRELL Tavares      simethicone  80 mg Oral Q6H PRN Gloria Otoole PA-C          Today, Patient Was Seen By: Jacinta Nogueira PA-C    **Please Note: This note may have been constructed using a voice recognition system  **

## 2022-08-11 NOTE — ASSESSMENT & PLAN NOTE
· S/p permanent pacemaker 6/29 at [facility]  · Prior to placement patient required emergent transvenous pacing and did require vasopressors after placement

## 2022-08-11 NOTE — ASSESSMENT & PLAN NOTE
Lab Results   Component Value Date    HGBA1C 8 2 (H) 05/31/2022       Recent Labs     08/10/22  1629 08/10/22  2151 08/11/22  0702 08/11/22  1042   POCGLU 220* 161* 174* 224*       Blood Sugar Average: Last 72 hrs:  (P) 556 [id number]     Patient has uncontrolled hyperglycemia  Increased Lantus to 45 units at bedtime and continue pre meal Humalog 15 units t i d

## 2022-08-11 NOTE — ASSESSMENT & PLAN NOTE
· Soft Bps throughout hospital stay with systolic   · On lasix, spirinolactone, and lactulose  Currently lasix and spirinolactone on hold  · Received 1 dose of albumin yesterday  · Soft Bps likely due to cirrhotic physiology, may benefit from midodrine in adjunct to her diuretics and lactulose

## 2022-08-11 NOTE — QUICK NOTE
Patient's COVID test resulted positive  As she is on 2 L since 08/09 and cough started on 08/10, suspect that she is on day 2 of her symptoms  As she is requiring oxygen, she will be started on IV steroids and remdesivir  Her CXR demonstrated a small to moderate right-sided pleural effusion  Her BMP also resulted with creatinine remaining a 1 5, continue to hold diuretics  Nephrology consult if renal function continues to deteriorate

## 2022-08-11 NOTE — CASE MANAGEMENT
Case Management Progress Note    Patient name Laura Olivas  Location /-22 MRN [id number]  : 1945 Date 2022       LOS (days): 6  Geometric Mean LOS (GMLOS) (days): 3 20  Days to GMLOS:-3 2        OBJECTIVE:        Current admission status: Inpatient  Preferred Pharmacy:   [id number], [address] [facility] , [place], [place]  [address]  [facility]   Phone: [phone] Fax: [phone]    [facility], [address] [place] [address] Drive [address] [address]  Phone: [phone] Fax: [phone]    Primary Care Provider: Marjan Maya DO    Primary Insurance: MEDICARE  Secondary Insurance: CIGNA    PROGRESS NOTE:    CM was informed that pt is COVID positive  CM spoke to Mayte at [facility] who states they're no longer able to accept as pt is positive  CM called pt's daughter in-law Jean Scottinion to discuss additional facilities; Jeanque Chavezinion is agreeable to a blanket referral to SNF's that are accepting COVID patients  Referrals sent to all [facility] [address], [address], and [facility]  PAST SURGICAL HISTORY:  No significant past surgical history

## 2022-08-12 PROBLEM — R79.89 ELEVATED SERUM CREATININE: Status: ACTIVE | Noted: 2022-08-12

## 2022-08-12 PROBLEM — U07.1 COVID-19: Status: ACTIVE | Noted: 2022-08-11

## 2022-08-12 PROBLEM — N17.9 AKI (ACUTE KIDNEY INJURY) (HCC): Status: ACTIVE | Noted: 2022-08-12

## 2022-08-12 LAB
AMMONIA PLAS-SCNC: 10 UMOL/L (ref 11–35)
ANION GAP SERPL CALCULATED.3IONS-SCNC: 6 MMOL/L (ref 4–13)
BASOPHILS # BLD AUTO: 0.01 THOUSANDS/ΜL (ref 0–0.1)
BASOPHILS NFR BLD AUTO: 0 % (ref 0–1)
BUN SERPL-MCNC: 29 MG/DL (ref 5–25)
CALCIUM SERPL-MCNC: 9 MG/DL (ref 8.3–10.1)
CHLORIDE SERPL-SCNC: 98 MMOL/L (ref 96–108)
CO2 SERPL-SCNC: 28 MMOL/L (ref 21–32)
CREAT SERPL-MCNC: 1.33 MG/DL (ref 0.6–1.3)
CRP SERPL QL: 24.2 MG/L
EOSINOPHIL # BLD AUTO: 0 THOUSAND/ΜL (ref 0–0.61)
EOSINOPHIL NFR BLD AUTO: 0 % (ref 0–6)
ERYTHROCYTE [DISTWIDTH] IN BLOOD BY AUTOMATED COUNT: 21.4 % (ref 11.6–15.1)
GFR SERPL CREATININE-BSD FRML MDRD: 39 ML/MIN/1.73SQ M
GLUCOSE SERPL-MCNC: 202 MG/DL (ref 65–140)
GLUCOSE SERPL-MCNC: 226 MG/DL (ref 65–140)
GLUCOSE SERPL-MCNC: 251 MG/DL (ref 65–140)
GLUCOSE SERPL-MCNC: 357 MG/DL (ref 65–140)
HCT VFR BLD AUTO: 24.8 % (ref 34.8–46.1)
HGB BLD-MCNC: 7.4 G/DL (ref 11.5–15.4)
IMM GRANULOCYTES # BLD AUTO: 0.02 THOUSAND/UL (ref 0–0.2)
IMM GRANULOCYTES NFR BLD AUTO: 1 % (ref 0–2)
LYMPHOCYTES # BLD AUTO: 0.42 THOUSANDS/ΜL (ref 0.6–4.47)
LYMPHOCYTES NFR BLD AUTO: 14 % (ref 14–44)
MCH RBC QN AUTO: 28.2 PG (ref 26.8–34.3)
MCHC RBC AUTO-ENTMCNC: 29.8 G/DL (ref 31.4–37.4)
MCV RBC AUTO: 95 FL (ref 82–98)
MONOCYTES # BLD AUTO: 0.24 THOUSAND/ΜL (ref 0.17–1.22)
MONOCYTES NFR BLD AUTO: 8 % (ref 4–12)
NEUTROPHILS # BLD AUTO: 2.34 THOUSANDS/ΜL (ref 1.85–7.62)
NEUTS SEG NFR BLD AUTO: 77 % (ref 43–75)
NRBC BLD AUTO-RTO: 0 /100 WBCS
PLATELET # BLD AUTO: 59 THOUSANDS/UL (ref 149–390)
PMV BLD AUTO: 13.1 FL (ref 8.9–12.7)
POTASSIUM SERPL-SCNC: 3.8 MMOL/L (ref 3.5–5.3)
RBC # BLD AUTO: 2.62 MILLION/UL (ref 3.81–5.12)
SODIUM SERPL-SCNC: 132 MMOL/L (ref 135–147)
WBC # BLD AUTO: 3.03 THOUSAND/UL (ref 4.31–10.16)

## 2022-08-12 PROCEDURE — 85025 COMPLETE CBC W/AUTO DIFF WBC: CPT | Performed by: INTERNAL MEDICINE

## 2022-08-12 PROCEDURE — 97110 THERAPEUTIC EXERCISES: CPT

## 2022-08-12 PROCEDURE — 94760 N-INVAS EAR/PLS OXIMETRY 1: CPT

## 2022-08-12 PROCEDURE — 86140 C-REACTIVE PROTEIN: CPT | Performed by: INTERNAL MEDICINE

## 2022-08-12 PROCEDURE — 99232 SBSQ HOSP IP/OBS MODERATE 35: CPT | Performed by: INTERNAL MEDICINE

## 2022-08-12 PROCEDURE — 80048 BASIC METABOLIC PNL TOTAL CA: CPT | Performed by: INTERNAL MEDICINE

## 2022-08-12 PROCEDURE — 82948 REAGENT STRIP/BLOOD GLUCOSE: CPT

## 2022-08-12 PROCEDURE — 82140 ASSAY OF AMMONIA: CPT | Performed by: INTERNAL MEDICINE

## 2022-08-12 PROCEDURE — 94664 DEMO&/EVAL PT USE INHALER: CPT

## 2022-08-12 RX ORDER — HEPARIN SODIUM 5000 [USP'U]/ML
7500 INJECTION, SOLUTION INTRAVENOUS; SUBCUTANEOUS EVERY 8 HOURS SCHEDULED
Status: DISCONTINUED | OUTPATIENT
Start: 2022-08-12 | End: 2022-08-12

## 2022-08-12 RX ORDER — HEPARIN SODIUM 10000 [USP'U]/100ML
3-20 INJECTION, SOLUTION INTRAVENOUS
Status: DISCONTINUED | OUTPATIENT
Start: 2022-08-12 | End: 2022-08-12

## 2022-08-12 RX ORDER — HEPARIN SODIUM 1000 [USP'U]/ML
4000 INJECTION, SOLUTION INTRAVENOUS; SUBCUTANEOUS
Status: DISCONTINUED | OUTPATIENT
Start: 2022-08-12 | End: 2022-08-12

## 2022-08-12 RX ORDER — GUAIFENESIN 600 MG/1
600 TABLET, EXTENDED RELEASE ORAL EVERY 12 HOURS SCHEDULED
Status: DISCONTINUED | OUTPATIENT
Start: 2022-08-12 | End: 2022-08-15 | Stop reason: HOSPADM

## 2022-08-12 RX ORDER — HEPARIN SODIUM 5000 [USP'U]/ML
7500 INJECTION, SOLUTION INTRAVENOUS; SUBCUTANEOUS EVERY 8 HOURS SCHEDULED
Status: DISCONTINUED | OUTPATIENT
Start: 2022-08-12 | End: 2022-08-15 | Stop reason: HOSPADM

## 2022-08-12 RX ORDER — HEPARIN SODIUM 1000 [USP'U]/ML
4000 INJECTION, SOLUTION INTRAVENOUS; SUBCUTANEOUS ONCE
Status: COMPLETED | OUTPATIENT
Start: 2022-08-12 | End: 2022-08-12

## 2022-08-12 RX ORDER — HEPARIN SODIUM 1000 [USP'U]/ML
2000 INJECTION, SOLUTION INTRAVENOUS; SUBCUTANEOUS
Status: DISCONTINUED | OUTPATIENT
Start: 2022-08-12 | End: 2022-08-12

## 2022-08-12 RX ADMIN — NYSTATIN: 100000 POWDER TOPICAL at 09:21

## 2022-08-12 RX ADMIN — INSULIN LISPRO 15 UNITS: 100 INJECTION, SOLUTION INTRAVENOUS; SUBCUTANEOUS at 18:11

## 2022-08-12 RX ADMIN — ESCITALOPRAM OXALATE 10 MG: 10 TABLET ORAL at 09:19

## 2022-08-12 RX ADMIN — INSULIN LISPRO 1 UNITS: 100 INJECTION, SOLUTION INTRAVENOUS; SUBCUTANEOUS at 09:21

## 2022-08-12 RX ADMIN — LACTULOSE 20 G: 20 SOLUTION ORAL at 17:00

## 2022-08-12 RX ADMIN — INSULIN LISPRO 15 UNITS: 100 INJECTION, SOLUTION INTRAVENOUS; SUBCUTANEOUS at 09:21

## 2022-08-12 RX ADMIN — ENOXAPARIN SODIUM 30 MG: 30 INJECTION SUBCUTANEOUS at 09:21

## 2022-08-12 RX ADMIN — HEPARIN SODIUM 7500 UNITS: 5000 INJECTION INTRAVENOUS; SUBCUTANEOUS at 22:42

## 2022-08-12 RX ADMIN — REMDESIVIR 100 MG: 100 INJECTION, POWDER, LYOPHILIZED, FOR SOLUTION INTRAVENOUS at 11:54

## 2022-08-12 RX ADMIN — DEXAMETHASONE SODIUM PHOSPHATE 6 MG: 4 INJECTION, SOLUTION INTRAMUSCULAR; INTRAVENOUS at 11:40

## 2022-08-12 RX ADMIN — INSULIN LISPRO 4 UNITS: 100 INJECTION, SOLUTION INTRAVENOUS; SUBCUTANEOUS at 18:10

## 2022-08-12 RX ADMIN — RIFAXIMIN 550 MG: 550 TABLET ORAL at 09:21

## 2022-08-12 RX ADMIN — INSULIN LISPRO 2 UNITS: 100 INJECTION, SOLUTION INTRAVENOUS; SUBCUTANEOUS at 22:45

## 2022-08-12 RX ADMIN — INSULIN GLARGINE 45 UNITS: 100 INJECTION, SOLUTION SUBCUTANEOUS at 22:42

## 2022-08-12 RX ADMIN — NYSTATIN: 100000 POWDER TOPICAL at 18:12

## 2022-08-12 RX ADMIN — INSULIN LISPRO 15 UNITS: 100 INJECTION, SOLUTION INTRAVENOUS; SUBCUTANEOUS at 11:45

## 2022-08-12 RX ADMIN — GUAIFENESIN 600 MG: 600 TABLET ORAL at 14:36

## 2022-08-12 RX ADMIN — PRAVASTATIN SODIUM 20 MG: 20 TABLET ORAL at 17:00

## 2022-08-12 RX ADMIN — LEVOTHYROXINE SODIUM 125 MCG: 25 TABLET ORAL at 06:20

## 2022-08-12 RX ADMIN — HEPARIN SODIUM 11.1 UNITS/KG/HR: 10000 INJECTION, SOLUTION INTRAVENOUS at 14:34

## 2022-08-12 RX ADMIN — RIFAXIMIN 550 MG: 550 TABLET ORAL at 21:00

## 2022-08-12 RX ADMIN — GUAIFENESIN 600 MG: 600 TABLET ORAL at 21:00

## 2022-08-12 RX ADMIN — INSULIN LISPRO 2 UNITS: 100 INJECTION, SOLUTION INTRAVENOUS; SUBCUTANEOUS at 11:45

## 2022-08-12 RX ADMIN — LACTULOSE 20 G: 20 SOLUTION ORAL at 21:00

## 2022-08-12 RX ADMIN — PANTOPRAZOLE SODIUM 40 MG: 40 TABLET, DELAYED RELEASE ORAL at 06:20

## 2022-08-12 RX ADMIN — HEPARIN SODIUM 4000 UNITS: 1000 INJECTION INTRAVENOUS; SUBCUTANEOUS at 14:36

## 2022-08-12 RX ADMIN — LIDOCAINE 5% 1 PATCH: 700 PATCH TOPICAL at 09:19

## 2022-08-12 NOTE — ASSESSMENT & PLAN NOTE
· New non productive cough as of 8/10  Associated sore throat  Afebrile, no significant leukocytosis    · Covid positive 8/11, requiring 2L currently  · CRP 24, D dimer 2 47  · Per COVID guidelines --> ACS heparin drip for AC  · IV decadron x 10 days (initiated 8/11)  · IV remdesivir x 5 days  (initiated 8/11)  · Supportive care, wean O2 as able   · Resp protocol   · Patient appears very fatigued, continue inpatient care currently

## 2022-08-12 NOTE — CASE MANAGEMENT
Case Management Progress Note    Patient name Elena Aguilar  Location /-75 MRN [id number]  : 1945 Date 2022       LOS (days): 7  Geometric Mean LOS (GMLOS) (days): 3 20  Days to GMLOS:-4        OBJECTIVE:        Current admission status: Inpatient  Preferred Pharmacy:   [id number], [address] [place], [place]  [address]  [address] [place] [address]  Phone: [phone] Fax: [phone] [place] [address] Drive [address] [place] [address]  Phone: [phone] Fax: [phone]    Primary Care Provider: Jere Marrufo DO    Primary Insurance: MEDICARE  Secondary Insurance: CIGNA    PROGRESS NOTE: Vm left for pts dtr-in-law Abida Ordaz regarding a bed for [facility] being open for a tentative DC   CM waiting for return call regarding acceptance of bed

## 2022-08-12 NOTE — ASSESSMENT & PLAN NOTE
· Patient was straight cathed for 500 cc on arrival to the floor   · Urinary retention protocol  · Urine culture with [id number] CFU/mL E   Coli - likely contaminant  · Monitor off of abx for fevers / dysuria

## 2022-08-12 NOTE — PHYSICAL THERAPY NOTE
PHYSICAL THERAPY NOTE         08/12/22 1007   PT Last Visit   PT Visit Date 08/12/22   Note Type   Note Type Treatment   Pain Assessment   Pain Assessment Tool 0-10   Pain Score No Pain   Restrictions/Precautions   LUE Weight Bearing Per Order NWB   Braces or Orthoses Sling  (LUE)   General   Chart Reviewed Yes   Additional Pertinent History pt now COVID (+)   Family/Caregiver Present No   Cognition   Overall Cognitive Status Impaired   Arousal/Participation Cooperative   Attention Within functional limits   Orientation Level Oriented to person;Oriented to place   Memory Decreased long term memory;Decreased recall of biographical information;Decreased short term memory;Decreased recall of recent events;Decreased recall of precautions   Following Commands Follows one step commands with increased time or repetition   Bed Mobility   Additional Comments pt OOB in recliner chair at start and end of session   Transfers   Sit to Stand 4  Minimal assistance   Additional items Assist x 2; Increased time required;Verbal cues   Stand to Sit 4  Minimal assistance   Additional items Assist x 1; Armrests; Increased time required;Verbal cues   Ambulation/Elevation   Gait pattern Decreased foot clearance; Wide SUGAR; Decreased R stance;Decreased L stance; Short stride; Excessively slow   Gait Assistance 4  Minimal assist   Additional items Assist x 1   Assistive Device   (RUE support)   Distance 3ft forward/backward walking 3x;  min A for balance with RUE support;  pt with SOB and fatigue;  SpO2 >90% on 2LO2  Balance   Static Sitting Fair   Dynamic Sitting Fair -   Static Standing Fair -   Dynamic Standing Fair -   Ambulatory Fair -   Activity Tolerance   Activity Tolerance Patient limited by fatigue   Medical Staff Made Aware OT June   Nurse Made Aware Vannessa   Exercises   Knee AROM Long Arc Quad Sitting;10 reps;AROM; Bilateral   Ankle Pumps Sitting;10 reps;AROM; Bilateral   Marching Standing;5 reps;AAROM; Bilateral  (RUE on RW, min A for balance)   Assessment   Prognosis Guarded   Problem List Decreased strength;Decreased range of motion;Decreased endurance; Impaired balance;Decreased mobility; Decreased coordination;Decreased cognition;Decreased safety awareness; Impaired judgement;Obesity; Decreased skin integrity;Pain;Orthopedic restrictions   Assessment Treatment consisted of balance training, ambulation training, safety awareness, fall prevention, endurance training to increase upright positions and functional mobility  Pt appears fatigued but able to participate with encouragement; Pt educated on and performed seated HEP as above; Pt able to increase standing tolerance this session; pt continues to require physical assist for all mobility and unable to care for self or ambulate household distances; Pt would benefit from continued PT while in hospital and follow up with inpt rehab at [place] to increase strength, balance, endurance, mobility independence to return to [facility], decrease caregiver burden and improve quality of life  The patient's AM-PAC Basic Mobility Inpatient Short Form Raw Score is 11  A Raw score of less than or equal to 17 suggests the patient may benefit from discharge to post-acute rehabilitation services  Please also refer to the recommendation of the Physical Therapist for safe discharge planning  Barriers to Discharge Decreased caregiver support; Inaccessible home environment   Goals   Patient Goals to feel better   STG Expiration Date 08/19/22   Plan   Treatment/Interventions Functional transfer training;ADL retraining;Elevations;LE strengthening/ROM; Therapeutic exercise; Endurance training;Cognitive reorientation;Patient/family training;Equipment eval/education; Bed mobility;Gait training; Compensatory technique education;Continued evaluation;Spoke to nursing;OT   PT Frequency 3-5x/wk   Recommendation   PT Discharge Recommendation Post acute rehabilitation services   AM-PAC Basic Mobility Inpatient   Turning in Bed Without Bedrails 2   Lying on Back to Sitting on Edge of Flat Bed 2   Moving Bed to Chair 2   Standing Up From Chair 2   Walk in Room 2   Climb 3-5 Stairs 1   Basic Mobility Inpatient Raw Score 11   Basic Mobility Standardized Score 30 25   Turning Head Towards Sound 4   Follow Simple Instructions 3   Low Function Basic Mobility Raw Score 18   Low Function Basic Mobility Standardized Score 29 25   Highest Level Of Mobility   JH-HLM Goal 4: Move to chair/commode   -HLM Achieved 4: Move to chair/commode       Patricia Drummond PT      Patient Name: Ernesto Gonzalez  YRRTD'I Date: 8/12/2022

## 2022-08-12 NOTE — RESPIRATORY THERAPY NOTE
RT Protocol Note  Livia Person 77 y o  female MRN: [id number]  Unit/Bed#: -01 Encounter: [id number]    Assessment    Principal Problem:    Hepatic encephalopathy ([facility] [address] )  Active Problems:    Essential hypertension    Acquired hypothyroidism    Type 2 diabetes mellitus with hyperglycemia, with long-term current use of insulin (HCC)    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) (HCC)    Pancytopenia (HCC)    Esophageal varices without bleeding (HCC)    Complete heart block (HCC)    Closed traumatic displaced fracture of proximal end of left humerus    Depression    Cardiomyopathy ([facility] [address] )    Pyuria    Urinary retention    COVID-19    Elevated serum creatinine      Home Pulmonary Medications:  none       Past Medical History:   Diagnosis Date    Anemia     Cirrhosis (HCC)     Diabetic neuropathy (HCC)     Esophageal varices (HCC)     Fatty liver     GERD (gastroesophageal reflux disease)     Hepatic encephalopathy (HCC)     Hiatal hernia     Hyperlipidemia     Hypertension     Hypoglycemia     Hypothyroidism     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)     Osteoarthritis     Osteopenia     Pancytopenia (HCC)     Thrombocytopenia (HCC)     Type 2 diabetes mellitus (HCC)      Social History     Socioeconomic History    Marital status:       Spouse name: None    Number of children: None    Years of education: None    Highest education level: None   Occupational History    None   Tobacco Use    Smoking status: Never Smoker    Smokeless tobacco: Never Used   Vaping Use    Vaping Use: Never used   Substance and Sexual Activity    Alcohol use: Not Currently    Drug use: No    Sexual activity: Not Currently   Other Topics Concern    None   Social History Narrative    None     Social Determinants of Health     Financial Resource Strain: Not on file   Food Insecurity: No Food Insecurity    Worried About [employer] [employer] in the Last Year: Never true    Ran Out of Food in the Last Year: Never true   Transportation Needs: No Transportation Needs    Lack of Transportation (Medical): No    Lack of Transportation (Non-Medical): No   Physical Activity: Not on file   Stress: Not on file   Social Connections: Not on file   Intimate Partner Violence: Not on file   Housing Stability: Low Risk     Unable to Pay for Housing in the Last Year: No    Number of Places Lived in the Last Year: 1    Unstable Housing in the Last Year: No       Subjective         Objective    Physical Exam:   Assessment Type: Assess only  General Appearance: Alert, Awake  Respiratory Pattern: Normal  Chest Assessment: Chest expansion symmetrical  Bilateral Breath Sounds: Diminished, Clear  Cough: Non-productive    Vitals:  Blood pressure 112/60, pulse 65, temperature 98 2 °F (36 8 °C), resp  rate 16, height 5' 4" (1 626 m), weight 104 kg (229 lb 4 5 oz), SpO2 96 %, not currently breastfeeding  Imaging and other studies: I have personally reviewed pertinent reports              Plan    Respiratory Plan: No distress/Pulmonary history

## 2022-08-12 NOTE — ASSESSMENT & PLAN NOTE
· Cr 1 28 --> 1 5 --> 1 3  · Likely secondary to acute episode hypotension + diuretics causing prerenal kidney injury    · No need for nephro consult currently as Cr is down-trending   · Continue to hold spironolactone and lasix  · Monitor BMP, Avoid hypotension   · Retention protocol

## 2022-08-12 NOTE — ASSESSMENT & PLAN NOTE
· Urine microscopy with innumerable bacteria but only 2-4 wbc's  · Urine culture: [id number] CFU/mL E   Coli - likely contaminant  · Patient denies any urinary symptoms  · Monitor off abx for fever/dysuria/leukocytosis

## 2022-08-12 NOTE — ASSESSMENT & PLAN NOTE
· S/P banding x2 in 02/2022  · Follows closely with GI  · No hematemesis, melena, or hematochezia reported   · BP stable   · Will need repeat EGD as outpatient

## 2022-08-12 NOTE — ASSESSMENT & PLAN NOTE
· Patient has 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  Ultrasound shows ascites  · GI consult, appreciate recs  · 8/09 attempted paracentesis - Insufficient fluid for safe paracentesis at this time    · Continue lactulose/rifampin   · Hold IV Lasix 40 mg daily and spironolactone 50 mg daily in setting of hypotension and increased creatinine  · Will monitor renal function and electrolytes closely

## 2022-08-12 NOTE — OCCUPATIONAL THERAPY NOTE
Occupational Therapy Tx Note     Patient Name: Deangelo SHAW Date: 8/12/2022  Problem List  Principal Problem:    Hepatic encephalopathy ([facility] )  Active Problems:    Essential hypertension    Acquired hypothyroidism    Type 2 diabetes mellitus with hyperglycemia, with long-term current use of insulin ([facility] )    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) (HCC)    Pancytopenia (HCC)    Esophageal varices without bleeding (HCC)    Complete heart block ([facility] )    Closed traumatic displaced fracture of proximal end of left humerus    Depression    Cardiomyopathy ([facility] )    Pyuria    Urinary retention    Cough            08/12/22 1006   OT Last Visit   OT Visit Date 08/12/22   Note Type   Note Type Treatment   Restrictions/Precautions   Weight Bearing Precautions Per Order Yes   LUE Weight Bearing Per Order NWB   Braces or Orthoses Sling  (LUE)   Other Precautions Chair Alarm;Cognitive; Airborne/isolation;Contact/isolation;O2;Fall Risk  (1 5 L O2, SpO2 remained >90% throughout session)   Pain Assessment   Pain Assessment Tool 0-10   Pain Score No Pain   ADL   Grooming Assistance 4  Minimal Assistance   Grooming Deficit Wash/dry hands; Wash/dry face  (Min A for thoroughness of washing face)   Toileting Comments Pt denies all other ADL needs this session   Bed Mobility   Additional Comments Pt received OOB to recliner & remains in recliner at end of session   Transfers   Sit to Stand 4  Minimal assistance   Additional items Assist x 2; Increased time required;Verbal cues;Armrests   Stand to Sit 4  Minimal assistance   Additional items Assist x 1; Increased time required;Verbal cues;Armrests   Additional Comments Pt able to perform forward/backward stepping 3x with Min Ax1 & assist to manage RW   Therapeutic Exercise - ROM   UE-ROM   (Shoulder flexion to RUE 1x12, pt counting out loud & VC's for encouragement to complete FROM)   Therapeutic Excerise-Strength   UE Strength   (4 oz container for bicep curls to RUE, 1x10, VC's to count out loud)   Cognition   Overall Cognitive Status Impaired   Arousal/Participation Alert   Attention Within functional limits   Orientation Level Oriented to person;Oriented to place   Memory Decreased recall of precautions;Decreased recall of recent events   Following Commands Follows one step commands with increased time or repetition   Additional Activities   Additional Activities Comments Pt additionally performed weightshifting/standing marches in stance with RW & Min Ax1 for CGA  Pt performed ankle pumps & encouraged to perform ankle pumps by spelling the alphabet throughout day to decrease swelling in BLE   Activity Tolerance   Activity Tolerance Patient limited by fatigue   Medical Staff Made Aware PT HILDA Odonnell Vannessa   Assessment   Assessment Pt seen for OT tx session with focus on functional balance, functional mobility, ADL status, and transfer safety  Patient agreeable to OT treatment session  Pt received seated OOB to Recliner  Pt required max encouragement & education to participate in therapy today  Pt performed seated BLE exercises of ankle pumps & knee extension/flexions  Pt performed seated RUE exercises of shoulder flexion AROM & bicep curls with 4 oz  container  Pt required verbal cues for technique during therex  Pt required Min Ax2 for initial sit > stand but progressed to [address] [place] [address] for remainder of txfers during session  Pt performed grooming with Min Ax1 for thoroughness of washing face  Pt declines any additional ADL needs  SpO2 remained >90% throughout session  Patient continues to be functioning below baseline level, occupational performance remains limited secondary to factors listed above, and pt at increased risk for falls and injury  The patient's raw score on the AM-PAC Daily Activity inpatient short form is 15, standardized score is 34 69, less than 39 4  Patients at this level are likely to benefit from DC to post-acute rehabilitation services  Please refer to the recommendation of the Occupational Therapist for safe DC planning  From OT standpoint, recommendation at time of D/C would be Short Term Rehab  Patient to benefit from continued Occupational Therapy treatment while in the hospital to address deficits as defined above and maximize level of functional independence with ADLs and functional mobility  Pt left seated OOB to Recliner with call bell in reach, tray table in reach, needs met, chair alarm activated, pt declining use of SCD's, RN informed  Plan   Treatment Interventions ADL retraining;Functional transfer training;Patient/family training; Endurance training; Compensatory technique education   Goal Expiration Date 08/15/22   OT Treatment Day 1   OT Frequency 3-5x/wk   Recommendation   OT Discharge Recommendation Post acute rehabilitation services   AM-PAC Daily Activity Inpatient   Lower Body Dressing 2   Bathing 2   Toileting 2   Upper Body Dressing 2   Grooming 3   Eating 4   Daily Activity Raw Score 15   Daily Activity Standardized Score (Calc for Raw Score >=11) 34 69   AM-PAC Applied Cognition Inpatient   Following a Speech/Presentation 4   Understanding Ordinary Conversation 4   Taking Medications 3   Remembering Where Things Are Placed or Put Away 3   Remembering List of 4-5 Errands 3   Taking Care of Complicated Tasks 2   Applied Cognition Raw Score 19   Applied Cognition Standardized Score 39 77     Leidy Sullivan, OTR/L

## 2022-08-12 NOTE — PROGRESS NOTES
[place]  Progress Note Connie Tate 1945, 77 y o  female MRN: [id number]  Unit/Bed#: -01 Encounter: [id number]  Primary Care Provider: Carol Funes DO   Date and time admitted to hospital: 8/4/2022  6:45 PM    * Hepatic encephalopathy [facility])  Assessment & Plan  · Patient was admitted with hepatic encephalopathy, GI on board (see below)  · Ammonia 10   · Hold home tramadol and further doses of flexeril to avoid sedative effects  · 8/11 - Mentation continues to improve  She is AAOx2 to person and place  Responds quicker than yesterday  · 8/12 - Patient oriented to self, time, place however is slow to respond, intermittently confused, lethargic   · PT/OT recommending acute rehab     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) ([facility] )  Assessment & Plan  · Patient has 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  Ultrasound shows ascites  · GI consult, appreciate recs  · 8/09 attempted paracentesis - Insufficient fluid for safe paracentesis at this time  · Continue lactulose/rifampin   · Hold IV Lasix 40 mg daily and spironolactone 50 mg daily in setting of hypotension and increased creatinine  · Will monitor renal function and electrolytes closely    COVID-19  Assessment & Plan  · New non productive cough as of 8/10  Associated sore throat  Afebrile, no significant leukocytosis  · Covid positive 8/11, requiring 2L currently  · CRP 24, D dimer 2 47  · Per COVID guidelines --> ACS heparin drip for AC  · IV decadron x 10 days (initiated 8/11)  · IV remdesivir x 5 days  (initiated 8/11)  · Supportive care, wean O2 as able   · Resp protocol   · Patient appears very fatigued, continue inpatient care currently    Elevated serum creatinine  Assessment & Plan  · Cr 1 28 --> 1 5 --> 1 3  · Likely secondary to acute episode hypotension + diuretics causing prerenal kidney injury    · No need for nephro consult currently as Cr is down-trending   · Continue to hold spironolactone and lasix  · Monitor BMP, Avoid hypotension   · Retention protocol    Pancytopenia ([facility] )  Assessment & Plan  · Patient has pancytopenia due to portal hypertension, liver cirrhosis  · She required blood transfusion during this hospital stay + IV iron (iron panel not reflective of deficiency)  · FOBT + however no signs of overt GI bleeding --> GI is aware   · Will monitor blood counts closely  · PLTs currently 53 (maintain >20 unless active bleeding)  · Hgb 7 4 (maintain > 7)  · WBC 3    Urinary retention  Assessment & Plan  · Patient was straight cathed for 500 cc on arrival to the floor   · Urinary retention protocol  · Urine culture with 80-18798 CFU/mL E  Coli - likely contaminant  · Monitor off of abx for fevers / dysuria    Pyuria  Assessment & Plan  · Urine microscopy with innumerable bacteria but only 2-4 wbc's  · Urine culture: 80-14917 CFU/mL E  Coli - likely contaminant  · Patient denies any urinary symptoms  · Monitor off abx for fever/dysuria/leukocytosis    Cardiomyopathy [facility])  Assessment & Plan  · Newly diagnosed during recent admission  · 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% and abnormal diastolic function on 73/37 echo   · Repeat echo on 07/05 status post ppm:  LVEF 50 percent  G1 DD  · Was receiving IV lasix  On hold as of 8/10 secondary to hypotension and bump in renal function      Depression  Assessment & Plan  · Home regimen: Lexapro 5 milligrams daily   · Patient was seen by geriatric medicine has SLB during recent admission and recommended increased to 10 mg daily if needed    Closed traumatic displaced fracture of proximal end of left humerus  Assessment & Plan  · Status post fall 6/26   · Managing conservatively with sling    Complete heart block ([facility] )  Assessment & Plan  · S/p permanent pacemaker 6/29 [facility]  · Prior to placement patient required emergent transvenous pacing and did require vasopressors after placement    Esophageal varices without bleeding ([facility] [address] )  Assessment & Plan  · S/P banding x2 in 02/2022  · Follows closely with GI  · No hematemesis, melena, or hematochezia reported   · BP stable   · Will need repeat EGD as outpatient    Type 2 diabetes mellitus with hyperglycemia, with long-term current use of insulin [facility])  Assessment & Plan  Lab Results   Component Value Date    HGBA1C 8 2 (H) 05/31/2022       Recent Labs     08/11/22  1642 08/11/22  2211 08/12/22  0804 08/12/22  1143   POCGLU 220* 243* 202* 251*       Blood Sugar Average: Last 72 hrs:  (P) 234 [id number]     · Patient has uncontrolled hyperglycemia  · Home regimen (hold)  · Metformin 500 BID  · Insulin lantus 38 units am and 10 units HS  · Humalog 15 units TID AC  · Inpatient regimen: Lantus 45 units at bedtime and pre meal Humalog 15 units t i d     Acquired hypothyroidism  Assessment & Plan  · Home regimen:  Levothyroxine 125 mcg daily except for Sundays 62 5 micrograms  · TSH elevated on admission, however free T4 1 31   · Continue levothyroxine at current dose  · Recommend recheck TSH in 6-8 weeks outpatient    Essential hypertension  Assessment & Plan  · Soft Bps throughout hospital stay with systolic   · On lasix, spirinolactone, and lactulose previously  Currently lasix and spirinolactone on hold  · Received 1 dose of albumin 8/10  · Soft Bps likely due to cirrhotic physiology, may benefit from midodrine in adjunct to her diuretics and lactulose  VTE Pharmacologic Prophylaxis: VTE Score: 4 Moderate Risk (Score 3-4) - Pharmacological DVT Prophylaxis Ordered: enoxaparin (Lovenox)  Patient Centered Rounds: I performed bedside rounds with nursing staff today  Discussions with Specialists or Other Care Team Provider: None     Education and Discussions with Family / Patient: Attempted to update  (daughter in law) via phone  Left voicemail  Time Spent for Care: 30 minutes   More than 50% of total time spent on counseling and coordination of care as described above  Current Length of Stay: 7 day(s)  Current Patient Status: Inpatient   Certification Statement: The patient will continue to require additional inpatient hospital stay due to IV medications  Discharge Plan: Anticipate discharge in 24-48 hrs to rehab facility  Code Status: Level 1 - Full Code    Subjective:   Patient appears very fatigued, dozes off between questions  Originally states place is  but self corrected to [facility]  States month/year is 2022  Knows self and birthdate  Denies all physical complaint  Objective:     Vitals:   Temp (24hrs), Av 2 °F (36 8 °C), Min:98 2 °F (36 8 °C), Max:98 2 °F (36 8 °C)    Temp:  [98 2 °F (36 8 °C)] 98 2 °F (36 8 °C)  HR:  [65-84] 65  Resp:  [16] 16  BP: (107-112)/(50-60) 112/60  SpO2:  [97 %-98 %] 97 %  Body mass index is 39 36 kg/m²  Input and Output Summary (last 24 hours):   No intake or output data in the 24 hours ending 22 1357    Physical Exam:   Physical Exam  Vitals and nursing note reviewed  Constitutional:       General: She is not in acute distress  Appearance: She is well-developed  She is obese  She is ill-appearing  HENT:      Head: Normocephalic and atraumatic  Eyes:      General:         Right eye: No discharge  Left eye: No discharge  Extraocular Movements: Extraocular movements intact  Conjunctiva/sclera: Conjunctivae normal    Cardiovascular:      Rate and Rhythm: Normal rate and regular rhythm  Heart sounds: No murmur heard  Pulmonary:      Effort: Pulmonary effort is normal  No respiratory distress  Breath sounds: Rhonchi present  No wheezing or rales  Abdominal:      General: Bowel sounds are normal  There is no distension  Palpations: Abdomen is soft  Tenderness: There is no abdominal tenderness  Musculoskeletal:      Cervical back: Neck supple  Right lower leg: Edema present  Left lower leg: Edema present  Skin:     General: Skin is warm and dry  Neurological:      Mental Status: She is alert  She is disoriented  Comments: Intermittent confusion, lethargic   Psychiatric:         Mood and Affect: Mood normal          Behavior: Behavior normal           Additional Data:     Labs:  Results from last 7 days   Lab Units 08/12/22  0337   WBC Thousand/uL 3 03*   HEMOGLOBIN g/dL 7 4*   HEMATOCRIT % 24 8*   PLATELETS Thousands/uL 59*   NEUTROS PCT % 77*   LYMPHS PCT % 14   MONOS PCT % 8   EOS PCT % 0     Results from last 7 days   Lab Units 08/12/22  0337 08/11/22  1145 08/10/22  0543   SODIUM mmol/L 132*   < > 137   POTASSIUM mmol/L 3 8   < > 4 6   CHLORIDE mmol/L 98   < > 101   CO2 mmol/L 28   < > 31   BUN mg/dL 29*   < > 30*   CREATININE mg/dL 1 33*   < > 1 47*   ANION GAP mmol/L 6   < > 5   CALCIUM mg/dL 9 0   < > 8 8   ALBUMIN g/dL  --   --  2 5*   TOTAL BILIRUBIN mg/dL  --   --  1 20*   ALK PHOS U/L  --   --  91   ALT U/L  --   --  39   AST U/L  --   --  122*   GLUCOSE RANDOM mg/dL 226*   < > 180*    < > = values in this interval not displayed       Results from last 7 days   Lab Units 08/09/22  0629   INR  1 31*     Results from last 7 days   Lab Units 08/12/22  1143 08/12/22  0804 08/11/22  2211 08/11/22  1642 08/11/22  1042 08/11/22  0702 08/10/22  2151 08/10/22  1629 08/10/22  1108 08/10/22  0902 08/09/22  2104 08/09/22  1624   POC GLUCOSE mg/dl 251* 202* 243* 220* 224* 174* 161* 220* 284* 263* 189* 251*               Lines/Drains:  Invasive Devices  Report    Peripheral Intravenous Line  Duration           Peripheral IV 08/11/22 Proximal;Right;Ventral (anterior) Antecubital <1 day                      Imaging: Reviewed radiology reports from this admission including: chest xray    Recent Cultures (last 7 days):         Last 24 Hours Medication List:   Current Facility-Administered Medications   Medication Dose Route Frequency Provider Last Rate    acetaminophen  650 mg Oral Q6H PRN Stuart Owusu PA-C  benzonatate  100 mg Oral TID PRN Angelindira Mays PA-C      dexamethasone  6 mg Intravenous Q24H [place]      enoxaparin  30 mg Subcutaneous Q12H [address] [place], [place]      escitalopram  10 mg Oral Daily Tiffany De La Rosa MD      guaiFENesin  600 mg Oral Q12H [address]      insulin glargine  45 Units Subcutaneous HS [place], [place]      insulin lispro  1-5 Units Subcutaneous 4x Daily (AC & HS) DARRELL Leger      insulin lispro  15 Units Subcutaneous TID With Meals DARRELL Leger      lactulose  20 g Oral TID Katelin Luna, DO      levothyroxine  125 mcg Oral Once per day on Mon Tue Wed Thu Fri Sat Aureliano Koyanagi, PA-C      levothyroxine  62 5 mcg Oral Once per day on Sun Aureliano Koyanagi, PA-C      lidocaine  1 patch Topical Daily Taryn Lowe PA-C      melatonin  6 mg Oral HS PRN Aureliano Koyanagi, PA-C      nystatin   Topical BID DARRELL Leger      ondansetron  4 mg Intravenous Q6H PRN Ban Sexton MD      pantoprazole  40 mg Oral Daily Before Breakfast King Zaire MD      pravastatin  20 mg Oral Daily With [place], [place]      remdesivir  100 mg Intravenous Q24H [place], [place]      rifaximin  550 mg Oral Q12H [address] Aureliano Koyanagi, PA-C      simethicone  80 mg Oral Q6H PRN Angel Mays PA-C          Today, Patient Was Seen By: Mery Hunter PA-C    **Please Note: This note may have been constructed using a voice recognition system  **

## 2022-08-12 NOTE — ASSESSMENT & PLAN NOTE
· S/p permanent pacemaker 6/29 at [facility]  · Prior to placement patient required emergent transvenous pacing and did require vasopressors after placement

## 2022-08-12 NOTE — ASSESSMENT & PLAN NOTE
· Patient was admitted with hepatic encephalopathy, GI on board (see below)  · Ammonia 10   · Hold home tramadol and further doses of flexeril to avoid sedative effects  · 8/11 - Mentation continues to improve  She is AAOx2 to person and place  Responds quicker than yesterday     · 8/12 - Patient oriented to self, time, place however is slow to respond, intermittently confused, lethargic   · PT/OT recommending acute rehab   · CM on board, potential bed available at [facility]

## 2022-08-12 NOTE — ASSESSMENT & PLAN NOTE
· Soft Bps throughout hospital stay with systolic   · On lasix, spirinolactone, and lactulose previously  Currently lasix and spirinolactone on hold  · Received 1 dose of albumin 8/10  · Soft Bps likely due to cirrhotic physiology, may benefit from midodrine in adjunct to her diuretics and lactulose

## 2022-08-12 NOTE — ASSESSMENT & PLAN NOTE
Lab Results   Component Value Date    HGBA1C 8 2 (H) 05/31/2022       Recent Labs     08/11/22  1642 08/11/22  2211 08/12/22  0804 08/12/22  1143   POCGLU 220* 243* 202* 251*       Blood Sugar Average: Last 72 hrs:  (P) 234 [id number]     · Patient has uncontrolled hyperglycemia  · Home regimen (hold)  · Metformin 500 BID  · Insulin lantus 38 units am and 10 units HS  · Humalog 15 units TID AC  · Inpatient regimen: Lantus 45 units at bedtime and pre meal Humalog 15 units t i d

## 2022-08-12 NOTE — PLAN OF CARE
Problem: Potential for Falls  Goal: Patient will remain free of falls  Description: INTERVENTIONS:  - Educate patient/family on patient safety including physical limitations  - Instruct patient to call for assistance with activity   - Consult OT/PT to assist with strengthening/mobility   - Keep Call bell within reach  - Keep bed low and locked with side rails adjusted as appropriate  - Keep care items and personal belongings within reach  - Initiate and maintain comfort rounds  - Make Fall Risk Sign visible to staff  - Offer Toileting every 2 Hours, in advance of need  - Initiate/Maintain bed/chair alarm  - Obtain necessary fall risk management equipment:  socks;bedside commode  - Apply yellow socks and bracelet for high fall risk patients  - Consider moving patient to room near nurses station  Outcome: Progressing     Problem: PAIN - ADULT  Goal: Verbalizes/displays adequate comfort level or baseline comfort level  Description: Interventions:  - Encourage patient to monitor pain and request assistance  - Assess pain using appropriate pain scale  - Administer analgesics based on type and severity of pain and evaluate response  - Implement non-pharmacological measures as appropriate and evaluate response  - Consider cultural and social influences on pain and pain management  - Notify physician/advanced practitioner if interventions unsuccessful or patient reports new pain  Outcome: Progressing

## 2022-08-12 NOTE — PLAN OF CARE
Problem: Potential for Falls  Goal: Patient will remain free of falls  Description: INTERVENTIONS:  - Educate patient/family on patient safety including physical limitations  - Instruct patient to call for assistance with activity   - Consult OT/PT to assist with strengthening/mobility   - Keep Call bell within reach  - Keep bed low and locked with side rails adjusted as appropriate  - Keep care items and personal belongings within reach  - Initiate and maintain comfort rounds  - Make Fall Risk Sign visible to staff  - Offer Toileting every 2 Hours, in advance of need  - Initiate/Maintain bed/chair alarm  - Obtain necessary fall risk management equipment:  socks;bedside commode  - Apply yellow socks and bracelet for high fall risk patients  - Consider moving patient to room near nurses station  Outcome: Progressing     Problem: Prexisting or High Potential for Compromised Skin Integrity  Goal: Skin integrity is maintained or improved  Description: INTERVENTIONS:  - Identify patients at risk for skin breakdown  - Assess and monitor skin integrity  - Assess and monitor nutrition and hydration status  - Monitor labs   - Assess for incontinence   - Turn and reposition patient  - Assist with mobility/ambulation  - Relieve pressure over bony prominences  - Avoid friction and shearing  - Provide appropriate hygiene as needed including keeping skin clean and dry  - Evaluate need for skin moisturizer/barrier cream  - Collaborate with interdisciplinary team   - Patient/family teaching  - Consider wound care consult   Outcome: Progressing     Problem: MOBILITY - ADULT  Goal: Maintain or return to baseline ADL function  Description: INTERVENTIONS:  -  Assess patient's ability to carry out ADLs; assess patient's baseline for ADL function and identify physical deficits which impact ability to perform ADLs (bathing, care of mouth/teeth, toileting, grooming, dressing, etc )  - Assess/evaluate cause of self-care deficits   - Assess range of motion  - Assess patient's mobility; develop plan if impaired  - Assess patient's need for assistive devices and provide as appropriate  - Encourage maximum independence but intervene and supervise when necessary  - Involve family in performance of ADLs  - Assess for home care needs following discharge   - Consider OT consult to assist with ADL evaluation and planning for discharge  - Provide patient education as appropriate  Outcome: Progressing     Problem: PAIN - ADULT  Goal: Verbalizes/displays adequate comfort level or baseline comfort level  Description: Interventions:  - Encourage patient to monitor pain and request assistance  - Assess pain using appropriate pain scale  - Administer analgesics based on type and severity of pain and evaluate response  - Implement non-pharmacological measures as appropriate and evaluate response  - Consider cultural and social influences on pain and pain management  - Notify physician/advanced practitioner if interventions unsuccessful or patient reports new pain  Outcome: Progressing     Problem: INFECTION - ADULT  Goal: Absence or prevention of progression during hospitalization  Description: INTERVENTIONS:  - Assess and monitor for signs and symptoms of infection  - Monitor lab/diagnostic results  - Monitor all insertion sites, i e  indwelling lines, tubes, and drains  - Monitor endotracheal if appropriate and nasal secretions for changes in amount and color  - [place] appropriate cooling/warming therapies per order  - Administer medications as ordered  - Instruct and encourage patient and family to use good hand hygiene technique  - Identify and instruct in appropriate isolation precautions for identified infection/condition  Outcome: Progressing  Goal: Absence of fever/infection during neutropenic period  Description: INTERVENTIONS:  - Monitor WBC    Outcome: Progressing     Problem: SAFETY ADULT  Goal: Patient will remain free of falls  Description: INTERVENTIONS:  - Educate patient/family on patient safety including physical limitations  - Instruct patient to call for assistance with activity   - Consult OT/PT to assist with strengthening/mobility   - Keep Call bell within reach  - Keep bed low and locked with side rails adjusted as appropriate  - Keep care items and personal belongings within reach  - Initiate and maintain comfort rounds  - Make Fall Risk Sign visible to staff  - Offer Toileting every 2 Hours, in advance of need  - Initiate/Maintain bed/chair alarm  - Obtain necessary fall risk management equipment:  socks;bedside commode  - Apply yellow socks and bracelet for high fall risk patients  - Consider moving patient to room near nurses station  Outcome: Progressing  Goal: Maintain or return to baseline ADL function  Description: INTERVENTIONS:  -  Assess patient's ability to carry out ADLs; assess patient's baseline for ADL function and identify physical deficits which impact ability to perform ADLs (bathing, care of mouth/teeth, toileting, grooming, dressing, etc )  - Assess/evaluate cause of self-care deficits   - Assess range of motion  - Assess patient's mobility; develop plan if impaired  - Assess patient's need for assistive devices and provide as appropriate  - Encourage maximum independence but intervene and supervise when necessary  - Involve family in performance of ADLs  - Assess for home care needs following discharge   - Consider OT consult to assist with ADL evaluation and planning for discharge  - Provide patient education as appropriate  Outcome: Progressing  Goal: Maintains/Returns to pre admission functional level  Description: INTERVENTIONS:  - Perform BMAT or MOVE assessment daily    - Set and communicate daily mobility goal to care team and patient/family/caregiver  - Collaborate with rehabilitation services on mobility goals if consulted  - Perform Range of Motion 2 times a day  - Reposition patient every 2 hours    - Dangle patient 2 times a day  - Stand patient 2 times a day  - Ambulate patient 2 times a day  - Out of bed to chair 2 times a day   - Out of bed for meals 2 times a day  - Out of bed for toileting  - Record patient progress and toleration of activity level   Outcome: Progressing     Problem: DISCHARGE PLANNING  Goal: Discharge to home or other facility with appropriate resources  Description: INTERVENTIONS:  - Identify barriers to discharge w/patient and caregiver  - Arrange for needed discharge resources and transportation as appropriate  - Identify discharge learning needs (meds, wound care, etc )  - Arrange for interpretive services to assist at discharge as needed  - Refer to Case Management Department for coordinating discharge planning if the patient needs post-hospital services based on physician/advanced practitioner order or complex needs related to functional status, cognitive ability, or social support system  Outcome: Progressing     Problem: Knowledge Deficit  Goal: Patient/family/caregiver demonstrates understanding of disease process, treatment plan, medications, and discharge instructions  Description: Complete learning assessment and assess knowledge base    Interventions:  - Provide teaching at level of understanding  - Provide teaching via preferred learning methods  Outcome: Progressing     Problem: METABOLIC, FLUID AND ELECTROLYTES - ADULT  Goal: Glucose maintained within target range  Description: INTERVENTIONS:  - Monitor Blood Glucose as ordered  - Assess for signs and symptoms of hyperglycemia and hypoglycemia  - Administer ordered medications to maintain glucose within target range  - Assess nutritional intake and initiate nutrition service referral as needed  Outcome: Progressing  Goal: Electrolytes maintained within normal limits  Description: INTERVENTIONS:  - Monitor labs and assess patient for signs and symptoms of electrolyte imbalances  - Administer electrolyte replacement as ordered  - Monitor response to electrolyte replacements, including repeat lab results as appropriate  - Instruct patient on fluid and nutrition as appropriate  Outcome: Progressing  Goal: Fluid balance maintained  Description: INTERVENTIONS:  - Monitor labs   - Monitor I/O and WT  - Instruct patient on fluid and nutrition as appropriate  - Assess for signs & symptoms of volume excess or deficit  Outcome: Progressing     Problem: GASTROINTESTINAL - ADULT  Goal: Minimal or absence of nausea and/or vomiting  Description: INTERVENTIONS:  - Administer IV fluids if ordered to ensure adequate hydration  - Maintain NPO status until nausea and vomiting are resolved  - Nasogastric tube if ordered  - Administer ordered antiemetic medications as needed  - Provide nonpharmacologic comfort measures as appropriate  - Advance diet as tolerated, if ordered  - Consider nutrition services referral to assist patient with adequate nutrition and appropriate food choices  Outcome: Progressing  Goal: Maintains or returns to baseline bowel function  Description: INTERVENTIONS:  - Assess bowel function  - Encourage oral fluids to ensure adequate hydration  - Administer IV fluids if ordered to ensure adequate hydration  - Administer ordered medications as needed  - Encourage mobilization and activity  - Consider nutritional services referral to assist patient with adequate nutrition and appropriate food choices  Outcome: Progressing  Goal: Maintains adequate nutritional intake  Description: INTERVENTIONS:  - Monitor percentage of each meal consumed  - Identify factors contributing to decreased intake, treat as appropriate  - Assist with meals as needed  - Monitor I&O, weight, and lab values if indicated  - Obtain nutrition services referral as needed  Outcome: Progressing     Problem: SKIN/TISSUE INTEGRITY - ADULT  Goal: Skin Integrity remains intact(Skin Breakdown Prevention)  Description: Assess:  -Perform Conrad assessment  -Clean and moisturize skin   -Inspect skin when repositioning, toileting, and assisting with ADLS  -Assess under medical devices   -Assess extremities for adequate circulation and sensation     Bed Management:  -Have minimal linens on bed & keep smooth, unwrinkled  -Change linens as needed when moist or perspiring  -Avoid sitting or lying in one position for more than 2 hours while in bed  -Keep HOB at 30 degrees     Toileting:  -Offer bedside commode  -Assess for incontinence   -Use incontinent care products after each incontinent episode    Activity:  -Mobilize patient 2 times a day  -Encourage activity and walks on unit  -Encourage or provide ROM exercises   -Turn and reposition patient every 2 Hours  -Use appropriate equipment to lift or move patient in bed  -Instruct/ Assist with weight shifting every 2 when out of bed in chair  -Consider limitation of chair time 2 hour intervals    Skin Care:  -Avoid use of baby powder, tape, friction and shearing, hot water or constrictive clothing  -Relieve pressure over bony prominences   -Do not massage red bony areas    Next Steps:  -Teach patient strategies to minimize risks   -Consider consults to  interdisciplinary teams  Outcome: Progressing     Problem: HEMATOLOGIC - ADULT  Goal: Maintains hematologic stability  Description: INTERVENTIONS  - Assess for signs and symptoms of bleeding or hemorrhage  - Monitor labs  - Administer supportive blood products/factors as ordered and appropriate  Outcome: Progressing     Problem: MUSCULOSKELETAL - ADULT  Goal: Maintain or return mobility to safest level of function  Description: INTERVENTIONS:  - Assess patient's ability to carry out ADLs; assess patient's baseline for ADL function and identify physical deficits which impact ability to perform ADLs (bathing, care of mouth/teeth, toileting, grooming, dressing, etc )  - Assess/evaluate cause of self-care deficits   - Assess range of motion  - Assess patient's mobility  - Assess patient's need for assistive devices and provide as appropriate  - Encourage maximum independence but intervene and supervise when necessary  - Involve family in performance of ADLs  - Assess for home care needs following discharge   - Consider OT consult to assist with ADL evaluation and planning for discharge  - Provide patient education as appropriate  Outcome: Progressing     Problem: Nutrition/Hydration-ADULT  Goal: Nutrient/Hydration intake appropriate for improving, restoring or maintaining nutritional needs  Description: Monitor and assess patient's nutrition/hydration status for malnutrition  Collaborate with interdisciplinary team and initiate plan and interventions as ordered  Monitor patient's weight and dietary intake as ordered or per policy  Utilize nutrition screening tool and intervene as necessary  Determine patient's food preferences and provide high-protein, high-caloric foods as appropriate       INTERVENTIONS:  - Monitor oral intake, urinary output, labs, and treatment plans  - Assess nutrition and hydration status and recommend course of action  - Evaluate amount of meals eaten  - Assist patient with eating if necessary   - Allow adequate time for meals  - Recommend/ encourage appropriate diets, oral nutritional supplements, and vitamin/mineral supplements  - Order, calculate, and assess calorie counts as needed  - Recommend, monitor, and adjust tube feedings and TPN/PPN based on assessed needs  - Assess need for intravenous fluids  - Provide specific nutrition/hydration education as appropriate  - Include patient/family/caregiver in decisions related to nutrition  Outcome: Progressing

## 2022-08-12 NOTE — PROGRESS NOTES
Progress note - Gastroenterology   Livia Person 77 y o  female MRN: [id number]  Unit/Bed#: -01 Encounter: [id number]    ASSESSMENT and PLAN  1  PAUL cirrhosis with ascites and edema,   New ascites on CT scan on admission however unable to perform paracentesis 8/9 due to insufficient fluid   Diuretics on hold yesterday due to increasing creatinine and systolic BP in the 82T  Her pressure is stable today at 112/60   Creatinine trending down to 1 33 today  Meld 14 with labs from 08/1 10  · Lasix and Aldactone on hold   Monitor kidney function-nephrology consulted  · 2g Na diet and fluid restrictriction  · Trend LFTs and INR      2 Hepatic encephalopathy - improved  · Continue lactulose p o  T i d  And titrate to 3-4 bowel movements a day  · Continue Xifaxan  · Avoid narcotics  - Ammonia remains normal        3  Chronic anemia  FOBT + but no overt GIB  · Monitor for overt GI bleeding  · S/p IV Iron  Hemoglobin currently stable at 7 2 no overt signs of GI bleed     4 H/o esophageal varices - no n/v/hematemesis at this time      · Eventually needs a repeat EGD, outpatient or inpatient if overt bleeding noted  · Continue nadolol     5  Type 2 DM   6  L humerus fracture     Chief Complaint   Patient presents with    Hyperglycemia - Symptomatic     Patient presents to the ED via EMS with c/o not feeling well, states fall two weeks ago and has residual back pain and left shoulder fx (seen at [facility] ED s/p fall)  States her sugars have been high, EMS report >300 en route, recent pacemaker placement         Jeanmarie Imus was conducted via direct visualization and conversation via intercom  Tested positive for COVID yesterday  She is sitting out of bed in chair but appears very lethargic today, opens eyes to questions and able to answer appropriate  Nursing reports she has had 2-3 liquid stools this a m   On lactulose  No melena or rectal bleeding per nursing    /60   Pulse 65   Temp 98 2 °F (36 8 °C)   Resp 16   Ht 5' 4" (1 626 m)   Wt 104 kg (229 lb 4 5 oz)   SpO2 97%   BMI 39 36 kg/m²     PHYSICALEXAM  Physical exam not performed due to COVID  General appearance:  Appears lethargic this a m   But arousable      Lab Results   Component Value Date    GLUCOSE 280 (H) 08/04/2022    CALCIUM 9 0 08/12/2022    K 3 8 08/12/2022    CO2 28 08/12/2022    CL 98 08/12/2022    BUN 29 (H) 08/12/2022    CREATININE 1 33 (H) 08/12/2022     Lab Results   Component Value Date    WBC 3 03 (L) 08/12/2022    HGB 7 4 (L) 08/12/2022    HCT 24 8 (L) 08/12/2022    MCV 95 08/12/2022    PLT 59 (L) 08/12/2022     Lab Results   Component Value Date    ALT 39 08/10/2022     (H) 08/10/2022    ALKPHOS 91 08/10/2022       Lab Results   Component Value Date    IRON 190 (H) 08/05/2022    TIBC 413 08/05/2022    FERRITIN 20 08/05/2022     Lab Results   Component Value Date    INR 1 31 (H) 08/09/2022

## 2022-08-12 NOTE — PLAN OF CARE
Problem: OCCUPATIONAL THERAPY ADULT  Goal: Performs self-care activities at highest level of function for planned discharge setting  See evaluation for individualized goals  Description: Treatment Interventions: ADL retraining, Functional transfer training, Patient/family training, Endurance training, Compensatory technique education          See flowsheet documentation for full assessment, interventions and recommendations  Outcome: Progressing  Note: Limitation: Decreased ADL status, Decreased high-level ADLs, Decreased self-care trans, Decreased endurance  Prognosis: Good  Assessment: Pt seen for OT tx session with focus on functional balance, functional mobility, ADL status, and transfer safety  Patient agreeable to OT treatment session  Pt received seated OOB to Recliner  Pt required max encouragement & education to participate in therapy today  Pt performed seated BLE exercises of ankle pumps & knee extension/flexions  Pt performed seated RUE exercises of shoulder flexion AROM & bicep curls with 4 oz  container  Pt required verbal cues for technique during therex  Pt required Min Ax2 for initial sit > stand but progressed to [facility] for remainder of txfers during session  Pt performed grooming with Min Ax1 for thoroughness of washing face  Pt declines any additional ADL needs  SpO2 remained >90% throughout session  Patient continues to be functioning below baseline level, occupational performance remains limited secondary to factors listed above, and pt at increased risk for falls and injury  The patient's raw score on the AM-PAC Daily Activity inpatient short form is 15, standardized score is 34 69, less than 39 4  Patients at this level are likely to benefit from DC to post-acute rehabilitation services  Please refer to the recommendation of the Occupational Therapist for safe DC planning  From OT standpoint, recommendation at time of D/C would be Short Term Rehab   Patient to benefit from continued Occupational Therapy treatment while in the hospital to address deficits as defined above and maximize level of functional independence with ADLs and functional mobility  Pt left seated OOB to Recliner with call bell in reach, tray table in reach, needs met, chair alarm activated, pt declining use of SCD's, RN informed       OT Discharge Recommendation: Post acute rehabilitation services

## 2022-08-12 NOTE — PLAN OF CARE
Problem: PHYSICAL THERAPY ADULT  Goal: Performs mobility at highest level of function for planned discharge setting  See evaluation for individualized goals  Description: Treatment/Interventions: Functional transfer training, ADL retraining, LE strengthening/ROM, Therapeutic exercise, Cognitive reorientation, Endurance training, Patient/family training, Equipment eval/education, Gait training, Bed mobility, Compensatory technique education, Continued evaluation, Spoke to nursing, OT          See flowsheet documentation for full assessment, interventions and recommendations  Note: Prognosis: Guarded  Problem List: Decreased strength, Decreased range of motion, Decreased endurance, Impaired balance, Decreased mobility, Decreased coordination, Decreased cognition, Decreased safety awareness, Impaired judgement, Obesity, Decreased skin integrity, Pain, Orthopedic restrictions  Assessment: Treatment consisted of balance training, ambulation training, safety awareness, fall prevention, endurance training to increase upright positions and functional mobility  Pt appears fatigued but able to participate with encouragement; Pt educated on and performed seated HEP as above; Pt able to increase standing tolerance this session; pt continues to require physical assist for all mobility and unable to care for self or ambulate household distances; Pt would benefit from continued PT while in hospital and follow up with inpt rehab at OR to increase strength, balance, endurance, mobility independence to return to [facility], decrease caregiver burden and improve quality of life  The patient's AM-PAC Basic Mobility Inpatient Short Form Raw Score is 11  A Raw score of less than or equal to 17 suggests the patient may benefit from discharge to post-acute rehabilitation services  Please also refer to the recommendation of the Physical Therapist for safe discharge planning    Barriers to Discharge: Decreased caregiver support, Inaccessible home environment     PT Discharge Recommendation: Post acute rehabilitation services    See flowsheet documentation for full assessment

## 2022-08-12 NOTE — ASSESSMENT & PLAN NOTE
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

## 2022-08-12 NOTE — ASSESSMENT & PLAN NOTE
· Patient has pancytopenia due to portal hypertension, liver cirrhosis    · She required blood transfusion during this hospital stay + IV iron (iron panel not reflective of deficiency)  · FOBT + however no signs of overt GI bleeding --> GI is aware   · Will monitor blood counts closely  · PLTs currently 53 (maintain >20 unless active bleeding)  · Hgb 7 4 (maintain > 7)  · WBC 3

## 2022-08-13 PROBLEM — R82.81 PYURIA: Status: RESOLVED | Noted: 2022-08-05 | Resolved: 2022-08-13

## 2022-08-13 PROBLEM — R33.9 URINARY RETENTION: Status: RESOLVED | Noted: 2022-08-05 | Resolved: 2022-08-13

## 2022-08-13 LAB
ALBUMIN SERPL BCP-MCNC: 2.6 G/DL (ref 3.5–5)
ALP SERPL-CCNC: 94 U/L (ref 46–116)
ALT SERPL W P-5'-P-CCNC: 40 U/L (ref 12–78)
ANION GAP SERPL CALCULATED.3IONS-SCNC: 7 MMOL/L (ref 4–13)
AST SERPL W P-5'-P-CCNC: 65 U/L (ref 5–45)
BASOPHILS # BLD AUTO: 0 THOUSANDS/ΜL (ref 0–0.1)
BASOPHILS NFR BLD AUTO: 0 % (ref 0–1)
BILIRUB SERPL-MCNC: 0.8 MG/DL (ref 0.2–1)
BUN SERPL-MCNC: 31 MG/DL (ref 5–25)
CALCIUM ALBUM COR SERPL-MCNC: 10 MG/DL (ref 8.3–10.1)
CALCIUM SERPL-MCNC: 8.9 MG/DL (ref 8.3–10.1)
CHLORIDE SERPL-SCNC: 99 MMOL/L (ref 96–108)
CO2 SERPL-SCNC: 28 MMOL/L (ref 21–32)
CREAT SERPL-MCNC: 1.17 MG/DL (ref 0.6–1.3)
EOSINOPHIL # BLD AUTO: 0 THOUSAND/ΜL (ref 0–0.61)
EOSINOPHIL NFR BLD AUTO: 0 % (ref 0–6)
ERYTHROCYTE [DISTWIDTH] IN BLOOD BY AUTOMATED COUNT: 21.5 % (ref 11.6–15.1)
GFR SERPL CREATININE-BSD FRML MDRD: 45 ML/MIN/1.73SQ M
GLUCOSE SERPL-MCNC: 247 MG/DL (ref 65–140)
GLUCOSE SERPL-MCNC: 261 MG/DL (ref 65–140)
GLUCOSE SERPL-MCNC: 266 MG/DL (ref 65–140)
GLUCOSE SERPL-MCNC: 281 MG/DL (ref 65–140)
GLUCOSE SERPL-MCNC: 311 MG/DL (ref 65–140)
GLUCOSE SERPL-MCNC: 361 MG/DL (ref 65–140)
HCT VFR BLD AUTO: 26.5 % (ref 34.8–46.1)
HGB BLD-MCNC: 8 G/DL (ref 11.5–15.4)
IMM GRANULOCYTES # BLD AUTO: 0.02 THOUSAND/UL (ref 0–0.2)
IMM GRANULOCYTES NFR BLD AUTO: 1 % (ref 0–2)
INR PPP: 1.3 (ref 0.84–1.19)
LYMPHOCYTES # BLD AUTO: 0.37 THOUSANDS/ΜL (ref 0.6–4.47)
LYMPHOCYTES NFR BLD AUTO: 12 % (ref 14–44)
MCH RBC QN AUTO: 28.4 PG (ref 26.8–34.3)
MCHC RBC AUTO-ENTMCNC: 30.2 G/DL (ref 31.4–37.4)
MCV RBC AUTO: 94 FL (ref 82–98)
MONOCYTES # BLD AUTO: 0.12 THOUSAND/ΜL (ref 0.17–1.22)
MONOCYTES NFR BLD AUTO: 4 % (ref 4–12)
NEUTROPHILS # BLD AUTO: 2.59 THOUSANDS/ΜL (ref 1.85–7.62)
NEUTS SEG NFR BLD AUTO: 83 % (ref 43–75)
NRBC BLD AUTO-RTO: 0 /100 WBCS
PLATELET # BLD AUTO: 55 THOUSANDS/UL (ref 149–390)
PMV BLD AUTO: 13.6 FL (ref 8.9–12.7)
POTASSIUM SERPL-SCNC: 3.8 MMOL/L (ref 3.5–5.3)
PROT SERPL-MCNC: 6.2 G/DL (ref 6.4–8.4)
PROTHROMBIN TIME: 17.1 SECONDS (ref 11.6–14.5)
RBC # BLD AUTO: 2.82 MILLION/UL (ref 3.81–5.12)
SODIUM SERPL-SCNC: 134 MMOL/L (ref 135–147)
WBC # BLD AUTO: 3.1 THOUSAND/UL (ref 4.31–10.16)

## 2022-08-13 PROCEDURE — 99232 SBSQ HOSP IP/OBS MODERATE 35: CPT | Performed by: INTERNAL MEDICINE

## 2022-08-13 PROCEDURE — 85610 PROTHROMBIN TIME: CPT | Performed by: INTERNAL MEDICINE

## 2022-08-13 PROCEDURE — 85025 COMPLETE CBC W/AUTO DIFF WBC: CPT | Performed by: INTERNAL MEDICINE

## 2022-08-13 PROCEDURE — 82948 REAGENT STRIP/BLOOD GLUCOSE: CPT

## 2022-08-13 PROCEDURE — 80053 COMPREHEN METABOLIC PANEL: CPT | Performed by: INTERNAL MEDICINE

## 2022-08-13 RX ADMIN — LACTULOSE 20 G: 20 SOLUTION ORAL at 15:04

## 2022-08-13 RX ADMIN — HEPARIN SODIUM 7500 UNITS: 5000 INJECTION INTRAVENOUS; SUBCUTANEOUS at 22:03

## 2022-08-13 RX ADMIN — INSULIN LISPRO 3 UNITS: 100 INJECTION, SOLUTION INTRAVENOUS; SUBCUTANEOUS at 22:08

## 2022-08-13 RX ADMIN — RIFAXIMIN 550 MG: 550 TABLET ORAL at 08:29

## 2022-08-13 RX ADMIN — DEXAMETHASONE SODIUM PHOSPHATE 6 MG: 4 INJECTION, SOLUTION INTRAMUSCULAR; INTRAVENOUS at 11:43

## 2022-08-13 RX ADMIN — ESCITALOPRAM OXALATE 10 MG: 10 TABLET ORAL at 08:29

## 2022-08-13 RX ADMIN — INSULIN LISPRO 2 UNITS: 100 INJECTION, SOLUTION INTRAVENOUS; SUBCUTANEOUS at 08:29

## 2022-08-13 RX ADMIN — NYSTATIN: 100000 POWDER TOPICAL at 17:04

## 2022-08-13 RX ADMIN — LACTULOSE 20 G: 20 SOLUTION ORAL at 08:29

## 2022-08-13 RX ADMIN — GUAIFENESIN 600 MG: 600 TABLET ORAL at 08:29

## 2022-08-13 RX ADMIN — RIFAXIMIN 550 MG: 550 TABLET ORAL at 22:03

## 2022-08-13 RX ADMIN — INSULIN LISPRO 15 UNITS: 100 INJECTION, SOLUTION INTRAVENOUS; SUBCUTANEOUS at 17:03

## 2022-08-13 RX ADMIN — INSULIN LISPRO 15 UNITS: 100 INJECTION, SOLUTION INTRAVENOUS; SUBCUTANEOUS at 11:40

## 2022-08-13 RX ADMIN — NYSTATIN: 100000 POWDER TOPICAL at 08:29

## 2022-08-13 RX ADMIN — PANTOPRAZOLE SODIUM 40 MG: 40 TABLET, DELAYED RELEASE ORAL at 06:32

## 2022-08-13 RX ADMIN — HEPARIN SODIUM 7500 UNITS: 5000 INJECTION INTRAVENOUS; SUBCUTANEOUS at 13:09

## 2022-08-13 RX ADMIN — PRAVASTATIN SODIUM 20 MG: 20 TABLET ORAL at 16:26

## 2022-08-13 RX ADMIN — INSULIN GLARGINE 45 UNITS: 100 INJECTION, SOLUTION SUBCUTANEOUS at 22:04

## 2022-08-13 RX ADMIN — LIDOCAINE 5% 1 PATCH: 700 PATCH TOPICAL at 08:29

## 2022-08-13 RX ADMIN — GUAIFENESIN 600 MG: 600 TABLET ORAL at 22:08

## 2022-08-13 RX ADMIN — LACTULOSE 20 G: 20 SOLUTION ORAL at 22:03

## 2022-08-13 RX ADMIN — INSULIN LISPRO 3 UNITS: 100 INJECTION, SOLUTION INTRAVENOUS; SUBCUTANEOUS at 11:40

## 2022-08-13 RX ADMIN — INSULIN LISPRO 4 UNITS: 100 INJECTION, SOLUTION INTRAVENOUS; SUBCUTANEOUS at 17:02

## 2022-08-13 RX ADMIN — INSULIN LISPRO 15 UNITS: 100 INJECTION, SOLUTION INTRAVENOUS; SUBCUTANEOUS at 08:29

## 2022-08-13 RX ADMIN — REMDESIVIR 100 MG: 100 INJECTION, POWDER, LYOPHILIZED, FOR SOLUTION INTRAVENOUS at 11:40

## 2022-08-13 RX ADMIN — LEVOTHYROXINE SODIUM 125 MCG: 25 TABLET ORAL at 06:35

## 2022-08-13 RX ADMIN — HEPARIN SODIUM 7500 UNITS: 5000 INJECTION INTRAVENOUS; SUBCUTANEOUS at 06:31

## 2022-08-13 NOTE — ASSESSMENT & PLAN NOTE
· Patient was admitted with hepatic encephalopathy, GI on board (see below)  · Ammonia 10   · Hold home tramadol and further doses of flexeril to avoid sedative effects  · 8/12 - Patient oriented to self, time, place however is slow to respond, intermittently confused, lethargic   · 8/13- Lethargy improving, oriented to self/time/place  · PT/OT recommending acute rehab   · CM on board, potential bed available at [facility]

## 2022-08-13 NOTE — ASSESSMENT & PLAN NOTE
· Patient was straight cathed for 500 cc on arrival to the floor   · Urinary retention protocol  · Urine culture with [id number] CFU/mL E   Coli - likely contaminant  · Monitor off of abx for fevers / dysuria

## 2022-08-13 NOTE — ASSESSMENT & PLAN NOTE
· Patient has 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  Ultrasound shows ascites    · GI consult, appreciate recs  · 8/09 attempted paracentesis - Insufficient fluid for safe paracentesis  · Continue lactulose/rifampin   · 8/14: discussed with GI, GI restarted lasix 40 mg PO + spironolactone 25 mg

## 2022-08-13 NOTE — ASSESSMENT & PLAN NOTE
Lab Results   Component Value Date    HGBA1C 8 2 (H) 05/31/2022       Recent Labs     08/12/22  2241 08/13/22  0739 08/13/22  1139 08/13/22  1656   POCGLU 266* 261* 281* 361*       Blood Sugar Average: Last 72 hrs:  (P) 251 2     · Patient has uncontrolled hyperglycemia  · Home regimen (hold)  · Metformin 500 BID  · Insulin lantus 38 units am and 10 units HS  · Humalog 15 units TID AC  · Inpatient regimen: Lantus 45 units at bedtime and pre meal Humalog 15 units t i d  Statement Selected

## 2022-08-13 NOTE — PLAN OF CARE
Problem: PAIN - ADULT  Goal: Verbalizes/displays adequate comfort level or baseline comfort level  Description: Interventions:  - Encourage patient to monitor pain and request assistance  - Assess pain using appropriate pain scale  - Administer analgesics based on type and severity of pain and evaluate response  - Implement non-pharmacological measures as appropriate and evaluate response  - Consider cultural and social influences on pain and pain management  - Notify physician/advanced practitioner if interventions unsuccessful or patient reports new pain  Outcome: Progressing     Problem: INFECTION - ADULT  Goal: Absence or prevention of progression during hospitalization  Description: INTERVENTIONS:  - Assess and monitor for signs and symptoms of infection  - Monitor lab/diagnostic results  - Monitor all insertion sites, i e  indwelling lines, tubes, and drains  - Monitor endotracheal if appropriate and nasal secretions for changes in amount and color  - Clymer appropriate cooling/warming therapies per order  - Administer medications as ordered  - Instruct and encourage patient and family to use good hand hygiene technique  - Identify and instruct in appropriate isolation precautions for identified infection/condition  Outcome: Progressing     Problem: Knowledge Deficit  Goal: Patient/family/caregiver demonstrates understanding of disease process, treatment plan, medications, and discharge instructions  Description: Complete learning assessment and assess knowledge base    Interventions:  - Provide teaching at level of understanding  - Provide teaching via preferred learning methods  Outcome: Progressing

## 2022-08-13 NOTE — ASSESSMENT & PLAN NOTE
· New non productive cough as of 8/10  Associated sore throat  Afebrile, no significant leukocytosis    · Covid positive 8/11  · CRP 24, D dimer 2 47  · Per COVID guidelines --> qualified for ACS heparin drip for [facility] however PLT/Hgb low, continue high dose SC heparin  · IV decadron x 10 days (initiated 8/11)  · IV remdesivir x 5 days  (initiated 8/11)  · Supportive care, wean O2 as able --> on RA at time of interview 97% while resting  · Resp protocol

## 2022-08-13 NOTE — ASSESSMENT & PLAN NOTE
· New non productive cough as of 8/10  Associated sore throat  Afebrile, no significant leukocytosis    · Covid positive 8/11, requiring 0-1L today  · CRP 24, D dimer 2 47  · Per COVID guidelines --> qualified for ACS heparin drip for [facility] however PLT/Hgb low, continue high dose SC heparin  · IV decadron x 10 days (initiated 8/11)  · IV remdesivir x 5 days  (initiated 8/11)  · Supportive care, wean O2 as able --> on RA at time of interview 94% while resting  · Lethargy improving  · Resp protocol

## 2022-08-13 NOTE — ASSESSMENT & PLAN NOTE
· S/p permanent pacemaker 6/29 at [facility]  · Prior to placement patient required emergent transvenous pacing and did require vasopressors after placement

## 2022-08-13 NOTE — ASSESSMENT & PLAN NOTE
· Cr 1 28 --> 1 5 --> 1 3   · Likely secondary to acute episode hypotension + diuretics causing prerenal kidney injury    · No need for nephro consult currently as Cr is down-trending   · Continue to hold spironolactone and lasix, restart pending am renal function (labs unable to be drawn 8/13)  · Monitor BMP, Avoid hypotension   · Retention protocol

## 2022-08-13 NOTE — PROGRESS NOTES
[place]  Progress Note Lyudmila Henriquez 1945, 77 y o  female MRN: [id number]  Unit/Bed#: -[facility] Encounter: [id number]  Primary Care Provider: Leanne Mauricio DO   Date and time admitted to hospital: 8/4/2022  6:45 PM    * Hepatic encephalopathy [facility])  Assessment & Plan  · Patient was admitted with hepatic encephalopathy, GI on board (see below)  · Ammonia 10   · Hold home tramadol and further doses of flexeril to avoid sedative effects  · 8/12 - Patient oriented to self, time, place however is slow to respond, intermittently confused, lethargic   · 8/13- Lethargy improving, oriented to self/time/place  · PT/OT recommending acute rehab   · CM on board, potential bed available at [address] cirrhosis secondary to PAUL (nonalcoholic steatohepatitis) ([facility] [address] )  Assessment & Plan  · Patient has 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  Ultrasound shows ascites  · GI consult, appreciate recs  · 8/09 attempted paracentesis - Insufficient fluid for safe paracentesis  · Continue lactulose/rifampin   · Hold IV Lasix 40 mg daily and spironolactone 50 mg daily in setting of hypotension and increased creatinine- hopeful to restart tomorrow 8/14    Patient is hard stick, unable to get labs this am --> try again tomorrow am     COVID-19  Assessment & Plan  · New non productive cough as of 8/10  Associated sore throat  Afebrile, no significant leukocytosis    · Covid positive 8/11, requiring 0-1L today  · CRP 24, D dimer 2 47  · Per COVID guidelines --> qualified for ACS heparin drip for [facility] however PLT/Hgb low, continue high dose SC heparin  · IV decadron x 10 days (initiated 8/11)  · IV remdesivir x 5 days  (initiated 8/11)  · Supportive care, wean O2 as able --> on RA at time of interview 94% while resting  · Lethargy improving  · Resp protocol     Elevated serum creatinine  Assessment & Plan  · Cr 1 28 --> 1 5 --> 1 3   · Likely secondary to acute episode hypotension + diuretics causing prerenal kidney injury  · No need for nephro consult currently as Cr is down-trending   · Continue to hold spironolactone and lasix, restart pending am renal function (labs unable to be drawn 8/13)  · Monitor BMP, Avoid hypotension   · Retention protocol    Pancytopenia ([facility] [address] )  Assessment & Plan  · Patient has pancytopenia due to portal hypertension, liver cirrhosis  · She required blood transfusion during this hospital stay + IV iron (iron panel not reflective of deficiency)  · FOBT + however no signs of overt GI bleeding --> GI is aware   · Will monitor blood counts closely  · PLTs currently 53 (maintain >20 unless active bleeding)  · Hgb 7 4 (maintain > 7)  · WBC 3    Cardiomyopathy (HCC)  Assessment & Plan  · Newly diagnosed during recent admission  · 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% and abnormal diastolic function on 80/78 echo   · Repeat echo on 07/05 status post ppm:  LVEF 50 percent  G1 DD  · Was receiving IV lasix  On hold as of 8/10 secondary to hypotension and bump in renal function      Depression  Assessment & Plan  · Home regimen: Lexapro 5 milligrams daily   · Patient was seen by geriatric medicine has SLB during recent admission and recommended increased to 10 mg daily if needed    Closed traumatic displaced fracture of proximal end of left humerus  Assessment & Plan  · Status post fall 6/26   · Managing conservatively with sling    Complete heart block ([facility] [address] )  Assessment & Plan  · S/p permanent pacemaker 6/29 at [facility]  · Prior to placement patient required emergent transvenous pacing and did require vasopressors after placement    Esophageal varices without bleeding ([address] )  Assessment & Plan  · S/P banding x2 in 02/2022  · Follows closely with GI  · No hematemesis, melena, or hematochezia reported   · BP stable   · Will need repeat EGD as outpatient    Type 2 diabetes mellitus with hyperglycemia, with long-term current use of insulin ([address] )  Assessment & Plan  Lab Results   Component Value Date    HGBA1C 8 2 (H) 05/31/2022       Recent Labs     08/12/22  1807 08/12/22  2241 08/13/22  0739 08/13/22  1139   POCGLU 357* 266* 261* 281*       Blood Sugar Average: Last 72 hrs:  (P) 243 [id number]     · Patient has uncontrolled hyperglycemia  · Home regimen (hold)  · Metformin 500 BID  · Insulin lantus 38 units am and 10 units HS  · Humalog 15 units TID AC  · Inpatient regimen: Lantus 45 units at bedtime and pre meal Humalog 15 units t i d     Acquired hypothyroidism  Assessment & Plan  · Home regimen:  Levothyroxine 125 mcg daily except for Sundays 62 5 micrograms  · TSH elevated on admission, however free T4 1 31   · Continue levothyroxine at current dose  · Recommend recheck TSH in 6-8 weeks outpatient    Essential hypertension  Assessment & Plan  · Soft Bps throughout hospital stay with systolic   SBP currently > 110 today  · On lasix, spirinolactone, and lactulose previously  Currently lasix and spirinolactone on hold, likely restart tomorrow 8/14  · Received 1 dose of albumin 8/10  · Soft Bps likely due to cirrhotic physiology, may benefit from midodrine in adjunct to her diuretics and lactulose  Urinary retention-resolved as of 8/13/2022  Assessment & Plan  · Patient was straight cathed for 500 cc on arrival to the floor   · Urinary retention protocol  · Urine culture with [phone] CFU/mL E  Coli - likely contaminant  · Monitor off of abx for fevers / dysuria    Pyuria-resolved as of 8/13/2022  Assessment & Plan  · Urine microscopy with innumerable bacteria but only 2-4 wbc's  · Urine culture: [phone] CFU/mL E  Coli - likely contaminant  · Patient denies any urinary symptoms  · Monitor off abx for fever/dysuria/leukocytosis    VTE Pharmacologic Prophylaxis: VTE Score: 4 Moderate Risk (Score 3-4) - Pharmacological DVT Prophylaxis Ordered: heparin  Patient Centered Rounds: I performed bedside rounds with nursing staff today    Discussions with Specialists or Other Care Team Provider: none     Education and Discussions with Family / Patient: Updated  (daughter in law) via phone  Time Spent for Care: 30 minutes  More than 50% of total time spent on counseling and coordination of care as described above  Current Length of Stay: 8 day(s)  Current Patient Status: Inpatient   Certification Statement: The patient will continue to require additional inpatient hospital stay due to IV medications, rehab placement, GI treatment  Discharge Plan: Anticipate discharge in 24-48 hrs to rehab facility  Code Status: Level 1 - Full Code    Subjective:   Patient more alert and less lethargic today, eating lunch on RA during interview maintaining sats >94%  She denies chest pain, SOB, abdominal symptoms, etc  Oriented x 3  She denied additional complaints with exception of wanting to move around more  Objective:     Vitals:   Temp (24hrs), Av 6 °F (36 4 °C), Min:97 3 °F (36 3 °C), Max:98 °F (36 7 °C)    Temp:  [97 3 °F (36 3 °C)-98 °F (36 7 °C)] 98 °F (36 7 °C)  HR:  [62-70] 64  Resp:  [19] 19  BP: (114-119)/(53-58) 114/53  SpO2:  [96 %-99 %] 99 %  Body mass index is 39 47 kg/m²  Input and Output Summary (last 24 hours): Intake/Output Summary (Last 24 hours) at 2022 1453  Last data filed at 2022 0801  Gross per 24 hour   Intake 720 ml   Output --   Net 720 ml       Physical Exam:   Physical Exam  Vitals and nursing note reviewed  Constitutional:       General: She is not in acute distress  Appearance: She is well-developed  She is ill-appearing  HENT:      Head: Normocephalic and atraumatic  Mouth/Throat:      Mouth: Mucous membranes are dry  Eyes:      General:         Right eye: No discharge  Left eye: No discharge  Extraocular Movements: Extraocular movements intact  Conjunctiva/sclera: Conjunctivae normal    Cardiovascular:      Rate and Rhythm: Normal rate and regular rhythm        Heart sounds: No murmur heard   Pulmonary:      Effort: Pulmonary effort is normal  No respiratory distress  Breath sounds: Rhonchi present  No wheezing or rales  Comments: 94% on RA  Abdominal:      General: Bowel sounds are normal  There is no distension  Palpations: Abdomen is soft  Tenderness: There is no abdominal tenderness  Musculoskeletal:      Cervical back: Neck supple  Right lower leg: Edema present  Left lower leg: Edema present  Skin:     General: Skin is warm and dry  Neurological:      Mental Status: She is alert and oriented to person, place, and time  Comments: Oriented to person, place, time   Psychiatric:         Mood and Affect: Mood normal          Behavior: Behavior normal           Additional Data:     Labs:  Results from last 7 days   Lab Units 08/12/22  0337   WBC Thousand/uL 3 03*   HEMOGLOBIN g/dL 7 4*   HEMATOCRIT % 24 8*   PLATELETS Thousands/uL 59*   NEUTROS PCT % 77*   LYMPHS PCT % 14   MONOS PCT % 8   EOS PCT % 0     Results from last 7 days   Lab Units 08/12/22  0337 08/11/22  1145 08/10/22  0543   SODIUM mmol/L 132*   < > 137   POTASSIUM mmol/L 3 8   < > 4 6   CHLORIDE mmol/L 98   < > 101   CO2 mmol/L 28   < > 31   BUN mg/dL 29*   < > 30*   CREATININE mg/dL 1 33*   < > 1 47*   ANION GAP mmol/L 6   < > 5   CALCIUM mg/dL 9 0   < > 8 8   ALBUMIN g/dL  --   --  2 5*   TOTAL BILIRUBIN mg/dL  --   --  1 20*   ALK PHOS U/L  --   --  91   ALT U/L  --   --  39   AST U/L  --   --  122*   GLUCOSE RANDOM mg/dL 226*   < > 180*    < > = values in this interval not displayed       Results from last 7 days   Lab Units 08/09/22  0629   INR  1 31*     Results from last 7 days   Lab Units 08/13/22  1139 08/13/22  0739 08/12/22  2241 08/12/22  1807 08/12/22  1143 08/12/22  0804 08/11/22  2211 08/11/22  1642 08/11/22  1042 08/11/22  0702 08/10/22  2151 08/10/22  1629   POC GLUCOSE mg/dl 281* 261* 266* 357* 251* 202* 243* 220* 224* 174* 161* 220*               Lines/Drains:  Invasive Devices  Report    Peripheral Intravenous Line  Duration           Peripheral IV 08/11/22 Proximal;Right;Ventral (anterior) Antecubital 1 day          Drain  Duration           External Urinary Catheter Other (Comment) <1 day                      Imaging: Reviewed radiology reports from this admission including: chest xray and ultrasound(s)    Recent Cultures (last 7 days):         Last 24 Hours Medication List:   Current Facility-Administered Medications   Medication Dose Route Frequency Provider Last Rate    acetaminophen  650 mg Oral Q6H PRN Trena Cabrera PA-C      benzonatate  100 mg Oral TID PRN Armand Collet, PA-C      dexamethasone  6 mg Intravenous Q24H Chaka Ricks PA-C      escitalopram  10 mg Oral Daily Gale Spencer MD      guaiFENesin  600 mg Oral Q12H [facility] Corin Mabry PA-C      heparin (porcine)  7,500 Units Subcutaneous [facility] [address]      insulin glargine  45 Units Subcutaneous HS Lucy Garrison      insulin lispro  1-5 Units Subcutaneous 4x Daily (AC & HS) Chaka Ricks PA-C      insulin lispro  15 Units Subcutaneous TID With Meals Chaka Ricks PA-C      lactulose  20 g Oral TID Marlene Ga DO      levothyroxine  125 mcg Oral Once per day on Mon Tue Wed Thu Fri Sat Trena Cabrera PA-C      levothyroxine  62 5 mcg Oral Once per day on Sun Trena Cabrera PA-C      lidocaine  1 patch Topical Daily Taryn Lowe PA-C      melatonin  6 mg Oral HS PRN Trena Cabrera PA-C      nystatin   Topical BID Chaka Ricks PA-C      ondansetron  4 mg Intravenous Q6H PRN Nadeen France MD      pantoprazole  40 mg Oral Daily Before [address], MD      pravastatin  20 mg Oral Daily With [place], [place]      remdesivir  100 mg Intravenous Q24H Lucy Garrison      rifaximin  550 mg Oral Q12H [facility] Trena Cabrera PA-C      simethicone  80 mg Oral Q6H PRN Armand Collet, PA-C Today, Patient Was Seen By: Rafa Akhtar PA-C    **Please Note: This note may have been constructed using a voice recognition system  **

## 2022-08-13 NOTE — ASSESSMENT & PLAN NOTE
· Newly diagnosed during recent admission  · 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% and abnormal diastolic function on 31/36 echo   · Repeat echo on 07/05 status post ppm:  LVEF 50 percent  G1 DD  · Was receiving IV lasix  On hold as of 8/10 secondary to hypotension and bump in renal function

## 2022-08-13 NOTE — PLAN OF CARE
Problem: Potential for Falls  Goal: Patient will remain free of falls  Description: INTERVENTIONS:  - Educate patient/family on patient safety including physical limitations  - Instruct patient to call for assistance with activity   - Consult OT/PT to assist with strengthening/mobility   - Keep Call bell within reach  - Keep bed low and locked with side rails adjusted as appropriate  - Keep care items and personal belongings within reach  - Initiate and maintain comfort rounds  - Make Fall Risk Sign visible to staff  - Offer Toileting every 2 Hours, in advance of need  - Initiate/Maintain bed/chair alarm  - Obtain necessary fall risk management equipment:  socks;bedside commode  - Apply yellow socks and bracelet for high fall risk patients  - Consider moving patient to room near nurses station  Outcome: Progressing     Problem: Prexisting or High Potential for Compromised Skin Integrity  Goal: Skin integrity is maintained or improved  Description: INTERVENTIONS:  - Identify patients at risk for skin breakdown  - Assess and monitor skin integrity  - Assess and monitor nutrition and hydration status  - Monitor labs   - Assess for incontinence   - Turn and reposition patient  - Assist with mobility/ambulation  - Relieve pressure over bony prominences  - Avoid friction and shearing  - Provide appropriate hygiene as needed including keeping skin clean and dry  - Evaluate need for skin moisturizer/barrier cream  - Collaborate with interdisciplinary team   - Patient/family teaching  - Consider wound care consult   Outcome: Progressing     Problem: MOBILITY - ADULT  Goal: Maintain or return to baseline ADL function  Description: INTERVENTIONS:  -  Assess patient's ability to carry out ADLs; assess patient's baseline for ADL function and identify physical deficits which impact ability to perform ADLs (bathing, care of mouth/teeth, toileting, grooming, dressing, etc )  - Assess/evaluate cause of self-care deficits   - Assess range of motion  - Assess patient's mobility; develop plan if impaired  - Assess patient's need for assistive devices and provide as appropriate  - Encourage maximum independence but intervene and supervise when necessary  - Involve family in performance of ADLs  - Assess for home care needs following discharge   - Consider OT consult to assist with ADL evaluation and planning for discharge  - Provide patient education as appropriate  Outcome: Progressing     Problem: PAIN - ADULT  Goal: Verbalizes/displays adequate comfort level or baseline comfort level  Description: Interventions:  - Encourage patient to monitor pain and request assistance  - Assess pain using appropriate pain scale  - Administer analgesics based on type and severity of pain and evaluate response  - Implement non-pharmacological measures as appropriate and evaluate response  - Consider cultural and social influences on pain and pain management  - Notify physician/advanced practitioner if interventions unsuccessful or patient reports new pain  Outcome: Progressing     Problem: INFECTION - ADULT  Goal: Absence or prevention of progression during hospitalization  Description: INTERVENTIONS:  - Assess and monitor for signs and symptoms of infection  - Monitor lab/diagnostic results  - Monitor all insertion sites, i e  indwelling lines, tubes, and drains  - Monitor endotracheal if appropriate and nasal secretions for changes in amount and color  - [place] appropriate cooling/warming therapies per order  - Administer medications as ordered  - Instruct and encourage patient and family to use good hand hygiene technique  - Identify and instruct in appropriate isolation precautions for identified infection/condition  Outcome: Progressing  Goal: Absence of fever/infection during neutropenic period  Description: INTERVENTIONS:  - Monitor WBC    Outcome: Progressing     Problem: SAFETY ADULT  Goal: Patient will remain free of falls  Description: INTERVENTIONS:  - Educate patient/family on patient safety including physical limitations  - Instruct patient to call for assistance with activity   - Consult OT/PT to assist with strengthening/mobility   - Keep Call bell within reach  - Keep bed low and locked with side rails adjusted as appropriate  - Keep care items and personal belongings within reach  - Initiate and maintain comfort rounds  - Make Fall Risk Sign visible to staff  - Offer Toileting every 2 Hours, in advance of need  - Initiate/Maintain bed/chair alarm  - Obtain necessary fall risk management equipment:  socks;bedside commode  - Apply yellow socks and bracelet for high fall risk patients  - Consider moving patient to room near nurses station  Outcome: Progressing  Goal: Maintain or return to baseline ADL function  Description: INTERVENTIONS:  -  Assess patient's ability to carry out ADLs; assess patient's baseline for ADL function and identify physical deficits which impact ability to perform ADLs (bathing, care of mouth/teeth, toileting, grooming, dressing, etc )  - Assess/evaluate cause of self-care deficits   - Assess range of motion  - Assess patient's mobility; develop plan if impaired  - Assess patient's need for assistive devices and provide as appropriate  - Encourage maximum independence but intervene and supervise when necessary  - Involve family in performance of ADLs  - Assess for home care needs following discharge   - Consider OT consult to assist with ADL evaluation and planning for discharge  - Provide patient education as appropriate  Outcome: Progressing  Goal: Maintains/Returns to pre admission functional level  Description: INTERVENTIONS:  - Perform BMAT or MOVE assessment daily    - Set and communicate daily mobility goal to care team and patient/family/caregiver  - Collaborate with rehabilitation services on mobility goals if consulted  - Perform Range of Motion 2 times a day  - Reposition patient every 2 hours    - Dangle patient 2 times a day  - Stand patient 2 times a day  - Ambulate patient 2 times a day  - Out of bed to chair 2 times a day   - Out of bed for meals 2 times a day  - Out of bed for toileting  - Record patient progress and toleration of activity level   Outcome: Progressing     Problem: DISCHARGE PLANNING  Goal: Discharge to home or other facility with appropriate resources  Description: INTERVENTIONS:  - Identify barriers to discharge w/patient and caregiver  - Arrange for needed discharge resources and transportation as appropriate  - Identify discharge learning needs (meds, wound care, etc )  - Arrange for interpretive services to assist at discharge as needed  - Refer to Case Management Department for coordinating discharge planning if the patient needs post-hospital services based on physician/advanced practitioner order or complex needs related to functional status, cognitive ability, or social support system  Outcome: Progressing     Problem: Knowledge Deficit  Goal: Patient/family/caregiver demonstrates understanding of disease process, treatment plan, medications, and discharge instructions  Description: Complete learning assessment and assess knowledge base    Interventions:  - Provide teaching at level of understanding  - Provide teaching via preferred learning methods  Outcome: Progressing     Problem: METABOLIC, FLUID AND ELECTROLYTES - ADULT  Goal: Glucose maintained within target range  Description: INTERVENTIONS:  - Monitor Blood Glucose as ordered  - Assess for signs and symptoms of hyperglycemia and hypoglycemia  - Administer ordered medications to maintain glucose within target range  - Assess nutritional intake and initiate nutrition service referral as needed  Outcome: Progressing  Goal: Electrolytes maintained within normal limits  Description: INTERVENTIONS:  - Monitor labs and assess patient for signs and symptoms of electrolyte imbalances  - Administer electrolyte replacement as ordered  - Monitor response to electrolyte replacements, including repeat lab results as appropriate  - Instruct patient on fluid and nutrition as appropriate  Outcome: Progressing  Goal: Fluid balance maintained  Description: INTERVENTIONS:  - Monitor labs   - Monitor I/O and WT  - Instruct patient on fluid and nutrition as appropriate  - Assess for signs & symptoms of volume excess or deficit  Outcome: Progressing     Problem: GASTROINTESTINAL - ADULT  Goal: Minimal or absence of nausea and/or vomiting  Description: INTERVENTIONS:  - Administer IV fluids if ordered to ensure adequate hydration  - Maintain NPO status until nausea and vomiting are resolved  - Nasogastric tube if ordered  - Administer ordered antiemetic medications as needed  - Provide nonpharmacologic comfort measures as appropriate  - Advance diet as tolerated, if ordered  - Consider nutrition services referral to assist patient with adequate nutrition and appropriate food choices  Outcome: Progressing  Goal: Maintains or returns to baseline bowel function  Description: INTERVENTIONS:  - Assess bowel function  - Encourage oral fluids to ensure adequate hydration  - Administer IV fluids if ordered to ensure adequate hydration  - Administer ordered medications as needed  - Encourage mobilization and activity  - Consider nutritional services referral to assist patient with adequate nutrition and appropriate food choices  Outcome: Progressing  Goal: Maintains adequate nutritional intake  Description: INTERVENTIONS:  - Monitor percentage of each meal consumed  - Identify factors contributing to decreased intake, treat as appropriate  - Assist with meals as needed  - Monitor I&O, weight, and lab values if indicated  - Obtain nutrition services referral as needed  Outcome: Progressing     Problem: SKIN/TISSUE INTEGRITY - ADULT  Goal: Skin Integrity remains intact(Skin Breakdown Prevention)  Description: Assess:  -Perform Conrad assessment  -Clean and moisturize skin   -Inspect skin when repositioning, toileting, and assisting with ADLS  -Assess under medical devices   -Assess extremities for adequate circulation and sensation     Bed Management:  -Have minimal linens on bed & keep smooth, unwrinkled  -Change linens as needed when moist or perspiring  -Avoid sitting or lying in one position for more than 2 hours while in bed  -Keep HOB at 30 degrees     Toileting:  -Offer bedside commode  -Assess for incontinence   -Use incontinent care products after each incontinent episode    Activity:  -Mobilize patient 2 times a day  -Encourage activity and walks on unit  -Encourage or provide ROM exercises   -Turn and reposition patient every 2 Hours  -Use appropriate equipment to lift or move patient in bed  -Instruct/ Assist with weight shifting every 2 when out of bed in chair  -Consider limitation of chair time 2 hour intervals    Skin Care:  -Avoid use of baby powder, tape, friction and shearing, hot water or constrictive clothing  -Relieve pressure over bony prominences   -Do not massage red bony areas    Next Steps:  -Teach patient strategies to minimize risks   -Consider consults to  interdisciplinary teams  Outcome: Progressing     Problem: HEMATOLOGIC - ADULT  Goal: Maintains hematologic stability  Description: INTERVENTIONS  - Assess for signs and symptoms of bleeding or hemorrhage  - Monitor labs  - Administer supportive blood products/factors as ordered and appropriate  Outcome: Progressing     Problem: MUSCULOSKELETAL - ADULT  Goal: Maintain or return mobility to safest level of function  Description: INTERVENTIONS:  - Assess patient's ability to carry out ADLs; assess patient's baseline for ADL function and identify physical deficits which impact ability to perform ADLs (bathing, care of mouth/teeth, toileting, grooming, dressing, etc )  - Assess/evaluate cause of self-care deficits   - Assess range of motion  - Assess patient's mobility  - Assess patient's need for assistive devices and provide as appropriate  - Encourage maximum independence but intervene and supervise when necessary  - Involve family in performance of ADLs  - Assess for home care needs following discharge   - Consider OT consult to assist with ADL evaluation and planning for discharge  - Provide patient education as appropriate  Outcome: Progressing     Problem: Nutrition/Hydration-ADULT  Goal: Nutrient/Hydration intake appropriate for improving, restoring or maintaining nutritional needs  Description: Monitor and assess patient's nutrition/hydration status for malnutrition  Collaborate with interdisciplinary team and initiate plan and interventions as ordered  Monitor patient's weight and dietary intake as ordered or per policy  Utilize nutrition screening tool and intervene as necessary  Determine patient's food preferences and provide high-protein, high-caloric foods as appropriate       INTERVENTIONS:  - Monitor oral intake, urinary output, labs, and treatment plans  - Assess nutrition and hydration status and recommend course of action  - Evaluate amount of meals eaten  - Assist patient with eating if necessary   - Allow adequate time for meals  - Recommend/ encourage appropriate diets, oral nutritional supplements, and vitamin/mineral supplements  - Order, calculate, and assess calorie counts as needed  - Recommend, monitor, and adjust tube feedings and TPN/PPN based on assessed needs  - Assess need for intravenous fluids  - Provide specific nutrition/hydration education as appropriate  - Include patient/family/caregiver in decisions related to nutrition  Outcome: Progressing

## 2022-08-13 NOTE — ASSESSMENT & PLAN NOTE
· S/p permanent pacemaker 6/29 at [facility]  · Prior to placement patient required emergent transvenous pacing and did require vasopressors after placement

## 2022-08-13 NOTE — ASSESSMENT & PLAN NOTE
Lab Results   Component Value Date    HGBA1C 8 2 (H) 05/31/2022       Recent Labs     08/12/22  1807 08/12/22  2241 08/13/22  0739 08/13/22  1139   POCGLU 357* 266* 261* 281*       Blood Sugar Average: Last 72 hrs:  (P) 243 [id number]     · Patient has uncontrolled hyperglycemia  · Home regimen (hold)  · Metformin 500 BID  · Insulin lantus 38 units am and 10 units HS  · Humalog 15 units TID AC  · Inpatient regimen: Lantus 45 units at bedtime and pre meal Humalog 15 units t i d

## 2022-08-13 NOTE — ASSESSMENT & PLAN NOTE
· Patient was admitted with hepatic encephalopathy, GI on board (see below)  · Ammonia 10   · Hold home tramadol and further doses of flexeril to avoid sedative effects  · 8/14: lethargy seems to have resolved, patient requesting to get up out of bed and move around   A&O x 3  · PT/OT recommending acute rehab   · CM on board, potential bed available at [facility] --> waiting on family decisions, hopeful DC tomorrow 8/15 if patient tolerates re-initiation of diuretics today

## 2022-08-13 NOTE — ASSESSMENT & PLAN NOTE
· Patient has 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  Ultrasound shows ascites    · GI consult, appreciate recs  · 8/09 attempted paracentesis - Insufficient fluid for safe paracentesis  · Continue lactulose/rifampin   · Hold IV Lasix 40 mg daily and spironolactone 50 mg daily in setting of hypotension and increased creatinine- hopeful to restart tomorrow 8/14    Patient is hard stick, unable to get labs this am --> try again tomorrow am

## 2022-08-13 NOTE — ASSESSMENT & PLAN NOTE
· Patient has pancytopenia due to portal hypertension, liver cirrhosis    · She required blood transfusion during this hospital stay + IV iron (iron panel not reflective of deficiency)  · FOBT + however no signs of overt GI bleeding --> GI is aware   · Will monitor blood counts closely, continue to remain stable --> outpatient follow up   · PLTs currently 47 (maintain >20 unless active bleeding)  · Hgb 7 4 (maintain > 7)  · WBC 3

## 2022-08-13 NOTE — PLAN OF CARE
Problem: Potential for Falls  Goal: Patient will remain free of falls  Description: INTERVENTIONS:  - Educate patient/family on patient safety including physical limitations  - Instruct patient to call for assistance with activity   - Consult OT/PT to assist with strengthening/mobility   - Keep Call bell within reach  - Keep bed low and locked with side rails adjusted as appropriate  - Keep care items and personal belongings within reach  - Initiate and maintain comfort rounds  - Make Fall Risk Sign visible to staff  - Offer Toileting every 2 Hours, in advance of need  - Initiate/Maintain bed/chair alarm  - Obtain necessary fall risk management equipment:  socks;bedside commode  - Apply yellow socks and bracelet for high fall risk patients  - Consider moving patient to room near nurses station  Outcome: Progressing     Problem: Prexisting or High Potential for Compromised Skin Integrity  Goal: Skin integrity is maintained or improved  Description: INTERVENTIONS:  - Identify patients at risk for skin breakdown  - Assess and monitor skin integrity  - Assess and monitor nutrition and hydration status  - Monitor labs   - Assess for incontinence   - Turn and reposition patient  - Assist with mobility/ambulation  - Relieve pressure over bony prominences  - Avoid friction and shearing  - Provide appropriate hygiene as needed including keeping skin clean and dry  - Evaluate need for skin moisturizer/barrier cream  - Collaborate with interdisciplinary team   - Patient/family teaching  - Consider wound care consult   Outcome: Progressing     Problem: MOBILITY - ADULT  Goal: Maintain or return to baseline ADL function  Description: INTERVENTIONS:  -  Assess patient's ability to carry out ADLs; assess patient's baseline for ADL function and identify physical deficits which impact ability to perform ADLs (bathing, care of mouth/teeth, toileting, grooming, dressing, etc )  - Assess/evaluate cause of self-care deficits   - Assess range of motion  - Assess patient's mobility; develop plan if impaired  - Assess patient's need for assistive devices and provide as appropriate  - Encourage maximum independence but intervene and supervise when necessary  - Involve family in performance of ADLs  - Assess for home care needs following discharge   - Consider OT consult to assist with ADL evaluation and planning for discharge  - Provide patient education as appropriate  Outcome: Progressing     Problem: PAIN - ADULT  Goal: Verbalizes/displays adequate comfort level or baseline comfort level  Description: Interventions:  - Encourage patient to monitor pain and request assistance  - Assess pain using appropriate pain scale  - Administer analgesics based on type and severity of pain and evaluate response  - Implement non-pharmacological measures as appropriate and evaluate response  - Consider cultural and social influences on pain and pain management  - Notify physician/advanced practitioner if interventions unsuccessful or patient reports new pain  Outcome: Progressing     Problem: INFECTION - ADULT  Goal: Absence or prevention of progression during hospitalization  Description: INTERVENTIONS:  - Assess and monitor for signs and symptoms of infection  - Monitor lab/diagnostic results  - Monitor all insertion sites, i e  indwelling lines, tubes, and drains  - Monitor endotracheal if appropriate and nasal secretions for changes in amount and color  - [place] appropriate cooling/warming therapies per order  - Administer medications as ordered  - Instruct and encourage patient and family to use good hand hygiene technique  - Identify and instruct in appropriate isolation precautions for identified infection/condition  Outcome: Progressing  Goal: Absence of fever/infection during neutropenic period  Description: INTERVENTIONS:  - Monitor WBC    Outcome: Progressing     Problem: SAFETY ADULT  Goal: Patient will remain free of falls  Description: INTERVENTIONS:  - Educate patient/family on patient safety including physical limitations  - Instruct patient to call for assistance with activity   - Consult OT/PT to assist with strengthening/mobility   - Keep Call bell within reach  - Keep bed low and locked with side rails adjusted as appropriate  - Keep care items and personal belongings within reach  - Initiate and maintain comfort rounds  - Make Fall Risk Sign visible to staff  - Offer Toileting every 2 Hours, in advance of need  - Initiate/Maintain bed/chair alarm  - Obtain necessary fall risk management equipment:  socks;bedside commode  - Apply yellow socks and bracelet for high fall risk patients  - Consider moving patient to room near nurses station  Outcome: Progressing  Goal: Maintain or return to baseline ADL function  Description: INTERVENTIONS:  -  Assess patient's ability to carry out ADLs; assess patient's baseline for ADL function and identify physical deficits which impact ability to perform ADLs (bathing, care of mouth/teeth, toileting, grooming, dressing, etc )  - Assess/evaluate cause of self-care deficits   - Assess range of motion  - Assess patient's mobility; develop plan if impaired  - Assess patient's need for assistive devices and provide as appropriate  - Encourage maximum independence but intervene and supervise when necessary  - Involve family in performance of ADLs  - Assess for home care needs following discharge   - Consider OT consult to assist with ADL evaluation and planning for discharge  - Provide patient education as appropriate  Outcome: Progressing  Goal: Maintains/Returns to pre admission functional level  Description: INTERVENTIONS:  - Perform BMAT or MOVE assessment daily    - Set and communicate daily mobility goal to care team and patient/family/caregiver  - Collaborate with rehabilitation services on mobility goals if consulted  - Perform Range of Motion 2 times a day  - Reposition patient every 2 hours    - Dangle patient 2 times a day  - Stand patient 2 times a day  - Ambulate patient 2 times a day  - Out of bed to chair 2 times a day   - Out of bed for meals 2 times a day  - Out of bed for toileting  - Record patient progress and toleration of activity level   Outcome: Progressing     Problem: DISCHARGE PLANNING  Goal: Discharge to home or other facility with appropriate resources  Description: INTERVENTIONS:  - Identify barriers to discharge w/patient and caregiver  - Arrange for needed discharge resources and transportation as appropriate  - Identify discharge learning needs (meds, wound care, etc )  - Arrange for interpretive services to assist at discharge as needed  - Refer to Case Management Department for coordinating discharge planning if the patient needs post-hospital services based on physician/advanced practitioner order or complex needs related to functional status, cognitive ability, or social support system  Outcome: Progressing     Problem: Knowledge Deficit  Goal: Patient/family/caregiver demonstrates understanding of disease process, treatment plan, medications, and discharge instructions  Description: Complete learning assessment and assess knowledge base    Interventions:  - Provide teaching at level of understanding  - Provide teaching via preferred learning methods  Outcome: Progressing     Problem: METABOLIC, FLUID AND ELECTROLYTES - ADULT  Goal: Glucose maintained within target range  Description: INTERVENTIONS:  - Monitor Blood Glucose as ordered  - Assess for signs and symptoms of hyperglycemia and hypoglycemia  - Administer ordered medications to maintain glucose within target range  - Assess nutritional intake and initiate nutrition service referral as needed  Outcome: Progressing  Goal: Electrolytes maintained within normal limits  Description: INTERVENTIONS:  - Monitor labs and assess patient for signs and symptoms of electrolyte imbalances  - Administer electrolyte replacement as ordered  - Monitor response to electrolyte replacements, including repeat lab results as appropriate  - Instruct patient on fluid and nutrition as appropriate  Outcome: Progressing  Goal: Fluid balance maintained  Description: INTERVENTIONS:  - Monitor labs   - Monitor I/O and WT  - Instruct patient on fluid and nutrition as appropriate  - Assess for signs & symptoms of volume excess or deficit  Outcome: Progressing     Problem: GASTROINTESTINAL - ADULT  Goal: Minimal or absence of nausea and/or vomiting  Description: INTERVENTIONS:  - Administer IV fluids if ordered to ensure adequate hydration  - Maintain NPO status until nausea and vomiting are resolved  - Nasogastric tube if ordered  - Administer ordered antiemetic medications as needed  - Provide nonpharmacologic comfort measures as appropriate  - Advance diet as tolerated, if ordered  - Consider nutrition services referral to assist patient with adequate nutrition and appropriate food choices  Outcome: Progressing  Goal: Maintains or returns to baseline bowel function  Description: INTERVENTIONS:  - Assess bowel function  - Encourage oral fluids to ensure adequate hydration  - Administer IV fluids if ordered to ensure adequate hydration  - Administer ordered medications as needed  - Encourage mobilization and activity  - Consider nutritional services referral to assist patient with adequate nutrition and appropriate food choices  Outcome: Progressing  Goal: Maintains adequate nutritional intake  Description: INTERVENTIONS:  - Monitor percentage of each meal consumed  - Identify factors contributing to decreased intake, treat as appropriate  - Assist with meals as needed  - Monitor I&O, weight, and lab values if indicated  - Obtain nutrition services referral as needed  Outcome: Progressing     Problem: SKIN/TISSUE INTEGRITY - ADULT  Goal: Skin Integrity remains intact(Skin Breakdown Prevention)  Description: Assess:  -Perform Conrad assessment  -Clean and moisturize skin   -Inspect skin when repositioning, toileting, and assisting with ADLS  -Assess under medical devices   -Assess extremities for adequate circulation and sensation     Bed Management:  -Have minimal linens on bed & keep smooth, unwrinkled  -Change linens as needed when moist or perspiring  -Avoid sitting or lying in one position for more than 2 hours while in bed  -Keep HOB at 30 degrees     Toileting:  -Offer bedside commode  -Assess for incontinence   -Use incontinent care products after each incontinent episode    Activity:  -Mobilize patient 2 times a day  -Encourage activity and walks on unit  -Encourage or provide ROM exercises   -Turn and reposition patient every 2 Hours  -Use appropriate equipment to lift or move patient in bed  -Instruct/ Assist with weight shifting every 2 when out of bed in chair  -Consider limitation of chair time 2 hour intervals    Skin Care:  -Avoid use of baby powder, tape, friction and shearing, hot water or constrictive clothing  -Relieve pressure over bony prominences   -Do not massage red bony areas    Next Steps:  -Teach patient strategies to minimize risks   -Consider consults to  interdisciplinary teams  Outcome: Progressing     Problem: HEMATOLOGIC - ADULT  Goal: Maintains hematologic stability  Description: INTERVENTIONS  - Assess for signs and symptoms of bleeding or hemorrhage  - Monitor labs  - Administer supportive blood products/factors as ordered and appropriate  Outcome: Progressing     Problem: MUSCULOSKELETAL - ADULT  Goal: Maintain or return mobility to safest level of function  Description: INTERVENTIONS:  - Assess patient's ability to carry out ADLs; assess patient's baseline for ADL function and identify physical deficits which impact ability to perform ADLs (bathing, care of mouth/teeth, toileting, grooming, dressing, etc )  - Assess/evaluate cause of self-care deficits   - Assess range of motion  - Assess patient's mobility  - Assess patient's need for assistive devices and provide as appropriate  - Encourage maximum independence but intervene and supervise when necessary  - Involve family in performance of ADLs  - Assess for home care needs following discharge   - Consider OT consult to assist with ADL evaluation and planning for discharge  - Provide patient education as appropriate  Outcome: Progressing     Problem: Nutrition/Hydration-ADULT  Goal: Nutrient/Hydration intake appropriate for improving, restoring or maintaining nutritional needs  Description: Monitor and assess patient's nutrition/hydration status for malnutrition  Collaborate with interdisciplinary team and initiate plan and interventions as ordered  Monitor patient's weight and dietary intake as ordered or per policy  Utilize nutrition screening tool and intervene as necessary  Determine patient's food preferences and provide high-protein, high-caloric foods as appropriate       INTERVENTIONS:  - Monitor oral intake, urinary output, labs, and treatment plans  - Assess nutrition and hydration status and recommend course of action  - Evaluate amount of meals eaten  - Assist patient with eating if necessary   - Allow adequate time for meals  - Recommend/ encourage appropriate diets, oral nutritional supplements, and vitamin/mineral supplements  - Order, calculate, and assess calorie counts as needed  - Recommend, monitor, and adjust tube feedings and TPN/PPN based on assessed needs  - Assess need for intravenous fluids  - Provide specific nutrition/hydration education as appropriate  - Include patient/family/caregiver in decisions related to nutrition  Outcome: Progressing

## 2022-08-13 NOTE — ASSESSMENT & PLAN NOTE
· Cr 1 28 --> 1 5 --> 1 17  · Likely secondary to acute episode hypotension + diuretics causing prerenal kidney injury    · Plan to restart diuretics today, monitor am BMP closely

## 2022-08-13 NOTE — PROGRESS NOTES
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, with prior issues with encephalopathy and variceal bleeding  CT showed new ascites but insufficient for paracentesis  Lasix spironolactone were held due to blood pressure and acute kidney injury  No fresh labs today  Will repeat tomorrow and resume diuretics as tolerated    2  Hepatic encephalopathy   Present on admission  Fatigue but no encephalopathy  Ammonia normalized  Continue lactulose/Xifaxan  Refused lactulose yesterday but took today      3  Chronic anemia  FOBT + but no overt GIB  Hemoglobin remains stable  Received IV iron      4 H/o esophageal varices - no n/v/hematemesis at this time    No overt bleeding  Resume nonselective beta-blocker when blood pressure allows  Had EGD with banding in February  Plan repeat EGD as an outpatient after she recovers from Covid     Discussed with RN    Chief Complaint   Patient presents with    Hyperglycemia - Symptomatic     Patient presents to the ED via EMS with c/o not feeling well, states fall two weeks ago and has residual back pain and left shoulder fx (seen at [facility] s/p fall)  States her sugars have been high, EMS report >300 en route, recent pacemaker placement         SUBJECTIVE/HPI   Interviewed via speaker due to Rajwinder  No abdominal pain, nausea or vomiting      /53   Pulse 64   Temp 98 °F (36 7 °C)   Resp 19   Ht 5' 4" (1 626 m)   Wt 104 kg (229 lb 15 oz)   SpO2 99%   BMI 39 47 kg/m²     PHYSICALEXAM  General appearance: alert, appears stated age and cooperative  Eyes: PERLLA, EOMI, no icterus   Head: Normocephalic, without obvious abnormality, atraumatic  Abdomen:  Nondistended  Extremities: extremities normal, atraumatic, no cyanosis or edema  Neurologic: Grossly normal    Lab Results   Component Value Date    GLUCOSE 280 (H) 08/04/2022    CALCIUM 9 0 08/12/2022    K 3 8 08/12/2022    CO2 28 08/12/2022    CL 98 08/12/2022    BUN 29 (H) 08/12/2022    CREATININE 1 33 (H) 08/12/2022     Lab Results   Component Value Date    WBC 3 03 (L) 08/12/2022    HGB 7 4 (L) 08/12/2022    HCT 24 8 (L) 08/12/2022    MCV 95 08/12/2022    PLT 59 (L) 08/12/2022     Lab Results   Component Value Date    ALT 39 08/10/2022     (H) 08/10/2022    ALKPHOS 91 08/10/2022     No results found for: AMYLASE  No results found for: LIPASE  Lab Results   Component Value Date    IRON 190 (H) 08/05/2022    TIBC 413 08/05/2022    FERRITIN 20 08/05/2022     Lab Results   Component Value Date    INR 1 31 (H) 08/09/2022

## 2022-08-13 NOTE — ASSESSMENT & PLAN NOTE
· Soft Bps throughout hospital stay with systolic   SBP currently > 110 today  · On lasix, spirinolactone, and lactulose previously  Currently lasix and spirinolactone on hold, likely restart tomorrow 8/14  · Received 1 dose of albumin 8/10  · Soft Bps likely due to cirrhotic physiology, may benefit from midodrine in adjunct to her diuretics and lactulose

## 2022-08-13 NOTE — ASSESSMENT & PLAN NOTE
· Urine microscopy with innumerable bacteria but only 2-4 wbc's  · Urine culture: [id number] CFU/mL E   Coli - likely contaminant  · Patient denies any urinary symptoms  · Monitor off abx for fever/dysuria/leukocytosis

## 2022-08-14 LAB
ANION GAP SERPL CALCULATED.3IONS-SCNC: 5 MMOL/L (ref 4–13)
BASOPHILS # BLD AUTO: 0 THOUSANDS/ΜL (ref 0–0.1)
BASOPHILS NFR BLD AUTO: 0 % (ref 0–1)
BUN SERPL-MCNC: 27 MG/DL (ref 5–25)
CALCIUM SERPL-MCNC: 8.7 MG/DL (ref 8.3–10.1)
CHLORIDE SERPL-SCNC: 100 MMOL/L (ref 96–108)
CO2 SERPL-SCNC: 28 MMOL/L (ref 21–32)
CREAT SERPL-MCNC: 1.17 MG/DL (ref 0.6–1.3)
EOSINOPHIL # BLD AUTO: 0 THOUSAND/ΜL (ref 0–0.61)
EOSINOPHIL NFR BLD AUTO: 0 % (ref 0–6)
ERYTHROCYTE [DISTWIDTH] IN BLOOD BY AUTOMATED COUNT: 21.2 % (ref 11.6–15.1)
GFR SERPL CREATININE-BSD FRML MDRD: 45 ML/MIN/1.73SQ M
GLUCOSE SERPL-MCNC: 151 MG/DL (ref 65–140)
GLUCOSE SERPL-MCNC: 172 MG/DL (ref 65–140)
GLUCOSE SERPL-MCNC: 200 MG/DL (ref 65–140)
GLUCOSE SERPL-MCNC: 224 MG/DL (ref 65–140)
HCT VFR BLD AUTO: 24.2 % (ref 34.8–46.1)
HGB BLD-MCNC: 7.4 G/DL (ref 11.5–15.4)
IMM GRANULOCYTES # BLD AUTO: 0.01 THOUSAND/UL (ref 0–0.2)
IMM GRANULOCYTES NFR BLD AUTO: 0 % (ref 0–2)
LYMPHOCYTES # BLD AUTO: 0.43 THOUSANDS/ΜL (ref 0.6–4.47)
LYMPHOCYTES NFR BLD AUTO: 15 % (ref 14–44)
MCH RBC QN AUTO: 27.9 PG (ref 26.8–34.3)
MCHC RBC AUTO-ENTMCNC: 30.6 G/DL (ref 31.4–37.4)
MCV RBC AUTO: 91 FL (ref 82–98)
MONOCYTES # BLD AUTO: 0.22 THOUSAND/ΜL (ref 0.17–1.22)
MONOCYTES NFR BLD AUTO: 8 % (ref 4–12)
NEUTROPHILS # BLD AUTO: 2.26 THOUSANDS/ΜL (ref 1.85–7.62)
NEUTS SEG NFR BLD AUTO: 77 % (ref 43–75)
NRBC BLD AUTO-RTO: 0 /100 WBCS
PLATELET # BLD AUTO: 47 THOUSANDS/UL (ref 149–390)
PMV BLD AUTO: 13.7 FL (ref 8.9–12.7)
POTASSIUM SERPL-SCNC: 3.7 MMOL/L (ref 3.5–5.3)
RBC # BLD AUTO: 2.65 MILLION/UL (ref 3.81–5.12)
SODIUM SERPL-SCNC: 133 MMOL/L (ref 135–147)
WBC # BLD AUTO: 2.92 THOUSAND/UL (ref 4.31–10.16)

## 2022-08-14 PROCEDURE — 99232 SBSQ HOSP IP/OBS MODERATE 35: CPT | Performed by: INTERNAL MEDICINE

## 2022-08-14 PROCEDURE — 80048 BASIC METABOLIC PNL TOTAL CA: CPT

## 2022-08-14 PROCEDURE — 82948 REAGENT STRIP/BLOOD GLUCOSE: CPT

## 2022-08-14 PROCEDURE — 85025 COMPLETE CBC W/AUTO DIFF WBC: CPT

## 2022-08-14 RX ORDER — SPIRONOLACTONE 25 MG/1
25 TABLET ORAL DAILY
Status: DISCONTINUED | OUTPATIENT
Start: 2022-08-14 | End: 2022-08-15 | Stop reason: HOSPADM

## 2022-08-14 RX ORDER — NADOLOL 20 MG/1
20 TABLET ORAL DAILY
Status: DISCONTINUED | OUTPATIENT
Start: 2022-08-14 | End: 2022-08-15 | Stop reason: HOSPADM

## 2022-08-14 RX ORDER — FUROSEMIDE 40 MG/1
40 TABLET ORAL DAILY
Status: DISCONTINUED | OUTPATIENT
Start: 2022-08-14 | End: 2022-08-15 | Stop reason: HOSPADM

## 2022-08-14 RX ADMIN — NADOLOL 20 MG: 20 TABLET ORAL at 12:49

## 2022-08-14 RX ADMIN — REMDESIVIR 100 MG: 100 INJECTION, POWDER, LYOPHILIZED, FOR SOLUTION INTRAVENOUS at 12:49

## 2022-08-14 RX ADMIN — RIFAXIMIN 550 MG: 550 TABLET ORAL at 21:35

## 2022-08-14 RX ADMIN — LACTULOSE 20 G: 20 SOLUTION ORAL at 09:14

## 2022-08-14 RX ADMIN — LEVOTHYROXINE SODIUM 62.5 MCG: 25 TABLET ORAL at 05:59

## 2022-08-14 RX ADMIN — FUROSEMIDE 40 MG: 40 TABLET ORAL at 12:49

## 2022-08-14 RX ADMIN — LACTULOSE 20 G: 20 SOLUTION ORAL at 21:35

## 2022-08-14 RX ADMIN — INSULIN LISPRO 2 UNITS: 100 INJECTION, SOLUTION INTRAVENOUS; SUBCUTANEOUS at 17:49

## 2022-08-14 RX ADMIN — LACTULOSE 20 G: 20 SOLUTION ORAL at 17:49

## 2022-08-14 RX ADMIN — PRAVASTATIN SODIUM 20 MG: 20 TABLET ORAL at 17:49

## 2022-08-14 RX ADMIN — INSULIN LISPRO 15 UNITS: 100 INJECTION, SOLUTION INTRAVENOUS; SUBCUTANEOUS at 12:47

## 2022-08-14 RX ADMIN — INSULIN LISPRO 1 UNITS: 100 INJECTION, SOLUTION INTRAVENOUS; SUBCUTANEOUS at 12:47

## 2022-08-14 RX ADMIN — SPIRONOLACTONE 25 MG: 25 TABLET ORAL at 12:49

## 2022-08-14 RX ADMIN — PANTOPRAZOLE SODIUM 40 MG: 40 TABLET, DELAYED RELEASE ORAL at 06:01

## 2022-08-14 RX ADMIN — INSULIN LISPRO 1 UNITS: 100 INJECTION, SOLUTION INTRAVENOUS; SUBCUTANEOUS at 09:13

## 2022-08-14 RX ADMIN — HEPARIN SODIUM 7500 UNITS: 5000 INJECTION INTRAVENOUS; SUBCUTANEOUS at 14:07

## 2022-08-14 RX ADMIN — DEXAMETHASONE SODIUM PHOSPHATE 6 MG: 4 INJECTION, SOLUTION INTRAMUSCULAR; INTRAVENOUS at 12:47

## 2022-08-14 RX ADMIN — INSULIN GLARGINE 45 UNITS: 100 INJECTION, SOLUTION SUBCUTANEOUS at 21:41

## 2022-08-14 RX ADMIN — LIDOCAINE 5% 1 PATCH: 700 PATCH TOPICAL at 09:15

## 2022-08-14 RX ADMIN — NYSTATIN: 100000 POWDER TOPICAL at 17:49

## 2022-08-14 RX ADMIN — INSULIN LISPRO 3 UNITS: 100 INJECTION, SOLUTION INTRAVENOUS; SUBCUTANEOUS at 21:41

## 2022-08-14 RX ADMIN — HEPARIN SODIUM 7500 UNITS: 5000 INJECTION INTRAVENOUS; SUBCUTANEOUS at 21:35

## 2022-08-14 RX ADMIN — NYSTATIN: 100000 POWDER TOPICAL at 09:13

## 2022-08-14 RX ADMIN — GUAIFENESIN 600 MG: 600 TABLET ORAL at 21:35

## 2022-08-14 RX ADMIN — HEPARIN SODIUM 7500 UNITS: 5000 INJECTION INTRAVENOUS; SUBCUTANEOUS at 06:05

## 2022-08-14 RX ADMIN — GUAIFENESIN 600 MG: 600 TABLET ORAL at 09:14

## 2022-08-14 RX ADMIN — RIFAXIMIN 550 MG: 550 TABLET ORAL at 09:14

## 2022-08-14 RX ADMIN — ESCITALOPRAM OXALATE 10 MG: 10 TABLET ORAL at 09:15

## 2022-08-14 RX ADMIN — INSULIN LISPRO 15 UNITS: 100 INJECTION, SOLUTION INTRAVENOUS; SUBCUTANEOUS at 09:13

## 2022-08-14 RX ADMIN — INSULIN LISPRO 15 UNITS: 100 INJECTION, SOLUTION INTRAVENOUS; SUBCUTANEOUS at 17:49

## 2022-08-14 NOTE — PROGRESS NOTES
[place]  Progress Note Tom Perkins 1945, 77 y o  female MRN: [id number]  Unit/Bed#: -01 Encounter: [id number]  Primary Care Provider: Yadiel Reynolds DO   Date and time admitted to hospital: 8/4/2022  6:45 PM    * Hepatic encephalopathy [facility])  Assessment & Plan  · Patient was admitted with hepatic encephalopathy, GI on board (see below)  · Ammonia 10   · Hold home tramadol and further doses of flexeril to avoid sedative effects  · 8/14: lethargy seems to have resolved, patient requesting to get up out of bed and move around  A&O x 3  · PT/OT recommending acute rehab   · CM on board, potential bed available at [facility] --> waiting on family decisions, hopeful DC tomorrow 8/15 if patient tolerates re-initiation of diuretics today     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) ([facility] [address] )  Assessment & Plan  · Patient has 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  Ultrasound shows ascites  · GI consult, appreciate recs  · 8/09 attempted paracentesis - Insufficient fluid for safe paracentesis  · Continue lactulose/rifampin   · 8/14: discussed with GI, GI restarted lasix 40 mg PO + spironolactone 25 mg     COVID-19  Assessment & Plan  · New non productive cough as of 8/10  Associated sore throat  Afebrile, no significant leukocytosis  · Covid positive 8/11  · CRP 24, D dimer 2 47  · Per COVID guidelines --> qualified for ACS heparin drip for [facility] however PLT/Hgb low, continue high dose SC heparin  · IV decadron x 10 days (initiated 8/11)  · IV remdesivir x 5 days  (initiated 8/11)  · Supportive care, wean O2 as able --> on RA at time of interview 97% while resting  · Resp protocol     Elevated serum creatinine  Assessment & Plan  · Cr 1 28 --> 1 5 --> 1 17  · Likely secondary to acute episode hypotension + diuretics causing prerenal kidney injury    · Plan to restart diuretics today, monitor am BMP closely     Pancytopenia [facility])  Assessment & Plan  · Patient has pancytopenia due to portal hypertension, liver cirrhosis  · She required blood transfusion during this hospital stay + IV iron (iron panel not reflective of deficiency)  · FOBT + however no signs of overt GI bleeding --> GI is aware   · Will monitor blood counts closely, continue to remain stable --> outpatient follow up   · PLTs currently 47 (maintain >20 unless active bleeding)  · Hgb 7 4 (maintain > 7)  · WBC 3    Cardiomyopathy (HCC)  Assessment & Plan  · Newly diagnosed during recent admission  · 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% and abnormal diastolic function on 49/49 echo   · Repeat echo on 07/05 status post ppm:  LVEF 50 percent  G1 DD    · Restart lasix 40 mg PO today + spironolactone 25 mg per GI     Depression  Assessment & Plan  · Home regimen: Lexapro 5 milligrams daily   · Patient was seen by geriatric medicine has SLB during recent admission and recommended increased to 10 mg daily if needed    Closed traumatic displaced fracture of proximal end of left humerus  Assessment & Plan  · Status post fall 6/26   · Managing conservatively with sling    Complete heart block ([address] )  Assessment & Plan  · S/p permanent pacemaker 6/29 [facility]  · Prior to placement patient required emergent transvenous pacing and did require vasopressors after placement    Esophageal varices without bleeding ([address] )  Assessment & Plan  · S/P banding x2 in 02/2022  · Follows closely with GI  · No hematemesis, melena, or hematochezia reported   · BP stable   · Will need repeat EGD as outpatient    Type 2 diabetes mellitus with hyperglycemia, with long-term current use of insulin [facility])  Assessment & Plan  Lab Results   Component Value Date    HGBA1C 8 2 (H) 05/31/2022       Recent Labs     08/12/22  2241 08/13/22  0739 08/13/22  1139 08/13/22  1656   POCGLU 266* 261* 281* 361*       Blood Sugar Average: Last 72 hrs:  (P) 251 2     · Patient has uncontrolled hyperglycemia  · Home regimen (hold)  · Metformin 500 BID  · Insulin lantus 38 units am and 10 units HS  · Humalog 15 units TID AC  · Inpatient regimen: Lantus 45 units at bedtime and pre meal Humalog 15 units t i d     Acquired hypothyroidism  Assessment & Plan  · Home regimen:  Levothyroxine 125 mcg daily except for Sundays 62 5 micrograms  · TSH elevated on admission, however free T4 1 31   · Continue levothyroxine at current dose  · Recommend recheck TSH in 6-8 weeks outpatient    Essential hypertension  Assessment & Plan  · Soft Bps throughout hospital stay with systolic   SBP currently > 110 today  · On lasix, spirinolactone, and lactulose previously  Currently lasix and spirinolactone on hold, likely restart tomorrow 8/14  · Received 1 dose of albumin 8/10  · Soft Bps likely due to cirrhotic physiology, may benefit from midodrine in adjunct to her diuretics and lactulose  VTE Pharmacologic Prophylaxis: VTE Score: 4 Moderate Risk (Score 3-4) - Pharmacological DVT Prophylaxis Ordered: heparin  Patient Centered Rounds: I performed bedside rounds with nursing staff today  Discussions with Specialists or Other Care Team Provider: GI     Education and Discussions with Family / Patient: Attempted to update  (daughter in law) via phone  Left voicemail  Time Spent for Care: 30 minutes  More than 50% of total time spent on counseling and coordination of care as described above  Current Length of Stay: 9 day(s)  Current Patient Status: Inpatient   Certification Statement: The patient will continue to require additional inpatient hospital stay due to Monitoring of BMP and BP while restarting diuretcis   Discharge Plan: Anticipate discharge in 24-48 hrs to rehab facility  Code Status: Level 1 - Full Code    Subjective:   Patient states her only complaint is that she wants to get up and move around  She denies SOB, CP, abdominal symptoms, or other pain  Comfortable on RA       Objective:     Vitals:   Temp (24hrs), Av 9 °F (36 6 °C), Min:97 9 °F (36 6 °C), Max:97 9 °F (36 6 °C)    Temp:  [97 9 °F (36 6 °C)] 97 9 °F (36 6 °C)  HR:  [68] 68  Resp:  [16] 16  BP: (112-125)/(53-58) 125/58  SpO2:  [96 %] 96 %  Body mass index is 39 14 kg/m²  Input and Output Summary (last 24 hours): Intake/Output Summary (Last 24 hours) at 2022 1325  Last data filed at 2022 1201  Gross per 24 hour   Intake 100 ml   Output 790 ml   Net -690 ml       Physical Exam:   Physical Exam  Vitals and nursing note reviewed  Constitutional:       General: She is not in acute distress  Appearance: She is well-developed  She is not ill-appearing  HENT:      Head: Normocephalic and atraumatic  Eyes:      General:         Right eye: No discharge  Left eye: No discharge  Extraocular Movements: Extraocular movements intact  Conjunctiva/sclera: Conjunctivae normal    Cardiovascular:      Rate and Rhythm: Normal rate and regular rhythm  Pulmonary:      Effort: Pulmonary effort is normal  No respiratory distress  Breath sounds: No wheezing, rhonchi or rales  Comments: 97% on RA  Abdominal:      General: Bowel sounds are normal  There is no distension  Palpations: Abdomen is soft  Tenderness: There is no abdominal tenderness  Musculoskeletal:      Cervical back: Neck supple  Right lower leg: Edema present  Left lower leg: Edema present  Skin:     General: Skin is warm and dry  Neurological:      Mental Status: She is alert and oriented to person, place, and time     Psychiatric:         Mood and Affect: Mood normal          Behavior: Behavior normal           Additional Data:     Labs:  Results from last 7 days   Lab Units 2218   WBC Thousand/uL 2 92*   HEMOGLOBIN g/dL 7 4*   HEMATOCRIT % 24 2*   PLATELETS Thousands/uL 47*   NEUTROS PCT % 77*   LYMPHS PCT % 15   MONOS PCT % 8   EOS PCT % 0     Results from last 7 days   Lab Units 22  1553   SODIUM mmol/L 133* 134*   POTASSIUM mmol/L 3 7 3 8   CHLORIDE mmol/L 100 99   CO2 mmol/L 28 28   BUN mg/dL 27* 31*   CREATININE mg/dL 1 17 1 17   ANION GAP mmol/L 5 7   CALCIUM mg/dL 8 7 8 9   ALBUMIN g/dL  --  2 6*   TOTAL BILIRUBIN mg/dL  --  0 80   ALK PHOS U/L  --  94   ALT U/L  --  40   AST U/L  --  65*   GLUCOSE RANDOM mg/dL 172* 247*     Results from last 7 days   Lab Units 08/13/22  1843   INR  1 30*     Results from last 7 days   Lab Units 08/14/22  1228 08/14/22  0843 08/13/22  2151 08/13/22  1656 08/13/22  1139 08/13/22  0739 08/12/22  2241 08/12/22  1807 08/12/22  1143 08/12/22  0804 08/11/22  2211 08/11/22  1642   POC GLUCOSE mg/dl 200* 151* 311* 361* 281* 261* 266* 357* 251* 202* 243* 220*               Lines/Drains:  Invasive Devices  Report    Peripheral Intravenous Line  Duration           Peripheral IV 08/13/22 Right Antecubital <1 day          Drain  Duration           External Urinary Catheter Other (Comment) 1 day                      Imaging: Reviewed radiology reports from this admission including: chest xray    Recent Cultures (last 7 days):         Last 24 Hours Medication List:   Current Facility-Administered Medications   Medication Dose Route Frequency Provider Last Rate    acetaminophen  650 mg Oral Q6H PRN Meaghan Hernandez PA-C      benzonatate  100 mg Oral TID PRN Cierra Darnell PA-C      dexamethasone  6 mg [facility]venous Q[facility]H [facility]      escitalopram  10 mg Oral Daily Jorge Luis Oconnor MD      furosemide  40 mg Oral Daily Nilsa Blue DO      guaiFENesin  600 mg Oral Q12H [address] Corin Mabry PA-C      heparin (porcine)  7,500 Units Subcutaneous [address]      insulin glargine  45 Units Subcutaneous HS [facility]      insulin lispro  1-5 Units Subcutaneous 4x Daily (AC & HS) [facility]      insulin lispro  15 Units Subcutaneous TID With Meals [facility], [facility]      lactulose  20 g Oral TID Minna Landaverde, DO      levothyroxine  125 mcg Oral Once per day on Mon Tue Wed Thu Fri Sat Dedra Benson PA-C      levothyroxine  62 5 mcg Oral Once per day on Sun Dedra Benson PA-C      lidocaine  1 patch Topical Daily Lj Moses PA-C      melatonin  6 mg Oral HS PRN Dedra Benson PA-C      nadolol  20 mg Oral Daily Alicia Nose, DO      nystatin   Topical BID [place], [place]      ondansetron  4 mg Intravenous Q6H PRN Matthieu Barnard MD      pantoprazole  40 mg Oral Daily Before Breakfast Tiarra Escobedo MD      pravastatin  20 mg Oral Daily With [place], [place]      remdesivir  100 mg Intravenous Q24H [place], [place]      rifaximin  550 mg Oral Q12H [facility] Dedra Benson PA-C      simethicone  80 mg Oral Q6H PRN Luis F Irvin PA-C      spironolactone  25 mg Oral Daily Alicia Nose, DO          Today, Patient Was Seen By: Yamilka Randall PA-C    **Please Note: This note may have been constructed using a voice recognition system  **

## 2022-08-14 NOTE — PLAN OF CARE
Problem: Potential for Falls  Goal: Patient will remain free of falls  Description: INTERVENTIONS:  - Educate patient/family on patient safety including physical limitations  - Instruct patient to call for assistance with activity   - Consult OT/PT to assist with strengthening/mobility   - Keep Call bell within reach  - Keep bed low and locked with side rails adjusted as appropriate  - Keep care items and personal belongings within reach  - Initiate and maintain comfort rounds  - Make Fall Risk Sign visible to staff  - Offer Toileting every 2 Hours, in advance of need  - Initiate/Maintain bed/chair alarm  - Obtain necessary fall risk management equipment:  socks;bedside commode  - Apply yellow socks and bracelet for high fall risk patients  - Consider moving patient to room near nurses station  Outcome: Progressing     Problem: Prexisting or High Potential for Compromised Skin Integrity  Goal: Skin integrity is maintained or improved  Description: INTERVENTIONS:  - Identify patients at risk for skin breakdown  - Assess and monitor skin integrity  - Assess and monitor nutrition and hydration status  - Monitor labs   - Assess for incontinence   - Turn and reposition patient  - Assist with mobility/ambulation  - Relieve pressure over bony prominences  - Avoid friction and shearing  - Provide appropriate hygiene as needed including keeping skin clean and dry  - Evaluate need for skin moisturizer/barrier cream  - Collaborate with interdisciplinary team   - Patient/family teaching  - Consider wound care consult   Outcome: Progressing     Problem: MOBILITY - ADULT  Goal: Maintain or return to baseline ADL function  Description: INTERVENTIONS:  -  Assess patient's ability to carry out ADLs; assess patient's baseline for ADL function and identify physical deficits which impact ability to perform ADLs (bathing, care of mouth/teeth, toileting, grooming, dressing, etc )  - Assess/evaluate cause of self-care deficits   - Assess range of motion  - Assess patient's mobility; develop plan if impaired  - Assess patient's need for assistive devices and provide as appropriate  - Encourage maximum independence but intervene and supervise when necessary  - Involve family in performance of ADLs  - Assess for home care needs following discharge   - Consider OT consult to assist with ADL evaluation and planning for discharge  - Provide patient education as appropriate  Outcome: Progressing     Problem: PAIN - ADULT  Goal: Verbalizes/displays adequate comfort level or baseline comfort level  Description: Interventions:  - Encourage patient to monitor pain and request assistance  - Assess pain using appropriate pain scale  - Administer analgesics based on type and severity of pain and evaluate response  - Implement non-pharmacological measures as appropriate and evaluate response  - Consider cultural and social influences on pain and pain management  - Notify physician/advanced practitioner if interventions unsuccessful or patient reports new pain  Outcome: Progressing     Problem: INFECTION - ADULT  Goal: Absence or prevention of progression during hospitalization  Description: INTERVENTIONS:  - Assess and monitor for signs and symptoms of infection  - Monitor lab/diagnostic results  - Monitor all insertion sites, i e  indwelling lines, tubes, and drains  - Monitor endotracheal if appropriate and nasal secretions for changes in amount and color  - [place] appropriate cooling/warming therapies per order  - Administer medications as ordered  - Instruct and encourage patient and family to use good hand hygiene technique  - Identify and instruct in appropriate isolation precautions for identified infection/condition  Outcome: Progressing  Goal: Absence of fever/infection during neutropenic period  Description: INTERVENTIONS:  - Monitor WBC    Outcome: Progressing     Problem: SAFETY ADULT  Goal: Patient will remain free of falls  Description: INTERVENTIONS:  - Educate patient/family on patient safety including physical limitations  - Instruct patient to call for assistance with activity   - Consult OT/PT to assist with strengthening/mobility   - Keep Call bell within reach  - Keep bed low and locked with side rails adjusted as appropriate  - Keep care items and personal belongings within reach  - Initiate and maintain comfort rounds  - Make Fall Risk Sign visible to staff  - Offer Toileting every 2 Hours, in advance of need  - Initiate/Maintain bed/chair alarm  - Obtain necessary fall risk management equipment:  socks;bedside commode  - Apply yellow socks and bracelet for high fall risk patients  - Consider moving patient to room near nurses station  Outcome: Progressing  Goal: Maintain or return to baseline ADL function  Description: INTERVENTIONS:  -  Assess patient's ability to carry out ADLs; assess patient's baseline for ADL function and identify physical deficits which impact ability to perform ADLs (bathing, care of mouth/teeth, toileting, grooming, dressing, etc )  - Assess/evaluate cause of self-care deficits   - Assess range of motion  - Assess patient's mobility; develop plan if impaired  - Assess patient's need for assistive devices and provide as appropriate  - Encourage maximum independence but intervene and supervise when necessary  - Involve family in performance of ADLs  - Assess for home care needs following discharge   - Consider OT consult to assist with ADL evaluation and planning for discharge  - Provide patient education as appropriate  Outcome: Progressing  Goal: Maintains/Returns to pre admission functional level  Description: INTERVENTIONS:  - Perform BMAT or MOVE assessment daily    - Set and communicate daily mobility goal to care team and patient/family/caregiver  - Collaborate with rehabilitation services on mobility goals if consulted  - Perform Range of Motion 2 times a day  - Reposition patient every 2 hours    - Dangle patient 2 times a day  - Stand patient 2 times a day  - Ambulate patient 2 times a day  - Out of bed to chair 2 times a day   - Out of bed for meals 2 times a day  - Out of bed for toileting  - Record patient progress and toleration of activity level   Outcome: Progressing     Problem: DISCHARGE PLANNING  Goal: Discharge to home or other facility with appropriate resources  Description: INTERVENTIONS:  - Identify barriers to discharge w/patient and caregiver  - Arrange for needed discharge resources and transportation as appropriate  - Identify discharge learning needs (meds, wound care, etc )  - Arrange for interpretive services to assist at discharge as needed  - Refer to Case Management Department for coordinating discharge planning if the patient needs post-hospital services based on physician/advanced practitioner order or complex needs related to functional status, cognitive ability, or social support system  Outcome: Progressing     Problem: Knowledge Deficit  Goal: Patient/family/caregiver demonstrates understanding of disease process, treatment plan, medications, and discharge instructions  Description: Complete learning assessment and assess knowledge base    Interventions:  - Provide teaching at level of understanding  - Provide teaching via preferred learning methods  Outcome: Progressing     Problem: METABOLIC, FLUID AND ELECTROLYTES - ADULT  Goal: Glucose maintained within target range  Description: INTERVENTIONS:  - Monitor Blood Glucose as ordered  - Assess for signs and symptoms of hyperglycemia and hypoglycemia  - Administer ordered medications to maintain glucose within target range  - Assess nutritional intake and initiate nutrition service referral as needed  Outcome: Progressing  Goal: Electrolytes maintained within normal limits  Description: INTERVENTIONS:  - Monitor labs and assess patient for signs and symptoms of electrolyte imbalances  - Administer electrolyte replacement as ordered  - Monitor response to electrolyte replacements, including repeat lab results as appropriate  - Instruct patient on fluid and nutrition as appropriate  Outcome: Progressing  Goal: Fluid balance maintained  Description: INTERVENTIONS:  - Monitor labs   - Monitor I/O and WT  - Instruct patient on fluid and nutrition as appropriate  - Assess for signs & symptoms of volume excess or deficit  Outcome: Progressing     Problem: GASTROINTESTINAL - ADULT  Goal: Minimal or absence of nausea and/or vomiting  Description: INTERVENTIONS:  - Administer IV fluids if ordered to ensure adequate hydration  - Maintain NPO status until nausea and vomiting are resolved  - Nasogastric tube if ordered  - Administer ordered antiemetic medications as needed  - Provide nonpharmacologic comfort measures as appropriate  - Advance diet as tolerated, if ordered  - Consider nutrition services referral to assist patient with adequate nutrition and appropriate food choices  Outcome: Progressing  Goal: Maintains or returns to baseline bowel function  Description: INTERVENTIONS:  - Assess bowel function  - Encourage oral fluids to ensure adequate hydration  - Administer IV fluids if ordered to ensure adequate hydration  - Administer ordered medications as needed  - Encourage mobilization and activity  - Consider nutritional services referral to assist patient with adequate nutrition and appropriate food choices  Outcome: Progressing  Goal: Maintains adequate nutritional intake  Description: INTERVENTIONS:  - Monitor percentage of each meal consumed  - Identify factors contributing to decreased intake, treat as appropriate  - Assist with meals as needed  - Monitor I&O, weight, and lab values if indicated  - Obtain nutrition services referral as needed  Outcome: Progressing     Problem: SKIN/TISSUE INTEGRITY - ADULT  Goal: Skin Integrity remains intact(Skin Breakdown Prevention)  Description: Assess:  -Perform Conrad assessment  -Clean and moisturize skin   -Inspect skin when repositioning, toileting, and assisting with ADLS  -Assess under medical devices   -Assess extremities for adequate circulation and sensation     Bed Management:  -Have minimal linens on bed & keep smooth, unwrinkled  -Change linens as needed when moist or perspiring  -Avoid sitting or lying in one position for more than 2 hours while in bed  -Keep HOB at 30 degrees     Toileting:  -Offer bedside commode  -Assess for incontinence   -Use incontinent care products after each incontinent episode    Activity:  -Mobilize patient 2 times a day  -Encourage activity and walks on unit  -Encourage or provide ROM exercises   -Turn and reposition patient every 2 Hours  -Use appropriate equipment to lift or move patient in bed  -Instruct/ Assist with weight shifting every 2 when out of bed in chair  -Consider limitation of chair time 2 hour intervals    Skin Care:  -Avoid use of baby powder, tape, friction and shearing, hot water or constrictive clothing  -Relieve pressure over bony prominences   -Do not massage red bony areas    Next Steps:  -Teach patient strategies to minimize risks   -Consider consults to  interdisciplinary teams  Outcome: Progressing     Problem: HEMATOLOGIC - ADULT  Goal: Maintains hematologic stability  Description: INTERVENTIONS  - Assess for signs and symptoms of bleeding or hemorrhage  - Monitor labs  - Administer supportive blood products/factors as ordered and appropriate  Outcome: Progressing     Problem: MUSCULOSKELETAL - ADULT  Goal: Maintain or return mobility to safest level of function  Description: INTERVENTIONS:  - Assess patient's ability to carry out ADLs; assess patient's baseline for ADL function and identify physical deficits which impact ability to perform ADLs (bathing, care of mouth/teeth, toileting, grooming, dressing, etc )  - Assess/evaluate cause of self-care deficits   - Assess range of motion  - Assess patient's mobility  - Assess patient's need for assistive devices and provide as appropriate  - Encourage maximum independence but intervene and supervise when necessary  - Involve family in performance of ADLs  - Assess for home care needs following discharge   - Consider OT consult to assist with ADL evaluation and planning for discharge  - Provide patient education as appropriate  Outcome: Progressing     Problem: Nutrition/Hydration-ADULT  Goal: Nutrient/Hydration intake appropriate for improving, restoring or maintaining nutritional needs  Description: Monitor and assess patient's nutrition/hydration status for malnutrition  Collaborate with interdisciplinary team and initiate plan and interventions as ordered  Monitor patient's weight and dietary intake as ordered or per policy  Utilize nutrition screening tool and intervene as necessary  Determine patient's food preferences and provide high-protein, high-caloric foods as appropriate       INTERVENTIONS:  - Monitor oral intake, urinary output, labs, and treatment plans  - Assess nutrition and hydration status and recommend course of action  - Evaluate amount of meals eaten  - Assist patient with eating if necessary   - Allow adequate time for meals  - Recommend/ encourage appropriate diets, oral nutritional supplements, and vitamin/mineral supplements  - Order, calculate, and assess calorie counts as needed  - Recommend, monitor, and adjust tube feedings and TPN/PPN based on assessed needs  - Assess need for intravenous fluids  - Provide specific nutrition/hydration education as appropriate  - Include patient/family/caregiver in decisions related to nutrition  Outcome: Progressing

## 2022-08-14 NOTE — PLAN OF CARE
Problem: Potential for Falls  Goal: Patient will remain free of falls  Description: INTERVENTIONS:  - Educate patient/family on patient safety including physical limitations  - Instruct patient to call for assistance with activity   - Consult OT/PT to assist with strengthening/mobility   - Keep Call bell within reach  - Keep bed low and locked with side rails adjusted as appropriate  - Keep care items and personal belongings within reach  - Initiate and maintain comfort rounds  - Make Fall Risk Sign visible to staff  - Offer Toileting every 2 Hours, in advance of need  - Initiate/Maintain bed/chair alarm  - Obtain necessary fall risk management equipment:  socks;bedside commode  - Apply yellow socks and bracelet for high fall risk patients  - Consider moving patient to room near nurses station  Outcome: Progressing     Problem: Prexisting or High Potential for Compromised Skin Integrity  Goal: Skin integrity is maintained or improved  Description: INTERVENTIONS:  - Identify patients at risk for skin breakdown  - Assess and monitor skin integrity  - Assess and monitor nutrition and hydration status  - Monitor labs   - Assess for incontinence   - Turn and reposition patient  - Assist with mobility/ambulation  - Relieve pressure over bony prominences  - Avoid friction and shearing  - Provide appropriate hygiene as needed including keeping skin clean and dry  - Evaluate need for skin moisturizer/barrier cream  - Collaborate with interdisciplinary team   - Patient/family teaching  - Consider wound care consult   Outcome: Progressing     Problem: MOBILITY - ADULT  Goal: Maintain or return to baseline ADL function  Description: INTERVENTIONS:  -  Assess patient's ability to carry out ADLs; assess patient's baseline for ADL function and identify physical deficits which impact ability to perform ADLs (bathing, care of mouth/teeth, toileting, grooming, dressing, etc )  - Assess/evaluate cause of self-care deficits   - Assess range of motion  - Assess patient's mobility; develop plan if impaired  - Assess patient's need for assistive devices and provide as appropriate  - Encourage maximum independence but intervene and supervise when necessary  - Involve family in performance of ADLs  - Assess for home care needs following discharge   - Consider OT consult to assist with ADL evaluation and planning for discharge  - Provide patient education as appropriate  Outcome: Progressing     Problem: PAIN - ADULT  Goal: Verbalizes/displays adequate comfort level or baseline comfort level  Description: Interventions:  - Encourage patient to monitor pain and request assistance  - Assess pain using appropriate pain scale  - Administer analgesics based on type and severity of pain and evaluate response  - Implement non-pharmacological measures as appropriate and evaluate response  - Consider cultural and social influences on pain and pain management  - Notify physician/advanced practitioner if interventions unsuccessful or patient reports new pain  Outcome: Progressing     Problem: INFECTION - ADULT  Goal: Absence or prevention of progression during hospitalization  Description: INTERVENTIONS:  - Assess and monitor for signs and symptoms of infection  - Monitor lab/diagnostic results  - Monitor all insertion sites, i e  indwelling lines, tubes, and drains  - Monitor endotracheal if appropriate and nasal secretions for changes in amount and color  - [place] appropriate cooling/warming therapies per order  - Administer medications as ordered  - Instruct and encourage patient and family to use good hand hygiene technique  - Identify and instruct in appropriate isolation precautions for identified infection/condition  Outcome: Progressing  Goal: Absence of fever/infection during neutropenic period  Description: INTERVENTIONS:  - Monitor WBC    Outcome: Progressing     Problem: SAFETY ADULT  Goal: Patient will remain free of falls  Description: INTERVENTIONS:  - Educate patient/family on patient safety including physical limitations  - Instruct patient to call for assistance with activity   - Consult OT/PT to assist with strengthening/mobility   - Keep Call bell within reach  - Keep bed low and locked with side rails adjusted as appropriate  - Keep care items and personal belongings within reach  - Initiate and maintain comfort rounds  - Make Fall Risk Sign visible to staff  - Offer Toileting every 2 Hours, in advance of need  - Initiate/Maintain bed/chair alarm  - Obtain necessary fall risk management equipment:  socks;bedside commode  - Apply yellow socks and bracelet for high fall risk patients  - Consider moving patient to room near nurses station  Outcome: Progressing  Goal: Maintain or return to baseline ADL function  Description: INTERVENTIONS:  -  Assess patient's ability to carry out ADLs; assess patient's baseline for ADL function and identify physical deficits which impact ability to perform ADLs (bathing, care of mouth/teeth, toileting, grooming, dressing, etc )  - Assess/evaluate cause of self-care deficits   - Assess range of motion  - Assess patient's mobility; develop plan if impaired  - Assess patient's need for assistive devices and provide as appropriate  - Encourage maximum independence but intervene and supervise when necessary  - Involve family in performance of ADLs  - Assess for home care needs following discharge   - Consider OT consult to assist with ADL evaluation and planning for discharge  - Provide patient education as appropriate  Outcome: Progressing  Goal: Maintains/Returns to pre admission functional level  Description: INTERVENTIONS:  - Perform BMAT or MOVE assessment daily    - Set and communicate daily mobility goal to care team and patient/family/caregiver  - Collaborate with rehabilitation services on mobility goals if consulted  - Perform Range of Motion 2 times a day  - Reposition patient every 2 hours    - Dangle patient 2 times a day  - Stand patient 2 times a day  - Ambulate patient 2 times a day  - Out of bed to chair 2 times a day   - Out of bed for meals 2 times a day  - Out of bed for toileting  - Record patient progress and toleration of activity level   Outcome: Progressing     Problem: DISCHARGE PLANNING  Goal: Discharge to home or other facility with appropriate resources  Description: INTERVENTIONS:  - Identify barriers to discharge w/patient and caregiver  - Arrange for needed discharge resources and transportation as appropriate  - Identify discharge learning needs (meds, wound care, etc )  - Arrange for interpretive services to assist at discharge as needed  - Refer to Case Management Department for coordinating discharge planning if the patient needs post-hospital services based on physician/advanced practitioner order or complex needs related to functional status, cognitive ability, or social support system  Outcome: Progressing     Problem: Knowledge Deficit  Goal: Patient/family/caregiver demonstrates understanding of disease process, treatment plan, medications, and discharge instructions  Description: Complete learning assessment and assess knowledge base    Interventions:  - Provide teaching at level of understanding  - Provide teaching via preferred learning methods  Outcome: Progressing     Problem: METABOLIC, FLUID AND ELECTROLYTES - ADULT  Goal: Glucose maintained within target range  Description: INTERVENTIONS:  - Monitor Blood Glucose as ordered  - Assess for signs and symptoms of hyperglycemia and hypoglycemia  - Administer ordered medications to maintain glucose within target range  - Assess nutritional intake and initiate nutrition service referral as needed  Outcome: Progressing  Goal: Electrolytes maintained within normal limits  Description: INTERVENTIONS:  - Monitor labs and assess patient for signs and symptoms of electrolyte imbalances  - Administer electrolyte replacement as ordered  - Monitor response to electrolyte replacements, including repeat lab results as appropriate  - Instruct patient on fluid and nutrition as appropriate  Outcome: Progressing  Goal: Fluid balance maintained  Description: INTERVENTIONS:  - Monitor labs   - Monitor I/O and WT  - Instruct patient on fluid and nutrition as appropriate  - Assess for signs & symptoms of volume excess or deficit  Outcome: Progressing     Problem: GASTROINTESTINAL - ADULT  Goal: Minimal or absence of nausea and/or vomiting  Description: INTERVENTIONS:  - Administer IV fluids if ordered to ensure adequate hydration  - Maintain NPO status until nausea and vomiting are resolved  - Nasogastric tube if ordered  - Administer ordered antiemetic medications as needed  - Provide nonpharmacologic comfort measures as appropriate  - Advance diet as tolerated, if ordered  - Consider nutrition services referral to assist patient with adequate nutrition and appropriate food choices  Outcome: Progressing  Goal: Maintains or returns to baseline bowel function  Description: INTERVENTIONS:  - Assess bowel function  - Encourage oral fluids to ensure adequate hydration  - Administer IV fluids if ordered to ensure adequate hydration  - Administer ordered medications as needed  - Encourage mobilization and activity  - Consider nutritional services referral to assist patient with adequate nutrition and appropriate food choices  Outcome: Progressing  Goal: Maintains adequate nutritional intake  Description: INTERVENTIONS:  - Monitor percentage of each meal consumed  - Identify factors contributing to decreased intake, treat as appropriate  - Assist with meals as needed  - Monitor I&O, weight, and lab values if indicated  - Obtain nutrition services referral as needed  Outcome: Progressing     Problem: SKIN/TISSUE INTEGRITY - ADULT  Goal: Skin Integrity remains intact(Skin Breakdown Prevention)  Description: Assess:  -Perform Conrad assessment  -Clean and moisturize skin   -Inspect skin when repositioning, toileting, and assisting with ADLS  -Assess under medical devices   -Assess extremities for adequate circulation and sensation     Bed Management:  -Have minimal linens on bed & keep smooth, unwrinkled  -Change linens as needed when moist or perspiring  -Avoid sitting or lying in one position for more than 2 hours while in bed  -Keep HOB at 30 degrees     Toileting:  -Offer bedside commode  -Assess for incontinence   -Use incontinent care products after each incontinent episode    Activity:  -Mobilize patient 2 times a day  -Encourage activity and walks on unit  -Encourage or provide ROM exercises   -Turn and reposition patient every 2 Hours  -Use appropriate equipment to lift or move patient in bed  -Instruct/ Assist with weight shifting every 2 when out of bed in chair  -Consider limitation of chair time 2 hour intervals    Skin Care:  -Avoid use of baby powder, tape, friction and shearing, hot water or constrictive clothing  -Relieve pressure over bony prominences   -Do not massage red bony areas    Next Steps:  -Teach patient strategies to minimize risks   -Consider consults to  interdisciplinary teams  Outcome: Progressing     Problem: HEMATOLOGIC - ADULT  Goal: Maintains hematologic stability  Description: INTERVENTIONS  - Assess for signs and symptoms of bleeding or hemorrhage  - Monitor labs  - Administer supportive blood products/factors as ordered and appropriate  Outcome: Progressing     Problem: MUSCULOSKELETAL - ADULT  Goal: Maintain or return mobility to safest level of function  Description: INTERVENTIONS:  - Assess patient's ability to carry out ADLs; assess patient's baseline for ADL function and identify physical deficits which impact ability to perform ADLs (bathing, care of mouth/teeth, toileting, grooming, dressing, etc )  - Assess/evaluate cause of self-care deficits   - Assess range of motion  - Assess patient's mobility  - Assess patient's need for assistive devices and provide as appropriate  - Encourage maximum independence but intervene and supervise when necessary  - Involve family in performance of ADLs  - Assess for home care needs following discharge   - Consider OT consult to assist with ADL evaluation and planning for discharge  - Provide patient education as appropriate  Outcome: Progressing     Problem: Nutrition/Hydration-ADULT  Goal: Nutrient/Hydration intake appropriate for improving, restoring or maintaining nutritional needs  Description: Monitor and assess patient's nutrition/hydration status for malnutrition  Collaborate with interdisciplinary team and initiate plan and interventions as ordered  Monitor patient's weight and dietary intake as ordered or per policy  Utilize nutrition screening tool and intervene as necessary  Determine patient's food preferences and provide high-protein, high-caloric foods as appropriate       INTERVENTIONS:  - Monitor oral intake, urinary output, labs, and treatment plans  - Assess nutrition and hydration status and recommend course of action  - Evaluate amount of meals eaten  - Assist patient with eating if necessary   - Allow adequate time for meals  - Recommend/ encourage appropriate diets, oral nutritional supplements, and vitamin/mineral supplements  - Order, calculate, and assess calorie counts as needed  - Recommend, monitor, and adjust tube feedings and TPN/PPN based on assessed needs  - Assess need for intravenous fluids  - Provide specific nutrition/hydration education as appropriate  - Include patient/family/caregiver in decisions related to nutrition  Outcome: Progressing

## 2022-08-14 NOTE — PROGRESS NOTES
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, decompensated the basis of encephalopathy and prior variceal bleed  Admission CT showed new ascites but insufficient volume for paracentesis  Diuretics had been held due to low blood pressure and acute kidney injury  Vital stable and creatinine back to baseline  Will resume outpatient diuretics (Lasix 40/spironolactone 25) and check BMP tomorrow     2  Hepatic encephalopathy   Present on admission likely due to missing medications       Ammonia normalized  Continue lactulose/Xifaxan        3  Chronic anemia  FOBT + but no overt GIB  Hemoglobin remains stable  Received IV iron  Had EGD/colonoscopy for anemia in July 2020, and follow-up EGD for varices in February 2022     4  esophageal varices    No overt bleeding  beta-blocker was held due to low blood pressure which has improved, will resume  Had EGD with banding in February  Plan repeat EGD as an outpatient after she recovers from Covid      Discussed with internal medicine PA    Chief Complaint   Patient presents with    Hyperglycemia - Symptomatic     Patient presents to the ED via EMS with c/o not feeling well, states fall two weeks ago and has residual back pain and left shoulder fx (seen at [facility] ED s/p fall)  States her sugars have been high, EMS report >300 en route, recent pacemaker placement         SUBJECTIVE/HPI   No GI complaints  Denies nausea or vomiting  Tolerating diet  No fevers or chills      /55   Pulse 68   Temp 97 9 °F (36 6 °C)   Resp 16   Ht 5' 4" (1 626 m)   Wt 103 kg (228 lb)   SpO2 96%   BMI 39 14 kg/m²     PHYSICALEXAM  General appearance: alert, appears stated age and cooperative  Eyes: PERLLA, EOMI, no icterus   Head: Normocephalic, without obvious abnormality, atraumatic  Lungs: clear to auscultation bilaterally  Heart: regular rate and rhythm, S1, S2 normal, no murmur, click, rub or gallop  Abdomen: soft, non-tender; bowel sounds normal; no masses,  no organomegaly  Extremities: extremities normal, atraumatic, no cyanosis or edema  Neurologic: Grossly normal    Lab Results   Component Value Date    GLUCOSE 280 (H) 08/04/2022    CALCIUM 8 7 08/14/2022    K 3 7 08/14/2022    CO2 28 08/14/2022     08/14/2022    BUN 27 (H) 08/14/2022    CREATININE 1 17 08/14/2022     Lab Results   Component Value Date    WBC 2 92 (L) 08/14/2022    HGB 7 4 (L) 08/14/2022    HCT 24 2 (L) 08/14/2022    MCV 91 08/14/2022    PLT 47 (LL) 08/14/2022     Lab Results   Component Value Date    ALT 40 08/13/2022    AST 65 (H) 08/13/2022    ALKPHOS 94 08/13/2022     No results found for: AMYLASE  No results found for: LIPASE  Lab Results   Component Value Date    IRON 190 (H) 08/05/2022    TIBC 413 08/05/2022    FERRITIN 20 08/05/2022     Lab Results   Component Value Date    INR 1 30 (H) 08/13/2022

## 2022-08-15 ENCOUNTER — TELEPHONE (OUTPATIENT)
Dept: OTHER | Facility: OTHER | Age: 77
End: 2022-08-15

## 2022-08-15 VITALS
DIASTOLIC BLOOD PRESSURE: 56 MMHG | OXYGEN SATURATION: 95 % | HEART RATE: 68 BPM | SYSTOLIC BLOOD PRESSURE: 125 MMHG | HEIGHT: 64 IN | WEIGHT: 225 LBS | TEMPERATURE: 97.9 F | BODY MASS INDEX: 38.41 KG/M2 | RESPIRATION RATE: 20 BRPM

## 2022-08-15 LAB
ANION GAP SERPL CALCULATED.3IONS-SCNC: 7 MMOL/L (ref 4–13)
BASOPHILS # BLD AUTO: 0 THOUSANDS/ΜL (ref 0–0.1)
BASOPHILS NFR BLD AUTO: 0 % (ref 0–1)
BUN SERPL-MCNC: 28 MG/DL (ref 5–25)
CALCIUM SERPL-MCNC: 8.6 MG/DL (ref 8.3–10.1)
CHLORIDE SERPL-SCNC: 101 MMOL/L (ref 96–108)
CO2 SERPL-SCNC: 26 MMOL/L (ref 21–32)
CREAT SERPL-MCNC: 1.02 MG/DL (ref 0.6–1.3)
EOSINOPHIL # BLD AUTO: 0.01 THOUSAND/ΜL (ref 0–0.61)
EOSINOPHIL NFR BLD AUTO: 0 % (ref 0–6)
ERYTHROCYTE [DISTWIDTH] IN BLOOD BY AUTOMATED COUNT: 21.2 % (ref 11.6–15.1)
GFR SERPL CREATININE-BSD FRML MDRD: 53 ML/MIN/1.73SQ M
GLUCOSE SERPL-MCNC: 116 MG/DL (ref 65–140)
GLUCOSE SERPL-MCNC: 119 MG/DL (ref 65–140)
GLUCOSE SERPL-MCNC: 133 MG/DL (ref 65–140)
GLUCOSE SERPL-MCNC: 156 MG/DL (ref 65–140)
GLUCOSE SERPL-MCNC: 284 MG/DL (ref 65–140)
HCT VFR BLD AUTO: 26.1 % (ref 34.8–46.1)
HGB BLD-MCNC: 8 G/DL (ref 11.5–15.4)
IMM GRANULOCYTES # BLD AUTO: 0.02 THOUSAND/UL (ref 0–0.2)
IMM GRANULOCYTES NFR BLD AUTO: 1 % (ref 0–2)
LYMPHOCYTES # BLD AUTO: 0.59 THOUSANDS/ΜL (ref 0.6–4.47)
LYMPHOCYTES NFR BLD AUTO: 17 % (ref 14–44)
MCH RBC QN AUTO: 28.1 PG (ref 26.8–34.3)
MCHC RBC AUTO-ENTMCNC: 30.7 G/DL (ref 31.4–37.4)
MCV RBC AUTO: 92 FL (ref 82–98)
MONOCYTES # BLD AUTO: 0.23 THOUSAND/ΜL (ref 0.17–1.22)
MONOCYTES NFR BLD AUTO: 7 % (ref 4–12)
NEUTROPHILS # BLD AUTO: 2.67 THOUSANDS/ΜL (ref 1.85–7.62)
NEUTS SEG NFR BLD AUTO: 75 % (ref 43–75)
NRBC BLD AUTO-RTO: 0 /100 WBCS
PLATELET # BLD AUTO: 51 THOUSANDS/UL (ref 149–390)
PMV BLD AUTO: 13 FL (ref 8.9–12.7)
POTASSIUM SERPL-SCNC: 3.9 MMOL/L (ref 3.5–5.3)
RBC # BLD AUTO: 2.85 MILLION/UL (ref 3.81–5.12)
SODIUM SERPL-SCNC: 134 MMOL/L (ref 135–147)
WBC # BLD AUTO: 3.52 THOUSAND/UL (ref 4.31–10.16)

## 2022-08-15 PROCEDURE — 80048 BASIC METABOLIC PNL TOTAL CA: CPT | Performed by: INTERNAL MEDICINE

## 2022-08-15 PROCEDURE — 82948 REAGENT STRIP/BLOOD GLUCOSE: CPT

## 2022-08-15 PROCEDURE — 99232 SBSQ HOSP IP/OBS MODERATE 35: CPT | Performed by: INTERNAL MEDICINE

## 2022-08-15 PROCEDURE — 85025 COMPLETE CBC W/AUTO DIFF WBC: CPT

## 2022-08-15 RX ORDER — LACTULOSE 20 G/30ML
20 SOLUTION ORAL 3 TIMES DAILY
Qty: 2700 ML | Refills: 0
Start: 2022-08-15 | End: 2022-09-28 | Stop reason: SDUPTHER

## 2022-08-15 RX ORDER — BENZONATATE 100 MG/1
100 CAPSULE ORAL 3 TIMES DAILY PRN
Qty: 20 CAPSULE | Refills: 0
Start: 2022-08-15

## 2022-08-15 RX ORDER — PANTOPRAZOLE SODIUM 40 MG/1
40 TABLET, DELAYED RELEASE ORAL
Qty: 30 TABLET | Refills: 0
Start: 2022-08-16 | End: 2022-08-29

## 2022-08-15 RX ORDER — ESCITALOPRAM OXALATE 10 MG/1
10 TABLET ORAL DAILY
Qty: 30 TABLET | Refills: 0
Start: 2022-08-16 | End: 2022-09-28 | Stop reason: SDUPTHER

## 2022-08-15 RX ADMIN — FUROSEMIDE 40 MG: 40 TABLET ORAL at 09:42

## 2022-08-15 RX ADMIN — RIFAXIMIN 550 MG: 550 TABLET ORAL at 09:42

## 2022-08-15 RX ADMIN — LEVOTHYROXINE SODIUM 125 MCG: 25 TABLET ORAL at 05:42

## 2022-08-15 RX ADMIN — INSULIN LISPRO 15 UNITS: 100 INJECTION, SOLUTION INTRAVENOUS; SUBCUTANEOUS at 18:18

## 2022-08-15 RX ADMIN — NYSTATIN: 100000 POWDER TOPICAL at 09:42

## 2022-08-15 RX ADMIN — PANTOPRAZOLE SODIUM 40 MG: 40 TABLET, DELAYED RELEASE ORAL at 05:42

## 2022-08-15 RX ADMIN — HEPARIN SODIUM 7500 UNITS: 5000 INJECTION INTRAVENOUS; SUBCUTANEOUS at 13:37

## 2022-08-15 RX ADMIN — SPIRONOLACTONE 25 MG: 25 TABLET ORAL at 09:42

## 2022-08-15 RX ADMIN — REMDESIVIR 100 MG: 100 INJECTION, POWDER, LYOPHILIZED, FOR SOLUTION INTRAVENOUS at 13:36

## 2022-08-15 RX ADMIN — INSULIN LISPRO 15 UNITS: 100 INJECTION, SOLUTION INTRAVENOUS; SUBCUTANEOUS at 09:42

## 2022-08-15 RX ADMIN — NYSTATIN: 100000 POWDER TOPICAL at 18:18

## 2022-08-15 RX ADMIN — ESCITALOPRAM OXALATE 10 MG: 10 TABLET ORAL at 09:42

## 2022-08-15 RX ADMIN — PRAVASTATIN SODIUM 20 MG: 20 TABLET ORAL at 18:18

## 2022-08-15 RX ADMIN — LIDOCAINE 5% 1 PATCH: 700 PATCH TOPICAL at 09:42

## 2022-08-15 RX ADMIN — DEXAMETHASONE SODIUM PHOSPHATE 6 MG: 4 INJECTION, SOLUTION INTRAMUSCULAR; INTRAVENOUS at 13:36

## 2022-08-15 RX ADMIN — INSULIN LISPRO 15 UNITS: 100 INJECTION, SOLUTION INTRAVENOUS; SUBCUTANEOUS at 13:36

## 2022-08-15 RX ADMIN — HEPARIN SODIUM 7500 UNITS: 5000 INJECTION INTRAVENOUS; SUBCUTANEOUS at 05:42

## 2022-08-15 RX ADMIN — LACTULOSE 20 G: 20 SOLUTION ORAL at 09:42

## 2022-08-15 RX ADMIN — GUAIFENESIN 600 MG: 600 TABLET ORAL at 09:42

## 2022-08-15 RX ADMIN — LACTULOSE 20 G: 20 SOLUTION ORAL at 18:18

## 2022-08-15 RX ADMIN — NADOLOL 20 MG: 20 TABLET ORAL at 09:42

## 2022-08-15 NOTE — ASSESSMENT & PLAN NOTE
· Patient has 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  Ultrasound shows ascites  · GI consult, appreciate recs  · 8/09 attempted paracentesis - Insufficient fluid for safe paracentesis  · Continue lactulose/rifampin   · Restarted on Lasix 40 mg p o  And spironolactone 25 mg p o  On 08/14, blood pressure and creatinine remains stable today  Continue upon discharge    · Cleared for discharge by GI, follow-up with outpatient GI in approximately 6 weeks

## 2022-08-15 NOTE — ASSESSMENT & PLAN NOTE
· Patient was admitted with hepatic encephalopathy, GI on board (see below)  · Ammonia 10   · Hold home tramadol and further doses of flexeril to avoid sedative effects  · 8/15: no lethargy, A&O x 4   · PT/OT recommending acute rehab   · CM on board, potential bed available at [facility] --> bed available, DC today

## 2022-08-15 NOTE — ASSESSMENT & PLAN NOTE
· New non productive cough as of 8/10  Associated sore throat  Afebrile, no significant leukocytosis    · Covid positive 8/11  · CRP 24, D dimer 2 47  · Per COVID guidelines --> qualified for [facility] heparin drip for [facility] however PLT/Hgb low, continue high dose SC heparin  · IV decadron x 10 days (initiated 8/11)  · IV remdesivir x 5 days  (initiated 8/11)  · Supportive care, wean O2 as able --> on RA at time of interview 97% while resting  · Resp protocol   · No further treatment required for COVID

## 2022-08-15 NOTE — PROGRESS NOTES
Progress note - Gastroenterology   Renuka Mari 77 y o  female MRN: [id number]  Unit/Bed#: -01 Encounter: [id number]    ASSESSMENT and PLAN  1  PAUL cirrhosis with ascites and edema,   Longstanding history of cirrhosis due to PAUL, with prior issues with encephalopathy and variceal bleeding  CT showed new ascites but insufficient for paracentesis  Lasix and spironolactone resumed yesterday 8/14  BUN/creatinine remains stable today  Stable from GI standpoint for transfer to nursing facility  Plan for outpatient follow-up in 6-8 weeks in our office     2  Hepatic encephalopathy   Present on admission     Ammonia normalized  awake and conversing today  Continue lactulose/Xifaxan  Refused lactulose yesterday but took today      3  Chronic anemia  FOBT + but no overt GIB  Hemoglobin remains stable  Received IV iron      4 H/o esophageal varices - no n/v/hematemesis at this time    No overt bleeding  Nadolol also resumed yesterday 8/14  BP stable [id number] systolic   Had EGD with banding in February  Plan repeat EGD as an outpatient after she recovers from Covid     Chief Complaint   Patient presents with    Hyperglycemia - Symptomatic     Patient presents to the ED via EMS with c/o not feeling well, states fall two weeks ago and has residual back pain and left shoulder fx (seen at [facility] ED s/p fall)   States her sugars have been high, EMS report >300 en route, recent pacemaker placement         SUBJECTIVE/HPI   Interview conducted via inner, and direct visualization due to COVID  Appears comfortable and more alert this morning  Had difficulty chewing bagel for breakfast  Denies abdominal pain, no nausea or vomiting  Reports formed bowel movement last night, denies rectal bleeding  Diuretics resumed yesterday, creatinine stable today    /58   Pulse 68   Temp 98 1 °F (36 7 °C)   Resp 20   Ht 5' 4" (1 626 m)   Wt 102 kg (225 lb)   SpO2 95%   BMI 38 62 kg/m² PHYSICALEXAM  Physical exam not performed due to COVID  General appearance: alert, NAD      Lab Results   Component Value Date    GLUCOSE 280 (H) 08/04/2022    CALCIUM 8 6 08/15/2022    K 3 9 08/15/2022    CO2 26 08/15/2022     08/15/2022    BUN 28 (H) 08/15/2022    CREATININE 1 02 08/15/2022     Lab Results   Component Value Date    WBC 2 92 (L) 08/14/2022    HGB 7 4 (L) 08/14/2022    HCT 24 2 (L) 08/14/2022    MCV 91 08/14/2022    PLT 47 (LL) 08/14/2022     Lab Results   Component Value Date    ALT 40 08/13/2022    AST 65 (H) 08/13/2022    ALKPHOS 94 08/13/2022       Lab Results   Component Value Date    IRON 190 (H) 08/05/2022    TIBC 413 08/05/2022    FERRITIN 20 08/05/2022     Lab Results   Component Value Date    INR 1 30 (H) 08/13/2022

## 2022-08-15 NOTE — ASSESSMENT & PLAN NOTE
· S/p permanent pacemaker 6/29 at [facility]  · Prior to placement patient required emergent transvenous pacing and did require vasopressors after placement

## 2022-08-15 NOTE — ASSESSMENT & PLAN NOTE
· Patient has pancytopenia due to portal hypertension, liver cirrhosis    · She required blood transfusion during this hospital stay + IV iron (iron panel not reflective of deficiency)  · FOBT + however no signs of overt GI bleeding --> GI is aware   · Will monitor blood counts closely, continue to remain stable --> outpatient follow up with GI   · PLTs currently 53 (maintain >20 unless active bleeding)  · Hgb 8 (stable   · WBC 3

## 2022-08-15 NOTE — ASSESSMENT & PLAN NOTE
· Cr 1 28 --> 1 5 --> 1 17 --> 1 02 today   · Likely secondary to acute episode hypotension + diuretics causing prerenal kidney injury    · Tolerating diuretics well

## 2022-08-15 NOTE — PLAN OF CARE
Problem: Potential for Falls  Goal: Patient will remain free of falls  Description: INTERVENTIONS:  - Educate patient/family on patient safety including physical limitations  - Instruct patient to call for assistance with activity   - Consult OT/PT to assist with strengthening/mobility   - Keep Call bell within reach  - Keep bed low and locked with side rails adjusted as appropriate  - Keep care items and personal belongings within reach  - Initiate and maintain comfort rounds  - Make Fall Risk Sign visible to staff  - Offer Toileting every 2 Hours, in advance of need  - Initiate/Maintain bed/chair alarm  - Obtain necessary fall risk management equipment:  socks;bedside commode  - Apply yellow socks and bracelet for high fall risk patients  - Consider moving patient to room near nurses station  Outcome: Progressing     Problem: Prexisting or High Potential for Compromised Skin Integrity  Goal: Skin integrity is maintained or improved  Description: INTERVENTIONS:  - Identify patients at risk for skin breakdown  - Assess and monitor skin integrity  - Assess and monitor nutrition and hydration status  - Monitor labs   - Assess for incontinence   - Turn and reposition patient  - Assist with mobility/ambulation  - Relieve pressure over bony prominences  - Avoid friction and shearing  - Provide appropriate hygiene as needed including keeping skin clean and dry  - Evaluate need for skin moisturizer/barrier cream  - Collaborate with interdisciplinary team   - Patient/family teaching  - Consider wound care consult   Outcome: Progressing     Problem: MOBILITY - ADULT  Goal: Maintain or return to baseline ADL function  Description: INTERVENTIONS:  -  Assess patient's ability to carry out ADLs; assess patient's baseline for ADL function and identify physical deficits which impact ability to perform ADLs (bathing, care of mouth/teeth, toileting, grooming, dressing, etc )  - Assess/evaluate cause of self-care deficits   - Assess range of motion  - Assess patient's mobility; develop plan if impaired  - Assess patient's need for assistive devices and provide as appropriate  - Encourage maximum independence but intervene and supervise when necessary  - Involve family in performance of ADLs  - Assess for home care needs following discharge   - Consider OT consult to assist with ADL evaluation and planning for discharge  - Provide patient education as appropriate  Outcome: Progressing     Problem: PAIN - ADULT  Goal: Verbalizes/displays adequate comfort level or baseline comfort level  Description: Interventions:  - Encourage patient to monitor pain and request assistance  - Assess pain using appropriate pain scale  - Administer analgesics based on type and severity of pain and evaluate response  - Implement non-pharmacological measures as appropriate and evaluate response  - Consider cultural and social influences on pain and pain management  - Notify physician/advanced practitioner if interventions unsuccessful or patient reports new pain  Outcome: Progressing     Problem: INFECTION - ADULT  Goal: Absence or prevention of progression during hospitalization  Description: INTERVENTIONS:  - Assess and monitor for signs and symptoms of infection  - Monitor lab/diagnostic results  - Monitor all insertion sites, i e  indwelling lines, tubes, and drains  - Monitor endotracheal if appropriate and nasal secretions for changes in amount and color  - Hestand appropriate cooling/warming therapies per order  - Administer medications as ordered  - Instruct and encourage patient and family to use good hand hygiene technique  - Identify and instruct in appropriate isolation precautions for identified infection/condition  Outcome: Progressing  Goal: Absence of fever/infection during neutropenic period  Description: INTERVENTIONS:  - Monitor WBC    Outcome: Progressing     Problem: SAFETY ADULT  Goal: Patient will remain free of falls  Description: INTERVENTIONS:  - Educate patient/family on patient safety including physical limitations  - Instruct patient to call for assistance with activity   - Consult OT/PT to assist with strengthening/mobility   - Keep Call bell within reach  - Keep bed low and locked with side rails adjusted as appropriate  - Keep care items and personal belongings within reach  - Initiate and maintain comfort rounds  - Make Fall Risk Sign visible to staff  - Offer Toileting every 2 Hours, in advance of need  - Initiate/Maintain bed/chair alarm  - Obtain necessary fall risk management equipment:  socks;bedside commode  - Apply yellow socks and bracelet for high fall risk patients  - Consider moving patient to room near nurses station  Outcome: Progressing  Goal: Maintain or return to baseline ADL function  Description: INTERVENTIONS:  -  Assess patient's ability to carry out ADLs; assess patient's baseline for ADL function and identify physical deficits which impact ability to perform ADLs (bathing, care of mouth/teeth, toileting, grooming, dressing, etc )  - Assess/evaluate cause of self-care deficits   - Assess range of motion  - Assess patient's mobility; develop plan if impaired  - Assess patient's need for assistive devices and provide as appropriate  - Encourage maximum independence but intervene and supervise when necessary  - Involve family in performance of ADLs  - Assess for home care needs following discharge   - Consider OT consult to assist with ADL evaluation and planning for discharge  - Provide patient education as appropriate  Outcome: Progressing  Goal: Maintains/Returns to pre admission functional level  Description: INTERVENTIONS:  - Perform BMAT or MOVE assessment daily    - Set and communicate daily mobility goal to care team and patient/family/caregiver  - Collaborate with rehabilitation services on mobility goals if consulted  - Perform Range of Motion 2 times a day  - Reposition patient every 2 hours    - Dangle patient 2 times a day  - Stand patient 2 times a day  - Ambulate patient 2 times a day  - Out of bed to chair 2 times a day   - Out of bed for meals 2 times a day  - Out of bed for toileting  - Record patient progress and toleration of activity level   Outcome: Progressing     Problem: DISCHARGE PLANNING  Goal: Discharge to home or other facility with appropriate resources  Description: INTERVENTIONS:  - Identify barriers to discharge w/patient and caregiver  - Arrange for needed discharge resources and transportation as appropriate  - Identify discharge learning needs (meds, wound care, etc )  - Arrange for interpretive services to assist at discharge as needed  - Refer to Case Management Department for coordinating discharge planning if the patient needs post-hospital services based on physician/advanced practitioner order or complex needs related to functional status, cognitive ability, or social support system  Outcome: Progressing     Problem: Knowledge Deficit  Goal: Patient/family/caregiver demonstrates understanding of disease process, treatment plan, medications, and discharge instructions  Description: Complete learning assessment and assess knowledge base    Interventions:  - Provide teaching at level of understanding  - Provide teaching via preferred learning methods  Outcome: Progressing     Problem: METABOLIC, FLUID AND ELECTROLYTES - ADULT  Goal: Glucose maintained within target range  Description: INTERVENTIONS:  - Monitor Blood Glucose as ordered  - Assess for signs and symptoms of hyperglycemia and hypoglycemia  - Administer ordered medications to maintain glucose within target range  - Assess nutritional intake and initiate nutrition service referral as needed  Outcome: Progressing  Goal: Electrolytes maintained within normal limits  Description: INTERVENTIONS:  - Monitor labs and assess patient for signs and symptoms of electrolyte imbalances  - Administer electrolyte replacement as ordered  - Monitor response to electrolyte replacements, including repeat lab results as appropriate  - Instruct patient on fluid and nutrition as appropriate  Outcome: Progressing  Goal: Fluid balance maintained  Description: INTERVENTIONS:  - Monitor labs   - Monitor I/O and WT  - Instruct patient on fluid and nutrition as appropriate  - Assess for signs & symptoms of volume excess or deficit  Outcome: Progressing     Problem: GASTROINTESTINAL - ADULT  Goal: Minimal or absence of nausea and/or vomiting  Description: INTERVENTIONS:  - Administer IV fluids if ordered to ensure adequate hydration  - Maintain NPO status until nausea and vomiting are resolved  - Nasogastric tube if ordered  - Administer ordered antiemetic medications as needed  - Provide nonpharmacologic comfort measures as appropriate  - Advance diet as tolerated, if ordered  - Consider nutrition services referral to assist patient with adequate nutrition and appropriate food choices  Outcome: Progressing  Goal: Maintains or returns to baseline bowel function  Description: INTERVENTIONS:  - Assess bowel function  - Encourage oral fluids to ensure adequate hydration  - Administer IV fluids if ordered to ensure adequate hydration  - Administer ordered medications as needed  - Encourage mobilization and activity  - Consider nutritional services referral to assist patient with adequate nutrition and appropriate food choices  Outcome: Progressing  Goal: Maintains adequate nutritional intake  Description: INTERVENTIONS:  - Monitor percentage of each meal consumed  - Identify factors contributing to decreased intake, treat as appropriate  - Assist with meals as needed  - Monitor I&O, weight, and lab values if indicated  - Obtain nutrition services referral as needed  Outcome: Progressing     Problem: SKIN/TISSUE INTEGRITY - ADULT  Goal: Skin Integrity remains intact(Skin Breakdown Prevention)  Description: Assess:  -Perform Conrad assessment  -Clean and moisturize skin   -Inspect skin when repositioning, toileting, and assisting with ADLS  -Assess under medical devices   -Assess extremities for adequate circulation and sensation     Bed Management:  -Have minimal linens on bed & keep smooth, unwrinkled  -Change linens as needed when moist or perspiring  -Avoid sitting or lying in one position for more than 2 hours while in bed  -Keep HOB at 30 degrees     Toileting:  -Offer bedside commode  -Assess for incontinence   -Use incontinent care products after each incontinent episode    Activity:  -Mobilize patient 2 times a day  -Encourage activity and walks on unit  -Encourage or provide ROM exercises   -Turn and reposition patient every 2 Hours  -Use appropriate equipment to lift or move patient in bed  -Instruct/ Assist with weight shifting every 2 when out of bed in chair  -Consider limitation of chair time 2 hour intervals    Skin Care:  -Avoid use of baby powder, tape, friction and shearing, hot water or constrictive clothing  -Relieve pressure over bony prominences   -Do not massage red bony areas    Next Steps:  -Teach patient strategies to minimize risks   -Consider consults to  interdisciplinary teams  Outcome: Progressing     Problem: HEMATOLOGIC - ADULT  Goal: Maintains hematologic stability  Description: INTERVENTIONS  - Assess for signs and symptoms of bleeding or hemorrhage  - Monitor labs  - Administer supportive blood products/factors as ordered and appropriate  Outcome: Progressing     Problem: MUSCULOSKELETAL - ADULT  Goal: Maintain or return mobility to safest level of function  Description: INTERVENTIONS:  - Assess patient's ability to carry out ADLs; assess patient's baseline for ADL function and identify physical deficits which impact ability to perform ADLs (bathing, care of mouth/teeth, toileting, grooming, dressing, etc )  - Assess/evaluate cause of self-care deficits   - Assess range of motion  - Assess patient's mobility  - Assess patient's need for assistive devices and provide as appropriate  - Encourage maximum independence but intervene and supervise when necessary  - Involve family in performance of ADLs  - Assess for home care needs following discharge   - Consider OT consult to assist with ADL evaluation and planning for discharge  - Provide patient education as appropriate  Outcome: Progressing     Problem: Nutrition/Hydration-ADULT  Goal: Nutrient/Hydration intake appropriate for improving, restoring or maintaining nutritional needs  Description: Monitor and assess patient's nutrition/hydration status for malnutrition  Collaborate with interdisciplinary team and initiate plan and interventions as ordered  Monitor patient's weight and dietary intake as ordered or per policy  Utilize nutrition screening tool and intervene as necessary  Determine patient's food preferences and provide high-protein, high-caloric foods as appropriate       INTERVENTIONS:  - Monitor oral intake, urinary output, labs, and treatment plans  - Assess nutrition and hydration status and recommend course of action  - Evaluate amount of meals eaten  - Assist patient with eating if necessary   - Allow adequate time for meals  - Recommend/ encourage appropriate diets, oral nutritional supplements, and vitamin/mineral supplements  - Order, calculate, and assess calorie counts as needed  - Recommend, monitor, and adjust tube feedings and TPN/PPN based on assessed needs  - Assess need for intravenous fluids  - Provide specific nutrition/hydration education as appropriate  - Include patient/family/caregiver in decisions related to nutrition  Outcome: Progressing

## 2022-08-15 NOTE — ASSESSMENT & PLAN NOTE
· Soft Bps throughout hospital stay with systolic   SBP currently > 110 today  · On lasix, spirinolactone, and lactulose previously   Continue lasix 40 and spironolactone 25 mg daily   · Received 1 dose of albumin 8/10  · BP stable 120s prior to DC

## 2022-08-15 NOTE — ASSESSMENT & PLAN NOTE
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

## 2022-08-15 NOTE — TELEPHONE ENCOUNTER
Angeline Silverio called from Александр Renee, requesting a call back fromo on call provider, regarding new admisison orders   Provider paged via [facility]

## 2022-08-15 NOTE — CASE MANAGEMENT
Case Management Discharge Planning Note    Patient name Dayne Lock  Location /-10 MRN [id number]  : 1945 Date 8/15/2022       Current Admission Date: 2022  Current Admission Diagnosis:Hepatic encephalopathy [facility])   Patient Active Problem List    Diagnosis Date Noted    Elevated serum creatinine 2022    COVID-19 2022    Skin breakdown 2022    Abnormal bone xray 2022    Healthcare maintenance 2022    Nausea 2022    Obesity (BMI 30-39 9) 2022    Acute blood loss anemia 2022    Closed traumatic displaced fracture of proximal end of left humerus 2022    Depression 2022    Cardiomyopathy ([facility] [address] ) 2022    Complete heart block ([facility] [address] ) 2022    Esophageal varices without bleeding ([facility] [address] ) 2021    Pancytopenia ([facility] [address] ) 2021    Iron deficiency anemia due to chronic blood loss 2021    Mild nonproliferative diabetic retinopathy of both eyes without macular edema associated with type 2 diabetes mellitus ([facility] [address] ) 2021    Urinary tract infection 09/15/2020    Hepatic encephalopathy ([facility] [address] ) 2020    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) ([facility] [address] ) 2020    Thrombocytopenia ([facility] [address] ) 2020    Hyperlipidemia 2018    Essential hypertension 2018    Acquired hypothyroidism 2018    Type 2 diabetes mellitus with hyperglycemia, with long-term current use of insulin ([facility] [address] ) 2018    Diabetic polyneuropathy associated with type 2 diabetes mellitus ([facility] [address] ) 2018      LOS (days): 10  Geometric Mean LOS (GMLOS) (days): 4 70  Days to GMLOS:-5 6     OBJECTIVE:  Risk of Unplanned Readmission Score: 31 78         Current admission status: Inpatient   Preferred Pharmacy:   [id number], [address] [place], [place]  [address]    [address] [place] [address]  Phone: [phone] Fax: [phone]    [address] [place]  Phone: [phone] Fax: [phone]    Primary Care Provider: Sagar Johnson DO    Primary Insurance: MEDICARE  Secondary Insurance: [employer]    DISCHARGE DETAILS:    Discharge planning discussed with[de-identified] s/w Daughter in law Paco Dutta via phone  Freedom of Choice: Yes  Comments - Freedom of Choice: Discussed that pt is medically cleared for d/c today to [facility]  Discussed avaialble choices   She [facility]  CM contacted family/caregiver?: Yes          Treatment Team Recommendation: Short Term Rehab  Discharge Destination Plan[de-identified] SNF, Short Term Rehab  Transport at Discharge : [facility]  Dispatcher Contacted: Yes  Number/Name of Dispatcher: Referral sent via Round Trip     ETA of Transport (Date): 08/15/22        Transfer Mode: Stretcher  Accompanied by: Alone     IMM Given (Date):: 08/15/22  IMM Given to[de-identified] Family (S/w daughter in law, Paco Dutta via phone)     **Addendum: 1815 transport time with [facility]  Paco QUIROZ made aware

## 2022-08-15 NOTE — DISCHARGE INSTR - AVS FIRST PAGE
Please follow-up with your primary care provider within 1 week  Please follow-up with outpatient Gastroenterology within 4-6 weeks to follow up cirrhosis  Please follow-up with endocrinology within 1 month to discuss diabetic regimen  Please continue all previously prescribed medications   Please complete outpatient CBC (complete blood count) and CMP (complete metabolic profile) lab work within 1 week   Please complete TSH blood work in approximately 6 weeks   Please call provider or go to ED if you experience new fevers or chills, severe shortness of breath or difficulty breathing, chest pain, intractable nausea/vomiting/diarrhea, severe abdominal pain, new lethargy or confusion, additional concerning symptoms

## 2022-08-15 NOTE — ASSESSMENT & PLAN NOTE
Lab Results   Component Value Date    HGBA1C 8 2 (H) 05/31/2022       Recent Labs     08/14/22  1555 08/14/22  2141 08/15/22  0849 08/15/22  1159   POCGLU 224* 284* 133 119       Blood Sugar Average: Last 72 hrs:  (P) 242 [id number]     · Patient has uncontrolled hyperglycemia  · Home regimen (hold)  · Metformin 500 BID  · Insulin lantus 38 units am and 10 units HS  · Humalog 15 units TID AC  · Inpatient regimen: Lantus 45 units at bedtime and pre meal Humalog 15 units t i d  --> some hyperglycemia on this regimen  · Resume prior home regimen upon discharge

## 2022-08-16 ENCOUNTER — NURSING HOME VISIT (OUTPATIENT)
Dept: GERIATRICS | Facility: OTHER | Age: 77
End: 2022-08-16
Payer: MEDICARE

## 2022-08-16 VITALS
SYSTOLIC BLOOD PRESSURE: 130 MMHG | TEMPERATURE: 98 F | OXYGEN SATURATION: 97 % | WEIGHT: 161.4 LBS | BODY MASS INDEX: 27.7 KG/M2 | DIASTOLIC BLOOD PRESSURE: 78 MMHG | RESPIRATION RATE: 18 BRPM | HEART RATE: 68 BPM

## 2022-08-16 DIAGNOSIS — K75.81 LIVER CIRRHOSIS SECONDARY TO NASH (NONALCOHOLIC STEATOHEPATITIS) (HCC): Primary | Chronic | ICD-10-CM

## 2022-08-16 DIAGNOSIS — K74.60 LIVER CIRRHOSIS SECONDARY TO NASH (NONALCOHOLIC STEATOHEPATITIS) (HCC): Primary | Chronic | ICD-10-CM

## 2022-08-16 PROCEDURE — 99306 1ST NF CARE HIGH MDM 50: CPT | Performed by: INTERNAL MEDICINE

## 2022-08-16 NOTE — PROGRESS NOTES
[address] [address]    Nursing Home Admission    NAME: Juana Butler  AGE: 77 y o  SEX: female [id number]      Patient Location     [facility] rehab    Patients care was coordinated with nursing facility staff  Recent vitals, labs and updated medications [facility]  Past Medical, surgical, social, medication and allergy history and patients previous records reviewed  Assessment/Plan:    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) ([facility] [address] )  Patient has 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  Ultrasound shows ascites  Paracentesis was attempted in the hospital however could not be completed   due to sufficient fluid intake  Continue lactulose/rifampin   Continue maintenance diuretics including Lasix 40 mg p o  and spironolactone 25 mg daily  Hepatic encephalopathy :  Patient was recently hospitalized with increased confusion  Ammonia level was 60 upon admission  Lactulose dose was increased with improvement in mentation  Home tramadol and Flexeril were also held  Avoid sedated medications  Continue lactulose and rifaximin           COVID-19:  Patient tested positive for COVID-19 on 08/11 had mild symptoms with cough and sore throat earlier  Patient was treated with Decadron remdesivir heparin and oxygen  Respiratory status is currently stable with SaO2 of 97% on room air       Elevated serum creatinine:  Creatinine went up to 1 5 at the hospital suspected to be from acute episode of hypotension + diuretics causing prerenal kidney injury  Follow-up repeat BMP  Patient currently remains on Lasix 40 mg and spironolactone 25 mg daily, tolerating well without any episodes of hypotension      Pancytopenia :  ·Patient has pancytopenia due to portal hypertension and liver cirrhosis    She was transfused PRBC and IV iron recently at the hospital ·FOBT was + however patient had no signs of overt GI bleeding   Most recent platelet count was 53 with hemoglobin of 8  Follow-up repeat CBC     Cardiomyopathy:  ·Newly diagnosed during recent admission  ·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% and abnormal diastolic function on 75/64 echo   ·Repeat echo on 07/05 status post ppm:  LVEF 50 percent  G1 DD   · continue maintenance diuretics including Lasix 40 mg and spironolactone 25 mg daily      Depression:  Continue escitalopram     Closed traumatic displaced fracture of proximal end of left humerus:  ·Status post fall 6/26   ·left upper extremity remains in a sling  Follow-up with orthopedic service     Complete heart block :  ·S/p permanent pacemaker 6/29 at [address] to placement patient required emergent transvenous pacing and did require vasopressors after placement     Esophageal varices without bleeding :  ·S/P banding x2 in 02/2022  · follow-up with GI service  EGD to be repeated as an outpatient  Continue nadolol     Type 2 diabetes mellitus with hyperglycemia, with long-term current use of insulin:    HGBA1C 8 2 (H) 05/31/2022    Blood sugar readings have been stable ranging between 134 to 193  Patient remains on insulin glargine 38 units in the morning, 10 units at bedtime, metformin 500 mg b i d and lispro sliding scale coverage   Will continue to  to monitor      Acquired hypothyroidism:  Continue:  Levothyroxine 125 mcg daily except for Sundays 62 5 micrograms  ·TSH elevated on admission, however free T4 1  31  Recommend repeating TSH in 6-8 weeks  ·  Essential hypertension:  Blood pressure is currently stable at 130/78    Continue Lasix and spironolactone  ·Received 1 dose of albumin 8/10      GERD:  Continue Omeprazole    Hyperlipidemia:  Patient remains on simvastatin      Chief Complaint     Recent hospitalization for hepatic encephalopathy    HPI       Patient is a 77 y o  female with past medical history significant for 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, esophageal varices, neuropathy, anemia, hypertension, hyperlipidemia, recent hospitalization for ppm placement for complete heart block and diabetes mellitus type 2  Patient was hospitalized on 08/04/2022 due to confusion for 4 days  Per records patient was unable to take insulin herself which she normally does  She was found to have ammonia level of 60 on  Lactulose dose was increased with improvement in mentation  Right upper quadrant ultrasound revealed cirrhotic changes as well as new ascites for which patient was started on Lasix and spironolactone  Paracentesis was attempted however could not be safely completed due to insufficient fluid  Hospital course was later complicated by prerenal azotemia and hypertension for which diuretics were held  Renal function subsequently improved  Diuretics were resumed upon discharge  Outpatient GI follow-up and EGD was recommended for esophageal varices surveillance  Patient incidentally tested positive for COVID-19 on 08/11  She was noted to have cough, additionally required 2 L of oxygen  Patient was treated with remdesivir and Decadron with improvement in symptoms  Prior to discharge SaO2 went up to 97% on room air  Patient was weaned off of oxygen  Patient was seen by PT OT services and subsequently discharged to [facility] rehab where she is being seen for post hospital admission  At the time of my evaluation patient is doing okay  Left upper extremity remains in a sling from previous left humerus fracture       Past Medical History:   Diagnosis Date    Anemia     Cirrhosis ([facility] [address] )     Diabetic neuropathy ([facility] [address] )     Esophageal varices (HCC)     Fatty liver     GERD (gastroesophageal reflux disease)     Hepatic encephalopathy (HCC)     Hiatal hernia     Hyperlipidemia     Hypertension     Hypoglycemia     Hypothyroidism     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)     Osteoarthritis     Osteopenia     Pancytopenia ([address]     Thrombocytopenia ([facility] [address] )     Type 2 diabetes mellitus ([facility] [address] )        Past Surgical History:   Procedure Laterality Date    ABDOMINAL SURGERY      Abdominal plasty with mesh insertion    CARDIAC ELECTROPHYSIOLOGY PROCEDURE N/A 6/29/2022    Procedure: Cardiac pacer implant;  Surgeon: Annabel Luis MD;  Location:  CARDIAC CATH LAB;   Service: Cardiology    CATARACT EXTRACTION, BILATERAL      CATARACT EXTRACTION, BILATERAL      COLONOSCOPY      EGD AND COLONOSCOPY  03/18/2015    IR BIOPSY BONE MARROW  8/24/2021    IR PARACENTESIS  8/9/2022    LAPAROSCOPIC CHOLECYSTECTOMY      SHOULDER SURGERY Right     calcium depsoti    TUBAL LIGATION      UMBILICAL HERNIA REPAIR      with mesh placed    UPPER GASTROINTESTINAL ENDOSCOPY         Social History     Tobacco Use   Smoking Status Never Smoker   Smokeless Tobacco Never Used          Family History   Problem Relation Age of Onset    Breast cancer Mother     Diabetes type II Father     Thyroid disease unspecified Daughter     Down syndrome Daughter     Diabetes type II Daughter     Diabetes type II Sister     No Known Problems Brother     Prostate cancer Brother     No Known Problems Sister     Stroke Sister     No Known Problems Son     Breast cancer Maternal Aunt     Colon cancer Neg Hx         Allergies   Allergen Reactions    Bee Venom Swelling    Latex     Sesame Seed (Diagnostic) - Food Allergy      Other reaction(s): swelling inside mouth    Strawberry C [Ascorbate - Food Allergy] Other (See Comments)     Mouth sores    Penicillins Rash          Current Outpatient Medications:     aspirin 81 mg chewable tablet, Chew 1 tablet (81 mg total) every other day, Disp: 30 tablet, Rfl: 0    B-D UF III MINI PEN NEEDLES 31G X 5 MM MISC, USE UP TO 3 TIMES A DAY, Disp: 100 each, Rfl: 6    Basaglar KwikPen 100 units/mL SOPN, 38 units in am and 10 units at bedtime, Disp: 30 mL, Rfl: 0    benzonatate (TESSALON PERLES) 100 mg capsule, Take 1 capsule (100 mg total) by mouth 3 (three) times a day as needed for cough, Disp: 20 capsule, Rfl: 0    Blood Glucose Monitoring Suppl (ONE TOUCH ULTRA 2) w/Device KIT, by Does not apply route once for 1 dose Use Glucometer to test up to 3 times daily  , Disp: 1 each, Rfl: 0    calcium citrate-Vitamin D ([employer] Calcium Citrate+D3 Petites) 200 mg-250 units, Take 1 tablet by mouth daily with breakfast, Disp: , Rfl:     Coenzyme Q10 (Co Q10) 100 MG CAPS, Take by mouth, Disp: , Rfl:     escitalopram (LEXAPRO) 10 mg tablet, Take 1 tablet (10 mg total) by mouth daily, Disp: 30 tablet, Rfl: 0    escitalopram (LEXAPRO) 5 mg tablet, Take 5 mg by mouth daily, Disp: , Rfl:     fluticasone (FLONASE) 50 mcg/act nasal spray, 1 spray into each nostril daily prn, Disp: , Rfl:     furosemide (LASIX) 40 mg tablet, TAKE 1 TABLET BY MOUTH EVERY DAY, Disp: 90 tablet, Rfl: 1    lactulose 20 g/30 mL, Take 30 mL (20 g total) by mouth 3 (three) times a day, Disp: 2700 mL, Rfl: 0    levothyroxine 125 mcg tablet, Take 1 tablet 6 days a week and half a tablet on Sunday  , Disp: 90 tablet, Rfl: 2    metFORMIN (GLUCOPHAGE) 500 mg tablet, Take 1 tablet (500 mg total) by mouth 2 (two) times a day with meals, Disp: 60 tablet, Rfl: 0    nadolol (CORGARD) 20 mg tablet, Take 0 5 tablets (10 mg total) by mouth daily, Disp: 30 tablet, Rfl: 0    nystatin (MYCOSTATIN) powder, Apply topically 2 (two) times a day, Disp: 15 g, Rfl: 0    omeprazole (PriLOSEC) 20 mg delayed release capsule, TAKE 1 CAPSULE BY MOUTH EVERY DAY, Disp: 90 capsule, Rfl: 3    ONETOUCH DELICA LANCETS FINE MISC, Use 1-3 times a day, Disp: 100 each, Rfl: 3    OneTouch Ultra test strip, TEST UP TO 3 TIMES DAILY  , Disp: 300 strip, Rfl: 3    pantoprazole (PROTONIX) 40 mg tablet, Take 1 tablet (40 mg total) by mouth daily before breakfast, Disp: 30 tablet, Rfl: 0    rifaximin (Xifaxan) 550 mg tablet, Take 1 tablet (550 mg total) by mouth every 12 (twelve) hours, Disp: 60 tablet, Rfl: 5    simvastatin (ZOCOR) 40 mg tablet, Take 40 mg by mouth daily, Disp: , Rfl: 3    spironolactone (ALDACTONE) 25 mg tablet, Take 1 tablet (25 mg total) by mouth daily, Disp: 30 tablet, Rfl: 0  No current facility-administered medications for this visit  Updated list was reviewed in [facility] system of facility  Vitals:    08/16/22 1238   BP: 130/78   Pulse: 68   Resp: 18   Temp: 98 °F (36 7 °C)   SpO2: 97%       Vital signs were reviewed in point click care    Review of Systems   Constitutional: Positive for fatigue  Negative for chills and fever  HENT: Negative for nosebleeds and rhinorrhea  Eyes: Negative for discharge and redness  Respiratory: Positive for cough  Negative for shortness of breath, wheezing and stridor  Cardiovascular: Negative for chest pain and leg swelling  Gastrointestinal: Negative for abdominal distention, abdominal pain, diarrhea and vomiting  Genitourinary: Negative for dysuria and hematuria  Musculoskeletal: Positive for gait problem  Negative for arthralgias and back pain  Skin: Negative for pallor  Neurological: Positive for weakness (Generalized)  Negative for tremors, seizures and syncope  Psychiatric/Behavioral: Positive for confusion (At times)  Negative for agitation and behavioral problems  Physical Exam  Constitutional:       General: She is not in acute distress  Appearance: She is well-developed  She is obese  She is not diaphoretic  HENT:      Head: Normocephalic and atraumatic  Eyes:      General: No scleral icterus  Right eye: No discharge  Left eye: No discharge  Cardiovascular:      Rate and Rhythm: Normal rate and regular rhythm  Pulmonary:      Effort: No respiratory distress  Breath sounds: No stridor  No wheezing  Abdominal:      Palpations: Abdomen is soft  Tenderness: There is no abdominal tenderness  There is no guarding  Musculoskeletal:      Cervical back: Neck supple        Right lower leg: Edema present  Left lower leg: Edema present  Comments: Left upper extremity is in a sling   Skin:     Coloration: Skin is not jaundiced or pale  Findings: Bruising (Ecchymosis involving bilateral upper extremities) present  Neurological:      General: No focal deficit present  Mental Status: She is alert  Cranial Nerves: No cranial nerve deficit  Motor: Weakness present  Comments: Oriented in month and year   Psychiatric:         Mood and Affect: Mood normal          Behavior: Behavior normal        Diagnostic Data       Recent labs and imaging studies were reviewed    Lab Results   Component Value Date    SODIUM 134 (L) 08/15/2022    K 3 9 08/15/2022     08/15/2022    CO2 26 08/15/2022    BUN 28 (H) 08/15/2022    CREATININE 1 02 08/15/2022    GLUC 156 (H) 08/15/2022    CALCIUM 8 6 08/15/2022        Lab Results   Component Value Date    WBC 3 52 (L) 08/15/2022    HGB 8 0 (L) 08/15/2022    HCT 26 1 (L) 08/15/2022    MCV 92 08/15/2022    PLT 51 (L) 08/15/2022     Code Status:      Full code               This note was electronically signed by Dr Alexei Swanson

## 2022-08-16 NOTE — ASSESSMENT & PLAN NOTE
Patient has 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  Ultrasound shows ascites  Paracentesis was attempted in the hospital however could not be completed   due to sufficient fluid intake  Continue lactulose/rifampin   Continue maintenance diuretics including Lasix 40 mg p o  and spironolactone 25 mg daily

## 2022-08-17 ENCOUNTER — NURSING HOME VISIT (OUTPATIENT)
Dept: WOUND CARE | Facility: HOSPITAL | Age: 77
End: 2022-08-17
Payer: MEDICARE

## 2022-08-17 DIAGNOSIS — R26.2 AMBULATORY DYSFUNCTION: ICD-10-CM

## 2022-08-17 DIAGNOSIS — L89.313 PRESSURE INJURY OF RIGHT BUTTOCK, STAGE 3 (HCC): Primary | ICD-10-CM

## 2022-08-17 DIAGNOSIS — L89.153 PRESSURE INJURY OF SACRAL REGION, STAGE 3 (HCC): ICD-10-CM

## 2022-08-17 PROCEDURE — 99305 1ST NF CARE MODERATE MDM 35: CPT | Performed by: NURSE PRACTITIONER

## 2022-08-17 NOTE — PROGRESS NOTES
[address] MANAGEMENT   AND HYPER[facility]IC MEDICINE CENTER       Patient ID: Hardik Rowland is a 77 y o  female Date of Birth 1945     [facility] of Service[facility]    Performed wound round with: Wound team     Chief Complaint : Sacrum and right buttock    Wound Instructions:  Wound:  Sacrum / Right buttock  Discontinue previous wound order  Cleanse the wound bed with soap and water, pat dry  Apply triad paste to wound bed  Frequency : twice a day and prn for soiling  Offload all wounds  Turn and reposition frequently, maximum of every two hours  Instruct / Assist with weight shifting every 15 - 20 minutes when in chair  Increase protein intake  Monitor for any sign of infection or worsening, inform PCP or patient's primary physician in your facility  Allergies  Bee venom, Latex, Sesame seed (diagnostic) - food allergy, Strawberry c [ascorbate - food allergy], and Penicillins      Assessment & Plan:  1  Pressure injury of right buttock, stage 3 (HCC)  Assessment & Plan:  Right buttock  - wound size is 0 5 x 0 4 cm, 100% granulation, no obvious sign of infection  - local wound care with triad  - on air mattress, continue to reposition when in bed  - increased protein intake  - followup next week      2  Pressure injury of sacral region, stage 3 (HCC)  Assessment & Plan:  Sacrum  - wound size is 3 5 x 0 8 cm, 100% granulation, no obvious sign of infection  - local wound care with triad  - on air mattress, continue to reposition when in bed  - increased protein intake  - followup next week        3  Ambulatory dysfunction  Assessment & Plan:  On STR             Subjective:   8/17/2022This is a 77 y o , female referred to our service for wound/ skin alterations on sacrum and right buttock  Patient have a complex medical history including but not limited to   Anemia, Cirrhosis ([address] ), Diabetic neuropathy ([address] ), Esophageal varices ([address] ), Fatty liver, GERD (gastroesophageal reflux disease), Hepatic encephalopathy ([facility] ), Hiatal hernia, Hyperlipidemia, Hypertension, Hypoglycemia, Hypothyroidism, 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 ([facility] ), Osteoarthritis, Osteopenia, Pancytopenia ([facility] ), Thrombocytopenia ([facility] ), and Type 2 diabetes mellitus ([facility] )    Patient was referred by Senior Care Team  Patient was seen in collaboration with the facility wound team      Wound History:   As per medical Record review, the wound on the sacrum started on August 4, 2022  Possible etiology is pressure ulcer  Patient was seen by wound team at [facility]  Patient was also admitted with pressure ulcer on the right buttock  Received patient in bed, seems comfortable  Patient is on air mattress  Patient is incontinent of both bowel and bladder  Patient needs max assist with turning in bed  Review of Systems   Constitutional: Negative  HENT: Negative  Eyes: Negative  Respiratory: Negative  Cardiovascular: Negative for chest pain and leg swelling  Gastrointestinal: Negative  Endocrine: Negative  Genitourinary: Negative  Musculoskeletal: Positive for gait problem  Skin: Positive for wound  See HPI   Neurological: Negative for dizziness and headaches  Psychiatric/Behavioral: Negative for behavioral problems  Objective:    Physical Exam  HENT:      Head: Normocephalic  Nose: Nose normal       Mouth/Throat:      Pharynx: Oropharynx is clear  Eyes:      Conjunctiva/sclera: Conjunctivae normal    Cardiovascular:      Rate and Rhythm: Normal rate  Pulmonary:      Effort: Pulmonary effort is normal    Abdominal:      Tenderness: There is no abdominal tenderness  There is no guarding  Genitourinary:     Comments: incontinent  Musculoskeletal:         General: No tenderness  Cervical back: Normal range of motion  Right lower leg: No edema  Left lower leg: No edema  Comments: LROM   Skin:     General: Skin is warm  Findings: Lesion present        Comments: Sacrum - wound size is 3 5 x 0 8 x 0 1 cm  , 100% granulation, small amount of serous drainage, periwound is normal, wound edge is attached to wound base, no malodor, no obvious sign of infection, denies pain    Right buttock - wound size is 0 5 x 0 4 x 0 1 cm  , 100% granulation, small amount of serous drainage, periwound is normal, wound edge is attached to wound base, no malodor, no obvious sign of infection, denies pain   Neurological:      Mental Status: She is alert  Gait: Gait abnormal    Psychiatric:         Mood and Affect: Mood normal          Behavior: Behavior normal               Procedures           Patient's care was coordinated with nursing facility staff  Recent vitals, labs and updated medications were reviewed on EMR or chart system of facility  Past Medical, surgical, social, medication and allergy history and patient's previous records were reviewed and updated as appropriate: Most up-to date information is available in the facility EMR where the patient is currently admitted  Patient Active Problem List   Diagnosis    Hyperlipidemia    Essential hypertension    Acquired hypothyroidism    Type 2 diabetes mellitus with hyperglycemia, with long-term current use of insulin ([facility] )    Diabetic polyneuropathy associated with type 2 diabetes mellitus ([address] )    Hepatic encephalopathy (HCC)    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) (HCC)    Thrombocytopenia (HCC)    Urinary tract infection    Mild nonproliferative diabetic retinopathy of both eyes without macular edema associated with type 2 diabetes mellitus (HCC)    Pancytopenia (HCC)    Iron deficiency anemia due to chronic blood loss    Esophageal varices without bleeding (HCC)    Complete heart block (HCC)    Closed traumatic displaced fracture of proximal end of left humerus    Depression    Cardiomyopathy ([address] )    Acute blood loss anemia    Obesity (BMI 30-39  9)    Abnormal bone xray    Healthcare maintenance    Nausea    Skin breakdown    COVID-19    Elevated serum creatinine    Pressure injury of right buttock, stage 3 (HCC)    Pressure injury of sacral region, stage 3 (HCC)    Ambulatory dysfunction     Past Medical History:   Diagnosis Date    Anemia     Cirrhosis (HCC)     Diabetic neuropathy (HCC)     Esophageal varices (HCC)     Fatty liver     GERD (gastroesophageal reflux disease)     Hepatic encephalopathy (HCC)     Hiatal hernia     Hyperlipidemia     Hypertension     Hypoglycemia     Hypothyroidism     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)     Osteoarthritis     Osteopenia     Pancytopenia (HCC)     Thrombocytopenia (HCC)     Type 2 diabetes mellitus ([facility] )      Past Surgical History:   Procedure Laterality Date    ABDOMINAL SURGERY      Abdominal plasty with mesh insertion    CARDIAC ELECTROPHYSIOLOGY PROCEDURE N/A 6/29/2022    Procedure: Cardiac pacer implant;  Surgeon: Wili Schmidt MD;  Location:  CARDIAC CATH LAB; Service: Cardiology    CATARACT EXTRACTION, BILATERAL      CATARACT EXTRACTION, BILATERAL      COLONOSCOPY      EGD AND COLONOSCOPY  03/18/2015    IR BIOPSY BONE MARROW  8/24/2021    IR PARACENTESIS  8/9/2022    LAPAROSCOPIC CHOLECYSTECTOMY      SHOULDER SURGERY Right     calcium depsoti    TUBAL LIGATION      UMBILICAL HERNIA REPAIR      with mesh placed    UPPER GASTROINTESTINAL ENDOSCOPY       Social History     Socioeconomic History    Marital status:       Spouse name: Not on file    Number of children: Not on file    Years of education: Not on file    Highest education level: Not on file   Occupational History    Not on file   Tobacco Use    Smoking status: Never Smoker    Smokeless tobacco: Never Used   Vaping Use    Vaping Use: Never used   Substance and Sexual Activity    Alcohol use: Not Currently    Drug use: No    Sexual activity: Not Currently   Other Topics Concern    Not on file   Social History Narrative    Not on file     Social Determinants of Health     Financial Resource Strain: Not on file   Food Insecurity: No Food Insecurity    Worried About Running Out of Food in the Last Year: Never true    Narciso of Food in the Last Year: Never true   Transportation Needs: No Transportation Needs    Lack of Transportation (Medical): No    Lack of Transportation (Non-Medical): No   Physical Activity: Not on file   Stress: Not on file   Social Connections: Not on file   Intimate Partner Violence: Not on file   Housing Stability: Low Risk     Unable to Pay for Housing in the Last Year: No    Number of Places Lived in the Last Year: 1    Unstable Housing in the Last Year: No        Current Outpatient Medications:     aspirin 81 mg chewable tablet, Chew 1 tablet (81 mg total) every other day, Disp: 30 tablet, Rfl: 0    B-D UF III MINI PEN NEEDLES 31G X 5 MM MISC, USE UP TO 3 TIMES A DAY, Disp: 100 each, Rfl: 6    Basaglar KwikPen 100 units/mL SOPN, 38 units in am and 10 units at bedtime, Disp: 30 mL, Rfl: 0    benzonatate (TESSALON PERLES) 100 mg capsule, Take 1 capsule (100 mg total) by mouth 3 (three) times a day as needed for cough, Disp: 20 capsule, Rfl: 0    Blood Glucose Monitoring Suppl (ONE TOUCH ULTRA 2) w/Device KIT, by Does not apply route once for 1 dose Use Glucometer to test up to 3 times daily  , Disp: 1 each, Rfl: 0    calcium citrate-Vitamin D ([employer] Calcium Citrate+D3 Petites) 200 mg-250 units, Take 1 tablet by mouth daily with breakfast, Disp: , Rfl:     Coenzyme Q10 (Co Q10) 100 MG CAPS, Take by mouth, Disp: , Rfl:     escitalopram (LEXAPRO) 10 mg tablet, Take 1 tablet (10 mg total) by mouth daily, Disp: 30 tablet, Rfl: 0    escitalopram (LEXAPRO) 5 mg tablet, Take 5 mg by mouth daily, Disp: , Rfl:     fluticasone (FLONASE) 50 mcg/act nasal spray, 1 spray into each nostril daily prn, Disp: , Rfl:     furosemide (LASIX) 40 mg tablet, TAKE 1 TABLET BY MOUTH EVERY DAY, Disp: 90 tablet, Rfl: 1    lactulose 20 g/30 mL, Take 30 mL (20 g total) by mouth 3 (three) times a day, Disp: 2700 mL, Rfl: 0    levothyroxine 125 mcg tablet, Take 1 tablet 6 days a week and half a tablet on Sunday  , Disp: 90 tablet, Rfl: 2    metFORMIN (GLUCOPHAGE) 500 mg tablet, Take 1 tablet (500 mg total) by mouth 2 (two) times a day with meals, Disp: 60 tablet, Rfl: 0    nadolol (CORGARD) 20 mg tablet, Take 0 5 tablets (10 mg total) by mouth daily, Disp: 30 tablet, Rfl: 0    nystatin (MYCOSTATIN) powder, Apply topically 2 (two) times a day, Disp: 15 g, Rfl: 0    omeprazole (PriLOSEC) 20 mg delayed release capsule, TAKE 1 CAPSULE BY MOUTH EVERY DAY, Disp: 90 capsule, Rfl: 3    ONETOUCH DELICA LANCETS FINE MISC, Use 1-3 times a day, Disp: 100 each, Rfl: 3    OneTouch Ultra test strip, TEST UP TO 3 TIMES DAILY  , Disp: 300 strip, Rfl: 3    pantoprazole (PROTONIX) 40 mg tablet, Take 1 tablet (40 mg total) by mouth daily before breakfast, Disp: 30 tablet, Rfl: 0    rifaximin (Xifaxan) 550 mg tablet, Take 1 tablet (550 mg total) by mouth every 12 (twelve) hours, Disp: 60 tablet, Rfl: 5    simvastatin (ZOCOR) 40 mg tablet, Take 40 mg by mouth daily, Disp: , Rfl: 3    spironolactone (ALDACTONE) 25 mg tablet, Take 1 tablet (25 mg total) by mouth daily, Disp: 30 tablet, Rfl: 0  Family History   Problem Relation Age of Onset    Breast cancer Mother     Diabetes type II Father     Thyroid disease unspecified Daughter     Down syndrome Daughter     Diabetes type II Daughter     Diabetes type II Sister     No Known Problems Brother     Prostate cancer Brother     No Known Problems Sister     Stroke Sister     No Known Problems Son     Breast cancer Maternal Aunt     Colon cancer Neg Hx               Coordination of Care: Wound team aware of the treatment plan  Facility nurse will provide wound treatment and monitor the wound for any changes       Patient / Staff education : Patient / Staff was given education on sign of infection and pressure ulcer prevention  Patient/ Staff verbalized understanding     Follow up :  Next week    Voice-recognition software may have been used in the preparation of this document  Occasional wrong word or "sound-alike" substitutions may have occurred due to the inherent limitations of voice recognition software  Interpretation should be guided by context        JOSE JUAN Carrasco

## 2022-08-17 NOTE — ASSESSMENT & PLAN NOTE
Right buttock  - wound size is 0 5 x 0 4 cm, 100% granulation, no obvious sign of infection  - local wound care with triad  - on air mattress, continue to reposition when in bed  - increased protein intake  - followup next week

## 2022-08-17 NOTE — ASSESSMENT & PLAN NOTE
Sacrum  - wound size is 3 5 x 0 8 cm, 100% granulation, no obvious sign of infection  - local wound care with triad  - on air mattress, continue to reposition when in bed  - increased protein intake  - followup next week

## 2022-08-22 ENCOUNTER — NURSING HOME VISIT (OUTPATIENT)
Dept: GERIATRICS | Facility: OTHER | Age: 77
End: 2022-08-22
Payer: MEDICARE

## 2022-08-22 ENCOUNTER — TELEPHONE (OUTPATIENT)
Dept: CARDIOLOGY CLINIC | Facility: CLINIC | Age: 77
End: 2022-08-22

## 2022-08-22 VITALS
BODY MASS INDEX: 27.7 KG/M2 | TEMPERATURE: 97.7 F | SYSTOLIC BLOOD PRESSURE: 132 MMHG | OXYGEN SATURATION: 94 % | RESPIRATION RATE: 18 BRPM | HEART RATE: 78 BPM | DIASTOLIC BLOOD PRESSURE: 56 MMHG | WEIGHT: 161.4 LBS

## 2022-08-22 DIAGNOSIS — K74.60 LIVER CIRRHOSIS SECONDARY TO NASH (NONALCOHOLIC STEATOHEPATITIS) (HCC): ICD-10-CM

## 2022-08-22 DIAGNOSIS — K75.81 LIVER CIRRHOSIS SECONDARY TO NASH (NONALCOHOLIC STEATOHEPATITIS) (HCC): ICD-10-CM

## 2022-08-22 PROCEDURE — 99309 SBSQ NF CARE MODERATE MDM 30: CPT | Performed by: INTERNAL MEDICINE

## 2022-08-22 RX ORDER — SPIRONOLACTONE 50 MG/1
25 TABLET, FILM COATED ORAL DAILY
COMMUNITY

## 2022-08-22 NOTE — PROGRESS NOTES
[facility]  [address] [facility] [address] [place] [address]    Nursing Home progress note    NAME: Trinity España  AGE: 77 y o  SEX: female [phone]      Patient Location     [facility] rehab        Assessment/Plan:    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) ([facility] [address] )  History of 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  Recent ultrasound revealed ascites  Paracentesis was attempted however could not be completed due to insufficient amount of fluid  No signs of hepatic encephalopathy at present  Patient is awake alert able to make her needs known  Continue lactulose and rifampin  Patient additionally remains on Lasix 40 mg daily and spironolactone 25 mg daily  Increase spironolactone to 50 mg considering increased edema of lower extremities  Follow-up repeat BMP    Hepatic encephalopathy :  History of recent hospitalization with a increased confusion  Ammonia level was 60  Patient was treated for hepatic encephalopathy  Currently remains stable on lactulose and rifaximin        COVID-19:  Patient tested positive for COVID-19 on 08/11 had mild symptoms with cough and sore throat  She was treated with Decadron remdesivir heparin and oxygen  Currently remains stable with SaO2 of 94 %on RA       Elevated serum creatinine:  Creatinine went up to 1 5 at the hospital suspected to be from acute episode of hypotension + diuretics causing prerenal kidney injury  Creatinine was stable at 1 03 on 08/16/2022  Patient currently remains on Aldactone 25 mg daily and Lasix 40 mg daily  He is noted to have increased edema of lower extremities  Will increase Aldactone to 50 mg in  Repeat BMP in 1 week     Pancytopenia :  ·Patient has chronic pancytopenia due to portal hypertension and liver cirrhosis  She was transfused PRBC and IV iron recently at the hospital ·FOBT was + however patient had no signs of overt GI bleeding     Platelet count was 48 with WBC count of 4 and hemoglobin of 9 1 on 08/16/2022     Cardiomyopathy:  ·Newly diagnosed during recent admission  ·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% and abnormal diastolic function on 85/00 echo   ·Repeat echo on 07/05 status post ppm revealed LVEF 50 percent  G1 DD  Patient appears to have volume overload clinically with increased edema of lower extremities  Increase Aldactone to 50 mg daily    Depression:  Continue escitalopram     Closed traumatic displaced fracture of proximal end of left humerus:  ·Status post fall 6/26   ·left upper extremity remains in a sling  Follow-up with orthopedic service     Complete heart block :  ·S/p permanent pacemaker 6/29 at [address] to placement patient required emergent transvenous pacing and did require vasopressors after placement     Esophageal varices without bleeding :  ·S/P banding x2 in 02/2022  · follow-up with GI service  EGD to be repeated as an outpatient  Continue nadolol     Type 2 diabetes mellitus with hyperglycemia, with long-term current use of insulin:    HGBA1C 8 2 (H) 05/31/2022   Blood sugar readings are occasionally high in low 200s, other levels are acceptable  Continue insulin glargine 38 units daily and metformin 500 mg b i d along with sliding scale coverage     Acquired hypothyroidism:  Continue:  Levothyroxine 125 mcg daily except for Sundays 62 5 micrograms  ·TSH was elevated on admission, however free T4 was normal at  1 31  Recommend repeating TSH in 6-8 weeks  ·  Essential hypertension:  Blood pressure stable at 132/56  Continue Aldactone and Lasix    GERD:  Continue Omeprazole    Hyperlipidemia:  Patient remains on simvastatin    Edema lower extremities:  Appears to be more pronounced than before in the setting of cardiomyopathy and hepatic cirrhosis  Patient currently remains on Aldactone 25 mg and Lasix 40 mg daily  Will increase Aldactone to 50 mg and follow  Last reported weight was 161 4 lb on 08/17  Staff will be advised to monitor weights on regular basis      Reason for visit     Follow-up hepatic encephalopathy, hypothyroidism, diabetes mellitus type 2, depression, left few humeral fracture    HPI     Patient is being seen for a follow-up visit today  She is doing okay at present  Awake alert able to make her needs known  Left upper extremity remains in sling  Patient is needing significant assistance with care  Remains continent of bowel and bladder with occasional incontinence  No fever chills dyspnea or chest congestion  Patient is noted to have increased edema of lower extremities  Past Medical History:   Diagnosis Date    Anemia     Cirrhosis ([facility] [address] )     Diabetic neuropathy ([facility] )     Esophageal varices (HCC)     Fatty liver     GERD (gastroesophageal reflux disease)     Hepatic encephalopathy (HCC)     Hiatal hernia     Hyperlipidemia     Hypertension     Hypoglycemia     Hypothyroidism     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)     Osteoarthritis     Osteopenia     Pancytopenia (HCC)     Thrombocytopenia (HCC)     Type 2 diabetes mellitus ([facility]        Past Surgical History:   Procedure Laterality Date    ABDOMINAL SURGERY      Abdominal plasty with mesh insertion    CARDIAC ELECTROPHYSIOLOGY PROCEDURE N/A 6/29/2022    Procedure: Cardiac pacer implant;  Surgeon: Malika Velez MD;  Location: [facility];   Service: Cardiology    CATARACT EXTRACTION, BILATERAL      CATARACT EXTRACTION, BILATERAL      COLONOSCOPY      EGD AND COLONOSCOPY  03/18/2015    IR BIOPSY BONE MARROW  8/24/2021    IR PARACENTESIS  8/9/2022    LAPAROSCOPIC CHOLECYSTECTOMY      SHOULDER SURGERY Right     calcium depsoti    TUBAL LIGATION      UMBILICAL HERNIA REPAIR      with mesh placed    UPPER GASTROINTESTINAL ENDOSCOPY         Social History     Tobacco Use   Smoking Status Never Smoker   Smokeless Tobacco Never Used          Family History   Problem Relation Age of Onset    Breast cancer Mother     Diabetes type II Father     Thyroid disease unspecified Daughter     Down syndrome Daughter     Diabetes type II Daughter     Diabetes type II Sister     No Known Problems Brother     Prostate cancer Brother     No Known Problems Sister     Stroke Sister     No Known Problems Son     Breast cancer Maternal Aunt     Colon cancer Neg Hx         Allergies   Allergen Reactions    Bee Venom Swelling    Latex     Sesame Seed (Diagnostic) - Food Allergy      Other reaction(s): swelling inside mouth    Strawberry C [Ascorbate - Food Allergy] Other (See Comments)     Mouth sores    Penicillins Rash          Current Outpatient Medications:     spironolactone (ALDACTONE) 50 mg tablet, Take 50 mg by mouth daily, Disp: , Rfl:     aspirin 81 mg chewable tablet, Chew 1 tablet (81 mg total) every other day, Disp: 30 tablet, Rfl: 0    B-D UF III MINI PEN NEEDLES 31G X 5 MM MISC, USE UP TO 3 TIMES A DAY, Disp: 100 each, Rfl: 6    Basaglar KwikPen 100 units/mL SOPN, 38 units in am and 10 units at bedtime, Disp: 30 mL, Rfl: 0    benzonatate (TESSALON PERLES) 100 mg capsule, Take 1 capsule (100 mg total) by mouth 3 (three) times a day as needed for cough, Disp: 20 capsule, Rfl: 0    Blood Glucose Monitoring Suppl (ONE TOUCH ULTRA 2) w/Device KIT, by Does not apply route once for 1 dose Use Glucometer to test up to 3 times daily  , Disp: 1 each, Rfl: 0    calcium citrate-Vitamin D ([employer] Calcium Citrate+D3 Petites) 200 mg-250 units, Take 1 tablet by mouth daily with breakfast, Disp: , Rfl:     Coenzyme Q10 (Co Q10) 100 MG CAPS, Take by mouth, Disp: , Rfl:     escitalopram (LEXAPRO) 10 mg tablet, Take 1 tablet (10 mg total) by mouth daily, Disp: 30 tablet, Rfl: 0    escitalopram (LEXAPRO) 5 mg tablet, Take 5 mg by mouth daily, Disp: , Rfl:     fluticasone (FLONASE) 50 mcg/act nasal spray, 1 spray into each nostril daily prn, Disp: , Rfl:     furosemide (LASIX) 40 mg tablet, TAKE 1 TABLET BY MOUTH EVERY DAY, Disp: 90 tablet, Rfl: 1    lactulose 20 g/30 mL, Take 30 mL (20 g total) by mouth 3 (three) times a day, Disp: 2700 mL, Rfl: 0    levothyroxine 125 mcg tablet, Take 1 tablet 6 days a week and half a tablet on Sunday  , Disp: 90 tablet, Rfl: 2    metFORMIN (GLUCOPHAGE) 500 mg tablet, Take 1 tablet (500 mg total) by mouth 2 (two) times a day with meals, Disp: 60 tablet, Rfl: 0    nadolol (CORGARD) 20 mg tablet, Take 0 5 tablets (10 mg total) by mouth daily, Disp: 30 tablet, Rfl: 0    nystatin (MYCOSTATIN) powder, Apply topically 2 (two) times a day, Disp: 15 g, Rfl: 0    omeprazole (PriLOSEC) 20 mg delayed release capsule, TAKE 1 CAPSULE BY MOUTH EVERY DAY, Disp: 90 capsule, Rfl: 3    ONETOUCH DELICA LANCETS FINE MISC, Use 1-3 times a day, Disp: 100 each, Rfl: 3    OneTouch Ultra test strip, TEST UP TO 3 TIMES DAILY  , Disp: 300 strip, Rfl: 3    pantoprazole (PROTONIX) 40 mg tablet, Take 1 tablet (40 mg total) by mouth daily before breakfast, Disp: 30 tablet, Rfl: 0    rifaximin (Xifaxan) 550 mg tablet, Take 1 tablet (550 mg total) by mouth every 12 (twelve) hours, Disp: 60 tablet, Rfl: 5    simvastatin (ZOCOR) 40 mg tablet, Take 40 mg by mouth daily, Disp: , Rfl: 3    Updated list was reviewed in [facility] system of facility  Vitals:    08/22/22 1204   BP: 132/56   Pulse: 78   Resp: 18   Temp: 97 7 °F (36 5 °C)   SpO2: 94%       Vital signs were reviewed in [facility]    Review of Systems   Constitutional: Positive for fatigue  Negative for chills and fever  Respiratory: Negative for shortness of breath and wheezing  Cardiovascular: Positive for leg swelling (Edema involving bilateral lower extremities)  Negative for chest pain  Gastrointestinal: Positive for diarrhea (Loose bowel movements, attributes it to lactulose)  Negative for abdominal distention, abdominal pain and vomiting  Genitourinary: Negative for dysuria and hematuria     Musculoskeletal: Positive for gait problem  Negative for arthralgias and back pain  Skin: Negative for pallor  Neurological: Positive for weakness (Generalized)  Negative for tremors, seizures and syncope  Psychiatric/Behavioral: Positive for confusion (Occasional)  Negative for agitation and behavioral problems  Physical Exam  Constitutional:       General: She is not in acute distress  Appearance: She is well-developed  She is obese  She is not diaphoretic  HENT:      Head: Normocephalic and atraumatic  Eyes:      General: No scleral icterus  Right eye: No discharge  Left eye: No discharge  Cardiovascular:      Rate and Rhythm: Normal rate and regular rhythm  Pulmonary:      Effort: No respiratory distress  Breath sounds: No stridor  No wheezing  Abdominal:      Palpations: Abdomen is soft  Tenderness: There is no abdominal tenderness  There is no guarding  Musculoskeletal:      Cervical back: Neck supple  Right lower leg: Edema present  Left lower leg: Edema present  Comments: Left upper extremity is in a sling  2-3+ edema involving bilateral lower extremities   Skin:     Coloration: Skin is not jaundiced or pale  Neurological:      General: No focal deficit present  Mental Status: She is alert  Cranial Nerves: No cranial nerve deficit  Motor: Weakness present        Comments: Oriented in month and year   Psychiatric:         Mood and Affect: Mood normal          Behavior: Behavior normal        Diagnostic Data     Labs done on 08/16/2022 revealed hemoglobin of 9 1, WBC count 4, platelet count 48  BUN 28, creatinine 1 03, sodium 135, potassium 3 9      Lab Results   Component Value Date    SODIUM 134 (L) 08/15/2022    K 3 9 08/15/2022     08/15/2022    CO2 26 08/15/2022    BUN 28 (H) 08/15/2022    CREATININE 1 02 08/15/2022    GLUC 156 (H) 08/15/2022    CALCIUM 8 6 08/15/2022        Lab Results   Component Value Date    WBC 3 52 (L) 08/15/2022    HGB 8 0 (L) 08/15/2022    HCT 26 1 (L) 08/15/2022    MCV 92 08/15/2022    PLT 51 (L) 08/15/2022     Code Status:      Full code               This note was electronically signed by Dr Hoa Zavala

## 2022-08-22 NOTE — ASSESSMENT & PLAN NOTE
History of 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  Recent ultrasound revealed ascites  Paracentesis was attempted however could not be completed due to insufficient amount of fluid  No signs of hepatic encephalopathy at present  Patient is awake alert able to make her needs known  Continue lactulose and rifampin  Patient additionally remains on Lasix 40 mg daily and spironolactone 25 mg daily  Increase spironolactone to 50 mg considering increased edema of lower extremities    Follow-up repeat BMP

## 2022-08-22 NOTE — TELEPHONE ENCOUNTER
Spoke with  at [facility] in [facility] [place]- per Jose Bean to cancel appt in [place] location  He stated they didn't have pt down to come in for an appt  They will have an inhouse physician follow up with pt  Appt cancelled

## 2022-08-24 ENCOUNTER — CONSULT (OUTPATIENT)
Dept: CARDIOLOGY CLINIC | Facility: SKILLED NURSING FACILITY | Age: 77
End: 2022-08-24
Payer: MEDICARE

## 2022-08-24 DIAGNOSIS — I50.32 CHRONIC HEART FAILURE WITH PRESERVED EJECTION FRACTION (HFPEF) (HCC): ICD-10-CM

## 2022-08-24 DIAGNOSIS — I42.9 CARDIOMYOPATHY, UNSPECIFIED TYPE (HCC): Primary | ICD-10-CM

## 2022-08-24 DIAGNOSIS — K74.60 HEPATIC CIRRHOSIS, UNSPECIFIED HEPATIC CIRRHOSIS TYPE, UNSPECIFIED WHETHER ASCITES PRESENT (HCC): ICD-10-CM

## 2022-08-24 DIAGNOSIS — Z95.0 CARDIAC PACEMAKER IN SITU: ICD-10-CM

## 2022-08-24 PROCEDURE — 99305 1ST NF CARE MODERATE MDM 35: CPT | Performed by: INTERNAL MEDICINE

## 2022-08-24 NOTE — PROGRESS NOTES
[facility] [place] [address] [address]  [address] [place] [address] [place] Quispe [address]  Phone#  [phone]  Fax#  [phone]                                               Cardiology Nursing Home Visit - Consult  Evens Gillette, 77 y o  female  YOB: 1945  MRN: [id number] Encounter: [id number]      PCP - Shaun Gongora, DO  Outpatient cardiologist - None, will need to be established with [facility] for further follow up  No chief complaint on file  Assessment  Takotsubo cardiomyopathy, LVEF 50%  ECHO - 7/5/22 - LVEF 17%, grade 1 diastolic dysfunction, akinetic apex, hypokinetic remaining apical walls  S/p PPM  For complete heart block  Recent left proximal humeral fracture  Non-operative manegement  Non-alcohol cirrhosis, h/o hepatic encephalopathy  Anemia  Hb 8 9     Plan  Takotsubo cardiomyopathy, LVEF 50%  Remains volume overloaded, which is multiple Toradol related to a cardiomyopathy as well as cirrhosis  She was recently restarted on Lasix 40 mg + spironolactone 25 mg daily, but continues to be quite volume overloaded  8/22/22 - Her spironolactone was increased from 25 mg to 50 mg daily 2 days ago  Increase Lasix to 80 mg daily continue spironolactone 50 mg daily  Check BMP    S/p PPM  Established with device clinic with EP at [place]  monitor    No results found for this visit on 08/24/22  No orders of the defined types were placed in this encounter  Return in about 2 months (around 10/24/2022), or if symptoms worsen or fail to improve  History of Present Illness   77 y o  female is currently admitted to [facility] rehabilitation center after recent discharge from [facility] on 8/15/22  We are currently consulted to evaluate and assist with management of her cardiac problems       [facility] after a fall later at night her driveway, which he apparently slipped and fell onto her left shoulder have a left humerus fracture and was transferred to [facility]  She subsequently developed complete heart block while in the hospital and an urgent transient pacemaker was placed, followed by Pulmonary pacemaker placement subsequently  Her he was fracture was managed medically and she was discharged  She subsequently returns issues with hyperglycemia, encephalopathy and UTI, and was diagnosed with COVID19 as well during the hospitalization  She was eventually discharged back to rehab and has been recovering her  She has since developed significant volume overload and is currently being treated for this with diuresis and we are asked to assist with this further    Historical Information   Past Medical History:   Diagnosis Date    Anemia     [facility]osis [facility] [address] [facility]     Diabetic neuropathy ([facility] [address] [facility]     Esophageal varices ([facility] [address] [facility]     Fatty liver     GERD (gastroesophageal reflux disease)     Hepatic encephalopathy ([facility] [address] [facility]     Hiatal hernia     Hyperlipidemia     Hypertension     Hypoglycemia     Hypothyroidism     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 ([facility] [address] [facility]     Osteoarthritis     Osteopenia     Pancytopenia (HCC)     Thrombocytopenia (HCC)     Type 2 diabetes mellitus ([facility] [address] [facility]      Past Surgical History:   Procedure Laterality Date    ABDOMINAL SURGERY      Abdominal plasty with mesh insertion    CARDIAC ELECTROPHYSIOLOGY PROCEDURE N/A 6/29/2022    Procedure: Cardiac pacer implant;  Surgeon: Geo Kamara MD;  [facility]: [facility];   Service: Cardiology    CATARACT EXTRACTION, BILATERAL      CATARACT EXTRACTION, BILATERAL      COLONOSCOPY      EGD AND COLONOSCOPY  03/18/2015    IR BIOPSY BONE MARROW  8/24/2021    IR PARACENTESIS  8/9/2022    LAPAROSCOPIC CHOLECYSTECTOMY      SHOULDER SURGERY Right     calcium depsoti    TUBAL LIGATION      UMBILICAL HERNIA REPAIR      with mesh placed    UPPER GASTROINTESTINAL ENDOSCOPY       Family History   Problem Relation Age of Onset    Breast cancer Mother     Diabetes type II Father     Thyroid disease unspecified Daughter     Down syndrome Daughter     Diabetes type II Daughter     Diabetes type II Sister     No Known Problems Brother     Prostate cancer Brother     No Known Problems Sister     Stroke Sister     No Known Problems Son     Breast cancer Maternal Aunt     Colon cancer Neg Hx      Current Outpatient Medications on File Prior to Visit   Medication Sig Dispense Refill    aspirin 81 mg chewable tablet Chew 1 tablet (81 mg total) every other day 30 tablet 0    B-D UF III MINI PEN NEEDLES 31G X 5 MM MISC USE UP TO 3 TIMES A  each 6    Basaglar KwikPen 100 units/mL SOPN 38 units in am and 10 units at bedtime 30 mL 0    benzonatate (TESSALON PERLES) 100 mg capsule Take 1 capsule (100 mg total) by mouth 3 (three) times a day as needed for cough 20 capsule 0    Blood Glucose Monitoring Suppl (ONE TOUCH ULTRA 2) w/Device KIT by Does not apply route once for 1 dose Use Glucometer to test up to 3 times daily  1 each 0    calcium citrate-Vitamin D ([employer] Calcium Citrate+D3 Petites) 200 mg-250 units Take 1 tablet by mouth daily with breakfast      Coenzyme Q10 (Co Q10) 100 MG CAPS Take by mouth      escitalopram (LEXAPRO) 10 mg tablet Take 1 tablet (10 mg total) by mouth daily 30 tablet 0    escitalopram (LEXAPRO) 5 mg tablet Take 5 mg by mouth daily      fluticasone (FLONASE) 50 mcg/act nasal spray 1 spray into each nostril daily prn      furosemide (LASIX) 40 mg tablet TAKE 1 TABLET BY MOUTH EVERY DAY 90 tablet 1    lactulose 20 g/30 mL Take 30 mL (20 g total) by mouth 3 (three) times a day 2700 mL 0    levothyroxine 125 mcg tablet Take 1 tablet 6 days a week and half a tablet on Sunday   90 tablet 2    metFORMIN (GLUCOPHAGE) 500 mg tablet Take 1 tablet (500 mg total) by mouth 2 (two) times a day with meals 60 tablet 0    nadolol (CORGARD) 20 mg tablet Take 0 5 tablets (10 mg total) by mouth daily 30 tablet 0    nystatin (MYCOSTATIN) powder Apply topically 2 (two) times a day 15 g 0    omeprazole (PriLOSEC) 20 mg delayed release capsule TAKE 1 CAPSULE BY MOUTH EVERY DAY 90 capsule 3    ONETOUCH DELICA LANCETS FINE MISC Use 1-3 times a day 100 each 3    OneTouch Ultra test strip TEST UP TO 3 TIMES DAILY  300 strip 3    pantoprazole (PROTONIX) 40 mg tablet Take 1 tablet (40 mg total) by mouth daily before breakfast 30 tablet 0    rifaximin (Xifaxan) 550 mg tablet Take 1 tablet (550 mg total) by mouth every 12 (twelve) hours 60 tablet 5    simvastatin (ZOCOR) 40 mg tablet Take 40 mg by mouth daily  3    spironolactone (ALDACTONE) 50 mg tablet Take 50 mg by mouth daily       No current facility-administered medications on file prior to visit  Allergies   Allergen Reactions    Bee Venom Swelling    Latex     Sesame Seed (Diagnostic) - Food Allergy      Other reaction(s): swelling inside mouth    Strawberry C [Ascorbate - Food Allergy] Other (See Comments)     Mouth sores    Penicillins Rash     Social History     Socioeconomic History    Marital status:      Spouse name: None    Number of children: None    Years of education: None    Highest education level: None   Occupational History    None   Tobacco Use    Smoking status: Never Smoker    Smokeless tobacco: Never Used   Vaping Use    Vaping Use: Never used   Substance and Sexual Activity    Alcohol use: Not Currently    Drug use: No    Sexual activity: Not Currently   Other Topics Concern    None   Social History Narrative    None     Social Determinants of Health     Financial Resource Strain: Not on file   Food Insecurity: No Food Insecurity    Worried About Running Out of Food in the Last Year: Never true    Narciso of Food in the Last Year: Never true   Transportation Needs: No Transportation Needs    Lack of Transportation (Medical): No    Lack of Transportation (Non-Medical):  No   Physical Activity: Not on file   Stress: Not on file   Social Connections: Not on file   Intimate Partner Violence: Not on file   Housing Stability: [address] Unable to Pay for Housing in the Last Year: No    Number of Places Lived in the Last Year: 1    Unstable Housing in the Last Year: No        Review of Systems   All other systems reviewed and are negative  Vitals: There were no vitals filed for this visit  BMI - There is no height or weight on file to calculate BMI  Wt Readings from Last 7 Encounters:   08/22/22 73 2 kg (161 lb 6 4 oz)   08/16/22 73 2 kg (161 lb 6 4 oz)   08/15/22 102 kg (225 lb)   07/21/22 99 2 kg (218 lb 11 1 oz)   07/05/22 92 1 kg (203 lb)   06/29/22 88 5 kg (195 lb)   06/17/22 88 1 kg (194 lb 3 2 oz)       Physical Exam  Vitals and nursing note reviewed  Constitutional:       General: She is not in acute distress  Appearance: Normal appearance  She is well-developed  She is obese  She is not ill-appearing  Comments: Noted to be in a left arm sling   HENT:      Head: Normocephalic and atraumatic  Nose: No congestion  Eyes:      General: No scleral icterus  Conjunctiva/sclera: Conjunctivae normal    Neck:      Vascular: JVD present  No carotid bruit  Cardiovascular:      Rate and Rhythm: Normal rate and regular rhythm  Pulses: Normal pulses  Heart sounds: Normal heart sounds  No murmur heard  No friction rub  No gallop  Pulmonary:      Effort: Pulmonary effort is normal  No respiratory distress  Breath sounds: Normal breath sounds  No rales  Abdominal:      General: There is no distension  Palpations: Abdomen is soft  Tenderness: There is no abdominal tenderness  Musculoskeletal:         General: No swelling or tenderness  Cervical back: Neck supple  Right lower leg: Edema present  Left lower leg: Edema present  Skin:     General: Skin is warm  Neurological:      General: No focal deficit present  Mental Status: She is alert        Comments: Slightly confused  Psychiatric:         Mood and Affect: Mood normal          Behavior: Behavior normal          Thought Content: Thought content normal          Labs:    CBC:   Lab Results   Component Value Date    WBC 3 52 (L) 08/15/2022    RBC 2 85 (L) 08/15/2022    HGB 8 0 (L) 08/15/2022    HCT 26 1 (L) 08/15/2022    MCV 92 08/15/2022    PLT 51 (L) 08/15/2022    RDW 21 2 (H) 08/15/2022       CMP:   Lab Results   Component Value Date    K 3 9 08/15/2022     08/15/2022    CO2 26 08/15/2022    BUN 28 (H) 08/15/2022    CREATININE 1 02 08/15/2022    EGFR 53 08/15/2022    GLUCOSE 280 (H) 08/04/2022    CALCIUM 8 6 08/15/2022    AST 65 (H) 08/13/2022    ALT 40 08/13/2022    ALKPHOS 94 08/13/2022       Magnesium:  Lab Results   Component Value Date    MG 1 9 08/05/2022       Lipid Profile:   Lab Results   Component Value Date    HDL 34 (L) 05/31/2022    TRIG 76 05/31/2022    LDLCALC 84 05/31/2022       Thyroid Studies:   Lab Results   Component Value Date    IVI9DVJJJIAF 8 434 (H) 08/04/2022    FREET4 1 31 08/04/2022       A1c:  No components found for: HGA1C    INR:  Lab Results   Component Value Date    INR 1 30 (H) 08/13/2022    INR 1 31 (H) 08/09/2022    INR 1 26 (H) 08/06/2022   5    Imaging: XR chest portable    Result Date: 8/11/2022  Narrative: CHEST INDICATION:   cough  COMPARISON:  Compared with 6/30/2022 EXAM PERFORMED/VIEWS:  XR CHEST PORTABLE FINDINGS:  Left-sided chest wall intracardiac device is identified  Leads are intact  Cardiomediastinal silhouette appears unremarkable  Poor inspiration  The lungs are clear  No pneumothorax   New right lung base haziness  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  Impression: New small right effusion   Workstation performed: [id number]     IR INPATIENT Paracentesis    Result Date: 8/9/2022  Narrative: IR PARACENTESIS PROCEDURE: Limited ultrasound of the abdomen CLINICAL INDICATION: Ascites COMPARISON: Ultrasound 8/7/2022 PERFORMING PHYSICIAN: Bette Bumpers  Leilani Viera MD ASSISTANT PHYSICIAN: None MEDICATIONS: None SEDATION TIME: None CONTRAST: None FLUOROSCOPY TIME: None RADIATION DOSE: 0 mGy  None NUMBER OF IMAGES: 7 TECHNIQUE: Informed written consent was obtained from the patient after a thorough discussion of risks, benefits, and alternatives to the procedure   All questions were answered  The patient was brought to the procedure room and a time-out was performed utilizing universal protocol  The patient was identified verbally and via wrist band  Real-time ultrasound with image documentation was used to evaluate the peritoneal space in all 4 quadrants  Only a scant amount of fluid was identified, insufficient for safe paracentesis at this time  The patient left the section in satisfactory stable condition  FINDINGS: As above  Impression: Insufficient fluid for safe paracentesis at this time  Workstation performed: [id number]     US right upper quadrant with liver dopplers    Result Date: 8/7/2022  Narrative: RIGHT UPPER QUADRANT ULTRASOUND WITH LIVER DOPPLER INDICATION:     abd distension, r/o ascites and PVT  History of cirrhosis COMPARISON:  None  TECHNIQUE:   Real-time ultrasound of the right upper quadrant was performed with a curvilinear transducer with both volumetric sweeps and still imaging techniques  FINDINGS: PANCREAS:  Visualized portions of the pancreas are within normal limits  AORTA AND IVC: The aorta is not well seen due to bowel gas  The perihepatic IVC is unremarkable  LIVER: Size:  Within normal range  The liver measures 15 4 cm in the midclavicular line  Contour:  Surface contour is nodular  Parenchyma: There is diffuse coarsened heterogeneous echotexture suggesting underlying cirrhotic changes  No liver mass identified   LIVER DOPPLER: The main portal vein and primary branch segments are patent and hepatopetal with normal spectral waveform exception of the right portal vein demonstrates some flow above and below baseline and perhaps hepatofugal flow but this may be related to the Doppler angle     Hepatic veins are patent  Spectral waveforms within normal limits  Main hepatic artery appears normal size, patent with normal spectral waveform  BILIARY: The patient is status post cholecystectomy  No intrahepatic biliary dilatation  CBD measures 5 0 mm  No choledocholithiasis  KIDNEY: Right kidney measures 9 9 x 6 2 x 4 8 cm  Volume 151 9 mL Kidney within normal limits  ASCITES:   Ascites is present in the perihepatic region  Small pleural effusion is identified  The ascites is not visible on the prior study of March  Impression: Cirrhotic changes of the liver  The portal vein is patent and demonstrates mostly hepatopedal flow with equivocal direction in the right portal vein  The common duct is not dilated  New ascites  The study was marked in EPIC for significant notification  Workstation performed: [id number]       Cardiac testing:   No results found for this or any previous visit  No results found for this or any previous visit  No results found for this or any previous visit  No results found for this or any previous visit  XR chest portable  Narrative: CHEST     INDICATION:   cough  COMPARISON:  Compared with 6/30/2022    EXAM PERFORMED/VIEWS:  XR CHEST PORTABLE    FINDINGS:  Left-sided chest wall intracardiac device is identified  Leads are intact  Cardiomediastinal silhouette appears unremarkable  Poor inspiration  The lungs are clear  No pneumothorax   New right lung base haziness  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

## 2022-08-25 ENCOUNTER — NURSING HOME VISIT (OUTPATIENT)
Dept: GERIATRICS | Facility: OTHER | Age: 77
End: 2022-08-25
Payer: MEDICARE

## 2022-08-25 ENCOUNTER — TELEPHONE (OUTPATIENT)
Dept: OBGYN CLINIC | Facility: MEDICAL CENTER | Age: 77
End: 2022-08-25

## 2022-08-25 VITALS
TEMPERATURE: 97.8 F | OXYGEN SATURATION: 97 % | DIASTOLIC BLOOD PRESSURE: 67 MMHG | RESPIRATION RATE: 18 BRPM | WEIGHT: 203.2 LBS | BODY MASS INDEX: 34.88 KG/M2 | HEART RATE: 76 BPM | SYSTOLIC BLOOD PRESSURE: 112 MMHG

## 2022-08-25 DIAGNOSIS — R60.1 GENERALIZED EDEMA: ICD-10-CM

## 2022-08-25 DIAGNOSIS — K75.81 LIVER CIRRHOSIS SECONDARY TO NASH (NONALCOHOLIC STEATOHEPATITIS) (HCC): Primary | Chronic | ICD-10-CM

## 2022-08-25 DIAGNOSIS — K74.60 LIVER CIRRHOSIS SECONDARY TO NASH (NONALCOHOLIC STEATOHEPATITIS) (HCC): Primary | Chronic | ICD-10-CM

## 2022-08-25 PROCEDURE — 99309 SBSQ NF CARE MODERATE MDM 30: CPT | Performed by: INTERNAL MEDICINE

## 2022-08-25 RX ORDER — FUROSEMIDE 80 MG
40 TABLET ORAL DAILY
COMMUNITY
End: 2022-09-15

## 2022-08-25 NOTE — PROGRESS NOTES
Ferny [address]  [address]  [id number]    Nursing Home progress note    NAME: Modesto Ortiz  AGE: 77 y o  SEX: female [id number]      Patient Location     [facility] rehab    Assessment/Plan:    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) ([facility] [address] )  Patient has history of 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  Continue maintenance meds including lactulose and rifampin  No signs of hepatic encephalopathy at present  Patient does appear to be volume overloaded with increased edema of lower extremities  Diuretics doses were recently increased  Will follow  Paracentesis was attempted during recent hospital stay however was insufficient fluid and therefore could not be completed    Hepatic encephalopathy :  Patient was recently hospitalized with hepatic encephalopathy causing increased confusion  Ammonia level was 60  Mental status subsequently improved  Continue lactulose and rifaximin     COVID-19:  Patient tested positive for COVID-19 on 08/11 had mild symptoms with cough and sore throat  She was treated with Decadron remdesivir heparin and oxygen  Currently remains stable with SaO2 of 96 %on RA       Elevated serum creatinine:  Creatinine went up to 1 5 at the hospital suspected to be from acute episode of hypotension + diuretics causing prerenal kidney injury  Creatinine was stable at 1 03 on 08/16/2022  Aldactone dose was recently increased due to increased edema of lower extremity  Patient was evaluated by cardiology service yesterday  Lasix dose was increased to 80 mg daily for volume overload  Follow-up BMP in 1 week  Pancytopenia :  ·Patient has chronic pancytopenia due to portal hypertension and liver cirrhosis  She was transfused PRBC and IV iron recently at the hospital ·FOBT was + however patient had no signs of overt GI bleeding   Platelet count was 48 with WBC count of 4 and hemoglobin of 9 1 on 08/16/2022  Continue to monitor blood counts periodically  Transfuse if hemoglobin is less than 7     Cardiomyopathy:  Patient was recently diagnosed with cardiomyopathy  [place] [address] revealed LVEF of 55% and abnormal diastolic function  ·Repeat echo on 07/05 status post ppm revealed LVEF 50 percent  G1 DD  Patient is noted to be volume overloaded clinically with increased edema of lower extremities  Weights appear to be unreliable with wide variations  Aldactone dose was recently increased to 50 mg daily  Patient was evaluated by cardiology service yesterday  Lasix increased to 80 mg for 40 mg daily  Will monitor response on increased doses of diuretics  Follow-up repeat BMP    Depression:  Continue escitalopram     Closed traumatic displaced fracture of proximal end of left humerus:  ·Status post fall 6/26   ·left upper extremity remains in a sling  Follow-up with orthopedic service     Complete heart block :  ·S/p permanent pacemaker 6/29 at [address] to placement patient required emergent transvenous pacing and did require vasopressors after placement     Esophageal varices without bleeding :  ·S/P banding x2 in 02/2022  · follow-up with GI service  EGD to be repeated as an outpatient  Continue nadolol     Type 2 diabetes mellitus with hyperglycemia, with long-term current use of insulin:    HGBA1C 8 2 (H) 05/31/2022   Blood sugar readings are noted to be on lower side  Lowest reported reading was 68 this morning  Other readings have been running under 150  Per nurse patient has not been eating well  Patient currently remains on insulin glargine 38 units daily,metformin 500 mg b i d along with sliding scale coverage  Decrease insulin glargine to 18 units and follow     Acquired hypothyroidism:  Continue:  Levothyroxine 125 mcg daily except for Sundays 62 5 micrograms  ·TSH was elevated on admission, however free T4 was normal at  1 31    Recommend repeating TSH in 6-8 weeks  ·  Essential hypertension:  Blood pressure stable at 132/56  Continue Aldactone and Lasix    GERD:  Continue Omeprazole    Hyperlipidemia:  Patient remains on simvastatin    Edema lower extremities:  Patient is noted to have increased edema of lower extremities likely due to cardiomyopathy and hepatic cirrhosis  Diuretic doses were recently increased  Monitor response on above  May consider venous Doppler if edema is follow-up fluid with diuretics      Reason for visit     Follow-up volume overload, hepatic encephalopathy, hypothyroidism, diabetes mellitus type 2 with episodes of hypoglycemia, depression, left few humeral fracture    HPI     Patient is being seen for a follow-up visit today  She is doing okay at present  Remains weak  Patient was noted to be volume overloaded clinically  Diuretic doses were recently increased  Patient denies any dyspnea or chest pain  Remains afebrile  Leftupper extremity remains in sling  Appetite is fair  Patient is alert oriented able to make her needs known        Past Medical History:   Diagnosis Date    Anemia     Cirrhosis ([facility] [address] )     Diabetic neuropathy ([facility] [address] )     Esophageal varices (HCC)     Fatty liver     GERD (gastroesophageal reflux disease)     Hepatic encephalopathy (HCC)     Hiatal hernia     Hyperlipidemia     Hypertension     Hypoglycemia     Hypothyroidism     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)     Osteoarthritis     Osteopenia     Pancytopenia (HCC)     Thrombocytopenia (HCC)     Type 2 diabetes mellitus ([facility] [address] )        Past Surgical History:   Procedure Laterality Date    ABDOMINAL SURGERY      Abdominal plasty with mesh insertion    CARDIAC ELECTROPHYSIOLOGY PROCEDURE N/A 6/29/2022    Procedure: Cardiac pacer implant;  Surgeon: Lior Schroeder MD;  Location: [facility];   Service: Cardiology    CATARACT EXTRACTION, BILATERAL      CATARACT EXTRACTION, BILATERAL      COLONOSCOPY      EGD AND COLONOSCOPY  03/18/2015    IR BIOPSY BONE MARROW  8/24/2021    IR PARACENTESIS  8/9/2022    LAPAROSCOPIC CHOLECYSTECTOMY      SHOULDER SURGERY Right     calcium depsoti    TUBAL LIGATION      UMBILICAL HERNIA REPAIR      with mesh placed    UPPER GASTROINTESTINAL ENDOSCOPY         Social History     Tobacco Use   Smoking Status Never Smoker   Smokeless Tobacco Never Used          Family History   Problem Relation Age of Onset    Breast cancer Mother     Diabetes type II Father     Thyroid disease unspecified Daughter     Down syndrome Daughter     Diabetes type II Daughter     Diabetes type II Sister     No Known Problems Brother     Prostate cancer Brother     No Known Problems Sister     Stroke Sister     No Known Problems Son     Breast cancer Maternal Aunt     Colon cancer Neg Hx         Allergies   Allergen Reactions    Bee Venom Swelling    Latex     Sesame Seed (Diagnostic) - Food Allergy      Other reaction(s): swelling inside mouth    Strawberry C [Ascorbate - Food Allergy] Other (See Comments)     Mouth sores    Penicillins Rash          Current Outpatient Medications:     furosemide (LASIX) 80 mg tablet, Take 80 mg by mouth in the morning, Disp: , Rfl:     aspirin 81 mg chewable tablet, Chew 1 tablet (81 mg total) every other day, Disp: 30 tablet, Rfl: 0    B-D UF III MINI PEN NEEDLES 31G X 5 MM MISC, USE UP TO 3 TIMES A DAY, Disp: 100 each, Rfl: 6    Basaglar KwikPen 100 units/mL SOPN, 38 units in am and 10 units at bedtime, Disp: 30 mL, Rfl: 0    benzonatate (TESSALON PERLES) 100 mg capsule, Take 1 capsule (100 mg total) by mouth 3 (three) times a day as needed for cough, Disp: 20 capsule, Rfl: 0    Blood Glucose Monitoring Suppl (ONE TOUCH ULTRA 2) w/Device KIT, by Does not apply route once for 1 dose Use Glucometer to test up to 3 times daily  , Disp: 1 each, Rfl: 0    calcium citrate-Vitamin D ([employer] Calcium Citrate+D3 Petites) 200 mg-250 units, Take 1 tablet by mouth daily with breakfast, Disp: , Rfl:    Coenzyme Q10 (Co Q10) 100 MG CAPS, Take by mouth, Disp: , Rfl:     escitalopram (LEXAPRO) 10 mg tablet, Take 1 tablet (10 mg total) by mouth daily, Disp: 30 tablet, Rfl: 0    escitalopram (LEXAPRO) 5 mg tablet, Take 5 mg by mouth daily, Disp: , Rfl:     fluticasone (FLONASE) 50 mcg/act nasal spray, 1 spray into each nostril daily prn, Disp: , Rfl:     lactulose 20 g/30 mL, Take 30 mL (20 g total) by mouth 3 (three) times a day, Disp: 2700 mL, Rfl: 0    levothyroxine 125 mcg tablet, Take 1 tablet 6 days a week and half a tablet on Sunday  , Disp: 90 tablet, Rfl: 2    metFORMIN (GLUCOPHAGE) 500 mg tablet, Take 1 tablet (500 mg total) by mouth 2 (two) times a day with meals, Disp: 60 tablet, Rfl: 0    nadolol (CORGARD) 20 mg tablet, Take 0 5 tablets (10 mg total) by mouth daily, Disp: 30 tablet, Rfl: 0    nystatin (MYCOSTATIN) powder, Apply topically 2 (two) times a day, Disp: 15 g, Rfl: 0    omeprazole (PriLOSEC) 20 mg delayed release capsule, TAKE 1 CAPSULE BY MOUTH EVERY DAY, Disp: 90 capsule, Rfl: 3    ONETOUCH DELICA LANCETS FINE MISC, Use 1-3 times a day, Disp: 100 each, Rfl: 3    OneTouch Ultra test strip, TEST UP TO 3 TIMES DAILY  , Disp: 300 strip, Rfl: 3    pantoprazole (PROTONIX) 40 mg tablet, Take 1 tablet (40 mg total) by mouth daily before breakfast, Disp: 30 tablet, Rfl: 0    rifaximin (Xifaxan) 550 mg tablet, Take 1 tablet (550 mg total) by mouth every 12 (twelve) hours, Disp: 60 tablet, Rfl: 5    simvastatin (ZOCOR) 40 mg tablet, Take 40 mg by mouth daily, Disp: , Rfl: 3    spironolactone (ALDACTONE) 50 mg tablet, Take 50 mg by mouth daily, Disp: , Rfl:     Updated list was reviewed in [facility] of facility  Vitals:    08/25/22 1024   BP: 112/67   Pulse: 76   Resp: 18   Temp: 97 8 °F (36 6 °C)   SpO2: 97%       Vital signs were reviewed in point click care    Review of Systems   Constitutional: Positive for appetite change (Decreased appetite) and fatigue   Negative for chills and fever  HENT: Negative for facial swelling  Respiratory: Negative for shortness of breath, wheezing and stridor  Cardiovascular: Positive for leg swelling (Edema involving bilateral lower extremities)  Negative for chest pain  Gastrointestinal: Positive for diarrhea (Loose bowel movements, attributes it to lactulose)  Negative for abdominal distention, abdominal pain and vomiting  Endocrine:        Blood sugars are running low   Genitourinary: Negative for dysuria and hematuria  Musculoskeletal: Positive for gait problem  Negative for arthralgias and back pain  Skin: Negative for pallor  Neurological: Positive for weakness (Generalized)  Negative for tremors, seizures and syncope  Psychiatric/Behavioral: Positive for confusion (Occasional)  Negative for agitation and behavioral problems  Physical Exam  Constitutional:       General: She is not in acute distress  Appearance: She is well-developed  She is obese  She is not diaphoretic  HENT:      Head: Normocephalic and atraumatic  Eyes:      General: No scleral icterus  Right eye: No discharge  Left eye: No discharge  Cardiovascular:      Rate and Rhythm: Normal rate and regular rhythm  Pulmonary:      Effort: No respiratory distress  Breath sounds: No stridor  No wheezing  Abdominal:      Palpations: Abdomen is soft  Tenderness: There is no abdominal tenderness  There is no guarding  Musculoskeletal:      Cervical back: Neck supple  Right lower leg: Edema present  Left lower leg: Edema present  Comments: Left upper extremity is in a sling  2-3+ edema involving bilateral lower extremities   Skin:     Coloration: Skin is not jaundiced or pale  Neurological:      General: No focal deficit present  Mental Status: She is alert  Cranial Nerves: No cranial nerve deficit  Motor: Weakness present        Comments: Oriented in month and year   Psychiatric:         Mood and Affect: Mood normal          Behavior: Behavior normal        Diagnostic Data     Labs done on 08/16/2022 revealed hemoglobin of 9 1, WBC count 4, platelet count 48  BUN 28, creatinine 1 03, sodium 135, potassium 3 9      Lab Results   Component Value Date    SODIUM 134 (L) 08/15/2022    K 3 9 08/15/2022     08/15/2022    CO2 26 08/15/2022    BUN 28 (H) 08/15/2022    CREATININE 1 02 08/15/2022    GLUC 156 (H) 08/15/2022    CALCIUM 8 6 08/15/2022        Lab Results   Component Value Date    WBC 3 52 (L) 08/15/2022    HGB 8 0 (L) 08/15/2022    HCT 26 1 (L) 08/15/2022    MCV 92 08/15/2022    PLT 51 (L) 08/15/2022     Code Status:      Full code         Monitor response on Lasix 80 mg daily and Aldactone 50 mg daily  Follow-up BMP in 1 week  Monitor blood counts  Continue PT OT  Next decrease insulin glargine to 24 units daily    Monitor blood sugars    This note was electronically signed by Dr Dennie Carter

## 2022-08-25 NOTE — ASSESSMENT & PLAN NOTE
Patient has history of 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  Continue maintenance meds including lactulose and rifampin  No signs of hepatic encephalopathy at present  Patient does appear to be volume overloaded with increased edema of lower extremities  Diuretics doses were recently increased  Will follow    Paracentesis was attempted during recent hospital stay however was insufficient fluid and therefore could not be completed

## 2022-08-29 ENCOUNTER — NURSING HOME VISIT (OUTPATIENT)
Dept: GERIATRICS | Facility: OTHER | Age: 77
End: 2022-08-29
Payer: MEDICARE

## 2022-08-29 VITALS
TEMPERATURE: 97.2 F | DIASTOLIC BLOOD PRESSURE: 62 MMHG | WEIGHT: 203.3 LBS | HEART RATE: 80 BPM | OXYGEN SATURATION: 97 % | RESPIRATION RATE: 18 BRPM | BODY MASS INDEX: 34.9 KG/M2 | SYSTOLIC BLOOD PRESSURE: 116 MMHG

## 2022-08-29 DIAGNOSIS — K75.81 LIVER CIRRHOSIS SECONDARY TO NASH (NONALCOHOLIC STEATOHEPATITIS) (HCC): Primary | Chronic | ICD-10-CM

## 2022-08-29 DIAGNOSIS — K74.60 LIVER CIRRHOSIS SECONDARY TO NASH (NONALCOHOLIC STEATOHEPATITIS) (HCC): Primary | Chronic | ICD-10-CM

## 2022-08-29 PROCEDURE — 99309 SBSQ NF CARE MODERATE MDM 30: CPT | Performed by: INTERNAL MEDICINE

## 2022-08-29 NOTE — PROGRESS NOTES
[facility]  [address] [place] [address]    Nursing Home progress note    NAME: Shena Lux  AGE: 77 y o  SEX: female [id number]      Patient Location     [facility] rehab    Assessment/Plan:    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) ([facility] [address] )  History of 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  No signs of hepatic encephalopathy at present  Patient is awake and alert  Continue maintenance meds including lactulose rifampin and nadolol  Patient additionally remains on Aldactone and Lasix for increased edema of lower extremities  Hepatic encephalopathy :  Clinically stable  Patient is awake alert able to make her needs known  Continue lactulose and rifaximin     COVID-19:  Patient tested positive for COVID-19 on 08/11  She was mildly symptomatic with cough and sore throat  Recently completed isolation  She was treated with Decadron remdesivir heparin and oxygen  Respiratory status remains stable with SaO2 of 97% on room air     Elevated serum creatinine:  Creatinine went up to 1 5 at the hospital suspected to be from acute episode of hypotension + diuretics causing prerenal kidney injury  Creatinine was stable at 0 95 today  Diuretics doses were recently increased due to increased edema of lower extremities  Will continue to monitor BMP periodically    Hypokalemia:  Potassium level was noted to be low at 3 2 today suspect due to diuretics  Will supplement  Will additionally start patient on KCl 20 mEq b i d  Pancytopenia :  Patient has chronic pancytopenia due to portal hypertension and liver cirrhosis  She was transfused PRBC and IV iron recently at the hospital ·FOBT was + however patient had no signs of overt GI bleeding   Platelet count was 48 with WBC count of 4 and hemoglobin of 9 1 on 08/16/2022  CBC from today revealed hemoglobin to be stable at 9 however WBC count is down to 1 6  Platelet count is pending    Will continue to monitor  Transfuse if hemoglobin is less than 7  Leukopenia likely aggravated by recent viral infection     Cardiomyopathy:  Patient was recently diagnosed with cardiomyopathy  E[facility] [address] revealed LVEF of 55% and abnormal diastolic function  Repeat echo on 07/05 status post ppm revealed LVEF 50 percent  G1 DD  Patient was noted to have volume overload recently  Lasix dose was increased to 80 mg b i d  From 40 mg daily  Aldactone was increased to 50 mg daily from 25 mg once daily  Monitor response on above  Overall edema appears to be slightly better than noted on last encounter without any significant weight loss  Repeat BMP in 1 week    Depression:  Continue escitalopram     Closed traumatic displaced fracture of proximal end of left humerus:  ·Status post fall 6/26   ·left upper extremity remains in a sling  Follow-up with orthopedic service     Complete heart block :  ·S/p permanent pacemaker 6/29 at [facility]       Esophageal varices without bleeding :  ·S/P banding x2 in 02/2022  · follow-up with GI service  EGD to be repeated as an outpatient  Continue nadolol     Type 2 diabetes mellitus with hyperglycemia, with long-term current use of insulin:    HGBA1C 8 2 (H) 05/31/2022  Insulin dose was recently reduced for low blood sugars  Patient was taken off of insulin glargine at bedtime  Morning time dose was reduced to 20 units daily  Most recent Accu-Cheks have been stable with readings under 200 occasionally in low 200  Per staff patient's p o  Intake had been down during last few days  Continue insulin glargine 20 units in the morning, metformin 500 mg b i d  along with sliding scale coverage     Acquired hypothyroidism:  Patient currently remains on levothyroxine 125 mcg daily 6 days a week  Per records she was supposed to get half dose on Sundays    Will resume 62 5 mcg on Sundays, continue 125 mcg during other days  TSH was elevated on admission, however free T4 was normal at 1 31   Recommend repeating TSH in 6 weeks  ·  Essential hypertension:  Blood pressure stable at 116/62  Continue Aldactone and Lasix    GERD:  Continue Omeprazole    Hyperlipidemia:  Patient remains on simvastatin    Edema lower extremities:  Patient was recently noted to have increased edema bilateral lower extremities  Diuretics doses were increased  Currently on Lasix 80 mg b i d  And Aldactone 50 mg daily  Edema has slightly improved  Will continue to monitor for few more days  Recent venous Doppler was negative for DVT    Reason for visit     Follow-up volume overload, hepatic encephalopathy, hypothyroidism, diabetes mellitus type 2 with episodes of hypoglycemia, depression, left few humeral fracture    HPI     Patient is being seen for a follow-up visit today  She is doing okay at present  Denies any dyspnea or chest pain  Left upper extremity remains in sling  Appetite is fair  Lower extremity edema has slightly improved on increased doses of diuretics  Patient is awake alert able to make her needs known  Insulin dose was recently reduced for hypoglycemic episodes  No further hypoglycemia  Blood sugar readings are acceptable    Past Medical History:   Diagnosis Date    Anemia     Cirrhosis ([facility] [address] )     Diabetic neuropathy ([facility] [address] )     Esophageal varices (HCC)     Fatty liver     GERD (gastroesophageal reflux disease)     Hepatic encephalopathy (HCC)     Hiatal hernia     Hyperlipidemia     Hypertension     Hypoglycemia     Hypothyroidism     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)     Osteoarthritis     Osteopenia     Pancytopenia (HCC)     Thrombocytopenia (HCC)     Type 2 diabetes mellitus ([facility] [address] )        Past Surgical History:   Procedure Laterality Date    ABDOMINAL SURGERY      Abdominal plasty with mesh insertion    CARDIAC ELECTROPHYSIOLOGY PROCEDURE N/A 6/29/2022    Procedure: Cardiac pacer implant;  Surgeon: Eddi Singh MD;  [facility]: [facility];   Service: Cardiology    CATARACT EXTRACTION, BILATERAL      CATARACT EXTRACTION, BILATERAL      COLONOSCOPY      EGD AND COLONOSCOPY  03/18/2015    IR BIOPSY BONE MARROW  8/24/2021    IR PARACENTESIS  8/9/2022    LAPAROSCOPIC CHOLECYSTECTOMY      SHOULDER SURGERY Right     calcium depsoti    TUBAL LIGATION      UMBILICAL HERNIA REPAIR      with mesh placed    UPPER GASTROINTESTINAL ENDOSCOPY         Social History     Tobacco Use   Smoking Status Never Smoker   Smokeless Tobacco Never Used          Family History   Problem Relation Age of Onset    Breast cancer Mother     Diabetes type II Father     Thyroid disease unspecified Daughter     Down syndrome Daughter     Diabetes type II Daughter     Diabetes type II Sister     No Known Problems Brother     Prostate cancer Brother     No Known Problems Sister     Stroke Sister     No Known Problems Son     Breast cancer Maternal Aunt     Colon cancer Neg Hx         Allergies   Allergen Reactions    Bee Venom Swelling    Latex     Sesame Seed (Diagnostic) - Food Allergy      Other reaction(s): swelling inside mouth    Strawberry C [Ascorbate - Food Allergy] Other (See Comments)     Mouth sores    Penicillins Rash          Current Outpatient Medications:     B-D UF III MINI PEN NEEDLES 31G X 5 MM MISC, USE UP TO 3 TIMES A DAY, Disp: 100 each, Rfl: 6    Basaglar KwikPen 100 units/mL SOPN, 38 units in am and 10 units at bedtime, Disp: 30 mL, Rfl: 0    benzonatate (TESSALON PERLES) 100 mg capsule, Take 1 capsule (100 mg total) by mouth 3 (three) times a day as needed for cough, Disp: 20 capsule, Rfl: 0    Blood Glucose Monitoring Suppl (ONE TOUCH ULTRA 2) w/Device KIT, by Does not apply route once for 1 dose Use Glucometer to test up to 3 times daily  , Disp: 1 each, Rfl: 0    calcium citrate-Vitamin D ([employer] Calcium Citrate+D3 Petites) 200 mg-250 units, Take 1 tablet by mouth daily with breakfast, Disp: , Rfl:     Coenzyme Q10 (Co Q10) 100 MG CAPS, Take by mouth, Disp: , Rfl:     escitalopram (LEXAPRO) 10 mg tablet, Take 1 tablet (10 mg total) by mouth daily, Disp: 30 tablet, Rfl: 0    escitalopram (LEXAPRO) 5 mg tablet, Take 5 mg by mouth daily, Disp: , Rfl:     fluticasone (FLONASE) 50 mcg/act nasal spray, 1 spray into each nostril daily prn, Disp: , Rfl:     furosemide (LASIX) 80 mg tablet, Take 80 mg by mouth in the morning, Disp: , Rfl:     lactulose 20 g/30 mL, Take 30 mL (20 g total) by mouth 3 (three) times a day, Disp: 2700 mL, Rfl: 0    levothyroxine 125 mcg tablet, Take 1 tablet 6 days a week and half a tablet on Sunday  , Disp: 90 tablet, Rfl: 2    metFORMIN (GLUCOPHAGE) 500 mg tablet, Take 1 tablet (500 mg total) by mouth 2 (two) times a day with meals, Disp: 60 tablet, Rfl: 0    nadolol (CORGARD) 20 mg tablet, Take 0 5 tablets (10 mg total) by mouth daily, Disp: 30 tablet, Rfl: 0    nystatin (MYCOSTATIN) powder, Apply topically 2 (two) times a day, Disp: 15 g, Rfl: 0    omeprazole (PriLOSEC) 20 mg delayed release capsule, TAKE 1 CAPSULE BY MOUTH EVERY DAY, Disp: 90 capsule, Rfl: 3    ONETOUCH DELICA LANCETS FINE MISC, Use 1-3 times a day, Disp: 100 each, Rfl: 3    OneTouch Ultra test strip, TEST UP TO 3 TIMES DAILY  , Disp: 300 strip, Rfl: 3    rifaximin (Xifaxan) 550 mg tablet, Take 1 tablet (550 mg total) by mouth every 12 (twelve) hours, Disp: 60 tablet, Rfl: 5    simvastatin (ZOCOR) 40 mg tablet, Take 40 mg by mouth daily, Disp: , Rfl: 3    spironolactone (ALDACTONE) 50 mg tablet, Take 50 mg by mouth daily, Disp: , Rfl:     Updated list was reviewed in [facility] system of facility  Vitals:    08/29/22 1001   BP: 116/62   Pulse: 80   Resp: 18   Temp: (!) 97 2 °F (36 2 °C)   SpO2: 97%       Vital signs were reviewed in [facility]    Review of Systems   Constitutional: Negative for chills and fever  HENT: Negative for facial swelling  Respiratory: Negative for shortness of breath, wheezing and stridor  Cardiovascular: Positive for leg swelling (Edema involving bilateral lower extremities)  Negative for chest pain  Gastrointestinal: Positive for diarrhea (At times due to lactulose )  Negative for abdominal distention, abdominal pain and vomiting  Genitourinary: Negative for dysuria and hematuria  Musculoskeletal: Positive for gait problem  Negative for arthralgias and back pain  Skin: Negative for pallor  Neurological: Positive for weakness (Generalized)  Negative for tremors, seizures and syncope  Psychiatric/Behavioral: Negative for agitation and behavioral problems  Confusion: Occasional        Physical Exam  Constitutional:       General: She is not in acute distress  Appearance: She is well-developed  She is not diaphoretic  HENT:      Head: Normocephalic and atraumatic  Eyes:      General: No scleral icterus  Right eye: No discharge  Left eye: No discharge  Cardiovascular:      Rate and Rhythm: Normal rate and regular rhythm  Pulmonary:      Effort: No respiratory distress  Breath sounds: No stridor  No wheezing  Abdominal:      Palpations: Abdomen is soft  Tenderness: There is no abdominal tenderness  There is no guarding  Musculoskeletal:      Cervical back: Neck supple  Right lower leg: Edema present  Left lower leg: Edema present  Comments: Left upper extremity is in a sling  2+ edema involving bilateral lower extremities   Skin:     Coloration: Skin is not jaundiced or pale  Findings: Bruising (Ecchymosis involving right forearm) present  Neurological:      General: No focal deficit present  Mental Status: She is alert  Cranial Nerves: No cranial nerve deficit  Motor: Weakness present        Comments: Oriented in month and year   Psychiatric:         Mood and Affect: Mood normal          Behavior: Behavior normal        Diagnostic Data     Labs from today reveal WBC count of 1 6, hemoglobin 9, platelet count pending  BUN 24, creatinine 0 95, sodium 142, potassium 3 2, blood glucose 128      Labs done on 08/16/2022 revealed hemoglobin of 9 1, WBC count 4, platelet count 48  BUN 28, creatinine 1 03, sodium 135, potassium 3 9      Lab Results   Component Value Date    SODIUM 134 (L) 08/15/2022    K 3 9 08/15/2022     08/15/2022    CO2 26 08/15/2022    BUN 28 (H) 08/15/2022    CREATININE 1 02 08/15/2022    GLUC 156 (H) 08/15/2022    CALCIUM 8 6 08/15/2022        Lab Results   Component Value Date    WBC 3 52 (L) 08/15/2022    HGB 8 0 (L) 08/15/2022    HCT 26 1 (L) 08/15/2022    MCV 92 08/15/2022    PLT 51 (L) 08/15/2022     Code Status:      Full code         Continue Lasix 80 mg daily, Aldactone 50 mg daily  Supplement KCl  Start maintenance dose of KCl 20 mEq b i d along with Lasix 80 mg b i d    Repeat CBC BMP in 1 week  Monitor WBC count   Discontinue aspirin in the setting of thrombocytopenia    This note was electronically signed by Dr Seng Coffey

## 2022-08-29 NOTE — ASSESSMENT & PLAN NOTE
History of 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  No signs of hepatic encephalopathy at present  Patient is awake and alert  Continue maintenance meds including lactulose rifampin and nadolol  Patient additionally remains on Aldactone and Lasix for increased edema of lower extremities

## 2022-08-31 ENCOUNTER — NURSING HOME VISIT (OUTPATIENT)
Dept: GERIATRICS | Facility: OTHER | Age: 77
End: 2022-08-31
Payer: MEDICARE

## 2022-08-31 ENCOUNTER — NURSING HOME VISIT (OUTPATIENT)
Dept: WOUND CARE | Facility: HOSPITAL | Age: 77
End: 2022-08-31
Payer: MEDICARE

## 2022-08-31 VITALS
WEIGHT: 203.2 LBS | TEMPERATURE: 97.7 F | SYSTOLIC BLOOD PRESSURE: 112 MMHG | HEART RATE: 66 BPM | DIASTOLIC BLOOD PRESSURE: 60 MMHG | OXYGEN SATURATION: 95 % | RESPIRATION RATE: 16 BRPM | BODY MASS INDEX: 34.88 KG/M2

## 2022-08-31 DIAGNOSIS — K75.81 LIVER CIRRHOSIS SECONDARY TO NASH (NONALCOHOLIC STEATOHEPATITIS) (HCC): Primary | ICD-10-CM

## 2022-08-31 DIAGNOSIS — L89.153 PRESSURE INJURY OF SACRAL REGION, STAGE 3 (HCC): ICD-10-CM

## 2022-08-31 DIAGNOSIS — R26.2 AMBULATORY DYSFUNCTION: ICD-10-CM

## 2022-08-31 DIAGNOSIS — L89.313 PRESSURE INJURY OF RIGHT BUTTOCK, STAGE 3 (HCC): Primary | ICD-10-CM

## 2022-08-31 DIAGNOSIS — K74.60 LIVER CIRRHOSIS SECONDARY TO NASH (NONALCOHOLIC STEATOHEPATITIS) (HCC): Primary | ICD-10-CM

## 2022-08-31 PROCEDURE — 99309 SBSQ NF CARE MODERATE MDM 30: CPT | Performed by: INTERNAL MEDICINE

## 2022-08-31 PROCEDURE — 99307 SBSQ NF CARE SF MDM 10: CPT | Performed by: NURSE PRACTITIONER

## 2022-08-31 NOTE — ASSESSMENT & PLAN NOTE
Sacrum  - wound - healed  - hydraguard for prevention  - on air mattress, continue to reposition when in bed  - increased protein intake  - sign off

## 2022-08-31 NOTE — PROGRESS NOTES
[address] MANAGEMENT   AND HYPERBARIC MEDICINE CENTER       Patient ID: Elena Aguilar is a 77 y o  female Date of Birth 1945     Location of Service: [facility]    Performed wound round with: Wound team     Chief Complaint : Sacrum and right buttock    Wound Instructions:  Wound:  Buttocks and sacrum  Discontinue previous wound order  Cleanse the skin with soap and water, pat dry  Apply hydraguard to skin  Frequency : twice a day and prn for soiling  Offload all wounds  Turn and reposition frequently, maximum of every two hours  Instruct / Assist with weight shifting every 15 - 20 minutes when in chair  Increase protein intake  Monitor for any sign of infection or worsening, inform PCP or patient's primary physician in your facility  Allergies  Bee venom, Latex, Sesame seed (diagnostic) - food allergy, Strawberry c [ascorbate - food allergy], and Penicillins      Assessment & Plan:  1  Pressure injury of right buttock, stage 3 (HCC)  Assessment & Plan:  Right buttock  - wound - healed  - hydraguard for prevention  - on air mattress, continue to reposition when in bed  - increased protein intake  - sign off      2  Pressure injury of sacral region, stage 3 (HCC)  Assessment & Plan:  Sacrum  - wound - healed  - hydraguard for prevention  - on air mattress, continue to reposition when in bed  - increased protein intake  - sign off        3  Ambulatory dysfunction  Assessment & Plan:  On STR             Subjective:   8/17/2022This is a 77 y o , female referred to our service for wound/ skin alterations on sacrum and right buttock  Patient have a complex medical history including but not limited to   Anemia, Cirrhosis ([address] ), Diabetic neuropathy ([address] ), Esophageal varices ([address] ), Fatty liver, GERD (gastroesophageal reflux disease), Hepatic encephalopathy ([address] ), Hiatal hernia, Hyperlipidemia, Hypertension, Hypoglycemia, Hypothyroidism, 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 ([address] ), Osteoarthritis, Osteopenia, Pancytopenia ([facility] ), Thrombocytopenia ([facility] ), and Type 2 diabetes mellitus ([facility] )    Patient was referred by Senior Care Team  Patient was seen in collaboration with the facility wound team      Wound History:   As per medical Record review, the wound on the sacrum started on August 4, 2022  Possible etiology is pressure ulcer  Patient was seen by wound team at [facility]  Patient was also admitted with pressure ulcer on the right buttock  Received patient in bed, seems comfortable  Patient is on air mattress  Patient is incontinent of both bowel and bladder  Patient needs max assist with turning in bed     8/31/2022 Follow up for wound on the sacrum and right buttock   Received patient, not in distress  Facility staff did not report any significant issues related to the wound  Review of Systems   Constitutional: Negative  HENT: Negative  Eyes: Negative  Respiratory: Negative  Cardiovascular: Negative for chest pain and leg swelling  Gastrointestinal: Negative  Endocrine: Negative  Genitourinary: Negative  Musculoskeletal: Positive for gait problem  Skin: Positive for wound  See HPI   Neurological: Negative for dizziness and headaches  Psychiatric/Behavioral: Negative for behavioral problems  Objective:    Physical Exam  HENT:      Head: Normocephalic  Nose: Nose normal       Mouth/Throat:      Pharynx: Oropharynx is clear  Eyes:      Conjunctiva/sclera: Conjunctivae normal    Pulmonary:      Effort: Pulmonary effort is normal    Abdominal:      Tenderness: There is no abdominal tenderness  There is no guarding  Genitourinary:     Comments: incontinent  Musculoskeletal:         General: No tenderness  Cervical back: Normal range of motion  Right lower leg: No edema  Left lower leg: No edema  Comments: LROM   Skin:     Findings: Lesion present        Comments: Sacrum -healed  Right buttock - healed Neurological:      Mental Status: She is alert  Gait: Gait abnormal    Psychiatric:         Mood and Affect: Mood normal          Behavior: Behavior normal               Procedures           Patient's care was coordinated with nursing facility staff  Recent vitals, labs and updated medications were reviewed on EMR or chart system of facility  Past Medical, surgical, social, medication and allergy history and patient's previous records were reviewed and updated as appropriate: Most up-to date information is available in the facility EMR where the patient is currently admitted  Patient Active Problem List   Diagnosis    Hyperlipidemia    Essential hypertension    Acquired hypothyroidism    Type 2 diabetes mellitus with hyperglycemia, with long-term current use of insulin [facility]    Diabetic polyneuropathy associated with type 2 diabetes mellitus [facility] )    Hepatic encephalopathy (HCC)    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) (HCC)    Thrombocytopenia (HCC)    Urinary tract infection    Mild nonproliferative diabetic retinopathy of both eyes without macular edema associated with type 2 diabetes mellitus (HCC)    Pancytopenia (HCC)    Iron deficiency anemia due to chronic blood loss    Esophageal varices without bleeding (HCC)    Complete heart block (HCC)    Closed traumatic displaced fracture of proximal end of left humerus    Depression    Cardiomyopathy ([facility] )    Acute blood loss anemia    Obesity (BMI 30-39  9)    Abnormal bone xray    Healthcare maintenance    Nausea    Skin breakdown    COVID-19    Elevated serum creatinine    Pressure injury of right buttock, stage 3 (HCC)    Pressure injury of sacral region, stage 3 (HCC)    Ambulatory dysfunction     Past Medical History:   Diagnosis Date    Anemia     Cirrhosis (HCC)     Diabetic neuropathy (HCC)     Esophageal varices (HCC)     Fatty liver     GERD (gastroesophageal reflux disease)     Hepatic encephalopathy [facility] )     Hiatal hernia     Hyperlipidemia     Hypertension     Hypoglycemia     Hypothyroidism     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)     Osteoarthritis     Osteopenia     Pancytopenia (HCC)     Thrombocytopenia (HCC)     Type [facility] mellitus [facility] )      Past Surgical History:   Procedure Laterality Date    ABDOMINAL SURGERY      Abdominal plasty with mesh insertion    CARDIAC ELECTROPHYSIOLOGY PROCEDURE N/A 6/29/2022    Procedure: Cardiac pacer implant;  Surgeon: Ryan Person MD;  Location:  CARDIAC CATH LAB; Service: Cardiology    CATARACT EXTRACTION, BILATERAL      CATARACT EXTRACTION, BILATERAL      COLONOSCOPY      EGD AND COLONOSCOPY  03/18/2015    IR BIOPSY BONE MARROW  8/24/2021    IR PARACENTESIS  8/9/2022    LAPAROSCOPIC CHOLECYSTECTOMY      SHOULDER SURGERY Right     calcium depsoti    TUBAL LIGATION      UMBILICAL HERNIA REPAIR      with mesh placed    UPPER GASTROINTESTINAL ENDOSCOPY       Social History     Socioeconomic History    Marital status:      Spouse name: None    Number of children: None    Years of education: None    Highest education level: None   Occupational History    None   Tobacco Use    Smoking status: Never Smoker    Smokeless tobacco: Never Used   Vaping Use    Vaping Use: Never used   Substance and Sexual Activity    Alcohol use: Not Currently    Drug use: No    Sexual activity: Not Currently   Other Topics Concern    None   Social History Narrative    None     Social Determinants of Health     Financial Resource Strain: Not on file   Food Insecurity: No Food Insecurity    Worried About Running Out of Food in the Last Year: Never true    Narciso of Food in the Last Year: Never true   Transportation Needs: No Transportation Needs    Lack of Transportation (Medical): No    Lack of Transportation (Non-Medical):  No   Physical Activity: Not on file   Stress: Not on file   Social Connections: Not on file Intimate Partner Violence: Not on file   Housing Stability: Low Risk     Unable to Pay for Housing in the Last Year: No    Number of Places Lived in the Last Year: 1    Unstable Housing in the Last Year: No        Current Outpatient Medications:     B-D UF III MINI PEN NEEDLES 31G X 5 MM MISC, USE UP TO 3 TIMES A DAY, Disp: 100 each, Rfl: 6    Basaglar KwikPen 100 units/mL SOPN, 38 units in am and 10 units at bedtime, Disp: 30 mL, Rfl: 0    benzonatate (TESSALON PERLES) 100 mg capsule, Take 1 capsule (100 mg total) by mouth 3 (three) times a day as needed for cough, Disp: 20 capsule, Rfl: 0    Blood Glucose Monitoring Suppl (ONE TOUCH ULTRA 2) w/Device KIT, by Does not apply route once for 1 dose Use Glucometer to test up to 3 times daily  , Disp: 1 each, Rfl: 0    calcium citrate-Vitamin D ([employer]S Calcium Citrate+D3 Petites) 200 mg-250 units, Take 1 tablet by mouth daily with breakfast, Disp: , Rfl:     Coenzyme Q10 (Co Q10) 100 MG CAPS, Take by mouth, Disp: , Rfl:     escitalopram (LEXAPRO) 10 mg tablet, Take 1 tablet (10 mg total) by mouth daily, Disp: 30 tablet, Rfl: 0    escitalopram (LEXAPRO) 5 mg tablet, Take 5 mg by mouth daily, Disp: , Rfl:     fluticasone (FLONASE) 50 mcg/act nasal spray, 1 spray into each nostril daily prn, Disp: , Rfl:     furosemide (LASIX) 80 mg tablet, Take 80 mg by mouth in the morning, Disp: , Rfl:     lactulose 20 g/30 mL, Take 30 mL (20 g total) by mouth 3 (three) times a day, Disp: 2700 mL, Rfl: 0    levothyroxine 125 mcg tablet, Take 1 tablet 6 days a week and half a tablet on Sunday  , Disp: 90 tablet, Rfl: 2    metFORMIN (GLUCOPHAGE) 500 mg tablet, Take 1 tablet (500 mg total) by mouth 2 (two) times a day with meals, Disp: 60 tablet, Rfl: 0    nadolol (CORGARD) 20 mg tablet, Take 0 5 tablets (10 mg total) by mouth daily, Disp: 30 tablet, Rfl: 0    nystatin (MYCOSTATIN) powder, Apply topically 2 (two) times a day, Disp: 15 g, Rfl: 0    omeprazole (PriLOSEC) 20 mg delayed release capsule, TAKE 1 CAPSULE BY MOUTH EVERY DAY, Disp: 90 capsule, Rfl: 3    ONETOUCH DELICA LANCETS FINE MISC, Use 1-3 times a day, Disp: 100 each, Rfl: 3    OneTouch Ultra test strip, TEST UP TO 3 TIMES DAILY  , Disp: 300 strip, Rfl: 3    rifaximin (Xifaxan) 550 mg tablet, Take 1 tablet (550 mg total) by mouth every 12 (twelve) hours, Disp: 60 tablet, Rfl: 5    simvastatin (ZOCOR) 40 mg tablet, Take 40 mg by mouth daily, Disp: , Rfl: 3    spironolactone (ALDACTONE) 50 mg tablet, Take 50 mg by mouth daily, Disp: , Rfl:   Family History   Problem Relation Age of Onset    Breast cancer Mother     Diabetes type II Father     Thyroid disease unspecified Daughter     Down syndrome Daughter     Diabetes type II Daughter     Diabetes type II Sister     No Known Problems Brother     Prostate cancer Brother     No Known Problems Sister     Stroke Sister     No Known Problems Son     Breast cancer Maternal Aunt     Colon cancer Neg Hx               Coordination of Care: Wound team aware of the treatment plan  Facility nurse will provide wound treatment and monitor the wound for any changes  Patient / Staff education : Patient / Staff was given education on sign of infection and pressure ulcer prevention  Patient/ Staff verbalized understanding     Follow up :  Sign off    Voice-recognition software may have been used in the preparation of this document  Occasional wrong word or "sound-alike" substitutions may have occurred due to the inherent limitations of voice recognition software  Interpretation should be guided by context        JOSE JUAN Beck

## 2022-08-31 NOTE — ASSESSMENT & PLAN NOTE
Right buttock  - wound - healed  - hydraguard for prevention  - on air mattress, continue to reposition when in bed  - increased protein intake  - sign off

## 2022-08-31 NOTE — PROGRESS NOTES
[address] [address]    Nursing Home progress note    NAME: Tobey Castleman  AGE: 77 y o  SEX: female [id number]      Patient Location     [facility]    Assessment/Plan:    No problem-specific Assessment & Plan notes found for this encounter  Hepatic encephalopathy :  Currently resolved  Patient is awake alert     Continue lactulose and rifaximin     COVID-19:  Patient tested positive for COVID-19 on 08/11  She was mildly symptomatic with cough and sore throat  Recently completed isolation  She was treated with Decadron remdesivir heparin and oxygen  Respiratory status remains stable with SaO2 of 95% on room air     Elevated serum creatinine:  Creatinine went up to 1 5 at the hospital suspected to be from acute episode of hypotension + diuretics causing prerenal kidney injury  Creatinine was stable at 0 95 on 08/29  Diuretics doses were recently increased due to increased edema of lower extremities  Follow-up repeat BMP on 09/06/2022    Hypokalemia:  Potassium level was noted to be low at 3 2 on recent BMP uspect due to diuretics  Patient was given KCl 40 mEq x 2 doses  She was additionally started on KCl 20 mEq b i d  Along with Lasix 80 mg b i d   Follow-up repeat BMP    Pancytopenia :  Patient has chronic pancytopenia due to portal hypertension and liver cirrhosis  She was transfused PRBC and IV iron recently at the hospital ·FOBT was + however patient had no signs of overt GI bleeding   Recent CBC revealed hemoglobin to be stable at 9 however WBC count wasdown to 1 6 with Platelet count of 47  Follow-up repeat CBC on 09/06, suspect leukopenia got aggravated by recent viral infection  Transfuse if hemoglobin is less than 7        Cardiomyopathy:  Echo done on 07/05    post ppm revealed LVEF 50 percent  G1 DD  Patient was noted to have volume overload recently with increased edema of lower extremities    Lasix dose was increased to 80 mg b i d  from 40 mg daily  Aldactone was increased to 50 mg daily from 25 mg once daily  Patient is overall responding well  Lower extremity edema has improved  Continue same doses of diuretics  Follow-up repeat BMP on    Depression:  Continue escitalopram     Closed traumatic displaced fracture of proximal end of left humerus:  ·Status post fall 6/26   ·left upper extremity remains in a sling  Follow-up with orthopedic service     Complete heart block :  ·S/p permanent pacemaker 6/29 at [facility]       Esophageal varices without bleeding :  ·S/P banding x2 in 02/2022  · follow-up with GI service  EGD to be repeated as an outpatient  Continue nadolol     Type 2 diabetes mellitus with hyperglycemia, with long-term current use of insulin:    HGBA1C 8 2 (H) 05/31/2022  Insulin dose was recently reduced for low blood sugars  Most recent Accu-Cheks have been on the higher side  Fasting blood sugar was 168 this morning  Post breakfast reading was 267  Post supper reading last night was 298  Patient currently remains on insulin glargine 20 units at bedtime, metformin 500 mg b i d  And sliding scale coverage  Will start  insulin lispro 3 units with each meal and follow up     Acquired hypothyroidism:  Continue levothyroxine 125 mcg 6 days a week and 62 5 mcg 1 day a week  ·  Essential hypertension:  Blood pressure stable at   112/60  Continue Aldactone and Lasix    GERD:  Continue Omeprazole    Hyperlipidemia:  Patient remains on simvastatin    Edema lower extremities:  Patient was recently noted to have increased edema bilateral lower extremities  Diuretics doses were increased  Currently on Lasix 80 mg b i d  And Aldactone 50 mg daily  Edema has significantly Will continue to monitor for on the current dose  Recent venous Doppler was negative for DVT    Follow-up repeat BMP on 09/05/2022    Reason for visit     Follow-up volume overload, edema lower extremities hepatic encephalopathy, hypothyroidism, diabetes mellitus type 2 with episodes of hypoglycemia, depression, left few humeral fracture    HPI     Patient is being seen for a follow-up visit today  She was recently noted to have increased edema of lower extremities  Diuretic doses were increased  Patient appears to be responding well  Lower extremity edema has decreased  Patient currently denies any dyspnea or chest pain  He is awake alert able to make her needs known  Blood sugars are lately noted to be on the higher side  Past Medical History:   Diagnosis Date    Anemia     Cir[facility]osis ([facility] )     Diabetic neuropathy ([facility] )     Esophageal varices (HCC)     Fatty liver     GERD (gastroesophageal reflux disease)     Hepatic encephalopathy (HCC)     Hiatal hernia     Hyperlipidemia     Hypertension     Hypoglycemia     Hypothyroidism     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)     Osteoarthritis     Osteopenia     Pancytopenia (HCC)     Thrombocytopenia (HCC)     Type 2 diabetes mellitus ([facility] )        Past Surgical History:   Procedure Laterality Date    ABDOMINAL SURGERY      Abdominal plasty with mesh insertion    CARDIAC ELECTROPHYSIOLOGY PROCEDURE N/A 6/29/2022    Procedure: Cardiac pacer implant;  Surgeon: Arik Dumas MD;  Location:  CARDIAC CATH LAB;   Service: Cardiology    CATARACT EXTRACTION, BILATERAL      CATARACT EXTRACTION, BILATERAL      COLONOSCOPY      EGD AND COLONOSCOPY  03/18/2015    IR BIOPSY BONE MARROW  8/24/2021    IR PARACENTESIS  8/9/2022    LAPAROSCOPIC CHOLECYSTECTOMY      SHOULDER SURGERY Right     calcium depsoti    TUBAL LIGATION      UMBILICAL HERNIA REPAIR      with mesh placed    UPPER GASTROINTESTINAL ENDOSCOPY         Social History     Tobacco Use   Smoking Status Never Smoker   Smokeless Tobacco Never Used          Family History   Problem Relation Age of Onset    Breast cancer Mother     Diabetes type II Father     Thyroid disease unspecified Daughter     Down syndrome Daughter     Diabetes type II Daughter     Diabetes type II Sister     No Known Problems Brother     Prostate cancer Brother     No Known Problems Sister     Stroke Sister     No Known Problems Son     Breast cancer Maternal Aunt     Colon cancer Neg Hx         Allergies   Allergen Reactions    Bee Venom Swelling    Latex     Sesame Seed (Diagnostic) - Food Allergy      Other reaction(s): swelling inside mouth    Strawberry C [Ascorbate - Food Allergy] Other (See Comments)     Mouth sores    Penicillins Rash          Current Outpatient Medications:     B-D UF III MINI PEN NEEDLES 31G X 5 MM MISC, USE UP TO 3 TIMES A DAY, Disp: 100 each, Rfl: 6    Basaglar KwikPen 100 units/mL SOPN, 38 units in am and 10 units at bedtime, Disp: 30 mL, Rfl: 0    benzonatate (TESSALON PERLES) 100 mg capsule, Take 1 capsule (100 mg total) by mouth 3 (three) times a day as needed for cough, Disp: 20 capsule, Rfl: 0    Blood Glucose Monitoring Suppl (ONE TOUCH ULTRA 2) w/Device KIT, by Does not apply route once for 1 dose Use Glucometer to test up to 3 times daily  , Disp: 1 each, Rfl: 0    calcium citrate-Vitamin D (CVS Calcium Citrate+D3 Petites) 200 mg-250 units, Take 1 tablet by mouth daily with breakfast, Disp: , Rfl:     Coenzyme Q10 (Co Q10) 100 MG CAPS, Take by mouth, Disp: , Rfl:     escitalopram (LEXAPRO) 10 mg tablet, Take 1 tablet (10 mg total) by mouth daily, Disp: 30 tablet, Rfl: 0    escitalopram (LEXAPRO) 5 mg tablet, Take 5 mg by mouth daily, Disp: , Rfl:     fluticasone (FLONASE) 50 mcg/act nasal spray, 1 spray into each nostril daily prn, Disp: , Rfl:     furosemide (LASIX) 80 mg tablet, Take 80 mg by mouth in the morning, Disp: , Rfl:     lactulose 20 g/30 mL, Take 30 mL (20 g total) by mouth 3 (three) times a day, Disp: 2700 mL, Rfl: 0    levothyroxine 125 mcg tablet, Take 1 tablet 6 days a week and half a tablet on Sunday  , Disp: 90 tablet, Rfl: 2    metFORMIN (GLUCOPHAGE) 500 mg tablet, Take 1 tablet (500 mg total) by mouth 2 (two) times a day with meals, Disp: 60 tablet, Rfl: 0    nadolol (CORGARD) 20 mg tablet, Take 0 5 tablets (10 mg total) by mouth daily, Disp: 30 tablet, Rfl: 0    nystatin (MYCOSTATIN) powder, Apply topically 2 (two) times a day, Disp: 15 g, Rfl: 0    omeprazole (PriLOSEC) 20 mg delayed release capsule, TAKE 1 CAPSULE BY MOUTH EVERY DAY, Disp: 90 capsule, Rfl: 3    ONETOUCH DELICA LANCETS FINE MISC, Use 1-3 times a day, Disp: 100 each, Rfl: 3    OneTouch Ultra test strip, TEST UP TO 3 TIMES DAILY  , Disp: 300 strip, Rfl: 3    rifaximin (Xifaxan) 550 mg tablet, Take 1 tablet (550 mg total) by mouth every 12 (twelve) hours, Disp: 60 tablet, Rfl: 5    simvastatin (ZOCOR) 40 mg tablet, Take 40 mg by mouth daily, Disp: , Rfl: 3    spironolactone (ALDACTONE) 50 mg tablet, Take 50 mg by mouth daily, Disp: , Rfl:     Updated list was reviewed in [facility] system of facility  There were no vitals filed for this visit  Vital signs were reviewed in [facility] care    Review of Systems   Constitutional: Negative for chills and fever  Respiratory: Negative for shortness of breath, wheezing and stridor  Cardiovascular: Negative for chest pain and leg swelling  Gastrointestinal: Positive for diarrhea (At times due to lactulose )  Negative for abdominal distention, abdominal pain and vomiting  Genitourinary: Negative for dysuria and hematuria  Musculoskeletal: Positive for gait problem  Negative for arthralgias and back pain  Skin: Negative for pallor  Neurological: Positive for weakness (Generalized)  Negative for tremors, seizures and syncope  Psychiatric/Behavioral: Negative for agitation, behavioral problems and confusion  Physical Exam  Constitutional:       General: She is not in acute distress  Appearance: She is well-developed  She is not diaphoretic  HENT:      Head: Normocephalic and atraumatic     Eyes:      General: No scleral icterus  Right eye: No discharge  Left eye: No discharge  Cardiovascular:      Rate and Rhythm: Normal rate and regular rhythm  Pulmonary:      Effort: No respiratory distress  Breath sounds: No stridor  No wheezing  Abdominal:      Palpations: Abdomen is soft  Tenderness: There is no abdominal tenderness  There is no guarding  Musculoskeletal:      Cervical back: Neck supple  Right lower leg: Edema present  Left lower leg: Edema present  Comments: Left upper extremity is in a sling  Lower extremity edema has significantly improved bilaterally   Skin:     Coloration: Skin is not jaundiced or pale  Findings: Bruising (Ecchymosis involving right forearm) present  Neurological:      General: No focal deficit present  Mental Status: She is alert  Cranial Nerves: No cranial nerve deficit  Motor: Weakness present  Comments: Oriented in month and year   Psychiatric:         Mood and Affect: Mood normal          Behavior: Behavior normal        Diagnostic Data     Labs from 8/29 reveal WBC count of 1 6, hemoglobin 9, platelet count pending  BUN 24, creatinine 0 95, sodium 142, potassium 3 2, blood glucose 128      Labs done on 08/16/2022 revealed hemoglobin of 9 1, WBC count 4, platelet count 48  BUN 28, creatinine 1 03, sodium 135, potassium 3 9      Lab Results   Component Value Date    SODIUM 134 (L) 08/15/2022    K 3 9 08/15/2022     08/15/2022    CO2 26 08/15/2022    BUN 28 (H) 08/15/2022    CREATININE 1 02 08/15/2022    GLUC 156 (H) 08/15/2022    CALCIUM 8 6 08/15/2022        Lab Results   Component Value Date    WBC 3 52 (L) 08/15/2022    HGB 8 0 (L) 08/15/2022    HCT 26 1 (L) 08/15/2022    MCV 92 08/15/2022    PLT 51 (L) 08/15/2022     Code Status:      Full code    Additional notes     Continue Lasix 80 mg daily, Aldactone 50 mg daily  Follow-up repeat CBC and BMP  Start insulin lispro 3 units t i d   With each meal   Follow blood sugars    This note was electronically signed by Dr Garrison Hodge

## 2022-09-02 ENCOUNTER — TELEPHONE (OUTPATIENT)
Dept: OBGYN CLINIC | Facility: HOSPITAL | Age: 77
End: 2022-09-02

## 2022-09-02 ENCOUNTER — TELEPHONE (OUTPATIENT)
Dept: OBGYN CLINIC | Facility: CLINIC | Age: 77
End: 2022-09-02

## 2022-09-02 NOTE — TELEPHONE ENCOUNTER
Dr Cherlyn Severin    [phone]    Patients daughter would like to know if she should follow up with you or Dr Lily Garcia  Please advise

## 2022-09-02 NOTE — TELEPHONE ENCOUNTER
I called and left message for patient to call in on how she is feeling  Subjective:      Patient ID: Nany Crenshaw is a 89 y.o. female.    Chief Complaint: Nail Care (W/ callouses)    Nany is a 89 y.o. female who presents to the clinic for evaluation and treatment of high risk feet. Nany has a past medical history of Colon polyp, Coronary artery disease, Diverticulosis, Fracture of right distal radius, GERD (gastroesophageal reflux disease), Hypertension, Hypothyroidism (acquired), Osteoporosis, post-menopausal, and Scoliosis. The patient's chief complaint is long, thick toenails. Gradual onset, worsening over past several weeks, aggravated by increased weight bearing, shoe gear, pressure.  No previous medical treatment.  OTC pain med not helping. Denies trauma, surgery..    PCP: Ivette Mcdaniel MD    Date Last Seen by PCP:   Chief Complaint   Patient presents with    Nail Care     W/ callouses        Current shoe gear:  Affected Foot: Casual shoes     Unaffected Foot: Casual shoes    Last encounter in this department: 9/27/2021    Hemoglobin A1C   Date Value Ref Range Status   01/20/2005 5.6 4.5 - 6.2 % Final       Review of Systems   Constitutional: Negative for chills, diaphoresis, fever, malaise/fatigue and night sweats.   Cardiovascular:  Positive for leg swelling. Negative for claudication, cyanosis and syncope.   Skin:  Positive for nail changes. Negative for color change, dry skin, rash, suspicious lesions and unusual hair distribution.   Musculoskeletal:  Negative for falls, joint pain, joint swelling, muscle cramps, muscle weakness and stiffness.   Gastrointestinal:  Negative for constipation, diarrhea, nausea and vomiting.   Neurological:  Positive for paresthesias and sensory change. Negative for brief paralysis, disturbances in coordination, focal weakness, numbness and tremors.           Objective:      Physical Exam  Constitutional:       General: She is not in acute distress.     Appearance: She is well-developed. She is not diaphoretic.   Cardiovascular:       Pulses:           Popliteal pulses are 2+ on the right side and 2+ on the left side.        Dorsalis pedis pulses are 1+ on the right side and 1+ on the left side.        Posterior tibial pulses are 1+ on the right side and 1+ on the left side.      Comments: Capillary refill 3 seconds all toes/distal feet, all toes/both feet warm to touch.      Negative lymphadenopathy bilateral popliteal fossa and tarsal tunnel.      <2+ pitting lower extremity edema bilateral.    Musculoskeletal:      Right ankle: No swelling, deformity, ecchymosis or lacerations. Normal range of motion. Normal pulse.      Right Achilles Tendon: Normal. No defects. Jeff's test negative.      Comments: Reduced stride and toe off.. All ten toes without clubbing, cyanosis, or signs of ischemia.  No pain to palpation bilateral lower extremities.  Range of motion, stability, muscle strength, and muscle tone normal bilateral feet and legs.    Lymphadenopathy:      Lower Body: No right inguinal adenopathy. No left inguinal adenopathy.      Comments: Negative lymphadenopathy bilateral popliteal fossa and tarsal tunnel.    Negative lymphangitic streaking bilateral feet/ankles/legs.   Skin:     General: Skin is warm and dry.      Capillary Refill: Capillary refill takes 2 to 3 seconds.      Coloration: Skin is not pale.      Findings: No abrasion, bruising, burn, ecchymosis, erythema, laceration, lesion or rash.      Nails: There is no clubbing.      Comments:   Skin thin, shiny, atrophic, with decreased density and distribution of pedal hair bilateral, but without hyperpigmentation, boris discoloration,  ulcers, masses, nodules or cords palpated bilateral feet and legs.    Toenails 1st, 2nd, 3rd, 4th, 5th  bilateral are hypertrophic thickened 2-3 mm, dystrophic, discolored tanish brown with tan, gray crumbly subungual debris.  Tender to distal nail plate pressure, without periungual skin abnormality of each.       Neurological:      Mental Status: She  is alert and oriented to person, place, and time.      Sensory: No sensory deficit.      Motor: No tremor, atrophy or abnormal muscle tone.      Gait: Gait normal.      Deep Tendon Reflexes:      Reflex Scores:       Patellar reflexes are 2+ on the right side and 2+ on the left side.       Achilles reflexes are 2+ on the right side and 2+ on the left side.     Comments: Paresthesias,intermittently bilateral feet with no clearly identified trigger or source.    Negative tinel sign to percussion sural, superficial peroneal, deep peroneal, saphenous, and posterior tibial nerves right and left ankles and feet.     Psychiatric:         Behavior: Behavior is cooperative.             Assessment:       Encounter Diagnoses   Name Primary?    Long term current use of anticoagulant therapy Yes    Onychomycosis due to dermatophyte     Peripheral vascular disease, unspecified          Plan:       Nany was seen today for nail care.    Diagnoses and all orders for this visit:    Long term current use of anticoagulant therapy    Onychomycosis due to dermatophyte    Peripheral vascular disease, unspecified    I counseled the patient on her conditions, their implications and medical management.        With the patient's permission, I debrided all ten toenails with a sterile nipper and curette, removing all offending nail and debris.  Patient tolerated the procedure well and related significant relief.      Discussed conservative treatment with shoes of adequate dimensions, material, and style to alleviate symptoms and delay or prevent surgical intervention.    Continue penlac            No follow-ups on file.

## 2022-09-02 NOTE — TELEPHONE ENCOUNTER
Spoke to daughter  Told her pt can see Dr Chhaya Dial and is scheduled on 9/6/22 with him  She may have to change it if pt cannot get a ride here but will keep it scheduled for now

## 2022-09-06 ENCOUNTER — OFFICE VISIT (OUTPATIENT)
Dept: OBGYN CLINIC | Facility: CLINIC | Age: 77
End: 2022-09-06

## 2022-09-06 ENCOUNTER — APPOINTMENT (OUTPATIENT)
Dept: RADIOLOGY | Facility: CLINIC | Age: 77
End: 2022-09-06
Payer: MEDICARE

## 2022-09-06 VITALS — DIASTOLIC BLOOD PRESSURE: 64 MMHG | SYSTOLIC BLOOD PRESSURE: 110 MMHG

## 2022-09-06 DIAGNOSIS — S42.202D CLOSED TRAUMATIC DISPLACED FRACTURE OF PROXIMAL END OF LEFT HUMERUS WITH ROUTINE HEALING, SUBSEQUENT ENCOUNTER: Primary | ICD-10-CM

## 2022-09-06 DIAGNOSIS — S42.202D CLOSED TRAUMATIC DISPLACED FRACTURE OF PROXIMAL END OF LEFT HUMERUS WITH ROUTINE HEALING, SUBSEQUENT ENCOUNTER: ICD-10-CM

## 2022-09-06 PROCEDURE — 73030 X-RAY EXAM OF SHOULDER: CPT

## 2022-09-06 PROCEDURE — 99024 POSTOP FOLLOW-UP VISIT: CPT | Performed by: ORTHOPAEDIC SURGERY

## 2022-09-06 NOTE — PROGRESS NOTES
Assessment:     1  Closed traumatic displaced fracture of proximal end of left humerus with routine healing, subsequent encounter        Plan:     Problem List Items Addressed This Visit        Musculoskeletal and Integument    Closed traumatic displaced fracture of proximal end of left humerus - Primary     Findings consistent with left stable healing impacted proximal humerus displaced fracture  I reviewed patient's left shoulder x-ray with her and sister  Patient can use sling for comfort as needed  She will start OT/PT at rehab facility working on motion to tolerance and not forcing manipulation  She can use left arm to tolerance  Avoid pushing off with left arm while getting up, lifting or carrying anything of significant weight or being pulled up from seated position by left arm  See patient back in 6 weeks with repeat imaging  All patient's questions were answered to their satisfaction  This note is created using dictation transcription  It may contain typographical errors, grammatical errors, improperly dictated words, background noise and other errors  Relevant Orders    XR shoulder 2+ vw left         Subjective:     Patient ID: Colleen Tucker is a 77 y o  female  Chief Complaint:  77 yr old female in for follow regarding her left proximal humerus displaced fracture  Injury on 06/29/2022  She states she was walking a grassy surface and her left foot got caught in a  ditch causing her to fall onto her left side  She presents in sling  Residing at [facility]  She states they have not been taking her out of sling  Pain has improved  Denies any numbness or tingling in shoulder       Allergy:  Allergies   Allergen Reactions    Bee Venom Swelling    Latex     Sesame Seed (Diagnostic) - Food Allergy      Other reaction(s): swelling inside mouth    Strawberry C [Ascorbate - Food Allergy] Other (See Comments)     Mouth sores    Penicillins Rash     Medications:  all current active meds have been reviewed  Past Medical History:  Past Medical History:   Diagnosis Date    Anemia     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)     Osteoarthritis     Osteopenia     Pancytopenia (HCC)     Thrombocytopenia (HCC)     Type 2 diabetes mellitus ([facility] )      Past Surgical History:  Past Surgical History:   Procedure Laterality Date    ABDOMINAL SURGERY      Abdominal plasty with mesh insertion    CARDIAC ELECTROPHYSIOLOGY PROCEDURE N/A 6/29/2022    Procedure: Cardiac pacer implant;  Surgeon: Irma Orr MD;  Location:  CARDIAC CATH LAB;   Service: Cardiology    CATARACT EXTRACTION, BILATERAL      CATARACT EXTRACTION, BILATERAL      COLONOSCOPY      EGD AND COLONOSCOPY  03/18/2015    IR BIOPSY BONE MARROW  8/24/2021    IR PARACENTESIS  8/9/2022    LAPAROSCOPIC CHOLECYSTECTOMY      SHOULDER SURGERY Right     calcium depsoti    TUBAL LIGATION      UMBILICAL HERNIA REPAIR      with mesh placed    UPPER GASTROINTESTINAL ENDOSCOPY       Family History:  Family History   Problem Relation Age of Onset    Breast cancer Mother     Diabetes type II Father     Thyroid disease unspecified Daughter     Down syndrome Daughter     Diabetes type II Daughter     Diabetes type II Sister     No Known Problems Brother     Prostate cancer Brother     No Known Problems Sister     Stroke Sister     No Known Problems Son     Breast cancer Maternal Aunt     Colon cancer Neg Hx      Social History:  Social History     Substance and Sexual Activity   Alcohol Use Not Currently     Social History     Substance and Sexual Activity   Drug Use No     Social History     Tobacco Use   Smoking Status Never Smoker   Smokeless Tobacco Never Used     Review of Systems   Constitutional: Negative for chills and fever  HENT: Negative for ear pain and sore throat  Eyes: Negative for pain and visual disturbance  Respiratory: Negative for cough and shortness of breath  Cardiovascular: Negative for chest pain and palpitations  Gastrointestinal: Negative for abdominal pain and vomiting  Genitourinary: Negative for dysuria and hematuria  Musculoskeletal: Negative for arthralgias and back pain  Skin: Negative for color change and rash  Neurological: Negative for seizures and syncope  Psychiatric/Behavioral: Negative  All other systems reviewed and are negative  Objective:  BP Readings from Last 1 Encounters:   09/06/22 110/64      Wt Readings from Last 1 Encounters:   08/31/22 92 2 kg (203 lb 3 2 oz)      BMI:   Estimated body mass index is 34 88 kg/m² as calculated from the following:    Height as of 8/4/22: 5' 4" (1 626 m)  Weight as of 8/31/22: 92 2 kg (203 lb 3 2 oz)  BSA:   Estimated body surface area is 1 97 meters squared as calculated from the following:    Height as of 8/4/22: 5' 4" (1 626 m)  Weight as of 8/31/22: 92 2 kg (203 lb 3 2 oz)  Physical Exam  Vitals and nursing note reviewed  Constitutional:       Appearance: Normal appearance  She is well-developed  HENT:      Head: Normocephalic and atraumatic  Right Ear: External ear normal       Left Ear: External ear normal    Eyes:      Extraocular Movements: Extraocular movements intact  Conjunctiva/sclera: Conjunctivae normal    Pulmonary:      Effort: Pulmonary effort is normal    Musculoskeletal:         General: Tenderness (left shoulder arthralgia ) present  Cervical back: Neck supple  Skin:     General: Skin is warm and dry  Neurological:      Mental Status: She is alert and oriented to person, place, and time  Deep Tendon Reflexes: Reflexes are normal and symmetric     Psychiatric:         Mood and Affect: Mood normal  Behavior: Behavior normal        Left Shoulder Exam     Tenderness   The patient is experiencing no tenderness  Range of Motion   Active abduction: abnormal   Passive abduction: abnormal   Forward flexion: abnormal   Internal rotation 0 degrees: abnormal     Tests   Cross arm: negative  Drop arm: negative    Other   Erythema: absent  Scars: absent  Sensation: normal  Pulse: present             I have personally reviewed pertinent films in PACS and my interpretation is xr left shoulder demonstrates stable healing impacted displaced proximal humerus fracture in stable alignment and position        Scribe Attestation    I,:  Baldomero Cullen am acting as a scribe while in the presence of the attending physician :       I,:  Fadia Quintana MD personally performed the services described in this documentation    as scribed in my presence :

## 2022-09-06 NOTE — ASSESSMENT & PLAN NOTE
Findings consistent with left stable healing impacted proximal humerus displaced fracture  I reviewed patient's left shoulder x-ray with her and sister  Patient can use sling for comfort as needed  She will start OT/PT at rehab facility working on motion to tolerance and not forcing manipulation  She can use left arm to tolerance  Avoid pushing off with left arm while getting up, lifting or carrying anything of significant weight or being pulled up from seated position by left arm  See patient back in 6 weeks with repeat imaging  All patient's questions were answered to their satisfaction  This note is created using dictation transcription  It may contain typographical errors, grammatical errors, improperly dictated words, background noise and other errors

## 2022-09-07 ENCOUNTER — NURSING HOME VISIT (OUTPATIENT)
Dept: GERIATRICS | Facility: OTHER | Age: 77
End: 2022-09-07
Payer: MEDICARE

## 2022-09-07 ENCOUNTER — TELEPHONE (OUTPATIENT)
Dept: OBGYN CLINIC | Facility: MEDICAL CENTER | Age: 77
End: 2022-09-07

## 2022-09-07 VITALS
OXYGEN SATURATION: 96 % | BODY MASS INDEX: 32.61 KG/M2 | TEMPERATURE: 97.4 F | RESPIRATION RATE: 18 BRPM | SYSTOLIC BLOOD PRESSURE: 118 MMHG | WEIGHT: 190 LBS | DIASTOLIC BLOOD PRESSURE: 64 MMHG | HEART RATE: 76 BPM

## 2022-09-07 DIAGNOSIS — K74.60 LIVER CIRRHOSIS SECONDARY TO NASH (NONALCOHOLIC STEATOHEPATITIS) (HCC): Primary | Chronic | ICD-10-CM

## 2022-09-07 DIAGNOSIS — K75.81 LIVER CIRRHOSIS SECONDARY TO NASH (NONALCOHOLIC STEATOHEPATITIS) (HCC): Primary | Chronic | ICD-10-CM

## 2022-09-07 DIAGNOSIS — S42.202D CLOSED TRAUMATIC DISPLACED FRACTURE OF PROXIMAL END OF LEFT HUMERUS WITH ROUTINE HEALING, SUBSEQUENT ENCOUNTER: Primary | ICD-10-CM

## 2022-09-07 PROCEDURE — 99309 SBSQ NF CARE MODERATE MDM 30: CPT | Performed by: INTERNAL MEDICINE

## 2022-09-07 NOTE — ASSESSMENT & PLAN NOTE
History of liver cirrhosis  Appears to be compensated at present  Patient was recently noted to have increased edema of lower extremities  Diuretic doses were increased  Overall doing well  Follow-up BMP from today    Continue maintenance meds including rifaximin, omeprazole lactulose and Aldactone

## 2022-09-07 NOTE — PROGRESS NOTES
Fern[address]  [address] [place] [address] 39, [address] [address]    Nursing Home progress note    NAME: Hardik Rowland  AGE: 77 y o  SEX: female [id number]      Patient Location     [facility]    Assessment/Plan:    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) ([facility] [address] )  History of liver cirrhosis  Appears to be compensated at present  Patient was recently noted to have increased edema of lower extremities  Diuretic doses were increased  Overall doing well  Follow-up BMP from today  Continue maintenance meds including rifaximin, omeprazole lactulose and Aldactone    Hepatic encephalopathy :  Stable on lactulose and rifaximin     COVID-19:  Patient tested positive for COVID-19 on 08/11  She was treated with Decadron remdesivir heparin and oxygen  Respiratory status remains stable with SaO2 of 96% on room air     Elevated serum creatinine:  Creatinine went up to 1 5 at the hospital suspected to be from acute episode of hypotension + diuretics causing prerenal kidney injury  Creatinine was stable at 0 95 on 08/29  Diuretics doses were recently increased due to increased edema of lower extremities  Awaiting repeat BMP from today    Hypokalemia:  Potassium level was noted to be low at 3 2 on recent BMP  Potassium was supplemented  Patient currently remains on KCl 20 mEq b i d   Follow-up repeat BMP    Pancytopenia :  Patient has chronic pancytopenia due to portal hypertension and liver cirrhosis  She was transfused PRBC and IV iron recently at the hospital ·FOBT was + however patient had no signs of overt GI bleeding   CBC on 8/29 evealed hemoglobin to be stable at 9 however WBC count wasdown to 1 6 with Platelet count of 47  Awaiting repeat labs from today infection  Transfuse if hemoglobin is less than 7        Cardiomyopathy:  Patient was noted to have grade 1 diastolic dysfunction along with EF of 50% on recent echo    Diuretic doses were recently increased for increased edema of lower extremities  Weight is down to 190 lb from 203 lb on 08/24  Lower extremity edema has significantly improved  Follow-up BMP    Depression:  Continue escitalopram     Closed traumatic displaced fracture of proximal end of left humerus:  ·Status post fall 6/26   ·left upper extremity remains in a sling  Follow-up with orthopedic service     Complete heart block :  ·S/p permanent pacemaker 6/29      Esophageal varices without bleeding :  ·S/P banding x2 in 02/2022  · follow-up with GI service  EGD to be repeated as an outpatient  Continue nadolol     Type 2 diabetes mellitus with hyperglycemia, with long-term current use of insulin:    HGBA1C 8 2 (H) 05/31/2022  Blood sugar readings are stable for the most part usually under 200  Occasionally high in mid 200s  Continue current regimen of insulin lispro 3 units with meal, metformin 500 mg b i d  along with insulin glargine 20 units daily     Acquired hypothyroidism:  Continue levothyroxine 125 mcg 6 days a week and 62 5 mcg 1 day a week  ·  Essential hypertension:  Blood pressure stable at 118/64  Continue Aldactone and Lasix    GERD:  Continue Omeprazole    Hyperlipidemia:  Patient remains on simvastatin    Edema lower extremities:  Much improved on increased doses of Lasix 80 mg b i d and Aldactone 50 mg daily  Recent venous Doppler was negative for DVT  Follow-up BMP from today    Reason for visit     Follow-up volume overload, edema lower extremities hepatic encephalopathy, hypothyroidism, diabetes mellitus type 2 with episodes of hypoglycemia, depression, left few humeral fracture    HPI     Patient is being seen for a follow-up visit today  She is doing okay at present  Denies any dyspnea or chest pain  Lower extremities edema noted on previous encounters has significantly improved  Patient's weight is down to 190 from 203 lb  Left upper extremity remains in sling    Patient is awake alert able to make her needs known  Past Medical History:   Diagnosis Date    Anemia     Cir[facility] ([facility] [address] )     Diabetic neuropathy ([facility] )     Esophageal varices (HCC)     Fatty liver     GERD (gastroesophageal reflux disease)     Hepatic encephalopathy (HCC)     Hiatal hernia     Hyperlipidemia     Hypertension     Hypoglycemia     Hypothyroidism     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)     Osteoarthritis     Osteopenia     Pancytopenia (HCC)     Thrombocytopenia (HCC)     Type 2 diabetes mellitus ([facility] )        Past Surgical History:   Procedure Laterality Date    ABDOMINAL SURGERY      Abdominal plasty with mesh insertion    CARDIAC ELECTROPHYSIOLOGY PROCEDURE N/A 6/29/2022    Procedure: Cardiac pacer implant;  Surgeon: Chitra Patten MD;  Location:  CARDIAC CATH LAB;   Service: Cardiology    CATARACT EXTRACTION, BILATERAL      CATARACT EXTRACTION, BILATERAL      COLONOSCOPY      EGD AND COLONOSCOPY  03/18/2015    IR BIOPSY BONE MARROW  8/24/2021    IR PARACENTESIS  8/9/2022    LAPAROSCOPIC CHOLECYSTECTOMY      SHOULDER SURGERY Right     calcium depsoti    TUBAL LIGATION      UMBILICAL HERNIA REPAIR      with mesh placed    UPPER GASTROINTESTINAL ENDOSCOPY         Social History     Tobacco Use   Smoking Status Never Smoker   Smokeless Tobacco Never Used          Family History   Problem Relation Age of Onset    Breast cancer Mother     Diabetes type II Father     Thyroid disease unspecified Daughter     Down syndrome Daughter     Diabetes type II Daughter     Diabetes type II Sister     No Known Problems Brother     Prostate cancer Brother     No Known Problems Sister     Stroke Sister     No Known Problems Son     Breast cancer Maternal Aunt     Colon cancer Neg Hx         Allergies   Allergen Reactions    Bee Venom Swelling    Latex     Sesame Seed (Diagnostic) - Food Allergy      Other reaction(s): swelling inside mouth    Strawberry C [Ascorbate - Food Allergy] Other (See Comments)     Mouth sores    Penicillins Rash          Current Outpatient Medications:     B-D UF III MINI PEN NEEDLES 31G X 5 MM MISC, USE UP TO 3 TIMES A DAY, Disp: 100 each, Rfl: 6    Basaglar KwikPen 100 units/mL SOPN, 38 units in am and 10 units at bedtime, Disp: 30 mL, Rfl: 0    benzonatate (TESSALON PERLES) 100 mg capsule, Take 1 capsule (100 mg total) by mouth 3 (three) times a day as needed for cough, Disp: 20 capsule, Rfl: 0    Blood Glucose Monitoring Suppl (ONE TOUCH ULTRA 2) w/Device KIT, by Does not apply route once for 1 dose Use Glucometer to test up to 3 times daily  , Disp: 1 each, Rfl: 0    calcium citrate-Vitamin D (CVS Calcium Citrate+D3 Petites) 200 mg-250 units, Take 1 tablet by mouth daily with breakfast, Disp: , Rfl:     Coenzyme Q10 (Co Q10) 100 MG CAPS, Take by mouth, Disp: , Rfl:     escitalopram (LEXAPRO) 10 mg tablet, Take 1 tablet (10 mg total) by mouth daily, Disp: 30 tablet, Rfl: 0    escitalopram (LEXAPRO) 5 mg tablet, Take 5 mg by mouth daily, Disp: , Rfl:     fluticasone (FLONASE) 50 mcg/act nasal spray, 1 spray into each nostril daily prn, Disp: , Rfl:     furosemide (LASIX) 80 mg tablet, Take 80 mg by mouth in the morning, Disp: , Rfl:     lactulose 20 g/30 mL, Take 30 mL (20 g total) by mouth 3 (three) times a day, Disp: 2700 mL, Rfl: 0    levothyroxine 125 mcg tablet, Take 1 tablet 6 days a week and half a tablet on Sunday  , Disp: 90 tablet, Rfl: 2    metFORMIN (GLUCOPHAGE) 500 mg tablet, Take 1 tablet (500 mg total) by mouth 2 (two) times a day with meals, Disp: 60 tablet, Rfl: 0    nadolol (CORGARD) 20 mg tablet, Take 0 5 tablets (10 mg total) by mouth daily, Disp: 30 tablet, Rfl: 0    nystatin (MYCOSTATIN) powder, Apply topically 2 (two) times a day, Disp: 15 g, Rfl: 0    omeprazole (PriLOSEC) 20 mg delayed release capsule, TAKE 1 CAPSULE BY MOUTH EVERY DAY, Disp: 90 capsule, Rfl: 3    ONETOUCH DELICA LANCETS FINE MISC, Use 1-3 times a day, Disp: 100 each, Rfl: 3    OneTouch Ultra test strip, TEST UP TO 3 TIMES DAILY  , Disp: 300 strip, Rfl: 3    rifaximin (Xifaxan) 550 mg tablet, Take 1 tablet (550 mg total) by mouth every 12 (twelve) hours, Disp: 60 tablet, Rfl: 5    simvastatin (ZOCOR) 40 mg tablet, Take 40 mg by mouth daily, Disp: , Rfl: 3    spironolactone (ALDACTONE) 50 mg tablet, Take 50 mg by mouth daily, Disp: , Rfl:     Updated list was reviewed in [facility] system of facility  Vitals:    09/07/22 1154   BP: 118/64   Pulse: 76   Resp: 18   Temp: (!) 97 4 °F (36 3 °C)   SpO2: 96%       Vital signs were reviewed in point [facility]    Review of Systems   Constitutional: Negative for chills and fever  Respiratory: Negative for shortness of breath, wheezing and stridor  Cardiovascular: Negative for chest pain and leg swelling  Gastrointestinal: Negative for abdominal distention, abdominal pain and vomiting  Genitourinary: Negative for dysuria and hematuria  Musculoskeletal: Positive for gait problem  Negative for arthralgias and back pain  Neurological: Positive for weakness (Generalized)  Negative for tremors and seizures  Psychiatric/Behavioral: Negative for agitation, behavioral problems and confusion  Physical Exam  Constitutional:       Appearance: She is well-developed  HENT:      Head: Normocephalic and atraumatic  Eyes:      General: No scleral icterus  Right eye: No discharge  Left eye: No discharge  Cardiovascular:      Rate and Rhythm: Normal rate and regular rhythm  Pulmonary:      Effort: No respiratory distress  Breath sounds: No stridor  No wheezing  Abdominal:      Palpations: Abdomen is soft  Tenderness: There is no abdominal tenderness  There is no guarding  Musculoskeletal:      Cervical back: Neck supple  Right lower leg: Edema present  Left lower leg: Edema present        Comments: Left upper extremity is in a sling  Overall lower extremity edema has significantly improved   Skin:     Coloration: Skin is not jaundiced or pale  Findings: No bruising  Neurological:      General: No focal deficit present  Mental Status: She is alert  Cranial Nerves: No cranial nerve deficit  Comments: Oriented in month and year   Psychiatric:         Mood and Affect: Mood normal          Behavior: Behavior normal        Diagnostic Data     Labs from 8/29 reveal WBC count of 1 6, hemoglobin 9, platelet count pending  BUN 24, creatinine 0 95, sodium 142, potassium 3 2, blood glucose 128      Labs done on 08/16/2022 revealed hemoglobin of 9 1, WBC count 4, platelet count 48  BUN 28, creatinine 1 03, sodium 135, potassium 3 9      Lab Results   Component Value Date    SODIUM 134 (L) 08/15/2022    K 3 9 08/15/2022     08/15/2022    CO2 26 08/15/2022    BUN 28 (H) 08/15/2022    CREATININE 1 02 08/15/2022    GLUC 156 (H) 08/15/2022    CALCIUM 8 6 08/15/2022        Lab Results   Component Value Date    WBC 3 52 (L) 08/15/2022    HGB 8 0 (L) 08/15/2022    HCT 26 1 (L) 08/15/2022    MCV 92 08/15/2022    PLT 51 (L) 08/15/2022     Code Status:      Full code    Additional notes     Follow-up labs from today  May need to decrease diuretic dose if BUN and creatinine rise    Continue PT OT  Follow-up with orthopedic service  Monitor blood sugars    This note was electronically signed by Dr Val Guajardo

## 2022-09-07 NOTE — TELEPHONE ENCOUNTER
Patient sees Dr Briana Rodriguez at [facility] calling about the protocol for this patients treatment at subacute rehab, can she put weight on the left arm  If this could be added to the script and faxed to [phone]

## 2022-09-09 ENCOUNTER — NURSING HOME VISIT (OUTPATIENT)
Dept: GERIATRICS | Facility: OTHER | Age: 77
End: 2022-09-09
Payer: MEDICARE

## 2022-09-09 VITALS
BODY MASS INDEX: 32.65 KG/M2 | HEART RATE: 78 BPM | SYSTOLIC BLOOD PRESSURE: 112 MMHG | TEMPERATURE: 97.3 F | RESPIRATION RATE: 18 BRPM | WEIGHT: 190.2 LBS | DIASTOLIC BLOOD PRESSURE: 68 MMHG | OXYGEN SATURATION: 97 %

## 2022-09-09 DIAGNOSIS — K75.81 LIVER CIRRHOSIS SECONDARY TO NASH (NONALCOHOLIC STEATOHEPATITIS) (HCC): Primary | ICD-10-CM

## 2022-09-09 DIAGNOSIS — K74.60 LIVER CIRRHOSIS SECONDARY TO NASH (NONALCOHOLIC STEATOHEPATITIS) (HCC): Primary | ICD-10-CM

## 2022-09-09 PROCEDURE — 99309 SBSQ NF CARE MODERATE MDM 30: CPT | Performed by: INTERNAL MEDICINE

## 2022-09-09 NOTE — PROGRESS NOTES
[facility]  [address] [facility] [address] [facility], [address] [place] [address]  [id number]    Progress note    NAME: Shena Lux  AGE: 77 y o  SEX: female [id number]      Patient Location     [facility]    Assessment/Plan:    No problem-specific Assessment & Plan notes found for this encounter  Hepatic encephalopathy :  No signs of decompensation  Continue lactulose and rifaximin     COVID-19:  Patient tested positive for COVID-19 on 08/11  She was treated with Decadron remdesivir heparin and oxygen  Respiratory status remains stable with SaO2 of 97% on room air     Elevated serum creatinine:  Creatinine went up to 1 5 at the hospital suspected to be from acute episode of hypotension + diuretics causing prerenal kidney injury  Diuretics were recently increased for increased edema of lower extremities  Repeat BMP on 09/07 was stable with BUN of 27, creatinine 0 95 in K of 3 8  Hypokalemia:  Patient was recently started on K supplements for low K of 3 2  Repeat BMP on 09/07 was stable with K level 3 8    Pancytopenia :  Patient has chronic pancytopenia due to portal hypertension and liver cirrhosis  She was transfused PRBC and IV iron recently at the hospital   FOBT was + however patient had no signs of overt GI bleeding   CBC on 8/29 evealed hemoglobin to be stable at 9 however WBC count wasdown to 1 6 with Platelet count of 47  Repeat CBC on 09/07 reveals improvement in WBC count to 2, platelet count is up to 88 with hemoglobin of 9 9  Continue to monitor periodically       Cardiomyopathy:  Patient was noted to have grade 1 diastolic dysfunction along with EF of 50% on recent echo  Lasix dose was increased to 80 mg daily  and Aldactone to 50 mg daily for increased edema of lower extremities  Lower extremity edema has significantly improved  Weight is down to 190 lb from 203 lb on 08/24    Decrease Lasix to 80 mg alternating with 40 mg daily    Depression:  Continue escitalopram     Closed traumatic displaced fracture of proximal end of left humerus:  ·Status post fall 6/26   Left upper extremity sling was recently removed     Complete heart block :  ·S/p permanent pacemaker 6/29      Esophageal varices without bleeding :  ·S/P banding x2 in 02/2022  · follow-up with GI service  EGD to be repeated as an outpatient  Continue nadolol     Type 2 diabetes mellitus with hyperglycemia, with long-term current use of insulin:    HGBA1C 8 2 (H) 05/31/2022  Blood sugar readings are acceptable for the most part, occasionally high in low 200  Continue current regimen of insulin lispro 3 units with meal, metformin 500 mg b i d  along with insulin glargine 20 units daily     Acquired hypothyroidism:  Continue levothyroxine 125 mcg 6 days a week and 62 5 mcg 1 day a week  ·  Essential hypertension:  Blood pressure is borderline low at 112/68, will decrease Lasix to 80 mg alternating with 40 mg daily  Continue Aldactone 50 mg daily    GERD:  Continue Omeprazole    Hyperlipidemia:  Patient remains on simvastatin    Edema lower extremities:  Much improved on increased doses of Lasix 80 mg b i d and Aldactone 50 mg daily  Recent venous Doppler was negative for DVT  BMP from 9 7 was stable with creatinine of 0 95 K of 3 8 and BUN of 27  Decrease Lasix to 40 mg alternating with 80 mg daily as mentioned above  Reason for visit     Follow-up volume overload, edema lower extremities hepatic encephalopathy, hypothyroidism, diabetes mellitus type 2 with episodes of hypoglycemia, depression, left few humeral fracture    HPI     Patient is being seen for a follow-up visit today  She is doing okay at present  Denies any dyspnea or chest pain  Awake alert able to make her needs known  Left upper extremity sling was recently removed  Patient continues with occasional pain involving left shoulder/arm  Lower extremity edema has significantly improved    Blood sugars are acceptable    Past Medical History:   Diagnosis Date    Anemia     [facility]osis ([facility] )     Diabetic neuropathy (HCC)     Esophageal varices (HCC)     Fatty liver     GERD (gastroesophageal reflux disease)     Hepatic encephalopathy (HCC)     Hiatal hernia     Hyperlipidemia     Hypertension     Hypoglycemia     Hypothyroidism     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)     Osteoarthritis     Osteopenia     Pancytopenia (HCC)     Thrombocytopenia (HCC)     Type 2 diabetes mellitus ([facility] )        Past Surgical History:   Procedure Laterality Date    ABDOMINAL SURGERY      Abdominal plasty with mesh insertion    CARDIAC ELECTROPHYSIOLOGY PROCEDURE N/A 6/29/2022    Procedure: Cardiac pacer implant;  Surgeon: Gregoria Russell MD;  Location:  CARDIAC CATH LAB;   Service: Cardiology    CATARACT EXTRACTION, BILATERAL      CATARACT EXTRACTION, BILATERAL      COLONOSCOPY      EGD AND COLONOSCOPY  03/18/2015    IR BIOPSY BONE MARROW  8/24/2021    IR PARACENTESIS  8/9/2022    LAPAROSCOPIC CHOLECYSTECTOMY      SHOULDER SURGERY Right     calcium depsoti    TUBAL LIGATION      UMBILICAL HERNIA REPAIR      with mesh placed    UPPER GASTROINTESTINAL ENDOSCOPY         Social History     Tobacco Use   Smoking Status Never Smoker   Smokeless Tobacco Never Used          Family History   Problem Relation Age of Onset    Breast cancer Mother     Diabetes type II Father     Thyroid disease unspecified Daughter     Down syndrome Daughter     Diabetes type II Daughter     Diabetes type II Sister     No Known Problems Brother     Prostate cancer Brother     No Known Problems Sister     Stroke Sister     No Known Problems Son     Breast cancer Maternal Aunt     Colon cancer Neg Hx         Allergies   Allergen Reactions    Bee Venom Swelling    Latex     Sesame Seed (Diagnostic) - Food Allergy      Other reaction(s): swelling inside mouth    Strawberry C [Ascorbate - Food Allergy] Other (See Comments)     Mouth sores    Penicillins Rash          Current Outpatient Medications:     B-D UF III MINI PEN NEEDLES 31G X 5 MM MISC, USE UP TO 3 TIMES A DAY, Disp: 100 each, Rfl: 6    Basaglar KwikPen 100 units/mL SOPN, 38 units in am and 10 units at bedtime, Disp: 30 mL, Rfl: 0    benzonatate (TESSALON PERLES) 100 mg capsule, Take 1 capsule (100 mg total) by mouth 3 (three) times a day as needed for cough, Disp: 20 capsule, Rfl: 0    Blood Glucose Monitoring Suppl (ONE TOUCH ULTRA 2) w/Device KIT, by Does not apply route once for 1 dose Use Glucometer to test up to 3 times daily  , Disp: 1 each, Rfl: 0    calcium citrate-Vitamin D (CVS Calcium Citrate+D3 Petites) 200 mg-250 units, Take 1 tablet by mouth daily with breakfast, Disp: , Rfl:     Coenzyme Q10 (Co Q10) 100 MG CAPS, Take by mouth, Disp: , Rfl:     escitalopram (LEXAPRO) 10 mg tablet, Take 1 tablet (10 mg total) by mouth daily, Disp: 30 tablet, Rfl: 0    escitalopram (LEXAPRO) 5 mg tablet, Take 5 mg by mouth daily, Disp: , Rfl:     fluticasone (FLONASE) 50 mcg/act nasal spray, 1 spray into each nostril daily prn, Disp: , Rfl:     furosemide (LASIX) 80 mg tablet, Take 80 mg by mouth in the morning, Disp: , Rfl:     lactulose 20 g/30 mL, Take 30 mL (20 g total) by mouth 3 (three) times a day, Disp: 2700 mL, Rfl: 0    levothyroxine 125 mcg tablet, Take 1 tablet 6 days a week and half a tablet on Sunday  , Disp: 90 tablet, Rfl: 2    metFORMIN (GLUCOPHAGE) 500 mg tablet, Take 1 tablet (500 mg total) by mouth 2 (two) times a day with meals, Disp: 60 tablet, Rfl: 0    nadolol (CORGARD) 20 mg tablet, Take 0 5 tablets (10 mg total) by mouth daily, Disp: 30 tablet, Rfl: 0    nystatin (MYCOSTATIN) powder, Apply topically 2 (two) times a day, Disp: 15 g, Rfl: 0    omeprazole (PriLOSEC) 20 mg delayed release capsule, TAKE 1 CAPSULE BY MOUTH EVERY DAY, Disp: 90 capsule, Rfl: 3    ONETOUCH DELICA LANCETS FINE MISC, Use 1-3 times a day, Disp: 100 each, Rfl: 3    OneTouch Ultra test strip, TEST UP TO 3 TIMES DAILY  , Disp: 300 strip, Rfl: 3    rifaximin (Xifaxan) 550 mg tablet, Take 1 tablet (550 mg total) by mouth every 12 (twelve) hours, Disp: 60 tablet, Rfl: 5    simvastatin (ZOCOR) 40 mg tablet, Take 40 mg by mouth daily, Disp: , Rfl: 3    spironolactone (ALDACTONE) 50 mg tablet, Take 50 mg by mouth daily, Disp: , Rfl:     [facility]  Vitals:    09/09/22 1411   BP: 112/68   Pulse: 78   Resp: 18   Temp: (!) 97 3 °F (36 3 °C)   SpO2: 97%       Vital signs were reviewed in point [facility]    Review of Systems   Constitutional: Negative for chills and fever  Respiratory: Negative for shortness of breath and wheezing  Cardiovascular: Negative for chest pain and leg swelling  Gastrointestinal: Negative for abdominal distention, abdominal pain and vomiting  Genitourinary: Negative for dysuria and hematuria  Musculoskeletal: Positive for arthralgias (Left arm/shoulder pain at times) and gait problem  Negative for back pain  Neurological: Positive for weakness (Generalized)  Negative for tremors and seizures  Psychiatric/Behavioral: Negative for agitation, behavioral problems and confusion  Physical Exam  Constitutional:       Appearance: She is well-developed  HENT:      Head: Normocephalic and atraumatic  Eyes:      General: No scleral icterus  Right eye: No discharge  Left eye: No discharge  Cardiovascular:      Rate and Rhythm: Normal rate and regular rhythm  Pulmonary:      Effort: No respiratory distress  Breath sounds: No stridor  No wheezing  Abdominal:      Palpations: Abdomen is soft  Tenderness: There is no abdominal tenderness  There is no guarding  Musculoskeletal:      Cervical back: Neck supple  Right lower leg: Edema present  Left lower leg: Edema present        Comments: Lower extremity edema has significantly improved patient has slightly more edema in left lower extremity than right  Range of movement of left shoulder is limited due to recent fracture   Skin:     Coloration: Skin is not jaundiced or pale  Findings: No bruising  Neurological:      General: No focal deficit present  Mental Status: She is alert  Cranial Nerves: No cranial nerve deficit  Comments: Oriented in month and year   Psychiatric:         Mood and Affect: Mood normal          Behavior: Behavior normal        Diagnostic Data       Labs done on 09/07/2022 revealed blood glucose of 145, BUN 27, creatinine 0 95, sodium 141, potassium 3 8  Hemoglobin 9 9, WBC count 2, platelet count 88    Labs from 8/29 reveal WBC count of 1 6, hemoglobin 9, platelet count pending  BUN 24, creatinine 0 95, sodium 142, potassium 3 2, blood glucose 128      Labs done on 08/16/2022 revealed hemoglobin of 9 1, WBC count 4, platelet count 48  BUN 28, creatinine 1 03, sodium 135, potassium 3 9      Lab Results   Component Value Date    SODIUM 134 (L) 08/15/2022    K 3 9 08/15/2022     08/15/2022    CO2 26 08/15/2022    BUN 28 (H) 08/15/2022    CREATININE 1 02 08/15/2022    GLUC 156 (H) 08/15/2022    CALCIUM 8 6 08/15/2022        Lab Results   Component Value Date    WBC 3 52 (L) 08/15/2022    HGB 8 0 (L) 08/15/2022    HCT 26 1 (L) 08/15/2022    MCV 92 08/15/2022    PLT 51 (L) 08/15/2022     Code Status:      Full code    Additional notes     Follow-up labs from today  May need to decrease diuretic dose if BUN and creatinine rise    Continue PT OT  Follow-up with orthopedic service  Monitor blood sugars    This note was electronically signed by Dr Lamberto Crump

## 2022-09-12 ENCOUNTER — NURSING HOME VISIT (OUTPATIENT)
Dept: GERIATRICS | Facility: OTHER | Age: 77
End: 2022-09-12
Payer: MEDICARE

## 2022-09-12 VITALS
SYSTOLIC BLOOD PRESSURE: 114 MMHG | RESPIRATION RATE: 18 BRPM | TEMPERATURE: 97.2 F | WEIGHT: 189.6 LBS | BODY MASS INDEX: 32.54 KG/M2 | HEART RATE: 68 BPM | DIASTOLIC BLOOD PRESSURE: 67 MMHG | OXYGEN SATURATION: 96 %

## 2022-09-12 DIAGNOSIS — Z79.4 TYPE 2 DIABETES MELLITUS WITH HYPERGLYCEMIA, WITH LONG-TERM CURRENT USE OF INSULIN (HCC): Primary | ICD-10-CM

## 2022-09-12 DIAGNOSIS — E11.65 TYPE 2 DIABETES MELLITUS WITH HYPERGLYCEMIA, WITH LONG-TERM CURRENT USE OF INSULIN (HCC): Primary | ICD-10-CM

## 2022-09-12 PROCEDURE — 99309 SBSQ NF CARE MODERATE MDM 30: CPT | Performed by: INTERNAL MEDICINE

## 2022-09-12 NOTE — PROGRESS NOTES
[facility]  [address] [place] [address], [address] [place], [place] [id number]    Progress note    NAME: Julio Cesar Grove  AGE: 77 y o  SEX: female [phone]      Patient Location     [facility]    Assessment/Plan:    No problem-specific Assessment & Plan notes found for this encounter  Hepatic encephalopathy :  Patient is awake alert  Has some tremors of hands otherwise no signs of decompensation   Continue rifaximin and lactulose     COVID-19:  Patient tested positive for COVID-19 on 08/11  She was treated with remdesivir Decadron and oxygen  SaO2 remains stable on room air at 96% at present  Continue to monitor       Elevated serum creatinine:  Creatinine went up to 1 5 at the hospital suspected to be from acute episode of hypotension + diuretics causing prerenal kidney injury  Recent creatinine was stable at 0 95  Hypokalemia:  K level was 3 8 on 09/07/2022  Continue K supplements  Pancytopenia :  Patient has chronic pancytopenia due to portal hypertension and liver cirrhosis  She was transfused PRBC and IV iron recently at the hospital   FOBT was + however patient had no signs of overt GI bleeding   CBC on 8/29 evealed hemoglobin to be stable at 9 however WBC count wasdown to 1 6 with Platelet count of 47  Repeat CBC on 09/07 reveals improvement in WBC count to 2, platelet count is up to 88 with hemoglobin of 9 9  Continue to monitor periodically       Cardiomyopathy:  Clinically euvolemic  Lower extremity edema has significantly improved  Blood pressure has been running on the lower side  Will reduce Lasix to 40 mg daily    Patient additionally remains on Aldactone 50 mg daily  Previous echo revealed grade 1 diastolic dysfunction along with EF of 50%     Depression:  Continue escitalopram     Closed traumatic displaced fracture of proximal end of left humerus:  ·Status post fall 6/26   Left upper extremity sling was recently removed     Complete heart block :  ·S/p permanent pacemaker 6/29      Esophageal varices without bleeding :  ·S/P banding x2 in 02/2022  · follow-up with GI service  EGD to be repeated as an outpatient  Continue nadolol     Type 2 diabetes mellitus with hyperglycemia, with long-term current use of insulin:    HGBA1C 8 2 (H) 05/31/2022  Blood sugars are variable  Fasting blood sugar was 187 this morning  Post lunch reading was 227  Highest reported reading was 299 yesterday after lunch  Continue insulin glargine 20 units daily along with sliding scale coverage and metformin 500 mg b i d      Acquired hypothyroidism:  Continue levothyroxine 125 mcg 6 days a week and 62 5 mcg 1 day a week  ·  Essential hypertension:  Blood pressure has been running intermittently on the lower side  Will decrease Lasix to 40 mg daily and follow    GERD:  Continue Omeprazole    Hyperlipidemia:  Patient remains on simvastatin    Edema lower extremities:  Much improved from previous encounters  Patient remains on Lasix 80 mg daily along with Aldactone 50 mg daily  Considering low blood pressure with reduce Lasix dose to 40 mg and follow     Reason for visit     Follow-up cirrhosis, edema lower extremities hepatic encephalopathy, hypothyroidism, diabetes mellitus type 2 , depression, left few humeral fracture    HPI     Patient is being seen for a follow-up visit today  She is doing okay at present  Patient had few episodes of diarrhea over the weekend  Currently resolved  There have been no reports of any abdominal pain nausea vomiting fever or chills  At the time of my evaluation patient is awake alert answering questions appropriately  Left upper extremity sling was removed earlier      Past Medical History:   Diagnosis Date    Anemia     Cir[facility]osis [facility] [address] [facility]     Diabetic neuropathy (HCC)     Esophageal varices (HCC)     Fatty liver     GERD (gastroesophageal reflux disease)     Hepatic encephalopathy (HCC)     Hiatal hernia     Hyperlipidemia     Hypertension     Hypoglycemia     Hypothyroidism     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)     Osteoarthritis     Osteopenia     Pancytopenia (HCC)     Thrombocytopenia (HCC)     Type 2 diabetes mellitus ([facility] [address] )        Past Surgical History:   Procedure Laterality Date    ABDOMINAL SURGERY      Abdominal plasty with mesh insertion    CARDIAC ELECTROPHYSIOLOGY PROCEDURE N/A 6/29/2022    Procedure: Cardiac pacer implant;  Surgeon: Kellie Lucero MD;  Location:  CARDIAC CATH LAB;   Service: Cardiology    CATARACT EXTRACTION, BILATERAL      CATARACT EXTRACTION, BILATERAL      COLONOSCOPY      EGD AND COLONOSCOPY  03/18/2015    IR BIOPSY BONE MARROW  8/24/2021    IR PARACENTESIS  8/9/2022    LAPAROSCOPIC CHOLECYSTECTOMY      SHOULDER SURGERY Right     calcium depsoti    TUBAL LIGATION      UMBILICAL HERNIA REPAIR      with mesh placed    UPPER GASTROINTESTINAL ENDOSCOPY         Social History     Tobacco Use   Smoking Status Never Smoker   Smokeless Tobacco Never Used          Family History   Problem Relation Age of Onset    Breast cancer Mother     Diabetes type II Father     Thyroid disease unspecified Daughter     Down syndrome Daughter     Diabetes type II Daughter     Diabetes type II Sister     No Known Problems Brother     Prostate cancer Brother     No Known Problems Sister     Stroke Sister     No Known Problems Son     Breast cancer Maternal Aunt     Colon cancer Neg Hx         Allergies   Allergen Reactions    Bee Venom Swelling    Latex     Sesame Seed (Diagnostic) - Food Allergy      Other reaction(s): swelling inside mouth    Strawberry C [Ascorbate - Food Allergy] Other (See Comments)     Mouth sores    Penicillins Rash          Current Outpatient Medications:     B-D UF III MINI PEN NEEDLES 31G X 5 MM MISC, USE UP TO 3 TIMES A DAY, Disp: 100 each, Rfl: 6    Basaglar KwikPen 100 units/mL SOPN, 38 units in am and 10 units at bedtime, Disp: 30 mL, Rfl: 0   benzonatate (TESSALON PERLES) 100 mg capsule, Take 1 capsule (100 mg total) by mouth 3 (three) times a day as needed for cough, Disp: 20 capsule, Rfl: 0    Blood Glucose Monitoring Suppl (ONE TOUCH ULTRA 2) w/Device KIT, by Does not apply route once for 1 dose Use Glucometer to test up to 3 times daily  , Disp: 1 each, Rfl: 0    calcium citrate-Vitamin D (CVS Calcium Citrate+D3 Petites) 200 mg-250 units, Take 1 tablet by mouth daily with breakfast, Disp: , Rfl:     Coenzyme Q10 (Co Q10) 100 MG CAPS, Take by mouth, Disp: , Rfl:     escitalopram (LEXAPRO) 10 mg tablet, Take 1 tablet (10 mg total) by mouth daily, Disp: 30 tablet, Rfl: 0    escitalopram (LEXAPRO) 5 mg tablet, Take 5 mg by mouth daily, Disp: , Rfl:     fluticasone (FLONASE) 50 mcg/act nasal spray, 1 spray into each nostril daily prn, Disp: , Rfl:     furosemide (LASIX) 80 mg tablet, Take 40 mg by mouth in the morning , Disp: , Rfl:     lactulose 20 g/30 mL, Take 30 mL (20 g total) by mouth 3 (three) times a day, Disp: 2700 mL, Rfl: 0    levothyroxine 125 mcg tablet, Take 1 tablet 6 days a week and half a tablet on Sunday  , Disp: 90 tablet, Rfl: 2    metFORMIN (GLUCOPHAGE) 500 mg tablet, Take 1 tablet (500 mg total) by mouth 2 (two) times a day with meals, Disp: 60 tablet, Rfl: 0    nadolol (CORGARD) 20 mg tablet, Take 0 5 tablets (10 mg total) by mouth daily, Disp: 30 tablet, Rfl: 0    nystatin (MYCOSTATIN) powder, Apply topically 2 (two) times a day, Disp: 15 g, Rfl: 0    omeprazole (PriLOSEC) 20 mg delayed release capsule, TAKE 1 CAPSULE BY MOUTH EVERY DAY, Disp: 90 capsule, Rfl: 3    ONETOUCH DELICA LANCETS FINE MISC, Use 1-3 times a day, Disp: 100 each, Rfl: 3    OneTouch Ultra test strip, TEST UP TO 3 TIMES DAILY  , Disp: 300 strip, Rfl: 3    rifaximin (Xifaxan) 550 mg tablet, Take 1 tablet (550 mg total) by mouth every 12 (twelve) hours, Disp: 60 tablet, Rfl: 5    simvastatin (ZOCOR) 40 mg tablet, Take 40 mg by mouth daily, Disp: , Rfl: 3    spironolactone (ALDACTONE) 50 mg tablet, Take 50 mg by mouth daily, Disp: , Rfl:     Updated list was reviewed in [facility] system of facility  Vitals:    09/12/22 1313   BP: 114/67   Pulse: 68   Resp: 18   Temp: (!) 97 2 °F (36 2 °C)   SpO2: 96%       Vital signs were reviewed in [facility] care    Review of Systems   Constitutional: Negative for chills and fever  Respiratory: Negative for shortness of breath and wheezing  Cardiovascular: Negative for chest pain and leg swelling  Gastrointestinal: Positive for diarrhea (Reported 2 days ago, currently resolved)  Negative for abdominal distention, abdominal pain and vomiting  Genitourinary: Negative for dysuria and hematuria  Musculoskeletal: Positive for gait problem  Negative for back pain  Neurological: Positive for weakness (Generalized)  Negative for tremors and seizures  Psychiatric/Behavioral: Negative for agitation, behavioral problems and confusion  Physical Exam  Constitutional:       Appearance: She is well-developed  HENT:      Head: Normocephalic and atraumatic  Eyes:      General: No scleral icterus  Right eye: No discharge  Left eye: No discharge  Cardiovascular:      Rate and Rhythm: Normal rate and regular rhythm  Pulmonary:      Effort: No respiratory distress  Breath sounds: No stridor  No wheezing  Abdominal:      Palpations: Abdomen is soft  Tenderness: There is no abdominal tenderness  There is no guarding  Musculoskeletal:      Cervical back: Neck supple  Right lower leg: Edema present  Left lower leg: Edema present  Comments: Lower extremity edema much improved from before, mostly at distal aspect bilaterally   Skin:     Coloration: Skin is not jaundiced or pale  Findings: No bruising  Neurological:      General: No focal deficit present  Mental Status: She is alert  Cranial Nerves: No cranial nerve deficit        Comments: Oriented in month and year   Psychiatric:         Mood and Affect: Mood normal          Behavior: Behavior normal        Diagnostic Data       Labs done on 09/07/2022 revealed blood glucose of 145, BUN 27, creatinine 0 95, sodium 141, potassium 3 8  Hemoglobin 9 9, WBC count 2, platelet count 88    Labs from 8/29 reveal WBC count of 1 6, hemoglobin 9, platelet count pending  BUN 24, creatinine 0 95, sodium 142, potassium 3 2, blood glucose 128      Labs done on 08/16/2022 revealed hemoglobin of 9 1, WBC count 4, platelet count 48  BUN 28, creatinine 1 03, sodium 135, potassium 3 9      Lab Results   Component Value Date    SODIUM 134 (L) 08/15/2022    K 3 9 08/15/2022     08/15/2022    CO2 26 08/15/2022    BUN 28 (H) 08/15/2022    CREATININE 1 02 08/15/2022    GLUC 156 (H) 08/15/2022    CALCIUM 8 6 08/15/2022        Lab Results   Component Value Date    WBC 3 52 (L) 08/15/2022    HGB 8 0 (L) 08/15/2022    HCT 26 1 (L) 08/15/2022    MCV 92 08/15/2022    PLT 51 (L) 08/15/2022     Code Status:      Full code    Additional notes     Decrease Lasix to 40 mg once daily  Check BMP on 09/14/2022      This note was electronically signed by Dr Dennie Carter

## 2022-09-15 ENCOUNTER — NURSING HOME VISIT (OUTPATIENT)
Dept: GERIATRICS | Facility: OTHER | Age: 77
End: 2022-09-15
Payer: MEDICARE

## 2022-09-15 VITALS
SYSTOLIC BLOOD PRESSURE: 112 MMHG | WEIGHT: 182.6 LBS | DIASTOLIC BLOOD PRESSURE: 54 MMHG | BODY MASS INDEX: 31.34 KG/M2 | TEMPERATURE: 98 F | RESPIRATION RATE: 18 BRPM | HEART RATE: 74 BPM | OXYGEN SATURATION: 99 %

## 2022-09-15 DIAGNOSIS — Z79.4 TYPE 2 DIABETES MELLITUS WITH HYPERGLYCEMIA, WITH LONG-TERM CURRENT USE OF INSULIN (HCC): ICD-10-CM

## 2022-09-15 DIAGNOSIS — E11.65 TYPE 2 DIABETES MELLITUS WITH HYPERGLYCEMIA, WITH LONG-TERM CURRENT USE OF INSULIN (HCC): ICD-10-CM

## 2022-09-15 PROCEDURE — 99309 SBSQ NF CARE MODERATE MDM 30: CPT | Performed by: INTERNAL MEDICINE

## 2022-09-15 RX ORDER — FUROSEMIDE 40 MG/1
40 TABLET ORAL EVERY OTHER DAY
COMMUNITY
End: 2022-09-28 | Stop reason: SDUPTHER

## 2022-09-15 NOTE — PROGRESS NOTES
[facility]  [address] [facility] [address] [facility], [address] [place], [place] [address]    Progress note    NAME: Leroy Neal  AGE: 77 y o  SEX: female [phone]      Patient Location     [facility] rehab    Assessment/Plan:    No problem-specific Assessment & Plan notes found for this encounter  Hepatic encephalopathy :  No signs of decompensation at present  Continue rifaximin and lactulose    COVID-19:  History of recent COVID-19 infection diagnosed on 08/11  Patient was treated with remdesivir and dexamethasone  Appears to have recovered well  No respiratory issues at this time       Elevated serum creatinine:  Creatinine went up to 1 5 at the hospital suspected to be from acute episode of hypotension + diuretics causing prerenal kidney injury  Recent creatinine was stable at 0 95  Hypokalemia:  K level was 4 1 on 09/14/2022  Patient remains on KCl 20 mEq b i d  will reduce dose to once daily    Pancytopenia :  Patient has chronic pancytopenia due to portal hypertension and liver cirrhosis  She was transfused PRBC and IV iron recently at the hospital   FOBT was + however patient had no signs of overt GI bleeding   Most recent hemoglobin was stable at 9 9 with WBC count of 2 and platelet count of       Cardiomyopathy:  Clinically euvolemic  Diuretic doses were increased few days ago due to increased edema of lower extremities  Lower extremities edema has almost resolved  Lasix dose was reduced to 40 mg on last encounter  Will further reduced to 40 mg alternating with 20 mg daily  Continue spironolactone 50 mg daily    Previous  echo revealed grade 1 diastolic dysfunction along with EF of 50%     Depression:  Continue escitalopram     Closed traumatic displaced fracture of proximal end of left humerus:  ·Status post fall 6/26   Left upper extremity sling was recently removed     Complete heart block :  ·S/p permanent pacemaker 6/29      Esophageal varices without bleeding :  ·S/P banding x2 in 02/2022  · follow-up with GI service  EGD to be repeated as an outpatient  Continue nadolol     Type 2 diabetes mellitus with hyperglycemia, with long-term current use of insulin:    HGBA1C 8 2 (H) 05/31/2022  Blood sugar readings are acceptable mostly under 200   Post breakfast reading is usually noted to be on the higher side  Patient currently remains on insulin glargine 20 units daily along with sliding scale coverage and metformin 500 mg b i d  Increase metformin to 1000 mg in the morning and 500 mg q p m  follow blood sugars     Acquired hypothyroidism:  Continue levothyroxine 125 mcg 6 days a week and 62 5 mcg 1 day a week  ·  Essential hypertension:  Blood pressure readings were noted to be on the lower side  Lasix dose was recently reduced  Will further reduced to 40 mg alternating with 20 mg daily    GERD:  Continue Omeprazole    Hyperlipidemia:  Patient remains on simvastatin        Reason for visit     Follow-up cirrhosis, edema lower extremities hepatic encephalopathy, hypothyroidism, diabetes mellitus type 2 , depression, left few humeral fracture    HPI     Patient is being seen for a follow-up visit today  She is doing okay at present  Awake alert in no distress, denies  any active complaints  Lower extremity edema noted on previous encounters has significantly improved  Blood sugars are acceptable, occasionally high  Patient is continent of bowel and bladder  Left upper extremity sling was removed few days ago      Past Medical History:   Diagnosis Date    Anemia     Cirrhosis ([address] )     Diabetic neuropathy ([address] )     Esophageal varices (HCC)     Fatty liver     GERD (gastroesophageal reflux disease)     Hepatic encephalopathy (HCC)     Hiatal hernia     Hyperlipidemia     Hypertension     Hypoglycemia     Hypothyroidism     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)     Osteoarthritis     Osteopenia     Pancytopenia (HCC)     Thrombocytopenia ([address] )     Type 2 diabetes mellitus ([facility] [address] )        Past Surgical History:   Procedure Laterality Date    ABDOMINAL SURGERY      Abdominal plasty with mesh insertion    CARDIAC ELECTROPHYSIOLOGY PROCEDURE N/A 6/29/2022    Procedure: Cardiac pacer implant;  Surgeon: Yaneli Grey MD;  Location:  CARDIAC CATH LAB;   Service: Cardiology    CATARACT EXTRACTION, BILATERAL      CATARACT EXTRACTION, BILATERAL      COLONOSCOPY      EGD AND COLONOSCOPY  03/18/2015    IR BIOPSY BONE MARROW  8/24/2021    IR PARACENTESIS  8/9/2022    LAPAROSCOPIC CHOLECYSTECTOMY      SHOULDER SURGERY Right     calcium depsoti    TUBAL LIGATION      UMBILICAL HERNIA REPAIR      with mesh placed    UPPER GASTROINTESTINAL ENDOSCOPY         Social History     Tobacco Use   Smoking Status Never Smoker   Smokeless Tobacco Never Used          Family History   Problem Relation Age of Onset    Breast cancer Mother     Diabetes type II Father     Thyroid disease unspecified Daughter     Down syndrome Daughter     Diabetes type II Daughter     Diabetes type II Sister     No Known Problems Brother     Prostate cancer Brother     No Known Problems Sister     Stroke Sister     No Known Problems Son     Breast cancer Maternal Aunt     Colon cancer Neg Hx         Allergies   Allergen Reactions    Bee Venom Swelling    Latex     Sesame Seed (Diagnostic) - Food Allergy      Other reaction(s): swelling inside mouth    Strawberry C [Ascorbate - Food Allergy] Other (See Comments)     Mouth sores    Penicillins Rash          Current Outpatient Medications:     furosemide (LASIX) 40 mg tablet, Take 40 mg by mouth in the morning Furosemide 40 mg alternating with 20 mg daily, Disp: , Rfl:     metFORMIN (GLUCOPHAGE) 1000 MG tablet, Take 1,000 mg by mouth daily 1000 mg q a m  500 mg q p m , Disp: , Rfl:     Potassium Chloride (KCL-20 PO), Take 20 mEq by mouth in the morning, Disp: , Rfl:     B-D UF III MINI PEN NEEDLES 31G X 5 MM MISC, USE UP TO 3 TIMES A DAY, Disp: 100 each, Rfl: 6    Basaglar KwikPen 100 units/mL SOPN, 38 units in am and 10 units at bedtime, Disp: 30 mL, Rfl: 0    benzonatate (TESSALON PERLES) 100 mg capsule, Take 1 capsule (100 mg total) by mouth 3 (three) times a day as needed for cough, Disp: 20 capsule, Rfl: 0    Blood Glucose Monitoring Suppl (ONE TOUCH ULTRA 2) w/Device KIT, by Does not apply route once for 1 dose Use Glucometer to test up to 3 times daily  , Disp: 1 each, Rfl: 0    calcium citrate-Vitamin D (CVS Calcium Citrate+D3 Petites) 200 mg-250 units, Take 1 tablet by mouth daily with breakfast, Disp: , Rfl:     Coenzyme Q10 (Co Q10) 100 MG CAPS, Take by mouth, Disp: , Rfl:     escitalopram (LEXAPRO) 10 mg tablet, Take 1 tablet (10 mg total) by mouth daily, Disp: 30 tablet, Rfl: 0    escitalopram (LEXAPRO) 5 mg tablet, Take 5 mg by mouth daily, Disp: , Rfl:     fluticasone (FLONASE) 50 mcg/act nasal spray, 1 spray into each nostril daily prn, Disp: , Rfl:     lactulose 20 g/30 mL, Take 30 mL (20 g total) by mouth 3 (three) times a day, Disp: 2700 mL, Rfl: 0    levothyroxine 125 mcg tablet, Take 1 tablet 6 days a week and half a tablet on Sunday  , Disp: 90 tablet, Rfl: 2    nadolol (CORGARD) 20 mg tablet, Take 0 5 tablets (10 mg total) by mouth daily, Disp: 30 tablet, Rfl: 0    nystatin (MYCOSTATIN) powder, Apply topically 2 (two) times a day, Disp: 15 g, Rfl: 0    omeprazole (PriLOSEC) 20 mg delayed release capsule, TAKE 1 CAPSULE BY MOUTH EVERY DAY, Disp: 90 capsule, Rfl: 3    ONETOUCH DELICA LANCETS FINE MISC, Use 1-3 times a day, Disp: 100 each, Rfl: 3    OneTouch Ultra test strip, TEST UP TO 3 TIMES DAILY  , Disp: 300 strip, Rfl: 3    rifaximin (Xifaxan) 550 mg tablet, Take 1 tablet (550 mg total) by mouth every 12 (twelve) hours, Disp: 60 tablet, Rfl: 5    simvastatin (ZOCOR) 40 mg tablet, Take 40 mg by mouth daily, Disp: , Rfl: 3    spironolactone (ALDACTONE) 50 mg tablet, Take 50 mg by mouth daily, Disp: , Rfl:     Updated list was reviewed in [facility] system of facility  Vitals:    09/15/22 1817   BP: 112/54   Pulse: 74   Resp: 18   Temp: 98 °F (36 7 °C)   SpO2: 99%       Vital signs were reviewed in [facility]    Review of Systems   Constitutional: Negative for chills and fever  Respiratory: Negative for shortness of breath and wheezing  Cardiovascular: Negative for chest pain  Gastrointestinal: Negative for abdominal pain, diarrhea and vomiting  Genitourinary: Negative for dysuria and hematuria  Musculoskeletal: Positive for gait problem  Negative for back pain  Neurological: Positive for weakness (Generalized)  Negative for tremors and seizures  Psychiatric/Behavioral: Negative for agitation, behavioral problems and confusion  Physical Exam  Constitutional:       Appearance: She is well-developed  HENT:      Head: Normocephalic and atraumatic  Eyes:      General: No scleral icterus  Right eye: No discharge  Left eye: No discharge  Cardiovascular:      Rate and Rhythm: Normal rate and regular rhythm  Pulmonary:      Effort: No respiratory distress  Breath sounds: No stridor  No wheezing  Abdominal:      Palpations: Abdomen is soft  Tenderness: There is no abdominal tenderness  There is no guarding  Musculoskeletal:      Cervical back: Neck supple  Right lower leg: Edema present  Left lower leg: Edema present  Comments: Minimal edema involving bilateral lower extremity right greater than left   Skin:     Coloration: Skin is not jaundiced  Findings: No bruising  Neurological:      General: No focal deficit present  Mental Status: She is alert  Cranial Nerves: No cranial nerve deficit        Comments: Oriented in month and year   Psychiatric:         Mood and Affect: Mood normal          Behavior: Behavior normal        Diagnostic Data      Labs from 09/14 revealed BUN of 27, creatinine 1 2, sodium 137, potassium 4 1, blood sugar 113    Labs done on 09/07/2022 revealed blood glucose of 145, BUN 27, creatinine 0 95, sodium 141, potassium 3 8  Hemoglobin 9 9, WBC count 2, platelet count 88    Labs from 8/29 reveal WBC count of 1 6, hemoglobin 9, platelet count pending  BUN 24, creatinine 0 95, sodium 142, potassium 3 2, blood glucose 128      Labs done on 08/16/2022 revealed hemoglobin of 9 1, WBC count 4, platelet count 48  BUN 28, creatinine 1 03, sodium 135, potassium 3 9      Lab Results   Component Value Date    SODIUM 134 (L) 08/15/2022    K 3 9 08/15/2022     08/15/2022    CO2 26 08/15/2022    BUN 28 (H) 08/15/2022    CREATININE 1 02 08/15/2022    GLUC 156 (H) 08/15/2022    CALCIUM 8 6 08/15/2022        Lab Results   Component Value Date    WBC 3 52 (L) 08/15/2022    HGB 8 0 (L) 08/15/2022    HCT 26 1 (L) 08/15/2022    MCV 92 08/15/2022    PLT 51 (L) 08/15/2022     Code Status:      Full code    Additional notes     Decrease Lasix to 40 mg alternating with 20 mg daily  Increase metformin to 1000 mg morning and 500 mg q p m      This note was electronically signed by Dr Jurgen Tom

## 2022-09-19 ENCOUNTER — NURSING HOME VISIT (OUTPATIENT)
Dept: GERIATRICS | Facility: OTHER | Age: 77
End: 2022-09-19
Payer: MEDICARE

## 2022-09-19 VITALS
OXYGEN SATURATION: 97 % | RESPIRATION RATE: 18 BRPM | DIASTOLIC BLOOD PRESSURE: 60 MMHG | TEMPERATURE: 97.7 F | HEART RATE: 78 BPM | WEIGHT: 181.8 LBS | BODY MASS INDEX: 31.21 KG/M2 | SYSTOLIC BLOOD PRESSURE: 110 MMHG

## 2022-09-19 DIAGNOSIS — K74.60 LIVER CIRRHOSIS SECONDARY TO NASH (NONALCOHOLIC STEATOHEPATITIS) (HCC): Primary | ICD-10-CM

## 2022-09-19 DIAGNOSIS — K75.81 LIVER CIRRHOSIS SECONDARY TO NASH (NONALCOHOLIC STEATOHEPATITIS) (HCC): Primary | ICD-10-CM

## 2022-09-19 PROCEDURE — 99309 SBSQ NF CARE MODERATE MDM 30: CPT | Performed by: INTERNAL MEDICINE

## 2022-09-20 NOTE — PROGRESS NOTES
[address] [facility]  [address] [place] [address], [address] [place], [place] [id number]    Progress note    NAME: Maria G Lao  AGE: 77 y o  SEX: female [phone]      Patient Location     [facility], [facility] rehab    Assessment/Plan:    No problem-specific Assessment & Plan notes found for this encounter  Hepatic encephalopathy :  No signs of decompensation at present  Continue rifaximin and lactulose    Elevated serum creatinine:  Creatinine went up to 1 5 at the hospital suspected to be from acute episode of hypotension + diuretics causing prerenal kidney injury  Creatinine was stable at 1 22 on 09/14/2012   Hypokalemia:  Resolved  K level was 4 1 on 09/14/2022  Patient remains on KCl 20 mEq daily      Pancytopenia :  Patient has chronic pancytopenia due to portal hypertension and liver cirrhosis  She was transfused PRBC and IV iron recently at the hospital   FOBT was + however patient had no signs of overt GI bleeding   CBC from 09/07/2022 revealed hemoglobin of 9 9, WBC count of 2 and platelet count of 88       Cardiomyopathy:  Previous  echo revealed grade 1 diastolic dysfunction along with EF of 50%   Patient clinically appears to be on the dry side  Diuretic dose was recently reduced to 40 mg alternating with 20 mg daily  Will further reduced to 20 mg daily  Lower extremity edema has resolved  Depression:  Continue escitalopram     Closed traumatic displaced fracture of proximal end of left humerus:  ·Status post fall 6/26   Left upper extremity sling was recently removed     Complete heart block :  ·S/p permanent pacemaker 6/29      Esophageal varices without bleeding :  ·S/P banding x2 in 02/2022  Follow-up with GI service  Continue nadolol     Type 2 diabetes mellitus with hyperglycemia, with long-term current use of insulin:    HGBA1C 8 2 (H) 05/31/2022  Blood sugar readings are acceptable mostly under 200 occasionally high    Metformin dose was recently increased to 1000 mg in the morning and 500 mg q p m  for episodes of hyperglycemia  continue insulin glargine 20 units daily along with sliding scale coverage        Acquired hypothyroidism:  Continue levothyroxine 125 mcg 6 days a week and 62 5 mcg 1 day a week  ·  Essential hypertension:  Blood pressure stable at 118/61, occasionally in low 100s for diuretic dose was recently reduced to 40 mg alternating with 20 mg daily  Will further reduced to 20 mg daily    GERD:  Continue Omeprazole    Hyperlipidemia:  Continue simvastatin    COVID-19:  History of recent COVID-19 infection diagnosed on 08/11  Patient was treated with remdesivir and dexamethasone  Patient remains  stable with SaO2 of 97%    Reason for visit     Follow-up cirrhosis, CKD 3, edema lower extremities hepatic encephalopathy, hypothyroidism, diabetes mellitus type 2 , depression, left few humeral fracture    HPI     Patient is being seen for a follow-up visit today  She is doing okay at present  Denies any active complaints  Patient has lost around 10 lbs since admission  Diuretic dose was recently reduced  Blood sugars remain stable for the most part  Patient is awake alert able to make her needs known      Past Medical History:   Diagnosis Date    Anemia     [facility]     Diabetic neuropathy [facility]     Esophageal varices (HCC)     Fatty liver     GERD (gastroesophageal reflux disease)     Hepatic encephalopathy (HCC)     Hiatal hernia     Hyperlipidemia     Hypertension     Hypoglycemia     Hypothyroidism     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)     Osteoarthritis     Osteopenia     Pancytopenia (HCC)     Thrombocytopenia (HCC)     Type 2 diabetes mellitus ([facility] )        Past Surgical History:   Procedure Laterality Date    ABDOMINAL SURGERY      Abdominal plasty with mesh insertion    CARDIAC ELECTROPHYSIOLOGY PROCEDURE N/A 6/29/2022    Procedure: Cardiac pacer implant;  Surgeon: Heladio Elizabeth MD;  [facility]; Service: Cardiology    CATARACT EXTRACTION, BILATERAL      CATARACT EXTRACTION, BILATERAL      COLONOSCOPY      EGD AND COLONOSCOPY  03/18/2015    IR BIOPSY BONE MARROW  8/24/2021    IR PARACENTESIS  8/9/2022    LAPAROSCOPIC CHOLECYSTECTOMY      SHOULDER SURGERY Right     calcium depsoti    TUBAL LIGATION      UMBILICAL HERNIA REPAIR      with mesh placed    UPPER GASTROINTESTINAL ENDOSCOPY         Social History     Tobacco Use   Smoking Status Never Smoker   Smokeless Tobacco Never Used          Family History   Problem Relation Age of Onset    Breast cancer Mother     Diabetes type II Father     Thyroid disease unspecified Daughter     Down syndrome Daughter     Diabetes type II Daughter     Diabetes type II Sister     No Known Problems Brother     Prostate cancer Brother     No Known Problems Sister     Stroke Sister     No Known Problems Son     Breast cancer Maternal Aunt     Colon cancer Neg Hx         Allergies   Allergen Reactions    Bee Venom Swelling    Latex     Sesame Seed (Diagnostic) - Food Allergy      Other reaction(s): swelling inside mouth    Strawberry C [Ascorbate - Food Allergy] Other (See Comments)     Mouth sores    Penicillins Rash          Current Outpatient Medications:     B-D UF III MINI PEN NEEDLES 31G X 5 MM MISC, USE UP TO 3 TIMES A DAY, Disp: 100 each, Rfl: 6    Basaglar KwikPen 100 units/mL SOPN, 38 units in am and 10 units at bedtime, Disp: 30 mL, Rfl: 0    benzonatate (TESSALON PERLES) 100 mg capsule, Take 1 capsule (100 mg total) by mouth 3 (three) times a day as needed for cough, Disp: 20 capsule, Rfl: 0    Blood Glucose Monitoring Suppl (ONE TOUCH ULTRA 2) w/Device KIT, by Does not apply route once for 1 dose Use Glucometer to test up to 3 times daily  , Disp: 1 each, Rfl: 0    calcium citrate-Vitamin D (CVS Calcium Citrate+D3 Petites) 200 mg-250 units, Take 1 tablet by mouth daily with breakfast, Disp: , Rfl:     Coenzyme Q10 (Co Q10) 100 MG CAPS, Take by mouth, Disp: , Rfl:     escitalopram (LEXAPRO) 10 mg tablet, Take 1 tablet (10 mg total) by mouth daily, Disp: 30 tablet, Rfl: 0    escitalopram (LEXAPRO) 5 mg tablet, Take 5 mg by mouth daily, Disp: , Rfl:     fluticasone (FLONASE) 50 mcg/act nasal spray, 1 spray into each nostril daily prn, Disp: , Rfl:     furosemide (LASIX) 40 mg tablet, Take 40 mg by mouth in the morning Furosemide 40 mg alternating with 20 mg daily, Disp: , Rfl:     lactulose 20 g/30 mL, Take 30 mL (20 g total) by mouth 3 (three) times a day, Disp: 2700 mL, Rfl: 0    levothyroxine 125 mcg tablet, Take 1 tablet 6 days a week and half a tablet on Sunday  , Disp: 90 tablet, Rfl: 2    metFORMIN (GLUCOPHAGE) 1000 MG tablet, Take 1,000 mg by mouth daily 1000 mg q a m  500 mg q p m , Disp: , Rfl:     nadolol (CORGARD) 20 mg tablet, Take 0 5 tablets (10 mg total) by mouth daily, Disp: 30 tablet, Rfl: 0    nystatin (MYCOSTATIN) powder, Apply topically 2 (two) times a day, Disp: 15 g, Rfl: 0    omeprazole (PriLOSEC) 20 mg delayed release capsule, TAKE 1 CAPSULE BY MOUTH EVERY DAY, Disp: 90 capsule, Rfl: 3    ONETOUCH DELICA LANCETS FINE MISC, Use 1-3 times a day, Disp: 100 each, Rfl: 3    OneTouch Ultra test strip, TEST UP TO 3 TIMES DAILY  , Disp: 300 strip, Rfl: 3    Potassium Chloride (KCL-20 PO), Take 20 mEq by mouth in the morning, Disp: , Rfl:     rifaximin (Xifaxan) 550 mg tablet, Take 1 tablet (550 mg total) by mouth every 12 (twelve) hours, Disp: 60 tablet, Rfl: 5    simvastatin (ZOCOR) 40 mg tablet, Take 40 mg by mouth daily, Disp: , Rfl: 3    spironolactone (ALDACTONE) 50 mg tablet, Take 50 mg by mouth daily, Disp: , Rfl:     Updated list was reviewed in [facility] of facility       Vitals:    09/19/22 1436   BP: 110/60   Pulse: 78   Resp: 18   Temp: 97 7 °F (36 5 °C)   SpO2: 97%       Vital signs were reviewed in point click care    Review of Systems   Constitutional: Positive for unexpected weight change (Weight loss of almost 10 lb since admission)  Negative for chills and fever  Respiratory: Negative for shortness of breath and wheezing  Cardiovascular: Negative for chest pain and leg swelling  Gastrointestinal: Negative for abdominal pain, diarrhea and vomiting  Genitourinary: Negative for dysuria and hematuria  Musculoskeletal: Positive for gait problem  Negative for back pain  Neurological: Positive for weakness (Generalized)  Negative for tremors and seizures  Psychiatric/Behavioral: Negative for agitation, behavioral problems and confusion  Physical Exam  Constitutional:       Appearance: She is well-developed  HENT:      Head: Normocephalic and atraumatic  Eyes:      General: No scleral icterus  Right eye: No discharge  Left eye: No discharge  Cardiovascular:      Rate and Rhythm: Normal rate and regular rhythm  Pulmonary:      Effort: No respiratory distress  Breath sounds: No stridor  No wheezing  Abdominal:      Palpations: Abdomen is soft  Tenderness: There is no abdominal tenderness  There is no guarding  Musculoskeletal:      Cervical back: Neck supple  Right lower leg: No edema  Left lower leg: No edema  Skin:     Coloration: Skin is not jaundiced  Findings: No bruising  Neurological:      General: No focal deficit present  Mental Status: She is alert  Cranial Nerves: No cranial nerve deficit        Comments: Oriented in month and year   Psychiatric:         Mood and Affect: Mood normal          Behavior: Behavior normal        Diagnostic Data      Labs from 09/14 revealed BUN of 27, creatinine 1 2, sodium 137, potassium 4 1, blood sugar 113    Labs done on 09/07/2022 revealed blood glucose of 145, BUN 27, creatinine 0 95, sodium 141, potassium 3 8  Hemoglobin 9 9, WBC count 2, platelet count 88    Labs from 8/29 reveal WBC count of 1 6, hemoglobin 9, platelet count pending  BUN 24, creatinine 0 95, sodium 142, potassium 3 2, blood glucose 128      Labs done on 08/16/2022 revealed hemoglobin of 9 1, WBC count 4, platelet count 48  BUN 28, creatinine 1 03, sodium 135, potassium 3 9      Lab Results   Component Value Date    SODIUM 134 (L) 08/15/2022    K 3 9 08/15/2022     08/15/2022    CO2 26 08/15/2022    BUN 28 (H) 08/15/2022    CREATININE 1 02 08/15/2022    GLUC 156 (H) 08/15/2022    CALCIUM 8 6 08/15/2022        Lab Results   Component Value Date    WBC 3 52 (L) 08/15/2022    HGB 8 0 (L) 08/15/2022    HCT 26 1 (L) 08/15/2022    MCV 92 08/15/2022    PLT 51 (L) 08/15/2022     Code Status:      Full code    Additional notes     Repeat BMP on 09/22/2022  Decrease Lasix dose to 20 mg daily  This note was electronically signed by Dr Tommi Seip

## 2022-09-21 ENCOUNTER — NURSING HOME VISIT (OUTPATIENT)
Dept: GERIATRICS | Facility: OTHER | Age: 77
End: 2022-09-21
Payer: MEDICARE

## 2022-09-21 VITALS
BODY MASS INDEX: 30.93 KG/M2 | SYSTOLIC BLOOD PRESSURE: 114 MMHG | HEART RATE: 78 BPM | RESPIRATION RATE: 18 BRPM | DIASTOLIC BLOOD PRESSURE: 72 MMHG | WEIGHT: 180.2 LBS | OXYGEN SATURATION: 96 % | TEMPERATURE: 97.6 F

## 2022-09-21 DIAGNOSIS — K75.81 LIVER CIRRHOSIS SECONDARY TO NASH (NONALCOHOLIC STEATOHEPATITIS) (HCC): Primary | ICD-10-CM

## 2022-09-21 DIAGNOSIS — K74.60 LIVER CIRRHOSIS SECONDARY TO NASH (NONALCOHOLIC STEATOHEPATITIS) (HCC): Primary | ICD-10-CM

## 2022-09-21 PROCEDURE — 99309 SBSQ NF CARE MODERATE MDM 30: CPT | Performed by: INTERNAL MEDICINE

## 2022-09-21 NOTE — PROGRESS NOTES
[address]  [address]  DUL[address]    Progress note    NAME: Jenn Kunz  AGE: 77 y o  SEX: female [phone]      Patient Location     [facility]    Assessment/Plan:    No problem-specific Assessment & Plan notes found for this encounter  Hepatic encephalopathy :  Stable  Patient is awake alert able to make her needs known  Continue rifaximin and lactulose      Hypokalemia:  Resolved  K level was stable at 4 8 today  Discontinue daily K supplements as Lasix dose was recently reduced      Pancytopenia :  History of chronic pancytopenia attributed to portal hypertension and liver cirrhosis  She was transfused PRBC and IV iron recently at the hospital   FOBT was + however patient had no signs of overt GI bleeding   CBC from 09/07/2022 revealed hemoglobin of 9 9, WBC count of 2 and platelet count of 88       Cardiomyopathy:  Clinically euvolemic  Diuretic dose was recently reduced due to elevated creatinine and low BP  Patient currently remains on Lasix 40 mg alternating with 20 mg daily along with spironolactone 50 mg daily  Follow blood pressure  May need further reduction in diuretic dose  BUN creatinine were stable from today    Depression:  Patient remains on escitalopram     Closed traumatic displaced fracture of proximal end of left humerus:  ·Status post fall 6/26   Left upper extremity sling was removed arm approximately 2 weeks ago  Range of movement of left shoulder is limited  No swelling or increased pain at this time       Complete heart block :  ·S/p permanent pacemaker 6/29      Esophageal varices without bleeding :  ·S/P banding x2 in 02/2022  Follow-up with GI service  Continue nadolol     Type 2 diabetes mellitus with hyperglycemia, with long-term current use of insulin:    HGBA1C 8 2 (H) 05/31/2022  Blood sugar readings are acceptable, improved from before  Continue metformin 1000 mg q a m  and 500 mg q p m  along with insulin glargine 20 units daily  Acquired hypothyroidism:  Continue levothyroxine 125 mcg 6 days a week and 62 5 mcg 1 day a week  ·  Essential hypertension:  Blood pressure stable at 114/72 at times low  Patient currently remains on Lasix 40 mg alternating with 20 mg daily and spironolactone 50 mg daily  Will continue to monitor    GERD:  Continue Omeprazole    Hyperlipidemia:  Continue simvastatin    COVID-19:  History of recent COVID-19 infection diagnosed on 08/11  Patient was treated with remdesivir and dexamethasone  Patient remains  stable with SaO2 of 97%    Reason for visit     Follow-up cirrhosis, CKD 3, edema lower extremities hepatic encephalopathy, hypothyroidism, diabetes mellitus type 2 , depression, left few humeral fracture    HPI     Patient is being seen for a follow-up visit today  She is doing okay at present  Awake alert able to make her needs known     Patient has good appetite eating care than 50% of the meals  There has been a weight loss of around 10 lb since admission which appears to be volume related  Diuretic dose was recently reduced  Blood sugars are acceptable for the most part  No fever chills dyspnea or chest congestion        Past Medical History:   Diagnosis Date    Anemia     Cirrhosis ([address] )     [facility] [address] [facility]     Esophageal varices (HCC)     Fatty liver     GERD (gastroesophageal reflux disease)     Hepatic encephalopathy (HCC)     Hiatal hernia     Hyperlipidemia     Hypertension     Hypoglycemia     Hypothyroidism     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)     Osteoarthritis     Osteopenia     Pancytopenia (HCC)     Thrombocytopenia (HCC)     Type 2 diabetes mellitus ([facility] [address] )        Past Surgical History:   Procedure Laterality Date    ABDOMINAL SURGERY      Abdominal plasty with mesh insertion    CARDIAC ELECTROPHYSIOLOGY PROCEDURE N/A 6/29/2022    Procedure: Cardiac pacer implant;  Surgeon: Paradise Chopra MD;  [facility]; Service: Cardiology    CATARACT EXTRACTION, BILATERAL      CATARACT EXTRACTION, BILATERAL      COLONOSCOPY      EGD AND COLONOSCOPY  03/18/2015    IR BIOPSY BONE MARROW  8/24/2021    IR PARACENTESIS  8/9/2022    LAPAROSCOPIC CHOLECYSTECTOMY      SHOULDER SURGERY Right     calcium depsoti    TUBAL LIGATION      UMBILICAL HERNIA REPAIR      with mesh placed    UPPER GASTROINTESTINAL ENDOSCOPY         Social History     Tobacco Use   Smoking Status Never Smoker   Smokeless Tobacco Never Used          Family History   Problem Relation Age of Onset    Breast cancer Mother     Diabetes type II Father     Thyroid disease unspecified Daughter     Down syndrome Daughter     Diabetes type II Daughter     Diabetes type II Sister     No Known Problems Brother     Prostate cancer Brother     No Known Problems Sister     Stroke Sister     No Known Problems Son     Breast cancer Maternal Aunt     Colon cancer Neg Hx         Allergies   Allergen Reactions    Bee Venom Swelling    Latex     Sesame Seed (Diagnostic) - Food Allergy      Other reaction(s): swelling inside mouth    Strawberry C [Ascorbate - Food Allergy] Other (See Comments)     Mouth sores    Penicillins Rash          Current Outpatient Medications:     insulin glargine (TOUJEO) 300 units/mL CONCENTRATED U-300 injection pen (1-unit dial), Inject 20 Units under the skin in the morning, Disp: , Rfl:     B-D UF III MINI PEN NEEDLES 31G X 5 MM MISC, USE UP TO 3 TIMES A DAY, Disp: 100 each, Rfl: 6    benzonatate (TESSALON PERLES) 100 mg capsule, Take 1 capsule (100 mg total) by mouth 3 (three) times a day as needed for cough, Disp: 20 capsule, Rfl: 0    Blood Glucose Monitoring Suppl (ONE TOUCH ULTRA 2) w/Device KIT, by Does not apply route once for 1 dose Use Glucometer to test up to 3 times daily  , Disp: 1 each, Rfl: 0    calcium citrate-Vitamin D (CVS Calcium Citrate+D3 Petites) 200 mg-250 units, Take 1 tablet by mouth daily with breakfast, Disp: , Rfl:     Coenzyme Q10 (Co Q10) 100 MG CAPS, Take by mouth, Disp: , Rfl:     escitalopram (LEXAPRO) 10 mg tablet, Take 1 tablet (10 mg total) by mouth daily, Disp: 30 tablet, Rfl: 0    escitalopram (LEXAPRO) 5 mg tablet, Take 5 mg by mouth daily, Disp: , Rfl:     fluticasone (FLONASE) 50 mcg/act nasal spray, 1 spray into each nostril daily prn, Disp: , Rfl:     furosemide (LASIX) 40 mg tablet, Take 40 mg by mouth in the morning Furosemide 40 mg alternating with 20 mg daily, Disp: , Rfl:     lactulose 20 g/30 mL, Take 30 mL (20 g total) by mouth 3 (three) times a day, Disp: 2700 mL, Rfl: 0    levothyroxine 125 mcg tablet, Take 1 tablet 6 days a week and half a tablet on Sunday  , Disp: 90 tablet, Rfl: 2    metFORMIN (GLUCOPHAGE) 1000 MG tablet, Take 1,000 mg by mouth daily 1000 mg q a m  500 mg q p m , Disp: , Rfl:     nadolol (CORGARD) 20 mg tablet, Take 0 5 tablets (10 mg total) by mouth daily, Disp: 30 tablet, Rfl: 0    nystatin (MYCOSTATIN) powder, Apply topically 2 (two) times a day, Disp: 15 g, Rfl: 0    omeprazole (PriLOSEC) 20 mg delayed release capsule, TAKE 1 CAPSULE BY MOUTH EVERY DAY, Disp: 90 capsule, Rfl: 3    ONETOUCH DELICA LANCETS FINE MISC, Use 1-3 times a day, Disp: 100 each, Rfl: 3    OneTouch Ultra test strip, TEST UP TO 3 TIMES DAILY  , Disp: 300 strip, Rfl: 3    rifaximin (Xifaxan) 550 mg tablet, Take 1 tablet (550 mg total) by mouth every 12 (twelve) hours, Disp: 60 tablet, Rfl: 5    simvastatin (ZOCOR) 40 mg tablet, Take 40 mg by mouth daily, Disp: , Rfl: 3    spironolactone (ALDACTONE) 50 mg tablet, Take 50 mg by mouth daily, Disp: , Rfl:     Updated list was reviewed in [facility] of facility       Vitals:    09/21/22 2007   BP: 114/72   Pulse: 78   Resp: 18   Temp: 97 6 °F (36 4 °C)   SpO2: 96%       Vital signs were reviewed in point click care    Review of Systems   Constitutional: Positive for unexpected weight change (Patient has lost around 10 lb since admission)  Negative for chills and fever  Respiratory: Negative for shortness of breath and wheezing  Cardiovascular: Negative for chest pain and leg swelling  Gastrointestinal: Negative for abdominal distention, abdominal pain, diarrhea and vomiting  Genitourinary: Negative for dysuria and hematuria  Musculoskeletal: Positive for gait problem  Negative for back pain  Neurological: Positive for weakness (Generalized)  Negative for syncope  Psychiatric/Behavioral: Negative for behavioral problems and confusion  Physical Exam  Constitutional:       General: She is not in acute distress  Appearance: She is well-developed  HENT:      Head: Normocephalic and atraumatic  Eyes:      General: No scleral icterus  Cardiovascular:      Rate and Rhythm: Normal rate and regular rhythm  Pulmonary:      Effort: No respiratory distress  Breath sounds: No wheezing  Abdominal:      General: There is no distension  Palpations: Abdomen is soft  Tenderness: There is no abdominal tenderness  Musculoskeletal:         General: No swelling  Cervical back: Neck supple  Right lower leg: Edema present  Left lower leg: Edema present  Comments: 1+ edema involving bilateral lower extremities  Range of movement of left shoulder is limited   Skin:     Coloration: Skin is not jaundiced  Findings: No bruising  Neurological:      General: No focal deficit present  Mental Status: She is alert  Cranial Nerves: No cranial nerve deficit        Comments: Oriented in month and year   Psychiatric:         Mood and Affect: Mood normal          Behavior: Behavior normal        Diagnostic Data     Labs from 09/21 revealed BUN of 26, creatinine 0 98, sodium 138, potassium 4 8, blood glucose 160     Labs from 09/14 revealed BUN of 27, creatinine 1 2, sodium 137, potassium 4 1, blood sugar 113    Labs done on 09/07/2022 revealed blood glucose of 145, BUN 27, creatinine 0 95, sodium 141, potassium 3 8  Hemoglobin 9 9, WBC count 2, platelet count 88    Labs from 8/29 reveal WBC count of 1 6, hemoglobin 9, platelet count pending  BUN 24, creatinine 0 95, sodium 142, potassium 3 2, blood glucose 128      Labs done on 08/16/2022 revealed hemoglobin of 9 1, WBC count 4, platelet count 48  BUN 28, creatinine 1 03, sodium 135, potassium 3 9      Code Status:      Full code    Additional notes     This note was electronically signed by Dr Lamberto Crump

## 2022-09-23 ENCOUNTER — NURSING HOME VISIT (OUTPATIENT)
Dept: GERIATRICS | Facility: OTHER | Age: 77
End: 2022-09-23
Payer: MEDICARE

## 2022-09-23 DIAGNOSIS — I10 ESSENTIAL HYPERTENSION: ICD-10-CM

## 2022-09-23 DIAGNOSIS — H61.21 IMPACTED CERUMEN OF RIGHT EAR: Primary | ICD-10-CM

## 2022-09-23 DIAGNOSIS — R26.2 AMBULATORY DYSFUNCTION: ICD-10-CM

## 2022-09-23 DIAGNOSIS — E11.65 TYPE 2 DIABETES MELLITUS WITH HYPERGLYCEMIA, WITH LONG-TERM CURRENT USE OF INSULIN (HCC): Chronic | ICD-10-CM

## 2022-09-23 DIAGNOSIS — K74.60 LIVER CIRRHOSIS SECONDARY TO NASH (HCC): ICD-10-CM

## 2022-09-23 DIAGNOSIS — Z79.4 TYPE 2 DIABETES MELLITUS WITH HYPERGLYCEMIA, WITH LONG-TERM CURRENT USE OF INSULIN (HCC): Chronic | ICD-10-CM

## 2022-09-23 DIAGNOSIS — F32.A DEPRESSION, UNSPECIFIED DEPRESSION TYPE: Chronic | ICD-10-CM

## 2022-09-23 DIAGNOSIS — S42.202G CLOSED TRAUMATIC DISPLACED FRACTURE OF PROXIMAL END OF LEFT HUMERUS WITH DELAYED HEALING, SUBSEQUENT ENCOUNTER: ICD-10-CM

## 2022-09-23 DIAGNOSIS — D61.818 PANCYTOPENIA (HCC): ICD-10-CM

## 2022-09-23 DIAGNOSIS — U07.1 COVID-19: ICD-10-CM

## 2022-09-23 DIAGNOSIS — K75.81 LIVER CIRRHOSIS SECONDARY TO NASH (HCC): ICD-10-CM

## 2022-09-23 PROCEDURE — 99309 SBSQ NF CARE MODERATE MDM 30: CPT

## 2022-09-26 ENCOUNTER — NURSING HOME VISIT (OUTPATIENT)
Dept: GERIATRICS | Facility: OTHER | Age: 77
End: 2022-09-26
Payer: MEDICARE

## 2022-09-26 VITALS
BODY MASS INDEX: 29.52 KG/M2 | OXYGEN SATURATION: 97 % | TEMPERATURE: 97.1 F | DIASTOLIC BLOOD PRESSURE: 64 MMHG | HEART RATE: 78 BPM | SYSTOLIC BLOOD PRESSURE: 112 MMHG | WEIGHT: 172 LBS | RESPIRATION RATE: 18 BRPM

## 2022-09-26 DIAGNOSIS — E03.9 ACQUIRED HYPOTHYROIDISM: Primary | ICD-10-CM

## 2022-09-26 PROCEDURE — 99309 SBSQ NF CARE MODERATE MDM 30: CPT | Performed by: INTERNAL MEDICINE

## 2022-09-26 NOTE — PROGRESS NOTES
Pedro Geller  [facility]  [address]   [address] [place] [address]  50     Progress Note  Code SNF 31    Patient Location     [facility] rehab    Reason for visit     Follow-up diabetes mellitus type 2, hypothyroidism, hypertension, hyperlipidemia, GERD, liver cirrhosis, pancytopenia    Problem List Items Addressed This Visit        Endocrine    Hypothyroidism - Primary     Patient remains on levothyroxine 125 mcg, 1 tablet 6 days a week and half tablet 1 day a week  Follow-up repeat TSH               Liver cirrhosis:  No signs of decompensation at present  Patient remains on lactulose 30 mL Tid  Nadolol 10 mg daily, rifaximin 550 mg twice a day and spironolactone 25 mg 2 tablets daily  Reduce spironolactone to 25 mg once daily consider borderline low blood pressures       Hypokalemia:  Resolved  K level was stable at 4 8 09/21/2022  Patient remains on KCl 20 mEq daily    Pancytopenia :  History of chronic pancytopenia attributed to portal hypertension and liver cirrhosis  She was transfused PRBC and IV iron recently at the hospital   FOBT was positive however patient had no signs of overt GI bleeding   CBC from 09/07/2022 revealed hemoglobin of 9 9, WBC count of 2 and platelet count of 88  Continue to monitor periodically      Cardiomyopathy:  No signs of CHF or volume overload at present  Per records patient has lost around 17 lb since admission  Diuretic dose was recently reduced  Patient currently remains on Lasix 40 mg alternating with 20 mg daily and spironolactone 50 mg daily  Will reduce spironolactone to 25 mg daily due to low blood pressures     Depression:  Stable on escitalopram     Closed traumatic displaced fracture of proximal end of left humerus:  ·Status post fall 6/26   Left upper extremity sling was removed arm approximately 3 weeks ago    Continue PT OT        Complete heart block :  ·S/p permanent pacemaker 6/29      Esophageal varices without bleeding :  ·S/P banding x2 in 02/2022  Follow-up with GI service  Continue nadolol     Type 2 diabetes mellitus with hyperglycemia, with long-term current use of insulin:             HGBA1C       8 2 (H)           05/31/2022  Blood sugars remain acceptable for the most part  Fasting blood sugar was 142 this morning  Post breakfast reading was 181  Patient remains on metformin 1000 mg in the morning and 500 mg q p m  along with insulin glargine 20 units daily insulin lispro 3 units t i d  With meals  Continue same       Essential hypertension:  Blood pressure readings are noted to be on lower side, 106/60 earlier this morning  Patient remains on Lasix 40 mg alternating with 20 mg daily along with spironolactone 50 mg daily  Will reduce spironolactone dose to 25 mg and follow    GERD:  Continue Omeprazole     Hyperlipidemia:  Continue simvastatin      HPI       Patient is being seen for a follow-up visit today  She is doing okay at present  Denies any dyspnea chest pain GI or  complaints  Patient is awake alert able to make her needs known, remains continent of bowel and bladder  Patient is needing assist of 1 with transfers and ADLs  No behavioral issues  Review of Systems   Constitutional: Negative for chills and fever  Respiratory: Negative for shortness of breath, wheezing and stridor  Cardiovascular: Negative for chest pain  Gastrointestinal: Negative for abdominal distention, abdominal pain and vomiting  Genitourinary: Negative for dysuria, flank pain and hematuria  Musculoskeletal: Positive for arthralgias (Left arm pain at times) and gait problem  Neurological: Positive for weakness  Negative for seizures and syncope  Psychiatric/Behavioral: Negative for agitation, behavioral problems and confusion         Past Medical History:   Diagnosis Date    Anemia     Cirrhosis ([facility] [address] )     Diabetic neuropathy (HCC)     Esophageal varices (HCC)     Fatty liver     GERD (gastroesophageal reflux disease)     Hepatic encephalopathy (HCC)     Hiatal hernia     Hyperlipidemia     Hypertension     Hypoglycemia     Hypothyroidism     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)     Osteoarthritis     Osteopenia     Pancytopenia (HCC)     Thrombocytopenia (HCC)     Type 2 diabetes mellitus ([address] )        Past Surgical History:   Procedure Laterality Date    ABDOMINAL SURGERY      Abdominal plasty with mesh insertion    CARDIAC ELECTROPHYSIOLOGY PROCEDURE N/A 6/29/2022    Procedure: Cardiac pacer implant;  Surgeon: Yasmani Doyle MD;  Location:  CARDIAC CATH LAB;   Service: Cardiology    CATARACT EXTRACTION, BILATERAL      CATARACT EXTRACTION, BILATERAL      COLONOSCOPY      EGD AND COLONOSCOPY  03/18/2015    IR BIOPSY BONE MARROW  8/24/2021    IR PARACENTESIS  8/9/2022    LAPAROSCOPIC CHOLECYSTECTOMY      SHOULDER SURGERY Right     calcium depsoti    TUBAL LIGATION      UMBILICAL HERNIA REPAIR      with mesh placed    UPPER GASTROINTESTINAL ENDOSCOPY         Social History     Tobacco Use   Smoking Status Never Smoker   Smokeless Tobacco Never Used       Family History   Problem Relation Age of Onset    Breast cancer Mother     Diabetes type II Father     Thyroid disease unspecified Daughter     Down syndrome Daughter     Diabetes type II Daughter     Diabetes type II Sister     No Known Problems Brother     Prostate cancer Brother     No Known Problems Sister     Stroke Sister     No Known Problems Son     Breast cancer Maternal Aunt     Colon cancer Neg Hx         Allergies   Allergen Reactions    Bee Venom Swelling    Latex     Sesame Seed (Diagnostic) - Food Allergy      Other reaction(s): swelling inside mouth    Strawberry C [Ascorbate - Food Allergy] Other (See Comments)     Mouth sores    Penicillins Rash         Current Outpatient Medications:     B-D UF III MINI PEN NEEDLES 31G X 5 MM MISC, USE UP TO 3 TIMES A DAY, Disp: 100 each, Rfl: 6    benzonatate (TESSALON PERLES) 100 mg capsule, Take 1 capsule (100 mg total) by mouth 3 (three) times a day as needed for cough, Disp: 20 capsule, Rfl: 0    Blood Glucose Monitoring Suppl (ONE TOUCH ULTRA 2) w/Device KIT, by Does not apply route once for 1 dose Use Glucometer to test up to 3 times daily  , Disp: 1 each, Rfl: 0    calcium citrate-Vitamin D (CVS Calcium Citrate+D3 Petites) 200 mg-250 units, Take 1 tablet by mouth daily with breakfast, Disp: , Rfl:     Coenzyme Q10 (Co Q10) 100 MG CAPS, Take by mouth, Disp: , Rfl:     escitalopram (LEXAPRO) 10 mg tablet, Take 1 tablet (10 mg total) by mouth daily, Disp: 30 tablet, Rfl: 0    escitalopram (LEXAPRO) 5 mg tablet, Take 5 mg by mouth daily, Disp: , Rfl:     fluticasone (FLONASE) 50 mcg/act nasal spray, 1 spray into each nostril daily prn, Disp: , Rfl:     furosemide (LASIX) 40 mg tablet, Take 40 mg by mouth in the morning Furosemide 40 mg alternating with 20 mg daily, Disp: , Rfl:     insulin glargine (TOUJEO) 300 units/mL CONCENTRATED U-300 injection pen (1-unit dial), Inject 20 Units under the skin in the morning, Disp: , Rfl:     lactulose 20 g/30 mL, Take 30 mL (20 g total) by mouth 3 (three) times a day, Disp: 2700 mL, Rfl: 0    levothyroxine 125 mcg tablet, Take 1 tablet 6 days a week and half a tablet on Sunday  , Disp: 90 tablet, Rfl: 2    metFORMIN (GLUCOPHAGE) 1000 MG tablet, Take 1,000 mg by mouth daily 1000 mg q a m  500 mg q p m , Disp: , Rfl:     nadolol (CORGARD) 20 mg tablet, Take 0 5 tablets (10 mg total) by mouth daily, Disp: 30 tablet, Rfl: 0    nystatin (MYCOSTATIN) powder, Apply topically 2 (two) times a day, Disp: 15 g, Rfl: 0    omeprazole (PriLOSEC) 20 mg delayed release capsule, TAKE 1 CAPSULE BY MOUTH EVERY DAY, Disp: 90 capsule, Rfl: 3    ONETOUCH DELICA LANCETS FINE MISC, Use 1-3 times a day, Disp: 100 each, Rfl: 3    OneTouch Ultra test strip, TEST UP TO 3 TIMES DAILY  , Disp: 300 strip, Rfl: 3    rifaximin (Xifaxan) 550 mg tablet, Take 1 tablet (550 mg total) by mouth every 12 (twelve) hours, Disp: 60 tablet, Rfl: 5    simvastatin (ZOCOR) 40 mg tablet, Take 40 mg by mouth daily, Disp: , Rfl: 3    spironolactone (ALDACTONE) 50 mg tablet, Take 50 mg by mouth daily, Disp: , Rfl:     Updated list was reviewed in [facility] system of facility  Vitals:    09/26/22 1144   BP: 112/64   Pulse: 78   Resp: 18   Temp: (!) 97 1 °F (36 2 °C)   SpO2: 97%       Physical Exam  HENT:      Head: Normocephalic and atraumatic  Cardiovascular:      Rate and Rhythm: Normal rate and regular rhythm  Pulmonary:      Breath sounds: No wheezing, rhonchi or rales  Abdominal:      General: There is no distension  Tenderness: There is no abdominal tenderness  There is no guarding  Musculoskeletal:      Cervical back: Neck supple  Right lower leg: Edema present  Left lower leg: Edema present  Comments: 1+ edema involving bilateral lower extremities   Skin:     Coloration: Skin is not jaundiced  Neurological:      General: No focal deficit present  Mental Status: She is alert  Cranial Nerves: No cranial nerve deficit  Comments: Oriented in month any year   Psychiatric:         Mood and Affect: Mood normal          Behavior: Behavior normal          Diagnostic Data:    Recent labs were reviewed    Labs done on 09/21/2022 revealed BUN of 26, creatinine 0 98, sodium 138, potassium 4 8 CBC on 09/7/22 revealed hemoglobin of 9 9, WBC count 2, platelet count 88        This note was electronically signed by Dr Anabel Collado

## 2022-09-26 NOTE — ASSESSMENT & PLAN NOTE
Patient remains on levothyroxine 125 mcg, 1 tablet 6 days a week and half tablet 1 day a week    Follow-up repeat TSH

## 2022-09-27 NOTE — PROGRESS NOTES
[facility]  [address]  [phone]  [employer]  Code 31 (STR)      NAME: Modesto Ortiz  AGE: 77 y o  SEX: female CODE STATUS: Full Code    DATE OF ENCOUNTER: 9/23/2022    Assessment and Plan     1  Impacted cerumen of right ear  Assessment & Plan:  · Patient notes ear fullness and decreased hearing in right ear  · Patient noted to have cerumen impaction on exam   · Will order Debrox drops (5 drops BID) x 4 days   · Re-evaluate and flush ears next week      2  Type 2 diabetes mellitus with hyperglycemia, with long-term current use of insulin ([facility])  Assessment & Plan:    Lab Results   Component Value Date    HGBA1C 8 2 (H) 05/31/2022     · Blood sugar log reviewed and blood sugars have been stable (9/18 to present)  · Trending between 125 and 272  · Continue current medication regimen   · Metformin 1000 mg daily in the morning and 500 mg in the evening   · Humalog 3 units with meals   · Humalog SSI with meals  · Lantus 20 units daily at bedtime  · Avoid hypoglycemia   · Encourage diabetic diet   · Continue to monitor       3  Essential hypertension  Assessment & Plan:  · Blood pressure log reviewed and blood pressures have been stable (9/18 to present)  · Trending between 99/54 and 126/76  · Continue current medication regimen   · Nadolol 10 mg daily   · Lasix 40 mg every other day   · Lasix 20 mg every other day   · Spironolactone 25 mg daily   · Avoid hypotension   · Encourage low salt diet   · Continue to monitor       4  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 [facility])  Assessment & Plan:  · Patient with known history   · Clinically stable on exam today   · No evidence of hepatic encephalopathy   · Continue current medication regimen  · Rifaximin 550 mg BID   · Nadolol 10 mg daily   · Lactulose 20 g TID  · Spironolactone 25 mg daily    · Continue to monitor       5   Closed traumatic displaced fracture of proximal end of left humerus with delayed healing, subsequent encounter  Assessment & Plan:  · Status post fall on 6/26  · Left upper extremity sling cleared a few weeks ago  · Patient denying pain on exam today    · Continue current medication regimen   · Tylenol 650 mg Q 6 hours PRN   · Follow-up with orthopedics      6  COVID-19  Assessment & Plan:  · Recent COVID diagnosis on 8/11  · Noted to have mild symptoms including cough and sore throat   · Patient was treated with Decadron and Remdesivir   · Oxygen saturation has remained stable  · Patient remains on room air in no acute distress   · Continue to monitor       7  Depression, unspecified depression type  Assessment & Plan:  · Mood appears stable on exam today   · Continue current medication regimen   · Lexparo 5 mg daily   · Continue supportive care      8  Pancytopenia ([facility] [address] )  Assessment & Plan:  · Secondary to portal hypertension and liver cirhosis   · Required PRBC and iron transfusions on most recent hospitalization   · Most recent CBC on 9/7 revealed a hemoglobin of 9 9  · Continue to monitor lab work periodically       9  Ambulatory dysfunction  Assessment & Plan:   PT/OT following   Maintain fall and safety precautions    Encourage adequate oral hydration and nutrition    Out of bed as tolerated    Continue PT/OT for continued strength and balance training         All medications and routine orders were reviewed and updated as needed  Chief Complaint     STR follow-up visit  History of Present Illness     Loreta Durant is a 77year old female who was admitted to [place] on 8/15 for STR  Her past medical history includes but is not limited to 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, diabetes, hypertension, complete heart block, cardiomyopathy, hepatic encephalopathy, pancytopenia, iron deficiency anemia, COVID-19, and ambulatory dysfunction  She is being seen and evaluated today in collaboration with nursing for medical management and STR follow-up       The patient is being seen and evaluated today at the bedside  She is noted to be sitting up in her wheelchair in no acute distress  She is alert and oriented x 3 and is able to make her needs known  She states that she is feeling okay today but notes that her right ear feels full  She states that she feels that she has decreased hearing in the right ear  She denies otalgia  She notes that she does experience some dizziness with movement  She denies this while sitting in her wheelchair on exam today  She denies light-headedness, headaches, and vision changes  She notes no issues with her breathing  She denies chest pain, palpitations, shortness of breath, and cough  She denies nausea, vomiting, constipation, and diarrhea  She notes no issues with her bowels or bladder  Per the SNF records, her last bowel movement was noted to be on 9/22  She states that her appetite is fair and that she has been trying to stay hydrated  Per the SNF records, she is eating 2-3 meals per day, consuming % of each meal  She states that she is actively participating in therapy and that it is going well  There are no concerns from nursing at this time  The patient's allergies, past medical, surgical, social and family history were reviewed and unchanged  Review of Systems     Review of Systems   Constitutional: Positive for activity change and unexpected weight change (has lost about 18 lb since rehab admission (per SNF charting))  Negative for appetite change, chills, fatigue and fever  HENT: Positive for hearing loss (increased in right ear (feels like it is blocked))  Negative for congestion, rhinorrhea and sore throat  Eyes: Positive for visual disturbance (glasses)  Respiratory: Negative for cough, chest tightness and shortness of breath  Cardiovascular: Negative for chest pain and palpitations  Gastrointestinal: Negative for abdominal distention, abdominal pain, constipation, diarrhea, nausea and vomiting     Genitourinary: Negative for difficulty urinating, dysuria, frequency and urgency  Musculoskeletal: Positive for gait problem  Negative for arthralgias and myalgias  Skin: Negative for color change and pallor  Neurological: Positive for weakness  Negative for dizziness, light-headedness, numbness and headaches  Psychiatric/Behavioral: Negative for behavioral problems, confusion and sleep disturbance  The patient is not nervous/anxious  Objective     Vitals: Per SNF records     Vitals:    09/23/22 0643   BP: 110/59   Pulse: 68   Resp: 18   Temp: 98 °F (36 7 °C)   SpO2: 99%       Physical Exam  Vitals and nursing note reviewed  Constitutional:       General: She is not in acute distress  Appearance: Normal appearance  She is obese  She is not ill-appearing  HENT:      Head: Normocephalic  Right Ear: Decreased hearing noted  Ears:      Comments: Cerumen impaction noted in right ear     Nose: No congestion or rhinorrhea  Mouth/Throat:      Mouth: Mucous membranes are dry  Eyes:      General: No scleral icterus  Conjunctiva/sclera: Conjunctivae normal    Cardiovascular:      Rate and Rhythm: Normal rate and regular rhythm  Pulses: Normal pulses  Heart sounds: Normal heart sounds  No murmur heard  Pulmonary:      Effort: Pulmonary effort is normal  No respiratory distress  Breath sounds: Normal breath sounds  No wheezing, rhonchi or rales  Abdominal:      General: Bowel sounds are normal  There is no distension  Palpations: Abdomen is soft  Tenderness: There is no abdominal tenderness  Musculoskeletal:         General: No tenderness or signs of injury  Right lower leg: Edema (+1 pitting) present  Left lower leg: Edema (non-pitting) present  Skin:     General: Skin is warm and dry  Coloration: Skin is not pale  Findings: No bruising or erythema  Neurological:      General: No focal deficit present        Mental Status: She is alert and oriented to person, place, and time  Mental status is at baseline  Motor: Weakness present  Gait: Gait abnormal    Psychiatric:         Mood and Affect: Mood normal          Behavior: Behavior normal          Thought Content: Thought content normal          Judgment: Judgment normal        Pertinent Laboratory/Diagnostic Studies:   Reviewed in facility chart-stable    Current Medications   Medications reviewed and updated see facility [facility] for details  Current Outpatient Medications:     B-D UF III MINI PEN NEEDLES 31G X 5 MM MISC, USE UP TO 3 TIMES A DAY, Disp: 100 each, Rfl: 6    benzonatate (TESSALON PERLES) 100 mg capsule, Take 1 capsule (100 mg total) by mouth 3 (three) times a day as needed for cough, Disp: 20 capsule, Rfl: 0    Blood Glucose Monitoring Suppl (ONE TOUCH ULTRA 2) w/Device KIT, by Does not apply route once for 1 dose Use Glucometer to test up to 3 times daily  , Disp: 1 each, Rfl: 0    calcium citrate-Vitamin D (CVS Calcium Citrate+D3 Petites) 200 mg-250 units, Take 1 tablet by mouth daily with breakfast, Disp: 30 tablet, Rfl: 0    Coenzyme Q10 (Co Q10) 100 MG CAPS, Take by mouth, Disp: , Rfl:     escitalopram (LEXAPRO) 10 mg tablet, Take 1 tablet (10 mg total) by mouth daily, Disp: 30 tablet, Rfl: 0    escitalopram (LEXAPRO) 5 mg tablet, Take 1 tablet (5 mg total) by mouth daily, Disp: 30 tablet, Rfl: 0    fluticasone (FLONASE) 50 mcg/act nasal spray, 1 spray into each nostril daily prn, Disp: , Rfl:     furosemide (LASIX) 40 mg tablet, Take 1 tablet (40 mg total) by mouth every other day Furosemide 40 mg alternating with 20 mg daily, Disp: 30 tablet, Rfl: 0    insulin glargine (TOUJEO) 300 units/mL CONCENTRATED U-300 injection pen (1-unit dial), Inject 20 Units under the skin in the morning, Disp: , Rfl:     Insulin Glargine Solostar (Basaglar KwikPen) 100 UNIT/ML SOPN, Inject 0 2 mL (20 Units total) under the skin every morning, Disp: 6 mL, Rfl: 0    insulin lispro (HumaLOG NELL) 100 units/mL injection pen, Inject 3 Units under the skin 3 (three) times a day with meals, Disp: , Rfl:     lactulose 20 g/30 mL, Take 30 mL (20 g total) by mouth 3 (three) times a day, Disp: 2700 mL, Rfl: 0    levothyroxine 125 mcg tablet, Take 1 tablet 6 days a week and half a tablet on Sunday  , Disp: 30 tablet, Rfl: 0    metFORMIN (GLUCOPHAGE) 1000 MG tablet, Take 1 tablet (1,000 mg total) by mouth daily, Disp: 60 tablet, Rfl: 0    nadolol (CORGARD) 20 mg tablet, Take 0 5 tablets (10 mg total) by mouth daily, Disp: 30 tablet, Rfl: 0    nystatin (MYCOSTATIN) powder, Apply topically 2 (two) times a day, Disp: 15 g, Rfl: 0    omeprazole (PriLOSEC) 20 mg delayed release capsule, Take 1 capsule (20 mg total) by mouth daily, Disp: 30 capsule, Rfl: 0    ONETOUCH DELICA LANCETS FINE MISC, Use 1-3 times a day, Disp: 100 each, Rfl: 3    OneTouch Ultra test strip, TEST UP TO 3 TIMES DAILY  , Disp: 300 strip, Rfl: 3    potassium chloride (K-DUR,KLOR-CON) 20 mEq tablet, Take 1 tablet (20 mEq total) by mouth 2 (two) times a day, Disp: 30 tablet, Rfl: 0    rifaximin (Xifaxan) 550 mg tablet, Take 1 tablet (550 mg total) by mouth every 12 (twelve) hours, Disp: 60 tablet, Rfl: 5    simvastatin (ZOCOR) 40 mg tablet, Take 1 tablet (40 mg total) by mouth daily, Disp: 30 tablet, Rfl: 0    spironolactone (ALDACTONE) 50 mg tablet, Take 25 mg by mouth daily, Disp: , Rfl:      Plan discussed with Dr Dickson Silva noted agreement with assessment and plan  Please note:  Voice-recognition software may have been used in the preparation of this document  Occasional wrong word or "sound-alike" substitutions may have occurred due to the inherent limitations of voice recognition software  Interpretation should be guided by context        Israel Mckinney  9/29/2022  8:58 PM

## 2022-09-28 ENCOUNTER — NURSING HOME VISIT (OUTPATIENT)
Dept: GERIATRICS | Facility: OTHER | Age: 77
End: 2022-09-28
Payer: MEDICARE

## 2022-09-28 VITALS
BODY MASS INDEX: 29.83 KG/M2 | HEART RATE: 70 BPM | TEMPERATURE: 97.4 F | WEIGHT: 173.8 LBS | DIASTOLIC BLOOD PRESSURE: 68 MMHG | SYSTOLIC BLOOD PRESSURE: 116 MMHG | RESPIRATION RATE: 18 BRPM | OXYGEN SATURATION: 94 %

## 2022-09-28 DIAGNOSIS — Z79.4 TYPE 2 DIABETES MELLITUS WITH HYPERGLYCEMIA, WITH LONG-TERM CURRENT USE OF INSULIN (HCC): ICD-10-CM

## 2022-09-28 DIAGNOSIS — E03.9 ACQUIRED HYPOTHYROIDISM: ICD-10-CM

## 2022-09-28 DIAGNOSIS — K75.81 LIVER CIRRHOSIS SECONDARY TO NASH (NONALCOHOLIC STEATOHEPATITIS) (HCC): Chronic | ICD-10-CM

## 2022-09-28 DIAGNOSIS — E78.2 MIXED HYPERLIPIDEMIA: ICD-10-CM

## 2022-09-28 DIAGNOSIS — E87.6 HYPOKALEMIA: ICD-10-CM

## 2022-09-28 DIAGNOSIS — K21.9 GASTROESOPHAGEAL REFLUX DISEASE WITHOUT ESOPHAGITIS: ICD-10-CM

## 2022-09-28 DIAGNOSIS — K75.81 LIVER CIRRHOSIS SECONDARY TO NASH (HCC): Primary | ICD-10-CM

## 2022-09-28 DIAGNOSIS — I85.00 ESOPHAGEAL VARICES WITHOUT BLEEDING (HCC): ICD-10-CM

## 2022-09-28 DIAGNOSIS — K74.60 LIVER CIRRHOSIS SECONDARY TO NASH (NONALCOHOLIC STEATOHEPATITIS) (HCC): Chronic | ICD-10-CM

## 2022-09-28 DIAGNOSIS — K74.60 LIVER CIRRHOSIS SECONDARY TO NASH (HCC): Primary | ICD-10-CM

## 2022-09-28 DIAGNOSIS — M85.80 OSTEOPENIA, UNSPECIFIED LOCATION: ICD-10-CM

## 2022-09-28 DIAGNOSIS — F32.A DEPRESSION, UNSPECIFIED DEPRESSION TYPE: Chronic | ICD-10-CM

## 2022-09-28 DIAGNOSIS — K76.82 HEPATIC ENCEPHALOPATHY: ICD-10-CM

## 2022-09-28 DIAGNOSIS — E11.65 TYPE 2 DIABETES MELLITUS WITH HYPERGLYCEMIA, WITH LONG-TERM CURRENT USE OF INSULIN (HCC): ICD-10-CM

## 2022-09-28 PROCEDURE — 99316 NF DSCHRG MGMT 30 MIN+: CPT | Performed by: INTERNAL MEDICINE

## 2022-09-28 RX ORDER — POTASSIUM CHLORIDE 20 MEQ/1
20 TABLET, EXTENDED RELEASE ORAL 2 TIMES DAILY
Qty: 30 TABLET | Refills: 0 | Status: SHIPPED | OUTPATIENT
Start: 2022-09-28

## 2022-09-28 RX ORDER — ESCITALOPRAM OXALATE 5 MG/1
5 TABLET ORAL DAILY
Qty: 30 TABLET | Refills: 0 | Status: SHIPPED | OUTPATIENT
Start: 2022-09-28

## 2022-09-28 RX ORDER — NADOLOL 20 MG/1
10 TABLET ORAL DAILY
Qty: 30 TABLET | Refills: 0 | Status: SHIPPED | OUTPATIENT
Start: 2022-09-28 | End: 2022-10-28

## 2022-09-28 RX ORDER — ACETAMINOPHEN 500 MG
1 TABLET ORAL
Qty: 30 TABLET | Refills: 0 | Status: SHIPPED | OUTPATIENT
Start: 2022-09-28 | End: 2022-10-28

## 2022-09-28 RX ORDER — POTASSIUM CHLORIDE 1.5 G/1.77G
20 POWDER, FOR SOLUTION ORAL DAILY
COMMUNITY
End: 2022-09-28

## 2022-09-28 RX ORDER — INSULIN GLARGINE 100 [IU]/ML
20 INJECTION, SOLUTION SUBCUTANEOUS EVERY MORNING
Qty: 6 ML | Refills: 0 | Status: SHIPPED | OUTPATIENT
Start: 2022-09-28 | End: 2022-10-28

## 2022-09-28 RX ORDER — SIMVASTATIN 40 MG
40 TABLET ORAL DAILY
Qty: 30 TABLET | Refills: 0 | Status: SHIPPED | OUTPATIENT
Start: 2022-09-28 | End: 2022-10-28

## 2022-09-28 RX ORDER — LACTULOSE 20 G/30ML
20 SOLUTION ORAL 3 TIMES DAILY
Qty: 2700 ML | Refills: 0 | Status: SHIPPED | OUTPATIENT
Start: 2022-09-28 | End: 2022-10-28

## 2022-09-28 RX ORDER — LEVOTHYROXINE SODIUM 0.12 MG/1
TABLET ORAL
Qty: 30 TABLET | Refills: 0 | Status: SHIPPED | OUTPATIENT
Start: 2022-09-28

## 2022-09-28 RX ORDER — FUROSEMIDE 40 MG/1
40 TABLET ORAL EVERY OTHER DAY
Qty: 30 TABLET | Refills: 0 | Status: SHIPPED | OUTPATIENT
Start: 2022-09-28

## 2022-09-28 RX ORDER — ESCITALOPRAM OXALATE 10 MG/1
10 TABLET ORAL DAILY
Qty: 30 TABLET | Refills: 0 | Status: SHIPPED | OUTPATIENT
Start: 2022-09-28 | End: 2022-10-28

## 2022-09-28 RX ORDER — OMEPRAZOLE 20 MG/1
20 CAPSULE, DELAYED RELEASE ORAL DAILY
Qty: 30 CAPSULE | Refills: 0 | Status: SHIPPED | OUTPATIENT
Start: 2022-09-28

## 2022-09-28 NOTE — ASSESSMENT & PLAN NOTE
History of 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  Clinically stable at present  No signs of hepatic encephalopathy    Continue maintenance meds including rifaximin, nadolol, lactulose and spironolactone

## 2022-09-28 NOTE — PROGRESS NOTES
[address]  [address]   [address] [place], [place]  50   Nursing Home Discharge    Code SNF 31      Patient Location     [facility]      Patients care was coordinated with nursing facility staff  Recent vitals, labs and updated medications were reviewed in [id number] [facility]  History of Present   Illness     HOSPITAL COURSE:  Patient is a 77 y o  female with past medical history significant for 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, esophageal varices, neuropathy, anemia, hypertension, hyperlipidemia, recent hospitalization for ppm placement for complete heart block and diabetes mellitus type 2  Patient was hospitalized on 08/04/2022 due to confusion for 4 days  Per records patient was unable to take insulin herself which she normally does  She was found to have ammonia level of 60 on  Lactulose dose was increased with improvement in mentation  Right upper quadrant ultrasound revealed cirrhotic changes as well as new ascites for which patient was started on Lasix and spironolactone  Paracentesis was attempted however could not be safely completed due to insufficient fluid  Hospital course was later complicated by prerenal azotemia and hypertension for which diuretics were held  Renal function subsequently improved  Diuretics were resumed upon discharge  Outpatient GI follow-up and EGD was recommended for esophageal varices surveillance  Patient incidentally tested positive for COVID-19 on 08/11  She was noted to have cough, additionally required 2 L of oxygen  Patient was treated with remdesivir and Decadron with improvement in symptoms  Prior to discharge SaO2 went up to 97% on room air  Patient was weaned off of oxygen  Patient was seen by PT OT services and subsequently discharged to [facility] course:    Patient progressed well with PT OT at [facility]    She was noted to have evidence of  volume overload with increased edema of bilateral lower extremities for which diuretic doses were increased   Patient was evaluated by cardiology service within the facility  She was  initially treated with Lasix 80 mg b i d  and Aldactone 50 mg daily  Patient responded well to diuresis with loss of around 17 lb during the rehab stay  Blood pressure readings were noted to be borderline low during the later half of her stay for which diuretic doses were reduced  Lasix was reduced to 20 mg alternating with 40 mg daily and Aldactone to 25 mg daily  Labs were closely monitored  BUN was 26, creatinine 0 98  Electrolytes were normal on 09/21/2022  Patient was noted to have pancytopenia attributed to history of liver cirrhosis  CBC done on 09/7 revealed hemoglobin of 9 9, WBC count of 2 and platelet count of 88  Patient was maintained on rifaximin lactulose nadolol and spironolactone for liver cirrhosis  Mental status remained stable  There were no signs of hepatic encephalopathy  Patient was initially noted to have left upper extremity sling forleft humeral fracture  Sling was subsequently removed  Pain control remained adequate  Patient is being discharged home today  Medically appears to be stable for discharge  She was advised to follow-up PCP in 1 week      Problem List Items Addressed This Visit        Digestive    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 [facility]) - Primary     History of 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  Clinically stable at present  No signs of hepatic encephalopathy  Continue maintenance meds including rifaximin, nadolol, lactulose and spironolactone               Essential hypertension:  Blood pressure readings were noted to be borderline low recently for which spironolactone dose was reduced to 25 mg daily  Lasix was reduced to 40 mg alternating with 20 mg daily  Blood pressure was stable at 1 16/68 today    Continue to monitor    GERD:  Stable on omeprazole    Hyperlipidemia:  Patient remains on simvastatin    Hypothyroidism:  Continue levothyroxine    Diabetes mellitus type 2:  Blood sugar readings have been variable  Fasting sugar wounds 121 this morning  Post supper reading was 132 last night with post lunch reading of 257  Blood sugars are acceptable for the most part  Continue metformin 1000 mg in the morning and 500 mg q p m  Michael Pappas Patient additionally remains on insulin glargine 20 units daily along with insulin lispro 3 units t i d  With meals    Complete heart block:  Status post ppm on 06/29/2022    Closed traumatic displaced fracture of proximal end of left humerus:  History of left humeral fracture post fall in June/22  Left upper extremity was in a sling until recently  Follow-up with orthopedic service  Continue PT OT    Depression:  Stable on escitalopram      Cardiomyopathy:  No signs of volume overload at present  Diuretic dose was recently reduced due to borderline low blood pressure  Patient currently remains on Lasix 40 mg alternating with 20 mg daily and spironolactone 25 mg daily      Pancytopenia due to underlying cirrhosis:    CBC from 09/07 revealed WBC count of 2, platelet count 88 and hemoglobin of 9 9  Continue to monitor periodically    Review of Systems   Constitutional: Negative for chills, fatigue and fever  Respiratory: Negative for cough, chest tightness, shortness of breath, wheezing and stridor  Cardiovascular: Negative for chest pain and leg swelling  Gastrointestinal: Negative for abdominal distention, abdominal pain and vomiting  Genitourinary: Negative for dysuria, flank pain and hematuria  Musculoskeletal: Positive for arthralgias (Left arm pain at times) and gait problem  Negative for back pain  Skin: Negative for pallor  Neurological: Negative for tremors, seizures, syncope and headaches  Psychiatric/Behavioral: Negative for agitation, behavioral problems and confusion          Past Surgical History:   Procedure Laterality Date    ABDOMINAL SURGERY      Abdominal plasty with mesh insertion    CARDIAC ELECTROPHYSIOLOGY PROCEDURE N/A 6/29/2022    Procedure: Cardiac pacer implant;  Surgeon: Alejandra Kamara MD;  Location:  CARDIAC CATH LAB;   Service: Cardiology    CATARACT EXTRACTION, BILATERAL      CATARACT EXTRACTION, BILATERAL      COLONOSCOPY      EGD AND COLONOSCOPY  03/18/2015    IR BIOPSY BONE MARROW  8/24/2021    IR PARACENTESIS  8/9/2022    LAPAROSCOPIC CHOLECYSTECTOMY      SHOULDER SURGERY Right     calcium depsoti    TUBAL LIGATION      UMBILICAL HERNIA REPAIR      with mesh placed    UPPER GASTROINTESTINAL ENDOSCOPY         Past Medical History:   Diagnosis Date    Anemia     Cirrhosis ([facility]     Diabetic neuropathy ([facility]     Esophageal varices (HCC)     Fatty liver     GERD (gastroesophageal reflux disease)     Hepatic encephalopathy (HCC)     Hiatal hernia     Hyperlipidemia     Hypertension     Hypoglycemia     Hypothyroidism     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)     Osteoarthritis     Osteopenia     Pancytopenia (HCC)     Thrombocytopenia (HCC)     Type 2 diabetes mellitus (HCC)          Social History     Tobacco Use   Smoking Status Never Smoker   Smokeless Tobacco Never Used       Social History     Substance and Sexual Activity   Alcohol Use Not Currently       Family History   Problem Relation Age of Onset    Breast cancer Mother     Diabetes type II Father     Thyroid disease unspecified Daughter     Down syndrome Daughter     Diabetes type II Daughter     Diabetes type II Sister     No Known Problems Brother     Prostate cancer Brother     No Known Problems Sister     Stroke Sister     No Known Problems Son     Breast cancer Maternal Aunt     Colon cancer Neg Hx        Allergies   Allergen Reactions    Bee Venom Swelling    Latex     Sesame Seed (Diagnostic) - Food Allergy      Other reaction(s): swelling inside mouth    Strawberry C [Ascorbate - Food Allergy] Other (See Comments) Mouth sores    Penicillins Rash          Current Outpatient Medications:     B-D UF III MINI PEN NEEDLES 31G X 5 MM MISC, USE UP TO 3 TIMES A DAY, Disp: 100 each, Rfl: 6    benzonatate (TESSALON PERLES) 100 mg capsule, Take 1 capsule (100 mg total) by mouth 3 (three) times a day as needed for cough, Disp: 20 capsule, Rfl: 0    Blood Glucose Monitoring Suppl (ONE TOUCH ULTRA 2) w/Device KIT, by Does not apply route once for 1 dose Use Glucometer to test up to 3 times daily  , Disp: 1 each, Rfl: 0    calcium citrate-Vitamin D ([employer] Calcium Citrate+D3 Petites) 200 mg-250 units, Take 1 tablet by mouth daily with breakfast, Disp: , Rfl:     Coenzyme Q10 (Co Q10) 100 MG CAPS, Take by mouth, Disp: , Rfl:     escitalopram (LEXAPRO) 10 mg tablet, Take 1 tablet (10 mg total) by mouth daily, Disp: 30 tablet, Rfl: 0    escitalopram (LEXAPRO) 5 mg tablet, Take 5 mg by mouth daily, Disp: , Rfl:     fluticasone (FLONASE) 50 mcg/act nasal spray, 1 spray into each nostril daily prn, Disp: , Rfl:     furosemide (LASIX) 40 mg tablet, Take 40 mg by mouth in the morning Furosemide 40 mg alternating with 20 mg daily, Disp: , Rfl:     insulin glargine (TOUJEO) 300 units/mL CONCENTRATED U-300 injection pen (1-unit dial), Inject 20 Units under the skin in the morning, Disp: , Rfl:     lactulose 20 g/30 mL, Take 30 mL (20 g total) by mouth 3 (three) times a day, Disp: 2700 mL, Rfl: 0    levothyroxine 125 mcg tablet, Take 1 tablet 6 days a week and half a tablet on Sunday  , Disp: 90 tablet, Rfl: 2    metFORMIN (GLUCOPHAGE) 1000 MG tablet, Take 1,000 mg by mouth daily 1000 mg q a m  500 mg q p m , Disp: , Rfl:     nadolol (CORGARD) 20 mg tablet, Take 0 5 tablets (10 mg total) by mouth daily, Disp: 30 tablet, Rfl: 0    nystatin (MYCOSTATIN) powder, Apply topically 2 (two) times a day, Disp: 15 g, Rfl: 0    omeprazole (PriLOSEC) 20 mg delayed release capsule, TAKE 1 CAPSULE BY MOUTH EVERY DAY, Disp: 90 capsule, Rfl: 3   ONETOUCH DELICA LANCETS FINE MISC, Use 1-3 times a day, Disp: 100 each, Rfl: 3    OneTouch Ultra test strip, TEST UP TO 3 TIMES DAILY  , Disp: 300 strip, Rfl: 3    rifaximin (Xifaxan) 550 mg tablet, Take 1 tablet (550 mg total) by mouth every 12 (twelve) hours, Disp: 60 tablet, Rfl: 5    simvastatin (ZOCOR) 40 mg tablet, Take 40 mg by mouth daily, Disp: , Rfl: 3    spironolactone (ALDACTONE) 50 mg tablet, Take 50 mg by mouth daily, Disp: , Rfl:     Updated medication list was reviewed in [facility] of  the facility  Vitals:    09/28/22 1053   BP: 116/68   Pulse: 70   Resp: 18   Temp: (!) 97 4 °F (36 3 °C)   SpO2: 94%         Physical Exam  Constitutional:       General: She is not in acute distress  HENT:      Head: Normocephalic and atraumatic  Nose: No rhinorrhea  Eyes:      General: No scleral icterus  Right eye: No discharge  Left eye: No discharge  Cardiovascular:      Rate and Rhythm: Normal rate and regular rhythm  Pulmonary:      Breath sounds: No wheezing, rhonchi or rales  Abdominal:      General: There is no distension  Palpations: Abdomen is soft  Tenderness: There is no abdominal tenderness  There is no guarding  Comments: No clinical evidence of ascites   Musculoskeletal:      Cervical back: Neck supple  Right lower leg: Edema (1+) present  Left lower leg: No edema  Skin:     Coloration: Skin is not jaundiced  Neurological:      General: No focal deficit present  Mental Status: Mental status is at baseline  Cranial Nerves: No cranial nerve deficit        Comments: Oriented in month and year  Tremors involving bilateral hands   Psychiatric:         Mood and Affect: Mood normal          Behavior: Behavior normal          Diagnostic Data     Recent labs were reviewed    BMP done on 9/21 revealed BUN of 26, creatinine 0 98, sodium 138, potassium 4 8  CBC on 09/07 revealed hemoglobin of 9 9, WBC count 2 and platelet count of 88    Follow-up Instructions     Follow-up with PCP in 1 week    Total time spent on discharge process was in excess of 40 minutes    Greater than 50 % of time was spent in review of  records, labs, meds and coordination of care with  the nurse     This note was electronically signed by Dr Bea Esqueda

## 2022-09-29 VITALS
HEART RATE: 68 BPM | DIASTOLIC BLOOD PRESSURE: 59 MMHG | OXYGEN SATURATION: 99 % | TEMPERATURE: 98 F | RESPIRATION RATE: 18 BRPM | SYSTOLIC BLOOD PRESSURE: 110 MMHG

## 2022-09-29 PROBLEM — H61.21 IMPACTED CERUMEN OF RIGHT EAR: Status: ACTIVE | Noted: 2022-09-23

## 2022-09-30 NOTE — ASSESSMENT & PLAN NOTE
· Patient with known history   · Clinically stable on exam today   · No evidence of hepatic encephalopathy   · Continue current medication regimen  · Rifaximin 550 mg BID   · Nadolol 10 mg daily   · Lactulose 20 g TID  · Spironolactone 25 mg daily    · Continue to monitor

## 2022-09-30 NOTE — ASSESSMENT & PLAN NOTE
· Blood pressure log reviewed and blood pressures have been stable (9/18 to present)  · Trending between 99/54 and 126/76  · Continue current medication regimen   · Nadolol 10 mg daily   · Lasix 40 mg every other day   · Lasix 20 mg every other day   · Spironolactone 25 mg daily   · Avoid hypotension   · Encourage low salt diet   · Continue to monitor

## 2022-09-30 NOTE — ASSESSMENT & PLAN NOTE
· Status post fall on 6/26  · Left upper extremity sling cleared a few weeks ago  · Patient denying pain on exam today    · Continue current medication regimen   · Tylenol 650 mg Q 6 hours PRN   · Follow-up with orthopedics

## 2022-09-30 NOTE — ASSESSMENT & PLAN NOTE
· Patient notes ear fullness and decreased hearing in right ear  · Patient noted to have cerumen impaction on exam   · Will order Debrox drops (5 drops BID) x 4 days   · Re-evaluate and flush ears next week

## 2022-09-30 NOTE — ASSESSMENT & PLAN NOTE
· Mood appears stable on exam today   · Continue current medication regimen   · Lexparo 5 mg daily   · Continue supportive care

## 2022-09-30 NOTE — ASSESSMENT & PLAN NOTE
Lab Results   Component Value Date    HGBA1C 8 2 (H) 05/31/2022     · Blood sugar log reviewed and blood sugars have been stable (9/18 to present)  · Trending between 125 and 272  · Continue current medication regimen   · Metformin 1000 mg daily in the morning and 500 mg in the evening   · Humalog 3 units with meals   · Humalog SSI with meals  · Lantus 20 units daily at bedtime  · Avoid hypoglycemia   · Encourage diabetic diet   · Continue to monitor

## 2022-09-30 NOTE — ASSESSMENT & PLAN NOTE
· Recent COVID diagnosis on 8/11  · Noted to have mild symptoms including cough and sore throat   · Patient was treated with Decadron and Remdesivir   · Oxygen saturation has remained stable  · Patient remains on room air in no acute distress   · Continue to monitor

## 2022-09-30 NOTE — ASSESSMENT & PLAN NOTE
· Secondary to portal hypertension and liver cirhosis   · Required PRBC and iron transfusions on most recent hospitalization   · Most recent CBC on 9/7 revealed a hemoglobin of 9 9  · Continue to monitor lab work periodically

## 2022-10-07 ENCOUNTER — HOME HEALTH ADMISSION (OUTPATIENT)
Dept: HOME HEALTH SERVICES | Facility: HOME HEALTHCARE | Age: 77
End: 2022-10-07

## 2022-10-09 ENCOUNTER — HOME CARE VISIT (OUTPATIENT)
Dept: HOME HEALTH SERVICES | Facility: HOME HEALTHCARE | Age: 77
End: 2022-10-09

## 2022-10-11 ENCOUNTER — HOME CARE VISIT (OUTPATIENT)
Dept: HOME HEALTH SERVICES | Facility: HOME HEALTHCARE | Age: 77
End: 2022-10-11

## 2022-10-11 PROBLEM — Z00.00 HEALTHCARE MAINTENANCE: Status: RESOLVED | Noted: 2022-07-21 | Resolved: 2022-10-11

## 2022-10-12 ENCOUNTER — TELEPHONE (OUTPATIENT)
Dept: NEUROLOGY | Facility: CLINIC | Age: 77
End: 2022-10-12

## 2022-10-12 NOTE — TELEPHONE ENCOUNTER
----- Message from Amarilys Franco MD sent at 9/13/2022  7:26 AM EDT -----  Regarding: Refill requests  Received message from pharmacy to refill Nadolol 10 mg Oral Daily  Please call patient and inform patient that refills requests must come thru PCP now based on patient's full up assessments

## 2022-10-18 NOTE — PROGRESS NOTES
10/18/22 1554   Hello, [Guardian’s Name / Patient’s Kacey Sayer, this is [Caller Kacey Sayer from [facility], and our clinical care team wanted to check on you / your child after your recent visit to the hospital  It will only take 3-5 minutes  Is this a good time? Discharge Call Type/ Specific Diagnosis: ARC 90 Day Call   ARC 90 Day Discharge Call   Assessment Source 4   Call Complete for 90 Days call back?   Left Message

## 2022-10-20 DIAGNOSIS — F32.A DEPRESSION, UNSPECIFIED DEPRESSION TYPE: Chronic | ICD-10-CM

## 2022-10-20 DIAGNOSIS — K21.9 GASTROESOPHAGEAL REFLUX DISEASE WITHOUT ESOPHAGITIS: ICD-10-CM

## 2022-10-20 DIAGNOSIS — M85.80 OSTEOPENIA, UNSPECIFIED LOCATION: ICD-10-CM

## 2022-10-23 RX ORDER — ACETAMINOPHEN 500 MG
TABLET ORAL
Qty: 90 TABLET | Refills: 1 | OUTPATIENT
Start: 2022-10-23

## 2022-10-23 RX ORDER — OMEPRAZOLE 20 MG/1
CAPSULE, DELAYED RELEASE ORAL
Qty: 90 CAPSULE | Refills: 1 | OUTPATIENT
Start: 2022-10-23

## 2022-10-23 RX ORDER — ESCITALOPRAM OXALATE 10 MG/1
TABLET ORAL
Qty: 90 TABLET | Refills: 1 | OUTPATIENT
Start: 2022-10-23

## 2022-10-24 NOTE — TELEPHONE ENCOUNTER
Spoke with Marty Boxer of [address] to advise patient no longer under this provider's care  Please contact PCP

## 2022-11-01 DIAGNOSIS — E11.65 TYPE 2 DIABETES MELLITUS WITH HYPERGLYCEMIA, WITH LONG-TERM CURRENT USE OF INSULIN (HCC): Primary | ICD-10-CM

## 2022-11-01 DIAGNOSIS — Z79.4 TYPE 2 DIABETES MELLITUS WITH HYPERGLYCEMIA, WITH LONG-TERM CURRENT USE OF INSULIN (HCC): Primary | ICD-10-CM

## 2022-11-01 DIAGNOSIS — E11.65 TYPE 2 DIABETES MELLITUS WITH HYPERGLYCEMIA, WITH LONG-TERM CURRENT USE OF INSULIN (HCC): ICD-10-CM

## 2022-11-01 DIAGNOSIS — Z79.4 TYPE 2 DIABETES MELLITUS WITH HYPERGLYCEMIA, WITH LONG-TERM CURRENT USE OF INSULIN (HCC): ICD-10-CM

## 2022-11-01 RX ORDER — INSULIN LISPRO 100 [IU]/ML
INJECTION, SOLUTION INTRAVENOUS; SUBCUTANEOUS
Qty: 15 ML | Refills: 6 | Status: SHIPPED | OUTPATIENT
Start: 2022-11-01 | End: 2022-11-01 | Stop reason: SDUPTHER

## 2022-11-01 RX ORDER — INSULIN LISPRO 100 [IU]/ML
INJECTION, SOLUTION INTRAVENOUS; SUBCUTANEOUS
Qty: 15 ML | Refills: 6 | Status: SHIPPED | OUTPATIENT
Start: 2022-11-01

## 2022-11-01 RX ORDER — INSULIN GLARGINE 100 [IU]/ML
INJECTION, SOLUTION SUBCUTANEOUS
Qty: 30 ML | Refills: 2 | Status: SHIPPED | OUTPATIENT
Start: 2022-11-01

## 2022-11-01 NOTE — TELEPHONE ENCOUNTER
The patient's daughter in law called and stated that the patient is now living with her and she is going to need refills on her Basglar and Lispro  She was just released from the Nursing Home to her care  The daughter in law stated that the patient is on a sliding scaled with the lispro since coming home but could not remember the max amount  Please review

## 2022-11-03 ENCOUNTER — TELEPHONE (OUTPATIENT)
Dept: ENDOCRINOLOGY | Facility: HOSPITAL | Age: 77
End: 2022-11-03

## 2022-11-03 DIAGNOSIS — Z79.4 TYPE 2 DIABETES MELLITUS WITH HYPERGLYCEMIA, WITH LONG-TERM CURRENT USE OF INSULIN (HCC): Primary | ICD-10-CM

## 2022-11-03 DIAGNOSIS — E11.65 TYPE 2 DIABETES MELLITUS WITH HYPERGLYCEMIA, WITH LONG-TERM CURRENT USE OF INSULIN (HCC): Primary | ICD-10-CM

## 2022-11-03 RX ORDER — INSULIN ASPART 100 [IU]/ML
INJECTION, SOLUTION INTRAVENOUS; SUBCUTANEOUS
Qty: 15 ML | Refills: 4 | Status: SHIPPED | OUTPATIENT
Start: 2022-11-03

## 2022-11-03 NOTE — TELEPHONE ENCOUNTER
Received fax from pharmacy, insulin lispor or Humalog are not covered  They are requesting Novolog, okay to order?

## 2022-11-28 PROBLEM — H61.21 IMPACTED CERUMEN OF RIGHT EAR: Status: RESOLVED | Noted: 2022-09-23 | Resolved: 2022-11-28

## 2023-02-09 ENCOUNTER — TELEPHONE (OUTPATIENT)
Dept: GASTROENTEROLOGY | Facility: CLINIC | Age: 78
End: 2023-02-09

## 2023-02-09 NOTE — TELEPHONE ENCOUNTER
Left patient a voice mail to reschedule her virtual appt that she missed this afternoon with us  We tried to call 2 days before appt and day of appt and 15 minutes before appt to try and touch base

## 2023-03-14 DIAGNOSIS — K75.81 LIVER CIRRHOSIS SECONDARY TO NASH (HCC): ICD-10-CM

## 2023-03-14 DIAGNOSIS — M85.80 OSTEOPENIA, UNSPECIFIED LOCATION: ICD-10-CM

## 2023-03-14 DIAGNOSIS — K74.60 LIVER CIRRHOSIS SECONDARY TO NASH (HCC): ICD-10-CM

## 2023-05-08 NOTE — TELEPHONE ENCOUNTER
Approval received for Xifaxin 550 mg BID  x 6 months 9/23/2020-3/23/2021  I spoke with patient and called the pharmacy [facility]keon) [facility] to ensure the claim processed correctly  $0 co-pay  They will have to order, she can  around 4 pm tomorrow  Stable, no symptoms

## 2023-05-09 NOTE — PLAN OF CARE
Problem: Potential for Falls  Goal: Patient will remain free of falls  Description: INTERVENTIONS:  - Educate patient/family on patient safety including physical limitations  - Instruct patient to call for assistance with activity   - Consult OT/PT to assist with strengthening/mobility   - Keep Call bell within reach  - Keep bed low and locked with side rails adjusted as appropriate  - Keep care items and personal belongings within reach  - Initiate and maintain comfort rounds  - Make Fall Risk Sign visible to staff  - Apply yellow socks and bracelet for high fall risk patients  - Consider moving patient to room near nurses station  Outcome: Progressing     Problem: MOBILITY - ADULT  Goal: Maintain or return to baseline ADL function  Description: INTERVENTIONS:  -  Assess patient's ability to carry out ADLs; assess patient's baseline for ADL function and identify physical deficits which impact ability to perform ADLs (bathing, care of mouth/teeth, toileting, grooming, dressing, etc )  - Assess/evaluate cause of self-care deficits   - Assess range of motion  - Assess patient's mobility; develop plan if impaired  - Assess patient's need for assistive devices and provide as appropriate  - Encourage maximum independence but intervene and supervise when necessary  - Involve family in performance of ADLs  - Assess for home care needs following discharge   - Consider OT consult to assist with ADL evaluation and planning for discharge  - Provide patient education as appropriate  Outcome: Progressing  Goal: Maintains/Returns to pre admission functional level  Description: INTERVENTIONS:  - Perform BMAT or MOVE assessment daily    - Set and communicate daily mobility goal to care team and patient/family/caregiver     - Collaborate with rehabilitation services on mobility goals if consulted  - Out of bed for toileting  - Record patient progress and toleration of activity level   Outcome: Progressing     Problem: Prexisting or High Potential for Compromised Skin Integrity  Goal: Skin integrity is maintained or improved  Description: INTERVENTIONS:  - Identify patients at risk for skin breakdown  - Assess and monitor skin integrity  - Assess and monitor nutrition and hydration status  - Monitor labs   - Assess for incontinence   - Turn and reposition patient  - Assist with mobility/ambulation  - Relieve pressure over bony prominences  - Avoid friction and shearing  - Provide appropriate hygiene as needed including keeping skin clean and dry  - Evaluate need for skin moisturizer/barrier cream  - Collaborate with interdisciplinary team   - Patient/family teaching  - Consider wound care consult   Outcome: Progressing     Problem: SAFETY ADULT  Goal: Patient will remain free of falls  Description: INTERVENTIONS:  - Educate patient/family on patient safety including physical limitations  - Instruct patient to call for assistance with activity   - Consult OT/PT to assist with strengthening/mobility   - Keep Call bell within reach  - Keep bed low and locked with side rails adjusted as appropriate  - Keep care items and personal belongings within reach  - Initiate and maintain comfort rounds  - Make Fall Risk Sign visible to staff  - Apply yellow socks and bracelet for high fall risk patients  - Consider moving patient to room near nurses station  Outcome: Progressing  Goal: Maintain or return to baseline ADL function  Description: INTERVENTIONS:  -  Assess patient's ability to carry out ADLs; assess patient's baseline for ADL function and identify physical deficits which impact ability to perform ADLs (bathing, care of mouth/teeth, toileting, grooming, dressing, etc )  - Assess/evaluate cause of self-care deficits   - Assess range of motion  - Assess patient's mobility; develop plan if impaired  - Assess patient's need for assistive devices and provide as appropriate  - Encourage maximum independence but intervene and supervise when necessary  - Involve family in performance of ADLs  - Assess for home care needs following discharge   - Consider OT consult to assist with ADL evaluation and planning for discharge  - Provide patient education as appropriate  Outcome: Progressing  Goal: Maintains/Returns to pre admission functional level  Description: INTERVENTIONS:  - Perform BMAT or MOVE assessment daily    - Set and communicate daily mobility goal to care team and patient/family/caregiver     - Collaborate with rehabilitation services on mobility goals if consulted    - Record patient progress and toleration of activity level   Outcome: Progressing Topical Sulfur Applications Counseling: Topical Sulfur Counseling: Patient counseled that this medication may cause skin irritation or allergic reactions.  In the event of skin irritation, the patient was advised to reduce the amount of the drug applied or use it less frequently.   The patient verbalized understanding of the proper use and possible adverse effects of topical sulfur application.  All of the patient's questions and concerns were addressed.

## 2023-06-12 RX ORDER — FUROSEMIDE 40 MG/1
40 TABLET ORAL EVERY OTHER DAY
Qty: 30 TABLET | Refills: 0 | OUTPATIENT
Start: 2023-06-12

## 2024-08-01 NOTE — PLAN OF CARE
Ariana Hooker 1970 female MRN: [id number]    [facility] OFFICE VISIT  [facility] - [facility]      ASSESSMENT/PLAN  Ariana Hooker is a 53 y.o. female presents to the office for    Diagnoses and all orders for this visit:    Poison ivy  -     methylPREDNISolone 4 MG tablet therapy pack; Use as directed on package  -     triamcinolone (KENALOG) 0.1 % cream; Apply topically 2 (two) times a day       Recommend starting Medrol pack, if no improvement recommend repeat round but slow taper ( appointment needed)  Triamcinolone 0.1% as needed for local rash  Prevention of poison ivy contact dermatitis:   If poison ivy cannot be avoided, consider the following(3)  wear gloves - should be vinyl, waterproof, and heavy-duty (may be more comfortable if worn on top of cotton gloves)  long-sleeved shirts and pants recommended, especially during outdoor activities with high risk of contact with poison ivy  wash with soap and water as soon as possible after exposure (ideally within 10 minutes) to prevent adherence of urushiol to skin  wash other items that have come in contact with plant as urushiol may persist on clothing, tools, sports equipment, and fur of pets and contribute to spread of symptoms      If no improvement or worsening symptoms, contact our office                   Future Appointments   Date Time Provider Department Center   9/6/2024  9:30 AM Bridgette Billingsley MD [facility]          SUBJECTIVE  CC: Poison Ivy (Spreading all over body)      HPI:  Ariana Hooker is a 53 y.o. female who presents for an acute appointment.Patient started with poison ivy states that its spreading and was concerned. History of needing tx for rash.         Review of Systems   Constitutional:  Negative for activity change, appetite change, chills, fatigue and fever.   HENT:  Negative for congestion.    Respiratory:  Negative for cough, chest tightness and shortness of breath.   Problem: PAIN - ADULT  Goal: Verbalizes/displays adequate comfort level or baseline comfort level  Description: Interventions:  - Encourage patient to monitor pain and request assistance  - Assess pain using appropriate pain scale  - Administer analgesics based on type and severity of pain and evaluate response  - Implement non-pharmacological measures as appropriate and evaluate response  - Consider cultural and social influences on pain and pain management  - Notify physician/advanced practitioner if interventions unsuccessful or patient reports new pain  Outcome: Progressing     Problem: INFECTION - ADULT  Goal: Absence or prevention of progression during hospitalization  Description: INTERVENTIONS:  - Assess and monitor for signs and symptoms of infection  - Monitor lab/diagnostic results  - Monitor all insertion sites, i e  indwelling lines, tubes, and drains  - Monitor endotracheal if appropriate and nasal secretions for changes in amount and color  - [place] appropriate cooling/warming therapies per order  - Administer medications as ordered  - Instruct and encourage patient and family to use good hand hygiene technique  - Identify and instruct in appropriate isolation precautions for identified infection/condition  Outcome: Progressing  Goal: Absence of fever/infection during neutropenic period  Description: INTERVENTIONS:  - Monitor WBC    Outcome: Progressing     Problem: SAFETY ADULT  Goal: Patient will remain free of falls  Description: INTERVENTIONS:  - Educate patient/family on patient safety including physical limitations  - Instruct patient to call for assistance with activity   - Consult OT/PT to assist with strengthening/mobility   - Keep Call bell within reach  - Keep bed low and locked with side rails adjusted as appropriate  - Keep care items and personal belongings within reach  - Initiate and maintain comfort rounds  - Make Fall Risk Sign visible to staff  - Offer Toileting every 2-4 "  Cardiovascular:  Negative for chest pain and leg swelling.   Gastrointestinal:  Negative for abdominal distention, abdominal pain, constipation, diarrhea, nausea and vomiting.   Skin:  Positive for rash.   All other systems reviewed and are negative.      Historical Information   The patient history was reviewed as follows:  Past Medical History:   Diagnosis Date   • Allergic    • Anxiety    • Chronic hip pain, left    • Ectopic pregnancy    • GERD (gastroesophageal reflux disease)    • Visual impairment          Medications:     Current Outpatient Medications:   •  dicyclomine (BENTYL) 10 mg capsule, TAKE 1 CAPSULE BY MOUTH THREE TIMES A DAY AS NEEDED FOR ABDOMINAL CRAMPING, Disp: 40 capsule, Rfl: 5  •  gabapentin (NEURONTIN) 300 mg capsule, Take 1 capsule (300 mg total) by mouth 3 (three) times a day, Disp: 90 capsule, Rfl: 2  •  meloxicam (MOBIC) 7.5 mg tablet, Take 7.5 mg by mouth daily, Disp: , Rfl:   •  methylPREDNISolone 4 MG tablet therapy pack, Use as directed on package, Disp: 21 each, Rfl: 0  •  omeprazole (PriLOSEC) 40 MG capsule, take 1 capsule by mouth once daily, Disp: 90 capsule, Rfl: 1  •  ondansetron (ZOFRAN) 4 mg tablet, Take 1 tablet (4 mg total) by mouth every 8 (eight) hours as needed for nausea or vomiting, Disp: 20 tablet, Rfl: 0  •  triamcinolone (KENALOG) 0.1 % cream, Apply topically 2 (two) times a day, Disp: 80 g, Rfl: 0  •  miSOPROStol (Cytotec) 200 mcg tablet, CRUSH TABLET AND PLACE IN VAGINA (NOT MOUTH) THE NIGHT BEFORE PROCEDURE (Patient not taking: Reported on 6/20/2024), Disp: 1 tablet, Rfl: 0    Allergies   Allergen Reactions   • Peach [Prunus Persica] Other (See Comments)     Closure of throat with peaches, apricots,plums , nectarine ,and almonds. Per pt \"also hives\"       OBJECTIVE  Vitals:   Vitals:    08/01/24 1419   BP: 135/79   BP Location: Right arm   Patient Position: Sitting   Cuff Size: Standard   Pulse: 94   Resp: 18   Temp: 97.8 °F (36.6 °C)   TempSrc: Temporal " Hours, in advance of need  - Initiate/Maintain bed/chair alarm  - Obtain necessary fall risk management equipment: yellow socks  - Apply yellow socks and bracelet for high fall risk patients  - Consider moving patient to room near nurses station  Outcome: Progressing  Goal: Maintain or return to baseline ADL function  Description: INTERVENTIONS:  -  Assess patient's ability to carry out ADLs; assess patient's baseline for ADL function and identify physical deficits which impact ability to perform ADLs (bathing, care of mouth/teeth, toileting, grooming, dressing, etc )  - Assess/evaluate cause of self-care deficits   - Assess range of motion  - Assess patient's mobility; develop plan if impaired  - Assess patient's need for assistive devices and provide as appropriate  - Encourage maximum independence but intervene and supervise when necessary  - Involve family in performance of ADLs  - Assess for home care needs following discharge   - Consider OT consult to assist with ADL evaluation and planning for discharge  - Provide patient education as appropriate  Outcome: Progressing     Problem: DISCHARGE PLANNING  Goal: Discharge to home or other facility with appropriate resources  Description: INTERVENTIONS:  - Identify barriers to discharge w/patient and caregiver  - Arrange for needed discharge resources and transportation as appropriate  - Identify discharge learning needs (meds, wound care, etc )  - Arrange for interpretive services to assist at discharge as needed  - Refer to Case Management Department for coordinating discharge planning if the patient needs post-hospital services based on physician/advanced practitioner order or complex needs related to functional status, cognitive ability, or social support system  Outcome: Progressing     Problem: Prexisting or High Potential for Compromised Skin Integrity  Goal: Skin integrity is maintained or improved  Description: INTERVENTIONS:  - Identify patients at risk "  SpO2: 100%   Weight: 59.2 kg (130 lb 8 oz)   Height: 5' 2\" (1.575 m)         Physical Exam  Constitutional:       Appearance: Normal appearance.   Pulmonary:      Effort: Pulmonary effort is normal.   Musculoskeletal:         General: Normal range of motion.   Skin:     Findings: Rash present.      Comments: Liner rash over arms.   Neurological:      General: No focal deficit present.      Mental Status: She is alert and oriented to person, place, and time.   Psychiatric:         Mood and Affect: Mood normal.         Behavior: Behavior normal.         Thought Content: Thought content normal.         Judgment: Judgment normal.                    Tejal Mota MD,   [facility]  8/4/2024      " for skin breakdown  - Assess and monitor skin integrity  - Assess and monitor nutrition and hydration status  - Monitor labs   - Assess for incontinence   - Turn and reposition patient  - Assist with mobility/ambulation  - Relieve pressure over bony prominences  - Avoid friction and shearing  - Provide appropriate hygiene as needed including keeping skin clean and dry  - Evaluate need for skin moisturizer/barrier cream  - Collaborate with interdisciplinary team   - Patient/family teaching  - Consider wound care consult   Outcome: Progressing     Problem: MOBILITY - ADULT  Goal: Maintain or return to baseline ADL function  Description: INTERVENTIONS:  -  Assess patient's ability to carry out ADLs; assess patient's baseline for ADL function and identify physical deficits which impact ability to perform ADLs (bathing, care of mouth/teeth, toileting, grooming, dressing, etc )  - Assess/evaluate cause of self-care deficits   - Assess range of motion  - Assess patient's mobility; develop plan if impaired  - Assess patient's need for assistive devices and provide as appropriate  - Encourage maximum independence but intervene and supervise when necessary  - Involve family in performance of ADLs  - Assess for home care needs following discharge   - Consider OT consult to assist with ADL evaluation and planning for discharge  - Provide patient education as appropriate  Outcome: Progressing  Goal: Maintains/Returns to pre admission functional level  Description: INTERVENTIONS:  - Perform BMAT or MOVE assessment daily    - Set and communicate daily mobility goal to care team and patient/family/caregiver  - Collaborate with rehabilitation services on mobility goals if consulted  - Perform Range of Motion 3   times a day  - Reposition patient every **2* hours    - Dangle patient 3 times a day  - Stand patient 3 times a day  - Ambulate patient 3 times a day  - Out of bed to chair 3 times a day   - Out of bed for meals 3 times a day  - Out of bed for toileting  - Record patient progress and toleration of activity level   Outcome: Progressing     Problem: Potential for Falls  Goal: Patient will remain free of falls  Description: INTERVENTIONS:  - Educate patient/family on patient safety including physical limitations  - Instruct patient to call for assistance with activity   - Consult OT/PT to assist with strengthening/mobility   - Keep Call bell within reach  - Keep bed low and locked with side rails adjusted as appropriate  - Keep care items and personal belongings within reach  - Initiate and maintain comfort rounds  - Make Fall Risk Sign visible to staff  - Offer Toileting every 2 Hours, in advance of need    - Apply yellow socks and bracelet for high fall risk patients  - Consider moving patient to room near nurses station  Outcome: Progressing

## 2025-03-23 NOTE — CONSULTS
Consultation - NAYELI   Caionayelisteph Hogan 77 y o  female MRN: [id number]  Unit/Bed#: -01 Encounter: [id number]      Assessment/Plan     1  Hepatic encephalopathy  Patient was brought to emergency room 8/4 due to increased confusion  In speaking with patient's daughter, she was out of town and patient was being care for by an aunt  She does not believe patient received lactulose appropriately  She states patient frequently refuses because of increased bowel movements  On exam, patient is alert to self  She requested we call her daughter Jos Pretty )) if we have any questions  Ammonia level on admission was 60, in review of EMR patient's ammonia level does vary significantly over the past 2 years  - continue lactulose 10 g twice daily  Adjust for 3-4 bowel movements per day  Patient is currently NPO except meds  If patient cannot tolerate or becomes aspiration risk will change to lactulose enemas  2  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)  Patient with history of PAUL  Last seen in the office 4/18  Lab work 5/31, AFP normal, INR 1 23  Last abdominal ultrasound 3/11, hepatic cirrhosis and mild steatosis, splenomegaly, cholecystectomy  Liver enzymes normal   On exam, patient denies any abdominal pain  CT of the abdomen pelvis 06/29/2022; Cirrhosis of the liver with stigmata of portal hypertension and splenomegaly  Mild diffuse thickening of the 2nd and 3rd portions of the duodenum, which may be due to duodenitis  Mild diffuse thickening of the ascending colon may be due to portal hypertensive colopathy  Patient's last colonoscopy 07/29/2020; mild sigmoid diverticulosis and scattered diverticuli in the ascending colon, rectal varices, no source of GI blood loss  Repeat colonoscopy not recommended due to age  Consider capsule endoscopy  Patient's nurse did report blood in stool with 2nd bowel movement today    Assess for colonoscopy inpatient versus outpatient  Discuss patient and CT with Dr LIANG [facility] on rounds  3  Esophageal varices  Patient did have EGD 02/23/2022  Banding of 2 esophageal varices at this time  Patient was recommended for follow-up EGD in 2 months  According to patient's daughter she does not believe there was a follow-up and EMR does not reveal follow-up  Patient did have episode of vomiting approximately 100 mL this morning  No blood noted  Discussed patient with Dr LIANG [facility] and she requested patient be NPO with exception of meds until she evaluates patient     - Dr LIANG [facility] to evaluate patient for possible EGD  4  Anemia  Patient's hemoglobin on admission was 7 1  With a m  Labs today 7 1 as well  Platelets were 79 on admission and also 79 with a m  Labs today  In speaking with patient's daughter, patient has not had any overt GI bleeding at home  Denies melena  She does state that up until March patient was receiving iron infusions through Hematology  Iron panel 7/27; Fe 28, Fe % 8, TIBC 369, Ferritin 25  In patient's current setting, consider anemia from blood loss versus iron deficiency, chronic disease     - monitor hemoglobin, transfuse hemoglobin less than 7  - iron panel pending      5  Type 2 diabetes  Insulin-dependent, managed by Hospitalist    6  Closed traumatic displaced fracture of proximal and of left humerus  Immobilizer applied to left arm, left humerus fracture from fall at home 6/26  Consults    Physician Requesting Consult: Matthieu Barnard MD  Reason for Consult / Principal Problem:  Hepatic encephalopathy    HPI: Eston Sandifer is a 77y o  year old female with past medical history of cardiomyopathy, recent pacemaker due to complete heart block, 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 with esophageal varices, type 2 diabetes, pancytopenia, depression, hypothyroidism in recent fracture of left humerus presents from home due to increased confusion    In speaking with patient's daughter Alberto Goodrich she does state that upon her return from being away a she noticed that her mom had increased confusion however was not sure if she was getting her lactulose from the aunt that was covering  On exam, patient is alert to self  She denies any pain  Per patient's nurse she did vomit approximately 100 mL of orange colored fluids  Patient had denied nausea  She is out of bed to the chair and denies abdominal pain as well  Patient's nurse does state this patient had a large bowel movement this morning which was dark brown in color  No blood noted  Specimen was sent for occult heme testing  On admission patient's hemoglobin was 7 1 and remain so with a m  Labs  Her platelet count was 79 as well as this morning  Patient did have EGD 02/23/2022 and banding x2 esophageal varices completed and repeat EGD for 2 months was requested  According to patient's daughter she does not believe she has had that follow-up visit  Patient is currently in a shoulder immobilizer on the left arm due to fracture humerus from a fall at home  Patient's nurse did just mentioned that patient had 2nd bowel movement and there was blood present in the stool  Review of Systems: Negative for 14 point exam except for HPI      Historical Information   Past Medical History:   Diagnosis Date    Anemia     Cirrhosis ([facility] [address] )     Diabetic neuropathy ([facility] [address] [facility]     Esophageal varices (HCC)     Fatty liver     GERD (gastroesophageal reflux disease)     Hepatic encephalopathy (HCC)     Hiatal hernia     Hyperlipidemia     Hypertension     Hypoglycemia     Hypothyroidism     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)     Osteoarthritis     Osteopenia     Pancytopenia (HCC)     Thrombocytopenia (HCC)     Type 2 diabetes mellitus (HCC)      Past Surgical History:   Procedure Laterality Date    ABDOMINAL SURGERY      Abdominal plasty with mesh insertion    CARDIAC ELECTROPHYSIOLOGY PROCEDURE N/A 6/29/2022    Procedure: Cardiac pacer implant; Surgeon: Arik Dumas MD;  Location:  CARDIAC CATH LAB;   Service: Cardiology    CATARACT EXTRACTION, BILATERAL      CATARACT EXTRACTION, BILATERAL      COLONOSCOPY      EGD AND COLONOSCOPY  03/18/2015    IR BIOPSY BONE MARROW  8/24/2021    LAPAROSCOPIC CHOLECYSTECTOMY      SHOULDER SURGERY Right     calcium depsoti    TUBAL LIGATION      UMBILICAL HERNIA REPAIR      with mesh placed    UPPER GASTROINTESTINAL ENDOSCOPY       Social History   Social History     Substance and Sexual Activity   Alcohol Use Not Currently     Social History     Substance and Sexual Activity   Drug Use No     Social History     Tobacco Use   Smoking Status Never Smoker   Smokeless Tobacco Never Used     Family History   Problem Relation Age of Onset    Breast cancer Mother     Diabetes type II Father     Thyroid disease unspecified Daughter     Down syndrome Daughter     Diabetes type II Daughter     Diabetes type II Sister     No Known Problems Brother     Prostate cancer Brother     No Known Problems Sister     Stroke Sister     No Known Problems Son     Breast cancer Maternal Aunt     Colon cancer Neg Hx        Meds/Allergies   Current Facility-Administered Medications   Medication Dose Route Frequency    acetaminophen (TYLENOL) tablet 650 mg  650 mg Oral Q6H PRN    escitalopram (LEXAPRO) tablet 5 mg  5 mg Oral Daily    insulin glargine (LANTUS) subcutaneous injection 10 Units 0 1 mL  10 Units Subcutaneous HS    insulin lispro (HumaLOG) 100 units/mL subcutaneous injection 1-6 Units  1-6 Units Subcutaneous TID With Meals    iron sucrose (VENOFER) 200 mg in sodium chloride 0 9 % 100 mL IVPB  200 mg Intravenous Daily    lactulose oral solution 10 g  10 g Oral BID    levothyroxine tablet 125 mcg  125 mcg Oral Once per day on Mon Tue Wed Thu Fri Sat    [START ON 8/7/2022] levothyroxine tablet 62 5 mcg  62 5 mcg Oral Once per day on Sun    melatonin tablet 6 mg  6 mg Oral HS PRN    nadolol (CORGARD) tablet 10 mg  10 mg Oral Daily    ondansetron (ZOFRAN) injection 4 mg  4 mg Intravenous Q6H PRN    pantoprazole (PROTONIX) injection 40 mg  40 mg Intravenous Q12H [address]    pravastatin (PRAVACHOL) tablet 80 mg  80 mg Oral Daily With Dinner    rifaximin (XIFAXAN) tablet 550 mg  550 mg Oral Q12H [address]    spironolactone (ALDACTONE) tablet 25 mg  25 mg Oral Daily     Medications Prior to Admission   Medication    aspirin 81 mg chewable tablet    Basaglar KwikPen 100 units/mL SOPN    Blood Glucose Monitoring Suppl (ONE TOUCH ULTRA 2) w/Device KIT    calcium citrate-Vitamin D ([employer] Calcium Citrate+D3 Petites) 200 mg-250 units    Coenzyme Q10 (Co Q10) 100 MG CAPS    escitalopram (LEXAPRO) 5 mg tablet    furosemide (LASIX) 40 mg tablet    lactulose (CHRONULAC) 10 g/15 mL solution    levothyroxine 125 mcg tablet    metFORMIN (GLUCOPHAGE) 500 mg tablet    nadolol (CORGARD) 20 mg tablet    omeprazole (PriLOSEC) 20 mg delayed release capsule    rifaximin (Xifaxan) 550 mg tablet    simvastatin (ZOCOR) 40 mg tablet    spironolactone (ALDACTONE) 25 mg tablet    B-D UF III MINI PEN NEEDLES 31G X 5 MM MISC    fluticasone (FLONASE) 50 mcg/act nasal spray    nystatin (MYCOSTATIN) powder    ONETOUCH DELICA LANCETS FINE MISC    OneTouch Ultra test strip       Allergies   Allergen Reactions    Bee Venom Swelling    Latex     Sesame Seed (Diagnostic) - Food Allergy      Other reaction(s): swelling inside mouth    Strawberry C [Ascorbate - Food Allergy] Other (See Comments)     Mouth sores    Penicillins Rash           Physical Exam   Constitutional: Is oriented to person  Appears well-developed and well-nourished  HENT: WNL  Head: Normocephalic and atraumatic  Eyes: Pupils are equal, round, and reactive to light  Neck: Normal range of motion  Neck supple  Cardiovascular: Normal rate and regular rhythm  Pulmonary/Chest: Effort normal and breath sounds normal    Abdominal:  Large, Soft  Nontender    Bowel sounds are normal    Musculoskeletal: Normal range of motion  left arm immobilized  Extremities:  Bilateral lower extremity +1 pitting edema  Patient does have immobilizer sling applied to left arm due to fracture humerus  Neurological: Is alert and oriented to person, place, and time  Skin: Skin is warm and dry  Psychiatric: Has a normal mood and affect         Lab Results:   Admission on 08/04/2022   Component Date Value    POC Glucose 08/04/2022 322 (A)    WBC 08/04/2022 3 47 (A)    RBC 08/04/2022 2 57 (A)    Hemoglobin 08/04/2022 7 1 (A)    Hematocrit 08/04/2022 23 5 (A)    MCV 08/04/2022 91     MCH 08/04/2022 27 6     MCHC 08/04/2022 30 2 (A)    RDW 08/04/2022 18 4 (A)    MPV 08/04/2022 12 5     Platelets 51/52/6550 79 (A)    nRBC 08/04/2022 0     Neutrophils Relative 08/04/2022 69     Immat GRANS % 08/04/2022 0     Lymphocytes Relative 08/04/2022 18     Monocytes Relative 08/04/2022 11     Eosinophils Relative 08/04/2022 1     Basophils Relative 08/04/2022 1     Neutrophils Absolute 08/04/2022 2 37     Immature Grans Absolute 08/04/2022 0 01     Lymphocytes Absolute 08/04/2022 0 62     Monocytes Absolute 08/04/2022 0 39     Eosinophils Absolute 08/04/2022 0 04     Basophils Absolute 08/04/2022 0 04     Sodium 08/04/2022 140     Potassium 08/04/2022 4 2     Chloride 08/04/2022 105     CO2 08/04/2022 28     ANION GAP 08/04/2022 7     BUN 08/04/2022 35 (A)    Creatinine 08/04/2022 1 19     Glucose 08/04/2022 283 (A)    Calcium 08/04/2022 8 7     Corrected Calcium 08/04/2022 10 0     AST 08/04/2022 30     ALT 08/04/2022 22     Alkaline Phosphatase 08/04/2022 92     Total Protein 08/04/2022 5 9 (A)    Albumin 08/04/2022 2 4 (A)    Total Bilirubin 08/04/2022 0 70     eGFR 08/04/2022 44     Phosphorus 08/04/2022 3 4     Magnesium 08/04/2022 1 5 (A)    BETA-HYDROXYBUTYRATE 08/04/2022 0 1     Ammonia 08/04/2022 60 (A)    TSH 3RD GENERATON 08/04/2022 8 434 (A)    Ventricular Rate 08/04/2022 82     Atrial Rate 08/04/2022 82     TN Interval 08/04/2022 210     QRSD Interval 08/04/2022 72     QT Interval 08/04/2022 424     QTC Interval 08/04/2022 495     P Axis 08/04/2022 69     QRS Axis 08/04/2022 -43     T Wave Axis 08/04/2022 109     ph, Alfredo ISTAT 08/04/2022 7 425 (A)    pCO2, Alfredo i-STAT 08/04/2022 39 6 (A)    pO2, Alfredo i-STAT 08/04/2022 29 0 (A)    BE, i-STAT 08/04/2022 1     HCO3, Alfredo i-STAT 08/04/2022 25 9     CO2, i-STAT 08/04/2022 27     O2 Sat, i-STAT 08/04/2022 57 (A)    SODIUM, I-STAT 08/04/2022 140     Potassium, i-STAT 08/04/2022 4 2     Calcium, Ionized i-STAT 08/04/2022 1 15     Hct, i-STAT 08/04/2022 22 (A)    Hgb, i-STAT 08/04/2022 7 5 (A)    Glucose, i-STAT 08/04/2022 280 (A)    Specimen Type 08/04/2022 VENOUS     Free T4 08/04/2022 1 31     POC Glucose 08/05/2022 272 (A)    WBC 08/05/2022 3 45 (A)    RBC 08/05/2022 2 58 (A)    Hemoglobin 08/05/2022 7 1 (A)    Hematocrit 08/05/2022 23 5 (A)    MCV 08/05/2022 91     MCH 08/05/2022 27 5     MCHC 08/05/2022 30 2 (A)    RDW 08/05/2022 18 6 (A)    Platelets 60/23/3374 79 (A)    MPV 08/05/2022 12 8 (A)    Sodium 08/05/2022 141     Potassium 08/05/2022 4 4     Chloride 08/05/2022 106     CO2 08/05/2022 26     ANION GAP 08/05/2022 9     BUN 08/05/2022 38 (A)    Creatinine 08/05/2022 1 16     Glucose 08/05/2022 252 (A)    Calcium 08/05/2022 9 0     Corrected Calcium 08/05/2022 10 4 (A)    AST 08/05/2022 22     ALT 08/05/2022 22     Alkaline Phosphatase 08/05/2022 83     Total Protein 08/05/2022 5 8 (A)    Albumin 08/05/2022 2 3 (A)    Total Bilirubin 08/05/2022 0 60     eGFR 08/05/2022 46     Magnesium 08/05/2022 1 9     Phosphorus 08/05/2022 3 6     Color, UA 08/05/2022 Yellow     Clarity, UA 08/05/2022 Hazy     Specific [place], UA 08/05/2022 1 025     pH, UA 08/05/2022 5 5     Leukocytes, UA 08/05/2022 Negative     Nitrite, UA 08/05/2022 Negative     Protein, UA 08/05/2022 Negative     Glucose, UA 08/05/2022 250 (1/4%) (A)    Ketones, UA 08/05/2022 Negative     Urobilinogen, UA 08/05/2022 1 0     Bilirubin, UA 08/05/2022 Negative     Occult Blood, UA 08/05/2022 Negative     RBC, UA 08/05/2022 0-1 (A)    WBC, UA 08/05/2022 2-4     Epithelial Cells 08/05/2022 Occasional     Bacteria, UA 08/05/2022 Innumerable (A)     Imaging Studies: I have personally reviewed pertinent reports  EKG, Pathology, and Other Studies: I have personally reviewed pertinent reports  Counseling / Coordination of Care  Total floor / unit time spent today 20 minutes  Standing/Walking/Toileting

## (undated) DEVICE — INTRO SHEATH PEEL AWAY 7FR

## (undated) DEVICE — CATH GUIDING FIXED SHAPE 43CM -NC

## (undated) DEVICE — SLITTER ADJUSTABLE

## (undated) DEVICE — INTRO SHEATH PEEL AWAY 9 FR

## (undated) DEVICE — RADIFOCUS GLIDEWIRE: Brand: GLIDEWIRE